# Patient Record
Sex: MALE | Race: BLACK OR AFRICAN AMERICAN | NOT HISPANIC OR LATINO | Employment: FULL TIME | URBAN - METROPOLITAN AREA
[De-identification: names, ages, dates, MRNs, and addresses within clinical notes are randomized per-mention and may not be internally consistent; named-entity substitution may affect disease eponyms.]

---

## 2017-08-18 ENCOUNTER — HOSPITAL ENCOUNTER (INPATIENT)
Facility: HOSPITAL | Age: 32
LOS: 4 days | Discharge: HOME | DRG: 300 | End: 2017-08-24
Attending: EMERGENCY MEDICINE | Admitting: INTERNAL MEDICINE
Payer: COMMERCIAL

## 2017-08-18 ENCOUNTER — NURSE TRIAGE (OUTPATIENT)
Dept: ADMINISTRATIVE | Facility: CLINIC | Age: 32
End: 2017-08-18

## 2017-08-18 DIAGNOSIS — I82.4Y2 ACUTE DEEP VEIN THROMBOSIS (DVT) OF PROXIMAL VEIN OF LEFT LOWER EXTREMITY: Primary | ICD-10-CM

## 2017-08-18 DIAGNOSIS — M79.89 LEG SWELLING: ICD-10-CM

## 2017-08-18 LAB
ALBUMIN SERPL BCP-MCNC: 3.5 G/DL
ALP SERPL-CCNC: 94 U/L
ALT SERPL W/O P-5'-P-CCNC: 15 U/L
ANION GAP SERPL CALC-SCNC: 11 MMOL/L
APTT BLDCRRT: 24.7 SEC
AST SERPL-CCNC: 16 U/L
BASOPHILS # BLD AUTO: 0.01 K/UL
BASOPHILS NFR BLD: 0.1 %
BILIRUB SERPL-MCNC: 0.6 MG/DL
BUN SERPL-MCNC: 6 MG/DL
CALCIUM SERPL-MCNC: 9.4 MG/DL
CHLORIDE SERPL-SCNC: 98 MMOL/L
CO2 SERPL-SCNC: 26 MMOL/L
CREAT SERPL-MCNC: 1.1 MG/DL
DIFFERENTIAL METHOD: ABNORMAL
EOSINOPHIL # BLD AUTO: 0.1 K/UL
EOSINOPHIL NFR BLD: 0.8 %
ERYTHROCYTE [DISTWIDTH] IN BLOOD BY AUTOMATED COUNT: 12.2 %
EST. GFR  (AFRICAN AMERICAN): >60 ML/MIN/1.73 M^2
EST. GFR  (NON AFRICAN AMERICAN): >60 ML/MIN/1.73 M^2
GLUCOSE SERPL-MCNC: 410 MG/DL
HCT VFR BLD AUTO: 42.9 %
HGB BLD-MCNC: 14.8 G/DL
INR PPP: 1
LYMPHOCYTES # BLD AUTO: 2.5 K/UL
LYMPHOCYTES NFR BLD: 21.1 %
MCH RBC QN AUTO: 29.3 PG
MCHC RBC AUTO-ENTMCNC: 34.5 G/DL
MCV RBC AUTO: 85 FL
MONOCYTES # BLD AUTO: 0.9 K/UL
MONOCYTES NFR BLD: 7.5 %
NEUTROPHILS # BLD AUTO: 8.2 K/UL
NEUTROPHILS NFR BLD: 70.2 %
PLATELET # BLD AUTO: 197 K/UL
PMV BLD AUTO: 10.8 FL
POCT GLUCOSE: 364 MG/DL (ref 70–110)
POTASSIUM SERPL-SCNC: 3.8 MMOL/L
PROT SERPL-MCNC: 7.7 G/DL
PROTHROMBIN TIME: 10.7 SEC
RBC # BLD AUTO: 5.05 M/UL
SODIUM SERPL-SCNC: 135 MMOL/L
WBC # BLD AUTO: 11.69 K/UL

## 2017-08-18 PROCEDURE — 85025 COMPLETE CBC W/AUTO DIFF WBC: CPT

## 2017-08-18 PROCEDURE — 85730 THROMBOPLASTIN TIME PARTIAL: CPT

## 2017-08-18 PROCEDURE — 80053 COMPREHEN METABOLIC PANEL: CPT

## 2017-08-18 PROCEDURE — 82962 GLUCOSE BLOOD TEST: CPT

## 2017-08-18 PROCEDURE — 99291 CRITICAL CARE FIRST HOUR: CPT | Mod: 25

## 2017-08-18 PROCEDURE — G0378 HOSPITAL OBSERVATION PER HR: HCPCS

## 2017-08-18 PROCEDURE — 96365 THER/PROPH/DIAG IV INF INIT: CPT

## 2017-08-18 PROCEDURE — 85610 PROTHROMBIN TIME: CPT

## 2017-08-18 RX ORDER — HEPARIN SODIUM,PORCINE/D5W 25000/250
17 INTRAVENOUS SOLUTION INTRAVENOUS CONTINUOUS
Status: DISCONTINUED | OUTPATIENT
Start: 2017-08-19 | End: 2017-08-21

## 2017-08-18 RX ORDER — IBUPROFEN 200 MG
16 TABLET ORAL
Status: DISCONTINUED | OUTPATIENT
Start: 2017-08-19 | End: 2017-08-24 | Stop reason: HOSPADM

## 2017-08-18 RX ORDER — IBUPROFEN 200 MG
24 TABLET ORAL
Status: DISCONTINUED | OUTPATIENT
Start: 2017-08-19 | End: 2017-08-24 | Stop reason: HOSPADM

## 2017-08-18 RX ORDER — INSULIN ASPART 100 [IU]/ML
1-10 INJECTION, SOLUTION INTRAVENOUS; SUBCUTANEOUS
Status: DISCONTINUED | OUTPATIENT
Start: 2017-08-19 | End: 2017-08-24 | Stop reason: HOSPADM

## 2017-08-18 RX ORDER — GLUCAGON 1 MG
1 KIT INJECTION
Status: DISCONTINUED | OUTPATIENT
Start: 2017-08-19 | End: 2017-08-24 | Stop reason: HOSPADM

## 2017-08-18 RX ADMIN — HEPARIN SODIUM AND DEXTROSE 17 UNITS/KG/HR: 10000; 5 INJECTION INTRAVENOUS at 11:08

## 2017-08-19 PROBLEM — E11.9 TYPE 2 DIABETES MELLITUS WITHOUT COMPLICATION: Status: ACTIVE | Noted: 2017-08-19

## 2017-08-19 PROBLEM — E66.09 NON MORBID OBESITY DUE TO EXCESS CALORIES: Status: ACTIVE | Noted: 2017-08-19

## 2017-08-19 LAB
APTT BLDCRRT: 60.6 SEC
BASOPHILS # BLD AUTO: 0.02 K/UL
BASOPHILS NFR BLD: 0.2 %
DIFFERENTIAL METHOD: NORMAL
EOSINOPHIL # BLD AUTO: 0.1 K/UL
EOSINOPHIL NFR BLD: 0.9 %
ERYTHROCYTE [DISTWIDTH] IN BLOOD BY AUTOMATED COUNT: 12.2 %
HCT VFR BLD AUTO: 41.7 %
HGB BLD-MCNC: 14.3 G/DL
LYMPHOCYTES # BLD AUTO: 3.1 K/UL
LYMPHOCYTES NFR BLD: 28.5 %
MCH RBC QN AUTO: 29.2 PG
MCHC RBC AUTO-ENTMCNC: 34.3 G/DL
MCV RBC AUTO: 85 FL
MONOCYTES # BLD AUTO: 0.8 K/UL
MONOCYTES NFR BLD: 7.1 %
NEUTROPHILS # BLD AUTO: 6.8 K/UL
NEUTROPHILS NFR BLD: 63 %
PLATELET # BLD AUTO: 209 K/UL
PMV BLD AUTO: 11.4 FL
POCT GLUCOSE: 295 MG/DL (ref 70–110)
POCT GLUCOSE: 322 MG/DL (ref 70–110)
POCT GLUCOSE: 326 MG/DL (ref 70–110)
POCT GLUCOSE: 341 MG/DL (ref 70–110)
POCT GLUCOSE: 367 MG/DL (ref 70–110)
RBC # BLD AUTO: 4.9 M/UL
WBC # BLD AUTO: 10.77 K/UL

## 2017-08-19 PROCEDURE — 36415 COLL VENOUS BLD VENIPUNCTURE: CPT

## 2017-08-19 PROCEDURE — G0378 HOSPITAL OBSERVATION PER HR: HCPCS

## 2017-08-19 PROCEDURE — 25000003 PHARM REV CODE 250: Performed by: EMERGENCY MEDICINE

## 2017-08-19 PROCEDURE — 94761 N-INVAS EAR/PLS OXIMETRY MLT: CPT

## 2017-08-19 PROCEDURE — 63600175 PHARM REV CODE 636 W HCPCS: Performed by: INTERNAL MEDICINE

## 2017-08-19 PROCEDURE — 85730 THROMBOPLASTIN TIME PARTIAL: CPT

## 2017-08-19 PROCEDURE — 25500020 PHARM REV CODE 255: Performed by: INTERNAL MEDICINE

## 2017-08-19 PROCEDURE — 99223 1ST HOSP IP/OBS HIGH 75: CPT | Mod: ,,, | Performed by: INTERNAL MEDICINE

## 2017-08-19 PROCEDURE — 63600175 PHARM REV CODE 636 W HCPCS: Performed by: EMERGENCY MEDICINE

## 2017-08-19 PROCEDURE — 85025 COMPLETE CBC W/AUTO DIFF WBC: CPT

## 2017-08-19 RX ORDER — PANTOPRAZOLE SODIUM 40 MG/1
40 TABLET, DELAYED RELEASE ORAL DAILY
Status: DISCONTINUED | OUTPATIENT
Start: 2017-08-19 | End: 2017-08-23

## 2017-08-19 RX ORDER — MIRTAZAPINE 7.5 MG/1
7.5 TABLET, FILM COATED ORAL NIGHTLY
Status: DISCONTINUED | OUTPATIENT
Start: 2017-08-19 | End: 2017-08-24 | Stop reason: HOSPADM

## 2017-08-19 RX ORDER — MORPHINE SULFATE 2 MG/ML
2 INJECTION, SOLUTION INTRAMUSCULAR; INTRAVENOUS
Status: DISCONTINUED | OUTPATIENT
Start: 2017-08-19 | End: 2017-08-24 | Stop reason: HOSPADM

## 2017-08-19 RX ADMIN — HEPARIN SODIUM AND DEXTROSE 17 UNITS/KG/HR: 10000; 5 INJECTION INTRAVENOUS at 07:08

## 2017-08-19 RX ADMIN — INSULIN ASPART 3 UNITS: 100 INJECTION, SOLUTION INTRAVENOUS; SUBCUTANEOUS at 09:08

## 2017-08-19 RX ADMIN — MORPHINE SULFATE 2 MG: 2 INJECTION, SOLUTION INTRAMUSCULAR; INTRAVENOUS at 07:08

## 2017-08-19 RX ADMIN — HEPARIN SODIUM AND DEXTROSE 17 UNITS/KG/HR: 10000; 5 INJECTION INTRAVENOUS at 09:08

## 2017-08-19 RX ADMIN — MORPHINE SULFATE 2 MG: 2 INJECTION, SOLUTION INTRAMUSCULAR; INTRAVENOUS at 02:08

## 2017-08-19 RX ADMIN — IOHEXOL 100 ML: 350 INJECTION, SOLUTION INTRAVENOUS at 08:08

## 2017-08-19 RX ADMIN — INSULIN ASPART 4 UNITS: 100 INJECTION, SOLUTION INTRAVENOUS; SUBCUTANEOUS at 02:08

## 2017-08-19 RX ADMIN — INSULIN ASPART 10 UNITS: 100 INJECTION, SOLUTION INTRAVENOUS; SUBCUTANEOUS at 11:08

## 2017-08-19 RX ADMIN — MORPHINE SULFATE 2 MG: 2 INJECTION, SOLUTION INTRAMUSCULAR; INTRAVENOUS at 09:08

## 2017-08-19 RX ADMIN — MORPHINE SULFATE 2 MG: 2 INJECTION, SOLUTION INTRAMUSCULAR; INTRAVENOUS at 01:08

## 2017-08-19 RX ADMIN — HEPARIN SODIUM AND DEXTROSE 17 UNITS/KG/HR: 10000; 5 INJECTION INTRAVENOUS at 01:08

## 2017-08-19 RX ADMIN — PANTOPRAZOLE SODIUM 40 MG: 40 TABLET, DELAYED RELEASE ORAL at 08:08

## 2017-08-19 RX ADMIN — INSULIN ASPART 8 UNITS: 100 INJECTION, SOLUTION INTRAVENOUS; SUBCUTANEOUS at 04:08

## 2017-08-19 NOTE — TELEPHONE ENCOUNTER
Reason for Disposition   Already left for the hospital/clinic.    Protocols used: ST NO CONTACT OR DUPLICATE CONTACT CALL-A-AH  Patient is noted to be in the ED

## 2017-08-19 NOTE — ED PROVIDER NOTES
Encounter Date: 8/18/2017       History     Chief Complaint   Patient presents with    Leg Swelling     increased redness and swelling to L posterior knee. denies numbness and tingling. denies fever, chills. denies CP. denies SOB. denies recent trauma or injury.      Patient is a 32-year-old male who complains of diffuse swelling and pain of his left lower leg.  He denies trauma.  He says he began to have pain to the posterior aspect of his left knee a few days ago.  He then noted swelling extending down the lower leg.  Patient had an extended car trip, although says this was a couple of weeks ago.  He denies fever or chills.  No shortness of breath or chest pain.  He has no history of DVTs.      The history is provided by the patient.     Review of patient's allergies indicates:  No Known Allergies  Past Medical History:   Diagnosis Date    Diabetes mellitus     Hypertension      Past Surgical History:   Procedure Laterality Date    APPENDECTOMY       Family History   Problem Relation Age of Onset    Cancer Mother     Cancer Paternal Uncle     Cancer Paternal Grandfather     Cancer Maternal Grandfather      Social History   Substance Use Topics    Smoking status: Current Some Day Smoker     Packs/day: 1.00    Smokeless tobacco: Current User      Comment: per 2 weeks time    Alcohol use No     Review of Systems   Constitutional: Negative for fever.   Respiratory: Negative for chest tightness and shortness of breath.    Cardiovascular: Positive for leg swelling. Negative for chest pain.   Gastrointestinal: Negative for nausea and vomiting.   Skin: Negative for color change and rash.   Neurological: Negative for numbness.   All other systems reviewed and are negative.      Physical Exam     Initial Vitals [08/18/17 2051]   BP Pulse Resp Temp SpO2   (!) 163/94 (!) 112 18 99 °F (37.2 °C) 97 %      MAP       117         Physical Exam    Nursing note and vitals reviewed.  Constitutional: No distress.   HENT:    Head: Normocephalic and atraumatic.   Eyes: EOM are normal.   Neck: Normal range of motion. Neck supple.   Cardiovascular: Normal rate, regular rhythm and normal heart sounds.   Pulmonary/Chest: Breath sounds normal.   Musculoskeletal: Normal range of motion.   There is diffuse swelling with very mild tenderness of the left calf.  There are no palpable cords.  No erythema.  DP and PT pulses are palpable.   Neurological: He is alert and oriented to person, place, and time.   Skin: Skin is warm and dry. No erythema.   Psychiatric: His behavior is normal. Thought content normal.         ED Course   Critical Care  Date/Time: 8/18/2017 11:27 PM  Performed by: ROB MOORE  Authorized by: ROB MOORE   Direct patient critical care time: 12 minutes  Ordering / reviewing critical care time: 12 minutes  Documentation critical care time: 12 minutes  Consulting other physicians critical care time: 3 minutes  Total critical care time (exclusive of procedural time) : 39 minutes  Critical care was time spent personally by me on the following activities: examination of patient, obtaining history from patient or surrogate, ordering and review of radiographic studies and ordering and review of laboratory studies.        Labs Reviewed   POCT GLUCOSE - Abnormal; Notable for the following:        Result Value    POCT Glucose 364 (*)     All other components within normal limits        Imaging Results          US Lower Extremity Veins Left (Final result)  Result time 08/18/17 22:10:00    Final result by Meg Vazquez MD (08/18/17 22:10:00)                 Impression:      Left lower extremity deep venous thrombosis throughout the femoral, popliteal, and peroneal veins.      Findings were communicated from Dr. Vazquez to DR ROB MOORE at 22:08:57 on 08/18/17..        Electronically signed by: MEG VAZQUEZ MD  Date:     08/18/17  Time:    22:10              Narrative:    Reason for study: Rule out DVT      Comparison: None.    Technique: Routine bilateral lower extremity venous ultrasound was performed using grayscale and color flow doppler spectral analysis.    Findings:   There is thrombosis visualized within the left femoral vein, popliteal vein, and peroneal vein.  No evidence of clot involving the bilateral common femoral veins or left greater saphenous, posterior tibial, and anterior tibial veins.  No evidence of soft tissue mass or Baker's cyst.                                                   ED Course     Clinical Impression:   The primary encounter diagnosis was Acute deep vein thrombosis (DVT) of proximal vein of left lower extremity. A diagnosis of Leg swelling was also pertinent to this visit.                           Jaret Julian MD  08/18/17 3601

## 2017-08-19 NOTE — PLAN OF CARE
08/19/17 1736   Discharge Assessment   Assessment Type Discharge Planning Assessment   Assessment information obtained from? Patient   Prior to hospitilization cognitive status: Alert/Oriented   Prior to hospitalization functional status: Independent   Current cognitive status: Alert/Oriented   Current Functional Status: Independent   Arrived From home or self-care   Lives With child(danny), dependent   Able to Return to Prior Arrangements yes   Is patient able to care for self after discharge? Yes   How many people do you have in your home that can help with your care after discharge? 1   Who are your caregiver(s) and their phone number(s)? Chloé Mueller(wife)[phone]   Patient's perception of discharge disposition home or selfcare   Readmission Within The Last 30 Days no previous admission in last 30 days   Patient currently being followed by outpatient case management? No   Patient currently receives home health services? No   Does the patient currently use HME? No   Patient currently receives private duty nursing? No   Patient currently receives any other outside agency services? No   Equipment Currently Used at Home none   Do you have any problems affording any of your prescribed medications? No   Is the patient taking medications as prescribed? yes   Do you have any financial concerns preventing you from receiving the healthcare you need? No   Does the patient have transportation to healthcare appointments? Yes   Transportation Available family or friend will provide;car   On Dialysis? No   Does the patient receive services at the Coumadin Clinic? No   Are there any open cases? No   Discharge Plan A Home   Discharge Plan B Home with family   Patient/Family In Agreement With Plan yes   The pt's a  and states he's very very independent.

## 2017-08-19 NOTE — NURSING TRANSFER
Nursing Transfer Note      8/19/2017     Transfer From: ED to 470     Transfer via wheelchair    Transported by: PCT    Medicines sent: Heparin drip infusing @ 18.1 mL/hr     Chart send with patient: Yes    Notified: spouse at the bedside    Patient reassessed at: 8/19 @ 00:45    98.6 temp  HR 87  168/91  RR 19  99 % on RA      Upon arrival to floor: patient oriented to room, call bell in reach and bed in lowest position, fall contract signed, all safety precautions maintained.     Rn to continue to monitor.

## 2017-08-19 NOTE — H&P
[facility]  Cardiology  History and Physical     Patient Name: Kulwant Mueller Jr.  MRN: [id number]  Admission Date: 8/18/2017  Code Status: Full Code   Attending Provider: Madan Quintero  Primary Care Physician: Primary Doctor No  Principal Problem: L leg pain    Patient information was obtained from patient and chart    Subjective:     Chief Complaint:  Left leg pain    HPI:    32 year old male presenting with an unprovoked L leg DVT. He noted severe pain with edema. No pulmonary symptoms. He was started on IV heparin with improvement of edema but the pain 6/10 with palpation. No previous h/o VTE. Drove for 5 hours 2 weeks ago. He thinks his mother and aunt had VTEs in the past. No other medical problems. Intentional 100 lbs weight loss over the past year.        Past Medical History:   Diagnosis Date    Diabetes mellitus     Hypertension        Past Surgical History:   Procedure Laterality Date    APPENDECTOMY         Review of patient's allergies indicates:  No Known Allergies    No current facility-administered medications on file prior to encounter.      Current Outpatient Prescriptions on File Prior to Encounter   Medication Sig    INSULIN DETEMIR (LEVEMIR FLEXPEN SUBQ) Inject into the skin.    insulin lispro (HUMALOG) 100 unit/mL injection Inject into the skin 3 (three) times daily before meals.    mirtazapine (REMERON) 7.5 MG Tab Take 1 tablet (7.5 mg total) by mouth every evening.    pantoprazole (PROTONIX) 40 MG tablet Take 1 tablet (40 mg total) by mouth once daily.     Family History     Problem Relation (Age of Onset)    Cancer Mother, Paternal Uncle, Paternal Grandfather, Maternal Grandfather        Social History Main Topics    Smoking status: Current Some Day Smoker     Packs/day: 1.00    Smokeless tobacco: Current User      Comment: per 2 weeks time    Alcohol use No    Drug use: No    Sexual activity: Not on file     Review of Systems   Constitution: Negative for  diaphoresis, weakness, night sweats, weight gain and weight loss.   HENT: Negative for congestion.    Eyes: Negative for blurred vision, discharge and double vision.   Cardiovascular: Positive for leg swelling (left leg with pain). Negative for chest pain, claudication, cyanosis, dyspnea on exertion, irregular heartbeat, near-syncope, orthopnea, palpitations, paroxysmal nocturnal dyspnea and syncope.   Respiratory: Negative for cough, shortness of breath and wheezing.    Endocrine: Negative for cold intolerance, heat intolerance and polyphagia.   Hematologic/Lymphatic: Negative for adenopathy and bleeding problem. Does not bruise/bleed easily.   Skin: Negative for dry skin and nail changes.   Musculoskeletal: Negative for arthritis, back pain, falls, joint pain, myalgias and neck pain.   Gastrointestinal: Negative for bloating, abdominal pain, change in bowel habit and constipation.   Genitourinary: Negative for bladder incontinence, dysuria, flank pain, genital sores and missed menses.   Neurological: Negative for aphonia, brief paralysis, difficulty with concentration and dizziness.   Psychiatric/Behavioral: Negative for altered mental status and memory loss. The patient does not have insomnia.    Allergic/Immunologic: Negative for environmental allergies.     Objective:     Vital Signs (Most Recent):  Temp: 98.6 °F (37 °C) (08/19/17 1127)  Pulse: 94 (08/19/17 1127)  Resp: 20 (08/19/17 1127)  BP: (!) 140/90 (08/19/17 1127)  SpO2: 96 % (08/19/17 1141) Vital Signs (24h Range):  Temp:  [97 °F (36.1 °C)-99 °F (37.2 °C)] 98.6 °F (37 °C)  Pulse:  [] 94  Resp:  [18-20] 20  SpO2:  [96 %-99 %] 96 %  BP: (136-168)/(77-99) 140/90     Weight: 106.6 kg (235 lb)  Body mass index is 30.17 kg/m².    SpO2: 96 %  O2 Device (Oxygen Therapy): room air      Intake/Output Summary (Last 24 hours) at 08/19/17 1453  Last data filed at 08/19/17 1200   Gross per 24 hour   Intake              700 ml   Output             1052 ml   Net              -352 ml       Lines/Drains/Airways     Peripheral Intravenous Line                 Peripheral IV - Single Lumen 08/18/17 2229 Right Antecubital less than 1 day                Physical Exam   Constitutional: He is oriented to person, place, and time. He appears well-developed and well-nourished. He is not intubated.   HENT:   Head: Normocephalic and atraumatic.   Right Ear: External ear normal.   Left Ear: External ear normal.   Mouth/Throat: Oropharynx is clear and moist.   Eyes: Conjunctivae and EOM are normal. Pupils are equal, round, and reactive to light. Right eye exhibits no discharge. Left eye exhibits no discharge. No scleral icterus.   Neck: Normal range of motion. Neck supple. Normal carotid pulses, no hepatojugular reflux and no JVD present. Carotid bruit is not present. No tracheal deviation present. No thyromegaly present.   Cardiovascular: Normal rate, regular rhythm, S1 normal and S2 normal.   No extrasystoles are present. PMI is not displaced.  Exam reveals no gallop, no S3, no distant heart sounds, no friction rub and no midsystolic click.    No murmur heard.  Pulses:       Carotid pulses are 2+ on the right side, and 2+ on the left side.       Radial pulses are 2+ on the right side, and 2+ on the left side.        Femoral pulses are 2+ on the right side, and 2+ on the left side.       Popliteal pulses are 2+ on the right side, and 2+ on the left side.        Dorsalis pedis pulses are 2+ on the right side, and 2+ on the left side.        Posterior tibial pulses are 2+ on the right side, and 2+ on the left side.       L leg edema foot to ankle associated with pain to palpation         Pulmonary/Chest: Effort normal and breath sounds normal. No accessory muscle usage or stridor. No apnea, no tachypnea and no bradypnea. He is not intubated. No respiratory distress. He has no decreased breath sounds. He has no wheezes. He has no rales. He exhibits no tenderness and no bony tenderness.    Abdominal: He exhibits no distension, no pulsatile liver, no abdominal bruit, no ascites, no pulsatile midline mass and no mass. There is no tenderness. There is no rebound and no guarding.   Musculoskeletal: Normal range of motion. He exhibits no edema or tenderness.   Lymphadenopathy:     He has no cervical adenopathy.   Neurological: He is alert and oriented to person, place, and time. He has normal reflexes. No cranial nerve deficit. Coordination normal.   Skin: Skin is warm. No rash noted. No erythema. No pallor.     L calf warmer than R   Psychiatric: He has a normal mood and affect. His behavior is normal. Judgment and thought content normal.       Significant Labs:       LABS  CBC    Recent Labs  Lab 08/18/17 2229 08/19/17  0522   WBC 11.69 10.77   RBC 5.05 4.90   HGB 14.8 14.3   HCT 42.9 41.7    209   MCV 85 85   MCH 29.3 29.2   MCHC 34.5 34.3     BMP    Recent Labs  Lab 08/18/17 2229   *   K 3.8   CO2 26   CL 98   BUN 6   CREATININE 1.1   *       POCT-Glucose  POCT Glucose   Date Value Ref Range Status   08/19/2017 367 (H) 70 - 110 mg/dL Final   08/19/2017 341 (H) 70 - 110 mg/dL Final   08/19/2017 322 (H) 70 - 110 mg/dL Final   08/18/2017 364 (H) 70 - 110 mg/dL Final         Recent Labs  Lab 08/18/17  2229   CALCIUM 9.4     LFT    Recent Labs  Lab 08/18/17  2229   PROT 7.7   ALBUMIN 3.5   BILITOT 0.6   AST 16   ALKPHOS 94   ALT 15     GFR     COAGS    Recent Labs  Lab 08/18/17 2232 08/19/17  0521   INR 1.0  --    APTT 24.7 60.6*       LAST HbA1c  Lab Results   Component Value Date    HGBA1C 11.3 (H) 10/24/2016       Lipid panel  Lab Results   Component Value Date    CHOL 176 10/24/2016     Lab Results   Component Value Date    HDL 31 (L) 10/24/2016     Lab Results   Component Value Date    LDLCALC 121.2 10/24/2016     Lab Results   Component Value Date    TRIG 119 10/24/2016     Lab Results   Component Value Date    CHOLHDL 17.6 (L) 10/24/2016          Significant Imaging:      Imaging Results          US Lower Extremity Veins Left (Final result)  Result time 08/18/17 22:10:00    Final result by Meg Vazquez MD (08/18/17 22:10:00)                 Impression:      Left lower extremity deep venous thrombosis throughout the femoral, popliteal, and peroneal veins.      Findings were communicated from Dr. Vazquez to DR ROB MOORE at 22:08:57 on 08/18/17..        Electronically signed by: MEG VAZQUEZ MD  Date:     08/18/17  Time:    22:10              Narrative:    Reason for study: Rule out DVT     Comparison: None.    Technique: Routine bilateral lower extremity venous ultrasound was performed using grayscale and color flow doppler spectral analysis.    Findings:   There is thrombosis visualized within the left femoral vein, popliteal vein, and peroneal vein.  No evidence of clot involving the bilateral common femoral veins or left greater saphenous, posterior tibial, and anterior tibial veins.  No evidence of soft tissue mass or Baker's cyst.                                Assessment and Plan:     No notes have been filed under this hospital service.  Service: Cardiology      VTE Risk Mitigation         Ordered     Low Risk of VTE  Once      08/19/17 0150              Imp:      Acute L leg DVT -POP and femoral  DVT  Obesity  DM        Plan:    IV heparin  CT abdomen and pelvis   Rule out malignancy   Check patency of IVC and iliac veins        If there is no improvement with IV heparin we will consider thrombectomy  Consider venogram later        HgA1c  Weight loss  Lipid profile        Hypercoagulable work up later          Madan Quintero MD  Cardiology   [facility]

## 2017-08-19 NOTE — HOSPITAL COURSE
8/18/2017 Presented with LLE swelling and extreme pain. Underwent LE venous ultrasound with evidence of deep venous thrombosis throughout the femoral, popliteal, and peroneal veins. Started on IV Heparin drip and admitted to [facility] 8/19/2017 Continued swelling despite IV Heparin. 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 Evidence of possible worsening DVT despite IV Heparin. Plan for venogram tomorrow.   8/21/2017 Taken to the cath lab for venogram with left AT vein access was obtained under US guidance with placement of EKOS catheter at the level of CFV and therapy initiated with TPA at 1 mg/hr as well as Angiomax infusion 8/22/2017 EKOS catheter with TPA and Angiomax continued overnight with continued improvement in LLE swelling. Complaints of nausea overnight with several episodes of vomiting with no improvement with Zofran and Phenergan. Felt to be related to side effect of pain medication. Alk phos, total bili, AST and ALT normal. STAT KUB with no evidence of obstruction and abdominal ultrasound with no acute abnormalities. Amylase and lipase pending. Plan for removal of EKOS catheter at bedside rather than cath lab given n/v and given additional dose of IV Phenergan and started on Reglan

## 2017-08-19 NOTE — SUBJECTIVE & OBJECTIVE
Past Medical History:   Diagnosis Date    Diabetes mellitus     Hypertension        Past Surgical History:   Procedure Laterality Date    APPENDECTOMY         Review of patient's allergies indicates:  No Known Allergies    No current facility-administered medications on file prior to encounter.      Current Outpatient Prescriptions on File Prior to Encounter   Medication Sig    INSULIN DETEMIR (LEVEMIR FLEXPEN SUBQ) Inject into the skin.    insulin lispro (HUMALOG) 100 unit/mL injection Inject into the skin 3 (three) times daily before meals.    mirtazapine (REMERON) 7.5 MG Tab Take 1 tablet (7.5 mg total) by mouth every evening.    pantoprazole (PROTONIX) 40 MG tablet Take 1 tablet (40 mg total) by mouth once daily.     Family History     Problem Relation (Age of Onset)    Cancer Mother, Paternal Uncle, Paternal Grandfather, Maternal Grandfather        Social History Main Topics    Smoking status: Current Some Day Smoker     Packs/day: 1.00    Smokeless tobacco: Current User      Comment: per 2 weeks time    Alcohol use No    Drug use: No    Sexual activity: Not on file     Review of Systems   Constitution: Negative for diaphoresis, weakness, night sweats, weight gain and weight loss.   HENT: Negative for congestion.    Eyes: Negative for blurred vision, discharge and double vision.   Cardiovascular: Positive for leg swelling (left leg with pain). Negative for chest pain, claudication, cyanosis, dyspnea on exertion, irregular heartbeat, near-syncope, orthopnea, palpitations, paroxysmal nocturnal dyspnea and syncope.   Respiratory: Negative for cough, shortness of breath and wheezing.    Endocrine: Negative for cold intolerance, heat intolerance and polyphagia.   Hematologic/Lymphatic: Negative for adenopathy and bleeding problem. Does not bruise/bleed easily.   Skin: Negative for dry skin and nail changes.   Musculoskeletal: Negative for arthritis, back pain, falls, joint pain, myalgias and neck pain.    Gastrointestinal: Negative for bloating, abdominal pain, change in bowel habit and constipation.   Genitourinary: Negative for bladder incontinence, dysuria, flank pain, genital sores and missed menses.   Neurological: Negative for aphonia, brief paralysis, difficulty with concentration and dizziness.   Psychiatric/Behavioral: Negative for altered mental status and memory loss. The patient does not have insomnia.    Allergic/Immunologic: Negative for environmental allergies.     Objective:     Vital Signs (Most Recent):  Temp: 98.6 °F (37 °C) (08/19/17 1127)  Pulse: 94 (08/19/17 1127)  Resp: 20 (08/19/17 1127)  BP: (!) 140/90 (08/19/17 1127)  SpO2: 96 % (08/19/17 1141) Vital Signs (24h Range):  Temp:  [97 °F (36.1 °C)-99 °F (37.2 °C)] 98.6 °F (37 °C)  Pulse:  [] 94  Resp:  [18-20] 20  SpO2:  [96 %-99 %] 96 %  BP: (136-168)/(77-99) 140/90     Weight: 106.6 kg (235 lb)  Body mass index is 30.17 kg/m².    SpO2: 96 %  O2 Device (Oxygen Therapy): room air      Intake/Output Summary (Last 24 hours) at 08/19/17 1453  Last data filed at 08/19/17 1200   Gross per 24 hour   Intake              700 ml   Output             1052 ml   Net             -352 ml       Lines/Drains/Airways     Peripheral Intravenous Line                 Peripheral IV - Single Lumen 08/18/17 2229 Right Antecubital less than 1 day                Physical Exam   Constitutional: He is oriented to person, place, and time. He appears well-developed and well-nourished. He is not intubated.   HENT:   Head: Normocephalic and atraumatic.   Right Ear: External ear normal.   Left Ear: External ear normal.   Mouth/Throat: Oropharynx is clear and moist.   Eyes: Conjunctivae and EOM are normal. Pupils are equal, round, and reactive to light. Right eye exhibits no discharge. Left eye exhibits no discharge. No scleral icterus.   Neck: Normal range of motion. Neck supple. Normal carotid pulses, no hepatojugular reflux and no JVD present. Carotid bruit is not  present. No tracheal deviation present. No thyromegaly present.   Cardiovascular: Normal rate, regular rhythm, S1 normal and S2 normal.   No extrasystoles are present. PMI is not displaced.  Exam reveals no gallop, no S3, no distant heart sounds, no friction rub and no midsystolic click.    No murmur heard.  Pulses:       Carotid pulses are 2+ on the right side, and 2+ on the left side.       Radial pulses are 2+ on the right side, and 2+ on the left side.        Femoral pulses are 2+ on the right side, and 2+ on the left side.       Popliteal pulses are 2+ on the right side, and 2+ on the left side.        Dorsalis pedis pulses are 2+ on the right side, and 2+ on the left side.        Posterior tibial pulses are 2+ on the right side, and 2+ on the left side.       L leg edema foot to ankle associated with pain to palpation         Pulmonary/Chest: Effort normal and breath sounds normal. No accessory muscle usage or stridor. No apnea, no tachypnea and no bradypnea. He is not intubated. No respiratory distress. He has no decreased breath sounds. He has no wheezes. He has no rales. He exhibits no tenderness and no bony tenderness.   Abdominal: He exhibits no distension, no pulsatile liver, no abdominal bruit, no ascites, no pulsatile midline mass and no mass. There is no tenderness. There is no rebound and no guarding.   Musculoskeletal: Normal range of motion. He exhibits no edema or tenderness.   Lymphadenopathy:     He has no cervical adenopathy.   Neurological: He is alert and oriented to person, place, and time. He has normal reflexes. No cranial nerve deficit. Coordination normal.   Skin: Skin is warm. No rash noted. No erythema. No pallor.     L calf warmer than R   Psychiatric: He has a normal mood and affect. His behavior is normal. Judgment and thought content normal.       Significant Labs:       LABS  CBC    Recent Labs  Lab 08/18/17  2229 08/19/17  0522   WBC 11.69 10.77   RBC 5.05 4.90   HGB 14.8 14.3    HCT 42.9 41.7    209   MCV 85 85   MCH 29.3 29.2   MCHC 34.5 34.3     BMP    Recent Labs  Lab 08/18/17  2229   *   K 3.8   CO2 26   CL 98   BUN 6   CREATININE 1.1   *       POCT-Glucose  POCT Glucose   Date Value Ref Range Status   08/19/2017 367 (H) 70 - 110 mg/dL Final   08/19/2017 341 (H) 70 - 110 mg/dL Final   08/19/2017 322 (H) 70 - 110 mg/dL Final   08/18/2017 364 (H) 70 - 110 mg/dL Final         Recent Labs  Lab 08/18/17  2229   CALCIUM 9.4     LFT    Recent Labs  Lab 08/18/17  2229   PROT 7.7   ALBUMIN 3.5   BILITOT 0.6   AST 16   ALKPHOS 94   ALT 15     GFR     COAGS    Recent Labs  Lab 08/18/17 2232 08/19/17  0521   INR 1.0  --    APTT 24.7 60.6*       LAST HbA1c  Lab Results   Component Value Date    HGBA1C 11.3 (H) 10/24/2016       Lipid panel  Lab Results   Component Value Date    CHOL 176 10/24/2016     Lab Results   Component Value Date    HDL 31 (L) 10/24/2016     Lab Results   Component Value Date    LDLCALC 121.2 10/24/2016     Lab Results   Component Value Date    TRIG 119 10/24/2016     Lab Results   Component Value Date    CHOLHDL 17.6 (L) 10/24/2016          Significant Imaging:     Imaging Results          US Lower Extremity Veins Left (Final result)  Result time 08/18/17 22:10:00    Final result by Meg Vazquez MD (08/18/17 22:10:00)                 Impression:      Left lower extremity deep venous thrombosis throughout the femoral, popliteal, and peroneal veins.      Findings were communicated from Dr. Vazquez to DR ROB MOORE at 22:08:57 on 08/18/17..        Electronically signed by: MEG VAZQUEZ MD  Date:     08/18/17  Time:    22:10              Narrative:    Reason for study: Rule out DVT     Comparison: None.    Technique: Routine bilateral lower extremity venous ultrasound was performed using grayscale and color flow doppler spectral analysis.    Findings:   There is thrombosis visualized within the left femoral vein, popliteal vein, and peroneal vein.   No evidence of clot involving the bilateral common femoral veins or left greater saphenous, posterior tibial, and anterior tibial veins.  No evidence of soft tissue mass or Baker's cyst.

## 2017-08-19 NOTE — ED NOTES
Patient identifiers for Kulwant Mueller Jr. checked and correct.  LOC: The patient is awake, alert and aware of environment with an appropriate affect, the patient is oriented x 3 and speaking appropriately.  APPEARANCE: Patient uncomfortable and in no acute distress, patient is clean and well groomed, patient's clothing are properly fastened.  SKIN: The skin is warm and dry.  Swelling to left leg.  Pain behind left knee.  MUSKULOSKELETAL: Patient moving all extremities. + pedal pulse, good movement and sensation in toes.  RESPIRATORY: Airway is open and patent, respirations are spontaneous, patient has a normal effort and rate.

## 2017-08-19 NOTE — PLAN OF CARE
Problem: Patient Care Overview  Goal: Plan of Care Review  Outcome: Ongoing (interventions implemented as appropriate)  Pt VSS and NAD. Pt received bolus dose of heparin to continuous infusion, rate remains @ 18.1 ml/hr or 17 units/kg/hr. Morning APTT not resulted yet. Pt LLE elevated. Pt blood glucose elevated overnight, 327, pt received 4 units s/s aspart. Pt on bedrest. Pain moderately controlled. All safety precautions maintained.   Rn to continue to monitor.

## 2017-08-19 NOTE — HPI
32 year old male presenting with an unprovoked L leg DVT. He noted severe pain with edema. No pulmonary symptoms. He was started on IV heparin with improvement of edema but the pain 6/10 with palpation. No previous h/o VTE. Drove for 5 hours 2 weeks ago. He thinks his mother and aunt had VTEs in the past. No other medical problems. Intentional 100 lbs weight loss over the past year.

## 2017-08-20 LAB
ALBUMIN SERPL BCP-MCNC: 2.9 G/DL
ALP SERPL-CCNC: 83 U/L
ALT SERPL W/O P-5'-P-CCNC: 13 U/L
ANION GAP SERPL CALC-SCNC: 8 MMOL/L
APTT BLDCRRT: 48.7 SEC
AST SERPL-CCNC: 12 U/L
BASOPHILS # BLD AUTO: 0.01 K/UL
BASOPHILS NFR BLD: 0.1 %
BILIRUB SERPL-MCNC: 0.3 MG/DL
BUN SERPL-MCNC: 10 MG/DL
CALCIUM SERPL-MCNC: 8.9 MG/DL
CHLORIDE SERPL-SCNC: 102 MMOL/L
CHOLEST/HDLC SERPL: 4.8 {RATIO}
CO2 SERPL-SCNC: 28 MMOL/L
CREAT SERPL-MCNC: 1 MG/DL
DIFFERENTIAL METHOD: ABNORMAL
EOSINOPHIL # BLD AUTO: 0.1 K/UL
EOSINOPHIL NFR BLD: 0.9 %
ERYTHROCYTE [DISTWIDTH] IN BLOOD BY AUTOMATED COUNT: 12.2 %
EST. GFR  (AFRICAN AMERICAN): >60 ML/MIN/1.73 M^2
EST. GFR  (NON AFRICAN AMERICAN): >60 ML/MIN/1.73 M^2
ESTIMATED AVG GLUCOSE: 332 MG/DL
GLUCOSE SERPL-MCNC: 356 MG/DL
HBA1C MFR BLD HPLC: 13.2 %
HCT VFR BLD AUTO: 39.9 %
HDL/CHOLESTEROL RATIO: 20.7 %
HDLC SERPL-MCNC: 140 MG/DL
HDLC SERPL-MCNC: 29 MG/DL
HGB BLD-MCNC: 13.4 G/DL
LDLC SERPL CALC-MCNC: 83.4 MG/DL
LYMPHOCYTES # BLD AUTO: 2.7 K/UL
LYMPHOCYTES NFR BLD: 25.9 %
MCH RBC QN AUTO: 29.2 PG
MCHC RBC AUTO-ENTMCNC: 33.6 G/DL
MCV RBC AUTO: 87 FL
MONOCYTES # BLD AUTO: 0.8 K/UL
MONOCYTES NFR BLD: 7.8 %
NEUTROPHILS # BLD AUTO: 6.7 K/UL
NEUTROPHILS NFR BLD: 65.2 %
NONHDLC SERPL-MCNC: 111 MG/DL
PLATELET # BLD AUTO: 204 K/UL
PMV BLD AUTO: 10.7 FL
POCT GLUCOSE: 302 MG/DL (ref 70–110)
POCT GLUCOSE: 318 MG/DL (ref 70–110)
POCT GLUCOSE: 319 MG/DL (ref 70–110)
POCT GLUCOSE: 366 MG/DL (ref 70–110)
POTASSIUM SERPL-SCNC: 4 MMOL/L
PROT SERPL-MCNC: 6.7 G/DL
RBC # BLD AUTO: 4.59 M/UL
SODIUM SERPL-SCNC: 138 MMOL/L
TRIGL SERPL-MCNC: 138 MG/DL
WBC # BLD AUTO: 10.28 K/UL

## 2017-08-20 PROCEDURE — 83036 HEMOGLOBIN GLYCOSYLATED A1C: CPT

## 2017-08-20 PROCEDURE — 63600175 PHARM REV CODE 636 W HCPCS: Performed by: EMERGENCY MEDICINE

## 2017-08-20 PROCEDURE — 85730 THROMBOPLASTIN TIME PARTIAL: CPT

## 2017-08-20 PROCEDURE — G0378 HOSPITAL OBSERVATION PER HR: HCPCS

## 2017-08-20 PROCEDURE — 85025 COMPLETE CBC W/AUTO DIFF WBC: CPT

## 2017-08-20 PROCEDURE — 94761 N-INVAS EAR/PLS OXIMETRY MLT: CPT

## 2017-08-20 PROCEDURE — 80061 LIPID PANEL: CPT

## 2017-08-20 PROCEDURE — 63600175 PHARM REV CODE 636 W HCPCS: Performed by: INTERNAL MEDICINE

## 2017-08-20 PROCEDURE — 99233 SBSQ HOSP IP/OBS HIGH 50: CPT | Mod: ,,, | Performed by: INTERNAL MEDICINE

## 2017-08-20 PROCEDURE — 25000003 PHARM REV CODE 250: Performed by: EMERGENCY MEDICINE

## 2017-08-20 PROCEDURE — 80053 COMPREHEN METABOLIC PANEL: CPT

## 2017-08-20 PROCEDURE — 36415 COLL VENOUS BLD VENIPUNCTURE: CPT

## 2017-08-20 RX ADMIN — INSULIN ASPART 8 UNITS: 100 INJECTION, SOLUTION INTRAVENOUS; SUBCUTANEOUS at 09:08

## 2017-08-20 RX ADMIN — INSULIN ASPART 4 UNITS: 100 INJECTION, SOLUTION INTRAVENOUS; SUBCUTANEOUS at 10:08

## 2017-08-20 RX ADMIN — MORPHINE SULFATE 2 MG: 2 INJECTION, SOLUTION INTRAMUSCULAR; INTRAVENOUS at 06:08

## 2017-08-20 RX ADMIN — MORPHINE SULFATE 2 MG: 2 INJECTION, SOLUTION INTRAMUSCULAR; INTRAVENOUS at 10:08

## 2017-08-20 RX ADMIN — PANTOPRAZOLE SODIUM 40 MG: 40 TABLET, DELAYED RELEASE ORAL at 09:08

## 2017-08-20 RX ADMIN — MIRTAZAPINE 7.5 MG: 7.5 TABLET ORAL at 08:08

## 2017-08-20 RX ADMIN — HEPARIN SODIUM AND DEXTROSE 17 UNITS/KG/HR: 10000; 5 INJECTION INTRAVENOUS at 01:08

## 2017-08-20 RX ADMIN — INSULIN ASPART 10 UNITS: 100 INJECTION, SOLUTION INTRAVENOUS; SUBCUTANEOUS at 12:08

## 2017-08-20 RX ADMIN — MIRTAZAPINE 7.5 MG: 7.5 TABLET ORAL at 12:08

## 2017-08-20 RX ADMIN — INSULIN ASPART 8 UNITS: 100 INJECTION, SOLUTION INTRAVENOUS; SUBCUTANEOUS at 04:08

## 2017-08-20 RX ADMIN — MORPHINE SULFATE 2 MG: 2 INJECTION, SOLUTION INTRAMUSCULAR; INTRAVENOUS at 01:08

## 2017-08-20 NOTE — PLAN OF CARE
Problem: Patient Care Overview  Goal: Plan of Care Review  Outcome: Ongoing (interventions implemented as appropriate)  Continue to monitor oxygenation status

## 2017-08-20 NOTE — PLAN OF CARE
Performed pain assessment on pt.  At rest, pt stated pain is 5-6/10.  When ambulating in the room to restroom and back, pt stated pain was 8-9/10.  Pt ambulated around the nurse's station w/nurse.  Pt stated pain while ambulating around nurse's station was 9/10.

## 2017-08-20 NOTE — PROGRESS NOTES
Seen this am      Still c/o L calf pain  Edema is better      CT revealed L CIV compression consistent with May Thurner Syndrome        Vitals:    08/20/17 0734 08/20/17 0749 08/20/17 1149 08/20/17 1209   BP: 120/78  129/75    Pulse: 85  90    Resp: 18  20    Temp: 98.3 °F (36.8 °C)  98.6 °F (37 °C)    TempSrc: Oral  Oral    SpO2:  97%  97%   Weight:       Height:               LABS  CBC    Recent Labs  Lab 08/18/17 2229 08/19/17  0522 08/20/17  0440   WBC 11.69 10.77 10.28   RBC 5.05 4.90 4.59*   HGB 14.8 14.3 13.4*   HCT 42.9 41.7 39.9*    209 204   MCV 85 85 87   MCH 29.3 29.2 29.2   MCHC 34.5 34.3 33.6     BMP    Recent Labs  Lab 08/18/17 2229 08/20/17  0440   * 138   K 3.8 4.0   CO2 26 28   CL 98 102   BUN 6 10   CREATININE 1.1 1.0   * 356*       POCT-Glucose  POCT Glucose   Date Value Ref Range Status   08/20/2017 366 (H) 70 - 110 mg/dL Final   08/20/2017 318 (H) 70 - 110 mg/dL Final   08/19/2017 295 (H) 70 - 110 mg/dL Final   08/19/2017 326 (H) 70 - 110 mg/dL Final   08/19/2017 367 (H) 70 - 110 mg/dL Final   08/19/2017 341 (H) 70 - 110 mg/dL Final   08/19/2017 322 (H) 70 - 110 mg/dL Final   08/18/2017 364 (H) 70 - 110 mg/dL Final         Recent Labs  Lab 08/18/17 2229 08/20/17  0440   CALCIUM 9.4 8.9     LFT    Recent Labs  Lab 08/18/17 2229 08/20/17  0440   PROT 7.7 6.7   ALBUMIN 3.5 2.9*   BILITOT 0.6 0.3   AST 16 12   ALKPHOS 94 83   ALT 15 13     GFR     COAGS    Recent Labs  Lab 08/18/17 2232 08/19/17  0521 08/20/17  0440   INR 1.0  --   --    APTT 24.7 60.6* 48.7*       LAST HbA1c  Lab Results   Component Value Date    HGBA1C 13.2 (H) 08/20/2017       Lipid panel  Lab Results   Component Value Date    CHOL 140 08/20/2017    CHOL 176 10/24/2016     Lab Results   Component Value Date    HDL 29 (L) 08/20/2017    HDL 31 (L) 10/24/2016     Lab Results   Component Value Date    LDLCALC 83.4 08/20/2017    LDLCALC 121.2 10/24/2016     Lab Results   Component Value Date    TRIG 138  08/20/2017    TRIG 119 10/24/2016     Lab Results   Component Value Date    CHOLHDL 20.7 08/20/2017    CHOLHDL 17.6 (L) 10/24/2016            Imaging Results          CT Abdomen Pelvis W WO Contrast (Final result)  Result time 08/20/17 06:22:19   Procedure changed from CT Abdomen Pelvis With Contrast     Final result by Catarina Rojo MD (08/20/17 06:22:19)                 Impression:       1. Possible compression of the left common iliac vein by the right common iliac artery.  Given the patient's known left lower extremity DVT, this could represent May Thurner syndrome.    2. No filling defects within the IVC to suggest thrombosis in this patient with known lower extremity DVT.    3. Marked distention of the urinary bladder.  Clinical correlation recommended.  Slight asymmetric prominence of the left renal collecting system and proximal ureter, which tapers to normal caliber.  No evidence of obstructing stone, noting multiple pelvic phleboliths.    4. No evidence of abnormal enhancing hepatic lesion on today's examination.    5. Left gynecomastia.        Electronically signed by: CATARINA ROJO  Date:     08/20/17  Time:    06:22              Narrative:    TECHNIQUE: Oral contrast was used. Noncontrast CT images of the abdomen were obtained. Using IV contrast ( 100 cc Omni 350 IV),  images of the liver were obtained during the arterial phase. Standard portal venous phase abdominal CT scan was then performed. Delayed images of the liver also performed.      COMPARISON:Lower extremity ultrasound 8/19/2017    FINDINGS:   The visualized portions of the lungs, heart, and pericardium demonstrate no significant abnormalities.  There is left-sided gynecomastia.    The liver is at the upper limit of normal size.  No focal hepatic abnormality is identified on today's examination. The poral vein and splenic vein are patent. The gallbladder demonstrates no evidence of gallbladder wall thickening, dilatation, or surrounding  inflammatory changes.  No evidence of cholelithiasis.No evidence of intra or extrahepatic biliary ductal dilation. The spleen, stomach, pancreas, and adrenal glands demonstrate no significant abnormalities.    The kidneys are normal in size and location.The kidneys are normal in size and location concentrate and excrete contrast satisfactorily on delayed phase imaging.  There is an accessory left renal artery.  There is no evidence of nephrolithiasis.  There is asymmetric slight prominence of the left renal collecting system and ureter, which tapers to normal caliber distally.  There are multiple pelvic phleboliths.  The urinary bladder markedly distended, with smooth margins.  The prostate is unremarkable.    The visualized loops of large and small bowel demonstrate no evidence of obstruction or inflammatory change. There is no ascites. There is no free intraperitoneal air or portal venous gas.    The abdominal aorta is non-aneurysmal. Aortic branch vessels are patent.  Portal venous phase and delayed phase imaging, there no filling defects identified within the IVC to indicate thrombosis.  There is possible compression of the left common iliac vein by the right common iliac artery (please see axial series 5, images 58-62).  Given this patient's history of reported left lower extremity DVT, findings could represent May Thurner syndrome.  Please note that the patient's known left lower extremity DVT is not as well appreciated on today's exam.  Please refer to ultrasound report of 8/18/2017 for additional details.    The osseous structures demonstrate no acute abnormality.                             US Lower Extremity Veins Left (Final result)  Result time 08/18/17 22:10:00    Final result by Stephenie Patel MD (08/18/17 22:10:00)                 Impression:      Left lower extremity deep venous thrombosis throughout the femoral, popliteal, and peroneal veins.      Findings were communicated from Dr. Patel to   ROB MOORE at 22:08:57 on 08/18/17..        Electronically signed by: MEG VAZQUEZ MD  Date:     08/18/17  Time:    22:10              Narrative:    Reason for study: Rule out DVT     Comparison: None.    Technique: Routine bilateral lower extremity venous ultrasound was performed using grayscale and color flow doppler spectral analysis.    Findings:   There is thrombosis visualized within the left femoral vein, popliteal vein, and peroneal vein.  No evidence of clot involving the bilateral common femoral veins or left greater saphenous, posterior tibial, and anterior tibial veins.  No evidence of soft tissue mass or Baker's cyst.                                  Imp:        L femoral, popliteal, and infrapopliteal DVT with underlying May Thurner Syndrome  L calf pain with significant improvement of edema  Obesity  Poorly controlled DM        Plan:        Continue with IV heparin  If there is persistent with ambulation he will proceed with CDT   If he will ultimately need L CIV stent       He will have a hypercoagulable work up later  He will be on Xarelto as an outpatient

## 2017-08-20 NOTE — PLAN OF CARE
Problem: Patient Care Overview  Goal: Plan of Care Review  Outcome: Ongoing (interventions implemented as appropriate)  Plan of care discussed with patient. Safety measures maintained. Call bell in reach and bed alarm on.  Patient remains on heparin drip at 18.1cc/hr with aPTT to be rechecked in AM.  Left lower leg remains warm to touch, tender to palpation. Patient receiving prn morphine with pain relief.  No complaints of shortness of breath

## 2017-08-21 LAB
ALBUMIN SERPL BCP-MCNC: 2.9 G/DL
ALP SERPL-CCNC: 86 U/L
ALT SERPL W/O P-5'-P-CCNC: 13 U/L
ANION GAP SERPL CALC-SCNC: 10 MMOL/L
ANION GAP SERPL CALC-SCNC: 9 MMOL/L
APTT BLDCRRT: 41.4 SEC
AST SERPL-CCNC: 11 U/L
BASOPHILS # BLD AUTO: 0.02 K/UL
BASOPHILS NFR BLD: 0.2 %
BILIRUB SERPL-MCNC: 0.3 MG/DL
BUN SERPL-MCNC: 10 MG/DL
BUN SERPL-MCNC: 10 MG/DL
CALCIUM SERPL-MCNC: 8.9 MG/DL
CALCIUM SERPL-MCNC: 9.2 MG/DL
CHLORIDE SERPL-SCNC: 101 MMOL/L
CHLORIDE SERPL-SCNC: 101 MMOL/L
CO2 SERPL-SCNC: 27 MMOL/L
CO2 SERPL-SCNC: 27 MMOL/L
CREAT SERPL-MCNC: 0.9 MG/DL
CREAT SERPL-MCNC: 1 MG/DL
DIFFERENTIAL METHOD: ABNORMAL
EOSINOPHIL # BLD AUTO: 0.1 K/UL
EOSINOPHIL NFR BLD: 1.2 %
ERYTHROCYTE [DISTWIDTH] IN BLOOD BY AUTOMATED COUNT: 12.1 %
ERYTHROCYTE [DISTWIDTH] IN BLOOD BY AUTOMATED COUNT: 12.4 %
EST. GFR  (AFRICAN AMERICAN): >60 ML/MIN/1.73 M^2
EST. GFR  (AFRICAN AMERICAN): >60 ML/MIN/1.73 M^2
EST. GFR  (NON AFRICAN AMERICAN): >60 ML/MIN/1.73 M^2
EST. GFR  (NON AFRICAN AMERICAN): >60 ML/MIN/1.73 M^2
GLUCOSE SERPL-MCNC: 251 MG/DL
GLUCOSE SERPL-MCNC: 308 MG/DL
HCT VFR BLD AUTO: 41.3 %
HCT VFR BLD AUTO: 43.4 %
HGB BLD-MCNC: 13.8 G/DL
HGB BLD-MCNC: 14.6 G/DL
LYMPHOCYTES # BLD AUTO: 2.2 K/UL
LYMPHOCYTES NFR BLD: 23.2 %
MCH RBC QN AUTO: 28.9 PG
MCH RBC QN AUTO: 29.1 PG
MCHC RBC AUTO-ENTMCNC: 33.4 G/DL
MCHC RBC AUTO-ENTMCNC: 33.6 G/DL
MCV RBC AUTO: 86 FL
MCV RBC AUTO: 87 FL
MONOCYTES # BLD AUTO: 0.6 K/UL
MONOCYTES NFR BLD: 6.6 %
NEUTROPHILS # BLD AUTO: 6.5 K/UL
NEUTROPHILS NFR BLD: 68.6 %
PLATELET # BLD AUTO: 223 K/UL
PLATELET # BLD AUTO: 235 K/UL
PMV BLD AUTO: 10.6 FL
PMV BLD AUTO: 11.5 FL
POCT GLUCOSE: 238 MG/DL (ref 70–110)
POCT GLUCOSE: 273 MG/DL (ref 70–110)
POCT GLUCOSE: 286 MG/DL (ref 70–110)
POTASSIUM SERPL-SCNC: 3.8 MMOL/L
POTASSIUM SERPL-SCNC: 3.9 MMOL/L
PROT SERPL-MCNC: 7 G/DL
RBC # BLD AUTO: 4.78 M/UL
RBC # BLD AUTO: 5.01 M/UL
SODIUM SERPL-SCNC: 137 MMOL/L
SODIUM SERPL-SCNC: 138 MMOL/L
WBC # BLD AUTO: 9.4 K/UL
WBC # BLD AUTO: 9.65 K/UL

## 2017-08-21 PROCEDURE — 06HN33Z INSERTION OF INFUSION DEVICE INTO LEFT FEMORAL VEIN, PERCUTANEOUS APPROACH: ICD-10-PCS | Performed by: INTERNAL MEDICINE

## 2017-08-21 PROCEDURE — 37212 THROMBOLYTIC VENOUS THERAPY: CPT | Mod: LT,,, | Performed by: INTERNAL MEDICINE

## 2017-08-21 PROCEDURE — 80053 COMPREHEN METABOLIC PANEL: CPT

## 2017-08-21 PROCEDURE — 25000003 PHARM REV CODE 250: Performed by: INTERNAL MEDICINE

## 2017-08-21 PROCEDURE — 63600175 PHARM REV CODE 636 W HCPCS

## 2017-08-21 PROCEDURE — 85027 COMPLETE CBC AUTOMATED: CPT

## 2017-08-21 PROCEDURE — 25000003 PHARM REV CODE 250: Performed by: EMERGENCY MEDICINE

## 2017-08-21 PROCEDURE — 94761 N-INVAS EAR/PLS OXIMETRY MLT: CPT

## 2017-08-21 PROCEDURE — 63600175 PHARM REV CODE 636 W HCPCS: Performed by: INTERNAL MEDICINE

## 2017-08-21 PROCEDURE — 85025 COMPLETE CBC W/AUTO DIFF WBC: CPT

## 2017-08-21 PROCEDURE — 99152 MOD SED SAME PHYS/QHP 5/>YRS: CPT | Mod: ,,, | Performed by: INTERNAL MEDICINE

## 2017-08-21 PROCEDURE — 25500020 PHARM REV CODE 255

## 2017-08-21 PROCEDURE — 85730 THROMBOPLASTIN TIME PARTIAL: CPT

## 2017-08-21 PROCEDURE — 3E04317 INTRODUCTION OF OTHER THROMBOLYTIC INTO CENTRAL VEIN, PERCUTANEOUS APPROACH: ICD-10-PCS | Performed by: INTERNAL MEDICINE

## 2017-08-21 PROCEDURE — 80048 BASIC METABOLIC PNL TOTAL CA: CPT

## 2017-08-21 PROCEDURE — 20000000 HC ICU ROOM

## 2017-08-21 PROCEDURE — 63600175 PHARM REV CODE 636 W HCPCS: Performed by: EMERGENCY MEDICINE

## 2017-08-21 PROCEDURE — C1769 GUIDE WIRE: HCPCS

## 2017-08-21 PROCEDURE — 36415 COLL VENOUS BLD VENIPUNCTURE: CPT

## 2017-08-21 PROCEDURE — 25000003 PHARM REV CODE 250

## 2017-08-21 RX ORDER — DIPHENHYDRAMINE HCL 50 MG
50 CAPSULE ORAL ONCE
Status: DISCONTINUED | OUTPATIENT
Start: 2017-08-22 | End: 2017-08-24 | Stop reason: HOSPADM

## 2017-08-21 RX ADMIN — MIRTAZAPINE 7.5 MG: 7.5 TABLET ORAL at 09:08

## 2017-08-21 RX ADMIN — MORPHINE SULFATE 2 MG: 2 INJECTION, SOLUTION INTRAMUSCULAR; INTRAVENOUS at 10:08

## 2017-08-21 RX ADMIN — MORPHINE SULFATE 2 MG: 2 INJECTION, SOLUTION INTRAMUSCULAR; INTRAVENOUS at 08:08

## 2017-08-21 RX ADMIN — BIVALIRUDIN 0.25 MG/KG/HR: 250 INJECTION, POWDER, LYOPHILIZED, FOR SOLUTION INTRAVENOUS at 06:08

## 2017-08-21 RX ADMIN — MORPHINE SULFATE 2 MG: 2 INJECTION, SOLUTION INTRAMUSCULAR; INTRAVENOUS at 03:08

## 2017-08-21 RX ADMIN — HEPARIN SODIUM AND DEXTROSE 17 UNITS/KG/HR: 10000; 5 INJECTION INTRAVENOUS at 01:08

## 2017-08-21 RX ADMIN — INSULIN ASPART 2 UNITS: 100 INJECTION, SOLUTION INTRAVENOUS; SUBCUTANEOUS at 10:08

## 2017-08-21 RX ADMIN — INSULIN ASPART 6 UNITS: 100 INJECTION, SOLUTION INTRAVENOUS; SUBCUTANEOUS at 06:08

## 2017-08-21 RX ADMIN — MORPHINE SULFATE 2 MG: 2 INJECTION, SOLUTION INTRAMUSCULAR; INTRAVENOUS at 06:08

## 2017-08-21 NOTE — PLAN OF CARE
Problem: Patient Care Overview  Goal: Plan of Care Review  Plan of care reviewed. Heparin infusion continues. Medication with morphine x1 after pt ambulated. Telemetry monitoring in progress. No true red alarms noted.NSR noted with HR in the 70s -90. Instructed on NPO for possible Venogram today. Will continue to monitor.

## 2017-08-21 NOTE — NURSING
Pt arrived to  with cath lab team. Pt awake and responding to commands. Echos to L foot. Site intact. Pt attached to bedside monitor.

## 2017-08-21 NOTE — PROGRESS NOTES
Post Procedure Note: Catheter directed thrombolysis    The pt was brought to the cath lab and under sterile technique, LATV access was obtained under US guidance.EKOS catheter placed to the level of CFV and therapy initiated with alteplase at 1 mg/hr and angiomax infusion. Please see full report for details. The pt tolerated the procedure well without complications. Sheath sutured in place and pt transported to ICU.     Vitals:    08/21/17 0453 08/21/17 0710 08/21/17 0731 08/21/17 0816   BP: 135/84  134/84    BP Location:   Right arm    Patient Position:   Lying    Pulse: 81 77 88    Resp:   18    Temp: 98.6 °F (37 °C)  98.5 °F (36.9 °C)    TempSrc: Oral  Oral    SpO2:    99%   Weight:       Height:             Gen: NAD  Ext: 2+ fem/DP/PT pulses, no evidence of hematoma    Plan:  -Post cath care per protocol  -tPA at 1 mg/hr and angiomax infusion  -Likely relook tomorrow

## 2017-08-21 NOTE — PLAN OF CARE
Problem: Patient Care Overview  Goal: Plan of Care Review  PT on RA sats 99%. Will  Cont. To misti.

## 2017-08-21 NOTE — PLAN OF CARE
Problem: Patient Care Overview  Goal: Plan of Care Review  Sats 96% RA , will continue to monitor.

## 2017-08-21 NOTE — INTERVAL H&P NOTE
The patient has been examined and the H&P has been reviewed:    I concur with the findings and no changes have occurred since H&P was written.    Anesthesia/Surgery risks, benefits and alternative options discussed and understood by patient/family.          Active Hospital Problems    Diagnosis  POA    Non morbid obesity due to excess calories [E66.09]  Unknown    Type 2 diabetes mellitus without complication [E11.9]  Unknown    Acute deep vein thrombosis (DVT) of proximal vein of left lower extremity [I82.4Y2]  Yes      Resolved Hospital Problems    Diagnosis Date Resolved POA   No resolved problems to display.

## 2017-08-21 NOTE — PLAN OF CARE
TN went to meet with patient.  Patient not in room, in cath lab procedure.   Per notes, will transfer to ICU after procedure.   TN will follow-up with patient in ICU.     --Update: TN met with patient and family in room. No current discharge needs at this time, will continue to follow.     08/21/17 1208   Discharge Reassessment   Assessment Type Discharge Planning Reassessment   Describe the patient's ability to walk at the present time. No restrictions   Discharge plan remains the same: Yes   Provided patient/caregiver education on the expected discharge date and the discharge plan Yes   Discharge Plan A Home with family   Discharge Plan B Home with family;Home Health   Patient choice form signed by patient/caregiver N/A   Involved the patient/caregiver in establishing a new discharge plan: No     Nayeli Strickland RN  Transition Navigator  [phone]

## 2017-08-21 NOTE — PLAN OF CARE
Pt will be transferring to ICU once procedure is complete.  Report given to ADRIEL Blanco in ICU.  Nurse verbalized understanding.  Pt's belongings were sent to pt's assigned room in ICU.

## 2017-08-22 LAB
ALBUMIN SERPL BCP-MCNC: 2.8 G/DL
ALP SERPL-CCNC: 80 U/L
ALT SERPL W/O P-5'-P-CCNC: 10 U/L
ANION GAP SERPL CALC-SCNC: 9 MMOL/L
AST SERPL-CCNC: 12 U/L
BASOPHILS # BLD AUTO: 0.02 K/UL
BASOPHILS NFR BLD: 0.2 %
BILIRUB SERPL-MCNC: 0.5 MG/DL
BUN SERPL-MCNC: 11 MG/DL
CALCIUM SERPL-MCNC: 8.7 MG/DL
CHLORIDE SERPL-SCNC: 101 MMOL/L
CO2 SERPL-SCNC: 25 MMOL/L
CREAT SERPL-MCNC: 0.9 MG/DL
DIFFERENTIAL METHOD: ABNORMAL
EOSINOPHIL # BLD AUTO: 0.1 K/UL
EOSINOPHIL NFR BLD: 0.7 %
ERYTHROCYTE [DISTWIDTH] IN BLOOD BY AUTOMATED COUNT: 12.1 %
EST. GFR  (AFRICAN AMERICAN): >60 ML/MIN/1.73 M^2
EST. GFR  (NON AFRICAN AMERICAN): >60 ML/MIN/1.73 M^2
GLUCOSE SERPL-MCNC: 292 MG/DL
HCT VFR BLD AUTO: 41.7 %
HGB BLD-MCNC: 14.1 G/DL
LYMPHOCYTES # BLD AUTO: 1.8 K/UL
LYMPHOCYTES NFR BLD: 16.7 %
MCH RBC QN AUTO: 29.2 PG
MCHC RBC AUTO-ENTMCNC: 33.8 G/DL
MCV RBC AUTO: 86 FL
MONOCYTES # BLD AUTO: 0.6 K/UL
MONOCYTES NFR BLD: 5.8 %
NEUTROPHILS # BLD AUTO: 8.1 K/UL
NEUTROPHILS NFR BLD: 76.3 %
PLATELET # BLD AUTO: 212 K/UL
PMV BLD AUTO: 10.2 FL
POCT GLUCOSE: 184 MG/DL (ref 70–110)
POCT GLUCOSE: 199 MG/DL (ref 70–110)
POCT GLUCOSE: 254 MG/DL (ref 70–110)
POCT GLUCOSE: 279 MG/DL (ref 70–110)
POTASSIUM SERPL-SCNC: 4.1 MMOL/L
PROT SERPL-MCNC: 6.7 G/DL
RBC # BLD AUTO: 4.83 M/UL
SODIUM SERPL-SCNC: 135 MMOL/L
WBC # BLD AUTO: 10.57 K/UL

## 2017-08-22 PROCEDURE — 25000003 PHARM REV CODE 250: Performed by: INTERNAL MEDICINE

## 2017-08-22 PROCEDURE — 63600175 PHARM REV CODE 636 W HCPCS

## 2017-08-22 PROCEDURE — 80053 COMPREHEN METABOLIC PANEL: CPT

## 2017-08-22 PROCEDURE — 63600175 PHARM REV CODE 636 W HCPCS: Performed by: INTERNAL MEDICINE

## 2017-08-22 PROCEDURE — 99233 SBSQ HOSP IP/OBS HIGH 50: CPT | Mod: ,,, | Performed by: INTERNAL MEDICINE

## 2017-08-22 PROCEDURE — 20000000 HC ICU ROOM

## 2017-08-22 PROCEDURE — 25000003 PHARM REV CODE 250: Performed by: EMERGENCY MEDICINE

## 2017-08-22 PROCEDURE — 63600175 PHARM REV CODE 636 W HCPCS: Performed by: NURSE PRACTITIONER

## 2017-08-22 PROCEDURE — 36415 COLL VENOUS BLD VENIPUNCTURE: CPT

## 2017-08-22 PROCEDURE — 85025 COMPLETE CBC W/AUTO DIFF WBC: CPT

## 2017-08-22 RX ORDER — PROMETHAZINE HYDROCHLORIDE 25 MG/ML
INJECTION, SOLUTION INTRAMUSCULAR; INTRAVENOUS
Status: COMPLETED
Start: 2017-08-22 | End: 2017-08-22

## 2017-08-22 RX ORDER — ONDANSETRON 2 MG/ML
4 INJECTION INTRAMUSCULAR; INTRAVENOUS EVERY 6 HOURS PRN
Status: DISCONTINUED | OUTPATIENT
Start: 2017-08-22 | End: 2017-08-24 | Stop reason: HOSPADM

## 2017-08-22 RX ADMIN — MORPHINE SULFATE 2 MG: 2 INJECTION, SOLUTION INTRAMUSCULAR; INTRAVENOUS at 12:08

## 2017-08-22 RX ADMIN — BIVALIRUDIN 0.25 MG/KG/HR: 250 INJECTION, POWDER, LYOPHILIZED, FOR SOLUTION INTRAVENOUS at 04:08

## 2017-08-22 RX ADMIN — INSULIN ASPART 2 UNITS: 100 INJECTION, SOLUTION INTRAVENOUS; SUBCUTANEOUS at 05:08

## 2017-08-22 RX ADMIN — MORPHINE SULFATE 2 MG: 2 INJECTION, SOLUTION INTRAMUSCULAR; INTRAVENOUS at 07:08

## 2017-08-22 RX ADMIN — MORPHINE SULFATE 2 MG: 2 INJECTION, SOLUTION INTRAMUSCULAR; INTRAVENOUS at 03:08

## 2017-08-22 RX ADMIN — LORAZEPAM 1 MG: 2 INJECTION INTRAMUSCULAR; INTRAVENOUS at 09:08

## 2017-08-22 RX ADMIN — ONDANSETRON 4 MG: 2 INJECTION INTRAMUSCULAR; INTRAVENOUS at 01:08

## 2017-08-22 RX ADMIN — INSULIN ASPART 2 UNITS: 100 INJECTION, SOLUTION INTRAVENOUS; SUBCUTANEOUS at 12:08

## 2017-08-22 RX ADMIN — INSULIN DETEMIR 35 UNITS: 100 INJECTION, SOLUTION SUBCUTANEOUS at 01:08

## 2017-08-22 RX ADMIN — BIVALIRUDIN 0.25 MG/KG/HR: 250 INJECTION, POWDER, LYOPHILIZED, FOR SOLUTION INTRAVENOUS at 05:08

## 2017-08-22 RX ADMIN — PROMETHAZINE HYDROCHLORIDE 25 MG: 25 INJECTION INTRAMUSCULAR; INTRAVENOUS at 04:08

## 2017-08-22 RX ADMIN — PROMETHAZINE HYDROCHLORIDE 12.5 MG: 25 INJECTION, SOLUTION INTRAMUSCULAR; INTRAVENOUS at 07:08

## 2017-08-22 RX ADMIN — MORPHINE SULFATE 2 MG: 2 INJECTION, SOLUTION INTRAMUSCULAR; INTRAVENOUS at 05:08

## 2017-08-22 RX ADMIN — INSULIN ASPART 4 UNITS: 100 INJECTION, SOLUTION INTRAVENOUS; SUBCUTANEOUS at 06:08

## 2017-08-22 RX ADMIN — INSULIN ASPART 3 UNITS: 100 INJECTION, SOLUTION INTRAVENOUS; SUBCUTANEOUS at 10:08

## 2017-08-22 RX ADMIN — ALTEPLASE 10 MG: 2.2 INJECTION, POWDER, LYOPHILIZED, FOR SOLUTION INTRAVENOUS at 03:08

## 2017-08-22 RX ADMIN — MIRTAZAPINE 7.5 MG: 7.5 TABLET ORAL at 09:08

## 2017-08-22 RX ADMIN — MORPHINE SULFATE 2 MG: 2 INJECTION, SOLUTION INTRAMUSCULAR; INTRAVENOUS at 11:08

## 2017-08-22 RX ADMIN — ALTEPLASE 10 MG: 2.2 INJECTION, POWDER, LYOPHILIZED, FOR SOLUTION INTRAVENOUS at 07:08

## 2017-08-22 RX ADMIN — PANTOPRAZOLE SODIUM 40 MG: 40 TABLET, DELAYED RELEASE ORAL at 09:08

## 2017-08-22 RX ADMIN — ONDANSETRON 4 MG: 2 INJECTION INTRAMUSCULAR; INTRAVENOUS at 07:08

## 2017-08-22 NOTE — PLAN OF CARE
Problem: Patient Care Overview  Goal: Plan of Care Review  Outcome: Ongoing (interventions implemented as appropriate)  EKOS therapy remained intact during shift with concurrent administration of Cathflo and Angiomax. Patient experienced nausea throughout the day with minimal relief of symptoms from initial antiemetic Zofran. Promethazine administered with mild to moderate relief. Pt to remain NPO after midnight tonight in preparation for his return to the cath lab tomorrow. Controlled bleeding to nose observed x 1 event and Dr. Quintero notified. Pt resting at this time. Has poor appetite due to nausea and vomiting. VSS. RN to the bedside to monitor.

## 2017-08-22 NOTE — ASSESSMENT & PLAN NOTE
-extensive LLE DVT present with slight extension despite IV Heparin  -underwent venogram yesterday with successful access obtained to left AT with EKOS catheter placement and initiation of TPA along with Angiomax  -slight improvement in swelling to LLE with continued pain  -no s/s of compartment syndrome present  -will continue with EKOS catheter along with TPA and Angiomax today with plans for relook venogram tomorrow  -will need chronic anticoagulation and good candidate for NOACs-plan for initiation once TPA and EKOS catheter treatment complete  -will need further workup for hypercoaguable and possible stenting for May Thurner's syndrome

## 2017-08-22 NOTE — PLAN OF CARE
Problem: Patient Care Overview  Goal: Plan of Care Review  Outcome: Ongoing (interventions implemented as appropriate)  Patient is progressing toward goals. Pain controlled with prn meds. No signs of pressure ulcer. Blood glucose monitored. On EKOS machine. Bed in low position. Remains free from fall/injury. Call light within reach. Will continue to monitor.

## 2017-08-22 NOTE — PROGRESS NOTES
Pt vomited ~100ml of emesis with food pieces. Zofran administered. Nose bleeding observed with minimal bleeding, and stopped within one minute. Dr. Quintero notified and asked nursing to notify the team if bleeding is observed further. VSS. Comfort measures taken and patient told to notify nursing with any further complaints of nausea or bleeding.

## 2017-08-22 NOTE — SUBJECTIVE & OBJECTIVE
Review of Systems   Constitution: Negative for chills, decreased appetite, diaphoresis, fever and weakness.   Cardiovascular: Negative for chest pain, claudication, cyanosis, dyspnea on exertion, irregular heartbeat, leg swelling, near-syncope, orthopnea, palpitations, paroxysmal nocturnal dyspnea and syncope.   Respiratory: Negative for cough, hemoptysis, shortness of breath and wheezing.    Gastrointestinal: Negative for bloating, abdominal pain, constipation, diarrhea, melena, nausea and vomiting.   Neurological: Negative for dizziness.     Objective:     Vital Signs (Most Recent):  Temp: 98.5 °F (36.9 °C) (08/22/17 0715)  Pulse: 93 (08/22/17 1030)  Resp: 20 (08/22/17 1030)  BP: (!) 155/92 (08/22/17 1030)  SpO2: 98 % (08/22/17 1030) Vital Signs (24h Range):  Temp:  [98.5 °F (36.9 °C)-99.6 °F (37.6 °C)] 98.5 °F (36.9 °C)  Pulse:  [] 93  Resp:  [9-23] 20  SpO2:  [92 %-98 %] 98 %  BP: (109-173)/(52-92) 155/92     Weight: 107.1 kg (236 lb 1.8 oz)  Body mass index is 30.32 kg/m².     SpO2: 98 %  O2 Device (Oxygen Therapy): room air      Intake/Output Summary (Last 24 hours) at 08/22/17 1143  Last data filed at 08/22/17 1000   Gross per 24 hour   Intake           746.13 ml   Output              875 ml   Net          -128.87 ml       Lines/Drains/Airways     Peripheral Intravenous Line                 Peripheral IV - Single Lumen 08/21/17 Left Forearm 1 day                Physical Exam   Constitutional: He is oriented to person, place, and time. He appears well-developed and well-nourished. No distress.   Cardiovascular: Normal rate and regular rhythm.  Exam reveals no gallop.    No murmur heard.  Pulmonary/Chest: Effort normal and breath sounds normal. No respiratory distress. He has no wheezes.   Abdominal: Soft. Bowel sounds are normal. He exhibits no distension. There is no tenderness.   Musculoskeletal: He exhibits edema (1-2+ LLE edema present ).   Neurological: He is alert and oriented to person, place,  and time.   Skin: Skin is warm and dry.       Significant Labs:       Recent Labs  Lab 08/22/17  0356   *   K 4.1      CO2 25   BUN 11   CREATININE 0.9       Recent Labs  Lab 08/22/17 0356   WBC 10.57   RBC 4.83   HGB 14.1   HCT 41.7      MCV 86   MCH 29.2   MCHC 33.8

## 2017-08-22 NOTE — ASSESSMENT & PLAN NOTE
-HgbA1c 13.9 this admission  -on Levemir at home per home medication-will verify dose and order  -needs aggressive control of DM  -accuchecks AC & HS with SSI prn

## 2017-08-22 NOTE — PROGRESS NOTES
[facility]  Cardiology  Progress Note    Patient Name: Kulwant Mueller Jr.  MRN: [id number]  Admission Date: 8/18/2017  Hospital Length of Stay: 2 days  Code Status: Full Code   Attending Physician: Madan Quintero MD   Primary Care Physician: Dona Pineda MD  Expected Discharge Date:   Principal Problem:Acute deep vein thrombosis (DVT) of proximal vein of left lower extremity    Subjective:     Hospital Course:   8/18/2017 Presented with LLE swelling and extreme pain. Underwent LE venous ultrasound with evidence of deep venous thrombosis throughout the femoral, popliteal, and peroneal veins. Started on IV Heparin drip and admitted to [facility] 8/19/2017 Continued swelling despite IV Heparin. 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 Evidence of possible worsening DVT despite IV Heparin. Plan for venogram tomorrow.   8/21/2017 Taken to the cath lab for venogram with left AT vein access was obtained under US guidance with placement of EKOS catheter at the level of CFV and therapy initiated with TPA at 1 mg/hr as well as Angiomax infusion        Review of Systems   Constitution: Negative for chills, decreased appetite, diaphoresis, fever and weakness.   Cardiovascular: Negative for chest pain, claudication, cyanosis, dyspnea on exertion, irregular heartbeat, leg swelling, near-syncope, orthopnea, palpitations, paroxysmal nocturnal dyspnea and syncope.   Respiratory: Negative for cough, hemoptysis, shortness of breath and wheezing.    Gastrointestinal: Negative for bloating, abdominal pain, constipation, diarrhea, melena, nausea and  vomiting.   Neurological: Negative for dizziness.     Objective:     Vital Signs (Most Recent):  Temp: 98.5 °F (36.9 °C) (08/22/17 0715)  Pulse: 93 (08/22/17 1030)  Resp: 20 (08/22/17 1030)  BP: (!) 155/92 (08/22/17 1030)  SpO2: 98 % (08/22/17 1030) Vital Signs (24h Range):  Temp:  [98.5 °F (36.9 °C)-99.6 °F (37.6 °C)] 98.5 °F (36.9 °C)  Pulse:  [] 93  Resp:  [9-23] 20  SpO2:  [92 %-98 %] 98 %  BP: (109-173)/(52-92) 155/92     Weight: 107.1 kg (236 lb 1.8 oz)  Body mass index is 30.32 kg/m².     SpO2: 98 %  O2 Device (Oxygen Therapy): room air      Intake/Output Summary (Last 24 hours) at 08/22/17 1143  Last data filed at 08/22/17 1000   Gross per 24 hour   Intake           746.13 ml   Output              875 ml   Net          -128.87 ml       Lines/Drains/Airways     Peripheral Intravenous Line                 Peripheral IV - Single Lumen 08/21/17 Left Forearm 1 day                Physical Exam   Constitutional: He is oriented to person, place, and time. He appears well-developed and well-nourished. No distress.   Cardiovascular: Normal rate and regular rhythm.  Exam reveals no gallop.    No murmur heard.  Pulmonary/Chest: Effort normal and breath sounds normal. No respiratory distress. He has no wheezes.   Abdominal: Soft. Bowel sounds are normal. He exhibits no distension. There is no tenderness.   Musculoskeletal: He exhibits edema (1-2+ LLE edema present ).   Neurological: He is alert and oriented to person, place, and time.   Skin: Skin is warm and dry.       Significant Labs:       Recent Labs  Lab 08/22/17  0356   *   K 4.1      CO2 25   BUN 11   CREATININE 0.9       Recent Labs  Lab 08/22/17  0356   WBC 10.57   RBC 4.83   HGB 14.1   HCT 41.7      MCV 86   MCH 29.2   MCHC 33.8           Assessment and Plan:     Brief HPI: Seen this morning on rounds with family at the bedside. Complains of LE pain controlled with pain medication. Discussed POC with patient and family at bedside as  detailed below-verbalized understanding and agrees with POC    Type 2 diabetes mellitus without complication    -HgbA1c 13.9 this admission  -on Levemir at home per home medication-will verify dose and order  -needs aggressive control of DM  -accuchecks AC & HS with SSI prn         * Acute deep vein thrombosis (DVT) of proximal vein of left lower extremity    -extensive LLE DVT present with slight extension despite IV Heparin  -underwent venogram yesterday with successful access obtained to left AT with EKOS catheter placement and initiation of TPA along with Angiomax  -slight improvement in swelling to LLE with continued pain  -no s/s of compartment syndrome present  -will continue with EKOS catheter along with TPA and Angiomax today with plans for relook venogram tomorrow  -will need chronic anticoagulation and good candidate for NOACs-plan for initiation once TPA and EKOS catheter treatment complete  -will need further workup for hypercoaguable and possible stenting for May Thurner's syndrome             VTE Risk Mitigation         Ordered     Low Risk of VTE  Once      08/19/17 0150          CHARLENE Moran, ANP  Cardiology  [facility]

## 2017-08-23 PROBLEM — R11.2 NAUSEA AND VOMITING: Status: ACTIVE | Noted: 2017-08-23

## 2017-08-23 LAB
ALBUMIN SERPL BCP-MCNC: 3 G/DL
ALP SERPL-CCNC: 88 U/L
ALT SERPL W/O P-5'-P-CCNC: 10 U/L
AMYLASE SERPL-CCNC: 39 U/L
ANION GAP SERPL CALC-SCNC: 11 MMOL/L
AST SERPL-CCNC: 12 U/L
BASOPHILS # BLD AUTO: 0.01 K/UL
BASOPHILS NFR BLD: 0.1 %
BILIRUB SERPL-MCNC: 0.6 MG/DL
BUN SERPL-MCNC: 9 MG/DL
CALCIUM SERPL-MCNC: 9.1 MG/DL
CHLORIDE SERPL-SCNC: 100 MMOL/L
CO2 SERPL-SCNC: 28 MMOL/L
CREAT SERPL-MCNC: 0.9 MG/DL
DIFFERENTIAL METHOD: ABNORMAL
EOSINOPHIL # BLD AUTO: 0 K/UL
EOSINOPHIL NFR BLD: 0 %
ERYTHROCYTE [DISTWIDTH] IN BLOOD BY AUTOMATED COUNT: 11.9 %
EST. GFR  (AFRICAN AMERICAN): >60 ML/MIN/1.73 M^2
EST. GFR  (NON AFRICAN AMERICAN): >60 ML/MIN/1.73 M^2
GLUCOSE SERPL-MCNC: 247 MG/DL
HCT VFR BLD AUTO: 42.1 %
HGB BLD-MCNC: 14.6 G/DL
LIPASE SERPL-CCNC: 8 U/L
LYMPHOCYTES # BLD AUTO: 1.3 K/UL
LYMPHOCYTES NFR BLD: 11.4 %
MAGNESIUM SERPL-MCNC: 1.9 MG/DL
MCH RBC QN AUTO: 29.6 PG
MCHC RBC AUTO-ENTMCNC: 34.7 G/DL
MCV RBC AUTO: 85 FL
MONOCYTES # BLD AUTO: 0.8 K/UL
MONOCYTES NFR BLD: 6.6 %
NEUTROPHILS # BLD AUTO: 9.6 K/UL
NEUTROPHILS NFR BLD: 81.7 %
PLATELET # BLD AUTO: 200 K/UL
PMV BLD AUTO: 10.3 FL
POCT GLUCOSE: 187 MG/DL (ref 70–110)
POCT GLUCOSE: 194 MG/DL (ref 70–110)
POCT GLUCOSE: 196 MG/DL (ref 70–110)
POCT GLUCOSE: 202 MG/DL (ref 70–110)
POCT GLUCOSE: 226 MG/DL (ref 70–110)
POTASSIUM SERPL-SCNC: 4.2 MMOL/L
PROT SERPL-MCNC: 7.1 G/DL
RBC # BLD AUTO: 4.94 M/UL
SODIUM SERPL-SCNC: 139 MMOL/L
WBC # BLD AUTO: 11.73 K/UL

## 2017-08-23 PROCEDURE — 25000003 PHARM REV CODE 250: Performed by: NURSE PRACTITIONER

## 2017-08-23 PROCEDURE — 80053 COMPREHEN METABOLIC PANEL: CPT

## 2017-08-23 PROCEDURE — 25000003 PHARM REV CODE 250: Performed by: INTERNAL MEDICINE

## 2017-08-23 PROCEDURE — 85025 COMPLETE CBC W/AUTO DIFF WBC: CPT

## 2017-08-23 PROCEDURE — 63600175 PHARM REV CODE 636 W HCPCS: Performed by: INTERNAL MEDICINE

## 2017-08-23 PROCEDURE — 63600175 PHARM REV CODE 636 W HCPCS: Performed by: NURSE PRACTITIONER

## 2017-08-23 PROCEDURE — 36415 COLL VENOUS BLD VENIPUNCTURE: CPT

## 2017-08-23 PROCEDURE — 63600175 PHARM REV CODE 636 W HCPCS

## 2017-08-23 PROCEDURE — S0028 INJECTION, FAMOTIDINE, 20 MG: HCPCS | Performed by: NURSE PRACTITIONER

## 2017-08-23 PROCEDURE — 83690 ASSAY OF LIPASE: CPT

## 2017-08-23 PROCEDURE — 99233 SBSQ HOSP IP/OBS HIGH 50: CPT | Mod: ,,, | Performed by: INTERNAL MEDICINE

## 2017-08-23 PROCEDURE — 25500020 PHARM REV CODE 255

## 2017-08-23 PROCEDURE — 83735 ASSAY OF MAGNESIUM: CPT

## 2017-08-23 PROCEDURE — 94761 N-INVAS EAR/PLS OXIMETRY MLT: CPT

## 2017-08-23 PROCEDURE — 82150 ASSAY OF AMYLASE: CPT

## 2017-08-23 PROCEDURE — 25000003 PHARM REV CODE 250

## 2017-08-23 PROCEDURE — 25000003 PHARM REV CODE 250: Performed by: EMERGENCY MEDICINE

## 2017-08-23 PROCEDURE — 20000000 HC ICU ROOM

## 2017-08-23 RX ORDER — METOCLOPRAMIDE HYDROCHLORIDE 5 MG/ML
5 INJECTION INTRAMUSCULAR; INTRAVENOUS EVERY 8 HOURS PRN
Status: DISCONTINUED | OUTPATIENT
Start: 2017-08-23 | End: 2017-08-24 | Stop reason: HOSPADM

## 2017-08-23 RX ORDER — PROMETHAZINE HYDROCHLORIDE 6.25 MG/5ML
25 SYRUP ORAL ONCE
Status: COMPLETED | OUTPATIENT
Start: 2017-08-23 | End: 2017-08-23

## 2017-08-23 RX ORDER — TRAMADOL HYDROCHLORIDE 50 MG/1
50 TABLET ORAL EVERY 6 HOURS PRN
Status: DISCONTINUED | OUTPATIENT
Start: 2017-08-23 | End: 2017-08-24 | Stop reason: HOSPADM

## 2017-08-23 RX ORDER — FAMOTIDINE 10 MG/ML
20 INJECTION INTRAVENOUS 2 TIMES DAILY
Status: DISCONTINUED | OUTPATIENT
Start: 2017-08-23 | End: 2017-08-24 | Stop reason: HOSPADM

## 2017-08-23 RX ADMIN — FAMOTIDINE 20 MG: 10 INJECTION, SOLUTION INTRAVENOUS at 08:08

## 2017-08-23 RX ADMIN — BIVALIRUDIN 0.25 MG/KG/HR: 250 INJECTION, POWDER, LYOPHILIZED, FOR SOLUTION INTRAVENOUS at 03:08

## 2017-08-23 RX ADMIN — TRAMADOL HYDROCHLORIDE 50 MG: 50 TABLET, COATED ORAL at 06:08

## 2017-08-23 RX ADMIN — FAMOTIDINE 20 MG: 10 INJECTION, SOLUTION INTRAVENOUS at 11:08

## 2017-08-23 RX ADMIN — RIVAROXABAN 15 MG: 15 TABLET, FILM COATED ORAL at 06:08

## 2017-08-23 RX ADMIN — PROMETHAZINE HYDROCHLORIDE 12.5 MG: 25 INJECTION, SOLUTION INTRAMUSCULAR; INTRAVENOUS at 10:08

## 2017-08-23 RX ADMIN — PROMETHAZINE HYDROCHLORIDE 25 MG: 6.25 SOLUTION ORAL at 09:08

## 2017-08-23 RX ADMIN — ONDANSETRON 4 MG: 2 INJECTION INTRAMUSCULAR; INTRAVENOUS at 07:08

## 2017-08-23 RX ADMIN — INSULIN ASPART 4 UNITS: 100 INJECTION, SOLUTION INTRAVENOUS; SUBCUTANEOUS at 07:08

## 2017-08-23 RX ADMIN — INSULIN DETEMIR 35 UNITS: 100 INJECTION, SOLUTION SUBCUTANEOUS at 11:08

## 2017-08-23 RX ADMIN — ONDANSETRON 4 MG: 2 INJECTION INTRAMUSCULAR; INTRAVENOUS at 03:08

## 2017-08-23 RX ADMIN — PROMETHAZINE HYDROCHLORIDE 25 MG: 25 INJECTION, SOLUTION INTRAMUSCULAR; INTRAVENOUS at 12:08

## 2017-08-23 RX ADMIN — INSULIN ASPART 1 UNITS: 100 INJECTION, SOLUTION INTRAVENOUS; SUBCUTANEOUS at 10:08

## 2017-08-23 RX ADMIN — INSULIN ASPART 2 UNITS: 100 INJECTION, SOLUTION INTRAVENOUS; SUBCUTANEOUS at 06:08

## 2017-08-23 RX ADMIN — MIRTAZAPINE 7.5 MG: 7.5 TABLET ORAL at 08:08

## 2017-08-23 NOTE — NURSING
Swapna, with [facility] team notified about patient increased vomiting, which causes patient heart rate to climb to the 120's and blood pressure to increase (200/100), patient received Zofran but still has no relief from vomiting, Promethazine given orally. Patient refuses 2nd IV, to infuse Promethazine IV over 20 minutes,Per new order, will continue to monitor to see if oral Promethazine helps.

## 2017-08-23 NOTE — ASSESSMENT & PLAN NOTE
-extensive LLE DVT involving femoral, popliteal, peroneal and PT veins  -angiogram with EKOS catheter placement with administration of TPA and Angiomax  -LLE swelling improving  -plan for relook angiogram today deferred secondary to nausea and vomiting  -EKOS catheter to be removed bedside with transition to oral AC   -will plan to start chronic anticoagulation this afternoon after discontinuation of TPA/Angiomax and removal of IV access  -will need further workup for hypercoaguable and  stenting for May Thurner's syndrome at a later date as an outpatient

## 2017-08-23 NOTE — PROGRESS NOTES
Cath lab procedure cancelled due to intractable nausea and vomiting. Will plan for removal of EKOS, catheter, and sheath at the bedside in ICU.

## 2017-08-23 NOTE — PLAN OF CARE
Problem: Patient Care Overview  Goal: Plan of Care Review  Outcome: Ongoing (interventions implemented as appropriate)  Pt on RA with sats of 96. Will continue to monitor.

## 2017-08-23 NOTE — NURSING
Swapna RIVERO, Notified about patient minor nose bleed, no clots were noted, and only a small amount was expelled, no other signs of distress, will continue to monitor.

## 2017-08-23 NOTE — ASSESSMENT & PLAN NOTE
-likely related to pain medication  -KUB negative; LFTs normal; amylase and lipase pending  -abdominal ultrasound with no acute abnormalities  -will de escalate pain medications once EKOS catheter removal and monitor n/v  -other possible etiology is gastroparesis given poor controlled DM-will start on Reglan

## 2017-08-23 NOTE — PROGRESS NOTES
[facility]  Cardiology  Progress Note    Patient Name: Kulwant Mueller Jr.  MRN: [id number]  Admission Date: 8/18/2017  Hospital Length of Stay: 3 days  Code Status: Full Code   Attending Physician: Madan Quintero MD   Primary Care Physician: Dona Pineda MD  Expected Discharge Date:   Principal Problem:Acute deep vein thrombosis (DVT) of proximal vein of left lower extremity    Subjective:     Hospital Course:   8/18/2017 Presented with LLE swelling and extreme pain. Underwent LE venous ultrasound with evidence of deep venous thrombosis throughout the femoral, popliteal, and peroneal veins. Started on IV Heparin drip and admitted to [facility] 8/19/2017 Continued swelling despite IV Heparin. 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 Evidence of possible worsening DVT despite IV Heparin. Plan for venogram tomorrow.   8/21/2017 Taken to the cath lab for venogram with left AT vein access was obtained under US guidance with placement of EKOS catheter at the level of CFV and therapy initiated with TPA at 1 mg/hr as well as Angiomax infusion 8/22/2017 EKOS catheter with TPA and Angiomax continued overnight with continued improvement in LLE swelling. Complaints of nausea overnight with several episodes of vomiting with no improvement with Zofran and Phenergan. Felt to be related to side effect of pain medication. Alk phos, total bili, AST and ALT normal. STAT KUB with no evidence of obstruction and abdominal ultrasound with no acute abnormalities. Amylase and lipase pending. Plan for removal of EKOS catheter at bedside rather than cath  lab given n/v and given additional dose of IV Phenergan and started on Reglan         Review of Systems   Constitution: Negative for chills, decreased appetite, diaphoresis, fever and weakness.   Cardiovascular: Negative for chest pain, claudication, cyanosis, dyspnea on exertion, irregular heartbeat, leg swelling, near-syncope, orthopnea, palpitations, paroxysmal nocturnal dyspnea and syncope.   Respiratory: Negative for cough, hemoptysis, shortness of breath and wheezing.    Gastrointestinal: Positive for nausea and vomiting. Negative for bloating, abdominal pain, constipation, diarrhea and melena.   Neurological: Negative for dizziness.     Objective:     Vital Signs (Most Recent):  Temp: 98.9 °F (37.2 °C) (08/23/17 1115)  Pulse: 101 (08/23/17 1145)  Resp: 20 (08/23/17 1145)  BP: (!) 196/105 (08/23/17 1130)  SpO2: 97 % (08/23/17 1145) Vital Signs (24h Range):  Temp:  [98.1 °F (36.7 °C)-99.1 °F (37.3 °C)] 98.9 °F (37.2 °C)  Pulse:  [] 101  Resp:  [7-33] 20  SpO2:  [94 %-97 %] 97 %  BP: (111-197)/() 196/105     Weight: 106 kg (233 lb 11 oz)  Body mass index is 30 kg/m².     SpO2: 97 %  O2 Device (Oxygen Therapy): room air      Intake/Output Summary (Last 24 hours) at 08/23/17 1306  Last data filed at 08/23/17 0000   Gross per 24 hour   Intake            593.6 ml   Output             3325 ml   Net          -2731.4 ml       Lines/Drains/Airways     Peripheral Intravenous Line                 Peripheral IV - Single Lumen 08/21/17 Left Forearm 2 days                Physical Exam   Constitutional: He is oriented to person, place, and time. He appears well-developed and well-nourished. No distress.   Cardiovascular: Normal rate and regular rhythm.  Exam reveals no gallop.    No murmur heard.  Pulmonary/Chest: Effort normal and breath sounds normal. No respiratory distress. He has no wheezes.   Abdominal: Soft. Bowel sounds are normal. He exhibits no distension. There is no tenderness.   Musculoskeletal: He  exhibits edema (1-2+ LLE edema present ).   Neurological: He is alert and oriented to person, place, and time.   Skin: Skin is warm and dry.       Significant Labs:       Recent Labs  Lab 08/23/17  0331   WBC 11.73   RBC 4.94   HGB 14.6   HCT 42.1      MCV 85   MCH 29.6   MCHC 34.7       Recent Labs  Lab 08/23/17  0331 08/23/17  1003     --    K 4.2  --      --    CO2 28  --    BUN 9  --    CREATININE 0.9  --    MG  --  1.9       Significant Imaging: Echocardiogram: 2D echo with color flow doppler: No results found for this or any previous visit.    Assessment and Plan:     Brief HPI: Seen this morning on AM NP rounds with wife at the bedside. Complains of nausea and vomiting overnight with no relief with IV or po medications and denies abdominal pain. Discussed POC with patient and wife as detailed below-verbalized understanding and agree with POC     Nausea and vomiting    -likely related to pain medication  -KUB negative; LFTs normal; amylase and lipase pending  -abdominal ultrasound with no acute abnormalities  -will de escalate pain medications once EKOS catheter removal and monitor n/v  -other possible etiology is gastroparesis given poor controlled DM-will start on Reglan        Type 2 diabetes mellitus without complication    -HgbA1c 13.9 this admission  -[id number] overnight  -on Levemir 70units daily at home with Novolog 30 units TID with meals  -given half dose of Levemir yesterday given decreased po intake; will continue current dose for now given poor po intake due to nausea and vomiting  -accuchecks AC & HS with SSI prn         * Acute deep vein thrombosis (DVT) of proximal vein of left lower extremity    -extensive LLE DVT involving femoral, popliteal, peroneal and PT veins  -angiogram with EKOS catheter placement with administration of TPA and Angiomax  -LLE swelling improving  -plan for relook angiogram today deferred secondary to nausea and vomiting  -EKOS catheter to be removed  bedside with transition to oral AC   -will plan to start chronic anticoagulation this afternoon after discontinuation of TPA/Angiomax and removal of IV access  -will need further workup for hypercoaguable and  stenting for May Thurner's syndrome at a later date as an outpatient             VTE Risk Mitigation         Ordered     Low Risk of VTE  Once      08/19/17 0150          CHARLENE Moran, ANP  Cardiology  [facility]

## 2017-08-23 NOTE — ASSESSMENT & PLAN NOTE
-HgbA1c 13.9 this admission  -[id number] overnight  -on Levemir 70units daily at home with Novolog 30 units TID with meals  -given half dose of Levemir yesterday given decreased po intake; will continue current dose for now given poor po intake due to nausea and vomiting  -accuchecks AC & HS with SSI prn

## 2017-08-23 NOTE — PLAN OF CARE
Notified pt should have at least two IVs when in the ICU. Pt has one IV currently with angiomax infusion.   Pt refuses to have another IV.

## 2017-08-23 NOTE — NURSING
Per verbal order per Dr. Omega woodard to turn off patient's alteplase and angiomax, will continue to monitor.

## 2017-08-23 NOTE — PLAN OF CARE
Problem: Patient Care Overview  Goal: Plan of Care Review  Outcome: Ongoing (interventions implemented as appropriate)  Patient understands, and agrees with plan of care. ABCDEF Bundle. Not intubated nor sedated. Breathing on room air, respirations stable currently 20, lungs clear pulse ox 99. Cam score negative, patient alert and oriented times 4 and can follow commands. Patient can move independently in bed, and move all extremities. No signs of skin breakdown. Patient remain free from fall in injury throughout the shift. Family at bedside and involved with patient care. Patient denies  pain throughout the shift. Patient remained NPO and voided approprietly each hour. No BM. Side rails up times 2, call light in reach, will continue to monitor.

## 2017-08-23 NOTE — PLAN OF CARE
Notified Dr Quintero, pt is nauseous with multiple emesis of undigested food (no blood). Pt's SBP is 170-190s.     Dr ordered phenergan 12.5 mg IV Q6H prn. Notify Dr if medicine does not help bring blood pressure down.

## 2017-08-23 NOTE — SUBJECTIVE & OBJECTIVE
Review of Systems   Constitution: Negative for chills, decreased appetite, diaphoresis, fever and weakness.   Cardiovascular: Negative for chest pain, claudication, cyanosis, dyspnea on exertion, irregular heartbeat, leg swelling, near-syncope, orthopnea, palpitations, paroxysmal nocturnal dyspnea and syncope.   Respiratory: Negative for cough, hemoptysis, shortness of breath and wheezing.    Gastrointestinal: Positive for nausea and vomiting. Negative for bloating, abdominal pain, constipation, diarrhea and melena.   Neurological: Negative for dizziness.     Objective:     Vital Signs (Most Recent):  Temp: 98.9 °F (37.2 °C) (08/23/17 1115)  Pulse: 101 (08/23/17 1145)  Resp: 20 (08/23/17 1145)  BP: (!) 196/105 (08/23/17 1130)  SpO2: 97 % (08/23/17 1145) Vital Signs (24h Range):  Temp:  [98.1 °F (36.7 °C)-99.1 °F (37.3 °C)] 98.9 °F (37.2 °C)  Pulse:  [] 101  Resp:  [7-33] 20  SpO2:  [94 %-97 %] 97 %  BP: (111-197)/() 196/105     Weight: 106 kg (233 lb 11 oz)  Body mass index is 30 kg/m².     SpO2: 97 %  O2 Device (Oxygen Therapy): room air      Intake/Output Summary (Last 24 hours) at 08/23/17 1306  Last data filed at 08/23/17 0000   Gross per 24 hour   Intake            593.6 ml   Output             3325 ml   Net          -2731.4 ml       Lines/Drains/Airways     Peripheral Intravenous Line                 Peripheral IV - Single Lumen 08/21/17 Left Forearm 2 days                Physical Exam   Constitutional: He is oriented to person, place, and time. He appears well-developed and well-nourished. No distress.   Cardiovascular: Normal rate and regular rhythm.  Exam reveals no gallop.    No murmur heard.  Pulmonary/Chest: Effort normal and breath sounds normal. No respiratory distress. He has no wheezes.   Abdominal: Soft. Bowel sounds are normal. He exhibits no distension. There is no tenderness.   Musculoskeletal: He exhibits edema (1-2+ LLE edema present ).   Neurological: He is alert and oriented  to person, place, and time.   Skin: Skin is warm and dry.       Significant Labs:       Recent Labs  Lab 08/23/17  0331   WBC 11.73   RBC 4.94   HGB 14.6   HCT 42.1      MCV 85   MCH 29.6   MCHC 34.7       Recent Labs  Lab 08/23/17  0331 08/23/17  1003     --    K 4.2  --      --    CO2 28  --    BUN 9  --    CREATININE 0.9  --    MG  --  1.9       Significant Imaging: Echocardiogram: 2D echo with color flow doppler: No results found for this or any previous visit.

## 2017-08-23 NOTE — PLAN OF CARE
Notified Dr Quintero, pt is still complaining of nausea but emesis is decreasing in occurrence. Pt has anxiety and SBP BP is decreasing. Last BP was 176/103, -110.     Pt had minimal bleeding from nose.      ordered ativan 1 mg IV q12h.

## 2017-08-24 VITALS
RESPIRATION RATE: 16 BRPM | TEMPERATURE: 98 F | SYSTOLIC BLOOD PRESSURE: 124 MMHG | HEART RATE: 91 BPM | HEIGHT: 74 IN | WEIGHT: 233.69 LBS | BODY MASS INDEX: 29.99 KG/M2 | OXYGEN SATURATION: 97 % | DIASTOLIC BLOOD PRESSURE: 69 MMHG

## 2017-08-24 LAB
ALBUMIN SERPL BCP-MCNC: 2.9 G/DL
ALP SERPL-CCNC: 73 U/L
ALT SERPL W/O P-5'-P-CCNC: 8 U/L
ANION GAP SERPL CALC-SCNC: 8 MMOL/L
AST SERPL-CCNC: 12 U/L
BASOPHILS # BLD AUTO: 0.01 K/UL
BASOPHILS NFR BLD: 0.1 %
BILIRUB SERPL-MCNC: 0.4 MG/DL
BUN SERPL-MCNC: 9 MG/DL
CALCIUM SERPL-MCNC: 8.9 MG/DL
CHLORIDE SERPL-SCNC: 100 MMOL/L
CO2 SERPL-SCNC: 30 MMOL/L
CREAT SERPL-MCNC: 0.8 MG/DL
DIFFERENTIAL METHOD: ABNORMAL
EOSINOPHIL # BLD AUTO: 0.1 K/UL
EOSINOPHIL NFR BLD: 0.6 %
ERYTHROCYTE [DISTWIDTH] IN BLOOD BY AUTOMATED COUNT: 12.1 %
EST. GFR  (AFRICAN AMERICAN): >60 ML/MIN/1.73 M^2
EST. GFR  (NON AFRICAN AMERICAN): >60 ML/MIN/1.73 M^2
GLUCOSE SERPL-MCNC: 148 MG/DL
HCT VFR BLD AUTO: 39.7 %
HGB BLD-MCNC: 13.6 G/DL
LYMPHOCYTES # BLD AUTO: 2.6 K/UL
LYMPHOCYTES NFR BLD: 25.5 %
MCH RBC QN AUTO: 29.4 PG
MCHC RBC AUTO-ENTMCNC: 34.3 G/DL
MCV RBC AUTO: 86 FL
MONOCYTES # BLD AUTO: 1 K/UL
MONOCYTES NFR BLD: 9.6 %
NEUTROPHILS # BLD AUTO: 6.6 K/UL
NEUTROPHILS NFR BLD: 63.9 %
PLATELET # BLD AUTO: 220 K/UL
PMV BLD AUTO: 10.4 FL
POCT GLUCOSE: 148 MG/DL (ref 70–110)
POCT GLUCOSE: 176 MG/DL (ref 70–110)
POCT GLUCOSE: 265 MG/DL (ref 70–110)
POTASSIUM SERPL-SCNC: 3.3 MMOL/L
PROT SERPL-MCNC: 6.9 G/DL
RBC # BLD AUTO: 4.62 M/UL
SODIUM SERPL-SCNC: 138 MMOL/L
WBC # BLD AUTO: 10.34 K/UL

## 2017-08-24 PROCEDURE — 25000003 PHARM REV CODE 250: Performed by: NURSE PRACTITIONER

## 2017-08-24 PROCEDURE — 99238 HOSP IP/OBS DSCHRG MGMT 30/<: CPT | Mod: ,,, | Performed by: INTERNAL MEDICINE

## 2017-08-24 PROCEDURE — 94761 N-INVAS EAR/PLS OXIMETRY MLT: CPT

## 2017-08-24 PROCEDURE — S0028 INJECTION, FAMOTIDINE, 20 MG: HCPCS | Performed by: NURSE PRACTITIONER

## 2017-08-24 PROCEDURE — 36415 COLL VENOUS BLD VENIPUNCTURE: CPT

## 2017-08-24 PROCEDURE — 80053 COMPREHEN METABOLIC PANEL: CPT

## 2017-08-24 PROCEDURE — 97161 PT EVAL LOW COMPLEX 20 MIN: CPT

## 2017-08-24 PROCEDURE — 85025 COMPLETE CBC W/AUTO DIFF WBC: CPT

## 2017-08-24 RX ORDER — TRAMADOL HYDROCHLORIDE 50 MG/1
50 TABLET ORAL EVERY 6 HOURS PRN
Qty: 20 TABLET | Refills: 0 | Status: SHIPPED | OUTPATIENT
Start: 2017-08-24 | End: 2017-08-31

## 2017-08-24 RX ADMIN — INSULIN DETEMIR 35 UNITS: 100 INJECTION, SOLUTION SUBCUTANEOUS at 09:08

## 2017-08-24 RX ADMIN — FAMOTIDINE 20 MG: 10 INJECTION, SOLUTION INTRAVENOUS at 09:08

## 2017-08-24 RX ADMIN — TRAMADOL HYDROCHLORIDE 50 MG: 50 TABLET, COATED ORAL at 11:08

## 2017-08-24 RX ADMIN — INSULIN ASPART 6 UNITS: 100 INJECTION, SOLUTION INTRAVENOUS; SUBCUTANEOUS at 04:08

## 2017-08-24 RX ADMIN — INSULIN ASPART 2 UNITS: 100 INJECTION, SOLUTION INTRAVENOUS; SUBCUTANEOUS at 11:08

## 2017-08-24 RX ADMIN — RIVAROXABAN 15 MG: 15 TABLET, FILM COATED ORAL at 04:08

## 2017-08-24 RX ADMIN — RIVAROXABAN 15 MG: 15 TABLET, FILM COATED ORAL at 09:08

## 2017-08-24 NOTE — PHYSICIAN QUERY
PT Name: Kulwant Mueller Jr.  MR #: [id number]     Physician Query Form - Documentation Clarification      CDS/: Khai Ayala Jr, RN              Contact information:[phone]    This form is a permanent document in the medical record.     Query Date: August 24, 2017    By submitting this query, we are merely seeking further clarification of documentation. Please utilize your independent clinical judgment when addressing the question(s) below.    The Medical record reflects the following:    Supporting Clinical Findings Location in Medical Record   Poorly controlled DM    Hemoglobin A1c =13.2    Glucose=410-->356-->308-->251   8/20 Cardiology Progress Note    8/20 Labs    8/18-8/21 Labs                                                                                      Doctor, Please specify Poorly Controlled DM    Provider Use Only    (  x  )Poorly Controlled DM with Hyperglycemia    (    )Other________________________________________________                                                                                                           [  ] Clinically undetermined

## 2017-08-24 NOTE — PLAN OF CARE
Problem: Patient Care Overview  Goal: Plan of Care Review  Outcome: Outcome(s) achieved Date Met: 08/24/17  Pt very happy to be going home excited to see children. Discharge instructions reviewed with pt along with diabetic diet education, medication education, DVT education, and fall risk prevention measures. Pt awaiting transportation home from spouse. Pt denies any needs at this time.

## 2017-08-24 NOTE — PLAN OF CARE
Problem: Patient Care Overview  Goal: Plan of Care Review  Outcome: Ongoing (interventions implemented as appropriate)  Pt on RA with sats of 98%. Will continue to monitor.

## 2017-08-24 NOTE — PT/OT/SLP EVAL
Physical Therapy  Evaluation    Kulwant Mueller Jr.   MRN: [id number]   Admitting Diagnosis: Acute deep vein thrombosis (DVT) of proximal vein of left lower extremity    PT Received On: 08/24/17  PT Start Time: 1150     PT Stop Time: 1208    PT Total Time (min): 18 min       Billable Minutes:  Evaluation 18 minutes    Diagnosis: Acute deep vein thrombosis (DVT) of proximal vein of left lower extremity  The primary encounter diagnosis was Acute deep vein thrombosis (DVT) of proximal vein of left lower extremity. A diagnosis of Leg swelling was also pertinent to this visit.      Past Medical History:   Diagnosis Date    Diabetes mellitus     Hypertension       Past Surgical History:   Procedure Laterality Date    APPENDECTOMY         General Precautions: Standard, fall  Orthopedic Precautions: N/A   Braces: N/A       Do you have any cultural, spiritual, Restorationist conflicts, given your current situation?: none    Patient History:  Living Environment Comment: Pt reports that he live with wife in [facility] with 1 threshold SHANNAN; WIS, (I)PLOF; works as  and drives  Equipment Currently Used at Home: none      Previous Level of Function:  Ambulation Skills: independent  Transfer Skills: independent  ADL Skills: independent  Work/Leisure Activity: independent    Subjective:  Communicated with nurse prior to session.  Pt reports using crutches in the past  Chief Complaint: pain initially when he first got up  Patient goals: to return to PLOF    Pain/Comfort  Pain Rating 1:  (Pt reports that the pain is tolerable throughout the session -did not rate)      Objective:   Patient found with:  (ICU monitoring)     Cognitive Exam:  Oriented to: Person, Place, Time and Situation    Follows Commands/attention: Follows multistep  commands  Communication: clear/fluent  Safety awareness/insight to disability: intact    Physical Exam:  Postural examination/scapula alignment: Rounded shoulder    Skin integrity: Visible skin intact  Edema:  None noted     Sensation:   Intact    Upper Extremity Range of Motion:  Right Upper Extremity: WFL  Left Upper Extremity: WFL    Upper Extremity Strength:  Right Upper Extremity: WFL  Left Upper Extremity: WFL    Lower Extremity Range of Motion:  Right Lower Extremity: WFL  Left Lower Extremity: WFL    Lower Extremity Strength:  Right Lower Extremity: WFL  Left Lower Extremity: WFL       Gross motor coordination: WFL    Functional Mobility:  Bed Mobility:  Pt already sitting in bedside chair    Transfers:  Sit <> Stand Assistance: Modified Independent  Sit <> Stand Assistive Device: Axillary crutches, No Assistive Device    Gait:   Gait Distance: 200ft - 170ft with crutches and last 30ft without AD  Assistance 1: Modified Independent, Independent  Gait Assistive Device: No device  Gait Pattern: 3-point gait ( with crutches; reciprocal without AD)  Gait Deviation(s): decreased nathaniel (VCs for erect stance, step length with crutches)    Stairs:  Up/down 1 step with crutches with S/SBA    Balance:   Static Sit: NORMAL: No deviations seen in posture held statically  Dynamic Sit: NORMAL: No deviations seen in posture held dynamically  Static Stand: GOOD+: Takes MAXIMAL challenges from all directions  Dynamic stand: GOOD+: Independent gait (with or without assistive device)      AM-PAC 6 CLICK MOBILITY  How much help from another person does this patient currently need?   1 = Unable, Total/Dependent Assistance  2 = A lot, Maximum/Moderate Assistance  3 = A little, Minimum/Contact Guard/Supervision  4 = None, Modified [place]/Independent    Turning over in bed (including adjusting bedclothes, sheets and blankets)?: 4  Sitting down on and standing up from a chair with arms (e.g., wheelchair, bedside commode, etc.): 4  Moving from lying on back to sitting on the side of the bed?: 4  Moving to and from a bed to a chair (including a wheelchair)?: 4  Need to walk in hospital room?: 4  Climbing 3-5 steps with a railing?:  4  Total Score: 24     AM-PAC Raw Score CMS G-Code Modifier Level of Impairment Assistance   6 % Total / Unable   7 - 9 CM 80 - 100% Maximal Assist   10 - 14 CL 60 - 80% Moderate Assist   15 - 19 CK 40 - 60% Moderate Assist   20 - 22 CJ 20 - 40% Minimal Assist   23 CI 1-20% SBA / CGA   24 CH 0% Independent/ Mod I     Patient left up in chair with all lines intact, call button in reach, nurse notified and wife present.    Assessment:   Kulwant Mueller Jr. is a 32 y.o. male with a medical diagnosis of Acute deep vein thrombosis (DVT) of proximal vein of left lower extremity   Patient demonstrated amb with crutches to initiate amb WBAT LLE and progressed to amb without AD; pt is Cristela with transfers and amb without AD; no further acute PT needs; will d/c PT services.  Rehab identified problem list/impairments: Rehab identified problem list/impairments: impaired endurance, pain    Rehab potential is excellent.    Activity tolerance: Good    Discharge recommendations: Discharge Facility/Level Of Care Needs: home     Barriers to discharge: Barriers to Discharge: None    Equipment recommendations: Equipment Needed After Discharge: none     GOALS:    Physical Therapy Goals     Not on file          Multidisciplinary Problems (Resolved)        Problem: Physical Therapy Goal    Goal Priority Disciplines Outcome Goal Variances Interventions   Physical Therapy Goal   (Resolved)     PT/OT, PT Outcome(s) achieved                     PLAN:      (d/c acute PT services)  Plan of Care reviewed with: patient, spouse          Fya BLACK Nemesio, PT  08/24/2017

## 2017-08-24 NOTE — DISCHARGE SUMMARY
[facility]  Cardiology  Discharge Summary      Patient Name: Kulwant Mueller Jr.  MRN: [id number]  Admission Date: 8/18/2017  Hospital Length of Stay: 4 days  Discharge Date and Time: 8/24/2017  Attending Physician: Madan Quintero MD  Discharging Provider: CHARLENE Moran, ANP  Primary Care Physician: Dona Pineda MD    HPI: 32 year old male presenting with an unprovoked L leg DVT. He noted severe pain with edema. No pulmonary symptoms. He was started on IV heparin with improvement of edema but the pain 6/10 with palpation. No previous h/o VTE. Drove for 5 hours 2 weeks ago. He thinks his mother and aunt had VTEs in the past. No other medical problems. Intentional 100 lbs weight loss over the past year.      Procedure(s) (LRB):  VENOGRAM (N/A)     Indwelling Lines/Drains at time of discharge:      Hospital Course 8/18/2017 Presented with LLE swelling and extreme pain. Underwent LE venous ultrasound with evidence of deep venous thrombosis throughout the femoral, popliteal, and peroneal veins. Started on IV Heparin drip and admitted to [facility] 8/19/2017 Continued swelling despite IV Heparin. 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 Evidence of possible worsening DVT despite IV Heparin. Plan for venogram tomorrow.   8/21/2017 Taken to the cath lab for venogram with left AT vein access was obtained under US guidance with placement of EKOS catheter at the level of CFV and therapy initiated with TPA at 1 mg/hr as well as Angiomax infusion 8/22/2017 EKOS catheter with TPA and Angiomax continued overnight with continued  improvement in LLE swelling.8/23/2017 Complaints of nausea overnight with several episodes of vomiting with no improvement with Zofran and Phenergan. Felt to be related to side effect of pain medication. Alk phos, total bili, AST and ALT normal. STAT KUB with no evidence of obstruction and abdominal ultrasound with no acute abnormalities. Amylase and lipase normal. Relook angiogram planned for today with deferment secondary to nausea and vomiting. TPA and Angiomax discontinued with removal of sheath due to improvement in LLE swelling. Started on Xarelto a few hours after sheath with stability of access site. Nausea and vomiting resolved with de escalation of IV pain medications to oral Tramadol  8/24/2017 Complete resolution of nausea and vomiting overnight. LLE swelling dramatically improved as well. OOB to chair with slight pain with weight bearing to LLE at access site felt to be related to access and immobility. Strong palpable pulses. PT consulted for evaluation with no recommendations for need for PT upon discharge. Deemed ready for discharge and sent home on current home medication regimen of insulin along with addition of Xarelto 15mg po BID x 21 days followed by 20mg po daily thereafter. Instructed on importance and role of Xarelto with full verbalization and understanding. Informed of needed follow up with Dr. Duff as an outpatient and need for additional procedure in the next 4-6 weeks for treatment of May Thurner syndrome. Throughtout hospitalization, he was counseled on importance of aggressive blood sugar control with moderate reception to teaching. He did not appear to be fully compliant with his diet at home and it was felt to be more related to resistance rather than lack of understanding. Will follow up with Dr. Duff in [facility]     Consults: none         Final Active Diagnoses:    Diagnosis Date Noted POA    PRINCIPAL PROBLEM:  Acute deep vein thrombosis (DVT) of proximal vein of left lower  extremity [I82.4Y2] 08/18/2017 Yes    Nausea and vomiting [R11.2] resolved  08/23/2017 No    Non morbid obesity due to excess calories [E66.09] 08/19/2017 Yes    Type 2 diabetes mellitus without complication [E11.9] 08/19/2017 Yes      Problems Resolved During this Admission:    Diagnosis Date Noted Date Resolved POA       Discharged Condition: good    Follow Up:  Follow-up Information     Tyree Duff MD On 9/21/2017.    Specialties:  INTERVENTIONAL CARDIOLOGY, Cardiology  Why:  at 9:00 am--Cardiology Hospital Follow-Up.  Contact information:  [facility] [address]  [address]  [phone]                 Patient Instructions:     Diet general   Order Specific Question Answer Comments   Total calories: 1800 Calorie      Activity as tolerated       Medications:  Reconciled Home Medications:   Current Discharge Medication List      START taking these medications    Details   !! rivaroxaban (XARELTO) 15 mg Tab Take 1 tablet (15 mg total) by mouth 2 (two) times daily with meals.  Qty: 30 tablet, Refills: 0      !! rivaroxaban (XARELTO) 20 mg Tab Take 1 tablet (20 mg total) by mouth daily with dinner or evening meal.  Qty: 30 tablet, Refills: 11      tramadol (ULTRAM) 50 mg tablet Take 1 tablet (50 mg total) by mouth every 6 (six) hours as needed for Pain.  Qty: 20 tablet, Refills: 0       !! - Potential duplicate medications found. Please discuss with provider.      CONTINUE these medications which have NOT CHANGED    Details   INSULIN DETEMIR (LEVEMIR FLEXPEN SUBQ) Inject into the skin.      insulin lispro (HUMALOG) 100 unit/mL injection Inject into the skin 3 (three) times daily before meals.      mirtazapine (REMERON) 7.5 MG Tab Take 1 tablet (7.5 mg total) by mouth every evening.  Qty: 30 tablet, Refills: 0    Associated Diagnoses: Insomnia, unspecified type      pantoprazole (PROTONIX) 40 MG tablet Take 1 tablet (40 mg total) by mouth once daily.  Qty: 30 tablet, Refills: 1    Associated  Diagnoses: Diarrhea, unspecified type             Time spent on the discharge of patient: 15 minutes    CHARLENE Moran, ANP  Cardiology  [facility]

## 2017-08-24 NOTE — PLAN OF CARE
Future Appointments  Date Time Provider Department Center   9/21/2017 9:00 AM Tyree Duff MD [facility] CARDIO [place]     Follow-up With  Details  Why  Contact Info   Tyree Duff MD  On 9/21/2017  at 9:00 am--Cardiology Hospital Follow-Up.  [address]  [address]  [place] [address]  [phone]     Nayeli Strickland RN  Transition Navigator  [phone]

## 2017-08-24 NOTE — PLAN OF CARE
Problem: Patient Care Overview  Goal: Plan of Care Review  Outcome: Ongoing (interventions implemented as appropriate)  Pt is AAOX4, VSS, no distress noted. Pt states the plan is for him to follow-up with Dr. Shields as an outpatient for a procedure. He states he wants to known if he can be discharged soon. No distress identified, care is ongoing, will continue to monitor.     Problem: VTE, DVT and PE (Adult)  Goal: Signs and Symptoms of Listed Potential Problems Will be Absent, Minimized or Managed (VTE, DVT and PE)  Signs and symptoms of listed potential problems will be absent, minimized or managed by discharge/transition of care (reference VTE, DVT and PE (Adult) CPG).   LLE warm, no SS of numbness or tingling, pulses palpable. Pt states swelling in that leg is improved, pt does have some +2/3 edema in the ankle.

## 2017-08-24 NOTE — PLAN OF CARE
Problem: Physical Therapy Goal  Goal: Physical Therapy Goal  Outcome: Outcome(s) achieved Date Met: 08/24/17  Patient demonstrated amb with crutches to initiate amb WBAT LLE and progressed to amb without AD; pt is Cristela with transfers and amb without AD; no further acute PT needs; will d/c PT services.

## 2017-08-24 NOTE — PLAN OF CARE
TN met with patient.  Patient discharged to home with wife. Wife will  patient for discharge. TN spoke with PT Raina, stated they recommend home, no HME. VIKA Aviles to send xarelto script to [facility], patient aware. No further discharge needs. TN reviewed follow-up appointments with patient, verbalized understanding. Patient would like to change PCP to an [facility] PCP. TN provided [facility] PCP name in [place] per preference. Patient will call to establish care.    Future Appointments  Date Time Provider Department Center   9/21/2017 9:00 AM Tyree Duff MD [facility] CARDIO [facility]     Follow-up With  Details  Why  Contact Info   Tyree Duff MD  On 9/21/2017  at 9:00 am--Cardiology Hospital Follow-Up.  [address]  [address]  [facility] [address]  [phone]   Aryan Steven MD    --To establish care.  [address] [facility] [address]  [phone]        08/24/17 1446   Final Note   Assessment Type Final Discharge Note   Discharge Disposition Home   What phone number can be called within the next 1-3 days to see how you are doing after discharge? [phone]   Hospital Follow Up  Appt(s) scheduled? Yes   Discharge plans and expectations educations in teach back method with documentation complete? Yes   Right Care Referral Info   Post Acute Recommendation No Care     Nayeli Strickland RN  Transition Navigator  [phone]

## 2017-08-24 NOTE — DISCHARGE INSTRUCTIONS
DEEP VEIN THROMBOSIS, TREATING (ENGLISH)  DEEP VEIN THROMBOSIS, COMPLICATIONS OF (ENGLISH)   DEEP VEIN THROMBOSIS, DISCHARGE INSTRUCTIONS FOR (ENGLISH)  RIVAROXABAN ORAL TABLETS (ENGLISH)   TRAMADOL TABLETS (ENGLISH)   DIABETES, SERVING AND PORTION SIZES, LEARNING ABOUT (ENGLISH)  DIABETES, LONG-TERM COMPLICATIONS (ENGLISH)  DIABETES, HEALTHY MEALS FOR (ENGLISH)

## 2017-08-25 ENCOUNTER — PATIENT OUTREACH (OUTPATIENT)
Dept: ADMINISTRATIVE | Facility: CLINIC | Age: 32
End: 2017-08-25

## 2017-08-25 NOTE — PATIENT INSTRUCTIONS
Discharge Instructions for Deep Vein Thrombosis (DVT)  A blood clot or thrombus that forms in a large, deep vein is called a deep vein thrombosis (DVT). If a DVT is not treated, part of the clot (embolus) can break off and travel to your lungs. This is called a pulmonary embolus (PE). This can cut off the flow of blood to part or all of the lung. PE is a medical emergency and may cause death.   Healthcare providers use the term venous thromboembolism (VTE) to describe these two conditions: DVT and PE. They use the term VTE because the two conditions are very closely related. And because their prevention and treatment are also closely related.   Make sure you follow all instructions for taking your medicine, follow-up care, and diet and lifestyle changes.  Medicine  Your healthcare provider will usually prescribe an anticoagulant medicine. This medicine is a blood thinner that helps to prevent new blood clots. Anticoagulants can be given by mouth (oral), by injection, or into your vein (intravenous or IV). Commonly used anticoagulants include warfarin and heparin. Newer anticoagulants may also be used. They include rivaroxaban, apixaban, dabigatran, and enoxaparin. Your healthcare provider will provide specific instructions on how to take your anticoagulant. You may take more than one type for a period of time.  Make sure to take your anticoagulant exactly as directed. If you miss a dose, call your healthcare provider to find out what you should do. These medicines increase the chance of bleeding. So it is very important to take them correctly. Be sure to tell all of your healthcare providers, including dentists, that you are taking an anticoagulant.  Follow-up monitoring  If you are taking warfarin, you will need regular blood tests to see how it is working. These blood tests include a prothrombin time (PT), also reported as an international normalized ratio (INR). Your dose of warfarin may be changed based on the  test results. It is very important that you keep your testing appointments after you leave the hospital.  Be sure you understand the following information before you go home:   My goal PT/INR is between _____ and _____.   My next PT/INR blood draw will be on ______________ (date) at _______________ (time) at __________________________________________ (name of healthcare provider or clinic and address).   The name of the healthcare provider who will monitor my anticoagulation is ________________________ and the phone number is _________________________.  Depending on which anticoagulant you are prescribed, you may need other blood tests. Before you are discharged, your healthcare provider will review what testing is needed in detail.  Diet and warfarin  Vitamin K can interact with warfarin and reduce its ability to thin your blood. Vitamin K helps your blood clot. So sudden changes in vitamin K intake can affect the way warfarin works. You dont need to avoid foods with vitamin K. Instead, keep the amount you eat about the same each day. Foods high in vitamin K include:  · Leafy green vegetables like spinach, cabbage, and kale  · Avocado  · Asparagus  · Egg yolks  · Oils like canola, olive, and soybean  When taking warfarin, don't change your diet without first checking with your healthcare provider.  The other anticoagulants do not have the same interaction with vitamin K that warfarin does.   Medicines and your anticoagulant  Some medicines may cause problems with anticoagulant. Check with your healthcare provider before making any changes to your medicines. And don't take over-the-counter (OTC) medicines without checking with your provider. Some medicines interact with your anticoagulant and make your blood too thin. This increases your risk of bleeding. Others may stop your anticoagulant from doing its job, making your blood too thick. So it is very important to tell your healthcare provider about all of the  medicines you take, including OTCs and herbal supplements. Don't start or stop taking any medicine, including OTCs, unless your healthcare provider tells you to.  Medicines that may cause problems with your anticoagulant include:  · Some antibiotic medicines  · Some heart medicines  · Cimetidine  · Aspirin or other nonsteroidal anti-inflammatory drugs (NSAIDs) like ibuprofen, naproxen.  · Some medicines for depression, cancer, HIV infection, diabetes, seizures, gout, high cholesterol, or thyroid disease  · Vitamins with vitamin K  · Some herbal products like Julio's wort, garlic, coenzyme Q10, tumeric, and ginkgo biloba  Home care  To help prevent blood clots, you might do the following:  · Wiggle your toes and move your ankles while sitting or lying down.  · When traveling by car, make frequent stops to get up and move around.  · On long airplane rides, get up and move around when possible. If you cant get up, wiggle your toes, move your ankles and tighten your calves to keep your blood moving.  · If you have to stay in bed, do leg exercises.  · Wear support or compression stockings, if prescribed by your healthcare provider.  · Rest and put your legs up whenever they feel swollen or heavy.  · Raise the foot of your mattress 5 to 6 inches, using a foam wedge.  Lifestyle changes  To help you stay healthy, especially your heart and blood vessels, you should:  · Begin an exercise program, if you are not exercising. Ask your healthcare provider how to get started. Try walking, inside or out.  · Stay at a healthy weight. Get help to lose any extra pounds.  · Keep blood pressure in a healthy range  · If you smoke, make a plan to quit. Ask your healthcare provider about stop-smoking programs to help you quit.  Call 911  Call 911 right away if you have the following symptoms. They may mean a blood clot in your lungs:  · Chest pain  · Trouble breathing  · Fast heartbeat  · Sweating  · Fainting  · Coughing (may cough up  blood)  · Heavy or uncontrolled bleeding  When to call your healthcare provider  Call your healthcare provider if you have pain, swelling, or redness in your leg, arm, or other area. These symptoms may mean a blood clot.  If you take blood thinners and are bleeding, you may have:  · Blood in the urine   · Bleeding with bowel movements  · Very dark or tar-like stool  · Vomiting with blood  · Coughing with blood  · Bleeding from the nose  · Bleeding from the gums  · A cut that will not stop bleeding  · Bleeding from the vagina  Date Last Reviewed: 5/1/2017© 5363-2569 [employer]. [address] [place] [address]. All rights reserved. This information is not intended as a substitute for professional medical care. Always follow your healthcare professional's instructions.

## 2017-08-29 ENCOUNTER — NURSE TRIAGE (OUTPATIENT)
Dept: ADMINISTRATIVE | Facility: CLINIC | Age: 32
End: 2017-08-29

## 2017-08-29 ENCOUNTER — TELEPHONE (OUTPATIENT)
Dept: FAMILY MEDICINE | Facility: CLINIC | Age: 32
End: 2017-08-29

## 2017-08-29 NOTE — TELEPHONE ENCOUNTER
I spoke with the pt  I advised him that we have not seen him in over 3 years  He has not followed up with Dr Pineda since he was discharged from the hospital.  He wants to see Dr Steven today or tomorrow.  I advised we do not have any openings at this time for a new patient ( today or tomorrow )  I advised the pt to go back to ER  The leg is not getting worse but the swelling has not gone done  He thinks it maybe the xarelto  I advised we have not seen him and are not up to date with what has been happening since it has been so long since he has been seen here  I advised to call the cardiologist who recently treated him or go to the ER

## 2017-08-29 NOTE — TELEPHONE ENCOUNTER
Dr Steven   See the message below  Pt has not seen you since we   Started with [facility]  Please advise

## 2017-08-29 NOTE — TELEPHONE ENCOUNTER
NO I do not think it is appropriate for me to see him. He was just admitted for DVT and dr Hager took care of him as well as dr Albrecht    He needs to go to the er if it is not better or has problems

## 2017-08-29 NOTE — TELEPHONE ENCOUNTER
Reason for Disposition   Thigh, calf, or ankle swelling in only one leg    Protocols used:  LEG SWELLING AND EDEMA-A-OH    Kulwant is calling to report that he continues to have swelling in the left ankle and foot.  Father is also present during call.  Both report swelling has not gone down any.  Both report hx of DVT.  Pain is a 9-10.  Foot is hot to touch.  Advised CARI Blum refused and states wants to come in to see Dr. Moran today or tomorrow.  Please contact Kulwant at [phone] to advise.

## 2017-08-29 NOTE — TELEPHONE ENCOUNTER
NO he was just admitted for DVT and dr Hager took care of him as well as dr Albrecht    He needs to go to the er if it is not better or has problems

## 2017-08-31 ENCOUNTER — TELEPHONE (OUTPATIENT)
Dept: CARDIOLOGY | Facility: CLINIC | Age: 32
End: 2017-08-31

## 2017-08-31 ENCOUNTER — OFFICE VISIT (OUTPATIENT)
Dept: CARDIOLOGY | Facility: CLINIC | Age: 32
End: 2017-08-31
Payer: COMMERCIAL

## 2017-08-31 VITALS
BODY MASS INDEX: 29.65 KG/M2 | WEIGHT: 231 LBS | OXYGEN SATURATION: 98 % | HEIGHT: 74 IN | SYSTOLIC BLOOD PRESSURE: 108 MMHG | HEART RATE: 95 BPM | DIASTOLIC BLOOD PRESSURE: 71 MMHG

## 2017-08-31 DIAGNOSIS — I87.1 MAY-THURNER SYNDROME: ICD-10-CM

## 2017-08-31 DIAGNOSIS — E11.9 TYPE 2 DIABETES MELLITUS WITHOUT COMPLICATION, UNSPECIFIED LONG TERM INSULIN USE STATUS: ICD-10-CM

## 2017-08-31 DIAGNOSIS — R11.2 NAUSEA AND VOMITING, INTRACTABILITY OF VOMITING NOT SPECIFIED, UNSPECIFIED VOMITING TYPE: ICD-10-CM

## 2017-08-31 DIAGNOSIS — I82.4Y2 ACUTE DEEP VEIN THROMBOSIS (DVT) OF PROXIMAL VEIN OF LEFT LOWER EXTREMITY: Primary | ICD-10-CM

## 2017-08-31 PROCEDURE — 3008F BODY MASS INDEX DOCD: CPT | Mod: S$GLB,,, | Performed by: INTERNAL MEDICINE

## 2017-08-31 PROCEDURE — 99999 PR PBB SHADOW E&M-EST. PATIENT-LVL III: CPT | Mod: PBBFAC,,, | Performed by: INTERNAL MEDICINE

## 2017-08-31 PROCEDURE — 3046F HEMOGLOBIN A1C LEVEL >9.0%: CPT | Mod: S$GLB,,, | Performed by: INTERNAL MEDICINE

## 2017-08-31 PROCEDURE — 99214 OFFICE O/P EST MOD 30 MIN: CPT | Mod: S$GLB,,, | Performed by: INTERNAL MEDICINE

## 2017-08-31 RX ORDER — DIPHENHYDRAMINE HCL 25 MG
50 CAPSULE ORAL ONCE
Status: CANCELLED | OUTPATIENT
Start: 2017-08-31 | End: 2017-08-31

## 2017-08-31 RX ORDER — SODIUM CHLORIDE 9 MG/ML
3 INJECTION, SOLUTION INTRAVENOUS CONTINUOUS
Status: CANCELLED | OUTPATIENT
Start: 2017-08-31 | End: 2017-08-31

## 2017-08-31 NOTE — PROGRESS NOTES
Subjective:    Patient ID:  Kulwant Mueller Jr. is a 32 y.o. male who presents for follow-up of Deep Vein Thrombosis      HPI     33 y/o male with hx of recent acute LLE fem-pop and below the knee DVT (extensive thrombus) s/p catheter directed thrombolysis with EKOS with significant improvement in symptoms. Also has a hx of DM on insulin. He initially presented with LLE pain and swelling and found to have acute LLE extensive DVT from fem to peroneal. 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 EKOS removed after 48 hours with significant improvement in edema and pain. Discharged home on Xarelto. Has done well since discharge. LLE around ankle still edematous bur improved. Denies CP, SOB/DASILVA, orthopnea, PND, syncope, palps. Compliant with meds. Wondering when he can go back to work.      Review of Systems   Constitution: Negative for weakness and malaise/fatigue.   HENT: Negative for congestion and headaches.    Eyes: Negative for blurred vision.   Cardiovascular: Positive for leg swelling. Negative for chest pain, claudication, cyanosis, dyspnea on exertion, irregular heartbeat, near-syncope, orthopnea, palpitations, paroxysmal nocturnal dyspnea and syncope.   Respiratory: Negative for shortness of breath.    Endocrine: Negative for polyuria.   Hematologic/Lymphatic: Negative for bleeding problem.   Skin: Negative for itching and rash.   Musculoskeletal: Negative for joint swelling, muscle cramps and muscle weakness.   Gastrointestinal: Negative for abdominal pain, hematemesis, hematochezia, melena, nausea and vomiting.   Genitourinary: Negative for dysuria and hematuria.   Neurological: Negative for dizziness, focal weakness, light-headedness and loss of balance.   Psychiatric/Behavioral: Negative for depression. The patient is not nervous/anxious.         Objective:    Physical Exam   Constitutional: He is oriented to person, place, and time. He  appears well-developed and well-nourished.   HENT:   Head: Normocephalic and atraumatic.   Neck: Neck supple. No JVD present.   Cardiovascular: Normal rate, regular rhythm and normal heart sounds.    Pulses:       Carotid pulses are 2+ on the right side, and 2+ on the left side.       Radial pulses are 2+ on the right side, and 2+ on the left side.        Femoral pulses are 2+ on the right side, and 2+ on the left side.       Dorsalis pedis pulses are 2+ on the right side, and 2+ on the left side.        Posterior tibial pulses are 2+ on the right side, and 2+ on the left side.   Pulmonary/Chest: Effort normal and breath sounds normal.   Abdominal: Soft. Bowel sounds are normal.   Musculoskeletal: He exhibits edema.   Neurological: He is alert and oriented to person, place, and time.   Skin: Skin is warm and dry.   Psychiatric: He has a normal mood and affect. His behavior is normal. Thought content normal.         Assessment:       1. Acute deep vein thrombosis (DVT) of proximal vein of left lower extremity    2. Type 2 diabetes mellitus without complication, unspecified long term insulin use status    3. Nausea and vomiting, intractability of vomiting not specified, unspecified vomiting type      33 y/o male with hx and presentation as above. LLE improved. Tolerating meds well. Will plan for staged intervention of iliac veins for May-Thurner. Will obtain venous doppler to evaluate for DVT improvement prior to and will need to hold Xarelto 48 hours prior to procedure. Compression stockings and elevation at night.        Plan:       -Venous doppler  -Plan for Venogram + intervention of iliacs for May Thurner  -Bilateral CFV access with likely 8-10 Fr sheaths  -peripheral IVUS  -Will need consents the day of procedure

## 2017-09-05 NOTE — NURSING
Spoke with patient and informed him he needs to be here at 6am. All preop instructions given.  Pt instructed to stop xarelto on today.  Pt acknowledges all instructions.  All questions answered.

## 2017-09-06 ENCOUNTER — HOSPITAL ENCOUNTER (OUTPATIENT)
Dept: RADIOLOGY | Facility: HOSPITAL | Age: 32
Discharge: HOME | End: 2017-09-06
Attending: INTERNAL MEDICINE
Payer: COMMERCIAL

## 2017-09-06 DIAGNOSIS — I82.4Y2 ACUTE DEEP VEIN THROMBOSIS (DVT) OF PROXIMAL VEIN OF LEFT LOWER EXTREMITY: ICD-10-CM

## 2017-09-06 PROCEDURE — 93971 EXTREMITY STUDY: CPT | Mod: TC,PO

## 2017-09-08 ENCOUNTER — HOSPITAL ENCOUNTER (OUTPATIENT)
Facility: HOSPITAL | Age: 32
Discharge: HOME | End: 2017-09-09
Attending: INTERNAL MEDICINE | Admitting: INTERNAL MEDICINE
Payer: COMMERCIAL

## 2017-09-08 DIAGNOSIS — I87.1 COMPRESSION OF VEIN: ICD-10-CM

## 2017-09-08 DIAGNOSIS — I82.4Y2 ACUTE DEEP VEIN THROMBOSIS (DVT) OF PROXIMAL VEIN OF LEFT LOWER EXTREMITY: ICD-10-CM

## 2017-09-08 DIAGNOSIS — R11.2 NAUSEA AND VOMITING: ICD-10-CM

## 2017-09-08 DIAGNOSIS — I87.1 MAY-THURNER SYNDROME: Primary | ICD-10-CM

## 2017-09-08 DIAGNOSIS — E11.9 TYPE 2 DIABETES MELLITUS WITHOUT COMPLICATION, UNSPECIFIED LONG TERM INSULIN USE STATUS: ICD-10-CM

## 2017-09-08 LAB
ANION GAP SERPL CALC-SCNC: 12 MMOL/L
ANION GAP SERPL CALC-SCNC: 12 MMOL/L
ANION GAP SERPL CALC-SCNC: 15 MMOL/L
BASOPHILS # BLD AUTO: 0.02 K/UL
BASOPHILS # BLD AUTO: 0.02 K/UL
BASOPHILS NFR BLD: 0.2 %
BASOPHILS NFR BLD: 0.2 %
BUN SERPL-MCNC: 10 MG/DL
BUN SERPL-MCNC: 11 MG/DL
BUN SERPL-MCNC: 11 MG/DL
CALCIUM SERPL-MCNC: 9.4 MG/DL
CALCIUM SERPL-MCNC: 9.4 MG/DL
CALCIUM SERPL-MCNC: 9.6 MG/DL
CHLORIDE SERPL-SCNC: 100 MMOL/L
CHLORIDE SERPL-SCNC: 100 MMOL/L
CHLORIDE SERPL-SCNC: 98 MMOL/L
CO2 SERPL-SCNC: 22 MMOL/L
CO2 SERPL-SCNC: 25 MMOL/L
CO2 SERPL-SCNC: 25 MMOL/L
CREAT SERPL-MCNC: 1 MG/DL
DIFFERENTIAL METHOD: NORMAL
DIFFERENTIAL METHOD: NORMAL
EOSINOPHIL # BLD AUTO: 0.1 K/UL
EOSINOPHIL # BLD AUTO: 0.1 K/UL
EOSINOPHIL NFR BLD: 0.9 %
EOSINOPHIL NFR BLD: 0.9 %
ERYTHROCYTE [DISTWIDTH] IN BLOOD BY AUTOMATED COUNT: 12.8 %
ERYTHROCYTE [DISTWIDTH] IN BLOOD BY AUTOMATED COUNT: 12.8 %
EST. GFR  (AFRICAN AMERICAN): >60 ML/MIN/1.73 M^2
EST. GFR  (NON AFRICAN AMERICAN): >60 ML/MIN/1.73 M^2
GLUCOSE SERPL-MCNC: 243 MG/DL
GLUCOSE SERPL-MCNC: 243 MG/DL
GLUCOSE SERPL-MCNC: 284 MG/DL
HCT VFR BLD AUTO: 43.8 %
HCT VFR BLD AUTO: 43.8 %
HGB BLD-MCNC: 14.5 G/DL
HGB BLD-MCNC: 14.5 G/DL
INR PPP: 1
LYMPHOCYTES # BLD AUTO: 2.8 K/UL
LYMPHOCYTES # BLD AUTO: 2.8 K/UL
LYMPHOCYTES NFR BLD: 25.7 %
LYMPHOCYTES NFR BLD: 25.7 %
MCH RBC QN AUTO: 28.9 PG
MCH RBC QN AUTO: 28.9 PG
MCHC RBC AUTO-ENTMCNC: 33.1 G/DL
MCHC RBC AUTO-ENTMCNC: 33.1 G/DL
MCV RBC AUTO: 87 FL
MCV RBC AUTO: 87 FL
MONOCYTES # BLD AUTO: 0.8 K/UL
MONOCYTES # BLD AUTO: 0.8 K/UL
MONOCYTES NFR BLD: 7.3 %
MONOCYTES NFR BLD: 7.3 %
NEUTROPHILS # BLD AUTO: 7.2 K/UL
NEUTROPHILS # BLD AUTO: 7.2 K/UL
NEUTROPHILS NFR BLD: 65.7 %
NEUTROPHILS NFR BLD: 65.7 %
PLATELET # BLD AUTO: 302 K/UL
PLATELET # BLD AUTO: 302 K/UL
PMV BLD AUTO: 9.4 FL
PMV BLD AUTO: 9.4 FL
POCT GLUCOSE: 282 MG/DL (ref 70–110)
POCT GLUCOSE: 283 MG/DL (ref 70–110)
POTASSIUM SERPL-SCNC: 3.9 MMOL/L
POTASSIUM SERPL-SCNC: 3.9 MMOL/L
POTASSIUM SERPL-SCNC: 4.1 MMOL/L
PROTHROMBIN TIME: 10.3 SEC
RBC # BLD AUTO: 5.01 M/UL
RBC # BLD AUTO: 5.01 M/UL
SODIUM SERPL-SCNC: 135 MMOL/L
SODIUM SERPL-SCNC: 137 MMOL/L
SODIUM SERPL-SCNC: 137 MMOL/L
WBC # BLD AUTO: 10.91 K/UL
WBC # BLD AUTO: 10.91 K/UL

## 2017-09-08 PROCEDURE — 85610 PROTHROMBIN TIME: CPT

## 2017-09-08 PROCEDURE — 63600175 PHARM REV CODE 636 W HCPCS

## 2017-09-08 PROCEDURE — C1759 CATH, INTRA ECHOCARDIOGRAPHY: HCPCS

## 2017-09-08 PROCEDURE — 63600175 PHARM REV CODE 636 W HCPCS: Performed by: INTERNAL MEDICINE

## 2017-09-08 PROCEDURE — 25000003 PHARM REV CODE 250

## 2017-09-08 PROCEDURE — 37239 OPEN/PERQ PLACE STENT EA ADD: CPT

## 2017-09-08 PROCEDURE — 37239 OPEN/PERQ PLACE STENT EA ADD: CPT | Mod: LT,,, | Performed by: INTERNAL MEDICINE

## 2017-09-08 PROCEDURE — 37253 INTRVASC US NONCORONARY ADDL: CPT | Mod: ,,, | Performed by: INTERNAL MEDICINE

## 2017-09-08 PROCEDURE — 25000003 PHARM REV CODE 250: Performed by: INTERNAL MEDICINE

## 2017-09-08 PROCEDURE — G0378 HOSPITAL OBSERVATION PER HR: HCPCS

## 2017-09-08 PROCEDURE — 37238 OPEN/PERQ PLACE STENT SAME: CPT | Mod: RT,,, | Performed by: INTERNAL MEDICINE

## 2017-09-08 PROCEDURE — 80048 BASIC METABOLIC PNL TOTAL CA: CPT | Mod: 91

## 2017-09-08 PROCEDURE — 94761 N-INVAS EAR/PLS OXIMETRY MLT: CPT | Mod: 59

## 2017-09-08 PROCEDURE — 99152 MOD SED SAME PHYS/QHP 5/>YRS: CPT | Mod: ,,, | Performed by: INTERNAL MEDICINE

## 2017-09-08 PROCEDURE — 99152 MOD SED SAME PHYS/QHP 5/>YRS: CPT

## 2017-09-08 PROCEDURE — 85025 COMPLETE CBC W/AUTO DIFF WBC: CPT

## 2017-09-08 PROCEDURE — 93005 ELECTROCARDIOGRAM TRACING: CPT

## 2017-09-08 PROCEDURE — 36415 COLL VENOUS BLD VENIPUNCTURE: CPT

## 2017-09-08 PROCEDURE — 37252 INTRVASC US NONCORONARY 1ST: CPT | Mod: ,,, | Performed by: INTERNAL MEDICINE

## 2017-09-08 PROCEDURE — 93010 ELECTROCARDIOGRAM REPORT: CPT | Mod: ,,, | Performed by: INTERNAL MEDICINE

## 2017-09-08 RX ORDER — IBUPROFEN 200 MG
16 TABLET ORAL
Status: DISCONTINUED | OUTPATIENT
Start: 2017-09-08 | End: 2017-09-09 | Stop reason: HOSPADM

## 2017-09-08 RX ORDER — SODIUM CHLORIDE 9 MG/ML
3 INJECTION, SOLUTION INTRAVENOUS CONTINUOUS
Status: ACTIVE | OUTPATIENT
Start: 2017-09-08 | End: 2017-09-08

## 2017-09-08 RX ORDER — HYDRALAZINE HYDROCHLORIDE 20 MG/ML
10 INJECTION INTRAMUSCULAR; INTRAVENOUS EVERY 6 HOURS PRN
Status: DISCONTINUED | OUTPATIENT
Start: 2017-09-08 | End: 2017-09-09 | Stop reason: HOSPADM

## 2017-09-08 RX ORDER — ONDANSETRON 2 MG/ML
8 INJECTION INTRAMUSCULAR; INTRAVENOUS EVERY 6 HOURS
Status: DISCONTINUED | OUTPATIENT
Start: 2017-09-08 | End: 2017-09-09 | Stop reason: HOSPADM

## 2017-09-08 RX ORDER — ONDANSETRON HCL IN 0.9 % NACL 8 MG/50 ML
8 INTRAVENOUS SOLUTION, PIGGYBACK (ML) INTRAVENOUS EVERY 6 HOURS
Status: DISCONTINUED | OUTPATIENT
Start: 2017-09-08 | End: 2017-09-08

## 2017-09-08 RX ORDER — IBUPROFEN 200 MG
24 TABLET ORAL
Status: DISCONTINUED | OUTPATIENT
Start: 2017-09-08 | End: 2017-09-09 | Stop reason: HOSPADM

## 2017-09-08 RX ORDER — GLUCAGON 1 MG
1 KIT INJECTION
Status: DISCONTINUED | OUTPATIENT
Start: 2017-09-08 | End: 2017-09-09 | Stop reason: HOSPADM

## 2017-09-08 RX ORDER — ONDANSETRON 2 MG/ML
4 INJECTION INTRAMUSCULAR; INTRAVENOUS EVERY 12 HOURS PRN
Status: DISCONTINUED | OUTPATIENT
Start: 2017-09-08 | End: 2017-09-09 | Stop reason: HOSPADM

## 2017-09-08 RX ORDER — DIPHENHYDRAMINE HCL 50 MG
50 CAPSULE ORAL ONCE
Status: DISCONTINUED | OUTPATIENT
Start: 2017-09-08 | End: 2017-09-08 | Stop reason: HOSPADM

## 2017-09-08 RX ORDER — METOCLOPRAMIDE HYDROCHLORIDE 5 MG/ML
5 INJECTION INTRAMUSCULAR; INTRAVENOUS ONCE
Status: COMPLETED | OUTPATIENT
Start: 2017-09-08 | End: 2017-09-08

## 2017-09-08 RX ORDER — METOCLOPRAMIDE HYDROCHLORIDE 5 MG/ML
10 INJECTION INTRAMUSCULAR; INTRAVENOUS EVERY 6 HOURS
Status: DISCONTINUED | OUTPATIENT
Start: 2017-09-08 | End: 2017-09-09 | Stop reason: HOSPADM

## 2017-09-08 RX ORDER — INSULIN ASPART 100 [IU]/ML
0-5 INJECTION, SOLUTION INTRAVENOUS; SUBCUTANEOUS
Status: DISCONTINUED | OUTPATIENT
Start: 2017-09-08 | End: 2017-09-09 | Stop reason: HOSPADM

## 2017-09-08 RX ADMIN — ONDANSETRON 8 MG: 2 INJECTION INTRAMUSCULAR; INTRAVENOUS at 05:09

## 2017-09-08 RX ADMIN — SODIUM CHLORIDE 3 ML/KG/HR: 0.9 INJECTION, SOLUTION INTRAVENOUS at 10:09

## 2017-09-08 RX ADMIN — SODIUM CHLORIDE 3 ML/KG/HR: 0.9 INJECTION, SOLUTION INTRAVENOUS at 07:09

## 2017-09-08 RX ADMIN — METOCLOPRAMIDE 5 MG: 5 INJECTION, SOLUTION INTRAMUSCULAR; INTRAVENOUS at 12:09

## 2017-09-08 RX ADMIN — METOCLOPRAMIDE 10 MG: 5 INJECTION, SOLUTION INTRAMUSCULAR; INTRAVENOUS at 09:09

## 2017-09-08 RX ADMIN — PROMETHAZINE HYDROCHLORIDE 12.5 MG: 25 INJECTION INTRAMUSCULAR; INTRAVENOUS at 08:09

## 2017-09-08 RX ADMIN — ONDANSETRON 4 MG: 2 INJECTION INTRAMUSCULAR; INTRAVENOUS at 11:09

## 2017-09-08 RX ADMIN — INSULIN ASPART 1 UNITS: 100 INJECTION, SOLUTION INTRAVENOUS; SUBCUTANEOUS at 09:09

## 2017-09-08 RX ADMIN — ONDANSETRON 4 MG: 2 INJECTION INTRAMUSCULAR; INTRAVENOUS at 10:09

## 2017-09-08 RX ADMIN — INSULIN ASPART 3 UNITS: 100 INJECTION, SOLUTION INTRAVENOUS; SUBCUTANEOUS at 04:09

## 2017-09-08 NOTE — PLAN OF CARE
Bernarda molina lab tech finished with [id number] pressure, bed rest for 2hrs then pt to discharge home. Care is ongoing, will continue to monitor.

## 2017-09-08 NOTE — PLAN OF CARE
Discharge instructions given to wife. Pt told to make follow-up appointment with Dr. Duff in 2 weeks. Instructions regarding site care, and SS of bleeding was given and she stated she understood and did not have any questions

## 2017-09-08 NOTE — PLAN OF CARE
Mr. De Anda arrived from cath lab, hypertensive but stable with two SHARRI 8fr sheath in groin. Pt complained of nausea, zofran was given. Sonido salas at bedside to pull sheaths. Dr. Duff aware of BP, will continue to monitor for now.

## 2017-09-08 NOTE — NURSING TRANSFER
Report given to uQin MOREL, VSS, no distress noted. Pt ambulated to bathroom and then to bed, sites in SHARRI groin remained stable, no SS of swelling, bleeding or hematoma noted. Wife received pts belongings and glasses. Care of pt handed off.

## 2017-09-08 NOTE — PLAN OF CARE
Pt is still vomiting, vomited X5 despite treatment. Dr. Shields was notified. Pt may stay overnight

## 2017-09-08 NOTE — INTERVAL H&P NOTE
The patient has been examined and the H&P has been reviewed:    I concur with the findings and no changes have occurred since H&P was written.    Anesthesia/Surgery risks, benefits and alternative options discussed and understood by patient/family.          Active Hospital Problems    Diagnosis  POA    Acute deep vein thrombosis (DVT) of proximal vein of left lower extremity [I82.4Y2]  Yes     Priority: Low      Resolved Hospital Problems    Diagnosis Date Resolved POA   No resolved problems to display.

## 2017-09-08 NOTE — PROGRESS NOTES
Post Procedure Note: Venous stenting for May-Thurner's    The pt was brought to the cath lab and under sterile technique, bilateral CFV access was obtained without difficulty under US guidance. Images were obtained in multiple views and PTA with stenting performed to bilateral iliac veins. Please see full report for details. The pt tolerated the procedure well without complications. Manual pressure held with successful hemostasis.     Vitals:    09/08/17 1045 09/08/17 1100 09/08/17 1130 09/08/17 1200   BP: (!) 198/100 (!) 184/112 (!) 193/99 (!) 179/106   BP Location: Right arm Right arm Right arm Right arm   Patient Position: Lying Lying Lying Lying   Pulse: 82 80 84 87   Resp: 12 12 14 12   Temp:       TempSrc:       SpO2: 98% 98% 98% 97%   Weight:       Height:             Gen: NAD  Ext: 2+ fem/DP/PT pulses, no evidence of hematoma    Plan:  -Post cath care per protocol  -ASA for life  -Continue NOAC

## 2017-09-08 NOTE — PLAN OF CARE
Problem: Patient Care Overview  Goal: Plan of Care Review  Pt on room air with SpO2 97 %. No respiratory distress noted. Will continue to monitor.

## 2017-09-09 VITALS
HEIGHT: 74 IN | OXYGEN SATURATION: 97 % | DIASTOLIC BLOOD PRESSURE: 77 MMHG | TEMPERATURE: 99 F | HEART RATE: 91 BPM | SYSTOLIC BLOOD PRESSURE: 139 MMHG | RESPIRATION RATE: 18 BRPM | BODY MASS INDEX: 30.29 KG/M2 | WEIGHT: 236 LBS

## 2017-09-09 LAB
POCT GLUCOSE: 196 MG/DL (ref 70–110)
POCT GLUCOSE: 265 MG/DL (ref 70–110)

## 2017-09-09 PROCEDURE — 94761 N-INVAS EAR/PLS OXIMETRY MLT: CPT

## 2017-09-09 PROCEDURE — 63600175 PHARM REV CODE 636 W HCPCS: Performed by: INTERNAL MEDICINE

## 2017-09-09 RX ADMIN — METOCLOPRAMIDE 10 MG: 5 INJECTION, SOLUTION INTRAMUSCULAR; INTRAVENOUS at 03:09

## 2017-09-09 NOTE — PLAN OF CARE
Problem: Nausea/Vomiting (Adult)  Goal: Symptom Relief  Patient will demonstrate the desired outcomes by discharge/transition of care.  Outcome: Ongoing (interventions implemented as appropriate)  Reported relief from n/v.

## 2017-09-09 NOTE — PROGRESS NOTES
Seen this am      Doing well      S/p bilateral iliac veins stenting        Nausea post anesthesia resolved        Vitals:    09/09/17 0753 09/09/17 0853 09/09/17 1127 09/09/17 1135   BP: (!) 190/114 (!) 140/72  139/77   Pulse: 101   91   Resp: 20   18   Temp: 98.1 °F (36.7 °C)   98.7 °F (37.1 °C)   TempSrc:       SpO2:   97%    Weight:       Height:             LABS  CBC    Recent Labs  Lab 09/08/17  0650   WBC 10.91  10.91   RBC 5.01  5.01   HGB 14.5  14.5   HCT 43.8  43.8     302   MCV 87  87   MCH 28.9  28.9   MCHC 33.1  33.1     BMP    Recent Labs  Lab 09/08/17  0650 09/08/17  1937     137 135*   K 3.9  3.9 4.1   CO2 25  25 22*     100 98   BUN 11  11 10   CREATININE 1.0  1.0 1.0   *  243* 284*       POCT-Glucose  POCT Glucose   Date Value Ref Range Status   09/09/2017 265 (H) 70 - 110 mg/dL Final   09/09/2017 196 (H) 70 - 110 mg/dL Final   09/08/2017 282 (H) 70 - 110 mg/dL Final   09/08/2017 283 (H) 70 - 110 mg/dL Final         Recent Labs  Lab 09/08/17  0650 09/08/17  1937   CALCIUM 9.4  9.4 9.6     LFT  No results for input(s): PROT, ALBUMIN, BILITOT, AST, ALKPHOS, ALT in the last 168 hours.  GFR     COAGS    Recent Labs  Lab 09/08/17  0650   INR 1.0  1.0  1.0     CE  No results for input(s): TROPONINI, CKTOTAL, CKMB in the last 168 hours.  ABGs  No results for input(s): PH, PCO2, PO2, HCO3, POCSATURATED, BE in the last 24 hours.  BNP  No results for input(s): BNP in the last 168 hours.    LAST HbA1c  Lab Results   Component Value Date    HGBA1C 13.2 (H) 08/20/2017       Lipid panel  Lab Results   Component Value Date    CHOL 140 08/20/2017    CHOL 176 10/24/2016     Lab Results   Component Value Date    HDL 29 (L) 08/20/2017    HDL 31 (L) 10/24/2016     Lab Results   Component Value Date    LDLCALC 83.4 08/20/2017    LDLCALC 121.2 10/24/2016     Lab Results   Component Value Date    TRIG 138 08/20/2017    TRIG 119 10/24/2016     Lab Results   Component Value  Date    CHOLHDL 20.7 08/20/2017    CHOLHDL 17.6 (L) 10/24/2016          Imp:      May Thurner s/p PTVS        DC home  Resume Xarelto  Compression stockings

## 2017-09-09 NOTE — PLAN OF CARE
Problem: Patient Care Overview  Goal: Plan of Care Review  Plan of care reviewed. Puncture site to both  Noted with dressings dry/intact. No bruising or hematoma noted. No nausea or vomiting reported since Phenergan administered.no true red alarm noted on telemetry.NSR - Sinus Tach noted.

## 2017-09-09 NOTE — PLAN OF CARE
Problem: Patient Care Overview  Goal: Plan of Care Review  Outcome: Ongoing (interventions implemented as appropriate)  RA SAT 96%; no distress noted

## 2017-09-09 NOTE — PLAN OF CARE
Problem: Patient Care Overview  Goal: Plan of Care Review  Outcome: Ongoing (interventions implemented as appropriate)  Plan of care reviewed with patient and wife. Patient verbalized complete understanding. Fall precautions maintained. Bed in lowest position, locked, and call light within reach. Side rails up x's 2 with slip resistant socks on. Nurse instructed patient to notify staff for any assistance and the patient verbalized complete understanding. Patient on telemetry throughout shift with no ectopy noted. Will continue to monitor.

## 2017-09-09 NOTE — DISCHARGE INSTRUCTIONS
Deep Vein Thrombosis (DVT) (English) View Edit Remove  DEEP VEIN THROMBOSIS, TREATING (ENGLISH) View Edit Remove  Smoking, Tips for Quitting (Cardiovascular) (English) View Edit Remove

## 2017-09-09 NOTE — PLAN OF CARE
Problem: Patient Care Overview  Goal: Plan of Care Review  Outcome: Ongoing (interventions implemented as appropriate)  Pt IV and tele box removed. No distress noted. Education on diet, medications and follow up care. Pt verbalized understanding of dc instructions. Pt given paper prescription for compression stockings.

## 2017-09-09 NOTE — PLAN OF CARE
Problem: Nausea/Vomiting (Adult)  Goal: Symptom Relief  Patient will demonstrate the desired outcomes by discharge/transition of care.  Outcome: Ongoing (interventions implemented as appropriate)  C/o N/V since came back to room. Scheduled Zofran given without relief.  Phenergan 12.5mg IVPB  Given. Will continue to monitor. Cool compress to forehead.

## 2017-09-09 NOTE — PLAN OF CARE
Discharge orders noted, no HH or HME ordered.       09/09/17 1425   Final Note   Assessment Type Final Discharge Note   Discharge Disposition Home   What phone number can be called within the next 1-3 days to see how you are doing after discharge? [phone]   Hospital Follow Up  Appt(s) scheduled? No  (offices closed, [place] to follow up)   Right Care Referral Info   Post Acute Recommendation No Care     Koki Pulido, RN Transitional Navigator  [phone]

## 2017-09-09 NOTE — PLAN OF CARE
Problem: Patient Care Overview  Goal: Plan of Care Review  Outcome: Ongoing (interventions implemented as appropriate)  Pt on RA with sats of 99%, Will continue to monitor.

## 2017-09-10 ENCOUNTER — HOSPITAL ENCOUNTER (EMERGENCY)
Facility: HOSPITAL | Age: 32
Discharge: HOME | End: 2017-09-10
Attending: EMERGENCY MEDICINE
Payer: COMMERCIAL

## 2017-09-10 VITALS
WEIGHT: 230 LBS | DIASTOLIC BLOOD PRESSURE: 82 MMHG | SYSTOLIC BLOOD PRESSURE: 177 MMHG | HEART RATE: 99 BPM | BODY MASS INDEX: 29.53 KG/M2 | TEMPERATURE: 99 F | OXYGEN SATURATION: 98 % | RESPIRATION RATE: 20 BRPM

## 2017-09-10 DIAGNOSIS — R11.2 NON-INTRACTABLE VOMITING WITH NAUSEA, UNSPECIFIED VOMITING TYPE: Primary | ICD-10-CM

## 2017-09-10 LAB
ACETONE BLD-MCNC: ABNORMAL MG/DL
ALBUMIN SERPL BCP-MCNC: 4.8 G/DL
ALP SERPL-CCNC: 89 U/L
ALT SERPL W/O P-5'-P-CCNC: 23 U/L
AMORPH CRY UR QL COMP ASSIST: NORMAL
AMPHET+METHAMPHET UR QL: NEGATIVE
ANION GAP SERPL CALC-SCNC: 18 MMOL/L
AST SERPL-CCNC: 17 U/L
BACTERIA #/AREA URNS AUTO: NORMAL /HPF
BARBITURATES UR QL SCN>200 NG/ML: NEGATIVE
BASOPHILS NFR BLD: 0 %
BENZODIAZ UR QL SCN>200 NG/ML: NEGATIVE
BILIRUB SERPL-MCNC: 0.9 MG/DL
BILIRUB UR QL STRIP: NEGATIVE
BUN SERPL-MCNC: 11 MG/DL
BZE UR QL SCN: NEGATIVE
CALCIUM SERPL-MCNC: 9.9 MG/DL
CANNABINOIDS UR QL SCN: NEGATIVE
CHLORIDE SERPL-SCNC: 96 MMOL/L
CLARITY UR REFRACT.AUTO: CLEAR
CO2 SERPL-SCNC: 25 MMOL/L
COLOR UR AUTO: ABNORMAL
CREAT SERPL-MCNC: 0.77 MG/DL
CREAT UR-MCNC: 154.7 MG/DL
DIFFERENTIAL METHOD: ABNORMAL
EOSINOPHIL NFR BLD: 0 %
ERYTHROCYTE [DISTWIDTH] IN BLOOD BY AUTOMATED COUNT: 12.7 %
EST. GFR  (AFRICAN AMERICAN): >60 ML/MIN/1.73 M^2
EST. GFR  (NON AFRICAN AMERICAN): >60 ML/MIN/1.73 M^2
GLUCOSE SERPL-MCNC: 240 MG/DL
GLUCOSE UR QL STRIP: ABNORMAL
HCT VFR BLD AUTO: 47.4 %
HGB BLD-MCNC: 16.1 G/DL
HGB UR QL STRIP: NEGATIVE
HYALINE CASTS UR QL AUTO: 0 /LPF
KETONES UR QL STRIP: ABNORMAL
LEUKOCYTE ESTERASE UR QL STRIP: NEGATIVE
LYMPHOCYTES NFR BLD: 10 %
MCH RBC QN AUTO: 28.6 PG
MCHC RBC AUTO-ENTMCNC: 34 G/DL
MCV RBC AUTO: 84 FL
METHADONE UR QL SCN>300 NG/ML: NEGATIVE
MICROSCOPIC COMMENT: NORMAL
MONOCYTES NFR BLD: 7 %
NEUTROPHILS NFR BLD: 83 %
NITRITE UR QL STRIP: NEGATIVE
OPIATES UR QL SCN: NEGATIVE
PCP UR QL SCN>25 NG/ML: NEGATIVE
PH UR STRIP: 7 [PH] (ref 5–8)
PLATELET # BLD AUTO: 253 K/UL
PMV BLD AUTO: 10.2 FL
POCT GLUCOSE: 229 MG/DL (ref 70–110)
POTASSIUM SERPL-SCNC: 3.6 MMOL/L
PROT SERPL-MCNC: 8.9 G/DL
PROT UR QL STRIP: ABNORMAL
RBC # BLD AUTO: 5.63 M/UL
RBC #/AREA URNS AUTO: 0 /HPF (ref 0–4)
SODIUM SERPL-SCNC: 139 MMOL/L
SP GR UR STRIP: 1.01 (ref 1–1.03)
TOXICOLOGY INFORMATION: NORMAL
URN SPEC COLLECT METH UR: ABNORMAL
UROBILINOGEN UR STRIP-ACNC: NEGATIVE EU/DL
WBC # BLD AUTO: 19.23 K/UL
WBC #/AREA URNS AUTO: 0 /HPF (ref 0–5)
YEAST UR QL AUTO: NORMAL

## 2017-09-10 PROCEDURE — C9113 INJ PANTOPRAZOLE SODIUM, VIA: HCPCS | Performed by: FAMILY MEDICINE

## 2017-09-10 PROCEDURE — 96375 TX/PRO/DX INJ NEW DRUG ADDON: CPT

## 2017-09-10 PROCEDURE — 81000 URINALYSIS NONAUTO W/SCOPE: CPT

## 2017-09-10 PROCEDURE — 80053 COMPREHEN METABOLIC PANEL: CPT

## 2017-09-10 PROCEDURE — 82962 GLUCOSE BLOOD TEST: CPT

## 2017-09-10 PROCEDURE — 96366 THER/PROPH/DIAG IV INF ADDON: CPT

## 2017-09-10 PROCEDURE — 25000003 PHARM REV CODE 250: Performed by: FAMILY MEDICINE

## 2017-09-10 PROCEDURE — 63600175 PHARM REV CODE 636 W HCPCS: Performed by: EMERGENCY MEDICINE

## 2017-09-10 PROCEDURE — 85025 COMPLETE CBC W/AUTO DIFF WBC: CPT

## 2017-09-10 PROCEDURE — 25000003 PHARM REV CODE 250: Performed by: EMERGENCY MEDICINE

## 2017-09-10 PROCEDURE — 82009 KETONE BODYS QUAL: CPT

## 2017-09-10 PROCEDURE — 96365 THER/PROPH/DIAG IV INF INIT: CPT

## 2017-09-10 PROCEDURE — 80307 DRUG TEST PRSMV CHEM ANLYZR: CPT

## 2017-09-10 PROCEDURE — 99284 EMERGENCY DEPT VISIT MOD MDM: CPT | Mod: 25

## 2017-09-10 PROCEDURE — 63600175 PHARM REV CODE 636 W HCPCS: Performed by: FAMILY MEDICINE

## 2017-09-10 RX ORDER — ONDANSETRON 2 MG/ML
4 INJECTION INTRAMUSCULAR; INTRAVENOUS
Status: COMPLETED | OUTPATIENT
Start: 2017-09-10 | End: 2017-09-10

## 2017-09-10 RX ORDER — PANTOPRAZOLE SODIUM 40 MG/10ML
40 INJECTION, POWDER, LYOPHILIZED, FOR SOLUTION INTRAVENOUS
Status: COMPLETED | OUTPATIENT
Start: 2017-09-10 | End: 2017-09-10

## 2017-09-10 RX ORDER — METOCLOPRAMIDE HYDROCHLORIDE 5 MG/ML
10 INJECTION INTRAMUSCULAR; INTRAVENOUS
Status: COMPLETED | OUTPATIENT
Start: 2017-09-10 | End: 2017-09-10

## 2017-09-10 RX ORDER — METOCLOPRAMIDE 10 MG/1
10 TABLET ORAL EVERY 6 HOURS
Qty: 30 TABLET | Refills: 0 | Status: SHIPPED | OUTPATIENT
Start: 2017-09-10 | End: 2017-09-12 | Stop reason: ALTCHOICE

## 2017-09-10 RX ORDER — PROMETHAZINE HYDROCHLORIDE 25 MG/1
25 SUPPOSITORY RECTAL EVERY 6 HOURS PRN
Qty: 10 SUPPOSITORY | Refills: 0 | Status: SHIPPED | OUTPATIENT
Start: 2017-09-10 | End: 2017-09-14

## 2017-09-10 RX ADMIN — METOCLOPRAMIDE 10 MG: 5 INJECTION, SOLUTION INTRAMUSCULAR; INTRAVENOUS at 07:09

## 2017-09-10 RX ADMIN — ONDANSETRON 4 MG: 2 INJECTION INTRAMUSCULAR; INTRAVENOUS at 06:09

## 2017-09-10 RX ADMIN — PROMETHAZINE HYDROCHLORIDE 12.5 MG: 25 INJECTION INTRAMUSCULAR; INTRAVENOUS at 06:09

## 2017-09-10 RX ADMIN — SODIUM CHLORIDE 1000 ML: 0.9 INJECTION, SOLUTION INTRAVENOUS at 06:09

## 2017-09-10 RX ADMIN — PANTOPRAZOLE SODIUM 40 MG: 40 INJECTION, POWDER, FOR SOLUTION INTRAVENOUS at 06:09

## 2017-09-11 LAB — PERIPHERAL STENOSIS: ABNORMAL

## 2017-09-11 NOTE — ED PROVIDER NOTES
Encounter Date: 9/10/2017       History     Chief Complaint   Patient presents with    Emesis     Pt s/p vomiting since procedure Friday had stents placed, spouse reports pt had procedure for DVT approx 2 weeks ago and had episodes of vomiting for 24-48 hours.  Pt tearful during triage, pt heaving and gagging, pt denies pain reports + nausea.      The patient underwent a surgical procedure 3 days ago involving anesthesia.  Since then the patient began having violent vomiting.  He states that last time he had surgical procedure done the same thing happened.      The history is provided by the patient.   Emesis    This is a recurrent problem. The current episode started several days ago. The problem occurs 5 - 10 times per day. The problem has been unchanged. The emesis has an appearance of stomach contents and bilious material. Associated symptoms include abdominal pain. Pertinent negatives include no arthralgias, no chills, no cough, no diarrhea, no fever, no headaches, no myalgias, no sweats and no URI.     Review of patient's allergies indicates:  No Known Allergies  Past Medical History:   Diagnosis Date    Diabetes mellitus     Hypertension      Past Surgical History:   Procedure Laterality Date    APPENDECTOMY       Family History   Problem Relation Age of Onset    Cancer Mother     Cancer Paternal Uncle     Cancer Paternal Grandfather     Cancer Maternal Grandfather      Social History   Substance Use Topics    Smoking status: Current Some Day Smoker     Packs/day: 0.00    Smokeless tobacco: Current User      Comment: occasionally     Alcohol use No     Review of Systems   Constitutional: Negative for chills and fever.   Respiratory: Negative for cough.    Gastrointestinal: Positive for abdominal pain, nausea and vomiting. Negative for diarrhea.   Musculoskeletal: Negative for arthralgias and myalgias.   Neurological: Negative for headaches.   All other systems reviewed and are  negative.      Physical Exam     Initial Vitals [09/10/17 1744]   BP Pulse Resp Temp SpO2   (!) 173/112 (!) 118 20 98.4 °F (36.9 °C) 97 %      MAP       132.33         Physical Exam    Nursing note and vitals reviewed.  Constitutional: He appears well-developed and well-nourished.   HENT:   Head: Normocephalic and atraumatic.   Eyes: EOM are normal.   Neck: Normal range of motion. Neck supple.   Cardiovascular: Normal rate, regular rhythm, normal heart sounds and intact distal pulses.   Pulmonary/Chest: Breath sounds normal.   Abdominal: Soft. He exhibits no distension. There is no tenderness. There is no rigidity, no rebound, no guarding, no CVA tenderness, no tenderness at McBurney's point and negative Riley's sign.   Musculoskeletal: Normal range of motion.   Neurological: He is alert and oriented to person, place, and time.   Skin: Skin is warm and dry. Capillary refill takes less than 2 seconds.   Psychiatric: He has a normal mood and affect. His behavior is normal. Judgment and thought content normal.         ED Course   Procedures  Labs Reviewed   CBC W/ AUTO DIFFERENTIAL - Abnormal; Notable for the following:        Result Value    WBC 19.23 (*)     Gran% 83.0 (*)     Lymph% 10.0 (*)     All other components within normal limits   COMPREHENSIVE METABOLIC PANEL - Abnormal; Notable for the following:     Glucose 240 (*)     Total Protein 8.9 (*)     Anion Gap 18 (*)     All other components within normal limits   ACETONE - Abnormal; Notable for the following:     Acetone, Bld Small (*)     All other components within normal limits   POCT GLUCOSE - Abnormal; Notable for the following:     POCT Glucose 229 (*)     All other components within normal limits   DRUG SCREEN PANEL, URINE EMERGENCY   URINALYSIS             Medical Decision Making:   Clinical Tests:   Lab Tests: Ordered and Reviewed                   ED Course      Clinical Impression:   The encounter diagnosis was Non-intractable vomiting with nausea,  unspecified vomiting type.    Disposition:   Disposition: Discharged  Condition: Stable                        Jocelyn Santiago MD  09/10/17 9555

## 2017-09-12 ENCOUNTER — HOSPITAL ENCOUNTER (EMERGENCY)
Facility: HOSPITAL | Age: 32
Discharge: HOME | End: 2017-09-12
Attending: EMERGENCY MEDICINE
Payer: COMMERCIAL

## 2017-09-12 VITALS
DIASTOLIC BLOOD PRESSURE: 80 MMHG | HEART RATE: 96 BPM | WEIGHT: 236 LBS | TEMPERATURE: 98 F | RESPIRATION RATE: 18 BRPM | SYSTOLIC BLOOD PRESSURE: 156 MMHG | OXYGEN SATURATION: 99 % | HEIGHT: 74 IN | BODY MASS INDEX: 30.29 KG/M2

## 2017-09-12 DIAGNOSIS — E86.0 MILD DEHYDRATION: ICD-10-CM

## 2017-09-12 DIAGNOSIS — R11.2 NAUSEA AND VOMITING: ICD-10-CM

## 2017-09-12 DIAGNOSIS — R73.9 HYPERGLYCEMIA: Primary | ICD-10-CM

## 2017-09-12 LAB
ALBUMIN SERPL BCP-MCNC: 3.8 G/DL
ALLENS TEST: ABNORMAL
ALP SERPL-CCNC: 79 U/L
ALT SERPL W/O P-5'-P-CCNC: 11 U/L
ANION GAP SERPL CALC-SCNC: 14 MMOL/L
AST SERPL-CCNC: 14 U/L
B-OH-BUTYR BLD STRIP-SCNC: 1.1 MMOL/L
BASOPHILS # BLD AUTO: 0.02 K/UL
BASOPHILS NFR BLD: 0.1 %
BILIRUB SERPL-MCNC: 1 MG/DL
BILIRUB UR QL STRIP: NEGATIVE
BUN SERPL-MCNC: 12 MG/DL
CALCIUM SERPL-MCNC: 9.9 MG/DL
CHLORIDE SERPL-SCNC: 97 MMOL/L
CLARITY UR: CLEAR
CO2 SERPL-SCNC: 24 MMOL/L
COLOR UR: YELLOW
CREAT SERPL-MCNC: 1 MG/DL
DELSYS: ABNORMAL
DIFFERENTIAL METHOD: ABNORMAL
EOSINOPHIL # BLD AUTO: 0 K/UL
EOSINOPHIL NFR BLD: 0.1 %
ERYTHROCYTE [DISTWIDTH] IN BLOOD BY AUTOMATED COUNT: 12.3 %
EST. GFR  (AFRICAN AMERICAN): >60 ML/MIN/1.73 M^2
EST. GFR  (NON AFRICAN AMERICAN): >60 ML/MIN/1.73 M^2
GLUCOSE SERPL-MCNC: 296 MG/DL
GLUCOSE UR QL STRIP: ABNORMAL
HCO3 UR-SCNC: 24.8 MMOL/L (ref 24–28)
HCT VFR BLD AUTO: 47.8 %
HGB BLD-MCNC: 16.5 G/DL
HGB UR QL STRIP: NEGATIVE
KETONES UR QL STRIP: ABNORMAL
LEUKOCYTE ESTERASE UR QL STRIP: NEGATIVE
LIPASE SERPL-CCNC: 16 U/L
LYMPHOCYTES # BLD AUTO: 1.4 K/UL
LYMPHOCYTES NFR BLD: 8.7 %
MCH RBC QN AUTO: 29.2 PG
MCHC RBC AUTO-ENTMCNC: 34.5 G/DL
MCV RBC AUTO: 85 FL
MONOCYTES # BLD AUTO: 0.8 K/UL
MONOCYTES NFR BLD: 5.2 %
NEUTROPHILS # BLD AUTO: 14 K/UL
NEUTROPHILS NFR BLD: 85.7 %
NITRITE UR QL STRIP: NEGATIVE
PCO2 BLDA: 34.9 MMHG (ref 35–45)
PH SMN: 7.46 [PH] (ref 7.35–7.45)
PH UR STRIP: 7 [PH] (ref 5–8)
PLATELET # BLD AUTO: 203 K/UL
PMV BLD AUTO: 10 FL
PO2 BLDA: 50 MMHG (ref 40–60)
POC ACTIVATED CLOTTING TIME K: 208 SEC (ref 74–137)
POC BE: 1 MMOL/L
POC SATURATED O2: 87 % (ref 95–100)
POC TCO2: 26 MMOL/L (ref 24–29)
POCT GLUCOSE: 270 MG/DL (ref 70–110)
POTASSIUM SERPL-SCNC: 3.5 MMOL/L
PROT SERPL-MCNC: 8.6 G/DL
PROT UR QL STRIP: NEGATIVE
RBC # BLD AUTO: 5.65 M/UL
SAMPLE: ABNORMAL
SAMPLE: ABNORMAL
SITE: ABNORMAL
SODIUM SERPL-SCNC: 135 MMOL/L
SP GR UR STRIP: 1.01 (ref 1–1.03)
URN SPEC COLLECT METH UR: ABNORMAL
UROBILINOGEN UR STRIP-ACNC: NEGATIVE EU/DL
WBC # BLD AUTO: 16.31 K/UL

## 2017-09-12 PROCEDURE — 81003 URINALYSIS AUTO W/O SCOPE: CPT

## 2017-09-12 PROCEDURE — 63600175 PHARM REV CODE 636 W HCPCS: Performed by: PHYSICIAN ASSISTANT

## 2017-09-12 PROCEDURE — 83690 ASSAY OF LIPASE: CPT

## 2017-09-12 PROCEDURE — 82803 BLOOD GASES ANY COMBINATION: CPT

## 2017-09-12 PROCEDURE — 25000003 PHARM REV CODE 250: Performed by: EMERGENCY MEDICINE

## 2017-09-12 PROCEDURE — 96374 THER/PROPH/DIAG INJ IV PUSH: CPT

## 2017-09-12 PROCEDURE — 25000003 PHARM REV CODE 250: Performed by: PHYSICIAN ASSISTANT

## 2017-09-12 PROCEDURE — 80053 COMPREHEN METABOLIC PANEL: CPT

## 2017-09-12 PROCEDURE — 82962 GLUCOSE BLOOD TEST: CPT

## 2017-09-12 PROCEDURE — 96375 TX/PRO/DX INJ NEW DRUG ADDON: CPT

## 2017-09-12 PROCEDURE — 93005 ELECTROCARDIOGRAM TRACING: CPT

## 2017-09-12 PROCEDURE — 99284 EMERGENCY DEPT VISIT MOD MDM: CPT | Mod: 25

## 2017-09-12 PROCEDURE — 96361 HYDRATE IV INFUSION ADD-ON: CPT

## 2017-09-12 PROCEDURE — 85025 COMPLETE CBC W/AUTO DIFF WBC: CPT

## 2017-09-12 PROCEDURE — C9113 INJ PANTOPRAZOLE SODIUM, VIA: HCPCS | Performed by: PHYSICIAN ASSISTANT

## 2017-09-12 PROCEDURE — 82010 KETONE BODYS QUAN: CPT

## 2017-09-12 RX ORDER — PROCHLORPERAZINE 25 MG
25 SUPPOSITORY, RECTAL RECTAL EVERY 12 HOURS PRN
Qty: 10 SUPPOSITORY | Refills: 0 | Status: SHIPPED | OUTPATIENT
Start: 2017-09-12 | End: 2018-01-11

## 2017-09-12 RX ORDER — PANTOPRAZOLE SODIUM 40 MG/10ML
40 INJECTION, POWDER, LYOPHILIZED, FOR SOLUTION INTRAVENOUS
Status: COMPLETED | OUTPATIENT
Start: 2017-09-12 | End: 2017-09-12

## 2017-09-12 RX ORDER — ONDANSETRON 2 MG/ML
4 INJECTION INTRAMUSCULAR; INTRAVENOUS
Status: DISCONTINUED | OUTPATIENT
Start: 2017-09-12 | End: 2017-09-12

## 2017-09-12 RX ORDER — HALOPERIDOL 5 MG/ML
5 INJECTION INTRAMUSCULAR
Status: COMPLETED | OUTPATIENT
Start: 2017-09-12 | End: 2017-09-12

## 2017-09-12 RX ADMIN — ONDANSETRON 4 MG: 2 INJECTION, SOLUTION INTRAMUSCULAR; INTRAVENOUS at 08:09

## 2017-09-12 RX ADMIN — HALOPERIDOL LACTATE 5 MG: 5 INJECTION, SOLUTION INTRAMUSCULAR at 08:09

## 2017-09-12 RX ADMIN — PANTOPRAZOLE SODIUM 40 MG: 40 INJECTION, POWDER, FOR SOLUTION INTRAVENOUS at 08:09

## 2017-09-12 RX ADMIN — SODIUM CHLORIDE 1000 ML: 0.9 INJECTION, SOLUTION INTRAVENOUS at 08:09

## 2017-09-12 RX ADMIN — SODIUM CHLORIDE 1000 ML: 0.9 INJECTION, SOLUTION INTRAVENOUS at 09:09

## 2017-09-12 NOTE — DISCHARGE SUMMARY
[facility]  Discharge Summary      Admit Date: 9/8/2017    Discharge Date and Time: 9/9/2017    Attending Physician: Tyree Duff MD    Reason for Admission: Venous intervention for May Thurner's Syndrome    Procedures Performed: Procedure(s) (LRB):  VENOGRAM - Iiac vein PTA/stenting (N/A)    Hospital Course (synopsis of major diagnoses, care, treatment, and services provided during the course of the hospital stay): The pt was brought to the cath lab and with bilateral CFV access and IVUS evaluation, successful bilateral iliac vein stenting for May Thurner's Syndrome was performed. Wallstents x 4 used [22 x 45 x 75 (2), 22 x 45 x 75, 20 x 55 x 75, 20 x 80 x 75]. Post procedure again developed intractable N/V, was monitored overnight and discharged home the next day. Resume NOAC.       Final Diagnoses:    Principal Problem: May-Thurner syndrome   Secondary Diagnoses:   Active Hospital Problems    Diagnosis  POA    *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]  Yes     Priority: High    Acute deep vein thrombosis (DVT) of proximal vein of left lower extremity [I82.4Y2]  Yes     Priority: Low      Resolved Hospital Problems    Diagnosis Date Resolved POA   No resolved problems to display.   Chronic anticoagulation    Discharged Condition: good    Disposition: Home or Self Care    Follow Up/Patient Instructions:     Medications:  Reconciled Home Medications: Cannot display discharge medications since this patient is expected but has not yet arrived.      Discharge Procedure Orders  COMPRESSION STOCKINGS   Order Comments: Custom made   Order Specific Question Answer Comments   Pressure amount: 20-30 mmHg      Diet general     Activity as tolerated       Follow-up Information     Tyree Duff MD In 2 weeks.    Specialties:  INTERVENTIONAL CARDIOLOGY, Cardiology  Contact information:  [address] [place] [address]  [address] [place] [address] [id number] [phone]

## 2017-09-12 NOTE — ED PROVIDER NOTES
Encounter Date: 9/12/2017       History     Chief Complaint   Patient presents with    Emesis     Pt had stents placed to bilateral groin area this past Friday- since then pt nauseated, vomiting, weakness. Seen at  ER on Saturday.      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  He is noted to have some postoperative nausea and vomiting that was controlled in the hospital process with Zofran, Phenergan and Compazine.  Per record, it seems that he did have some improvement however when he returned home the vomiting recurred.  He states he has tried prescription medications for vomiting at home with no success.  He was seen at another hospital on Sunday for the same symptoms and states no relief since this time.  He states his last by mouth intake was on Thursday and that he now feels generalized weakness, fatigue and lightheaded.  He denies any headache, chest pain, shortness of breath, abdominal pain, diarrhea, fever, chills, dysuria.  He is noted to have left lower leg swelling which she states is unchanged.  He denies any pain or changes to the operative sites.        The history is provided by the patient, the spouse and a parent.     Review of patient's allergies indicates:  No Known Allergies  Past Medical History:   Diagnosis Date    Diabetes mellitus     Hypertension      Past Surgical History:   Procedure Laterality Date    APPENDECTOMY       Family History   Problem Relation Age of Onset    Cancer Mother     Cancer Paternal Uncle     Cancer Paternal Grandfather     Cancer Maternal Grandfather      Social History   Substance Use Topics    Smoking status: Current Some Day Smoker     Packs/day: 0.00    Smokeless tobacco: Current User      Comment: occasionally      Alcohol use No     Review of Systems   Constitutional: Positive for activity change, appetite change and diaphoresis. Negative for fever.   HENT: Negative for sore throat.    Respiratory: Negative for cough and shortness of breath.    Cardiovascular: Positive for leg swelling (left lower leg swelling; unchanged pe patient). Negative for chest pain and palpitations.   Gastrointestinal: Positive for nausea and vomiting. Negative for abdominal pain, constipation and diarrhea.   Genitourinary: Positive for decreased urine volume. Negative for dysuria and hematuria.   Musculoskeletal: Negative for back pain.   Skin: Negative for color change and rash.   Neurological: Positive for weakness (generalized) and light-headedness. Negative for headaches.   Hematological: Does not bruise/bleed easily.       Physical Exam     Initial Vitals [09/12/17 0812]   BP Pulse Resp Temp SpO2   (!) 162/103 104 20 98 °F (36.7 °C) 99 %      MAP       122.67         Physical Exam    Nursing note and vitals reviewed.  Constitutional: Vital signs are normal. He appears well-developed and well-nourished. He is cooperative.  Non-toxic appearance. He does not appear ill. He appears distressed.   HENT:   Head: Normocephalic and atraumatic.   Mouth/Throat: Mucous membranes are dry.   Eyes: Conjunctivae and lids are normal.   Neck: Normal range of motion. Neck supple.   Cardiovascular: Regular rhythm. Tachycardia present.    Pulses:       Dorsalis pedis pulses are 2+ on the right side, and 2+ on the left side.        Posterior tibial pulses are 2+ on the right side, and 2+ on the left side.   Lower leg edema of the left lower extremity with no erythema or warmth    Pulmonary/Chest: Breath sounds normal. No respiratory distress. He has no wheezes. He has no rhonchi.   Abdominal: Soft. Normal appearance. There is no tenderness. There is no rebound and no guarding.   Musculoskeletal:   Fair ROM of extremities   Neurological: He is alert and oriented  to person, place, and time. He has normal strength. No sensory deficit. GCS eye subscore is 4. GCS verbal subscore is 5. GCS motor subscore is 6.   Skin: Skin is warm, dry and intact. No rash noted. No pallor.   Psychiatric: He has a normal mood and affect. His speech is normal and behavior is normal. Thought content normal.         ED Course   Procedures  Labs Reviewed   POCT GLUCOSE - Abnormal; Notable for the following:        Result Value    POCT Glucose 270 (*)     All other components within normal limits   CBC W/ AUTO DIFFERENTIAL   COMPREHENSIVE METABOLIC PANEL   LIPASE   URINALYSIS   BETA - HYDROXYBUTYRATE, SERUM   POCT GLUCOSE MONITORING CONTINUOUS             Medical Decision Making:   History:   Old Records Summarized: other records.  Initial Assessment:   32-year-old male with self-reported history of postoperative nausea vomiting with venogram and stent placement to the bilateral common femoral veins on 9/8 presents to the ED today with continued nausea and vomiting.  He has been unable to take any of his medicines including his insulin.  Physical exam reveals well-appearing male in some obvious distress.  Mucous membranes are noted to be dry.  Lungs clear to auscultation; heart regular rate and rhythm.  Abdomen is soft and nontender.  I did visualize bilateral femoral dressings that are clean and dry with no obvious erythema or edema.  Fair range of motion of all extremities with strength equal bilaterally.  Left lower extremity with noted edema with no erythema, warmth and distal pulses intact.  Differential Diagnosis:   Postoperative nausea vomiting, DKA, dehydration, acute kidney injury, UTI  Clinical Tests:   Lab Tests: Ordered and Reviewed  Medical Tests: Ordered and Reviewed  ED Management:  Patient actively vomiting upon me entering the room.  IV fluids, lab work including VBG and beta hydroxy. Haldol for vomiting as he states he feels Phenergan makes him vomit more.  EKG with no acute changes.   Lab work reveals leukocytosis that is improved from 2 days ago; glucose is noted to be elevated at 296 with no signs of acidosis no other significant findings to suggest acute infectious process or DKA at this time.  I suspected this elevation as patient has been unable to take insulin and medications due to vomiting.  Symptoms likely related to postoperative nausea and vomiting complicated by DM.  Patient will be sent home with suppository and emphasis was placed on using these as he is not tolerating the medications by mouth.  He was sent home with Phenergan from the ER 2 days ago however has not utilized any of these medications as father at the bedside revealed that patient had medication change back to by mouth as he did not want to use suppository.  It was emphasized that suppositories are the best administration of antiemetic when actively vomiting and that he should utilize this route.  He was reportedly feeling better after 2 L of IV fluids in the ED.  He was tolerating water in the ED. Patient was cautioned on when to return to ED. Patient verbalized understanding and agreement with the treatment plan                Attending Attestation:     Physician Attestation Statement for NP/PA:   I have conducted a face to face encounter with this patient in addition to the NP/PA, due to NP/PA Request    Other NP/PA Attestation Additions:    History of Present Illness: Agree; patient about 4 days status post stenting of left lower extremity vessel returns to the emergency department complaining of vomiting.  No relief with prescription oral Compazine or Phenergan or Zofran.  Patient has not tried the suppository medications prescribed.  Reports persistent nausea, vomiting which is nonbloody and nonbilious, as well as generalized weakness   Physical Exam: Agree   Medical Decision Making: Agree; patient tolerating by mouth the emergency department with stable vital signs.  We discussed disposition including discharge  with prescription for suppository medications and instructions to follow-up with his primary care physician                 ED Course      Clinical Impression:   The primary encounter diagnosis was Hyperglycemia. Diagnoses of Nausea and vomiting and Mild dehydration were also pertinent to this visit.                           STEFAN Ramsay  09/12/17 1129       STEFAN Ramsay  09/12/17 1142       Alonzo Chang MD  09/12/17 1221

## 2017-09-14 ENCOUNTER — OFFICE VISIT (OUTPATIENT)
Dept: GASTROENTEROLOGY | Facility: CLINIC | Age: 32
End: 2017-09-14
Payer: COMMERCIAL

## 2017-09-14 ENCOUNTER — TELEPHONE (OUTPATIENT)
Dept: ENDOSCOPY | Facility: HOSPITAL | Age: 32
End: 2017-09-14

## 2017-09-14 ENCOUNTER — OFFICE VISIT (OUTPATIENT)
Dept: CARDIOLOGY | Facility: CLINIC | Age: 32
End: 2017-09-14
Payer: COMMERCIAL

## 2017-09-14 VITALS
DIASTOLIC BLOOD PRESSURE: 79 MMHG | HEART RATE: 98 BPM | WEIGHT: 220.44 LBS | SYSTOLIC BLOOD PRESSURE: 106 MMHG | HEIGHT: 73 IN | BODY MASS INDEX: 29.22 KG/M2

## 2017-09-14 VITALS
OXYGEN SATURATION: 98 % | BODY MASS INDEX: 28.45 KG/M2 | WEIGHT: 221.69 LBS | HEIGHT: 74 IN | DIASTOLIC BLOOD PRESSURE: 81 MMHG | HEART RATE: 108 BPM | SYSTOLIC BLOOD PRESSURE: 121 MMHG

## 2017-09-14 DIAGNOSIS — R11.2 NAUSEA AND VOMITING, INTRACTABILITY OF VOMITING NOT SPECIFIED, UNSPECIFIED VOMITING TYPE: ICD-10-CM

## 2017-09-14 DIAGNOSIS — Z95.828 HISTORY OF INTRAVASCULAR STENT PLACEMENT: ICD-10-CM

## 2017-09-14 DIAGNOSIS — I87.1 MAY-THURNER SYNDROME: Primary | ICD-10-CM

## 2017-09-14 DIAGNOSIS — E11.9 TYPE 2 DIABETES MELLITUS WITHOUT COMPLICATION, UNSPECIFIED LONG TERM INSULIN USE STATUS: ICD-10-CM

## 2017-09-14 DIAGNOSIS — K92.0 COFFEE GROUND EMESIS: ICD-10-CM

## 2017-09-14 DIAGNOSIS — I82.4Y2 ACUTE DEEP VEIN THROMBOSIS (DVT) OF PROXIMAL VEIN OF LEFT LOWER EXTREMITY: ICD-10-CM

## 2017-09-14 DIAGNOSIS — R11.2 INTRACTABLE VOMITING WITH NAUSEA, UNSPECIFIED VOMITING TYPE: Primary | ICD-10-CM

## 2017-09-14 PROCEDURE — 99204 OFFICE O/P NEW MOD 45 MIN: CPT | Mod: S$GLB,,, | Performed by: NURSE PRACTITIONER

## 2017-09-14 PROCEDURE — 99999 PR PBB SHADOW E&M-EST. PATIENT-LVL III: CPT | Mod: PBBFAC,,, | Performed by: NURSE PRACTITIONER

## 2017-09-14 PROCEDURE — 3008F BODY MASS INDEX DOCD: CPT | Mod: S$GLB,,, | Performed by: NURSE PRACTITIONER

## 2017-09-14 PROCEDURE — 99999 PR PBB SHADOW E&M-EST. PATIENT-LVL III: CPT | Mod: PBBFAC,,, | Performed by: INTERNAL MEDICINE

## 2017-09-14 PROCEDURE — 99214 OFFICE O/P EST MOD 30 MIN: CPT | Mod: S$GLB,,, | Performed by: INTERNAL MEDICINE

## 2017-09-14 PROCEDURE — 3008F BODY MASS INDEX DOCD: CPT | Mod: S$GLB,,, | Performed by: INTERNAL MEDICINE

## 2017-09-14 PROCEDURE — 3046F HEMOGLOBIN A1C LEVEL >9.0%: CPT | Mod: S$GLB,,, | Performed by: INTERNAL MEDICINE

## 2017-09-14 RX ORDER — METOCLOPRAMIDE 10 MG/1
10 TABLET ORAL 4 TIMES DAILY PRN
Qty: 60 TABLET | Refills: 0 | Status: SHIPPED | OUTPATIENT
Start: 2017-09-14 | End: 2018-01-11

## 2017-09-14 RX ORDER — PROMETHAZINE HYDROCHLORIDE 25 MG/1
TABLET ORAL
Refills: 0 | COMMUNITY
Start: 2017-09-10 | End: 2017-09-14 | Stop reason: SDUPTHER

## 2017-09-14 RX ORDER — METOCLOPRAMIDE 10 MG/1
10 TABLET ORAL 4 TIMES DAILY
COMMUNITY
End: 2017-09-14 | Stop reason: SDUPTHER

## 2017-09-14 NOTE — PROGRESS NOTES
"Subjective:    Patient ID:  Kulwant Mueller Jr. is a 32 y.o. 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 He initially presented with LLE pain and swelling and found to have acute LLE extensive DVT from fem to peroneal. 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 EKOS removed after 48 hours with significant improvement in edema and pain. Discharged home on Xarelto. 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 Had intractable N/V for a few days after initial procedure for DVT which resolved and again had similar symptoms after this last intervention about 1 week ago. N/V initially thought due to a reaction to sedation and improved with anti-nausea meds. This last episode of intractable N/V has persisted despite multiple medications. Currently Compazine had been working until today. Originally it was non bilious, non bloody emesis and now it is more "dry heaving." Unable to hold anything down and presented earlier this week to ED with dehydration and received IVF hydration with improvement. Denies CP, SOB/DASILVA, orthopnea, PND, syncope, palps. Compliant with meds.    Review of Systems   Constitution: Negative for weakness and malaise/fatigue.   HENT: Negative for congestion.    Eyes: Negative for blurred vision.   Cardiovascular: Negative for chest pain, claudication, cyanosis, dyspnea on exertion, irregular heartbeat, leg swelling, near-syncope, orthopnea, palpitations, paroxysmal nocturnal dyspnea and syncope.   Respiratory: Negative for shortness of breath.    Endocrine: Negative for " polyuria.   Hematologic/Lymphatic: Negative for bleeding problem.   Skin: Negative for itching and rash.   Musculoskeletal: Negative for joint swelling, muscle cramps and muscle weakness.   Gastrointestinal: Negative for abdominal pain, hematemesis, hematochezia, melena, nausea and vomiting.   Genitourinary: Negative for dysuria and hematuria.   Neurological: Negative for dizziness, focal weakness, headaches, light-headedness and loss of balance.   Psychiatric/Behavioral: Negative for depression. The patient is not nervous/anxious.         Objective:    Physical Exam   Constitutional: He is oriented to person, place, and time. He appears well-developed and well-nourished.   HENT:   Head: Normocephalic and atraumatic.   Neck: Neck supple. No JVD present.   Cardiovascular: Normal rate, regular rhythm and normal heart sounds.    Pulses:       Carotid pulses are 2+ on the right side, and 2+ on the left side.       Radial pulses are 2+ on the right side, and 2+ on the left side.        Femoral pulses are 2+ on the right side, and 2+ on the left side.       Dorsalis pedis pulses are 2+ on the right side, and 2+ on the left side.        Posterior tibial pulses are 2+ on the right side, and 2+ on the left side.   Pulmonary/Chest: Effort normal and breath sounds normal.   Abdominal: Soft. Bowel sounds are normal.   Musculoskeletal: He exhibits no edema.   Neurological: He is alert and oriented to person, place, and time.   Skin: Skin is warm and dry.   Psychiatric: He has a normal mood and affect. His behavior is normal. Thought content normal.         Assessment:       1. May-Thurner syndrome    2. Acute deep vein thrombosis (DVT) of proximal vein of left lower extremity    3. Type 2 diabetes mellitus without complication, unspecified long term insulin use status    4. Nausea and vomiting, intractability of vomiting not specified, unspecified vomiting type    5. History of intravascular stent placement      31 y/o male with  hx and presentation as above. Has intractable N/V post procedure and I'm going to have him seen by GI. Symptoms of DVT and swelling have resolved.        Plan:       -GI evaluation  -Continue current medical management  -f/u in 3 months

## 2017-09-14 NOTE — TELEPHONE ENCOUNTER
Patient is wanting to schedule an EGD. Is it okay to hold Xarelto 2 days prior to procedure? Please advise.

## 2017-09-14 NOTE — PROGRESS NOTES
"[facility] Note    Reason for Visit:  The primary encounter diagnosis was Intractable vomiting with nausea, unspecified vomiting type. A diagnosis of Coffee ground emesis was also pertinent to this visit.    PCP:   Dona Pineda   [address] / [place] [id number]    Referring MD:  Tyree Duff Md  [address]  [address]  [place] [address]    HPI:  This is a 32 y.o. male here for evaluation of nausea and vomiting. Mr. Mueller is here with his father and has a h/o type 2 DM and May-Thurner syndrome with DVT. He has had procedures for DVT x 2 with post op n/v on both occasions. He states he has not been able to start blood thinner Xarelto as of yet, and has not been taking insulin d/t n/v. He has been evaluated per ED for his s/s x 2 (9/10/2017, 9/12/2017).     n/v  Onset:8/2017 s/p anesthetics d/t DVT treatment  Frequency: nausea daily. Vomiting 4-5 times most days of the week. Also with "dry heaving".   PO intake: Gatorade, Glucerna, vegetable soup (just juice) cannot tolerate solids in soup.  Emesis description:liquid. Can occur on empty stomach or a few hours after PO intake.  Hematemesis/coffee ground emesis:dark brown/coffee ground emesis at times. Denies intake of anything dark  Last episode of vomiting: last night    Taking 25 mg compazine supp BID, 10 mg Reglan once yesterday. With some improvement.    Abdominal pain - no  Reflux - no  Dysphagia/odynophagia - no   Bowel habits - 1 formed BM last Thursday.  GI bleeding - + coffee ground emesis. denies hematochezia, hematemesis, melena, BRBPR, black/tarry stools.   NSAID usage - denies    ROS:  Constitutional: No fevers, no chills, No unintentional weight loss, + fatigue,   ENT: No allergies  CV: No chest pain, no palpitations, no perif. edema, no sob on exertion  Pulm: No cough, No shortness of breath, no wheezes, no sputum  Ophtho: No vision changes  GI: see HPI;   Derm: No rash  Heme: No lymphadenopathy, No " "bruising  MSK: No arthritis, no muscle pain, no muscle weakness  : No dysuria, No hematuria  Endo: No hot or cold intolerance  Neuro: No syncope, No seizure,       Medical History:  has a past medical history of Diabetes mellitus and Hypertension.    Surgical History:  has a past surgical history that includes Appendectomy.    Family History: family history includes Cancer in his maternal grandfather, mother, paternal grandfather, and paternal uncle; Irritable bowel syndrome in his paternal grandfather..     Social History:  reports that he has been smoking.  He has been smoking about 0.00 packs per day. He uses smokeless tobacco. He reports that he does not drink alcohol or use drugs.    Review of patient's allergies indicates:  No Known Allergies    Current Outpatient Prescriptions   Medication Sig    metoclopramide HCl (REGLAN) 10 MG tablet Take 1 tablet (10 mg total) by mouth 4 (four) times daily as needed.    prochlorperazine (COMPAZINE) 25 MG suppository Place 1 suppository (25 mg total) rectally every 12 (twelve) hours as needed for Nausea.    INSULIN DETEMIR (LEVEMIR FLEXPEN SUBQ) Inject 60 Units into the skin once daily.     insulin lispro (HUMALOG) 100 unit/mL injection Inject into the skin 3 (three) times daily before meals.    rivaroxaban (XARELTO) 20 mg Tab Take 1 tablet (20 mg total) by mouth daily with dinner or evening meal.     No current facility-administered medications for this visit.        Objective Findings:    Vital Signs:  /79   Pulse 98   Ht 6' 1" (1.854 m)   Wt 100 kg (220 lb 7.4 oz)   BMI 29.09 kg/m²   Body mass index is 29.09 kg/m².    Physical Exam:  General Appearance: Well appearing in no acute distress  Head:   Normocephalic, without obvious abnormality  Eyes:    No scleral icterus, EOMI  ENT: Neck supple, Lips, mucosa, and tongue normal; teeth and gums normal  Lungs: CTA bilaterally in anterior and posterior fields, no wheezes, no crackles.  Heart:  Regular rate and " rhythm, S1, S2 normal, no murmurs heard  Abdomen: Soft, non tender, non distended with positive bowel sounds in all four quadrants. No hepatosplenomegaly, ascites, or mass  Extremities: 2+ radial pulses, no clubbing, cyanosis or edema  Skin: No rash to exposed areas  Neurologic: A&Ox4      Labs:  Lab Results   Component Value Date    WBC 16.31 (H) 2017    HGB 16.5 2017    HCT 47.8 2017     2017    CHOL 140 2017    TRIG 138 2017    HDL 29 (L) 2017    ALT 11 2017    AST 14 2017     (L) 2017    K 3.5 2017    CL 97 2017    CREATININE 1.0 2017    BUN 12 2017    CO2 24 2017    INR 1.0 2017    INR 1.0 2017    INR 1.0 2017    HGBA1C 13.2 (H) 2017       Imagin2017 chest xray-no acute process.  2017 abd us-no etiology for n/v identified.  2017 abd xray-non obstructive bowel gas pattern.  Endoscopy:    none  Assessment:  1. Intractable vomiting with nausea, unspecified vomiting type    2. Coffee ground emesis           Recommendations:  1. N/v- w/ coffee ground emesis-EGD for further eval. Possible CT abd/pelvis +/- GES pending results. ED precautions reviewed. Understanding verbalized. Pt request refill of Reglan. Pt advised that treatment with metoclopramide/Reglan can cause tardive dyskinesia, a serious movement disorder that is often irreversible. The risk of developing tardive dyskinesia increases with duration of treatment (longer than 12 weeks) and total cumulative dose (higher doses). Pt advised to stop taking the medication and seek medical attention for signs or symptoms of tardive dyskinesia (involuntary  Movements of the limbs, tongue, face; facial grimacing, twisting of the neck, tongue protrusion, change in speech). Pt also advised that there is no known treatment for tardive dyskinesia. In some patients, symptoms lessen or resolve after metoclopramide treatment is  stopped. understanding verbalized.     F/u pending results.       Order summary:  Orders Placed This Encounter    metoclopramide HCl (REGLAN) 10 MG tablet    Case request GI: ESOPHAGOGASTRODUODENOSCOPY (EGD)         Thank you so much for allowing me to participate in the care of CHARLENE Mayes Jr., FNP-C

## 2017-09-14 NOTE — LETTER
September 14, 2017      Tyree Duff MD  [address]  [address]  [facility] [address]           [facility] Gastroenterology  [address]  [place] [address]  Phone: [phone]  Fax: [phone]          Patient: Kulwant Mueller Jr.   MR Number: [id number]   YOB: 1985   Date of Visit: 9/14/2017       Dear Dr. Tyree Duff:    Thank you for referring Kulwant Mueller to me for evaluation. Attached you will find relevant portions of my assessment and plan of care.    If you have questions, please do not hesitate to call me. I look forward to following Kulwant Mueller along with you.    Sincerely,    Ana Sethi, MAT    Enclosure  CC:  No Recipients    If you would like to receive this communication electronically, please contact [email or web address] or [phone] to request more information on [employer] access.    For providers and/or their staff who would like to refer a patient to [facility], please contact us through our one-stop-shop provider referral line, [facility] , at [phone].    If you feel you have received this communication in error or would no longer like to receive these types of communications, please e-mail [email or web address]

## 2017-09-15 ENCOUNTER — TELEPHONE (OUTPATIENT)
Dept: ENDOSCOPY | Facility: HOSPITAL | Age: 32
End: 2017-09-15

## 2017-09-15 NOTE — TELEPHONE ENCOUNTER
Contacted patient to schedule EGD. Patient currently taking Xarelto that is being followed by Dr. Tyree Duff. Per Dr. Duff, patient can hold Xarelto 2 days prior to procedure. EGD scheduled 9/18/17. Patient educated on procedure prep and holding time for blood thinner. Verbalized understanding. A set of prep instructions emailed to patient.

## 2017-09-18 ENCOUNTER — ANESTHESIA EVENT (OUTPATIENT)
Dept: ENDOSCOPY | Facility: HOSPITAL | Age: 32
End: 2017-09-18
Payer: COMMERCIAL

## 2017-09-18 ENCOUNTER — ANESTHESIA (OUTPATIENT)
Dept: ENDOSCOPY | Facility: HOSPITAL | Age: 32
End: 2017-09-18
Payer: COMMERCIAL

## 2017-09-18 ENCOUNTER — SURGERY (OUTPATIENT)
Age: 32
End: 2017-09-18

## 2017-09-18 ENCOUNTER — HOSPITAL ENCOUNTER (OUTPATIENT)
Facility: HOSPITAL | Age: 32
Discharge: HOME | End: 2017-09-18
Attending: INTERNAL MEDICINE | Admitting: INTERNAL MEDICINE
Payer: COMMERCIAL

## 2017-09-18 VITALS
RESPIRATION RATE: 17 BRPM | HEIGHT: 73 IN | TEMPERATURE: 98 F | RESPIRATION RATE: 16 BRPM | WEIGHT: 221 LBS | BODY MASS INDEX: 29.29 KG/M2 | DIASTOLIC BLOOD PRESSURE: 93 MMHG | HEART RATE: 83 BPM | OXYGEN SATURATION: 99 % | SYSTOLIC BLOOD PRESSURE: 142 MMHG

## 2017-09-18 DIAGNOSIS — R11.2 NAUSEA AND VOMITING, INTRACTABILITY OF VOMITING NOT SPECIFIED, UNSPECIFIED VOMITING TYPE: Primary | ICD-10-CM

## 2017-09-18 LAB
GLUCOSE SERPL-MCNC: 276 MG/DL (ref 70–110)
POCT GLUCOSE: 276 MG/DL (ref 70–110)
POCT GLUCOSE: 305 MG/DL (ref 70–110)

## 2017-09-18 PROCEDURE — 37000008 HC ANESTHESIA 1ST 15 MINUTES: Performed by: INTERNAL MEDICINE

## 2017-09-18 PROCEDURE — D9220A PRA ANESTHESIA: Mod: ANES,,, | Performed by: ANESTHESIOLOGY

## 2017-09-18 PROCEDURE — 88342 IMHCHEM/IMCYTCHM 1ST ANTB: CPT | Mod: 26,,, | Performed by: PATHOLOGY

## 2017-09-18 PROCEDURE — 37000009 HC ANESTHESIA EA ADD 15 MINS: Performed by: INTERNAL MEDICINE

## 2017-09-18 PROCEDURE — 88305 TISSUE EXAM BY PATHOLOGIST: CPT | Performed by: PATHOLOGY

## 2017-09-18 PROCEDURE — 43239 EGD BIOPSY SINGLE/MULTIPLE: CPT | Performed by: INTERNAL MEDICINE

## 2017-09-18 PROCEDURE — 63600175 PHARM REV CODE 636 W HCPCS: Performed by: ANESTHESIOLOGY

## 2017-09-18 PROCEDURE — 82962 GLUCOSE BLOOD TEST: CPT | Performed by: INTERNAL MEDICINE

## 2017-09-18 PROCEDURE — 63600175 PHARM REV CODE 636 W HCPCS: Performed by: NURSE ANESTHETIST, CERTIFIED REGISTERED

## 2017-09-18 PROCEDURE — 27201012 HC FORCEPS, HOT/COLD, DISP: Performed by: INTERNAL MEDICINE

## 2017-09-18 PROCEDURE — 88305 TISSUE EXAM BY PATHOLOGIST: CPT | Mod: 26,,, | Performed by: PATHOLOGY

## 2017-09-18 PROCEDURE — 25000003 PHARM REV CODE 250: Performed by: INTERNAL MEDICINE

## 2017-09-18 PROCEDURE — 43239 EGD BIOPSY SINGLE/MULTIPLE: CPT | Mod: ,,, | Performed by: INTERNAL MEDICINE

## 2017-09-18 PROCEDURE — D9220A PRA ANESTHESIA: Mod: CRNA,,, | Performed by: NURSE ANESTHETIST, CERTIFIED REGISTERED

## 2017-09-18 RX ORDER — ONDANSETRON 2 MG/ML
INJECTION INTRAMUSCULAR; INTRAVENOUS
Status: DISCONTINUED | OUTPATIENT
Start: 2017-09-18 | End: 2017-09-18

## 2017-09-18 RX ORDER — LIDOCAINE HCL/PF 100 MG/5ML
SYRINGE (ML) INTRAVENOUS
Status: DISCONTINUED | OUTPATIENT
Start: 2017-09-18 | End: 2017-09-18

## 2017-09-18 RX ORDER — SODIUM CHLORIDE 9 MG/ML
INJECTION, SOLUTION INTRAVENOUS CONTINUOUS
Status: DISCONTINUED | OUTPATIENT
Start: 2017-09-18 | End: 2017-09-18 | Stop reason: HOSPADM

## 2017-09-18 RX ORDER — PROPOFOL 10 MG/ML
VIAL (ML) INTRAVENOUS
Status: DISCONTINUED | OUTPATIENT
Start: 2017-09-18 | End: 2017-09-18

## 2017-09-18 RX ADMIN — ONDANSETRON 4 MG: 2 INJECTION INTRAMUSCULAR; INTRAVENOUS at 08:09

## 2017-09-18 RX ADMIN — PROPOFOL 50 MG: 10 INJECTION, EMULSION INTRAVENOUS at 08:09

## 2017-09-18 RX ADMIN — INSULIN HUMAN 5 UNITS: 100 INJECTION, SOLUTION PARENTERAL at 08:09

## 2017-09-18 RX ADMIN — SODIUM CHLORIDE: 900 INJECTION, SOLUTION INTRAVENOUS at 08:09

## 2017-09-18 RX ADMIN — LIDOCAINE HYDROCHLORIDE 100 MG: 20 INJECTION, SOLUTION INTRAVENOUS at 08:09

## 2017-09-18 NOTE — PATIENT INSTRUCTIONS
Discharge Summary/Instructions after an Endoscopic Procedure  Patient Name: Kulwant Mueller  Patient MRN: [id number]  Patient YOB: 1985  Monday, September 18, 2017  Boaz Gonzalez MD  RESTRICTIONS:  During your procedure today, you received medications for sedation.  These   medications may affect your judgment, balance and coordination.  Therefore,   for 24 hours, you have the following restrictions:   - DO NOT drive a car, operate machinery, make legal/financial decisions,   sign important papers or drink alcohol.    ACTIVITY:  The following day: return to full activity including work, except no heavy   lifting, straining or running for 3 days if polyps were removed.  DIET:  Eat and drink normally unless instructed otherwise.  TREATMENT FOR COMMON SIDE EFFECTS:  - Mild abdominal pain, belching, bloating or excessive gas: rest, eat   lightly and use a heating pad.  - Sore Throat: treat with throat lozenges and/or gargle with warm salt   water.  SYMPTOMS TO WATCH FOR AND REPORT TO YOUR PHYSICIAN:  1. Abdominal pain or bloating, other than gas cramps.  2. Chest pain.  3. Back pain.  4. Chills or fever occurring within 24 hours after the procedure.  5. Rectal bleeding, which would show as bright red, maroon, or black stools.   (A tablespoon of blood from the rectum is not serious, especially if   hemorrhoids are present.)  6. Vomiting.  7. Weakness or dizziness.  8. Because air was used during the procedure, expelling large amounts of air   from your rectum or belching is normal.  9. If a bowel prep was taken, you may not have a bowel movement for 1-3   days.  This is normal.  GO DIRECTLY TO THE EMERGENCY ROOM IF YOU HAVE ANY OF THE FOLLOWING:   Difficulty breathing   Chills and/or fever over 101 F   Persistent vomiting and/or vomiting blood   Severe abdominal pain   Severe chest pain   Black, tarry stools   Bleeding- more than one tablespoon  Your doctor recommends these additional instructions:  If any  biopsies were taken, your doctors clinic will call you in 1 to 2   weeks with any results.  You have a contact number available for emergencies.  The signs and symptoms   of potential delayed complications were discussed with you.  You may return   to normal activities tomorrow.  Written discharge instructions were   provided to you.   You are being discharged to home.   Resume your previous diet.   Continue your present medications.   We are waiting for your pathology results.  For questions, problems or results please call your physician - Boaz Gonzalez MD at Work:  [phone].  [facility], EMERGENCY ROOM PHONE NUMBER: [phone]  IF A COMPLICATION OR EMERGENCY SITUATION ARISES AND YOU ARE UNABLE TO REACH   YOUR PHYSICIAN - GO DIRECTLY TO THE EMERGENCY ROOM.  Boaz Gonzalez MD  9/18/2017 8:32:17 AM  This report has been verified and signed electronically.

## 2017-09-18 NOTE — PLAN OF CARE
Pt aaox3, vss, no bleeding from iv site, family at bedside. Will discharge pt home with family with no distress.

## 2017-09-18 NOTE — INTERVAL H&P NOTE
The patient has been examined and the H&P has been reviewed:    There is no interval changes since last encounter.    EGD: N/V  Sedation: GA  ASA: Per anesthesia  Mallampati: Per anesthesia    Endoscopy risks, benefits and alternative options discussed and understood by patient/family.          There are no hospital problems to display for this patient.

## 2017-09-18 NOTE — ANESTHESIA PREPROCEDURE EVALUATION
09/18/2017  Kulwant Mueller Jr. is a 32 y.o., male.    Anesthesia Evaluation    I have reviewed the Patient Summary Reports.     I have reviewed the Medications.     Review of Systems  Anesthesia Hx:  No problems with previous Anesthesia  History of prior surgery of interest to airway management or planning: Previous anesthesia: General   Social:  Smoker, No Alcohol Use    Hematology/Oncology:  Hematology Normal   Oncology Normal     EENT/Dental:EENT/Dental Normal   Cardiovascular:   Exercise tolerance: good Hypertension, well controlled    Pulmonary:  Pulmonary Normal    Renal/:  Renal/ Normal     Hepatic/GI:  Hepatic/GI Normal    Musculoskeletal:  Musculoskeletal Normal    Neurological:  Neurology Normal    Endocrine:   Diabetes, well controlled, type 2    Dermatological:  Skin Normal    Psych:  Psychiatric Normal           Physical Exam  General:  Well nourished    Airway/Jaw/Neck:  Airway Findings: Mouth Opening: Normal Tongue: Normal  General Airway Assessment: Adult  Mallampati: II  Jaw/Neck Findings:  Neck ROM: Normal ROM      Dental:  Dental Findings: In tact        Mental Status:  Mental Status Findings:  Cooperative, Alert and Oriented         Anesthesia Plan  Type of Anesthesia, risks & benefits discussed:  Anesthesia Type:  general  Patient's Preference: GA  Intra-op Monitoring Plan: standard ASA monitors  Intra-op Monitoring Plan Comments:   Post Op Pain Control Plan:   Post Op Pain Control Plan Comments:   Induction:   IV  Beta Blocker:  Patient is not currently on a Beta-Blocker (No further documentation required).       Informed Consent: Patient understands risks and agrees with Anesthesia plan.  Questions answered. Anesthesia consent signed with patient.  ASA Score: 3     Day of Surgery Review of History & Physical:  There are no significant changes.  H&P update referred to the provider.          Ready For Surgery From Anesthesia Perspective.

## 2017-09-18 NOTE — PROGRESS NOTES
BS = 305. 5 units Insulin IV given per Dr. Russell's order. Patients blood sugar to be rechecked.

## 2017-09-18 NOTE — TRANSFER OF CARE
"Anesthesia Transfer of Care Note    Patient: Kulwant Mueller Jr.    Procedure(s) Performed: Procedure(s) (LRB):  ESOPHAGOGASTRODUODENOSCOPY (EGD) (N/A)    Patient location: GI    Anesthesia Type: general    Transport from OR: Transported from OR on room air with adequate spontaneous ventilation    Post pain: adequate analgesia    Post assessment: no apparent anesthetic complications    Post vital signs: stable    Level of consciousness: awake    Nausea/Vomiting: no nausea/vomiting    Complications: none    Transfer of care protocol was followed      Last vitals:   Visit Vitals  /88 (Patient Position: Lying)   Pulse 96   Temp 36.7 °C (98.1 °F) (Oral)   Resp 16   Ht 6' 1" (1.854 m)   Wt 100.2 kg (221 lb)   SpO2 99%   BMI 29.16 kg/m²     "

## 2017-09-18 NOTE — ANESTHESIA POSTPROCEDURE EVALUATION
"Anesthesia Post Evaluation    Patient: Kulwant Mueller Jr.    Procedure(s) Performed: Procedure(s) (LRB):  ESOPHAGOGASTRODUODENOSCOPY (EGD) (N/A)    Final Anesthesia Type: general  Patient location during evaluation: PACU  Patient participation: Yes- Able to Participate  Level of consciousness: awake and alert  Post-procedure vital signs: reviewed and stable  Pain management: adequate  Airway patency: patent  PONV status at discharge: No PONV  Anesthetic complications: no      Cardiovascular status: blood pressure returned to baseline  Respiratory status: unassisted  Hydration status: euvolemic  Follow-up not needed.        Visit Vitals  BP (!) 142/93   Pulse 83   Temp 36.6 °C (97.8 °F)   Resp 16   Ht 6' 1" (1.854 m)   Wt 100.2 kg (221 lb)   SpO2 99%   BMI 29.16 kg/m²       Pain/Tana Score: Pain Assessment Performed: Yes (9/18/2017  7:53 AM)  Presence of Pain: denies (9/18/2017  8:38 AM)  Tana Score: 10 (9/18/2017  8:56 AM)      "

## 2017-09-18 NOTE — H&P (VIEW-ONLY)
"[facility] Note    Reason for Visit:  The primary encounter diagnosis was Intractable vomiting with nausea, unspecified vomiting type. A diagnosis of Coffee ground emesis was also pertinent to this visit.    PCP:   Dona Pineda   [address] / [place] [id number]    Referring MD:  Tyree Duff Md  [address]  [address]  [place] [address]    HPI:  This is a 32 y.o. male here for evaluation of nausea and vomiting. Mr. Mueller is here with his father and has a h/o type 2 DM and May-Thurner syndrome with DVT. He has had procedures for DVT x 2 with post op n/v on both occasions. He states he has not been able to start blood thinner Xarelto as of yet, and has not been taking insulin d/t n/v. He has been evaluated per ED for his s/s x 2 (9/10/2017, 9/12/2017).     n/v  Onset:8/2017 s/p anesthetics d/t DVT treatment  Frequency: nausea daily. Vomiting 4-5 times most days of the week. Also with "dry heaving".   PO intake: Gatorade, Glucerna, vegetable soup (just juice) cannot tolerate solids in soup.  Emesis description:liquid. Can occur on empty stomach or a few hours after PO intake.  Hematemesis/coffee ground emesis:dark brown/coffee ground emesis at times. Denies intake of anything dark  Last episode of vomiting: last night    Taking 25 mg compazine supp BID, 10 mg Reglan once yesterday. With some improvement.    Abdominal pain - no  Reflux - no  Dysphagia/odynophagia - no   Bowel habits - 1 formed BM last Thursday.  GI bleeding - + coffee ground emesis. denies hematochezia, hematemesis, melena, BRBPR, black/tarry stools.   NSAID usage - denies    ROS:  Constitutional: No fevers, no chills, No unintentional weight loss, + fatigue,   ENT: No allergies  CV: No chest pain, no palpitations, no perif. edema, no sob on exertion  Pulm: No cough, No shortness of breath, no wheezes, no sputum  Ophtho: No vision changes  GI: see HPI;   Derm: No rash  Heme: No lymphadenopathy, No " "bruising  MSK: No arthritis, no muscle pain, no muscle weakness  : No dysuria, No hematuria  Endo: No hot or cold intolerance  Neuro: No syncope, No seizure,       Medical History:  has a past medical history of Diabetes mellitus and Hypertension.    Surgical History:  has a past surgical history that includes Appendectomy.    Family History: family history includes Cancer in his maternal grandfather, mother, paternal grandfather, and paternal uncle; Irritable bowel syndrome in his paternal grandfather..     Social History:  reports that he has been smoking.  He has been smoking about 0.00 packs per day. He uses smokeless tobacco. He reports that he does not drink alcohol or use drugs.    Review of patient's allergies indicates:  No Known Allergies    Current Outpatient Prescriptions   Medication Sig    metoclopramide HCl (REGLAN) 10 MG tablet Take 1 tablet (10 mg total) by mouth 4 (four) times daily as needed.    prochlorperazine (COMPAZINE) 25 MG suppository Place 1 suppository (25 mg total) rectally every 12 (twelve) hours as needed for Nausea.    INSULIN DETEMIR (LEVEMIR FLEXPEN SUBQ) Inject 60 Units into the skin once daily.     insulin lispro (HUMALOG) 100 unit/mL injection Inject into the skin 3 (three) times daily before meals.    rivaroxaban (XARELTO) 20 mg Tab Take 1 tablet (20 mg total) by mouth daily with dinner or evening meal.     No current facility-administered medications for this visit.        Objective Findings:    Vital Signs:  /79   Pulse 98   Ht 6' 1" (1.854 m)   Wt 100 kg (220 lb 7.4 oz)   BMI 29.09 kg/m²   Body mass index is 29.09 kg/m².    Physical Exam:  General Appearance: Well appearing in no acute distress  Head:   Normocephalic, without obvious abnormality  Eyes:    No scleral icterus, EOMI  ENT: Neck supple, Lips, mucosa, and tongue normal; teeth and gums normal  Lungs: CTA bilaterally in anterior and posterior fields, no wheezes, no crackles.  Heart:  Regular rate and " rhythm, S1, S2 normal, no murmurs heard  Abdomen: Soft, non tender, non distended with positive bowel sounds in all four quadrants. No hepatosplenomegaly, ascites, or mass  Extremities: 2+ radial pulses, no clubbing, cyanosis or edema  Skin: No rash to exposed areas  Neurologic: A&Ox4      Labs:  Lab Results   Component Value Date    WBC 16.31 (H) 2017    HGB 16.5 2017    HCT 47.8 2017     2017    CHOL 140 2017    TRIG 138 2017    HDL 29 (L) 2017    ALT 11 2017    AST 14 2017     (L) 2017    K 3.5 2017    CL 97 2017    CREATININE 1.0 2017    BUN 12 2017    CO2 24 2017    INR 1.0 2017    INR 1.0 2017    INR 1.0 2017    HGBA1C 13.2 (H) 2017       Imagin2017 chest xray-no acute process.  2017 abd us-no etiology for n/v identified.  2017 abd xray-non obstructive bowel gas pattern.  Endoscopy:    none  Assessment:  1. Intractable vomiting with nausea, unspecified vomiting type    2. Coffee ground emesis           Recommendations:  1. N/v- w/ coffee ground emesis-EGD for further eval. Possible CT abd/pelvis +/- GES pending results. ED precautions reviewed. Understanding verbalized. Pt request refill of Reglan. Pt advised that treatment with metoclopramide/Reglan can cause tardive dyskinesia, a serious movement disorder that is often irreversible. The risk of developing tardive dyskinesia increases with duration of treatment (longer than 12 weeks) and total cumulative dose (higher doses). Pt advised to stop taking the medication and seek medical attention for signs or symptoms of tardive dyskinesia (involuntary  Movements of the limbs, tongue, face; facial grimacing, twisting of the neck, tongue protrusion, change in speech). Pt also advised that there is no known treatment for tardive dyskinesia. In some patients, symptoms lessen or resolve after metoclopramide treatment is  stopped. understanding verbalized.     F/u pending results.       Order summary:  Orders Placed This Encounter    metoclopramide HCl (REGLAN) 10 MG tablet    Case request GI: ESOPHAGOGASTRODUODENOSCOPY (EGD)         Thank you so much for allowing me to participate in the care of CHARLENE Mayes Jr., FNP-C

## 2017-09-20 ENCOUNTER — TELEPHONE (OUTPATIENT)
Dept: GASTROENTEROLOGY | Facility: CLINIC | Age: 32
End: 2017-09-20

## 2017-09-20 DIAGNOSIS — R11.2 INTRACTABLE VOMITING WITH NAUSEA, UNSPECIFIED VOMITING TYPE: Primary | ICD-10-CM

## 2017-09-20 NOTE — TELEPHONE ENCOUNTER
Navdeep,  Patient would like a call back regarding his symptoms of burping  And burps smell like a boiled egg and he would like to know what causes that . Patient states he may need a follow up appt but would like to speak with staff

## 2017-09-20 NOTE — TELEPHONE ENCOUNTER
----- Message from Boaz Gonzalez MD sent at 9/20/2017 10:46 AM CDT -----  Stomach biopsies are normal.

## 2017-09-20 NOTE — TELEPHONE ENCOUNTER
Please call pt to inform: I have ordered a gastric emptying study (to see if his stomach is emptying too slowly) to evaluate his s/s since his EGD was unrevealing. He needs to be off of Reglan and compazine for the test. Orders placed.    Thanks  Tiff, NP

## 2017-09-20 NOTE — TELEPHONE ENCOUNTER
Not 100% sure about the cause of the foul smelling belch, but want to him to have GES in case delayed gastric emptying is the cause of his s/s (see phone note from earlier today).    MAT Perez

## 2017-09-25 ENCOUNTER — TELEPHONE (OUTPATIENT)
Dept: ENDOSCOPY | Facility: HOSPITAL | Age: 32
End: 2017-09-25

## 2017-10-11 LAB — PERIPHERAL STENOSIS: ABNORMAL

## 2017-11-03 ENCOUNTER — OFFICE VISIT (OUTPATIENT)
Dept: CARDIOLOGY | Facility: CLINIC | Age: 32
End: 2017-11-03
Payer: COMMERCIAL

## 2017-11-03 VITALS
HEIGHT: 73 IN | DIASTOLIC BLOOD PRESSURE: 72 MMHG | WEIGHT: 252 LBS | OXYGEN SATURATION: 96 % | SYSTOLIC BLOOD PRESSURE: 127 MMHG | HEART RATE: 98 BPM | BODY MASS INDEX: 33.4 KG/M2

## 2017-11-03 DIAGNOSIS — I87.1 MAY-THURNER SYNDROME: Primary | ICD-10-CM

## 2017-11-03 DIAGNOSIS — I82.4Y2 ACUTE DEEP VEIN THROMBOSIS (DVT) OF PROXIMAL VEIN OF LEFT LOWER EXTREMITY: ICD-10-CM

## 2017-11-03 DIAGNOSIS — E66.09 NON MORBID OBESITY DUE TO EXCESS CALORIES: ICD-10-CM

## 2017-11-03 DIAGNOSIS — E11.9 TYPE 2 DIABETES MELLITUS WITHOUT COMPLICATION, UNSPECIFIED LONG TERM INSULIN USE STATUS: ICD-10-CM

## 2017-11-03 DIAGNOSIS — Z95.828 HISTORY OF INTRAVASCULAR STENT PLACEMENT: ICD-10-CM

## 2017-11-03 PROCEDURE — 99999 PR PBB SHADOW E&M-EST. PATIENT-LVL III: CPT | Mod: PBBFAC,,, | Performed by: INTERNAL MEDICINE

## 2017-11-03 PROCEDURE — 99214 OFFICE O/P EST MOD 30 MIN: CPT | Mod: S$GLB,,, | Performed by: INTERNAL MEDICINE

## 2017-11-03 RX ORDER — PEN NEEDLE, DIABETIC 31 GX5/16"
NEEDLE, DISPOSABLE MISCELLANEOUS
Status: ON HOLD | COMMUNITY
Start: 2017-11-02 | End: 2018-09-08 | Stop reason: SDUPTHER

## 2017-11-03 RX ORDER — TADALAFIL 5 MG/1
5 TABLET ORAL DAILY PRN
Qty: 90 TABLET | Refills: 3 | Status: SHIPPED | OUTPATIENT
Start: 2017-11-03 | End: 2018-01-10 | Stop reason: SDUPTHER

## 2017-11-03 NOTE — PROGRESS NOTES
Subjective:    Patient ID:  Kulwant Mueller Jr. is a 32 y.o. male who presents for follow-up of May-Thurner's Syndrome    HPI  31 y/o male with hx of recent acute LLE fem-pop and below the knee DVT (extensive thrombus) s/p catheter directed thrombolysis with EKOS with significant improvement in symptoms on chronic anticoagulation on Xarelto, May Thurner's Syndrome s/p bilateral iliac vein stenting, DM on insulin. He initially presented with LLE pain and swelling and found to have acute LLE extensive DVT from fem to peroneal. 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 EKOS removed after 48 hours with significant improvement in edema and pain. Discharged home on Xarelto. 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 Had intractable N/V for a few days after initial procedure for DVT which resolved and again had similar symptoms after last intervention. Seen by GI and started on Reglan with improvement and eventual resolution of symptoms. Had EGD which was normal.   Doing well today and is back at work. Denies CP, SOB/DASILVA, orthopnea, PND, syncope, palps, LE edema. Compliant with meds.    Review of Systems   Constitution: Negative for weakness and malaise/fatigue.   HENT: Negative for congestion.    Eyes: Negative for blurred vision.   Cardiovascular: Negative for chest pain, claudication, cyanosis, dyspnea on exertion, irregular heartbeat, leg swelling, near-syncope, orthopnea, palpitations, paroxysmal nocturnal dyspnea and syncope.   Respiratory: Negative for shortness of breath.    Endocrine: Negative for polyuria.   Hematologic/Lymphatic: Negative for bleeding problem.   Skin: Negative for itching and rash.   Musculoskeletal: Negative for joint swelling, muscle cramps and muscle weakness.   Gastrointestinal: Negative for abdominal pain, hematemesis, hematochezia, melena, nausea and vomiting.   Genitourinary: Positive  for decreased libido. Negative for dysuria and hematuria.   Neurological: Negative for dizziness, focal weakness, headaches, light-headedness and loss of balance.   Psychiatric/Behavioral: Negative for depression. The patient is not nervous/anxious.         Objective:    Physical Exam   Constitutional: He is oriented to person, place, and time. He appears well-developed and well-nourished.   HENT:   Head: Normocephalic and atraumatic.   Neck: Neck supple. No JVD present.   Cardiovascular: Normal rate, regular rhythm and normal heart sounds.    Pulses:       Carotid pulses are 2+ on the right side, and 2+ on the left side.       Radial pulses are 2+ on the right side, and 2+ on the left side.        Femoral pulses are 2+ on the right side, and 2+ on the left side.       Dorsalis pedis pulses are 2+ on the right side, and 2+ on the left side.        Posterior tibial pulses are 2+ on the right side, and 2+ on the left side.   Pulmonary/Chest: Effort normal and breath sounds normal.   Abdominal: Soft. Bowel sounds are normal.   Musculoskeletal: He exhibits no edema.   Neurological: He is alert and oriented to person, place, and time.   Skin: Skin is warm and dry.   Psychiatric: He has a normal mood and affect. His behavior is normal. Thought content normal.         Assessment:       1. May-Thurner syndrome    2. History of intravascular stent placement    3. Acute deep vein thrombosis (DVT) of proximal vein of left lower extremity    4. Non morbid obesity due to excess calories    5. Type 2 diabetes mellitus without complication, unspecified long term insulin use status      31 y/o male with hx and presentation as above. Doing well and asymptomatic. C/o ED.        Plan:       -Continue current medical management  -Cialis 5 mg daily  -f/u in 6 months

## 2017-11-03 NOTE — TELEPHONE ENCOUNTER
----- Message from Carmen Miguel sent at 11/3/2017 12:24 PM CDT -----  No. [phone]     Please call in Cialis into [facility].  No. [phone]

## 2017-11-13 ENCOUNTER — TELEPHONE (OUTPATIENT)
Dept: CARDIOLOGY | Facility: CLINIC | Age: 32
End: 2017-11-13

## 2017-11-13 NOTE — TELEPHONE ENCOUNTER
----- Message from Manju Pelayo sent at 11/13/2017  9:49 AM CST -----  Contact: Self/ [phone]  Patient called in to get prescription refill. He said his prescription has not been sent to the pharmacy yet.    Please call and advise.

## 2018-01-08 ENCOUNTER — TELEPHONE (OUTPATIENT)
Dept: CARDIOLOGY | Facility: CLINIC | Age: 33
End: 2018-01-08

## 2018-01-08 NOTE — TELEPHONE ENCOUNTER
----- Message from Tyree Duff MD sent at 1/8/2018  3:30 PM CST -----  Contact: Patient [phone]  Aspirin is not a substitute for Xarelto. If he would like to discuss this or medication changes, he can be scheduled a clinic visit.   Thanks  FLO    ----- Message -----  From: Lyubov Butt MA  Sent: 12/14/2017   4:52 PM  To: Tyree Duff MD        ----- Message -----  From: Evens Sherman  Sent: 12/14/2017   2:21 PM  To: [facility] Staff    Patient states he saw a eye specialist in [place] on today,(didn't have the name on hand) for his eyes. He was told by the specialist that he is encountering  bleeding behind they eyes, when taking Xarelto. Was told to ask Cardiologist if he could take aspirin instead? Please call to advise.

## 2018-01-10 ENCOUNTER — OFFICE VISIT (OUTPATIENT)
Dept: CARDIOLOGY | Facility: CLINIC | Age: 33
End: 2018-01-10
Payer: COMMERCIAL

## 2018-01-10 VITALS
SYSTOLIC BLOOD PRESSURE: 130 MMHG | HEIGHT: 73 IN | BODY MASS INDEX: 33.4 KG/M2 | WEIGHT: 252 LBS | DIASTOLIC BLOOD PRESSURE: 92 MMHG | HEART RATE: 102 BPM | OXYGEN SATURATION: 96 %

## 2018-01-10 DIAGNOSIS — Z95.828 HISTORY OF INTRAVASCULAR STENT PLACEMENT: ICD-10-CM

## 2018-01-10 DIAGNOSIS — E11.9 TYPE 2 DIABETES MELLITUS WITHOUT COMPLICATION, UNSPECIFIED LONG TERM INSULIN USE STATUS: ICD-10-CM

## 2018-01-10 DIAGNOSIS — I82.4Y2 ACUTE DEEP VEIN THROMBOSIS (DVT) OF PROXIMAL VEIN OF LEFT LOWER EXTREMITY: ICD-10-CM

## 2018-01-10 DIAGNOSIS — E66.09 NON MORBID OBESITY DUE TO EXCESS CALORIES: ICD-10-CM

## 2018-01-10 DIAGNOSIS — I87.1 MAY-THURNER SYNDROME: Primary | ICD-10-CM

## 2018-01-10 DIAGNOSIS — I82.412 DEEP VEIN THROMBOSIS (DVT) OF FEMORAL VEIN OF LEFT LOWER EXTREMITY, UNSPECIFIED CHRONICITY: ICD-10-CM

## 2018-01-10 PROCEDURE — 99999 PR PBB SHADOW E&M-EST. PATIENT-LVL III: CPT | Mod: PBBFAC,,, | Performed by: INTERNAL MEDICINE

## 2018-01-10 PROCEDURE — 99214 OFFICE O/P EST MOD 30 MIN: CPT | Mod: S$GLB,,, | Performed by: INTERNAL MEDICINE

## 2018-01-10 RX ORDER — NAPROXEN SODIUM 220 MG/1
81 TABLET, FILM COATED ORAL DAILY
Refills: 0 | COMMUNITY
Start: 2018-01-10 | End: 2018-09-26

## 2018-01-10 RX ORDER — TADALAFIL 5 MG/1
5 TABLET ORAL DAILY PRN
Qty: 90 TABLET | Refills: 3 | Status: SHIPPED | OUTPATIENT
Start: 2018-01-10 | End: 2018-01-11

## 2018-01-10 NOTE — PROGRESS NOTES
"Subjective:    Patient ID:  Kulwant Mueller Jr. is a 32 y.o. male who presents for follow-up of Deep Vein Thrombosis      HPI  33 y/o male with hx of acute LLE fem-pop and below the knee DVT (extensive thrombus) 8/2016 s/p catheter directed thrombolysis with EKOS with significant improvement in symptoms on chronic anticoagulation on Xarelto, May Thurner's Syndrome s/p bilateral iliac vein stenting, DM on insulin. He initially presented with LLE pain and swelling and found to have acute LLE extensive DVT from fem to peroneal. 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 EKOS removed after 48 hours with significant improvement in edema and pain. Discharged home on Xarelto. 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 Repeat LE venous doppler without evidence of residual thrombus. Had intractable N/V for a few days after initial procedure for DVT which resolved and again had similar symptoms after last intervention. Seen by GI and started on Reglan with improvement and eventual resolution of symptoms. Had EGD which was normal.   Was recently seen by optometrist and specialist with evidence of "bleeding behind his eyes" which is being attributed to Xarelto. Denies CP, SOB/DASILVA, orthopnea, PND, syncope, palps, LE edema. Compliant with meds. Has been having HA's and checked BP recently which was 160's. Continues to complain of ED, however, has not started Cialis.     Review of Systems   Constitution: Negative for weakness and malaise/fatigue.   HENT: Negative for congestion.    Eyes: Negative for blurred vision.   Cardiovascular: Negative for chest pain, claudication, cyanosis, dyspnea on exertion, irregular heartbeat, leg swelling, near-syncope, orthopnea, palpitations, paroxysmal nocturnal dyspnea and syncope.   Respiratory: Negative for shortness of breath.    Endocrine: Negative for polyuria.   Hematologic/Lymphatic: " Negative for bleeding problem.   Skin: Negative for itching and rash.   Musculoskeletal: Negative for joint swelling, muscle cramps and muscle weakness.   Gastrointestinal: Negative for abdominal pain, hematemesis, hematochezia, melena, nausea and vomiting.   Genitourinary: Positive for decreased libido. Negative for dysuria and hematuria.   Neurological: Negative for dizziness, focal weakness, headaches, light-headedness and loss of balance.   Psychiatric/Behavioral: Negative for depression. The patient is not nervous/anxious.         Objective:    Physical Exam   Constitutional: He is oriented to person, place, and time. He appears well-developed and well-nourished.   HENT:   Head: Normocephalic and atraumatic.   Neck: Neck supple. No JVD present.   Cardiovascular: Normal rate, regular rhythm and normal heart sounds.    Pulses:       Carotid pulses are 2+ on the right side, and 2+ on the left side.       Radial pulses are 2+ on the right side, and 2+ on the left side.        Femoral pulses are 2+ on the right side, and 2+ on the left side.       Dorsalis pedis pulses are 2+ on the right side, and 2+ on the left side.        Posterior tibial pulses are 2+ on the right side, and 2+ on the left side.   Pulmonary/Chest: Effort normal and breath sounds normal.   Abdominal: Soft. Bowel sounds are normal.   Musculoskeletal: He exhibits no edema.   Neurological: He is alert and oriented to person, place, and time.   Skin: Skin is warm and dry.   Psychiatric: He has a normal mood and affect. His behavior is normal. Thought content normal.         Assessment:       1. May-Thurner syndrome    2. Deep vein thrombosis (DVT) of femoral vein of left lower extremity, unspecified chronicity    3. History of intravascular stent placement    4. Acute deep vein thrombosis (DVT) of proximal vein of left lower extremity    5. Non morbid obesity due to excess calories    6. Type 2 diabetes mellitus without complication, unspecified long  term insulin use status      31 y/o male with hx and presentation as above. Based on risk of continued hemorrhaging, will D/C Xarelto. Will start ASA 81 mg for now. No definite guidelines for anticoagulation in the setting of iliofemoral DVT provoked by May-Thurner's. Guidelines with recommendations 3-6-8 months for provoked DVT and currently 5 months on Xarelto, so I'm comfortable stopping Xarelto at this time and continuing with ASA therapy. Will obtain venous doppler every 6 months for now and will refer to Heme-Onc for hypercoagulable workup.        Plan:       -Stop Xarelto  -Start ASA 81 mg daily  -Refer to Heme-Onc  -Venous doppler in 1 month and then in 6 months  -f/u in 6 months

## 2018-01-11 ENCOUNTER — OFFICE VISIT (OUTPATIENT)
Dept: ENDOCRINOLOGY | Facility: CLINIC | Age: 33
End: 2018-01-11
Payer: COMMERCIAL

## 2018-01-11 VITALS
DIASTOLIC BLOOD PRESSURE: 100 MMHG | HEIGHT: 73 IN | HEART RATE: 72 BPM | WEIGHT: 257.69 LBS | BODY MASS INDEX: 34.15 KG/M2 | SYSTOLIC BLOOD PRESSURE: 137 MMHG

## 2018-01-11 DIAGNOSIS — Z79.4 TYPE 2 DIABETES MELLITUS WITH HYPERGLYCEMIA, WITH LONG-TERM CURRENT USE OF INSULIN: Primary | ICD-10-CM

## 2018-01-11 DIAGNOSIS — E11.65 TYPE 2 DIABETES MELLITUS WITH HYPERGLYCEMIA, WITH LONG-TERM CURRENT USE OF INSULIN: Primary | ICD-10-CM

## 2018-01-11 DIAGNOSIS — E66.9 OBESITY, UNSPECIFIED CLASSIFICATION, UNSPECIFIED OBESITY TYPE, UNSPECIFIED WHETHER SERIOUS COMORBIDITY PRESENT: ICD-10-CM

## 2018-01-11 LAB
CREAT UR-MCNC: 146 MG/DL
GLUCOSE SERPL-MCNC: 191 MG/DL (ref 70–110)
MICROALBUMIN UR DL<=1MG/L-MCNC: 11 UG/ML
MICROALBUMIN/CREATININE RATIO: 7.5 UG/MG

## 2018-01-11 PROCEDURE — 99204 OFFICE O/P NEW MOD 45 MIN: CPT | Mod: S$GLB,,, | Performed by: INTERNAL MEDICINE

## 2018-01-11 PROCEDURE — 99999 PR PBB SHADOW E&M-EST. PATIENT-LVL III: CPT | Mod: PBBFAC,,, | Performed by: INTERNAL MEDICINE

## 2018-01-11 PROCEDURE — 82948 REAGENT STRIP/BLOOD GLUCOSE: CPT | Mod: S$GLB,,, | Performed by: INTERNAL MEDICINE

## 2018-01-11 PROCEDURE — 82043 UR ALBUMIN QUANTITATIVE: CPT

## 2018-01-11 NOTE — PATIENT INSTRUCTIONS
Levemir 45 units daily    Humalog 20 with breakfast - 10 with lunch -10 with dinner    FS 3-4 times daily for next 2 weeks

## 2018-01-11 NOTE — PROGRESS NOTES
"Mr. Mueller is a 32 y.o. M with history of DM, DVT, LLE bypass, here for initial visit for DM.    Dm2 diagnosed in his late 20s when he was "found down" and in hospital sugars in 600s, initially on metformin but when it was insufficient was switched by his endocrinologist then to [employer] pump. Multiple people with DM in his family, all diagnosed when they were young.  He liked the pump but felt it was difficult for it to stay on due to the tubing and it fell into disuse.  He was then placed on levemir and humalog, but prior to his hospitalization for his DVT in Aug 2017 he was not using it - and his A1c was 13 on admission.    Since hospitalization was initially taking 60 units levemir daily but kept getting overnight hypoglycemia so now lowered himself down to 36 units levemir daily; he also takes humalog 30 units in AM regardless of if he eats breakfast or not, humalog 10 w lunch and dinner.  Breakfast is his most carb heavy meal of the day.     No log  Fasting FS 200s   Does not check FS later in the day    Optho exam in Dec 2017 per pt showed "bleeding."    Pt states he is "tired" of having diabetes with "all the sticking" for fingersticks and insulins, and wonders if he is able to simply his regimen, or go back on the pump.  He also wants to lose weight although admits he eats a very "southern" diet.         Component      Latest Ref Rng & Units 8/20/2017           4:40 AM   Cholesterol      120 - 199 mg/dL 140   Triglycerides      30 - 150 mg/dL 138   HDL      40 - 75 mg/dL 29 (L)   LDL Cholesterol      63.0 - 159.0 mg/dL 83.4   HDL/Chol Ratio      20.0 - 50.0 % 20.7   Total Cholesterol/HDL Ratio      2.0 - 5.0 4.8   Non-HDL Cholesterol      mg/dL 111   Hemoglobin A1C      4.0 - 5.6 % 13.2 (H)       Component      Latest Ref Rng & Units 9/12/2017 9/10/2017 9/9/2017 9/9/2017            11:34 AM  5:53 AM   Glucose      70 - 110 mg/dL 296 (H) 240 (H)       Component      Latest Ref Rng & Units 9/8/2017 9/8/2017 " "9/8/2017 9/8/2017           8:30 PM  7:37 PM  3:20 PM  6:50 AM   Glucose      70 - 110 mg/dL  284 (H)  243 (H)     Component      Latest Ref Rng & Units 9/8/2017           6:50 AM   Glucose      70 - 110 mg/dL 243 (H)     Past Medical History:   Diagnosis Date    Diabetes mellitus     Hypertension         reports that he has been smoking.  He has been smoking about 0.00 packs per day. He uses smokeless tobacco. He reports that he does not drink alcohol or use drugs.    Family History   Problem Relation Age of Onset    Cancer Mother     Cancer Paternal Uncle     Cancer Paternal Grandfather     Irritable bowel syndrome Paternal Grandfather     Cancer Maternal Grandfather     Colon cancer Neg Hx     Esophageal cancer Neg Hx     Rectal cancer Neg Hx     Stomach cancer Neg Hx     Ulcerative colitis Neg Hx     Crohn's disease Neg Hx     Celiac disease Neg Hx        Past Surgical History:   Procedure Laterality Date    APPENDECTOMY         Patient's Medications   New Prescriptions    No medications on file   Previous Medications    ASPIRIN 81 MG CHEW    Take 1 tablet (81 mg total) by mouth once daily.    BD ULTRA-FINE DIYA PEN NEEDLES 32 GAUGE X 5/32" NDLE        INSULIN DETEMIR (LEVEMIR FLEXPEN SUBQ)    Inject 60 Units into the skin once daily.     INSULIN LISPRO (HUMALOG) 100 UNIT/ML INJECTION    Inject into the skin 3 (three) times daily before meals.    METOCLOPRAMIDE HCL (REGLAN) 10 MG TABLET    Take 1 tablet (10 mg total) by mouth 4 (four) times daily as needed.    PROCHLORPERAZINE (COMPAZINE) 25 MG SUPPOSITORY    Place 1 suppository (25 mg total) rectally every 12 (twelve) hours as needed for Nausea.    TADALAFIL (CIALIS) 5 MG TABLET    Take 1 tablet (5 mg total) by mouth daily as needed for Erectile Dysfunction.   Modified Medications    No medications on file   Discontinued Medications    No medications on file         Review of Systems:  General: no fevers  Eyes: no vision changes  ENT: no sore " "throat  Lung: no sob  CV: no palpitations  GI: no nausea   : no dysuria   Endo: no heat intolerance  Heme: no easy bruising   MSK: no joint swelling        BP (!) 137/100 (BP Location: Right arm, Patient Position: Sitting, BP Method: Large (Manual))   Pulse 72   Ht 6' 1" (1.854 m)   Wt 116.9 kg (257 lb 11.5 oz)   BMI 34.00 kg/m²     Physical Exam:  General: obese body habitus, not in acute distress   Eyes: anicteric, no proptosis   ENT: no facial lesions, no oral lesions   Neck: no masses, no LAD   Lung; ctabl, no wheezing or stridor   GI: normal bowel sounds, nondistended   CV: RRR, no rubs or murmurs   Skin: exposed skin without bruising or bleeding   Ext: no peripheral edema or erythema, feet without lesions, +dec monofilament and vibrational sense bilaterally   Neuro: AOx3, moving all extremities, normal gait     Labs:    Chemistry        Component Value Date/Time     (L) 09/12/2017 0829    K 3.5 09/12/2017 0829    CL 97 09/12/2017 0829    CO2 24 09/12/2017 0829    BUN 12 09/12/2017 0829    CREATININE 1.0 09/12/2017 0829     (H) 09/12/2017 0829        Component Value Date/Time    CALCIUM 9.9 09/12/2017 0829    ALKPHOS 79 09/12/2017 0829    AST 14 09/12/2017 0829    ALT 11 09/12/2017 0829    BILITOT 1.0 09/12/2017 0829    ESTGFRAFRICA >60 09/12/2017 0829    EGFRNONAA >60 09/12/2017 0829          Lab Results   Component Value Date    WBC 16.31 (H) 09/12/2017    HGB 16.5 09/12/2017    HCT 47.8 09/12/2017    MCV 85 09/12/2017     09/12/2017        Lab Results   Component Value Date    HDL 29 (L) 08/20/2017    HDL 31 (L) 10/24/2016     Lab Results   Component Value Date    LDLCALC 83.4 08/20/2017    LDLCALC 121.2 10/24/2016     Lab Results   Component Value Date    TRIG 138 08/20/2017    TRIG 119 10/24/2016     Lab Results   Component Value Date    CHOLHDL 20.7 08/20/2017    CHOLHDL 17.6 (L) 10/24/2016       Hemoglobin A1C   Date Value Ref Range Status   08/20/2017 13.2 (H) 4.0 - 5.6 % Final "     Comment:     According to ADA guidelines, hemoglobin A1c <7.0% represents  optimal control in non-pregnant diabetic patients. Different  metrics may apply to specific patient populations.   Standards of Medical Care in Diabetes-2016.  For the purpose of screening for the presence of diabetes:  <5.7%     Consistent with the absence of diabetes  5.7-6.4%  Consistent with increasing risk for diabetes   (prediabetes)  >or=6.5%  Consistent with diabetes  Currently, no consensus exists for use of hemoglobin A1c  for diagnosis of diabetes for children.  This Hemoglobin A1c assay has significant interference with fetal   hemoglobin   (HbF). The results are invalid for patients with abnormal amounts of   HbF,   including those with known Hereditary Persistence   of Fetal Hemoglobin. Heterozygous hemoglobin variants (HbAS, HbAC,   HbAD, HbAE, HbA2) do not significantly interfere with this assay;   however, presence of multiple variants in a sample may impact the %   interference.     10/24/2016 11.3 (H) 4.5 - 6.2 % Final     Comment:     According to ADA guidelines, hemoglobin A1C <7.0% represents  optimal control in non-pregnant diabetic patients.  Different  metrics may apply to specific populations.   Standards of Medical Care in Diabetes - 2016.  For the purpose of screening for the presence of diabetes:  <5.7%     Consistent with the absence of diabetes  5.7-6.4%  Consistent with increasing risk for diabetes   (prediabetes)  >or=6.5%  Consistent with diabetes  Currently no consensus exists for use of hemoglobin A1C  for diagnosis of diabetes for children.         No results found for: TSH, B9LPJKB, H6HILBL, THYROIDAB      Assessment and Plan:    Mr. Mueller  with history of DM, DVT, LLE bypass, here for initial visit for Dm.    DM - given obesity and significant insulin resistance is most likely type 2, given strong family hx can also consider MAAME although his Dm/insulin resistance is quite severe for this  presentation; would also rule out MAIRELLE given young age at diagnosis  Obtain CMP, C peptide, GAD65 now  We also do not have good idea of his glycemic control - will rebalance out insulin with levemir 45 units daily, humalog 20-10-10 - and have pt check FS 3-4 times and obtain A1c  Has been to see DM education in the past   Follows optho regularly  There are several considerations we can explore if his labs are suggestive of DM type 2: SGLT2 inhibitors for weight loss (pt opposed to GLP1 agonists due to hearing about side effects), and possibly going back on insulin pump and/or obtaining CGM to decrease fingerstick/insulin injection burden  Will address glucagon kit    Also obtain TSH, lipids, cmp, microalbumin/Cr    RTC 2-3 weeks      Sunday Humphrey M.D.  Endocrinology

## 2018-01-19 ENCOUNTER — TELEPHONE (OUTPATIENT)
Dept: ENDOCRINOLOGY | Facility: HOSPITAL | Age: 33
End: 2018-01-19

## 2018-01-19 NOTE — TELEPHONE ENCOUNTER
Discussed w [employer] that pt may be available for pump upgrade, but not likely sensor/closed loop due to being DM type 2.  Could we fax over a copy of his insurance sheet to [employer] fax: [phone].      Dr. Kam Rider

## 2018-01-24 ENCOUNTER — PATIENT OUTREACH (OUTPATIENT)
Dept: DIABETES | Facility: CLINIC | Age: 33
End: 2018-01-24

## 2018-01-25 ENCOUNTER — TELEPHONE (OUTPATIENT)
Dept: ENDOCRINOLOGY | Facility: CLINIC | Age: 33
End: 2018-01-25

## 2018-01-25 NOTE — TELEPHONE ENCOUNTER
Insulin pump orders and detailed office visit notes and labs Faxed in to [employer] [facility] with certificate of medical necessity to [phone].

## 2018-02-07 ENCOUNTER — HOSPITAL ENCOUNTER (OUTPATIENT)
Dept: RADIOLOGY | Facility: HOSPITAL | Age: 33
Discharge: HOME | End: 2018-02-07
Attending: INTERNAL MEDICINE
Payer: COMMERCIAL

## 2018-02-07 DIAGNOSIS — I82.412 DEEP VEIN THROMBOSIS (DVT) OF FEMORAL VEIN OF LEFT LOWER EXTREMITY, UNSPECIFIED CHRONICITY: ICD-10-CM

## 2018-02-07 PROCEDURE — 93970 EXTREMITY STUDY: CPT | Mod: TC,PO

## 2018-02-28 ENCOUNTER — TELEPHONE (OUTPATIENT)
Dept: CARDIOLOGY | Facility: CLINIC | Age: 33
End: 2018-02-28

## 2018-02-28 NOTE — TELEPHONE ENCOUNTER
----- Message from Tyree Duff MD sent at 2/27/2018  4:56 PM CST -----  Please let Mr Mueller know that his leg ultrasound shows no evidence of clots  Thanks  FLO

## 2018-04-18 ENCOUNTER — TELEPHONE (OUTPATIENT)
Dept: DIABETES | Facility: CLINIC | Age: 33
End: 2018-04-18

## 2018-04-18 NOTE — TELEPHONE ENCOUNTER
Received order for Diabetes education - 2 hour pump eval.  Left message for pt to return call to schedule.  Contact information provided.

## 2018-08-22 ENCOUNTER — TELEPHONE (OUTPATIENT)
Dept: CARDIOLOGY | Facility: CLINIC | Age: 33
End: 2018-08-22

## 2018-08-22 DIAGNOSIS — Z95.828 HISTORY OF INTRAVASCULAR STENT PLACEMENT: ICD-10-CM

## 2018-08-22 DIAGNOSIS — I87.1 MAY-THURNER SYNDROME: Primary | ICD-10-CM

## 2018-08-22 NOTE — TELEPHONE ENCOUNTER
Contacted pt about scheduled US due to recent leg swelling. Will schedule for clinical vane shortly after.

## 2018-08-22 NOTE — TELEPHONE ENCOUNTER
----- Message from Fany Rivera sent at 8/22/2018  3:33 PM CDT -----  Contact: PT Portal Request  Appointment Request From: Kulwant Mueller Jr.    With Provider: Tyree Duff MD [[facility] - Cardiology]    Preferred Date Range: 8/22/2018 - 8/27/2018    Preferred Times: Any time    Reason for visit: Existing Patient    Comments:  Requesting a check up for occasional pains in legs, swelling. Can also have appt in [place].

## 2018-08-28 ENCOUNTER — TELEPHONE (OUTPATIENT)
Dept: CARDIOLOGY | Facility: CLINIC | Age: 33
End: 2018-08-28

## 2018-08-28 NOTE — TELEPHONE ENCOUNTER
Contacted pt to scheduled f/u w NP due to recent leg pain and swelling.     Pt was scheduled to have US prior to vane, but stated cost was too high and cancelled.

## 2018-08-31 ENCOUNTER — PATIENT MESSAGE (OUTPATIENT)
Dept: CARDIOLOGY | Facility: CLINIC | Age: 33
End: 2018-08-31

## 2018-08-31 ENCOUNTER — HOSPITAL ENCOUNTER (INPATIENT)
Facility: HOSPITAL | Age: 33
LOS: 6 days | Discharge: HOME | DRG: 299 | End: 2018-09-08
Attending: SURGERY | Admitting: INTERNAL MEDICINE
Payer: COMMERCIAL

## 2018-08-31 ENCOUNTER — TELEPHONE (OUTPATIENT)
Dept: CARDIOLOGY | Facility: CLINIC | Age: 33
End: 2018-08-31

## 2018-08-31 DIAGNOSIS — I82.412 DEEP VEIN THROMBOSIS (DVT) OF FEMORAL VEIN OF LEFT LOWER EXTREMITY, UNSPECIFIED CHRONICITY: Primary | ICD-10-CM

## 2018-08-31 DIAGNOSIS — E11.10 DKA (DIABETIC KETOACIDOSES): ICD-10-CM

## 2018-08-31 DIAGNOSIS — N48.9 DISORDER OF PENIS: ICD-10-CM

## 2018-08-31 DIAGNOSIS — Z79.4 TYPE 2 DIABETES MELLITUS WITH HYPERGLYCEMIA, WITH LONG-TERM CURRENT USE OF INSULIN: Chronic | ICD-10-CM

## 2018-08-31 DIAGNOSIS — I82.412: ICD-10-CM

## 2018-08-31 DIAGNOSIS — E11.9 TYPE 2 DIABETES MELLITUS WITHOUT COMPLICATION, WITH LONG-TERM CURRENT USE OF INSULIN: ICD-10-CM

## 2018-08-31 DIAGNOSIS — I82.409 DVT (DEEP VENOUS THROMBOSIS): ICD-10-CM

## 2018-08-31 DIAGNOSIS — I87.1 MAY-THURNER SYNDROME: Chronic | ICD-10-CM

## 2018-08-31 DIAGNOSIS — Z79.4 TYPE 2 DIABETES MELLITUS WITHOUT COMPLICATION, WITH LONG-TERM CURRENT USE OF INSULIN: ICD-10-CM

## 2018-08-31 DIAGNOSIS — E11.65 TYPE 2 DIABETES MELLITUS WITH HYPERGLYCEMIA, WITH LONG-TERM CURRENT USE OF INSULIN: Chronic | ICD-10-CM

## 2018-08-31 LAB
ALBUMIN SERPL BCP-MCNC: 4.9 G/DL
ALP SERPL-CCNC: 107 U/L
ALT SERPL W/O P-5'-P-CCNC: 24 U/L
ANION GAP SERPL CALC-SCNC: 15 MMOL/L
APTT BLDCRRT: 21.9 SEC
APTT BLDCRRT: 29.6 SEC
AST SERPL-CCNC: 25 U/L
BASOPHILS # BLD AUTO: 0.02 K/UL
BASOPHILS NFR BLD: 0.1 %
BILIRUB SERPL-MCNC: 0.5 MG/DL
BUN SERPL-MCNC: 17 MG/DL
CALCIUM SERPL-MCNC: 10 MG/DL
CHLORIDE SERPL-SCNC: 99 MMOL/L
CO2 SERPL-SCNC: 25 MMOL/L
CREAT SERPL-MCNC: 0.96 MG/DL
DIFFERENTIAL METHOD: ABNORMAL
EOSINOPHIL # BLD AUTO: 0 K/UL
EOSINOPHIL NFR BLD: 0.1 %
ERYTHROCYTE [DISTWIDTH] IN BLOOD BY AUTOMATED COUNT: 13 %
EST. GFR  (AFRICAN AMERICAN): >60 ML/MIN/1.73 M^2
EST. GFR  (NON AFRICAN AMERICAN): >60 ML/MIN/1.73 M^2
ESTIMATED AVG GLUCOSE: 318 MG/DL
GLUCOSE SERPL-MCNC: 236 MG/DL
HBA1C MFR BLD HPLC: 12.7 %
HCT VFR BLD AUTO: 51 %
HGB BLD-MCNC: 17 G/DL
INR PPP: 1
INR PPP: 1
LYMPHOCYTES # BLD AUTO: 1.6 K/UL
LYMPHOCYTES NFR BLD: 8.4 %
MCH RBC QN AUTO: 28.9 PG
MCHC RBC AUTO-ENTMCNC: 33.3 G/DL
MCV RBC AUTO: 87 FL
MONOCYTES # BLD AUTO: 0.9 K/UL
MONOCYTES NFR BLD: 4.9 %
NEUTROPHILS # BLD AUTO: 15.9 K/UL
NEUTROPHILS NFR BLD: 86.2 %
PLATELET # BLD AUTO: 246 K/UL
PMV BLD AUTO: 10.9 FL
POCT GLUCOSE: 217 MG/DL (ref 70–110)
POCT GLUCOSE: 333 MG/DL (ref 70–110)
POTASSIUM SERPL-SCNC: 4.1 MMOL/L
PROT SERPL-MCNC: 9.6 G/DL
PROTHROMBIN TIME: 11.3 SEC
PROTHROMBIN TIME: 11.3 SEC
RBC # BLD AUTO: 5.88 M/UL
SODIUM SERPL-SCNC: 139 MMOL/L
WBC # BLD AUTO: 18.41 K/UL

## 2018-08-31 PROCEDURE — 96367 TX/PROPH/DG ADDL SEQ IV INF: CPT

## 2018-08-31 PROCEDURE — 99285 EMERGENCY DEPT VISIT HI MDM: CPT | Mod: 25

## 2018-08-31 PROCEDURE — 25000003 PHARM REV CODE 250: Performed by: SURGERY

## 2018-08-31 PROCEDURE — 96375 TX/PRO/DX INJ NEW DRUG ADDON: CPT

## 2018-08-31 PROCEDURE — 80053 COMPREHEN METABOLIC PANEL: CPT

## 2018-08-31 PROCEDURE — 85610 PROTHROMBIN TIME: CPT

## 2018-08-31 PROCEDURE — 85730 THROMBOPLASTIN TIME PARTIAL: CPT | Mod: 91

## 2018-08-31 PROCEDURE — 63600175 PHARM REV CODE 636 W HCPCS: Performed by: INTERNAL MEDICINE

## 2018-08-31 PROCEDURE — 85025 COMPLETE CBC W/AUTO DIFF WBC: CPT

## 2018-08-31 PROCEDURE — 85730 THROMBOPLASTIN TIME PARTIAL: CPT

## 2018-08-31 PROCEDURE — G0378 HOSPITAL OBSERVATION PER HR: HCPCS

## 2018-08-31 PROCEDURE — 83036 HEMOGLOBIN GLYCOSYLATED A1C: CPT

## 2018-08-31 PROCEDURE — 63600175 PHARM REV CODE 636 W HCPCS: Performed by: SURGERY

## 2018-08-31 PROCEDURE — 36415 COLL VENOUS BLD VENIPUNCTURE: CPT

## 2018-08-31 PROCEDURE — 82962 GLUCOSE BLOOD TEST: CPT

## 2018-08-31 PROCEDURE — 96365 THER/PROPH/DIAG IV INF INIT: CPT

## 2018-08-31 RX ORDER — GLUCAGON 1 MG
1 KIT INJECTION
Status: DISCONTINUED | OUTPATIENT
Start: 2018-08-31 | End: 2018-09-08 | Stop reason: HOSPADM

## 2018-08-31 RX ORDER — OXYCODONE AND ACETAMINOPHEN 5; 325 MG/1; MG/1
2 TABLET ORAL
Status: COMPLETED | OUTPATIENT
Start: 2018-08-31 | End: 2018-08-31

## 2018-08-31 RX ORDER — HEPARIN SODIUM,PORCINE/D5W 25000/250
12 INTRAVENOUS SOLUTION INTRAVENOUS CONTINUOUS
Status: DISCONTINUED | OUTPATIENT
Start: 2018-08-31 | End: 2018-08-31

## 2018-08-31 RX ORDER — HEPARIN SODIUM 10000 [USP'U]/100ML
18 INJECTION, SOLUTION INTRAVENOUS
Status: DISCONTINUED | OUTPATIENT
Start: 2018-08-31 | End: 2018-09-01

## 2018-08-31 RX ORDER — MORPHINE SULFATE 4 MG/ML
2 INJECTION, SOLUTION INTRAMUSCULAR; INTRAVENOUS EVERY 4 HOURS PRN
Status: DISCONTINUED | OUTPATIENT
Start: 2018-08-31 | End: 2018-09-08 | Stop reason: HOSPADM

## 2018-08-31 RX ORDER — HEPARIN SODIUM 5000 [USP'U]/ML
5000 INJECTION, SOLUTION INTRAVENOUS; SUBCUTANEOUS
Status: COMPLETED | OUTPATIENT
Start: 2018-08-31 | End: 2018-08-31

## 2018-08-31 RX ORDER — HEPARIN SODIUM,PORCINE/D5W 25000/250
12 INTRAVENOUS SOLUTION INTRAVENOUS CONTINUOUS
Status: DISCONTINUED | OUTPATIENT
Start: 2018-08-31 | End: 2018-09-01

## 2018-08-31 RX ORDER — IBUPROFEN 200 MG
24 TABLET ORAL
Status: DISCONTINUED | OUTPATIENT
Start: 2018-08-31 | End: 2018-09-08 | Stop reason: HOSPADM

## 2018-08-31 RX ORDER — METOCLOPRAMIDE HYDROCHLORIDE 5 MG/ML
10 INJECTION INTRAMUSCULAR; INTRAVENOUS
Status: COMPLETED | OUTPATIENT
Start: 2018-08-31 | End: 2018-08-31

## 2018-08-31 RX ORDER — ONDANSETRON 2 MG/ML
4 INJECTION INTRAMUSCULAR; INTRAVENOUS EVERY 6 HOURS PRN
Status: DISCONTINUED | OUTPATIENT
Start: 2018-08-31 | End: 2018-09-07

## 2018-08-31 RX ORDER — ONDANSETRON 2 MG/ML
8 INJECTION INTRAMUSCULAR; INTRAVENOUS
Status: DISCONTINUED | OUTPATIENT
Start: 2018-08-31 | End: 2018-08-31

## 2018-08-31 RX ORDER — OXYCODONE AND ACETAMINOPHEN 10; 325 MG/1; MG/1
1 TABLET ORAL EVERY 6 HOURS PRN
Status: DISCONTINUED | OUTPATIENT
Start: 2018-08-31 | End: 2018-09-08 | Stop reason: HOSPADM

## 2018-08-31 RX ORDER — IBUPROFEN 200 MG
16 TABLET ORAL
Status: DISCONTINUED | OUTPATIENT
Start: 2018-08-31 | End: 2018-09-08 | Stop reason: HOSPADM

## 2018-08-31 RX ORDER — INSULIN ASPART 100 [IU]/ML
0-5 INJECTION, SOLUTION INTRAVENOUS; SUBCUTANEOUS
Status: DISCONTINUED | OUTPATIENT
Start: 2018-08-31 | End: 2018-09-01

## 2018-08-31 RX ORDER — ONDANSETRON 4 MG/1
8 TABLET, ORALLY DISINTEGRATING ORAL
Status: COMPLETED | OUTPATIENT
Start: 2018-08-31 | End: 2018-08-31

## 2018-08-31 RX ADMIN — HEPARIN SODIUM AND DEXTROSE 14 UNITS/KG/HR: 10000; 5 INJECTION INTRAVENOUS at 11:08

## 2018-08-31 RX ADMIN — HEPARIN SODIUM 5000 UNITS: 5000 INJECTION, SOLUTION INTRAVENOUS; SUBCUTANEOUS at 02:08

## 2018-08-31 RX ADMIN — ONDANSETRON 4 MG: 2 INJECTION INTRAMUSCULAR; INTRAVENOUS at 09:08

## 2018-08-31 RX ADMIN — METOCLOPRAMIDE 10 MG: 5 INJECTION, SOLUTION INTRAMUSCULAR; INTRAVENOUS at 02:08

## 2018-08-31 RX ADMIN — HEPARIN SODIUM AND DEXTROSE 12 UNITS/KG/HR: 10000; 5 INJECTION INTRAVENOUS at 02:08

## 2018-08-31 RX ADMIN — ONDANSETRON 8 MG: 4 TABLET, ORALLY DISINTEGRATING ORAL at 12:08

## 2018-08-31 RX ADMIN — MORPHINE SULFATE 2 MG: 4 INJECTION INTRAVENOUS at 09:08

## 2018-08-31 RX ADMIN — SODIUM CHLORIDE 1000 ML: 0.9 INJECTION, SOLUTION INTRAVENOUS at 02:08

## 2018-08-31 RX ADMIN — HEPARIN SODIUM AND DEXTROSE 12 UNITS/KG/HR: 10000; 5 INJECTION INTRAVENOUS at 11:08

## 2018-08-31 RX ADMIN — OXYCODONE HYDROCHLORIDE AND ACETAMINOPHEN 2 TABLET: 5; 325 TABLET ORAL at 01:08

## 2018-08-31 NOTE — TELEPHONE ENCOUNTER
----- Message from Rajani Elmore sent at 8/31/2018 12:47 PM CDT -----  Contact: [phone]/Dr Boyle with [facility]  Dr Boyle its requesting to speak with Dr Duff . Please advise

## 2018-08-31 NOTE — ED TRIAGE NOTES
"Pt reports leg pain and "stiffness" that started last night. Pt denies SOB or chest pain. Pt reports he has stents in both his legs  "

## 2018-08-31 NOTE — ED PROVIDER NOTES
"Encounter Date: 8/31/2018       History     Chief Complaint   Patient presents with    Leg Pain     Pt reports leg pain and "stiffness" that started last night. Pt denies SOB or chest pain. Pt reports he has stents in both his legs     Patient started with leg pain in his left leg and calf last night and this morning he woke up with his left eg swollen  he has had a previous femoral vein  thrombosis with thrombolysis and had a iliac vein stent for May -Thurner placed 8/31/17  He had normal ultrasound jan 2018   he is on aspirin now  he has no chest pain or pulmonary complaint.  He is diabetic and slightly nauseated from not eating      The history is provided by the patient.   Leg Pain    There was no injury mechanism. The incident occurred yesterday. The pain location is generalized. The quality of the pain is described as aching. The pain is at a severity of 4/10. The pain has been constant since onset. Pertinent negatives include no numbness, no inability to bear weight and no loss of motion. Nothing aggravates the symptoms.     Review of patient's allergies indicates:   Allergen Reactions    Phenergan [promethazine] Nausea And Vomiting     Past Medical History:   Diagnosis Date    Diabetes mellitus     Hypertension      Past Surgical History:   Procedure Laterality Date    APPENDECTOMY      stents in bilateral legs       Family History   Problem Relation Age of Onset    Cancer Mother     Cancer Paternal Uncle     Cancer Paternal Grandfather     Irritable bowel syndrome Paternal Grandfather     Cancer Maternal Grandfather     Colon cancer Neg Hx     Esophageal cancer Neg Hx     Rectal cancer Neg Hx     Stomach cancer Neg Hx     Ulcerative colitis Neg Hx     Crohn's disease Neg Hx     Celiac disease Neg Hx      Social History     Tobacco Use    Smoking status: Current Some Day Smoker     Packs/day: 0.00    Smokeless tobacco: Current User    Tobacco comment: occasionally    Substance Use Topics "    Alcohol use: Yes     Comment: occ    Drug use: No     Review of Systems   Constitutional: Negative.    HENT: Negative.    Eyes: Negative.    Respiratory: Negative.  Negative for stridor.    Cardiovascular: Positive for leg swelling.   Gastrointestinal: Negative.    Endocrine: Negative.    Genitourinary: Negative.    Musculoskeletal: Positive for myalgias.   Skin: Negative.    Allergic/Immunologic: Negative.    Neurological: Negative.  Negative for numbness.   Hematological: Negative.    Psychiatric/Behavioral: Negative.        Physical Exam     Initial Vitals [08/31/18 1102]   BP Pulse Resp Temp SpO2   129/82 (!) 123 18 98.2 °F (36.8 °C) 99 %      MAP       --         Physical Exam    Nursing note and vitals reviewed.  Constitutional: He appears well-developed and well-nourished.   HENT:   Head: Normocephalic.   Eyes: Conjunctivae are normal.   Neck: Normal range of motion.   Cardiovascular: Normal rate, regular rhythm, normal heart sounds and intact distal pulses.   Left leg edema from the proximal thigh to the ankle left calf compartment is tight but no compartment syndrome.  Neurovascular check of left lower extremity intact left foot is warm with pedal pulses   Pulmonary/Chest: Breath sounds normal.   Abdominal: Soft.   Musculoskeletal: Normal range of motion.   Neurological: He is alert and oriented to person, place, and time. He has normal strength.   Skin: Skin is warm and dry.   Psychiatric: He has a normal mood and affect.         ED Course   Procedures  Labs Reviewed   CBC W/ AUTO DIFFERENTIAL - Abnormal; Notable for the following components:       Result Value    WBC 18.41 (*)     Gran # (ANC) 15.9 (*)     Gran% 86.2 (*)     Lymph% 8.4 (*)     All other components within normal limits    Narrative:     (if patient is on warfarin prior to heparin therapy)   POCT GLUCOSE - Abnormal; Notable for the following components:    POCT Glucose 217 (*)     All other components within normal limits    COMPREHENSIVE METABOLIC PANEL   PROTIME-INR   APTT   PROTIME-INR   POCT GLUCOSE MONITORING CONTINUOUS          Imaging Results          US Lower Extremity Veins Bilateral (Final result)  Result time 08/31/18 12:35:57    Final result by MOMO Cevallos Sr., MD (08/31/18 12:35:57)                 Impression:      1. There is occlusive thrombosis in the left common femoral, superficial femoral, popliteal, and posterior tibial veins.  This is consistent with the patient's history.  2. There is no deep venous thrombosis in the right lower extremity.      Electronically signed by: Rian Cevallos MD  Date:    08/31/2018  Time:    12:35             Narrative:    EXAMINATION:  US LOWER EXTREMITY VEINS BILATERAL    CLINICAL HISTORY:  Acute embolism and thrombosis of unspecified deep veins of unspecified lower extremity    TECHNIQUE:  Multiple static images are submitted for interpretation with color flow and spectral Doppler imaging.    COMPARISON:  02/07/2018    FINDINGS:  The following pertains to the right lower extremity.  The veins are normal in appearance and have normal compressibility. There are normal venous waveforms seen with augmentation during the compression of the veins. The right common femoral vein has a velocity of 10 cm/sec.    The following pertains to the left lower extremity.  There is occlusive thrombosis in the common femoral, superficial femoral, popliteal, and posterior tibial veins.                                 Medical Decision Making:   Initial Assessment:   Deep venous thrombosis left leg  ED Management:  Due to extensive nature  of his thrombosis and his previous history of thrombolysis and stent placement the cardiologist on-call recommended admission and IV heparin                      Clinical Impression:   The primary encounter diagnosis was Deep vein thrombosis (DVT) of femoral vein of left lower extremity, unspecified chronicity. A diagnosis of DVT (deep venous thrombosis) was also  pertinent to this visit.      Disposition:   Disposition: Admitted  Condition: Stable                        MAURI Boyle III, MD  08/31/18 1331       MAURI Boyle III, MD  08/31/18 1333       MAURI Boyle III, MD  08/31/18 [id number]

## 2018-08-31 NOTE — TELEPHONE ENCOUNTER
Contacted Dr. Boyle's office. They were advised that Dr. Duff is out on vacation but can take a message for the NP's that are covering his messages.    Dr. Boyle would like a call back to discuss pt DVT.  [phone]

## 2018-08-31 NOTE — TELEPHONE ENCOUNTER
----- Message from aCrlos Alberto Ruby NP sent at 8/31/2018 10:56 AM CDT -----  He should go to the ED

## 2018-09-01 PROBLEM — E11.10 DIABETIC KETOACIDOSIS WITHOUT COMA ASSOCIATED WITH TYPE 2 DIABETES MELLITUS: Status: ACTIVE | Noted: 2018-09-01

## 2018-09-01 LAB
ALBUMIN SERPL BCP-MCNC: 4 G/DL
ALBUMIN SERPL BCP-MCNC: 4.2 G/DL
ALLENS TEST: ABNORMAL
ALP SERPL-CCNC: 105 U/L
ALP SERPL-CCNC: 99 U/L
ALT SERPL W/O P-5'-P-CCNC: 17 U/L
ALT SERPL W/O P-5'-P-CCNC: 17 U/L
ANION GAP SERPL CALC-SCNC: 16 MMOL/L
ANION GAP SERPL CALC-SCNC: 16 MMOL/L
ANION GAP SERPL CALC-SCNC: 17 MMOL/L
APTT BLDCRRT: 23.1 SEC
APTT BLDCRRT: 30.6 SEC
AST SERPL-CCNC: 12 U/L
AST SERPL-CCNC: 13 U/L
B-OH-BUTYR BLD STRIP-SCNC: 1 MMOL/L
BACTERIA #/AREA URNS HPF: ABNORMAL /HPF
BASOPHILS # BLD AUTO: 0.01 K/UL
BASOPHILS # BLD AUTO: 0.01 K/UL
BASOPHILS NFR BLD: 0.1 %
BASOPHILS NFR BLD: 0.1 %
BILIRUB SERPL-MCNC: 0.6 MG/DL
BILIRUB SERPL-MCNC: 0.6 MG/DL
BILIRUB UR QL STRIP: NEGATIVE
BUN SERPL-MCNC: 21 MG/DL
BUN SERPL-MCNC: 22 MG/DL
BUN SERPL-MCNC: 23 MG/DL
CALCIUM SERPL-MCNC: 10 MG/DL
CALCIUM SERPL-MCNC: 10.1 MG/DL
CALCIUM SERPL-MCNC: 10.1 MG/DL
CHLORIDE SERPL-SCNC: 100 MMOL/L
CHLORIDE SERPL-SCNC: 101 MMOL/L
CHLORIDE SERPL-SCNC: 99 MMOL/L
CLARITY UR: CLEAR
CO2 SERPL-SCNC: 18 MMOL/L
CO2 SERPL-SCNC: 18 MMOL/L
CO2 SERPL-SCNC: 21 MMOL/L
COLOR UR: YELLOW
CREAT SERPL-MCNC: 1.4 MG/DL
CREAT SERPL-MCNC: 1.5 MG/DL
CREAT SERPL-MCNC: 1.5 MG/DL
DELSYS: ABNORMAL
DIFFERENTIAL METHOD: ABNORMAL
DIFFERENTIAL METHOD: ABNORMAL
EOSINOPHIL # BLD AUTO: 0 K/UL
EOSINOPHIL # BLD AUTO: 0 K/UL
EOSINOPHIL NFR BLD: 0 %
EOSINOPHIL NFR BLD: 0 %
ERYTHROCYTE [DISTWIDTH] IN BLOOD BY AUTOMATED COUNT: 12.8 %
ERYTHROCYTE [DISTWIDTH] IN BLOOD BY AUTOMATED COUNT: 12.9 %
EST. GFR  (AFRICAN AMERICAN): >60 ML/MIN/1.73 M^2
EST. GFR  (NON AFRICAN AMERICAN): 59 ML/MIN/1.73 M^2
EST. GFR  (NON AFRICAN AMERICAN): 59 ML/MIN/1.73 M^2
EST. GFR  (NON AFRICAN AMERICAN): >60 ML/MIN/1.73 M^2
FERRITIN SERPL-MCNC: 792 NG/ML
GLUCOSE SERPL-MCNC: 425 MG/DL
GLUCOSE SERPL-MCNC: 434 MG/DL
GLUCOSE SERPL-MCNC: 435 MG/DL
GLUCOSE UR QL STRIP: ABNORMAL
HCO3 UR-SCNC: 22.4 MMOL/L (ref 24–28)
HCT VFR BLD AUTO: 49.7 %
HCT VFR BLD AUTO: 50.6 %
HGB BLD-MCNC: 16.7 G/DL
HGB BLD-MCNC: 16.8 G/DL
HGB BLD-MCNC: 18.1 G/DL
HGB UR QL STRIP: ABNORMAL
INR PPP: 1
KETONES UR QL STRIP: ABNORMAL
LACTATE SERPL-SCNC: 3.9 MMOL/L
LEUKOCYTE ESTERASE UR QL STRIP: NEGATIVE
LYMPHOCYTES # BLD AUTO: 0.9 K/UL
LYMPHOCYTES # BLD AUTO: 2.6 K/UL
LYMPHOCYTES NFR BLD: 15.8 %
LYMPHOCYTES NFR BLD: 4.5 %
MCH RBC QN AUTO: 29.3 PG
MCH RBC QN AUTO: 29.6 PG
MCHC RBC AUTO-ENTMCNC: 33.2 G/DL
MCHC RBC AUTO-ENTMCNC: 33.6 G/DL
MCV RBC AUTO: 88 FL
MCV RBC AUTO: 88 FL
MICROSCOPIC COMMENT: ABNORMAL
MONOCYTES # BLD AUTO: 0.6 K/UL
MONOCYTES # BLD AUTO: 1.1 K/UL
MONOCYTES NFR BLD: 3.1 %
MONOCYTES NFR BLD: 6.7 %
NEUTROPHILS # BLD AUTO: 12.5 K/UL
NEUTROPHILS # BLD AUTO: 17.3 K/UL
NEUTROPHILS NFR BLD: 77.2 %
NEUTROPHILS NFR BLD: 92 %
NITRITE UR QL STRIP: NEGATIVE
OSMOLALITY SERPL: 311 MOSM/KG
PCO2 BLDA: 42.4 MMHG (ref 35–45)
PH SMN: 7.33 [PH] (ref 7.35–7.45)
PH UR STRIP: 6 [PH] (ref 5–8)
PLATELET # BLD AUTO: 210 K/UL
PLATELET # BLD AUTO: 211 K/UL
PMV BLD AUTO: 10.9 FL
PMV BLD AUTO: 11.1 FL
PO2 BLDA: 42 MMHG (ref 40–60)
POC BE: -4 MMOL/L
POC SATURATED O2: 74 % (ref 95–100)
POC TCO2: 24 MMOL/L (ref 24–29)
POCT GLUCOSE: 330 MG/DL (ref 70–110)
POCT GLUCOSE: 333 MG/DL (ref 70–110)
POCT GLUCOSE: 338 MG/DL (ref 70–110)
POCT GLUCOSE: 352 MG/DL (ref 70–110)
POCT GLUCOSE: 391 MG/DL (ref 70–110)
POCT GLUCOSE: 402 MG/DL (ref 70–110)
POCT GLUCOSE: 417 MG/DL (ref 70–110)
POTASSIUM SERPL-SCNC: 4.5 MMOL/L
POTASSIUM SERPL-SCNC: 4.6 MMOL/L
POTASSIUM SERPL-SCNC: 4.7 MMOL/L
PROCALCITONIN SERPL IA-MCNC: 0.17 NG/ML
PROT SERPL-MCNC: 8.7 G/DL
PROT SERPL-MCNC: 8.9 G/DL
PROT UR QL STRIP: ABNORMAL
PROTHROMBIN TIME: 10.7 SEC
RBC # BLD AUTO: 5.64 M/UL
RBC # BLD AUTO: 5.73 M/UL
RBC #/AREA URNS HPF: 1 /HPF (ref 0–4)
SAMPLE: ABNORMAL
SITE: ABNORMAL
SODIUM SERPL-SCNC: 134 MMOL/L
SODIUM SERPL-SCNC: 136 MMOL/L
SODIUM SERPL-SCNC: 136 MMOL/L
SP GR UR STRIP: 1.02 (ref 1–1.03)
SQUAMOUS #/AREA URNS HPF: 2 /HPF
URN SPEC COLLECT METH UR: ABNORMAL
UROBILINOGEN UR STRIP-ACNC: NEGATIVE EU/DL
WBC # BLD AUTO: 16.22 K/UL
WBC # BLD AUTO: 18.84 K/UL
WBC #/AREA URNS HPF: 8 /HPF (ref 0–5)
WBC CLUMPS URNS QL MICRO: ABNORMAL
YEAST URNS QL MICRO: ABNORMAL

## 2018-09-01 PROCEDURE — 25000003 PHARM REV CODE 250: Performed by: HOSPITALIST

## 2018-09-01 PROCEDURE — 25000003 PHARM REV CODE 250: Performed by: PHYSICIAN ASSISTANT

## 2018-09-01 PROCEDURE — 82728 ASSAY OF FERRITIN: CPT

## 2018-09-01 PROCEDURE — C9113 INJ PANTOPRAZOLE SODIUM, VIA: HCPCS | Performed by: PHYSICIAN ASSISTANT

## 2018-09-01 PROCEDURE — 85610 PROTHROMBIN TIME: CPT

## 2018-09-01 PROCEDURE — 81000 URINALYSIS NONAUTO W/SCOPE: CPT

## 2018-09-01 PROCEDURE — 99223 1ST HOSP IP/OBS HIGH 75: CPT | Mod: ,,, | Performed by: INTERNAL MEDICINE

## 2018-09-01 PROCEDURE — 99900035 HC TECH TIME PER 15 MIN (STAT)

## 2018-09-01 PROCEDURE — 85018 HEMOGLOBIN: CPT

## 2018-09-01 PROCEDURE — 63600175 PHARM REV CODE 636 W HCPCS: Performed by: HOSPITALIST

## 2018-09-01 PROCEDURE — 84145 PROCALCITONIN (PCT): CPT

## 2018-09-01 PROCEDURE — 85730 THROMBOPLASTIN TIME PARTIAL: CPT | Mod: 91

## 2018-09-01 PROCEDURE — 87040 BLOOD CULTURE FOR BACTERIA: CPT

## 2018-09-01 PROCEDURE — 82803 BLOOD GASES ANY COMBINATION: CPT

## 2018-09-01 PROCEDURE — 94761 N-INVAS EAR/PLS OXIMETRY MLT: CPT

## 2018-09-01 PROCEDURE — 63600175 PHARM REV CODE 636 W HCPCS: Performed by: PHYSICIAN ASSISTANT

## 2018-09-01 PROCEDURE — 83605 ASSAY OF LACTIC ACID: CPT

## 2018-09-01 PROCEDURE — G0378 HOSPITAL OBSERVATION PER HR: HCPCS

## 2018-09-01 PROCEDURE — 63600175 PHARM REV CODE 636 W HCPCS: Performed by: INTERNAL MEDICINE

## 2018-09-01 PROCEDURE — 36415 COLL VENOUS BLD VENIPUNCTURE: CPT

## 2018-09-01 PROCEDURE — 25000003 PHARM REV CODE 250: Performed by: INTERNAL MEDICINE

## 2018-09-01 PROCEDURE — 82010 KETONE BODYS QUAN: CPT

## 2018-09-01 PROCEDURE — 83930 ASSAY OF BLOOD OSMOLALITY: CPT

## 2018-09-01 PROCEDURE — 85025 COMPLETE CBC W/AUTO DIFF WBC: CPT

## 2018-09-01 PROCEDURE — 80048 BASIC METABOLIC PNL TOTAL CA: CPT

## 2018-09-01 PROCEDURE — 80053 COMPREHEN METABOLIC PANEL: CPT | Mod: 91

## 2018-09-01 PROCEDURE — 80053 COMPREHEN METABOLIC PANEL: CPT

## 2018-09-01 RX ORDER — INSULIN ASPART 100 [IU]/ML
7 INJECTION, SOLUTION INTRAVENOUS; SUBCUTANEOUS
Status: DISCONTINUED | OUTPATIENT
Start: 2018-09-01 | End: 2018-09-02

## 2018-09-01 RX ORDER — INSULIN ASPART 100 [IU]/ML
1-10 INJECTION, SOLUTION INTRAVENOUS; SUBCUTANEOUS
Status: DISCONTINUED | OUTPATIENT
Start: 2018-09-01 | End: 2018-09-08 | Stop reason: HOSPADM

## 2018-09-01 RX ORDER — SODIUM CHLORIDE 9 MG/ML
INJECTION, SOLUTION INTRAVENOUS CONTINUOUS
Status: ACTIVE | OUTPATIENT
Start: 2018-09-01 | End: 2018-09-02

## 2018-09-01 RX ORDER — SODIUM CHLORIDE 9 MG/ML
INJECTION, SOLUTION INTRAVENOUS CONTINUOUS
Status: DISCONTINUED | OUTPATIENT
Start: 2018-09-01 | End: 2018-09-01

## 2018-09-01 RX ORDER — INSULIN ASPART 100 [IU]/ML
0-5 INJECTION, SOLUTION INTRAVENOUS; SUBCUTANEOUS
Status: DISCONTINUED | OUTPATIENT
Start: 2018-09-01 | End: 2018-09-01

## 2018-09-01 RX ORDER — MORPHINE SULFATE 4 MG/ML
1 INJECTION, SOLUTION INTRAMUSCULAR; INTRAVENOUS ONCE
Status: COMPLETED | OUTPATIENT
Start: 2018-09-01 | End: 2018-09-01

## 2018-09-01 RX ORDER — ENOXAPARIN SODIUM 150 MG/ML
1 INJECTION SUBCUTANEOUS
Status: DISCONTINUED | OUTPATIENT
Start: 2018-09-01 | End: 2018-09-04

## 2018-09-01 RX ADMIN — PIPERACILLIN AND TAZOBACTAM 4.5 G: 4; .5 INJECTION, POWDER, LYOPHILIZED, FOR SOLUTION INTRAVENOUS; PARENTERAL at 05:09

## 2018-09-01 RX ADMIN — ONDANSETRON 4 MG: 2 INJECTION INTRAMUSCULAR; INTRAVENOUS at 11:09

## 2018-09-01 RX ADMIN — ENOXAPARIN SODIUM 120 MG: 150 INJECTION SUBCUTANEOUS at 05:09

## 2018-09-01 RX ADMIN — INSULIN ASPART 5 UNITS: 100 INJECTION, SOLUTION INTRAVENOUS; SUBCUTANEOUS at 12:09

## 2018-09-01 RX ADMIN — SODIUM CHLORIDE: 0.9 INJECTION, SOLUTION INTRAVENOUS at 12:09

## 2018-09-01 RX ADMIN — INSULIN ASPART 10 UNITS: 100 INJECTION, SOLUTION INTRAVENOUS; SUBCUTANEOUS at 05:09

## 2018-09-01 RX ADMIN — INSULIN ASPART 4 UNITS: 100 INJECTION, SOLUTION INTRAVENOUS; SUBCUTANEOUS at 08:09

## 2018-09-01 RX ADMIN — SODIUM CHLORIDE 500 ML: 0.9 INJECTION, SOLUTION INTRAVENOUS at 08:09

## 2018-09-01 RX ADMIN — MORPHINE SULFATE 2 MG: 4 INJECTION INTRAVENOUS at 11:09

## 2018-09-01 RX ADMIN — INSULIN ASPART 2 UNITS: 100 INJECTION, SOLUTION INTRAVENOUS; SUBCUTANEOUS at 01:09

## 2018-09-01 RX ADMIN — SODIUM CHLORIDE: 0.9 INJECTION, SOLUTION INTRAVENOUS at 09:09

## 2018-09-01 RX ADMIN — MORPHINE SULFATE 2 MG: 4 INJECTION INTRAVENOUS at 05:09

## 2018-09-01 RX ADMIN — MORPHINE SULFATE 1 MG: 4 INJECTION INTRAVENOUS at 12:09

## 2018-09-01 RX ADMIN — INSULIN HUMAN 10 UNITS: 100 INJECTION, SOLUTION PARENTERAL at 07:09

## 2018-09-01 RX ADMIN — INSULIN DETEMIR 30 UNITS: 100 INJECTION, SOLUTION SUBCUTANEOUS at 08:09

## 2018-09-01 RX ADMIN — MORPHINE SULFATE 2 MG: 4 INJECTION INTRAVENOUS at 02:09

## 2018-09-01 RX ADMIN — ONDANSETRON 4 MG: 2 INJECTION INTRAMUSCULAR; INTRAVENOUS at 02:09

## 2018-09-01 RX ADMIN — SODIUM CHLORIDE 1000 ML: 0.9 INJECTION, SOLUTION INTRAVENOUS at 05:09

## 2018-09-01 RX ADMIN — MORPHINE SULFATE 1 MG: 4 INJECTION INTRAVENOUS at 01:09

## 2018-09-01 RX ADMIN — INSULIN HUMAN 10 UNITS: 100 INJECTION, SOLUTION PARENTERAL at 05:09

## 2018-09-01 RX ADMIN — DEXTROSE 40 MG: 50 INJECTION, SOLUTION INTRAVENOUS at 08:09

## 2018-09-01 RX ADMIN — VANCOMYCIN HYDROCHLORIDE 2000 MG: 1 INJECTION, POWDER, LYOPHILIZED, FOR SOLUTION INTRAVENOUS at 05:09

## 2018-09-01 RX ADMIN — INSULIN ASPART 7 UNITS: 100 INJECTION, SOLUTION INTRAVENOUS; SUBCUTANEOUS at 05:09

## 2018-09-01 RX ADMIN — ONDANSETRON 4 MG: 2 INJECTION INTRAMUSCULAR; INTRAVENOUS at 05:09

## 2018-09-01 RX ADMIN — ONDANSETRON 4 MG: 2 INJECTION INTRAMUSCULAR; INTRAVENOUS at 08:09

## 2018-09-01 NOTE — PLAN OF CARE
Patient continue to vomit, medication provided, small amount of dark blood present in vomit residual, MD made aware, ordered not to start heparin drip.

## 2018-09-01 NOTE — PLAN OF CARE
Problem: Patient Care Overview  Goal: Plan of Care Review   09/01/18 0734   Coping/Psychosocial   Plan Of Care Reviewed With patient;spouse   Care plan reviewed with Pt verbalized plan of care. AAOx4, spouse at bedside, no respiratory distress noted, on room air. NSR per cardiac monitor, no red alarm noted. Education provided on medication effect and side effect, including heparin drip, voices understanding. Dr. Quintero made aware of patient continue with n/v, medicine for n/v is not helping as per patient statement, new orders received to stop heparin drip for two hours and restart at same rate 14 units/kg/hr at 0930am, order followed. Safety measures maintained, call light within his reach, bed in low position, bed alarm on, educated on the importance of calling as needed, voices understanding, stable at this time.

## 2018-09-01 NOTE — CONSULTS
"[facility] Hospital Medicine  Consult Note    Patient Name: Kulwant Mueller Jr.  MRN: [id number]  Admission Date: 8/31/2018  Hospital Length of Stay: 0 days  Attending Physician: Madan Quintero MD   Primary Care Provider: Dona Pineda MD           Patient information was obtained from patient, spouse/SO, past medical records and ER records.     Consults  Subjective:     Principal Problem: Deep vein thrombosis (DVT) of femoral vein of left lower extremity    Chief Complaint:   Chief Complaint   Patient presents with    Leg Pain     Pt reports leg pain and "stiffness" that started last night. Pt denies SOB or chest pain. Pt reports he has stents in both his legs        HPI: Mr. Kulwant Mueller is a 34 yo black man with May-Thurner syndrome, morbid obesity, and DM 2 who presents today with a recurrence of DVT of the left leg. Hospital medicine has been consulted to manage DKA. Pt and wife report that he developed leg swelling and pain similar to prior DVT the am of 8/31. He has not eaten since pm of 8/30. He presented to [facility] ED where BLE venous US showed occlusive thrombosis in the left common femoral, superficial femoral, popliteal, and posterior tibial veins. CBC revealed WBC of 18.41, and indicated hemoconcentration. Pt was started on vancomycin and zosyn. Heparin drip was initiated and he was transferred to [facility] for admission by cardiology. Soon after initiating heparin drip, pt developed nausea and vomiting which has remained continuous for nearly 12 hours. Cr increased from 0.9 to 1.5, blood glucose was elevated at 435. Lactate 3.9, beta-hydroxybutyrate was 1.0, UA showed ketones. He was given 10 units of insulin, PRN antiemetics, and 1 L NS. Heparin drip was switched to lovenox for presumed sensitivity, as similar symptoms were experienced during his admission for DVT one year ago. EGD at that time was inconclusive, and gastroparesis was suspected. Vomit had some blood in it so IV PPI was " "initiated.     Past Medical History:   Diagnosis Date    Anticoagulant long-term use     Diabetes mellitus     Hypertension        Past Surgical History:   Procedure Laterality Date    APPENDECTOMY      stents in bilateral legs         Review of patient's allergies indicates:   Allergen Reactions    Heparin analogues Nausea And Vomiting    Phenergan [promethazine] Nausea And Vomiting       No current facility-administered medications on file prior to encounter.      Current Outpatient Medications on File Prior to Encounter   Medication Sig    aspirin 81 MG Chew Take 1 tablet (81 mg total) by mouth once daily.    BD ULTRA-FINE DIYA PEN NEEDLES 32 gauge x 5/32" Ndle     INSULIN DETEMIR (LEVEMIR FLEXPEN SUBQ) Inject 30 Units into the skin once daily. 30 units in morning not at night    insulin lispro (HUMALOG) 100 unit/mL injection Inject into the skin 3 (three) times daily before meals.     Family History     Problem Relation (Age of Onset)    Cancer Mother, Paternal Uncle, Paternal Grandfather, Maternal Grandfather    Irritable bowel syndrome Paternal Grandfather        Tobacco Use    Smoking status: Current Some Day Smoker     Packs/day: 0.00    Smokeless tobacco: Current User    Tobacco comment: occasionally    Substance and Sexual Activity    Alcohol use: Yes     Comment: occ    Drug use: No    Sexual activity: Not on file     Review of Systems   Constitutional: Positive for appetite change.   HENT: Negative for congestion and rhinorrhea.    Eyes: Negative for visual disturbance.   Respiratory: Negative for cough and shortness of breath.    Cardiovascular: Positive for leg swelling. Negative for chest pain.   Gastrointestinal: Positive for nausea and vomiting. Negative for abdominal pain.   Endocrine: Negative for polydipsia and polyphagia.   Genitourinary: Positive for decreased urine volume. Negative for difficulty urinating.   Musculoskeletal: Positive for myalgias.   Allergic/Immunologic: " Positive for immunocompromised state.   Neurological: Positive for weakness.   Hematological: Does not bruise/bleed easily.   Psychiatric/Behavioral: Negative for confusion.     Objective:     Vital Signs (Most Recent):  Temp: 96.9 °F (36.1 °C) (09/01/18 1627)  Pulse: 100 (09/01/18 1627)  Resp: 15 (09/01/18 1627)  BP: 136/85 (09/01/18 1627)  SpO2: 98 % (09/01/18 1606) Vital Signs (24h Range):  Temp:  [96.3 °F (35.7 °C)-99.3 °F (37.4 °C)] 96.9 °F (36.1 °C)  Pulse:  [100-122] 100  Resp:  [15-20] 15  SpO2:  [96 %-98 %] 98 %  BP: (136-177)/() 136/85     Weight: 118.9 kg (262 lb 2 oz)  Body mass index is 34.58 kg/m².    Physical Exam   Constitutional: He appears well-developed and well-nourished. He appears distressed.   Eyes: EOM are normal. Pupils are equal, round, and reactive to light.   Cardiovascular: Normal rate and regular rhythm.   Pulses:       Dorsalis pedis pulses are 2+ on the right side, and 0 on the left side.   Pulmonary/Chest: Effort normal and breath sounds normal. He has no wheezes. He has no rales.   Abdominal: Soft. Bowel sounds are decreased. There is tenderness.   Musculoskeletal: He exhibits edema (L leg firm edema, not tense) and tenderness.   Bilateral legs cool, L > R   Neurological: He is alert.   Skin: He is diaphoretic.       Significant Labs: All pertinent labs within the past 24 hours have been reviewed.    Significant Imaging: I have reviewed all pertinent imaging results/findings within the past 24 hours.    Assessment/Plan:     * Deep vein thrombosis (DVT) of femoral vein of left lower extremity    May-Thurner syndrome  History of intravascular stent placement  Per primary team cardiology. Started on heparin drip but switched to lovenox d/t n/v.         Type 2 diabetes mellitus without complication    Intractable vomiting with nausea  Diabetic ketoacidosis without coma associated with type 2 diabetes mellitus  Pt with , beta-hydroxybutyrate 1.0 and lactate 3.9. Received 10 u  regular insulin and 1 L NS with minimal improvement in labs. Given another 10 u and 500 cc NS, then continuous infusion. Persistent vomiting poorly improved with IV zofran. Appears to have blood in vomit. A1C 12.7.   Continue IV fluids. Continue insulin coverage. IV protonix 40 mg BID. PRN antiemetics.           VTE Risk Mitigation (From admission, onward)        Ordered     enoxaparin injection 120 mg  Every 24 hours (non-standard times)      09/01/18 5491              Thank you for your consult. I will follow-up with patient. Please contact us if you have any additional questions.    Nickie Billy PA-C  Department of Hospital Medicine   [facility]

## 2018-09-01 NOTE — ASSESSMENT & PLAN NOTE
Intractable vomiting with nausea  Diabetic ketoacidosis without coma associated with type 2 diabetes mellitus  Pt with , beta-hydroxybutyrate 1.0 and lactate 3.9. Received 10 u regular insulin and 1 L NS with minimal improvement in labs. Given another 10 u and 500 cc NS, then continuous infusion. Persistent vomiting poorly improved with IV zofran. Appears to have blood in vomit. A1C 12.7.   Continue IV fluids. Continue insulin coverage. IV protonix 40 mg BID. PRN antiemetics.

## 2018-09-01 NOTE — SUBJECTIVE & OBJECTIVE
"Past Medical History:   Diagnosis Date    Anticoagulant long-term use     Diabetes mellitus     Hypertension        Past Surgical History:   Procedure Laterality Date    APPENDECTOMY      stents in bilateral legs         Review of patient's allergies indicates:   Allergen Reactions    Heparin analogues Nausea And Vomiting    Phenergan [promethazine] Nausea And Vomiting       No current facility-administered medications on file prior to encounter.      Current Outpatient Medications on File Prior to Encounter   Medication Sig    aspirin 81 MG Chew Take 1 tablet (81 mg total) by mouth once daily.    BD ULTRA-FINE DIYA PEN NEEDLES 32 gauge x 5/32" Ndle     INSULIN DETEMIR (LEVEMIR FLEXPEN SUBQ) Inject 30 Units into the skin once daily. 30 units in morning not at night    insulin lispro (HUMALOG) 100 unit/mL injection Inject into the skin 3 (three) times daily before meals.     Family History     Problem Relation (Age of Onset)    Cancer Mother, Paternal Uncle, Paternal Grandfather, Maternal Grandfather    Irritable bowel syndrome Paternal Grandfather        Tobacco Use    Smoking status: Current Some Day Smoker     Packs/day: 0.00    Smokeless tobacco: Current User    Tobacco comment: occasionally    Substance and Sexual Activity    Alcohol use: Yes     Comment: occ    Drug use: No    Sexual activity: Not on file     Review of Systems   Constitutional: Positive for appetite change.   HENT: Negative for congestion and rhinorrhea.    Eyes: Negative for visual disturbance.   Respiratory: Negative for cough and shortness of breath.    Cardiovascular: Positive for leg swelling. Negative for chest pain.   Gastrointestinal: Positive for nausea and vomiting. Negative for abdominal pain.   Endocrine: Negative for polydipsia and polyphagia.   Genitourinary: Positive for decreased urine volume. Negative for difficulty urinating.   Musculoskeletal: Positive for myalgias.   Allergic/Immunologic: Positive for " immunocompromised state.   Neurological: Positive for weakness.   Hematological: Does not bruise/bleed easily.   Psychiatric/Behavioral: Negative for confusion.     Objective:     Vital Signs (Most Recent):  Temp: 96.9 °F (36.1 °C) (09/01/18 1627)  Pulse: 100 (09/01/18 1627)  Resp: 15 (09/01/18 1627)  BP: 136/85 (09/01/18 1627)  SpO2: 98 % (09/01/18 1606) Vital Signs (24h Range):  Temp:  [96.3 °F (35.7 °C)-99.3 °F (37.4 °C)] 96.9 °F (36.1 °C)  Pulse:  [100-122] 100  Resp:  [15-20] 15  SpO2:  [96 %-98 %] 98 %  BP: (136-177)/() 136/85     Weight: 118.9 kg (262 lb 2 oz)  Body mass index is 34.58 kg/m².    Physical Exam   Constitutional: He appears well-developed and well-nourished. He appears distressed.   Eyes: EOM are normal. Pupils are equal, round, and reactive to light.   Cardiovascular: Normal rate and regular rhythm.   Pulses:       Dorsalis pedis pulses are 2+ on the right side, and 0 on the left side.   Pulmonary/Chest: Effort normal and breath sounds normal. He has no wheezes. He has no rales.   Abdominal: Soft. Bowel sounds are decreased. There is tenderness.   Musculoskeletal: He exhibits edema (L leg firm edema, not tense) and tenderness.   Bilateral legs cool, L > R   Neurological: He is alert.   Skin: He is diaphoretic.       Significant Labs: All pertinent labs within the past 24 hours have been reviewed.    Significant Imaging: I have reviewed all pertinent imaging results/findings within the past 24 hours.

## 2018-09-01 NOTE — PLAN OF CARE
Problem: Patient Care Overview  Goal: Plan of Care Review  Outcome: Ongoing (interventions implemented as appropriate)   08/31/18 9369   Coping/Psychosocial   Plan Of Care Reviewed With patient   Received patient via stretcher. Orientation to room provided, voices understanding. Care plan reviewed with Pt, verbalized plan of care. AAOx3, no respiratory distress noted, on room air. NSR per cardiac monitor, no red alarm noted. Denies any pain of discomfort, education provided on medication effect and side effect, including  Heparin drip, voices understanding. Safety measures maintained, call light within his reach, no apparent distress noted, bed in low position, bed alarm on, educated on the importance of calling as needed, voices understanding, stable at this time. Report to incoming nurse provided.

## 2018-09-01 NOTE — HPI
Kulwant Mueller Jr. is a 33 y.o. black man with obesity, hypertension, insulin-dependent diabetes mellitus type 2, May-Thurner syndrome, cigarette smoking, chronic nausea and vomiting.  He likely has diabetic gastroparesis, because esophagogastroduodenoscopy on 9/18/17 showed food residue in his stomach (he could not afford the gastric emptying study).  He lives in [place], [place].  He is a  with [employer]'s Department.  His primary care physician was Dr. Dona Pineda but he wants to change to a new one.   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  Ultrasound at [facility] showed showed occlusive thrombosis in the left common femoral, superficial femoral, popliteal, and posterior tibial veins.  He was started on heparin drip, piperacillin-tazobactam, and vancomycin.  He developed intractable nausea and vomiting soon after starting the heparin drip, which was changed to enoxaparin injections because he experienced similar symptoms with heparin drip a year ago.  EGD at that time was inconclusive so gastroparesis was suspected.  Labs showed diabetic ketoacidosis.  [facility] was consulted to manage diabetic ketoacidosis.  His vomit had some blood in it so pantoprazole was started.

## 2018-09-01 NOTE — PLAN OF CARE
Problem: Diabetes, Type 2 (Adult)  Intervention: Optimize Glycemic Control  Plan of care reviewed with patient, he verbalized understanding. Blood glucose monitored       Comments: Plan of care reviewed patient verbalized understanding. Blood glucose monitored. IV Heparin Infusing 13.4 ml/hr. aPPT taken q6hrs. medication administered. Patient was vomiting zofran was administered. Bed in lowest position, call bell within reach. Bed in the lowest position. Will continue to monitor.

## 2018-09-01 NOTE — PLAN OF CARE
09/01/18 1432   Discharge Assessment   Assessment Type Discharge Planning Assessment   Confirmed/corrected address and phone number on facesheet? Yes   Assessment information obtained from? Caregiver  (Chloé Mueller(wife)[phone])   Prior to hospitilization cognitive status: Alert/Oriented   Prior to hospitalization functional status: Independent   Current cognitive status: Alert/Oriented   Current Functional Status: Independent   Facility Arrived From: [facility]   Lives With child(danny), dependent;spouse   Able to Return to Prior Arrangements yes   Is patient able to care for self after discharge? Yes   Who are your caregiver(s) and their phone number(s)? Chloé Mueller(wife)[phone]   Readmission Within The Last 30 Days no previous admission in last 30 days   Patient currently being followed by outpatient case management? No   Patient currently receives any other outside agency services? No   Equipment Currently Used at Home none   Do you have any problems affording any of your prescribed medications? No   Is the patient taking medications as prescribed? no   If no, which medications is patient not taking? his insulin   Does the patient have transportation home? Yes   Transportation Available family or friend will provide   Does the patient receive services at the [facility]? No   Discharge Plan A Home with family   Discharge Plan B Home Health   Patient/Family In Agreement With Plan yes   The Sw met with the pt and his wife Chloé who was at bedside. The pt's independent with his adl's and uses no dme. The pt's wife states he's noncompliant with his insulin and she tells him about it all the time. The pt's a [facility] . The pt was not feeling well so his wife answered the questions. The pt's a transfer from [facility]. The pt's independent with his adl's and uses no dme. The pt's on short and long acting insulin his wife states. The pt has transportation  home at d/c. The Sw stressed to the pt about the importance of taking his insulin and checking his blood sugars daily.

## 2018-09-01 NOTE — ASSESSMENT & PLAN NOTE
May-Thurner syndrome  History of intravascular stent placement  Per primary team cardiology. Started on heparin drip but switched to lovenox d/t n/v.

## 2018-09-02 PROBLEM — E11.10 DKA (DIABETIC KETOACIDOSES): Status: ACTIVE | Noted: 2018-09-02

## 2018-09-02 LAB
ANION GAP SERPL CALC-SCNC: 11 MMOL/L
ANION GAP SERPL CALC-SCNC: 14 MMOL/L
ANION GAP SERPL CALC-SCNC: 15 MMOL/L
ANION GAP SERPL CALC-SCNC: 15 MMOL/L
B-OH-BUTYR BLD STRIP-SCNC: 0.2 MMOL/L
BASOPHILS # BLD AUTO: 0.01 K/UL
BASOPHILS NFR BLD: 0.1 %
BUN SERPL-MCNC: 25 MG/DL
BUN SERPL-MCNC: 26 MG/DL
BUN SERPL-MCNC: 26 MG/DL
BUN SERPL-MCNC: 28 MG/DL
CALCIUM SERPL-MCNC: 10 MG/DL
CALCIUM SERPL-MCNC: 9.5 MG/DL
CALCIUM SERPL-MCNC: 9.6 MG/DL
CALCIUM SERPL-MCNC: 9.6 MG/DL
CHLORIDE SERPL-SCNC: 101 MMOL/L
CHLORIDE SERPL-SCNC: 102 MMOL/L
CHLORIDE SERPL-SCNC: 105 MMOL/L
CHLORIDE SERPL-SCNC: 108 MMOL/L
CO2 SERPL-SCNC: 15 MMOL/L
CO2 SERPL-SCNC: 21 MMOL/L
CO2 SERPL-SCNC: 21 MMOL/L
CO2 SERPL-SCNC: 23 MMOL/L
CREAT SERPL-MCNC: 1.6 MG/DL
CREAT SERPL-MCNC: 1.7 MG/DL
DIFFERENTIAL METHOD: ABNORMAL
EOSINOPHIL # BLD AUTO: 0 K/UL
EOSINOPHIL NFR BLD: 0 %
ERYTHROCYTE [DISTWIDTH] IN BLOOD BY AUTOMATED COUNT: 12.8 %
EST. GFR  (AFRICAN AMERICAN): 59 ML/MIN/1.73 M^2
EST. GFR  (AFRICAN AMERICAN): >60 ML/MIN/1.73 M^2
EST. GFR  (NON AFRICAN AMERICAN): 51 ML/MIN/1.73 M^2
EST. GFR  (NON AFRICAN AMERICAN): 55 ML/MIN/1.73 M^2
GLUCOSE SERPL-MCNC: 209 MG/DL
GLUCOSE SERPL-MCNC: 302 MG/DL
GLUCOSE SERPL-MCNC: 325 MG/DL
GLUCOSE SERPL-MCNC: 345 MG/DL
HCT VFR BLD AUTO: 48.6 %
HGB BLD-MCNC: 15.4 G/DL
HGB BLD-MCNC: 16 G/DL
HGB BLD-MCNC: 16.2 G/DL
HGB BLD-MCNC: 16.6 G/DL
HGB BLD-MCNC: 17.2 G/DL
LYMPHOCYTES # BLD AUTO: 0.9 K/UL
LYMPHOCYTES NFR BLD: 5.4 %
MAGNESIUM SERPL-MCNC: 2.3 MG/DL
MCH RBC QN AUTO: 29 PG
MCHC RBC AUTO-ENTMCNC: 32.9 G/DL
MCV RBC AUTO: 88 FL
MONOCYTES # BLD AUTO: 0.9 K/UL
MONOCYTES NFR BLD: 5.3 %
NEUTROPHILS # BLD AUTO: 15 K/UL
NEUTROPHILS NFR BLD: 88.8 %
PHOSPHATE SERPL-MCNC: 3.4 MG/DL
PLATELET # BLD AUTO: 201 K/UL
PMV BLD AUTO: 11 FL
POCT GLUCOSE: 171 MG/DL (ref 70–110)
POCT GLUCOSE: 183 MG/DL (ref 70–110)
POCT GLUCOSE: 223 MG/DL (ref 70–110)
POCT GLUCOSE: 243 MG/DL (ref 70–110)
POCT GLUCOSE: 257 MG/DL (ref 70–110)
POCT GLUCOSE: 279 MG/DL (ref 70–110)
POCT GLUCOSE: 281 MG/DL (ref 70–110)
POCT GLUCOSE: 298 MG/DL (ref 70–110)
POCT GLUCOSE: 299 MG/DL (ref 70–110)
POCT GLUCOSE: 315 MG/DL (ref 70–110)
POCT GLUCOSE: 334 MG/DL (ref 70–110)
POTASSIUM SERPL-SCNC: 4 MMOL/L
POTASSIUM SERPL-SCNC: 4.2 MMOL/L
POTASSIUM SERPL-SCNC: 4.3 MMOL/L
POTASSIUM SERPL-SCNC: 4.3 MMOL/L
RBC # BLD AUTO: 5.52 M/UL
SODIUM SERPL-SCNC: 136 MMOL/L
SODIUM SERPL-SCNC: 138 MMOL/L
SODIUM SERPL-SCNC: 138 MMOL/L
SODIUM SERPL-SCNC: 139 MMOL/L
WBC # BLD AUTO: 16.9 K/UL

## 2018-09-02 PROCEDURE — 84100 ASSAY OF PHOSPHORUS: CPT

## 2018-09-02 PROCEDURE — 25000003 PHARM REV CODE 250: Performed by: HOSPITALIST

## 2018-09-02 PROCEDURE — 80048 BASIC METABOLIC PNL TOTAL CA: CPT

## 2018-09-02 PROCEDURE — 63600175 PHARM REV CODE 636 W HCPCS: Performed by: INTERNAL MEDICINE

## 2018-09-02 PROCEDURE — 63600175 PHARM REV CODE 636 W HCPCS: Performed by: HOSPITALIST

## 2018-09-02 PROCEDURE — 99233 SBSQ HOSP IP/OBS HIGH 50: CPT | Mod: ,,, | Performed by: INTERNAL MEDICINE

## 2018-09-02 PROCEDURE — 85018 HEMOGLOBIN: CPT | Mod: 91

## 2018-09-02 PROCEDURE — 36415 COLL VENOUS BLD VENIPUNCTURE: CPT

## 2018-09-02 PROCEDURE — 25000003 PHARM REV CODE 250: Performed by: PHYSICIAN ASSISTANT

## 2018-09-02 PROCEDURE — 63600175 PHARM REV CODE 636 W HCPCS: Performed by: PHYSICIAN ASSISTANT

## 2018-09-02 PROCEDURE — 97802 MEDICAL NUTRITION INDIV IN: CPT

## 2018-09-02 PROCEDURE — 82010 KETONE BODYS QUAN: CPT

## 2018-09-02 PROCEDURE — 94761 N-INVAS EAR/PLS OXIMETRY MLT: CPT

## 2018-09-02 PROCEDURE — 25000003 PHARM REV CODE 250: Performed by: INTERNAL MEDICINE

## 2018-09-02 PROCEDURE — 85025 COMPLETE CBC W/AUTO DIFF WBC: CPT

## 2018-09-02 PROCEDURE — 83735 ASSAY OF MAGNESIUM: CPT

## 2018-09-02 PROCEDURE — C9113 INJ PANTOPRAZOLE SODIUM, VIA: HCPCS | Performed by: PHYSICIAN ASSISTANT

## 2018-09-02 PROCEDURE — 20000000 HC ICU ROOM

## 2018-09-02 RX ORDER — SODIUM CHLORIDE 0.9 % (FLUSH) 0.9 %
5 SYRINGE (ML) INJECTION
Status: DISCONTINUED | OUTPATIENT
Start: 2018-09-02 | End: 2018-09-08 | Stop reason: HOSPADM

## 2018-09-02 RX ORDER — ONDANSETRON 2 MG/ML
8 INJECTION INTRAMUSCULAR; INTRAVENOUS ONCE
Status: COMPLETED | OUTPATIENT
Start: 2018-09-02 | End: 2018-09-02

## 2018-09-02 RX ORDER — CLONIDINE HYDROCHLORIDE 0.1 MG/1
0.1 TABLET ORAL EVERY 6 HOURS PRN
Status: DISCONTINUED | OUTPATIENT
Start: 2018-09-02 | End: 2018-09-02

## 2018-09-02 RX ORDER — DEXTROSE MONOHYDRATE 100 MG/ML
1000 INJECTION, SOLUTION INTRAVENOUS
Status: DISCONTINUED | OUTPATIENT
Start: 2018-09-02 | End: 2018-09-03

## 2018-09-02 RX ORDER — CARVEDILOL 6.25 MG/1
6.25 TABLET ORAL 2 TIMES DAILY
Status: DISCONTINUED | OUTPATIENT
Start: 2018-09-02 | End: 2018-09-04

## 2018-09-02 RX ORDER — CLONIDINE HYDROCHLORIDE 0.1 MG/1
0.1 TABLET ORAL EVERY 4 HOURS PRN
Status: DISCONTINUED | OUTPATIENT
Start: 2018-09-02 | End: 2018-09-08 | Stop reason: HOSPADM

## 2018-09-02 RX ORDER — ONDANSETRON 2 MG/ML
8 INJECTION INTRAMUSCULAR; INTRAVENOUS ONCE
Status: DISCONTINUED | OUTPATIENT
Start: 2018-09-02 | End: 2018-09-02

## 2018-09-02 RX ORDER — SODIUM CHLORIDE 0.9 % (FLUSH) 0.9 %
5 SYRINGE (ML) INJECTION
Status: DISCONTINUED | OUTPATIENT
Start: 2018-09-02 | End: 2018-09-03

## 2018-09-02 RX ORDER — PROCHLORPERAZINE EDISYLATE 5 MG/ML
10 INJECTION INTRAMUSCULAR; INTRAVENOUS EVERY 6 HOURS PRN
Status: DISCONTINUED | OUTPATIENT
Start: 2018-09-02 | End: 2018-09-07

## 2018-09-02 RX ORDER — LABETALOL HYDROCHLORIDE 5 MG/ML
10 INJECTION, SOLUTION INTRAVENOUS EVERY 6 HOURS PRN
Status: DISCONTINUED | OUTPATIENT
Start: 2018-09-02 | End: 2018-09-08 | Stop reason: HOSPADM

## 2018-09-02 RX ORDER — NICARDIPINE HYDROCHLORIDE 0.2 MG/ML
1 INJECTION INTRAVENOUS CONTINUOUS
Status: DISCONTINUED | OUTPATIENT
Start: 2018-09-02 | End: 2018-09-04

## 2018-09-02 RX ORDER — SODIUM CHLORIDE 9 MG/ML
INJECTION, SOLUTION INTRAVENOUS CONTINUOUS
Status: DISCONTINUED | OUTPATIENT
Start: 2018-09-02 | End: 2018-09-02

## 2018-09-02 RX ORDER — HYDRALAZINE HYDROCHLORIDE 25 MG/1
25 TABLET, FILM COATED ORAL EVERY 8 HOURS
Status: DISCONTINUED | OUTPATIENT
Start: 2018-09-02 | End: 2018-09-04

## 2018-09-02 RX ADMIN — DEXTROSE 40 MG: 50 INJECTION, SOLUTION INTRAVENOUS at 09:09

## 2018-09-02 RX ADMIN — VANCOMYCIN HYDROCHLORIDE 1500 MG: 10 INJECTION, POWDER, LYOPHILIZED, FOR SOLUTION INTRAVENOUS at 06:09

## 2018-09-02 RX ADMIN — VANCOMYCIN HYDROCHLORIDE 1500 MG: 10 INJECTION, POWDER, LYOPHILIZED, FOR SOLUTION INTRAVENOUS at 05:09

## 2018-09-02 RX ADMIN — ONDANSETRON 4 MG: 2 INJECTION INTRAMUSCULAR; INTRAVENOUS at 07:09

## 2018-09-02 RX ADMIN — PIPERACILLIN AND TAZOBACTAM 4.5 G: 4; .5 INJECTION, POWDER, LYOPHILIZED, FOR SOLUTION INTRAVENOUS; PARENTERAL at 12:09

## 2018-09-02 RX ADMIN — MORPHINE SULFATE 2 MG: 4 INJECTION INTRAVENOUS at 05:09

## 2018-09-02 RX ADMIN — LABETALOL HYDROCHLORIDE 10 MG: 5 INJECTION, SOLUTION INTRAVENOUS at 12:09

## 2018-09-02 RX ADMIN — SODIUM CHLORIDE 6 UNITS/HR: 9 INJECTION, SOLUTION INTRAVENOUS at 06:09

## 2018-09-02 RX ADMIN — INSULIN ASPART 6 UNITS: 100 INJECTION, SOLUTION INTRAVENOUS; SUBCUTANEOUS at 04:09

## 2018-09-02 RX ADMIN — CARVEDILOL 6.25 MG: 6.25 TABLET, FILM COATED ORAL at 09:09

## 2018-09-02 RX ADMIN — HYDRALAZINE HYDROCHLORIDE 25 MG: 25 TABLET, FILM COATED ORAL at 01:09

## 2018-09-02 RX ADMIN — NICARDIPINE HYDROCHLORIDE 12 MG/HR: 0.2 INJECTION, SOLUTION INTRAVENOUS at 11:09

## 2018-09-02 RX ADMIN — ONDANSETRON 8 MG: 2 INJECTION INTRAMUSCULAR; INTRAVENOUS at 02:09

## 2018-09-02 RX ADMIN — CLONIDINE HYDROCHLORIDE 0.1 MG: 0.1 TABLET ORAL at 03:09

## 2018-09-02 RX ADMIN — INSULIN ASPART 3 UNITS: 100 INJECTION, SOLUTION INTRAVENOUS; SUBCUTANEOUS at 10:09

## 2018-09-02 RX ADMIN — PIPERACILLIN AND TAZOBACTAM 4.5 G: 4; .5 INJECTION, POWDER, LYOPHILIZED, FOR SOLUTION INTRAVENOUS; PARENTERAL at 09:09

## 2018-09-02 RX ADMIN — ENOXAPARIN SODIUM 120 MG: 150 INJECTION SUBCUTANEOUS at 04:09

## 2018-09-02 RX ADMIN — INSULIN DETEMIR 30 UNITS: 100 INJECTION, SOLUTION SUBCUTANEOUS at 12:09

## 2018-09-02 RX ADMIN — PIPERACILLIN AND TAZOBACTAM 4.5 G: 4; .5 INJECTION, POWDER, LYOPHILIZED, FOR SOLUTION INTRAVENOUS; PARENTERAL at 04:09

## 2018-09-02 RX ADMIN — MORPHINE SULFATE 2 MG: 4 INJECTION INTRAVENOUS at 11:09

## 2018-09-02 RX ADMIN — MORPHINE SULFATE 2 MG: 4 INJECTION INTRAVENOUS at 12:09

## 2018-09-02 RX ADMIN — NICARDIPINE HYDROCHLORIDE 1 MG/HR: 0.2 INJECTION, SOLUTION INTRAVENOUS at 04:09

## 2018-09-02 RX ADMIN — HYDRALAZINE HYDROCHLORIDE 25 MG: 25 TABLET, FILM COATED ORAL at 09:09

## 2018-09-02 RX ADMIN — MORPHINE SULFATE 2 MG: 4 INJECTION INTRAVENOUS at 08:09

## 2018-09-02 RX ADMIN — SODIUM CHLORIDE: 0.9 INJECTION, SOLUTION INTRAVENOUS at 12:09

## 2018-09-02 RX ADMIN — CARVEDILOL 6.25 MG: 6.25 TABLET, FILM COATED ORAL at 01:09

## 2018-09-02 NOTE — SUBJECTIVE & OBJECTIVE
Interval History: Pt sleepy but arousable. Feeling much better today though still with some nausea. Leg appears more swollen today. Awaiting cardiology decision.    Review of Systems   Respiratory: Negative for shortness of breath.    Cardiovascular: Negative for chest pain.   Gastrointestinal: Positive for nausea and vomiting (much less, mostly spitting).   Musculoskeletal: Positive for myalgias.   Skin: Positive for color change.     Objective:     Vital Signs (Most Recent):  Temp: 98.2 °F (36.8 °C) (09/02/18 0701)  Pulse: 102 (09/02/18 1245)  Resp: 14 (09/02/18 1245)  BP: (!) 192/114 (09/02/18 1245)  SpO2: 98 % (09/02/18 1245) Vital Signs (24h Range):  Temp:  [96.9 °F (36.1 °C)-98.5 °F (36.9 °C)] 98.2 °F (36.8 °C)  Pulse:  [] 102  Resp:  [13-20] 14  SpO2:  [96 %-99 %] 98 %  BP: (136-197)/() 192/114     Weight: 115.2 kg (253 lb 15.5 oz)  Body mass index is 33.51 kg/m².    Intake/Output Summary (Last 24 hours) at 9/2/2018 1316  Last data filed at 9/2/2018 1200  Gross per 24 hour   Intake 784.85 ml   Output 1600 ml   Net -815.15 ml      Physical Exam   Cardiovascular: Normal rate and regular rhythm.   Pulmonary/Chest: Effort normal and breath sounds normal.   Abdominal: Soft. Bowel sounds are normal. There is no tenderness.   Musculoskeletal: He exhibits edema (L leg with increased swelling) and tenderness.       Significant Labs: All pertinent labs within the past 24 hours have been reviewed.    Significant Imaging: I have reviewed all pertinent imaging results/findings within the past 24 hours.

## 2018-09-02 NOTE — PLAN OF CARE
Called Dr. Grajeda to clarify the insulin gtt order as the entered order is for inpatient protocol . No answer back.  Called [facility] and requested to clarify the insulin gtt order for DKA protocol and PRN meds for high BP. Nurse will notify Dr. Shah.

## 2018-09-02 NOTE — PLAN OF CARE
Problem: Pain, Acute (Adult)  Goal: Identify Related Risk Factors and Signs and Symptoms  Related risk factors and signs and symptoms are identified upon initiation of Human Response Clinical Practice Guideline (CPG)  Outcome: Ongoing (interventions implemented as appropriate)  Pt c/o of abd pain r/t vomiting. PRN morphine given.

## 2018-09-02 NOTE — PLAN OF CARE
Pt still vomiting and nauseated. PRN zofran cannot be given again until 0533. Dr. Quintero paged.

## 2018-09-02 NOTE — ASSESSMENT & PLAN NOTE
Intractable vomiting with nausea  Diabetic ketoacidosis without coma associated with type 2 diabetes mellitus  Pt with , beta-hydroxybutyrate 1.0 and lactate 3.9. Received 10 u regular insulin and 1 L NS with minimal improvement in labs. Given another 10 u and 500 cc NS, then continuous infusion. Persistent vomiting poorly improved with IV zofran. Appears to have blood in vomit, though Hgb elevated/stable. Insulin drip initiated in ICU. b-HB fell to 0.2 so drip d/geraldo. A1C 12.7.   Continue IV fluids. Continue insulin coverage. IV protonix 40 mg BID. PRN antiemetics.

## 2018-09-02 NOTE — PLAN OF CARE
"Spoke with Dr. Quintero and informed him that pt is still continuously vomiting. One time dose of Zofran 8mg IV ordered. Dr. Quintero asked " has the pt been transferred to ICU?" Dr. Quintero informed that no orders had been written to transfer the pt to ICU. Dr. Qiuntero said "ok."  "

## 2018-09-02 NOTE — PROGRESS NOTES
Progress Note  Cardiology    Admit Date: 8/31/2018   LOS: 0 days     Follow-up For: Recurrent LLE DVT and DKA     Scheduled Meds:   carvedilol  6.25 mg Oral BID    enoxaparin  1 mg/kg Subcutaneous Q24H    hydrALAZINE  25 mg Oral Q8H    insulin detemir U-100  30 Units Subcutaneous Daily    pantoprazole 40 mg in dextrose 5 % 100 mL infusion (ready to mix system)  40 mg Intravenous BID    piperacillin-tazobactam (ZOSYN) IVPB  4.5 g Intravenous Q8H    vancomycin (VANCOCIN) IVPB  15 mg/kg (Order-Specific) Intravenous Q12H     Continuous Infusions:   sodium chloride 0.9% 125 mL/hr at 09/02/18 1246       Review of patient's allergies indicates:   Allergen Reactions    Heparin analogues Nausea And Vomiting    Phenergan [promethazine] Nausea And Vomiting       SUBJECTIVE:   Hospital Course:    8/31/2018-9/1/2018 Pt and wife report that he developed leg swelling and pain similar to prior DVT the am of 8/31. He has not eaten since pm of 8/30. He presented to [facility] ED where BLE venous US showed occlusive thrombosis in the left common femoral, superficial femoral, popliteal, and posterior tibial veins.   Heparin drip was initiated and he was transferred to [facility] for admission by cardiology. Soon after initiating heparin drip, pt developed nausea and vomiting which has remained continuous for nearly 12 hours.  Blood glucose found to be greater than 400 and DKA was suspected.  Pt was started on vigorous IV hydration with normal saline and insulin was then given.  Labs revealed UA with ketones, anion gap of 16 with metabolic acidosis.  CBC revealed WBC of 18.41, suggestive of hemoconcentration. All findings consistent with DKA.  Etiology unclear. Blood cultures sent.  Pt started on vancomycin and zosyn. Heparin drip was discontinued and pt subsequently started on full dose lovenox.  Hospital medicine consulted for assistance with managing DKA.  Mr. Mueller has remained hemodynamically stable.  Pt transferred to the ICU  overnight for insulin drip.    Interval History: Insulin drip now discontinued.  Mr. Mueller states he's feeling much better today.  Nausea and vomiting significantly improved.  Glucose improving with insulin and IVF's.      OBJECTIVE:     Vital Signs (Most Recent)  Temp: 98.9 °F (37.2 °C) (09/02/18 1101)  Pulse: 102 (09/02/18 1245)  Resp: 14 (09/02/18 1245)  BP: (!) 192/114 (09/02/18 1245)  SpO2: 98 % (09/02/18 1245)    Vital Signs Range (Last 24H):  Temp:  [96.9 °F (36.1 °C)-98.9 °F (37.2 °C)]   Pulse:  []   Resp:  [13-20]   BP: (136-197)/()   SpO2:  [96 %-99 %]     I & O (Last 24H):    Intake/Output Summary (Last 24 hours) at 9/2/2018 1411  Last data filed at 9/2/2018 1300  Gross per 24 hour   Intake 2729.92 ml   Output 1600 ml   Net 1129.92 ml       Physical Exam:  Constitutional: No acute distress, appears comfortable  HEENT: Sclera anicteric, Pupils equal, round and reactive to light, extraocular motions intact, Oropharynx clear  Neck: No JVD, no carotid bruits  Cardiovascular: regular rate and rhythm, no murmur, rubs or gallops, normal S1/S2  Pulmonary: Clear to auscultation bilaterally  Abdominal: Abdomen soft, nontender, nondistended, positive bowel sounds  Extremities: LLE with moderate edema and TTP  Pulses:  Carotid pulses are 2+ on the right side, and 2+ on the left side.  Radial pulses are 2+ on the right side, and 2+ on the left side.   Femoral pulses are 2+ on the right side, and 2+ on the left side.  Popliteal pulses are 2+ on the right side, and 2+ on the left side.   Dorsalis pedis pulses are 2+ on the right side, and 2+ on the left side.   Posterior tibial pulses are 2+ on the right side, and 2+ on the left side.     Skin: No ecchymosis, erythema, or ulcers  Psych: Alert and oriented x 3, appropriate affect  Neuro: CNII-XII intact, no focal deficits      Laboratory:  Chemistry:  Lab Results   Component Value Date     09/02/2018    K 4.2 09/02/2018     09/02/2018    CO2 15  (L) 09/02/2018    BUN 26 (H) 09/02/2018    CREATININE 1.6 (H) 09/02/2018    CALCIUM 9.6 09/02/2018     Cardiac Markers:No results found for: CKTOTAL, CKMB, CKMBINDEX, TROPONINI  Cardiac Markers (Last 3):No results found for: CKTOTAL, CKMB, CKMBINDEX, TROPONINI  CBC:   Lab Results   Component Value Date    WBC 16.90 (H) 09/02/2018    HGB 16.6 09/02/2018    HCT 48.6 09/02/2018    HCT 54 05/19/2016    MCV 88 09/02/2018     09/02/2018     Lipids:  Lab Results   Component Value Date    CHOL 202 (H) 01/11/2018    TRIG 145 01/11/2018    HDL 34 (L) 01/11/2018     Coagulation:   Lab Results   Component Value Date    INR 1.0 09/01/2018    APTT 23.1 09/01/2018       Diagnostic Results:  Labs: Reviewed  ECG: Reviewed  X-Ray: Reviewed  US: Reviewed      ASSESSMENT/PLAN:   Kulwant Mueller Jr. is a 33 y.o. male with PMH of May-Thurner Syndrome s/p bilateral LE iliac vein stenting, who presents with extensive LLE DVT and DKA with nausea/vomiting.      PLAN:   1. LLE DVT- Pt with recurrent extensive LLE DVT.  Continue on full dose lovenox at this time and monitor clinical exam.  Reassess need for possible EKOS in am.       2. DKA- Likely due in part to insulin indiscretion given A1c of 13.  Continue IV hydration with insulin.  Nausea/vomiting improving.  We thank [facility] for their assistance with managing DKA.  Monitor closely on telemetry.

## 2018-09-02 NOTE — NURSING
/98 following Clonodine administration. Dr. Quintero notified. Orders received for Cardene drip. Will cont to monitor.

## 2018-09-02 NOTE — PLAN OF CARE
Informed Dr. Quintero of pt juan /120. No new orders for BP given at this time. Pt has 500cc bolus due and NS continuous IVF due, Dr. Quintero stated he was ok with pt BP and with pt receiving both since he believes pt in in DKA. Dr. Quintero also informed pt is still vomiting. Pt also received 10 units of Reg insulin, BS prior was 338 and after receiving the insulin . Dr. Quintero stated he will call Dr. Klein and discuss what they want to do with the pt.

## 2018-09-02 NOTE — PLAN OF CARE
Pt transferred by 2 RN's and PCT to ICU  via bed on continuous cardiac monitor. Pt belongings and chart sent down with pt. EDDA Dennis RN was at bedside awaiting pt.

## 2018-09-02 NOTE — PLAN OF CARE
"STEFAN Billy at bedside assessing pt who is continuously vomiting. She stated pt may be going down to ICU tonight. Dr. Klein was at the nurses station and he stated "Im okay with the pt going down to ICU." Dr. Klein informed that no orders have been placed to transfer pt and if they want the pt to be transferred then someone needs to put in the order. Dr. Klein stated " I will call Dr. Jang and see what he wants to do."    "

## 2018-09-02 NOTE — HOSPITAL COURSE
DVT and hypertension:  Blood pressure increased and remained elevated >200, so Cardiology put him on nicardipine drip.  He underwent thrombectomy and was prescribed rivaroxaban.    Diabetes mellitus type 2 and gastroparesis:  DKA was treated with insulin drip.  Vomiting abated by the morning of 9/2/18.  Beta-hydroxybutyrate fell to 0.2, so drip was discontinued.  Hemoglobin A1c was 12.7% (and was 9.1% on 1/11/18) showing that he is chronically hyperglycemic.  He takes insulin detemir 30 units daily in the morning and insulin lispro sliding scale with meals.  Metoclopramide was started for suspected gastroparesis.  On 9/4/18 he was seen drinking a Sprite and reported that he does not follow a diabetic diet or check his blood glucose at home.  He was kept on a diabetic diet and required much less insulin than he does at home.  He was advised to adjust his home insulin down to account for changing his diet.  He requested to be prescribed the metoclopramide because it helped.

## 2018-09-02 NOTE — ASSESSMENT & PLAN NOTE
Related to (etiology):   Excessive energy intake    Signs and Symptoms (as evidenced by):   BMI 33.6    Interventions/Recommendations (treatment strategy):  Diabetic, Low Sodium Diet  Outpt Nutrition Counseling    Nutrition Diagnosis Status:   New

## 2018-09-02 NOTE — EICU
eICU Note :    Called by the [facility] eRN:    Problem: DKA orders for Insulin GTT    Pertinent History and labs reviewed :33-year-old male with status  post bilateral lower extremity iliac vein stenting with LLE DVT on IV heparin GTT earlier changed to Lovenox   PMHx: May Thurner syndrome with Morbid Obesity    Treatment /Intervention given: Orders placed  for DKA protocol .        Candice Solitario M.D  eICU Physician

## 2018-09-02 NOTE — PROGRESS NOTES
[facility] Hospital Medicine  Progress Note    Patient Name: Kulwant Mueller Jr.  MRN: [id number]  Patient Class: IP- Inpatient   Admission Date: 8/31/2018  Length of Stay: 0 days  Attending Physician: Madan Quintero MD  Primary Care Provider: Dona Pineda MD        Subjective:     Principal Problem:Deep vein thrombosis (DVT) of femoral vein of left lower extremity    HPI:  Mr. Kulwant Mueller is a 34 yo black man with May-Thurner syndrome, morbid obesity, and DM 2 who presents today with a recurrence of DVT of the left leg. Hospital medicine has been consulted to manage DKA. Pt and wife report that he developed leg swelling and pain similar to prior DVT the am of 8/31. He has not eaten since pm of 8/30. He presented to [facility] ED where BLE venous US showed occlusive thrombosis in the left common femoral, superficial femoral, popliteal, and posterior tibial veins. CBC revealed WBC of 18.41, and indicated hemoconcentration. Pt was started on vancomycin and zosyn. Heparin drip was initiated and he was transferred to [facility] for admission by cardiology. Soon after initiating heparin drip, pt developed nausea and vomiting which has remained continuous for nearly 12 hours. Cr increased from 0.9 to 1.5, blood glucose was elevated at 435. Lactate 3.9, beta-hydroxybutyrate was 1.0, UA showed ketones. He was given 10 units of insulin, PRN antiemetics, and 1 L NS. Heparin drip was switched to lovenox for presumed sensitivity, as similar symptoms were experienced during his admission for DVT one year ago. EGD at that time was inconclusive, and gastroparesis was suspected. Vomit had some blood in it so IV PPI was initiated.     Hospital Course:  Pt transferred to ICU for insulin drip. Vomiting abated by 9/2 am. Hgb remained stable though elevated. BMP improved. Beta-hydroxybutyrate fell to 0.2, so drip was discontinued.     Interval History: Pt sleepy but arousable. Feeling much better today though still with some  nausea. Leg appears more swollen today. Awaiting cardiology decision.    Review of Systems   Respiratory: Negative for shortness of breath.    Cardiovascular: Negative for chest pain.   Gastrointestinal: Positive for nausea and vomiting (much less, mostly spitting).   Musculoskeletal: Positive for myalgias.   Skin: Positive for color change.     Objective:     Vital Signs (Most Recent):  Temp: 98.2 °F (36.8 °C) (09/02/18 0701)  Pulse: 102 (09/02/18 1245)  Resp: 14 (09/02/18 1245)  BP: (!) 192/114 (09/02/18 1245)  SpO2: 98 % (09/02/18 1245) Vital Signs (24h Range):  Temp:  [96.9 °F (36.1 °C)-98.5 °F (36.9 °C)] 98.2 °F (36.8 °C)  Pulse:  [] 102  Resp:  [13-20] 14  SpO2:  [96 %-99 %] 98 %  BP: (136-197)/() 192/114     Weight: 115.2 kg (253 lb 15.5 oz)  Body mass index is 33.51 kg/m².    Intake/Output Summary (Last 24 hours) at 9/2/2018 1316  Last data filed at 9/2/2018 1200  Gross per 24 hour   Intake 784.85 ml   Output 1600 ml   Net -815.15 ml      Physical Exam   Cardiovascular: Normal rate and regular rhythm.   Pulmonary/Chest: Effort normal and breath sounds normal.   Abdominal: Soft. Bowel sounds are normal. There is no tenderness.   Musculoskeletal: He exhibits edema (L leg with increased swelling) and tenderness.       Significant Labs: All pertinent labs within the past 24 hours have been reviewed.    Significant Imaging: I have reviewed all pertinent imaging results/findings within the past 24 hours.    Assessment/Plan:      * Deep vein thrombosis (DVT) of femoral vein of left lower extremity    May-Thurner syndrome  History of intravascular stent placement  Per primary team cardiology. Started on heparin drip but switched to lovenox d/t n/v.         Type 2 diabetes mellitus without complication    Intractable vomiting with nausea  Diabetic ketoacidosis without coma associated with type 2 diabetes mellitus  Pt with , beta-hydroxybutyrate 1.0 and lactate 3.9. Received 10 u regular insulin and  1 L NS with minimal improvement in labs. Given another 10 u and 500 cc NS, then continuous infusion. Persistent vomiting poorly improved with IV zofran. Appears to have blood in vomit, though Hgb elevated/stable. Insulin drip initiated in ICU. b-HB fell to 0.2 so drip d/geraldo. A1C 12.7.   Continue IV fluids. Continue insulin coverage. IV protonix 40 mg BID. PRN antiemetics.           VTE Risk Mitigation (From admission, onward)        Ordered     enoxaparin injection 120 mg  Every 24 hours (non-standard times)      09/01/18 0517            Nickie Billy PA-C  Department of Hospital Medicine   [facility]

## 2018-09-02 NOTE — NURSING
Pt hypertensive with SBP 200s-220s. Given IV Labetalol, po Carvedilol, po hydralazine with no improvement. Pain and nausea managed with Morphine and Zofran. Pt resting comfortably with no complaints. Nickie AVILES notified. Orders received to d/c IV fluids and give Clonodine. Will cont to monitor.

## 2018-09-02 NOTE — H&P
"HISTORY AND PHYSICAL  Interventional Cardiology     CC:   Chief Complaint   Patient presents with    Leg Pain     Pt reports leg pain and "stiffness" that started last night. Pt denies SOB or chest pain. Pt reports he has stents in both his legs       HPI:  Kulwant Mueller Jr. is a 33 y.o. male with PMH of May-Thurner Syndrome s/p bilateral LE iliac vein stenting, who presents with extensive LLE DVT and DKA with nausea/vomiting.  Pertinent history/events as follows:     8/31/2018-9/1/2018 Pt and wife report that he developed leg swelling and pain similar to prior DVT the am of 8/31. He has not eaten since pm of 8/30. He presented to [facility] ED where BLE venous US showed occlusive thrombosis in the left common femoral, superficial femoral, popliteal, and posterior tibial veins.   Heparin drip was initiated and he was transferred to [facility] for admission by cardiology. Soon after initiating heparin drip, pt developed nausea and vomiting which has remained continuous for nearly 12 hours.  Blood glucose found to be greater than 400 and DKA was suspected.  Pt was started on vigorous IV hydration with normal saline and insulin was then given.  Labs revealed UA with ketones, anion gap of 16 with metabolic acidosis.  CBC revealed WBC of 18.41, suggestive of hemoconcentration. All findings consistent with DKA.  Etiology unclear. Blood cultures sent.  Pt started on vancomycin and zosyn. Heparin drip was discontinued and pt subsequently started on full dose lovenox.  Hospital medicine consulted for assistance with managing DKA.  Mr. Mueller has remained hemodynamically stable.       Review of Systems:  Constitution: Denies chills, fever, and sweats.  HENT: Denies headaches or blurry vision.  Cardiovascular: Denies chest pain or irregular heart beat.  Respiratory: Denies cough or shortness of breath.  Gastrointestinal: Positive for abdominal pain, nausea and vomiting.  Musculoskeletal: Positive for LLE pain and edema.  Neurological: " "Denies dizziness or focal weakness.  Psychiatric/Behavioral: Normal mental status.  Hematologic/Lymphatic: Denies bleeding problem or easy bruising/bleeding.  Skin: Denies rash or suspicious lesions    Past Medical History:   Diagnosis Date    Anticoagulant long-term use     Diabetes mellitus     Hypertension      Patient Active Problem List    Diagnosis Date Noted    Diabetic ketoacidosis without coma associated with type 2 diabetes mellitus 09/01/2018    Deep vein thrombosis (DVT) of femoral vein of left lower extremity 08/31/2018    History of intravascular stent placement 09/14/2017    May-Thurner syndrome 09/08/2017    Intractable vomiting with nausea 08/23/2017    Non morbid obesity due to excess calories 08/19/2017    Type 2 diabetes mellitus without complication 08/19/2017    Acute deep vein thrombosis (DVT) of proximal vein of left lower extremity 08/18/2017    Penile lesion 10/30/2013     Social History     Tobacco Use    Smoking status: Current Some Day Smoker     Packs/day: 0.00    Smokeless tobacco: Current User    Tobacco comment: occasionally    Substance Use Topics    Alcohol use: Yes     Comment: occ    Drug use: No     Family History   Problem Relation Age of Onset    Cancer Mother     Cancer Paternal Uncle     Cancer Paternal Grandfather     Irritable bowel syndrome Paternal Grandfather     Cancer Maternal Grandfather     Colon cancer Neg Hx     Esophageal cancer Neg Hx     Rectal cancer Neg Hx     Stomach cancer Neg Hx     Ulcerative colitis Neg Hx     Crohn's disease Neg Hx     Celiac disease Neg Hx      No current facility-administered medications on file prior to encounter.      Current Outpatient Medications on File Prior to Encounter   Medication Sig Dispense Refill    aspirin 81 MG Chew Take 1 tablet (81 mg total) by mouth once daily.  0    BD ULTRA-FINE DIYA PEN NEEDLES 32 gauge x 5/32" Ndle       INSULIN DETEMIR (LEVEMIR FLEXPEN SUBQ) Inject 30 Units into " the skin once daily. 30 units in morning not at night      insulin lispro (HUMALOG) 100 unit/mL injection Inject into the skin 3 (three) times daily before meals.       Review of patient's allergies indicates:   Allergen Reactions    Heparin analogues Nausea And Vomiting    Phenergan [promethazine] Nausea And Vomiting        Vital Signs (Most Recent)  Temp: 97.2 °F (36.2 °C) (09/01/18 1936)  Pulse: (!) 118 (09/01/18 2002)  Resp: 20 (09/01/18 1936)  BP: (!) 180/120 (09/01/18 1954)  SpO2: 97 % (09/01/18 1922)    Vital Signs Range (Last 24H):  Temp:  [96.3 °F (35.7 °C)-98.4 °F (36.9 °C)]   Pulse:  [100-122]   Resp:  [15-20]   BP: (136-190)/()   SpO2:  [96 %-98 %]     Physical Exam:  Constitutional: No acute distress, conversant  HEENT: Sclera anicteric, Pupils equal, round and reactive to light, extraocular motions intact, Oropharynx clear  Neck: No JVD, no carotid bruits  Cardiovascular: regular rate and rhythm, no murmur, rubs or gallops, normal S1/S2  Pulmonary: Clear to auscultation bilaterally  Abdominal: Abdomen soft, nontender, nondistended, positive bowel sounds  Extremities: LLE with moderate edema and TTP  Pulses:  Carotid pulses are 2+ on the right side, and 2+ on the left side.  Radial pulses are 2+ on the right side, and 2+ on the left side.   Femoral pulses are 2+ on the right side, and 2+ on the left side.  Popliteal pulses are 2+ on the right side, and 2+ on the left side.   Dorsalis pedis pulses are 2+ on the right side, and 2+ on the left side.   Posterior tibial pulses are 2+ on the right side, and 2+ on the left side.     Skin: No ecchymosis, erythema, or ulcers  Psych: Alert and oriented x 3, appropriate affect  Neuro: CNII-XII intact, no focal deficits        Laboratory:  Per above      ASSESSMENT:  Kulwant Mueller Jr. is a 33 y.o. male with PMH of May-Thurner Syndrome s/p bilateral LE iliac vein stenting, who presents with extensive LLE DVT and DKA with nausea/vomiting.     PLAN:    1. LLE  DVT- Pt with recurrent extensive LLE DVT as described.  Continue on full dose lovenox at this time and monitor clinical exam.  Etiology of recurrent DVT unclear.      2. DKA- Likely due in part to insulin indiscretion given A1c of 13.  Continue IV hydration with insulin.  Nausea/vomiting improving.  We thank Hospital Medicine for their assistance with managing DKA.  Agree with moving pt to ICU for insulin drip if indicated by Hospital Medicine recommendations.  Monitor closely on telemetry.       López Klein MD, PhD  Interventional Cardiology

## 2018-09-02 NOTE — PLAN OF CARE
Problem: Nausea/Vomiting (Adult)  Goal: Identify Related Risk Factors and Signs and Symptoms  Related risk factors and signs and symptoms are identified upon initiation of Human Response Clinical Practice Guideline (CPG)  Outcome: Ongoing (interventions implemented as appropriate)  Pt has continued to vomit PRN IV Zofran given.

## 2018-09-02 NOTE — ASSESSMENT & PLAN NOTE
Contributing Nutrition Diagnosis  Inadequate energy intake    Related to (etiology):   Decreased intake of meals    Signs and Symptoms (as evidenced by):   +n/v, clear liquids     Interventions/Recommendations (treatment strategy):  Diabetic, Low Sodium Diet    Nutrition Diagnosis Status:   New

## 2018-09-02 NOTE — PLAN OF CARE
Problem: Patient Care Overview  Goal: Plan of Care Review  Outcome: Ongoing (interventions implemented as appropriate)  Nutrition Goals: advancement energy intake within 48hrs  Nutrition Goal Status: new  Communication of RD Recs: (POC)    Nutrition Discharge Planning: Diabetic Low Sodium Diet with adequate intake to meet EEN/EPN promoting glucose control

## 2018-09-02 NOTE — PLAN OF CARE
"Spoke with Dr. Quintero to clarify if he wanted pt to go down to ICU and he stated "yes transfer him to ICU."  "

## 2018-09-03 PROBLEM — E11.10 DIABETIC KETOACIDOSIS WITHOUT COMA ASSOCIATED WITH TYPE 2 DIABETES MELLITUS: Status: RESOLVED | Noted: 2018-09-01 | Resolved: 2018-09-03

## 2018-09-03 LAB
ANION GAP SERPL CALC-SCNC: 12 MMOL/L
ANION GAP SERPL CALC-SCNC: 14 MMOL/L
BASOPHILS # BLD AUTO: 0 K/UL
BASOPHILS NFR BLD: 0 %
BNP SERPL-MCNC: <10 PG/ML
BUN SERPL-MCNC: 25 MG/DL
BUN SERPL-MCNC: 29 MG/DL
CALCIUM SERPL-MCNC: 9.3 MG/DL
CALCIUM SERPL-MCNC: 9.9 MG/DL
CHLORIDE SERPL-SCNC: 105 MMOL/L
CHLORIDE SERPL-SCNC: 106 MMOL/L
CO2 SERPL-SCNC: 21 MMOL/L
CO2 SERPL-SCNC: 23 MMOL/L
CREAT SERPL-MCNC: 1.6 MG/DL
CREAT SERPL-MCNC: 1.6 MG/DL
DIFFERENTIAL METHOD: ABNORMAL
EOSINOPHIL # BLD AUTO: 0 K/UL
EOSINOPHIL NFR BLD: 0 %
ERYTHROCYTE [DISTWIDTH] IN BLOOD BY AUTOMATED COUNT: 12.8 %
EST. GFR  (AFRICAN AMERICAN): >60 ML/MIN/1.73 M^2
EST. GFR  (AFRICAN AMERICAN): >60 ML/MIN/1.73 M^2
EST. GFR  (NON AFRICAN AMERICAN): 55 ML/MIN/1.73 M^2
EST. GFR  (NON AFRICAN AMERICAN): 55 ML/MIN/1.73 M^2
GLUCOSE SERPL-MCNC: 328 MG/DL
GLUCOSE SERPL-MCNC: 360 MG/DL
HCT VFR BLD AUTO: 46.7 %
HGB BLD-MCNC: 14 G/DL
HGB BLD-MCNC: 15.4 G/DL
LYMPHOCYTES # BLD AUTO: 1 K/UL
LYMPHOCYTES NFR BLD: 6.4 %
MAGNESIUM SERPL-MCNC: 2.3 MG/DL
MCH RBC QN AUTO: 29.3 PG
MCHC RBC AUTO-ENTMCNC: 33 G/DL
MCV RBC AUTO: 89 FL
MONOCYTES # BLD AUTO: 1.1 K/UL
MONOCYTES NFR BLD: 7.1 %
NEUTROPHILS # BLD AUTO: 13.9 K/UL
NEUTROPHILS NFR BLD: 86.3 %
PHOSPHATE SERPL-MCNC: 3.8 MG/DL
PLATELET # BLD AUTO: 206 K/UL
PMV BLD AUTO: 11 FL
POCT GLUCOSE: 195 MG/DL (ref 70–110)
POCT GLUCOSE: 271 MG/DL (ref 70–110)
POCT GLUCOSE: 300 MG/DL (ref 70–110)
POCT GLUCOSE: 343 MG/DL (ref 70–110)
POCT GLUCOSE: 372 MG/DL (ref 70–110)
POTASSIUM SERPL-SCNC: 4.1 MMOL/L
POTASSIUM SERPL-SCNC: 4.3 MMOL/L
RBC # BLD AUTO: 5.26 M/UL
SODIUM SERPL-SCNC: 140 MMOL/L
SODIUM SERPL-SCNC: 141 MMOL/L
VANCOMYCIN TROUGH SERPL-MCNC: 11.9 UG/ML
WBC # BLD AUTO: 16.13 K/UL

## 2018-09-03 PROCEDURE — 25000003 PHARM REV CODE 250: Performed by: INTERNAL MEDICINE

## 2018-09-03 PROCEDURE — 83880 ASSAY OF NATRIURETIC PEPTIDE: CPT

## 2018-09-03 PROCEDURE — 63600175 PHARM REV CODE 636 W HCPCS: Performed by: PHYSICIAN ASSISTANT

## 2018-09-03 PROCEDURE — 84100 ASSAY OF PHOSPHORUS: CPT

## 2018-09-03 PROCEDURE — 36415 COLL VENOUS BLD VENIPUNCTURE: CPT

## 2018-09-03 PROCEDURE — 63600175 PHARM REV CODE 636 W HCPCS: Performed by: INTERNAL MEDICINE

## 2018-09-03 PROCEDURE — 25000003 PHARM REV CODE 250: Performed by: PHYSICIAN ASSISTANT

## 2018-09-03 PROCEDURE — 85018 HEMOGLOBIN: CPT

## 2018-09-03 PROCEDURE — 80202 ASSAY OF VANCOMYCIN: CPT

## 2018-09-03 PROCEDURE — 20000000 HC ICU ROOM

## 2018-09-03 PROCEDURE — 80048 BASIC METABOLIC PNL TOTAL CA: CPT

## 2018-09-03 PROCEDURE — 99291 CRITICAL CARE FIRST HOUR: CPT | Mod: ,,, | Performed by: INTERNAL MEDICINE

## 2018-09-03 PROCEDURE — 94761 N-INVAS EAR/PLS OXIMETRY MLT: CPT

## 2018-09-03 PROCEDURE — 63600175 PHARM REV CODE 636 W HCPCS: Performed by: HOSPITALIST

## 2018-09-03 PROCEDURE — 83735 ASSAY OF MAGNESIUM: CPT

## 2018-09-03 PROCEDURE — 85025 COMPLETE CBC W/AUTO DIFF WBC: CPT

## 2018-09-03 PROCEDURE — C9113 INJ PANTOPRAZOLE SODIUM, VIA: HCPCS | Performed by: PHYSICIAN ASSISTANT

## 2018-09-03 RX ORDER — LORAZEPAM 0.5 MG/1
0.5 TABLET ORAL ONCE
Status: DISCONTINUED | OUTPATIENT
Start: 2018-09-03 | End: 2018-09-03

## 2018-09-03 RX ORDER — METOCLOPRAMIDE HYDROCHLORIDE 5 MG/ML
10 INJECTION INTRAMUSCULAR; INTRAVENOUS EVERY 6 HOURS
Status: DISCONTINUED | OUTPATIENT
Start: 2018-09-03 | End: 2018-09-04

## 2018-09-03 RX ADMIN — VANCOMYCIN HYDROCHLORIDE 1500 MG: 10 INJECTION, POWDER, LYOPHILIZED, FOR SOLUTION INTRAVENOUS at 06:09

## 2018-09-03 RX ADMIN — METOCLOPRAMIDE 10 MG: 5 INJECTION, SOLUTION INTRAMUSCULAR; INTRAVENOUS at 12:09

## 2018-09-03 RX ADMIN — ENOXAPARIN SODIUM 120 MG: 150 INJECTION SUBCUTANEOUS at 04:09

## 2018-09-03 RX ADMIN — LABETALOL HYDROCHLORIDE 10 MG: 5 INJECTION, SOLUTION INTRAVENOUS at 06:09

## 2018-09-03 RX ADMIN — INSULIN ASPART 1 UNITS: 100 INJECTION, SOLUTION INTRAVENOUS; SUBCUTANEOUS at 09:09

## 2018-09-03 RX ADMIN — PIPERACILLIN AND TAZOBACTAM 4.5 G: 4; .5 INJECTION, POWDER, LYOPHILIZED, FOR SOLUTION INTRAVENOUS; PARENTERAL at 12:09

## 2018-09-03 RX ADMIN — INSULIN ASPART 8 UNITS: 100 INJECTION, SOLUTION INTRAVENOUS; SUBCUTANEOUS at 12:09

## 2018-09-03 RX ADMIN — NICARDIPINE HYDROCHLORIDE 12 MG/HR: 0.2 INJECTION, SOLUTION INTRAVENOUS at 02:09

## 2018-09-03 RX ADMIN — NICARDIPINE HYDROCHLORIDE 10 MG/HR: 0.2 INJECTION, SOLUTION INTRAVENOUS at 12:09

## 2018-09-03 RX ADMIN — CLONIDINE HYDROCHLORIDE 0.1 MG: 0.1 TABLET ORAL at 08:09

## 2018-09-03 RX ADMIN — NICARDIPINE HYDROCHLORIDE 15 MG/HR: 0.2 INJECTION, SOLUTION INTRAVENOUS at 05:09

## 2018-09-03 RX ADMIN — HYDRALAZINE HYDROCHLORIDE 25 MG: 25 TABLET, FILM COATED ORAL at 05:09

## 2018-09-03 RX ADMIN — CLONIDINE HYDROCHLORIDE 0.1 MG: 0.1 TABLET ORAL at 04:09

## 2018-09-03 RX ADMIN — CARVEDILOL 6.25 MG: 6.25 TABLET, FILM COATED ORAL at 09:09

## 2018-09-03 RX ADMIN — HYDRALAZINE HYDROCHLORIDE 25 MG: 25 TABLET, FILM COATED ORAL at 09:09

## 2018-09-03 RX ADMIN — PIPERACILLIN AND TAZOBACTAM 4.5 G: 4; .5 INJECTION, POWDER, LYOPHILIZED, FOR SOLUTION INTRAVENOUS; PARENTERAL at 08:09

## 2018-09-03 RX ADMIN — ONDANSETRON 4 MG: 2 INJECTION INTRAMUSCULAR; INTRAVENOUS at 04:09

## 2018-09-03 RX ADMIN — DEXTROSE 40 MG: 50 INJECTION, SOLUTION INTRAVENOUS at 09:09

## 2018-09-03 RX ADMIN — PIPERACILLIN AND TAZOBACTAM 4.5 G: 4; .5 INJECTION, POWDER, LYOPHILIZED, FOR SOLUTION INTRAVENOUS; PARENTERAL at 04:09

## 2018-09-03 RX ADMIN — METOCLOPRAMIDE 10 MG: 5 INJECTION, SOLUTION INTRAMUSCULAR; INTRAVENOUS at 05:09

## 2018-09-03 RX ADMIN — MORPHINE SULFATE 2 MG: 4 INJECTION INTRAVENOUS at 05:09

## 2018-09-03 RX ADMIN — INSULIN DETEMIR 10 UNITS: 100 INJECTION, SOLUTION SUBCUTANEOUS at 09:09

## 2018-09-03 RX ADMIN — INSULIN ASPART 10 UNITS: 100 INJECTION, SOLUTION INTRAVENOUS; SUBCUTANEOUS at 08:09

## 2018-09-03 RX ADMIN — INSULIN ASPART 6 UNITS: 100 INJECTION, SOLUTION INTRAVENOUS; SUBCUTANEOUS at 04:09

## 2018-09-03 RX ADMIN — DEXTROSE 40 MG: 50 INJECTION, SOLUTION INTRAVENOUS at 08:09

## 2018-09-03 RX ADMIN — HYDRALAZINE HYDROCHLORIDE 25 MG: 25 TABLET, FILM COATED ORAL at 02:09

## 2018-09-03 RX ADMIN — CARVEDILOL 6.25 MG: 6.25 TABLET, FILM COATED ORAL at 08:09

## 2018-09-03 RX ADMIN — NICARDIPINE HYDROCHLORIDE 15 MG/HR: 0.2 INJECTION, SOLUTION INTRAVENOUS at 07:09

## 2018-09-03 RX ADMIN — PROCHLORPERAZINE EDISYLATE 10 MG: 5 INJECTION INTRAMUSCULAR; INTRAVENOUS at 12:09

## 2018-09-03 RX ADMIN — INSULIN DETEMIR 30 UNITS: 100 INJECTION, SOLUTION SUBCUTANEOUS at 08:09

## 2018-09-03 NOTE — PROGRESS NOTES
Progress Note  Cardiology    Admit Date: 8/31/2018   LOS: 1 day     Follow-up For: Recurrent LLE DVT and DKA     Scheduled Meds:   carvedilol  6.25 mg Oral BID    enoxaparin  1 mg/kg Subcutaneous Q24H    hydrALAZINE  25 mg Oral Q8H    insulin detemir U-100  10 Units Subcutaneous QHS    insulin detemir U-100  30 Units Subcutaneous Daily    metoclopramide HCl  10 mg Intravenous Q6H    pantoprazole 40 mg in dextrose 5 % 100 mL infusion (ready to mix system)  40 mg Intravenous BID    piperacillin-tazobactam (ZOSYN) IVPB  4.5 g Intravenous Q8H    vancomycin (VANCOCIN) IVPB  15 mg/kg (Order-Specific) Intravenous Q12H     Continuous Infusions:   niCARdipine 7 mg/hr (09/03/18 1220)       Review of patient's allergies indicates:   Allergen Reactions    Heparin analogues Nausea And Vomiting    Phenergan [promethazine] Nausea And Vomiting       SUBJECTIVE:   Hospital Course:    8/31/2018-9/1/2018 Pt and wife report that he developed leg swelling and pain similar to prior DVT the am of 8/31. He has not eaten since pm of 8/30. He presented to [facility] ED where BLE venous US showed occlusive thrombosis in the left common femoral, superficial femoral, popliteal, and posterior tibial veins.   Heparin drip was initiated and he was transferred to [facility] [place] by cardiology. Soon after initiating heparin drip, pt developed nausea and vomiting which has remained continuous for nearly 12 hours.  Blood glucose found to be greater than 400 and DKA was suspected.  Pt was started on vigorous IV hydration with normal saline and insulin was then given.  Labs revealed UA with ketones, anion gap of 16 with metabolic acidosis.  CBC revealed WBC of 18.41, suggestive of hemoconcentration. All findings consistent with DKA.  Etiology unclear. Blood cultures sent.  Pt started on vancomycin and zosyn. Heparin drip was discontinued and pt subsequently started on full dose lovenox.  Hospital medicine consulted for assistance with managing  DKA.  Mr. Mueller has remained hemodynamically stable.  Pt transferred to the ICU overnight for insulin drip.    Interval History: Insulin drip now discontinued.  Mr. Mueller states he's feeling much better today.  Nausea and vomiting significantly improved.  Glucose improving with insulin and IVF's.          9/3/2018:      He is currently in ICU       No longer on insulin drip  Currently on cardene drip for BP control  LLE DVT                     OBJECTIVE:     Vital Signs (Most Recent)  Temp: 97.5 °F (36.4 °C) (09/03/18 1101)  Pulse: 107 (09/03/18 1145)  Resp: 19 (09/03/18 1145)  BP: 137/73 (09/03/18 1130)  SpO2: 97 % (09/03/18 1145)    Vital Signs Range (Last 24H):  Temp:  [97.5 °F (36.4 °C)-99.2 °F (37.3 °C)]   Pulse:  []   Resp:  [10-39]   BP: (137-228)/()   SpO2:  [94 %-98 %]     I & O (Last 24H):    Intake/Output Summary (Last 24 hours) at 9/3/2018 1300  Last data filed at 9/3/2018 1220  Gross per 24 hour   Intake 1913.75 ml   Output 2225 ml   Net -311.25 ml       Physical Exam:  Constitutional: No acute distress, appears comfortable  HEENT: Sclera anicteric, Pupils equal, round and reactive to light, extraocular motions intact, Oropharynx clear  Neck: No JVD, no carotid bruits  Cardiovascular: regular rate and rhythm, no murmur, rubs or gallops, normal S1/S2  Pulmonary: Clear to auscultation bilaterally  Abdominal: Abdomen soft, nontender, nondistended, positive bowel sounds  Extremities: LLE with moderate edema and TTP  Pulses:  Carotid pulses are 2+ on the right side, and 2+ on the left side.  Radial pulses are 2+ on the right side, and 2+ on the left side.   Femoral pulses are 2+ on the right side, and 2+ on the left side.  Popliteal pulses are 2+ on the right side, and 2+ on the left side.   Dorsalis pedis pulses are 2+ on the right side, and 2+ on the left side.   Posterior tibial pulses are 2+ on the right side, and 2+ on the left side.     Skin: No ecchymosis, erythema, or ulcers  Psych:  Alert and oriented x 3, appropriate affect  Neuro: CNII-XII intact, no focal deficits      Laboratory:  Chemistry:  Lab Results   Component Value Date     09/03/2018    K 4.1 09/03/2018     09/03/2018    CO2 23 09/03/2018    BUN 29 (H) 09/03/2018    CREATININE 1.6 (H) 09/03/2018    CALCIUM 9.3 09/03/2018    BNP <10 09/03/2018     Cardiac Markers:No results found for: CKTOTAL, CKMB, CKMBINDEX, TROPONINI  Cardiac Markers (Last 3):No results found for: CKTOTAL, CKMB, CKMBINDEX, TROPONINI  CBC:   Lab Results   Component Value Date    WBC 16.13 (H) 09/03/2018    HGB 14.0 09/03/2018    HCT 46.7 09/03/2018    HCT 54 05/19/2016    MCV 89 09/03/2018     09/03/2018     Lipids:  Lab Results   Component Value Date    CHOL 202 (H) 01/11/2018    TRIG 145 01/11/2018    HDL 34 (L) 01/11/2018     Coagulation:   Lab Results   Component Value Date    INR 1.0 09/01/2018    APTT 23.1 09/01/2018       Diagnostic Results:  Labs: Reviewed  ECG: Reviewed  X-Ray: Reviewed  US: Reviewed      ASSESSMENT/PLAN:   Kulwant Mueller Jr. is a 33 y.o. male with PMH of May-Thurner Syndrome s/p bilateral LE iliac vein stenting, who presents with extensive LLE DVT and DKA with nausea/vomiting.      PLAN:   1. LLE DVT- Pt with recurrent extensive LLE DVT.  Continue on full dose lovenox at this time and monitor clinical exam.  Reassess need for possible EKOS in am.       2. DKA- Likely due in part to insulin indiscretion given A1c of 13.  Continue IV hydration with insulin.  Nausea/vomiting improving.  We thank Hospital Medicine for their assistance with managing DKA.  Monitor closely on telemetry.        Titrate oral HTN agents  Wean cardene to off          Spoke with father at the bedside

## 2018-09-03 NOTE — PROGRESS NOTES
[facility] Hospital Medicine  Progress Note    Patient Name: Kulwant Mueller Jr.  MRN: [id number]  Patient Class: IP- Inpatient   Admission Date: 8/31/2018  Length of Stay: 1 days  Attending Physician: Madan Quintero MD  Primary Care Provider: Dona Pineda MD        Subjective:     Principal Problem:Deep vein thrombosis (DVT) of femoral vein of left lower extremity    HPI:  Mr. Kulwant Mueller is a 34 yo black man with May-Thurner syndrome, morbid obesity, and DM 2 who presents today with a recurrence of DVT of the left leg. Hospital medicine has been consulted to manage DKA. Pt and wife report that he developed leg swelling and pain similar to prior DVT the am of 8/31. He has not eaten since pm of 8/30. He presented to [facility] ED where BLE venous US showed occlusive thrombosis in the left common femoral, superficial femoral, popliteal, and posterior tibial veins. CBC revealed WBC of 18.41, and indicated hemoconcentration. Pt was started on vancomycin and zosyn. Heparin drip was initiated and he was transferred to [facility] [place] by cardiology. Soon after initiating heparin drip, pt developed nausea and vomiting which has remained continuous for nearly 12 hours. Cr increased from 0.9 to 1.5, blood glucose was elevated at 435. Lactate 3.9, beta-hydroxybutyrate was 1.0, UA showed ketones. He was given 10 units of insulin, PRN antiemetics, and 1 L NS. Heparin drip was switched to lovenox for presumed sensitivity, as similar symptoms were experienced during his admission for DVT one year ago. EGD at that time was inconclusive, and gastroparesis was suspected. Vomit had some blood in it so IV PPI was initiated.     Hospital Course:  Pt transferred to ICU for insulin drip. Vomiting abated by 9/2 am. Hgb remained stable though elevated. BMP improved. Beta-hydroxybutyrate fell to 0.2, so drip was discontinued. BP increased and remained elevated >200, so cardene drip was initiated by cardiology. IV reglan  was started for suspected gastroparesis.     Interval History: Had more vomiting this morning. Blood pressures tend to spike when pt wakes up but are controlled while he is sleeping.    Review of Systems   Unable to perform ROS: Other     Objective:     Vital Signs (Most Recent):  Temp: 97.6 °F (36.4 °C) (09/03/18 0701)  Pulse: 109 (09/03/18 1115)  Resp: 19 (09/03/18 1115)  BP: (!) 148/74 (09/03/18 1115)  SpO2: 97 % (09/03/18 1115) Vital Signs (24h Range):  Temp:  [97.6 °F (36.4 °C)-99.2 °F (37.3 °C)] 97.6 °F (36.4 °C)  Pulse:  [] 109  Resp:  [10-39] 19  SpO2:  [94 %-98 %] 97 %  BP: (139-228)/() 148/74     Weight: 115.2 kg (253 lb 15.5 oz)  Body mass index is 33.51 kg/m².    Intake/Output Summary (Last 24 hours) at 9/3/2018 1141  Last data filed at 9/3/2018 1105  Gross per 24 hour   Intake 4320.32 ml   Output 2225 ml   Net 2095.32 ml      Physical Exam   Constitutional: He appears well-developed and well-nourished. No distress.   Pulmonary/Chest: Effort normal.   Skin: Skin is warm and dry. He is not diaphoretic.       Significant Labs: All pertinent labs within the past 24 hours have been reviewed.    Significant Imaging: I have reviewed all pertinent imaging results/findings within the past 24 hours.    Assessment/Plan:      * Deep vein thrombosis (DVT) of femoral vein of left lower extremity    May-Thurner syndrome  History of intravascular stent placement  Per primary team cardiology. Started on heparin drip but switched to lovenox d/t n/v. Waiting for better BG and BP control before proceeding with intervention.        Type 2 diabetes mellitus without complication    Intractable vomiting with nausea  Diabetic ketoacidosis without coma associated with type 2 diabetes mellitus  Pt with , beta-hydroxybutyrate 1.0 and lactate 3.9. Received 10 u regular insulin and 1 L NS with minimal improvement in labs. Given another 10 u and 500 cc NS, then continuous infusion. Persistent vomiting poorly  improved with IV zofran. Appears to have blood in vomit, though Hgb elevated/stable. Insulin drip initiated in ICU. b-HB fell to 0.2 so drip d/geraldo and started on scheduled insulin. A1C 12.7. Vomiting reported this am, but no blood was present.  Continue IV fluids. Continue insulin coverage with 30 units qam and 10 units qhs, and 7u aspart with meals. IV protonix 40 mg BID. PRN antiemetics.           VTE Risk Mitigation (From admission, onward)        Ordered     enoxaparin injection 120 mg  Every 24 hours (non-standard times)      09/01/18 2147              Nickie Billy PA-C  Department of Hospital Medicine   [facility]

## 2018-09-03 NOTE — ASSESSMENT & PLAN NOTE
Intractable vomiting with nausea  Diabetic ketoacidosis without coma associated with type 2 diabetes mellitus  Pt with , beta-hydroxybutyrate 1.0 and lactate 3.9. Received 10 u regular insulin and 1 L NS with minimal improvement in labs. Given another 10 u and 500 cc NS, then continuous infusion. Persistent vomiting poorly improved with IV zofran. Appears to have blood in vomit, though Hgb elevated/stable. Insulin drip initiated in ICU. b-HB fell to 0.2 so drip d/geraldo and started on scheduled insulin. A1C 12.7. Vomiting reported this am, but no blood was present.  Continue IV fluids. Continue insulin coverage with 30 units qam and 10 units qhs, and 7u aspart with meals. IV protonix 40 mg BID. PRN antiemetics.

## 2018-09-03 NOTE — SUBJECTIVE & OBJECTIVE
Interval History: Had more vomiting this morning. Blood pressures tend to spike when pt wakes up but are controlled while he is sleeping.    Review of Systems   Unable to perform ROS: Other     Objective:     Vital Signs (Most Recent):  Temp: 97.6 °F (36.4 °C) (09/03/18 0701)  Pulse: 109 (09/03/18 1115)  Resp: 19 (09/03/18 1115)  BP: (!) 148/74 (09/03/18 1115)  SpO2: 97 % (09/03/18 1115) Vital Signs (24h Range):  Temp:  [97.6 °F (36.4 °C)-99.2 °F (37.3 °C)] 97.6 °F (36.4 °C)  Pulse:  [] 109  Resp:  [10-39] 19  SpO2:  [94 %-98 %] 97 %  BP: (139-228)/() 148/74     Weight: 115.2 kg (253 lb 15.5 oz)  Body mass index is 33.51 kg/m².    Intake/Output Summary (Last 24 hours) at 9/3/2018 1141  Last data filed at 9/3/2018 1105  Gross per 24 hour   Intake 4320.32 ml   Output 2225 ml   Net 2095.32 ml      Physical Exam   Constitutional: He appears well-developed and well-nourished. No distress.   Pulmonary/Chest: Effort normal.   Skin: Skin is warm and dry. He is not diaphoretic.       Significant Labs: All pertinent labs within the past 24 hours have been reviewed.    Significant Imaging: I have reviewed all pertinent imaging results/findings within the past 24 hours.

## 2018-09-03 NOTE — PLAN OF CARE
Problem: Patient Care Overview  Goal: Plan of Care Review  Outcome: Ongoing (interventions implemented as appropriate)  Mr Blum remains free of falls/trauma/injuries this shift.  Neuro- AAOX4, Afebrile (Tmax 99.2)  Received PRN Morphine x2 for left lower extremity pain; Refused Oxycodone-acetaminophen due to fear of vomiting.   Resp- Spo2 95-98% on RA, no s/s of respiratory distress.   Cardio-Remains in NSR/ST. Hypertensive as high as the 200s (systolic).   Managed with Scheduled BP meds, cardene gtts and PRN BP meds.   Maxed out on Cardene drip (at 15mg/hr).  PRN Clonidine and Labetalol given as well.   GI- States he feels full and feels like something is stuck in his throat.  Had a few episodes of nausea and vomitting;   PRN Zofran and prochlorperazine given, non-pharmacological measures also employed with mild to full relief.    - Voids spontaneously per urinal, UOP adequate (please see I/O flowsheet for details)  Endocrine- BG in 200s, PRN Novolog given for coverage.   Skin- General skin remains intact, PIVs CDI x2.   Left leg remains swollen, intermittently complains of pain; bedrest maintained.   On Lovenox to Treat DVT.   Other Interventions ongoing in the management of patient's DM2, Hypertensive urgency, and DVT.   General POC reviewed with patient and family; They verbalized understanding, need continuous reinforcement.

## 2018-09-03 NOTE — ASSESSMENT & PLAN NOTE
May-Thurner syndrome  History of intravascular stent placement  Per primary team cardiology. Started on heparin drip but switched to lovenox d/t n/v. Waiting for better BG and BP control before proceeding with intervention.

## 2018-09-04 PROBLEM — I87.1 MAY-THURNER SYNDROME: Chronic | Status: ACTIVE | Noted: 2017-09-08

## 2018-09-04 PROBLEM — R11.2 INTRACTABLE VOMITING WITH NAUSEA: Status: RESOLVED | Noted: 2017-08-23 | Resolved: 2018-09-04

## 2018-09-04 PROBLEM — Z79.4 TYPE 2 DIABETES MELLITUS WITH HYPERGLYCEMIA, WITH LONG-TERM CURRENT USE OF INSULIN: Chronic | Status: ACTIVE | Noted: 2017-08-19

## 2018-09-04 PROBLEM — E11.65 TYPE 2 DIABETES MELLITUS WITH HYPERGLYCEMIA, WITH LONG-TERM CURRENT USE OF INSULIN: Chronic | Status: ACTIVE | Noted: 2017-08-19

## 2018-09-04 PROBLEM — E11.10 DKA (DIABETIC KETOACIDOSES): Status: RESOLVED | Noted: 2018-09-02 | Resolved: 2018-09-04

## 2018-09-04 LAB
ANION GAP SERPL CALC-SCNC: 11 MMOL/L
BASOPHILS # BLD AUTO: 0.01 K/UL
BASOPHILS NFR BLD: 0.1 %
BUN SERPL-MCNC: 26 MG/DL
CALCIUM SERPL-MCNC: 9.3 MG/DL
CHLORIDE SERPL-SCNC: 106 MMOL/L
CO2 SERPL-SCNC: 25 MMOL/L
CREAT SERPL-MCNC: 1.4 MG/DL
DIFFERENTIAL METHOD: ABNORMAL
EOSINOPHIL # BLD AUTO: 0 K/UL
EOSINOPHIL NFR BLD: 0 %
ERYTHROCYTE [DISTWIDTH] IN BLOOD BY AUTOMATED COUNT: 12.9 %
EST. GFR  (AFRICAN AMERICAN): >60 ML/MIN/1.73 M^2
EST. GFR  (NON AFRICAN AMERICAN): >60 ML/MIN/1.73 M^2
GLUCOSE SERPL-MCNC: 262 MG/DL
HCT VFR BLD AUTO: 44.7 %
HGB BLD-MCNC: 14 G/DL
LYMPHOCYTES # BLD AUTO: 1.4 K/UL
LYMPHOCYTES NFR BLD: 11.2 %
MAGNESIUM SERPL-MCNC: 2.4 MG/DL
MCH RBC QN AUTO: 28.1 PG
MCHC RBC AUTO-ENTMCNC: 31.3 G/DL
MCV RBC AUTO: 90 FL
MONOCYTES # BLD AUTO: 0.9 K/UL
MONOCYTES NFR BLD: 6.6 %
NEUTROPHILS # BLD AUTO: 10.5 K/UL
NEUTROPHILS NFR BLD: 81.9 %
PHOSPHATE SERPL-MCNC: 3.3 MG/DL
PLATELET # BLD AUTO: 214 K/UL
PMV BLD AUTO: 10.9 FL
POCT GLUCOSE: 181 MG/DL (ref 70–110)
POCT GLUCOSE: 246 MG/DL (ref 70–110)
POCT GLUCOSE: 260 MG/DL (ref 70–110)
POCT GLUCOSE: 84 MG/DL (ref 70–110)
POTASSIUM SERPL-SCNC: 3.7 MMOL/L
RBC # BLD AUTO: 4.98 M/UL
SODIUM SERPL-SCNC: 142 MMOL/L
WBC # BLD AUTO: 12.81 K/UL

## 2018-09-04 PROCEDURE — 25000003 PHARM REV CODE 250: Performed by: PHYSICIAN ASSISTANT

## 2018-09-04 PROCEDURE — 63600175 PHARM REV CODE 636 W HCPCS: Performed by: PHYSICIAN ASSISTANT

## 2018-09-04 PROCEDURE — 63600175 PHARM REV CODE 636 W HCPCS: Performed by: INTERNAL MEDICINE

## 2018-09-04 PROCEDURE — 25000003 PHARM REV CODE 250: Performed by: INTERNAL MEDICINE

## 2018-09-04 PROCEDURE — 63600175 PHARM REV CODE 636 W HCPCS: Performed by: HOSPITALIST

## 2018-09-04 PROCEDURE — C9113 INJ PANTOPRAZOLE SODIUM, VIA: HCPCS | Performed by: PHYSICIAN ASSISTANT

## 2018-09-04 PROCEDURE — 85025 COMPLETE CBC W/AUTO DIFF WBC: CPT

## 2018-09-04 PROCEDURE — 36415 COLL VENOUS BLD VENIPUNCTURE: CPT

## 2018-09-04 PROCEDURE — 99233 SBSQ HOSP IP/OBS HIGH 50: CPT | Mod: ,,, | Performed by: INTERNAL MEDICINE

## 2018-09-04 PROCEDURE — 63600175 PHARM REV CODE 636 W HCPCS: Performed by: NURSE PRACTITIONER

## 2018-09-04 PROCEDURE — 25000003 PHARM REV CODE 250: Performed by: HOSPITALIST

## 2018-09-04 PROCEDURE — 80048 BASIC METABOLIC PNL TOTAL CA: CPT

## 2018-09-04 PROCEDURE — 83735 ASSAY OF MAGNESIUM: CPT

## 2018-09-04 PROCEDURE — 11000001 HC ACUTE MED/SURG PRIVATE ROOM

## 2018-09-04 PROCEDURE — 94761 N-INVAS EAR/PLS OXIMETRY MLT: CPT

## 2018-09-04 PROCEDURE — 84100 ASSAY OF PHOSPHORUS: CPT

## 2018-09-04 RX ORDER — CARVEDILOL 12.5 MG/1
12.5 TABLET ORAL 2 TIMES DAILY
Status: DISCONTINUED | OUTPATIENT
Start: 2018-09-04 | End: 2018-09-04

## 2018-09-04 RX ORDER — METOCLOPRAMIDE 10 MG/1
10 TABLET ORAL
Status: DISCONTINUED | OUTPATIENT
Start: 2018-09-04 | End: 2018-09-07

## 2018-09-04 RX ORDER — AMLODIPINE BESYLATE 5 MG/1
10 TABLET ORAL DAILY
Status: DISCONTINUED | OUTPATIENT
Start: 2018-09-04 | End: 2018-09-08 | Stop reason: HOSPADM

## 2018-09-04 RX ORDER — INSULIN ASPART 100 [IU]/ML
25 INJECTION, SOLUTION INTRAVENOUS; SUBCUTANEOUS
Status: DISCONTINUED | OUTPATIENT
Start: 2018-09-04 | End: 2018-09-05

## 2018-09-04 RX ORDER — ENOXAPARIN SODIUM 150 MG/ML
1 INJECTION SUBCUTANEOUS
Status: DISCONTINUED | OUTPATIENT
Start: 2018-09-04 | End: 2018-09-05

## 2018-09-04 RX ORDER — CARVEDILOL 12.5 MG/1
12.5 TABLET ORAL 2 TIMES DAILY
Status: DISCONTINUED | OUTPATIENT
Start: 2018-09-04 | End: 2018-09-05

## 2018-09-04 RX ADMIN — PIPERACILLIN AND TAZOBACTAM 4.5 G: 4; .5 INJECTION, POWDER, LYOPHILIZED, FOR SOLUTION INTRAVENOUS; PARENTERAL at 12:09

## 2018-09-04 RX ADMIN — INSULIN ASPART 1 UNITS: 100 INJECTION, SOLUTION INTRAVENOUS; SUBCUTANEOUS at 09:09

## 2018-09-04 RX ADMIN — ENOXAPARIN SODIUM 120 MG: 150 INJECTION SUBCUTANEOUS at 12:09

## 2018-09-04 RX ADMIN — INSULIN ASPART 4 UNITS: 100 INJECTION, SOLUTION INTRAVENOUS; SUBCUTANEOUS at 12:09

## 2018-09-04 RX ADMIN — METOCLOPRAMIDE 10 MG: 10 TABLET ORAL at 11:09

## 2018-09-04 RX ADMIN — PIPERACILLIN AND TAZOBACTAM 4.5 G: 4; .5 INJECTION, POWDER, LYOPHILIZED, FOR SOLUTION INTRAVENOUS; PARENTERAL at 05:09

## 2018-09-04 RX ADMIN — METOCLOPRAMIDE 10 MG: 5 INJECTION, SOLUTION INTRAMUSCULAR; INTRAVENOUS at 12:09

## 2018-09-04 RX ADMIN — PIPERACILLIN AND TAZOBACTAM 4.5 G: 4; .5 INJECTION, POWDER, LYOPHILIZED, FOR SOLUTION INTRAVENOUS; PARENTERAL at 08:09

## 2018-09-04 RX ADMIN — AMLODIPINE BESYLATE 10 MG: 5 TABLET ORAL at 06:09

## 2018-09-04 RX ADMIN — METOCLOPRAMIDE 10 MG: 5 INJECTION, SOLUTION INTRAMUSCULAR; INTRAVENOUS at 06:09

## 2018-09-04 RX ADMIN — ENOXAPARIN SODIUM 120 MG: 150 INJECTION SUBCUTANEOUS at 10:09

## 2018-09-04 RX ADMIN — ONDANSETRON 4 MG: 2 INJECTION INTRAMUSCULAR; INTRAVENOUS at 09:09

## 2018-09-04 RX ADMIN — INSULIN ASPART 25 UNITS: 100 INJECTION, SOLUTION INTRAVENOUS; SUBCUTANEOUS at 12:09

## 2018-09-04 RX ADMIN — DEXTROSE 40 MG: 50 INJECTION, SOLUTION INTRAVENOUS at 08:09

## 2018-09-04 RX ADMIN — CARVEDILOL 12.5 MG: 12.5 TABLET, FILM COATED ORAL at 09:09

## 2018-09-04 RX ADMIN — DEXTROSE 40 MG: 50 INJECTION, SOLUTION INTRAVENOUS at 09:09

## 2018-09-04 RX ADMIN — INSULIN ASPART 6 UNITS: 100 INJECTION, SOLUTION INTRAVENOUS; SUBCUTANEOUS at 06:09

## 2018-09-04 RX ADMIN — CARVEDILOL 12.5 MG: 12.5 TABLET, FILM COATED ORAL at 06:09

## 2018-09-04 RX ADMIN — METOCLOPRAMIDE 10 MG: 10 TABLET ORAL at 05:09

## 2018-09-04 NOTE — HOSPITAL COURSE
8/31/2018-9/1/2018 Pt and wife report that he developed leg swelling and pain similar to prior DVT the am of 8/31. He has not eaten since pm of 8/30. He presented to [facility] ED where BLE venous US showed occlusive thrombosis in the left common femoral, superficial femoral, popliteal, and posterior tibial veins.   Heparin drip was initiated and he was transferred to [facility] for admission by cardiology. Soon after initiating heparin drip, pt developed nausea and vomiting which has remained continuous for nearly 12 hours.  Blood glucose found to be greater than 400 and DKA was suspected.  Pt was started on vigorous IV hydration with normal saline and insulin was then given.  Labs revealed UA with ketones, anion gap of 16 with metabolic acidosis.  CBC revealed WBC of 18.41, suggestive of hemoconcentration. All findings consistent with DKA.  Etiology unclear. Blood cultures sent.  Pt started on vancomycin and zosyn. Heparin drip was discontinued and pt subsequently started on full dose lovenox.  Hospital medicine consulted for assistance with managing DKA.  Mr. Mueller has remained hemodynamically stable.  Pt transferred to the ICU overnight for insulin drip.  9/2/2018 Insulin drip now discontinued. Symptoms improved and nausea and vomiting significantly improved.  Glucose improving with insulin and IVF's. Continue full dose lovenox at this time and monitor clinical exam with plan for reassessment for need of EKOS.  DKA related to insulin indiscretion given A1c of 13.    9/3/2018 On IV Cardene drip for BP control. BS improved and remains off Insulin. On FD Lovenox for LLE DVT. Will continue with current plan of DVT and HTN management medically. Will re assess to determine if EKOS catheter needed at later time   9/4/2018 LLE swelling remains-patient uncertain if any improvement. CBGs overnight 195-300 and off insulin drip. BP improved to 150s/90s and off Cardene drip. Awaiting transfer to floor. Will continue with SQ Lovenox  treatment for now. Discuss with staff on rounds re: plans for EKOS catheter treatment   9/5/2018 NPO for EKOS catheter guided TPA otday  9/6/2018 Underwent EKOS catheter placement yesterday via left pedal access with following results:  Successful EKOS catheter placement with initiation of catheter directed thrombolysis with tPA at 1 mg/hr.  Catheter placed in LCIV (50 cm) with extension to distal LSFV with systemic bivalirudin intiated  Labs stable this AM. Complains of nausea and vomiting this AM ?pain medication related. Swelling remains with only slight improvement. Will continue EKOS catheter guided TPA for now along with Angiomax   9/7/2018 EKOS catheter with TPA and systemic Angiomax maintained overnight. LLE swelling dramatically improved overnight and left leg softer. Decision not to proceed with repeat venogram. EKOS catheter discontinued and will start on Xarelto this evening. Recurrent nausea and vomiting overnight and this AM with no abdominal pain. Amylase, lipase and LFTs WNL. Will transfer to the floor and anticipate discharge in AM

## 2018-09-04 NOTE — NURSING TRANSFER
Nursing Transfer Note      9/4/2018     Transfer To: [id number]    Transfer via wheelchair    Transfer with cardiac monitoring    Transported by RN and Pt transport    Medicines sent: Yes    Chart send with patient: Yes    Notified: father at the bedside    Patient reassessed at: 9/4/18 @ 1000 (date, time)    Upon arrival to floor: cardiac monitor applied, patient oriented to room, call bell in reach and bed in lowest position; receiving RN (Suly) at the bedside.

## 2018-09-04 NOTE — SUBJECTIVE & OBJECTIVE
Review of Systems   Constitution: Negative for chills, decreased appetite, diaphoresis, fever and weakness.   Cardiovascular: Positive for leg swelling. Negative for chest pain, claudication, cyanosis, dyspnea on exertion, irregular heartbeat, near-syncope, orthopnea, palpitations, paroxysmal nocturnal dyspnea and syncope.   Respiratory: Negative for cough, hemoptysis, shortness of breath and wheezing.    Gastrointestinal: Negative for bloating, abdominal pain, constipation, diarrhea, melena, nausea and vomiting.   Neurological: Negative for dizziness.     Objective:     Vital Signs (Most Recent):  Temp: 98.7 °F (37.1 °C) (09/04/18 0730)  Pulse: 94 (09/04/18 1000)  Resp: 17 (09/04/18 1000)  BP: (!) 159/97 (09/04/18 1000)  SpO2: 97 % (09/04/18 1000) Vital Signs (24h Range):  Temp:  [97.5 °F (36.4 °C)-99.2 °F (37.3 °C)] 98.7 °F (37.1 °C)  Pulse:  [] 94  Resp:  [10-22] 17  SpO2:  [94 %-99 %] 97 %  BP: (125-177)/() 159/97     Weight: 118.8 kg (261 lb 14.5 oz)  Body mass index is 34.55 kg/m².     SpO2: 97 %  O2 Device (Oxygen Therapy): room air      Intake/Output Summary (Last 24 hours) at 9/4/2018 1051  Last data filed at 9/4/2018 0859  Gross per 24 hour   Intake 1090.26 ml   Output 1550 ml   Net -459.74 ml       Lines/Drains/Airways     Peripheral Intravenous Line                 Peripheral IV - Single Lumen 08/31/18 0823 Anterior;Right Wrist 4 days         Peripheral IV - Single Lumen 08/31/18 1348 Left Forearm 3 days                Physical Exam   Constitutional: He is oriented to person, place, and time. He appears well-developed and well-nourished. No distress.   Cardiovascular: Normal rate and regular rhythm. Exam reveals no gallop.   No murmur heard.  Pulmonary/Chest: Effort normal and breath sounds normal. No respiratory distress. He has no wheezes.   Abdominal: Soft. Bowel sounds are normal. He exhibits no distension. There is no tenderness.   Musculoskeletal: He exhibits edema (3+ LLE swelling  present ).   Neurological: He is alert and oriented to person, place, and time.   Skin: Skin is warm and dry.       Significant Labs:       Recent Labs   Lab  09/04/18   0325  09/04/18   0326   NA   142  142   K   3.7   3.7   CL   106   106   CO2   25  25   BUN   26   26*   CREATININE   1.4   1.4   MG  2.4   --      Recent Labs   Lab  09/04/18   0325   WBC  12.81*   RBC  4.98   HGB  14.0   HCT  44.7   PLT  214   MCV  90   MCH  28.1   MCHC  31.3*     Significant Imaging: Echocardiogram: 2D echo with color flow doppler: No results found for this or any previous visit.

## 2018-09-04 NOTE — NURSING
Patient to rm via w/c with ICU RN. Tele monitor placed on patient as per orders. At present no distress noted. Patient states feeling very tired. Family at bedside. IV sites x2 dressings c/d/i. Will cont to monitor pt and intervene prn.

## 2018-09-04 NOTE — PLAN OF CARE
TN went to meet with patient, no family at bedside. Patient is asleep at this time. TN reviewed patient's clinicals. No anticipated discharge needs at this time. Will continue to follow.    Future Appointments   Date Time Provider Department Center   9/6/2018  3:20 PM Carlos Alberto Ruby NP [facility]        09/04/18 6610   Discharge Reassessment   Assessment Type Discharge Planning Reassessment   Provided patient/caregiver education on the expected discharge date and the discharge plan No   Discharge Plan A Home with family   Discharge Plan B Home with family;Home Health     Nayeli Strickland RN  Transition Navigator  [phone]

## 2018-09-04 NOTE — ASSESSMENT & PLAN NOTE
-presented with abdominal pain, nausea and vomiting  -initially concerned about infectious etiology and started on IV Vanc and Zosyn with blood cultures ordered  -initial blood cultures with NGTD and Vanc de escalated; BS noted to be 400 with anion gap treated with IVF and IV Insulin  -Hospital Medicine consulted for BS management  -[id number] 12.7 upon admission  -weaned off insulin drip and placed on SQ regimen with Levemir 60units QHS with Novology 25units TID   -CBGs overnight 195-300  -appreciate assistance of Hospital Medicine

## 2018-09-04 NOTE — PROGRESS NOTES
[facility] Hospital Medicine  Progress Note    Patient Name: Kulwant Mueller Jr.  MRN: [id number]  Patient Class: IP- Inpatient   Admission Date: 8/31/2018  Length of Stay: 2 days  Attending Physician: Madan Quintero MD  Primary Care Provider: Dona Pineda MD        Subjective:     Principal Problem:Deep vein thrombosis (DVT) of femoral vein of left lower extremity    HPI:  Kulwant Mueller Jr. is a 33 y.o. black man with obesity, hypertension, insulin-dependent diabetes mellitus type 2, May-Thurner syndrome, cigarette smoking.  He lives in [place], [place].  His primary care physician is Dr. Dona Pineda.   He was admitted to [facility] by Cardiology for recurrent left leg deep vein thrombosis.  Ultrasound at [facility] showed showed occlusive thrombosis in the left common femoral, superficial femoral, popliteal, and posterior tibial veins.  He was started on heparin drip, piperacillin-tazobactam, and vancomycin.  He developed intractable nausea and vomiting soon after starting the heparin drip, which was changed to enoxaparin injections because he experienced similar symptoms with heparin drip a year ago.  EGD at that time was inconclusive so gastroparesis was suspected.  Labs showed diabetic ketoacidosis.  [facility] was consulted to manage diabetic ketoacidosis.  His vomit had some blood in it so pantoprazole was started.      Hospital Course:  DKA was treated with insulin drip.  Vomiting abated by the morning of 9/2/18.  Beta-hydroxybutyrate fell to 0.2, so drip was discontinued.  Hemoglobin A1c was 12.7% (and was 9.1% on 1/11/18) showing that he is chronically hyperglycemic.  He takes insulin detemir 30 units daily in the morning and insulin lispro sliding scale with meals.  Blood pressure increased and remained elevated >200, so Cardiology put him on nicardipine drip.  IV metoclopramide was started  for suspected gastroparesis.  On 9/4/18 he was seen drinking a Sprite and reported that he does not follow a diabetic diet or check his blood glucose at home.      Interval History: See above.    Review of Systems   Constitutional: Negative for chills and fever.   Gastrointestinal: Negative for abdominal pain and vomiting.     Objective:     Vital Signs (Most Recent):  Temp: 98.7 °F (37.1 °C) (09/04/18 0730)  Pulse: 81 (09/04/18 0900)  Resp: 16 (09/04/18 0900)  BP: 128/81 (09/04/18 0900)  SpO2: 96 % (09/04/18 0900) Vital Signs (24h Range):  Temp:  [97.5 °F (36.4 °C)-99.2 °F (37.3 °C)] 98.7 °F (37.1 °C)  Pulse:  [] 81  Resp:  [10-22] 16  SpO2:  [94 %-99 %] 96 %  BP: (125-177)/() 128/81     Weight: 118.8 kg (261 lb 14.5 oz)  Body mass index is 34.55 kg/m².    Intake/Output Summary (Last 24 hours) at 9/4/2018 1012  Last data filed at 9/4/2018 0800  Gross per 24 hour   Intake 990.26 ml   Output 1550 ml   Net -559.74 ml      Physical Exam   Constitutional: He is oriented to person, place, and time. He appears well-developed. No distress.   Neurological: He is alert and oriented to person, place, and time.   Psychiatric: He has a normal mood and affect.   Nursing note and vitals reviewed.      Significant Labs: All pertinent labs within the past 24 hours have been reviewed.   Blood Sugars (AccuCheck):  Recent Labs      09/02/18   1648  09/02/18   2158  09/03/18   0350  09/03/18   0733  09/03/18   1215  09/03/18   1622  09/03/18   2157  09/04/18   0643   POCTGLUCOSE  257*  281*  271*  372*  343*  300*  195*  260*       Significant Imaging: I have reviewed all pertinent imaging results/findings within the past 24 hours.    Assessment/Plan:      * Deep vein thrombosis (DVT) of femoral vein of left lower extremity    May-Thurner syndrome  History of intravascular stent placement  Per primary team.        Type 2 diabetes mellitus with hyperglycemia, with long-term current use of insulin    Takes insulin detemir 70  units in the morning and Humalog 30 units with meals at home.  Hemoglobin A1c very high.  Has a high carbohydrate diet at home.  Give insulin detemir 60 units in the morning and insulin aspart 25 units with meals here.  Will adjust as necessary.  Advised a diet with complex carbohydrates.          VTE Risk Mitigation (From admission, onward)        Ordered     enoxaparin injection 120 mg  Every 24 hours (non-standard times)      09/01/18 3929          Cipriano Karimi MD  Department of Hospital Medicine   [facility]

## 2018-09-04 NOTE — ASSESSMENT & PLAN NOTE
-s/p bilateral iliac vein stenting for May Thurner  -taken off Xarelto in 1/2018 given lack of DVT  -recurrent DVT and back on SQ Lovenox currently; may need repeat EKOS catheter placement

## 2018-09-04 NOTE — ASSESSMENT & PLAN NOTE
Takes insulin detemir 70 units in the morning and Humalog 30 units with meals at home.  Hemoglobin A1c very high.  Has a high carbohydrate diet at home.  Give insulin detemir 60 units in the morning and insulin aspart 25 units with meals here.  Will adjust as necessary.  Advised a diet with complex carbohydrates.

## 2018-09-04 NOTE — ASSESSMENT & PLAN NOTE
-recurrent LLE swelling and pain similar to symptoms with previous DVTs  -venous ultrasound with evidence of LCFV, left superficial vein, left popliteal and left PT veins; no evidence of DVT to RLE with notation of velocity of 10 to right CFV  -initially treated with IV Heparin with transition to SQ Lovenox; LLE swelling remains-patient unaware if any improvement  -will continue with SQ Lovenox for now and may need repeat thrombectomy with EKOS catheter placement  -will need chronic AC upon discharge and may need lifelong given recurrence; previous mention of Hem Onc referral with no office visit noted-will likely plan to refer for evaluation upon discharge

## 2018-09-04 NOTE — PROGRESS NOTES
[facility]  Cardiology  Progress Note    Patient Name: Kulwant Mueller Jr.  MRN: [id number]  Admission Date: 8/31/2018  Hospital Length of Stay: 2 days  Code Status: Full Code   Attending Physician: Madan Quintero MD   Primary Care Physician: Dona Pineda MD  Expected Discharge Date:   Principal Problem:Deep vein thrombosis (DVT) of femoral vein of left lower extremity    Subjective:     Hospital Course:   8/31/2018-9/1/2018 Pt and wife report that he developed leg swelling and pain similar to prior DVT the am of 8/31. He has not eaten since pm of 8/30. He presented to [facility] ED where BLE venous US showed occlusive thrombosis in the left common femoral, superficial femoral, popliteal, and posterior tibial veins.   Heparin drip was initiated and he was transferred [place] [facility] [place] by cardiology. Soon after initiating heparin drip, pt developed nausea and vomiting which has remained continuous for nearly 12 hours.  Blood glucose found to be greater than 400 and DKA was suspected.  Pt was started on vigorous IV hydration with normal saline and insulin was then given.  Labs revealed UA with ketones, anion gap of 16 with metabolic acidosis.  CBC revealed WBC of 18.41, suggestive of hemoconcentration. All findings consistent with DKA.  Etiology unclear. Blood cultures sent.  Pt started on vancomycin and zosyn. Heparin drip was discontinued and pt subsequently started on full dose lovenox.  Hospital medicine consulted for assistance with managing DKA.  Mr. Mueller has remained hemodynamically stable.  Pt transferred to the ICU overnight for insulin drip.  9/2/2018 Insulin drip now discontinued. Symptoms improved and nausea and vomiting significantly improved.  Glucose improving with insulin and IVF's. Continue full dose lovenox at this time and monitor clinical exam with plan for reassessment for need of EKOS.  DKA  related to insulin indiscretion given A1c of 13.    9/3/2018 On IV Cardene drip for  BP control. BS improved and remains off Insulin. On FD Lovenox for LLE DVT. Will continue with current plan of DVT and HTN management medically. Will re assess to determine if EKOS catheter needed at later time   9/4/2018 LLE swelling remains-patient uncertain if any improvement. CBGs overnight 195-300 and off insulin drip. BP improved to 150s/90s and off Cardene drip. Awaiting transfer to floor. Will continue with SQ Lovenox treatment for now. Discuss with staff on rounds re: plans for EKOS catheter treatment             Review of Systems   Constitution: Negative for chills, decreased appetite, diaphoresis, fever and weakness.   Cardiovascular: Positive for leg swelling. Negative for chest pain, claudication, cyanosis, dyspnea on exertion, irregular heartbeat, near-syncope, orthopnea, palpitations, paroxysmal nocturnal dyspnea and syncope.   Respiratory: Negative for cough, hemoptysis, shortness of breath and wheezing.    Gastrointestinal: Negative for bloating, abdominal pain, constipation, diarrhea, melena, nausea and vomiting.   Neurological: Negative for dizziness.     Objective:     Vital Signs (Most Recent):  Temp: 98.7 °F (37.1 °C) (09/04/18 0730)  Pulse: 94 (09/04/18 1000)  Resp: 17 (09/04/18 1000)  BP: (!) 159/97 (09/04/18 1000)  SpO2: 97 % (09/04/18 1000) Vital Signs (24h Range):  Temp:  [97.5 °F (36.4 °C)-99.2 °F (37.3 °C)] 98.7 °F (37.1 °C)  Pulse:  [] 94  Resp:  [10-22] 17  SpO2:  [94 %-99 %] 97 %  BP: (125-177)/() 159/97     Weight: 118.8 kg (261 lb 14.5 oz)  Body mass index is 34.55 kg/m².     SpO2: 97 %  O2 Device (Oxygen Therapy): room air      Intake/Output Summary (Last 24 hours) at 9/4/2018 1051  Last data filed at 9/4/2018 0859  Gross per 24 hour   Intake 1090.26 ml   Output 1550 ml   Net -459.74 ml       Lines/Drains/Airways     Peripheral Intravenous Line                 Peripheral IV - Single Lumen 08/31/18 0823 Anterior;Right Wrist 4 days         Peripheral IV - Single Lumen  08/31/18 1348 Left Forearm 3 days                Physical Exam   Constitutional: He is oriented to person, place, and time. He appears well-developed and well-nourished. No distress.   Cardiovascular: Normal rate and regular rhythm. Exam reveals no gallop.   No murmur heard.  Pulmonary/Chest: Effort normal and breath sounds normal. No respiratory distress. He has no wheezes.   Abdominal: Soft. Bowel sounds are normal. He exhibits no distension. There is no tenderness.   Musculoskeletal: He exhibits edema (3+ LLE swelling present ).   Neurological: He is alert and oriented to person, place, and time.   Skin: Skin is warm and dry.       Significant Labs:       Recent Labs   Lab  09/04/18   0325  09/04/18   0326   NA   142  142   K   3.7   3.7   CL   106   106   CO2   25  25   BUN   26   26*   CREATININE   1.4   1.4   MG  2.4   --      Recent Labs   Lab  09/04/18   0325   WBC  12.81*   RBC  4.98   HGB  14.0   HCT  44.7   PLT  214   MCV  90   MCH  28.1   MCHC  31.3*     Significant Imaging: Echocardiogram: 2D echo with color flow doppler: No results found for this or any previous visit.    Assessment and Plan:     Brief HPI: Seen on AM NP rounds while resting in bed with father at bedside. Denies any complaints currently. Uncertain if swelling any different compared to admission. Not overly engaging in conversation this AM. Discussed POC with patient and father as detailed below-father verbalized understanding and agrees with POC and patient appears to understand and agree as well     * Deep vein thrombosis (DVT) of femoral vein of left lower extremity    -recurrent LLE swelling and pain similar to symptoms with previous DVTs  -venous ultrasound with evidence of LCFV, left superficial vein, left popliteal and left PT veins; no evidence of DVT to RLE with notation of velocity of 10 to right CFV  -initially treated with IV Heparin with transition to SQ Lovenox; LLE swelling remains-patient unaware if any  improvement  -will continue with SQ Lovenox for now and may need repeat thrombectomy with EKOS catheter placement  -will need chronic AC upon discharge and may need lifelong given recurrence; previous mention of Hem Onc referral with no office visit noted-will likely plan to refer for evaluation upon discharge         May-Thurner syndrome    -s/p bilateral iliac vein stenting for May Thurner  -taken off Xarelto in 1/2018 given lack of DVT  -recurrent DVT and back on SQ Lovenox currently; may need repeat EKOS catheter placement         Type 2 diabetes mellitus with hyperglycemia, with long-term current use of insulin    -presented with abdominal pain, nausea and vomiting  -initially concerned about infectious etiology and started on IV Vanc and Zosyn with blood cultures ordered  -initial blood cultures with NGTD and Vanc de escalated; BS noted to be 400 with anion gap treated with IVF and IV Insulin  -Hospital Medicine consulted for BS management  -[id number] 12.7 upon admission  -weaned off insulin drip and placed on SQ regimen with Levemir 60units QHS with Novology 25units TID   -CBGs overnight 195-300  -appreciate assistance of Hospital Medicine             VTE Risk Mitigation (From admission, onward)        Ordered     enoxaparin injection 120 mg  Every 24 hours (non-standard times)      09/01/18 1614          CHARLENE Moran, ANP  Cardiology  [facility]

## 2018-09-05 LAB
ANION GAP SERPL CALC-SCNC: 10 MMOL/L
BASOPHILS # BLD AUTO: 0.02 K/UL
BASOPHILS NFR BLD: 0.2 %
BUN SERPL-MCNC: 18 MG/DL
CALCIUM SERPL-MCNC: 8.8 MG/DL
CHLORIDE SERPL-SCNC: 103 MMOL/L
CO2 SERPL-SCNC: 25 MMOL/L
CREAT SERPL-MCNC: 1.1 MG/DL
DIFFERENTIAL METHOD: ABNORMAL
EOSINOPHIL # BLD AUTO: 0 K/UL
EOSINOPHIL NFR BLD: 0.1 %
ERYTHROCYTE [DISTWIDTH] IN BLOOD BY AUTOMATED COUNT: 12.9 %
EST. GFR  (AFRICAN AMERICAN): >60 ML/MIN/1.73 M^2
EST. GFR  (NON AFRICAN AMERICAN): >60 ML/MIN/1.73 M^2
GLUCOSE SERPL-MCNC: 202 MG/DL
HCT VFR BLD AUTO: 42.6 %
HGB BLD-MCNC: 13.9 G/DL
LYMPHOCYTES # BLD AUTO: 2.6 K/UL
LYMPHOCYTES NFR BLD: 22.2 %
MAGNESIUM SERPL-MCNC: 2.3 MG/DL
MCH RBC QN AUTO: 29.1 PG
MCHC RBC AUTO-ENTMCNC: 32.6 G/DL
MCV RBC AUTO: 89 FL
MONOCYTES # BLD AUTO: 1 K/UL
MONOCYTES NFR BLD: 8 %
NEUTROPHILS # BLD AUTO: 8.3 K/UL
NEUTROPHILS NFR BLD: 69.5 %
PERIPHERAL STENOSIS: ABNORMAL
PHOSPHATE SERPL-MCNC: 3.4 MG/DL
PLATELET # BLD AUTO: 196 K/UL
PMV BLD AUTO: 11.1 FL
POCT GLUCOSE: 132 MG/DL (ref 70–110)
POCT GLUCOSE: 191 MG/DL (ref 70–110)
POCT GLUCOSE: 214 MG/DL (ref 70–110)
POCT GLUCOSE: 261 MG/DL (ref 70–110)
POCT GLUCOSE: 54 MG/DL (ref 70–110)
POCT GLUCOSE: 69 MG/DL (ref 70–110)
POCT GLUCOSE: 79 MG/DL (ref 70–110)
POCT GLUCOSE: 86 MG/DL (ref 70–110)
POCT GLUCOSE: 99 MG/DL (ref 70–110)
POTASSIUM SERPL-SCNC: 3.1 MMOL/L
RBC # BLD AUTO: 4.77 M/UL
SODIUM SERPL-SCNC: 138 MMOL/L
WBC # BLD AUTO: 11.87 K/UL

## 2018-09-05 PROCEDURE — 25000003 PHARM REV CODE 250: Performed by: NURSE PRACTITIONER

## 2018-09-05 PROCEDURE — 25000003 PHARM REV CODE 250: Performed by: INTERNAL MEDICINE

## 2018-09-05 PROCEDURE — 84100 ASSAY OF PHOSPHORUS: CPT

## 2018-09-05 PROCEDURE — 63600175 PHARM REV CODE 636 W HCPCS: Performed by: HOSPITALIST

## 2018-09-05 PROCEDURE — 25000003 PHARM REV CODE 250: Performed by: PHYSICIAN ASSISTANT

## 2018-09-05 PROCEDURE — 63600175 PHARM REV CODE 636 W HCPCS: Performed by: INTERNAL MEDICINE

## 2018-09-05 PROCEDURE — 36140 INTRO NDL ICATH UPR/LXTR ART: CPT

## 2018-09-05 PROCEDURE — B41GYZZ FLUOROSCOPY OF LEFT LOWER EXTREMITY ARTERIES USING OTHER CONTRAST: ICD-10-PCS | Performed by: INTERNAL MEDICINE

## 2018-09-05 PROCEDURE — C9113 INJ PANTOPRAZOLE SODIUM, VIA: HCPCS | Performed by: PHYSICIAN ASSISTANT

## 2018-09-05 PROCEDURE — 04HL33Z INSERTION OF INFUSION DEVICE INTO LEFT FEMORAL ARTERY, PERCUTANEOUS APPROACH: ICD-10-PCS | Performed by: INTERNAL MEDICINE

## 2018-09-05 PROCEDURE — 36415 COLL VENOUS BLD VENIPUNCTURE: CPT

## 2018-09-05 PROCEDURE — 99152 MOD SED SAME PHYS/QHP 5/>YRS: CPT | Mod: ,,, | Performed by: INTERNAL MEDICINE

## 2018-09-05 PROCEDURE — 75710 ARTERY X-RAYS ARM/LEG: CPT | Mod: 26,,, | Performed by: INTERNAL MEDICINE

## 2018-09-05 PROCEDURE — 36140 INTRO NDL ICATH UPR/LXTR ART: CPT | Mod: ,,, | Performed by: INTERNAL MEDICINE

## 2018-09-05 PROCEDURE — 80048 BASIC METABOLIC PNL TOTAL CA: CPT

## 2018-09-05 PROCEDURE — 63600175 PHARM REV CODE 636 W HCPCS: Performed by: PHYSICIAN ASSISTANT

## 2018-09-05 PROCEDURE — 63600175 PHARM REV CODE 636 W HCPCS

## 2018-09-05 PROCEDURE — 83735 ASSAY OF MAGNESIUM: CPT

## 2018-09-05 PROCEDURE — 63600175 PHARM REV CODE 636 W HCPCS: Mod: JG | Performed by: INTERNAL MEDICINE

## 2018-09-05 PROCEDURE — 25000003 PHARM REV CODE 250: Performed by: HOSPITALIST

## 2018-09-05 PROCEDURE — 25500020 PHARM REV CODE 255

## 2018-09-05 PROCEDURE — 20000000 HC ICU ROOM

## 2018-09-05 PROCEDURE — 85025 COMPLETE CBC W/AUTO DIFF WBC: CPT

## 2018-09-05 PROCEDURE — 25000003 PHARM REV CODE 250

## 2018-09-05 PROCEDURE — 94761 N-INVAS EAR/PLS OXIMETRY MLT: CPT

## 2018-09-05 PROCEDURE — 3E05317 INTRODUCTION OF OTHER THROMBOLYTIC INTO PERIPHERAL ARTERY, PERCUTANEOUS APPROACH: ICD-10-PCS | Performed by: INTERNAL MEDICINE

## 2018-09-05 RX ORDER — CARVEDILOL 25 MG/1
25 TABLET ORAL 2 TIMES DAILY
Status: DISCONTINUED | OUTPATIENT
Start: 2018-09-05 | End: 2018-09-08 | Stop reason: HOSPADM

## 2018-09-05 RX ORDER — INSULIN ASPART 100 [IU]/ML
30 INJECTION, SOLUTION INTRAVENOUS; SUBCUTANEOUS
Status: DISCONTINUED | OUTPATIENT
Start: 2018-09-05 | End: 2018-09-06

## 2018-09-05 RX ORDER — CARVEDILOL 12.5 MG/1
12.5 TABLET ORAL ONCE
Status: COMPLETED | OUTPATIENT
Start: 2018-09-05 | End: 2018-09-05

## 2018-09-05 RX ORDER — POTASSIUM CHLORIDE 20 MEQ/15ML
60 SOLUTION ORAL ONCE
Status: DISCONTINUED | OUTPATIENT
Start: 2018-09-05 | End: 2018-09-08 | Stop reason: HOSPADM

## 2018-09-05 RX ORDER — DIPHENHYDRAMINE HCL 50 MG
50 CAPSULE ORAL ONCE
Status: CANCELLED | OUTPATIENT
Start: 2018-09-05 | End: 2018-09-05

## 2018-09-05 RX ORDER — POTASSIUM CHLORIDE 20 MEQ/1
60 TABLET, EXTENDED RELEASE ORAL ONCE
Status: COMPLETED | OUTPATIENT
Start: 2018-09-05 | End: 2018-09-05

## 2018-09-05 RX ORDER — ENOXAPARIN SODIUM 150 MG/ML
1 INJECTION SUBCUTANEOUS
Status: DISCONTINUED | OUTPATIENT
Start: 2018-09-06 | End: 2018-09-05

## 2018-09-05 RX ADMIN — CARVEDILOL 25 MG: 25 TABLET, FILM COATED ORAL at 10:09

## 2018-09-05 RX ADMIN — CARVEDILOL 25 MG: 25 TABLET, FILM COATED ORAL at 09:09

## 2018-09-05 RX ADMIN — ONDANSETRON 4 MG: 2 INJECTION INTRAMUSCULAR; INTRAVENOUS at 11:09

## 2018-09-05 RX ADMIN — BIVALIRUDIN 1.75 MG/KG/HR: 250 INJECTION, POWDER, LYOPHILIZED, FOR SOLUTION INTRAVENOUS at 05:09

## 2018-09-05 RX ADMIN — DEXTROSE 40 MG: 50 INJECTION, SOLUTION INTRAVENOUS at 09:09

## 2018-09-05 RX ADMIN — PROCHLORPERAZINE EDISYLATE 10 MG: 5 INJECTION INTRAMUSCULAR; INTRAVENOUS at 07:09

## 2018-09-05 RX ADMIN — CARVEDILOL 12.5 MG: 12.5 TABLET, FILM COATED ORAL at 09:09

## 2018-09-05 RX ADMIN — PIPERACILLIN AND TAZOBACTAM 4.5 G: 4; .5 INJECTION, POWDER, LYOPHILIZED, FOR SOLUTION INTRAVENOUS; PARENTERAL at 02:09

## 2018-09-05 RX ADMIN — LABETALOL HYDROCHLORIDE 10 MG: 5 INJECTION, SOLUTION INTRAVENOUS at 05:09

## 2018-09-05 RX ADMIN — PROCHLORPERAZINE EDISYLATE 10 MG: 5 INJECTION INTRAMUSCULAR; INTRAVENOUS at 11:09

## 2018-09-05 RX ADMIN — BIVALIRUDIN 1.75 MG/KG/HR: 250 INJECTION, POWDER, LYOPHILIZED, FOR SOLUTION INTRAVENOUS at 12:09

## 2018-09-05 RX ADMIN — INSULIN ASPART 30 UNITS: 100 INJECTION, SOLUTION INTRAVENOUS; SUBCUTANEOUS at 05:09

## 2018-09-05 RX ADMIN — METOCLOPRAMIDE 10 MG: 10 TABLET ORAL at 04:09

## 2018-09-05 RX ADMIN — PIPERACILLIN AND TAZOBACTAM 4.5 G: 4; .5 INJECTION, POWDER, LYOPHILIZED, FOR SOLUTION INTRAVENOUS; PARENTERAL at 09:09

## 2018-09-05 RX ADMIN — AMLODIPINE BESYLATE 10 MG: 5 TABLET ORAL at 09:09

## 2018-09-05 RX ADMIN — BIVALIRUDIN 1.75 MG/KG/HR: 250 INJECTION, POWDER, LYOPHILIZED, FOR SOLUTION INTRAVENOUS at 11:09

## 2018-09-05 RX ADMIN — INSULIN ASPART 2 UNITS: 100 INJECTION, SOLUTION INTRAVENOUS; SUBCUTANEOUS at 09:09

## 2018-09-05 RX ADMIN — BIVALIRUDIN 1.75 MG/KG/HR: 250 INJECTION, POWDER, LYOPHILIZED, FOR SOLUTION INTRAVENOUS at 02:09

## 2018-09-05 RX ADMIN — BIVALIRUDIN 1.75 MG/KG/HR: 250 INJECTION, POWDER, LYOPHILIZED, FOR SOLUTION INTRAVENOUS at 07:09

## 2018-09-05 RX ADMIN — PIPERACILLIN AND TAZOBACTAM 4.5 G: 4; .5 INJECTION, POWDER, LYOPHILIZED, FOR SOLUTION INTRAVENOUS; PARENTERAL at 04:09

## 2018-09-05 RX ADMIN — METOCLOPRAMIDE 10 MG: 10 TABLET ORAL at 05:09

## 2018-09-05 RX ADMIN — BIVALIRUDIN 1.75 MG/KG/HR: 250 INJECTION, POWDER, LYOPHILIZED, FOR SOLUTION INTRAVENOUS at 09:09

## 2018-09-05 RX ADMIN — METOCLOPRAMIDE 10 MG: 10 TABLET ORAL at 12:09

## 2018-09-05 RX ADMIN — POTASSIUM CHLORIDE 60 MEQ: 20 TABLET, EXTENDED RELEASE ORAL at 10:09

## 2018-09-05 RX ADMIN — INSULIN ASPART 30 UNITS: 100 INJECTION, SOLUTION INTRAVENOUS; SUBCUTANEOUS at 12:09

## 2018-09-05 RX ADMIN — BIVALIRUDIN 1.75 MG/KG/HR: 250 INJECTION, POWDER, LYOPHILIZED, FOR SOLUTION INTRAVENOUS at 10:09

## 2018-09-05 RX ADMIN — BIVALIRUDIN 1.75 MG/KG/HR: 250 INJECTION, POWDER, LYOPHILIZED, FOR SOLUTION INTRAVENOUS at 04:09

## 2018-09-05 RX ADMIN — ALTEPLASE 1 MG/HR: 2.2 INJECTION, POWDER, LYOPHILIZED, FOR SOLUTION INTRAVENOUS at 07:09

## 2018-09-05 RX ADMIN — ONDANSETRON 4 MG: 2 INJECTION INTRAMUSCULAR; INTRAVENOUS at 03:09

## 2018-09-05 RX ADMIN — LABETALOL HYDROCHLORIDE 10 MG: 5 INJECTION, SOLUTION INTRAVENOUS at 11:09

## 2018-09-05 NOTE — PROCEDURES
Post Procedure Note: Catheter directed thrombolysis with EKOS    The pt was brought to the cath lab and under sterile technique, left pedal AT access was obtained without difficulty under US guidance. Images were obtained in multiple views and successful EKOS catheter placement in LCIAV within stent to distal LSFV. TPA initiated at 1 mg/hr and systemic bivalirudin. Please see full report for details. The pt tolerated the procedure well without complications. Sheath sutured in place.     Vitals:    09/05/18 1345 09/05/18 1400 09/05/18 1500 09/05/18 1515   BP: 120/74 129/77 124/75    BP Location:   Right arm    Patient Position:   Lying    Pulse: 70 71 71 71   Resp: 13 18 14    Temp:       TempSrc:       SpO2: 95% 95% 95%    Weight:       Height:             Gen: NAD  Ext: 2+ fem/DP/PT pulses, no evidence of hematoma    Plan:  -Post cath care per protocol  -continuous tPA at 1 mg/hr, systemic bivalirudin, and reassess in AM

## 2018-09-05 NOTE — INTERVAL H&P NOTE
The patient has been examined and the H&P has been reviewed:    I concur with the findings and no changes have occurred since H&P was written.    Anesthesia/Surgery risks, benefits and alternative options discussed and understood by patient/family.          Active Hospital Problems    Diagnosis  POA    *Deep vein thrombosis (DVT) of femoral vein of left lower extremity [I82.412]  Yes    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]  Yes     Priority: High     Chronic    History of intravascular stent placement [Z95.828]  Not Applicable     Bilateral iliac vein stenting for May Thurner's Syndrome      Type 2 diabetes mellitus with hyperglycemia, with long-term current use of insulin [E11.65, Z79.4]  Not Applicable     Chronic      Resolved Hospital Problems    Diagnosis Date Resolved POA    DKA (diabetic ketoacidoses) [E13.10] 09/04/2018 Yes    Diabetic ketoacidosis without coma associated with type 2 diabetes mellitus [E11.10] 09/03/2018 No    Intractable vomiting with nausea [R11.2] 09/04/2018 No

## 2018-09-05 NOTE — PLAN OF CARE
Plan of care reviewed with patient. Patient verbalized complete understanding. Fall precautions maintained. Bed in lowest position, locked, call light within reach and bed alarm is on. Side rails up x's 2 with slip resistant socks on. Accuchecks performed, prn insulin given.PRN pain med and n/v meds given. Patient kept npo from midnight on.Nurse instructed patient to notify staff for any assistance and the patient verbalized complete understanding. Patient on telemetry throughout shift with no ectopy noted. Will continue to monitor.

## 2018-09-05 NOTE — NURSING
Report given to ADRIEL Quintanilla in the cath lab and transported downstairs by RN. Patient's tele monitor removed and returned. IVs clean, dry, and intact. Last BG is 261 following insulin administration. Patient shaved and prepped for procedure.

## 2018-09-05 NOTE — NURSING
Notified Swapna Aviles NP of patient having manual BP of 170/100. Instructed to administer morning BP medications and recheck about an hour following. Patient denies any CP or SOB. Will continue to monitor.

## 2018-09-05 NOTE — NURSING
Report given to ADRIEL Reina in the ICU. Patient will be transferred to ICU Room 262 after cath lab and all patient belongings brought to room.

## 2018-09-05 NOTE — PLAN OF CARE
[facility]    Diabetes mellitus type 2:   Mr. Mueller takes insulin detemir 70 units in the morning and Humalog 30 units with meals at home, but was not checking his blood glucose and was counteracting treatment with a diet high in sugars.  His blood glucose is fairly controlled on insulin detemir 60 units in the morning and insulin aspart 25 units with meals currently, which is slightly lower than his home doses.  Since he is NPO this morning, continue insulin detemir 60 units today, but increase to his home dose of 70 units tomorrow morning.  Increase mealtime insulin to his home dose of 30 units, which he will not get until he is put back on a diet.     Since diabetes is more manageable, continue these doses.  No further need to see daily.  Will follow from a distance.

## 2018-09-06 LAB
ANION GAP SERPL CALC-SCNC: 10 MMOL/L
BACTERIA BLD CULT: NORMAL
BASOPHILS # BLD AUTO: 0.01 K/UL
BASOPHILS NFR BLD: 0.1 %
BUN SERPL-MCNC: 12 MG/DL
CALCIUM SERPL-MCNC: 8.8 MG/DL
CHLORIDE SERPL-SCNC: 101 MMOL/L
CO2 SERPL-SCNC: 24 MMOL/L
CREAT SERPL-MCNC: 0.9 MG/DL
DIFFERENTIAL METHOD: ABNORMAL
EOSINOPHIL # BLD AUTO: 0 K/UL
EOSINOPHIL NFR BLD: 0.2 %
ERYTHROCYTE [DISTWIDTH] IN BLOOD BY AUTOMATED COUNT: 12.7 %
EST. GFR  (AFRICAN AMERICAN): >60 ML/MIN/1.73 M^2
EST. GFR  (NON AFRICAN AMERICAN): >60 ML/MIN/1.73 M^2
GLUCOSE SERPL-MCNC: 129 MG/DL
HCT VFR BLD AUTO: 41.5 %
HGB BLD-MCNC: 13.9 G/DL
LYMPHOCYTES # BLD AUTO: 1.6 K/UL
LYMPHOCYTES NFR BLD: 12 %
MAGNESIUM SERPL-MCNC: 2 MG/DL
MCH RBC QN AUTO: 29.4 PG
MCHC RBC AUTO-ENTMCNC: 33.5 G/DL
MCV RBC AUTO: 88 FL
MONOCYTES # BLD AUTO: 0.8 K/UL
MONOCYTES NFR BLD: 5.8 %
NEUTROPHILS # BLD AUTO: 10.7 K/UL
NEUTROPHILS NFR BLD: 81.9 %
PHOSPHATE SERPL-MCNC: 2.5 MG/DL
PLATELET # BLD AUTO: 192 K/UL
PMV BLD AUTO: 10.6 FL
POCT GLUCOSE: 131 MG/DL (ref 70–110)
POCT GLUCOSE: 154 MG/DL (ref 70–110)
POCT GLUCOSE: 53 MG/DL (ref 70–110)
POCT GLUCOSE: 55 MG/DL (ref 70–110)
POCT GLUCOSE: 93 MG/DL (ref 70–110)
POCT GLUCOSE: 96 MG/DL (ref 70–110)
POTASSIUM SERPL-SCNC: 3.5 MMOL/L
RBC # BLD AUTO: 4.73 M/UL
SODIUM SERPL-SCNC: 135 MMOL/L
WBC # BLD AUTO: 13.09 K/UL

## 2018-09-06 PROCEDURE — 94761 N-INVAS EAR/PLS OXIMETRY MLT: CPT

## 2018-09-06 PROCEDURE — 25000003 PHARM REV CODE 250: Performed by: NURSE PRACTITIONER

## 2018-09-06 PROCEDURE — 25000003 PHARM REV CODE 250: Performed by: INTERNAL MEDICINE

## 2018-09-06 PROCEDURE — 63600175 PHARM REV CODE 636 W HCPCS: Performed by: PHYSICIAN ASSISTANT

## 2018-09-06 PROCEDURE — 25000003 PHARM REV CODE 250: Performed by: HOSPITALIST

## 2018-09-06 PROCEDURE — 36415 COLL VENOUS BLD VENIPUNCTURE: CPT

## 2018-09-06 PROCEDURE — 83735 ASSAY OF MAGNESIUM: CPT

## 2018-09-06 PROCEDURE — 85025 COMPLETE CBC W/AUTO DIFF WBC: CPT

## 2018-09-06 PROCEDURE — C9113 INJ PANTOPRAZOLE SODIUM, VIA: HCPCS | Performed by: PHYSICIAN ASSISTANT

## 2018-09-06 PROCEDURE — 25000003 PHARM REV CODE 250: Performed by: PHYSICIAN ASSISTANT

## 2018-09-06 PROCEDURE — 63600175 PHARM REV CODE 636 W HCPCS: Performed by: INTERNAL MEDICINE

## 2018-09-06 PROCEDURE — 84100 ASSAY OF PHOSPHORUS: CPT

## 2018-09-06 PROCEDURE — 20000000 HC ICU ROOM

## 2018-09-06 PROCEDURE — 63600175 PHARM REV CODE 636 W HCPCS: Performed by: HOSPITALIST

## 2018-09-06 PROCEDURE — 63600175 PHARM REV CODE 636 W HCPCS: Performed by: NURSE PRACTITIONER

## 2018-09-06 PROCEDURE — 63600175 PHARM REV CODE 636 W HCPCS: Mod: JG | Performed by: INTERNAL MEDICINE

## 2018-09-06 PROCEDURE — 99291 CRITICAL CARE FIRST HOUR: CPT | Mod: ,,, | Performed by: INTERNAL MEDICINE

## 2018-09-06 PROCEDURE — 80048 BASIC METABOLIC PNL TOTAL CA: CPT

## 2018-09-06 RX ORDER — INSULIN ASPART 100 [IU]/ML
25 INJECTION, SOLUTION INTRAVENOUS; SUBCUTANEOUS
Status: DISCONTINUED | OUTPATIENT
Start: 2018-09-06 | End: 2018-09-07

## 2018-09-06 RX ADMIN — BIVALIRUDIN 1.75 MG/KG/HR: 250 INJECTION, POWDER, LYOPHILIZED, FOR SOLUTION INTRAVENOUS at 10:09

## 2018-09-06 RX ADMIN — BIVALIRUDIN 0.25 MG/KG/HR: 250 INJECTION, POWDER, LYOPHILIZED, FOR SOLUTION INTRAVENOUS at 05:09

## 2018-09-06 RX ADMIN — BIVALIRUDIN 1.75 MG/KG/HR: 250 INJECTION, POWDER, LYOPHILIZED, FOR SOLUTION INTRAVENOUS at 04:09

## 2018-09-06 RX ADMIN — INSULIN ASPART 2 UNITS: 100 INJECTION, SOLUTION INTRAVENOUS; SUBCUTANEOUS at 12:09

## 2018-09-06 RX ADMIN — INSULIN ASPART 25 UNITS: 100 INJECTION, SOLUTION INTRAVENOUS; SUBCUTANEOUS at 05:09

## 2018-09-06 RX ADMIN — METOCLOPRAMIDE 10 MG: 10 TABLET ORAL at 06:09

## 2018-09-06 RX ADMIN — ALTEPLASE 1 MG/HR: 2.2 INJECTION, POWDER, LYOPHILIZED, FOR SOLUTION INTRAVENOUS at 11:09

## 2018-09-06 RX ADMIN — BIVALIRUDIN 1.75 MG/KG/HR: 250 INJECTION, POWDER, LYOPHILIZED, FOR SOLUTION INTRAVENOUS at 08:09

## 2018-09-06 RX ADMIN — BIVALIRUDIN 1.75 MG/KG/HR: 250 INJECTION, POWDER, LYOPHILIZED, FOR SOLUTION INTRAVENOUS at 05:09

## 2018-09-06 RX ADMIN — BIVALIRUDIN 1.75 MG/KG/HR: 250 INJECTION, POWDER, LYOPHILIZED, FOR SOLUTION INTRAVENOUS at 07:09

## 2018-09-06 RX ADMIN — METOCLOPRAMIDE 10 MG: 10 TABLET ORAL at 05:09

## 2018-09-06 RX ADMIN — ALTEPLASE 1 MG/HR: 2.2 INJECTION, POWDER, LYOPHILIZED, FOR SOLUTION INTRAVENOUS at 02:09

## 2018-09-06 RX ADMIN — ALTEPLASE 1 MG/HR: 2.2 INJECTION, POWDER, LYOPHILIZED, FOR SOLUTION INTRAVENOUS at 07:09

## 2018-09-06 RX ADMIN — BIVALIRUDIN 0.25 MG/KG/HR: 250 INJECTION, POWDER, LYOPHILIZED, FOR SOLUTION INTRAVENOUS at 11:09

## 2018-09-06 RX ADMIN — BIVALIRUDIN 1.75 MG/KG/HR: 250 INJECTION, POWDER, LYOPHILIZED, FOR SOLUTION INTRAVENOUS at 12:09

## 2018-09-06 RX ADMIN — BIVALIRUDIN 1.75 MG/KG/HR: 250 INJECTION, POWDER, LYOPHILIZED, FOR SOLUTION INTRAVENOUS at 03:09

## 2018-09-06 RX ADMIN — BIVALIRUDIN 1.75 MG/KG/HR: 250 INJECTION, POWDER, LYOPHILIZED, FOR SOLUTION INTRAVENOUS at 02:09

## 2018-09-06 RX ADMIN — Medication 16 G: at 09:09

## 2018-09-06 RX ADMIN — DEXTROSE 40 MG: 50 INJECTION, SOLUTION INTRAVENOUS at 08:09

## 2018-09-06 RX ADMIN — INSULIN ASPART 25 UNITS: 100 INJECTION, SOLUTION INTRAVENOUS; SUBCUTANEOUS at 12:09

## 2018-09-06 RX ADMIN — CARVEDILOL 25 MG: 25 TABLET, FILM COATED ORAL at 08:09

## 2018-09-06 RX ADMIN — PIPERACILLIN AND TAZOBACTAM 4.5 G: 4; .5 INJECTION, POWDER, LYOPHILIZED, FOR SOLUTION INTRAVENOUS; PARENTERAL at 05:09

## 2018-09-06 RX ADMIN — Medication 16 G: at 08:09

## 2018-09-06 RX ADMIN — PIPERACILLIN AND TAZOBACTAM 4.5 G: 4; .5 INJECTION, POWDER, LYOPHILIZED, FOR SOLUTION INTRAVENOUS; PARENTERAL at 09:09

## 2018-09-06 RX ADMIN — PIPERACILLIN AND TAZOBACTAM 4.5 G: 4; .5 INJECTION, POWDER, LYOPHILIZED, FOR SOLUTION INTRAVENOUS; PARENTERAL at 01:09

## 2018-09-06 RX ADMIN — CARVEDILOL 25 MG: 25 TABLET, FILM COATED ORAL at 09:09

## 2018-09-06 RX ADMIN — BIVALIRUDIN 1.75 MG/KG/HR: 250 INJECTION, POWDER, LYOPHILIZED, FOR SOLUTION INTRAVENOUS at 09:09

## 2018-09-06 RX ADMIN — ONDANSETRON 4 MG: 2 INJECTION INTRAMUSCULAR; INTRAVENOUS at 07:09

## 2018-09-06 RX ADMIN — AMLODIPINE BESYLATE 10 MG: 5 TABLET ORAL at 09:09

## 2018-09-06 RX ADMIN — LABETALOL HYDROCHLORIDE 10 MG: 5 INJECTION, SOLUTION INTRAVENOUS at 07:09

## 2018-09-06 RX ADMIN — BIVALIRUDIN 1.75 MG/KG/HR: 250 INJECTION, POWDER, LYOPHILIZED, FOR SOLUTION INTRAVENOUS at 01:09

## 2018-09-06 RX ADMIN — PROCHLORPERAZINE EDISYLATE 10 MG: 5 INJECTION INTRAMUSCULAR; INTRAVENOUS at 08:09

## 2018-09-06 RX ADMIN — METOCLOPRAMIDE 10 MG: 10 TABLET ORAL at 11:09

## 2018-09-06 NOTE — PLAN OF CARE
TN went to meet with patient, family at bedside. Patient remains in ICU. No discharge plans at this time. Patient is independent at home. TN will continue to follow.    Nayeli Strickland RN  Transition Navigator  [phone]

## 2018-09-06 NOTE — PLAN OF CARE
Problem: Patient Care Overview  Goal: Plan of Care Review  Outcome: Ongoing (interventions implemented as appropriate)  Patient on RA, no respiratory distress noted. Ambu bag and mask at the bedside. Will continue to monitor.

## 2018-09-06 NOTE — SUBJECTIVE & OBJECTIVE
Review of Systems   Constitution: Negative for chills, decreased appetite, diaphoresis, fever and weakness.   Cardiovascular: Positive for leg swelling. Negative for chest pain, claudication, cyanosis, dyspnea on exertion, irregular heartbeat, near-syncope, orthopnea, palpitations, paroxysmal nocturnal dyspnea and syncope.   Respiratory: Negative for cough, hemoptysis, shortness of breath and wheezing.    Gastrointestinal: Negative for bloating, abdominal pain, constipation, diarrhea, melena, nausea and vomiting.   Neurological: Negative for dizziness.     Objective:     Vital Signs (Most Recent):  Temp: 98.6 °F (37 °C) (09/06/18 0715)  Pulse: 76 (09/06/18 1030)  Resp: 18 (09/06/18 1030)  BP: 139/77 (09/06/18 1030)  SpO2: 95 % (09/06/18 1030) Vital Signs (24h Range):  Temp:  [97.8 °F (36.6 °C)-98.9 °F (37.2 °C)] 98.6 °F (37 °C)  Pulse:  [67-94] 76  Resp:  [9-21] 18  SpO2:  [93 %-99 %] 95 %  BP: (107-186)/() 139/77     Weight: 118.8 kg (261 lb 14.5 oz)  Body mass index is 34.55 kg/m².     SpO2: 95 %  O2 Device (Oxygen Therapy): room air      Intake/Output Summary (Last 24 hours) at 9/6/2018 1045  Last data filed at 9/6/2018 0930  Gross per 24 hour   Intake 696.02 ml   Output 3531 ml   Net -2834.98 ml       Lines/Drains/Airways     Peripheral Intravenous Line                 Peripheral IV - Single Lumen 08/31/18 0823 Anterior;Right Wrist 6 days         Peripheral IV - Single Lumen 08/31/18 1348 Left Forearm 5 days                Physical Exam   Constitutional: He is oriented to person, place, and time. He appears well-developed and well-nourished. No distress.   Cardiovascular: Normal rate and regular rhythm. Exam reveals no gallop.   No murmur heard.  Pulmonary/Chest: Effort normal and breath sounds normal. No respiratory distress. He has no wheezes.   Abdominal: Soft. Bowel sounds are normal. He exhibits no distension. There is no tenderness.   Musculoskeletal: He exhibits edema (3+ LLE).   Neurological:  He is alert and oriented to person, place, and time.   Skin: Skin is warm and dry.       Significant Labs:     Recent Labs   Lab  09/06/18   0316   CALCIUM  8.8   NA  135*   K  3.5   CO2  24   CL  101   BUN  12   CREATININE  0.9     Recent Labs   Lab  09/06/18   0316   WBC  13.09*   RBC  4.73   HGB  13.9*   HCT  41.5   PLT  192   MCV  88   MCH  29.4   MCHC  33.5

## 2018-09-06 NOTE — ASSESSMENT & PLAN NOTE
-recurrent LLE swelling and pain similar to symptoms with previous DVTs  -venous ultrasound with evidence of LCFV, left superficial vein, left popliteal and left PT veins; no evidence of DVT to RLE with notation of velocity of 10 to right CFV  -successful EKOS catheter placement with initiation of catheter directed thrombolysis with tPA at 1 mg/hr with catheter placed in LCIV (50 cm) with extension to distal LSFV with systemic Angiomax infusing  -will continue with EKOS catheter guided TPA for now  -will need chronic AC upon discharge and will need lifelong AC and anticipate Xarelto

## 2018-09-06 NOTE — ASSESSMENT & PLAN NOTE
-s/p bilateral iliac vein stenting for May Thurner  -taken off Xarelto in 1/2018 given lack of DVT  -recurrent DVT with venogram yesterday with extensive DVT involving CIV stent

## 2018-09-06 NOTE — PROGRESS NOTES
[facility]  Cardiology  Progress Note    Patient Name: Kulwant Mueller Jr.  MRN: [id number]  Admission Date: 8/31/2018  Hospital Length of Stay: 4 days  Code Status: Full Code   Attending Physician: Madan Quintero MD   Primary Care Physician: Dona Pineda MD  Expected Discharge Date:   Principal Problem:Deep vein thrombosis (DVT) of femoral vein of left lower extremity    Subjective:     Hospital Course:   8/31/2018-9/1/2018 Pt and wife report that he developed leg swelling and pain similar to prior DVT the am of 8/31. He has not eaten since pm of 8/30. He presented to [facility] ED where BLE venous US showed occlusive thrombosis in the left common femoral, superficial femoral, popliteal, and posterior tibial veins.   Heparin drip was initiated and [place] [facility] [place]. Soon after initiating heparin drip, pt developed nausea and vomiting which has remained continuous for nearly 12 hours.  Blood glucose found to be greater than 400 and DKA was suspected.  Pt was started on vigorous IV hydration with normal saline and insulin was then given.  Labs revealed UA with ketones, anion gap of 16 with metabolic acidosis.  CBC revealed WBC of 18.41, suggestive of hemoconcentration. All findings consistent with DKA.  Etiology unclear. Blood cultures sent.  Pt started on vancomycin and zosyn. Heparin drip was discontinued and pt subsequently started on full dose lovenox.  Hospital medicine consulted for assistance with managing DKA.  Mr. Mueller has remained hemodynamically stable.  Pt transferred to the ICU overnight for insulin drip.  9/2/2018 Insulin drip now discontinued. Symptoms improved and nausea and vomiting significantly improved.  Glucose improving with insulin and IVF's. Continue full dose lovenox at this time and monitor clinical exam with plan for reassessment for need of EKOS.  DKA  related to insulin indiscretion given A1c of 13.    9/3/2018 On IV Cardene drip for  BP control. BS improved and remains off Insulin. On FD Lovenox for LLE DVT. Will continue with current plan of DVT and HTN management medically. Will re assess to determine if EKOS catheter needed at later time   9/4/2018 LLE swelling remains-patient uncertain if any improvement. CBGs overnight 195-300 and off insulin drip. BP improved to 150s/90s and off Cardene drip. Awaiting transfer to floor. Will continue with SQ Lovenox treatment for now. Discuss with staff on rounds re: plans for EKOS catheter treatment   9/5/2018 NPO for EKOS catheter guided TPA otday  9/6/2018 Underwent EKOS catheter placement yesterday via left pedal access with following results:  Successful EKOS catheter placement with initiation of catheter directed thrombolysis with tPA at 1 mg/hr.  Catheter placed in LCIV (50 cm) with extension to distal LSFV with systemic bivalirudin intiated  Labs stable this AM. Complains of nausea and vomiting this AM ?pain medication related. Swelling remains with only slight improvement. Will continue EKOS catheter guided TPA for now along with Angiomax           Review of Systems   Constitution: Negative for chills, decreased appetite, diaphoresis, fever and weakness.   Cardiovascular: Positive for leg swelling. Negative for chest pain, claudication, cyanosis, dyspnea on exertion, irregular heartbeat, near-syncope, orthopnea, palpitations, paroxysmal nocturnal dyspnea and syncope.   Respiratory: Negative for cough, hemoptysis, shortness of breath and wheezing.    Gastrointestinal: Negative for bloating, abdominal pain, constipation, diarrhea, melena, nausea and vomiting.   Neurological: Negative for dizziness.     Objective:     Vital Signs (Most Recent):  Temp: 98.6 °F (37 °C) (09/06/18 0715)  Pulse: 76 (09/06/18 1030)  Resp: 18 (09/06/18 1030)  BP: 139/77 (09/06/18 1030)  SpO2: 95 % (09/06/18 1030) Vital Signs (24h Range):  Temp:  [97.8 °F (36.6 °C)-98.9 °F (37.2 °C)] 98.6 °F (37 °C)  Pulse:  [67-94]  "76  Resp:  [9-21] 18  SpO2:  [93 %-99 %] 95 %  BP: (107-186)/() 139/77     Weight: 118.8 kg (261 lb 14.5 oz)  Body mass index is 34.55 kg/m².     SpO2: 95 %  O2 Device (Oxygen Therapy): room air      Intake/Output Summary (Last 24 hours) at 9/6/2018 1045  Last data filed at 9/6/2018 0930  Gross per 24 hour   Intake 696.02 ml   Output 3531 ml   Net -2834.98 ml       Lines/Drains/Airways     Peripheral Intravenous Line                 Peripheral IV - Single Lumen 08/31/18 0823 Anterior;Right Wrist 6 days         Peripheral IV - Single Lumen 08/31/18 1348 Left Forearm 5 days                Physical Exam   Constitutional: He is oriented to person, place, and time. He appears well-developed and well-nourished. No distress.   Cardiovascular: Normal rate and regular rhythm. Exam reveals no gallop.   No murmur heard.  Pulmonary/Chest: Effort normal and breath sounds normal. No respiratory distress. He has no wheezes.   Abdominal: Soft. Bowel sounds are normal. He exhibits no distension. There is no tenderness.   Musculoskeletal: He exhibits edema (3+ LLE).   Neurological: He is alert and oriented to person, place, and time.   Skin: Skin is warm and dry.       Significant Labs:     Recent Labs   Lab  09/06/18   0316   CALCIUM  8.8   NA  135*   K  3.5   CO2  24   CL  101   BUN  12   CREATININE  0.9     Recent Labs   Lab  09/06/18   0316   WBC  13.09*   RBC  4.73   HGB  13.9*   HCT  41.5   PLT  192   MCV  88   MCH  29.4   MCHC  33.5         Assessment and Plan:     Brief HPI: Seen this morning on AM NP rounds while resting in bed with father at bedside. Patient reports nausea and vomiting this morning but denies any abdominal pain. Reviewed venogram from yesterday with plan for continuation of catheter and TPA today. Patient states "my body cannot take anymore". Provided emotional support and given positive words of encouragement and discussed the overall goal given his extensive clot as well as the risk of PE without " aggressive treatment. Patient with limited eye contact or verbal response. Father very receptive and encouraging     * Deep vein thrombosis (DVT) of femoral vein of left lower extremity    -recurrent LLE swelling and pain similar to symptoms with previous DVTs  -venous ultrasound with evidence of LCFV, left superficial vein, left popliteal and left PT veins; no evidence of DVT to RLE with notation of velocity of 10 to right CFV  -successful EKOS catheter placement with initiation of catheter directed thrombolysis with tPA at 1 mg/hr with catheter placed in LCIV (50 cm) with extension to distal LSFV with systemic Angiomax infusing  -will continue with EKOS catheter guided TPA for now  -will need chronic AC upon discharge and will need lifelong AC and anticipate Xarelto         May-Thurner syndrome    -s/p bilateral iliac vein stenting for May Thurner  -taken off Xarelto in 1/2018 given lack of DVT  -recurrent DVT with venogram yesterday with extensive DVT involving CIV stent           Type 2 diabetes mellitus with hyperglycemia, with long-term current use of insulin    -presented with abdominal pain, nausea and vomiting  -initially concerned about infectious etiology and started on IV Vanc and Zosyn with blood cultures ordered  -Hospital Medicine consulted for BS management  -[id number] 12.7 upon admission  -SQ regimen with Levemir 60units QHS with Novology 25units TID   -CBGs overnight  overnight; likely due to insulin and NPO status;  this AM   -appreciate assistance of Hospital Medicine             VTE Risk Mitigation (From admission, onward)    None          CHARLENE Moran, ANP  Cardiology  [facility]

## 2018-09-06 NOTE — PLAN OF CARE
Problem: Patient Care Overview  Goal: Plan of Care Review  Outcome: Ongoing (interventions implemented as appropriate)      AAOx4, no c/o pain, blood glucose fluctuating after receiving 30 units of insulin aspart in the evening, better after oral orange juice intake, EKOS in place, doppler pulses at LLE, no bleeding or hematoma noted, one episode of vomiting, PRN meds given. BP trended up after vomiting, PRN labetalol given.     Problem: Nausea/Vomiting (Adult)  Goal: Identify Related Risk Factors and Signs and Symptoms  Related risk factors and signs and symptoms are identified upon initiation of Human Response Clinical Practice Guideline (CPG)  Outcome: Ongoing (interventions implemented as appropriate)  PRN pain meds given.    Problem: Fall Risk (Adult)  Goal: Identify Related Risk Factors and Signs and Symptoms  Related risk factors and signs and symptoms are identified upon initiation of Human Response Clinical Practice Guideline (CPG)  Outcome: Ongoing (interventions implemented as appropriate)  Fall risks reviewed with patient, pt verbalized understanding, fall precautions  Maintained, bed alarm on, bed locked and in lowest position.    Problem: Hypertensive Disease/Crisis (Arterial) (Adult)  Goal: Signs and Symptoms of Listed Potential Problems Will be Absent, Minimized or Managed (Hypertensive Disease/Crisis)  Signs and symptoms of listed potential problems will be absent, minimized or managed by discharge/transition of care (reference Hypertensive Disease/Crisis (Arterial) (Adult) CPG).  Outcome: Ongoing (interventions implemented as appropriate)  Labetelol given as per order.     Problem: VTE, DVT and PE (Adult)  Goal: Signs and Symptoms of Listed Potential Problems Will be Absent, Minimized or Managed (VTE, DVT and PE)  Signs and symptoms of listed potential problems will be absent, minimized or managed by discharge/transition of care (reference VTE, DVT and PE (Adult) CPG).  Outcome: Ongoing  (interventions implemented as appropriate)  EKOS in place

## 2018-09-06 NOTE — ASSESSMENT & PLAN NOTE
-presented with abdominal pain, nausea and vomiting  -initially concerned about infectious etiology and started on IV Vanc and Zosyn with blood cultures ordered  -Hospital Medicine consulted for BS management  -[id number] 12.7 upon admission  -SQ regimen with Levemir 60units QHS with Novology 25units TID   -CBGs overnight  overnight; likely due to insulin and NPO status;  this AM   -appreciate assistance of Hospital Medicine

## 2018-09-06 NOTE — PLAN OF CARE
Problem: Patient Care Overview  Goal: Plan of Care Review  Outcome: Ongoing (interventions implemented as appropriate)  Pt is aaox4. VSS. NADN. No c/o pain. Left leg swollen, pulses +, doppler lower. Leg Warm. Ekos running, NS @ 50, TPA @ 1cc/hr. Insulin given as scheduled. No coverage needed. Family at bedside. Call light within reach. Safety maintained.

## 2018-09-06 NOTE — PLAN OF CARE
[facility]    Diabetes mellitus type 2:   Mr. Mueller takes insulin detemir 70 units in the morning and Humalog 30 units with meals at home, but was not checking his blood glucose and was counteracting treatment with a diet high in sugars.  His blood glucose is fairly controlled on insulin detemir 60 units in the morning and insulin aspart 25 units with meals currently, which is slightly lower than his home doses.  He had mild hypoglycemia on insulin aspart 30 units due to decreased food intake yesterday, so will decrease back down to 25 units.     Since diabetes is more manageable, continue these doses.  No further need to see daily.  Will follow from a distance.

## 2018-09-07 ENCOUNTER — TELEPHONE (OUTPATIENT)
Dept: CARDIOLOGY | Facility: CLINIC | Age: 33
End: 2018-09-07

## 2018-09-07 PROBLEM — R11.10 CHRONIC VOMITING: Chronic | Status: ACTIVE | Noted: 2018-08-31

## 2018-09-07 LAB
ALBUMIN SERPL BCP-MCNC: 3.4 G/DL
ALP SERPL-CCNC: 79 U/L
ALT SERPL W/O P-5'-P-CCNC: 17 U/L
AMYLASE SERPL-CCNC: 50 U/L
ANION GAP SERPL CALC-SCNC: 8 MMOL/L
AST SERPL-CCNC: 20 U/L
BASOPHILS # BLD AUTO: 0.01 K/UL
BASOPHILS NFR BLD: 0.1 %
BILIRUB DIRECT SERPL-MCNC: 0.4 MG/DL
BILIRUB SERPL-MCNC: 0.9 MG/DL
BUN SERPL-MCNC: 9 MG/DL
CALCIUM SERPL-MCNC: 8.5 MG/DL
CHLORIDE SERPL-SCNC: 103 MMOL/L
CO2 SERPL-SCNC: 27 MMOL/L
CREAT SERPL-MCNC: 0.9 MG/DL
DIFFERENTIAL METHOD: ABNORMAL
EOSINOPHIL # BLD AUTO: 0.1 K/UL
EOSINOPHIL NFR BLD: 0.6 %
ERYTHROCYTE [DISTWIDTH] IN BLOOD BY AUTOMATED COUNT: 12.2 %
EST. GFR  (AFRICAN AMERICAN): >60 ML/MIN/1.73 M^2
EST. GFR  (NON AFRICAN AMERICAN): >60 ML/MIN/1.73 M^2
GLUCOSE SERPL-MCNC: 173 MG/DL
HCT VFR BLD AUTO: 39.2 %
HGB BLD-MCNC: 13.1 G/DL
LIPASE SERPL-CCNC: 29 U/L
LYMPHOCYTES # BLD AUTO: 2.1 K/UL
LYMPHOCYTES NFR BLD: 17.2 %
MAGNESIUM SERPL-MCNC: 2 MG/DL
MCH RBC QN AUTO: 29.6 PG
MCHC RBC AUTO-ENTMCNC: 33.4 G/DL
MCV RBC AUTO: 89 FL
MONOCYTES # BLD AUTO: 0.9 K/UL
MONOCYTES NFR BLD: 7.5 %
NEUTROPHILS # BLD AUTO: 9.2 K/UL
NEUTROPHILS NFR BLD: 74.5 %
PHOSPHATE SERPL-MCNC: 2.8 MG/DL
PLATELET # BLD AUTO: 182 K/UL
PMV BLD AUTO: 10.4 FL
POCT GLUCOSE: 193 MG/DL (ref 70–110)
POCT GLUCOSE: 219 MG/DL (ref 70–110)
POCT GLUCOSE: 229 MG/DL (ref 70–110)
POCT GLUCOSE: 234 MG/DL (ref 70–110)
POTASSIUM SERPL-SCNC: 3.5 MMOL/L
PROT SERPL-MCNC: 7.2 G/DL
RBC # BLD AUTO: 4.43 M/UL
SODIUM SERPL-SCNC: 138 MMOL/L
WBC # BLD AUTO: 12.31 K/UL

## 2018-09-07 PROCEDURE — 63600175 PHARM REV CODE 636 W HCPCS: Performed by: INTERNAL MEDICINE

## 2018-09-07 PROCEDURE — 80048 BASIC METABOLIC PNL TOTAL CA: CPT

## 2018-09-07 PROCEDURE — 84100 ASSAY OF PHOSPHORUS: CPT

## 2018-09-07 PROCEDURE — 25000003 PHARM REV CODE 250: Performed by: INTERNAL MEDICINE

## 2018-09-07 PROCEDURE — 82150 ASSAY OF AMYLASE: CPT

## 2018-09-07 PROCEDURE — 97803 MED NUTRITION INDIV SUBSEQ: CPT

## 2018-09-07 PROCEDURE — 63600175 PHARM REV CODE 636 W HCPCS: Performed by: NURSE PRACTITIONER

## 2018-09-07 PROCEDURE — 63600175 PHARM REV CODE 636 W HCPCS: Performed by: HOSPITALIST

## 2018-09-07 PROCEDURE — 83690 ASSAY OF LIPASE: CPT

## 2018-09-07 PROCEDURE — 63600175 PHARM REV CODE 636 W HCPCS: Performed by: PHYSICIAN ASSISTANT

## 2018-09-07 PROCEDURE — 83735 ASSAY OF MAGNESIUM: CPT

## 2018-09-07 PROCEDURE — 25000003 PHARM REV CODE 250: Performed by: NURSE PRACTITIONER

## 2018-09-07 PROCEDURE — 36415 COLL VENOUS BLD VENIPUNCTURE: CPT

## 2018-09-07 PROCEDURE — 25000003 PHARM REV CODE 250: Performed by: PHYSICIAN ASSISTANT

## 2018-09-07 PROCEDURE — 63600175 PHARM REV CODE 636 W HCPCS: Mod: JG | Performed by: INTERNAL MEDICINE

## 2018-09-07 PROCEDURE — 99233 SBSQ HOSP IP/OBS HIGH 50: CPT | Mod: ,,, | Performed by: INTERNAL MEDICINE

## 2018-09-07 PROCEDURE — 94761 N-INVAS EAR/PLS OXIMETRY MLT: CPT

## 2018-09-07 PROCEDURE — C9113 INJ PANTOPRAZOLE SODIUM, VIA: HCPCS | Performed by: PHYSICIAN ASSISTANT

## 2018-09-07 PROCEDURE — 20000000 HC ICU ROOM

## 2018-09-07 PROCEDURE — 85025 COMPLETE CBC W/AUTO DIFF WBC: CPT

## 2018-09-07 PROCEDURE — 80076 HEPATIC FUNCTION PANEL: CPT

## 2018-09-07 RX ORDER — PROCHLORPERAZINE EDISYLATE 5 MG/ML
10 INJECTION INTRAMUSCULAR; INTRAVENOUS EVERY 6 HOURS PRN
Status: DISCONTINUED | OUTPATIENT
Start: 2018-09-07 | End: 2018-09-08 | Stop reason: HOSPADM

## 2018-09-07 RX ORDER — METOCLOPRAMIDE HYDROCHLORIDE 5 MG/ML
10 INJECTION INTRAMUSCULAR; INTRAVENOUS
Status: DISCONTINUED | OUTPATIENT
Start: 2018-09-07 | End: 2018-09-08 | Stop reason: HOSPADM

## 2018-09-07 RX ORDER — INSULIN ASPART 100 [IU]/ML
8 INJECTION, SOLUTION INTRAVENOUS; SUBCUTANEOUS
Status: DISCONTINUED | OUTPATIENT
Start: 2018-09-07 | End: 2018-09-08 | Stop reason: HOSPADM

## 2018-09-07 RX ORDER — INSULIN ASPART 100 [IU]/ML
18 INJECTION, SOLUTION INTRAVENOUS; SUBCUTANEOUS
Status: DISCONTINUED | OUTPATIENT
Start: 2018-09-07 | End: 2018-09-07

## 2018-09-07 RX ADMIN — PROCHLORPERAZINE EDISYLATE 10 MG: 5 INJECTION INTRAMUSCULAR; INTRAVENOUS at 07:09

## 2018-09-07 RX ADMIN — INSULIN ASPART 4 UNITS: 100 INJECTION, SOLUTION INTRAVENOUS; SUBCUTANEOUS at 05:09

## 2018-09-07 RX ADMIN — METOCLOPRAMIDE 10 MG: 5 INJECTION, SOLUTION INTRAMUSCULAR; INTRAVENOUS at 05:09

## 2018-09-07 RX ADMIN — ALTEPLASE 1 MG/HR: 2.2 INJECTION, POWDER, LYOPHILIZED, FOR SOLUTION INTRAVENOUS at 02:09

## 2018-09-07 RX ADMIN — DEXTROSE 40 MG: 50 INJECTION, SOLUTION INTRAVENOUS at 09:09

## 2018-09-07 RX ADMIN — ALTEPLASE 1 MG/HR: 2.2 INJECTION, POWDER, LYOPHILIZED, FOR SOLUTION INTRAVENOUS at 11:09

## 2018-09-07 RX ADMIN — INSULIN ASPART 4 UNITS: 100 INJECTION, SOLUTION INTRAVENOUS; SUBCUTANEOUS at 12:09

## 2018-09-07 RX ADMIN — CARVEDILOL 25 MG: 25 TABLET, FILM COATED ORAL at 09:09

## 2018-09-07 RX ADMIN — AMLODIPINE BESYLATE 10 MG: 5 TABLET ORAL at 09:09

## 2018-09-07 RX ADMIN — INSULIN ASPART 8 UNITS: 100 INJECTION, SOLUTION INTRAVENOUS; SUBCUTANEOUS at 05:09

## 2018-09-07 RX ADMIN — PIPERACILLIN AND TAZOBACTAM 4.5 G: 4; .5 INJECTION, POWDER, LYOPHILIZED, FOR SOLUTION INTRAVENOUS; PARENTERAL at 02:09

## 2018-09-07 RX ADMIN — PIPERACILLIN AND TAZOBACTAM 4.5 G: 4; .5 INJECTION, POWDER, LYOPHILIZED, FOR SOLUTION INTRAVENOUS; PARENTERAL at 05:09

## 2018-09-07 RX ADMIN — METOCLOPRAMIDE 10 MG: 5 INJECTION, SOLUTION INTRAMUSCULAR; INTRAVENOUS at 10:09

## 2018-09-07 RX ADMIN — RIVAROXABAN 15 MG: 15 TABLET, FILM COATED ORAL at 07:09

## 2018-09-07 RX ADMIN — ONDANSETRON 4 MG: 2 INJECTION INTRAMUSCULAR; INTRAVENOUS at 06:09

## 2018-09-07 RX ADMIN — CLONIDINE HYDROCHLORIDE 0.1 MG: 0.1 TABLET ORAL at 09:09

## 2018-09-07 RX ADMIN — BIVALIRUDIN 0.25 MG/KG/HR: 250 INJECTION, POWDER, LYOPHILIZED, FOR SOLUTION INTRAVENOUS at 10:09

## 2018-09-07 RX ADMIN — LABETALOL HYDROCHLORIDE 10 MG: 5 INJECTION, SOLUTION INTRAVENOUS at 07:09

## 2018-09-07 RX ADMIN — INSULIN ASPART 2 UNITS: 100 INJECTION, SOLUTION INTRAVENOUS; SUBCUTANEOUS at 11:09

## 2018-09-07 RX ADMIN — METOCLOPRAMIDE 10 MG: 5 INJECTION, SOLUTION INTRAMUSCULAR; INTRAVENOUS at 09:09

## 2018-09-07 RX ADMIN — PIPERACILLIN AND TAZOBACTAM 4.5 G: 4; .5 INJECTION, POWDER, LYOPHILIZED, FOR SOLUTION INTRAVENOUS; PARENTERAL at 09:09

## 2018-09-07 NOTE — PROGRESS NOTES
[facility]  Cardiology  Progress Note    Patient Name: Kulwant Mueller Jr.  MRN: [id number]  Admission Date: 8/31/2018  Hospital Length of Stay: 5 days  Code Status: Full Code   Attending Physician: Madan Quintero MD   Primary Care Physician: Dona Pineda MD  Expected Discharge Date:   Principal Problem:Deep vein thrombosis (DVT) of femoral vein of left lower extremity    Subjective:     Hospital Course:   8/31/2018-9/1/2018 Pt and wife report that he developed leg swelling and pain similar to prior DVT the am of 8/31. He has not eaten since pm of 8/30. He presented to [facility] ED where BLE venous US showed occlusive thrombosis in the left common femoral, superficial femoral, popliteal, and posterior tibial veins.   Heparin drip was initiated and he was transferred [place] [facility] [place] by [place]ology. Soon after initiating heparin drip, pt developed nausea and vomiting which has remained continuous for nearly 12 hours.  Blood glucose found to be greater than 400 and DKA was suspected.  Pt was started on vigorous IV hydration with normal saline and insulin was then given.  Labs revealed UA with ketones, anion gap of 16 with metabolic acidosis.  CBC revealed WBC of 18.41, suggestive of hemoconcentration. All findings consistent with DKA.  Etiology unclear. Blood cultures sent.  Pt started on vancomycin and zosyn. Heparin drip was discontinued and pt subsequently started on full dose lovenox.  Hospital medicine consulted for assistance with managing DKA.  Mr. Mueller has remained hemodynamically stable.  Pt transferred to the ICU overnight for insulin drip.  9/2/2018 Insulin drip now discontinued. Symptoms improved and nausea and vomiting significantly improved.  Glucose improving with insulin and IVF's. Continue full dose lovenox at this time and monitor clinical exam with plan for reassessment for need of EKOS.  DKA  related to insulin indiscretion given A1c of 13.    9/3/2018 On IV Cardene drip for  BP control. BS improved and remains off Insulin. On FD Lovenox for LLE DVT. Will continue with current plan of DVT and HTN management medically. Will re assess to determine if EKOS catheter needed at later time   9/4/2018 LLE swelling remains-patient uncertain if any improvement. CBGs overnight 195-300 and off insulin drip. BP improved to 150s/90s and off Cardene drip. Awaiting transfer to floor. Will continue with SQ Lovenox treatment for now. Discuss with staff on rounds re: plans for EKOS catheter treatment   9/5/2018 NPO for EKOS catheter guided TPA otday  9/6/2018 Underwent EKOS catheter placement yesterday via left pedal access with following results:  Successful EKOS catheter placement with initiation of catheter directed thrombolysis with tPA at 1 mg/hr.  Catheter placed in LCIV (50 cm) with extension to distal LSFV with systemic bivalirudin intiated  Labs stable this AM. Complains of nausea and vomiting this AM ?pain medication related. Swelling remains with only slight improvement. Will continue EKOS catheter guided TPA for now along with Angiomax   9/7/2018 EKOS catheter with TPA and systemic Angiomax maintained overnight. LLE swelling dramatically improved overnight and left leg softer. Decision not to proceed with repeat venogram. EKOS catheter discontinued and will start on Xarelto this evening. Recurrent nausea and vomiting overnight and this AM with no abdominal pain. Amylase, lipase and LFTs WNL. Will transfer to the floor and anticipate discharge in AM         Review of Systems   Constitution: Negative for chills, decreased appetite, diaphoresis, fever and weakness.   Cardiovascular: Positive for leg swelling. Negative for chest pain, claudication, cyanosis, dyspnea on exertion, irregular heartbeat, near-syncope, orthopnea, palpitations, paroxysmal nocturnal dyspnea and syncope.   Respiratory: Negative for cough, hemoptysis, shortness of breath and wheezing.    Gastrointestinal: Negative for  bloating, abdominal pain, constipation, diarrhea, melena, nausea and vomiting.   Neurological: Negative for dizziness.     Objective:     Vital Signs (Most Recent):  Temp: 99.7 °F (37.6 °C) (09/07/18 1130)  Pulse: 86 (09/07/18 1515)  Resp: 20 (09/07/18 1500)  BP: (!) 157/88 (09/07/18 1515)  SpO2: 98 % (09/07/18 1515) Vital Signs (24h Range):  Temp:  [98.3 °F (36.8 °C)-99.7 °F (37.6 °C)] 99.7 °F (37.6 °C)  Pulse:  [62-91] 86  Resp:  [11-20] 20  SpO2:  [95 %-99 %] 98 %  BP: (107-180)/() 157/88     Weight: 118.8 kg (261 lb 14.5 oz)  Body mass index is 34.55 kg/m².     SpO2: 98 %  O2 Device (Oxygen Therapy): room air      Intake/Output Summary (Last 24 hours) at 9/7/2018 1536  Last data filed at 9/7/2018 1300  Gross per 24 hour   Intake 438.63 ml   Output 7600 ml   Net -7161.37 ml       Lines/Drains/Airways     Peripheral Intravenous Line                 Peripheral IV - Single Lumen 08/31/18 0823 Anterior;Right Wrist 7 days         Peripheral IV - Single Lumen 08/31/18 1348 Left Forearm 7 days                Physical Exam   Constitutional: He is oriented to person, place, and time. He appears well-developed and well-nourished. No distress.   Cardiovascular: Normal rate and regular rhythm. Exam reveals no gallop.   No murmur heard.  Pulmonary/Chest: Effort normal and breath sounds normal. No respiratory distress. He has no wheezes.   Abdominal: Soft. Bowel sounds are normal. He exhibits no distension. There is no tenderness.   Musculoskeletal: He exhibits edema (1+ LLE edema present; dramatically improved compared to yesterday ).   Neurological: He is alert and oriented to person, place, and time.   Skin: Skin is warm and dry.       Significant Labs:     Recent Labs   Lab  09/07/18   0341   WBC  12.31   RBC  4.43*   HGB  13.1*   HCT  39.2*   PLT  182   MCV  89   MCH  29.6   MCHC  33.4     Recent Labs   Lab  09/07/18   0341  09/07/18   1101   CALCIUM  8.5*   --    PROT   --   7.2   NA  138   --    K  3.5   --    CO2   27   --    CL  103   --    BUN  9   --    CREATININE  0.9   --    ALKPHOS   --   79   ALT   --   17   AST   --   20   BILITOT   --   0.9           Assessment and Plan:     Brief HPI: Seen on AM NP rounds with father at bedside and again this afternoon on rounds with Dr. Duff with family at the bedside. Reviewed POC as detailed below. EKOS catheter discontinued along with discontinuation of Angiomax and TPA. Will transfer to the floor     * Deep vein thrombosis (DVT) of femoral vein of left lower extremity    -recurrent LLE swelling and pain similar to symptoms with previous DVTs  -venous ultrasound with evidence of LCFV, left superficial vein, left popliteal and left PT veins; no evidence of DVT to RLE with notation of velocity of 10 to right CFV  -successful EKOS catheter placement with initiation of catheter directed thrombolysis with tPA at 1 mg/hr with catheter placed in LCIV (50 cm) with extension to distal LSFV with systemic Angiomax infusing  -EKOS catheter guided TPA along with systemic TPA continued for 36 hours; dramatic improvement in LLE swelling today  -will not proceed with repeat venogram given dramatic improvement; EKOS catheter discontinued along with TPA and Angiomax  -will start Xarelto this evening and plan for 15mg po BID x 21 days followed by 20mg daily   -anticipate need for lifelong AC        Chronic vomiting    -recurrent n/v this AM  -amylase, lipase and LFTs  -previous abdominal ultrasound with no acute abnormalites; EGD with retained food in stomach  -recs for gastric emptying study as an outpt; on Reglan per Hospital Medicine         May-Thurner syndrome    -s/p bilateral iliac vein stenting for May Thurner  -recurrent DVT with venogram with extensive DVT involving CIV stent   -will transition to Xarelto         Type 2 diabetes mellitus with hyperglycemia, with long-term current use of insulin    -presented with abdominal pain, nausea and vomiting  -initially concerned about  infectious etiology and started on IV Vanc and Zosyn with blood cultures ordered  -Hospital Medicine consulted for BS management  -[id number] 12.7 upon admission  -SQ regimen with Levemir 60units QHS with Novology 25units TID   -CBGs overnight  overnight; no insulin yesterday per patient out of fear of hypoglycemia  -will hold this AM due to n/v    -appreciate assistance of Hospital Medicine             VTE Risk Mitigation (From admission, onward)        Ordered     rivaroxaban tablet 20 mg  with dinner      09/07/18 1442     rivaroxaban tablet 15 mg  2 times daily with meals      09/07/18 1442          CHARLENE Moran, ANP  Cardiology  [facility]

## 2018-09-07 NOTE — PLAN OF CARE
TN went to meet with patient, grandfather at bedside. Patient remains in ICU. TN reviewed patient's clinicals. No anticipated discharge needs at this time. TN will continue to follow.    Follow-up With  Details  Why  Contact Info   Dona Pineda MD  Call  Primary Care Physician--Office Closed  [address]   [place] [id number] [phone]   Tyree Duff MD    Cardiology Follow-Up-- left message to schedule.  [address]  [address]  [facility] [address] [phone]      09/07/18 1417   Discharge Reassessment   Assessment Type Discharge Planning Reassessment   Provided patient/caregiver education on the expected discharge date and the discharge plan Yes   Discharge Plan A Home with family   Discharge Plan B Home with family;Home Health     Nayeli Strickland RN  Transition Navigator  [phone]

## 2018-09-07 NOTE — PLAN OF CARE
Problem: Patient Care Overview  Goal: Plan of Care Review  Outcome: Ongoing (interventions implemented as appropriate)  Recommendation/Intervention:   1. Resume diet when medcially acceptable.   2. Encourage intake at meals as tolerated.   3. Nausea management    Goals:   Pt will tolerate po diet with at least 50% intake at meals  Nutrition Goal Status: new  Communication of RD Recs: reviewed with RN(Jose Francisco)

## 2018-09-07 NOTE — PLAN OF CARE
[facility]    Diabetes mellitus type 2:   Mr. Mueller takes insulin detemir 70 units in the morning and Humalog 30 units with meals at home, but was not checking his blood glucose and was counteracting treatment with a diet high in sugars.  His hemoglobin A1c was 12.7%, which means his average blood glucose at home was over 300.      His blood glucose appear to be too well controlled on insulin doses lower than what he takes at home.  He is on insulin detemir 60 units in the morning and insulin aspart 25 units with meals currently.  His lowest blood glucose yesterday was 53.  Our team was notified about this, so I will decrease insulin aspart from 25 to 18 units with meals.     We were also asked about his chronic nausea and vomiting.  He had a CT and ultrasound of his abdomen last year to work this up and was supposed to get a gastric emptying study.  He likely has gastroparesis.  He is already on empiric metoclopramide, and should continue eating small meals.  When he gets outpatient gastric emptying study, he should be off metoclopramide and any opioids.    Will continue to follow from a distance and adjust insulin doses further if needed.

## 2018-09-07 NOTE — ASSESSMENT & PLAN NOTE
Supposed to get outpatient gastric emptying study for suspected diabetic gastroparesis.  Change oral metoclopramide back to IV.  Make prochlorperazine first choice PRN for nausea.  Cannot tolerate promethazine.  Ondansetron not effective.  Advised small meals.

## 2018-09-07 NOTE — PROGRESS NOTES
" [facility]  Adult Nutrition  Progress Note    SUMMARY       Recommendations    Recommendation/Intervention:   1. Resume diet when medcially acceptable.   2. Encourage intake at meals as tolerated.   3. Nausea management    Goals:   Pt will tolerate po diet with at least 50% intake at meals  Nutrition Goal Status: new  Communication of RD Recs: reviewed with RN(Jose Francisco)    Reason for Assessment  Reason for Assessment: RD follow-up  Diagnosis: diabetes diagnosis/complications  Relevant Medical History: HTN, Diabetes  General Information Comments: Pt NPO today for possible procedure. Pt also with N/V today. Pt was on 2000 ADA diet prior. Will educate on ADA diet when pt more appropriate.  Nutrition Discharge Planning: Diabetic Low Sodium Diet with adequate intake to meet EEN/EPN promoting glucose control    Nutrition Risk Screen  Nutrition Risk Screen: no indicators present    Nutrition/Diet History  Food Preferences: no Christian or cultural food prefs identified  Do you have any cultural, spiritual, Christian conflicts, given your current situation?: none reported  Food Allergies: NKFA  Factors Affecting Nutritional Intake: nausea/vomiting    Anthropometrics  Temp: 99.3 °F (37.4 °C)  Height Method: Stated  Height: 6' 1" (185.4 cm)  Height (inches): 73 in  Weight Method: Bed Scale  Weight: 118.8 kg (261 lb 14.5 oz)  Weight (lb): 261.91 lb  Ideal Body Weight (IBW), Male: 184 lb  % Ideal Body Weight, Male (lb): 142.34 lb  BMI (Calculated): 34.6  BMI Grade: 30 - 34.9- obesity - grade I     Lab/Procedures/Meds  Pertinent Labs Reviewed: reviewed  Pertinent Labs Comments: K 3.1L, Glu 202H  Pertinent Medications Reviewed: reviewed  Pertinent Medications Comments: insulin, Reglan    Physical Findings/Assessment  Overall Physical Appearance: overweight  Tubes: (-)  Oral/Mouth Cavity: WDL  Skin: (Ashish 20-intact)    Estimated/Assessed Needs  Weight Used For Calorie Calculations: 83.6 kg (184 lb 4.9 " oz)(IBW)  Energy Calorie Requirements (kcal): 2508 (30 kcal/kg)  Energy Need Method: Kcal/kg  Protein Requirements: 84g  Weight Used For Protein Calculations: 84.6 kg (186 lb 8.2 oz)(IBW)  Fluid Requirements (mL): 1ml/kcal or per MD  RDA Method (mL): 2508  CHO Requirement: 270g    Nutrition Prescription Ordered  Current Diet Order: 2000 ADA  Nutrition Order Comments: NPO today for possible procedure    Evaluation of Received Nutrient/Fluid Intake  Energy Calories Required: not meeting needs  Protein Required: not meeting needs  Fluid Required: not meeting needs  Comments: LBM 9/6  % Intake of Estimated Energy Needs: 0 - 25 %  % Meal Intake: 0 - 25 %    Nutrition Risk  Level of Risk/Frequency of Follow-up: (F/U 2x/wk)     Assessment and Plan  Nutrition Problem  Inadequate energy intake    Related to (etiology):   N/V    Signs and Symptoms (as evidenced by):   No recent po intake    Interventions/Recommendations (treatment strategy):  See recs    Nutrition Diagnosis Status:   New     Monitor and Evaluation  Food and Nutrient Intake: food and beverage intake  Food and Nutrient Adminstration: diet order  Knowledge/Beliefs/Attitudes: food and nutrition knowledge/skill, beliefs and attitudes  Physical Activity and Function: nutrition-related ADLs and IADLs  Anthropometric Measurements: weight, weight change  Biochemical Data, Medical Tests and Procedures: electrolyte and renal panel, gastrointestinal profile, glucose/endocrine profile, lipid profile, inflammatory profile  Nutrition-Focused Physical Findings: overall appearance     Nutrition Follow-Up  RD Follow-up?: Yes

## 2018-09-07 NOTE — PLAN OF CARE
Problem: Diabetes, Type 2 (Adult)  Goal: Signs and Symptoms of Listed Potential Problems Will be Absent, Minimized or Managed (Diabetes, Type 2)  Signs and symptoms of listed potential problems will be absent, minimized or managed by discharge/transition of care (reference Diabetes, Type 2 (Adult) CPG).  Outcome: Ongoing (interventions implemented as appropriate)  Results for ASTRID GOLD JR. (MRN [id number]) as of 9/7/2018 03:54   Ref. Range 9/6/2018 20:11 9/6/2018 20:58 9/6/2018 21:42   POCT Glucose Latest Ref Range: 70 - 110 mg/dL 53 (L) 55 (L) 96     BG dropped to 50's after the evening dose of 25 units of insulin aspart. As per the order, glucose tablets given x2.     Problem: Patient Care Overview  Goal: Plan of Care Review  Outcome: Ongoing (interventions implemented as appropriate)  AAOx4, no c/o pain, EKOS in place    Problem: Nausea/Vomiting (Adult)  Goal: Identify Related Risk Factors and Signs and Symptoms  Related risk factors and signs and symptoms are identified upon initiation of Human Response Clinical Practice Guideline (CPG)  Outcome: Ongoing (interventions implemented as appropriate)  No c/o nausea    Problem: Fall Risk (Adult)  Goal: Identify Related Risk Factors and Signs and Symptoms  Related risk factors and signs and symptoms are identified upon initiation of Human Response Clinical Practice Guideline (CPG)  Outcome: Ongoing (interventions implemented as appropriate)  Fall risks reviewed with patient, pt verbalized understanding, bed alarm on, bed locked and in lowest position    Problem: VTE, DVT and PE (Adult)  Goal: Signs and Symptoms of Listed Potential Problems Will be Absent, Minimized or Managed (VTE, DVT and PE)  Signs and symptoms of listed potential problems will be absent, minimized or managed by discharge/transition of care (reference VTE, DVT and PE (Adult) CPG).  Outcome: Ongoing (interventions implemented as appropriate)  EKOS in place, LLE swelling and doppler pulses, no  bleeding noted

## 2018-09-07 NOTE — SUBJECTIVE & OBJECTIVE
Review of Systems   Constitution: Negative for chills, decreased appetite, diaphoresis, fever and weakness.   Cardiovascular: Positive for leg swelling. Negative for chest pain, claudication, cyanosis, dyspnea on exertion, irregular heartbeat, near-syncope, orthopnea, palpitations, paroxysmal nocturnal dyspnea and syncope.   Respiratory: Negative for cough, hemoptysis, shortness of breath and wheezing.    Gastrointestinal: Negative for bloating, abdominal pain, constipation, diarrhea, melena, nausea and vomiting.   Neurological: Negative for dizziness.     Objective:     Vital Signs (Most Recent):  Temp: 99.7 °F (37.6 °C) (09/07/18 1130)  Pulse: 86 (09/07/18 1515)  Resp: 20 (09/07/18 1500)  BP: (!) 157/88 (09/07/18 1515)  SpO2: 98 % (09/07/18 1515) Vital Signs (24h Range):  Temp:  [98.3 °F (36.8 °C)-99.7 °F (37.6 °C)] 99.7 °F (37.6 °C)  Pulse:  [62-91] 86  Resp:  [11-20] 20  SpO2:  [95 %-99 %] 98 %  BP: (107-180)/() 157/88     Weight: 118.8 kg (261 lb 14.5 oz)  Body mass index is 34.55 kg/m².     SpO2: 98 %  O2 Device (Oxygen Therapy): room air      Intake/Output Summary (Last 24 hours) at 9/7/2018 1536  Last data filed at 9/7/2018 1300  Gross per 24 hour   Intake 438.63 ml   Output 7600 ml   Net -7161.37 ml       Lines/Drains/Airways     Peripheral Intravenous Line                 Peripheral IV - Single Lumen 08/31/18 0823 Anterior;Right Wrist 7 days         Peripheral IV - Single Lumen 08/31/18 1348 Left Forearm 7 days                Physical Exam   Constitutional: He is oriented to person, place, and time. He appears well-developed and well-nourished. No distress.   Cardiovascular: Normal rate and regular rhythm. Exam reveals no gallop.   No murmur heard.  Pulmonary/Chest: Effort normal and breath sounds normal. No respiratory distress. He has no wheezes.   Abdominal: Soft. Bowel sounds are normal. He exhibits no distension. There is no tenderness.   Musculoskeletal: He exhibits edema (1+ LLE edema  present; dramatically improved compared to yesterday ).   Neurological: He is alert and oriented to person, place, and time.   Skin: Skin is warm and dry.       Significant Labs:     Recent Labs   Lab  09/07/18   0341   WBC  12.31   RBC  4.43*   HGB  13.1*   HCT  39.2*   PLT  182   MCV  89   MCH  29.6   MCHC  33.4     Recent Labs   Lab  09/07/18   0341 09/07/18   1101   CALCIUM  8.5*   --    PROT   --   7.2   NA  138   --    K  3.5   --    CO2  27   --    CL  103   --    BUN  9   --    CREATININE  0.9   --    ALKPHOS   --   79   ALT   --   17   AST   --   20   BILITOT   --   0.9

## 2018-09-07 NOTE — SUBJECTIVE & OBJECTIVE
Interval History: Vomiting.  Ondansetron does not work.    Review of Systems   Respiratory: Negative for cough and shortness of breath.    Gastrointestinal: Positive for nausea and vomiting.     Objective:     Vital Signs (Most Recent):  Temp: 99.3 °F (37.4 °C) (09/07/18 0715)  Pulse: 81 (09/07/18 0830)  Resp: 17 (09/07/18 0830)  BP: (!) 180/103 (09/07/18 0925)  SpO2: 96 % (09/07/18 0830) Vital Signs (24h Range):  Temp:  [98.3 °F (36.8 °C)-99.3 °F (37.4 °C)] 99.3 °F (37.4 °C)  Pulse:  [62-83] 81  Resp:  [11-20] 17  SpO2:  [95 %-99 %] 96 %  BP: (107-180)/() 180/103     Weight: 118.8 kg (261 lb 14.5 oz)  Body mass index is 34.55 kg/m².    Intake/Output Summary (Last 24 hours) at 9/7/2018 0943  Last data filed at 9/7/2018 0600  Gross per 24 hour   Intake 438.63 ml   Output 3500 ml   Net -3061.37 ml      Physical Exam   Constitutional: He is oriented to person, place, and time. He appears well-developed. No distress.   Pulmonary/Chest: Effort normal. No respiratory distress.   Musculoskeletal:   Catheter in left foot   Neurological: He is alert and oriented to person, place, and time.   Psychiatric: He has a normal mood and affect.   Nursing note and vitals reviewed.      Significant Labs: All pertinent labs within the past 24 hours have been reviewed.    Significant Imaging: I have reviewed all pertinent imaging results/findings within the past 24 hours.

## 2018-09-07 NOTE — TELEPHONE ENCOUNTER
----- Message from Nayeli Strickland RN sent at 9/7/2018  2:15 PM CDT -----  Hello,        Patient may discharge over the weekend. Can you please schedule follow-up with either Dr. Duff or Debbie for follow-up. He would prefer [place] if possible. Thanks!    Thanks!  Nayeli Strickland RN  Transition Navigator  [phone]

## 2018-09-07 NOTE — ASSESSMENT & PLAN NOTE
Takes insulin detemir 70 units in the morning and Humalog 30 units with meals at home.  Hemoglobin A1c very high.  Has a high carbohydrate diet at home.  Give insulin detemir 15 units in the morning and insulin aspart 8 units with meals.  Will adjust as necessary.

## 2018-09-07 NOTE — NURSING
Sheath pulled from Left dorsal surface of foot as ordered. Suture removed, witnessed by Imani Ricks. Pressured held for 15 minutes. Pressure dressing applied. Positive pedal pulses noted.

## 2018-09-07 NOTE — ASSESSMENT & PLAN NOTE
-presented with abdominal pain, nausea and vomiting  -initially concerned about infectious etiology and started on IV Vanc and Zosyn with blood cultures ordered  -Hospital Medicine consulted for BS management  -ColG25w 12.7 upon admission  -SQ regimen with Levemir 60units QHS with Novology 25units TID   -CBGs overnight  overnight; no insulin yesterday per patient out of fear of hypoglycemia  -will hold this AM due to n/v    -appreciate assistance of Hospital Medicine

## 2018-09-07 NOTE — ASSESSMENT & PLAN NOTE
-recurrent LLE swelling and pain similar to symptoms with previous DVTs  -venous ultrasound with evidence of LCFV, left superficial vein, left popliteal and left PT veins; no evidence of DVT to RLE with notation of velocity of 10 to right CFV  -successful EKOS catheter placement with initiation of catheter directed thrombolysis with tPA at 1 mg/hr with catheter placed in LCIV (50 cm) with extension to distal LSFV with systemic Angiomax infusing  -EKOS catheter guided TPA along with systemic TPA continued for 36 hours; dramatic improvement in LLE swelling today  -will not proceed with repeat venogram given dramatic improvement; EKOS catheter discontinued along with TPA and Angiomax  -will start Xarelto this evening and plan for 15mg po BID x 21 days followed by 20mg daily   -anticipate need for lifelong AC

## 2018-09-07 NOTE — PROGRESS NOTES
[facility] Hospital Medicine  Progress Note    Patient Name: Kulwant Mueller Jr.  MRN: [id number]  Patient Class: IP- Inpatient   Admission Date: 8/31/2018  Length of Stay: 5 days  Attending Physician: Madan Quintero MD  Primary Care Provider: Dona Pineda MD        Subjective:     Principal Problem:Deep vein thrombosis (DVT) of femoral vein of left lower extremity    HPI:  Kulwant Mueller Jr. is a 33 y.o. black man with obesity, hypertension, insulin-dependent diabetes mellitus type 2, May-Thurner syndrome, cigarette smoking, chronic nausea and vomiting.  He likely has diabetic gastroparesis, because esophagogastroduodenoscopy on 9/18/17 showed food residue in his stomach.  He lives in [place], [place].  His primary care physician is Dr. Dona Pineda.   He was admitted to [facility] by Cardiology for recurrent left leg deep vein thrombosis.  Ultrasound at [facility] showed showed occlusive thrombosis in the left common femoral, superficial femoral, popliteal, and posterior tibial veins.  He was started on heparin drip, piperacillin-tazobactam, and vancomycin.  He developed intractable nausea and vomiting soon after starting the heparin drip, which was changed to enoxaparin injections because he experienced similar symptoms with heparin drip a year ago.  EGD at that time was inconclusive so gastroparesis was suspected.  Labs showed diabetic ketoacidosis.  [facility] was consulted to manage diabetic ketoacidosis.  His vomit had some blood in it so pantoprazole was started.      Hospital Course:  DKA was treated with insulin drip.  Vomiting abated by the morning of 9/2/18.  Beta-hydroxybutyrate fell to 0.2, so drip was discontinued.  Hemoglobin A1c was 12.7% (and was 9.1% on 1/11/18) showing that he is chronically hyperglycemic.  He takes insulin detemir 30 units daily in the morning and insulin lispro  sliding scale with meals.  Blood pressure increased and remained elevated >200, so Cardiology put him on nicardipine drip.  Metoclopramide was started for suspected gastroparesis.  On 9/4/18 he was seen drinking a Sprite and reported that he does not follow a diabetic diet or check his blood glucose at home.  He was kept on a diabetic diet and required much less insulin than he does at home.    Interval History: Vomiting.  Ondansetron does not work.    Review of Systems   Respiratory: Negative for cough and shortness of breath.    Gastrointestinal: Positive for nausea and vomiting.     Objective:     Vital Signs (Most Recent):  Temp: 99.3 °F (37.4 °C) (09/07/18 0715)  Pulse: 81 (09/07/18 0830)  Resp: 17 (09/07/18 0830)  BP: (!) 180/103 (09/07/18 0925)  SpO2: 96 % (09/07/18 0830) Vital Signs (24h Range):  Temp:  [98.3 °F (36.8 °C)-99.3 °F (37.4 °C)] 99.3 °F (37.4 °C)  Pulse:  [62-83] 81  Resp:  [11-20] 17  SpO2:  [95 %-99 %] 96 %  BP: (107-180)/() 180/103     Weight: 118.8 kg (261 lb 14.5 oz)  Body mass index is 34.55 kg/m².    Intake/Output Summary (Last 24 hours) at 9/7/2018 0943  Last data filed at 9/7/2018 0600  Gross per 24 hour   Intake 438.63 ml   Output 3500 ml   Net -3061.37 ml      Physical Exam   Constitutional: He is oriented to person, place, and time. He appears well-developed. No distress.   Pulmonary/Chest: Effort normal. No respiratory distress.   Musculoskeletal:   Catheter in left foot   Neurological: He is alert and oriented to person, place, and time.   Psychiatric: He has a normal mood and affect.   Nursing note and vitals reviewed.      Significant Labs: All pertinent labs within the past 24 hours have been reviewed.    Significant Imaging: I have reviewed all pertinent imaging results/findings within the past 24 hours.    Assessment/Plan:      * Deep vein thrombosis (DVT) of femoral vein of left lower extremity    May-Thurner syndrome  History of intravascular stent placement  Per  primary team.        Chronic vomiting    Supposed to get outpatient gastric emptying study for suspected diabetic gastroparesis.  Change oral metoclopramide back to IV.  Make prochlorperazine first choice PRN for nausea.  Cannot tolerate promethazine.  Ondansetron not effective.  Advised small meals.          Type 2 diabetes mellitus with hyperglycemia, with long-term current use of insulin    Takes insulin detemir 70 units in the morning and Humalog 30 units with meals at home.  Hemoglobin A1c very high.  Has a high carbohydrate diet at home.  Give insulin detemir 15 units in the morning and insulin aspart 8 units with meals.  Will adjust as necessary.            VTE Risk Mitigation (From admission, onward)    None          Critical care time spent on the evaluation and treatment of severe organ dysfunction, review of pertinent labs and imaging studies, discussions with consulting providers and discussions with patient/family: 30 minutes.    Cipriano Karimi MD  Department of Hospital Medicine   [facility]

## 2018-09-07 NOTE — ASSESSMENT & PLAN NOTE
-recurrent n/v this AM  -amylase, lipase and LFTs  -previous abdominal ultrasound with no acute abnormalites; EGD with retained food in stomach  -recs for gastric emptying study as an outpt; on Reglan per Hospital Medicine

## 2018-09-08 VITALS
BODY MASS INDEX: 34.71 KG/M2 | WEIGHT: 261.94 LBS | SYSTOLIC BLOOD PRESSURE: 136 MMHG | HEIGHT: 73 IN | TEMPERATURE: 99 F | OXYGEN SATURATION: 100 % | DIASTOLIC BLOOD PRESSURE: 73 MMHG | RESPIRATION RATE: 18 BRPM | HEART RATE: 76 BPM

## 2018-09-08 PROBLEM — E11.43 DIABETIC GASTROPARESIS ASSOCIATED WITH TYPE 2 DIABETES MELLITUS: Chronic | Status: ACTIVE | Noted: 2018-08-31

## 2018-09-08 PROBLEM — K31.84 DIABETIC GASTROPARESIS ASSOCIATED WITH TYPE 2 DIABETES MELLITUS: Chronic | Status: ACTIVE | Noted: 2018-08-31

## 2018-09-08 LAB
ANION GAP SERPL CALC-SCNC: 9 MMOL/L
BASOPHILS # BLD AUTO: 0.01 K/UL
BASOPHILS NFR BLD: 0.1 %
BUN SERPL-MCNC: 12 MG/DL
CALCIUM SERPL-MCNC: 8.8 MG/DL
CHLORIDE SERPL-SCNC: 99 MMOL/L
CO2 SERPL-SCNC: 27 MMOL/L
CREAT SERPL-MCNC: 1 MG/DL
DIFFERENTIAL METHOD: ABNORMAL
EOSINOPHIL # BLD AUTO: 0.1 K/UL
EOSINOPHIL NFR BLD: 0.8 %
ERYTHROCYTE [DISTWIDTH] IN BLOOD BY AUTOMATED COUNT: 12.3 %
EST. GFR  (AFRICAN AMERICAN): >60 ML/MIN/1.73 M^2
EST. GFR  (NON AFRICAN AMERICAN): >60 ML/MIN/1.73 M^2
GLUCOSE SERPL-MCNC: 237 MG/DL
HCT VFR BLD AUTO: 40.4 %
HGB BLD-MCNC: 13.3 G/DL
LYMPHOCYTES # BLD AUTO: 2.2 K/UL
LYMPHOCYTES NFR BLD: 16.6 %
MAGNESIUM SERPL-MCNC: 2 MG/DL
MCH RBC QN AUTO: 29 PG
MCHC RBC AUTO-ENTMCNC: 32.9 G/DL
MCV RBC AUTO: 88 FL
MONOCYTES # BLD AUTO: 1 K/UL
MONOCYTES NFR BLD: 7.3 %
NEUTROPHILS # BLD AUTO: 10 K/UL
NEUTROPHILS NFR BLD: 75.1 %
PHOSPHATE SERPL-MCNC: 3.3 MG/DL
PLATELET # BLD AUTO: 189 K/UL
PMV BLD AUTO: 10.6 FL
POCT GLUCOSE: 213 MG/DL (ref 70–110)
POCT GLUCOSE: 216 MG/DL (ref 70–110)
POTASSIUM SERPL-SCNC: 3.7 MMOL/L
RBC # BLD AUTO: 4.59 M/UL
SODIUM SERPL-SCNC: 135 MMOL/L
WBC # BLD AUTO: 13.35 K/UL

## 2018-09-08 PROCEDURE — 25000003 PHARM REV CODE 250: Performed by: PHYSICIAN ASSISTANT

## 2018-09-08 PROCEDURE — 36415 COLL VENOUS BLD VENIPUNCTURE: CPT

## 2018-09-08 PROCEDURE — 25000003 PHARM REV CODE 250: Performed by: INTERNAL MEDICINE

## 2018-09-08 PROCEDURE — 63600175 PHARM REV CODE 636 W HCPCS: Performed by: HOSPITALIST

## 2018-09-08 PROCEDURE — 83735 ASSAY OF MAGNESIUM: CPT

## 2018-09-08 PROCEDURE — 85025 COMPLETE CBC W/AUTO DIFF WBC: CPT

## 2018-09-08 PROCEDURE — 25000003 PHARM REV CODE 250: Performed by: NURSE PRACTITIONER

## 2018-09-08 PROCEDURE — 63600175 PHARM REV CODE 636 W HCPCS: Performed by: INTERNAL MEDICINE

## 2018-09-08 PROCEDURE — 80048 BASIC METABOLIC PNL TOTAL CA: CPT

## 2018-09-08 PROCEDURE — 99239 HOSP IP/OBS DSCHRG MGMT >30: CPT | Mod: ,,, | Performed by: INTERNAL MEDICINE

## 2018-09-08 PROCEDURE — 84100 ASSAY OF PHOSPHORUS: CPT

## 2018-09-08 PROCEDURE — C9113 INJ PANTOPRAZOLE SODIUM, VIA: HCPCS | Performed by: PHYSICIAN ASSISTANT

## 2018-09-08 PROCEDURE — 94761 N-INVAS EAR/PLS OXIMETRY MLT: CPT

## 2018-09-08 PROCEDURE — 63600175 PHARM REV CODE 636 W HCPCS: Performed by: PHYSICIAN ASSISTANT

## 2018-09-08 RX ORDER — PEN NEEDLE, DIABETIC 31 GX5/16"
1 NEEDLE, DISPOSABLE MISCELLANEOUS
Qty: 100 EACH | Refills: 3 | Status: SHIPPED | OUTPATIENT
Start: 2018-09-08 | End: 2021-11-27

## 2018-09-08 RX ORDER — LANCETS
1 EACH MISCELLANEOUS
Qty: 100 EACH | Refills: 11 | Status: SHIPPED | OUTPATIENT
Start: 2018-09-08 | End: 2021-11-27 | Stop reason: ALTCHOICE

## 2018-09-08 RX ORDER — INSULIN PUMP SYRINGE, 3 ML
EACH MISCELLANEOUS
Qty: 1 EACH | Refills: 0 | Status: SHIPPED | OUTPATIENT
Start: 2018-09-08 | End: 2021-11-27

## 2018-09-08 RX ORDER — METOCLOPRAMIDE 10 MG/1
10 TABLET ORAL
Qty: 90 TABLET | Refills: 11 | Status: SHIPPED | OUTPATIENT
Start: 2018-09-08 | End: 2019-02-15 | Stop reason: SDUPTHER

## 2018-09-08 RX ORDER — INSULIN LISPRO 100 [IU]/ML
15 INJECTION, SOLUTION INTRAVENOUS; SUBCUTANEOUS
Start: 2018-09-08 | End: 2021-11-27 | Stop reason: SDUPTHER

## 2018-09-08 RX ADMIN — METOCLOPRAMIDE 10 MG: 5 INJECTION, SOLUTION INTRAMUSCULAR; INTRAVENOUS at 05:09

## 2018-09-08 RX ADMIN — INSULIN ASPART 4 UNITS: 100 INJECTION, SOLUTION INTRAVENOUS; SUBCUTANEOUS at 05:09

## 2018-09-08 RX ADMIN — DEXTROSE 40 MG: 50 INJECTION, SOLUTION INTRAVENOUS at 08:09

## 2018-09-08 RX ADMIN — AMLODIPINE BESYLATE 10 MG: 5 TABLET ORAL at 08:09

## 2018-09-08 RX ADMIN — PIPERACILLIN AND TAZOBACTAM 4.5 G: 4; .5 INJECTION, POWDER, LYOPHILIZED, FOR SOLUTION INTRAVENOUS; PARENTERAL at 01:09

## 2018-09-08 RX ADMIN — INSULIN DETEMIR 20 UNITS: 100 INJECTION, SOLUTION SUBCUTANEOUS at 08:09

## 2018-09-08 RX ADMIN — INSULIN ASPART 8 UNITS: 100 INJECTION, SOLUTION INTRAVENOUS; SUBCUTANEOUS at 08:09

## 2018-09-08 RX ADMIN — RIVAROXABAN 15 MG: 15 TABLET, FILM COATED ORAL at 08:09

## 2018-09-08 RX ADMIN — METOCLOPRAMIDE 10 MG: 5 INJECTION, SOLUTION INTRAMUSCULAR; INTRAVENOUS at 11:09

## 2018-09-08 RX ADMIN — CARVEDILOL 25 MG: 25 TABLET, FILM COATED ORAL at 08:09

## 2018-09-08 RX ADMIN — PIPERACILLIN AND TAZOBACTAM 4.5 G: 4; .5 INJECTION, POWDER, LYOPHILIZED, FOR SOLUTION INTRAVENOUS; PARENTERAL at 08:09

## 2018-09-08 RX ADMIN — INSULIN ASPART 8 UNITS: 100 INJECTION, SOLUTION INTRAVENOUS; SUBCUTANEOUS at 11:09

## 2018-09-08 NOTE — PROGRESS NOTES
[facility] Hospital Medicine  Consult Progress Note    Patient Name: Kulwant Mueller Jr.  MRN: [id number]  Patient Class: IP- Inpatient   Admission Date: 8/31/2018  Length of Stay: 6 days  Attending Physician: Madan Quintero MD  Primary Care Provider: Dona Pineda MD        Subjective:     Principal Problem:Deep vein thrombosis (DVT) of femoral vein of left lower extremity    HPI:  Kulwant Mueller Jr. is a 33 y.o. black man with obesity, hypertension, insulin-dependent diabetes mellitus type 2, May-Thurner syndrome, cigarette smoking, chronic nausea and vomiting.  He likely has diabetic gastroparesis, because esophagogastroduodenoscopy on 9/18/17 showed food residue in his stomach (he could not afford the gastric emptying study).  He lives in [place], [place].  He is a  with [employer].  His primary care physician was Dr. Dona Pineda but he wants to change to a new one.   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  Ultrasound at [facility] showed showed occlusive thrombosis in the left common femoral, superficial femoral, popliteal, and posterior tibial veins.  He was started on heparin drip, piperacillin-tazobactam, and vancomycin.  He developed intractable nausea and vomiting soon after starting the heparin drip, which was changed to enoxaparin injections because he experienced similar symptoms with heparin drip a year ago.  EGD at that time was inconclusive so gastroparesis was suspected.  Labs showed diabetic ketoacidosis.  [facility] was consulted to manage diabetic ketoacidosis.  His vomit had some blood in it so pantoprazole was started.      Hospital Course:  DVT and hypertension:  Blood pressure increased and remained elevated >200, so Cardiology put him on  nicardipine drip.  He underwent thrombectomy and was prescribed rivaroxaban.    Diabetes mellitus type 2 and gastroparesis:  DKA was treated with insulin drip.  Vomiting abated by the morning of 9/2/18.  Beta-hydroxybutyrate fell to 0.2, so drip was discontinued.  Hemoglobin A1c was 12.7% (and was 9.1% on 1/11/18) showing that he is chronically hyperglycemic.  He takes insulin detemir 30 units daily in the morning and insulin lispro sliding scale with meals.  Metoclopramide was started for suspected gastroparesis.  On 9/4/18 he was seen drinking a Sprite and reported that he does not follow a diabetic diet or check his blood glucose at home.  He was kept on a diabetic diet and required much less insulin than he does at home.  He was advised to adjust his home insulin down to account for changing his diet.  He requested to be prescribed the metoclopramide because it helped.    Interval History: Apologizes for being nair the past couple days.  Wants to get his diabetes under better control, and may follow up with Dr. Manisha Cruz.  I encouraged this.    Review of Systems   Constitutional: Negative for chills and fever.   Respiratory: Negative for cough and shortness of breath.      Objective:     Vital Signs (Most Recent):  Temp: 97.8 °F (36.6 °C) (09/08/18 0422)  Pulse: 72 (09/08/18 0730)  Resp: 14 (09/08/18 0422)  BP: 136/76 (09/08/18 0422)  SpO2: 100 % (09/08/18 0915) Vital Signs (24h Range):  Temp:  [97.8 °F (36.6 °C)-99.7 °F (37.6 °C)] 97.8 °F (36.6 °C)  Pulse:  [61-91] 72  Resp:  [14-26] 14  SpO2:  [95 %-100 %] 100 %  BP: (112-176)/() 136/76     Weight: 118.8 kg (261 lb 14.5 oz)  Body mass index is 34.55 kg/m².    Intake/Output Summary (Last 24 hours) at 9/8/2018 1055  Last data filed at 9/8/2018 0615  Gross per 24 hour   Intake 665.13 ml   Output 2550 ml   Net -1884.87 ml      Physical Exam   Constitutional: He is oriented to person, place, and time. He appears well-developed. No distress.    Pulmonary/Chest: Effort normal. No respiratory distress.   Neurological: He is alert and oriented to person, place, and time.   Psychiatric: He has a normal mood and affect.   Nursing note and vitals reviewed.      Significant Labs:   POCT Glucose:   Recent Labs   Lab  09/07/18   1712  09/07/18   2330  09/08/18   0514   POCTGLUCOSE  234*  229*  213*     All pertinent labs within the past 24 hours have been reviewed.    Significant Imaging: I have reviewed all pertinent imaging results/findings within the past 24 hours.    Assessment/Plan:      * Deep vein thrombosis (DVT) of femoral vein of left lower extremity    May-Thurner syndrome  History of intravascular stent placement  Per primary team.        Diabetic gastroparesis associated with type 2 diabetes mellitus    Was supposed to get outpatient gastric emptying study for suspected diabetic gastroparesis but it was too expensive.  Advised to eat smaller more frequent meals.  Metroclopramide was prescribed.        Type 2 diabetes mellitus with hyperglycemia, with long-term current use of insulin    Takes insulin detemir 70 units in the morning and Humalog 30 units with meals at home.  Hemoglobin A1c very high.  Was eating a high carbohydrate diet at home.  Giving insulin detemir 20 units in the morning and insulin aspart 8 units with meals.  Will adjust as necessary.  He can resume his home insulin at half the doses he was getting and adjust from there.  He was prescribed testing supplies and a new glucose meter.          VTE Risk Mitigation (From admission, onward)        Ordered     rivaroxaban tablet 20 mg  with dinner      09/07/18 1442     rivaroxaban tablet 15 mg  2 times daily with meals      09/07/18 1442              Cipriano Karimi MD  Department of Hospital Medicine   [facility]

## 2018-09-08 NOTE — ASSESSMENT & PLAN NOTE
Was supposed to get outpatient gastric emptying study for suspected diabetic gastroparesis but it was too expensive.  Advised to eat smaller more frequent meals.  Metroclopramide was prescribed.

## 2018-09-08 NOTE — PLAN OF CARE
Discharge orders noted, no HH or HME ordered.    Future Appointments   Date Time Provider Department Center   9/26/2018  2:00 PM CHARLENE Rich, ANP [facility]       Pt's nurse will go over medications/signs and symptoms prior to discharge       09/08/18 1124   Final Note   Assessment Type Final Discharge Note   Discharge Disposition Home   What phone number can be called within the next 1-3 days to see how you are doing after discharge? [phone]   Hospital Follow Up  Appt(s) scheduled? No  (Offices closed for weekend. Patient to schedule own follow up appointment.)   Right Care Referral Info   Post Acute Recommendation No Care     Koki Pulido, RN Transitional Navigator  [phone]

## 2018-09-08 NOTE — ASSESSMENT & PLAN NOTE
Takes insulin detemir 70 units in the morning and Humalog 30 units with meals at home.  Hemoglobin A1c very high.  Was eating a high carbohydrate diet at home.  Giving insulin detemir 20 units in the morning and insulin aspart 8 units with meals.  Will adjust as necessary.  He can resume his home insulin at half the doses he was getting and adjust from there.  He was prescribed testing supplies and a new glucose meter.

## 2018-09-08 NOTE — NURSING TRANSFER
Nursing Transfer Note      9/8/2018     Transfer To: 428    Transfer via bed    Transfer with cardiac monitoring    Transported by ICU RN    Medicines sent: insulin pens, labetalol, enoxaparin    Chart send with patient: Yes    Notified: per patient    Handoff to Barbra RN at bedside.

## 2018-09-08 NOTE — SUBJECTIVE & OBJECTIVE
Interval History: Apologizes for being nair the past couple days.  Wants to get his diabetes under better control, and may follow up with Dr. Manisha Cruz.  I encouraged this.    Review of Systems   Constitutional: Negative for chills and fever.   Respiratory: Negative for cough and shortness of breath.      Objective:     Vital Signs (Most Recent):  Temp: 97.8 °F (36.6 °C) (09/08/18 0422)  Pulse: 72 (09/08/18 0730)  Resp: 14 (09/08/18 0422)  BP: 136/76 (09/08/18 0422)  SpO2: 100 % (09/08/18 0915) Vital Signs (24h Range):  Temp:  [97.8 °F (36.6 °C)-99.7 °F (37.6 °C)] 97.8 °F (36.6 °C)  Pulse:  [61-91] 72  Resp:  [14-26] 14  SpO2:  [95 %-100 %] 100 %  BP: (112-176)/() 136/76     Weight: 118.8 kg (261 lb 14.5 oz)  Body mass index is 34.55 kg/m².    Intake/Output Summary (Last 24 hours) at 9/8/2018 1055  Last data filed at 9/8/2018 0615  Gross per 24 hour   Intake 665.13 ml   Output 2550 ml   Net -1884.87 ml      Physical Exam   Constitutional: He is oriented to person, place, and time. He appears well-developed. No distress.   Pulmonary/Chest: Effort normal. No respiratory distress.   Neurological: He is alert and oriented to person, place, and time.   Psychiatric: He has a normal mood and affect.   Nursing note and vitals reviewed.      Significant Labs:   POCT Glucose:   Recent Labs   Lab  09/07/18   1712  09/07/18   2330  09/08/18   0514   POCTGLUCOSE  234*  229*  213*     All pertinent labs within the past 24 hours have been reviewed.    Significant Imaging: I have reviewed all pertinent imaging results/findings within the past 24 hours.

## 2018-09-08 NOTE — DISCHARGE SUMMARY
[facility]  Discharge Summary      Admit Date: 8/31/2018    Discharge Date and Time:  09/08/2018 12:53 PM    Attending Physician: Madan Quintero MD     Reason for Admission: DVT    Procedures Performed: Procedure(s) (LRB):  THROMBECTOMY (N/A)    Hospital Course (synopsis of major diagnoses, care, treatment, and services provided during the course of the hospital stay): The pt presented with recurrent LLE DVT with post thrombotic syndrome. S/p successful catheter directed EKOS tPA and started on Xarelto. Plan for outpatient venogram and possible intervention.  Outpatient H/O consult.    Final Diagnoses:    Principal Problem: Deep vein thrombosis (DVT) of femoral vein of left lower extremity   Secondary Diagnoses:   Active Hospital Problems    Diagnosis  POA    *Deep vein thrombosis (DVT) of femoral vein of left lower extremity [I82.412]  Yes    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]  Yes     Priority: High     Chronic    Diabetic gastroparesis associated with type 2 diabetes mellitus [E11.43, K31.84]  Yes     Chronic    History of intravascular stent placement [Z95.828]  Not Applicable     Bilateral iliac vein stenting for May Thurner's Syndrome      Type 2 diabetes mellitus with hyperglycemia, with long-term current use of insulin [E11.65, Z79.4]  Not Applicable     Chronic      Resolved Hospital Problems    Diagnosis Date Resolved POA    DKA (diabetic ketoacidoses) [E13.10] 09/04/2018 Yes    Diabetic ketoacidosis without coma associated with type 2 diabetes mellitus [E11.10] 09/03/2018 No    Intractable vomiting with nausea [R11.2] 09/04/2018 No       Discharged Condition: good    Disposition: Home or Self Care    Follow Up/Patient Instructions:   As previously scheduled    Medications:  Reconciled Home Medications:      Medication List      START taking these medications    blood sugar diagnostic Strp  1 strip by Misc.(Non-Drug; Combo Route) route 4 (four) times daily before meals and nightly.    "  blood-glucose meter kit  Use as instructed     lancets Misc  1 lancet by Misc.(Non-Drug; Combo Route) route 4 (four) times daily before meals and nightly.     metoclopramide HCl 10 MG tablet  Commonly known as:  REGLAN  Take 1 tablet (10 mg total) by mouth 3 (three) times daily before meals.     * XARELTO 15 mg (42)- 20 mg (9) tablet dose pack  Generic drug:  rivaroxaban  take as directed     * XARELTO 20 mg Tab  Generic drug:  rivaroxaban  Take 1 tablet (20 mg total) by mouth once daily.         * This list has 2 medication(s) that are the same as other medications prescribed for you. Read the directions carefully, and ask your doctor or other care provider to review them with you.            CHANGE how you take these medications    BD ULTRA-FINE DIYA PEN NEEDLE 32 gauge x 5/32" Ndle  Generic drug:  pen needle, diabetic  Inject 1 Units into the skin 4 (four) times daily before meals and nightly.  What changed:    · how much to take  · how to take this  · when to take this     insulin detemir U-100 100 unit/mL (3 mL) Inpn pen  Commonly known as:  LEVEMIR FLEXTOUCH  Inject 35 Units into the skin once daily. Adjust dose based on blood glucose readings.  What changed:    · medication strength  · how much to take  · additional instructions     insulin lispro 100 unit/mL injection  Commonly known as:  HUMALOG  Inject 15 Units into the skin 3 (three) times daily before meals. Adjust dose based on blood sugar readings.  What changed:    · how much to take  · additional instructions        CONTINUE taking these medications    aspirin 81 MG Chew  Take 1 tablet (81 mg total) by mouth once daily.          Discharge Procedure Orders   Diet Cardiac     Activity as tolerated     Follow-up Information     Call Dona Pineda MD.    Specialty:  Family Medicine  Why:  Primary Care Physician-- Offices closed for weekend. Patient to schedule own follow up appointment.  Contact information:  [address]   [place] [address]  [phone]             Tyree Duff MD.    Specialties:  INTERVENTIONAL CARDIOLOGY, Cardiology  Why:  Cardiology Follow-Up-- left message to schedule.  Contact information:  [address]  [address]  [place] [id number] [phone]             Manisha Cruz MD.    Specialty:  Endocrinology  Why:  Offices closed for weekend. Patient to schedule own follow up appointment.  Contact information:  [address]  [facility] [address]  [phone]

## 2018-09-08 NOTE — PLAN OF CARE
Problem: Fall Risk (Adult)  Goal: Absence of Falls  Patient will demonstrate the desired outcomes by discharge/transition of care.  Outcome: Ongoing (interventions implemented as appropriate)  Non slip socks in place. Call bell at side. Bed alarm remains on. Personal items in reach. Rails up x 2. No injuries by the end of shift.

## 2018-09-08 NOTE — PROGRESS NOTES
[facility]  Cardiology  Progress Note     Patient Name: Kulwant Mueller Jr.  MRN: [id number]  Admission Date: 8/31/2018  Hospital Length of Stay: 6 days  Code Status: Full Code   Attending Physician: Madan Quintero MD   Primary Care Physician: Dona Pineda MD  Expected Discharge Date:   Principal Problem:Deep vein thrombosis (DVT) of femoral vein of left lower extremity     Subjective:      Hospital Course:   8/31/2018-9/1/2018 Pt and wife report that he developed leg swelling and pain similar to prior DVT the am of 8/31. He has not eaten since pm of 8/30. He presented to [facility] ED where BLE venous US showed occlusive thrombosis in the left common femoral, superficial femoral, popliteal, and posterior tibial veins.   Heparin drip was initiated and he was transferred to [facility] for admission by cardiology. Soon after initiating heparin drip, pt developed nausea and vomiting which has remained continuous for nearly 12 hours.  Blood glucose found to be greater than 400 and DKA was suspected.  Pt was started on vigorous IV hydration with normal saline and insulin was then given.  Labs revealed UA with ketones, anion gap of 16 with metabolic acidosis.  CBC revealed WBC of 18.41, suggestive of hemoconcentration. All findings consistent with DKA.  Etiology unclear. Blood cultures sent.  Pt started on vancomycin and zosyn. Heparin drip was discontinued and pt subsequently started on full dose lovenox.  Hospital medicine consulted for assistance with managing DKA.  Mr. Mueller has remained hemodynamically stable.  Pt transferred to the ICU overnight for insulin drip.  9/2/2018 Insulin drip now discontinued. Symptoms improved and nausea and vomiting significantly improved.  Glucose improving with insulin and IVF's. Continue full dose lovenox at this time and monitor clinical exam with plan for reassessment for need of EKOS.  DKA  related to insulin indiscretion given A1c of 13.    9/3/2018 On IV Cardene drip  for BP control. BS improved and remains off Insulin. On FD Lovenox for LLE DVT. Will continue with current plan of DVT and HTN management medically. Will re assess to determine if EKOS catheter needed at later time   9/4/2018 LLE swelling remains-patient uncertain if any improvement. CBGs overnight 195-300 and off insulin drip. BP improved to 150s/90s and off Cardene drip. Awaiting transfer to floor. Will continue with SQ Lovenox treatment for now. Discuss with staff on rounds re: plans for EKOS catheter treatment   9/5/2018 NPO for EKOS catheter guided TPA otday  9/6/2018 Underwent EKOS catheter placement yesterday via left pedal access with following results:  Successful EKOS catheter placement with initiation of catheter directed thrombolysis with tPA at 1 mg/hr.  Catheter placed in LCIV (50 cm) with extension to distal LSFV with systemic bivalirudin intiated  Labs stable this AM. Complains of nausea and vomiting this AM ?pain medication related. Swelling remains with only slight improvement. Will continue EKOS catheter guided TPA for now along with Angiomax   9/7/2018 EKOS catheter with TPA and systemic Angiomax maintained overnight. LLE swelling dramatically improved overnight and left leg softer. Decision not to proceed with repeat venogram. EKOS catheter discontinued and will start on Xarelto this evening. Recurrent nausea and vomiting overnight and this AM with no abdominal pain. Amylase, lipase and LFTs WNL. Will transfer to the floor and anticipate discharge in AM   9/8/2018  Overnight no acute events. Hemodynamically stable and leg improved.          Review of Systems   Constitution: Negative for chills, decreased appetite, diaphoresis, fever and weakness.   Cardiovascular: Positive for leg swelling. Negative for chest pain, claudication, cyanosis, dyspnea on exertion, irregular heartbeat, near-syncope, orthopnea, palpitations, paroxysmal nocturnal dyspnea and syncope.   Respiratory: Negative for cough,  hemoptysis, shortness of breath and wheezing.    Gastrointestinal: Negative for bloating, abdominal pain, constipation, diarrhea, melena, nausea and vomiting.   Neurological: Negative for dizziness.      Objective:      Vitals:    09/08/18 0705 09/08/18 0730 09/08/18 0915 09/08/18 1120   BP: (!) 155/96   136/73   BP Location: Right arm      Patient Position: Lying      Pulse: 76 72  76   Resp: 20   18   Temp: 98.8 °F (37.1 °C)   98.6 °F (37 °C)   TempSrc: Oral      SpO2:   100%    Weight:       Height:           Intake/Output - Last 3 Shifts       09/06 0700 - 09/07 0659 09/07 0700 - 09/08 0659 09/08 0700 - 09/09 0659    P.O.  390     I.V. (mL/kg) 138.6 (1.2) 189.5 (1.6)     IV Piggyback 300 300     Total Intake(mL/kg) 438.6 (3.7) 879.5 (7.4)     Urine (mL/kg/hr) 4600 (1.6) 4850 (1.7)     Emesis/NG output       Stool 0      Total Output 4600 4850     Net -4161.4 -3970.5            Urine Occurrence  2 x     Stool Occurrence 1 x                      Lines/Drains/Airways            Peripheral Intravenous Line                          Peripheral IV - Single Lumen 08/31/18 0823 Anterior;Right Wrist 7 days           Peripheral IV - Single Lumen 08/31/18 1348 Left Forearm 7 days                     Physical Exam   Constitutional: He is oriented to person, place, and time. He appears well-developed and well-nourished. No distress.   Cardiovascular: Normal rate and regular rhythm. Exam reveals no gallop.   No murmur heard.  Pulmonary/Chest: Effort normal and breath sounds normal. No respiratory distress. He has no wheezes.   Abdominal: Soft. Bowel sounds are normal. He exhibits no distension. There is no tenderness.   Musculoskeletal: He exhibits edema (1+ LLE edema present; dramatically improved compared to yesterday ).   Neurological: He is alert and oriented to person, place, and time.   Skin: Skin is warm and dry.         Significant Labs:          Recent Labs     Recent Labs      09/08/18   0419   HGB  13.3*   HCT  40.4    NA  135*   K  3.7   BUN  12   CREATININE  1.0   ]              Assessment and Plan:             * Deep vein thrombosis (DVT) of femoral vein of left lower extremity     -recurrent LLE swelling and pain similar to symptoms with previous DVTs  -venous ultrasound with evidence of LCFV, left superficial vein, left popliteal and left PT veins; no evidence of DVT to RLE with notation of velocity of 10 to right CFV  -successful EKOS catheter placement with initiation of catheter directed thrombolysis with tPA at 1 mg/hr with catheter placed in LCIV (50 cm) with extension to distal LSFV with systemic Angiomax infusing  -EKOS catheter guided TPA along with systemic TPA continued for 36 hours; dramatic improvement in LLE swelling today  -will not proceed with repeat venogram given dramatic improvement; EKOS catheter discontinued along with TPA and Angiomax  -Xarelto yesterday evening and plan for 15mg po BID x 21 days followed by 20mg daily   -anticipate need for lifelong AC          Chronic vomiting     -amylase, lipase and LFTs  -previous abdominal ultrasound with no acute abnormalites; EGD with retained food in stomach  -recs for gastric emptying study as an outpt; on Reglan per Hospital Medicine           May-Thurner syndrome     -s/p bilateral iliac vein stenting for May Thurner  -recurrent DVT with venogram with extensive DVT involving CIV stent   -transitioned to Xarelto           Type 2 diabetes mellitus with hyperglycemia, with long-term current use of insulin     -presented with abdominal pain, nausea and vomiting  -initially concerned about infectious etiology and started on IV Vanc and Zosyn with blood cultures ordered  -Hospital Medicine consulted for BS management  -[id number] 12.7 upon admission  -SQ regimen with Levemir 60units QHS with Novology 25units TID   -CBGs overnight  overnight; no insulin yesterday per patient out of fear of hypoglycemia  -will hold this AM due to n/v    -appreciate assistance of  Hospital Medicine                       VTE Risk Mitigation (From admission, onward)         Ordered       rivaroxaban tablet 20 mg  with dinner      09/07/18 1442       rivaroxaban tablet 15 mg  2 times daily with meals      09/07/18 1442             Tyree Duff MD  Cardiology  [facility]

## 2018-09-08 NOTE — NURSING
Pt is awake and alert. Pt continued to be NSR on telemetry with HR in the 70's.No ectopy noted.Denies any pain. No distress noted. IV removed from RIGHT wrist,cath tip intact. Tele monitor removed. Discharge instructions given to patient and spouse. Discussed side effects of medications.All questions answered. Verbalized understanding.

## 2018-09-14 ENCOUNTER — TELEPHONE (OUTPATIENT)
Dept: ENDOCRINOLOGY | Facility: CLINIC | Age: 33
End: 2018-09-14

## 2018-09-14 NOTE — TELEPHONE ENCOUNTER
----- Message from [facility], System Message sent at 9/14/2018  3:37 PM CDT -----      Is it possible to be seen by someone else regarding diabetes?   ----- Message -----   From: Nurse Darlin   Sent: 9/14/2018 12:06 PM CDT   To: Kulwant Mueller Jr.   Subject: RE: Appointment Request   Dr Humphrey is leaving the [facility] sy[facility] and is moving back [place] of [place]      ----- Message -----      From: Kulwant Mueller Jr.      Sent: 9/14/2018 12:00 PM CDT        To: Patient Appointment Schedule Request Mailing List   Subject: Appointment Request      Appointment Request From: Kulwant Mueller Jr.      With Provider: Sunday Humphrey MD [[facility] - [facility]/Diab/Metab]      Preferred Date Range: 9/14/2018 - 9/26/2018      Preferred Times: Any time      Reason for visit: Existing Patient      Comments:   Just released from hospital and need follow up on diabetes care. Was on insulin drip in ICU

## 2018-09-18 ENCOUNTER — PATIENT MESSAGE (OUTPATIENT)
Dept: ENDOSCOPY | Facility: HOSPITAL | Age: 33
End: 2018-09-18

## 2018-09-26 ENCOUNTER — OFFICE VISIT (OUTPATIENT)
Dept: CARDIOLOGY | Facility: CLINIC | Age: 33
End: 2018-09-26
Payer: COMMERCIAL

## 2018-09-26 VITALS
HEIGHT: 73 IN | HEART RATE: 92 BPM | WEIGHT: 258 LBS | DIASTOLIC BLOOD PRESSURE: 100 MMHG | SYSTOLIC BLOOD PRESSURE: 152 MMHG | OXYGEN SATURATION: 99 % | BODY MASS INDEX: 34.19 KG/M2

## 2018-09-26 DIAGNOSIS — E11.65 TYPE 2 DIABETES MELLITUS WITH HYPERGLYCEMIA, WITH LONG-TERM CURRENT USE OF INSULIN: Chronic | ICD-10-CM

## 2018-09-26 DIAGNOSIS — I87.1 MAY-THURNER SYNDROME: Primary | Chronic | ICD-10-CM

## 2018-09-26 DIAGNOSIS — K31.84 DIABETIC GASTROPARESIS ASSOCIATED WITH TYPE 2 DIABETES MELLITUS: Chronic | ICD-10-CM

## 2018-09-26 DIAGNOSIS — Z79.4 TYPE 2 DIABETES MELLITUS WITH HYPERGLYCEMIA, WITH LONG-TERM CURRENT USE OF INSULIN: Chronic | ICD-10-CM

## 2018-09-26 DIAGNOSIS — E11.43 DIABETIC GASTROPARESIS ASSOCIATED WITH TYPE 2 DIABETES MELLITUS: Chronic | ICD-10-CM

## 2018-09-26 DIAGNOSIS — I10 ESSENTIAL HYPERTENSION: ICD-10-CM

## 2018-09-26 DIAGNOSIS — I82.412 ACUTE DEEP VEIN THROMBOSIS (DVT) OF FEMORAL VEIN OF LEFT LOWER EXTREMITY: ICD-10-CM

## 2018-09-26 DIAGNOSIS — Z95.828 HISTORY OF INTRAVASCULAR STENT PLACEMENT: ICD-10-CM

## 2018-09-26 DIAGNOSIS — E66.09 NON MORBID OBESITY DUE TO EXCESS CALORIES: ICD-10-CM

## 2018-09-26 PROCEDURE — 99214 OFFICE O/P EST MOD 30 MIN: CPT | Mod: S$GLB,,, | Performed by: NURSE PRACTITIONER

## 2018-09-26 PROCEDURE — 99999 PR PBB SHADOW E&M-EST. PATIENT-LVL IV: CPT | Mod: PBBFAC,,, | Performed by: NURSE PRACTITIONER

## 2018-09-26 RX ORDER — LOSARTAN POTASSIUM 25 MG/1
25 TABLET ORAL DAILY
Qty: 90 TABLET | Refills: 3 | Status: SHIPPED | OUTPATIENT
Start: 2018-09-26 | End: 2019-02-14

## 2018-09-26 NOTE — PROGRESS NOTES
Subjective:    Patient ID:  Kulwant Mueller Jr. is a 33 y.o. male who presents to clinic for follow up after hospital discharge      34yo male with history of DVT, May Thurner syndrome s/p bilateral CIV stents, HTN, DMII, obesity and chronic nausea and vomiting ?gastroparesis who presents to clinic for follow up after hospital discharge. He was admitted earlier this month with LLE swelling with recurrent DVT. He was treated for May Thurner syndrome with stenting along with oral AC with discontinuation after several months. His LLE swelling then recurred likely due to his career (). He was treated with EKOS catheter directed TPA with improvement in the swelling. He was discharged on Xarelto and instructed to follow up in the clinic    Today in the clinic, he is feeling pretty well. He complains of some pain to his left posterior thigh that typically occurs with sitting but not necessarily with walking. He reports the LLE swelling has improved. He denies any chest pain, SOB/DASILVA, orthopnea, PND, edema, nausea, vomiting. He has not returned to work and is eager to do so. 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 He has not eaten since pm of 8/30. He presented to [facility] ED where BLE venous US showed occlusive thrombosis in the left common femoral, superficial femoral, popliteal, and posterior tibial veins.   Heparin drip was initiated and he was [place] [facility] [place]ology. Soon after initiating heparin drip, pt developed nausea and vomiting which has remained continuous for nearly 12 hours.  Blood glucose found to be greater than 400 and DKA was suspected.  Pt was started on vigorous IV hydration with normal saline and  insulin was then given.  Labs revealed UA with ketones, anion gap of 16 with metabolic acidosis.  CBC revealed WBC of 18.41, suggestive of hemoconcentration. All findings consistent with DKA.  Etiology unclear. Blood cultures sent.  Pt started on vancomycin and zosyn. Heparin drip was discontinued and pt subsequently started on full dose lovenox.  Hospital medicine consulted for assistance with managing DKA.  Mr. Mueller has remained hemodynamically stable.  Pt transferred to the ICU overnight for insulin drip.  9/2/2018 Insulin drip now discontinued. Symptoms improved and nausea and vomiting significantly improved.  Glucose improving with insulin and IVF's. Continue full dose lovenox at this time and monitor clinical exam with plan for reassessment for need of EKOS.  DKA  related to insulin indiscretion given A1c of 13.    9/3/2018 On IV Cardene drip for BP control. BS improved and remains off Insulin. On FD Lovenox for LLE DVT. Will continue with current plan of DVT and HTN management medically. Will re assess to determine if EKOS catheter needed at later time   9/4/2018 LLE swelling remains-patient uncertain if any improvement. CBGs overnight 195-300 and off insulin drip. BP improved to 150s/90s and off Cardene drip. Awaiting transfer to floor. Will continue with SQ Lovenox treatment for now. Discuss with staff on rounds re: plans for EKOS catheter treatment   9/5/2018 NPO for EKOS catheter guided TPA otday  9/6/2018 Underwent EKOS catheter placement yesterday via left pedal access with following results:  Successful EKOS catheter placement with initiation of catheter directed thrombolysis with tPA at 1 mg/hr.  Catheter placed in LCIV (50 cm) with extension to distal LSFV with systemic bivalirudin intiated  Labs stable this AM. Complains of nausea and vomiting this AM ?pain medication related. Swelling remains with only slight improvement. Will continue EKOS catheter guided TPA for now along with Angiomax    9/7/2018 EKOS catheter with TPA and systemic Angiomax maintained overnight. LLE swelling dramatically improved overnight and left leg softer. Decision not to proceed with repeat venogram. EKOS catheter discontinued and will start on Xarelto this evening. Recurrent nausea and vomiting overnight and this AM with no abdominal pain. Amylase, lipase and LFTs WNL. Will transfer to the floor and anticipate discharge in AM   9/8/2018  Overnight no acute events. Hemodynamically stable and leg improved.                 Review of Systems   Constitution: Negative for chills, decreased appetite, diaphoresis, fever and weakness.   Cardiovascular: Negative for chest pain, claudication, cyanosis, dyspnea on exertion, irregular heartbeat, leg swelling, near-syncope, orthopnea, palpitations, paroxysmal nocturnal dyspnea and syncope.   Respiratory: Negative for cough, hemoptysis, shortness of breath and wheezing.    Gastrointestinal: Negative for bloating, abdominal pain, constipation, diarrhea, melena, nausea and vomiting.   Neurological: Negative for dizziness.        Objective:    Physical Exam   Constitutional: He is oriented to person, place, and time. He appears well-developed and well-nourished. No distress.   Cardiovascular: Normal rate and regular rhythm. Exam reveals no gallop.   No murmur heard.  Pulmonary/Chest: Effort normal and breath sounds normal. No respiratory distress. He has no wheezes.   Abdominal: Soft. Bowel sounds are normal. He exhibits no distension. There is no tenderness.   Neurological: He is alert and oriented to person, place, and time.   Skin: Skin is warm and dry.         BLE venous ultrasound 8/31/2018   There is occlusive thrombosis in the left common femoral, superficial femoral, popliteal, and posterior tibial veins.  This is consistent with the patient's history.  There is no deep venous thrombosis in the right lower extremity.    Labs reviewed from recent hospitalization  BMP with K+ 3.7 with  BUN 12 creatinine 1.0  HgbA1c 12.7 was previously 9.1 in January 2018  Hgb 17.0 Hct 51.0    Current Cardiac Medications:     aspirin 81 MG Chew, Take 1 tablet (81 mg total) by mouth once daily., Disp: , Rfl: 0    rivaroxaban (XARELTO) 15 mg (42)- 20 mg (9) tablet dose pack, take as directed, Disp: 51 tablet, Rfl: 0    rivaroxaban (XARELTO) 20 mg Tab, Take 1 tablet (20 mg total) by mouth once daily., Disp: 90 tablet, Rfl: 11    Vitals:    09/26/18 1408   BP: (!) 152/100   Pulse: 92       Assessment:       1. May-Thurner syndrome    2. Acute deep vein thrombosis (DVT) of femoral vein of left lower extremity    3. Essential hypertension    4. History of intravascular stent placement    5. Type 2 diabetes mellitus with hyperglycemia, with long-term current use of insulin    6. Diabetic gastroparesis associated with type 2 diabetes mellitus    7. Non morbid obesity due to excess calories         Plan:             Refer to Hem Onc_Dr. Antony/Dr. Cowan for hypercoaguable workup  Continue Xarelto-as of now I feel he will need lifelong anticoagulation  Note for work provided to avoid prolonged sitting  Repeat LLE venous ultrasound for re evaluation of DVT  BP elevated. His BP was elevated in the hospital with treatment with ARB and BB. Will start medication for BP control as well as given his history of DM  Start Losartan 25mg po daily  Recheck BMP with LLE venous ultrasound

## 2018-09-28 ENCOUNTER — HOSPITAL ENCOUNTER (OUTPATIENT)
Dept: RADIOLOGY | Facility: HOSPITAL | Age: 33
Discharge: HOME | End: 2018-09-28
Attending: NURSE PRACTITIONER
Payer: COMMERCIAL

## 2018-09-28 DIAGNOSIS — I87.1 MAY-THURNER SYNDROME: Chronic | ICD-10-CM

## 2018-09-28 DIAGNOSIS — I82.412 ACUTE DEEP VEIN THROMBOSIS (DVT) OF FEMORAL VEIN OF LEFT LOWER EXTREMITY: ICD-10-CM

## 2018-09-28 DIAGNOSIS — Z95.828 HISTORY OF INTRAVASCULAR STENT PLACEMENT: ICD-10-CM

## 2018-09-28 DIAGNOSIS — I87.1 MAY-THURNER SYNDROME: ICD-10-CM

## 2018-09-28 PROCEDURE — 93971 EXTREMITY STUDY: CPT | Mod: TC,PO

## 2018-10-02 NOTE — PROGRESS NOTES
PATIENT: Kulwant Mueller Jr.  MRN: [id number]  DATE: 10/2/2018    Diagnosis:   1. Acute deep vein thrombosis (DVT) of femoral vein of left lower extremity    2. Recurrent deep vein thrombosis (DVT) of left lower extremity    3. May-Thurner syndrome    4. History of intravascular stent placement    5. Chronic anticoagulation    6. Post-thrombotic syndrome of left lower extremity    7. Type 2 diabetes mellitus with hyperglycemia, with long-term current use of insulin      Chief Complaint: Deep Vein Thrombosis    Subjective:    History of Present Illness: Mr. Mueller is a 33 y.o. male who presents for evaluation and management of recurrent acute deep vein thromboses of the left lower extremity. He is referred by cardiology nurse practitioner Swapna Aviles. He has the following comorbid conditions: May-Thurner syndrome status post intravascular stent placement, chronic anticoagulation with rivaroxaban, likely post-thrombotic syndrome of left lower extremity, and type 2 diabetes mellitus with long-term use of insulin and hyperglycemia.    Onset of symptoms was August 2017. He was diagnosed with an extensive left lower-extremity deep vein thrombosis. He was subsequently diagnosed with May-Thurner syndrome and underwent intravascular stenting (he states that 6 stents were placed). He was treated with 6 months of anticoagulation, and he was placed on aspirin.   - in August 2018, he was diagnosed with a recurrent DVT of the left lower extremity. He was not taking anticoagulation at the time of the diagnosis, and it is unclear if he was taking aspirin or not.  - he is currently taking rivaroxaban. Follow-up ultrasound in September 2018 was negative for deep vein thrombosis.    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 He denies shortness of breath, chest pain,  "nausea, vomiting, diarrhea, constipation.    Past medical, surgical, family, and social histories have been reviewed and updated below.    Past Medical History:   Past Medical History:   Diagnosis Date    Anticoagulant long-term use     Diabetes mellitus     Hypertension     May-Thurner syndrome        Past Surgical History:   Past Surgical History:   Procedure Laterality Date    APPENDECTOMY      ESOPHAGOGASTRODUODENOSCOPY (EGD) N/A 9/18/2017    Performed by Boaz Gonzalez MD at [facility] (4TH FLR)    stents in bilateral legs         Family History:   Family History   Problem Relation Age of Onset    Cancer Mother     Cancer Paternal Uncle     Cancer Paternal Grandfather     Irritable bowel syndrome Paternal Grandfather     Cancer Maternal Grandfather     Colon cancer Neg Hx     Esophageal cancer Neg Hx     Rectal cancer Neg Hx     Stomach cancer Neg Hx     Ulcerative colitis Neg Hx     Crohn's disease Neg Hx     Celiac disease Neg Hx        Social History:  reports that he has been smoking.  He has been smoking about 0.00 packs per day. He uses smokeless tobacco. He reports that he drinks alcohol. He reports that he does not use drugs.    Allergies:  Review of patient's allergies indicates:   Allergen Reactions    Heparin analogues Nausea And Vomiting    Phenergan [promethazine] Nausea And Vomiting       Medications:  Current Outpatient Medications   Medication Sig Dispense Refill    BD ULTRA-FINE DIYA PEN NEEDLE 32 gauge x 5/32" Ndle Inject 1 Units into the skin 4 (four) times daily before meals and nightly. 100 each 3    blood sugar diagnostic Strp 1 strip by Misc.(Non-Drug; Combo Route) route 4 (four) times daily before meals and nightly. 100 strip 11    blood-glucose meter kit Use as instructed 1 each 0    insulin detemir U-100 (LEVEMIR FLEXTOUCH) 100 unit/mL (3 mL) SubQ InPn pen Inject 35 Units into the skin once daily. Adjust dose based on blood glucose readings.  0    insulin " "lispro (HUMALOG) 100 unit/mL injection Inject 15 Units into the skin 3 (three) times daily before meals. Adjust dose based on blood sugar readings.      lancets Misc 1 lancet by Misc.(Non-Drug; Combo Route) route 4 (four) times daily before meals and nightly. 100 each 11    losartan (COZAAR) 25 MG tablet Take 1 tablet (25 mg total) by mouth once daily. 90 tablet 3    metoclopramide HCl (REGLAN) 10 MG tablet Take 1 tablet (10 mg total) by mouth 3 (three) times daily before meals. 90 tablet 11    rivaroxaban (XARELTO) 15 mg (42)- 20 mg (9) tablet dose pack take as directed 51 tablet 0    rivaroxaban (XARELTO) 20 mg Tab Take 1 tablet (20 mg total) by mouth once daily. 90 tablet 11     No current facility-administered medications for this visit.        Review of Systems   Constitutional: Negative for unexpected weight change.   HENT: Negative for sore throat.    Eyes: Negative for visual disturbance.   Respiratory: Negative for shortness of breath.    Cardiovascular: Negative for chest pain.   Gastrointestinal: Negative for abdominal pain, diarrhea, nausea and vomiting.   Genitourinary: Negative for dysuria.   Musculoskeletal:        Intermittent left leg pain   Skin: Negative for rash.   Neurological: Negative for headaches.   Hematological: Negative for adenopathy.   Psychiatric/Behavioral: The patient is not nervous/anxious.        ECOG Performance Status:   ECOG SCORE 0     Objective:      Vitals:   Vitals:    10/03/18 1335   BP: (!) 157/97   BP Location: Right arm   Patient Position: Sitting   Pulse: 92   Resp: 18   Temp: 98 °F (36.7 °C)   TempSrc: Oral   SpO2: 98%   Weight: 119.2 kg (262 lb 12.6 oz)   Height: 6' 3" (1.905 m)     BMI: Body mass index is 32.85 kg/m².    Physical Exam   Constitutional: He is oriented to person, place, and time. He appears well-developed and well-nourished. No distress.   HENT:   Head: Normocephalic and atraumatic.   Eyes: EOM are normal. Pupils are equal, round, and reactive to " light. No scleral icterus.   Neck: Normal range of motion. Neck supple.   Cardiovascular: Normal rate and regular rhythm.   No murmur heard.  Pulmonary/Chest: Effort normal and breath sounds normal. He has no rales.   Abdominal: Soft. Bowel sounds are normal. He exhibits no mass.   Musculoskeletal: Normal range of motion. He exhibits no edema.   Neurological: He is alert and oriented to person, place, and time.   Skin: Skin is warm and dry.   Psychiatric: He has a normal mood and affect. His behavior is normal. Judgment and thought content normal.   Nursing note and vitals reviewed.    Laboratory Data:  Labs have been reviewed.    Imaging:     Ultrasound left lower extremity (9/28/18):  - The veins are normal in appearance and have normal compressibility. There are normal venous waveforms seen with augmentation during the compression of the veins. The left common femoral vein has a velocity of 12 cm/sec.    Ultrasound lower extremities bilateral - venous (8/31/18):  1. There is occlusive thrombosis in the left common femoral, superficial femoral, popliteal, and posterior tibial veins.  This is consistent with the patient's history.  2. There is no deep venous thrombosis in the right lower extremity.    Ultrasound lower extremities bilateral - venous (2/7/18):  - Negative for bilateral lower extremity DVT.    Ultrasound lower extremities bilateral - venous (8/20/17):  - Heavy burden of thrombus within the left femoral vein, popliteal vein, peroneal vein and now posterior tibial vein.    Assessment:       1. Acute deep vein thrombosis (DVT) of femoral vein of left lower extremity    2. Recurrent deep vein thrombosis (DVT) of left lower extremity    3. May-Thurner syndrome    4. History of intravascular stent placement    5. Chronic anticoagulation    6. Post-thrombotic syndrome of left lower extremity    7. Type 2 diabetes mellitus with hyperglycemia, with long-term current use of insulin         Plan:     1.  Recurrent deep vein thrombosis of the left lower extremity / May-Thurner syndrome  - May-Thurner syndrome (iliac vein compression syndrome) is a risk factor for deep vein thrombosis (due to venous stasis and possible endothelial injury). He underwent intravascular stenting. And now, he had an episode of recurrent left lower-extremity deep vein thrombosis.  - the endovascular stents can be considered prothrombotic due to endothelial injury.  - the guidelines regarding chronic management of anticoagulation in this setting are not 100% clear. However, given that he is a male, under 50 years old, with recurrent deep vein thrombosis, with a now-chronic risk factor (endovascular stents), I recommend indefinite anticoagulation. Without the endovascular stents, his risk of recurrent deep vein thrombosis would be estimated at at least 5-10% yearly. He is tolerating anticoagulation well and not having any bleeding.  - I told him that after 3 months of anticoagulation with rivaroxaban, I would be comfortable with him decreasing the dose to the prophylactic 10mg PO daily. Rivaroxaban has FDA approval for risk reduction following therapy for a deep vein thrombosis).  - since he has several reasons for having a deep vein thrombosis, I will not pursue a hypercoagulable workup at this time. My suspicion for a true genetic hypercoagulable state is low at this time.  - I told him that his cardiology nurse practitioner Swapna Aviles can manage his anticoagulation and decrease the dose to 10mg PO daily after 3-6 months of full-dose anticoagulation. He is agreeable with this plan.    2. Post-thrombotic syndrome of left lower extremity  - he complains of intermittent leg tightness and pain  - recommend elevation of lower extremity, compression stockings.    3. Diabetes mellitus type 2 with long-term use of insulin and hyperglycemia  - last hemoglobin A1c was uncontrolled at 12.7 %  - recommend to continue insulin therapy  - defer  management to his primary care provider    - return to clinic as needed.    Bertin Cowan M.D.  Hematology/Oncology  [facility] - [address], [address]  [place] [address]  Phone: [phone]  Fax: [phone]

## 2018-10-03 ENCOUNTER — INITIAL CONSULT (OUTPATIENT)
Dept: HEMATOLOGY/ONCOLOGY | Facility: CLINIC | Age: 33
End: 2018-10-03
Payer: COMMERCIAL

## 2018-10-03 VITALS
SYSTOLIC BLOOD PRESSURE: 157 MMHG | OXYGEN SATURATION: 98 % | WEIGHT: 262.81 LBS | RESPIRATION RATE: 18 BRPM | BODY MASS INDEX: 32.68 KG/M2 | DIASTOLIC BLOOD PRESSURE: 97 MMHG | HEIGHT: 75 IN | HEART RATE: 92 BPM | TEMPERATURE: 98 F

## 2018-10-03 DIAGNOSIS — I87.002 POST-THROMBOTIC SYNDROME OF LEFT LOWER EXTREMITY: ICD-10-CM

## 2018-10-03 DIAGNOSIS — Z79.4 TYPE 2 DIABETES MELLITUS WITH HYPERGLYCEMIA, WITH LONG-TERM CURRENT USE OF INSULIN: Chronic | ICD-10-CM

## 2018-10-03 DIAGNOSIS — Z95.828 HISTORY OF INTRAVASCULAR STENT PLACEMENT: ICD-10-CM

## 2018-10-03 DIAGNOSIS — I87.1 MAY-THURNER SYNDROME: Chronic | ICD-10-CM

## 2018-10-03 DIAGNOSIS — E11.65 TYPE 2 DIABETES MELLITUS WITH HYPERGLYCEMIA, WITH LONG-TERM CURRENT USE OF INSULIN: Chronic | ICD-10-CM

## 2018-10-03 DIAGNOSIS — I82.412 ACUTE DEEP VEIN THROMBOSIS (DVT) OF FEMORAL VEIN OF LEFT LOWER EXTREMITY: Primary | ICD-10-CM

## 2018-10-03 DIAGNOSIS — I82.402 RECURRENT DEEP VEIN THROMBOSIS (DVT) OF LEFT LOWER EXTREMITY: ICD-10-CM

## 2018-10-03 DIAGNOSIS — Z79.01 CHRONIC ANTICOAGULATION: ICD-10-CM

## 2018-10-03 PROCEDURE — 99205 OFFICE O/P NEW HI 60 MIN: CPT | Mod: S$GLB,,, | Performed by: INTERNAL MEDICINE

## 2018-10-03 PROCEDURE — 99999 PR PBB SHADOW E&M-EST. PATIENT-LVL III: CPT | Mod: PBBFAC,,, | Performed by: INTERNAL MEDICINE

## 2018-10-03 NOTE — LETTER
October 3, 2018      CHARLENE Rich, ANP  [address]  [address]  [facility] [address]           [facility]  [address] [id number]  Phone: [phone]          Patient: Kulwant Mueller Jr.   MR Number: [id number]   YOB: 1985   Date of Visit: 10/3/2018       Dear Swapna Aviles:    Thank you for referring Kulwant Mueller to me for evaluation. Attached you will find relevant portions of my assessment and plan of care.    If you have questions, please do not hesitate to call me. I look forward to following Kulwant Mueller along with you.    Sincerely,    Bertin Cowan MD    Enclosure  CC:  No Recipients    If you would like to receive this communication electronically, please contact [email or web address] or [phone] to request more information on [employer] Link access.    For providers and/or their staff who would like to refer a patient to [facility], please contact us through our one-stop-shop provider referral line, Rani Weathers, at [phone].    If you feel you have received this communication in error or would no longer like to receive these types of communications, please e-mail [email or web address]

## 2019-01-14 ENCOUNTER — PATIENT MESSAGE (OUTPATIENT)
Dept: CARDIOLOGY | Facility: CLINIC | Age: 34
End: 2019-01-14

## 2019-02-14 ENCOUNTER — OFFICE VISIT (OUTPATIENT)
Dept: CARDIOLOGY | Facility: CLINIC | Age: 34
End: 2019-02-14
Payer: COMMERCIAL

## 2019-02-14 VITALS
OXYGEN SATURATION: 96 % | HEART RATE: 92 BPM | SYSTOLIC BLOOD PRESSURE: 148 MMHG | BODY MASS INDEX: 35 KG/M2 | WEIGHT: 280 LBS | DIASTOLIC BLOOD PRESSURE: 100 MMHG

## 2019-02-14 DIAGNOSIS — I87.1 MAY-THURNER SYNDROME: Chronic | ICD-10-CM

## 2019-02-14 DIAGNOSIS — I82.412 ACUTE DEEP VEIN THROMBOSIS (DVT) OF FEMORAL VEIN OF LEFT LOWER EXTREMITY: ICD-10-CM

## 2019-02-14 DIAGNOSIS — I82.4Y2 ACUTE DEEP VEIN THROMBOSIS (DVT) OF PROXIMAL VEIN OF LEFT LOWER EXTREMITY: ICD-10-CM

## 2019-02-14 DIAGNOSIS — E66.09 NON MORBID OBESITY DUE TO EXCESS CALORIES: ICD-10-CM

## 2019-02-14 DIAGNOSIS — K31.84 DIABETIC GASTROPARESIS ASSOCIATED WITH TYPE 2 DIABETES MELLITUS: Chronic | ICD-10-CM

## 2019-02-14 DIAGNOSIS — E11.43 DIABETIC GASTROPARESIS ASSOCIATED WITH TYPE 2 DIABETES MELLITUS: Chronic | ICD-10-CM

## 2019-02-14 DIAGNOSIS — Z79.01 CHRONIC ANTICOAGULATION: ICD-10-CM

## 2019-02-14 DIAGNOSIS — I82.402 RECURRENT DEEP VEIN THROMBOSIS (DVT) OF LEFT LOWER EXTREMITY: Primary | ICD-10-CM

## 2019-02-14 DIAGNOSIS — I10 ESSENTIAL HYPERTENSION: ICD-10-CM

## 2019-02-14 DIAGNOSIS — I87.002 POST-THROMBOTIC SYNDROME OF LEFT LOWER EXTREMITY: ICD-10-CM

## 2019-02-14 DIAGNOSIS — I82.4Y9 DEEP VEIN THROMBOSIS (DVT) OF PROXIMAL LOWER EXTREMITY, UNSPECIFIED CHRONICITY, UNSPECIFIED LATERALITY: ICD-10-CM

## 2019-02-14 DIAGNOSIS — Z95.828 HISTORY OF INTRAVASCULAR STENT PLACEMENT: ICD-10-CM

## 2019-02-14 PROCEDURE — 99214 PR OFFICE/OUTPT VISIT, EST, LEVL IV, 30-39 MIN: ICD-10-PCS | Mod: S$GLB,,, | Performed by: INTERNAL MEDICINE

## 2019-02-14 PROCEDURE — 3080F DIAST BP >= 90 MM HG: CPT | Mod: CPTII,S$GLB,, | Performed by: INTERNAL MEDICINE

## 2019-02-14 PROCEDURE — 3046F HEMOGLOBIN A1C LEVEL >9.0%: CPT | Mod: CPTII,S$GLB,, | Performed by: INTERNAL MEDICINE

## 2019-02-14 PROCEDURE — 3046F PR MOST RECENT HEMOGLOBIN A1C LEVEL > 9.0%: ICD-10-PCS | Mod: CPTII,S$GLB,, | Performed by: INTERNAL MEDICINE

## 2019-02-14 PROCEDURE — 3077F PR MOST RECENT SYSTOLIC BLOOD PRESSURE >= 140 MM HG: ICD-10-PCS | Mod: CPTII,S$GLB,, | Performed by: INTERNAL MEDICINE

## 2019-02-14 PROCEDURE — 3008F BODY MASS INDEX DOCD: CPT | Mod: CPTII,S$GLB,, | Performed by: INTERNAL MEDICINE

## 2019-02-14 PROCEDURE — 3080F PR MOST RECENT DIASTOLIC BLOOD PRESSURE >= 90 MM HG: ICD-10-PCS | Mod: CPTII,S$GLB,, | Performed by: INTERNAL MEDICINE

## 2019-02-14 PROCEDURE — 99999 PR PBB SHADOW E&M-EST. PATIENT-LVL III: ICD-10-PCS | Mod: PBBFAC,,, | Performed by: INTERNAL MEDICINE

## 2019-02-14 PROCEDURE — 99999 PR PBB SHADOW E&M-EST. PATIENT-LVL III: CPT | Mod: PBBFAC,,, | Performed by: INTERNAL MEDICINE

## 2019-02-14 PROCEDURE — 3008F PR BODY MASS INDEX (BMI) DOCUMENTED: ICD-10-PCS | Mod: CPTII,S$GLB,, | Performed by: INTERNAL MEDICINE

## 2019-02-14 PROCEDURE — 3077F SYST BP >= 140 MM HG: CPT | Mod: CPTII,S$GLB,, | Performed by: INTERNAL MEDICINE

## 2019-02-14 PROCEDURE — 99214 OFFICE O/P EST MOD 30 MIN: CPT | Mod: S$GLB,,, | Performed by: INTERNAL MEDICINE

## 2019-02-14 RX ORDER — INSULIN DEGLUDEC 200 U/ML
INJECTION, SOLUTION SUBCUTANEOUS
Refills: 5 | COMMUNITY
Start: 2019-01-16 | End: 2022-01-21 | Stop reason: SDUPTHER

## 2019-02-14 RX ORDER — LOSARTAN POTASSIUM 25 MG/1
25 TABLET ORAL DAILY
Qty: 90 TABLET | Refills: 3 | Status: SHIPPED | OUTPATIENT
Start: 2019-02-14 | End: 2019-10-10 | Stop reason: SDUPTHER

## 2019-02-14 NOTE — PROGRESS NOTES
Subjective:    Patient ID:  Kulwant Mueller Jr. is a 33 y.o. male who presents for follow-up of Deep Vein Thrombosis      HPI     34 y/o male with hx of acute LLE fem-pop and below the knee DVT (extensive thrombus) 8/2016 s/p catheter directed thrombolysis with EKOS with significant improvement in symptoms on chronic anticoagulation on Xarelto, May Thurner's Syndrome s/p bilateral iliac vein stenting, DM on insulin. He initially presented with LLE pain and swelling and found to have acute LLE extensive DVT from fem to peroneal. 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 EKOS removed after 48 hours with significant improvement in edema and pain. Discharged home on Xarelto. 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 Repeat LE venous doppler without evidence of residual thrombus. Had intractable N/V for a few days after initial procedure for DVT which resolved and again had similar symptoms after last intervention. Seen by GI and started on Reglan with improvement and eventual resolution of symptoms. Had EGD which was normal. Recent hospitalization for recurrent LLE DVT with post thrombotic syndrome and again received catheter directed thrombolysis with EKOS with resolution of symptoms and DVT. Repeat LLE doppler with no evidence of DVT.   Denies CP, SOB/DASILVA, orthopnea, PND, syncope, palps, LE edema. Compliant with meds. Again began having N/V.     Review of Systems   Constitution: Negative for weakness and malaise/fatigue.   HENT: Negative for congestion.    Eyes: Negative for blurred vision.   Cardiovascular: Negative for chest pain, claudication, cyanosis, dyspnea on exertion, irregular heartbeat, leg swelling, near-syncope, orthopnea, palpitations, paroxysmal nocturnal dyspnea and syncope.   Respiratory: Negative for shortness of breath.    Endocrine: Negative for polyuria.   Hematologic/Lymphatic: Negative for  bleeding problem.   Skin: Negative for itching and rash.   Musculoskeletal: Negative for joint swelling, muscle cramps and muscle weakness.   Gastrointestinal: Negative for abdominal pain, hematemesis, hematochezia, melena, nausea and vomiting.   Genitourinary: Negative for dysuria and hematuria.   Neurological: Negative for dizziness, focal weakness, headaches, light-headedness and loss of balance.   Psychiatric/Behavioral: Negative for depression. The patient is not nervous/anxious.         Objective:    Physical Exam   Constitutional: He is oriented to person, place, and time. He appears well-developed and well-nourished.   HENT:   Head: Normocephalic and atraumatic.   Neck: Neck supple. No JVD present.   Cardiovascular: Normal rate, regular rhythm and normal heart sounds.   Pulses:       Carotid pulses are 2+ on the right side, and 2+ on the left side.       Radial pulses are 2+ on the right side, and 2+ on the left side.        Femoral pulses are 2+ on the right side, and 2+ on the left side.       Dorsalis pedis pulses are 2+ on the right side, and 2+ on the left side.        Posterior tibial pulses are 2+ on the right side, and 2+ on the left side.   Pulmonary/Chest: Effort normal and breath sounds normal.   Abdominal: Soft. Bowel sounds are normal.   Musculoskeletal: He exhibits no edema.   Neurological: He is alert and oriented to person, place, and time.   Skin: Skin is warm and dry.   Psychiatric: He has a normal mood and affect. His behavior is normal. Thought content normal.         Assessment:       1. DVT (deep vein thrombosis) in pregnancy    2. Acute deep vein thrombosis (DVT) of proximal vein of left lower extremity    3. Post-thrombotic syndrome of left lower extremity    4. Recurrent deep vein thrombosis (DVT) of left lower extremity    5. Acute deep vein thrombosis (DVT) of femoral vein of left lower extremity    6. Chronic anticoagulation    7. History of intravascular stent placement    8.  Essential hypertension    9. May-Thurner syndrome    10. Diabetic gastroparesis associated with type 2 diabetes mellitus    11. Non morbid obesity due to excess calories      34 y/o pt with hx and presentation as above. Doing well from a cardiac perspective and compensated from a HF perspective. LLE no issues and will continue with DOAC. Discussed the importance of med compliance, heart healthy diet, and regular exercise.        Plan:       -Continue current medical management  -f/u in 6 months

## 2019-02-15 ENCOUNTER — PATIENT MESSAGE (OUTPATIENT)
Dept: CARDIOLOGY | Facility: CLINIC | Age: 34
End: 2019-02-15

## 2019-02-15 DIAGNOSIS — E11.65 TYPE 2 DIABETES MELLITUS WITH HYPERGLYCEMIA, WITH LONG-TERM CURRENT USE OF INSULIN: Chronic | ICD-10-CM

## 2019-02-15 DIAGNOSIS — Z79.4 TYPE 2 DIABETES MELLITUS WITH HYPERGLYCEMIA, WITH LONG-TERM CURRENT USE OF INSULIN: Chronic | ICD-10-CM

## 2019-02-15 RX ORDER — METOCLOPRAMIDE 10 MG/1
10 TABLET ORAL
Qty: 90 TABLET | Refills: 11 | Status: SHIPPED | OUTPATIENT
Start: 2019-02-15 | End: 2020-07-18

## 2019-02-28 ENCOUNTER — PATIENT MESSAGE (OUTPATIENT)
Dept: CARDIOLOGY | Facility: CLINIC | Age: 34
End: 2019-02-28

## 2019-03-08 PROBLEM — O22.30 DVT (DEEP VEIN THROMBOSIS) IN PREGNANCY: Status: ACTIVE | Noted: 2019-03-08

## 2019-08-16 ENCOUNTER — TELEPHONE (OUTPATIENT)
Dept: CARDIOLOGY | Facility: CLINIC | Age: 34
End: 2019-08-16

## 2019-08-16 NOTE — TELEPHONE ENCOUNTER
----- Message from Deloris Post sent at 8/16/2019  1:20 PM CDT -----  Contact: [employer] request  Message     Appointment Request From: Kulwant Mueller Jr.    With Provider: Tyree Duff MD [[facility] - Cardiology]    Preferred Date Range: 8/12/2019 - 8/31/2019    Preferred Times: Any time    Reason for visit: Existing Patient    Comments:  Need follow up visit

## 2019-08-28 ENCOUNTER — TELEPHONE (OUTPATIENT)
Dept: PODIATRY | Facility: CLINIC | Age: 34
End: 2019-08-28

## 2019-08-28 NOTE — TELEPHONE ENCOUNTER
Called patient regarding scheduled appointment on 9/11/19 with Dr Booth, patient did not answer. Left a detailed voicemail and call back number, stating Dr Booth will be out of clinic and appointment needs to be rescheduled.         Bang Rice MA

## 2019-08-29 ENCOUNTER — TELEPHONE (OUTPATIENT)
Dept: PODIATRY | Facility: CLINIC | Age: 34
End: 2019-08-29

## 2019-08-29 NOTE — TELEPHONE ENCOUNTER
Spoke w/ Pt in regards to rescheduling 9/11 appt. Explained to Pt that provider would be out of office and that I would reschedule him w/ the next available. Pt agreed to all terms with new appt.

## 2019-10-10 ENCOUNTER — OFFICE VISIT (OUTPATIENT)
Dept: CARDIOLOGY | Facility: CLINIC | Age: 34
End: 2019-10-10
Payer: COMMERCIAL

## 2019-10-10 VITALS
HEIGHT: 75 IN | WEIGHT: 269.69 LBS | SYSTOLIC BLOOD PRESSURE: 142 MMHG | DIASTOLIC BLOOD PRESSURE: 100 MMHG | BODY MASS INDEX: 33.53 KG/M2 | OXYGEN SATURATION: 95 % | HEART RATE: 98 BPM

## 2019-10-10 DIAGNOSIS — Z79.4 TYPE 2 DIABETES MELLITUS WITH HYPERGLYCEMIA, WITH LONG-TERM CURRENT USE OF INSULIN: Chronic | ICD-10-CM

## 2019-10-10 DIAGNOSIS — K31.84 DIABETIC GASTROPARESIS ASSOCIATED WITH TYPE 2 DIABETES MELLITUS: Chronic | ICD-10-CM

## 2019-10-10 DIAGNOSIS — E11.65 TYPE 2 DIABETES MELLITUS WITH HYPERGLYCEMIA, WITH LONG-TERM CURRENT USE OF INSULIN: Chronic | ICD-10-CM

## 2019-10-10 DIAGNOSIS — I10 ESSENTIAL HYPERTENSION: ICD-10-CM

## 2019-10-10 DIAGNOSIS — Z95.828 HISTORY OF INTRAVASCULAR STENT PLACEMENT: ICD-10-CM

## 2019-10-10 DIAGNOSIS — E11.43 DIABETIC GASTROPARESIS ASSOCIATED WITH TYPE 2 DIABETES MELLITUS: Chronic | ICD-10-CM

## 2019-10-10 DIAGNOSIS — I82.412 ACUTE DEEP VEIN THROMBOSIS (DVT) OF FEMORAL VEIN OF LEFT LOWER EXTREMITY: ICD-10-CM

## 2019-10-10 DIAGNOSIS — I87.1 MAY-THURNER SYNDROME: Primary | Chronic | ICD-10-CM

## 2019-10-10 DIAGNOSIS — Z79.01 CHRONIC ANTICOAGULATION: ICD-10-CM

## 2019-10-10 DIAGNOSIS — I87.002 POST-THROMBOTIC SYNDROME OF LEFT LOWER EXTREMITY: ICD-10-CM

## 2019-10-10 PROCEDURE — 99214 PR OFFICE/OUTPT VISIT, EST, LEVL IV, 30-39 MIN: ICD-10-PCS | Mod: S$GLB,,, | Performed by: INTERNAL MEDICINE

## 2019-10-10 PROCEDURE — 3080F DIAST BP >= 90 MM HG: CPT | Mod: CPTII,S$GLB,, | Performed by: INTERNAL MEDICINE

## 2019-10-10 PROCEDURE — 99999 PR PBB SHADOW E&M-EST. PATIENT-LVL III: CPT | Mod: PBBFAC,,, | Performed by: INTERNAL MEDICINE

## 2019-10-10 PROCEDURE — 3008F BODY MASS INDEX DOCD: CPT | Mod: CPTII,S$GLB,, | Performed by: INTERNAL MEDICINE

## 2019-10-10 PROCEDURE — 99999 PR PBB SHADOW E&M-EST. PATIENT-LVL III: ICD-10-PCS | Mod: PBBFAC,,, | Performed by: INTERNAL MEDICINE

## 2019-10-10 PROCEDURE — 3080F PR MOST RECENT DIASTOLIC BLOOD PRESSURE >= 90 MM HG: ICD-10-PCS | Mod: CPTII,S$GLB,, | Performed by: INTERNAL MEDICINE

## 2019-10-10 PROCEDURE — 3077F PR MOST RECENT SYSTOLIC BLOOD PRESSURE >= 140 MM HG: ICD-10-PCS | Mod: CPTII,S$GLB,, | Performed by: INTERNAL MEDICINE

## 2019-10-10 PROCEDURE — 3008F PR BODY MASS INDEX (BMI) DOCUMENTED: ICD-10-PCS | Mod: CPTII,S$GLB,, | Performed by: INTERNAL MEDICINE

## 2019-10-10 PROCEDURE — 3077F SYST BP >= 140 MM HG: CPT | Mod: CPTII,S$GLB,, | Performed by: INTERNAL MEDICINE

## 2019-10-10 PROCEDURE — 99214 OFFICE O/P EST MOD 30 MIN: CPT | Mod: S$GLB,,, | Performed by: INTERNAL MEDICINE

## 2019-10-10 RX ORDER — LOSARTAN POTASSIUM 25 MG/1
25 TABLET ORAL DAILY
Qty: 90 TABLET | Refills: 3 | Status: SHIPPED | OUTPATIENT
Start: 2019-10-10 | End: 2020-12-26 | Stop reason: SDUPTHER

## 2019-10-10 NOTE — PROGRESS NOTES
Subjective:    Patient ID:  Kulwant Mueller Jr. is a 34 y.o. male who presents for follow-up of Deep Vein Thrombosis      HPI    33 y/o male with hx of acute LLE fem-pop and below the knee DVT (extensive thrombus) 8/2016 s/p catheter directed thrombolysis with EKOS with significant improvement in symptoms on chronic anticoagulation on Xarelto, May Thurner's Syndrome s/p bilateral iliac vein stenting, HTN, DM on insulin. He initially presented with LLE pain and swelling and found to have acute LLE extensive DVT from fem to peroneal. 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 EKOS removed after 48 hours with significant improvement in edema and pain. Discharged home on Xarelto. 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 Repeat LE venous doppler without evidence of residual thrombus. Had intractable N/V for a few days after initial procedure for DVT which resolved and again had similar symptoms after last intervention. Seen by GI and started on Reglan with improvement and eventual resolution of symptoms. Had EGD which was normal. Repeat hospitalization for recurrent LLE DVT with post thrombotic syndrome and again received catheter directed thrombolysis with EKOS with resolution of symptoms and DVT. Repeat LLE doppler with no evidence of DVT.   Denies CP, SOB/DASILVA, orthopnea, PND, syncope, palps, LE edema. Compliant with meds. BP elevated last couple clinic visits and he admits to not taking losartan. 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     Review of Systems   Constitution: Negative for malaise/fatigue.   HENT: Negative for congestion.    Eyes: Negative for blurred vision.   Cardiovascular: Negative for chest pain, claudication, cyanosis, dyspnea on exertion, irregular heartbeat, leg swelling, near-syncope, orthopnea, palpitations, paroxysmal nocturnal dyspnea and syncope.   Respiratory: Negative for shortness of  breath.    Endocrine: Negative for polyuria.   Hematologic/Lymphatic: Negative for bleeding problem.   Skin: Negative for itching and rash.   Musculoskeletal: Negative for joint swelling, muscle cramps and muscle weakness.   Gastrointestinal: Negative for abdominal pain, hematemesis, hematochezia, melena, nausea and vomiting.   Genitourinary: Negative for dysuria and hematuria.   Neurological: Negative for dizziness, focal weakness, headaches, light-headedness, loss of balance and weakness.   Psychiatric/Behavioral: Negative for depression. The patient is not nervous/anxious.         Objective:    Physical Exam   Constitutional: He is oriented to person, place, and time. He appears well-developed and well-nourished.   HENT:   Head: Normocephalic and atraumatic.   Neck: Neck supple. No JVD present.   Cardiovascular: Normal rate, regular rhythm and normal heart sounds.   Pulses:       Carotid pulses are 2+ on the right side, and 2+ on the left side.       Radial pulses are 2+ on the right side, and 2+ on the left side.        Femoral pulses are 2+ on the right side, and 2+ on the left side.       Dorsalis pedis pulses are 2+ on the right side, and 2+ on the left side.        Posterior tibial pulses are 2+ on the right side, and 2+ on the left side.   Pulmonary/Chest: Effort normal and breath sounds normal.   Abdominal: Soft. Bowel sounds are normal.   Musculoskeletal: He exhibits no edema.   Neurological: He is alert and oriented to person, place, and time.   Skin: Skin is warm and dry.   Psychiatric: He has a normal mood and affect. His behavior is normal. Thought content normal.         Assessment:       1. May-Thurner syndrome    2. Acute deep vein thrombosis (DVT) of femoral vein of left lower extremity    3. Post-thrombotic syndrome of left lower extremity    4. Chronic anticoagulation    5. History of intravascular stent placement    6. Essential hypertension    7. Diabetic gastroparesis associated with type 2  diabetes mellitus    8. Type 2 diabetes mellitus with hyperglycemia, with long-term current use of insulin      33 y/o pt with hx and presentation as above. Doing well from a cardiac perspective and compensated from a HF perspective. BP needs improvement. BP log intermittently. Restart losartan. Discussed the etiology, evaluation, and management of DVT, MayEagleer, AC, HTN, DM, med compliance. Discussed the importance of med compliance, heart healthy diet, and regular exercise.        Plan:       -Continue current medical management  -Resume Losartan 25 mg daily  -f/u in 6 months

## 2019-10-16 NOTE — ASSESSMENT & PLAN NOTE
-s/p bilateral iliac vein stenting for May Thurner  -recurrent DVT with venogram with extensive DVT involving CIV stent   -will transition to Xarelto    No

## 2020-01-20 ENCOUNTER — OFFICE VISIT (OUTPATIENT)
Dept: URGENT CARE | Facility: CLINIC | Age: 35
End: 2020-01-20
Payer: COMMERCIAL

## 2020-01-20 VITALS
WEIGHT: 260 LBS | OXYGEN SATURATION: 94 % | DIASTOLIC BLOOD PRESSURE: 68 MMHG | TEMPERATURE: 102 F | HEART RATE: 130 BPM | SYSTOLIC BLOOD PRESSURE: 106 MMHG | BODY MASS INDEX: 32.5 KG/M2

## 2020-01-20 DIAGNOSIS — R50.9 FEVER, UNSPECIFIED FEVER CAUSE: ICD-10-CM

## 2020-01-20 DIAGNOSIS — R53.83 FATIGUE, UNSPECIFIED TYPE: ICD-10-CM

## 2020-01-20 DIAGNOSIS — J10.1 INFLUENZA A: Primary | ICD-10-CM

## 2020-01-20 DIAGNOSIS — R11.2 NAUSEA AND VOMITING, INTRACTABILITY OF VOMITING NOT SPECIFIED, UNSPECIFIED VOMITING TYPE: ICD-10-CM

## 2020-01-20 LAB
CTP QC/QA: YES
FLUAV AG NPH QL: POSITIVE
FLUBV AG NPH QL: NEGATIVE

## 2020-01-20 PROCEDURE — 99204 OFFICE O/P NEW MOD 45 MIN: CPT | Mod: 25,S$GLB,, | Performed by: NURSE PRACTITIONER

## 2020-01-20 PROCEDURE — 87804 POCT INFLUENZA A/B: ICD-10-PCS | Mod: QW,S$GLB,, | Performed by: NURSE PRACTITIONER

## 2020-01-20 PROCEDURE — 87804 INFLUENZA ASSAY W/OPTIC: CPT | Mod: QW,S$GLB,, | Performed by: NURSE PRACTITIONER

## 2020-01-20 PROCEDURE — 99204 PR OFFICE/OUTPT VISIT, NEW, LEVL IV, 45-59 MIN: ICD-10-PCS | Mod: 25,S$GLB,, | Performed by: NURSE PRACTITIONER

## 2020-01-20 RX ORDER — ACETAMINOPHEN 325 MG/1
650 TABLET ORAL
Status: COMPLETED | OUTPATIENT
Start: 2020-01-20 | End: 2020-01-20

## 2020-01-20 RX ORDER — ONDANSETRON 8 MG/1
8 TABLET, ORALLY DISINTEGRATING ORAL EVERY 8 HOURS PRN
Qty: 12 TABLET | Refills: 0 | Status: SHIPPED | OUTPATIENT
Start: 2020-01-20 | End: 2020-01-24

## 2020-01-20 RX ORDER — OSELTAMIVIR PHOSPHATE 75 MG/1
75 CAPSULE ORAL 2 TIMES DAILY
Qty: 10 CAPSULE | Refills: 0 | Status: SHIPPED | OUTPATIENT
Start: 2020-01-20 | End: 2020-01-25

## 2020-01-20 RX ORDER — ONDANSETRON 8 MG/1
8 TABLET, ORALLY DISINTEGRATING ORAL
Status: COMPLETED | OUTPATIENT
Start: 2020-01-20 | End: 2020-01-20

## 2020-01-20 RX ADMIN — ONDANSETRON 8 MG: 8 TABLET, ORALLY DISINTEGRATING ORAL at 05:01

## 2020-01-20 RX ADMIN — ACETAMINOPHEN 650 MG: 325 TABLET ORAL at 05:01

## 2020-01-20 NOTE — PATIENT INSTRUCTIONS
· Your symptoms are caused by the influenza virus. Viral infections will not improve with antibiotics. If your symptoms persist >10 days without improvement or if you have any new or worsening symptoms, follow up with your primary care provider.  · You are considered contagious for up to 24 hours until after your fever is resolved. Fever is considered any reading > 100.4. Fever many times lasts between 3-7 days with flu viruses. It is very important to remain home from work/school until you are no longer contagious in order to help prevent spreading of the flu virus. Also make sure to take part in frequent hand washing and always cover your cough.  · Getting plenty of rest is very important to fighting infections.  · Increase fluids.   · May apply warm compresses as needed for facial pain and congestion.   · Saline nasal spray to loosen nasal congestion.  · Flonase or Nasacort to reduce inflammation in the sinus cavities.  · You may take an over the counter antihistamine for allergy symptoms such as sneezing, itchy/watery eyes, scratchy throat, or congestion.  · Take Tylenol or Ibuprofen as needed for sore throat, body aches, or fever (fever is considered any temperature > 100.4).  · Don't drive, drink alcohol, or take any other medication or substance that causes sedation while taking cough syrup.   · Go to the ER for any fever that does not improve with Tylenol/Ibuprofen, neck stiffness, rash, severe headache, vision changes, shortness of breath, chest pain, facial swelling, severe facial pain, or any other new and concerning symptoms.     Influenza (Adult)    Influenza is also called the flu. It is a viral illness that affects the air passages of your lungs. It is different from the common cold. The flu can easily be passed from one to person to another. It may be spread through the air by coughing and sneezing. Or it can be spread by touching the sick person and then touching your own eyes, nose, or mouth.  The  flu starts 1 to 3 days after you are exposed to the flu virus. It may last for 1 to 2 weeks but many people feel tired or fatigued for many weeks afterward. You usually dont need to take antibiotics unless you have a complication. This might be an ear or sinus infection or pneumonia.  Symptoms of the flu may be mild or severe. They can include extreme tiredness (wanting to stay in bed all day), chills, fevers, muscle aches, soreness with eye movement, headache, and a dry, hacking cough.  Home care  Follow these guidelines when caring for yourself at home:  · Avoid being around cigarette smoke, whether yours or other peoples.  · Acetaminophen or ibuprofen will help ease your fever, muscle aches, and headache. Dont give aspirin to anyone younger than 18 who has the flu. Aspirin can harm the liver.  · Nausea and loss of appetite are common with the flu. Eat light meals. Drink 6 to 8 glasses of liquids every day. Good choices are water, sport drinks, soft drinks without caffeine, juices, tea, and soup. Extra fluids will also help loosen secretions in your nose and lungs.  · Over-the-counter cold medicines will not make the flu go away faster. But the medicines may help with coughing, sore throat, and congestion in your nose and sinuses. Dont use a decongestant if you have high blood pressure.  · Stay home until your fever has been gone for at least 24 hours without using medicine to reduce fever.  Follow-up care  Follow up with your healthcare provider, or as advised, if you are not getting better over the next week.  If you are age 65 or older, talk with your provider about getting a pneumococcal vaccine every 5 years. You should also get this vaccine if you have chronic asthma or COPD. All adults should get a flu vaccine every fall. Ask your provider about this.  When to seek medical advice  Call your healthcare provider right away if any of these occur:  · Cough with lots of colored mucus (sputum) or blood in  your mucus  · Chest pain, shortness of breath, wheezing, or trouble breathing  · Severe headache, or face, neck, or ear pain  · New rash with fever  · Fever of 100.4°F (38°C) or higher, or as directed by your healthcare provider  · Confusion, behavior change, or seizure  · Severe weakness or dizziness  · You get a new fever or cough after getting better for a few days  Date Last Reviewed: 1/1/2017  © 8597-5457 [employer]. [address] [place] [id number]. All rights reserved. This information is not intended as a substitute for professional medical care. Always follow your healthcare professional's instructions.

## 2020-01-20 NOTE — PROGRESS NOTES
Subjective:       Patient ID: Kulwant Mueller Jr. is a 34 y.o. male.    Vitals:  weight is 117.9 kg (260 lb). His tympanic temperature is 101.5 °F (38.6 °C) (abnormal). His blood pressure is 106/68 and his pulse is 130 (abnormal). His oxygen saturation is 94% (abnormal).     Chief Complaint: Fever    Fever    This is a new problem. The current episode started yesterday. The problem occurs constantly. The problem has been unchanged. The maximum temperature noted was 101 to 101.9 F. The temperature was taken using a tympanic thermometer. Associated symptoms include nausea and vomiting. Pertinent negatives include no congestion, coughing, ear pain, rash, sore throat or wheezing. He has tried acetaminophen for the symptoms. The treatment provided no relief.       Constitution: Positive for chills, sweating, fatigue and fever.   HENT: Negative for ear pain, congestion, sinus pain, sinus pressure, sore throat and voice change.    Neck: Negative for painful lymph nodes.   Eyes: Negative for eye redness.   Respiratory: Negative for chest tightness, cough, sputum production, bloody sputum, COPD, shortness of breath, stridor, wheezing and asthma.    Gastrointestinal: Positive for nausea and vomiting.   Musculoskeletal: Positive for muscle ache.   Skin: Negative for rash.   Allergic/Immunologic: Negative for seasonal allergies and asthma.   Hematologic/Lymphatic: Negative for swollen lymph nodes.       Objective:      Physical Exam   Constitutional: He is oriented to person, place, and time. He appears well-developed and well-nourished. He is cooperative.  Non-toxic appearance. He does not have a sickly appearance. He appears ill. No distress.   HENT:   Head: Normocephalic and atraumatic.   Right Ear: Hearing, external ear and ear canal normal. Tympanic membrane is erythematous. No middle ear effusion.   Left Ear: Hearing, external ear and ear canal normal. Tympanic membrane is erythematous.  No middle ear effusion.   Nose: Nose  normal. No mucosal edema, rhinorrhea or nasal deformity. No epistaxis. Right sinus exhibits no maxillary sinus tenderness and no frontal sinus tenderness. Left sinus exhibits no maxillary sinus tenderness and no frontal sinus tenderness.   Mouth/Throat: Uvula is midline and mucous membranes are normal. No trismus in the jaw. Normal dentition. No uvula swelling. Posterior oropharyngeal erythema present. No oropharyngeal exudate or posterior oropharyngeal edema.   Pt actively vomiting in exam room   Eyes: Conjunctivae and lids are normal. No scleral icterus.   Neck: Trachea normal, full passive range of motion without pain and phonation normal. Neck supple. No neck rigidity. No edema and no erythema present.   Cardiovascular: Regular rhythm, normal heart sounds, intact distal pulses and normal pulses. Tachycardia present.   Pulmonary/Chest: Effort normal and breath sounds normal. No respiratory distress. He has no decreased breath sounds. He has no rhonchi.   Abdominal: Soft. Normal appearance and bowel sounds are normal.   Musculoskeletal: Normal range of motion. He exhibits no edema or deformity.   Neurological: He is alert and oriented to person, place, and time. He exhibits normal muscle tone. Coordination normal.   Skin: Skin is warm, dry, intact, not diaphoretic and not pale.   Psychiatric: He has a normal mood and affect. His speech is normal and behavior is normal. Judgment and thought content normal. Cognition and memory are normal.   Nursing note and vitals reviewed.        Assessment:       1. Influenza A    2. Fever, unspecified fever cause    3. Nausea and vomiting, intractability of vomiting not specified, unspecified vomiting type    4. Fatigue, unspecified type        Plan:         Influenza A  -     oseltamivir (TAMIFLU) 75 MG capsule; Take 1 capsule (75 mg total) by mouth 2 (two) times daily. for 5 days  Dispense: 10 capsule; Refill: 0    Fever, unspecified fever cause  -     POCT Influenza A/B  -      acetaminophen tablet 650 mg    Nausea and vomiting, intractability of vomiting not specified, unspecified vomiting type  -     ondansetron disintegrating tablet 8 mg  -     ondansetron (ZOFRAN-ODT) 8 MG TbDL; Take 1 tablet (8 mg total) by mouth every 8 (eight) hours as needed (nausea and vomiting).  Dispense: 12 tablet; Refill: 0    Fatigue, unspecified type         Results for orders placed or performed in visit on 01/20/20   POCT Influenza A/B   Result Value Ref Range    Rapid Influenza A Ag Positive (A) Negative    Rapid Influenza B Ag Negative Negative     Acceptable Yes      Lab results reviewed and discussed with patient.    · Your symptoms are caused by the influenza virus. Viral infections will not improve with antibiotics. If your symptoms persist >10 days without improvement or if you have any new or worsening symptoms, follow up with your primary care provider.  · You are considered contagious for up to 24 hours until after your fever is resolved. Fever is considered any reading > 100.4. Fever many times lasts between 3-7 days with flu viruses. It is very important to remain home from work/school until you are no longer contagious in order to help prevent spreading of the flu virus. Also make sure to take part in frequent hand washing and always cover your cough.  · Take tamiflu as prescribed.  · Take Zofran as prescribed for nausea and vomiting.  · Getting plenty of rest is very important to fighting infections.  · Increase fluids.   · May apply warm compresses as needed for facial pain and congestion.   · Saline nasal spray to loosen nasal congestion.  · Flonase or Nasacort to reduce inflammation in the sinus cavities.  · You may take an over the counter antihistamine for allergy symptoms such as sneezing, itchy/watery eyes, scratchy throat, or congestion.  · Take Tylenol or Ibuprofen as needed for sore throat, body aches, or fever (fever is considered any temperature > 100.4).  · Take  an over the counter cough suppressant as needed for cough.  · Go to the ER for any fever that does not improve with Tylenol/Ibuprofen, neck stiffness, rash, severe headache, vision changes, shortness of breath, chest pain, facial swelling, severe facial pain, or any other new and concerning symptoms.

## 2020-03-16 ENCOUNTER — PATIENT MESSAGE (OUTPATIENT)
Dept: CARDIOLOGY | Facility: CLINIC | Age: 35
End: 2020-03-16

## 2020-03-16 ENCOUNTER — TELEPHONE (OUTPATIENT)
Dept: CARDIOLOGY | Facility: CLINIC | Age: 35
End: 2020-03-16

## 2020-03-16 RX ORDER — RIVAROXABAN 10 MG/1
TABLET, FILM COATED ORAL
Qty: 90 TABLET | Refills: 3 | OUTPATIENT
Start: 2020-03-16

## 2020-03-16 NOTE — TELEPHONE ENCOUNTER
Reached out to pt     Pt was informed about the medication dosage on his xarelto rx being sent in for 20 mg, which is what he should be on, and not the 10 mg     Pt was very thankful

## 2020-03-16 NOTE — TELEPHONE ENCOUNTER
----- Message from CHARLENE Mehta, ANP sent at 3/16/2020  3:26 PM CDT -----  Contact: Swapna  I sent his refill for Xarelto to the pharmacy. I noticed it stated 10mg po daily but he should be on 20mg po daily. I changed the prescription but please make sure he knows it is 20mg po daily     Thanks  BH

## 2020-03-18 ENCOUNTER — PATIENT MESSAGE (OUTPATIENT)
Dept: CARDIOLOGY | Facility: CLINIC | Age: 35
End: 2020-03-18

## 2020-03-19 ENCOUNTER — PATIENT MESSAGE (OUTPATIENT)
Dept: CARDIOLOGY | Facility: CLINIC | Age: 35
End: 2020-03-19

## 2020-03-19 DIAGNOSIS — O22.30 DVT (DEEP VEIN THROMBOSIS) IN PREGNANCY: Primary | ICD-10-CM

## 2020-03-19 DIAGNOSIS — I82.4Y1 DEEP VEIN THROMBOSIS (DVT) OF PROXIMAL VEIN OF RIGHT LOWER EXTREMITY, UNSPECIFIED CHRONICITY: ICD-10-CM

## 2020-03-24 ENCOUNTER — TELEPHONE (OUTPATIENT)
Dept: CARDIOLOGY | Facility: CLINIC | Age: 35
End: 2020-03-24

## 2020-03-24 ENCOUNTER — OFFICE VISIT (OUTPATIENT)
Dept: CARDIOLOGY | Facility: CLINIC | Age: 35
End: 2020-03-24
Payer: COMMERCIAL

## 2020-03-24 VITALS — DIASTOLIC BLOOD PRESSURE: 109 MMHG | SYSTOLIC BLOOD PRESSURE: 163 MMHG

## 2020-03-24 DIAGNOSIS — Z79.01 CHRONIC ANTICOAGULATION: ICD-10-CM

## 2020-03-24 DIAGNOSIS — Z79.4 TYPE 2 DIABETES MELLITUS WITH HYPERGLYCEMIA, WITH LONG-TERM CURRENT USE OF INSULIN: Chronic | ICD-10-CM

## 2020-03-24 DIAGNOSIS — Z95.828 HISTORY OF INTRAVASCULAR STENT PLACEMENT: ICD-10-CM

## 2020-03-24 DIAGNOSIS — I82.402 RECURRENT DEEP VEIN THROMBOSIS (DVT) OF LEFT LOWER EXTREMITY: ICD-10-CM

## 2020-03-24 DIAGNOSIS — I87.1 MAY-THURNER SYNDROME: Primary | Chronic | ICD-10-CM

## 2020-03-24 DIAGNOSIS — R60.9 EDEMA, UNSPECIFIED TYPE: Primary | ICD-10-CM

## 2020-03-24 DIAGNOSIS — E11.65 TYPE 2 DIABETES MELLITUS WITH HYPERGLYCEMIA, WITH LONG-TERM CURRENT USE OF INSULIN: Chronic | ICD-10-CM

## 2020-03-24 DIAGNOSIS — I10 ESSENTIAL HYPERTENSION: ICD-10-CM

## 2020-03-24 DIAGNOSIS — I87.002 POST-THROMBOTIC SYNDROME OF LEFT LOWER EXTREMITY: ICD-10-CM

## 2020-03-24 DIAGNOSIS — I82.512 CHRONIC DEEP VEIN THROMBOSIS (DVT) OF FEMORAL VEIN OF LEFT LOWER EXTREMITY: ICD-10-CM

## 2020-03-24 DIAGNOSIS — E66.09 NON MORBID OBESITY DUE TO EXCESS CALORIES: ICD-10-CM

## 2020-03-24 PROCEDURE — 3077F PR MOST RECENT SYSTOLIC BLOOD PRESSURE >= 140 MM HG: ICD-10-PCS | Mod: CPTII,,, | Performed by: INTERNAL MEDICINE

## 2020-03-24 PROCEDURE — 99214 OFFICE O/P EST MOD 30 MIN: CPT | Mod: 95,,, | Performed by: INTERNAL MEDICINE

## 2020-03-24 PROCEDURE — 3077F SYST BP >= 140 MM HG: CPT | Mod: CPTII,,, | Performed by: INTERNAL MEDICINE

## 2020-03-24 PROCEDURE — 99214 PR OFFICE/OUTPT VISIT, EST, LEVL IV, 30-39 MIN: ICD-10-PCS | Mod: 95,,, | Performed by: INTERNAL MEDICINE

## 2020-03-24 PROCEDURE — 3080F PR MOST RECENT DIASTOLIC BLOOD PRESSURE >= 90 MM HG: ICD-10-PCS | Mod: CPTII,,, | Performed by: INTERNAL MEDICINE

## 2020-03-24 PROCEDURE — 3080F DIAST BP >= 90 MM HG: CPT | Mod: CPTII,,, | Performed by: INTERNAL MEDICINE

## 2020-03-24 NOTE — PROGRESS NOTES
Subjective:    Patient ID:  Kulwant Mueller Jr. is a 35 y.o. male who presents for follow-up of Deep Vein Thrombosis      HPI    The patient location is: [place], home  The chief complaint leading to consultation is: leg edema and pain with hx of DVT  Visit type: Virtual visit with synchronous audio and video  Total time spent with patient: 45 mins  Each patient to whom he or she provides medical services by telemedicine is:  (1) informed of the relationship between the physician and patient and the respective role of any other health care provider with respect to management of the patient; and (2) notified that he or she may decline to receive medical services by telemedicine and may withdraw from such care at any time.    Notes:       36 y/o male  with hx of acute LLE fem-pop and below the knee DVT (extensive thrombus) 8/2016 s/p catheter directed thrombolysis with EKOS with significant improvement in symptoms on chronic anticoagulation on Xarelto, May Thurner's Syndrome s/p bilateral iliac vein stenting, HTN, DM on insulin. He initially presented with LLE pain and swelling and found to have acute LLE extensive DVT from fem to peroneal. 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 EKOS removed after 48 hours with significant improvement in edema and pain. Discharged home on Xarelto. 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 Repeat LE venous doppler without evidence of residual thrombus. Had intractable N/V for a few days after initial procedure for DVT which resolved and again had similar symptoms after last intervention. Seen by GI and started on Reglan with improvement and eventual resolution of symptoms. Had EGD which was normal. Repeat hospitalization for recurrent LLE DVT with post thrombotic syndrome and again received catheter directed thrombolysis with EKOS with resolution of symptoms and DVT.  Repeat LLE doppler with no evidence of DVT.   Had developed bilateral leg edema mostly to the ankles and mostly on the right. States symptoms began after increasing dose of Xarelto from 10 mg to 20 mg. No evidence of post phlebitic syndrome. Has not been taking losartan again and his wife checked BP while I was observing and /109.   Denies CP, SOB/DASILVA, orthopnea, PND, syncope, palps, LE edema. 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     Review of Systems   Constitution: Negative for malaise/fatigue.   HENT: Negative for congestion.    Eyes: Negative for blurred vision.   Cardiovascular: Positive for leg swelling. Negative for chest pain, claudication, cyanosis, dyspnea on exertion, irregular heartbeat, near-syncope, orthopnea, palpitations, paroxysmal nocturnal dyspnea and syncope.   Respiratory: Negative for shortness of breath.    Endocrine: Negative for polyuria.   Hematologic/Lymphatic: Negative for bleeding problem.   Skin: Negative for itching and rash.   Musculoskeletal: Positive for joint pain. Negative for joint swelling, muscle cramps and muscle weakness.   Gastrointestinal: Negative for abdominal pain, hematemesis, hematochezia, melena, nausea and vomiting.   Genitourinary: Negative for dysuria and hematuria.   Neurological: Negative for dizziness, focal weakness, headaches, light-headedness, loss of balance and weakness.   Psychiatric/Behavioral: Negative for depression. The patient is not nervous/anxious.         Objective:    Physical Exam   Constitutional: He is oriented to person, place, and time. He appears well-developed and well-nourished.   HENT:   Head: Normocephalic and atraumatic.   Eyes: EOM are normal.   Neck: No JVD present.   Pulmonary/Chest: Effort normal.   Musculoskeletal: He exhibits edema.   Neurological: He is alert and oriented to person, place, and time.   Skin: Skin is dry.   Psychiatric: He has a normal mood and affect. His behavior is normal. Thought content normal.          Assessment:       1. May-Thurner syndrome    2. Recurrent deep vein thrombosis (DVT) of left lower extremity    3. Chronic deep vein thrombosis (DVT) of femoral vein of left lower extremity    4. Post-thrombotic syndrome of left lower extremity    5. Chronic anticoagulation    6. History of intravascular stent placement    7. Essential hypertension    8. Type 2 diabetes mellitus with hyperglycemia, with long-term current use of insulin    9. Non morbid obesity due to excess calories      36 y/o pt with hx and presentation as above. Doing well from a cardiac perspective and compensated from a HF perspective. BP needs improvement. BP log daily for 2 weeks. Restart losartan daily. Regarding leg issues, likely not acute DVT or post phlebitic syndrome. Will obtain bilateral LE venous doppler for surveillance. Decrease dose of Xarelto to 10 mg daily per Heme/Onc rec's. Discussed the etiology, evaluation, and management of DVT, May-Thurner, AC, HTN, DM, med compliance. Discussed the importance of med compliance, heart healthy diet, and regular exercise.      Plan:       -Decrease Xarelto to 10 mg daily (see H/O note)  -Bilateral venous doppler  -Restart losartan 25 mg daily  -Daily BP log  -f/u in 2 weeks with virtual visit

## 2020-03-24 NOTE — TELEPHONE ENCOUNTER
Reached out to pt    Pt was advised, per Dr. Duff, to reduce the dose of Xarelto from 20 mg to 10 mg

## 2020-03-24 NOTE — TELEPHONE ENCOUNTER
----- Message from Tyree Duff MD sent at 3/24/2020  3:24 PM CDT -----  Please call Mr Mueller and tell him to reduce dose of Xarelto from 20 mg to 10 mg. He was placed on the 10 mg dose by Heme/Onc per their recommendations and was changed back to 20 mg by our NP unintentionally.   FLO

## 2020-03-27 ENCOUNTER — HOSPITAL ENCOUNTER (OUTPATIENT)
Dept: RADIOLOGY | Facility: HOSPITAL | Age: 35
Discharge: HOME | End: 2020-03-27
Attending: INTERNAL MEDICINE
Payer: COMMERCIAL

## 2020-03-27 DIAGNOSIS — R60.9 EDEMA, UNSPECIFIED TYPE: ICD-10-CM

## 2020-03-27 PROCEDURE — 93970 EXTREMITY STUDY: CPT | Mod: TC,PO

## 2020-03-31 ENCOUNTER — TELEPHONE (OUTPATIENT)
Dept: CARDIOLOGY | Facility: CLINIC | Age: 35
End: 2020-03-31

## 2020-03-31 NOTE — TELEPHONE ENCOUNTER
----- Message from Tyree Duff MD sent at 3/31/2020  1:09 PM CDT -----  Your leg ultrasound shows normal circulation with no evidence of blood clots or significant congestion

## 2020-04-01 ENCOUNTER — PATIENT MESSAGE (OUTPATIENT)
Dept: CARDIOLOGY | Facility: CLINIC | Age: 35
End: 2020-04-01

## 2020-04-03 ENCOUNTER — TELEPHONE (OUTPATIENT)
Dept: CARDIOLOGY | Facility: CLINIC | Age: 35
End: 2020-04-03

## 2020-04-03 NOTE — TELEPHONE ENCOUNTER
Mr. Mueller,    I just wanted to let you know that     Received your message regarding a  Letter of Medical Leave .    Dr. Duff,     Is working on writing you that Medical Leave Letter     For your Job.        Sincerely --      Nayely Patino (rita).

## 2020-04-07 ENCOUNTER — OFFICE VISIT (OUTPATIENT)
Dept: CARDIOLOGY | Facility: CLINIC | Age: 35
End: 2020-04-07
Payer: COMMERCIAL

## 2020-04-07 VITALS — DIASTOLIC BLOOD PRESSURE: 107 MMHG | SYSTOLIC BLOOD PRESSURE: 158 MMHG

## 2020-04-07 DIAGNOSIS — E11.43 DIABETIC GASTROPARESIS ASSOCIATED WITH TYPE 2 DIABETES MELLITUS: Chronic | ICD-10-CM

## 2020-04-07 DIAGNOSIS — I87.002 POST-THROMBOTIC SYNDROME OF LEFT LOWER EXTREMITY: ICD-10-CM

## 2020-04-07 DIAGNOSIS — I82.402 RECURRENT DEEP VEIN THROMBOSIS (DVT) OF LEFT LOWER EXTREMITY: ICD-10-CM

## 2020-04-07 DIAGNOSIS — I82.4Y2 ACUTE DEEP VEIN THROMBOSIS (DVT) OF PROXIMAL VEIN OF LEFT LOWER EXTREMITY: Primary | ICD-10-CM

## 2020-04-07 DIAGNOSIS — Z79.01 CHRONIC ANTICOAGULATION: ICD-10-CM

## 2020-04-07 DIAGNOSIS — K31.84 DIABETIC GASTROPARESIS ASSOCIATED WITH TYPE 2 DIABETES MELLITUS: Chronic | ICD-10-CM

## 2020-04-07 DIAGNOSIS — I10 ESSENTIAL HYPERTENSION: ICD-10-CM

## 2020-04-07 DIAGNOSIS — I82.512 CHRONIC DEEP VEIN THROMBOSIS (DVT) OF FEMORAL VEIN OF LEFT LOWER EXTREMITY: ICD-10-CM

## 2020-04-07 DIAGNOSIS — I87.1 MAY-THURNER SYNDROME: Chronic | ICD-10-CM

## 2020-04-07 DIAGNOSIS — Z95.828 HISTORY OF INTRAVASCULAR STENT PLACEMENT: ICD-10-CM

## 2020-04-07 PROCEDURE — 3080F DIAST BP >= 90 MM HG: CPT | Mod: CPTII,,, | Performed by: INTERNAL MEDICINE

## 2020-04-07 PROCEDURE — 99214 PR OFFICE/OUTPT VISIT, EST, LEVL IV, 30-39 MIN: ICD-10-PCS | Mod: 95,,, | Performed by: INTERNAL MEDICINE

## 2020-04-07 PROCEDURE — 3077F SYST BP >= 140 MM HG: CPT | Mod: CPTII,,, | Performed by: INTERNAL MEDICINE

## 2020-04-07 PROCEDURE — 3077F PR MOST RECENT SYSTOLIC BLOOD PRESSURE >= 140 MM HG: ICD-10-PCS | Mod: CPTII,,, | Performed by: INTERNAL MEDICINE

## 2020-04-07 PROCEDURE — 3080F PR MOST RECENT DIASTOLIC BLOOD PRESSURE >= 90 MM HG: ICD-10-PCS | Mod: CPTII,,, | Performed by: INTERNAL MEDICINE

## 2020-04-07 PROCEDURE — 99214 OFFICE O/P EST MOD 30 MIN: CPT | Mod: 95,,, | Performed by: INTERNAL MEDICINE

## 2020-04-07 NOTE — PROGRESS NOTES
Subjective:    Patient ID:  Kulwant Mueller Jr. is a 35 y.o. male who presents for follow-up of Deep Vein Thrombosis      HPI    The patient location is: [place], home  The chief complaint leading to consultation is: leg edema and pain with hx of DVT  Visit type: Virtual visit with synchronous audio and video  Total time spent with patient: 30 mins  Each patient to whom he or she provides medical services by telemedicine is:  (1) informed of the relationship between the physician and patient and the respective role of any other health care provider with respect to management of the patient; and (2) notified that he or she may decline to receive medical services by telemedicine and may withdraw from such care at any time.     Notes:       34 y/o male  with hx of acute LLE fem-pop and below the knee DVT (extensive thrombus) 8/2016 s/p catheter directed thrombolysis with EKOS with significant improvement in symptoms on chronic anticoagulation on Xarelto, May Thurner's Syndrome s/p bilateral iliac vein stenting, HTN, DM on insulin. He initially presented with LLE pain and swelling and found to have acute LLE extensive DVT from fem to peroneal. 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 EKOS removed after 48 hours with significant improvement in edema and pain. Discharged home on Xarelto. 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 Repeat LE venous doppler without evidence of residual thrombus. Had intractable N/V for a few days after initial procedure for DVT which resolved and again had similar symptoms after last intervention. Seen by GI and started on Reglan with improvement and eventual resolution of symptoms. Had EGD which was normal. Repeat hospitalization for recurrent LLE DVT with post thrombotic syndrome and again received catheter directed thrombolysis with EKOS with resolution of symptoms and DVT.  Repeat LLE doppler with no evidence of DVT.   Had developed bilateral leg edema mostly to the ankles and mostly on the right. States symptoms began after increasing dose of Xarelto from 10 mg to 20 mg. No evidence of post phlebitic syndrome. Had not been taking losartan again. Last clinic visit had venous doppler ordered which was normal. Xarelto dose decreased to 10 mg daily per Heme/Onc rec's. BP elevated today, however, states he just took losartan. States BP has improved since restarting losartan ~130/90's. Had an episode of left sided mild chest pain (pressure) which lasted mins, relieved with a nebulizer treatment from his wife (?albuterol). States SaO2 was 92% at the time. No recurrent episodes.   Denies CP, SOB/DASILVA, orthopnea, PND, syncope, palps, LE edema. 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     Review of Systems   Constitution: Negative for malaise/fatigue.   HENT: Negative for congestion.    Eyes: Negative for blurred vision.   Cardiovascular: Positive for chest pain. Negative for claudication, cyanosis, dyspnea on exertion, irregular heartbeat, leg swelling, near-syncope, orthopnea, palpitations, paroxysmal nocturnal dyspnea and syncope.   Respiratory: Positive for shortness of breath.    Endocrine: Negative for polyuria.   Hematologic/Lymphatic: Negative for bleeding problem.   Skin: Negative for itching and rash.   Musculoskeletal: Negative for joint swelling, muscle cramps and muscle weakness.   Gastrointestinal: Negative for abdominal pain, hematemesis, hematochezia, melena, nausea and vomiting.   Genitourinary: Negative for dysuria and hematuria.   Neurological: Negative for dizziness, focal weakness, headaches, light-headedness, loss of balance and weakness.   Psychiatric/Behavioral: Negative for depression. The patient is not nervous/anxious.         Objective:    Physical Exam   Constitutional: He is oriented to person, place, and time. He appears well-developed and well-nourished.   HENT:    Head: Normocephalic and atraumatic.   Eyes: EOM are normal.   Neck: No JVD present.   Pulmonary/Chest: Effort normal.   Musculoskeletal: He exhibits no edema.   Neurological: He is alert and oriented to person, place, and time.   Psychiatric: He has a normal mood and affect. His behavior is normal. Thought content normal.         Assessment:       1. Acute deep vein thrombosis (DVT) of proximal vein of left lower extremity    2. Chronic deep vein thrombosis (DVT) of femoral vein of left lower extremity    3. Recurrent deep vein thrombosis (DVT) of left lower extremity    4. Post-thrombotic syndrome of left lower extremity    5. Chronic anticoagulation    6. May-Thurner syndrome    7. History of intravascular stent placement    8. Essential hypertension    9. Diabetic gastroparesis associated with type 2 diabetes mellitus      34 y/o pt with hx and presentation as above. Doing well from a cardiac perspective and compensated from a HF perspective. BP improved, however, elevated this AM (just took losartan). Continue BP log (doesn't seem like he does this regularly). Discussed the etiology, evaluation, and management of DVT, May-Thurner, AC, HTN, DM, med compliance. Discussed the importance of med compliance, heart healthy diet, and regular exercise.      Plan:       -Continue current medical management  -BP log  -f/u in 6 months

## 2020-05-22 ENCOUNTER — TELEPHONE (OUTPATIENT)
Dept: PODIATRY | Facility: CLINIC | Age: 35
End: 2020-05-22

## 2020-09-15 ENCOUNTER — OFFICE VISIT (OUTPATIENT)
Dept: URGENT CARE | Facility: CLINIC | Age: 35
End: 2020-09-15
Payer: COMMERCIAL

## 2020-09-15 ENCOUNTER — CLINICAL SUPPORT (OUTPATIENT)
Dept: URGENT CARE | Facility: CLINIC | Age: 35
End: 2020-09-15
Payer: COMMERCIAL

## 2020-09-15 ENCOUNTER — OFFICE VISIT (OUTPATIENT)
Dept: FAMILY MEDICINE | Facility: CLINIC | Age: 35
End: 2020-09-15
Payer: COMMERCIAL

## 2020-09-15 VITALS — OXYGEN SATURATION: 97 % | HEART RATE: 108 BPM | TEMPERATURE: 97 F

## 2020-09-15 VITALS — HEART RATE: 108 BPM | TEMPERATURE: 97 F | OXYGEN SATURATION: 97 %

## 2020-09-15 DIAGNOSIS — Z53.20 PROCEDURE NOT CARRIED OUT BECAUSE OF PATIENT'S DECISION: Primary | ICD-10-CM

## 2020-09-15 DIAGNOSIS — Z20.822 EXPOSURE TO COVID-19 VIRUS: ICD-10-CM

## 2020-09-15 DIAGNOSIS — R53.83 FATIGUE, UNSPECIFIED TYPE: ICD-10-CM

## 2020-09-15 DIAGNOSIS — R11.2 NON-INTRACTABLE VOMITING WITH NAUSEA, UNSPECIFIED VOMITING TYPE: Primary | ICD-10-CM

## 2020-09-15 DIAGNOSIS — R05.9 COUGH: Primary | ICD-10-CM

## 2020-09-15 DIAGNOSIS — R63.0 POOR APPETITE: ICD-10-CM

## 2020-09-15 LAB
CTP QC/QA: YES
SARS-COV-2 RDRP RESP QL NAA+PROBE: NEGATIVE

## 2020-09-15 PROCEDURE — 99203 OFFICE O/P NEW LOW 30 MIN: CPT | Mod: 95,,, | Performed by: INTERNAL MEDICINE

## 2020-09-15 PROCEDURE — U0002: ICD-10-PCS | Mod: QW,S$GLB,, | Performed by: NURSE PRACTITIONER

## 2020-09-15 PROCEDURE — 99203 PR OFFICE/OUTPT VISIT, NEW, LEVL III, 30-44 MIN: ICD-10-PCS | Mod: 95,,, | Performed by: INTERNAL MEDICINE

## 2020-09-15 PROCEDURE — 99499 UNLISTED E&M SERVICE: CPT | Mod: S$GLB,,, | Performed by: NURSE PRACTITIONER

## 2020-09-15 PROCEDURE — U0002 COVID-19 LAB TEST NON-CDC: HCPCS | Mod: QW,S$GLB,, | Performed by: NURSE PRACTITIONER

## 2020-09-15 PROCEDURE — 99499 NO LOS: ICD-10-PCS | Mod: S$GLB,,, | Performed by: NURSE PRACTITIONER

## 2020-09-15 NOTE — PROGRESS NOTES
Primary Care Telemedicine Note  The patient location is:  Patient Home   The chief complaint leading to consultation is: n/d symptoms  Total time spent with patient: 10    Visit type: Virtual visit with synchronous audio only and video  Each patient to whom he or she provides medical services by telemedicine is:  (1) informed of the relationship between the physician and patient and the respective role of any other health care provider with respect to management of the patient; and (2) notified that he or she may decline to receive medical services by telemedicine and may withdraw from such care at any time.    CC: n/v/dweak  HPI:    He woke up this morning with vomiting, nausea, and diarrhea. No fevers or chills. No headache. No cough or SOB. Poor appetite. Felt ok last night. His son has uri symptoms. Works at [employer] in [employer]. No body aches.  Discussed with patient that he has multiple comorbidities.  He has not reach out to his primary care physician.  He appears ill during our examination.  He was somewhat apprehensive in regards to COVID testing, however as noted per assessment plan my limitations via this platform limit me being able to provide further assessment beyond ruling out COVID.  He denies any pulmonary symptoms, but advised that COVID may present with other symptoms.  He denies any diarrhea.    Problem List Items Addressed This Visit     None      Visit Diagnoses     Non-intractable vomiting with nausea, unspecified vomiting type    -  Primary    Relevant Orders    COVID-19 Routine Screening    Fatigue, unspecified type        Relevant Orders    COVID-19 Routine Screening    Poor appetite        Relevant Orders    COVID-19 Routine Screening    Exposure to Covid-19 Virus        Relevant Orders    COVID-19 Routine Screening          The patient's Health Maintenance was reviewed and the following appears to be due at this time:  Health Maintenance Due   Topic Date Due    Hepatitis C Screening   "1985    Eye Exam  03/15/1993    HIV Screening  03/15/1998    TETANUS VACCINE  03/15/2001    Pneumococcal Vaccine (Medium Risk) (1 of 1 - PPSV23) 03/15/2002    Foot Exam  01/11/2019    Lipid Panel  01/11/2019    Hemoglobin A1c  02/28/2019    Influenza Vaccine (1) 08/01/2020       [unfilled]   Outpatient Medications Prior to Visit   Medication Sig Dispense Refill    BD ULTRA-FINE DIYA PEN NEEDLE 32 gauge x 5/32" Ndle Inject 1 Units into the skin 4 (four) times daily before meals and nightly. 100 each 3    blood sugar diagnostic Strp 1 strip by Misc.(Non-Drug; Combo Route) route 4 (four) times daily before meals and nightly. 100 strip 11    blood-glucose meter kit Use as instructed 1 each 0    insulin lispro (HUMALOG) 100 unit/mL injection Inject 15 Units into the skin 3 (three) times daily before meals. Adjust dose based on blood sugar readings.      lancets Misc 1 lancet by Misc.(Non-Drug; Combo Route) route 4 (four) times daily before meals and nightly. 100 each 11    losartan (COZAAR) 25 MG tablet Take 1 tablet (25 mg total) by mouth once daily. 90 tablet 3    metoclopramide HCl (REGLAN) 10 MG tablet TAKE 1 TABLET(10 MG) BY MOUTH THREE TIMES DAILY BEFORE MEALS 90 tablet 11    rivaroxaban (XARELTO) 20 mg Tab Take 1 tablet (20 mg total) by mouth daily with dinner or evening meal. 90 tablet 3    TRESIBA FLEXTOUCH U-200 200 unit/mL (3 mL) InPn INJECT 50 UNITS UNDER THE SKIN D  5     No facility-administered medications prior to visit.      Review of Systems   Constitutional: Positive for malaise/fatigue. Negative for chills and fever.   HENT: Negative for hearing loss.    Eyes: Negative for discharge.   Respiratory: Negative for cough and wheezing.    Cardiovascular: Negative for chest pain and palpitations.   Gastrointestinal: Positive for diarrhea and vomiting. Negative for blood in stool and constipation.   Genitourinary: Negative for hematuria and urgency.   Musculoskeletal: Negative for " neck pain.   Skin: Negative.    Neurological: Negative for weakness and headaches.   Endo/Heme/Allergies: Negative for polydipsia.         Physical Exam   @[email or web address]@  Physical Exam  Vitals signs and nursing note reviewed.   Constitutional:       General: He is not in acute distress.     Appearance: Normal appearance. He is ill-appearing and toxic-appearing. He is not diaphoretic.      Comments: Laying in bed.  Appears very ill   HENT:      Head: Normocephalic and atraumatic.      Mouth/Throat:      Mouth: Mucous membranes are moist.   Eyes:      Conjunctiva/sclera: Conjunctivae normal.   Cardiovascular:      Rate and Rhythm: Normal rate.   Pulmonary:      Comments: Speaking in full sentences   Skin:     General: Skin is warm and dry.   Neurological:      Mental Status: He is alert and oriented to person, place, and time.   Psychiatric:         Mood and Affect: Mood normal.         Behavior: Behavior normal.         Thought Content: Thought content normal.         Judgment: Judgment normal.           Encounter Diagnoses   Name Primary?    Non-intractable vomiting with nausea, unspecified vomiting type Yes    Fatigue, unspecified type     Poor appetite     Exposure to Covid-19 Virus        PLAN:    I am having Kulwant Menardcaro Benítez maintain his insulin lispro, BD ULTRA-FINE DIYA PEN NEEDLE, lancets, blood sugar diagnostic, blood-glucose meter, TRESIBA FLEXTOUCH U-200, losartan, rivaroxaban, and metoclopramide HCl.    Kulwant was seen today for nausea, emesis and fatigue.    Diagnoses and all orders for this visit:    Non-intractable vomiting with nausea, unspecified vomiting type  -     COVID-19 Routine Screening; Future    Fatigue, unspecified type  -     COVID-19 Routine Screening; Future    Poor appetite  -     COVID-19 Routine Screening; Future    Exposure to Covid-19 Virus  -     COVID-19 Routine Screening; Future      As per HPI patient was advised that my limitation via the telemedicine platform  prohibits me from being able to provide a thorough examination.  He does appear ill with sudden onset of symptoms.  He is a healthcare worker.  Discussed that COVID testing would be for step and if negative recommended that he follow-up with primary care for thorough examination and person.  Discussed close monitoring of blood sugars.  Inability to tolerate oral intake.  Higher level of care needed if unable to keep food down.  Child with similar symptoms.  Patient verbalized understanding and states that he will go to [place] to hopefully have a drive-through testing.  I advised him that I would released the results and if positive or negative he would be advised.  Further follow-up to his PCP.          Stephanie Avila M.D.  9/15/2020  7:21 AM           Orders Placed This Encounter   Procedures    COVID-19 Routine Screening     Standing Status:   Future     Standing Expiration Date:   11/14/2021     Order Specific Question:   Is the patient symptomatic?     Answer:   Yes     Order Specific Question:   Is this needed for pre-procedure or pre-op testing?     Answer:   No     Order Specific Question:   Does the patient have the ability to go to a drive thru location?     Answer:   Yes     Order Specific Question:   Diagnosis:     Answer:   Nausea [916028]     Order Specific Question:   Diagnosis:     Answer:   Diarrhea [787.91.ICD-9-CM]     Order Specific Question:   Diagnosis:     Answer:   Encounter for observation for suspected exposure to other biological agents ruled out [6855184]       Answers for HPI/ROS submitted by the patient on 9/15/2020   activity change: Yes  unexpected weight change: No  rhinorrhea: No  trouble swallowing: No  visual disturbance: No  chest tightness: No  polyuria: No  difficulty urinating: No  joint swelling: No  arthralgias: No  confusion: No  dysphoric mood: No

## 2020-09-15 NOTE — PROGRESS NOTES
Pt is an [employer] [facility] sent by [employer] for a rapid covid test due to symptoms. Pt has order for rapid covid test.

## 2020-10-06 ENCOUNTER — TELEPHONE (OUTPATIENT)
Dept: PODIATRY | Facility: CLINIC | Age: 35
End: 2020-10-06

## 2020-10-06 ENCOUNTER — OFFICE VISIT (OUTPATIENT)
Dept: FAMILY MEDICINE | Facility: CLINIC | Age: 35
End: 2020-10-06
Payer: COMMERCIAL

## 2020-10-06 DIAGNOSIS — L08.9 TOE INFECTION: Primary | ICD-10-CM

## 2020-10-06 PROCEDURE — 99213 OFFICE O/P EST LOW 20 MIN: CPT | Mod: 95,,, | Performed by: NURSE PRACTITIONER

## 2020-10-06 PROCEDURE — 99213 PR OFFICE/OUTPT VISIT, EST, LEVL III, 20-29 MIN: ICD-10-PCS | Mod: 95,,, | Performed by: NURSE PRACTITIONER

## 2020-10-06 RX ORDER — CLINDAMYCIN HYDROCHLORIDE 300 MG/1
300 CAPSULE ORAL EVERY 8 HOURS
Qty: 30 CAPSULE | Refills: 0 | Status: SHIPPED | OUTPATIENT
Start: 2020-10-06 | End: 2020-10-16

## 2020-10-06 NOTE — PROGRESS NOTES
Answers for HPI/ROS submitted by the patient on 10/6/2020   activity change: No  hearing loss: No  trouble swallowing: No  eye discharge: No  chest tightness: No  wheezing: No  chest pain: No  palpitations: No  constipation: No  vomiting: No  diarrhea: No  difficulty urinating: No  hematuria: No  headaches: No  dysphoric mood: No

## 2020-10-06 NOTE — Clinical Note
I have a patient, [employer] employee, who has a significant infected wound on his right toe, he is an insulin dependant diabetic, the earliest I could get him in was 10/19/2020, is there any way he could be worked in earlier? Thank you

## 2020-10-06 NOTE — PROGRESS NOTES
Subjective:       Patient ID: Kulwant Mueller Jr. is a 35 y.o. male.    Chief Complaint: No chief complaint on file.    The patient location is: [place], [place]  The chief complaint leading to consultation is: toe infection    Visit type: audiovisual    Face to Face time with patient: 15  15 minutes of total time spent on the encounter, which includes face to face time and non-face to face time preparing to see the patient (eg, review of tests), Obtaining and/or reviewing separately obtained history, Documenting clinical information in the electronic or other health record, Independently interpreting results (not separately reported) and communicating results to the patient/family/caregiver, or Care coordination (not separately reported).         Each patient to whom he or she provides medical services by telemedicine is:  (1) informed of the relationship between the physician and patient and the respective role of any other health care provider with respect to management of the patient; and (2) notified that he or she may decline to receive medical services by telemedicine and may withdraw from such care at any time.    Notes:   Patient had a blister on his foot, on the side of his right great toe, opened with a nail clipper and drained pus from it about 2 days ago. Has been using bactroban on it. Now there is a large red swollen ulceration in the area. He says there has been bloody drainage on the bandage. He is a diabetic on insulin, Last Hga1c reported by patient was 7 at the beginning of last year. No kidney problems according to patient.         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    Past Surgical History:  No date: APPENDECTOMY  No date: stents in bilateral legs    Review of patient's family history indicates:  Problem: Cancer      Relation: Mother          Age of Onset: (Not Specified)  Problem: Cancer       Relation: Paternal Uncle          Age of Onset: (Not Specified)  Problem: Cancer      Relation: Paternal Grandfather          Age of Onset: (Not Specified)  Problem: Irritable bowel syndrome      Relation: Paternal Grandfather          Age of Onset: (Not Specified)  Problem: Cancer      Relation: Maternal Grandfather          Age of Onset: (Not Specified)  Problem: Colon cancer      Relation: Neg Hx          Age of Onset: (Not Specified)  Problem: Esophageal cancer      Relation: Neg Hx          Age of Onset: (Not Specified)  Problem: Rectal cancer      Relation: Neg Hx          Age of Onset: (Not Specified)  Problem: Stomach cancer      Relation: Neg Hx          Age of Onset: (Not Specified)  Problem: Ulcerative colitis      Relation: Neg Hx          Age of Onset: (Not Specified)  Problem: Crohn's disease      Relation: Neg Hx          Age of Onset: (Not Specified)  Problem: Celiac disease      Relation: Neg Hx          Age of Onset: (Not Specified)      Social History    Socioeconomic History      Marital status:       Spouse name: Not on file      Number of children: Not on file      Years of education: Not on file      Highest education level: Not on file    Occupational History      Not on file    Social Needs      Financial resource strain: Not on file      Food insecurity        Worry: Not on file        Inability: Not on file      Transportation needs        Medical: Not on file        Non-medical: Not on file    Tobacco Use      Smoking status: Current Some Day Smoker        Packs/day: 0.00      Smokeless tobacco: Current User      Tobacco comment: occasionally     Substance and Sexual Activity      Alcohol use: Yes        Comment: occ      Drug use: No      Sexual activity: Not on file    Lifestyle      Physical activity        Days per week: Not on file        Minutes per session: Not on file      Stress: Not on file    Relationships      Social connections        Talks on phone: Not on file         "Gets together: Not on file        Attends [employer] service: Not on file        Active member of club or organization: Not on file        Attends meetings of clubs or organizations: Not on file        Relationship status: Not on file    Other Topics      Concerns:        Not on file    Social History Narrative      Not on file      Current Outpatient Medications:  BD ULTRA-FINE DIYA PEN NEEDLE 32 gauge x 5/32" Ndle, Inject 1 Units into the skin 4 (four) times daily before meals and nightly., Disp: 100 each, Rfl: 3  blood sugar diagnostic Strp, 1 strip by Misc.(Non-Drug; Combo Route) route 4 (four) times daily before meals and nightly., Disp: 100 strip, Rfl: 11  blood-glucose meter kit, Use as instructed, Disp: 1 each, Rfl: 0  icosapent ethyL (VASCEPA) 1 gram Cap, Take 2 capsules by mouth twice daily, Disp: 120 capsule, Rfl: 5  insulin degludec (TRESIBA FLEXTOUCH U-200) 200 unit/mL (3 mL) InPn, Inject 50 units under the skin daily, Disp: 9 mL, Rfl: 2  insulin lispro (HUMALOG) 100 unit/mL injection, Inject 15 Units into the skin 3 (three) times daily before meals. Adjust dose based on blood sugar readings., Disp: , Rfl:   lancets Misc, 1 lancet by Misc.(Non-Drug; Combo Route) route 4 (four) times daily before meals and nightly., Disp: 100 each, Rfl: 11  losartan (COZAAR) 25 MG tablet, Take 1 tablet (25 mg total) by mouth once daily., Disp: 90 tablet, Rfl: 3  metoclopramide HCl (REGLAN) 10 MG tablet, TAKE 1 TABLET(10 MG) BY MOUTH THREE TIMES DAILY BEFORE MEALS, Disp: 90 tablet, Rfl: 11  pen needle, diabetic (BD ULTRA-FINE DIYA PEN NEEDLE) 32 gauge x 5/32" Ndle, Use as directed four times daily  before meals and every night at bedtime, Disp: 100 each, Rfl: 3  rivaroxaban (XARELTO) 20 mg Tab, Take 1 tablet (20 mg total) by mouth daily with dinner or evening meal., Disp: 90 tablet, Rfl: 3  TRESIBA FLEXTOUCH U-200 200 unit/mL (3 mL) InPn, INJECT 50 UNITS UNDER THE SKIN D, Disp: , Rfl: 5    No current facility-administered " medications for this visit.       Review of patient's allergies indicates:   -- Heparin analogues -- Nausea And Vomiting    Review of Systems   Constitutional: Negative for activity change, chills and fever.   HENT: Negative for hearing loss and trouble swallowing.    Eyes: Negative for discharge.   Respiratory: Negative for chest tightness and wheezing.    Cardiovascular: Negative for chest pain and palpitations.   Gastrointestinal: Negative for constipation, diarrhea and vomiting.   Genitourinary: Negative for difficulty urinating and hematuria.   Integumentary:  Positive for wound.   Neurological: Negative for headaches.   Psychiatric/Behavioral: Negative for dysphoric mood.         Objective:      Physical Exam  Musculoskeletal:        Feet:          Assessment:       1. Toe infection        Plan:       1. Toe infection  Warm epsom salt soaks, treat with oral clindamycin, probiotics as discussed, asap follow up with podiatry to monitor.   - Ambulatory referral/consult to Podiatry; Future

## 2020-10-06 NOTE — TELEPHONE ENCOUNTER
Spoke with pt about a messasge we received that he need to be seen by a podiatrist. I asked the pt what was a good time and date for him he stated that he wanted to come on Friday after 2:00 pm. I offered them Friday October 09,2020 at 3:15 pm. Pt stated that was a good time for him.          Nicole Sorto MA  Podiatry Surgical Department  [facility]            ----- Message from Nicole Dawkins NP sent at 10/6/2020  4:43 PM CDT -----  I have a patient, [employer] employee, who has a significant infected wound on his right toe, he is an insulin dependant diabetic, the earliest I could get him in was 10/19/2020, is there any way he could be worked in earlier? Thank you

## 2020-10-12 ENCOUNTER — HOSPITAL ENCOUNTER (OUTPATIENT)
Dept: RADIOLOGY | Facility: HOSPITAL | Age: 35
Discharge: HOME | End: 2020-10-12
Attending: PODIATRIST
Payer: COMMERCIAL

## 2020-10-12 ENCOUNTER — OFFICE VISIT (OUTPATIENT)
Dept: PODIATRY | Facility: CLINIC | Age: 35
End: 2020-10-12
Payer: COMMERCIAL

## 2020-10-12 DIAGNOSIS — Z79.4 TYPE 2 DIABETES MELLITUS WITH HYPERGLYCEMIA, WITH LONG-TERM CURRENT USE OF INSULIN: ICD-10-CM

## 2020-10-12 DIAGNOSIS — L08.9 TOE INFECTION: ICD-10-CM

## 2020-10-12 DIAGNOSIS — Z79.01 CHRONIC ANTICOAGULATION: ICD-10-CM

## 2020-10-12 DIAGNOSIS — L98.8 MACERATION OF SKIN: ICD-10-CM

## 2020-10-12 DIAGNOSIS — E11.65 TYPE 2 DIABETES MELLITUS WITH HYPERGLYCEMIA, WITH LONG-TERM CURRENT USE OF INSULIN: ICD-10-CM

## 2020-10-12 DIAGNOSIS — L97.512 ULCER OF GREAT TOE, RIGHT, WITH FAT LAYER EXPOSED: Primary | ICD-10-CM

## 2020-10-12 PROCEDURE — 73630 X-RAY EXAM OF FOOT: CPT | Mod: 26,50,, | Performed by: RADIOLOGY

## 2020-10-12 PROCEDURE — 99243 OFF/OP CNSLTJ NEW/EST LOW 30: CPT | Mod: 25,S$GLB,, | Performed by: PODIATRIST

## 2020-10-12 PROCEDURE — 99999 PR PBB SHADOW E&M-EST. PATIENT-LVL III: ICD-10-PCS | Mod: PBBFAC,,, | Performed by: PODIATRIST

## 2020-10-12 PROCEDURE — 73630 XR FOOT COMPLETE 3 VIEW BILATERAL: ICD-10-PCS | Mod: 26,50,, | Performed by: RADIOLOGY

## 2020-10-12 PROCEDURE — 99999 PR PBB SHADOW E&M-EST. PATIENT-LVL III: CPT | Mod: PBBFAC,,, | Performed by: PODIATRIST

## 2020-10-12 PROCEDURE — 99243 PR OFFICE CONSULTATION,LEVEL III: ICD-10-PCS | Mod: 25,S$GLB,, | Performed by: PODIATRIST

## 2020-10-12 PROCEDURE — 11042 DBRDMT SUBQ TIS 1ST 20SQCM/<: CPT | Mod: S$GLB,,, | Performed by: PODIATRIST

## 2020-10-12 PROCEDURE — 11042 PR DEBRIDEMENT, SKIN, SUB-Q TISSUE,=<20 SQ CM: ICD-10-PCS | Mod: S$GLB,,, | Performed by: PODIATRIST

## 2020-10-12 PROCEDURE — 73630 X-RAY EXAM OF FOOT: CPT | Mod: TC,50

## 2020-10-12 NOTE — LETTER
October 12, 2020      Nicole Dawkins NP  [address] [facility] [address] [place] [address]           [facility] [address]  Phone: [phone]  Fax: [phone]          Patient: Kulwant Mueller Jr.   MR Number: [id number]   YOB: 1985   Date of Visit: 10/12/2020       Dear Nicole Dawkins:    Thank you for referring Kulwant Mueller to me for evaluation. Attached you will find relevant portions of my assessment and plan of care.    If you have questions, please do not hesitate to call me. I look forward to following Kulwant Mueller along with you.    Sincerely,    Greta Joaquin, HILARIA    Enclosure  CC:  No Recipients    If you would like to receive this communication electronically, please contact [email or web address] or [phone] to request more information on [employer] access.    For providers and/or their staff who would like to refer a patient to [facility], please contact us through our one-stop-shop provider referral line, [facility], at [phone].    If you feel you have received this communication in error or would no longer like to receive these types of communications, please e-mail [email or web address]

## 2020-10-12 NOTE — PROGRESS NOTES
0.7cm x 0.2cm x 0.1cm . Right medial hallux    Subjective:       Patient ID: Kulwant Mueller Jr. is a 35 y.o. male.    Chief Complaint: Diabetic Foot Exam (patient is a diabetic and last seen Stephanie Avila on 9/17/2020. Denies pain at present) and Diabetic Foot Ulcer (Right hallux medial wound present 10/8/2020. no drainage present, no odor. )      HPI: Kulwant Mueller Jr. is referreby Nicole Dawkins NP for a right toe infection.  Patient was recently started on antibiotics.  He does have an area of discoloration to the plantar aspect of the right great toe.  States that he is concerned about possible ulceration.  Does not have ulceration history in the past.  States the ulcer became present on 10/08/2020.  Has been utilizing a antibiotics.  Has noticed the ulcer to dry.  States that he does follow with Dr. Stephanie Avila with last appointment on 09/17/2020.    Hemoglobin A1C   Date Value Ref Range Status   08/31/2018 12.7 (H) 4.0 - 5.6 % Final     Comment:     ADA Screening Guidelines:  5.7-6.4%  Consistent with prediabetes  >or=6.5%  Consistent with diabetes  High levels of fetal hemoglobin interfere with the HbA1C  assay. Heterozygous hemoglobin variants (HbS, HgC, etc)do  not significantly interfere with this assay.   However, presence of multiple variants may affect accuracy.     01/11/2018 9.1 (H) 4.0 - 5.6 % Final     Comment:     According to ADA guidelines, hemoglobin A1c <7.0% represents  optimal control in non-pregnant diabetic patients. Different  metrics may apply to specific patient populations.   Standards of Medical Care in Diabetes-2016.  For the purpose of screening for the presence of diabetes:  <5.7%     Consistent with the absence of diabetes  5.7-6.4%  Consistent with increasing risk for diabetes   (prediabetes)  >or=6.5%  Consistent with diabetes  Currently, no consensus exists for use of hemoglobin A1c  for diagnosis of diabetes for children.  This Hemoglobin A1c assay has significant  interference with fetal   hemoglobin   (HbF). The results are invalid for patients with abnormal amounts of   HbF,   including those with known Hereditary Persistence   of Fetal Hemoglobin. Heterozygous hemoglobin variants (HbAS, HbAC,   HbAD, HbAE, HbA2) do not significantly interfere with this assay;   however, presence of multiple variants in a sample may impact the %   interference.     08/20/2017 13.2 (H) 4.0 - 5.6 % Final     Comment:     According to ADA guidelines, hemoglobin A1c <7.0% represents  optimal control in non-pregnant diabetic patients. Different  metrics may apply to specific patient populations.   Standards of Medical Care in Diabetes-2016.  For the purpose of screening for the presence of diabetes:  <5.7%     Consistent with the absence of diabetes  5.7-6.4%  Consistent with increasing risk for diabetes   (prediabetes)  >or=6.5%  Consistent with diabetes  Currently, no consensus exists for use of hemoglobin A1c  for diagnosis of diabetes for children.  This Hemoglobin A1c assay has significant interference with fetal   hemoglobin   (HbF). The results are invalid for patients with abnormal amounts of   HbF,   including those with known Hereditary Persistence   of Fetal Hemoglobin. Heterozygous hemoglobin variants (HbAS, HbAC,   HbAD, HbAE, HbA2) do not significantly interfere with this assay;   however, presence of multiple variants in a sample may impact the %   interference.     .    Review of patient's allergies indicates:   Allergen Reactions    Heparin analogues Nausea And Vomiting       Past Medical History:   Diagnosis Date    Anticoagulant long-term use     Diabetes mellitus     Hypertension     May-Thurner syndrome        Family History   Problem Relation Age of Onset    Cancer Mother     Cancer Paternal Uncle     Cancer Paternal Grandfather     Irritable bowel syndrome Paternal Grandfather     Cancer Maternal Grandfather     Colon cancer Neg Hx     Esophageal cancer Neg Hx      Rectal cancer Neg Hx     Stomach cancer Neg Hx     Ulcerative colitis Neg Hx     Crohn's disease Neg Hx     Celiac disease Neg Hx        Social History     Socioeconomic History    Marital status:      Spouse name: Not on file    Number of children: Not on file    Years of education: Not on file    Highest education level: Not on file   Occupational History    Not on file   Social Needs    Financial resource strain: Not on file    Food insecurity     Worry: Not on file     Inability: Not on file    Transportation needs     Medical: Not on file     Non-medical: Not on file   Tobacco Use    Smoking status: Current Some Day Smoker     Packs/day: 0.00    Smokeless tobacco: Current User    Tobacco comment: occasionally    Substance and Sexual Activity    Alcohol use: Yes     Comment: occ    Drug use: No    Sexual activity: Not on file   Lifestyle    Physical activity     Days per week: Not on file     Minutes per session: Not on file    Stress: Not on file   Relationships    Social connections     Talks on phone: Not on file     Gets together: Not on file     Attends Orthodox service: Not on file     Active member of club or organization: Not on file     Attends meetings of clubs or organizations: Not on file     Relationship status: Not on file   Other Topics Concern    Not on file   Social History Narrative    Not on file       Past Surgical History:   Procedure Laterality Date    APPENDECTOMY      stents in bilateral legs         Review of Systems      Objective:   There were no vitals taken for this visit.    Physical Exam  LOWER EXTREMITY PHYSICAL EXAMINATION    ORTHOPEDIC:  No pain on palpation of the foot or ankle.   Range of motion within normal limits of the ankle joint, rearfoot, and forefoot.  Manual muscle strength testing is 5/5 with dorsiflexion, plantar flexion, abduction and abduction of the lower extremity.  No pain with or without resistance.  The patient is a full  to ambulate without pain or discomfort.  The patient's gait is non antalgic.  The patient does not utilize any assistive device for ambulation.    VASCULAR: 2Capillary refill time is less than 3s. Edema is trace. The left dorsalis pedis pulse is 2/4 and the right dorsalis pedis pulse is 2/4. The left posterior tibial pulse is 2/4 and the right posterior tibial pulse is 2/4. Varicosities are absent. Spider veins and telangiectasias are absent. Hair growth is decreased. Skin appearance is WNL. Proximal to distal warm to warm to touch.     NEUROLOGY: Proprioception is intact. Sensation to light touch is intact. Sensation to pin prick is intact. Vibratory sensation is decreased to the left and right lower extremity. Examination with 5.07 [place] monofilament reveals that protective sensation is intact to the left and right plantar surfaces of the foot and digits    DERMATOLOGY: Skin is supple and dry.  There is a thickened callus present to the medial aspect of the right great toe.  Upon removal this thickened discolored hyperkeratotic tissue there is a small ulceration present.  This ulceration does measure approximately 7 mm by 2 mm.  The wound base is granular with a 0.1 cm depth.  No signs of infection.  No evidence of drainage.  No malodor.  No evidence of erythema.  Interdigital maceration is noted to the 4th and 3rd interspace bilaterally.  No rash present.  No flaking skin or pruritus.    Assessment:     1. Ulcer of great toe, right, with fat layer exposed    2. Toe infection    3. Type 2 diabetes mellitus with hyperglycemia, with long-term current use of insulin    4. Chronic anticoagulation        Plan:     Ulcer of great toe, right, with fat layer exposed  The wound was surgically debrided after adequate prep with alcohol and/or betadine paint. Excisional wound debridement was performed using sharp #10/#15 blade/rounded scalpel and tissue nipper, with removal of all non-viable skin and soft tissues;  necrotic skin/tissue formation. The woundbase/wound bed was also debrided to encourage bleeding as to promote/stimulate healing. Debridement was excisional and included epidermal, dermal and subcutaneous tissues. Post debridement measurements are as above. Hemostasis was achieved. Patient tolerated procedure well and without complications. Local woundcare with iodine and dry dressings and bandage thereafter.     Toe infection  -     Ambulatory referral/consult to Podiatry  -     X-Ray Foot Complete 3 view Bilateral; Future; Expected date: 10/12/2020    Maceration of skin  Interdigital maceration is noted to the right left foot.  Recommend patient to utilize iodine/Betadine paint to the interspaces.  Would recommend utilizing dry gauze or cotton balls between the digits.  Recommend 3 times per day dressing changes.  Also discussed the importance of changing socks 2-3 times per day.  Patient may benefit from transition of shoe gear every other day.  Encourage him to utilize [place] powders to help deal with hyperhidrosis and moisture.    Type 2 diabetes mellitus with hyperglycemia, with long-term current use of insulin  I counseled the patient on his/her Diabetic Mellitus regarding today's clinical examination and objection findings. We did also discuss recent medication changes, pertinent labs and imaging evaluations and other medical consultation notes and progress notes. Greater than 50% of this visit was spent on counseling and coordination of care. Greater than 20 minutes of this appt. was spent on education about the diabetic foot, in relation to PVD and/or neuropathy, and the prevention of limb loss.     Shoe gear is inspected and wear and proper fit/type. Patient is reminded of the importance of good nutrition and blood sugar control to help prevent podiatric complications of diabetes. Patient instructed on proper foot hygeine. We discussed wearing proper shoe gear, daily foot inspections, never walking  without protective shoe gear, never putting sharp instruments to feet.  Patient  will continue to monitor the areas daily, inspect feet, wear protective shoe gear when ambulatory, moisturizer to maintain skin integrity.     Patient's DMI/DMII is managed by Primary Care Provider and/or Endocrinology Advanced Practice Provider.    Chronic anticoagulation  Continue long-term anticoagulation therapy as he does have a risk of DVT

## 2020-10-13 ENCOUNTER — PATIENT MESSAGE (OUTPATIENT)
Dept: PODIATRY | Facility: CLINIC | Age: 35
End: 2020-10-13

## 2020-10-30 ENCOUNTER — OFFICE VISIT (OUTPATIENT)
Dept: PODIATRY | Facility: CLINIC | Age: 35
End: 2020-10-30
Payer: COMMERCIAL

## 2020-10-30 VITALS — HEIGHT: 75 IN | BODY MASS INDEX: 33.91 KG/M2 | WEIGHT: 272.69 LBS

## 2020-10-30 DIAGNOSIS — Z87.2 HISTORY OF FOOT ULCER: Primary | ICD-10-CM

## 2020-10-30 DIAGNOSIS — Z79.4 TYPE 2 DIABETES MELLITUS WITH HYPERGLYCEMIA, WITH LONG-TERM CURRENT USE OF INSULIN: ICD-10-CM

## 2020-10-30 DIAGNOSIS — E11.65 TYPE 2 DIABETES MELLITUS WITH HYPERGLYCEMIA, WITH LONG-TERM CURRENT USE OF INSULIN: ICD-10-CM

## 2020-10-30 DIAGNOSIS — Z79.01 CHRONIC ANTICOAGULATION: ICD-10-CM

## 2020-10-30 PROCEDURE — 3008F PR BODY MASS INDEX (BMI) DOCUMENTED: ICD-10-PCS | Mod: CPTII,S$GLB,, | Performed by: PODIATRIST

## 2020-10-30 PROCEDURE — 99999 PR PBB SHADOW E&M-EST. PATIENT-LVL III: CPT | Mod: PBBFAC,,, | Performed by: PODIATRIST

## 2020-10-30 PROCEDURE — 3008F BODY MASS INDEX DOCD: CPT | Mod: CPTII,S$GLB,, | Performed by: PODIATRIST

## 2020-10-30 PROCEDURE — 99212 PR OFFICE/OUTPT VISIT, EST, LEVL II, 10-19 MIN: ICD-10-PCS | Mod: S$GLB,,, | Performed by: PODIATRIST

## 2020-10-30 PROCEDURE — 99999 PR PBB SHADOW E&M-EST. PATIENT-LVL III: ICD-10-PCS | Mod: PBBFAC,,, | Performed by: PODIATRIST

## 2020-10-30 PROCEDURE — 99212 OFFICE O/P EST SF 10 MIN: CPT | Mod: S$GLB,,, | Performed by: PODIATRIST

## 2020-10-30 NOTE — PROGRESS NOTES
Subjective:       Patient ID: Kulwant Mueller Jr. is a 35 y.o. male.    Chief Complaint: Foot Ulcer (Wound care to the right great toe. No pain at present. Wears tennis shoes w/ socks. Diabetic pt. Last seen by Nicole Dawkins NP on 10/06/2020.)      HPI: Kulwant Mueller Jr. presents today to follow-up on ulceration to the right great toe.  Has been utilizing iodine and a Band-Aid to this area.  States that he has noticed improvement in the size and texture of the wound.  States he has 0/10 pain at present.  Ambulating in regular shoe gear.      Hemoglobin A1C   Date Value Ref Range Status   08/31/2018 12.7 (H) 4.0 - 5.6 % Final     Comment:     ADA Screening Guidelines:  5.7-6.4%  Consistent with prediabetes  >or=6.5%  Consistent with diabetes  High levels of fetal hemoglobin interfere with the HbA1C  assay. Heterozygous hemoglobin variants (HbS, HgC, etc)do  not significantly interfere with this assay.   However, presence of multiple variants may affect accuracy.     01/11/2018 9.1 (H) 4.0 - 5.6 % Final     Comment:     According to ADA guidelines, hemoglobin A1c <7.0% represents  optimal control in non-pregnant diabetic patients. Different  metrics may apply to specific patient populations.   Standards of Medical Care in Diabetes-2016.  For the purpose of screening for the presence of diabetes:  <5.7%     Consistent with the absence of diabetes  5.7-6.4%  Consistent with increasing risk for diabetes   (prediabetes)  >or=6.5%  Consistent with diabetes  Currently, no consensus exists for use of hemoglobin A1c  for diagnosis of diabetes for children.  This Hemoglobin A1c assay has significant interference with fetal   hemoglobin   (HbF). The results are invalid for patients with abnormal amounts of   HbF,   including those with known Hereditary Persistence   of Fetal Hemoglobin. Heterozygous hemoglobin variants (HbAS, HbAC,   HbAD, HbAE, HbA2) do not significantly interfere with this assay;   however, presence of  multiple variants in a sample may impact the %   interference.     08/20/2017 13.2 (H) 4.0 - 5.6 % Final     Comment:     According to ADA guidelines, hemoglobin A1c <7.0% represents  optimal control in non-pregnant diabetic patients. Different  metrics may apply to specific patient populations.   Standards of Medical Care in Diabetes-2016.  For the purpose of screening for the presence of diabetes:  <5.7%     Consistent with the absence of diabetes  5.7-6.4%  Consistent with increasing risk for diabetes   (prediabetes)  >or=6.5%  Consistent with diabetes  Currently, no consensus exists for use of hemoglobin A1c  for diagnosis of diabetes for children.  This Hemoglobin A1c assay has significant interference with fetal   hemoglobin   (HbF). The results are invalid for patients with abnormal amounts of   HbF,   including those with known Hereditary Persistence   of Fetal Hemoglobin. Heterozygous hemoglobin variants (HbAS, HbAC,   HbAD, HbAE, HbA2) do not significantly interfere with this assay;   however, presence of multiple variants in a sample may impact the %   interference.     .    Review of patient's allergies indicates:   Allergen Reactions    Heparin analogues Nausea And Vomiting       Past Medical History:   Diagnosis Date    Anticoagulant long-term use     Diabetes mellitus     Hypertension     May-Thurner syndrome        Family History   Problem Relation Age of Onset    Cancer Mother     Cancer Paternal Uncle     Cancer Paternal Grandfather     Irritable bowel syndrome Paternal Grandfather     Cancer Maternal Grandfather     Colon cancer Neg Hx     Esophageal cancer Neg Hx     Rectal cancer Neg Hx     Stomach cancer Neg Hx     Ulcerative colitis Neg Hx     Crohn's disease Neg Hx     Celiac disease Neg Hx        Social History     Socioeconomic History    Marital status:      Spouse name: Not on file    Number of children: Not on file    Years of education: Not on file    Highest  "education level: Not on file   Occupational History    Not on file   Social Needs    Financial resource strain: Not on file    Food insecurity     Worry: Not on file     Inability: Not on file    Transportation needs     Medical: Not on file     Non-medical: Not on file   Tobacco Use    Smoking status: Current Some Day Smoker     Packs/day: 0.00    Smokeless tobacco: Current User    Tobacco comment: occasionally    Substance and Sexual Activity    Alcohol use: Yes     Comment: occ    Drug use: No    Sexual activity: Not on file   Lifestyle    Physical activity     Days per week: Not on file     Minutes per session: Not on file    Stress: Not on file   Relationships    Social connections     Talks on phone: Not on file     Gets together: Not on file     Attends Muslim service: Not on file     Active member of club or organization: Not on file     Attends meetings of clubs or organizations: Not on file     Relationship status: Not on file   Other Topics Concern    Not on file   Social History Narrative    Not on file       Past Surgical History:   Procedure Laterality Date    APPENDECTOMY      stents in bilateral legs         Review of Systems   Constitutional: Negative for activity change, appetite change, chills and fever.   HENT: Negative for sinus pain, sore throat and voice change.    Eyes: Negative for pain, redness and visual disturbance.   Respiratory: Negative for cough and shortness of breath.    Cardiovascular: Negative for chest pain and palpitations.   Gastrointestinal: Negative for diarrhea, nausea and vomiting.   Musculoskeletal: Negative for back pain and joint swelling.   Skin: Negative for color change and wound.   Neurological: Negative for dizziness, weakness and numbness.   Psychiatric/Behavioral: The patient is not nervous/anxious.          Objective:   Ht 6' 3" (1.905 m)   Wt 123.7 kg (272 lb 11.3 oz)   BMI 34.09 kg/m²     Physical Exam  LOWER EXTREMITY PHYSICAL " EXAMINATION      DERMATOLOGY: Skin is supple and dry.  Ulceration to the plantar aspect of the right great toe has ultimately healed.  There is no additional ulceration present.  There is no evidence of open wounds, erythema, edema.     Assessment:     1. History of foot ulcer    2. Type 2 diabetes mellitus with hyperglycemia, with long-term current use of insulin    3. Chronic anticoagulation        Plan:     History of foot ulcer  Ulceration is resolved to the plantar aspect the right foot.  Continue to monitor this area for repeat or subsequent ulcerations.  Continue with diabetic foot exam daily to assess the plantar surface of the right left foot.     Type 2 diabetes mellitus with hyperglycemia, with long-term current use of insulin  Foot counseling and education is provided at this visit. Patient is advised to wear socks and shoes at all times.  Do not walk barefoot, or with just socks, even when indoors.  Be sure to check and inspect the inside of the shoe before putting them on her feet.  Protect your feet at all times.  Walking shoes and/or athletic shoes are the best types of shoe gear. Do not wear vinyl or plastic type shoe gear, as they do not stretch and/or breathe.  Protect your feet from hot and/or cold. Elevate the extremities when sitting.  Do not wear excessively tight socks and/or shoe gear. Wiggle your toes for a few minutes throughout the day. Move your ankles up and down, in and out, to help blood flow in your lower extremity.     Chronic anticoagulation  Continue long-term anticoagulation therapy as he does have a risk of DVT

## 2020-12-04 DIAGNOSIS — Z01.84 ANTIBODY RESPONSE EXAMINATION: ICD-10-CM

## 2020-12-15 ENCOUNTER — OFFICE VISIT (OUTPATIENT)
Dept: PODIATRY | Facility: CLINIC | Age: 35
End: 2020-12-15
Payer: COMMERCIAL

## 2020-12-15 DIAGNOSIS — Z79.4 TYPE 2 DIABETES MELLITUS WITH HYPERGLYCEMIA, WITH LONG-TERM CURRENT USE OF INSULIN: Primary | ICD-10-CM

## 2020-12-15 DIAGNOSIS — Z87.2 HISTORY OF FOOT ULCER: ICD-10-CM

## 2020-12-15 DIAGNOSIS — Z79.01 CHRONIC ANTICOAGULATION: ICD-10-CM

## 2020-12-15 DIAGNOSIS — L84 CORN OR CALLUS: ICD-10-CM

## 2020-12-15 DIAGNOSIS — R23.8 NONTRAUMATIC BLISTER OF SKIN: ICD-10-CM

## 2020-12-15 DIAGNOSIS — E11.65 TYPE 2 DIABETES MELLITUS WITH HYPERGLYCEMIA, WITH LONG-TERM CURRENT USE OF INSULIN: Primary | ICD-10-CM

## 2020-12-15 PROCEDURE — 1125F AMNT PAIN NOTED PAIN PRSNT: CPT | Mod: S$GLB,,, | Performed by: PODIATRIST

## 2020-12-15 PROCEDURE — 99999 PR PBB SHADOW E&M-EST. PATIENT-LVL III: ICD-10-PCS | Mod: PBBFAC,,, | Performed by: PODIATRIST

## 2020-12-15 PROCEDURE — 99213 OFFICE O/P EST LOW 20 MIN: CPT | Mod: 25,S$GLB,, | Performed by: PODIATRIST

## 2020-12-15 PROCEDURE — 97597 DBRDMT OPN WND 1ST 20 CM/<: CPT | Mod: S$GLB,,, | Performed by: PODIATRIST

## 2020-12-15 PROCEDURE — 99213 PR OFFICE/OUTPT VISIT, EST, LEVL III, 20-29 MIN: ICD-10-PCS | Mod: 25,S$GLB,, | Performed by: PODIATRIST

## 2020-12-15 PROCEDURE — 97597 PR DEBRIDEMENT OPEN WOUND 20 SQ CM<: ICD-10-PCS | Mod: S$GLB,,, | Performed by: PODIATRIST

## 2020-12-15 PROCEDURE — 1125F PR PAIN SEVERITY QUANTIFIED, PAIN PRESENT: ICD-10-PCS | Mod: S$GLB,,, | Performed by: PODIATRIST

## 2020-12-15 PROCEDURE — 99999 PR PBB SHADOW E&M-EST. PATIENT-LVL III: CPT | Mod: PBBFAC,,, | Performed by: PODIATRIST

## 2020-12-15 NOTE — PROGRESS NOTES
Subjective:       Patient ID: Kulwant Mueller Jr. is a 35 y.o. male.    Chief Complaint: Toe Pain (Right halux pain, 2/10. Thick hardened area noted with discoloration. no drainage at this time. patient is a diabetic. )    HPI: Kulwant Mueller Jr. presents to the office today for concerns of recurrent ulceration to the plantar surface of the right 1st metatarsophalangeal joint.  States he has noticed thickening of the area with discoloration of the skin.  He is concerned about new ulceration.  Denies any recent injury or trauma.  Denies any drainage.  States he has noticed a change in the discoloration over the last 1 week.    Review of patient's allergies indicates:   Allergen Reactions    Heparin analogues Nausea And Vomiting       Past Medical History:   Diagnosis Date    Anticoagulant long-term use     Diabetes mellitus     Hypertension     May-Thurner syndrome        Family History   Problem Relation Age of Onset    Cancer Mother     Cancer Paternal Uncle     Cancer Paternal Grandfather     Irritable bowel syndrome Paternal Grandfather     Cancer Maternal Grandfather     Colon cancer Neg Hx     Esophageal cancer Neg Hx     Rectal cancer Neg Hx     Stomach cancer Neg Hx     Ulcerative colitis Neg Hx     Crohn's disease Neg Hx     Celiac disease Neg Hx        Social History     Socioeconomic History    Marital status:      Spouse name: Not on file    Number of children: Not on file    Years of education: Not on file    Highest education level: Not on file   Occupational History    Not on file   Social Needs    Financial resource strain: Not on file    Food insecurity     Worry: Not on file     Inability: Not on file    Transportation needs     Medical: Not on file     Non-medical: Not on file   Tobacco Use    Smoking status: Current Some Day Smoker     Packs/day: 0.00    Smokeless tobacco: Current User    Tobacco comment: occasionally    Substance and Sexual Activity    Alcohol  use: Yes     Comment: occ    Drug use: No    Sexual activity: Not on file   Lifestyle    Physical activity     Days per week: Not on file     Minutes per session: Not on file    Stress: Not on file   Relationships    Social connections     Talks on phone: Not on file     Gets together: Not on file     Attends Confucianism service: Not on file     Active member of club or organization: Not on file     Attends meetings of clubs or organizations: Not on file     Relationship status: Not on file   Other Topics Concern    Not on file   Social History Narrative    Not on file       Past Surgical History:   Procedure Laterality Date    APPENDECTOMY      stents in bilateral legs         Review of Systems   Constitutional: Negative for activity change, appetite change, chills and fever.   HENT: Negative for sinus pain, sore throat and voice change.    Eyes: Negative for pain, redness and visual disturbance.   Respiratory: Negative for cough and shortness of breath.    Cardiovascular: Negative for chest pain and palpitations.   Gastrointestinal: Negative for diarrhea, nausea and vomiting.   Musculoskeletal: Negative for back pain and joint swelling.   Skin: Negative for color change and wound.   Neurological: Negative for dizziness, weakness and numbness.   Psychiatric/Behavioral: The patient is not nervous/anxious.           Objective:   There were no vitals taken for this visit.    X-Ray Foot Complete 3 view Bilateral  Narrative: EXAMINATION:  XR FOOT COMPLETE 3 VIEW BILATERAL    CLINICAL HISTORY:  Local infection of the skin and subcutaneous tissue, unspecified    TECHNIQUE:  AP, lateral, and oblique views of both feet were performed.    COMPARISON:  None    FINDINGS:  Left foot:    There is no left foot fracture.  Alignment of the left foot is normal.  Joint spaces are within normal limits.  No osseous erosion to suggest presence of osteomyelitis in this patient with reported infection.  No acute soft tissue  abnormality identified.    Right foot:    There is no right foot fracture.  Alignment of the right foot is normal.  Joint spaces of the right foot are normal.  No osseous erosion to suggest presence of osteomyelitis in this patient with reported infection.  No acute soft tissue abnormality identified.  Impression: As above.    Electronically signed by: Bull Raymundo  Date:    10/12/2020  Time:    16:27       Physical Exam   LOWER EXTREMITY PHYSICAL EXAMINATION    Vascular: Capillary refill time is intact.  The left dorsalis pedis pulse is 2/4 and on the right is 2/4. The left posterior tibial pulse is 2/4  and is the right posterior tibial pulse is 2/4. Hair growth is diminished to absent on the bilateral dorsal foot and at the digits.      Neurological: Sensation to light touch is intact. Proprioception is intact. Sensation to pin prick is intact. Vibratory sensation is intact to the left and right lower extremity. Examination with 5.07 [place] monofilament reveals that protective sensation is intact     Musculoskeletal: Manual Muscle Testing is 5/5 with dorsiflexion, plantar flexion, abduction, and adduction.   There is normal range of motion in the forefoot, hindfoot, and Ankle joint.  No pain on palpation of the feet bilaterally.  No pain with muscle testing with or without resistance. Gait pattern is non-antalgic.  Patient ambulates with an assistive device    Dermatological:  Skin is supple, moist, intact.  There is area discoloration to the plantar surface of the right 1st metatarsophalangeal joint.  Overlying tissue is hyperkeratotic.  Upon removal of the thickened hyperkeratotic tissue, there is no open wounds.  Area discoloration was likely associated with previous blister formation.  There is no signs of infection.  There is no drainage.  There is no malodor.  Area measures 1.7 cm x 1.5 cm in circumference.  The lesion is limited to the skin and does not extend into the deep dermal layer.  No  subcutaneous tissue is appreciated.      Imaging:     Results for orders placed during the hospital encounter of 10/12/20   X-Ray Foot Complete 3 view Bilateral    Narrative EXAMINATION:  XR FOOT COMPLETE 3 VIEW BILATERAL    CLINICAL HISTORY:  Local infection of the skin and subcutaneous tissue, unspecified    TECHNIQUE:  AP, lateral, and oblique views of both feet were performed.    COMPARISON:  None    FINDINGS:  Left foot:    There is no left foot fracture.  Alignment of the left foot is normal.  Joint spaces are within normal limits.  No osseous erosion to suggest presence of osteomyelitis in this patient with reported infection.  No acute soft tissue abnormality identified.    Right foot:    There is no right foot fracture.  Alignment of the right foot is normal.  Joint spaces of the right foot are normal.  No osseous erosion to suggest presence of osteomyelitis in this patient with reported infection.  No acute soft tissue abnormality identified.      Impression As above.      Electronically signed by: Bull Raymundo  Date:    10/12/2020  Time:    16:27       No results found for this or any previous visit.     No results found for this or any previous visit.        Assessment:     1. Type 2 diabetes mellitus with hyperglycemia, with long-term current use of insulin    2. Chronic anticoagulation    3. Nontraumatic blister of skin    4. Pre-ulcerative Lesion    5. History of foot ulcer        Plan:     Type 2 diabetes mellitus with hyperglycemia, with long-term current use of insulin  Foot counseling and education is provided at this visit. Patient is advised to wear socks and shoes at all times.  Do not walk barefoot, or with just socks, even when indoors.  Be sure to check and inspect the inside of the shoe before putting them on her feet.  Protect your feet at all times.  Walking shoes and/or athletic shoes are the best types of shoe gear. Do not wear vinyl or plastic type shoe gear, as they do not stretch  and/or breathe.  Protect your feet from hot and/or cold. Elevate the extremities when sitting.  Do not wear excessively tight socks and/or shoe gear. Wiggle your toes for a few minutes throughout the day. Move your ankles up and down, in and out, to help blood flow in your lower extremity.     Chronic anticoagulation  Continue on long-term anticoagulation therapy.    Nontraumatic blister of skin  Pre-ulcerative Lesion  The wound was surgically debrided after adequate prep with alcohol and/or betadine paint. Excisional wound debridement was performed using sharp #10/#15 blade/rounded scalpel and tissue nipper, with removal of all non-viable skin and soft tissues; necrotic skin/tissue formation.  Debridement was excisional and included epidermal, dermal. Post debridement measurements are as above. Hemostasis was achieved. Patient tolerated procedure well and without complications. Local woundcare with Betadine dressings and bandage thereafter.    Patient to initiate Medihoney dressing changes in 2 days until areas completely healed.  History of foot ulcer              No future appointments.

## 2020-12-28 ENCOUNTER — IMMUNIZATION (OUTPATIENT)
Dept: INTERNAL MEDICINE | Facility: CLINIC | Age: 35
End: 2020-12-28
Payer: COMMERCIAL

## 2020-12-28 DIAGNOSIS — Z23 NEED FOR VACCINATION: ICD-10-CM

## 2020-12-28 PROCEDURE — 0001A COVID-19, MRNA, LNP-S, PF, 30 MCG/0.3 ML DOSE VACCINE: ICD-10-PCS | Mod: CV19,,, | Performed by: FAMILY MEDICINE

## 2020-12-28 PROCEDURE — 91300 COVID-19, MRNA, LNP-S, PF, 30 MCG/0.3 ML DOSE VACCINE: CPT | Mod: ,,, | Performed by: FAMILY MEDICINE

## 2020-12-28 PROCEDURE — 0001A COVID-19, MRNA, LNP-S, PF, 30 MCG/0.3 ML DOSE VACCINE: CPT | Mod: CV19,,, | Performed by: FAMILY MEDICINE

## 2020-12-28 PROCEDURE — 91300 COVID-19, MRNA, LNP-S, PF, 30 MCG/0.3 ML DOSE VACCINE: ICD-10-PCS | Mod: ,,, | Performed by: FAMILY MEDICINE

## 2020-12-28 RX ORDER — LOSARTAN POTASSIUM 25 MG/1
25 TABLET ORAL DAILY
Qty: 90 TABLET | Refills: 3 | Status: SHIPPED | OUTPATIENT
Start: 2020-12-28 | End: 2021-12-29 | Stop reason: SDUPTHER

## 2021-01-19 ENCOUNTER — IMMUNIZATION (OUTPATIENT)
Dept: INTERNAL MEDICINE | Facility: CLINIC | Age: 36
End: 2021-01-19
Payer: COMMERCIAL

## 2021-01-19 DIAGNOSIS — Z23 NEED FOR VACCINATION: Primary | ICD-10-CM

## 2021-01-19 PROCEDURE — 0002A COVID-19, MRNA, LNP-S, PF, 30 MCG/0.3 ML DOSE VACCINE: CPT | Mod: PBBFAC | Performed by: FAMILY MEDICINE

## 2021-01-19 PROCEDURE — 91300 COVID-19, MRNA, LNP-S, PF, 30 MCG/0.3 ML DOSE VACCINE: CPT | Mod: PBBFAC | Performed by: FAMILY MEDICINE

## 2021-01-28 ENCOUNTER — HOSPITAL ENCOUNTER (EMERGENCY)
Facility: HOSPITAL | Age: 36
Discharge: HOME | End: 2021-01-28
Attending: EMERGENCY MEDICINE
Payer: COMMERCIAL

## 2021-01-28 VITALS
WEIGHT: 275 LBS | RESPIRATION RATE: 18 BRPM | DIASTOLIC BLOOD PRESSURE: 97 MMHG | HEIGHT: 74 IN | TEMPERATURE: 98 F | OXYGEN SATURATION: 100 % | BODY MASS INDEX: 35.29 KG/M2 | SYSTOLIC BLOOD PRESSURE: 173 MMHG | HEART RATE: 86 BPM

## 2021-01-28 DIAGNOSIS — H10.9 CONJUNCTIVITIS OF LEFT EYE, UNSPECIFIED CONJUNCTIVITIS TYPE: Primary | ICD-10-CM

## 2021-01-28 DIAGNOSIS — R03.0 ELEVATED BLOOD PRESSURE READING: ICD-10-CM

## 2021-01-28 PROCEDURE — 99283 EMERGENCY DEPT VISIT LOW MDM: CPT | Mod: ER

## 2021-01-28 PROCEDURE — 25000003 PHARM REV CODE 250: Mod: ER | Performed by: EMERGENCY MEDICINE

## 2021-01-28 RX ORDER — CIPROFLOXACIN HYDROCHLORIDE 3 MG/ML
SOLUTION/ DROPS OPHTHALMIC
Qty: 5 ML | Refills: 0 | Status: SHIPPED | OUTPATIENT
Start: 2021-01-28 | End: 2021-11-27 | Stop reason: ALTCHOICE

## 2021-01-28 RX ORDER — PROPARACAINE HYDROCHLORIDE 5 MG/ML
2 SOLUTION/ DROPS OPHTHALMIC
Status: COMPLETED | OUTPATIENT
Start: 2021-01-28 | End: 2021-01-28

## 2021-01-28 RX ADMIN — FLUORESCEIN SODIUM 1 EACH: 1 STRIP OPHTHALMIC at 06:01

## 2021-01-28 RX ADMIN — PROPARACAINE HYDROCHLORIDE 2 DROP: 5 SOLUTION/ DROPS OPHTHALMIC at 06:01

## 2021-03-06 ENCOUNTER — PATIENT MESSAGE (OUTPATIENT)
Dept: CARDIOLOGY | Facility: CLINIC | Age: 36
End: 2021-03-06

## 2021-03-16 ENCOUNTER — PATIENT MESSAGE (OUTPATIENT)
Dept: PODIATRY | Facility: CLINIC | Age: 36
End: 2021-03-16

## 2021-03-18 ENCOUNTER — TELEPHONE (OUTPATIENT)
Dept: RADIOLOGY | Facility: HOSPITAL | Age: 36
End: 2021-03-18

## 2021-03-19 ENCOUNTER — HOSPITAL ENCOUNTER (OUTPATIENT)
Dept: RADIOLOGY | Facility: HOSPITAL | Age: 36
Discharge: HOME | End: 2021-03-19
Attending: INTERNAL MEDICINE
Payer: COMMERCIAL

## 2021-03-19 ENCOUNTER — OFFICE VISIT (OUTPATIENT)
Dept: PODIATRY | Facility: CLINIC | Age: 36
End: 2021-03-19
Payer: COMMERCIAL

## 2021-03-19 VITALS — HEIGHT: 74 IN | WEIGHT: 250.44 LBS | BODY MASS INDEX: 32.14 KG/M2

## 2021-03-19 DIAGNOSIS — L97.512 ULCER OF GREAT TOE, RIGHT, WITH FAT LAYER EXPOSED: Primary | ICD-10-CM

## 2021-03-19 DIAGNOSIS — Z79.4 TYPE 2 DIABETES MELLITUS WITH HYPERGLYCEMIA, WITH LONG-TERM CURRENT USE OF INSULIN: ICD-10-CM

## 2021-03-19 DIAGNOSIS — Z79.01 CHRONIC ANTICOAGULATION: ICD-10-CM

## 2021-03-19 DIAGNOSIS — E11.65 TYPE 2 DIABETES MELLITUS WITH HYPERGLYCEMIA, WITH LONG-TERM CURRENT USE OF INSULIN: ICD-10-CM

## 2021-03-19 DIAGNOSIS — I82.4Y1 DEEP VEIN THROMBOSIS (DVT) OF PROXIMAL VEIN OF RIGHT LOWER EXTREMITY, UNSPECIFIED CHRONICITY: ICD-10-CM

## 2021-03-19 PROCEDURE — 11042 DBRDMT SUBQ TIS 1ST 20SQCM/<: CPT | Mod: S$GLB,,, | Performed by: PODIATRIST

## 2021-03-19 PROCEDURE — 1126F PR PAIN SEVERITY QUANTIFIED, NO PAIN PRESENT: ICD-10-PCS | Mod: S$GLB,,, | Performed by: PODIATRIST

## 2021-03-19 PROCEDURE — 99213 PR OFFICE/OUTPT VISIT, EST, LEVL III, 20-29 MIN: ICD-10-PCS | Mod: 25,S$GLB,, | Performed by: PODIATRIST

## 2021-03-19 PROCEDURE — 11042 PR DEBRIDEMENT, SKIN, SUB-Q TISSUE,=<20 SQ CM: ICD-10-PCS | Mod: S$GLB,,, | Performed by: PODIATRIST

## 2021-03-19 PROCEDURE — 93971 EXTREMITY STUDY: CPT | Mod: 26,RT,, | Performed by: RADIOLOGY

## 2021-03-19 PROCEDURE — 3008F PR BODY MASS INDEX (BMI) DOCUMENTED: ICD-10-PCS | Mod: CPTII,S$GLB,, | Performed by: PODIATRIST

## 2021-03-19 PROCEDURE — 1126F AMNT PAIN NOTED NONE PRSNT: CPT | Mod: S$GLB,,, | Performed by: PODIATRIST

## 2021-03-19 PROCEDURE — 99999 PR PBB SHADOW E&M-EST. PATIENT-LVL III: ICD-10-PCS | Mod: PBBFAC,,, | Performed by: PODIATRIST

## 2021-03-19 PROCEDURE — 3008F BODY MASS INDEX DOCD: CPT | Mod: CPTII,S$GLB,, | Performed by: PODIATRIST

## 2021-03-19 PROCEDURE — 93971 US LOWER EXTREMITY VEINS RIGHT: ICD-10-PCS | Mod: 26,RT,, | Performed by: RADIOLOGY

## 2021-03-19 PROCEDURE — 99999 PR PBB SHADOW E&M-EST. PATIENT-LVL III: CPT | Mod: PBBFAC,,, | Performed by: PODIATRIST

## 2021-03-19 PROCEDURE — 93971 EXTREMITY STUDY: CPT | Mod: TC,RT

## 2021-03-19 PROCEDURE — 99213 OFFICE O/P EST LOW 20 MIN: CPT | Mod: 25,S$GLB,, | Performed by: PODIATRIST

## 2021-04-08 ENCOUNTER — OFFICE VISIT (OUTPATIENT)
Dept: CARDIOLOGY | Facility: CLINIC | Age: 36
End: 2021-04-08
Payer: COMMERCIAL

## 2021-04-08 VITALS
SYSTOLIC BLOOD PRESSURE: 105 MMHG | OXYGEN SATURATION: 95 % | DIASTOLIC BLOOD PRESSURE: 76 MMHG | WEIGHT: 252.31 LBS | HEIGHT: 74 IN | HEART RATE: 98 BPM | BODY MASS INDEX: 32.38 KG/M2

## 2021-04-08 DIAGNOSIS — Z95.828 HISTORY OF INTRAVASCULAR STENT PLACEMENT: ICD-10-CM

## 2021-04-08 DIAGNOSIS — I87.1 MAY-THURNER SYNDROME: Primary | Chronic | ICD-10-CM

## 2021-04-08 DIAGNOSIS — E66.09 NON MORBID OBESITY DUE TO EXCESS CALORIES: ICD-10-CM

## 2021-04-08 DIAGNOSIS — Z79.01 CHRONIC ANTICOAGULATION: ICD-10-CM

## 2021-04-08 DIAGNOSIS — I10 ESSENTIAL HYPERTENSION: ICD-10-CM

## 2021-04-08 DIAGNOSIS — E11.65 TYPE 2 DIABETES MELLITUS WITH HYPERGLYCEMIA, WITH LONG-TERM CURRENT USE OF INSULIN: Chronic | ICD-10-CM

## 2021-04-08 DIAGNOSIS — I82.402 RECURRENT DEEP VEIN THROMBOSIS (DVT) OF LEFT LOWER EXTREMITY: ICD-10-CM

## 2021-04-08 DIAGNOSIS — Z79.4 TYPE 2 DIABETES MELLITUS WITH HYPERGLYCEMIA, WITH LONG-TERM CURRENT USE OF INSULIN: Chronic | ICD-10-CM

## 2021-04-08 PROCEDURE — 99214 PR OFFICE/OUTPT VISIT, EST, LEVL IV, 30-39 MIN: ICD-10-PCS | Mod: S$GLB,,, | Performed by: INTERNAL MEDICINE

## 2021-04-08 PROCEDURE — 99999 PR PBB SHADOW E&M-EST. PATIENT-LVL III: ICD-10-PCS | Mod: PBBFAC,,, | Performed by: INTERNAL MEDICINE

## 2021-04-08 PROCEDURE — 3008F BODY MASS INDEX DOCD: CPT | Mod: CPTII,S$GLB,, | Performed by: INTERNAL MEDICINE

## 2021-04-08 PROCEDURE — 99999 PR PBB SHADOW E&M-EST. PATIENT-LVL III: CPT | Mod: PBBFAC,,, | Performed by: INTERNAL MEDICINE

## 2021-04-08 PROCEDURE — 1126F PR PAIN SEVERITY QUANTIFIED, NO PAIN PRESENT: ICD-10-PCS | Mod: S$GLB,,, | Performed by: INTERNAL MEDICINE

## 2021-04-08 PROCEDURE — 3008F PR BODY MASS INDEX (BMI) DOCUMENTED: ICD-10-PCS | Mod: CPTII,S$GLB,, | Performed by: INTERNAL MEDICINE

## 2021-04-08 PROCEDURE — 99214 OFFICE O/P EST MOD 30 MIN: CPT | Mod: S$GLB,,, | Performed by: INTERNAL MEDICINE

## 2021-04-08 PROCEDURE — 1126F AMNT PAIN NOTED NONE PRSNT: CPT | Mod: S$GLB,,, | Performed by: INTERNAL MEDICINE

## 2021-04-18 ENCOUNTER — PATIENT MESSAGE (OUTPATIENT)
Dept: PODIATRY | Facility: CLINIC | Age: 36
End: 2021-04-18

## 2021-04-19 RX ORDER — DOXYCYCLINE 100 MG/1
100 CAPSULE ORAL EVERY 12 HOURS
Qty: 10 CAPSULE | Refills: 0 | Status: SHIPPED | OUTPATIENT
Start: 2021-04-19 | End: 2021-04-25

## 2021-04-21 RX ORDER — RIVAROXABAN 20 MG/1
20 TABLET, FILM COATED ORAL
Qty: 90 TABLET | Refills: 3 | Status: SHIPPED | OUTPATIENT
Start: 2021-04-21 | End: 2022-07-07 | Stop reason: SDUPTHER

## 2021-04-22 ENCOUNTER — OFFICE VISIT (OUTPATIENT)
Dept: PODIATRY | Facility: CLINIC | Age: 36
End: 2021-04-22
Payer: COMMERCIAL

## 2021-04-22 DIAGNOSIS — L97.512 ULCER OF GREAT TOE, RIGHT, WITH FAT LAYER EXPOSED: Primary | ICD-10-CM

## 2021-04-22 DIAGNOSIS — Z79.4 TYPE 2 DIABETES MELLITUS WITH HYPERGLYCEMIA, WITH LONG-TERM CURRENT USE OF INSULIN: ICD-10-CM

## 2021-04-22 DIAGNOSIS — E11.65 TYPE 2 DIABETES MELLITUS WITH HYPERGLYCEMIA, WITH LONG-TERM CURRENT USE OF INSULIN: ICD-10-CM

## 2021-04-22 DIAGNOSIS — Z79.01 CHRONIC ANTICOAGULATION: ICD-10-CM

## 2021-04-22 PROCEDURE — 99499 NO LOS: ICD-10-PCS | Mod: S$GLB,,, | Performed by: PODIATRIST

## 2021-04-22 PROCEDURE — 99999 PR PBB SHADOW E&M-EST. PATIENT-LVL III: ICD-10-PCS | Mod: PBBFAC,,, | Performed by: PODIATRIST

## 2021-04-22 PROCEDURE — 11042 PR DEBRIDEMENT, SKIN, SUB-Q TISSUE,=<20 SQ CM: ICD-10-PCS | Mod: S$GLB,,, | Performed by: PODIATRIST

## 2021-04-22 PROCEDURE — 11042 DBRDMT SUBQ TIS 1ST 20SQCM/<: CPT | Mod: S$GLB,,, | Performed by: PODIATRIST

## 2021-04-22 PROCEDURE — 99499 UNLISTED E&M SERVICE: CPT | Mod: S$GLB,,, | Performed by: PODIATRIST

## 2021-04-22 PROCEDURE — 99999 PR PBB SHADOW E&M-EST. PATIENT-LVL III: CPT | Mod: PBBFAC,,, | Performed by: PODIATRIST

## 2021-08-09 DIAGNOSIS — E11.65 TYPE 2 DIABETES MELLITUS WITH HYPERGLYCEMIA, WITH LONG-TERM CURRENT USE OF INSULIN: Chronic | ICD-10-CM

## 2021-08-09 DIAGNOSIS — Z79.4 TYPE 2 DIABETES MELLITUS WITH HYPERGLYCEMIA, WITH LONG-TERM CURRENT USE OF INSULIN: Chronic | ICD-10-CM

## 2021-08-09 RX ORDER — METOCLOPRAMIDE 10 MG/1
TABLET ORAL
Qty: 90 TABLET | Refills: 11 | Status: ON HOLD | OUTPATIENT
Start: 2021-08-09 | End: 2022-03-21 | Stop reason: SDUPTHER

## 2021-08-18 ENCOUNTER — OFFICE VISIT (OUTPATIENT)
Dept: PODIATRY | Facility: CLINIC | Age: 36
End: 2021-08-18
Payer: COMMERCIAL

## 2021-08-18 DIAGNOSIS — L97.512 ULCER OF GREAT TOE, RIGHT, WITH FAT LAYER EXPOSED: Primary | ICD-10-CM

## 2021-08-18 DIAGNOSIS — E11.65 TYPE 2 DIABETES MELLITUS WITH HYPERGLYCEMIA, WITH LONG-TERM CURRENT USE OF INSULIN: ICD-10-CM

## 2021-08-18 DIAGNOSIS — Z79.01 CHRONIC ANTICOAGULATION: ICD-10-CM

## 2021-08-18 DIAGNOSIS — Z79.4 TYPE 2 DIABETES MELLITUS WITH HYPERGLYCEMIA, WITH LONG-TERM CURRENT USE OF INSULIN: ICD-10-CM

## 2021-08-18 PROCEDURE — 99499 NO LOS: ICD-10-PCS | Mod: S$GLB,,, | Performed by: PODIATRIST

## 2021-08-18 PROCEDURE — 11042 DBRDMT SUBQ TIS 1ST 20SQCM/<: CPT | Mod: S$GLB,,, | Performed by: PODIATRIST

## 2021-08-18 PROCEDURE — 99999 PR PBB SHADOW E&M-EST. PATIENT-LVL III: CPT | Mod: PBBFAC,,, | Performed by: PODIATRIST

## 2021-08-18 PROCEDURE — 1159F PR MEDICATION LIST DOCUMENTED IN MEDICAL RECORD: ICD-10-PCS | Mod: CPTII,S$GLB,, | Performed by: PODIATRIST

## 2021-08-18 PROCEDURE — 11042 PR DEBRIDEMENT, SKIN, SUB-Q TISSUE,=<20 SQ CM: ICD-10-PCS | Mod: S$GLB,,, | Performed by: PODIATRIST

## 2021-08-18 PROCEDURE — 1160F RVW MEDS BY RX/DR IN RCRD: CPT | Mod: CPTII,S$GLB,, | Performed by: PODIATRIST

## 2021-08-18 PROCEDURE — 1159F MED LIST DOCD IN RCRD: CPT | Mod: CPTII,S$GLB,, | Performed by: PODIATRIST

## 2021-08-18 PROCEDURE — 99999 PR PBB SHADOW E&M-EST. PATIENT-LVL III: ICD-10-PCS | Mod: PBBFAC,,, | Performed by: PODIATRIST

## 2021-08-18 PROCEDURE — 1160F PR REVIEW ALL MEDS BY PRESCRIBER/CLIN PHARMACIST DOCUMENTED: ICD-10-PCS | Mod: CPTII,S$GLB,, | Performed by: PODIATRIST

## 2021-08-18 PROCEDURE — 99499 UNLISTED E&M SERVICE: CPT | Mod: S$GLB,,, | Performed by: PODIATRIST

## 2021-08-24 ENCOUNTER — PATIENT MESSAGE (OUTPATIENT)
Dept: PODIATRY | Facility: CLINIC | Age: 36
End: 2021-08-24

## 2021-08-24 DIAGNOSIS — Z79.4 TYPE 2 DIABETES MELLITUS WITH HYPERGLYCEMIA, WITH LONG-TERM CURRENT USE OF INSULIN: Primary | ICD-10-CM

## 2021-08-24 DIAGNOSIS — E11.65 TYPE 2 DIABETES MELLITUS WITH HYPERGLYCEMIA, WITH LONG-TERM CURRENT USE OF INSULIN: Primary | ICD-10-CM

## 2021-09-08 ENCOUNTER — PATIENT MESSAGE (OUTPATIENT)
Dept: CARDIOLOGY | Facility: CLINIC | Age: 36
End: 2021-09-08

## 2021-09-15 ENCOUNTER — OFFICE VISIT (OUTPATIENT)
Dept: CARDIOLOGY | Facility: CLINIC | Age: 36
End: 2021-09-15
Payer: COMMERCIAL

## 2021-09-15 DIAGNOSIS — E11.65 TYPE 2 DIABETES MELLITUS WITH HYPERGLYCEMIA, WITH LONG-TERM CURRENT USE OF INSULIN: Chronic | ICD-10-CM

## 2021-09-15 DIAGNOSIS — Z95.828 HISTORY OF INTRAVASCULAR STENT PLACEMENT: ICD-10-CM

## 2021-09-15 DIAGNOSIS — Z79.4 TYPE 2 DIABETES MELLITUS WITH HYPERGLYCEMIA, WITH LONG-TERM CURRENT USE OF INSULIN: Chronic | ICD-10-CM

## 2021-09-15 DIAGNOSIS — Z79.01 CHRONIC ANTICOAGULATION: ICD-10-CM

## 2021-09-15 DIAGNOSIS — R60.0 LEG EDEMA, RIGHT: ICD-10-CM

## 2021-09-15 DIAGNOSIS — I10 ESSENTIAL HYPERTENSION: ICD-10-CM

## 2021-09-15 DIAGNOSIS — I82.512 CHRONIC DEEP VEIN THROMBOSIS (DVT) OF FEMORAL VEIN OF LEFT LOWER EXTREMITY: ICD-10-CM

## 2021-09-15 DIAGNOSIS — I87.1 MAY-THURNER SYNDROME: Primary | Chronic | ICD-10-CM

## 2021-09-15 DIAGNOSIS — E66.09 NON MORBID OBESITY DUE TO EXCESS CALORIES: ICD-10-CM

## 2021-09-15 DIAGNOSIS — I87.002 POST-THROMBOTIC SYNDROME OF LEFT LOWER EXTREMITY: ICD-10-CM

## 2021-09-15 PROCEDURE — 4010F ACE/ARB THERAPY RXD/TAKEN: CPT | Mod: CPTII,95,, | Performed by: INTERNAL MEDICINE

## 2021-09-15 PROCEDURE — 1160F RVW MEDS BY RX/DR IN RCRD: CPT | Mod: CPTII,95,, | Performed by: INTERNAL MEDICINE

## 2021-09-15 PROCEDURE — 3046F HEMOGLOBIN A1C LEVEL >9.0%: CPT | Mod: CPTII,95,, | Performed by: INTERNAL MEDICINE

## 2021-09-15 PROCEDURE — 99214 OFFICE O/P EST MOD 30 MIN: CPT | Mod: 95,,, | Performed by: INTERNAL MEDICINE

## 2021-09-15 PROCEDURE — 1159F PR MEDICATION LIST DOCUMENTED IN MEDICAL RECORD: ICD-10-PCS | Mod: CPTII,95,, | Performed by: INTERNAL MEDICINE

## 2021-09-15 PROCEDURE — 1160F PR REVIEW ALL MEDS BY PRESCRIBER/CLIN PHARMACIST DOCUMENTED: ICD-10-PCS | Mod: CPTII,95,, | Performed by: INTERNAL MEDICINE

## 2021-09-15 PROCEDURE — 4010F PR ACE/ARB THEARPY RXD/TAKEN: ICD-10-PCS | Mod: CPTII,95,, | Performed by: INTERNAL MEDICINE

## 2021-09-15 PROCEDURE — 99214 PR OFFICE/OUTPT VISIT, EST, LEVL IV, 30-39 MIN: ICD-10-PCS | Mod: 95,,, | Performed by: INTERNAL MEDICINE

## 2021-09-15 PROCEDURE — 1159F MED LIST DOCD IN RCRD: CPT | Mod: CPTII,95,, | Performed by: INTERNAL MEDICINE

## 2021-09-15 PROCEDURE — 3046F PR MOST RECENT HEMOGLOBIN A1C LEVEL > 9.0%: ICD-10-PCS | Mod: CPTII,95,, | Performed by: INTERNAL MEDICINE

## 2021-09-16 ENCOUNTER — PATIENT MESSAGE (OUTPATIENT)
Dept: CARDIOLOGY | Facility: CLINIC | Age: 36
End: 2021-09-16

## 2021-09-22 ENCOUNTER — HOSPITAL ENCOUNTER (OUTPATIENT)
Dept: RADIOLOGY | Facility: HOSPITAL | Age: 36
Discharge: HOME | End: 2021-09-22
Attending: INTERNAL MEDICINE
Payer: COMMERCIAL

## 2021-09-22 DIAGNOSIS — R60.0 LEG EDEMA, RIGHT: ICD-10-CM

## 2021-09-22 PROCEDURE — 93970 EXTREMITY STUDY: CPT | Mod: TC,PO,RT

## 2021-09-28 ENCOUNTER — OFFICE VISIT (OUTPATIENT)
Dept: INTERNAL MEDICINE | Facility: CLINIC | Age: 36
End: 2021-09-28
Payer: COMMERCIAL

## 2021-09-28 VITALS
HEART RATE: 72 BPM | SYSTOLIC BLOOD PRESSURE: 110 MMHG | BODY MASS INDEX: 31.15 KG/M2 | DIASTOLIC BLOOD PRESSURE: 70 MMHG | TEMPERATURE: 98 F | RESPIRATION RATE: 18 BRPM | HEIGHT: 74 IN | WEIGHT: 242.75 LBS

## 2021-09-28 DIAGNOSIS — E11.43 DIABETIC GASTROPARESIS ASSOCIATED WITH TYPE 2 DIABETES MELLITUS: Chronic | ICD-10-CM

## 2021-09-28 DIAGNOSIS — I82.402 RECURRENT DEEP VEIN THROMBOSIS (DVT) OF LEFT LOWER EXTREMITY: ICD-10-CM

## 2021-09-28 DIAGNOSIS — K31.84 DIABETIC GASTROPARESIS ASSOCIATED WITH TYPE 2 DIABETES MELLITUS: Chronic | ICD-10-CM

## 2021-09-28 DIAGNOSIS — E66.09 NON MORBID OBESITY DUE TO EXCESS CALORIES: ICD-10-CM

## 2021-09-28 DIAGNOSIS — Z79.01 CHRONIC ANTICOAGULATION: ICD-10-CM

## 2021-09-28 DIAGNOSIS — E11.65 UNCONTROLLED TYPE 2 DIABETES MELLITUS WITH HYPERGLYCEMIA: Primary | ICD-10-CM

## 2021-09-28 PROCEDURE — 99999 PR PBB SHADOW E&M-EST. PATIENT-LVL IV: CPT | Mod: PBBFAC,,, | Performed by: NURSE PRACTITIONER

## 2021-09-28 PROCEDURE — 1159F MED LIST DOCD IN RCRD: CPT | Mod: CPTII,S$GLB,, | Performed by: NURSE PRACTITIONER

## 2021-09-28 PROCEDURE — 3078F DIAST BP <80 MM HG: CPT | Mod: CPTII,S$GLB,, | Performed by: NURSE PRACTITIONER

## 2021-09-28 PROCEDURE — 99999 PR PBB SHADOW E&M-EST. PATIENT-LVL IV: ICD-10-PCS | Mod: PBBFAC,,, | Performed by: NURSE PRACTITIONER

## 2021-09-28 PROCEDURE — 3074F SYST BP LT 130 MM HG: CPT | Mod: CPTII,S$GLB,, | Performed by: NURSE PRACTITIONER

## 2021-09-28 PROCEDURE — 99214 PR OFFICE/OUTPT VISIT, EST, LEVL IV, 30-39 MIN: ICD-10-PCS | Mod: S$GLB,,, | Performed by: NURSE PRACTITIONER

## 2021-09-28 PROCEDURE — 3008F PR BODY MASS INDEX (BMI) DOCUMENTED: ICD-10-PCS | Mod: CPTII,S$GLB,, | Performed by: NURSE PRACTITIONER

## 2021-09-28 PROCEDURE — 1159F PR MEDICATION LIST DOCUMENTED IN MEDICAL RECORD: ICD-10-PCS | Mod: CPTII,S$GLB,, | Performed by: NURSE PRACTITIONER

## 2021-09-28 PROCEDURE — 3046F HEMOGLOBIN A1C LEVEL >9.0%: CPT | Mod: CPTII,S$GLB,, | Performed by: NURSE PRACTITIONER

## 2021-09-28 PROCEDURE — 4010F ACE/ARB THERAPY RXD/TAKEN: CPT | Mod: CPTII,S$GLB,, | Performed by: NURSE PRACTITIONER

## 2021-09-28 PROCEDURE — 3046F PR MOST RECENT HEMOGLOBIN A1C LEVEL > 9.0%: ICD-10-PCS | Mod: CPTII,S$GLB,, | Performed by: NURSE PRACTITIONER

## 2021-09-28 PROCEDURE — 3008F BODY MASS INDEX DOCD: CPT | Mod: CPTII,S$GLB,, | Performed by: NURSE PRACTITIONER

## 2021-09-28 PROCEDURE — 4010F PR ACE/ARB THEARPY RXD/TAKEN: ICD-10-PCS | Mod: CPTII,S$GLB,, | Performed by: NURSE PRACTITIONER

## 2021-09-28 PROCEDURE — 99214 OFFICE O/P EST MOD 30 MIN: CPT | Mod: S$GLB,,, | Performed by: NURSE PRACTITIONER

## 2021-09-28 PROCEDURE — 3074F PR MOST RECENT SYSTOLIC BLOOD PRESSURE < 130 MM HG: ICD-10-PCS | Mod: CPTII,S$GLB,, | Performed by: NURSE PRACTITIONER

## 2021-09-28 PROCEDURE — 3078F PR MOST RECENT DIASTOLIC BLOOD PRESSURE < 80 MM HG: ICD-10-PCS | Mod: CPTII,S$GLB,, | Performed by: NURSE PRACTITIONER

## 2021-09-28 RX ORDER — BLOOD-GLUCOSE TRANSMITTER
EACH MISCELLANEOUS
Qty: 1 EACH | Refills: 0 | Status: SHIPPED | OUTPATIENT
Start: 2021-09-28 | End: 2021-11-27

## 2021-09-28 RX ORDER — BLOOD-GLUCOSE SENSOR
EACH MISCELLANEOUS
Qty: 10 EACH | Refills: 6 | Status: SHIPPED | OUTPATIENT
Start: 2021-09-28 | End: 2021-11-27

## 2021-09-28 RX ORDER — BLOOD-GLUCOSE,RECEIVER,CONT
EACH MISCELLANEOUS
Qty: 1 EACH | Refills: 0 | Status: SHIPPED | OUTPATIENT
Start: 2021-09-28 | End: 2021-11-27

## 2021-10-04 ENCOUNTER — PATIENT MESSAGE (OUTPATIENT)
Dept: CARDIOLOGY | Facility: CLINIC | Age: 36
End: 2021-10-04

## 2021-10-20 ENCOUNTER — TELEPHONE (OUTPATIENT)
Dept: CARDIOLOGY | Facility: CLINIC | Age: 36
End: 2021-10-20

## 2021-10-21 ENCOUNTER — OFFICE VISIT (OUTPATIENT)
Dept: CARDIOLOGY | Facility: CLINIC | Age: 36
End: 2021-10-21
Payer: COMMERCIAL

## 2021-10-21 DIAGNOSIS — K31.84 DIABETIC GASTROPARESIS ASSOCIATED WITH TYPE 2 DIABETES MELLITUS: Chronic | ICD-10-CM

## 2021-10-21 DIAGNOSIS — Z95.828 HISTORY OF INTRAVASCULAR STENT PLACEMENT: ICD-10-CM

## 2021-10-21 DIAGNOSIS — I87.1 MAY-THURNER SYNDROME: Primary | Chronic | ICD-10-CM

## 2021-10-21 DIAGNOSIS — E11.43 DIABETIC GASTROPARESIS ASSOCIATED WITH TYPE 2 DIABETES MELLITUS: Chronic | ICD-10-CM

## 2021-10-21 DIAGNOSIS — Z79.01 CHRONIC ANTICOAGULATION: ICD-10-CM

## 2021-10-21 DIAGNOSIS — I82.512 CHRONIC DEEP VEIN THROMBOSIS (DVT) OF FEMORAL VEIN OF LEFT LOWER EXTREMITY: ICD-10-CM

## 2021-10-21 DIAGNOSIS — Z79.4 TYPE 2 DIABETES MELLITUS WITH HYPERGLYCEMIA, WITH LONG-TERM CURRENT USE OF INSULIN: Chronic | ICD-10-CM

## 2021-10-21 DIAGNOSIS — E66.09 NON MORBID OBESITY DUE TO EXCESS CALORIES: ICD-10-CM

## 2021-10-21 DIAGNOSIS — E11.65 TYPE 2 DIABETES MELLITUS WITH HYPERGLYCEMIA, WITH LONG-TERM CURRENT USE OF INSULIN: Chronic | ICD-10-CM

## 2021-10-21 DIAGNOSIS — I10 ESSENTIAL HYPERTENSION: ICD-10-CM

## 2021-10-21 PROCEDURE — 99214 PR OFFICE/OUTPT VISIT, EST, LEVL IV, 30-39 MIN: ICD-10-PCS | Mod: 95,,, | Performed by: INTERNAL MEDICINE

## 2021-10-21 PROCEDURE — 99214 OFFICE O/P EST MOD 30 MIN: CPT | Mod: 95,,, | Performed by: INTERNAL MEDICINE

## 2021-10-21 PROCEDURE — 4010F PR ACE/ARB THEARPY RXD/TAKEN: ICD-10-PCS | Mod: CPTII,95,, | Performed by: INTERNAL MEDICINE

## 2021-10-21 PROCEDURE — 4010F ACE/ARB THERAPY RXD/TAKEN: CPT | Mod: CPTII,95,, | Performed by: INTERNAL MEDICINE

## 2021-10-29 ENCOUNTER — TELEPHONE (OUTPATIENT)
Dept: DIABETES | Facility: CLINIC | Age: 36
End: 2021-10-29
Payer: COMMERCIAL

## 2021-11-02 ENCOUNTER — TELEPHONE (OUTPATIENT)
Dept: ADMINISTRATIVE | Facility: HOSPITAL | Age: 36
End: 2021-11-02
Payer: COMMERCIAL

## 2021-11-22 ENCOUNTER — OFFICE VISIT (OUTPATIENT)
Dept: PODIATRY | Facility: CLINIC | Age: 36
End: 2021-11-22
Payer: COMMERCIAL

## 2021-11-22 VITALS — BODY MASS INDEX: 31.06 KG/M2 | WEIGHT: 242 LBS | HEIGHT: 74 IN

## 2021-11-22 DIAGNOSIS — E11.65 UNCONTROLLED TYPE 2 DIABETES MELLITUS WITH HYPERGLYCEMIA: ICD-10-CM

## 2021-11-22 DIAGNOSIS — L60.3 ONYCHODYSTROPHY: ICD-10-CM

## 2021-11-22 DIAGNOSIS — L97.512 ULCER OF GREAT TOE, RIGHT, WITH FAT LAYER EXPOSED: Primary | ICD-10-CM

## 2021-11-22 PROCEDURE — 11721 PR DEBRIDEMENT OF NAILS, 6 OR MORE: ICD-10-PCS | Mod: 59,S$GLB,, | Performed by: PODIATRIST

## 2021-11-22 PROCEDURE — 99999 PR PBB SHADOW E&M-EST. PATIENT-LVL IV: CPT | Mod: PBBFAC,,, | Performed by: PODIATRIST

## 2021-11-22 PROCEDURE — 11721 DEBRIDE NAIL 6 OR MORE: CPT | Mod: 59,S$GLB,, | Performed by: PODIATRIST

## 2021-11-22 PROCEDURE — 4010F ACE/ARB THERAPY RXD/TAKEN: CPT | Mod: CPTII,S$GLB,, | Performed by: PODIATRIST

## 2021-11-22 PROCEDURE — 99214 PR OFFICE/OUTPT VISIT, EST, LEVL IV, 30-39 MIN: ICD-10-PCS | Mod: 25,S$GLB,, | Performed by: PODIATRIST

## 2021-11-22 PROCEDURE — 99999 PR PBB SHADOW E&M-EST. PATIENT-LVL IV: ICD-10-PCS | Mod: PBBFAC,,, | Performed by: PODIATRIST

## 2021-11-22 PROCEDURE — 4010F PR ACE/ARB THEARPY RXD/TAKEN: ICD-10-PCS | Mod: CPTII,S$GLB,, | Performed by: PODIATRIST

## 2021-11-22 PROCEDURE — 99214 OFFICE O/P EST MOD 30 MIN: CPT | Mod: 25,S$GLB,, | Performed by: PODIATRIST

## 2021-11-22 PROCEDURE — 11042 DBRDMT SUBQ TIS 1ST 20SQCM/<: CPT | Mod: S$GLB,,, | Performed by: PODIATRIST

## 2021-11-22 PROCEDURE — 11042 PR DEBRIDEMENT, SKIN, SUB-Q TISSUE,=<20 SQ CM: ICD-10-PCS | Mod: S$GLB,,, | Performed by: PODIATRIST

## 2021-11-24 ENCOUNTER — PATIENT MESSAGE (OUTPATIENT)
Dept: PODIATRY | Facility: CLINIC | Age: 36
End: 2021-11-24
Payer: COMMERCIAL

## 2021-11-26 ENCOUNTER — PATIENT MESSAGE (OUTPATIENT)
Dept: PODIATRY | Facility: CLINIC | Age: 36
End: 2021-11-26
Payer: COMMERCIAL

## 2021-11-27 ENCOUNTER — HOSPITAL ENCOUNTER (OUTPATIENT)
Facility: HOSPITAL | Age: 36
Discharge: HOME HEALTH | End: 2021-11-29
Attending: EMERGENCY MEDICINE | Admitting: FAMILY MEDICINE
Payer: COMMERCIAL

## 2021-11-27 DIAGNOSIS — U07.1 COVID: ICD-10-CM

## 2021-11-27 DIAGNOSIS — L97.421 DIABETIC ULCER OF LEFT MIDFOOT ASSOCIATED WITH TYPE 1 DIABETES MELLITUS, LIMITED TO BREAKDOWN OF SKIN: ICD-10-CM

## 2021-11-27 DIAGNOSIS — E10.621 DIABETIC ULCER OF LEFT MIDFOOT ASSOCIATED WITH TYPE 1 DIABETES MELLITUS, LIMITED TO BREAKDOWN OF SKIN: ICD-10-CM

## 2021-11-27 DIAGNOSIS — L97.422 DIABETIC ULCER OF LEFT HEEL ASSOCIATED WITH TYPE 2 DIABETES MELLITUS, WITH FAT LAYER EXPOSED: ICD-10-CM

## 2021-11-27 DIAGNOSIS — L03.116 CELLULITIS OF LEFT LOWER EXTREMITY: Primary | ICD-10-CM

## 2021-11-27 DIAGNOSIS — R50.9 FEVER: ICD-10-CM

## 2021-11-27 DIAGNOSIS — I95.9 HYPOTENSION, UNSPECIFIED HYPOTENSION TYPE: ICD-10-CM

## 2021-11-27 DIAGNOSIS — U07.1 COVID-19: ICD-10-CM

## 2021-11-27 DIAGNOSIS — E11.621 DIABETIC ULCER OF LEFT HEEL ASSOCIATED WITH TYPE 2 DIABETES MELLITUS, WITH FAT LAYER EXPOSED: ICD-10-CM

## 2021-11-27 PROBLEM — E88.09 HYPOALBUMINEMIA: Status: ACTIVE | Noted: 2021-11-27

## 2021-11-27 PROBLEM — E11.59 HYPERTENSION ASSOCIATED WITH DIABETES: Status: ACTIVE | Noted: 2018-09-26

## 2021-11-27 PROBLEM — I15.2 HYPERTENSION ASSOCIATED WITH DIABETES: Status: ACTIVE | Noted: 2018-09-26

## 2021-11-27 PROBLEM — E11.8 TYPE 2 DIABETES MELLITUS WITH COMPLICATION, WITH LONG-TERM CURRENT USE OF INSULIN: Status: ACTIVE | Noted: 2017-08-19

## 2021-11-27 PROBLEM — Z79.4 TYPE 2 DIABETES MELLITUS WITH COMPLICATION, WITH LONG-TERM CURRENT USE OF INSULIN: Status: ACTIVE | Noted: 2017-08-19

## 2021-11-27 PROBLEM — L97.529 DIABETIC ULCER OF LEFT FOOT ASSOCIATED WITH TYPE 2 DIABETES MELLITUS: Status: ACTIVE | Noted: 2021-11-27

## 2021-11-27 PROBLEM — R74.8 ELEVATED LIVER ENZYMES: Status: ACTIVE | Noted: 2021-11-27

## 2021-11-27 LAB
25(OH)D3+25(OH)D2 SERPL-MCNC: 18 NG/ML (ref 30–96)
ALBUMIN SERPL BCP-MCNC: 3.4 G/DL (ref 3.5–5.2)
ALP SERPL-CCNC: 115 U/L (ref 55–135)
ALT SERPL W/O P-5'-P-CCNC: 56 U/L (ref 10–44)
ANION GAP SERPL CALC-SCNC: 15 MMOL/L (ref 8–16)
APTT BLDCRRT: 29.2 SEC (ref 21–32)
AST SERPL-CCNC: 45 U/L (ref 10–40)
BASOPHILS # BLD AUTO: 0.02 K/UL (ref 0–0.2)
BASOPHILS NFR BLD: 0.2 % (ref 0–1.9)
BILIRUB SERPL-MCNC: 0.4 MG/DL (ref 0.1–1)
BILIRUB UR QL STRIP: NEGATIVE
BNP SERPL-MCNC: 17 PG/ML (ref 0–99)
BUN SERPL-MCNC: 11 MG/DL (ref 6–20)
CALCIUM SERPL-MCNC: 8.7 MG/DL (ref 8.7–10.5)
CHLORIDE SERPL-SCNC: 99 MMOL/L (ref 95–110)
CK SERPL-CCNC: 81 U/L (ref 20–200)
CLARITY UR: CLEAR
CO2 SERPL-SCNC: 23 MMOL/L (ref 23–29)
COLOR UR: YELLOW
CREAT SERPL-MCNC: 1.3 MG/DL (ref 0.5–1.4)
CTP QC/QA: YES
D DIMER PPP IA.FEU-MCNC: <0.19 MG/L FEU
DIFFERENTIAL METHOD: ABNORMAL
EOSINOPHIL # BLD AUTO: 0.1 K/UL (ref 0–0.5)
EOSINOPHIL NFR BLD: 0.6 % (ref 0–8)
ERYTHROCYTE [DISTWIDTH] IN BLOOD BY AUTOMATED COUNT: 11.8 % (ref 11.5–14.5)
ERYTHROCYTE [SEDIMENTATION RATE] IN BLOOD BY WESTERGREN METHOD: 44 MM/HR (ref 0–10)
EST. GFR  (AFRICAN AMERICAN): >60 ML/MIN/1.73 M^2
EST. GFR  (NON AFRICAN AMERICAN): >60 ML/MIN/1.73 M^2
FERRITIN SERPL-MCNC: 675 NG/ML (ref 20–300)
GLUCOSE SERPL-MCNC: 236 MG/DL (ref 70–110)
GLUCOSE UR QL STRIP: ABNORMAL
HCT VFR BLD AUTO: 43 % (ref 40–54)
HGB BLD-MCNC: 14.3 G/DL (ref 14–18)
HGB UR QL STRIP: NEGATIVE
IMM GRANULOCYTES # BLD AUTO: 0.03 K/UL (ref 0–0.04)
IMM GRANULOCYTES NFR BLD AUTO: 0.4 % (ref 0–0.5)
INR PPP: 1.1 (ref 0.8–1.2)
KETONES UR QL STRIP: NEGATIVE
LACTATE SERPL-SCNC: 2 MMOL/L (ref 0.5–2.2)
LDH SERPL L TO P-CCNC: 228 U/L (ref 110–260)
LEUKOCYTE ESTERASE UR QL STRIP: NEGATIVE
LIPASE SERPL-CCNC: 10 U/L (ref 4–60)
LYMPHOCYTES # BLD AUTO: 0.5 K/UL (ref 1–4.8)
LYMPHOCYTES NFR BLD: 5.5 % (ref 18–48)
MCH RBC QN AUTO: 29 PG (ref 27–31)
MCHC RBC AUTO-ENTMCNC: 33.3 G/DL (ref 32–36)
MCV RBC AUTO: 87 FL (ref 82–98)
MONOCYTES # BLD AUTO: 0.6 K/UL (ref 0.3–1)
MONOCYTES NFR BLD: 7.3 % (ref 4–15)
NEUTROPHILS # BLD AUTO: 7.3 K/UL (ref 1.8–7.7)
NEUTROPHILS NFR BLD: 86 % (ref 38–73)
NITRITE UR QL STRIP: NEGATIVE
NRBC BLD-RTO: 0 /100 WBC
PH UR STRIP: 7 [PH] (ref 5–8)
PLATELET # BLD AUTO: 227 K/UL (ref 150–450)
PMV BLD AUTO: 10.2 FL (ref 9.2–12.9)
POCT GLUCOSE: 119 MG/DL (ref 70–110)
POCT GLUCOSE: 146 MG/DL (ref 70–110)
POTASSIUM SERPL-SCNC: 3.9 MMOL/L (ref 3.5–5.1)
PROT SERPL-MCNC: 7 G/DL (ref 6–8.4)
PROT UR QL STRIP: NEGATIVE
PROTHROMBIN TIME: 11.8 SEC (ref 9–12.5)
RBC # BLD AUTO: 4.93 M/UL (ref 4.6–6.2)
SARS-COV-2 RDRP RESP QL NAA+PROBE: POSITIVE
SODIUM SERPL-SCNC: 137 MMOL/L (ref 136–145)
SP GR UR STRIP: 1.01 (ref 1–1.03)
TROPONIN I SERPL DL<=0.01 NG/ML-MCNC: 0.02 NG/ML (ref 0–0.03)
TROPONIN I SERPL DL<=0.01 NG/ML-MCNC: <0.006 NG/ML (ref 0–0.03)
URN SPEC COLLECT METH UR: ABNORMAL
UROBILINOGEN UR STRIP-ACNC: NEGATIVE EU/DL
WBC # BLD AUTO: 8.5 K/UL (ref 3.9–12.7)

## 2021-11-27 PROCEDURE — 25000003 PHARM REV CODE 250: Performed by: EMERGENCY MEDICINE

## 2021-11-27 PROCEDURE — G0378 HOSPITAL OBSERVATION PER HR: HCPCS

## 2021-11-27 PROCEDURE — 82306 VITAMIN D 25 HYDROXY: CPT | Performed by: NURSE PRACTITIONER

## 2021-11-27 PROCEDURE — U0002 COVID-19 LAB TEST NON-CDC: HCPCS | Performed by: NURSE PRACTITIONER

## 2021-11-27 PROCEDURE — 96366 THER/PROPH/DIAG IV INF ADDON: CPT

## 2021-11-27 PROCEDURE — 63600175 PHARM REV CODE 636 W HCPCS: Performed by: NURSE PRACTITIONER

## 2021-11-27 PROCEDURE — 84484 ASSAY OF TROPONIN QUANT: CPT | Mod: 91 | Performed by: NURSE PRACTITIONER

## 2021-11-27 PROCEDURE — 81003 URINALYSIS AUTO W/O SCOPE: CPT | Performed by: NURSE PRACTITIONER

## 2021-11-27 PROCEDURE — 83880 ASSAY OF NATRIURETIC PEPTIDE: CPT | Performed by: NURSE PRACTITIONER

## 2021-11-27 PROCEDURE — 82550 ASSAY OF CK (CPK): CPT | Performed by: NURSE PRACTITIONER

## 2021-11-27 PROCEDURE — 96365 THER/PROPH/DIAG IV INF INIT: CPT

## 2021-11-27 PROCEDURE — 85379 FIBRIN DEGRADATION QUANT: CPT | Performed by: NURSE PRACTITIONER

## 2021-11-27 PROCEDURE — 99285 EMERGENCY DEPT VISIT HI MDM: CPT | Mod: 25

## 2021-11-27 PROCEDURE — 36415 COLL VENOUS BLD VENIPUNCTURE: CPT | Performed by: NURSE PRACTITIONER

## 2021-11-27 PROCEDURE — 83615 LACTATE (LD) (LDH) ENZYME: CPT | Performed by: NURSE PRACTITIONER

## 2021-11-27 PROCEDURE — 63600175 PHARM REV CODE 636 W HCPCS: Performed by: EMERGENCY MEDICINE

## 2021-11-27 PROCEDURE — 85651 RBC SED RATE NONAUTOMATED: CPT | Performed by: NURSE PRACTITIONER

## 2021-11-27 PROCEDURE — 85610 PROTHROMBIN TIME: CPT | Performed by: NURSE PRACTITIONER

## 2021-11-27 PROCEDURE — 96372 THER/PROPH/DIAG INJ SC/IM: CPT | Mod: 59

## 2021-11-27 PROCEDURE — 82728 ASSAY OF FERRITIN: CPT | Performed by: NURSE PRACTITIONER

## 2021-11-27 PROCEDURE — 85730 THROMBOPLASTIN TIME PARTIAL: CPT | Performed by: NURSE PRACTITIONER

## 2021-11-27 PROCEDURE — 83690 ASSAY OF LIPASE: CPT | Performed by: NURSE PRACTITIONER

## 2021-11-27 PROCEDURE — 25000003 PHARM REV CODE 250: Performed by: NURSE PRACTITIONER

## 2021-11-27 PROCEDURE — 87040 BLOOD CULTURE FOR BACTERIA: CPT | Performed by: EMERGENCY MEDICINE

## 2021-11-27 PROCEDURE — 85025 COMPLETE CBC W/AUTO DIFF WBC: CPT | Performed by: NURSE PRACTITIONER

## 2021-11-27 PROCEDURE — 83605 ASSAY OF LACTIC ACID: CPT | Performed by: EMERGENCY MEDICINE

## 2021-11-27 PROCEDURE — 80053 COMPREHEN METABOLIC PANEL: CPT | Performed by: NURSE PRACTITIONER

## 2021-11-27 RX ORDER — IBUPROFEN 200 MG
24 TABLET ORAL
Status: DISCONTINUED | OUTPATIENT
Start: 2021-11-27 | End: 2021-11-29 | Stop reason: HOSPADM

## 2021-11-27 RX ORDER — ASCORBIC ACID 500 MG
500 TABLET ORAL NIGHTLY
Status: DISCONTINUED | OUTPATIENT
Start: 2021-11-27 | End: 2021-11-29 | Stop reason: HOSPADM

## 2021-11-27 RX ORDER — ENOXAPARIN SODIUM 100 MG/ML
1 INJECTION SUBCUTANEOUS 2 TIMES DAILY
Status: DISCONTINUED | OUTPATIENT
Start: 2021-11-27 | End: 2021-11-29 | Stop reason: HOSPADM

## 2021-11-27 RX ORDER — ACETAMINOPHEN 325 MG/1
650 TABLET ORAL EVERY 4 HOURS PRN
Status: DISCONTINUED | OUTPATIENT
Start: 2021-11-27 | End: 2021-11-29 | Stop reason: HOSPADM

## 2021-11-27 RX ORDER — TALC
6 POWDER (GRAM) TOPICAL NIGHTLY PRN
Status: DISCONTINUED | OUTPATIENT
Start: 2021-11-27 | End: 2021-11-29 | Stop reason: HOSPADM

## 2021-11-27 RX ORDER — SODIUM CHLORIDE 0.9 % (FLUSH) 0.9 %
10 SYRINGE (ML) INJECTION
Status: DISCONTINUED | OUTPATIENT
Start: 2021-11-27 | End: 2021-11-27

## 2021-11-27 RX ORDER — IBUPROFEN 200 MG
16 TABLET ORAL
Status: DISCONTINUED | OUTPATIENT
Start: 2021-11-27 | End: 2021-11-29 | Stop reason: HOSPADM

## 2021-11-27 RX ORDER — POLYETHYLENE GLYCOL 3350 17 G/17G
17 POWDER, FOR SOLUTION ORAL 2 TIMES DAILY PRN
Status: DISCONTINUED | OUTPATIENT
Start: 2021-11-27 | End: 2021-11-29 | Stop reason: HOSPADM

## 2021-11-27 RX ORDER — CHOLECALCIFEROL (VITAMIN D3) 25 MCG
1000 TABLET ORAL DAILY
Status: DISCONTINUED | OUTPATIENT
Start: 2021-11-28 | End: 2021-11-29 | Stop reason: HOSPADM

## 2021-11-27 RX ORDER — METOCLOPRAMIDE 5 MG/1
10 TABLET ORAL
Status: DISCONTINUED | OUTPATIENT
Start: 2021-11-27 | End: 2021-11-29 | Stop reason: HOSPADM

## 2021-11-27 RX ORDER — ZINC SULFATE 50(220)MG
220 CAPSULE ORAL NIGHTLY
Status: DISCONTINUED | OUTPATIENT
Start: 2021-11-27 | End: 2021-11-29 | Stop reason: HOSPADM

## 2021-11-27 RX ORDER — SODIUM CHLORIDE 0.9 % (FLUSH) 0.9 %
10 SYRINGE (ML) INJECTION
Status: DISCONTINUED | OUTPATIENT
Start: 2021-11-27 | End: 2021-11-29 | Stop reason: HOSPADM

## 2021-11-27 RX ORDER — HYDROCODONE BITARTRATE AND ACETAMINOPHEN 5; 325 MG/1; MG/1
1 TABLET ORAL EVERY 6 HOURS PRN
Status: DISCONTINUED | OUTPATIENT
Start: 2021-11-27 | End: 2021-11-29 | Stop reason: HOSPADM

## 2021-11-27 RX ORDER — ONDANSETRON 2 MG/ML
4 INJECTION INTRAMUSCULAR; INTRAVENOUS EVERY 4 HOURS PRN
Status: DISCONTINUED | OUTPATIENT
Start: 2021-11-27 | End: 2021-11-29 | Stop reason: HOSPADM

## 2021-11-27 RX ORDER — INSULIN ASPART 100 [IU]/ML
1-10 INJECTION, SOLUTION INTRAVENOUS; SUBCUTANEOUS
Status: DISCONTINUED | OUTPATIENT
Start: 2021-11-27 | End: 2021-11-29 | Stop reason: HOSPADM

## 2021-11-27 RX ORDER — GLUCAGON 1 MG
1 KIT INJECTION
Status: DISCONTINUED | OUTPATIENT
Start: 2021-11-27 | End: 2021-11-29 | Stop reason: HOSPADM

## 2021-11-27 RX ORDER — INSULIN ASPART 100 [IU]/ML
6 INJECTION, SOLUTION INTRAVENOUS; SUBCUTANEOUS
Status: DISCONTINUED | OUTPATIENT
Start: 2021-11-27 | End: 2021-11-29 | Stop reason: HOSPADM

## 2021-11-27 RX ADMIN — INSULIN ASPART 6 UNITS: 100 INJECTION, SOLUTION INTRAVENOUS; SUBCUTANEOUS at 04:11

## 2021-11-27 RX ADMIN — OXYCODONE HYDROCHLORIDE AND ACETAMINOPHEN 500 MG: 500 TABLET ORAL at 09:11

## 2021-11-27 RX ADMIN — PIPERACILLIN AND TAZOBACTAM 4.5 G: 4; .5 INJECTION, POWDER, LYOPHILIZED, FOR SOLUTION INTRAVENOUS; PARENTERAL at 09:11

## 2021-11-27 RX ADMIN — ENOXAPARIN SODIUM 110 MG: 100 INJECTION, SOLUTION INTRAVENOUS; SUBCUTANEOUS at 09:11

## 2021-11-27 RX ADMIN — PIPERACILLIN AND TAZOBACTAM 4.5 G: 4; .5 INJECTION, POWDER, LYOPHILIZED, FOR SOLUTION INTRAVENOUS; PARENTERAL at 01:11

## 2021-11-27 RX ADMIN — ZINC SULFATE 220 MG (50 MG) CAPSULE 220 MG: CAPSULE at 09:11

## 2021-11-28 ENCOUNTER — ANESTHESIA (OUTPATIENT)
Dept: SURGERY | Facility: HOSPITAL | Age: 36
End: 2021-11-28
Payer: COMMERCIAL

## 2021-11-28 ENCOUNTER — ANESTHESIA EVENT (OUTPATIENT)
Dept: SURGERY | Facility: HOSPITAL | Age: 36
End: 2021-11-28
Payer: COMMERCIAL

## 2021-11-28 DIAGNOSIS — L02.612 ABSCESS OF LEFT FOOT: Primary | ICD-10-CM

## 2021-11-28 DIAGNOSIS — M25.572 PAIN OF JOINT OF LEFT ANKLE AND FOOT: ICD-10-CM

## 2021-11-28 LAB
ALBUMIN SERPL BCP-MCNC: 3.5 G/DL (ref 3.5–5.2)
ALP SERPL-CCNC: 322 U/L (ref 55–135)
ALT SERPL W/O P-5'-P-CCNC: 169 U/L (ref 10–44)
ANION GAP SERPL CALC-SCNC: 16 MMOL/L (ref 8–16)
AST SERPL-CCNC: 211 U/L (ref 10–40)
BASOPHILS # BLD AUTO: 0.01 K/UL (ref 0–0.2)
BASOPHILS NFR BLD: 0.1 % (ref 0–1.9)
BILIRUB SERPL-MCNC: 0.5 MG/DL (ref 0.1–1)
BUN SERPL-MCNC: 13 MG/DL (ref 6–20)
CALCIUM SERPL-MCNC: 9.4 MG/DL (ref 8.7–10.5)
CHLORIDE SERPL-SCNC: 96 MMOL/L (ref 95–110)
CO2 SERPL-SCNC: 25 MMOL/L (ref 23–29)
CREAT SERPL-MCNC: 1.2 MG/DL (ref 0.5–1.4)
DIFFERENTIAL METHOD: NORMAL
EOSINOPHIL # BLD AUTO: 0 K/UL (ref 0–0.5)
EOSINOPHIL NFR BLD: 0 % (ref 0–8)
ERYTHROCYTE [DISTWIDTH] IN BLOOD BY AUTOMATED COUNT: 11.9 % (ref 11.5–14.5)
EST. GFR  (AFRICAN AMERICAN): >60 ML/MIN/1.73 M^2
EST. GFR  (NON AFRICAN AMERICAN): >60 ML/MIN/1.73 M^2
GLUCOSE SERPL-MCNC: 134 MG/DL (ref 70–110)
HCT VFR BLD AUTO: 47.7 % (ref 40–54)
HGB BLD-MCNC: 15.6 G/DL (ref 14–18)
IMM GRANULOCYTES # BLD AUTO: 0.02 K/UL (ref 0–0.04)
IMM GRANULOCYTES NFR BLD AUTO: 0.3 % (ref 0–0.5)
LYMPHOCYTES # BLD AUTO: 1.3 K/UL (ref 1–4.8)
LYMPHOCYTES NFR BLD: 18.5 % (ref 18–48)
MAGNESIUM SERPL-MCNC: 1.9 MG/DL (ref 1.6–2.6)
MCH RBC QN AUTO: 28.7 PG (ref 27–31)
MCHC RBC AUTO-ENTMCNC: 32.7 G/DL (ref 32–36)
MCV RBC AUTO: 88 FL (ref 82–98)
MONOCYTES # BLD AUTO: 0.7 K/UL (ref 0.3–1)
MONOCYTES NFR BLD: 10.3 % (ref 4–15)
NEUTROPHILS # BLD AUTO: 5 K/UL (ref 1.8–7.7)
NEUTROPHILS NFR BLD: 70.8 % (ref 38–73)
NRBC BLD-RTO: 0 /100 WBC
PLATELET # BLD AUTO: 229 K/UL (ref 150–450)
PMV BLD AUTO: 10.4 FL (ref 9.2–12.9)
POCT GLUCOSE: 132 MG/DL (ref 70–110)
POCT GLUCOSE: 173 MG/DL (ref 70–110)
POCT GLUCOSE: 195 MG/DL (ref 70–110)
POCT GLUCOSE: 204 MG/DL (ref 70–110)
POTASSIUM SERPL-SCNC: 4 MMOL/L (ref 3.5–5.1)
PROT SERPL-MCNC: 8.3 G/DL (ref 6–8.4)
RBC # BLD AUTO: 5.43 M/UL (ref 4.6–6.2)
SODIUM SERPL-SCNC: 137 MMOL/L (ref 136–145)
WBC # BLD AUTO: 7.1 K/UL (ref 3.9–12.7)

## 2021-11-28 PROCEDURE — 99900035 HC TECH TIME PER 15 MIN (STAT)

## 2021-11-28 PROCEDURE — 11042 DBRDMT SUBQ TIS 1ST 20SQCM/<: CPT | Mod: 59,,, | Performed by: PODIATRIST

## 2021-11-28 PROCEDURE — 63600175 PHARM REV CODE 636 W HCPCS

## 2021-11-28 PROCEDURE — 83735 ASSAY OF MAGNESIUM: CPT | Performed by: NURSE PRACTITIONER

## 2021-11-28 PROCEDURE — 94761 N-INVAS EAR/PLS OXIMETRY MLT: CPT

## 2021-11-28 PROCEDURE — 36000704 HC OR TIME LEV I 1ST 15 MIN: Performed by: PODIATRIST

## 2021-11-28 PROCEDURE — 96376 TX/PRO/DX INJ SAME DRUG ADON: CPT

## 2021-11-28 PROCEDURE — 96372 THER/PROPH/DIAG INJ SC/IM: CPT

## 2021-11-28 PROCEDURE — 85025 COMPLETE CBC W/AUTO DIFF WBC: CPT | Performed by: NURSE PRACTITIONER

## 2021-11-28 PROCEDURE — 37000009 HC ANESTHESIA EA ADD 15 MINS: Performed by: PODIATRIST

## 2021-11-28 PROCEDURE — 99214 OFFICE O/P EST MOD 30 MIN: CPT | Mod: 25,,, | Performed by: PODIATRIST

## 2021-11-28 PROCEDURE — 63600175 PHARM REV CODE 636 W HCPCS: Performed by: NURSE PRACTITIONER

## 2021-11-28 PROCEDURE — 25000003 PHARM REV CODE 250: Performed by: NURSE ANESTHETIST, CERTIFIED REGISTERED

## 2021-11-28 PROCEDURE — 25000003 PHARM REV CODE 250: Performed by: NURSE PRACTITIONER

## 2021-11-28 PROCEDURE — 94799 UNLISTED PULMONARY SVC/PX: CPT

## 2021-11-28 PROCEDURE — 25000003 PHARM REV CODE 250: Performed by: PODIATRIST

## 2021-11-28 PROCEDURE — 10060 PR DRAIN SKIN ABSCESS SIMPLE: ICD-10-PCS | Mod: ,,, | Performed by: PODIATRIST

## 2021-11-28 PROCEDURE — 99214 PR OFFICE/OUTPT VISIT, EST, LEVL IV, 30-39 MIN: ICD-10-PCS | Mod: 25,,, | Performed by: PODIATRIST

## 2021-11-28 PROCEDURE — 87077 CULTURE AEROBIC IDENTIFY: CPT | Performed by: PODIATRIST

## 2021-11-28 PROCEDURE — 87186 SC STD MICRODIL/AGAR DIL: CPT | Performed by: PODIATRIST

## 2021-11-28 PROCEDURE — 37000008 HC ANESTHESIA 1ST 15 MINUTES: Performed by: PODIATRIST

## 2021-11-28 PROCEDURE — 96366 THER/PROPH/DIAG IV INF ADDON: CPT

## 2021-11-28 PROCEDURE — 87070 CULTURE OTHR SPECIMN AEROBIC: CPT | Performed by: PODIATRIST

## 2021-11-28 PROCEDURE — C9399 UNCLASSIFIED DRUGS OR BIOLOG: HCPCS | Performed by: NURSE PRACTITIONER

## 2021-11-28 PROCEDURE — 10060 I&D ABSCESS SIMPLE/SINGLE: CPT | Mod: ,,, | Performed by: PODIATRIST

## 2021-11-28 PROCEDURE — 63600175 PHARM REV CODE 636 W HCPCS: Performed by: NURSE ANESTHETIST, CERTIFIED REGISTERED

## 2021-11-28 PROCEDURE — G0378 HOSPITAL OBSERVATION PER HR: HCPCS

## 2021-11-28 PROCEDURE — 11045 PR DEB SUBQ TISSUE ADD-ON: ICD-10-PCS | Mod: ,,, | Performed by: PODIATRIST

## 2021-11-28 PROCEDURE — 71000033 HC RECOVERY, INTIAL HOUR: Performed by: PODIATRIST

## 2021-11-28 PROCEDURE — 11042 PR DEBRIDEMENT, SKIN, SUB-Q TISSUE,=<20 SQ CM: ICD-10-PCS | Mod: 59,,, | Performed by: PODIATRIST

## 2021-11-28 PROCEDURE — 36415 COLL VENOUS BLD VENIPUNCTURE: CPT | Performed by: NURSE PRACTITIONER

## 2021-11-28 PROCEDURE — 11045 DBRDMT SUBQ TISS EACH ADDL: CPT | Mod: ,,, | Performed by: PODIATRIST

## 2021-11-28 PROCEDURE — 36000705 HC OR TIME LEV I EA ADD 15 MIN: Performed by: PODIATRIST

## 2021-11-28 PROCEDURE — 94760 N-INVAS EAR/PLS OXIMETRY 1: CPT

## 2021-11-28 PROCEDURE — 80053 COMPREHEN METABOLIC PANEL: CPT | Performed by: NURSE PRACTITIONER

## 2021-11-28 RX ORDER — MIDAZOLAM HYDROCHLORIDE 1 MG/ML
INJECTION, SOLUTION INTRAMUSCULAR; INTRAVENOUS
Status: DISCONTINUED | OUTPATIENT
Start: 2021-11-28 | End: 2021-11-28

## 2021-11-28 RX ORDER — FENTANYL CITRATE 50 UG/ML
INJECTION, SOLUTION INTRAMUSCULAR; INTRAVENOUS
Status: DISCONTINUED | OUTPATIENT
Start: 2021-11-28 | End: 2021-11-28

## 2021-11-28 RX ORDER — FENTANYL CITRATE 50 UG/ML
25 INJECTION, SOLUTION INTRAMUSCULAR; INTRAVENOUS EVERY 5 MIN PRN
Status: DISCONTINUED | OUTPATIENT
Start: 2021-11-28 | End: 2021-11-29 | Stop reason: HOSPADM

## 2021-11-28 RX ORDER — METOCLOPRAMIDE HYDROCHLORIDE 5 MG/ML
10 INJECTION INTRAMUSCULAR; INTRAVENOUS EVERY 10 MIN PRN
Status: DISCONTINUED | OUTPATIENT
Start: 2021-11-28 | End: 2021-11-29 | Stop reason: HOSPADM

## 2021-11-28 RX ORDER — DOXYCYCLINE 100 MG/1
100 CAPSULE ORAL EVERY 12 HOURS
Qty: 14 CAPSULE | Refills: 0 | Status: SHIPPED | OUTPATIENT
Start: 2021-11-28 | End: 2021-12-05

## 2021-11-28 RX ORDER — DIPHENHYDRAMINE HYDROCHLORIDE 50 MG/ML
25 INJECTION INTRAMUSCULAR; INTRAVENOUS EVERY 6 HOURS PRN
Status: DISCONTINUED | OUTPATIENT
Start: 2021-11-28 | End: 2021-11-29 | Stop reason: HOSPADM

## 2021-11-28 RX ORDER — ACETAMINOPHEN 325 MG/1
650 TABLET ORAL EVERY 6 HOURS PRN
Status: DISCONTINUED | OUTPATIENT
Start: 2021-11-28 | End: 2021-11-29 | Stop reason: HOSPADM

## 2021-11-28 RX ORDER — LIDOCAINE HYDROCHLORIDE AND EPINEPHRINE 10; 10 MG/ML; UG/ML
INJECTION, SOLUTION INFILTRATION; PERINEURAL
Status: DISCONTINUED | OUTPATIENT
Start: 2021-11-28 | End: 2021-11-28 | Stop reason: HOSPADM

## 2021-11-28 RX ORDER — LIDOCAINE HYDROCHLORIDE 10 MG/ML
INJECTION, SOLUTION EPIDURAL; INFILTRATION; INTRACAUDAL; PERINEURAL
Status: DISCONTINUED | OUTPATIENT
Start: 2021-11-28 | End: 2021-11-28

## 2021-11-28 RX ORDER — ONDANSETRON 2 MG/ML
4 INJECTION INTRAMUSCULAR; INTRAVENOUS DAILY PRN
Status: DISCONTINUED | OUTPATIENT
Start: 2021-11-28 | End: 2021-11-29 | Stop reason: HOSPADM

## 2021-11-28 RX ORDER — SODIUM CHLORIDE 0.9 % (FLUSH) 0.9 %
10 SYRINGE (ML) INJECTION
Status: DISCONTINUED | OUTPATIENT
Start: 2021-11-28 | End: 2021-11-29 | Stop reason: HOSPADM

## 2021-11-28 RX ORDER — PROPOFOL 10 MG/ML
VIAL (ML) INTRAVENOUS
Status: DISCONTINUED | OUTPATIENT
Start: 2021-11-28 | End: 2021-11-28

## 2021-11-28 RX ORDER — HYDROCODONE BITARTRATE AND ACETAMINOPHEN 10; 325 MG/1; MG/1
1 TABLET ORAL EVERY 6 HOURS PRN
Qty: 28 TABLET | Refills: 0 | Status: SHIPPED | OUTPATIENT
Start: 2021-11-28 | End: 2021-12-05

## 2021-11-28 RX ORDER — KETOROLAC TROMETHAMINE 30 MG/ML
15 INJECTION, SOLUTION INTRAMUSCULAR; INTRAVENOUS EVERY 8 HOURS PRN
Status: DISCONTINUED | OUTPATIENT
Start: 2021-11-28 | End: 2021-11-29 | Stop reason: HOSPADM

## 2021-11-28 RX ADMIN — MIDAZOLAM HYDROCHLORIDE 2 MG: 1 INJECTION, SOLUTION INTRAMUSCULAR; INTRAVENOUS at 02:11

## 2021-11-28 RX ADMIN — ZINC SULFATE 220 MG (50 MG) CAPSULE 220 MG: CAPSULE at 09:11

## 2021-11-28 RX ADMIN — Medication 1000 UNITS: at 08:11

## 2021-11-28 RX ADMIN — INSULIN ASPART 6 UNITS: 100 INJECTION, SOLUTION INTRAVENOUS; SUBCUTANEOUS at 11:11

## 2021-11-28 RX ADMIN — ACETAMINOPHEN 650 MG: 325 TABLET ORAL at 01:11

## 2021-11-28 RX ADMIN — PIPERACILLIN AND TAZOBACTAM 4.5 G: 4; .5 INJECTION, POWDER, LYOPHILIZED, FOR SOLUTION INTRAVENOUS; PARENTERAL at 06:11

## 2021-11-28 RX ADMIN — OXYCODONE HYDROCHLORIDE AND ACETAMINOPHEN 500 MG: 500 TABLET ORAL at 09:11

## 2021-11-28 RX ADMIN — SODIUM CHLORIDE, POTASSIUM CHLORIDE, SODIUM LACTATE AND CALCIUM CHLORIDE: 600; 310; 30; 20 INJECTION, SOLUTION INTRAVENOUS at 02:11

## 2021-11-28 RX ADMIN — PIPERACILLIN AND TAZOBACTAM 4.5 G: 4; .5 INJECTION, POWDER, LYOPHILIZED, FOR SOLUTION INTRAVENOUS; PARENTERAL at 09:11

## 2021-11-28 RX ADMIN — METOCLOPRAMIDE 10 MG: 5 TABLET ORAL at 06:11

## 2021-11-28 RX ADMIN — INSULIN DETEMIR 16 UNITS: 100 INJECTION, SOLUTION SUBCUTANEOUS at 08:11

## 2021-11-28 RX ADMIN — ENOXAPARIN SODIUM 110 MG: 100 INJECTION, SOLUTION INTRAVENOUS; SUBCUTANEOUS at 08:11

## 2021-11-28 RX ADMIN — INSULIN ASPART 6 UNITS: 100 INJECTION, SOLUTION INTRAVENOUS; SUBCUTANEOUS at 06:11

## 2021-11-28 RX ADMIN — INSULIN ASPART 6 UNITS: 100 INJECTION, SOLUTION INTRAVENOUS; SUBCUTANEOUS at 04:11

## 2021-11-28 RX ADMIN — INSULIN DETEMIR 16 UNITS: 100 INJECTION, SOLUTION SUBCUTANEOUS at 09:11

## 2021-11-28 RX ADMIN — PIPERACILLIN AND TAZOBACTAM 4.5 G: 4; .5 INJECTION, POWDER, LYOPHILIZED, FOR SOLUTION INTRAVENOUS; PARENTERAL at 01:11

## 2021-11-28 RX ADMIN — FENTANYL CITRATE 50 MCG: 50 INJECTION, SOLUTION INTRAMUSCULAR; INTRAVENOUS at 02:11

## 2021-11-28 RX ADMIN — LIDOCAINE HYDROCHLORIDE 50 MG: 10 INJECTION, SOLUTION EPIDURAL; INFILTRATION; INTRACAUDAL; PERINEURAL at 02:11

## 2021-11-28 RX ADMIN — PROPOFOL 50 MG: 10 INJECTION, EMULSION INTRAVENOUS at 02:11

## 2021-11-28 RX ADMIN — MULTIPLE VITAMINS W/ MINERALS TAB 1 TABLET: TAB at 08:11

## 2021-11-28 RX ADMIN — INSULIN ASPART 2 UNITS: 100 INJECTION, SOLUTION INTRAVENOUS; SUBCUTANEOUS at 11:11

## 2021-11-28 RX ADMIN — INSULIN ASPART 4 UNITS: 100 INJECTION, SOLUTION INTRAVENOUS; SUBCUTANEOUS at 04:11

## 2021-11-29 VITALS
BODY MASS INDEX: 30.67 KG/M2 | OXYGEN SATURATION: 97 % | WEIGHT: 239 LBS | DIASTOLIC BLOOD PRESSURE: 97 MMHG | RESPIRATION RATE: 18 BRPM | HEIGHT: 74 IN | TEMPERATURE: 99 F | HEART RATE: 100 BPM | SYSTOLIC BLOOD PRESSURE: 151 MMHG

## 2021-11-29 DIAGNOSIS — U07.1 COVID-19 VIRUS DETECTED: ICD-10-CM

## 2021-11-29 PROBLEM — E88.09 HYPOALBUMINEMIA: Status: RESOLVED | Noted: 2021-11-27 | Resolved: 2021-11-29

## 2021-11-29 PROBLEM — R74.8 ELEVATED LIVER ENZYMES: Status: RESOLVED | Noted: 2021-11-27 | Resolved: 2021-11-29

## 2021-11-29 LAB
BASOPHILS # BLD AUTO: 0.05 K/UL (ref 0–0.2)
BASOPHILS NFR BLD: 0.4 % (ref 0–1.9)
DIFFERENTIAL METHOD: ABNORMAL
EOSINOPHIL # BLD AUTO: 0.1 K/UL (ref 0–0.5)
EOSINOPHIL NFR BLD: 0.8 % (ref 0–8)
ERYTHROCYTE [DISTWIDTH] IN BLOOD BY AUTOMATED COUNT: 16.1 % (ref 11.5–14.5)
HCT VFR BLD AUTO: 31.9 % (ref 40–54)
HGB BLD-MCNC: 9.7 G/DL (ref 14–18)
IMM GRANULOCYTES # BLD AUTO: 0.15 K/UL (ref 0–0.04)
IMM GRANULOCYTES NFR BLD AUTO: 1.2 % (ref 0–0.5)
LYMPHOCYTES # BLD AUTO: 2.8 K/UL (ref 1–4.8)
LYMPHOCYTES NFR BLD: 21.8 % (ref 18–48)
MCH RBC QN AUTO: 27.1 PG (ref 27–31)
MCHC RBC AUTO-ENTMCNC: 30.4 G/DL (ref 32–36)
MCV RBC AUTO: 89 FL (ref 82–98)
MONOCYTES # BLD AUTO: 1 K/UL (ref 0.3–1)
MONOCYTES NFR BLD: 7.5 % (ref 4–15)
NEUTROPHILS # BLD AUTO: 8.7 K/UL (ref 1.8–7.7)
NEUTROPHILS NFR BLD: 68.3 % (ref 38–73)
NRBC BLD-RTO: 0 /100 WBC
PLATELET # BLD AUTO: 392 K/UL (ref 150–450)
PLATELET BLD QL SMEAR: ABNORMAL
PMV BLD AUTO: 10.2 FL (ref 9.2–12.9)
POCT GLUCOSE: 132 MG/DL (ref 70–110)
POCT GLUCOSE: 153 MG/DL (ref 70–110)
RBC # BLD AUTO: 3.58 M/UL (ref 4.6–6.2)
WBC # BLD AUTO: 12.74 K/UL (ref 3.9–12.7)

## 2021-11-29 PROCEDURE — 36415 COLL VENOUS BLD VENIPUNCTURE: CPT | Performed by: NURSE PRACTITIONER

## 2021-11-29 PROCEDURE — G0378 HOSPITAL OBSERVATION PER HR: HCPCS

## 2021-11-29 PROCEDURE — 96376 TX/PRO/DX INJ SAME DRUG ADON: CPT

## 2021-11-29 PROCEDURE — 94761 N-INVAS EAR/PLS OXIMETRY MLT: CPT

## 2021-11-29 PROCEDURE — 63600175 PHARM REV CODE 636 W HCPCS: Performed by: NURSE PRACTITIONER

## 2021-11-29 PROCEDURE — 85025 COMPLETE CBC W/AUTO DIFF WBC: CPT | Performed by: NURSE PRACTITIONER

## 2021-11-29 PROCEDURE — C9399 UNCLASSIFIED DRUGS OR BIOLOG: HCPCS | Performed by: NURSE PRACTITIONER

## 2021-11-29 PROCEDURE — 25000003 PHARM REV CODE 250: Performed by: NURSE PRACTITIONER

## 2021-11-29 PROCEDURE — 96372 THER/PROPH/DIAG INJ SC/IM: CPT

## 2021-11-29 PROCEDURE — 94760 N-INVAS EAR/PLS OXIMETRY 1: CPT

## 2021-11-29 RX ADMIN — MULTIPLE VITAMINS W/ MINERALS TAB 1 TABLET: TAB at 08:11

## 2021-11-29 RX ADMIN — PIPERACILLIN AND TAZOBACTAM 4.5 G: 4; .5 INJECTION, POWDER, LYOPHILIZED, FOR SOLUTION INTRAVENOUS; PARENTERAL at 05:11

## 2021-11-29 RX ADMIN — Medication 1000 UNITS: at 08:11

## 2021-11-29 RX ADMIN — INSULIN DETEMIR 16 UNITS: 100 INJECTION, SOLUTION SUBCUTANEOUS at 08:11

## 2021-11-29 RX ADMIN — METOCLOPRAMIDE 10 MG: 5 TABLET ORAL at 05:11

## 2021-11-29 RX ADMIN — INSULIN ASPART 6 UNITS: 100 INJECTION, SOLUTION INTRAVENOUS; SUBCUTANEOUS at 08:11

## 2021-11-30 PROCEDURE — G0180 PR HOME HEALTH MD CERTIFICATION: ICD-10-PCS | Mod: ,,, | Performed by: FAMILY MEDICINE

## 2021-11-30 PROCEDURE — G0180 MD CERTIFICATION HHA PATIENT: HCPCS | Mod: ,,, | Performed by: FAMILY MEDICINE

## 2021-12-01 LAB — BACTERIA SPEC AEROBE CULT: ABNORMAL

## 2021-12-02 ENCOUNTER — PATIENT MESSAGE (OUTPATIENT)
Dept: PODIATRY | Facility: CLINIC | Age: 36
End: 2021-12-02
Payer: COMMERCIAL

## 2021-12-02 LAB
BACTERIA BLD CULT: NORMAL
BACTERIA BLD CULT: NORMAL

## 2021-12-03 ENCOUNTER — PATIENT MESSAGE (OUTPATIENT)
Dept: PODIATRY | Facility: CLINIC | Age: 36
End: 2021-12-03
Payer: COMMERCIAL

## 2021-12-13 ENCOUNTER — OFFICE VISIT (OUTPATIENT)
Dept: PODIATRY | Facility: CLINIC | Age: 36
End: 2021-12-13
Payer: COMMERCIAL

## 2021-12-13 VITALS — WEIGHT: 238 LBS | BODY MASS INDEX: 30.54 KG/M2 | HEIGHT: 74 IN

## 2021-12-13 DIAGNOSIS — E11.621 DIABETIC ULCER OF LEFT MIDFOOT ASSOCIATED WITH TYPE 2 DIABETES MELLITUS, WITH FAT LAYER EXPOSED: Primary | ICD-10-CM

## 2021-12-13 DIAGNOSIS — L97.422 DIABETIC ULCER OF LEFT MIDFOOT ASSOCIATED WITH TYPE 2 DIABETES MELLITUS, WITH FAT LAYER EXPOSED: Primary | ICD-10-CM

## 2021-12-13 PROCEDURE — 4010F ACE/ARB THERAPY RXD/TAKEN: CPT | Mod: CPTII,S$GLB,, | Performed by: PODIATRIST

## 2021-12-13 PROCEDURE — 99214 PR OFFICE/OUTPT VISIT, EST, LEVL IV, 30-39 MIN: ICD-10-PCS | Mod: 25,S$GLB,, | Performed by: PODIATRIST

## 2021-12-13 PROCEDURE — 4010F PR ACE/ARB THEARPY RXD/TAKEN: ICD-10-PCS | Mod: CPTII,S$GLB,, | Performed by: PODIATRIST

## 2021-12-13 PROCEDURE — 11042 PR DEBRIDEMENT, SKIN, SUB-Q TISSUE,=<20 SQ CM: ICD-10-PCS | Mod: S$GLB,,, | Performed by: PODIATRIST

## 2021-12-13 PROCEDURE — 11042 DBRDMT SUBQ TIS 1ST 20SQCM/<: CPT | Mod: S$GLB,,, | Performed by: PODIATRIST

## 2021-12-13 PROCEDURE — 99999 PR PBB SHADOW E&M-EST. PATIENT-LVL III: CPT | Mod: PBBFAC,,, | Performed by: PODIATRIST

## 2021-12-13 PROCEDURE — 99999 PR PBB SHADOW E&M-EST. PATIENT-LVL III: ICD-10-PCS | Mod: PBBFAC,,, | Performed by: PODIATRIST

## 2021-12-13 PROCEDURE — 99214 OFFICE O/P EST MOD 30 MIN: CPT | Mod: 25,S$GLB,, | Performed by: PODIATRIST

## 2021-12-14 ENCOUNTER — DOCUMENT SCAN (OUTPATIENT)
Dept: HOME HEALTH SERVICES | Facility: HOSPITAL | Age: 36
End: 2021-12-14
Payer: COMMERCIAL

## 2021-12-14 ENCOUNTER — PATIENT MESSAGE (OUTPATIENT)
Dept: PODIATRY | Facility: CLINIC | Age: 36
End: 2021-12-14
Payer: COMMERCIAL

## 2021-12-16 ENCOUNTER — EXTERNAL HOME HEALTH (OUTPATIENT)
Dept: HOME HEALTH SERVICES | Facility: HOSPITAL | Age: 36
End: 2021-12-16
Payer: COMMERCIAL

## 2021-12-17 ENCOUNTER — PATIENT MESSAGE (OUTPATIENT)
Dept: PODIATRY | Facility: CLINIC | Age: 36
End: 2021-12-17
Payer: COMMERCIAL

## 2021-12-20 ENCOUNTER — PATIENT MESSAGE (OUTPATIENT)
Dept: PODIATRY | Facility: CLINIC | Age: 36
End: 2021-12-20
Payer: COMMERCIAL

## 2021-12-29 RX ORDER — LOSARTAN POTASSIUM 25 MG/1
25 TABLET ORAL DAILY
Qty: 90 TABLET | Refills: 3 | Status: SHIPPED | OUTPATIENT
Start: 2021-12-29 | End: 2022-07-25 | Stop reason: SDUPTHER

## 2022-01-01 ENCOUNTER — PATIENT MESSAGE (OUTPATIENT)
Dept: PODIATRY | Facility: CLINIC | Age: 37
End: 2022-01-01
Payer: COMMERCIAL

## 2022-01-04 ENCOUNTER — OFFICE VISIT (OUTPATIENT)
Dept: PODIATRY | Facility: CLINIC | Age: 37
End: 2022-01-04
Payer: COMMERCIAL

## 2022-01-04 DIAGNOSIS — E11.621 DIABETIC ULCER OF LEFT MIDFOOT ASSOCIATED WITH TYPE 2 DIABETES MELLITUS, WITH FAT LAYER EXPOSED: Primary | ICD-10-CM

## 2022-01-04 DIAGNOSIS — L97.422 DIABETIC ULCER OF LEFT MIDFOOT ASSOCIATED WITH TYPE 2 DIABETES MELLITUS, WITH FAT LAYER EXPOSED: Primary | ICD-10-CM

## 2022-01-04 PROCEDURE — 1159F PR MEDICATION LIST DOCUMENTED IN MEDICAL RECORD: ICD-10-PCS | Mod: CPTII,S$GLB,, | Performed by: PODIATRIST

## 2022-01-04 PROCEDURE — 1159F MED LIST DOCD IN RCRD: CPT | Mod: CPTII,S$GLB,, | Performed by: PODIATRIST

## 2022-01-04 PROCEDURE — 99214 OFFICE O/P EST MOD 30 MIN: CPT | Mod: 25,S$GLB,, | Performed by: PODIATRIST

## 2022-01-04 PROCEDURE — 11042 PR DEBRIDEMENT, SKIN, SUB-Q TISSUE,=<20 SQ CM: ICD-10-PCS | Mod: S$GLB,,, | Performed by: PODIATRIST

## 2022-01-04 PROCEDURE — 1160F PR REVIEW ALL MEDS BY PRESCRIBER/CLIN PHARMACIST DOCUMENTED: ICD-10-PCS | Mod: CPTII,S$GLB,, | Performed by: PODIATRIST

## 2022-01-04 PROCEDURE — 99999 PR PBB SHADOW E&M-EST. PATIENT-LVL III: ICD-10-PCS | Mod: PBBFAC,,, | Performed by: PODIATRIST

## 2022-01-04 PROCEDURE — 11042 DBRDMT SUBQ TIS 1ST 20SQCM/<: CPT | Mod: S$GLB,,, | Performed by: PODIATRIST

## 2022-01-04 PROCEDURE — 99214 PR OFFICE/OUTPT VISIT, EST, LEVL IV, 30-39 MIN: ICD-10-PCS | Mod: 25,S$GLB,, | Performed by: PODIATRIST

## 2022-01-04 PROCEDURE — 1160F RVW MEDS BY RX/DR IN RCRD: CPT | Mod: CPTII,S$GLB,, | Performed by: PODIATRIST

## 2022-01-04 PROCEDURE — 99999 PR PBB SHADOW E&M-EST. PATIENT-LVL III: CPT | Mod: PBBFAC,,, | Performed by: PODIATRIST

## 2022-01-04 NOTE — PROGRESS NOTES
Subjective:       Patient ID: Kulwant Mueller Jr. is a 36 y.o. male.    Chief Complaint: Post-op Evaluation (Post op 11.28.21 I & d of left foot. Denies pain at present. )      HPI: Kulwant Mueller Jr. presents to the clinic today, for evaluation and treatment concerning an ulceration/wound/bulla(e) to the left dorsal midfoot. Patient is s/p 11/28/2021 I&D of left foot with excisional wound debridement of LLE dorsal lateral midfoot and right plantar great toe. Patient's Primary Care Provider is Dona Pineda MD. The PMHx. does include uncontrolled DMII. The wound(s) have/has been present for several weeks. Most recent local wound care w/ Santyl on the LLE. Patient does have home health services. Home Health Agency: [facility]. Did recently complete PO ABx. States better controlled finger sticks at present. Prior XR was WNL.     Hemoglobin A1C   Date Value Ref Range Status   09/22/2021 >14.0 (H) 4.0 - 5.6 % Final     Comment:     ADA Screening Guidelines:  5.7-6.4%  Consistent with prediabetes  >or=6.5%  Consistent with diabetes    High levels of fetal hemoglobin interfere with the HbA1C  assay. Heterozygous hemoglobin variants (HbS, HgC, etc)do  not significantly interfere with this assay.   However, presence of multiple variants may affect accuracy.     08/31/2018 12.7 (H) 4.0 - 5.6 % Final     Comment:     ADA Screening Guidelines:  5.7-6.4%  Consistent with prediabetes  >or=6.5%  Consistent with diabetes  High levels of fetal hemoglobin interfere with the HbA1C  assay. Heterozygous hemoglobin variants (HbS, HgC, etc)do  not significantly interfere with this assay.   However, presence of multiple variants may affect accuracy.     01/11/2018 9.1 (H) 4.0 - 5.6 % Final     Comment:     According to ADA guidelines, hemoglobin A1c <7.0% represents  optimal control in non-pregnant diabetic patients. Different  metrics may apply to specific patient populations.   Standards of Medical Care in Diabetes-2016.  For the  purpose of screening for the presence of diabetes:  <5.7%     Consistent with the absence of diabetes  5.7-6.4%  Consistent with increasing risk for diabetes   (prediabetes)  >or=6.5%  Consistent with diabetes  Currently, no consensus exists for use of hemoglobin A1c  for diagnosis of diabetes for children.  This Hemoglobin A1c assay has significant interference with fetal   hemoglobin   (HbF). The results are invalid for patients with abnormal amounts of   HbF,   including those with known Hereditary Persistence   of Fetal Hemoglobin. Heterozygous hemoglobin variants (HbAS, HbAC,   HbAD, HbAE, HbA2) do not significantly interfere with this assay;   however, presence of multiple variants in a sample may impact the %   interference.         Review of patient's allergies indicates:   Allergen Reactions    Heparin analogues Nausea And Vomiting       Past Medical History:   Diagnosis Date    Anticoagulant long-term use     Diabetes mellitus     Hypertension     May-Thurner syndrome        Family History   Problem Relation Age of Onset    Cancer Mother     Cancer Paternal Uncle     Cancer Paternal Grandfather     Irritable bowel syndrome Paternal Grandfather     Cancer Maternal Grandfather     Colon cancer Neg Hx     Esophageal cancer Neg Hx     Rectal cancer Neg Hx     Stomach cancer Neg Hx     Ulcerative colitis Neg Hx     Crohn's disease Neg Hx     Celiac disease Neg Hx        Social History     Socioeconomic History    Marital status:    Tobacco Use    Smoking status: Former Smoker     Packs/day: 0.00    Smokeless tobacco: Former User    Tobacco comment: occasionally    Substance and Sexual Activity    Alcohol use: Yes     Comment: occ    Drug use: No       Past Surgical History:   Procedure Laterality Date    APPENDECTOMY      INCISION AND DRAINAGE FOOT Left 11/28/2021    Procedure: INCISION AND DRAINAGE, FOOT;  Surgeon: Bj Alicea DPM;  Location: [facility];  Service: Podiatry;   Laterality: Left;    stents in bilateral legs         Review of Systems   Constitutional: Negative for chills, fatigue and fever.   HENT: Negative for hearing loss.    Eyes: Negative for photophobia and visual disturbance.   Respiratory: Negative for cough, chest tightness, shortness of breath and wheezing.    Cardiovascular: Negative for chest pain and palpitations.   Gastrointestinal: Negative for constipation, diarrhea, nausea and vomiting.   Endocrine: Negative for cold intolerance and heat intolerance.   Genitourinary: Negative for flank pain.   Musculoskeletal: Negative for neck pain and neck stiffness.   Skin: Positive for wound. Negative for color change.   Neurological: Negative for light-headedness and headaches.   Psychiatric/Behavioral: Negative for sleep disturbance.          Objective:   There were no vitals taken for this visit.                LOWER EXTREMITY PHYSICAL EXAMINATION  DERMATOLOGY: Skin is supple, dry and intact. Ulceration, dorsal lateral left midfoot. The area measures approximately 4.75cm x 3.75 cm without appreciable depth. Granulation tissues are noted.  No drainage.  No malodor.    VASCULAR: The left dorsalis pedis pulse is 2/4 and the posterior tibial pulse is 1/4. Hair growth is noted on the dorsal foot and digits. Proximal to distal, warm to warm. Capillary refill time is WNL at less than 3s.    NEUROLOGY: Protective sensation is not intact to the left plantar surfaces of the foot and digits, as the patient has no sensation/detection at greater than 4 distinct points of contact with 5.07 [place] monofilament. Sensation to light touch is intact on the right foot. Proprioception is intact. Sensation to pin prick is reduced to absent. Vibratory sensation is diminished.    Assessment:     1. Diabetic ulcer of left midfoot associated with type 2 diabetes mellitus, with fat layer exposed    2. Uncontrolled type 2 diabetes mellitus with complication        Plan:     Diabetic  ulcer of left midfoot associated with type 2 diabetes mellitus, with fat layer exposed  -     SUBSEQUENT HOME HEALTH ORDERS  -     Skin Substitute Authorization    Uncontrolled type 2 diabetes mellitus with complication      Thorough discussion is had with the patient today, concerning the diagnosis, its etiology, and the treatment algorithm at present.     The wound was surgically debrided after adequate prep with alcohol and/or betadine paint. Excisional wound debridement was performed using sharp #10/#15 blade/rounded scalpel and tissue nipper, with removal of all non-viable skin and soft tissues; necrotic skin/tissue formation. The woundbase/wound bed was also debrided to encourage bleeding as to promote/stimulate healing. Debridement was excisional and included epidermal, dermal and subcutaneous tissues. Post debridement measurements are as above. Hemostasis was achieved. Patient tolerated procedure well and without complications. Local woundcare with wet-2-dry dressings and bandage thereafter.     Continue [facility] for wound care with Santyl wet-2-dry.     Sx Shoe.    Strict DMII control.    Will attempt allograft authorization.             Future Appointments   Date Time Provider Department Center   1/7/2022  7:40 AM Rajwinder Sigala PA-C [facility] [place]   1/21/2022  4:00 PM [facility]

## 2022-01-07 ENCOUNTER — PATIENT MESSAGE (OUTPATIENT)
Dept: GASTROENTEROLOGY | Facility: CLINIC | Age: 37
End: 2022-01-07

## 2022-01-07 ENCOUNTER — OFFICE VISIT (OUTPATIENT)
Dept: GASTROENTEROLOGY | Facility: CLINIC | Age: 37
End: 2022-01-07
Payer: COMMERCIAL

## 2022-01-07 ENCOUNTER — HOSPITAL ENCOUNTER (OUTPATIENT)
Dept: RADIOLOGY | Facility: HOSPITAL | Age: 37
Discharge: HOME | End: 2022-01-07
Attending: PHYSICIAN ASSISTANT
Payer: COMMERCIAL

## 2022-01-07 VITALS
SYSTOLIC BLOOD PRESSURE: 138 MMHG | DIASTOLIC BLOOD PRESSURE: 100 MMHG | HEIGHT: 74 IN | BODY MASS INDEX: 30.44 KG/M2 | WEIGHT: 237.19 LBS

## 2022-01-07 DIAGNOSIS — K59.00 CONSTIPATION, UNSPECIFIED CONSTIPATION TYPE: ICD-10-CM

## 2022-01-07 DIAGNOSIS — R11.2 NAUSEA AND VOMITING, INTRACTABILITY OF VOMITING NOT SPECIFIED, UNSPECIFIED VOMITING TYPE: Primary | ICD-10-CM

## 2022-01-07 DIAGNOSIS — R11.2 NAUSEA AND VOMITING, INTRACTABILITY OF VOMITING NOT SPECIFIED, UNSPECIFIED VOMITING TYPE: ICD-10-CM

## 2022-01-07 DIAGNOSIS — K31.84 DIABETIC GASTROPARESIS ASSOCIATED WITH TYPE 2 DIABETES MELLITUS: ICD-10-CM

## 2022-01-07 DIAGNOSIS — E11.43 DIABETIC GASTROPARESIS ASSOCIATED WITH TYPE 2 DIABETES MELLITUS: ICD-10-CM

## 2022-01-07 PROCEDURE — 1159F MED LIST DOCD IN RCRD: CPT | Mod: CPTII,S$GLB,, | Performed by: PHYSICIAN ASSISTANT

## 2022-01-07 PROCEDURE — 3080F DIAST BP >= 90 MM HG: CPT | Mod: CPTII,S$GLB,, | Performed by: PHYSICIAN ASSISTANT

## 2022-01-07 PROCEDURE — 3075F PR MOST RECENT SYSTOLIC BLOOD PRESS GE 130-139MM HG: ICD-10-PCS | Mod: CPTII,S$GLB,, | Performed by: PHYSICIAN ASSISTANT

## 2022-01-07 PROCEDURE — 99999 PR PBB SHADOW E&M-EST. PATIENT-LVL III: ICD-10-PCS | Mod: PBBFAC,,, | Performed by: PHYSICIAN ASSISTANT

## 2022-01-07 PROCEDURE — 3075F SYST BP GE 130 - 139MM HG: CPT | Mod: CPTII,S$GLB,, | Performed by: PHYSICIAN ASSISTANT

## 2022-01-07 PROCEDURE — 3080F PR MOST RECENT DIASTOLIC BLOOD PRESSURE >= 90 MM HG: ICD-10-PCS | Mod: CPTII,S$GLB,, | Performed by: PHYSICIAN ASSISTANT

## 2022-01-07 PROCEDURE — 99203 PR OFFICE/OUTPT VISIT, NEW, LEVL III, 30-44 MIN: ICD-10-PCS | Mod: S$GLB,,, | Performed by: PHYSICIAN ASSISTANT

## 2022-01-07 PROCEDURE — 74019 RADEX ABDOMEN 2 VIEWS: CPT | Mod: 26,,, | Performed by: RADIOLOGY

## 2022-01-07 PROCEDURE — 99999 PR PBB SHADOW E&M-EST. PATIENT-LVL III: CPT | Mod: PBBFAC,,, | Performed by: PHYSICIAN ASSISTANT

## 2022-01-07 PROCEDURE — 99203 OFFICE O/P NEW LOW 30 MIN: CPT | Mod: S$GLB,,, | Performed by: PHYSICIAN ASSISTANT

## 2022-01-07 PROCEDURE — 74019 XR ABDOMEN FLAT AND ERECT: ICD-10-PCS | Mod: 26,,, | Performed by: RADIOLOGY

## 2022-01-07 PROCEDURE — 1159F PR MEDICATION LIST DOCUMENTED IN MEDICAL RECORD: ICD-10-PCS | Mod: CPTII,S$GLB,, | Performed by: PHYSICIAN ASSISTANT

## 2022-01-07 PROCEDURE — 3008F BODY MASS INDEX DOCD: CPT | Mod: CPTII,S$GLB,, | Performed by: PHYSICIAN ASSISTANT

## 2022-01-07 PROCEDURE — 74019 RADEX ABDOMEN 2 VIEWS: CPT | Mod: TC

## 2022-01-07 PROCEDURE — 3008F PR BODY MASS INDEX (BMI) DOCUMENTED: ICD-10-PCS | Mod: CPTII,S$GLB,, | Performed by: PHYSICIAN ASSISTANT

## 2022-01-07 RX ORDER — PROMETHAZINE HYDROCHLORIDE 25 MG/1
25 SUPPOSITORY RECTAL EVERY 6 HOURS PRN
Qty: 15 SUPPOSITORY | Refills: 0 | Status: SHIPPED | OUTPATIENT
Start: 2022-01-07 | End: 2022-03-11

## 2022-01-07 NOTE — PROGRESS NOTES
Clinic Consult:  [facility] Consultation Note    Reason for Consult:  The primary encounter diagnosis was Nausea and vomiting, intractability of vomiting not specified, unspecified vomiting type. Diagnoses of Diabetic gastroparesis associated with type 2 diabetes mellitus and Constipation, unspecified constipation type were also pertinent to this visit.    PCP: Dona Pineda   No address on file    CC: Constipation and Emesis      HPI:  This is a 36 y.o. male with history of DM II, foot ulcer, recurrent nausea/vomiting here for evaluation of the above. Reports nausea/vomiting over the past week. Unable to tolerate anything by mouth. Reports history of gastroparesis, although no GES completed upon chart review. Takes Reglan regularly which is typically helpful. Has been to the ED a few times over the past week for same complaints. Has Zofran at home, but has been prescribed phenergan as well. Had EGD in 2017 for similar complaint with food noted in the stomach.     Unable to count the amount of times he is vomiting. Ate [employer] right before this started last Wednesday. No bowel movement since last Wednesday. Nausea and vomiting resolves with taking Phenergan but he is not taking regularly per his wife.     Constipation noted on CT in the ER. Does not feel constipated. Last bowel movement was last Wednesday. No abdominal pain. Reports a lot of gas and belching. No bleeding.     Elevated BP today - wife reports it has been high. Was elevated in his last ED visit as well. Denies dizziness, changes in vision, headache. Wife has BP cuff at home to monitor.      Review of Systems   Constitutional: Positive for appetite change and fatigue. Negative for chills, diaphoresis and fever.   HENT: Negative for trouble swallowing.         Reports a lot of mucus in his throat   Respiratory: Negative for cough and shortness of breath.    Cardiovascular: Negative for chest pain.   Gastrointestinal: Positive for  constipation, nausea and vomiting. Negative for abdominal distention, abdominal pain, blood in stool and diarrhea.   Skin: Negative for color change.   Neurological: Negative for dizziness, syncope, light-headedness and headaches.   Psychiatric/Behavioral: Positive for dysphoric mood.        Medical History:   Past Medical History:   Diagnosis Date    Anticoagulant long-term use     Diabetes mellitus     Hypertension     May-Thurner syndrome        Surgical History:   Past Surgical History:   Procedure Laterality Date    APPENDECTOMY      INCISION AND DRAINAGE FOOT Left 11/28/2021    Procedure: INCISION AND DRAINAGE, FOOT;  Surgeon: Bj Alicea DPM;  Location: [facility];  Service: Podiatry;  Laterality: Left;    stents in bilateral legs         Family History:    Family History   Problem Relation Age of Onset    Cancer Mother     Cancer Paternal Uncle     Cancer Paternal Grandfather     Irritable bowel syndrome Paternal Grandfather     Cancer Maternal Grandfather     Colon cancer Neg Hx     Esophageal cancer Neg Hx     Rectal cancer Neg Hx     Stomach cancer Neg Hx     Ulcerative colitis Neg Hx     Crohn's disease Neg Hx     Celiac disease Neg Hx        Social History:   Social History     Socioeconomic History    Marital status:    Tobacco Use    Smoking status: Former Smoker     Packs/day: 0.00    Smokeless tobacco: Former User    Tobacco comment: occasionally    Substance and Sexual Activity    Alcohol use: Yes     Comment: occ    Drug use: No       Allergies:   Review of patient's allergies indicates:   Allergen Reactions    Heparin analogues Nausea And Vomiting       Home Medications:   Current Outpatient Medications on File Prior to Visit   Medication Sig Dispense Refill    collagenase (SANTYL) ointment Apply topically once daily. 90 g 1    insulin lispro 100 unit/mL pen Inject 30 units under the skin with meals (Patient taking differently: Inject 30 Units into the skin 3  "(three) times daily.) 30 mL 3    losartan (COZAAR) 25 MG tablet Take 1 tablet (25 mg total) by mouth once daily. 90 tablet 3    metoclopramide HCl (REGLAN) 10 MG tablet TAKE 1 TABLET(10 MG) BY MOUTH THREE TIMES DAILY BEFORE MEALS 90 tablet 11    pen needle, diabetic (BD ULTRA-FINE DIYA PEN NEEDLE) 32 gauge x 5/32" Ndle Use as directed four times daily  before meals and every night at bedtime 100 each 3    rivaroxaban (XARELTO) 20 mg Tab Take 1 tablet (20 mg total) by mouth daily with dinner or evening meal. 90 tablet 3    TRESIBA FLEXTOUCH U-200 200 unit/mL (3 mL) InPn INJECT 50 UNITS UNDER THE SKIN D  5     No current facility-administered medications on file prior to visit.       Physical Exam:  BP (!) 138/100   Ht 6' 2" (1.88 m)   Wt 107.6 kg (237 lb 3.4 oz)   BMI 30.46 kg/m²   Body mass index is 30.46 kg/m².  Physical Exam  Constitutional:       Appearance: Normal appearance. He is ill-appearing. He is not toxic-appearing or diaphoretic.      Comments: Patient continuously spitting on exam. Denies dysphagia, reports he is spitting due to excess mucus.    HENT:      Head: Normocephalic and atraumatic.   Eyes:      General: No scleral icterus.     Extraocular Movements: Extraocular movements intact.   Cardiovascular:      Rate and Rhythm: Normal rate and regular rhythm.   Pulmonary:      Effort: Pulmonary effort is normal. No respiratory distress.   Abdominal:      General: Bowel sounds are normal. There is no distension.      Palpations: Abdomen is soft.      Tenderness: There is no abdominal tenderness. There is no guarding.      Comments: One episode of dry heaving during exam.   Musculoskeletal:         General: Normal range of motion.      Cervical back: Normal range of motion.   Skin:     General: Skin is warm and dry.      Coloration: Skin is not jaundiced or pale.   Neurological:      General: No focal deficit present.      Mental Status: He is alert and oriented to person, place, and time. "           Labs: Pertinent labs reviewed.  Endoscopy: 2017  CRC Screening: --  Anticoagulation: Xarelto    Assessment:  1. Nausea and vomiting, intractability of vomiting not specified, unspecified vomiting type    2. Diabetic gastroparesis associated with type 2 diabetes mellitus    3. Constipation, unspecified constipation type         Recommendations:  -Recommend using phenergan suppositories as prescribed, as this medication resolves symptoms.  -[place] diet. Advance as tolerated.  -Will need GES  -Constipation likely contributing.  -Xray today to ensure no obstruction prior to clearing bowels.  -CBC, CMP  -ED if symptoms worsen.    Nausea and vomiting, intractability of vomiting not specified, unspecified vomiting type  -     CBC Auto Differential; Future; Expected date: 01/07/2022  -     Comprehensive Metabolic Panel; Future; Expected date: 01/07/2022  -     X-Ray Abdomen Flat And Erect; Future; Expected date: 01/07/2022  -     promethazine (PHENERGAN) 25 MG suppository; Place 1 suppository (25 mg total) rectally every 6 (six) hours as needed for Nausea.  Dispense: 15 suppository; Refill: 0  -     NM Gastric Emptying; Future; Expected date: 01/07/2022    Diabetic gastroparesis associated with type 2 diabetes mellitus  -     CBC Auto Differential; Future; Expected date: 01/07/2022  -     Comprehensive Metabolic Panel; Future; Expected date: 01/07/2022  -     X-Ray Abdomen Flat And Erect; Future; Expected date: 01/07/2022  -     promethazine (PHENERGAN) 25 MG suppository; Place 1 suppository (25 mg total) rectally every 6 (six) hours as needed for Nausea.  Dispense: 15 suppository; Refill: 0  -     NM Gastric Emptying; Future; Expected date: 01/07/2022    Constipation, unspecified constipation type  -     CBC Auto Differential; Future; Expected date: 01/07/2022  -     Comprehensive Metabolic Panel; Future; Expected date: 01/07/2022  -     X-Ray Abdomen Flat And Erect; Future; Expected date: 01/07/2022  -      promethazine (PHENERGAN) 25 MG suppository; Place 1 suppository (25 mg total) rectally every 6 (six) hours as needed for Nausea.  Dispense: 15 suppository; Refill: 0  -     NM Gastric Emptying; Future; Expected date: 01/07/2022        No follow-ups on file.    Thank you so much for allowing me to participate in the care of Kulwant Sigala PA-C

## 2022-01-09 ENCOUNTER — PATIENT MESSAGE (OUTPATIENT)
Dept: PODIATRY | Facility: CLINIC | Age: 37
End: 2022-01-09
Payer: COMMERCIAL

## 2022-01-10 ENCOUNTER — DOCUMENT SCAN (OUTPATIENT)
Dept: HOME HEALTH SERVICES | Facility: HOSPITAL | Age: 37
End: 2022-01-10
Payer: COMMERCIAL

## 2022-01-10 ENCOUNTER — PATIENT MESSAGE (OUTPATIENT)
Dept: GASTROENTEROLOGY | Facility: CLINIC | Age: 37
End: 2022-01-10
Payer: COMMERCIAL

## 2022-01-11 ENCOUNTER — DOCUMENT SCAN (OUTPATIENT)
Dept: HOME HEALTH SERVICES | Facility: HOSPITAL | Age: 37
End: 2022-01-11
Payer: COMMERCIAL

## 2022-01-17 ENCOUNTER — PATIENT MESSAGE (OUTPATIENT)
Dept: GASTROENTEROLOGY | Facility: CLINIC | Age: 37
End: 2022-01-17
Payer: COMMERCIAL

## 2022-01-19 ENCOUNTER — HOSPITAL ENCOUNTER (OUTPATIENT)
Dept: RADIOLOGY | Facility: HOSPITAL | Age: 37
Discharge: HOME | End: 2022-01-19
Attending: PHYSICIAN ASSISTANT
Payer: COMMERCIAL

## 2022-01-19 ENCOUNTER — PATIENT MESSAGE (OUTPATIENT)
Dept: GASTROENTEROLOGY | Facility: CLINIC | Age: 37
End: 2022-01-19
Payer: COMMERCIAL

## 2022-01-19 DIAGNOSIS — K59.00 CONSTIPATION, UNSPECIFIED CONSTIPATION TYPE: ICD-10-CM

## 2022-01-19 DIAGNOSIS — R11.2 NAUSEA AND VOMITING, INTRACTABILITY OF VOMITING NOT SPECIFIED, UNSPECIFIED VOMITING TYPE: ICD-10-CM

## 2022-01-19 DIAGNOSIS — E11.43 DIABETIC GASTROPARESIS ASSOCIATED WITH TYPE 2 DIABETES MELLITUS: ICD-10-CM

## 2022-01-19 DIAGNOSIS — K31.84 DIABETIC GASTROPARESIS ASSOCIATED WITH TYPE 2 DIABETES MELLITUS: ICD-10-CM

## 2022-01-19 PROCEDURE — 78264 GASTRIC EMPTYING IMG STUDY: CPT | Mod: 26,,, | Performed by: RADIOLOGY

## 2022-01-19 PROCEDURE — A9541 TC99M SULFUR COLLOID: HCPCS

## 2022-01-19 PROCEDURE — 78264 NM GASTRIC EMPTYING: ICD-10-PCS | Mod: 26,,, | Performed by: RADIOLOGY

## 2022-01-20 ENCOUNTER — OFFICE VISIT (OUTPATIENT)
Dept: PODIATRY | Facility: CLINIC | Age: 37
End: 2022-01-20
Payer: COMMERCIAL

## 2022-01-20 ENCOUNTER — OFFICE VISIT (OUTPATIENT)
Dept: GASTROENTEROLOGY | Facility: CLINIC | Age: 37
End: 2022-01-20
Payer: COMMERCIAL

## 2022-01-20 VITALS — WEIGHT: 237.19 LBS | HEIGHT: 74 IN | BODY MASS INDEX: 30.44 KG/M2

## 2022-01-20 DIAGNOSIS — E11.43 DIABETIC GASTROPARESIS ASSOCIATED WITH TYPE 2 DIABETES MELLITUS: ICD-10-CM

## 2022-01-20 DIAGNOSIS — L97.422 DIABETIC ULCER OF LEFT MIDFOOT ASSOCIATED WITH TYPE 2 DIABETES MELLITUS, WITH FAT LAYER EXPOSED: Primary | ICD-10-CM

## 2022-01-20 DIAGNOSIS — K31.84 DIABETIC GASTROPARESIS ASSOCIATED WITH TYPE 2 DIABETES MELLITUS: ICD-10-CM

## 2022-01-20 DIAGNOSIS — E11.621 DIABETIC ULCER OF LEFT MIDFOOT ASSOCIATED WITH TYPE 2 DIABETES MELLITUS, WITH FAT LAYER EXPOSED: Primary | ICD-10-CM

## 2022-01-20 DIAGNOSIS — R11.2 NAUSEA AND VOMITING, INTRACTABILITY OF VOMITING NOT SPECIFIED, UNSPECIFIED VOMITING TYPE: Primary | ICD-10-CM

## 2022-01-20 PROCEDURE — 99999 PR PBB SHADOW E&M-EST. PATIENT-LVL III: ICD-10-PCS | Mod: PBBFAC,,, | Performed by: PODIATRIST

## 2022-01-20 PROCEDURE — 1159F PR MEDICATION LIST DOCUMENTED IN MEDICAL RECORD: ICD-10-PCS | Mod: CPTII,S$GLB,, | Performed by: PODIATRIST

## 2022-01-20 PROCEDURE — 15275 PR SKIN SUB GRAFT FACE/NK/HF/G UP TO 100 SQCM: ICD-10-PCS | Mod: S$GLB,,, | Performed by: PODIATRIST

## 2022-01-20 PROCEDURE — 1159F MED LIST DOCD IN RCRD: CPT | Mod: CPTII,S$GLB,, | Performed by: PODIATRIST

## 2022-01-20 PROCEDURE — 99213 OFFICE O/P EST LOW 20 MIN: CPT | Mod: 95,,, | Performed by: PHYSICIAN ASSISTANT

## 2022-01-20 PROCEDURE — 1160F RVW MEDS BY RX/DR IN RCRD: CPT | Mod: CPTII,S$GLB,, | Performed by: PODIATRIST

## 2022-01-20 PROCEDURE — 99999 PR PBB SHADOW E&M-EST. PATIENT-LVL III: CPT | Mod: PBBFAC,,, | Performed by: PODIATRIST

## 2022-01-20 PROCEDURE — 3008F BODY MASS INDEX DOCD: CPT | Mod: CPTII,S$GLB,, | Performed by: PODIATRIST

## 2022-01-20 PROCEDURE — 15275 SKIN SUB GRAFT FACE/NK/HF/G: CPT | Mod: S$GLB,,, | Performed by: PODIATRIST

## 2022-01-20 PROCEDURE — 99499 UNLISTED E&M SERVICE: CPT | Mod: S$GLB,,, | Performed by: PODIATRIST

## 2022-01-20 PROCEDURE — 99213 PR OFFICE/OUTPT VISIT, EST, LEVL III, 20-29 MIN: ICD-10-PCS | Mod: 95,,, | Performed by: PHYSICIAN ASSISTANT

## 2022-01-20 PROCEDURE — 3008F PR BODY MASS INDEX (BMI) DOCUMENTED: ICD-10-PCS | Mod: CPTII,S$GLB,, | Performed by: PODIATRIST

## 2022-01-20 PROCEDURE — 1160F PR REVIEW ALL MEDS BY PRESCRIBER/CLIN PHARMACIST DOCUMENTED: ICD-10-PCS | Mod: CPTII,S$GLB,, | Performed by: PODIATRIST

## 2022-01-20 PROCEDURE — 99499 NO LOS: ICD-10-PCS | Mod: S$GLB,,, | Performed by: PODIATRIST

## 2022-01-20 NOTE — PROGRESS NOTES
Subjective:      Patient ID: Kulwant Mueller Jr. is a 36 y.o. male.    Chief Complaint: No chief complaint on file.  The patient location is: home  The chief complaint leading to consultation is: follow up nausea/vomiting    Visit type: audiovisual    Face to Face time with patient: 15 mins  20 minutes of total time spent on the encounter, which includes face to face time and non-face to face time preparing to see the patient (eg, review of tests), Obtaining and/or reviewing separately obtained history, Documenting clinical information in the electronic or other health record, Independently interpreting results (not separately reported) and communicating results to the patient/family/caregiver, or Care coordination (not separately reported).     Each patient to whom he or she provides medical services by telemedicine is:  (1) informed of the relationship between the physician and patient and the respective role of any other health care provider with respect to management of the patient; and (2) notified that he or she may decline to receive medical services by telemedicine and may withdraw from such care at any time.    Notes:   HPI:  Patient reports today via telemedicine for follow up of the above. Last visit he reported ongoing nausea and vomiting. He is diabetic and believed to have gastroparesis. However, he has not completed a GES. This was ordered, but patient unable to tolerate test, as he was vomiting. He is prescribed Reglan TID and was also given phenergan suppositories. He was not taking medication regularly.  Today he reports with continued nausea and vomiting. Unable to hold anything down. Wife specifies that symptoms are not daily - some days he is ok while others he is symptomatic. Patient reports he has only been taking Reglan maybe once per day because he didn't know he needed to take it 3x per day. Also reports he just started back taking the phenergan yesterday - only took 1. Reports this did not  help symptoms. On days he feels well, he eats a couple of meals and holds it down. On days where he is vomiting,m he may be donaldo to only hold down a smoothie.   Last EGD in 2017 with food remnants noted. No outlet obstruction.    Review of Systems   Constitutional: Positive for appetite change and fatigue. Negative for chills, diaphoresis and fever.   HENT: Negative for trouble swallowing.    Respiratory: Negative for cough and shortness of breath.    Cardiovascular: Negative for chest pain.   Gastrointestinal: Positive for nausea and vomiting. Negative for abdominal distention, abdominal pain and blood in stool.   Neurological: Positive for weakness. Negative for syncope.   Psychiatric/Behavioral: Positive for dysphoric mood. Negative for confusion. The patient is nervous/anxious.        Medical History: Reviewed    Social History: Reviewed    Allergies: Reviewed    Objective:     Physical Exam  Constitutional:       Appearance: Normal appearance. He is ill-appearing.   Pulmonary:      Effort: Pulmonary effort is normal. No respiratory distress.   Neurological:      Mental Status: He is alert and oriented to person, place, and time.         Assessment:     1. Nausea and vomiting, intractability of vomiting not specified, unspecified vomiting type    2. Diabetic gastroparesis associated with type 2 diabetes mellitus        Plan:     -Spoke with patient and wife. Explained that if this is gastroparesis, which I suspect it is, he needs to be taking the Reglan as prescribed TID. In addition, he needs to be taking antiemetics regularly in order to try to keep the vomiting controlled. Can always try trial of erythromycin if Reglan not helpful.  -Schedule for EGD for further evaluation, as last scope was in 2017.  -GES unable to be performed due to vomiting.  -Small, soft meals. [place] diet, chew well. Prefer liquids.  -Instructed ED if symptoms worsen, as he may need fluids if symptoms continue. Wife verbalizes  understanding and reports she is watching him closely and will being him to the ED if needed.    Diagnoses and all orders for this visit:    Nausea and vomiting, intractability of vomiting not specified, unspecified vomiting type  -     Case Request Endoscopy: EGD (ESOPHAGOGASTRODUODENOSCOPY)  -     COVID-19 Routine Screening; Future    Diabetic gastroparesis associated with type 2 diabetes mellitus  -     Case Request Endoscopy: EGD (ESOPHAGOGASTRODUODENOSCOPY)  -     COVID-19 Routine Screening; Future        No follow-ups on file.    Thank you for the opportunity to participate in the care of this patient.   Rajwinder Sigala PA-C.

## 2022-01-20 NOTE — PROGRESS NOTES
Subjective:       Patient ID: Kulwant Mueller Jr. is a 36 y.o. male.    Chief Complaint: skin graft (Left midfoot skin graft application, 0/10 pain at present, pcp  last seen 09/28/2021, currently wearing slip ons w/socks)    HPI: Patient presents to the clinic today, for application of Affinity allograft to an ulceration located at the dorsal lateral left midfoot. Patient's Primary Care Provider is Dona Pineda MD. The PMHx. does include uncontrolled. To this juncture, patient has had 1 allograft applications.    Hemoglobin A1C   Date Value Ref Range Status   09/22/2021 >14.0 (H) 4.0 - 5.6 % Final     Comment:     ADA Screening Guidelines:  5.7-6.4%  Consistent with prediabetes  >or=6.5%  Consistent with diabetes    High levels of fetal hemoglobin interfere with the HbA1C  assay. Heterozygous hemoglobin variants (HbS, HgC, etc)do  not significantly interfere with this assay.   However, presence of multiple variants may affect accuracy.     08/31/2018 12.7 (H) 4.0 - 5.6 % Final     Comment:     ADA Screening Guidelines:  5.7-6.4%  Consistent with prediabetes  >or=6.5%  Consistent with diabetes  High levels of fetal hemoglobin interfere with the HbA1C  assay. Heterozygous hemoglobin variants (HbS, HgC, etc)do  not significantly interfere with this assay.   However, presence of multiple variants may affect accuracy.     01/11/2018 9.1 (H) 4.0 - 5.6 % Final     Comment:     According to ADA guidelines, hemoglobin A1c <7.0% represents  optimal control in non-pregnant diabetic patients. Different  metrics may apply to specific patient populations.   Standards of Medical Care in Diabetes-2016.  For the purpose of screening for the presence of diabetes:  <5.7%     Consistent with the absence of diabetes  5.7-6.4%  Consistent with increasing risk for diabetes   (prediabetes)  >or=6.5%  Consistent with diabetes  Currently, no consensus exists for use of hemoglobin A1c  for diagnosis of diabetes for  children.  This Hemoglobin A1c assay has significant interference with fetal   hemoglobin   (HbF). The results are invalid for patients with abnormal amounts of   HbF,   including those with known Hereditary Persistence   of Fetal Hemoglobin. Heterozygous hemoglobin variants (HbAS, HbAC,   HbAD, HbAE, HbA2) do not significantly interfere with this assay;   however, presence of multiple variants in a sample may impact the %   interference.         Review of patient's allergies indicates:   Allergen Reactions    Heparin analogues Nausea And Vomiting       Past Medical History:   Diagnosis Date    Anticoagulant long-term use     Diabetes mellitus     Hypertension     May-Thurner syndrome        Family History   Problem Relation Age of Onset    Cancer Mother     Cancer Paternal Uncle     Cancer Paternal Grandfather     Irritable bowel syndrome Paternal Grandfather     Cancer Maternal Grandfather     Colon cancer Neg Hx     Esophageal cancer Neg Hx     Rectal cancer Neg Hx     Stomach cancer Neg Hx     Ulcerative colitis Neg Hx     Crohn's disease Neg Hx     Celiac disease Neg Hx        Social History     Socioeconomic History    Marital status:    Tobacco Use    Smoking status: Former Smoker     Packs/day: 0.00    Smokeless tobacco: Former User    Tobacco comment: occasionally    Substance and Sexual Activity    Alcohol use: Yes     Comment: occ    Drug use: No       Past Surgical History:   Procedure Laterality Date    APPENDECTOMY      INCISION AND DRAINAGE FOOT Left 11/28/2021    Procedure: INCISION AND DRAINAGE, FOOT;  Surgeon: Bj Alicea DPM;  Location: [facility];  Service: Podiatry;  Laterality: Left;    stents in bilateral legs         Review of Systems   Constitutional: Negative for chills, fatigue and fever.   HENT: Negative for hearing loss.    Eyes: Negative for photophobia and visual disturbance.   Respiratory: Negative for cough, chest tightness, shortness of breath and  "wheezing.    Cardiovascular: Negative for chest pain and palpitations.   Gastrointestinal: Negative for constipation, diarrhea, nausea and vomiting.   Endocrine: Negative for cold intolerance and heat intolerance.   Genitourinary: Negative for flank pain.   Musculoskeletal: Negative for neck pain and neck stiffness.   Skin: Positive for wound. Negative for color change.   Neurological: Positive for numbness. Negative for light-headedness and headaches.   Psychiatric/Behavioral: Negative for sleep disturbance.          Objective:   Ht 6' 2" (1.88 m)   Wt 107.6 kg (237 lb 3.4 oz)   BMI 30.46 kg/m²             LOWER EXTREMITY PHYSICAL EXAMINATION    DERMATOLOGY: The wound measures 5.0cm x 2.5cm x 0.10cm. No drainage is noted. Granular tissues are noted.     Assessment:     1. Diabetic ulcer of left midfoot associated with type 2 diabetes mellitus, with fat layer exposed    2. Uncontrolled type 2 diabetes mellitus with complication        Plan:     Diabetic ulcer of left midfoot associated with type 2 diabetes mellitus, with fat layer exposed  -     SUBSEQUENT HOME HEALTH ORDERS    Uncontrolled type 2 diabetes mellitus with complication      The wound was surgically debrided after adequate prep with alcohol and/or betadine paint. Excisional wound debridement was performed using sharp #10/#15 blade/rounded scalpel and tissue nipper, with removal of all non-viable skin and soft tissues; necrotic skin/tissue formation. The woundbase/wound bed was also debrided to encourage bleeding as to promote/stimulate healing. Debridement was excisional and included epidermal, dermal and subcutaneous tissues. Post debridement measurements are as above. Hemostasis was achieved. Patient tolerated procedure well and without complications. Local woundcare with [employer] Affinity allograft 15.625cm2 graft with nonadherent dressings and bandage thereafter. All units (21) of the graft were applied. The graft was packed deed into and " around the wound as well as to the wound periphery as to stimulate cell migration and proliferation, deep to superficial, and to fill the defect. The graft is then secured in standard fashion. Today's graft application is # 1. Donor #: [id number]  . Expiration Date: 1/26/22. ID#: [id number], [id number] and [id number].    Back to work on 2/24/22 (will extend LA).     D/C Home Health.         Future Appointments   Date Time Provider Department Center   1/21/2022  4:00 PM Lisa Bro NP [facility] DI[facility] [place]   1/24/2022  2:20 PM Rajwinder Sigala PA-C [facility]   1/28/2022  9:15 AM Bj Alicea DPM [facility]

## 2022-01-21 ENCOUNTER — DOCUMENTATION ONLY (OUTPATIENT)
Dept: GASTROENTEROLOGY | Facility: CLINIC | Age: 37
End: 2022-01-21
Payer: COMMERCIAL

## 2022-01-21 ENCOUNTER — OFFICE VISIT (OUTPATIENT)
Dept: DIABETES | Facility: CLINIC | Age: 37
End: 2022-01-21
Payer: COMMERCIAL

## 2022-01-21 ENCOUNTER — PATIENT MESSAGE (OUTPATIENT)
Dept: GASTROENTEROLOGY | Facility: CLINIC | Age: 37
End: 2022-01-21
Payer: COMMERCIAL

## 2022-01-21 VITALS
HEIGHT: 74 IN | HEART RATE: 113 BPM | BODY MASS INDEX: 28.32 KG/M2 | WEIGHT: 220.69 LBS | SYSTOLIC BLOOD PRESSURE: 128 MMHG | DIASTOLIC BLOOD PRESSURE: 90 MMHG

## 2022-01-21 DIAGNOSIS — I82.402 RECURRENT DEEP VEIN THROMBOSIS (DVT) OF LEFT LOWER EXTREMITY: ICD-10-CM

## 2022-01-21 DIAGNOSIS — E11.621 DIABETIC ULCER OF LEFT MIDFOOT ASSOCIATED WITH TYPE 2 DIABETES MELLITUS, WITH FAT LAYER EXPOSED: ICD-10-CM

## 2022-01-21 DIAGNOSIS — I15.2 HYPERTENSION ASSOCIATED WITH DIABETES: ICD-10-CM

## 2022-01-21 DIAGNOSIS — R11.2 INTRACTABLE VOMITING WITH NAUSEA, UNSPECIFIED VOMITING TYPE: ICD-10-CM

## 2022-01-21 DIAGNOSIS — L97.422 DIABETIC ULCER OF LEFT MIDFOOT ASSOCIATED WITH TYPE 2 DIABETES MELLITUS, WITH FAT LAYER EXPOSED: ICD-10-CM

## 2022-01-21 DIAGNOSIS — Z79.4 UNCONTROLLED TYPE 2 DIABETES MELLITUS WITH HYPERGLYCEMIA, WITH LONG-TERM CURRENT USE OF INSULIN: Primary | ICD-10-CM

## 2022-01-21 DIAGNOSIS — E11.65 UNCONTROLLED TYPE 2 DIABETES MELLITUS WITH HYPERGLYCEMIA, WITH LONG-TERM CURRENT USE OF INSULIN: Primary | ICD-10-CM

## 2022-01-21 DIAGNOSIS — E11.59 HYPERTENSION ASSOCIATED WITH DIABETES: ICD-10-CM

## 2022-01-21 LAB — GLUCOSE SERPL-MCNC: 261 MG/DL (ref 70–110)

## 2022-01-21 PROCEDURE — 3008F PR BODY MASS INDEX (BMI) DOCUMENTED: ICD-10-PCS | Mod: CPTII,S$GLB,, | Performed by: NURSE PRACTITIONER

## 2022-01-21 PROCEDURE — 99204 PR OFFICE/OUTPT VISIT, NEW, LEVL IV, 45-59 MIN: ICD-10-PCS | Mod: S$GLB,,, | Performed by: NURSE PRACTITIONER

## 2022-01-21 PROCEDURE — 1160F PR REVIEW ALL MEDS BY PRESCRIBER/CLIN PHARMACIST DOCUMENTED: ICD-10-PCS | Mod: CPTII,S$GLB,, | Performed by: NURSE PRACTITIONER

## 2022-01-21 PROCEDURE — 3080F PR MOST RECENT DIASTOLIC BLOOD PRESSURE >= 90 MM HG: ICD-10-PCS | Mod: CPTII,S$GLB,, | Performed by: NURSE PRACTITIONER

## 2022-01-21 PROCEDURE — 1159F PR MEDICATION LIST DOCUMENTED IN MEDICAL RECORD: ICD-10-PCS | Mod: CPTII,S$GLB,, | Performed by: NURSE PRACTITIONER

## 2022-01-21 PROCEDURE — 3080F DIAST BP >= 90 MM HG: CPT | Mod: CPTII,S$GLB,, | Performed by: NURSE PRACTITIONER

## 2022-01-21 PROCEDURE — 82962 GLUCOSE BLOOD TEST: CPT | Mod: S$GLB,,, | Performed by: NURSE PRACTITIONER

## 2022-01-21 PROCEDURE — 3074F SYST BP LT 130 MM HG: CPT | Mod: CPTII,S$GLB,, | Performed by: NURSE PRACTITIONER

## 2022-01-21 PROCEDURE — 1160F RVW MEDS BY RX/DR IN RCRD: CPT | Mod: CPTII,S$GLB,, | Performed by: NURSE PRACTITIONER

## 2022-01-21 PROCEDURE — 99204 OFFICE O/P NEW MOD 45 MIN: CPT | Mod: S$GLB,,, | Performed by: NURSE PRACTITIONER

## 2022-01-21 PROCEDURE — 1159F MED LIST DOCD IN RCRD: CPT | Mod: CPTII,S$GLB,, | Performed by: NURSE PRACTITIONER

## 2022-01-21 PROCEDURE — 82962 POCT GLUCOSE, HAND-HELD DEVICE: ICD-10-PCS | Mod: S$GLB,,, | Performed by: NURSE PRACTITIONER

## 2022-01-21 PROCEDURE — 3008F BODY MASS INDEX DOCD: CPT | Mod: CPTII,S$GLB,, | Performed by: NURSE PRACTITIONER

## 2022-01-21 PROCEDURE — 99999 PR PBB SHADOW E&M-EST. PATIENT-LVL V: CPT | Mod: PBBFAC,,, | Performed by: NURSE PRACTITIONER

## 2022-01-21 PROCEDURE — 99999 PR PBB SHADOW E&M-EST. PATIENT-LVL V: ICD-10-PCS | Mod: PBBFAC,,, | Performed by: NURSE PRACTITIONER

## 2022-01-21 PROCEDURE — 3074F PR MOST RECENT SYSTOLIC BLOOD PRESSURE < 130 MM HG: ICD-10-PCS | Mod: CPTII,S$GLB,, | Performed by: NURSE PRACTITIONER

## 2022-01-21 RX ORDER — PEN NEEDLE, DIABETIC 30 GX3/16"
NEEDLE, DISPOSABLE MISCELLANEOUS
Qty: 200 EACH | Refills: 5 | Status: SHIPPED | OUTPATIENT
Start: 2022-01-21 | End: 2022-02-03 | Stop reason: SDUPTHER

## 2022-01-21 RX ORDER — BLOOD-GLUCOSE TRANSMITTER
EACH MISCELLANEOUS
Qty: 1 EACH | Refills: 3 | Status: SHIPPED | OUTPATIENT
Start: 2022-01-21 | End: 2022-01-21

## 2022-01-21 RX ORDER — INSULIN DEGLUDEC 200 U/ML
50 INJECTION, SOLUTION SUBCUTANEOUS DAILY
Qty: 3 PEN | Refills: 5 | Status: SHIPPED | OUTPATIENT
Start: 2022-01-21 | End: 2022-07-25 | Stop reason: SDUPTHER

## 2022-01-21 RX ORDER — BLOOD-GLUCOSE SENSOR
EACH MISCELLANEOUS
Qty: 3 EACH | Refills: 11 | Status: SHIPPED | OUTPATIENT
Start: 2022-01-21 | End: 2022-01-21

## 2022-01-21 NOTE — PATIENT INSTRUCTIONS
Referral for Dexcom G6 (Sent to the [facility])     Referral for diabetes education to meet with Yuriy Salazar for pump evaluation/carb counting. Recommend Tandem TSlim X2 insulin pump with access to Control IQ.    -- Medications adjusted for today's visit include:    Continue Tresiba at 50 units once daily.    Take Humalog directly with each main meal at 15 units. If you are not eating or if the meal has zero carbs, do not take your Humalog.     You can use correction scale insulin every 4 hours as needed to correct a high blood sugar per sliding scale if you are unable to eat:    160-190: 1 unit  191-220: 2 units  221-250: 3 units  251-280: 4 units  281-310: 5 units  311-340: 6 units  341-370: 7 units  >370: 8 units    -- Limit carbs to no more than 45 grams with each meal. Never eat carbs by themselves, always add protein. Make snacks low carb or non-carb only.    -- Start checking blood sugars consistently with Dexcom: Fasting blood sugar, before lunch, before supper, 2 hours after any meal, and bedtime.   -Blood sugar goals should be a fasting blood sugar between , and no blood sugars throughout the day over 180 is good, less than 160 better, less than 140 perfect.    --Carry glucose tablets/soft peppermints/regular juice or Coke product with you at all times to treat a low blood sugar episode (less than 70). If your blood sugar is between 50-70, Chew 4 tablets or drink 1/2 cup of juice or regular Coke product. If your blood sugar is below 50, double the treatment. Re-check blood sugar in 15 minutes. If still low, repeat this. Always call the clinic to give an update for any low blood sugar episodes.    --Exercise as tolerated: Goal 30 minutes/day, 5 days/week. Start slowly and increase as tolerated.    --Follow-up for your next visit in 4 weeks. Plan to send readings weekly, sooner if having highs or lows.    --Please either drop off, fax, or MyChart your readings to me as needed.

## 2022-01-21 NOTE — PROGRESS NOTES
Recommended ED for further evaluation due to intractable nausea/vomiting. Patient has suspected gastroparesis (unable to complete GES to confirm due to vomiting). Has prescription for Reglan TID and phenergan but is not compliant per his wife. Zofran not helpful. Has not tried Erythromycin, per patient. GI physician on call has been notified.

## 2022-01-21 NOTE — PROGRESS NOTES
Subjective:         Patient ID: Kulwant Mueller Jr. is a 36 y.o. male.  Patient's current PCP is Dona Pineda MD.     Chief Complaint: Diabetes Mellitus    HPI  Kulwant Mueller Jr. is a 36 y.o. Black or  male presenting for a new consult for diabetes. Patient has been diagnosed with diabetes since almost 12 years ago .  The patient was initially diagnosed with Type 2 diabetes mellitus based on the following criteria: Experienced syncopal episode- blood sugar was 700 at the time. Classic symptoms.   Received diabetes education: yes (needs new referral for pump eval and carb counting refresher)  Prior visit with dietician: yes     CURRENT DM MEDICATIONS:   Diabetes Medications             insulin lispro 100 unit/mL pen Inject 30 units under the skin with meals    TRESIBA FLEXTOUCH U-200 200 unit/mL (3 mL) InPn INJECT 50 UNITS UNDER THE SKIN D        Past failed treatment include:  Metformin- nausea (?) he's unsure why this was discontinued.    Blood glucose testing continuous per Dexcom G6- initiated today. Previously 4x per day with high glycemic variability. Glucose range of .   Preferred lab: [facility]    His blood sugar in the clinic today was:   Lab Results   Component Value Date    POCGLU 261 (A) 01/21/2022       Kulwant Mueller Jr. presents today to discuss DM management.   Home blood sugar records: did not bring meter/logs today but reports range .    Presents today to establish care with his wife (respiratory therapist) for diabetes management. He unfortunately is having significant nausea and vomiting, unable to hold any fluids down. He does state he urinated shortly before coming to clinic- dark urine. He has been to the ER for this twice and so far workup has been inconclusive. He is working closely with GI- He was unable to complete GES as he vomited retirement through the test. He has plans for an EGD. He does not want to go back to the Er currently,  however I advised him ER precautions and urged him to go if he is unable to hold any food or fluids down. We discussed my suspicion for gastroparesis.     As for his diabetes, he was off of insulin for several months- he admits he was not taking good care of himself. He has used insulin pump therapy in the past (thinks Flowboard). We discussed pump therapy and I recommended Tandem TSlim X2 pump that communicates with Dexcom. He is interested in Dexcom and he is a good candidate. blood sugar of 261 today- fasting. He has not taken any insulin today. We reviewed MOA of Tresiba vs mealtime insulin and importance of site rotation. Today, plan to reduce Humalog with meals and start a correction sliding scale for use when he is not eating.     Comprehensive labs are overdue- will defer this due to acute nausea/vomiting complaints. Anti-BIRD 65 antibody in the past negative however C-peptide lower end of normal at 1.1 (not sure what glucose was at the time however- would benefit from repeat). No family history of T1 diabetes but does have T2 family history.    Diabetic foot ulcer of L midfoot--seeing podiatry routinely.    Gastroparesis- followed by GI. See above- plan for EGD. I advised patient I do feel ER evaluation is warranted if he is unable to keep any fluids/food down.    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 On Xarelto.    Current diet:  We discussed the diabetic diet today. He has carb counted in the past. He feels knowledgeable regarding carbs.  Activity Level:     Lab Results   Component Value Date    HGBA1C >14.0 (H) 09/22/2021    HGBA1C 12.7 (H) 08/31/2018    HGBA1C 9.1 (H) 01/11/2018     Any episodes of hypoglycemia? Yes- feels symptomatic of lows often, not always checking.   Hypoglycemia Unawareness? no   Severe hypoglycemia requiring 3rd party no  Complications related to diabetes: suspected gastroparesis. Diabetic foot ulcer    STANDARDS OF CARE  Diabetes Management Status    Statin:  Not taking  ACE/ARB: Taking    Screening or Prevention Patient's value Goal Complete/Controlled?   HgA1C Testing and Control   Lab Results   Component Value Date    HGBA1C >14.0 (H) 09/22/2021      Annually/Less than 8% Yes   Lipid profile Most Recent Lipid Panel Health Maintenance Topic Completion: Not Found Annually No   LDL control Lab Results   Component Value Date    LDLCALC 139.0 01/11/2018    Annually/Less than 100 mg/dl  No   Nephropathy screening Lab Results   Component Value Date    LABMICR 11.0 01/11/2018     Lab Results   Component Value Date    PROTEINUA Negative 11/27/2021     No results found for: UTPCR   Annually Yes   Blood pressure BP Readings from Last 1 Encounters:   01/21/22 (!) 128/90    Less than 140/90 Yes   Dilated retinal exam Most Recent Eye Exam Date: Not Found Annually No - Deferred due to time constraints- ask at next visit.   Foot exam   : 11/27/2021 Annually Yes       Labs reviewed and are noted below.    Lab Results   Component Value Date    WBC 9.66 01/07/2022    HGB 15.6 01/07/2022    HCT 49.1 01/07/2022     01/07/2022    CHOL 202 (H) 01/11/2018    TRIG 145 01/11/2018    HDL 34 (L) 01/11/2018    LDLCALC 139.0 01/11/2018    ALT 25 01/07/2022    AST 22 01/07/2022     01/07/2022    K 3.9 01/07/2022    CL 98 01/07/2022    ANIONGAP 8 01/07/2022    CREATININE 1.0 01/07/2022    ESTGFRAFRICA >60.0 01/07/2022    EGFRNONAA >60.0 01/07/2022    BUN 9 01/07/2022    CO2 30 (H) 01/07/2022    TSH 0.550 01/11/2018    INR 1.1 11/27/2021     (H) 01/07/2022     Lab Results   Component Value Date    GLUTAMICACID 0.01 01/11/2018    CPEPTIDE 1.17 01/11/2018    TSH 0.550 01/11/2018    FERRITIN 675 (H) 11/27/2021    CALCIUM 9.9 01/07/2022    PHOS 3.3 09/08/2018     Lab Results   Component Value Date    CPEPTIDE 1.17 01/11/2018     Lab Results   Component Value Date    GLUTAMICACID 0.01 01/11/2018     Glucose   Date Value Ref Range Status   01/07/2022 185 (H) 70 - 110 mg/dL Final      Anion Gap   Date Value Ref Range Status   01/07/2022 8 8 - 16 mmol/L Final     eGFR if    Date Value Ref Range Status   01/07/2022 >60.0 >60 mL/min/1.73 m^2 Final     eGFR if non    Date Value Ref Range Status   01/07/2022 >60.0 >60 mL/min/1.73 m^2 Final     Comment:     Calculation used to obtain the estimated glomerular filtration  rate (eGFR) is the CKD-EPI equation.          The following portions of the patient's history were reviewed and updated as appropriate: allergies, current medications, past family history, past medical history, past social history, past surgical history and problem list.    Review of patient's allergies indicates:   Allergen Reactions    Heparin analogues Nausea And Vomiting     Social History     Socioeconomic History    Marital status:    Tobacco Use    Smoking status: Former Smoker     Packs/day: 0.00    Smokeless tobacco: Former User    Tobacco comment: occasionally    Substance and Sexual Activity    Alcohol use: Yes     Comment: occ    Drug use: No     Past Medical History:   Diagnosis Date    Anticoagulant long-term use     Diabetes mellitus     Hypertension     May-Thurner syndrome        REVIEW OF SYSTEMS:  Cardiovascular: History of hyperlipidemia.   GI: C/o nausea and vomiting-see HPI  : No history of CKD.   Neuro: Suspected autonomic neuropathy.  PSYCH: No tobacco use. Former smoker.  ENDO: See HPI.        Objective:      Vitals:    01/21/22 1427   BP: (!) 128/90   Pulse: (!) 113     RESPIRATORY: No respiratory distress  NEUROLOGIC: Cranial nerves II-XII grossly intact.   PSYCHIATRIC: Alert & oriented x3. Normal mood and affect.  FOOT EXAMINATION:  UTD and seeing podiatry routinely.  Assessment:       1. Uncontrolled type 2 diabetes mellitus with hyperglycemia, with long-term current use of insulin    2. Hypertension associated with diabetes    3. Intractable vomiting with nausea, unspecified vomiting type    4. History  "of Recurrent deep vein thrombosis (DVT) of left lower extremity with chronic anticoagulation with Xarelto    5. Diabetic ulcer of left midfoot associated with type 2 diabetes mellitus, with fat layer exposed        Plan:   Uncontrolled type 2 diabetes mellitus with hyperglycemia, with long-term current use of insulin-Chronic  -     Ambulatory referral/consult to Diabetic Advanced Practice Providers (Medical Management)  -     POCT Glucose, Hand-Held Device  -     blood-glucose sensor (DEXCOM G6 SENSOR) Justyna; Change sensor every 10 days  Dispense: 3 each; Refill: 11  -     blood-glucose transmitter (DEXCOM G6 TRANSMITTER) Justyna; Change transmitter every 90 days.  Dispense: 1 each; Refill: 3  -     Ambulatory referral/consult to Diabetes Education; Future; Expected date: 01/21/2022  -     TRESIBA FLEXTOUCH U-200 200 unit/mL (3 mL) insulin pen; Inject 50 Units into the skin once daily.  Dispense: 3 pen; Refill: 5  -     pen needle, diabetic (BD ULTRA-FINE DIYA PEN NEEDLE) 32 gauge x 5/32" Ndle; Use with Tresiba daily and Humalog 3-4 times daily as directed.  Dispense: 200 each; Refill: 5  -     insulin lispro 200 unit/mL (3 mL) InPn; Inject 14 Units into the skin 3 (three) times daily with meals. Plus sliding scale if needed.  Dispense: 12 mL; Refill: 5    Referral for Dexcom G6 (Sent to the [facility])     Referral for diabetes education to meet with Yuriy Salazar for pump evaluation/carb counting. Recommend Tandem TSlim X2 insulin pump with access to Control IQ.    -- Medications adjusted for today's visit include:    Continue Tresiba at 50 units once daily.    Take Humalog directly with each main meal at 15 units. If you are not eating or if the meal has zero carbs, do not take your Humalog.     You can use correction scale insulin every 4 hours as needed to correct a high blood sugar per sliding scale if you are unable to eat:    160-190: 1 unit  191-220: 2 units  221-250: 3 units  251-280: 4 units  281-310: 5 " "units  311-340: 6 units  341-370: 7 units  >370: 8 units    -- Plan to send readings weekly, sooner if having highs or lows.    Hypertension associated with diabetes-Chronic,near goal    -Continue to monitor BP. Managed per PCP.    Intractable vomiting with nausea, unspecified vomiting type-Since late Dec 2021    -ER precautions advised. Continue workup with GI. Suspect gastroparesis.    History of Recurrent deep vein thrombosis (DVT) of left lower extremity with chronic anticoagulation with Xarelto-Chronic,stable    -iliac stent placed in the past- secondary to May-Thurner syndrome. On Xarelto. Continue to follow up with cardiology as advised.    Diabetic ulcer of left midfoot associated with type 2 diabetes mellitus, with fat layer exposed-Chronic    -Receiving treatment through podiatry routinely. Needs optimal glycemic control to promote healing.    - Follow up: 1 month    I spent a total of 55 minutes on the day of the visit.This includes face to face time and non-face to face time preparing to see the patient (eg, review of tests), documenting clinical information in the electronic record, independently interpreting results and communicating results to the patient.    Portions of this note may have been created with voice recognition software. Occasional "wrong-word" or "sound-a-like" substitutions may have occurred due to the inherent limitations of voice recognition software. Please, read the note carefully and recognize, using context, where substitutions have occurred.       "

## 2022-01-23 ENCOUNTER — HOSPITAL ENCOUNTER (EMERGENCY)
Facility: HOSPITAL | Age: 37
Discharge: HOME | End: 2022-01-23
Attending: EMERGENCY MEDICINE
Payer: COMMERCIAL

## 2022-01-23 VITALS
RESPIRATION RATE: 16 BRPM | DIASTOLIC BLOOD PRESSURE: 74 MMHG | HEART RATE: 97 BPM | TEMPERATURE: 98 F | SYSTOLIC BLOOD PRESSURE: 123 MMHG | WEIGHT: 220 LBS | HEIGHT: 74 IN | BODY MASS INDEX: 28.23 KG/M2 | OXYGEN SATURATION: 99 %

## 2022-01-23 DIAGNOSIS — E11.59 HYPERTENSION ASSOCIATED WITH DIABETES: ICD-10-CM

## 2022-01-23 DIAGNOSIS — R11.2 NON-INTRACTABLE VOMITING WITH NAUSEA, UNSPECIFIED VOMITING TYPE: ICD-10-CM

## 2022-01-23 DIAGNOSIS — R63.4 ABNORMAL WEIGHT LOSS: ICD-10-CM

## 2022-01-23 DIAGNOSIS — E11.8 TYPE 2 DIABETES MELLITUS WITH COMPLICATION, WITH LONG-TERM CURRENT USE OF INSULIN: Primary | ICD-10-CM

## 2022-01-23 DIAGNOSIS — E11.43 DIABETIC GASTROPARESIS ASSOCIATED WITH TYPE 2 DIABETES MELLITUS: Chronic | ICD-10-CM

## 2022-01-23 DIAGNOSIS — K31.84 DIABETIC GASTROPARESIS ASSOCIATED WITH TYPE 2 DIABETES MELLITUS: Chronic | ICD-10-CM

## 2022-01-23 DIAGNOSIS — Z79.4 TYPE 2 DIABETES MELLITUS WITH COMPLICATION, WITH LONG-TERM CURRENT USE OF INSULIN: Primary | ICD-10-CM

## 2022-01-23 DIAGNOSIS — I15.2 HYPERTENSION ASSOCIATED WITH DIABETES: ICD-10-CM

## 2022-01-23 DIAGNOSIS — E86.0 DEHYDRATION: ICD-10-CM

## 2022-01-23 LAB
ALBUMIN SERPL BCP-MCNC: 3.9 G/DL (ref 3.5–5.2)
ALP SERPL-CCNC: 61 U/L (ref 55–135)
ALT SERPL W/O P-5'-P-CCNC: 22 U/L (ref 10–44)
ANION GAP SERPL CALC-SCNC: 15 MMOL/L (ref 8–16)
AST SERPL-CCNC: 20 U/L (ref 10–40)
BASOPHILS # BLD AUTO: 0.03 K/UL (ref 0–0.2)
BASOPHILS NFR BLD: 0.2 % (ref 0–1.9)
BILIRUB SERPL-MCNC: 0.7 MG/DL (ref 0.1–1)
BUN SERPL-MCNC: 12 MG/DL (ref 6–20)
CALCIUM SERPL-MCNC: 10.2 MG/DL (ref 8.7–10.5)
CHLORIDE SERPL-SCNC: 100 MMOL/L (ref 95–110)
CO2 SERPL-SCNC: 27 MMOL/L (ref 23–29)
CREAT SERPL-MCNC: 1.1 MG/DL (ref 0.5–1.4)
DIFFERENTIAL METHOD: ABNORMAL
EOSINOPHIL # BLD AUTO: 0 K/UL (ref 0–0.5)
EOSINOPHIL NFR BLD: 0.2 % (ref 0–8)
ERYTHROCYTE [DISTWIDTH] IN BLOOD BY AUTOMATED COUNT: 11.7 % (ref 11.5–14.5)
EST. GFR  (AFRICAN AMERICAN): >60 ML/MIN/1.73 M^2
EST. GFR  (NON AFRICAN AMERICAN): >60 ML/MIN/1.73 M^2
GLUCOSE SERPL-MCNC: 211 MG/DL (ref 70–110)
HCT VFR BLD AUTO: 46.8 % (ref 40–54)
HGB BLD-MCNC: 15.2 G/DL (ref 14–18)
IMM GRANULOCYTES # BLD AUTO: 0.04 K/UL (ref 0–0.04)
IMM GRANULOCYTES NFR BLD AUTO: 0.3 % (ref 0–0.5)
LYMPHOCYTES # BLD AUTO: 2.5 K/UL (ref 1–4.8)
LYMPHOCYTES NFR BLD: 20.2 % (ref 18–48)
MAGNESIUM SERPL-MCNC: 2.1 MG/DL (ref 1.6–2.6)
MCH RBC QN AUTO: 28.5 PG (ref 27–31)
MCHC RBC AUTO-ENTMCNC: 32.5 G/DL (ref 32–36)
MCV RBC AUTO: 88 FL (ref 82–98)
MONOCYTES # BLD AUTO: 0.5 K/UL (ref 0.3–1)
MONOCYTES NFR BLD: 3.8 % (ref 4–15)
NEUTROPHILS # BLD AUTO: 9.2 K/UL (ref 1.8–7.7)
NEUTROPHILS NFR BLD: 75.3 % (ref 38–73)
NRBC BLD-RTO: 0 /100 WBC
PLATELET # BLD AUTO: 245 K/UL (ref 150–450)
PMV BLD AUTO: 10.8 FL (ref 9.2–12.9)
POCT GLUCOSE: 228 MG/DL (ref 70–110)
POTASSIUM SERPL-SCNC: 4.1 MMOL/L (ref 3.5–5.1)
PROT SERPL-MCNC: 8 G/DL (ref 6–8.4)
RBC # BLD AUTO: 5.33 M/UL (ref 4.6–6.2)
SODIUM SERPL-SCNC: 142 MMOL/L (ref 136–145)
WBC # BLD AUTO: 12.22 K/UL (ref 3.9–12.7)

## 2022-01-23 PROCEDURE — 63600175 PHARM REV CODE 636 W HCPCS: Performed by: EMERGENCY MEDICINE

## 2022-01-23 PROCEDURE — 99284 PR EMERGENCY DEPT VISIT,LEVEL IV: ICD-10-PCS | Mod: CR,,, | Performed by: EMERGENCY MEDICINE

## 2022-01-23 PROCEDURE — 99284 EMERGENCY DEPT VISIT MOD MDM: CPT | Mod: CR,,, | Performed by: EMERGENCY MEDICINE

## 2022-01-23 PROCEDURE — 80053 COMPREHEN METABOLIC PANEL: CPT | Performed by: EMERGENCY MEDICINE

## 2022-01-23 PROCEDURE — 85025 COMPLETE CBC W/AUTO DIFF WBC: CPT | Performed by: EMERGENCY MEDICINE

## 2022-01-23 PROCEDURE — 96361 HYDRATE IV INFUSION ADD-ON: CPT

## 2022-01-23 PROCEDURE — 82962 GLUCOSE BLOOD TEST: CPT

## 2022-01-23 PROCEDURE — 83735 ASSAY OF MAGNESIUM: CPT | Performed by: EMERGENCY MEDICINE

## 2022-01-23 PROCEDURE — 96374 THER/PROPH/DIAG INJ IV PUSH: CPT

## 2022-01-23 PROCEDURE — 25000003 PHARM REV CODE 250: Performed by: EMERGENCY MEDICINE

## 2022-01-23 PROCEDURE — 99284 EMERGENCY DEPT VISIT MOD MDM: CPT | Mod: 25

## 2022-01-23 RX ORDER — DROPERIDOL 2.5 MG/ML
0.62 INJECTION, SOLUTION INTRAMUSCULAR; INTRAVENOUS
Status: COMPLETED | OUTPATIENT
Start: 2022-01-23 | End: 2022-01-23

## 2022-01-23 RX ADMIN — SODIUM CHLORIDE 1000 ML: 0.9 INJECTION, SOLUTION INTRAVENOUS at 03:01

## 2022-01-23 RX ADMIN — DROPERIDOL 0.62 MG: 2.5 INJECTION, SOLUTION INTRAMUSCULAR; INTRAVENOUS at 02:01

## 2022-01-23 NOTE — ED PROVIDER NOTES
Encounter Date: 1/23/2022    SCRIBE #1 NOTE: I, Bairon Ordoñez am scribing for, and in the presence of,  Geovani Alaniz III, MD. I have scribed the following portions of the note - Other sections scribed: [place].       History     Chief Complaint   Patient presents with    Emesis     Pt emesis x1 month with intermittent diarrhea.      Mr. Mueller is a 36-year-old who presents by personal transportation.  He complains of nausea and vomiting for one month.  He has been unable to tolerate  intake of neither liquids nor solids.  He feels extremely weak and had a 20 lb weight loss over this period of time.  He reports being evaluated him multiple ED Zofran this period of time with labs that have been normal.  He is currently taking Reglan 3 times daily but is unable to tolerate the medication due to his symptoms.  He contracted the COVID-19 virus last month and recovered.  He is being evaluated by a gastroenterologist for these symptoms.  He is an insulin-dependent diabetic.  He has not been taking his insulin due to poor oral intake attributed to his symptoms.  A nuclear medicine gastric emptying study was aborted early on 01/19 due to development of vomiting.    He has a past medical history of HTN, DM, May-Thurner syndrome, and anticoagulant long-term use.       The history is provided by the patient and medical records. No  was used.     Review of patient's allergies indicates:   Allergen Reactions    Heparin analogues Nausea And Vomiting     Past Medical History:   Diagnosis Date    Anticoagulant long-term use     Diabetes mellitus     Hypertension     May-Thurner syndrome      Past Surgical History:   Procedure Laterality Date    APPENDECTOMY      ESOPHAGOGASTRODUODENOSCOPY N/A 1/31/2022    Procedure: EGD (ESOPHAGOGASTRODUODENOSCOPY);  Surgeon: Cindy Quiros MD;  Location: [facility];  Service: Endoscopy;  Laterality: N/A;    INCISION AND DRAINAGE FOOT Left 11/28/2021    Procedure:  INCISION AND DRAINAGE, FOOT;  Surgeon: Bj Alicea DPM;  Location: [facility] OR;  Service: Podiatry;  Laterality: Left;    stents in bilateral legs       Family History   Problem Relation Age of Onset    Cancer Mother     Cancer Paternal Uncle     Cancer Paternal Grandfather     Irritable bowel syndrome Paternal Grandfather     Cancer Maternal Grandfather     Colon cancer Neg Hx     Esophageal cancer Neg Hx     Rectal cancer Neg Hx     Stomach cancer Neg Hx     Ulcerative colitis Neg Hx     Crohn's disease Neg Hx     Celiac disease Neg Hx      Social History     Tobacco Use    Smoking status: Former Smoker     Packs/day: 0.00    Smokeless tobacco: Former User    Tobacco comment: occasionally    Substance Use Topics    Alcohol use: Yes     Comment: occ    Drug use: No     Review of Systems   Constitutional: Positive for fatigue and unexpected weight change. Negative for chills and fever.   HENT: Negative for sore throat and trouble swallowing.    Eyes: Negative for visual disturbance.   Respiratory: Negative for shortness of breath.    Cardiovascular: Negative for chest pain.   Gastrointestinal: Positive for vomiting. Negative for abdominal pain and diarrhea.   Endocrine: Negative for polydipsia and polyuria.   Genitourinary: Negative for dysuria and frequency.   Musculoskeletal: Negative for arthralgias and myalgias.   Allergic/Immunologic: Negative for immunocompromised state.   Neurological: Negative for dizziness, syncope, light-headedness and headaches.       Physical Exam     Initial Vitals [01/23/22 1314]   BP Pulse Resp Temp SpO2   119/80 106 18 98.7 °F (37.1 °C) 97 %      MAP       --         Physical Exam    Nursing note and vitals reviewed.  Constitutional: He is not diaphoretic. No distress.   HENT:   Mouth/Throat: Oropharynx is clear and moist.   Eyes: Conjunctivae are normal. No scleral icterus.   Neck: No JVD present.   Cardiovascular: Regular rhythm. Tachycardia present.  Exam  reveals no gallop and no friction rub.    No murmur heard.  Pulmonary/Chest: No stridor. No respiratory distress. He has no decreased breath sounds. He has no wheezes. He has no rhonchi. He has no rales.   Abdominal: Abdomen is soft. He exhibits no distension. There is no abdominal tenderness.   Musculoskeletal:         General: No edema.     Neurological: He is alert and oriented to person, place, and time. GCS score is 15. GCS eye subscore is 4. GCS verbal subscore is 5. GCS motor subscore is 6.   Skin: Skin is warm and dry. No pallor.         ED Course   Procedures  Labs Reviewed   CBC W/ AUTO DIFFERENTIAL - Abnormal; Notable for the following components:       Result Value    Gran # (ANC) 9.2 (*)     Gran % 75.3 (*)     Mono % 3.8 (*)     All other components within normal limits   COMPREHENSIVE METABOLIC PANEL - Abnormal; Notable for the following components:    Glucose 211 (*)     All other components within normal limits   POCT GLUCOSE - Abnormal; Notable for the following components:    POCT Glucose 228 (*)     All other components within normal limits   MAGNESIUM          Imaging Results    None          Medications   sodium chloride 0.9% bolus 1,000 mL (0 mLs Intravenous Stopped 1/23/22 1516)   droperidoL injection 0.625 mg (0.625 mg Intravenous Given 1/23/22 1428)   sodium chloride 0.9% bolus 1,000 mL (0 mLs Intravenous Stopped 1/23/22 1637)     Medical Decision Making:   History:   Old Medical Records: I decided to obtain old medical records.  Clinical Tests:   Lab Tests: Ordered and Reviewed          Scribe Attestation:   Scribe #1: I performed the above scribed service and the documentation accurately describes the services I performed. I attest to the accuracy of the note.    Attending Attestation:             Attending ED Notes:   Portions of this chart were completed by the scribe by interpretive transcription of statements made by the patient as a result of my questions at the bedside. Other  portions were completed by the scribe from statements made by me for direct transcription into the medical record. Following completion of the charting by the scribe, I made modifications for both correctness and proper phrasing.      ED Course as of 02/02/22 0015   Sun Jan 23, 2022   1518 He has positive orthostatic vital signs. [LP]   1631 He denies nausea at this time. He was able to drink water without nausea/vomiting. Will repeat orthostatic vitals after second liter of IV fluids. Discharge with gastroenterology follow-up if better. Otherwise, admit for additional hydration. [LP]      ED Course User Index  [LP] Geovani Alaniz III, MD             Clinical Impression:   Final diagnoses:  [R11.2] Non-intractable vomiting with nausea, unspecified vomiting type  [R63.4] Abnormal weight loss  [E86.0] Dehydration          ED Disposition Condition    Discharge Stable        ED Prescriptions     None        Follow-up Information     Follow up With Specialties Details Why Contact Info    Rajwinder Sigala PA-C Gastroenterology Schedule an appointment as soon as possible for a visit   [address] [place] [address] [address] Judah [address]AN[address] [address]  [phone]      [facility] - Emergency Dept Emergency Medicine  Return to the ER as needed for any concerns. [address]  [facility] [address] [phone]           Geovani Alaniz III, MD  02/02/22 0015

## 2022-01-23 NOTE — ED NOTES
Past month, unable to keep any food or drink down, weight loss 20 lbs in a month. Nausea vomiting, intermittent diarrhea. Weakness. Was given IV fluids 2 days ago at another hospital. Has been seen multiple times at numerous hospitals for same issue. Expresses frustration on lack of diagnoses and being sent to ERs with no improvement. Diabetic ulcer to L foot, followed by wound care. Overall ill appearing.     Patient identifiers for Kulwant Mueller Jr. 36 y.o. male checked and correct.  Chief Complaint   Patient presents with    Emesis     Pt emesis x1 month with intermittent diarrhea.      Past Medical History:   Diagnosis Date    Anticoagulant long-term use     Diabetes mellitus     Hypertension     May-Thurner syndrome      Allergies reported:   Review of patient's allergies indicates:   Allergen Reactions    Heparin analogues Nausea And Vomiting         LOC: Patient is awake, alert, and aware of environment with an appropriate affect. Patient is oriented x 4 and speaking appropriately.  APPEARANCE: Patient resting comfortably and in no acute distress. Patient is clean and well groomed, patient's clothing is properly fastened.  HEENT: WDL  SKIN: The skin is warm and dry. Patient has normal skin turgor and moist mucus membranes.   MUSKULOSKELETAL: Patient is moving all extremities well, no obvious deformities noted. Pulses intact.   RESPIRATORY: Airway is open and patent. Respirations are spontaneous and non-labored with normal effort and rate.  CARDIAC: Patient has a normal rate and rhythm.  No peripheral edema noted.   ABDOMEN: No distention noted. Soft and non-tender upon palpation.  NEUROLOGICAL: pupils 3mm, PERRL. Facial expression is symmetrical. Hand grasps are equal bilaterally. Normal sensation in all extremities when touched with finger.

## 2022-01-24 ENCOUNTER — OFFICE VISIT (OUTPATIENT)
Dept: GASTROENTEROLOGY | Facility: CLINIC | Age: 37
End: 2022-01-24
Payer: COMMERCIAL

## 2022-01-24 ENCOUNTER — PATIENT MESSAGE (OUTPATIENT)
Dept: CARDIOLOGY | Facility: CLINIC | Age: 37
End: 2022-01-24
Payer: COMMERCIAL

## 2022-01-24 ENCOUNTER — TELEPHONE (OUTPATIENT)
Dept: GASTROENTEROLOGY | Facility: CLINIC | Age: 37
End: 2022-01-24
Payer: COMMERCIAL

## 2022-01-24 ENCOUNTER — PATIENT MESSAGE (OUTPATIENT)
Dept: GASTROENTEROLOGY | Facility: CLINIC | Age: 37
End: 2022-01-24

## 2022-01-24 ENCOUNTER — HOSPITAL ENCOUNTER (OUTPATIENT)
Dept: RADIOLOGY | Facility: HOSPITAL | Age: 37
Discharge: HOME | End: 2022-01-24
Attending: PHYSICIAN ASSISTANT
Payer: COMMERCIAL

## 2022-01-24 DIAGNOSIS — R11.2 NAUSEA AND VOMITING, INTRACTABILITY OF VOMITING NOT SPECIFIED, UNSPECIFIED VOMITING TYPE: Primary | ICD-10-CM

## 2022-01-24 DIAGNOSIS — R11.2 NAUSEA AND VOMITING, INTRACTABILITY OF VOMITING NOT SPECIFIED, UNSPECIFIED VOMITING TYPE: ICD-10-CM

## 2022-01-24 PROCEDURE — 76700 US EXAM ABDOM COMPLETE: CPT | Mod: TC

## 2022-01-24 PROCEDURE — 99213 PR OFFICE/OUTPT VISIT, EST, LEVL III, 20-29 MIN: ICD-10-PCS | Mod: 95,,, | Performed by: PHYSICIAN ASSISTANT

## 2022-01-24 PROCEDURE — 99213 OFFICE O/P EST LOW 20 MIN: CPT | Mod: 95,,, | Performed by: PHYSICIAN ASSISTANT

## 2022-01-24 NOTE — TELEPHONE ENCOUNTER
Spoke with Nurse Pedraza with Home Health. Nurse Pedraza aware that I have spoke with the patient today. Patient aware that we are waiting on clearance from his cardiologist Dr. Duff in order to proceed with upper endoscopy. Patient has virtual appointment today with provider to discuss continued emesis.

## 2022-01-24 NOTE — PROGRESS NOTES
Subjective:      Patient ID: Kulwant Mueller Jr. is a 36 y.o. male.    Chief Complaint: No chief complaint on file.  The patient location is: home   The chief complaint leading to consultation is: nausea/vomiting    Visit type: audiovisual    Face to Face time with patient: 20 mins  20 minutes of total time spent on the encounter, which includes face to face time and non-face to face time preparing to see the patient (eg, review of tests), Obtaining and/or reviewing separately obtained history, Documenting clinical information in the electronic or other health record, Independently interpreting results (not separately reported) and communicating results to the patient/family/caregiver, or Care coordination (not separately reported).     Each patient to whom he or she provides medical services by telemedicine is:  (1) informed of the relationship between the physician and patient and the respective role of any other health care provider with respect to management of the patient; and (2) notified that he or she may decline to receive medical services by telemedicine and may withdraw from such care at any time.    Notes:   HPI:  Patient reports today via telemedicine for follow up of the above. Last seen a few days ago via telemedicine due to recurrent nausea/vomiting. Last visit was instructed to go to the ED due to the fact that he could not hold down anything orally. Went to the ED twice over the weekend. Got fluids and IV antiemetics and symptoms improved. Labs have been stable with no electrolyte abnormalities, white count or anemia. Reports oral medications (reglan and Zofran) and his phenergan suppositories are not helpful. Continues to lose weight. EGD is ordered but waiting on cardiac clearance, as he is on blood thinner.   Father is on telephone and is concerned regarding patient's gallbladder. Explained that CT did not show any gallbladder abnormalities. He is insistent. Will order STAT US.      Review of  Systems   Constitutional: Positive for activity change, appetite change, fatigue and unexpected weight change. Negative for chills, diaphoresis and fever.   HENT: Negative for trouble swallowing.    Respiratory: Negative for cough and shortness of breath.    Cardiovascular: Negative for chest pain.   Gastrointestinal: Positive for constipation, nausea and vomiting. Negative for abdominal distention, abdominal pain, blood in stool and diarrhea.   Neurological: Positive for weakness. Negative for dizziness.   Psychiatric/Behavioral: Positive for dysphoric mood. The patient is nervous/anxious.        Medical History: Reviewed    Social History: Reviewed    Allergies: Reviewed    Objective:     Physical Exam    Assessment:     1. Nausea and vomiting, intractability of vomiting not specified, unspecified vomiting type        Plan:     -STAT US ordered, as patient father is concerned regarding gallbladder. Explained CT gallbladder normal.  -Continue Reglan and phenergan   -ED with worsening symptoms for fluids and IV antiemetics  -gastroparesis diet  -Will call Dr. Duff office today to try and expedite clearance for EGD.    Diagnoses and all orders for this visit:    Nausea and vomiting, intractability of vomiting not specified, unspecified vomiting type  -     US Abdomen Complete; Future        No follow-ups on file.    Thank you for the opportunity to participate in the care of this patient.   Rajwinder Sigala PA-C.

## 2022-01-24 NOTE — TELEPHONE ENCOUNTER
Patient seen in clinic 1/20/22 for nausea/vomiting. Needs EGD for further evaluation. He is currently taking Xarelto 20mg daily. Requesting clearance to stop anticoagulant therapy for scopes.

## 2022-01-24 NOTE — TELEPHONE ENCOUNTER
----- Message from Maryanne Angel sent at 1/24/2022  8:17 AM CST -----  Regarding: vomiting  Contact: Keila- [facility]  Ms Pedraza needs to speak to someone regarding patient vomiting and not eating, Ms Pedraza is aware of patients virtual appt today, but wanted message as well, please call her back at [phone]

## 2022-01-24 NOTE — TELEPHONE ENCOUNTER
----- Message from Maryanne Angel sent at 1/24/2022  8:17 AM CST -----  Regarding: vomiting  Contact: Keila- [facility]  Ms Pedraza needs to speak to someone regarding patient vomiting and not eating, Ms Pedraza is aware of patients virtual appt today, but wanted message as well, please call her back at [phone]     I have personally performed a face to face diagnostic evaluation on this patient. I have reviewed the PA note and agree with the history, exam, and plan of care, except as noted.

## 2022-01-25 ENCOUNTER — TELEPHONE (OUTPATIENT)
Dept: CARDIOLOGY | Facility: CLINIC | Age: 37
End: 2022-01-25
Payer: COMMERCIAL

## 2022-01-25 ENCOUNTER — TELEPHONE (OUTPATIENT)
Dept: NEUROLOGY | Facility: HOSPITAL | Age: 37
End: 2022-01-25
Payer: COMMERCIAL

## 2022-01-25 ENCOUNTER — HOSPITAL ENCOUNTER (EMERGENCY)
Facility: HOSPITAL | Age: 37
Discharge: HOME | End: 2022-01-25
Attending: EMERGENCY MEDICINE
Payer: COMMERCIAL

## 2022-01-25 VITALS
SYSTOLIC BLOOD PRESSURE: 105 MMHG | WEIGHT: 220 LBS | DIASTOLIC BLOOD PRESSURE: 69 MMHG | BODY MASS INDEX: 28.25 KG/M2 | HEART RATE: 91 BPM | TEMPERATURE: 99 F | RESPIRATION RATE: 20 BRPM | OXYGEN SATURATION: 99 %

## 2022-01-25 DIAGNOSIS — R11.2 NAUSEA & VOMITING: Primary | ICD-10-CM

## 2022-01-25 LAB
ALBUMIN SERPL BCP-MCNC: 3.8 G/DL (ref 3.5–5.2)
ALP SERPL-CCNC: 55 U/L (ref 55–135)
ALT SERPL W/O P-5'-P-CCNC: 19 U/L (ref 10–44)
ANION GAP SERPL CALC-SCNC: 14 MMOL/L (ref 8–16)
AST SERPL-CCNC: 15 U/L (ref 10–40)
BASOPHILS # BLD AUTO: 0.03 K/UL (ref 0–0.2)
BASOPHILS NFR BLD: 0.3 % (ref 0–1.9)
BILIRUB SERPL-MCNC: 0.6 MG/DL (ref 0.1–1)
BUN SERPL-MCNC: 12 MG/DL (ref 6–20)
CALCIUM SERPL-MCNC: 9.5 MG/DL (ref 8.7–10.5)
CHLORIDE SERPL-SCNC: 102 MMOL/L (ref 95–110)
CO2 SERPL-SCNC: 27 MMOL/L (ref 23–29)
CREAT SERPL-MCNC: 1.2 MG/DL (ref 0.5–1.4)
DIFFERENTIAL METHOD: NORMAL
EOSINOPHIL # BLD AUTO: 0 K/UL (ref 0–0.5)
EOSINOPHIL NFR BLD: 0.4 % (ref 0–8)
ERYTHROCYTE [DISTWIDTH] IN BLOOD BY AUTOMATED COUNT: 11.9 % (ref 11.5–14.5)
EST. GFR  (AFRICAN AMERICAN): >60 ML/MIN/1.73 M^2
EST. GFR  (NON AFRICAN AMERICAN): >60 ML/MIN/1.73 M^2
GLUCOSE SERPL-MCNC: 194 MG/DL (ref 70–110)
HCT VFR BLD AUTO: 45.6 % (ref 40–54)
HGB BLD-MCNC: 15 G/DL (ref 14–18)
IMM GRANULOCYTES # BLD AUTO: 0.03 K/UL (ref 0–0.04)
IMM GRANULOCYTES NFR BLD AUTO: 0.3 % (ref 0–0.5)
LIPASE SERPL-CCNC: 22 U/L (ref 4–60)
LYMPHOCYTES # BLD AUTO: 2.4 K/UL (ref 1–4.8)
LYMPHOCYTES NFR BLD: 23.6 % (ref 18–48)
MCH RBC QN AUTO: 28.8 PG (ref 27–31)
MCHC RBC AUTO-ENTMCNC: 32.9 G/DL (ref 32–36)
MCV RBC AUTO: 88 FL (ref 82–98)
MONOCYTES # BLD AUTO: 0.5 K/UL (ref 0.3–1)
MONOCYTES NFR BLD: 5 % (ref 4–15)
NEUTROPHILS # BLD AUTO: 7.1 K/UL (ref 1.8–7.7)
NEUTROPHILS NFR BLD: 70.4 % (ref 38–73)
NRBC BLD-RTO: 0 /100 WBC
PLATELET # BLD AUTO: 224 K/UL (ref 150–450)
PMV BLD AUTO: 11.3 FL (ref 9.2–12.9)
POCT GLUCOSE: 172 MG/DL (ref 70–110)
POTASSIUM SERPL-SCNC: 3.8 MMOL/L (ref 3.5–5.1)
PROT SERPL-MCNC: 7.2 G/DL (ref 6–8.4)
RBC # BLD AUTO: 5.21 M/UL (ref 4.6–6.2)
SODIUM SERPL-SCNC: 143 MMOL/L (ref 136–145)
WBC # BLD AUTO: 10.03 K/UL (ref 3.9–12.7)

## 2022-01-25 PROCEDURE — 99284 EMERGENCY DEPT VISIT MOD MDM: CPT | Mod: 25

## 2022-01-25 PROCEDURE — 82962 GLUCOSE BLOOD TEST: CPT

## 2022-01-25 PROCEDURE — 63600175 PHARM REV CODE 636 W HCPCS: Performed by: EMERGENCY MEDICINE

## 2022-01-25 PROCEDURE — 96376 TX/PRO/DX INJ SAME DRUG ADON: CPT

## 2022-01-25 PROCEDURE — 80053 COMPREHEN METABOLIC PANEL: CPT | Performed by: PHYSICIAN ASSISTANT

## 2022-01-25 PROCEDURE — 25000003 PHARM REV CODE 250: Performed by: EMERGENCY MEDICINE

## 2022-01-25 PROCEDURE — 96361 HYDRATE IV INFUSION ADD-ON: CPT

## 2022-01-25 PROCEDURE — 85025 COMPLETE CBC W/AUTO DIFF WBC: CPT | Performed by: PHYSICIAN ASSISTANT

## 2022-01-25 PROCEDURE — 83690 ASSAY OF LIPASE: CPT | Performed by: PHYSICIAN ASSISTANT

## 2022-01-25 PROCEDURE — 96374 THER/PROPH/DIAG INJ IV PUSH: CPT

## 2022-01-25 RX ORDER — PROCHLORPERAZINE 25 MG
25 SUPPOSITORY, RECTAL RECTAL EVERY 12 HOURS PRN
Qty: 10 SUPPOSITORY | Refills: 0 | Status: SHIPPED | OUTPATIENT
Start: 2022-01-25 | End: 2022-03-11

## 2022-01-25 RX ORDER — PROCHLORPERAZINE MALEATE 10 MG
10 TABLET ORAL 3 TIMES DAILY PRN
Qty: 15 TABLET | Refills: 0 | Status: SHIPPED | OUTPATIENT
Start: 2022-01-25 | End: 2022-03-11 | Stop reason: SDUPTHER

## 2022-01-25 RX ORDER — HALOPERIDOL 5 MG/ML
5 INJECTION INTRAMUSCULAR
Status: COMPLETED | OUTPATIENT
Start: 2022-01-25 | End: 2022-01-25

## 2022-01-25 RX ADMIN — SODIUM CHLORIDE 1000 ML: 0.9 INJECTION, SOLUTION INTRAVENOUS at 06:01

## 2022-01-25 RX ADMIN — HALOPERIDOL LACTATE 5 MG: 5 INJECTION, SOLUTION INTRAMUSCULAR at 06:01

## 2022-01-25 NOTE — FIRST PROVIDER EVALUATION
Emergency Department TeleTriage Encounter Note      CHIEF COMPLAINT    Chief Complaint   Patient presents with    gastroparesis     Pt states he has been to 4 different ER's over the past mth for n/v/, hx of dm, father reports weight loss of 20 lbs in the past mth, MM dry, pt retching in triage, pt alos reports generalized weakness         VITAL SIGNS   Initial Vitals [01/25/22 1530]   BP Pulse Resp Temp SpO2   108/70 97 18 99 °F (37.2 °C) 98 %      MAP       --            ALLERGIES    Review of patient's allergies indicates:   Allergen Reactions    Heparin analogues Nausea And Vomiting       PROVIDER TRIAGE NOTE  This is a teletriage evaluation of a 36 y.o. male presenting to the ED complaining of persistent nausea and vomiting for the last month with associated weight loss.  No recent fever.  Has been evaluated in multiple EDs with no diagnosis.     Initial orders will be placed and care will be transferred to an alternate provider when patient is roomed for a full evaluation. Any additional orders and the final disposition will be determined by that provider.           ORDERS  Labs Reviewed   CBC W/ AUTO DIFFERENTIAL   COMPREHENSIVE METABOLIC PANEL   LIPASE       ED Orders (720h ago, onward)    Start Ordered     Status Ordering Provider    01/25/22 1539 01/25/22 1538  CBC auto differential  STAT         Ordered CHEYANNE LEGER S.    01/25/22 1539 01/25/22 1538  Comprehensive metabolic panel  STAT         Ordered CHEYANNE LEGER SLeti    01/25/22 1539 01/25/22 1538  Lipase  STAT         Ordered CHEYANNE LEGER S.    01/25/22 1538 01/25/22 1538  Saline lock IV  Once         Ordered CHEYANNE LEGER            Virtual Visit Note: The provider triage portion of this emergency department evaluation and documentation was performed via [employer], a HIPAA-compliant telemedicine application, in concert with a tele-presenter in the room. A face to face patient evaluation with one of my colleagues will occur  once the patient is placed in an emergency department room.      DISCLAIMER: This note was prepared with [employer] voice recognition transcription software. Garbled syntax, mangled pronouns, and other bizarre constructions may be attributed to that software system.

## 2022-01-25 NOTE — ED NOTES
Dr. Almodovar and MAT Arias both in room speaking with patient and his dad, because he is refusing all treatment ordered for him.

## 2022-01-25 NOTE — ED PROVIDER NOTES
Encounter Date: 1/25/2022       History     Chief Complaint   Patient presents with    gastroparesis     Pt states he has been to 4 different ER's over the past mth for n/v/, hx of dm, father reports weight loss of 20 lbs in the past mth, MM dry, pt retching in triage, pt alos reports generalized weakness       37yo male presents to the ED for vomiting. The patient's father reports that he has been unable to tolerate oral food for about a month.  He has seen GI and has had multiple visits to the ED, but the vomiting continues. Pt is awaiting clearance by cardiology for EGD.  Pt is prescribed insulin but has not been taking it due to inability to tolerate oral foods.  No abd pain, fever, CP, SOB.  Pt is prescribed Reglan TID but states that he is unable to tolerate the medication due to vomiting. Pt has had an unintentional weight loss of about 20lbs in the past month. No hematemesis.  No other complaints at this time.     The history is provided by the patient, medical records and a parent.     Review of patient's allergies indicates:   Allergen Reactions    Heparin analogues Nausea And Vomiting     Past Medical History:   Diagnosis Date    Anticoagulant long-term use     Diabetes mellitus     Hypertension     May-Thurner syndrome      Past Surgical History:   Procedure Laterality Date    APPENDECTOMY      INCISION AND DRAINAGE FOOT Left 11/28/2021    Procedure: INCISION AND DRAINAGE, FOOT;  Surgeon: Bj Alicea DPM;  Location: [facility];  Service: Podiatry;  Laterality: Left;    stents in bilateral legs       Family History   Problem Relation Age of Onset    Cancer Mother     Cancer Paternal Uncle     Cancer Paternal Grandfather     Irritable bowel syndrome Paternal Grandfather     Cancer Maternal Grandfather     Colon cancer Neg Hx     Esophageal cancer Neg Hx     Rectal cancer Neg Hx     Stomach cancer Neg Hx     Ulcerative colitis Neg Hx     Crohn's disease Neg Hx     Celiac disease Neg  Hx      Social History     Tobacco Use    Smoking status: Former Smoker     Packs/day: 0.00    Smokeless tobacco: Former User    Tobacco comment: occasionally    Substance Use Topics    Alcohol use: Yes     Comment: occ    Drug use: No     Review of Systems   Constitutional: Positive for activity change and appetite change. Negative for fever.   HENT: Negative for congestion.    Respiratory: Negative for cough and shortness of breath.    Cardiovascular: Negative for chest pain.   Gastrointestinal: Positive for nausea and vomiting. Negative for abdominal pain and diarrhea.   Genitourinary: Negative for dysuria.   Hematological: Bruises/bleeds easily.   Psychiatric/Behavioral: Negative for confusion.       Physical Exam     Initial Vitals [01/25/22 1530]   BP Pulse Resp Temp SpO2   108/70 97 18 99 °F (37.2 °C) 98 %      MAP       --         Physical Exam    Nursing note and vitals reviewed.  Constitutional: Vital signs are normal. He appears well-developed and well-nourished. He is active and cooperative. He is easily aroused.  Non-toxic appearance. He does not have a sickly appearance. He does not appear ill. No distress.   HENT:   Head: Normocephalic and atraumatic.   Mouth/Throat: Mucous membranes are normal.   Eyes: Conjunctivae are normal.   Neck: Phonation normal.   Normal range of motion.  Cardiovascular: Normal rate, regular rhythm and normal heart sounds.   Pulmonary/Chest: Effort normal and breath sounds normal.   Abdominal: Abdomen is soft. Normal appearance and bowel sounds are normal. He exhibits no distension. There is no abdominal tenderness.   Glucose monitor LLQ.  There is no rigidity, no rebound, no guarding and no CVA tenderness.   Musculoskeletal:      Cervical back: Normal range of motion.     Neurological: He is alert, oriented to person, place, and time and easily aroused. He has normal strength. Coordination normal. GCS eye subscore is 4. GCS verbal subscore is 5. GCS motor subscore is 6.    Skin: Skin is warm, dry and intact. No bruising and no rash noted.   Psychiatric: He has a normal mood and affect. His speech is normal and behavior is normal. Judgment and thought content normal. Cognition and memory are normal.         ED Course   Procedures  Labs Reviewed   COMPREHENSIVE METABOLIC PANEL - Abnormal; Notable for the following components:       Result Value    Glucose 194 (*)     All other components within normal limits   POCT GLUCOSE - Abnormal; Notable for the following components:    POCT Glucose 172 (*)     All other components within normal limits   CBC W/ AUTO DIFFERENTIAL   LIPASE   MAGNESIUM   URINALYSIS   DRUG SCREEN PANEL, URINE EMERGENCY   POCT GLUCOSE MONITORING CONTINUOUS          Imaging Results          X-Ray Abdomen Flat And Erect (Final result)  Result time 01/25/22 18:04:41    Final result by Zeferino Lynch MD (01/25/22 18:04:41)                 Impression:      Nonobstructive bowel gas pattern.      Electronically signed by: Zeferino Lynch MD  Date:    01/25/2022  Time:    18:04             Narrative:    EXAMINATION:  XR ABDOMEN FLAT AND ERECT    CLINICAL HISTORY:  Nausea with vomiting, unspecified    TECHNIQUE:  Flat and erect AP views of the abdomen were performed.    COMPARISON:  Abdominal ultrasound 01/24/2022, abdominal series 01/07/2022 and CT abdomen and pelvis 08/19/2017    FINDINGS:  Nonobstructive bowel gas pattern noting decreased amount of scattered fecal material throughout the colon, overall mild.  No organomegaly or significant mass effect.  No large amount of free air or abnormal intra-abdominal calcification seen.  Small electronic device now projects over the left lower quadrant.  Bilateral iliac stents again noted.  Pelvic phleboliths noted.  Imaged lung bases are clear.  Osseous structures are intact.                                 Medications   sodium chloride 0.9% bolus 1,000 mL (1,000 mLs Intravenous New Bag 1/25/22 1800)   haloperidol lactate injection 5  mg (5 mg Intravenous Given 1/25/22 1801)     Medical Decision Making:   History:   Old Medical Records: I decided to obtain old medical records.  Old Records Summarized: other records.       <> Summary of Records: 1/24/22- US Abdomen-Gallbladder sludge without wall thickening or sonographic Riley sign.     Otherwise unremarkable exam.    1/7/22 Gastric emptying-Essentially nondiagnostic study secondary to patient vomiting.  Essentially no gastric emptying at 65 minutes.  Initial Assessment:   35yo male here for persistent vomiting for one month. Denies abd pain. Awaiting clearance from cardiology for EGD.   Differential Diagnosis:   Gastroporesis, DM, dehydration, electrolyte derangement, infection, GERD, intestinal spasm, gastroenteritis, gastritis, ulcer, cholecystitis, cholelithiasis, gallstones, pancreatitis, ileus, small bowel obstruction, appendicitis, diverticulitis, colitis, constipation, intestinal gas pain.    Clinical Tests:   Lab Tests: Ordered and Reviewed  Radiological Study: Ordered and Reviewed  ED Management:  5:30 PM  Pt declined IV fluids and haldol.  States that he has no nausea currently.     No abd tenderness or report of pain.    Xray negative for acute change. WBC normal.  LFTs WNL. Pt is not in DKA.  The patient is tolerating oral fluids while in the Ed.  He was offered to await the UA result, but he denies urinary symptoms.  The patient was given a prescription for compazine suppositories and oral tablets.  He is to use the medication that brings him relief.  I advised attempting the compazine suppository if he is unable to tolerate reglan and compazine oral tablets due to vomiting.    The patient is awaiting cardiology clearance for EGD.      Pt to follow up with GI within one week.  I reviewed strict return precautions. In addition, pt is to return to the ED if condition changes, progresses, or if there are any concerns.  Pt verbalized understanding, compliance, and agreement with the  treatment plan.      Other:   I have discussed this case with another health care provider.       <> Summary of the Discussion: Reviewed with  who also evaluated this patient. He agrees with ED course and disposition.            Attending Attestation:     Physician Attestation Statement for NP/PA:   I have conducted a face to face encounter with this patient in addition to the NP/PA, due to NP/PA Request    Other NP/PA Attestation Additions:    History of Present Illness: Agree; 36-year-old male returns emergency department complaining of nausea, vomiting.  Has had a few evaluations in the past week or 2 for similar symptoms.  Has been diagnosed with gastroparesis in the past.  Is awaiting GI follow-up for endoscopy and further management and evaluation.   Physical Exam: Agree   Medical Decision Making: Agree; vital signs and labs benign here.  Imaging benign.  Patient offered symptomatic medication here, reassured regarding his results, encouraged to pursue follow-up with primary care and GI physicians                      Clinical Impression:   Final diagnoses:  [R11.2] Nausea & vomiting (Primary)          ED Disposition Condition    Discharge Stable        ED Prescriptions     Medication Sig Dispense Start Date End Date Auth. Provider    prochlorperazine (COMPAZINE) 10 MG tablet Take 1 tablet (10 mg total) by mouth 3 (three) times daily as needed (n/v). 15 tablet 1/25/2022  BRIEN Mcintosh    prochlorperazine (COMPAZINE) 25 MG suppository Place 1 suppository (25 mg total) rectally every 12 (twelve) hours as needed for Nausea. 10 suppository 1/25/2022  BRIEN Mcintosh        Follow-up Information     Follow up With Specialties Details Why Contact Info    Rajwinder Sigala PA-C Gastroenterology Schedule an appointment as soon as possible for a visit in 1 week  [address] [place] [address] DR Judah BOLANOS [id number]  [phone]             BRIEN Mcintosh  01/25/22  1852       Alonzo Chang MD  01/28/22 5344

## 2022-01-25 NOTE — TELEPHONE ENCOUNTER
Returned a call, spoke with patients fathers, advised that they are currently in the ER at [facility]. All questions were answered at this time.

## 2022-01-25 NOTE — TELEPHONE ENCOUNTER
----- Message from Anna Magdaleno sent at 1/25/2022  8:36 AM CST -----  Contact: Kulwant(Father)-[phone]  Type:  Needs Medical Advice    Who Called: Pt's Father  Reason for call: regarding scheduling the pt for a New pt appt for the pt is losing Weight, not able to keep anything down and has been to the ER a few Times. Pt is a Diabetic  Would the patient rather a call back or a response via [facility]?  Call back  Best Call Back Number:  [phone]

## 2022-01-25 NOTE — TELEPHONE ENCOUNTER
----- Message from Britany Cisneros sent at 1/25/2022 12:13 PM CST -----  Contact: [phone] or [phone]  Who Called: kevin  Regarding: hasn't eaten in 1 month  unable to take any of his meds he has lost 20 pounds and is dehydrated Rajwinder Sigala PA-C doesn't wants to admit him into the hospital with our Dr. Omega woodarday      Would the patient rather a call back or a response via [facility]? Call back  Best Call Back Number:[phone] or [phone]  Additional Information:

## 2022-01-26 ENCOUNTER — PATIENT MESSAGE (OUTPATIENT)
Dept: GASTROENTEROLOGY | Facility: CLINIC | Age: 37
End: 2022-01-26
Payer: COMMERCIAL

## 2022-01-26 NOTE — ED NOTES
Review of patient's allergies indicates:   Allergen Reactions    Heparin analogues Nausea And Vomiting        Patient has verified the spelling of the name and  on armband.   APPEARANCE: Patient is alert, calm, oriented x 4, and does not appear distressed. Flat affect and states that he is tired of going to ERs over the last couple weeks fo rthe same complaint.  SKIN: Skin is normal for race, warm, and dry. Normal skin turgor and mucous membranes moist.  CARDIAC: Normal rate and rhythm, no murmur heard.   RESPIRATORY:Normal rate and effort. Breath sounds clear bilaterally throughout chest. Respirations are equal and unlabored.    GASTRO: Bowel sounds normal, abdomen abdomen tender to palpitation, and no abdominal distention. Patient and father reports nausea and vomiting that has been going on for a few weeks. Patient reports no PO intake in a couple days, except some gatorade.   MUSCLE: Full ROM. No bony tenderness or soft tissue tenderness. No obvious deformity. Generalized weakness  PERIPHERAL VASCULAR: peripheral pulses present. Normal cap refill. No edema. Warm to touch.  NEURO: 5/5 strength major flexors/extensors bilaterally. Sensory intact to light touch bilaterally. Hinton coma scale: eyes open spontaneously-4, oriented & converses-5, obeys commands-6. No neurological abnormalities.   MENTAL STATUS: awake, alert and aware of environment.  EYE: No overt deficits noted. No drainage. Sclera WNL  ENT: EARS: no obvious drainage. NOSE: no active bleeding. THROAT: no redness or swelling.  : Voids without complication. Reports that he has not urinated since 11am today and that his urine is dark in color.

## 2022-01-26 NOTE — DISCHARGE INSTRUCTIONS
Please eat a bland diet and increase your fluid intake.      You may attempt the compazine tablets if you have no relief from the reglan.  You may attempt the compazine suppositories if you are unable to keep the reglan or compazine tablets down.     Return to the ED if your condition changes, progresses, or if you have any concerns.      Thank you for choosing [facility]! We appreciate you coming to us for your medical care. We hope you feel better soon! Please come back to [facility] for all of your future medical needs.      Our goal in the emergency department is to always give you outstanding care and exceptional service. You may receive a survey by mail or e-mail in the next week regarding your experience in our ED. We would greatly appreciate your completing and returning the survey. Your feedback provides us with a way to recognize our staff who give very good care and it helps us learn how to improve when your experience was below our aspiration of excellence.      Sincerely,  KIMBERLEY Zamudio  [facility]

## 2022-01-27 ENCOUNTER — OFFICE VISIT (OUTPATIENT)
Dept: CARDIOLOGY | Facility: CLINIC | Age: 37
End: 2022-01-27
Payer: COMMERCIAL

## 2022-01-27 ENCOUNTER — TELEPHONE (OUTPATIENT)
Dept: GASTROENTEROLOGY | Facility: CLINIC | Age: 37
End: 2022-01-27
Payer: COMMERCIAL

## 2022-01-27 ENCOUNTER — PATIENT MESSAGE (OUTPATIENT)
Dept: GASTROENTEROLOGY | Facility: CLINIC | Age: 37
End: 2022-01-27
Payer: COMMERCIAL

## 2022-01-27 VITALS
DIASTOLIC BLOOD PRESSURE: 85 MMHG | HEIGHT: 74 IN | HEART RATE: 95 BPM | BODY MASS INDEX: 28.22 KG/M2 | WEIGHT: 219.88 LBS | OXYGEN SATURATION: 98 % | SYSTOLIC BLOOD PRESSURE: 129 MMHG

## 2022-01-27 DIAGNOSIS — I15.2 HYPERTENSION ASSOCIATED WITH DIABETES: ICD-10-CM

## 2022-01-27 DIAGNOSIS — I82.512 CHRONIC DEEP VEIN THROMBOSIS (DVT) OF FEMORAL VEIN OF LEFT LOWER EXTREMITY: ICD-10-CM

## 2022-01-27 DIAGNOSIS — E11.59 HYPERTENSION ASSOCIATED WITH DIABETES: ICD-10-CM

## 2022-01-27 DIAGNOSIS — Z79.01 CHRONIC ANTICOAGULATION: ICD-10-CM

## 2022-01-27 DIAGNOSIS — E11.43 DIABETIC GASTROPARESIS ASSOCIATED WITH TYPE 2 DIABETES MELLITUS: Chronic | ICD-10-CM

## 2022-01-27 DIAGNOSIS — Z79.4 TYPE 2 DIABETES MELLITUS WITH COMPLICATION, WITH LONG-TERM CURRENT USE OF INSULIN: ICD-10-CM

## 2022-01-27 DIAGNOSIS — I82.4Y2 ACUTE DEEP VEIN THROMBOSIS (DVT) OF PROXIMAL VEIN OF LEFT LOWER EXTREMITY: Primary | ICD-10-CM

## 2022-01-27 DIAGNOSIS — I82.402 RECURRENT DEEP VEIN THROMBOSIS (DVT) OF LEFT LOWER EXTREMITY: ICD-10-CM

## 2022-01-27 DIAGNOSIS — K31.84 DIABETIC GASTROPARESIS ASSOCIATED WITH TYPE 2 DIABETES MELLITUS: Chronic | ICD-10-CM

## 2022-01-27 DIAGNOSIS — E11.8 TYPE 2 DIABETES MELLITUS WITH COMPLICATION, WITH LONG-TERM CURRENT USE OF INSULIN: ICD-10-CM

## 2022-01-27 DIAGNOSIS — Z95.828 HISTORY OF INTRAVASCULAR STENT PLACEMENT: ICD-10-CM

## 2022-01-27 DIAGNOSIS — I87.1 MAY-THURNER SYNDROME: Chronic | ICD-10-CM

## 2022-01-27 PROCEDURE — 1159F MED LIST DOCD IN RCRD: CPT | Mod: CPTII,S$GLB,, | Performed by: NURSE PRACTITIONER

## 2022-01-27 PROCEDURE — 3074F PR MOST RECENT SYSTOLIC BLOOD PRESSURE < 130 MM HG: ICD-10-PCS | Mod: CPTII,S$GLB,, | Performed by: NURSE PRACTITIONER

## 2022-01-27 PROCEDURE — 3008F PR BODY MASS INDEX (BMI) DOCUMENTED: ICD-10-PCS | Mod: CPTII,S$GLB,, | Performed by: NURSE PRACTITIONER

## 2022-01-27 PROCEDURE — 99999 PR PBB SHADOW E&M-EST. PATIENT-LVL V: CPT | Mod: PBBFAC,,, | Performed by: NURSE PRACTITIONER

## 2022-01-27 PROCEDURE — 99999 PR PBB SHADOW E&M-EST. PATIENT-LVL V: ICD-10-PCS | Mod: PBBFAC,,, | Performed by: NURSE PRACTITIONER

## 2022-01-27 PROCEDURE — 3079F PR MOST RECENT DIASTOLIC BLOOD PRESSURE 80-89 MM HG: ICD-10-PCS | Mod: CPTII,S$GLB,, | Performed by: NURSE PRACTITIONER

## 2022-01-27 PROCEDURE — 1160F PR REVIEW ALL MEDS BY PRESCRIBER/CLIN PHARMACIST DOCUMENTED: ICD-10-PCS | Mod: CPTII,S$GLB,, | Performed by: NURSE PRACTITIONER

## 2022-01-27 PROCEDURE — 99214 PR OFFICE/OUTPT VISIT, EST, LEVL IV, 30-39 MIN: ICD-10-PCS | Mod: S$GLB,,, | Performed by: NURSE PRACTITIONER

## 2022-01-27 PROCEDURE — 1159F PR MEDICATION LIST DOCUMENTED IN MEDICAL RECORD: ICD-10-PCS | Mod: CPTII,S$GLB,, | Performed by: NURSE PRACTITIONER

## 2022-01-27 PROCEDURE — 1160F RVW MEDS BY RX/DR IN RCRD: CPT | Mod: CPTII,S$GLB,, | Performed by: NURSE PRACTITIONER

## 2022-01-27 PROCEDURE — 3074F SYST BP LT 130 MM HG: CPT | Mod: CPTII,S$GLB,, | Performed by: NURSE PRACTITIONER

## 2022-01-27 PROCEDURE — 3008F BODY MASS INDEX DOCD: CPT | Mod: CPTII,S$GLB,, | Performed by: NURSE PRACTITIONER

## 2022-01-27 PROCEDURE — 3079F DIAST BP 80-89 MM HG: CPT | Mod: CPTII,S$GLB,, | Performed by: NURSE PRACTITIONER

## 2022-01-27 PROCEDURE — 99214 OFFICE O/P EST MOD 30 MIN: CPT | Mod: S$GLB,,, | Performed by: NURSE PRACTITIONER

## 2022-01-27 NOTE — TELEPHONE ENCOUNTER
REQUESTING CARDIAC CLEARANCE:  Patient seen in clinic 1/20/22 for nausea/vomiting. Needs EGD for further evaluation. He is currently taking Xarelto 20mg daily. Requesting clearance to stop anticoagulant therapy for scopes.

## 2022-01-27 NOTE — TELEPHONE ENCOUNTER
CARDIAC CLEARANCE  Per Fior Conner NP    Patient will be at low risk for upcoming EGD procedure. He is able to perform a minimum of 4 METS at moderate function capacity. He will hold Xarelto 24-48 hrs prior to procedure and can resume when ok from bleeding standpoint from GI. His EDG is not scheduled, as he was awaiting clearance.

## 2022-01-27 NOTE — PROGRESS NOTES
Cardiology Clinic note    Subjective:   Patient ID:  Kulwant Mueller Jr. is a 36 y.o. male who presents for clearance for EGD to evaluate nausea and vomiting.     He was last seen by Dr. Duff 10/21/2021 for a follow up visit hx of acute LLE fem-pop and below the knee DVT 8/2016 s/p catheter directed thrombolysis with EKOS on chronic Xarelto, May Thurner's Syndrome s/p bilateral iliac vein stenting, HTN, DM on insulin, which he reports today he does not take. Most recent venous u/s without evidence of residual trombosis.    He had intractable N/V for the last few months with multiple hospital visits. Reports losing 20lbs in a months time. His most recent A1C >14; he reports he does not use recommended insulin. GI is requesting cardiac risk assessment for EGD procedure to evaluate.     He denies CP, SOB/DASILVA, orthopnea, PND, palpitations, syncope.    HPI:   Kulwant Mueller Jr.  has a past medical history of Anticoagulant long-term use, Diabetes mellitus, Hypertension, and May-Thurner syndrome.          Patient Active Problem List    Diagnosis Date Noted    Infected Diabetic ulcer of left foot with Cellulitis  associated with type 2 diabetes mellitus 11/27/2021    COVID-19 virus infection 11/27/2021    History of Recurrent deep vein thrombosis (DVT) of left lower extremity with chronic anticoagulation with Xarelto 10/03/2018    Chronic anticoagulation 10/03/2018    Post-thrombotic syndrome of left lower extremity 10/03/2018    May-Thurner syndrome 10/03/2018    Hypertension associated with diabetes 09/26/2018    Deep vein thrombosis (DVT) of femoral vein of left lower extremity 08/31/2018    Diabetic gastroparesis associated with type 2 diabetes mellitus 08/31/2018    History of intravascular stent placement 09/14/2017     Bilateral iliac vein stenting for May Thurner's Syndrome      Non morbid obesity due to excess calories 08/19/2017    Type 2 diabetes mellitus with complication, with long-term  "current use of insulin 08/19/2017    Acute deep vein thrombosis (DVT) of proximal vein of left lower extremity 08/18/2017    Penile lesion 10/30/2013       Patient's Medications   New Prescriptions    No medications on file   Previous Medications    DEXCOM G6 SENSOR CAROL    CHANGE SENSOR EVERY 10 DAYS    DEXCOM G6 TRANSMITTER CAROL    CHANGE TRANSMITTER EVERY 90 DAYS.    INSULIN LISPRO 200 UNIT/ML (3 ML) INPN    Inject 14 Units into the skin 3 (three) times daily with meals. Plus sliding scale if needed.    LOSARTAN (COZAAR) 25 MG TABLET    Take 1 tablet (25 mg total) by mouth once daily.    METOCLOPRAMIDE HCL (REGLAN) 10 MG TABLET    TAKE 1 TABLET(10 MG) BY MOUTH THREE TIMES DAILY BEFORE MEALS    PEN NEEDLE, DIABETIC (BD ULTRA-FINE DIYA PEN NEEDLE) 32 GAUGE X 5/32" NDLE    Use with Tresiba daily and Humalog 3-4 times daily as directed.    PROCHLORPERAZINE (COMPAZINE) 10 MG TABLET    Take 1 tablet (10 mg total) by mouth 3 (three) times daily as needed (n/v).    PROCHLORPERAZINE (COMPAZINE) 25 MG SUPPOSITORY    Place 1 suppository (25 mg total) rectally every 12 (twelve) hours as needed for Nausea.    PROMETHAZINE (PHENERGAN) 25 MG SUPPOSITORY    Place 1 suppository (25 mg total) rectally every 6 (six) hours as needed for Nausea.    RIVAROXABAN (XARELTO) 20 MG TAB    Take 1 tablet (20 mg total) by mouth daily with dinner or evening meal.    TRESIBA FLEXTOUCH U-200 200 UNIT/ML (3 ML) INSULIN PEN    Inject 50 Units into the skin once daily.   Modified Medications    No medications on file   Discontinued Medications    No medications on file        ROS      Objective:   Vitals  Vitals:    01/27/22 1012 01/27/22 1014   BP: (!) 132/92 129/85   Pulse: 94 95   SpO2: 98% 98%   Weight: 99.7 kg (219 lb 14.4 oz) 99.7 kg (219 lb 14.4 oz)   Height: 6' 2" (1.88 m)           Physical Exam      Lab Results    Lab Results   Component Value Date    WBC 10.03 01/25/2022    HGB 15.0 01/25/2022    HCT 45.6 01/25/2022    HCT 54 " 05/19/2016    MCV 88 01/25/2022       Lab Results   Component Value Date     01/25/2022    INR 1.1 11/27/2021       Lab Results   Component Value Date    K 3.8 01/25/2022    MG 2.1 01/23/2022    BUN 12 01/25/2022    CREATININE 1.2 01/25/2022       Lab Results   Component Value Date     (H) 01/25/2022    HGBA1C >14.0 (H) 09/22/2021       Lab Results   Component Value Date    AST 15 01/25/2022    ALT 19 01/25/2022    ALBUMIN 3.8 01/25/2022    PROT 7.2 01/25/2022       Lab Results   Component Value Date    CHOL 202 (H) 01/11/2018    HDL 34 (L) 01/11/2018    LDLCALC 139.0 01/11/2018    TRIG 145 01/11/2018       Lab Results   Component Value Date    BNP 17 11/27/2021       Cardiac Studies    ECG 9/12/17:  NSR  Echo: N/A  Stress Test: N/A  Cath study: N/A    Assessment:     1. Acute deep vein thrombosis (DVT) of proximal vein of left lower extremity    2. Chronic deep vein thrombosis (DVT) of femoral vein of left lower extremity    3. History of Recurrent deep vein thrombosis (DVT) of left lower extremity with chronic anticoagulation with Xarelto        Plan:     Patient will be at low risk for upcoming EGD procedure. He is able to perform a minimum of 4 METS at moderate function capacity. He will hold Xarelto 24-48 hrs prior to procedure and can resume when ok from bleeding standpoint from GI. His EDG is not scheduled, as he was awaiting clearance.     Continue with current medical plan and lifestyle changes.    Follow up in 1 year with surveillance bilateral venous ultrasound    Return sooner for concerns or questions. If symptoms persist go to the ED    He expressed verbal understanding and agreed with the plan    Thank you for the opportunity to care for this patient. Will be available for questions if needed.     Total duration of face to face visit time 30 minutes.  Total time spent counseling greater than fifty percent of total visit time.  Counseling included discussion regarding imaging findings,  diagnosis, possibilities, treatment options, risks and benefits.    Fior Conner, FNP-C  Cardiology Clinic  [facility]

## 2022-01-28 ENCOUNTER — TELEPHONE (OUTPATIENT)
Dept: PREADMISSION TESTING | Facility: HOSPITAL | Age: 37
End: 2022-01-28
Payer: COMMERCIAL

## 2022-01-28 ENCOUNTER — OFFICE VISIT (OUTPATIENT)
Dept: PODIATRY | Facility: CLINIC | Age: 37
End: 2022-01-28
Payer: COMMERCIAL

## 2022-01-28 ENCOUNTER — EXTERNAL HOME HEALTH (OUTPATIENT)
Dept: HOME HEALTH SERVICES | Facility: HOSPITAL | Age: 37
End: 2022-01-28
Payer: COMMERCIAL

## 2022-01-28 ENCOUNTER — ANESTHESIA EVENT (OUTPATIENT)
Dept: ENDOSCOPY | Facility: HOSPITAL | Age: 37
End: 2022-01-28
Payer: COMMERCIAL

## 2022-01-28 VITALS — HEIGHT: 74 IN | BODY MASS INDEX: 28.11 KG/M2 | WEIGHT: 219 LBS

## 2022-01-28 DIAGNOSIS — E11.621 DIABETIC ULCER OF LEFT MIDFOOT ASSOCIATED WITH TYPE 2 DIABETES MELLITUS, WITH FAT LAYER EXPOSED: Primary | ICD-10-CM

## 2022-01-28 DIAGNOSIS — L97.422 DIABETIC ULCER OF LEFT MIDFOOT ASSOCIATED WITH TYPE 2 DIABETES MELLITUS, WITH FAT LAYER EXPOSED: Primary | ICD-10-CM

## 2022-01-28 PROCEDURE — 99999 PR PBB SHADOW E&M-EST. PATIENT-LVL IV: CPT | Mod: PBBFAC,,, | Performed by: PODIATRIST

## 2022-01-28 PROCEDURE — 99999 PR PBB SHADOW E&M-EST. PATIENT-LVL IV: ICD-10-PCS | Mod: PBBFAC,,, | Performed by: PODIATRIST

## 2022-01-28 PROCEDURE — 1160F PR REVIEW ALL MEDS BY PRESCRIBER/CLIN PHARMACIST DOCUMENTED: ICD-10-PCS | Mod: CPTII,S$GLB,, | Performed by: PODIATRIST

## 2022-01-28 PROCEDURE — 1159F PR MEDICATION LIST DOCUMENTED IN MEDICAL RECORD: ICD-10-PCS | Mod: CPTII,S$GLB,, | Performed by: PODIATRIST

## 2022-01-28 PROCEDURE — 99214 OFFICE O/P EST MOD 30 MIN: CPT | Mod: 25,S$GLB,, | Performed by: PODIATRIST

## 2022-01-28 PROCEDURE — 3008F BODY MASS INDEX DOCD: CPT | Mod: CPTII,S$GLB,, | Performed by: PODIATRIST

## 2022-01-28 PROCEDURE — 3008F PR BODY MASS INDEX (BMI) DOCUMENTED: ICD-10-PCS | Mod: CPTII,S$GLB,, | Performed by: PODIATRIST

## 2022-01-28 PROCEDURE — 15275 SKIN SUB GRAFT FACE/NK/HF/G: CPT | Mod: S$GLB,,, | Performed by: PODIATRIST

## 2022-01-28 PROCEDURE — 1160F RVW MEDS BY RX/DR IN RCRD: CPT | Mod: CPTII,S$GLB,, | Performed by: PODIATRIST

## 2022-01-28 PROCEDURE — 1159F MED LIST DOCD IN RCRD: CPT | Mod: CPTII,S$GLB,, | Performed by: PODIATRIST

## 2022-01-28 PROCEDURE — 15275 PR SKIN SUB GRAFT FACE/NK/HF/G UP TO 100 SQCM: ICD-10-PCS | Mod: S$GLB,,, | Performed by: PODIATRIST

## 2022-01-28 PROCEDURE — 99214 PR OFFICE/OUTPT VISIT, EST, LEVL IV, 30-39 MIN: ICD-10-PCS | Mod: 25,S$GLB,, | Performed by: PODIATRIST

## 2022-01-28 RX ORDER — DOXYCYCLINE 100 MG/1
100 CAPSULE ORAL EVERY 12 HOURS
Qty: 14 CAPSULE | Refills: 0 | Status: SHIPPED | OUTPATIENT
Start: 2022-01-28 | End: 2022-02-04

## 2022-01-28 NOTE — PRE-PROCEDURE INSTRUCTIONS
PAT call completed.  Patient educated on procedure instructions.  Medical history discussed and patient informed of arrival time of 11:00 AM on Monday, January 31, 2022 at the [place], and was made aware of the limited-visitor policy, and that  is to remain during the entire visit.  All questions and concerns addressed.  Endoscopy instructions reviewed. Instructed nothing to eat or drink after midnight the night before procedure.  Instructed also to take only his Losartan the morning of procedure with just small sips of water, to hold his diabetes medication, and to monitor his blood sugar closely.  Rapid pre-procedure covid testing to be performed the morning of procedure.  Patient verbalized understanding of all instructions.    Clearance and Xarelto 2 day hold approval received from Dr. Duff.

## 2022-01-28 NOTE — TELEPHONE ENCOUNTER
PAT call completed.  Patient educated on procedure instructions.  Medical history discussed and patient informed of arrival time of 11:00 AM on Monday, January 31, 2022 at the [place], and was made aware of the limited-visitor policy, and that  is to remain during the entire visit.  All questions and concerns addressed.  Endoscopy instructions reviewed. Instructed nothing to eat or drink after midnight the night before procedure.  Instructed also to take only his Losartan the morning of procedure with just small sips of water, to hold his diabetes medication, and to monitor his blood sugar closely.  Rapid pre-procedure covid testing to be performed the morning of procedure.  Patient verbalized understanding of all instructions.    Cardiac clearance and Xarelto 2 day hold approval received from Dr. Duff.

## 2022-01-28 NOTE — ANESTHESIA PREPROCEDURE EVALUATION
01/28/2022  Kulwant Mueller Jr. is a 36 y.o., male.  Past Medical History:   Diagnosis Date    Anticoagulant long-term use     Diabetes mellitus     Hypertension     May-Thurner syndrome      Past Surgical History:   Procedure Laterality Date    APPENDECTOMY      INCISION AND DRAINAGE FOOT Left 11/28/2021    Procedure: INCISION AND DRAINAGE, FOOT;  Surgeon: Bj Alicea DPM;  Location: [facility];  Service: Podiatry;  Laterality: Left;    stents in bilateral legs           Anesthesia Evaluation    I have reviewed the Patient Summary Reports.    I have reviewed the Nursing Notes. I have reviewed the NPO Status.   I have reviewed the Medications.     Review of Systems  Anesthesia Hx:  No problems with previous Anesthesia  Denies Family Hx of Anesthesia complications.   Denies Personal Hx of Anesthesia complications.   Social:  Former Smoker, Alcohol Use    Hematology/Oncology:  Hematology Normal   Oncology Normal     EENT/Dental:EENT/Dental Normal   Cardiovascular:   Hypertension Cleared by cards for EGD 1/27/2022. Long-term anticoagulation d/t May-Thurner Syndrome and h/o recurrent  DVT LLE.  Pt has debi iliac stents. Recent h/o Lt foot diabetic ulcer w cellulitis. Peripheral Arterial Disease    Pulmonary:  Pulmonary Normal    Renal/:  Renal/ Normal     Hepatic/GI:   Intractable N/V, diabetic gastroparesis    Musculoskeletal:  Musculoskeletal Normal    Neurological:  Neurology Normal    Endocrine:   Diabetes, poorly controlled, type 2, using insulin Last A1C >14    Dermatological:  Skin Normal    Psych:  Psychiatric Normal           Physical Exam  General:  Well nourished    Airway/Jaw/Neck:  Airway Findings: Mouth Opening: Normal Tongue: Normal  General Airway Assessment: Adult  Mallampati: II  TM Distance: Normal, at least 6 cm         Dental:  Dental Findings: In tact    Chest/Lungs:  Chest/Lungs Clear    Heart/Vascular:  Heart Findings: Normal Heart murmur: negative Vascular Findings: Normal    Abdomen:  Abdomen Findings: Normal    Musculoskeletal:  Musculoskeletal Findings: Normal   Skin:  Skin Findings: Normal    Mental Status:  Mental Status Findings:  Cooperative, Alert and Oriented         Anesthesia Plan  Type of Anesthesia, risks & benefits discussed:  Anesthesia Type:  general    Patient's Preference:   Plan Factors:          Intra-op Monitoring Plan: standard ASA monitors  Intra-op Monitoring Plan Comments:   Post Op Pain Control Plan: per primary service following discharge from PACU  Post Op Pain Control Plan Comments:     Induction:   IV  Beta Blocker:  Patient is not currently on a Beta-Blocker (No further documentation required).       Informed Consent: Patient understands risks and agrees with Anesthesia plan.  Questions answered. Anesthesia consent signed with patient.  ASA Score: 3     Day of Surgery Review of History & Physical: I have interviewed and examined the patient. I have reviewed the patient's H&P dated:    H&P update referred to the provider.         Ready For Surgery From Anesthesia Perspective.

## 2022-01-28 NOTE — PROGRESS NOTES
Subjective:       Patient ID: Kulwant Mueller Jr. is a 36 y.o. male.    Chief Complaint: Wound Care (No c/o pain, Wears post op shoe with dressing, diabetic Pt, PCP Dr. Pineda)      HPI: Patient presents to the clinic today, for follow up concerning application of Affinity allograft to an ulceration located at the left dorsal lateral foot. Patient's Primary Care Provider is Dona Pineda MD. The PMHx. does include uncontrolled DMII with neuropathy. To this juncture, patient has had 1 allograft applications. Is pending EGD on next week.     Hemoglobin A1C   Date Value Ref Range Status   09/22/2021 >14.0 (H) 4.0 - 5.6 % Final     Comment:     ADA Screening Guidelines:  5.7-6.4%  Consistent with prediabetes  >or=6.5%  Consistent with diabetes    High levels of fetal hemoglobin interfere with the HbA1C  assay. Heterozygous hemoglobin variants (HbS, HgC, etc)do  not significantly interfere with this assay.   However, presence of multiple variants may affect accuracy.     08/31/2018 12.7 (H) 4.0 - 5.6 % Final     Comment:     ADA Screening Guidelines:  5.7-6.4%  Consistent with prediabetes  >or=6.5%  Consistent with diabetes  High levels of fetal hemoglobin interfere with the HbA1C  assay. Heterozygous hemoglobin variants (HbS, HgC, etc)do  not significantly interfere with this assay.   However, presence of multiple variants may affect accuracy.     01/11/2018 9.1 (H) 4.0 - 5.6 % Final     Comment:     According to ADA guidelines, hemoglobin A1c <7.0% represents  optimal control in non-pregnant diabetic patients. Different  metrics may apply to specific patient populations.   Standards of Medical Care in Diabetes-2016.  For the purpose of screening for the presence of diabetes:  <5.7%     Consistent with the absence of diabetes  5.7-6.4%  Consistent with increasing risk for diabetes   (prediabetes)  >or=6.5%  Consistent with diabetes  Currently, no consensus exists for use of hemoglobin A1c  for diagnosis of  diabetes for children.  This Hemoglobin A1c assay has significant interference with fetal   hemoglobin   (HbF). The results are invalid for patients with abnormal amounts of   HbF,   including those with known Hereditary Persistence   of Fetal Hemoglobin. Heterozygous hemoglobin variants (HbAS, HbAC,   HbAD, HbAE, HbA2) do not significantly interfere with this assay;   however, presence of multiple variants in a sample may impact the %   interference.         Review of patient's allergies indicates:   Allergen Reactions    Heparin analogues Nausea And Vomiting       Past Medical History:   Diagnosis Date    Anticoagulant long-term use     Diabetes mellitus     Hypertension     May-Thurner syndrome        Family History   Problem Relation Age of Onset    Cancer Mother     Cancer Paternal Uncle     Cancer Paternal Grandfather     Irritable bowel syndrome Paternal Grandfather     Cancer Maternal Grandfather     Colon cancer Neg Hx     Esophageal cancer Neg Hx     Rectal cancer Neg Hx     Stomach cancer Neg Hx     Ulcerative colitis Neg Hx     Crohn's disease Neg Hx     Celiac disease Neg Hx        Social History     Socioeconomic History    Marital status:    Tobacco Use    Smoking status: Former Smoker     Packs/day: 0.00    Smokeless tobacco: Former User    Tobacco comment: occasionally    Substance and Sexual Activity    Alcohol use: Yes     Comment: occ    Drug use: No       Past Surgical History:   Procedure Laterality Date    APPENDECTOMY      INCISION AND DRAINAGE FOOT Left 11/28/2021    Procedure: INCISION AND DRAINAGE, FOOT;  Surgeon: Bj Alicea DPM;  Location: [facility];  Service: Podiatry;  Laterality: Left;    stents in bilateral legs         Review of Systems   Constitutional: Negative for chills, fatigue and fever.   HENT: Negative for hearing loss.    Eyes: Negative for photophobia and visual disturbance.   Respiratory: Negative for cough, chest tightness, shortness  "of breath and wheezing.    Cardiovascular: Negative for chest pain and palpitations.   Gastrointestinal: Negative for constipation, diarrhea, nausea and vomiting.   Endocrine: Negative for cold intolerance and heat intolerance.   Genitourinary: Negative for flank pain.   Musculoskeletal: Negative for neck pain and neck stiffness.   Skin: Positive for color change and wound.   Neurological: Positive for numbness. Negative for light-headedness and headaches.   Psychiatric/Behavioral: Negative for sleep disturbance.          Objective:   Ht 6' 2" (1.88 m)   Wt 99.3 kg (219 lb)   BMI 28.12 kg/m²                   LOWER EXTREMITY PHYSICAL EXAMINATION    DERMATOLOGY: The ulceration at the dorsal lateral left foot measures 2cm x 3cm x 0.10c. Slight malodor is noted.     Assessment:     1. Diabetic ulcer of left midfoot associated with type 2 diabetes mellitus, with fat layer exposed    2. Uncontrolled type 2 diabetes mellitus with complication        Plan:     Diabetic ulcer of left midfoot associated with type 2 diabetes mellitus, with fat layer exposed  -     doxycycline (VIBRAMYCIN) 100 MG Cap; Take 1 capsule (100 mg total) by mouth every 12 (twelve) hours. for 7 days  Dispense: 14 capsule; Refill: 0    Uncontrolled type 2 diabetes mellitus with complication        The wound was surgically debrided after adequate prep with alcohol and/or betadine paint. Excisional wound debridement was performed using sharp #10/#15 blade/rounded scalpel and tissue nipper, with removal of all non-viable skin and soft tissues; necrotic skin/tissue formation. The woundbase/wound bed was also debrided to encourage bleeding as to promote/stimulate healing. Debridement was excisional and included epidermal, dermal and subcutaneous tissues. Post debridement measurements are as above. Hemostasis was achieved. Patient tolerated procedure well and without complications. Local woundcare with [employer] Affinity allograft (two 2.5cm x 2.5cm " grafts) 12.5cm2 graft with nonadherent dressings and bandage thereafter. All units of the graft were applied. The graft was packed deed into and around the wound as well as to the wound periphery as to stimulate cell migration and proliferation, deep to superficial, and to fill the defect. The graft is then secured in standard fashion. Today's graft application is # 2. Donor #: [id number]. Expiration Date: 02/01/2022. ID#: [id number] and [id number].              Future Appointments   Date Time Provider Department Center   2/4/2022  8:00 AM [facility]   2/7/2022  9:15 AM Bj Alicea DPM [facility]   2/11/2022 12:00 PM [facility] [place] [id number]   2/22/2022  1:00 PM Lisa Bro NP [facility]

## 2022-01-29 ENCOUNTER — PATIENT MESSAGE (OUTPATIENT)
Dept: DIABETES | Facility: CLINIC | Age: 37
End: 2022-01-29
Payer: COMMERCIAL

## 2022-01-29 PROCEDURE — G0179 MD RECERTIFICATION HHA PT: HCPCS | Mod: ,,, | Performed by: PODIATRIST

## 2022-01-29 PROCEDURE — G0179 PR HOME HEALTH MD RECERTIFICATION: ICD-10-PCS | Mod: ,,, | Performed by: PODIATRIST

## 2022-01-31 ENCOUNTER — ANESTHESIA (OUTPATIENT)
Dept: ENDOSCOPY | Facility: HOSPITAL | Age: 37
End: 2022-01-31
Payer: COMMERCIAL

## 2022-01-31 ENCOUNTER — HOSPITAL ENCOUNTER (OUTPATIENT)
Facility: HOSPITAL | Age: 37
Discharge: HOME | End: 2022-01-31
Attending: INTERNAL MEDICINE | Admitting: INTERNAL MEDICINE
Payer: COMMERCIAL

## 2022-01-31 VITALS
WEIGHT: 225 LBS | TEMPERATURE: 98 F | DIASTOLIC BLOOD PRESSURE: 82 MMHG | BODY MASS INDEX: 28.88 KG/M2 | HEART RATE: 84 BPM | SYSTOLIC BLOOD PRESSURE: 140 MMHG | OXYGEN SATURATION: 100 % | RESPIRATION RATE: 15 BRPM | HEIGHT: 74 IN

## 2022-01-31 DIAGNOSIS — R11.2 N&V (NAUSEA AND VOMITING): ICD-10-CM

## 2022-01-31 DIAGNOSIS — R11.2 NAUSEA AND VOMITING, INTRACTABILITY OF VOMITING NOT SPECIFIED, UNSPECIFIED VOMITING TYPE: ICD-10-CM

## 2022-01-31 DIAGNOSIS — K20.80 LOS ANGELES GRADE C ESOPHAGITIS: Primary | ICD-10-CM

## 2022-01-31 DIAGNOSIS — Z01.818 PREOP TESTING: ICD-10-CM

## 2022-01-31 LAB
GLUCOSE SERPL-MCNC: 249 MG/DL (ref 70–110)
SARS-COV-2 RNA NPH QL NAA+NON-PROBE: NOT DETECTED

## 2022-01-31 PROCEDURE — 88305 TISSUE EXAM BY PATHOLOGIST: CPT | Mod: 26,,, | Performed by: PATHOLOGY

## 2022-01-31 PROCEDURE — 63600175 PHARM REV CODE 636 W HCPCS: Performed by: NURSE ANESTHETIST, CERTIFIED REGISTERED

## 2022-01-31 PROCEDURE — 88112 CYTOPATH CELL ENHANCE TECH: CPT | Performed by: PATHOLOGY

## 2022-01-31 PROCEDURE — 82962 GLUCOSE BLOOD TEST: CPT | Performed by: INTERNAL MEDICINE

## 2022-01-31 PROCEDURE — 88305 TISSUE EXAM BY PATHOLOGIST: ICD-10-PCS | Mod: 26,,, | Performed by: PATHOLOGY

## 2022-01-31 PROCEDURE — 88342 IMHCHEM/IMCYTCHM 1ST ANTB: CPT | Mod: 26,,, | Performed by: PATHOLOGY

## 2022-01-31 PROCEDURE — 88112 CYTOPATH CELL ENHANCE TECH: CPT | Mod: 26,,, | Performed by: PATHOLOGY

## 2022-01-31 PROCEDURE — 88342 IMHCHEM/IMCYTCHM 1ST ANTB: CPT | Performed by: PATHOLOGY

## 2022-01-31 PROCEDURE — 63600175 PHARM REV CODE 636 W HCPCS: Performed by: INTERNAL MEDICINE

## 2022-01-31 PROCEDURE — 88112 PR  CYTOPATH, CELL ENHANCE TECH: ICD-10-PCS | Mod: 26,,, | Performed by: PATHOLOGY

## 2022-01-31 PROCEDURE — 88305 TISSUE EXAM BY PATHOLOGIST: CPT | Performed by: PATHOLOGY

## 2022-01-31 PROCEDURE — D9220A PRA ANESTHESIA: Mod: ,,, | Performed by: NURSE ANESTHETIST, CERTIFIED REGISTERED

## 2022-01-31 PROCEDURE — 43239 EGD BIOPSY SINGLE/MULTIPLE: CPT | Performed by: INTERNAL MEDICINE

## 2022-01-31 PROCEDURE — 43239 EGD BIOPSY SINGLE/MULTIPLE: CPT | Mod: ,,, | Performed by: INTERNAL MEDICINE

## 2022-01-31 PROCEDURE — 27200946 HC BRUSH, CYTOLOGY: Performed by: INTERNAL MEDICINE

## 2022-01-31 PROCEDURE — 43239 PR EGD, FLEX, W/BIOPSY, SGL/MULTI: ICD-10-PCS | Mod: ,,, | Performed by: INTERNAL MEDICINE

## 2022-01-31 PROCEDURE — D9220A PRA ANESTHESIA: ICD-10-PCS | Mod: ,,, | Performed by: NURSE ANESTHETIST, CERTIFIED REGISTERED

## 2022-01-31 PROCEDURE — 27201012 HC FORCEPS, HOT/COLD, DISP: Performed by: INTERNAL MEDICINE

## 2022-01-31 PROCEDURE — 88342 CHG IMMUNOCYTOCHEMISTRY: ICD-10-PCS | Mod: 26,,, | Performed by: PATHOLOGY

## 2022-01-31 PROCEDURE — 37000008 HC ANESTHESIA 1ST 15 MINUTES: Performed by: INTERNAL MEDICINE

## 2022-01-31 RX ORDER — SODIUM CHLORIDE 0.9 % (FLUSH) 0.9 %
10 SYRINGE (ML) INJECTION
Status: DISCONTINUED | OUTPATIENT
Start: 2022-01-31 | End: 2022-01-31 | Stop reason: HOSPADM

## 2022-01-31 RX ORDER — LIDOCAINE HYDROCHLORIDE 10 MG/ML
1 INJECTION, SOLUTION EPIDURAL; INFILTRATION; INTRACAUDAL; PERINEURAL ONCE
Status: DISCONTINUED | OUTPATIENT
Start: 2022-01-31 | End: 2022-01-31 | Stop reason: HOSPADM

## 2022-01-31 RX ORDER — LIDOCAINE HCL/PF 100 MG/5ML
SYRINGE (ML) INTRAVENOUS
Status: DISCONTINUED | OUTPATIENT
Start: 2022-01-31 | End: 2022-01-31

## 2022-01-31 RX ORDER — PROPOFOL 10 MG/ML
VIAL (ML) INTRAVENOUS
Status: DISCONTINUED | OUTPATIENT
Start: 2022-01-31 | End: 2022-01-31

## 2022-01-31 RX ORDER — SODIUM CHLORIDE, SODIUM LACTATE, POTASSIUM CHLORIDE, CALCIUM CHLORIDE 600; 310; 30; 20 MG/100ML; MG/100ML; MG/100ML; MG/100ML
INJECTION, SOLUTION INTRAVENOUS CONTINUOUS
Status: DISCONTINUED | OUTPATIENT
Start: 2022-01-31 | End: 2022-01-31 | Stop reason: HOSPADM

## 2022-01-31 RX ORDER — PANTOPRAZOLE SODIUM 40 MG/1
40 TABLET, DELAYED RELEASE ORAL 2 TIMES DAILY
Qty: 120 TABLET | Refills: 0 | Status: SHIPPED | OUTPATIENT
Start: 2022-01-31 | End: 2022-02-22

## 2022-01-31 RX ADMIN — PROPOFOL 50 MG: 10 INJECTION, EMULSION INTRAVENOUS at 11:01

## 2022-01-31 RX ADMIN — SODIUM CHLORIDE, SODIUM LACTATE, POTASSIUM CHLORIDE, AND CALCIUM CHLORIDE: 600; 310; 30; 20 INJECTION, SOLUTION INTRAVENOUS at 11:01

## 2022-01-31 RX ADMIN — SODIUM CHLORIDE, SODIUM LACTATE, POTASSIUM CHLORIDE, AND CALCIUM CHLORIDE: .6; .31; .03; .02 INJECTION, SOLUTION INTRAVENOUS at 11:01

## 2022-01-31 RX ADMIN — Medication 20 MG: at 11:01

## 2022-01-31 NOTE — [employer] PATIENT INSTRUCTIONS
Discharge Summary/Instructions after an Endoscopic Procedure  Patient Name: Kulwant Mueller  Patient MRN: [id number]  Patient YOB: 1985  Monday, January 31, 2022  Cindy Quiros MD  Dear patient,  As a result of recent federal legislation (The Federal Cures Act), you may   receive lab or pathology results from your procedure in your [facility]   account before your physician is able to contact you. Your physician or   their representative will relay the results to you with their   recommendations at their soonest availability.  Thank you,  RESTRICTIONS:  During your procedure today, you received medications for sedation.  These   medications may affect your judgment, balance and coordination.  Therefore,   for 24 hours, you have the following restrictions:   - DO NOT drive a car, operate machinery, make legal/financial decisions,   sign important papers or drink alcohol.    ACTIVITY:  Today: no heavy lifting, straining or running due to procedural   sedation/anesthesia.  The following day: return to full activity including work.  DIET:  Eat and drink normally unless instructed otherwise.     TREATMENT FOR COMMON SIDE EFFECTS:  - Mild abdominal pain, nausea, belching, bloating or excessive gas:  rest,   eat lightly and use a heating pad.  - Sore Throat: treat with throat lozenges and/or gargle with warm salt   water.  - Because air was used during the procedure, expelling large amounts of air   from your rectum or belching is normal.  - If a bowel prep was taken, you may not have a bowel movement for 1-3 days.    This is normal.  SYMPTOMS TO WATCH FOR AND REPORT TO YOUR PHYSICIAN:  1. Abdominal pain or bloating, other than gas cramps.  2. Chest pain.  3. Back pain.  4. Signs of infection such as: chills or fever occurring within 24 hours   after the procedure.  5. Rectal bleeding, which would show as bright red, maroon, or black stools.   (A tablespoon of blood from the rectum is not serious, especially  if   hemorrhoids are present.)  6. Vomiting.  7. Weakness or dizziness.  GO DIRECTLY TO THE NEAREST EMERGENCY ROOM IF YOU HAVE ANY OF THE FOLLOWING:      Difficulty breathing              Chills and/or fever over 101 F   Persistent vomiting and/or vomiting blood   Severe abdominal pain   Severe chest pain   Black, tarry stools   Bleeding- more than one tablespoon   Any other symptom or condition that you feel may need urgent attention  Your doctor recommends these additional instructions:  If any biopsies were taken, your doctors clinic will contact you in 1 to 2   weeks with any results.  - Discharge patient to home (via wheelchair).   - Resume previous diet.   - Continue present medications.   - Use Protonix (pantoprazole) 40 mg PO BID for 2 months.   - Repeat upper endoscopy in 2 months to check healing.   - Telephone GI clinic for pathology results in 2 weeks.   - Patient has a contact number available for emergencies.  The signs and   symptoms of potential delayed complications were discussed with the   patient.  Return to normal activities tomorrow.  Written discharge   instructions were provided to the patient.   - Await pathology results.   - Resume anticoagulant at prior dose.  For questions, problems or results please call your physician Cindy Quiros MD at Work:  [phone]  If you have any questions about the above instructions, call the GI   department at [phone] or call the endoscopy unit at [phone]   from 7am until 3 pm.  [facility], EMERGENCY ROOM PHONE NUMBER:   [phone]  IF A COMPLICATION OR EMERGENCY SITUATION ARISES AND YOU ARE UNABLE TO REACH   YOUR PHYSICIAN - GO DIRECTLY TO THE EMERGENCY ROOM.  I have read or have had read to me these discharge instructions for my   procedure and have received a written copy.  I understand these   instructions and will follow-up with my physician if I have any questions.      __________________________________       _____________________________________  Nurse Signature                                          Patient/Designated   Responsible Party Signature  MD Cindy Adam MD  1/31/2022 11:39:17 AM  [employer]

## 2022-01-31 NOTE — ANESTHESIA POSTPROCEDURE EVALUATION
Anesthesia Post Evaluation    Patient: Kulwant Mueller Jr.    Procedure(s) Performed: Procedure(s) (LRB):  EGD (ESOPHAGOGASTRODUODENOSCOPY) (N/A)    Final Anesthesia Type: general      Patient location during evaluation: PACU  Patient participation: Yes- Able to Participate  Level of consciousness: awake and alert and oriented  Post-procedure vital signs: reviewed and stable  Pain management: adequate  Airway patency: patent    PONV status at discharge: No PONV  Anesthetic complications: no      Cardiovascular status: blood pressure returned to baseline and stable  Respiratory status: unassisted, spontaneous ventilation and room air  Hydration status: euvolemic  Follow-up not needed.          Vitals Value Taken Time   /82 01/31/22 1206   Temp 36.4 °C (97.5 °F) 01/31/22 1141   Pulse 84 01/31/22 1206   Resp 15 01/31/22 1205   SpO2 100 % 01/31/22 1206   Vitals shown include unvalidated device data.      Event Time   Out of Recovery 12:10:00         Pain/Tana Score: Tana Score: 10 (1/31/2022 12:10 PM)

## 2022-01-31 NOTE — TRANSFER OF CARE
"Anesthesia Transfer of Care Note    Patient: Kulwant Mueller Jr.    Procedure(s) Performed: Procedure(s) (LRB):  EGD (ESOPHAGOGASTRODUODENOSCOPY) (N/A)    Patient location: PACU    Anesthesia Type: general    Transport from OR: Transported from OR on room air with adequate spontaneous ventilation    Post pain: adequate analgesia    Post assessment: no apparent anesthetic complications and tolerated procedure well    Post vital signs: stable    Level of consciousness: sedated    Nausea/Vomiting: no nausea/vomiting    Complications: none    Transfer of care protocol was followed      Last vitals:   Visit Vitals  BP (!) 96/52 (BP Location: Left arm, Patient Position: Lying)   Pulse 83   Temp 36.4 °C (97.5 °F) (Temporal)   Resp 16   Ht 6' 2" (1.88 m)   Wt 102 kg (224 lb 15.7 oz)   SpO2 99%   BMI 28.89 kg/m²     "

## 2022-01-31 NOTE — PLAN OF CARE
Discharge instructions reviewed with patient and visitor. Handouts given & verbalized understanding with no further questions at this time. Dr. Quiros spoke to pt at bedside, reviewed procedure and findings, answered questions. Made aware they are awaiting biopsy results with MD telephone number provided per AVS sheet. VSS on RA, no pain or nausea noted, tolerating po fluids, no complaints noted. Fall precautions reviewed, consents in chart.

## 2022-01-31 NOTE — DISCHARGE SUMMARY
[facility]  Discharge Note  Short Stay    Procedure(s) (LRB):  EGD (ESOPHAGOGASTRODUODENOSCOPY) (N/A)    OUTCOME: Patient tolerated treatment/procedure well without complication and is now ready for discharge.    DISPOSITION: Home or Self Care    FINAL DIAGNOSIS:  N&V (nausea and vomiting)    FOLLOWUP: With primary care provider    DISCHARGE INSTRUCTIONS:    Discharge Procedure Orders   Case Request Endoscopy: EGD (ESOPHAGOGASTRODUODENOSCOPY)     Order Specific Question Answer Comments   Medical Necessity: Medically Non-Urgent [100]    CPT Code: OK EGD, FLEX, DIAGNOSTIC [20450]    Is an on-site pathologist required for this procedure? N/A

## 2022-01-31 NOTE — H&P
Short Stay Endoscopy History and Physical    PCP - Dona Pineda MD    Procedure - EGD  ASA - 2  Mallampati - per anesthesia  History of Anesthesia problems - no  Family history Anesthesia problems -  no     HPI:  This is a 36 y.o. male here for evaluation of :   Active Hospital Problems    Diagnosis  POA    *N&V (nausea and vomiting) [R11.2]  No      Resolved Hospital Problems   No resolved problems to display.       ROS:  CONSTITUTIONAL: Denies weight change,  fatigue, fevers, chills, night sweats.  CARDIOVASCULAR: Denies chest pain, shortness of breath, orthopnea and edema.  RESPIRATORY: Denies cough, hemoptysis, dyspnea, and wheezing.  GI: See HPI.    Medical History:   Past Medical History:   Diagnosis Date    Anticoagulant long-term use     Diabetes mellitus     Hypertension     May-Thurner syndrome        Surgical History:   Past Surgical History:   Procedure Laterality Date    APPENDECTOMY      INCISION AND DRAINAGE FOOT Left 11/28/2021    Procedure: INCISION AND DRAINAGE, FOOT;  Surgeon: Bj Alicea DPM;  [facility];  Service: Podiatry;  Laterality: Left;    stents in bilateral legs         Family History:  Family History   Problem Relation Age of Onset    Cancer Mother     Cancer Paternal Uncle     Cancer Paternal Grandfather     Irritable bowel syndrome Paternal Grandfather     Cancer Maternal Grandfather     Colon cancer Neg Hx     Esophageal cancer Neg Hx     Rectal cancer Neg Hx     Stomach cancer Neg Hx     Ulcerative colitis Neg Hx     Crohn's disease Neg Hx     Celiac disease Neg Hx        Social History:   Social History     Tobacco Use    Smoking status: Former Smoker     Packs/day: 0.00    Smokeless tobacco: Former User    Tobacco comment: occasionally    Substance Use Topics    Alcohol use: Yes     Comment: occ    Drug use: No       Allergy  Review of patient's allergies indicates:   Allergen Reactions    Heparin analogues Nausea And Vomiting        Medications:   No current facility-administered medications on file prior to encounter.     Current Outpatient Medications on File Prior to Encounter   Medication Sig Dispense Refill    losartan (COZAAR) 25 MG tablet Take 1 tablet (25 mg total) by mouth once daily. 90 tablet 3    rivaroxaban (XARELTO) 20 mg Tab Take 1 tablet (20 mg total) by mouth daily with dinner or evening meal. 90 tablet 3    metoclopramide HCl (REGLAN) 10 MG tablet TAKE 1 TABLET(10 MG) BY MOUTH THREE TIMES DAILY BEFORE MEALS 90 tablet 11    promethazine (PHENERGAN) 25 MG suppository Place 1 suppository (25 mg total) rectally every 6 (six) hours as needed for Nausea. 15 suppository 0       Physical Exam:  Vital Signs: There were no vitals filed for this visit.  General Appearance: Well appearing in no acute distress  ENT: OP clear  Chest: CTA B  CV: RRR, no m/r/g  Abd: s/nt/nd/nabs  Ext: no edema    Labs:  Reviewed    IMP:  Active Hospital Problems    Diagnosis  POA    *N&V (nausea and vomiting) [R11.2]  No      Resolved Hospital Problems   No resolved problems to display.         Plan:  I have explained the risks and benefits of endoscopy procedures to the patient including but not limited to bleeding, perforation, infection, and death. The patient wishes to proceed.

## 2022-02-01 ENCOUNTER — PATIENT MESSAGE (OUTPATIENT)
Dept: GASTROENTEROLOGY | Facility: CLINIC | Age: 37
End: 2022-02-01
Payer: COMMERCIAL

## 2022-02-01 DIAGNOSIS — Z79.4 UNCONTROLLED TYPE 2 DIABETES MELLITUS WITH HYPERGLYCEMIA, WITH LONG-TERM CURRENT USE OF INSULIN: ICD-10-CM

## 2022-02-01 DIAGNOSIS — E11.65 UNCONTROLLED TYPE 2 DIABETES MELLITUS WITH HYPERGLYCEMIA: ICD-10-CM

## 2022-02-01 DIAGNOSIS — E11.65 UNCONTROLLED TYPE 2 DIABETES MELLITUS WITH HYPERGLYCEMIA, WITH LONG-TERM CURRENT USE OF INSULIN: ICD-10-CM

## 2022-02-01 LAB — POCT GLUCOSE: 249 MG/DL (ref 70–110)

## 2022-02-02 ENCOUNTER — OUTPATIENT CASE MANAGEMENT (OUTPATIENT)
Dept: ADMINISTRATIVE | Facility: OTHER | Age: 37
End: 2022-02-02
Payer: COMMERCIAL

## 2022-02-02 LAB
FINAL PATHOLOGIC DIAGNOSIS: NORMAL
Lab: NORMAL

## 2022-02-02 NOTE — LETTER
February 2, 2022    Kulwant Mueller Jr.  [address] [place] [id number]             [facility] [address] [place] [address] Dear  Kulwant Ami,    Welcome to [facility]s Complex Care Management Program.  It was a pleasure talking with you today.  My name is Key Wren RN. I look forward to working with you as your Care Manager.  My goal is to help you function at the healthiest and highest level possible.  You can contact me directly at [phone].    As an [facility] patient, some of the services we may be able to provide include:      Development of an individualized care plan with a Registered Nurse    Connection with a    Connection with available resources and services     Coordinate communication among your care team members    Provide coaching and education    Help you understand your doctors treatment plan   Help you obtain information about your insurance coverage.     All services provided by [facility]s Complex Care Managers and other care team members are coordinated with and communicated to your primary care team.    As part of your enrollment, you will be receiving education materials and more information about these services in your [facility] account, by phone or through the mail.  If you do not wish to participate or receive information, please contact our office at [phone].      Sincerely,        Key Wren, RN  [facility]   Out-patient RN Complex Care Manager

## 2022-02-02 NOTE — PROGRESS NOTES
Outpatient Care Management  Initial Patient Assessment    Patient: Kulwant Mueller Jr.  MRN: [id number]  Date of Service: 02/02/2022  Completed by: Key Wren RN  Referral Date: 01/23/2022  Program:     Reason for Visit   Patient presents with    Initial Assessment    OPCM RN First Assessment Attempt    OPCM Enrollment Call    OPCM Chart Review    Plan Of Care       Brief Summary:  Kulwant Mueller Jr. was referred by his provider for DM. Patient qualifies for program based on Dx DM and risk score of 79.1%.   Active problem list, medical, surgical and social history reviewed. Active comorbidities include DM, HTN, DVT, and N/V . Areas of need identified by patient include DM.   Complex care plan created with patient/caregiver input. By next encounter, patient agrees to keep a log of his blood sugars for one week.  Pt reports that he is currently using the fingerstick twice a day. Pt voiced that he has to pick-up the supplies for the Dexcom. Pt states that he checks his BS BID. Pt checked his fasting BS with reading of 154 reported.Pt voiced that his BS are around 150. Pt reports that he is checking the blood sugar per fingerstick because he does not have the supplies for the Dexcom. Pt states that he will  the supplies on Friday 2/4/2022.  Discussed with pt the importance of checking the blood sugars 3-4 5 times daily. Pt is on insulin lispro 14 units three times a day with meals. Discussed with pt the benefit of keeping a log of the blood sugars to review on the next call and to bring to MD appts.Discussed with pt the goal blood glucose for pre-meal is . Reviewed with pt his most recent Hemoglobin a1c result of >14 on 9/22/2021. Pt agrees to OPCM services.      Assessment Documentation     OPCM Initial Assessment    Involvement of Care  Do I have permission to speak with other family members about your care?: Yes (Comment: Pt gives permission to speak with his wife,  Chloé.)  Assessment completed by: Patient  Identified Areas of Need  Advanced Care Planning: No  Housing: no  Medication Adherence: No  *Active medication list was reviewed and reconciled with patient and/or caregiver:   Nutrition: no (Comment: Pt reports no further N/V at this time.)  Lab Adherence: no  Depression: No  Cognitive/Behavioral Health: no  Communication: no  Health Literacy: no  Fall risk?: No  Equipment/Supplies/Services: no          Problem List and History     Problems Addressed This Visit    Hypertension: Not identified by patient as current problem  Diabetes: Identified as current problem  Chronic Kidney Disease: Not identified by patient as current problem         Reviewed medical and social history with patient and/or caregiver. A complex care plan was discussed and completed today, with input from patient and/or caregiver.    Patient Instructions     Instructions were provided via the [facility] patient resources and are available for the patient to view on the patient portal, if active.    Next steps: Mail diabetic guide to the pt. F/U that the pt received his supplies for the Dexcom  Follow up in about 1 week (around 2/9/2022) for RN Follow.    Todays OPCM Self-Management Care Plan was developed with the patients/caregivers input and was based on identified barriers from todays assessment.  Goals were written today with the patient/caregiver and the patient has agreed to work towards these goals to improve his/her overall well-being. Patient verbalized understanding of the care plan, goals, and all of today's instructions. Encouraged patient/caregiver to communicate with his/her physician and health care team about health conditions and the treatment plan.  Provided my contact information today and encouraged patient/caregiver to call me with any questions as needed.

## 2022-02-03 ENCOUNTER — PATIENT MESSAGE (OUTPATIENT)
Dept: GASTROENTEROLOGY | Facility: CLINIC | Age: 37
End: 2022-02-03
Payer: COMMERCIAL

## 2022-02-03 DIAGNOSIS — B37.81 CANDIDA ESOPHAGITIS: Primary | ICD-10-CM

## 2022-02-03 RX ORDER — FLUCONAZOLE 200 MG/1
200 TABLET ORAL DAILY
Qty: 22 TABLET | Refills: 0 | Status: SHIPPED | OUTPATIENT
Start: 2022-02-03 | End: 2022-02-25

## 2022-02-04 ENCOUNTER — TELEPHONE (OUTPATIENT)
Dept: DIABETES | Facility: CLINIC | Age: 37
End: 2022-02-04
Payer: COMMERCIAL

## 2022-02-07 ENCOUNTER — OFFICE VISIT (OUTPATIENT)
Dept: PODIATRY | Facility: CLINIC | Age: 37
End: 2022-02-07
Payer: COMMERCIAL

## 2022-02-07 VITALS — HEIGHT: 74 IN | BODY MASS INDEX: 28.86 KG/M2 | WEIGHT: 224.88 LBS

## 2022-02-07 DIAGNOSIS — E11.621 DIABETIC ULCER OF LEFT MIDFOOT ASSOCIATED WITH TYPE 2 DIABETES MELLITUS, WITH FAT LAYER EXPOSED: Primary | ICD-10-CM

## 2022-02-07 DIAGNOSIS — L97.422 DIABETIC ULCER OF LEFT MIDFOOT ASSOCIATED WITH TYPE 2 DIABETES MELLITUS, WITH FAT LAYER EXPOSED: Primary | ICD-10-CM

## 2022-02-07 LAB
FINAL PATHOLOGIC DIAGNOSIS: NORMAL
Lab: NORMAL

## 2022-02-07 PROCEDURE — 3008F BODY MASS INDEX DOCD: CPT | Mod: CPTII,S$GLB,, | Performed by: PODIATRIST

## 2022-02-07 PROCEDURE — 1159F PR MEDICATION LIST DOCUMENTED IN MEDICAL RECORD: ICD-10-PCS | Mod: CPTII,S$GLB,, | Performed by: PODIATRIST

## 2022-02-07 PROCEDURE — 3008F PR BODY MASS INDEX (BMI) DOCUMENTED: ICD-10-PCS | Mod: CPTII,S$GLB,, | Performed by: PODIATRIST

## 2022-02-07 PROCEDURE — 15275 SKIN SUB GRAFT FACE/NK/HF/G: CPT | Mod: S$GLB,,, | Performed by: PODIATRIST

## 2022-02-07 PROCEDURE — 99999 PR PBB SHADOW E&M-EST. PATIENT-LVL III: ICD-10-PCS | Mod: PBBFAC,,, | Performed by: PODIATRIST

## 2022-02-07 PROCEDURE — 15275 PR SKIN SUB GRAFT FACE/NK/HF/G UP TO 100 SQCM: ICD-10-PCS | Mod: S$GLB,,, | Performed by: PODIATRIST

## 2022-02-07 PROCEDURE — 1159F MED LIST DOCD IN RCRD: CPT | Mod: CPTII,S$GLB,, | Performed by: PODIATRIST

## 2022-02-07 PROCEDURE — 1160F PR REVIEW ALL MEDS BY PRESCRIBER/CLIN PHARMACIST DOCUMENTED: ICD-10-PCS | Mod: CPTII,S$GLB,, | Performed by: PODIATRIST

## 2022-02-07 PROCEDURE — 1160F RVW MEDS BY RX/DR IN RCRD: CPT | Mod: CPTII,S$GLB,, | Performed by: PODIATRIST

## 2022-02-07 PROCEDURE — 99499 NO LOS: ICD-10-PCS | Mod: S$GLB,,, | Performed by: PODIATRIST

## 2022-02-07 PROCEDURE — 99499 UNLISTED E&M SERVICE: CPT | Mod: S$GLB,,, | Performed by: PODIATRIST

## 2022-02-07 PROCEDURE — 99999 PR PBB SHADOW E&M-EST. PATIENT-LVL III: CPT | Mod: PBBFAC,,, | Performed by: PODIATRIST

## 2022-02-07 NOTE — PROGRESS NOTES
Subjective:       Patient ID: Kulwant Mueller Jr. is a 36 y.o. male.    Chief Complaint: Procedure (Graft application (AFFINITY). Diabetci pt, PCP: Dr. Pineda last seen 06/21/21. )      HPI: Patient presents to the clinic today, for follow up concerning application of Affinity allograft to an ulceration located at the left dorsal lateral foot. Patient's Primary Care Provider is Dona Pineda MD. The PMHx. does include uncontrolled DMII with neuropathy. To this juncture, patient has had 2 allograft applications.     Hemoglobin A1C   Date Value Ref Range Status   09/22/2021 >14.0 (H) 4.0 - 5.6 % Final     Comment:     ADA Screening Guidelines:  5.7-6.4%  Consistent with prediabetes  >or=6.5%  Consistent with diabetes    High levels of fetal hemoglobin interfere with the HbA1C  assay. Heterozygous hemoglobin variants (HbS, HgC, etc)do  not significantly interfere with this assay.   However, presence of multiple variants may affect accuracy.     08/31/2018 12.7 (H) 4.0 - 5.6 % Final     Comment:     ADA Screening Guidelines:  5.7-6.4%  Consistent with prediabetes  >or=6.5%  Consistent with diabetes  High levels of fetal hemoglobin interfere with the HbA1C  assay. Heterozygous hemoglobin variants (HbS, HgC, etc)do  not significantly interfere with this assay.   However, presence of multiple variants may affect accuracy.     01/11/2018 9.1 (H) 4.0 - 5.6 % Final     Comment:     According to ADA guidelines, hemoglobin A1c <7.0% represents  optimal control in non-pregnant diabetic patients. Different  metrics may apply to specific patient populations.   Standards of Medical Care in Diabetes-2016.  For the purpose of screening for the presence of diabetes:  <5.7%     Consistent with the absence of diabetes  5.7-6.4%  Consistent with increasing risk for diabetes   (prediabetes)  >or=6.5%  Consistent with diabetes  Currently, no consensus exists for use of hemoglobin A1c  for diagnosis of diabetes for  children.  This Hemoglobin A1c assay has significant interference with fetal   hemoglobin   (HbF). The results are invalid for patients with abnormal amounts of   HbF,   including those with known Hereditary Persistence   of Fetal Hemoglobin. Heterozygous hemoglobin variants (HbAS, HbAC,   HbAD, HbAE, HbA2) do not significantly interfere with this assay;   however, presence of multiple variants in a sample may impact the %   interference.         Review of patient's allergies indicates:   Allergen Reactions    Heparin analogues Nausea And Vomiting       Past Medical History:   Diagnosis Date    Anticoagulant long-term use     Diabetes mellitus     Hypertension     May-Thurner syndrome        Family History   Problem Relation Age of Onset    Cancer Mother     Cancer Paternal Uncle     Cancer Paternal Grandfather     Irritable bowel syndrome Paternal Grandfather     Cancer Maternal Grandfather     Colon cancer Neg Hx     Esophageal cancer Neg Hx     Rectal cancer Neg Hx     Stomach cancer Neg Hx     Ulcerative colitis Neg Hx     Crohn's disease Neg Hx     Celiac disease Neg Hx        Social History     Socioeconomic History    Marital status:    Tobacco Use    Smoking status: Former Smoker     Packs/day: 0.00    Smokeless tobacco: Former User    Tobacco comment: occasionally    Substance and Sexual Activity    Alcohol use: Yes     Comment: occ    Drug use: No       Past Surgical History:   Procedure Laterality Date    APPENDECTOMY      ESOPHAGOGASTRODUODENOSCOPY N/A 1/31/2022    Procedure: EGD (ESOPHAGOGASTRODUODENOSCOPY);  Surgeon: Cindy Quiros MD;  [facility];  Service: Endoscopy;  Laterality: N/A;    INCISION AND DRAINAGE FOOT Left 11/28/2021    Procedure: INCISION AND DRAINAGE, FOOT;  Surgeon: Bj Alicea DPM;  Location: [facility];  Service: Podiatry;  Laterality: Left;    stents in bilateral legs         Review of Systems   Constitutional: Negative for chills,  "fatigue and fever.   HENT: Negative for hearing loss.    Eyes: Negative for photophobia and visual disturbance.   Respiratory: Negative for cough, chest tightness, shortness of breath and wheezing.    Cardiovascular: Negative for chest pain and palpitations.   Gastrointestinal: Negative for constipation, diarrhea, nausea and vomiting.   Endocrine: Negative for cold intolerance and heat intolerance.   Genitourinary: Negative for flank pain.   Musculoskeletal: Negative for neck pain and neck stiffness.   Skin: Positive for color change and wound.   Neurological: Positive for numbness. Negative for light-headedness and headaches.   Psychiatric/Behavioral: Negative for sleep disturbance.          Objective:   Ht 6' 2" (1.88 m)   Wt 102 kg (224 lb 13.9 oz)   BMI 28.87 kg/m²                     LOWER EXTREMITY PHYSICAL EXAMINATION    DERMATOLOGY: The ulceration at the dorsal lateral left foot measures 3cm x 3.25cm x 0.10c.     Assessment:     1. Diabetic ulcer of left midfoot associated with type 2 diabetes mellitus, with fat layer exposed    2. Uncontrolled type 2 diabetes mellitus with complication        Plan:     Diabetic ulcer of left midfoot associated with type 2 diabetes mellitus, with fat layer exposed    Uncontrolled type 2 diabetes mellitus with complication        The wound was surgically debrided after adequate prep with alcohol and/or betadine paint. Excisional wound debridement was performed using sharp #10/#15 blade/rounded scalpel and tissue nipper, with removal of all non-viable skin and soft tissues; necrotic skin/tissue formation. The woundbase/wound bed was also debrided to encourage bleeding as to promote/stimulate healing. Debridement was excisional and included epidermal, dermal and subcutaneous tissues. Post debridement measurements are as above. Hemostasis was achieved. Patient tolerated procedure well and without complications. Local woundcare with [employer] Affinity allograft (two 2.5cm x " 2.5cm grafts) 12.5cm2 graft with nonadherent dressings and bandage thereafter. All units of the graft were applied. The graft was packed deed into and around the wound as well as to the wound periphery as to stimulate cell migration and proliferation, deep to superficial, and to fill the defect. The graft is then secured in standard fashion. Today's graft application is # 3. Donor #: [id number]. Expiration Date: 02/09/2022. ID#: [id number] and [id number].              Future Appointments   Date Time Provider Department Center   2/11/2022 12:00 PM [facility] [place] [address]   2/14/2022 10:00 AM Yuriy Salazar RD [facility] DIBEDU [place]   2/15/2022 10:30 AM Bj Alicea DPM [facility] POD [facility]   2/22/2022  1:00 PM Lisa Bro NP [facility] DIABETE [place]   3/2/2022  1:40 PM Rajwinder Sigala PA-C [facility] GASTRO [facility]

## 2022-02-11 ENCOUNTER — IMMUNIZATION (OUTPATIENT)
Dept: PRIMARY CARE CLINIC | Facility: CLINIC | Age: 37
End: 2022-02-11
Payer: COMMERCIAL

## 2022-02-11 ENCOUNTER — OUTPATIENT CASE MANAGEMENT (OUTPATIENT)
Dept: ADMINISTRATIVE | Facility: OTHER | Age: 37
End: 2022-02-11
Payer: COMMERCIAL

## 2022-02-11 DIAGNOSIS — Z23 NEED FOR VACCINATION: Primary | ICD-10-CM

## 2022-02-11 PROCEDURE — 0004A COVID-19, MRNA, LNP-S, PF, 30 MCG/0.3 ML DOSE VACCINE: CPT | Mod: CV19,PBBFAC | Performed by: FAMILY MEDICINE

## 2022-02-11 NOTE — PROGRESS NOTES
Outpatient Care Management  Plan of Care Follow Up Visit    Patient: Kulwant Mueller Jr.  MRN: [id number]  Date of Service: 02/11/2022  Completed by: Key Wren RN  Referral Date: 01/23/2022  Program:     Reason for Visit   Patient presents with    Plan Of Care       Brief Summary: Pt reports that his blood sugar was 150 this am before meal. Discussed with pt the BS goal for before meals. Pt voiced that he has changed his eating habit to include more fruits. Discussed with pt the the portion size is important when eating fruits due to the natural sugars. Pt voiced that he is having skin grafts to his foot wound. Pt voiced that he does not have any pain or swelling at this time.      Patient Summary     Involvement of Care:  Do I have permission to speak with other family members about your care?   yes    Patient Reported Labs & Vitals:  1.  Any Patient Reported Labs & Vitals?     2.  Patient Reported Blood Pressure:     3.  Patient Reported Pulse:     4.  Patient Reported Weight (Kg):     5.  Patient Reported Blood Glucose (mg/dl):       Medical and social history was reviewed with patient and/or caregiver.     Clinical Assessment     Reviewed and provided basic information on available community resources for mental health, transportation, wellness resources, and palliative care programs with patient and/or caregiver.     Complex Care Plan     Care plan was discussed and completed today with input from patient and/or caregiver.    Patient Instructions     Instructions were provided via the [facility] patient resources and are available for the patient to view on the patient portal.    Next Steps:Provide pt with education on 15 grams carbs serving size as per Diabetic Guide.  Follow up in about 1 week (around 2/18/2022) for RN Follow.    Todays OPCM Self-Management Care Plan was developed with the patients/caregivers input and was based on identified barriers from todays assessment.  Goals were written  today with the patient/caregiver and the patient has agreed to work towards these goals to improve his/her overall well-being. Patient verbalized understanding of the care plan, goals, and all of today's instructions. Encouraged patient/caregiver to communicate with his/her physician and health care team about health conditions and the treatment plan.  Provided my contact information today and encouraged patient/caregiver to call me with any questions as needed.

## 2022-02-14 ENCOUNTER — CLINICAL SUPPORT (OUTPATIENT)
Dept: DIABETES | Facility: CLINIC | Age: 37
End: 2022-02-14
Payer: COMMERCIAL

## 2022-02-14 VITALS — BODY MASS INDEX: 29.99 KG/M2 | HEIGHT: 74 IN | WEIGHT: 233.69 LBS

## 2022-02-14 DIAGNOSIS — E11.65 UNCONTROLLED TYPE 2 DIABETES MELLITUS WITH HYPERGLYCEMIA: Primary | ICD-10-CM

## 2022-02-14 DIAGNOSIS — E11.65 UNCONTROLLED TYPE 2 DIABETES MELLITUS WITH HYPERGLYCEMIA, WITH LONG-TERM CURRENT USE OF INSULIN: ICD-10-CM

## 2022-02-14 DIAGNOSIS — Z79.4 UNCONTROLLED TYPE 2 DIABETES MELLITUS WITH HYPERGLYCEMIA, WITH LONG-TERM CURRENT USE OF INSULIN: ICD-10-CM

## 2022-02-14 PROCEDURE — 99999 PR PBB SHADOW E&M-EST. PATIENT-LVL II: ICD-10-PCS | Mod: PBBFAC,,, | Performed by: DIETITIAN, REGISTERED

## 2022-02-14 PROCEDURE — G0108 PR DIAB MANAGE TRN  PER INDIV: ICD-10-PCS | Mod: S$GLB,,, | Performed by: DIETITIAN, REGISTERED

## 2022-02-14 PROCEDURE — G0108 DIAB MANAGE TRN  PER INDIV: HCPCS | Mod: S$GLB,,, | Performed by: DIETITIAN, REGISTERED

## 2022-02-14 PROCEDURE — 99999 PR PBB SHADOW E&M-EST. PATIENT-LVL II: CPT | Mod: PBBFAC,,, | Performed by: DIETITIAN, REGISTERED

## 2022-02-14 NOTE — LETTER
February 14, 2022    Lisa Bro, MAT  [address] [address]             [facility]  [address] [address] [address]  Phone: [phone]  Fax: [phone]   Patient: Kulwant Mueller Jr.   MR Number: [id number]   YOB: 1985   Date of Visit: 2/14/2022   Dear Dr. Bro:    Thank you for referring Kulwant Mueller to me for evaluation. Below are the relevant portions of my assessment and plan of care.    If you have questions, please do not hesitate to call me. I look forward to following Kulwant along with you.    Sincerely,      Yuriy Salazar RD       CC  No Recipients

## 2022-02-14 NOTE — PROGRESS NOTES
"Diabetes Care Specialist Progress Note  Author: Yuriy Salazar, HUNTER  Date: 2/14/2022   Assessment and education provided by Jennifer Mcclendon RN and me.     Program Intake  Reason for Diabetes Program Visit:: Initial Diabetes Assessment  Current diabetes risk level:: high  In the last 12 months, have you:: used emergency room services  Was the ER or hospital admission related to diabetes?: No  Permission to speak with others about care:: no    Lab Results   Component Value Date    HGBA1C >14.0 (H) 09/22/2021     CURRENT DM MEDICATIONS:    Tresiba, 53 units daily in the AM    Humalog, 14 units before meals - not using SS    Sliding Scale:  160-190: 1 unit  191-220: 2 units  221-250: 3 units  251-280: 4 units  281-310: 5 units  311-340: 6 units  341-370: 7 units  >370: 8 units    Clinical    Weight: 106 kg (233 lb 11 oz)   Height: 6' 2" (188 cm)   Body mass index is 30 kg/m².    Patient Health Rating  Compared to other people your age, how would you rate your health?: Fair    Problem Review  Reviewed Problem List with Patient: yes  Active comorbidities affecting diabetes self-care.: yes  Comorbidities: Diabetic Ulcer/wound,Cardiovascular Disease,Hypertension,Gastrointestinal Disorder  Reviewed health maintenance: yes    Clinical Assessment  Current Diabetes Treatment: Insulin  Have you ever experienced hypoglycemia (low blood sugar)?: yes  In the last month, how often have you experienced low blood sugar?: more than once a week  Are you able to tell when your blood sugar is low?: Yes  What symptoms do you experience?: Hungry,Tiredness,Shaky  Have you ever been hospitalized because your blood sugar was too low?: no  How do you treat hypoglycemia (low blood sugar)?: 1/2 can soda/fruit juice,5-6 pieces of hard candy  Have you ever experienced hyperglycemia (high blood sugar)?: yes  In the last month, how often have you experienced high blood sugar?: once a day  Are you able to tell when your blood sugar is high?: " No (comment)  Have you ever been hospitalized because your blood sugar was high?: no    Medication Information  How do you obtain your medications?: Patient drives  How many days a week do you miss your medications?: Never  Do you use a pill box or medication chart to help you manage your medications?: No  Do you sometimes have difficulty refilling your medications?: No  Medication adherence impacting ability to self-manage diabetes?: No    Labs  Do you have regular lab work to monitor your medications?: Yes  Type of Regular Lab Work: A1c,CBC,BMP,Cholesterol  Where do you get your labs drawn?: [facility]  Lab Compliance Barriers: No    Nutritional Status  Diet: Regular  Meal Plan 24 Hour Recall: Breakfast,Dinner,Snack,Lunch  Meal Plan 24 Hour Recall - Breakfast: 1 egg and 2 slices of kaur; water  Meal Plan 24 Hour Recall - Lunch: 2 slices of pizza; water  Meal Plan 24 Hour Recall - Dinner: 2 slices of pizza; 1/2 cup sweet tea  Meal Plan 24 Hour Recall - Snack: Apple  Change in appetite?: No  Dentation:: Intact  Recent Changes in Weight: Weight Gain  Current nutritional status an area of need that is impacting patient's ability to self-manage diabetes?: No    Additional Social History    Support  Does anyone support you with your diabetes care?: yes  Who supports you?: spouse,self  Who takes you to your medical appointments?: self  Does the current support meet the patient's needs?: Yes  Is Support an area impacting ability to self-manage diabetes?: No    Access to Mass Media & Technology  Does the patient have access to any of the following devices or technologies?: Smart phone  Media or technology needs impacting ability to self-manage diabetes?: No    Cognitive/Behavioral Health  Alert and Oriented: Yes  Difficulty Thinking: No  Requires Prompting: No  Requires assistance for routine expression?: No  Cognitive or behavioral barriers impacting ability to self-manage diabetes?: No    Culture/Roman Catholic  Culture or  Orthodoxy beliefs that may impact ability to access healthcare: No    Communication  Language preference: English  Hearing Problems: No  Vision Problems: Yes  Vision problem type:: Decreased Vision  Vision Assistance: Glasses  Communication needs impacting ability to self-manage diabetes?: No    Health Literacy  Preferred Learning Method: Face to Face,Reading Materials  How often do you need to have someone help you read instructions, pamphlets, or written material from your doctor or pharmacy?: Never  Health literacy needs impacting ability to self-manage diabetes?: No      Diabetes Self-Management Skills Assessment    Diabetes Disease Process/Treatment Options  Patient/caregiver able to state what happens when someone has diabetes.: no  Patient/caregiver knows what type of diabetes they have.: yes  Diabetes Type : Type II  Patient/caregiver able to identify at least three signs and symptoms of diabetes.: no  Patient able to identify at least three risk factors for diabetes.: no  Diabetes Disease Process/Treatment Options: Skills Assessment Completed: Yes  Assessment indicates:: Knowledge deficit,Instruction Needed  Area of need?: Yes    Nutrition/Healthy Eating  Challenges to healthy eating:: eating out, going to parties,portion control  Method of carbohydrate measurement:: no method  Patient can identify foods that impact blood sugar.: yes  Patient-identified foods:: fruit/fruit juice,starches (bread, pasta, rice, cereal),sweets  Nutrition/Healthy Eating Skills Assessment Completed:: Yes  Assessment indicates:: Knowledge deficit,Instruction Needed  Area of need?: Yes    Physical Activity/Exercise  Patient's daily activity level:: moderately active  Patient formally exercises outside of work.: yes  Exercise Type: walking  Intensity: Moderate  Frequency: four or more times a week  Duration: 45 min  Patient can identify forms of physical activity.: yes  Stated forms of physical activity:: any movement performed by  muscles that uses energy  Patient can identify reasons why exercise/physical activity is important in diabetes management.: yes  Identified reasons:: keeps body and joints flexible  Physical Activity/Exercise Skills Assessment Completed: : Yes  Assessment indicates:: Adequate understanding  Area of need?: No    Medications  Patient is able to describe current diabetes management routine.: yes  Diabetes management routine:: insulin  Patient is able to identify current diabetes medications, dosages, and appropriate timing of medications.: yes  Patient understands the purpose of the medications taken for diabetes.: yes  Patient reports problems or concerns with current medication regimen.: no  Medication Skills Assessment Completed:: Yes  Assessment indicates:: Adequate understanding  Area of need?: No    Home Blood Glucose Monitoring  Patient states that blood sugar is checked at home daily.: yes  Monitoring Method:: home glucometer  How often do you check your blood sugar?: Twice a day  When do you check your blood sugar?: Before breakfast,Before lunch  When you check what is your typical blood sugar range? : Fastin-250. Before Lunch: 125-250.  Blood glucose logs:: no,encouraged to keep logs  Blood glucose logs reviewed today?: no  Home Blood Glucose Monitoring Skills Assessment Completed: : Yes  Assessment indicates:: Knowledge deficit,Instruction Needed  Area of need?: Yes    Acute Complications  Patient is able to identify types of acute complications: Yes  Patient Identified:: Hypoglycemia  Patient is able to state the basic meaning of hypoglycemia?: Yes  Able to state the blood sugar range for hypoglycemia?: yes  Patient stated range:: <75  Patient can identify general symptoms of hypoglycemia: yes  Patient identified:: hunger,shakiness  Able to state proper treatment of hypoglycemia?: yes  Patient identified:: 1/2 can soda/fruit juice,5-6 pieces of hard candy  Acute Complications Skills Assessment  Completed: : Yes  Assessment indicates:: Knowledge deficit,Instruction Needed  Area of need?: Yes    Chronic Complications  Patient can identify major chronic complications of diabetes.: yes  Stated chronic complications::  (Organ failure)  Patient can identify ways to prevent or delay diabetes complications.: yes  Stated ways to prevent complications:: controlling blood sugar  Patient is aware that having diabetes increases risk of heart disease?: No  Patient is aware that heart disease is the leading cause of death and disability in people with diabetes?: No  Patient able to state risk factors for heart disease?: No  Patient is taking statin?: No  Chronic Complications Skills Assessment Completed: : Yes  Assessment indicates:: Knowledge deficit,Instruction Needed  Area of need?: Yes    Psychosocial/Coping  Psychosocial/Coping Skills Assessment Completed: : No  Deffered due to:: Time  Area of need?:  (Deferred.)    Assessment Summary and Plan    Based on today's diabetes care assessment, the following areas of need were identified:      Social 2/14/2022   Support No   Access to Mass Media/Tech No   Cognitive/Behavioral Health No   Culture/Cheondoism No   Communication No   Health Literacy No        Clinical 2/14/2022   Medication Adherence No   Lab Compliance No   Nutritional Status No        Diabetes Self-Management Skills 2/14/2022   Diabetes Disease Process/Treatment Options Yes-Discussed pathology of Type 2 diabetes, risk factors, and treatment options.      Nutrition/Healthy Eating Yes-See care plan.     Physical Activity/Exercise No     Medication No     Home Blood Glucose Monitoring Yes-See care plan.      Acute Complications Yes-Reviewed blood glucose goals, prevention, detection, signs and symptoms, and treatment of hypoglycemia and hyperglycemia, and when to contact the clinic.     Chronic Complications Yes-Discussed importance of A1c less than 7 to reduce risk of micro and macro complications, controlled  Blood Pressure and Cholesterol Lab Values for prevention heart disease, heart attack, stroke.  Health maintenance reviewed     Psychosocial/Coping Deferred.          Today's interventions were provided through individual discussion, instruction, and written materials were provided.      Patient verbalized understanding of instruction and written materials.  Pt was able to return back demonstration of instructions today. Patient understood key points, needs reinforcement and further instruction.     Diabetes Self-Management Care Plan:    Today's Diabetes Self-Management Care Plan was developed with Kulwant's input. Kulwant has agreed to work toward the following goal(s) to improve his/her overall diabetes control.      Care Plan: Diabetes Management   Updates made since 1/15/2022 12:00 AM      Problem: Healthy Eating       Goal: Eat 3 meals daily with 45-60g/3-4 servings of carbohydrate per meal & 15g/1 serving of carbohydrate per snack as needed.    Start Date: 2/14/2022   Expected End Date: 5/14/2022   Priority: High   Barriers: No Barriers Identified   Note:    Discussed carb counting and how it pertains to insulin pump therapy.   Provided patient with smart charts to document his meals and carbs in preparation for insulin pump therapy.      Task: Provided visual examples using dry measuring cups, food models, and other familiar objects such as computer mouse, deck or cards, tennis ball etc. to help with visualization of portions. Completed 2/14/2022      Task: Explained how to count carbohydrates using the food label and the use of dry measuring cups for accurate carb counting. Completed 2/14/2022      Task: Discussed strategies for choosing healthier menu options when dining out. Completed 2/14/2022      Task: Recommended replacing beverages containing high sugar content with noncaloric/sugar free options and/or water. Completed 2/14/2022      Task: Review the importance of balancing carbohydrates with each meal  using portion control techniques to count servings of carbohydrate and label reading to identify serving size and amount of total carbs per serving. Completed 2/14/2022      Task: Provided Sample plate method and reviewed the use of the plate to estimate amounts of carbohydrate per meal. Completed 2/14/2022      Problem: Blood Glucose Self-Monitoring       Goal: Patient agrees to check and record blood sugars 2-4 times per day.    Start Date: 2/14/2022   Expected End Date: 5/14/2022   Priority: Medium   Barriers: No Barriers Identified   Note:    Patient is checking blood sugars 2x/day (fasting and before lunch). Encouraged patient to check fasting blood sugar, before each meal and possibly check 2 hours pp occasionally.      Task: Reviewed the importance of self-monitoring blood glucose and keeping logs. Completed 2/14/2022      Task: Provided patient with blood glucose logs, reviewed appropriate timing and frequency to SMBG, education on parameters on when to notify provider and advised patient to bring logs to all appts with PCP/Endocrinologist/Diabetes Care Specialist. Completed 2/14/2022          Follow Up Plan     Follow up in about 2 weeks (around 2/28/2022) for Pump Education Follow-Up.    Today's care plan and follow up schedule was discussed with patient.  Kulwant verbalized understanding of the care plan, goals, and agrees to follow up plan.        The patient was encouraged to communicate with his/her health care provider/physician and care team regarding his/her condition(s) and treatment.  I provided the patient with my contact information today and encouraged to contact me via phone or [facility] Patient Portal as needed.     Length of Visit   Total Time: 60 Minutes

## 2022-02-15 ENCOUNTER — OFFICE VISIT (OUTPATIENT)
Dept: PODIATRY | Facility: CLINIC | Age: 37
End: 2022-02-15
Payer: COMMERCIAL

## 2022-02-15 VITALS — BODY MASS INDEX: 29.99 KG/M2 | HEIGHT: 74 IN | WEIGHT: 233.69 LBS

## 2022-02-15 DIAGNOSIS — L97.422 DIABETIC ULCER OF LEFT MIDFOOT ASSOCIATED WITH TYPE 2 DIABETES MELLITUS, WITH FAT LAYER EXPOSED: Primary | ICD-10-CM

## 2022-02-15 DIAGNOSIS — E11.621 DIABETIC ULCER OF LEFT MIDFOOT ASSOCIATED WITH TYPE 2 DIABETES MELLITUS, WITH FAT LAYER EXPOSED: Primary | ICD-10-CM

## 2022-02-15 PROCEDURE — 99499 NO LOS: ICD-10-PCS | Mod: S$GLB,,, | Performed by: PODIATRIST

## 2022-02-15 PROCEDURE — 99999 PR PBB SHADOW E&M-EST. PATIENT-LVL IV: CPT | Mod: PBBFAC,,, | Performed by: PODIATRIST

## 2022-02-15 PROCEDURE — 1160F RVW MEDS BY RX/DR IN RCRD: CPT | Mod: CPTII,S$GLB,, | Performed by: PODIATRIST

## 2022-02-15 PROCEDURE — 99999 PR PBB SHADOW E&M-EST. PATIENT-LVL IV: ICD-10-PCS | Mod: PBBFAC,,, | Performed by: PODIATRIST

## 2022-02-15 PROCEDURE — 3008F PR BODY MASS INDEX (BMI) DOCUMENTED: ICD-10-PCS | Mod: CPTII,S$GLB,, | Performed by: PODIATRIST

## 2022-02-15 PROCEDURE — 1160F PR REVIEW ALL MEDS BY PRESCRIBER/CLIN PHARMACIST DOCUMENTED: ICD-10-PCS | Mod: CPTII,S$GLB,, | Performed by: PODIATRIST

## 2022-02-15 PROCEDURE — 15275 SKIN SUB GRAFT FACE/NK/HF/G: CPT | Mod: S$GLB,,, | Performed by: PODIATRIST

## 2022-02-15 PROCEDURE — 99499 UNLISTED E&M SERVICE: CPT | Mod: S$GLB,,, | Performed by: PODIATRIST

## 2022-02-15 PROCEDURE — 3008F BODY MASS INDEX DOCD: CPT | Mod: CPTII,S$GLB,, | Performed by: PODIATRIST

## 2022-02-15 PROCEDURE — 15275 PR SKIN SUB GRAFT FACE/NK/HF/G UP TO 100 SQCM: ICD-10-PCS | Mod: S$GLB,,, | Performed by: PODIATRIST

## 2022-02-15 PROCEDURE — 1159F MED LIST DOCD IN RCRD: CPT | Mod: CPTII,S$GLB,, | Performed by: PODIATRIST

## 2022-02-15 PROCEDURE — 1159F PR MEDICATION LIST DOCUMENTED IN MEDICAL RECORD: ICD-10-PCS | Mod: CPTII,S$GLB,, | Performed by: PODIATRIST

## 2022-02-15 NOTE — PROGRESS NOTES
Subjective:       Patient ID: Kulwant Mueller Jr. is a 36 y.o. male.    Chief Complaint: Procedure (Graft application (affinity). Diabetic pt, PCP: Dr. Pineda last seen 06/21/21. )      HPI: Patient presents to the clinic today, for follow up concerning application of Affinity allograft to an ulceration located at the left dorsal lateral foot. Patient's Primary Care Provider is Dona Pineda MD. The PMHx. does include uncontrolled DMII with neuropathy. To this juncture, patient has had 3 allograft applications.     Hemoglobin A1C   Date Value Ref Range Status   09/22/2021 >14.0 (H) 4.0 - 5.6 % Final     Comment:     ADA Screening Guidelines:  5.7-6.4%  Consistent with prediabetes  >or=6.5%  Consistent with diabetes    High levels of fetal hemoglobin interfere with the HbA1C  assay. Heterozygous hemoglobin variants (HbS, HgC, etc)do  not significantly interfere with this assay.   However, presence of multiple variants may affect accuracy.     08/31/2018 12.7 (H) 4.0 - 5.6 % Final     Comment:     ADA Screening Guidelines:  5.7-6.4%  Consistent with prediabetes  >or=6.5%  Consistent with diabetes  High levels of fetal hemoglobin interfere with the HbA1C  assay. Heterozygous hemoglobin variants (HbS, HgC, etc)do  not significantly interfere with this assay.   However, presence of multiple variants may affect accuracy.     01/11/2018 9.1 (H) 4.0 - 5.6 % Final     Comment:     According to ADA guidelines, hemoglobin A1c <7.0% represents  optimal control in non-pregnant diabetic patients. Different  metrics may apply to specific patient populations.   Standards of Medical Care in Diabetes-2016.  For the purpose of screening for the presence of diabetes:  <5.7%     Consistent with the absence of diabetes  5.7-6.4%  Consistent with increasing risk for diabetes   (prediabetes)  >or=6.5%  Consistent with diabetes  Currently, no consensus exists for use of hemoglobin A1c  for diagnosis of diabetes for  children.  This Hemoglobin A1c assay has significant interference with fetal   hemoglobin   (HbF). The results are invalid for patients with abnormal amounts of   HbF,   including those with known Hereditary Persistence   of Fetal Hemoglobin. Heterozygous hemoglobin variants (HbAS, HbAC,   HbAD, HbAE, HbA2) do not significantly interfere with this assay;   however, presence of multiple variants in a sample may impact the %   interference.         Review of patient's allergies indicates:   Allergen Reactions    Heparin analogues Nausea And Vomiting       Past Medical History:   Diagnosis Date    Anticoagulant long-term use     Diabetes mellitus     Hypertension     May-Thurner syndrome        Family History   Problem Relation Age of Onset    Cancer Mother     Cancer Paternal Uncle     Cancer Paternal Grandfather     Irritable bowel syndrome Paternal Grandfather     Cancer Maternal Grandfather     Colon cancer Neg Hx     Esophageal cancer Neg Hx     Rectal cancer Neg Hx     Stomach cancer Neg Hx     Ulcerative colitis Neg Hx     Crohn's disease Neg Hx     Celiac disease Neg Hx        Social History     Socioeconomic History    Marital status:    Tobacco Use    Smoking status: Former Smoker     Packs/day: 0.00    Smokeless tobacco: Former User    Tobacco comment: occasionally    Substance and Sexual Activity    Alcohol use: Yes     Comment: occ    Drug use: No       Past Surgical History:   Procedure Laterality Date    APPENDECTOMY      ESOPHAGOGASTRODUODENOSCOPY N/A 1/31/2022    Procedure: EGD (ESOPHAGOGASTRODUODENOSCOPY);  Surgeon: Cindy Quiros MD;  Location: [facility];  Service: Endoscopy;  Laterality: N/A;    INCISION AND DRAINAGE FOOT Left 11/28/2021    Procedure: INCISION AND DRAINAGE, FOOT;  Surgeon: Bj Alicea DPM;  Location[facility];  Service: Podiatry;  Laterality: Left;    stents in bilateral legs         Review of Systems   Constitutional: Negative for chills,  "fatigue and fever.   HENT: Negative for hearing loss.    Eyes: Negative for photophobia and visual disturbance.   Respiratory: Negative for cough, chest tightness, shortness of breath and wheezing.    Cardiovascular: Negative for chest pain and palpitations.   Gastrointestinal: Negative for constipation, diarrhea, nausea and vomiting.   Endocrine: Negative for cold intolerance and heat intolerance.   Genitourinary: Negative for flank pain.   Musculoskeletal: Negative for neck pain and neck stiffness.   Skin: Positive for color change and wound.   Neurological: Positive for numbness. Negative for light-headedness and headaches.   Psychiatric/Behavioral: Negative for sleep disturbance.          Objective:   Ht 6' 2" (1.88 m)   Wt 106 kg (233 lb 11 oz)   BMI 30.00 kg/m²                             LOWER EXTREMITY PHYSICAL EXAMINATION    DERMATOLOGY: The ulceration at the dorsal lateral left foot measures 2.5cm x 1.0cm x 0.10c.     Assessment:     1. Diabetic ulcer of left midfoot associated with type 2 diabetes mellitus, with fat layer exposed    2. Uncontrolled type 2 diabetes mellitus with complication        Plan:     Diabetic ulcer of left midfoot associated with type 2 diabetes mellitus, with fat layer exposed    Uncontrolled type 2 diabetes mellitus with complication        The wound was surgically debrided after adequate prep with alcohol and/or betadine paint. Excisional wound debridement was performed using sharp #10/#15 blade/rounded scalpel and tissue nipper, with removal of all non-viable skin and soft tissues; necrotic skin/tissue formation. The woundbase/wound bed was also debrided to encourage bleeding as to promote/stimulate healing. Debridement was excisional and included epidermal, dermal and subcutaneous tissues. Post debridement measurements are as above. Hemostasis was achieved. Patient tolerated procedure well and without complications. Local woundcare with [employer] Affinity allograft (two " 2.5cm x 2.5cm grafts) 12.5cm2 graft with nonadherent dressings and bandage thereafter. All units of the graft were applied. The graft was packed deed into and around the wound as well as to the wound periphery as to stimulate cell migration and proliferation, deep to superficial, and to fill the defect. The graft is then secured in standard fashion. Today's graft application is # 4. Donor #: [id number]. Expiration Date: 2/16/2022. ID#: [id number] and [id number].              Future Appointments   Date Time Provider Department Center   2/15/2022 10:30 AM Bj Alicea DPM [facility]   2/22/2022 10:00 AM Bj Alicea DPM [facility]   2/22/2022  1:00 PM Lisa Bro NP [facility] [place] [facility]   3/2/2022  1:40 PM Rajwinder Sigala PA-C [facility]   3/7/2022  2:30 PM Yuriy Salazar RD [facility]

## 2022-02-22 ENCOUNTER — OFFICE VISIT (OUTPATIENT)
Dept: PODIATRY | Facility: CLINIC | Age: 37
End: 2022-02-22
Payer: COMMERCIAL

## 2022-02-22 ENCOUNTER — PATIENT MESSAGE (OUTPATIENT)
Dept: PODIATRY | Facility: CLINIC | Age: 37
End: 2022-02-22

## 2022-02-22 VITALS — HEIGHT: 74 IN | BODY MASS INDEX: 29.99 KG/M2 | WEIGHT: 233.69 LBS

## 2022-02-22 DIAGNOSIS — L97.422 DIABETIC ULCER OF LEFT MIDFOOT ASSOCIATED WITH TYPE 2 DIABETES MELLITUS, WITH FAT LAYER EXPOSED: Primary | ICD-10-CM

## 2022-02-22 DIAGNOSIS — E11.621 DIABETIC ULCER OF LEFT MIDFOOT ASSOCIATED WITH TYPE 2 DIABETES MELLITUS, WITH FAT LAYER EXPOSED: Primary | ICD-10-CM

## 2022-02-22 PROCEDURE — 1159F PR MEDICATION LIST DOCUMENTED IN MEDICAL RECORD: ICD-10-PCS | Mod: CPTII,S$GLB,, | Performed by: PODIATRIST

## 2022-02-22 PROCEDURE — 1160F PR REVIEW ALL MEDS BY PRESCRIBER/CLIN PHARMACIST DOCUMENTED: ICD-10-PCS | Mod: CPTII,S$GLB,, | Performed by: PODIATRIST

## 2022-02-22 PROCEDURE — 1160F RVW MEDS BY RX/DR IN RCRD: CPT | Mod: CPTII,S$GLB,, | Performed by: PODIATRIST

## 2022-02-22 PROCEDURE — 99999 PR PBB SHADOW E&M-EST. PATIENT-LVL IV: CPT | Mod: PBBFAC,,, | Performed by: PODIATRIST

## 2022-02-22 PROCEDURE — 15275 PR SKIN SUB GRAFT FACE/NK/HF/G UP TO 100 SQCM: ICD-10-PCS | Mod: S$GLB,,, | Performed by: PODIATRIST

## 2022-02-22 PROCEDURE — 1159F MED LIST DOCD IN RCRD: CPT | Mod: CPTII,S$GLB,, | Performed by: PODIATRIST

## 2022-02-22 PROCEDURE — 15275 SKIN SUB GRAFT FACE/NK/HF/G: CPT | Mod: S$GLB,,, | Performed by: PODIATRIST

## 2022-02-22 PROCEDURE — 99499 NO LOS: ICD-10-PCS | Mod: S$GLB,,, | Performed by: PODIATRIST

## 2022-02-22 PROCEDURE — 99499 UNLISTED E&M SERVICE: CPT | Mod: S$GLB,,, | Performed by: PODIATRIST

## 2022-02-22 PROCEDURE — 3008F BODY MASS INDEX DOCD: CPT | Mod: CPTII,S$GLB,, | Performed by: PODIATRIST

## 2022-02-22 PROCEDURE — 3008F PR BODY MASS INDEX (BMI) DOCUMENTED: ICD-10-PCS | Mod: CPTII,S$GLB,, | Performed by: PODIATRIST

## 2022-02-22 PROCEDURE — 99999 PR PBB SHADOW E&M-EST. PATIENT-LVL IV: ICD-10-PCS | Mod: PBBFAC,,, | Performed by: PODIATRIST

## 2022-02-22 RX ORDER — AMOXICILLIN AND CLAVULANATE POTASSIUM 875; 125 MG/1; MG/1
1 TABLET, FILM COATED ORAL EVERY 12 HOURS
Qty: 14 TABLET | Refills: 0 | Status: SHIPPED | OUTPATIENT
Start: 2022-02-22 | End: 2022-03-01

## 2022-02-22 RX ORDER — MUPIROCIN 20 MG/G
OINTMENT TOPICAL 2 TIMES DAILY
Qty: 30 G | Refills: 1 | Status: SHIPPED | OUTPATIENT
Start: 2022-02-22 | End: 2022-03-24

## 2022-02-22 NOTE — PROGRESS NOTES
Subjective:       Patient ID: Kulwant Mueller Jr. is a 36 y.o. male.    Chief Complaint: Procedure (Graft application (affinity). Pt denies pain at present. Diabetic PCP: Dr. Pineda last seen 06/21/21. )      HPI: Patient presents to the clinic today, for follow up concerning application of Affinity allograft to an ulceration located at the left dorsal lateral foot. Patient's Primary Care Provider is Dona Pineda MD. The PMHx. does include uncontrolled DMII with neuropathy. To this juncture, patient has had 4 allograft applications.     Hemoglobin A1C   Date Value Ref Range Status   09/22/2021 >14.0 (H) 4.0 - 5.6 % Final     Comment:     ADA Screening Guidelines:  5.7-6.4%  Consistent with prediabetes  >or=6.5%  Consistent with diabetes    High levels of fetal hemoglobin interfere with the HbA1C  assay. Heterozygous hemoglobin variants (HbS, HgC, etc)do  not significantly interfere with this assay.   However, presence of multiple variants may affect accuracy.     08/31/2018 12.7 (H) 4.0 - 5.6 % Final     Comment:     ADA Screening Guidelines:  5.7-6.4%  Consistent with prediabetes  >or=6.5%  Consistent with diabetes  High levels of fetal hemoglobin interfere with the HbA1C  assay. Heterozygous hemoglobin variants (HbS, HgC, etc)do  not significantly interfere with this assay.   However, presence of multiple variants may affect accuracy.     01/11/2018 9.1 (H) 4.0 - 5.6 % Final     Comment:     According to ADA guidelines, hemoglobin A1c <7.0% represents  optimal control in non-pregnant diabetic patients. Different  metrics may apply to specific patient populations.   Standards of Medical Care in Diabetes-2016.  For the purpose of screening for the presence of diabetes:  <5.7%     Consistent with the absence of diabetes  5.7-6.4%  Consistent with increasing risk for diabetes   (prediabetes)  >or=6.5%  Consistent with diabetes  Currently, no consensus exists for use of hemoglobin A1c  for diagnosis of  diabetes for children.  This Hemoglobin A1c assay has significant interference with fetal   hemoglobin   (HbF). The results are invalid for patients with abnormal amounts of   HbF,   including those with known Hereditary Persistence   of Fetal Hemoglobin. Heterozygous hemoglobin variants (HbAS, HbAC,   HbAD, HbAE, HbA2) do not significantly interfere with this assay;   however, presence of multiple variants in a sample may impact the %   interference.         Review of patient's allergies indicates:   Allergen Reactions    Heparin analogues Nausea And Vomiting       Past Medical History:   Diagnosis Date    Anticoagulant long-term use     Diabetes mellitus     Hypertension     May-Thurner syndrome        Family History   Problem Relation Age of Onset    Cancer Mother     Cancer Paternal Uncle     Cancer Paternal Grandfather     Irritable bowel syndrome Paternal Grandfather     Cancer Maternal Grandfather     Colon cancer Neg Hx     Esophageal cancer Neg Hx     Rectal cancer Neg Hx     Stomach cancer Neg Hx     Ulcerative colitis Neg Hx     Crohn's disease Neg Hx     Celiac disease Neg Hx        Social History     Socioeconomic History    Marital status:    Tobacco Use    Smoking status: Former Smoker     Packs/day: 0.00    Smokeless tobacco: Former User    Tobacco comment: occasionally    Substance and Sexual Activity    Alcohol use: Yes     Comment: occ    Drug use: No       Past Surgical History:   Procedure Laterality Date    APPENDECTOMY      ESOPHAGOGASTRODUODENOSCOPY N/A 1/31/2022    Procedure: EGD (ESOPHAGOGASTRODUODENOSCOPY);  Surgeon: Cindy Quiros MD;  Location: [facility];  Service: Endoscopy;  Laterality: N/A;    INCISION AND DRAINAGE FOOT Left 11/28/2021    Procedure: INCISION AND DRAINAGE, FOOT;  Surgeon: Bj Alicea DPM;  Location: [facility] OR;  Service: Podiatry;  Laterality: Left;    stents in bilateral legs         Review of Systems   Constitutional: Negative  "for chills, fatigue and fever.   HENT: Negative for hearing loss.    Eyes: Negative for photophobia and visual disturbance.   Respiratory: Negative for cough, chest tightness, shortness of breath and wheezing.    Cardiovascular: Negative for chest pain and palpitations.   Gastrointestinal: Negative for constipation, diarrhea, nausea and vomiting.   Endocrine: Negative for cold intolerance and heat intolerance.   Genitourinary: Negative for flank pain.   Musculoskeletal: Negative for neck pain and neck stiffness.   Skin: Positive for color change and wound.   Neurological: Positive for numbness. Negative for light-headedness and headaches.   Psychiatric/Behavioral: Negative for sleep disturbance.          Objective:   Ht 6' 2" (1.88 m)   Wt 106 kg (233 lb 11 oz)   BMI 30.00 kg/m²                 LOWER EXTREMITY PHYSICAL EXAMINATION    DERMATOLOGY: The ulceration at the dorsal lateral left foot measures 2.0cm x 6.0cm x 0.10cm.  Granular tissue noted.  No probe to bone or osseous exposure.  Periwound dermatitis is noted due to persistent bandaging.      Assessment:     1. Diabetic ulcer of left midfoot associated with type 2 diabetes mellitus, with fat layer exposed    2. Uncontrolled type 2 diabetes mellitus with complication        Plan:     Diabetic ulcer of left midfoot associated with type 2 diabetes mellitus, with fat layer exposed  -     amoxicillin-clavulanate 875-125mg (AUGMENTIN) 875-125 mg per tablet; Take 1 tablet by mouth every 12 (twelve) hours. for 7 days  Dispense: 14 tablet; Refill: 0  -     mupirocin (BACTROBAN) 2 % ointment; Apply topically 2 (two) times daily.  Dispense: 30 g; Refill: 1    Uncontrolled type 2 diabetes mellitus with complication        The wound was surgically debrided after adequate prep with alcohol and/or betadine paint. Excisional wound debridement was performed using sharp #10/#15 blade/rounded scalpel and tissue nipper, with removal of all non-viable skin and soft tissues; " necrotic skin/tissue formation. The woundbase/wound bed was also debrided to encourage bleeding as to promote/stimulate healing. Debridement was excisional and included epidermal, dermal and subcutaneous tissues. Post debridement measurements are as above. Hemostasis was achieved. Patient tolerated procedure well and without complications. Local woundcare with [employer] Affinity allograft (2.5cm x 2.5cm grafts) 6.25cm2 graft with nonadherent dressings and bandage thereafter. All units of the graft were applied. The graft was packed deed into and around the wound as well as to the wound periphery as to stimulate cell migration and proliferation, deep to superficial, and to fill the defect. The graft is then secured in standard fashion. Today's graft application is # 5. Donor #: [id number]. Expiration Date: 2/24/2022. ID#: [id number].    Strict DMII control.    Off-loading ATC.     May remove the dressings in 5-7 days and clean the area with NSS and apply topical Bactroban.    Patient is going away on vacation on tomorrow.  He may remove the outer dressing and apply a waterproof bandage, once or twice daily.    Start PO ABx.           Future Appointments   Date Time Provider Department Center   2/22/2022  1:00 PM Lisa Bro NP [facility]   3/2/2022  1:40 PM Rajwinder Sigala PA-C [facility]   3/7/2022  2:30 PM Yuriy Salazar RD [facility]   3/11/2022  3:30 PM Bj Alicea DPM [facility] POD [facility]

## 2022-02-25 ENCOUNTER — OUTPATIENT CASE MANAGEMENT (OUTPATIENT)
Dept: ADMINISTRATIVE | Facility: OTHER | Age: 37
End: 2022-02-25
Payer: COMMERCIAL

## 2022-03-02 ENCOUNTER — PATIENT MESSAGE (OUTPATIENT)
Dept: PODIATRY | Facility: CLINIC | Age: 37
End: 2022-03-02
Payer: COMMERCIAL

## 2022-03-04 ENCOUNTER — PATIENT MESSAGE (OUTPATIENT)
Dept: GASTROENTEROLOGY | Facility: CLINIC | Age: 37
End: 2022-03-04
Payer: COMMERCIAL

## 2022-03-07 ENCOUNTER — PATIENT MESSAGE (OUTPATIENT)
Dept: GASTROENTEROLOGY | Facility: CLINIC | Age: 37
End: 2022-03-07

## 2022-03-07 ENCOUNTER — OFFICE VISIT (OUTPATIENT)
Dept: GASTROENTEROLOGY | Facility: CLINIC | Age: 37
End: 2022-03-07
Payer: COMMERCIAL

## 2022-03-07 ENCOUNTER — TELEPHONE (OUTPATIENT)
Dept: GASTROENTEROLOGY | Facility: CLINIC | Age: 37
End: 2022-03-07

## 2022-03-07 DIAGNOSIS — Z01.818 PRE-OP TESTING: ICD-10-CM

## 2022-03-07 DIAGNOSIS — B37.81 CANDIDA ESOPHAGITIS: Primary | ICD-10-CM

## 2022-03-07 DIAGNOSIS — K20.80 LOS ANGELES GRADE C ESOPHAGITIS: ICD-10-CM

## 2022-03-07 DIAGNOSIS — R11.2 NAUSEA AND VOMITING, INTRACTABILITY OF VOMITING NOT SPECIFIED, UNSPECIFIED VOMITING TYPE: ICD-10-CM

## 2022-03-07 PROCEDURE — 99213 PR OFFICE/OUTPT VISIT, EST, LEVL III, 20-29 MIN: ICD-10-PCS | Mod: 95,,, | Performed by: PHYSICIAN ASSISTANT

## 2022-03-07 PROCEDURE — 99213 OFFICE O/P EST LOW 20 MIN: CPT | Mod: 95,,, | Performed by: PHYSICIAN ASSISTANT

## 2022-03-07 NOTE — TELEPHONE ENCOUNTER
Requesting clearance for patient to stop anticoagulant XARELTO for upcoming EGD scheduled on 3/28/22.    When can patient safely stop XARELTO prior to procedure?

## 2022-03-07 NOTE — PROGRESS NOTES
Subjective:      Patient ID: Kulwant Mueller Jr. is a 36 y.o. male.    Chief Complaint: No chief complaint on file.  The patient location is: work   The chief complaint leading to consultation is: follow up nausea/vomiting    Visit type: audiovisual    Face to Face time with patient: 10 mins  15 minutes of total time spent on the encounter, which includes face to face time and non-face to face time preparing to see the patient (eg, review of tests), Obtaining and/or reviewing separately obtained history, Documenting clinical information in the electronic or other health record, Independently interpreting results (not separately reported) and communicating results to the patient/family/caregiver, or Care coordination (not separately reported).     Each patient to whom he or she provides medical services by telemedicine is:  (1) informed of the relationship between the physician and patient and the respective role of any other health care provider with respect to management of the patient; and (2) notified that he or she may decline to receive medical services by telemedicine and may withdraw from such care at any time.    Notes:     HPI:  Patient reports to clinic today for follow up of the above. Previously seen for intractable nausea/vomiting. EGD completed which found Grade C esophagitis and esophageal candidiasis. Patient reports completing the Diflucan as prescribed and also has been taking Protonix twice daily. His symptoms of nausea and vomiting returned last Wednesday, however, he reports today with much improved symptoms. Reports he is taking a medication for nausea/vomiting which is working very well for him. He is taking 3x per day but it unsure of what medication it is. He does not think it is zofran or promethazine. Requesting refill. He is due this month for repeat upper endoscopy.    Review of Systems   Constitutional: Negative for activity change, appetite change, chills, diaphoresis, fatigue,  fever and unexpected weight change.   HENT: Negative for trouble swallowing.    Respiratory: Negative for shortness of breath.    Cardiovascular: Negative for chest pain.   Gastrointestinal: Positive for nausea (currently controlled) and vomiting (currently controlled). Negative for abdominal pain.   Neurological: Negative for dizziness and weakness.   Psychiatric/Behavioral: Negative for dysphoric mood. The patient is not nervous/anxious.        Medical History: Reviewed    Social History: Reviewed    Allergies: Reviewed    Objective:     Physical Exam  Constitutional:       General: He is not in acute distress.     Appearance: Normal appearance. He is not ill-appearing, toxic-appearing or diaphoretic.   Neurological:      Mental Status: He is alert.         Assessment:     1. Candida esophagitis    2. Nausea and vomiting, intractability of vomiting not specified, unspecified vomiting type    3. [place] grade C esophagitis        Plan:     -Symptoms of nausea/vomiting well controlled currently with medication. Patient requesting refill, but unsure of name of medication. Reports he will send the name of medication via portal once he gets home.   -Will schedule for repeat EGD near end of this month.  -Continue to monitor symptoms.  -Continue Protonix BID as prescribed.  -GERD diet      Diagnoses and all orders for this visit:    Candida esophagitis    Nausea and vomiting, intractability of vomiting not specified, unspecified vomiting type    [place] grade C esophagitis        No follow-ups on file.    Thank you for the opportunity to participate in the care of this patient.   Rajwinder Sigala PA-C.

## 2022-03-07 NOTE — H&P (VIEW-ONLY)
Subjective:      Patient ID: Kulwant Mueller Jr. is a 36 y.o. male.    Chief Complaint: No chief complaint on file.  The patient location is: work   The chief complaint leading to consultation is: follow up nausea/vomiting    Visit type: audiovisual    Face to Face time with patient: 10 mins  15 minutes of total time spent on the encounter, which includes face to face time and non-face to face time preparing to see the patient (eg, review of tests), Obtaining and/or reviewing separately obtained history, Documenting clinical information in the electronic or other health record, Independently interpreting results (not separately reported) and communicating results to the patient/family/caregiver, or Care coordination (not separately reported).     Each patient to whom he or she provides medical services by telemedicine is:  (1) informed of the relationship between the physician and patient and the respective role of any other health care provider with respect to management of the patient; and (2) notified that he or she may decline to receive medical services by telemedicine and may withdraw from such care at any time.    Notes:     HPI:  Patient reports to clinic today for follow up of the above. Previously seen for intractable nausea/vomiting. EGD completed which found Grade C esophagitis and esophageal candidiasis. Patient reports completing the Diflucan as prescribed and also has been taking Protonix twice daily. His symptoms of nausea and vomiting returned last Wednesday, however, he reports today with much improved symptoms. Reports he is taking a medication for nausea/vomiting which is working very well for him. He is taking 3x per day but it unsure of what medication it is. He does not think it is zofran or promethazine. Requesting refill. He is due this month for repeat upper endoscopy.    Review of Systems   Constitutional: Negative for activity change, appetite change, chills, diaphoresis, fatigue,  fever and unexpected weight change.   HENT: Negative for trouble swallowing.    Respiratory: Negative for shortness of breath.    Cardiovascular: Negative for chest pain.   Gastrointestinal: Positive for nausea (currently controlled) and vomiting (currently controlled). Negative for abdominal pain.   Neurological: Negative for dizziness and weakness.   Psychiatric/Behavioral: Negative for dysphoric mood. The patient is not nervous/anxious.        Medical History: Reviewed    Social History: Reviewed    Allergies: Reviewed    Objective:     Physical Exam  Constitutional:       General: He is not in acute distress.     Appearance: Normal appearance. He is not ill-appearing, toxic-appearing or diaphoretic.   Neurological:      Mental Status: He is alert.         Assessment:     1. Candida esophagitis    2. Nausea and vomiting, intractability of vomiting not specified, unspecified vomiting type    3. [place] grade C esophagitis        Plan:     -Symptoms of nausea/vomiting well controlled currently with medication. Patient requesting refill, but unsure of name of medication. Reports he will send the name of medication via portal once he gets home.   -Will schedule for repeat EGD near end of this month.  -Continue to monitor symptoms.  -Continue Protonix BID as prescribed.  -GERD diet      Diagnoses and all orders for this visit:    Candida esophagitis    Nausea and vomiting, intractability of vomiting not specified, unspecified vomiting type    [place] grade C esophagitis        No follow-ups on file.    Thank you for the opportunity to participate in the care of this patient.   Rajwinder Sigala PA-C.

## 2022-03-07 NOTE — TELEPHONE ENCOUNTER
Per Fior Conner NP:  Patient will be at low risk for upcoming EGD procedure. He is able to perform a minimum of 4 METS at moderate function capacity. He will hold Xarelto 24-48 hrs prior to procedure and can resume when ok from bleeding standpoint from GI. His EDG is not scheduled, as he was awaiting clearance.

## 2022-03-08 NOTE — TELEPHONE ENCOUNTER
Called patient and instructed to stop Xarelto 2 days days prior to procedure and to resume the day after procedure.

## 2022-03-09 ENCOUNTER — PATIENT MESSAGE (OUTPATIENT)
Dept: GASTROENTEROLOGY | Facility: CLINIC | Age: 37
End: 2022-03-09
Payer: COMMERCIAL

## 2022-03-11 ENCOUNTER — PATIENT MESSAGE (OUTPATIENT)
Dept: PODIATRY | Facility: CLINIC | Age: 37
End: 2022-03-11
Payer: COMMERCIAL

## 2022-03-11 ENCOUNTER — PATIENT MESSAGE (OUTPATIENT)
Dept: GASTROENTEROLOGY | Facility: CLINIC | Age: 37
End: 2022-03-11
Payer: COMMERCIAL

## 2022-03-11 ENCOUNTER — OFFICE VISIT (OUTPATIENT)
Dept: FAMILY MEDICINE | Facility: CLINIC | Age: 37
End: 2022-03-11
Payer: COMMERCIAL

## 2022-03-11 DIAGNOSIS — L97.529 ULCER OF LEFT FOOT, UNSPECIFIED ULCER STAGE: ICD-10-CM

## 2022-03-11 DIAGNOSIS — R11.2 NAUSEA AND VOMITING, INTRACTABILITY OF VOMITING NOT SPECIFIED, UNSPECIFIED VOMITING TYPE: Primary | ICD-10-CM

## 2022-03-11 DIAGNOSIS — E11.8 TYPE 2 DIABETES MELLITUS WITH COMPLICATION, WITH LONG-TERM CURRENT USE OF INSULIN: ICD-10-CM

## 2022-03-11 DIAGNOSIS — R11.2 INTRACTABLE VOMITING WITH NAUSEA, UNSPECIFIED VOMITING TYPE: Primary | ICD-10-CM

## 2022-03-11 DIAGNOSIS — Z79.4 TYPE 2 DIABETES MELLITUS WITH COMPLICATION, WITH LONG-TERM CURRENT USE OF INSULIN: ICD-10-CM

## 2022-03-11 PROCEDURE — 1159F PR MEDICATION LIST DOCUMENTED IN MEDICAL RECORD: ICD-10-PCS | Mod: CPTII,95,, | Performed by: FAMILY MEDICINE

## 2022-03-11 PROCEDURE — 99214 PR OFFICE/OUTPT VISIT, EST, LEVL IV, 30-39 MIN: ICD-10-PCS | Mod: 95,,, | Performed by: FAMILY MEDICINE

## 2022-03-11 PROCEDURE — 1159F MED LIST DOCD IN RCRD: CPT | Mod: CPTII,95,, | Performed by: FAMILY MEDICINE

## 2022-03-11 PROCEDURE — 1160F PR REVIEW ALL MEDS BY PRESCRIBER/CLIN PHARMACIST DOCUMENTED: ICD-10-PCS | Mod: CPTII,95,, | Performed by: FAMILY MEDICINE

## 2022-03-11 PROCEDURE — 99214 OFFICE O/P EST MOD 30 MIN: CPT | Mod: 95,,, | Performed by: FAMILY MEDICINE

## 2022-03-11 PROCEDURE — 1160F RVW MEDS BY RX/DR IN RCRD: CPT | Mod: CPTII,95,, | Performed by: FAMILY MEDICINE

## 2022-03-11 RX ORDER — PROCHLORPERAZINE MALEATE 10 MG
10 TABLET ORAL 3 TIMES DAILY PRN
Qty: 15 TABLET | Refills: 0 | Status: SHIPPED | OUTPATIENT
Start: 2022-03-11 | End: 2024-02-26

## 2022-03-11 NOTE — PROGRESS NOTES
The patient location is: At home  The chief complaint leading to consultation is: Vomiting     Visit type: Audiovisual     Face to Face time with patient: 25 min  30 minutes of total time spent on the encounter, which includes face to face time and non-face to face time preparing to see the patient (eg, review of tests), Obtaining and/or reviewing separately obtained history, Documenting clinical information in the electronic or other health record, Independently interpreting results (not separately reported) and communicating results to the patient/family/caregiver, or Care coordination (not separately reported).     Each patient to whom he or she provides medical services by telemedicine is:  (1) informed of the relationship between the physician and patient and the respective role of any other health care provider with respect to management of the patient; and (2) notified that he or she may decline to receive medical services by telemedicine and may withdraw from such care at any time.      CHIEF COMPLAINT:  This is a 36-year-old male complaining of vomiting.    SUBJECTIVE:  The patient is accompanied today by his wife who provides the majority of the history.  The patient who has uncontrolled diabetes has been vomiting since New Year's Day.  He has been to the ED 3 times since then and has been given IV fluids and sent home on anti emetics.  He has been evaluated by GI and is scheduled for gastric emptying study next week.  He also has repeat EGD scheduled for the end of the month.  Previous EGD showed Grade C esophagitis and esophageal candidiasis.  He completed treatment course of fluconazole and also has been taking Protonix twice daily.  Vomiting initially resolved but then returned.  Current working diagnosis is diabetic gastroparesis.  He was given a prescription for   metoclopramide which has not improved vomiting.    The patient reports intractable vomiting over the last 2-3 days with associated  weakness and lethargy.  He reports a urinating at least twice today.  He states that promethazine and ondansetron are not effective in relieving his nausea.  He was given Compazine by ER physician which worked well and requests refill.  Patient has uncontrolled diabetes with last A1c greater than 14% 6 months ago.  Patient reports is blood sugars are now in the range of 120-230.  He is currently being treated for chronic left foot ulcer by Podiatry.  His wife reports that the foot ulcer is finally healing.  Patient takes Tresiba and bolus insulin for diabetes and uses Dexcom CGM.  Patient is on chronic anticoagulation with of Xarelto for recurrent DVT.  Patient is obese with a BMI of 30.00.    ROS:  GENERAL: Patient denies fever, chills, night sweats.  Patient denies weight gain or loss. Patient denies swollen glands.  Positive for anorexia, fatigue and weakness.    SKIN: Patient denies rash.  HEENT: Patient denies sore throat, ear pain, hearing loss, nasal congestion, or runny nose. Patient denies visual disturbance, eye irritation or discharge.  LUNGS: Patient denies cough, wheeze or hemoptysis.  CARDIOVASCULAR: Patient denies chest pain, shortness of breath, palpitations, syncope or lower extremity edema.  GI: Patient denies abdominal pain, diarrhea, blood in stool or melena.  Positive for nausea, vomiting and constipation.  GENITOURINARY:  Patient denies dysuria, frequency, hematuria, nocturia, urgency or incontinence.  MUSCULOSKELETAL: Patient denies joint pain, swelling, redness or warmth.  NEUROLOGIC: Patient denies headache, vertigo, paresthesias, weakness in limb, dysarthria, dysphagia or abnormality of gait.  PSYCHIATRIC: Patient denies depression, anxiety or memory loss.    OBJECTIVE:   GENERAL: Well-developed well-nourished lethargic appearing overweight black male  alert and oriented x3 in no acute distress.  Memory, judgment and cognition without deficit.  Normal affect.  Accompanied by spouse.   SKIN:  Clear without rash. Normal color and tone.  HEENT: Eyes: Clear conjunctivae. No scleral icterus.  Nose:  Not congested-sounding.    NECK: Supple, normal range of motion.  LUNGS:  Normal respiratory effort.  No audible wheezing.  EXTREMITIES:  Normal range of motion in all extremities.  NEUROLOGIC:  Gait without abnormality.  No tremor.    ASSESSMENT:  1. Intractable vomiting with nausea, unspecified vomiting type    2. Type 2 diabetes mellitus with complication, with long-term current use of insulin    3. Ulcer of left foot, unspecified ulcer stage      PLAN:   1. Compazine 10 mg 3 times daily as needed for nausea or vomiting.  2. Sip clear liquids every 5 minutes.  Increased amount if no vomiting.  Avoid caffeine or milk products.  3. Dehydration precautions given.  4. Seek medical attention for intractable vomiting, decreased urination or worsening lethargy.  5. Check A1c.    6. Keep appointments for imaging study and endoscopy.    This note is generated with speech recognition software and is subject to transcription error and sound alike phrases that may be missed by proofreading.

## 2022-03-13 PROBLEM — U07.1 COVID-19 VIRUS INFECTION: Status: RESOLVED | Noted: 2021-11-27 | Resolved: 2022-03-13

## 2022-03-13 PROBLEM — I82.4Y2 ACUTE DEEP VEIN THROMBOSIS (DVT) OF PROXIMAL VEIN OF LEFT LOWER EXTREMITY: Status: RESOLVED | Noted: 2017-08-18 | Resolved: 2022-03-13

## 2022-03-13 PROBLEM — I82.412 DEEP VEIN THROMBOSIS (DVT) OF FEMORAL VEIN OF LEFT LOWER EXTREMITY: Status: RESOLVED | Noted: 2018-08-31 | Resolved: 2022-03-13

## 2022-03-13 PROBLEM — E11.43 DIABETIC GASTROPARESIS ASSOCIATED WITH TYPE 2 DIABETES MELLITUS: Status: ACTIVE | Noted: 2018-08-31

## 2022-03-13 PROBLEM — E11.8 TYPE 2 DIABETES MELLITUS WITH COMPLICATION, WITH LONG-TERM CURRENT USE OF INSULIN: Chronic | Status: ACTIVE | Noted: 2017-08-19

## 2022-03-13 PROBLEM — K31.84 DIABETIC GASTROPARESIS ASSOCIATED WITH TYPE 2 DIABETES MELLITUS: Status: ACTIVE | Noted: 2018-08-31

## 2022-03-13 PROBLEM — E66.09 NON MORBID OBESITY DUE TO EXCESS CALORIES: Status: RESOLVED | Noted: 2017-08-19 | Resolved: 2022-03-13

## 2022-03-13 PROBLEM — I82.402 RECURRENT DEEP VEIN THROMBOSIS (DVT) OF LEFT LOWER EXTREMITY: Chronic | Status: ACTIVE | Noted: 2018-10-03

## 2022-03-14 ENCOUNTER — PATIENT MESSAGE (OUTPATIENT)
Dept: PODIATRY | Facility: CLINIC | Age: 37
End: 2022-03-14
Payer: COMMERCIAL

## 2022-03-14 ENCOUNTER — TELEPHONE (OUTPATIENT)
Dept: HEPATOLOGY | Facility: CLINIC | Age: 37
End: 2022-03-14
Payer: COMMERCIAL

## 2022-03-15 ENCOUNTER — OFFICE VISIT (OUTPATIENT)
Dept: URGENT CARE | Facility: CLINIC | Age: 37
End: 2022-03-15
Payer: COMMERCIAL

## 2022-03-15 VITALS
WEIGHT: 215 LBS | SYSTOLIC BLOOD PRESSURE: 122 MMHG | BODY MASS INDEX: 27.59 KG/M2 | TEMPERATURE: 98 F | RESPIRATION RATE: 16 BRPM | HEART RATE: 96 BPM | DIASTOLIC BLOOD PRESSURE: 73 MMHG | OXYGEN SATURATION: 99 % | HEIGHT: 74 IN

## 2022-03-15 DIAGNOSIS — R11.2 NAUSEA AND VOMITING, INTRACTABILITY OF VOMITING NOT SPECIFIED, UNSPECIFIED VOMITING TYPE: Primary | ICD-10-CM

## 2022-03-15 PROCEDURE — 3074F PR MOST RECENT SYSTOLIC BLOOD PRESSURE < 130 MM HG: ICD-10-PCS | Mod: CPTII,S$GLB,, | Performed by: PHYSICIAN ASSISTANT

## 2022-03-15 PROCEDURE — 1160F RVW MEDS BY RX/DR IN RCRD: CPT | Mod: CPTII,S$GLB,, | Performed by: PHYSICIAN ASSISTANT

## 2022-03-15 PROCEDURE — 3008F PR BODY MASS INDEX (BMI) DOCUMENTED: ICD-10-PCS | Mod: CPTII,S$GLB,, | Performed by: PHYSICIAN ASSISTANT

## 2022-03-15 PROCEDURE — 3078F DIAST BP <80 MM HG: CPT | Mod: CPTII,S$GLB,, | Performed by: PHYSICIAN ASSISTANT

## 2022-03-15 PROCEDURE — 99213 OFFICE O/P EST LOW 20 MIN: CPT | Mod: S$GLB,,, | Performed by: PHYSICIAN ASSISTANT

## 2022-03-15 PROCEDURE — 1159F PR MEDICATION LIST DOCUMENTED IN MEDICAL RECORD: ICD-10-PCS | Mod: CPTII,S$GLB,, | Performed by: PHYSICIAN ASSISTANT

## 2022-03-15 PROCEDURE — 3074F SYST BP LT 130 MM HG: CPT | Mod: CPTII,S$GLB,, | Performed by: PHYSICIAN ASSISTANT

## 2022-03-15 PROCEDURE — 99213 PR OFFICE/OUTPT VISIT, EST, LEVL III, 20-29 MIN: ICD-10-PCS | Mod: S$GLB,,, | Performed by: PHYSICIAN ASSISTANT

## 2022-03-15 PROCEDURE — 1160F PR REVIEW ALL MEDS BY PRESCRIBER/CLIN PHARMACIST DOCUMENTED: ICD-10-PCS | Mod: CPTII,S$GLB,, | Performed by: PHYSICIAN ASSISTANT

## 2022-03-15 PROCEDURE — 3008F BODY MASS INDEX DOCD: CPT | Mod: CPTII,S$GLB,, | Performed by: PHYSICIAN ASSISTANT

## 2022-03-15 PROCEDURE — 3078F PR MOST RECENT DIASTOLIC BLOOD PRESSURE < 80 MM HG: ICD-10-PCS | Mod: CPTII,S$GLB,, | Performed by: PHYSICIAN ASSISTANT

## 2022-03-15 PROCEDURE — 1159F MED LIST DOCD IN RCRD: CPT | Mod: CPTII,S$GLB,, | Performed by: PHYSICIAN ASSISTANT

## 2022-03-15 RX ORDER — PROMETHAZINE HYDROCHLORIDE 25 MG/1
25 TABLET ORAL EVERY 6 HOURS PRN
Qty: 20 TABLET | Refills: 0 | Status: ON HOLD | OUTPATIENT
Start: 2022-03-15 | End: 2022-03-21 | Stop reason: HOSPADM

## 2022-03-15 RX ORDER — PROCHLORPERAZINE MALEATE 10 MG
10 TABLET ORAL 3 TIMES DAILY PRN
Qty: 15 TABLET | Refills: 0 | OUTPATIENT
Start: 2022-03-15

## 2022-03-15 NOTE — TELEPHONE ENCOUNTER
Patient called today complaing of vomiting and nausea every day and doesn't feel PA is doing anything for him. Patient stated he is having to leave work for the vomiting and he is a [employer] employee. Patient is asking for some kind of nausea medication he can take to work and a Doctors excuse. Patient requested s sooner appt with you but he has a scheduled procedure and appt already set up. He is requesting a refill on compazine and some other nausea medication.

## 2022-03-15 NOTE — LETTER
March 15, 2022      [facility]  [address], [address]  [place] [address]  Phone: [phone]       Patient: Kulwant Mueller   YOB: 1985  Date of Visit: 03/15/2022    To Whom It May Concern:    Vineet Mueller  was at [facility] on 03/15/2022. The patient may return to work/school on 3/21/2022 with no restrictions.  Patient has significant nausea and vomiting that could affect his work.  Has an appt with specialist on 3/29/2022.  If you have any questions or concerns, or if I can be of further assistance, please do not hesitate to contact me.    Sincerely,        Douglas Clayton Jr., PA

## 2022-03-15 NOTE — TELEPHONE ENCOUNTER
----- Message from Cathy Amrit sent at 3/15/2022 11:59 AM CDT -----  Name Of Caller: Chloé     Provider Name: Cindy Quiros,     Does patient feel the need to be seen today? No     Relationship to the Pt?: wife     Contact Preference?: [phone]    What is the nature of the call?:Chloé called and would like to speak to nurse because he been vomiting everyday. Would like to speak to you or nurse please call back

## 2022-03-15 NOTE — PROGRESS NOTES
"Subjective:       Patient ID: Kulwant Mueller Jr. is a 37 y.o. male.    Vitals:  height is 6' 2" (1.88 m) and weight is 97.5 kg (215 lb). His tympanic temperature is 97.7 °F (36.5 °C). His blood pressure is 122/73 and his pulse is 96. His respiration is 16 and oxygen saturation is 99%.     Chief Complaint: Emesis    37-year-old male who presents with vomiting and nausea x2 months.  States he has been to his primary care, and GI specialist.  He is being followed by [facility] practitioner [facility] but had EGD performed 1 month ago by Dr. Quiros gastroenterologist.  States he has another EGD scheduled for 03/28/2022 but until then he has continuously had nausea and vomiting that keeps him from working.  Nausea vomiting is continuous and he has not been able to eat.  Currently using electrolyte popsicles to help him get fluids.  Due to his nausea and vomiting his boss has sent him home, he works for security at [employer].  His boss told him he needs a note stating he needs to stay home until further appointment.  Corresponded with the nurse practitioner at the [facility] who booked him an appointment for 03/29/2022.  His primary care physician gave him 5 day supply of Compazine as he stated the nurse practitioner at the [facility]ology [facility] would not.  At this time he is here for a no to stay home from work or further evaluation.  States he has not had a bowel movement in several days because he has not been able to eat anything or hold anything down.    Emesis   This is a new problem. The current episode started more than 1 month ago. The problem occurs 5 to 10 times per day. The problem has been gradually worsening. Associated symptoms include abdominal pain and weight loss. Pertinent negatives include no arthralgias, chest pain, chills, coughing, decreased urine volume, diarrhea, dizziness, fever, headaches, myalgias, sweats or URI. Associated symptoms comments: Weakness . " He has tried nothing for the symptoms. The treatment provided no relief.       Constitution: Negative for chills, sweating, fatigue and fever.   Cardiovascular: Negative for chest pain.   Respiratory: Negative for cough.    Gastrointestinal: Positive for abdominal pain, nausea and vomiting. Negative for constipation, diarrhea, dark colored stools and rectal bleeding.   Genitourinary: Negative for urine decreased.   Musculoskeletal: Negative for joint pain and muscle ache.   Neurological: Negative for dizziness and headaches.       Objective:      Physical Exam   Constitutional: He is oriented to person, place, and time. He appears well-developed.   HENT:   Head: Normocephalic and atraumatic.   Ears:   Right Ear: External ear normal.   Left Ear: External ear normal.   Nose: Nose normal.   Mouth/Throat: Mucous membranes are normal. Abnormal dentition. No posterior oropharyngeal erythema or cobblestoning.   Eyes: Conjunctivae and lids are normal.   Neck: Trachea normal. Neck supple.   Cardiovascular: Normal rate, regular rhythm and normal heart sounds.   Pulmonary/Chest: Effort normal and breath sounds normal. No stridor. No respiratory distress. He has no decreased breath sounds. He has no wheezes. He has no rhonchi. He has no rales.   Abdominal: Normal appearance and bowel sounds are normal. He exhibits no distension, no abdominal bruit, no pulsatile midline mass and no mass. Soft. flat abdomenThere is no abdominal tenderness. There is no rebound, no guarding, no left CVA tenderness and no right CVA tenderness.   Musculoskeletal: Normal range of motion.         General: Normal range of motion.   Neurological: He is alert and oriented to person, place, and time. He has normal strength.   Skin: Skin is warm, dry, intact, not diaphoretic and not pale.   Psychiatric: His speech is normal and behavior is normal. Judgment and thought content normal.   Nursing note and vitals reviewed.        Assessment:       1. Nausea and  vomiting, intractability of vomiting not specified, unspecified vomiting type          Plan:         Nausea and vomiting, intractability of vomiting not specified, unspecified vomiting type    Other orders  -     promethazine (PHENERGAN) 25 MG tablet; Take 1 tablet (25 mg total) by mouth every 6 (six) hours as needed for Nausea.  Dispense: 20 tablet; Refill: 0    Will give patient a note to return to work on Monday.  Stated he needed talk to Gastroenterology who did his initial EGD to discuss further work note.  Recommend he call the Gastroenterology Department and talked to the nurse for the physician who performed his EGD or the physician who will be performing his next EGD in 2 weeks.  Unable to provide any further diagnosis or treatment plan as he is currently being followed by specialist.  Follow-up with gastroenterology ASAP.       You must understand that you've received an Urgent Care treatment only and that you may be released before all your medical problems are known or treated. You, the patient, will arrange for follow up care as instructed.  Follow up with your PCP or specialty clinic as directed in the next 1-2 weeks if not improved or as needed.  You can call [phone] to schedule an appointment with the appropriate provider.  If your condition worsens we recommend that you receive another evaluation at the emergency room immediately or contact your primary medical clinics after hours call service to discuss your concerns.  Please return here or go to the Emergency Department for any concerns or worsening of condition.    If you were prescribed a narcotic or controlled medication, do not drive or operate heavy equipment or machinery while taking these medications.\  Patient Instructions       Patient Education        Nausea and Vomiting, Adult ED   General Information   You came to the Emergency Department (ED) for nausea and vomiting. When you feel sick to your stomach, this is nausea. When you  throw up, this is vomiting. Often, nausea and vomiting are caused by a virus. But they can also be caused by more serious things like an infection around the brain. The staff felt the risk of a serious cause for your nausea and vomiting is low.  If a virus is causing your nausea and vomiting, it is easy to spread from person to person. You can lower this risk by washing your hands often. Most of the time, your symptoms will go away without treatment in a few days.  You may be waiting on some test results. The staff will contact you if there are concerning results.  What care is needed at home?   · Call your regular doctor to let them know you were in the ED. Make a follow-up appointment if you were told to.  · Drink small amounts of fluid every 15 to 30 minutes. Good fluids to drink are water, broth, and oral electrolyte solutions. Sugar-free or very low sugar sports drinks are also OK.  · Once you feel you can eat, start with crackers, toast, or cereal. Then eat small amounts of food more often.  · Wash your hands often. This will help keep others healthy.  · Avoid alcohol and caffeine.  · If you have diabetes, check your blood sugar more often than usual. Being sick can affect your blood sugar levels.  When do I need to get emergency help?   1. Call for an ambulance right away if:   1. You have vomiting along with a fever and severe headache or stiff neck.  2. You have vomiting along with severe chest or belly pain or trouble breathing.  3. You are vomiting large amounts of blood (more than 1 teaspoon or 5 mL).  2. Return to the ED if:   1. You have signs of severe fluid loss, such as:  § No urine for more than 8 hours.  § Feel very light-headed or like you are going to pass out.  § Feel weak like you are going to fall.  § Feel like your heart is beating very fast.  2. You feel extremely weak, like you are going to pass out.  3. You are vomiting multiple times every hour.  When do I need to call the doctor?    1. You develop early signs of fluid loss, such as:  ? Dark-colored urine.  ? Dry mouth.  ? Muscle cramps.  ? Lack of energy.  ? Feeling light-headed when you get up.  2. You have a small amount of blood (less than 1 teaspoon or 5 mL) in your vomit or bowel movements.  3. You throw up something that looks like coffee grounds.  4. You have a bowel movement that is black and looks like tar.  5. You are not able to keep fluids down.  6. You have a fever of 100.4º F (38°C) or higher or chills that do not go away after a day.  7. You have new or worsening symptoms.  Last Reviewed Date   2021-05-21  Consumer Information Use and Disclaimer   This information is not specific medical advice and does not replace information you receive from your health care provider. This is only a brief summary of general information. It does NOT include all information about conditions, illnesses, injuries, tests, procedures, treatments, therapies, discharge instructions or life-style choices that may apply to you. You must talk with your health care provider for complete information about your health and treatment options. This information should not be used to decide whether or not to accept your health care providers advice, instructions or recommendations. Only your health care provider has the knowledge and training to provide advice that is right for you.  Copyright   Copyright © 2021 [employer] and its affiliates and/or licensors. All rights reserved.            STEFAN Mars

## 2022-03-15 NOTE — PATIENT INSTRUCTIONS
Patient Education        Nausea and Vomiting, Adult ED   General Information   You came to the Emergency Department (ED) for nausea and vomiting. When you feel sick to your stomach, this is nausea. When you throw up, this is vomiting. Often, nausea and vomiting are caused by a virus. But they can also be caused by more serious things like an infection around the brain. The staff felt the risk of a serious cause for your nausea and vomiting is low.  If a virus is causing your nausea and vomiting, it is easy to spread from person to person. You can lower this risk by washing your hands often. Most of the time, your symptoms will go away without treatment in a few days.  You may be waiting on some test results. The staff will contact you if there are concerning results.  What care is needed at home?   Call your regular doctor to let them know you were in the ED. Make a follow-up appointment if you were told to.  Drink small amounts of fluid every 15 to 30 minutes. Good fluids to drink are water, broth, and oral electrolyte solutions. Sugar-free or very low sugar sports drinks are also OK.  Once you feel you can eat, start with crackers, toast, or cereal. Then eat small amounts of food more often.  Wash your hands often. This will help keep others healthy.  Avoid alcohol and caffeine.  If you have diabetes, check your blood sugar more often than usual. Being sick can affect your blood sugar levels.  When do I need to get emergency help?   Call for an ambulance right away if:   You have vomiting along with a fever and severe headache or stiff neck.  You have vomiting along with severe chest or belly pain or trouble breathing.  You are vomiting large amounts of blood (more than 1 teaspoon or 5 mL).  Return to the ED if:   You have signs of severe fluid loss, such as:  No urine for more than 8 hours.  Feel very light-headed or like you are going to pass out.  Feel weak like you are going to fall.  Feel like your heart is  beating very fast.  You feel extremely weak, like you are going to pass out.  You are vomiting multiple times every hour.  When do I need to call the doctor?   You develop early signs of fluid loss, such as:  Dark-colored urine.  Dry mouth.  Muscle cramps.  Lack of energy.  Feeling light-headed when you get up.  You have a small amount of blood (less than 1 teaspoon or 5 mL) in your vomit or bowel movements.  You throw up something that looks like coffee grounds.  You have a bowel movement that is black and looks like tar.  You are not able to keep fluids down.  You have a fever of 100.4º F (38°C) or higher or chills that do not go away after a day.  You have new or worsening symptoms.  Last Reviewed Date   2021-05-21  Consumer Information Use and Disclaimer   This information is not specific medical advice and does not replace information you receive from your health care provider. This is only a brief summary of general information. It does NOT include all information about conditions, illnesses, injuries, tests, procedures, treatments, therapies, discharge instructions or life-style choices that may apply to you. You must talk with your health care provider for complete information about your health and treatment options. This information should not be used to decide whether or not to accept your health care providers advice, instructions or recommendations. Only your health care provider has the knowledge and training to provide advice that is right for you.  Copyright   Copyright © 2021 [employer] and its affiliates and/or licensors. All rights reserved.

## 2022-03-17 ENCOUNTER — OUTPATIENT CASE MANAGEMENT (OUTPATIENT)
Dept: ADMINISTRATIVE | Facility: OTHER | Age: 37
End: 2022-03-17
Payer: COMMERCIAL

## 2022-03-17 NOTE — PROGRESS NOTES
Outpatient Care Management  Plan of Care Follow Up Visit    Patient: Kulwant Mueller Jr.  MRN: [id number]  Date of Service: 03/17/2022  Completed by: Key Wren RN  Referral Date: 01/23/2022  Program:     Reason for Visit   Patient presents with    OPCM RN First Assessment Attempt       Brief Summary: Pt reports that he is unable to talk at this time.     Patient Summary     Involvement of Care:  Do I have permission to speak with other family members about your care?   yes    Patient Reported Labs & Vitals:  1.  Any Patient Reported Labs & Vitals?     2.  Patient Reported Blood Pressure:     3.  Patient Reported Pulse:     4.  Patient Reported Weight (Kg):     5.  Patient Reported Blood Glucose (mg/dl):       Medical and social history was reviewed with patient and/or caregiver.     Clinical Assessment     Reviewed and provided basic information on available community resources for mental health, transportation, wellness resources, and palliative care programs with patient and/or caregiver.     Complex Care Plan     Care plan was discussed and completed today with input from patient and/or caregiver.    Patient Instructions     Instructions were provided via the [employer] patient resources and are available for the patient to view on the patient portal.    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    Follow up in about 8 days (around 3/25/2022).    Todays OPCM Self-Management Care Plan was developed with the patients/caregivers input and was based on identified barriers from todays assessment.  Goals were written today with the patient/caregiver and the patient has agreed to work towards these goals to improve his/her overall well-being. Patient verbalized understanding of the care plan, goals, and all of today's instructions. Encouraged patient/caregiver to communicate with his/her physician and health care team about health conditions and the treatment plan.  Provided my  contact information today and encouraged patient/caregiver to call me with any questions as needed.

## 2022-03-18 ENCOUNTER — PATIENT MESSAGE (OUTPATIENT)
Dept: ENDOSCOPY | Facility: HOSPITAL | Age: 37
End: 2022-03-18
Payer: COMMERCIAL

## 2022-03-18 ENCOUNTER — OFFICE VISIT (OUTPATIENT)
Dept: GASTROENTEROLOGY | Facility: CLINIC | Age: 37
End: 2022-03-18
Payer: COMMERCIAL

## 2022-03-18 ENCOUNTER — HOSPITAL ENCOUNTER (OUTPATIENT)
Dept: RADIOLOGY | Facility: HOSPITAL | Age: 37
Discharge: HOME | End: 2022-03-18
Attending: INTERNAL MEDICINE
Payer: COMMERCIAL

## 2022-03-18 ENCOUNTER — TELEPHONE (OUTPATIENT)
Dept: ENDOSCOPY | Facility: HOSPITAL | Age: 37
End: 2022-03-18
Payer: COMMERCIAL

## 2022-03-18 VITALS
SYSTOLIC BLOOD PRESSURE: 100 MMHG | BODY MASS INDEX: 28.6 KG/M2 | WEIGHT: 222.88 LBS | HEIGHT: 74 IN | HEART RATE: 106 BPM | DIASTOLIC BLOOD PRESSURE: 62 MMHG

## 2022-03-18 DIAGNOSIS — K20.80 LOS ANGELES GRADE C ESOPHAGITIS: Primary | ICD-10-CM

## 2022-03-18 DIAGNOSIS — K59.04 CHRONIC IDIOPATHIC CONSTIPATION: ICD-10-CM

## 2022-03-18 DIAGNOSIS — B37.81 CANDIDA ESOPHAGITIS: ICD-10-CM

## 2022-03-18 PROCEDURE — 1159F PR MEDICATION LIST DOCUMENTED IN MEDICAL RECORD: ICD-10-PCS | Mod: CPTII,S$GLB,, | Performed by: INTERNAL MEDICINE

## 2022-03-18 PROCEDURE — 3008F PR BODY MASS INDEX (BMI) DOCUMENTED: ICD-10-PCS | Mod: CPTII,S$GLB,, | Performed by: INTERNAL MEDICINE

## 2022-03-18 PROCEDURE — 3078F PR MOST RECENT DIASTOLIC BLOOD PRESSURE < 80 MM HG: ICD-10-PCS | Mod: CPTII,S$GLB,, | Performed by: INTERNAL MEDICINE

## 2022-03-18 PROCEDURE — 99999 PR PBB SHADOW E&M-EST. PATIENT-LVL IV: CPT | Mod: PBBFAC,,, | Performed by: INTERNAL MEDICINE

## 2022-03-18 PROCEDURE — 99214 OFFICE O/P EST MOD 30 MIN: CPT | Mod: S$GLB,,, | Performed by: INTERNAL MEDICINE

## 2022-03-18 PROCEDURE — 1159F MED LIST DOCD IN RCRD: CPT | Mod: CPTII,S$GLB,, | Performed by: INTERNAL MEDICINE

## 2022-03-18 PROCEDURE — 74019 RADEX ABDOMEN 2 VIEWS: CPT | Mod: TC

## 2022-03-18 PROCEDURE — 3074F PR MOST RECENT SYSTOLIC BLOOD PRESSURE < 130 MM HG: ICD-10-PCS | Mod: CPTII,S$GLB,, | Performed by: INTERNAL MEDICINE

## 2022-03-18 PROCEDURE — 99214 PR OFFICE/OUTPT VISIT, EST, LEVL IV, 30-39 MIN: ICD-10-PCS | Mod: S$GLB,,, | Performed by: INTERNAL MEDICINE

## 2022-03-18 PROCEDURE — 99999 PR PBB SHADOW E&M-EST. PATIENT-LVL IV: ICD-10-PCS | Mod: PBBFAC,,, | Performed by: INTERNAL MEDICINE

## 2022-03-18 PROCEDURE — 3078F DIAST BP <80 MM HG: CPT | Mod: CPTII,S$GLB,, | Performed by: INTERNAL MEDICINE

## 2022-03-18 PROCEDURE — 1160F RVW MEDS BY RX/DR IN RCRD: CPT | Mod: CPTII,S$GLB,, | Performed by: INTERNAL MEDICINE

## 2022-03-18 PROCEDURE — 1160F PR REVIEW ALL MEDS BY PRESCRIBER/CLIN PHARMACIST DOCUMENTED: ICD-10-PCS | Mod: CPTII,S$GLB,, | Performed by: INTERNAL MEDICINE

## 2022-03-18 PROCEDURE — 74019 RADEX ABDOMEN 2 VIEWS: CPT | Mod: 26,,, | Performed by: RADIOLOGY

## 2022-03-18 PROCEDURE — 3008F BODY MASS INDEX DOCD: CPT | Mod: CPTII,S$GLB,, | Performed by: INTERNAL MEDICINE

## 2022-03-18 PROCEDURE — 3074F SYST BP LT 130 MM HG: CPT | Mod: CPTII,S$GLB,, | Performed by: INTERNAL MEDICINE

## 2022-03-18 PROCEDURE — 74019 XR ABDOMEN FLAT AND ERECT: ICD-10-PCS | Mod: 26,,, | Performed by: RADIOLOGY

## 2022-03-18 NOTE — TELEPHONE ENCOUNTER
Please advise if patient may hold Eliquis 2 days prior to endoscopy procedure diagnosis . Upon your approval, our team will inform patient of medication clearance prior to procedure.  Procedure is scheduled for 3-.  Thanks for your time.

## 2022-03-18 NOTE — PROGRESS NOTES
Subjective:       Patient ID: Kulwant Mueller Jr. is a 37 y.o. male.    Chief Complaint: Emesis and Constipation    This patient is known to our service from previous encounters.  He underwent EGD earlier this year because of refractory nausea and vomiting and was diagnosed with grade C esophagitis with associated Candida.  The patient was treated with Diflucan and did well for about a month and till of foot infection required him to be on wide-spectrum antibiotic therapy, and not long afterwards his symptoms recurred.    As part of the patient's initial workup and his history of diabetes mellitus, a gastric emptying study was recommended and attempted to be performed on January 19th.  The procedure was not able to be completed because of the patient's vomiting.  He has been on therapy with Reglan, but despite this has continue with issues with vomiting.  Patient is due for repeat EGD at the end of this month and a repeat gastric emptying study has been reordered; however, because of refractory symptoms the patient has been unable to maintain his work schedule.  After speaking with our on-call endoscopy team, they have agreed to accommodate a repeat upper GI endoscopy next week.    Review of Systems   Constitutional: Positive for fatigue and unexpected weight change. Negative for activity change, appetite change, chills, diaphoresis and fever.   HENT: Negative for congestion, ear discharge, facial swelling, hearing loss, nosebleeds, postnasal drip, sinus pressure, sneezing, tinnitus, trouble swallowing and voice change.    Eyes: Negative for photophobia, redness and visual disturbance.   Respiratory: Negative for cough, chest tightness, shortness of breath and wheezing.    Cardiovascular: Negative for chest pain and palpitations.   Gastrointestinal: Positive for constipation, nausea and vomiting. Negative for abdominal distention, abdominal pain, blood in stool, diarrhea and rectal pain.   Genitourinary: Negative  for difficulty urinating, dysuria, flank pain, frequency, hematuria, penile discharge, scrotal swelling, testicular pain and urgency.   Musculoskeletal: Negative for arthralgias, back pain, gait problem, joint swelling, myalgias and neck stiffness.   Skin: Negative for color change, pallor, rash and wound.   Neurological: Negative for dizziness, tremors, seizures, syncope, facial asymmetry, speech difficulty, weakness, light-headedness, numbness and headaches.   Hematological: Negative for adenopathy. Does not bruise/bleed easily.   Psychiatric/Behavioral: Negative for agitation, confusion, hallucinations, sleep disturbance and suicidal ideas.       Objective:      Physical Exam  Vitals reviewed.   Constitutional:       General: He is not in acute distress.     Appearance: He is well-developed. He is not diaphoretic.   HENT:      Head: Normocephalic and atraumatic.      Nose: Nose normal.      Mouth/Throat:      Pharynx: No oropharyngeal exudate.   Eyes:      General: No scleral icterus.     Conjunctiva/sclera: Conjunctivae normal.      Pupils: Pupils are equal, round, and reactive to light.   Neck:      Thyroid: No thyromegaly.   Cardiovascular:      Rate and Rhythm: Normal rate and regular rhythm.      Heart sounds: No murmur heard.    No friction rub. No gallop.   Pulmonary:      Effort: Pulmonary effort is normal. No respiratory distress.      Breath sounds: Normal breath sounds. No wheezing or rales.   Abdominal:      General: Bowel sounds are normal. There is no distension.      Palpations: Abdomen is soft. There is no mass.      Tenderness: There is no abdominal tenderness. There is no guarding or rebound.   Musculoskeletal:         General: No tenderness.      Cervical back: Normal range of motion and neck supple.   Lymphadenopathy:      Cervical: No cervical adenopathy.   Skin:     General: Skin is warm.      Findings: Rash present.      Comments: Scaling and hyperpigmentation lower extremities due to  diabetes related  circulation issues and recent foot abscess.   Neurological:      Mental Status: He is alert and oriented to person, place, and time.      Motor: No abnormal muscle tone.      Coordination: Coordination normal.   Psychiatric:         Behavior: Behavior normal.         Thought Content: Thought content normal.         Judgment: Judgment normal.         Assessment:   [place] grade C esophagitis  -     Case Request Endoscopy: EGD (ESOPHAGOGASTRODUODENOSCOPY)    Candida esophagitis  -     Case Request Endoscopy: EGD (ESOPHAGOGASTRODUODENOSCOPY)    Chronic idiopathic constipation  -     X-Ray Abdomen Flat And Erect; Future; Expected date: 03/18/2022            Plan:   As above.   Off work until after EGD.

## 2022-03-19 ENCOUNTER — PATIENT MESSAGE (OUTPATIENT)
Dept: GASTROENTEROLOGY | Facility: CLINIC | Age: 37
End: 2022-03-19
Payer: COMMERCIAL

## 2022-03-19 PROBLEM — B37.81 CANDIDA ESOPHAGITIS: Status: ACTIVE | Noted: 2022-03-19

## 2022-03-19 PROBLEM — K20.90 ESOPHAGITIS DETERMINED BY ENDOSCOPY: Status: ACTIVE | Noted: 2022-03-19

## 2022-03-21 ENCOUNTER — HOSPITAL ENCOUNTER (OUTPATIENT)
Facility: HOSPITAL | Age: 37
Discharge: HOME | End: 2022-03-21
Attending: INTERNAL MEDICINE | Admitting: INTERNAL MEDICINE
Payer: COMMERCIAL

## 2022-03-21 ENCOUNTER — ANESTHESIA EVENT (OUTPATIENT)
Dept: ENDOSCOPY | Facility: HOSPITAL | Age: 37
End: 2022-03-21
Payer: COMMERCIAL

## 2022-03-21 ENCOUNTER — PATIENT MESSAGE (OUTPATIENT)
Dept: ENDOSCOPY | Facility: HOSPITAL | Age: 37
End: 2022-03-21
Payer: COMMERCIAL

## 2022-03-21 ENCOUNTER — ANESTHESIA (OUTPATIENT)
Dept: ENDOSCOPY | Facility: HOSPITAL | Age: 37
End: 2022-03-21
Payer: COMMERCIAL

## 2022-03-21 VITALS
RESPIRATION RATE: 20 BRPM | HEART RATE: 91 BPM | WEIGHT: 200 LBS | BODY MASS INDEX: 25.67 KG/M2 | HEIGHT: 74 IN | SYSTOLIC BLOOD PRESSURE: 134 MMHG | TEMPERATURE: 98 F | DIASTOLIC BLOOD PRESSURE: 80 MMHG | OXYGEN SATURATION: 100 %

## 2022-03-21 DIAGNOSIS — E11.65 TYPE 2 DIABETES MELLITUS WITH HYPERGLYCEMIA, WITH LONG-TERM CURRENT USE OF INSULIN: Chronic | ICD-10-CM

## 2022-03-21 DIAGNOSIS — Z79.4 TYPE 2 DIABETES MELLITUS WITH HYPERGLYCEMIA, WITH LONG-TERM CURRENT USE OF INSULIN: Chronic | ICD-10-CM

## 2022-03-21 DIAGNOSIS — K20.90 ESOPHAGITIS DETERMINED BY ENDOSCOPY: Primary | ICD-10-CM

## 2022-03-21 DIAGNOSIS — K21.9 GERD (GASTROESOPHAGEAL REFLUX DISEASE): ICD-10-CM

## 2022-03-21 DIAGNOSIS — B37.81 CANDIDA ESOPHAGITIS: ICD-10-CM

## 2022-03-21 DIAGNOSIS — K20.80 LOS ANGELES GRADE C ESOPHAGITIS: ICD-10-CM

## 2022-03-21 PROCEDURE — 43239 PR EGD, FLEX, W/BIOPSY, SGL/MULTI: ICD-10-PCS | Mod: ,,, | Performed by: INTERNAL MEDICINE

## 2022-03-21 PROCEDURE — 37000009 HC ANESTHESIA EA ADD 15 MINS: Performed by: INTERNAL MEDICINE

## 2022-03-21 PROCEDURE — 43239 EGD BIOPSY SINGLE/MULTIPLE: CPT | Mod: ,,, | Performed by: INTERNAL MEDICINE

## 2022-03-21 PROCEDURE — 37000008 HC ANESTHESIA 1ST 15 MINUTES: Performed by: INTERNAL MEDICINE

## 2022-03-21 PROCEDURE — 88305 TISSUE EXAM BY PATHOLOGIST: CPT | Performed by: PATHOLOGY

## 2022-03-21 PROCEDURE — 43239 EGD BIOPSY SINGLE/MULTIPLE: CPT | Performed by: INTERNAL MEDICINE

## 2022-03-21 PROCEDURE — 27201012 HC FORCEPS, HOT/COLD, DISP: Performed by: INTERNAL MEDICINE

## 2022-03-21 PROCEDURE — 63600175 PHARM REV CODE 636 W HCPCS: Performed by: NURSE ANESTHETIST, CERTIFIED REGISTERED

## 2022-03-21 PROCEDURE — 25000003 PHARM REV CODE 250: Performed by: NURSE ANESTHETIST, CERTIFIED REGISTERED

## 2022-03-21 PROCEDURE — 88305 TISSUE EXAM BY PATHOLOGIST: CPT | Mod: 26,,, | Performed by: PATHOLOGY

## 2022-03-21 PROCEDURE — 88305 TISSUE EXAM BY PATHOLOGIST: ICD-10-PCS | Mod: 26,,, | Performed by: PATHOLOGY

## 2022-03-21 RX ORDER — SODIUM CHLORIDE, SODIUM LACTATE, POTASSIUM CHLORIDE, CALCIUM CHLORIDE 600; 310; 30; 20 MG/100ML; MG/100ML; MG/100ML; MG/100ML
INJECTION, SOLUTION INTRAVENOUS CONTINUOUS PRN
Status: DISCONTINUED | OUTPATIENT
Start: 2022-03-21 | End: 2022-03-21

## 2022-03-21 RX ORDER — SODIUM CHLORIDE, SODIUM LACTATE, POTASSIUM CHLORIDE, CALCIUM CHLORIDE 600; 310; 30; 20 MG/100ML; MG/100ML; MG/100ML; MG/100ML
INJECTION, SOLUTION INTRAVENOUS CONTINUOUS
Status: DISCONTINUED | OUTPATIENT
Start: 2022-03-21 | End: 2022-03-21 | Stop reason: HOSPADM

## 2022-03-21 RX ORDER — LIDOCAINE HYDROCHLORIDE 10 MG/ML
INJECTION, SOLUTION EPIDURAL; INFILTRATION; INTRACAUDAL; PERINEURAL
Status: DISCONTINUED | OUTPATIENT
Start: 2022-03-21 | End: 2022-03-21

## 2022-03-21 RX ORDER — METOCLOPRAMIDE 10 MG/1
10 TABLET ORAL
Qty: 360 TABLET | Refills: 4 | Status: SHIPPED | OUTPATIENT
Start: 2022-03-21 | End: 2023-05-04 | Stop reason: SDUPTHER

## 2022-03-21 RX ORDER — SODIUM CHLORIDE 0.9 % (FLUSH) 0.9 %
2 SYRINGE (ML) INJECTION
Status: DISCONTINUED | OUTPATIENT
Start: 2022-03-21 | End: 2022-03-21 | Stop reason: HOSPADM

## 2022-03-21 RX ORDER — PROPOFOL 10 MG/ML
VIAL (ML) INTRAVENOUS
Status: DISCONTINUED | OUTPATIENT
Start: 2022-03-21 | End: 2022-03-21

## 2022-03-21 RX ADMIN — PROPOFOL 250 MG: 10 INJECTION, EMULSION INTRAVENOUS at 02:03

## 2022-03-21 RX ADMIN — LIDOCAINE HYDROCHLORIDE 50 MG: 10 INJECTION, SOLUTION EPIDURAL; INFILTRATION; INTRACAUDAL; PERINEURAL at 02:03

## 2022-03-21 RX ADMIN — SODIUM CHLORIDE, SODIUM LACTATE, POTASSIUM CHLORIDE, AND CALCIUM CHLORIDE: .6; .31; .03; .02 INJECTION, SOLUTION INTRAVENOUS at 02:03

## 2022-03-21 NOTE — INTERVAL H&P NOTE
The patient has been examined and the H&P has been reviewed:    I concur with the findings and no changes have occurred since H&P was written.    Procedure risks, benefits and alternative options discussed and understood by patient/family.          Active Hospital Problems    Diagnosis  POA    *Esophagitis determined by endoscopy [K20.90]  Yes    Candida esophagitis [B37.81]  Yes      Resolved Hospital Problems   No resolved problems to display.

## 2022-03-21 NOTE — ANESTHESIA PREPROCEDURE EVALUATION
03/21/2022  Kulwant Mueller Jr. is a 37 y.o., male.      Pre-op Assessment    I have reviewed the Patient Summary Reports.     I have reviewed the Nursing Notes. I have reviewed the NPO Status.   I have reviewed the Medications.     Review of Systems  Anesthesia Hx:  No problems with previous Anesthesia  Denies Family Hx of Anesthesia complications.   Denies Personal Hx of Anesthesia complications.   Social:  Former Smoker, Alcohol Use    Hematology/Oncology:  Hematology Normal   Oncology Normal     EENT/Dental:EENT/Dental Normal   Cardiovascular:   Hypertension  Peripheral Arterial Disease    Pulmonary:   History of covid   Renal/:  Renal/ Normal     Hepatic/GI:   esophagitis   Musculoskeletal:  Musculoskeletal Normal    Neurological:  Neurology Normal    Endocrine:   Diabetes, type 2, using insulin Last A1C >14    Dermatological:  Skin Normal    Psych:  Psychiatric Normal           Physical Exam  General: Well nourished, Cooperative, Alert and Oriented    Airway:  Mallampati: II / II  Mouth Opening: Normal  TM Distance: Normal  Tongue: Normal  Neck ROM: Normal ROM    Chest/Lungs:  Clear to auscultation, Normal Respiratory Rate    Heart:  Rate: Normal  Rhythm: Regular Rhythm  Sounds: Normal        Anesthesia Plan  Type of Anesthesia, risks & benefits discussed:    Anesthesia Type: MAC  Intra-op Monitoring Plan: Standard ASA Monitors  Post Op Pain Control Plan: multimodal analgesia  Induction:  IV  Informed Consent: Informed consent signed with the Patient and all parties understand the risks and agree with anesthesia plan.  All questions answered.   ASA Score: 2  Day of Surgery Review of History & Physical: H&P Update referred to the surgeon/provider.    Ready For Surgery From Anesthesia Perspective.     .

## 2022-03-21 NOTE — ANESTHESIA POSTPROCEDURE EVALUATION
Anesthesia Post Evaluation    Patient: Kulwant Mueller Jr.    Procedure(s) Performed: Procedure(s) (LRB):  EGD (ESOPHAGOGASTRODUODENOSCOPY) (N/A)    Final Anesthesia Type: MAC      Patient location during evaluation: GI PACU  Patient participation: No - Unable to Participate, Sedation  Level of consciousness: responds to stimulation  Post-procedure vital signs: reviewed and stable  Pain management: adequate  Airway patency: patent    PONV status at discharge: No PONV  Anesthetic complications: no      Cardiovascular status: blood pressure returned to baseline  Respiratory status: unassisted, spontaneous ventilation and room air  Hydration status: euvolemic  Follow-up not needed.          Vitals Value Taken Time   BP 86/60 03/21/22 1423   Temp  03/21/22 1425   Pulse 90 03/21/22 1423   Resp 20 03/21/22 1423   SpO2 99 % 03/21/22 1423         No case tracking events are documented in the log.      Pain/Tana Score: Tana Score: 10 (3/21/2022  2:23 PM)

## 2022-03-21 NOTE — PATIENT INSTRUCTIONS
Dear Kulwant Mueller Jr.      If you develop fevers, severe abdominal pain, significant bleeding, nausea or vomiting, please contact us or come to our Emergency Department at [facility].  Our  contact number is [phone] available for emergencies or any question that you may have.      You may return to normal activities tomorrow.  Written discharge instructions were provided to you today and have them handy since you may not remember our conversation today.       If you take Aspirin, please resume taking it at your prior dose today. If we obtained biopsies or removed polyps during your procedure, we will contact you within 5 to 6 days with the results.      Thanks for trusting us with your healthcare needs.      Sincerely,     Tejas Mann M.D.        To rate your experience with Dr. Mann, please click on the link below     [email or web address]

## 2022-03-21 NOTE — [employer]ATION PATIENT INSTRUCTIONS
Discharge Summary/Instructions after an Endoscopic Procedure  Patient Name: Kulwant Mueller  Patient MRN: [id number]  Patient YOB: 1985  Monday, March 21, 2022 Tejas Mann MD  Dear patient,  As a result of recent federal legislation (The Federal Cures Act), you may   receive lab or pathology results from your procedure in your [facility]   account before your physician is able to contact you. Your physician or   their representative will relay the results to you with their   recommendations at their soonest availability.  Thank you,  RESTRICTIONS:  During your procedure today, you received medications for sedation.  These   medications may affect your judgment, balance and coordination.  Therefore,   for 24 hours, you have the following restrictions:   - DO NOT drive a car, operate machinery, make legal/financial decisions,   sign important papers or drink alcohol.    ACTIVITY:  Today: no heavy lifting, straining or running due to procedural   sedation/anesthesia.  The following day: return to full activity including work.  DIET:  Eat and drink normally unless instructed otherwise.     TREATMENT FOR COMMON SIDE EFFECTS:  - Mild abdominal pain, nausea, belching, bloating or excessive gas:  rest,   eat lightly and use a heating pad.  - Sore Throat: treat with throat lozenges and/or gargle with warm salt   water.  - Because air was used during the procedure, expelling large amounts of air   from your rectum or belching is normal.  - If a bowel prep was taken, you may not have a bowel movement for 1-3 days.    This is normal.  SYMPTOMS TO WATCH FOR AND REPORT TO YOUR PHYSICIAN:  1. Abdominal pain or bloating, other than gas cramps.  2. Chest pain.  3. Back pain.  4. Signs of infection such as: chills or fever occurring within 24 hours   after the procedure.  5. Rectal bleeding, which would show as bright red, maroon, or black stools.   (A tablespoon of blood from the rectum is not serious, especially if    hemorrhoids are present.)  6. Vomiting.  7. Weakness or dizziness.  GO DIRECTLY TO THE NEAREST EMERGENCY ROOM IF YOU HAVE ANY OF THE FOLLOWING:      Difficulty breathing              Chills and/or fever over 101 F   Persistent vomiting and/or vomiting blood   Severe abdominal pain   Severe chest pain   Black, tarry stools   Bleeding- more than one tablespoon   Any other symptom or condition that you feel may need urgent attention  Your doctor recommends these additional instructions:  If any biopsies were taken, your doctors clinic will contact you in 1 to 2   weeks with any results.  - The patient will be observed post-procedure, until all discharge criteria   are met.   - Discharge patient to home (ambulatory).   - Patient has a contact number available for emergencies.  The signs and   symptoms of potential delayed complications were discussed with the   patient.  Return to normal activities tomorrow.  Written discharge   instructions were provided to the patient.   - Resume previous diet.   - Continue present medications.   - Await pathology results.   - Repeat upper endoscopy in 3 months to check healing.   - Return to referring physician as previously scheduled.   - Optimize glycemic management.  For questions, problems or results please call your physician Tejas Mann MD at Work:  [phone]  If you have any questions about the above instructions, call the GI   department at [phone] or call the endoscopy unit at [phone]   from 7am until 3 pm.  [facility], EMERGENCY ROOM PHONE NUMBER:   [phone]  IF A COMPLICATION OR EMERGENCY SITUATION ARISES AND YOU ARE UNABLE TO REACH   YOUR PHYSICIAN - GO DIRECTLY TO THE EMERGENCY ROOM.  I have read or have had read to me these discharge instructions for my   procedure and have received a written copy.  I understand these   instructions and will follow-up with my physician if I have any questions.      __________________________________       _____________________________________  Nurse Signature                                          Patient/Designated   Responsible Party Signature  Tejas Mann MD  3/21/2022 2:23:32 PM  This report has been verified and signed electronically.  Dear patient,  As a result of recent federal legislation (The Federal Cures Act), you may   receive lab or pathology results from your procedure in your [facility]   account before your physician is able to contact you. Your physician or   their representative will relay the results to you with their   recommendations at their soonest availability.  Thank you,  [employer]

## 2022-03-21 NOTE — BRIEF OP NOTE
Endoscopy Discharge Summary      Admit Date: 3/21/2022    Discharge Date and Time:  3/21/2022 2:29 PM    Attending Physician: Tejas Mann MD     Discharge Physician: Tejas Mann MD     Principal Admitting Diagnoses: Esophagitis determined by endoscopy         Discharge Diagnosis: The primary encounter diagnosis was Esophagitis determined by endoscopy. Diagnoses of Candida esophagitis, GERD (gastroesophageal reflux disease), Type 2 diabetes mellitus with hyperglycemia, with long-term current use of insulin, and [place] grade C esophagitis were also pertinent to this visit.     Discharged Condition: Good    Indication for Admission: Esophagitis determined by endoscopy     Hospital Course: Patient was admitted for an inpatient procedure and tolerated the procedure well with no complications.    Significant Diagnostic Studies: EGD    Pathology (if any):  Specimen (24h ago, onward)             Start     Ordered    03/21/22 1415  Specimen to Pathology, Surgery Gastrointestinal tract  Once        Comments: Pre-op Diagnosis: [place] grade C esophagitis [K20.80]  Candida esophagitis [B37.81]    Procedure(s):  EGD (ESOPHAGOGASTRODUODENOSCOPY)     1. Antrum bx r/o h pylori  2. Distal esophagus bx   References:    Click here for ordering Quick Tip   Question Answer Comment   Procedure Type: Gastrointestinal tract    Release to patient Immediate        03/21/22 1410                Disposition: Home.    Bleeding: None    No Implants         Follow Up/Patient Instructions:   Current Discharge Medication List      CONTINUE these medications which have CHANGED    Details   metoclopramide HCl (REGLAN) 10 MG tablet Take 1 tablet (10 mg total) by mouth 4 (four) times daily before meals and nightly.  Qty: 360 tablet, Refills: 4    Associated Diagnoses: Type 2 diabetes mellitus with hyperglycemia, with long-term current use of insulin         CONTINUE these medications which have NOT CHANGED    Details   insulin lispro 200  "unit/mL (3 mL) InPn Inject 14 Units into the skin 3 (three) times daily with meals. Plus sliding scale if needed.  Qty: 12 mL, Refills: 5    Comments: Total daily dose of 60 units  Associated Diagnoses: Uncontrolled type 2 diabetes mellitus with hyperglycemia, with long-term current use of insulin      losartan (COZAAR) 25 MG tablet Take 1 tablet (25 mg total) by mouth once daily.  Qty: 90 tablet, Refills: 3    Comments: .      mupirocin (BACTROBAN) 2 % ointment Apply topically 2 (two) times daily.  Qty: 30 g, Refills: 1    Associated Diagnoses: Diabetic ulcer of left midfoot associated with type 2 diabetes mellitus, with fat layer exposed      pantoprazole (PROTONIX) 40 MG tablet TAKE 1 TABLET BY MOUTH TWICE A DAY  Qty: 60 tablet, Refills: 1      prochlorperazine (COMPAZINE) 10 MG tablet Take 1 tablet (10 mg total) by mouth 3 (three) times daily as needed (nausea or vomiting).  Qty: 15 tablet, Refills: 0      TRESIBA FLEXTOUCH U-200 200 unit/mL (3 mL) insulin pen Inject 50 Units into the skin once daily.  Qty: 3 pen, Refills: 5    Associated Diagnoses: Uncontrolled type 2 diabetes mellitus with hyperglycemia, with long-term current use of insulin      pen needle, diabetic (BD ULTRA-FINE DIYA PEN NEEDLE) 32 gauge x 5/32" Ndle Use with Tresiba daily and Humalog 3-4 times daily as directed.  Qty: 200 each, Refills: 5    Associated Diagnoses: Uncontrolled type 2 diabetes mellitus with hyperglycemia, with long-term current use of insulin      rivaroxaban (XARELTO) 20 mg Tab Take 1 tablet (20 mg total) by mouth daily with dinner or evening meal.  Qty: 90 tablet, Refills: 3         STOP taking these medications       promethazine (PHENERGAN) 25 MG tablet Comments:   Reason for Stopping:         DEXCOM G6 SENSOR Justyna Comments:   Reason for Stopping:         DEXCOM G6 TRANSMITTER Justyna Comments:   Reason for Stopping:               Discharge Procedure Orders   Diet general     No dressing needed     Call MD for:  temperature " >100.4     Call MD for:  persistent nausea and vomiting     Call MD for:  severe uncontrolled pain     Call MD for:  difficulty breathing, headache or visual disturbances     Call MD for:  redness, tenderness, or signs of infection (pain, swelling, redness, odor or green/yellow discharge around incision site)     Call MD for:  hives     Call MD for:  persistent dizziness or light-headedness        Follow-up Information     Primary Doctor No .           First Available Judah Perkins Follow up in 3 month(s).    Why: For repeat EGD

## 2022-03-21 NOTE — TRANSFER OF CARE
"Anesthesia Transfer of Care Note    Patient: Kulwant Mueller Jr.    Procedure(s) Performed: Procedure(s) (LRB):  EGD (ESOPHAGOGASTRODUODENOSCOPY) (N/A)    Patient location: GI    Anesthesia Type: MAC    Transport from OR: Transported from OR on room air with adequate spontaneous ventilation    Post pain: adequate analgesia    Post assessment: no apparent anesthetic complications    Post vital signs: stable    Level of consciousness: responds to stimulation    Nausea/Vomiting: no nausea/vomiting    Complications: none    Transfer of care protocol was followed      Last vitals:   Visit Vitals  BP (!) 86/60 (BP Location: Left arm, Patient Position: Lying)   Pulse 90   Temp 36.5 °C (97.7 °F)   Resp 20   Ht 6' 2" (1.88 m)   Wt 90.7 kg (200 lb)   SpO2 99%   BMI 25.68 kg/m²     "

## 2022-03-23 ENCOUNTER — PATIENT MESSAGE (OUTPATIENT)
Dept: GASTROENTEROLOGY | Facility: CLINIC | Age: 37
End: 2022-03-23
Payer: COMMERCIAL

## 2022-03-23 ENCOUNTER — PATIENT MESSAGE (OUTPATIENT)
Dept: PODIATRY | Facility: CLINIC | Age: 37
End: 2022-03-23
Payer: COMMERCIAL

## 2022-03-24 ENCOUNTER — OFFICE VISIT (OUTPATIENT)
Dept: DIABETES | Facility: CLINIC | Age: 37
End: 2022-03-24
Payer: COMMERCIAL

## 2022-03-24 VITALS
DIASTOLIC BLOOD PRESSURE: 82 MMHG | SYSTOLIC BLOOD PRESSURE: 115 MMHG | BODY MASS INDEX: 28.08 KG/M2 | HEART RATE: 100 BPM | WEIGHT: 218.69 LBS

## 2022-03-24 DIAGNOSIS — E11.59 HYPERTENSION ASSOCIATED WITH DIABETES: ICD-10-CM

## 2022-03-24 DIAGNOSIS — Z79.01 CHRONIC ANTICOAGULATION: ICD-10-CM

## 2022-03-24 DIAGNOSIS — I15.2 HYPERTENSION ASSOCIATED WITH DIABETES: ICD-10-CM

## 2022-03-24 DIAGNOSIS — R11.2 INTRACTABLE VOMITING WITH NAUSEA, UNSPECIFIED VOMITING TYPE: ICD-10-CM

## 2022-03-24 DIAGNOSIS — K21.9 GASTROESOPHAGEAL REFLUX DISEASE, UNSPECIFIED WHETHER ESOPHAGITIS PRESENT: ICD-10-CM

## 2022-03-24 DIAGNOSIS — I82.402 RECURRENT DEEP VEIN THROMBOSIS (DVT) OF LEFT LOWER EXTREMITY: ICD-10-CM

## 2022-03-24 DIAGNOSIS — Z79.4 UNCONTROLLED TYPE 2 DIABETES MELLITUS WITH HYPERGLYCEMIA, WITH LONG-TERM CURRENT USE OF INSULIN: Primary | ICD-10-CM

## 2022-03-24 DIAGNOSIS — K20.80 LOS ANGELES GRADE C ESOPHAGITIS: ICD-10-CM

## 2022-03-24 DIAGNOSIS — E11.65 UNCONTROLLED TYPE 2 DIABETES MELLITUS WITH HYPERGLYCEMIA, WITH LONG-TERM CURRENT USE OF INSULIN: Primary | ICD-10-CM

## 2022-03-24 PROCEDURE — 3074F SYST BP LT 130 MM HG: CPT | Mod: CPTII,S$GLB,, | Performed by: NURSE PRACTITIONER

## 2022-03-24 PROCEDURE — 1160F PR REVIEW ALL MEDS BY PRESCRIBER/CLIN PHARMACIST DOCUMENTED: ICD-10-PCS | Mod: CPTII,S$GLB,, | Performed by: NURSE PRACTITIONER

## 2022-03-24 PROCEDURE — 1160F RVW MEDS BY RX/DR IN RCRD: CPT | Mod: CPTII,S$GLB,, | Performed by: NURSE PRACTITIONER

## 2022-03-24 PROCEDURE — 99214 OFFICE O/P EST MOD 30 MIN: CPT | Mod: S$GLB,,, | Performed by: NURSE PRACTITIONER

## 2022-03-24 PROCEDURE — 3079F DIAST BP 80-89 MM HG: CPT | Mod: CPTII,S$GLB,, | Performed by: NURSE PRACTITIONER

## 2022-03-24 PROCEDURE — 99999 PR PBB SHADOW E&M-EST. PATIENT-LVL IV: CPT | Mod: PBBFAC,,, | Performed by: NURSE PRACTITIONER

## 2022-03-24 PROCEDURE — 3079F PR MOST RECENT DIASTOLIC BLOOD PRESSURE 80-89 MM HG: ICD-10-PCS | Mod: CPTII,S$GLB,, | Performed by: NURSE PRACTITIONER

## 2022-03-24 PROCEDURE — 99999 PR PBB SHADOW E&M-EST. PATIENT-LVL IV: ICD-10-PCS | Mod: PBBFAC,,, | Performed by: NURSE PRACTITIONER

## 2022-03-24 PROCEDURE — 3074F PR MOST RECENT SYSTOLIC BLOOD PRESSURE < 130 MM HG: ICD-10-PCS | Mod: CPTII,S$GLB,, | Performed by: NURSE PRACTITIONER

## 2022-03-24 PROCEDURE — 1159F PR MEDICATION LIST DOCUMENTED IN MEDICAL RECORD: ICD-10-PCS | Mod: CPTII,S$GLB,, | Performed by: NURSE PRACTITIONER

## 2022-03-24 PROCEDURE — 99214 PR OFFICE/OUTPT VISIT, EST, LEVL IV, 30-39 MIN: ICD-10-PCS | Mod: S$GLB,,, | Performed by: NURSE PRACTITIONER

## 2022-03-24 PROCEDURE — 3008F BODY MASS INDEX DOCD: CPT | Mod: CPTII,S$GLB,, | Performed by: NURSE PRACTITIONER

## 2022-03-24 PROCEDURE — 3008F PR BODY MASS INDEX (BMI) DOCUMENTED: ICD-10-PCS | Mod: CPTII,S$GLB,, | Performed by: NURSE PRACTITIONER

## 2022-03-24 PROCEDURE — 1159F MED LIST DOCD IN RCRD: CPT | Mod: CPTII,S$GLB,, | Performed by: NURSE PRACTITIONER

## 2022-03-24 NOTE — PATIENT INSTRUCTIONS
Will send Dexcom G6 referral to [facility].    -- Medications adjusted for today's visit include:    Send some fasting blood sugars to me and I can advise how much long-acting insulin to re-start. Likely will not require the 50 units you were on before.    Since you are not eating, use this sliding scale every 4 hours to correct a high blood sugar. This is necessary outside of eating to control high blood sugars. Do not take the 15 units since you are not able to hold down any food.    160-190: 1 unit  191-220: 2 units  221-250: 3 units  251-280: 4 units  281-310: 5 units  311-340: 6 units  341-370: 7 units  >370: 8 units    -- Limit carbs to no more than 45 grams with each meal. Never eat carbs by themselves, always add protein. Make snacks low carb or non-carb only.    -- Start checking blood sugars at least 3-4 times per day: Fasting blood sugar, before lunch, before supper, 2 hours after any meal, and bedtime.   -Blood sugar goals should be a fasting blood sugar between , and no blood sugars throughout the day over 180 is good, less than 160 better, less than 140 perfect.    --Carry glucose tablets/soft peppermints/regular juice or Coke product with you at all times to treat a low blood sugar episode (less than 70). If your blood sugar is between 50-70, Chew 4 tablets or drink 1/2 cup of juice or regular Coke product. If your blood sugar is below 50, double the treatment. Re-check blood sugar in 15 minutes. If still low, repeat this. Always call the clinic to give an update for any low blood sugar episodes.    --Follow-up for your next visit in 6 weeks. Plan to send readings weekly, sooner if having highs or lows.

## 2022-03-24 NOTE — PROGRESS NOTES
Subjective:         Patient ID: Kulwant Mueller Jr. is a 37 y.o. male.  Patient's current PCP is Yessenia Santos MD.     Chief Complaint: Diabetes Mellitus (F/u), Nausea, and Vomiting    HPI  Kulwant Mueller Jr. is a 37 y.o. Black or  male presenting for a follow up for diabetes. Patient has been diagnosed with type 2 diabetes for several years.    CURRENT DM MEDICATIONS:   Diabetes Medications             insulin lispro 200 unit/mL (3 mL) InPn Inject 14 Units into the skin 3 (three) times daily with meals. Plus sliding scale if needed. Reports he is not taking.    TRESIBA FLEXTOUCH U-200 200 unit/mL (3 mL) insulin pen Inject 50 Units into the skin once daily. Reports he is not taking.        Past failed treatment include:  Metformin- unsure why discontinued in the past.    Blood glucose testing -- Referred but Dexcom but has not heard anything. Did not bring meter today. Not checking consistently  Preferred lab: [facility]     Any episodes of hypoglycemia? no     Complications related to diabetes: Diabetic foot ulcer. Possible gastroparesis     His blood sugar in the clinic today was:   Lab Results   Component Value Date    POCGLU 249 (A) 01/31/2022     Kulwant Mueller Jr. presents today for follow up visit to discuss diabetes management. Checked blood sugar last on Monday PM - Result 249 mg/dL.  At last visit, he was referred for Dexcom. He was processed at [facility] but he has a high deductible plan.  He declines sample today. He admits he is not currently monitoring BGs consistently. He is not taking his insulins due to chronic nausea and vomiting and fear of hypoglycemia as he is not eating. Advised patient that he can try to see if an urgent care can give him some fluids due to excessive vomiting, but that I also agree with ER if he is unable to hold down fluids/food. He was able to keep down a bowl of chicken noodle soup today. blood sugar during  mg/dL. Advised patient that  "I cannot help adjust his insulin doses without checking BGs routinely. Advised patient he can use his insulin SS every 4 hours to correct a high blood sugar. He has not been taking his Tresiba. Advised to send fasting BGs so I can suggest a dose to re-start.     Would suggest a repeat C-peptide with glucose with next labs. Would also repeat insulin antibody studies. Would not add SGLT2 inhibitor nor consider Metformin with current suspected dehydration status. Would not add GLP1 agonist with chronic nausea and vomiting.      Diabetic foot ulcer of L midfoot--seeing podiatry routinely. Healing.     Chronic nausea and vomiting- followed by GI. He reports this is his 3rd "second opinion" and so far he has not been able to get any answers.  Phenergan and Zofran have not helped. Suspect gastroparesis, but attempts to complete previous gastric emptying study 01/2022 were not successful as patient vomited during the study. Per PCP notes 03/11/22, Reglan has not helped. He was prescribed Compazine by PCP 3/11/2022 but as a short supply.  He did complete a repeat EGD between visits and is awaiting biopsy results.  I advised patient I do feel ER evaluation is warranted if he is unable to keep any fluids/food down. Secure chat message sent to his GI team to make them aware of the continued nausea and vomiting. MD confirms waiting on results but to send to ER as above. He has known grade C esophagitis as well as candida esophagitis.      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 On Xarelto.    Current diet: not able to eat much- having workup with GI.   Activity Level: Confirm at next visit    Lab Results   Component Value Date    HGBA1C >14.0 (H) 09/22/2021    HGBA1C 12.7 (H) 08/31/2018    HGBA1C 9.1 (H) 01/11/2018     STANDARDS OF CARE  Diabetes Management Status    Statin: Not taking  ACE/ARB: Taking    Screening or Prevention Patient's value Goal Complete/Controlled?   HgA1C Testing and Control   Lab Results "   Component Value Date    HGBA1C >14.0 (H) 09/22/2021      Annually/Less than 8% Yes   Lipid profile Most Recent Lipid Panel Health Maintenance Topic Completion: Not Found Annually No   LDL control Lab Results   Component Value Date    LDLCALC 139.0 01/11/2018    Annually/Less than 100 mg/dl  No   Nephropathy screening Lab Results   Component Value Date    LABMICR 11.0 01/11/2018     Lab Results   Component Value Date    PROTEINUA Negative 11/27/2021     No results found for: UTPCR   Annually Yes   Blood pressure BP Readings from Last 1 Encounters:   03/24/22 115/82    Less than 140/90 Yes   Dilated retinal exam Most Recent Eye Exam Date: Not Found Annually Yes- Reports 2021, pito SLOAN   Foot exam   : 11/27/2021 Annually Yes       Labs reviewed and are noted below.    Lab Results   Component Value Date    WBC 10.03 01/25/2022    HGB 15.0 01/25/2022    HCT 45.6 01/25/2022     01/25/2022    CHOL 202 (H) 01/11/2018    TRIG 145 01/11/2018    HDL 34 (L) 01/11/2018    LDLCALC 139.0 01/11/2018    ALT 19 01/25/2022    AST 15 01/25/2022     01/25/2022    K 3.8 01/25/2022     01/25/2022    ANIONGAP 14 01/25/2022    CREATININE 1.2 01/25/2022    ESTGFRAFRICA >60 01/25/2022    EGFRNONAA >60 01/25/2022    BUN 12 01/25/2022    CO2 27 01/25/2022    TSH 0.550 01/11/2018    INR 1.1 11/27/2021     (H) 01/25/2022     Lab Results   Component Value Date    GLUTAMICACID 0.01 01/11/2018    CPEPTIDE 1.17 01/11/2018    TSH 0.550 01/11/2018    FERRITIN 675 (H) 11/27/2021    CALCIUM 9.5 01/25/2022    PHOS 3.3 09/08/2018     Lab Results   Component Value Date    CPEPTIDE 1.17 01/11/2018     Lab Results   Component Value Date    GLUTAMICACID 0.01 01/11/2018     Glucose   Date Value Ref Range Status   01/25/2022 194 (H) 70 - 110 mg/dL Final     Anion Gap   Date Value Ref Range Status   01/25/2022 14 8 - 16 mmol/L Final     eGFR if    Date Value Ref Range Status   01/25/2022 >60 >60 mL/min/1.73 m^2 Final      eGFR if non    Date Value Ref Range Status   01/25/2022 >60 >60 mL/min/1.73 m^2 Final     Comment:     Calculation used to obtain the estimated glomerular filtration  rate (eGFR) is the CKD-EPI equation.          The following portions of the patient's history were reviewed and updated as appropriate: allergies, current medications, past family history, past medical history, past social history, past surgical history and problem list.    Review of patient's allergies indicates:   Allergen Reactions    Heparin analogues Nausea And Vomiting     Social History     Socioeconomic History    Marital status:    Tobacco Use    Smoking status: Former Smoker     Packs/day: 0.00    Smokeless tobacco: Former User    Tobacco comment: occasionally    Substance and Sexual Activity    Alcohol use: Yes     Comment: occ    Drug use: No     Past Medical History:   Diagnosis Date    Anticoagulant long-term use     COVID-19 virus infection     Diabetes mellitus     Hypertension     May-Thurner syndrome      REVIEW OF SYSTEMS:  Eyes Denies history of DR and reports eye exam 2021 stable.  Cardiovascular: History of hyperlipidemia.  GI: Chronic nausea and vomiting- having workup with GI   : No history of CKD.  SKIN: Dibetic foot ulceration healing. Sees podiatry routinely.  Neuro: suspect gastroparesis.  PSYCH: No tobacco use currently. Former user.  ENDO: See HPI.        Objective:      Vitals:    03/24/22 1505   BP: 115/82   Pulse: 100     RESPIRATORY: No respiratory distress  NEUROLOGIC: Cranial nerves II-XII grossly intact.   PSYCHIATRIC: Alert & oriented x3. Normal mood and affect.  FOOT EXAMINATION:  UTD  Assessment:       1. Uncontrolled type 2 diabetes mellitus with hyperglycemia, with long-term current use of insulin    2. Hypertension associated with diabetes    3. Intractable vomiting with nausea, unspecified vomiting type    4. [place] grade C esophagitis    5. Gastroesophageal  reflux disease, unspecified whether esophagitis present    6. Chronic anticoagulation    7. Recurrent deep vein thrombosis (DVT) of left lower extremity        Plan:   Uncontrolled type 2 diabetes mellitus with hyperglycemia, with long-term current use of insulin-Chronic  -     POCT Glucose, Hand-Held Device    -Highly recommend moving forward with Dexcom however patient declines today. He does have a deductible to meet per [place] notes.     Would suggest a repeat C-peptide with glucose with next labs. Would also repeat insulin antibody studies. Would not add SGLT2 inhibitor nor consider Metformin with current suspected dehydration status. Would not add GLP1 agonist with chronic nausea and vomiting.      Advised to send readings weekly. Cannot advise change of insulins without consistent glucose checks.     Send some fasting blood sugars to me and I can advise how much long-acting insulin to re-start. Likely will not require the 50 units you were on before.    Since you are not eating, use this sliding scale every 4 hours to correct a high blood sugar. This is necessary outside of eating to control high blood sugars. Do not take the 15 units since you are not able to hold down any food.    160-190: 1 unit  191-220: 2 units  221-250: 3 units  251-280: 4 units  281-310: 5 units  311-340: 6 units  341-370: 7 units  >370: 8 units    Hypertension associated with diabetes-Chronic    -Blood pressure is at goal. Continue current medications.    Intractable vomiting with nausea, unspecified vomiting type  [place] grade C esophagitis  Gastroesophageal reflux disease, unspecified whether esophagitis present    -See HPI for full details. Continue to follow up with GI. Awaiting EGD results. Needs repeat gastric emptying study. Advised ER precautions.     Chronic anticoagulation-On Xarelto  Recurrent deep vein thrombosis (DVT) of left lower extremity    -iliac stent placed in the past- secondary to May-Thurner syndrome. On  Xarelto. Continue to follow up with cardiology as advised.    - Follow up: 6 weeks however send readings weekly stressed.    I spent a total of 30 minutes on the day of the visit.This includes face to face time and non-face to face time preparing to see the patient (eg, review of tests), documenting clinical information in the electronic record, independently interpreting results and communicating results to the patient.      Lisa Bro,FNP-C [facility]

## 2022-03-25 ENCOUNTER — PATIENT MESSAGE (OUTPATIENT)
Dept: ADMINISTRATIVE | Facility: HOSPITAL | Age: 37
End: 2022-03-25
Payer: COMMERCIAL

## 2022-03-26 DIAGNOSIS — E11.9 TYPE 2 DIABETES MELLITUS WITHOUT COMPLICATION, UNSPECIFIED WHETHER LONG TERM INSULIN USE: ICD-10-CM

## 2022-03-29 ENCOUNTER — OFFICE VISIT (OUTPATIENT)
Dept: GASTROENTEROLOGY | Facility: CLINIC | Age: 37
End: 2022-03-29
Payer: COMMERCIAL

## 2022-03-29 ENCOUNTER — PATIENT MESSAGE (OUTPATIENT)
Dept: RESEARCH | Facility: HOSPITAL | Age: 37
End: 2022-03-29
Payer: COMMERCIAL

## 2022-03-29 VITALS
HEIGHT: 74 IN | SYSTOLIC BLOOD PRESSURE: 130 MMHG | DIASTOLIC BLOOD PRESSURE: 90 MMHG | BODY MASS INDEX: 29.23 KG/M2 | WEIGHT: 227.75 LBS

## 2022-03-29 DIAGNOSIS — K21.00 GASTROESOPHAGEAL REFLUX DISEASE WITH ESOPHAGITIS, UNSPECIFIED WHETHER HEMORRHAGE: ICD-10-CM

## 2022-03-29 DIAGNOSIS — R11.0 NAUSEA: Primary | ICD-10-CM

## 2022-03-29 DIAGNOSIS — K20.80 LOS ANGELES GRADE C ESOPHAGITIS: ICD-10-CM

## 2022-03-29 LAB — POCT GLUCOSE: 249 MG/DL (ref 70–110)

## 2022-03-29 PROCEDURE — 99214 PR OFFICE/OUTPT VISIT, EST, LEVL IV, 30-39 MIN: ICD-10-PCS | Mod: S$GLB,,, | Performed by: INTERNAL MEDICINE

## 2022-03-29 PROCEDURE — 99214 OFFICE O/P EST MOD 30 MIN: CPT | Mod: S$GLB,,, | Performed by: INTERNAL MEDICINE

## 2022-03-29 PROCEDURE — 1159F PR MEDICATION LIST DOCUMENTED IN MEDICAL RECORD: ICD-10-PCS | Mod: CPTII,S$GLB,, | Performed by: INTERNAL MEDICINE

## 2022-03-29 PROCEDURE — 3075F SYST BP GE 130 - 139MM HG: CPT | Mod: CPTII,S$GLB,, | Performed by: INTERNAL MEDICINE

## 2022-03-29 PROCEDURE — 1159F MED LIST DOCD IN RCRD: CPT | Mod: CPTII,S$GLB,, | Performed by: INTERNAL MEDICINE

## 2022-03-29 PROCEDURE — 3008F BODY MASS INDEX DOCD: CPT | Mod: CPTII,S$GLB,, | Performed by: INTERNAL MEDICINE

## 2022-03-29 PROCEDURE — 3080F PR MOST RECENT DIASTOLIC BLOOD PRESSURE >= 90 MM HG: ICD-10-PCS | Mod: CPTII,S$GLB,, | Performed by: INTERNAL MEDICINE

## 2022-03-29 PROCEDURE — 3080F DIAST BP >= 90 MM HG: CPT | Mod: CPTII,S$GLB,, | Performed by: INTERNAL MEDICINE

## 2022-03-29 PROCEDURE — 3075F PR MOST RECENT SYSTOLIC BLOOD PRESS GE 130-139MM HG: ICD-10-PCS | Mod: CPTII,S$GLB,, | Performed by: INTERNAL MEDICINE

## 2022-03-29 PROCEDURE — 3008F PR BODY MASS INDEX (BMI) DOCUMENTED: ICD-10-PCS | Mod: CPTII,S$GLB,, | Performed by: INTERNAL MEDICINE

## 2022-03-29 PROCEDURE — 99999 PR PBB SHADOW E&M-EST. PATIENT-LVL III: ICD-10-PCS | Mod: PBBFAC,,, | Performed by: INTERNAL MEDICINE

## 2022-03-29 PROCEDURE — 99999 PR PBB SHADOW E&M-EST. PATIENT-LVL III: CPT | Mod: PBBFAC,,, | Performed by: INTERNAL MEDICINE

## 2022-03-29 RX ORDER — SUCRALFATE 1 G/10ML
1 SUSPENSION ORAL 4 TIMES DAILY
Qty: 1200 ML | Refills: 1 | Status: SHIPPED | OUTPATIENT
Start: 2022-03-29 | End: 2022-04-28

## 2022-03-29 NOTE — PROGRESS NOTES
[facility]  Gastroenterology    PCP: Yessenia Santos MD    3/29/22    Reason for Visit: Nausea    Subjective:   Kulwant Mueller Jr. is a 37 y.o. male here for follow-up nausea/vomiting, GERD with [place] Grade C esophagitis. Patient was initially seen by GI PA with complaints of N/V. He had initial EGD in 01/2022 which showed candida esophagitis and Grade C reflux esophagitis. His Candida was treated with fluconazole x 21 days. He was also on PPI BID for the esophagitis. Symptoms of N/V continued. Repeat EGD in 03/2022 still showed Grade C esophagitis with free flow of stomach contents into esophagus. There was also retained gastric fluid. NM GE study unable to be completed due to patient vomiting. US GB showed sludge, otherwise normal.      Today, patient reports he is doing better. He has not vomited in the past 3 days and is starting to eat normally again. He thinks that maybe antibiotics after the last EGD flared his symptoms again but they are subsiding now. He is on PPI BID. He does not think the increase in Reglan was helping him. He is off of the antiemetics now. He occasionally still feels some nausea and abdominal cramping which he is concerned about, although no vomiting.         Past Medical History:   Diagnosis Date    Anticoagulant long-term use     COVID-19 virus infection     Diabetes mellitus     Hypertension     May-Thurner syndrome        Past Surgical History:   Procedure Laterality Date    APPENDECTOMY      ESOPHAGOGASTRODUODENOSCOPY N/A 1/31/2022    Procedure: EGD (ESOPHAGOGASTRODUODENOSCOPY);  Surgeon: Cindy Quiros MD;  Location: [facility];  Service: Endoscopy;  Laterality: N/A;    ESOPHAGOGASTRODUODENOSCOPY N/A 3/21/2022    Procedure: EGD (ESOPHAGOGASTRODUODENOSCOPY);  Surgeon: Tejas Mann MD;  Location: [facility];  Service: Endoscopy;  Laterality: N/A;    INCISION AND DRAINAGE FOOT Left 11/28/2021    Procedure: INCISION AND DRAINAGE, FOOT;  Surgeon: Bj  "PAPO Alicea DPM;  Location: [facility];  Service: Podiatry;  Laterality: Left;    stents in bilateral legs         Current Outpatient Medications on File Prior to Visit   Medication Sig Dispense Refill    insulin lispro 200 unit/mL (3 mL) InPn Inject 14 Units into the skin 3 (three) times daily with meals. Plus sliding scale if needed. 12 mL 5    losartan (COZAAR) 25 MG tablet Take 1 tablet (25 mg total) by mouth once daily. 90 tablet 3    metoclopramide HCl (REGLAN) 10 MG tablet Take 1 tablet (10 mg total) by mouth 4 (four) times daily before meals and nightly. 360 tablet 4    pantoprazole (PROTONIX) 40 MG tablet TAKE 1 TABLET BY MOUTH TWICE A DAY 60 tablet 1    pen needle, diabetic (BD ULTRA-FINE DIYA PEN NEEDLE) 32 gauge x 5/32" Ndle Use with Tresiba daily and Humalog 3-4 times daily as directed. 200 each 5    prochlorperazine (COMPAZINE) 10 MG tablet Take 1 tablet (10 mg total) by mouth 3 (three) times daily as needed (nausea or vomiting). 15 tablet 0    rivaroxaban (XARELTO) 20 mg Tab Take 1 tablet (20 mg total) by mouth daily with dinner or evening meal. 90 tablet 3    TRESIBA FLEXTOUCH U-200 200 unit/mL (3 mL) insulin pen Inject 50 Units into the skin once daily. 3 pen 5    [DISCONTINUED] DEXCOM G6 SENSOR Justyna CHANGE SENSOR EVERY 10 DAYS 3 each 11    [DISCONTINUED] DEXCOM G6 TRANSMITTER Justyna CHANGE TRANSMITTER EVERY 90 DAYS. 1 each 3     No current facility-administered medications on file prior to visit.       Review of patient's allergies indicates:   Allergen Reactions    Heparin analogues Nausea And Vomiting       Social History     Socioeconomic History    Marital status:    Tobacco Use    Smoking status: Former Smoker     Packs/day: 0.00    Smokeless tobacco: Former User    Tobacco comment: occasionally    Substance and Sexual Activity    Alcohol use: Yes     Comment: occ    Drug use: No       Family History   Problem Relation Age of Onset    Cancer Mother     Cancer Paternal " Uncle     Cancer Paternal Grandfather     Irritable bowel syndrome Paternal Grandfather     Cancer Maternal Grandfather     Colon cancer Neg Hx     Esophageal cancer Neg Hx     Rectal cancer Neg Hx     Stomach cancer Neg Hx     Ulcerative colitis Neg Hx     Crohn's disease Neg Hx     Celiac disease Neg Hx        Review of Systems   Constitutional: Negative for appetite change, fever and unexpected weight change.   HENT: Negative for sore throat and trouble swallowing.    Eyes: Negative for visual disturbance.   Respiratory: Negative for cough, shortness of breath and wheezing.    Cardiovascular: Negative for chest pain, palpitations and leg swelling.   Gastrointestinal: Positive for nausea. Negative for abdominal pain, blood in stool, constipation, diarrhea and vomiting.   Genitourinary: Negative for dysuria.   Musculoskeletal: Negative for arthralgias and myalgias.   Skin: Negative for color change, pallor and rash.   Neurological: Negative for dizziness and weakness.   Hematological: Negative for adenopathy.   Psychiatric/Behavioral: Negative for agitation.           Objective:   Vitals:   Vitals:    03/29/22 0959   BP: (!) 130/90       Physical Exam  Vitals reviewed.   Constitutional:       General: He is not in acute distress.     Appearance: He is not diaphoretic.   HENT:      Head: Normocephalic and atraumatic.      Mouth/Throat:      Pharynx: No oropharyngeal exudate.   Eyes:      General: No scleral icterus.        Right eye: No discharge.         Left eye: No discharge.      Conjunctiva/sclera: Conjunctivae normal.      Pupils: Pupils are equal, round, and reactive to light.   Cardiovascular:      Rate and Rhythm: Normal rate and regular rhythm.      Heart sounds: Normal heart sounds. No murmur heard.    No friction rub. No gallop.   Pulmonary:      Effort: Pulmonary effort is normal. No respiratory distress.      Breath sounds: Normal breath sounds. No stridor. No wheezing or rales.   Abdominal:       General: Bowel sounds are normal. There is no distension.      Palpations: Abdomen is soft. There is no mass.      Tenderness: There is no abdominal tenderness. There is no guarding.   Musculoskeletal:         General: Normal range of motion.      Cervical back: Normal range of motion.   Skin:     General: Skin is warm and dry.      Coloration: Skin is not pale.      Findings: No erythema or rash.   Neurological:      Mental Status: He is alert and oriented to person, place, and time.           IMPRESSION     Problem List Items Addressed This Visit        GI    [place] grade C esophagitis    GERD (gastroesophageal reflux disease)      Other Visit Diagnoses     Nausea    -  Primary          PLANS:    - Symptoms improved on PPI BID and after Candida esophagitis treatment  - Symptoms that recurred a bit after initial EGD may have been related to a flare from antibiotics taken for foot infection but now GI symptoms improving again  - Feel this is likely all related to severe acid reflux  - Will add in carafate with the PPI for the residual nausea to see if this helps to continue with symptom resolution  - NSAID avoidance  - Strict reflux precautions discussed- avoidance of caffeine/chocolate/mints, HOB elevation at night and avoidance of eating at least 3 hours before bedtime  - Hold off on HIDA scan for now. US reviewed. Can continue to hold Reglan and other antiemetics if not needed  - Very gradual diet acceleration to avoid symptom exacerbation  - Will f/u in 1 month to assess symptoms and decide eventual timing of repeat EGD, as we would like to see eventual healing of esophagitis. Biopsies from most recent EGD pending. Will f/u results  - RTC in 1 month for f/u    Nausea    [place] grade C esophagitis    Gastroesophageal reflux disease with esophagitis, unspecified whether hemorrhage    Other orders  -     sucralfate (CARAFATE) 100 mg/mL suspension; Take 10 mLs (1 g total) by mouth 4 (four) times  daily.  Dispense: 1200 mL; Refill: 1        Cindy Quiros MD  Gastroenterology and Hepatology

## 2022-03-30 ENCOUNTER — OFFICE VISIT (OUTPATIENT)
Dept: PODIATRY | Facility: CLINIC | Age: 37
End: 2022-03-30
Payer: COMMERCIAL

## 2022-03-30 ENCOUNTER — PATIENT OUTREACH (OUTPATIENT)
Dept: ADMINISTRATIVE | Facility: OTHER | Age: 37
End: 2022-03-30
Payer: COMMERCIAL

## 2022-03-30 VITALS — BODY MASS INDEX: 29.23 KG/M2 | HEIGHT: 74 IN | WEIGHT: 227.75 LBS

## 2022-03-30 DIAGNOSIS — L97.422 DIABETIC ULCER OF LEFT MIDFOOT ASSOCIATED WITH TYPE 2 DIABETES MELLITUS, WITH FAT LAYER EXPOSED: Primary | ICD-10-CM

## 2022-03-30 DIAGNOSIS — E11.42 TYPE 2 DIABETES MELLITUS WITH PERIPHERAL NEUROPATHY: ICD-10-CM

## 2022-03-30 DIAGNOSIS — M24.571 CONTRACTURE, RIGHT ANKLE: ICD-10-CM

## 2022-03-30 DIAGNOSIS — E11.621 DIABETIC ULCER OF LEFT MIDFOOT ASSOCIATED WITH TYPE 2 DIABETES MELLITUS, WITH FAT LAYER EXPOSED: Primary | ICD-10-CM

## 2022-03-30 DIAGNOSIS — L84 CALLUS: ICD-10-CM

## 2022-03-30 DIAGNOSIS — Z87.2 HEALED ULCER OF LEFT FOOT ON EXAMINATION: ICD-10-CM

## 2022-03-30 LAB
FINAL PATHOLOGIC DIAGNOSIS: NORMAL
GROSS: NORMAL
Lab: NORMAL

## 2022-03-30 PROCEDURE — 11055 PARING/CUTG B9 HYPRKER LES 1: CPT | Mod: S$GLB,,, | Performed by: PODIATRIST

## 2022-03-30 PROCEDURE — 1160F PR REVIEW ALL MEDS BY PRESCRIBER/CLIN PHARMACIST DOCUMENTED: ICD-10-PCS | Mod: CPTII,S$GLB,, | Performed by: PODIATRIST

## 2022-03-30 PROCEDURE — 99214 PR OFFICE/OUTPT VISIT, EST, LEVL IV, 30-39 MIN: ICD-10-PCS | Mod: 25,S$GLB,, | Performed by: PODIATRIST

## 2022-03-30 PROCEDURE — 11055 PR TRIM HYPERKERATOTIC SKIN LESION, ONE: ICD-10-PCS | Mod: S$GLB,,, | Performed by: PODIATRIST

## 2022-03-30 PROCEDURE — 99999 PR PBB SHADOW E&M-EST. PATIENT-LVL III: CPT | Mod: PBBFAC,,, | Performed by: PODIATRIST

## 2022-03-30 PROCEDURE — 1160F RVW MEDS BY RX/DR IN RCRD: CPT | Mod: CPTII,S$GLB,, | Performed by: PODIATRIST

## 2022-03-30 PROCEDURE — 99999 PR PBB SHADOW E&M-EST. PATIENT-LVL III: ICD-10-PCS | Mod: PBBFAC,,, | Performed by: PODIATRIST

## 2022-03-30 PROCEDURE — 1159F MED LIST DOCD IN RCRD: CPT | Mod: CPTII,S$GLB,, | Performed by: PODIATRIST

## 2022-03-30 PROCEDURE — 3008F PR BODY MASS INDEX (BMI) DOCUMENTED: ICD-10-PCS | Mod: CPTII,S$GLB,, | Performed by: PODIATRIST

## 2022-03-30 PROCEDURE — 1159F PR MEDICATION LIST DOCUMENTED IN MEDICAL RECORD: ICD-10-PCS | Mod: CPTII,S$GLB,, | Performed by: PODIATRIST

## 2022-03-30 PROCEDURE — 99214 OFFICE O/P EST MOD 30 MIN: CPT | Mod: 25,S$GLB,, | Performed by: PODIATRIST

## 2022-03-30 PROCEDURE — 3008F BODY MASS INDEX DOCD: CPT | Mod: CPTII,S$GLB,, | Performed by: PODIATRIST

## 2022-03-30 NOTE — PROGRESS NOTES
Health Maintenance Due   Topic Date Due    Hepatitis C Screening  Never done    Pneumococcal Vaccines (Age 0-64) (1 of 2 - PPSV23) Never done    Eye Exam  Never done    HIV Screening  Never done    TETANUS VACCINE  Never done    Diabetes Urine Screening  01/11/2019    Lipid Panel  01/11/2019    Hemoglobin A1c  12/22/2021     Updates were requested from care everywhere.  Chart was reviewed for overdue Proactive [facility] Encounters (CADEN) topics (CRS, Breast Cancer Screening, Eye exam)  Health Maintenance has been updated.  LINKS immunization registry triggered.  Immunizations were reconciled.

## 2022-03-30 NOTE — PROGRESS NOTES
Subjective:       Patient ID: Kulwant Mueller Jr. is a 37 y.o. male.    Chief Complaint: Follow-up (Diabetic ulcer of left midfoot, previously had graft application, 0/10 pain at present, pcp  last seen 03/11/2022)      HPI: Patient presents to the clinic today, for follow up concerning application of Affinity allograft to an ulceration located at the dorsal lateral left foot. Patient's Primary Care Provider is Yessenia Santos MD. The PMHx. does include uncontrolled DMII with neuropathy. To this juncture, patient has had 5 allograft applications. Also complains of a new wound to the right great toe.     Hemoglobin A1C   Date Value Ref Range Status   09/22/2021 >14.0 (H) 4.0 - 5.6 % Final     Comment:     ADA Screening Guidelines:  5.7-6.4%  Consistent with prediabetes  >or=6.5%  Consistent with diabetes    High levels of fetal hemoglobin interfere with the HbA1C  assay. Heterozygous hemoglobin variants (HbS, HgC, etc)do  not significantly interfere with this assay.   However, presence of multiple variants may affect accuracy.     08/31/2018 12.7 (H) 4.0 - 5.6 % Final     Comment:     ADA Screening Guidelines:  5.7-6.4%  Consistent with prediabetes  >or=6.5%  Consistent with diabetes  High levels of fetal hemoglobin interfere with the HbA1C  assay. Heterozygous hemoglobin variants (HbS, HgC, etc)do  not significantly interfere with this assay.   However, presence of multiple variants may affect accuracy.     01/11/2018 9.1 (H) 4.0 - 5.6 % Final     Comment:     According to ADA guidelines, hemoglobin A1c <7.0% represents  optimal control in non-pregnant diabetic patients. Different  metrics may apply to specific patient populations.   Standards of Medical Care in Diabetes-2016.  For the purpose of screening for the presence of diabetes:  <5.7%     Consistent with the absence of diabetes  5.7-6.4%  Consistent with increasing risk for diabetes   (prediabetes)  >or=6.5%  Consistent with  diabetes  Currently, no consensus exists for use of hemoglobin A1c  for diagnosis of diabetes for children.  This Hemoglobin A1c assay has significant interference with fetal   hemoglobin   (HbF). The results are invalid for patients with abnormal amounts of   HbF,   including those with known Hereditary Persistence   of Fetal Hemoglobin. Heterozygous hemoglobin variants (HbAS, HbAC,   HbAD, HbAE, HbA2) do not significantly interfere with this assay;   however, presence of multiple variants in a sample may impact the %   interference.         Review of patient's allergies indicates:   Allergen Reactions    Heparin analogues Nausea And Vomiting       Past Medical History:   Diagnosis Date    Anticoagulant long-term use     COVID-19 virus infection     Diabetes mellitus     Hypertension     May-Thurner syndrome        Family History   Problem Relation Age of Onset    Cancer Mother     Cancer Paternal Uncle     Cancer Paternal Grandfather     Irritable bowel syndrome Paternal Grandfather     Cancer Maternal Grandfather     Colon cancer Neg Hx     Esophageal cancer Neg Hx     Rectal cancer Neg Hx     Stomach cancer Neg Hx     Ulcerative colitis Neg Hx     Crohn's disease Neg Hx     Celiac disease Neg Hx        Social History     Socioeconomic History    Marital status:    Tobacco Use    Smoking status: Former Smoker     Packs/day: 0.00    Smokeless tobacco: Former User    Tobacco comment: occasionally    Substance and Sexual Activity    Alcohol use: Yes     Comment: occ    Drug use: No       Past Surgical History:   Procedure Laterality Date    APPENDECTOMY      ESOPHAGOGASTRODUODENOSCOPY N/A 1/31/2022    Procedure: EGD (ESOPHAGOGASTRODUODENOSCOPY);  Surgeon: Cindy Quiros MD;  Location[facility];  Service: Endoscopy;  Laterality: N/A;    ESOPHAGOGASTRODUODENOSCOPY N/A 3/21/2022    Procedure: EGD (ESOPHAGOGASTRODUODENOSCOPY);  Surgeon: Tejas Mann MD;  Location[facility];   "Service: Endoscopy;  Laterality: N/A;    INCISION AND DRAINAGE FOOT Left 11/28/2021    Procedure: INCISION AND DRAINAGE, FOOT;  Surgeon: Bj Alicea DPM;  Location: [facility];  Service: Podiatry;  Laterality: Left;    stents in bilateral legs         Review of Systems   Constitutional: Negative for chills, fatigue and fever.   HENT: Negative for hearing loss.    Eyes: Negative for photophobia and visual disturbance.   Respiratory: Negative for cough, chest tightness, shortness of breath and wheezing.    Cardiovascular: Negative for chest pain and palpitations.   Gastrointestinal: Negative for constipation, diarrhea, nausea and vomiting.   Endocrine: Negative for cold intolerance and heat intolerance.   Genitourinary: Negative for flank pain.   Musculoskeletal: Negative for neck pain and neck stiffness.   Skin: Positive for wound.   Neurological: Positive for numbness. Negative for light-headedness and headaches.   Psychiatric/Behavioral: Negative for sleep disturbance.          Objective:   Ht 6' 2" (1.88 m)   Wt 103.3 kg (227 lb 11.8 oz)   BMI 29.24 kg/m²       LOWER EXTREMITY PHYSICAL EXAMINATION  DERMATOLOGY: Skin is supple, dry and intact. Callus to the medial border of the right great toe. Upon debridement, no ulceration is noted. No infection is noted. The ulcer to the dorsal lateral left foot is healed and resolved.    VASCULAR: The B/L LE dorsalis pedis pulse is 2/4 and the posterior tibial pulse is 2/4. Hair growth is noted on the dorsal foot and digits. Proximal to distal, warm to warm. Capillary refill time is WNL at less than 3s.    ORTHOPEDIC: Manual Muscle Testing is 5/5 in all planes on the 2, without pains, with and without resistance. Equinus is noted, RLE.    NEUROLOGY: Sensation to light touch is intact. Proprioception is intact, bilateral. Sensation to pin prick is reduced to absent. Vibratory sensation is diminished to the left and right lower extremity. Examination with 5.07 [place] " [place] monofilament reveals that protective sensation is not intact to the left and right plantar surfaces of the foot and digits, as the patient has no sensation/detection at greater than 4 distinct points of contact.     Assessment:     1. Diabetic ulcer of left midfoot associated with type 2 diabetes mellitus, with fat layer exposed    2. Healed ulcer of left foot on examination    3. Callus    4. Type 2 diabetes mellitus with peripheral neuropathy    5. Contracture, right ankle        Plan:     Diabetic ulcer of left midfoot associated with type 2 diabetes mellitus, with fat layer exposed  Healed ulcer of the left foot on examination  The wound is healed and resolved at present. May D/C all local wound care.    Callus  Hypertrophic skin formation, as outlined within the examination portion of this note, is surgically debrided with sharp #10/#15 blade, to alleviate discomfort with weight bearing and ambulation, and to lessen the possibility of skin complications, e.g., ulceration due to pressure. No ulceration(s) is are noted with/post debridement. The lesion is completed healed and resolved. No evidence of infection.     Type 2 diabetes mellitus with peripheral neuropathy  Patient advised to follow up with Primary Care Physician for management of comorbid states.    Contracture, right ankle  The procedure of (percutaneous ISAAC) was thoroughly explained to the patient. Its necessity and/or purpose and the implications therein were outlined, including any pertinent advantages and/or disadvantages, and possible complications, if any. Possible complications include recurrence of pathology and/or deformity, infection (cellulitis, drainage, purulence, malodor, etc...), pain, numbness, neuritis, edema, burning, loss of function, need for further surgery, possible need for removal of any implanted hardware, soft tissue contracture and/or scarring, etc... No guarantees were given and/or implied. Post-operative  expectations and weightbearing protocol is thoroughly explained to the patient, who acknowledges understanding.             Future Appointments   Date Time Provider Department Center   3/31/2022  8:30 AM [facility] [place]   4/4/2022 11:15 AM Bj Alicea DPM [facility]   4/26/2022  9:30 AM Cindy Quiros MD [facility]STR[facility] [place]   5/5/2022  1:30 PM Lisa Bro NP [facility]

## 2022-04-08 ENCOUNTER — OUTPATIENT CASE MANAGEMENT (OUTPATIENT)
Dept: ADMINISTRATIVE | Facility: OTHER | Age: 37
End: 2022-04-08
Payer: COMMERCIAL

## 2022-04-08 ENCOUNTER — PATIENT MESSAGE (OUTPATIENT)
Dept: PODIATRY | Facility: CLINIC | Age: 37
End: 2022-04-08
Payer: COMMERCIAL

## 2022-04-08 NOTE — PROGRESS NOTES
Outpatient Care Management  Plan of Care Follow Up Visit    Patient: Kulwant Mueller Jr.  MRN: [id number]  Date of Service: 04/08/2022  Completed by: Key Wren RN  Referral Date: 01/23/2022  Program:     Reason for Visit   Patient presents with    Update Plan Of Care       Brief Summary: Pt reports that he is currently at work. Pt voiced that his BS last night was 75. Pt states that his foot is looking better.    Patient Summary     Involvement of Care:  Do I have permission to speak with other family members about your care?   yes    Patient Reported Labs & Vitals:  1.  Any Patient Reported Labs & Vitals?     2.  Patient Reported Blood Pressure:     3.  Patient Reported Pulse:     4.  Patient Reported Weight (Kg):     5.  Patient Reported Blood Glucose (mg/dl):       Medical and social history was reviewed with patient and/or caregiver.     Clinical Assessment     Reviewed and provided basic information on available community resources for mental health, transportation, wellness resources, and palliative care programs with patient and/or caregiver.     Complex Care Plan     Care plan was discussed and completed today with input from patient and/or caregiver.    Patient Instructions     Instructions were provided via the [employer] patient resources and are available for the patient to view on the patient portal.    Next Steps: Review BS trends.    Follow up in about 2 weeks (around 4/22/2022).    Todays OPCM Self-Management Care Plan was developed with the patients/caregivers input and was based on identified barriers from todays assessment.  Goals were written today with the patient/caregiver and the patient has agreed to work towards these goals to improve his/her overall well-being. Patient verbalized understanding of the care plan, goals, and all of today's instructions. Encouraged patient/caregiver to communicate with his/her physician and health care team about health conditions and the treatment  plan.  Provided my contact information today and encouraged patient/caregiver to call me with any questions as needed.

## 2022-04-11 DIAGNOSIS — M25.572 PAIN OF JOINT OF LEFT ANKLE AND FOOT: Primary | ICD-10-CM

## 2022-04-11 DIAGNOSIS — R22.43 LOCALIZED SWELLING OF BOTH LOWER LEGS: ICD-10-CM

## 2022-04-26 ENCOUNTER — PATIENT MESSAGE (OUTPATIENT)
Dept: ADMINISTRATIVE | Facility: HOSPITAL | Age: 37
End: 2022-04-26
Payer: COMMERCIAL

## 2022-04-29 ENCOUNTER — OUTPATIENT CASE MANAGEMENT (OUTPATIENT)
Dept: ADMINISTRATIVE | Facility: OTHER | Age: 37
End: 2022-04-29
Payer: COMMERCIAL

## 2022-04-29 NOTE — PROGRESS NOTES
Outpatient Care Management  Plan of Care Follow Up Visit    Patient: Kulwant Mueller Jr.  MRN: [id number]  Date of Service: 04/29/2022  Completed by: Key Wren RN  Referral Date: 01/23/2022  Program:     Reason for Visit   Patient presents with    Update Plan Of Care    Case Closure       Brief Summary: Pt voiced that he is doing well with his blood sugars at this time. Pt states that he is currently at work. Informed pt of case closure. Informed pt that he can contact this CM with his concerns. Pt voiced agreement with case closure at this time.    Patient Summary     Involvement of Care:  Do I have permission to speak with other family members about your care?   yes    Patient Reported Labs & Vitals:  1.  Any Patient Reported Labs & Vitals?     2.  Patient Reported Blood Pressure:     3.  Patient Reported Pulse:     4.  Patient Reported Weight (Kg):     5.  Patient Reported Blood Glucose (mg/dl):       Medical and social history was reviewed with patient and/or caregiver.     Clinical Assessment     Reviewed and provided basic information on available community resources for mental health, transportation, wellness resources, and palliative care programs with patient and/or caregiver.     Complex Care Plan     Care plan was discussed and completed today with input from patient and/or caregiver.    Patient Instructions     Instructions were provided via the [employer] patient resources and are available for the patient to view on the patient portal.    Next Steps: na  No follow-ups on file.na    Todays OPCM Self-Management Care Plan was developed with the patients/caregivers input and was based on identified barriers from todays assessment.  Goals were written today with the patient/caregiver and the patient has agreed to work towards these goals to improve his/her overall well-being. Patient verbalized understanding of the care plan, goals, and all of today's instructions. Encouraged patient/caregiver  to communicate with his/her physician and health care team about health conditions and the treatment plan.  Provided my contact information today and encouraged patient/caregiver to call me with any questions as needed.

## 2022-05-02 ENCOUNTER — PATIENT MESSAGE (OUTPATIENT)
Dept: GASTROENTEROLOGY | Facility: CLINIC | Age: 37
End: 2022-05-02
Payer: COMMERCIAL

## 2022-05-04 ENCOUNTER — PATIENT OUTREACH (OUTPATIENT)
Dept: ADMINISTRATIVE | Facility: OTHER | Age: 37
End: 2022-05-04
Payer: COMMERCIAL

## 2022-05-11 DIAGNOSIS — E11.9 TYPE 2 DIABETES MELLITUS WITHOUT COMPLICATION: ICD-10-CM

## 2022-05-27 ENCOUNTER — PATIENT MESSAGE (OUTPATIENT)
Dept: PODIATRY | Facility: CLINIC | Age: 37
End: 2022-05-27
Payer: COMMERCIAL

## 2022-05-31 ENCOUNTER — PATIENT MESSAGE (OUTPATIENT)
Dept: PODIATRY | Facility: CLINIC | Age: 37
End: 2022-05-31
Payer: COMMERCIAL

## 2022-06-06 RX ORDER — PANTOPRAZOLE SODIUM 40 MG/1
40 TABLET, DELAYED RELEASE ORAL 2 TIMES DAILY
Qty: 60 TABLET | Refills: 1 | OUTPATIENT
Start: 2022-06-06

## 2022-06-08 ENCOUNTER — PATIENT MESSAGE (OUTPATIENT)
Dept: PODIATRY | Facility: CLINIC | Age: 37
End: 2022-06-08

## 2022-06-08 ENCOUNTER — OFFICE VISIT (OUTPATIENT)
Dept: PODIATRY | Facility: CLINIC | Age: 37
End: 2022-06-08
Payer: COMMERCIAL

## 2022-06-08 ENCOUNTER — HOSPITAL ENCOUNTER (OUTPATIENT)
Dept: RADIOLOGY | Facility: HOSPITAL | Age: 37
Discharge: HOME | End: 2022-06-08
Attending: PODIATRIST
Payer: COMMERCIAL

## 2022-06-08 VITALS — BODY MASS INDEX: 29.23 KG/M2 | WEIGHT: 227.75 LBS | HEIGHT: 74 IN

## 2022-06-08 DIAGNOSIS — E11.42 TYPE 2 DIABETES MELLITUS WITH PERIPHERAL NEUROPATHY: ICD-10-CM

## 2022-06-08 DIAGNOSIS — E11.621 DIABETIC ULCER OF TOE OF RIGHT FOOT ASSOCIATED WITH TYPE 2 DIABETES MELLITUS, WITH FAT LAYER EXPOSED: ICD-10-CM

## 2022-06-08 DIAGNOSIS — L97.512 DIABETIC ULCER OF TOE OF RIGHT FOOT ASSOCIATED WITH TYPE 2 DIABETES MELLITUS, WITH FAT LAYER EXPOSED: ICD-10-CM

## 2022-06-08 DIAGNOSIS — E11.621 DIABETIC ULCER OF TOE OF RIGHT FOOT ASSOCIATED WITH TYPE 2 DIABETES MELLITUS, WITH FAT LAYER EXPOSED: Primary | ICD-10-CM

## 2022-06-08 DIAGNOSIS — L97.512 DIABETIC ULCER OF TOE OF RIGHT FOOT ASSOCIATED WITH TYPE 2 DIABETES MELLITUS, WITH FAT LAYER EXPOSED: Primary | ICD-10-CM

## 2022-06-08 PROCEDURE — 99999 PR PBB SHADOW E&M-EST. PATIENT-LVL III: ICD-10-PCS | Mod: PBBFAC,,, | Performed by: PODIATRIST

## 2022-06-08 PROCEDURE — 99214 PR OFFICE/OUTPT VISIT, EST, LEVL IV, 30-39 MIN: ICD-10-PCS | Mod: 25,S$GLB,, | Performed by: PODIATRIST

## 2022-06-08 PROCEDURE — 4010F PR ACE/ARB THEARPY RXD/TAKEN: ICD-10-PCS | Mod: CPTII,S$GLB,, | Performed by: PODIATRIST

## 2022-06-08 PROCEDURE — 87070 CULTURE OTHR SPECIMN AEROBIC: CPT | Performed by: PODIATRIST

## 2022-06-08 PROCEDURE — 73630 X-RAY EXAM OF FOOT: CPT | Mod: TC,RT

## 2022-06-08 PROCEDURE — 73630 X-RAY EXAM OF FOOT: CPT | Mod: 26,RT,, | Performed by: RADIOLOGY

## 2022-06-08 PROCEDURE — 1160F RVW MEDS BY RX/DR IN RCRD: CPT | Mod: CPTII,S$GLB,, | Performed by: PODIATRIST

## 2022-06-08 PROCEDURE — 11042 DBRDMT SUBQ TIS 1ST 20SQCM/<: CPT | Mod: S$GLB,,, | Performed by: PODIATRIST

## 2022-06-08 PROCEDURE — 1159F MED LIST DOCD IN RCRD: CPT | Mod: CPTII,S$GLB,, | Performed by: PODIATRIST

## 2022-06-08 PROCEDURE — 99214 OFFICE O/P EST MOD 30 MIN: CPT | Mod: 25,S$GLB,, | Performed by: PODIATRIST

## 2022-06-08 PROCEDURE — 4010F ACE/ARB THERAPY RXD/TAKEN: CPT | Mod: CPTII,S$GLB,, | Performed by: PODIATRIST

## 2022-06-08 PROCEDURE — 3008F PR BODY MASS INDEX (BMI) DOCUMENTED: ICD-10-PCS | Mod: CPTII,S$GLB,, | Performed by: PODIATRIST

## 2022-06-08 PROCEDURE — 1159F PR MEDICATION LIST DOCUMENTED IN MEDICAL RECORD: ICD-10-PCS | Mod: CPTII,S$GLB,, | Performed by: PODIATRIST

## 2022-06-08 PROCEDURE — 1160F PR REVIEW ALL MEDS BY PRESCRIBER/CLIN PHARMACIST DOCUMENTED: ICD-10-PCS | Mod: CPTII,S$GLB,, | Performed by: PODIATRIST

## 2022-06-08 PROCEDURE — 99999 PR PBB SHADOW E&M-EST. PATIENT-LVL III: CPT | Mod: PBBFAC,,, | Performed by: PODIATRIST

## 2022-06-08 PROCEDURE — 73630 XR FOOT COMPLETE 3 VIEW RIGHT: ICD-10-PCS | Mod: 26,RT,, | Performed by: RADIOLOGY

## 2022-06-08 PROCEDURE — 11042 PR DEBRIDEMENT, SKIN, SUB-Q TISSUE,=<20 SQ CM: ICD-10-PCS | Mod: S$GLB,,, | Performed by: PODIATRIST

## 2022-06-08 PROCEDURE — 3008F BODY MASS INDEX DOCD: CPT | Mod: CPTII,S$GLB,, | Performed by: PODIATRIST

## 2022-06-08 RX ORDER — MUPIROCIN 20 MG/G
OINTMENT TOPICAL 2 TIMES DAILY
Qty: 30 G | Refills: 1 | Status: SHIPPED | OUTPATIENT
Start: 2022-06-08 | End: 2022-07-06 | Stop reason: SDUPTHER

## 2022-06-08 NOTE — PROGRESS NOTES
Subjective:       Patient ID: Kulwant Mueller Jr. is a 37 y.o. male.    Chief Complaint: Wound Check (Pt c/o poss wound on the right hallux, 0/10 pain at present, diabetic, pcp  last seen 03/11/2022)      HPI: Kulwant Mueller Jr. presents to the clinic today, for evaluation and treatment concerning an ulceration/wound/bulla(e) to the right great toe. Patient's Primary Care Provider is Yessenia Santos MD. The PMHx. does include Hx. of amputation(s). The wound(s) have/has been present for several days. Most recent local wound care w/ dry dressings. Patient does not have home health services. DMII is not controlled.     Hemoglobin A1C   Date Value Ref Range Status   09/22/2021 >14.0 (H) 4.0 - 5.6 % Final     Comment:     ADA Screening Guidelines:  5.7-6.4%  Consistent with prediabetes  >or=6.5%  Consistent with diabetes    High levels of fetal hemoglobin interfere with the HbA1C  assay. Heterozygous hemoglobin variants (HbS, HgC, etc)do  not significantly interfere with this assay.   However, presence of multiple variants may affect accuracy.     08/31/2018 12.7 (H) 4.0 - 5.6 % Final     Comment:     ADA Screening Guidelines:  5.7-6.4%  Consistent with prediabetes  >or=6.5%  Consistent with diabetes  High levels of fetal hemoglobin interfere with the HbA1C  assay. Heterozygous hemoglobin variants (HbS, HgC, etc)do  not significantly interfere with this assay.   However, presence of multiple variants may affect accuracy.     01/11/2018 9.1 (H) 4.0 - 5.6 % Final     Comment:     According to ADA guidelines, hemoglobin A1c <7.0% represents  optimal control in non-pregnant diabetic patients. Different  metrics may apply to specific patient populations.   Standards of Medical Care in Diabetes-2016.  For the purpose of screening for the presence of diabetes:  <5.7%     Consistent with the absence of diabetes  5.7-6.4%  Consistent with increasing risk for diabetes   (prediabetes)  >or=6.5%  Consistent with  diabetes  Currently, no consensus exists for use of hemoglobin A1c  for diagnosis of diabetes for children.  This Hemoglobin A1c assay has significant interference with fetal   hemoglobin   (HbF). The results are invalid for patients with abnormal amounts of   HbF,   including those with known Hereditary Persistence   of Fetal Hemoglobin. Heterozygous hemoglobin variants (HbAS, HbAC,   HbAD, HbAE, HbA2) do not significantly interfere with this assay;   however, presence of multiple variants in a sample may impact the %   interference.         Review of patient's allergies indicates:   Allergen Reactions    Heparin analogues Nausea And Vomiting       Past Medical History:   Diagnosis Date    Anticoagulant long-term use     COVID-19 virus infection     Diabetes mellitus     Hypertension     May-Thurner syndrome        Family History   Problem Relation Age of Onset    Cancer Mother     Cancer Paternal Uncle     Cancer Paternal Grandfather     Irritable bowel syndrome Paternal Grandfather     Cancer Maternal Grandfather     Colon cancer Neg Hx     Esophageal cancer Neg Hx     Rectal cancer Neg Hx     Stomach cancer Neg Hx     Ulcerative colitis Neg Hx     Crohn's disease Neg Hx     Celiac disease Neg Hx        Social History     Socioeconomic History    Marital status:    Tobacco Use    Smoking status: Former Smoker     Packs/day: 0.00    Smokeless tobacco: Former User    Tobacco comment: occasionally    Substance and Sexual Activity    Alcohol use: Yes     Comment: occ    Drug use: No       Past Surgical History:   Procedure Laterality Date    APPENDECTOMY      ESOPHAGOGASTRODUODENOSCOPY N/A 1/31/2022    Procedure: EGD (ESOPHAGOGASTRODUODENOSCOPY);  Surgeon: Cindy Quiros MD;  [facility];  Service: Endoscopy;  Laterality: N/A;    ESOPHAGOGASTRODUODENOSCOPY N/A 3/21/2022    Procedure: EGD (ESOPHAGOGASTRODUODENOSCOPY);  Surgeon: Tejas Mann MD;  [facility];   "Service: Endoscopy;  Laterality: N/A;    INCISION AND DRAINAGE FOOT Left 11/28/2021    Procedure: INCISION AND DRAINAGE, FOOT;  Surgeon: Bj Alicea DPM;  Location: [facility];  Service: Podiatry;  Laterality: Left;    stents in bilateral legs         Review of Systems   Constitutional: Negative for chills, fatigue and fever.   HENT: Negative for hearing loss.    Eyes: Negative for photophobia and visual disturbance.   Respiratory: Negative for cough, chest tightness, shortness of breath and wheezing.    Cardiovascular: Negative for chest pain and palpitations.   Gastrointestinal: Negative for constipation, diarrhea, nausea and vomiting.   Endocrine: Negative for cold intolerance and heat intolerance.   Genitourinary: Negative for flank pain.   Musculoskeletal: Positive for gait problem. Negative for neck pain and neck stiffness.   Skin: Positive for color change and wound.   Neurological: Positive for numbness. Negative for light-headedness and headaches.   Psychiatric/Behavioral: Negative for sleep disturbance.          Objective:   Ht 6' 2" (1.88 m)   Wt 103.3 kg (227 lb 11.8 oz)   BMI 29.24 kg/m²           LOWER EXTREMITY PHYSICAL EXAMINATION  DERMATOLOGY: Ulceration, plantar medial aspect of the RLE 1st toe. The area measures 3.25cm x 2.5cm x 0.20cm. No osseous exposure is noted. Granular tissues are noted. No fluctuance or malodor is noted. No purulence is noted.    VASCULAR: The RLE dorsalis pedis pulse is 2/4 and the posterior tibial pulse is 2/4. Hair growth is noted on the dorsal foot and digits. Proximal to distal, warm to warm. Capillary refill time is WNL at less than 3s.    NEUROLOGY: Sensation to light touch is intact. Proprioception is intact. Sensation to pin prick is reduced to absent. Vibratory sensation is diminished to the right lower extremity. Examination with 5.07 [place] Dillon monofilament reveals that protective sensation is not intact to the right plantar surfaces of the foot and " digits, as the patient has no sensation/detection at greater than 4 distinct points of contact.       Assessment:     1. Diabetic ulcer of toe of right foot associated with type 2 diabetes mellitus, with fat layer exposed    2. Type 2 diabetes mellitus with peripheral neuropathy        Plan:     Diabetic ulcer of toe of right foot associated with type 2 diabetes mellitus, with fat layer exposed  -     Aerobic culture (Specify Source)  -     Sedimentation rate; Future; Expected date: 06/08/2022  -     C-reactive protein; Future; Expected date: 06/08/2022  -     X-Ray Foot Complete Right; Future; Expected date: 06/08/2022  -     mupirocin (BACTROBAN) 2 % ointment; Apply topically 2 (two) times daily.  Dispense: 30 g; Refill: 1    Type 2 diabetes mellitus with peripheral neuropathy  -     Hemoglobin A1C; Future; Expected date: 06/08/2022    Thorough discussion is had with the patient today, concerning the diagnosis, its etiology, and the treatment algorithm at present.     Please obtain XR.    The wound was surgically debrided after adequate prep with alcohol and/or betadine paint. Excisional wound debridement was performed using sharp #10/#15 blade/rounded scalpel and tissue nipper, with removal of all non-viable skin and soft tissues; necrotic skin/tissue formation. The woundbase/wound bed was also debrided to encourage bleeding as to promote/stimulate healing. Debridement was excisional and included epidermal, dermal and subcutaneous tissues. Post debridement measurements are as above. Hemostasis was achieved. Patient tolerated procedure well and without complications. Local woundcare with topical ABx ointment dressings and bandage thereafter.     Continue dressings with topical Bactroban QD to BID.    CAM Walker for gait ATC.    DMII control.    Check/Update HbA1C.              Future Appointments   Date Time Provider Department Center   6/21/2022  3:00 PM Cindy Quiros MD [facility] [place]

## 2022-06-09 ENCOUNTER — PATIENT MESSAGE (OUTPATIENT)
Dept: PODIATRY | Facility: CLINIC | Age: 37
End: 2022-06-09
Payer: COMMERCIAL

## 2022-06-11 LAB — BACTERIA SPEC AEROBE CULT: NORMAL

## 2022-07-06 DIAGNOSIS — E11.621 DIABETIC ULCER OF TOE OF RIGHT FOOT ASSOCIATED WITH TYPE 2 DIABETES MELLITUS, WITH FAT LAYER EXPOSED: ICD-10-CM

## 2022-07-06 DIAGNOSIS — L97.512 DIABETIC ULCER OF TOE OF RIGHT FOOT ASSOCIATED WITH TYPE 2 DIABETES MELLITUS, WITH FAT LAYER EXPOSED: ICD-10-CM

## 2022-07-06 RX ORDER — MUPIROCIN 20 MG/G
OINTMENT TOPICAL 2 TIMES DAILY
Qty: 30 G | Refills: 1 | Status: SHIPPED | OUTPATIENT
Start: 2022-07-06 | End: 2022-08-05

## 2022-07-08 ENCOUNTER — PATIENT MESSAGE (OUTPATIENT)
Dept: CARDIOLOGY | Facility: CLINIC | Age: 37
End: 2022-07-08
Payer: COMMERCIAL

## 2022-07-11 NOTE — TELEPHONE ENCOUNTER
Reached out to pt's preferred pharmacy, [facility] who verified they have this prescription on file.  They are unable to fill a 90 day supply at this time however will have filled tomorrow by 3 pm for pt     Reached out to pt and relayed this information. He responded that he will go tomorrow to  his Rx,    Voiced appreciation for calls

## 2022-07-22 ENCOUNTER — OFFICE VISIT (OUTPATIENT)
Dept: OPHTHALMOLOGY | Facility: CLINIC | Age: 37
End: 2022-07-22
Payer: COMMERCIAL

## 2022-07-22 DIAGNOSIS — H52.13 MYOPIA OF BOTH EYES WITH ASTIGMATISM: ICD-10-CM

## 2022-07-22 DIAGNOSIS — E11.9 DIABETES MELLITUS WITHOUT COMPLICATION: Primary | ICD-10-CM

## 2022-07-22 DIAGNOSIS — H52.203 MYOPIA OF BOTH EYES WITH ASTIGMATISM: ICD-10-CM

## 2022-07-22 PROCEDURE — 99999 PR PBB SHADOW E&M-EST. PATIENT-LVL III: ICD-10-PCS | Mod: PBBFAC,,, | Performed by: OPTOMETRIST

## 2022-07-22 PROCEDURE — 92015 DETERMINE REFRACTIVE STATE: CPT | Mod: S$GLB,,, | Performed by: OPTOMETRIST

## 2022-07-22 PROCEDURE — 92004 PR EYE EXAM, NEW PATIENT,COMPREHESV: ICD-10-PCS | Mod: S$GLB,,, | Performed by: OPTOMETRIST

## 2022-07-22 PROCEDURE — 99999 PR PBB SHADOW E&M-EST. PATIENT-LVL III: CPT | Mod: PBBFAC,,, | Performed by: OPTOMETRIST

## 2022-07-22 PROCEDURE — 92015 PR REFRACTION: ICD-10-PCS | Mod: S$GLB,,, | Performed by: OPTOMETRIST

## 2022-07-22 PROCEDURE — 92004 COMPRE OPH EXAM NEW PT 1/>: CPT | Mod: S$GLB,,, | Performed by: OPTOMETRIST

## 2022-07-22 NOTE — PROGRESS NOTES
HPI     Diabetic Eye Exam     Comments: NP reports for yearly eye exam   Would like new Mrx today  Monitors blood glucose-- 190  Diagnosed with diabetes in 2016  Longterm insulin use   Lab Results       Component                Value               Date                       HGBA1C                   10.0 (H)            06/08/2022                    Last edited by Fawad Andrew on 7/22/2022 10:31 AM. (History)            Assessment /Plan     For exam results, see Encounter Report.    Diabetes mellitus without complication  6 years, last A1c 10.0 Stressed importance of DM control to preserve vision. No diabetic retinopathy was seen in either eye today. Continue strict blood glucose control.  Reviewed importance of yearly dilated eye exams. Continue close care with PCP regarding diabetes.  Myopia of both eyes with astigmatism  Eyeglass Final Rx     Eyeglass Final Rx       Sphere Cylinder Axis    Right -3.50 +2.50 110    Left -5.00 +2.75 085    Type: SVL    Expiration Date: 7/22/2023   PD-67.5                 RTC 1 yr for dilated eye exam or sooner if any changes to vision.   Discussed above and answered questions.

## 2022-08-05 ENCOUNTER — OCCUPATIONAL HEALTH (OUTPATIENT)
Dept: URGENT CARE | Facility: CLINIC | Age: 37
End: 2022-08-05

## 2022-08-05 DIAGNOSIS — J02.9 SORE THROAT: Primary | ICD-10-CM

## 2022-08-05 LAB
CTP QC/QA: YES
SARS-COV-2 RDRP RESP QL NAA+PROBE: POSITIVE

## 2022-08-05 PROCEDURE — U0002: ICD-10-PCS | Mod: QW,S$GLB,, | Performed by: EMERGENCY MEDICINE

## 2022-08-05 PROCEDURE — U0002 COVID-19 LAB TEST NON-CDC: HCPCS | Mod: QW,S$GLB,, | Performed by: EMERGENCY MEDICINE

## 2022-08-05 NOTE — PATIENT INSTRUCTIONS
Your test was POSITIVE for COVID-19 (coronavirus).       Please isolate yourself at home.  You may leave home and/or return to work once the following conditions are met:    If you were not hospitalized and are not moderately to severely immunocompromised:   More than 5 days since symptoms first appeared AND  More than 24 hours fever free without medications AND  Symptoms are improving  Continue to wear a mask around others for 5 additional days.    If you were hospitalized OR are moderately to severely immunocompromised:  More than 20 days since symptoms first appeared  More than 24 hours fever free without medications  Symptoms have improved    If you had no symptoms but tested positive:  More than 5 days since the date of the first positive test (20 days if moderately to severely immunocompromised). If you develop symptoms, then use the guidelines above.  Continue to wear a mask around others for 5 additional days.      Contact Tracing    As one of the next steps, you will receive a call or text from the [place] Department of Health ([facility]) COVID-19 Tracing Team. See the contact information below so you know not to ignore the health departments call. It is important that you contact them back immediately so they can help.      Contact Tracer Number:  [phone]  Caller ID for most carriers: [facility]     What is contact tracing?  Contact tracing is a process that helps identify everyone who has been in close contact with an infected person. Contact tracers let those people know they may have been exposed and guide them on next steps. Confidentiality is important for everyone; no one will be told who may have exposed them to the virus.  Your involvement is important. The more we know about where and how this virus is spreading, the better chance we have at stopping it from spreading further.  What does exposure mean?  Exposure means you have been within 6 feet for more than 15 minutes with a person who  has or had COVID-19.  What kind of questions do the contact tracers ask?  A contact tracer will confirm your basic contact information including name, address, phone number, and next of kin, as well as asking about any symptoms you may have had. Theyll also ask you how you think you may have gotten sick, such as places where you may have been exposed to the virus, and people you were with. Those names will never be shared with anyone outside of that call, and will only be used to help trace and stop the spread of the virus.   I have privacy concerns. How will the state use my information?  Your privacy about your health is important. All calls are completed using call centers that use the appropriate health privacy protection measures (HIPAA compliance), meaning that your patient information is safe. No one will ever ask you any questions related to immigration status. Your health comes first.   Do I have to participate?  You do not have to participate, but we strongly encourage you to. Contact tracing can help us catch and control new outbreaks as theyre developing to keep your friends and family safe.   What if I dont hear from anyone?  If you dont receive a call within 24 hours, you can call the number above right away to inquire about your status. That line is open from 8:00 am - 8:00 p.m., 7 days a week.  Contact tracing saves lives! Together, we have the power to beat this virus and keep our loved ones and neighbors safe.    For more information see CDC link below.      https://www.cdc.gov/coronavirus/2019-ncov/hcp/guidance-prevent-spread.html#precautions        Sources:  [facility], [place] Department of [facility] and [facility]           Sincerely,     Ana Stapleton RT

## 2022-09-13 ENCOUNTER — OFFICE VISIT (OUTPATIENT)
Dept: PODIATRY | Facility: CLINIC | Age: 37
End: 2022-09-13
Payer: COMMERCIAL

## 2022-09-13 VITALS — HEIGHT: 74 IN | BODY MASS INDEX: 29.23 KG/M2 | WEIGHT: 227.75 LBS

## 2022-09-13 DIAGNOSIS — E11.42 TYPE 2 DIABETES MELLITUS WITH PERIPHERAL NEUROPATHY: ICD-10-CM

## 2022-09-13 DIAGNOSIS — L97.512 DIABETIC ULCER OF TOE OF RIGHT FOOT ASSOCIATED WITH TYPE 2 DIABETES MELLITUS, WITH FAT LAYER EXPOSED: Primary | ICD-10-CM

## 2022-09-13 DIAGNOSIS — E11.621 DIABETIC ULCER OF TOE OF RIGHT FOOT ASSOCIATED WITH TYPE 2 DIABETES MELLITUS, WITH FAT LAYER EXPOSED: Primary | ICD-10-CM

## 2022-09-13 PROCEDURE — 1159F PR MEDICATION LIST DOCUMENTED IN MEDICAL RECORD: ICD-10-PCS | Mod: CPTII,S$GLB,, | Performed by: PODIATRIST

## 2022-09-13 PROCEDURE — 99213 PR OFFICE/OUTPT VISIT, EST, LEVL III, 20-29 MIN: ICD-10-PCS | Mod: 25,S$GLB,, | Performed by: PODIATRIST

## 2022-09-13 PROCEDURE — 99999 PR PBB SHADOW E&M-EST. PATIENT-LVL III: ICD-10-PCS | Mod: PBBFAC,,, | Performed by: PODIATRIST

## 2022-09-13 PROCEDURE — 3046F PR MOST RECENT HEMOGLOBIN A1C LEVEL > 9.0%: ICD-10-PCS | Mod: CPTII,S$GLB,, | Performed by: PODIATRIST

## 2022-09-13 PROCEDURE — 11042 PR DEBRIDEMENT, SKIN, SUB-Q TISSUE,=<20 SQ CM: ICD-10-PCS | Mod: S$GLB,,, | Performed by: PODIATRIST

## 2022-09-13 PROCEDURE — 99999 PR PBB SHADOW E&M-EST. PATIENT-LVL III: CPT | Mod: PBBFAC,,, | Performed by: PODIATRIST

## 2022-09-13 PROCEDURE — 3046F HEMOGLOBIN A1C LEVEL >9.0%: CPT | Mod: CPTII,S$GLB,, | Performed by: PODIATRIST

## 2022-09-13 PROCEDURE — 3008F PR BODY MASS INDEX (BMI) DOCUMENTED: ICD-10-PCS | Mod: CPTII,S$GLB,, | Performed by: PODIATRIST

## 2022-09-13 PROCEDURE — 11042 DBRDMT SUBQ TIS 1ST 20SQCM/<: CPT | Mod: S$GLB,,, | Performed by: PODIATRIST

## 2022-09-13 PROCEDURE — 3008F BODY MASS INDEX DOCD: CPT | Mod: CPTII,S$GLB,, | Performed by: PODIATRIST

## 2022-09-13 PROCEDURE — 1160F RVW MEDS BY RX/DR IN RCRD: CPT | Mod: CPTII,S$GLB,, | Performed by: PODIATRIST

## 2022-09-13 PROCEDURE — 99213 OFFICE O/P EST LOW 20 MIN: CPT | Mod: 25,S$GLB,, | Performed by: PODIATRIST

## 2022-09-13 PROCEDURE — 4010F PR ACE/ARB THEARPY RXD/TAKEN: ICD-10-PCS | Mod: CPTII,S$GLB,, | Performed by: PODIATRIST

## 2022-09-13 PROCEDURE — 4010F ACE/ARB THERAPY RXD/TAKEN: CPT | Mod: CPTII,S$GLB,, | Performed by: PODIATRIST

## 2022-09-13 PROCEDURE — 1160F PR REVIEW ALL MEDS BY PRESCRIBER/CLIN PHARMACIST DOCUMENTED: ICD-10-PCS | Mod: CPTII,S$GLB,, | Performed by: PODIATRIST

## 2022-09-13 PROCEDURE — 1159F MED LIST DOCD IN RCRD: CPT | Mod: CPTII,S$GLB,, | Performed by: PODIATRIST

## 2022-09-13 NOTE — PROGRESS NOTES
Subjective:       Patient ID: Kulwant Mueller Jr. is a 37 y.o. male.    Chief Complaint: Wound Care (Diabetic ulcer of right toe, 0/10 pain at present, pcp  last seen 03/11/2022)      HPI: Kulwant Mueller Jr. presents to the clinic today, for evaluation and treatment concerning an ulceration/wound/bulla(e) to the right 1st toe. Patient's Primary Care Provider is Yessenia Santos MD. The PMHx. does include DM w/ peripheral neuropathy. The wound(s) have/has been present for several weeks. Most recent local wound care w/ medical honey. Patient does not have home health services. WB with slides. DMII is not controlled. Last evaluation here was on 6/8/22.    Hemoglobin A1C   Date Value Ref Range Status   06/08/2022 10.0 (H) 4.0 - 5.6 % Final     Comment:     ADA Screening Guidelines:  5.7-6.4%  Consistent with prediabetes  >or=6.5%  Consistent with diabetes    High levels of fetal hemoglobin interfere with the HbA1C  assay. Heterozygous hemoglobin variants (HbS, HgC, etc)do  not significantly interfere with this assay.   However, presence of multiple variants may affect accuracy.     09/22/2021 >14.0 (H) 4.0 - 5.6 % Final     Comment:     ADA Screening Guidelines:  5.7-6.4%  Consistent with prediabetes  >or=6.5%  Consistent with diabetes    High levels of fetal hemoglobin interfere with the HbA1C  assay. Heterozygous hemoglobin variants (HbS, HgC, etc)do  not significantly interfere with this assay.   However, presence of multiple variants may affect accuracy.     08/31/2018 12.7 (H) 4.0 - 5.6 % Final     Comment:     ADA Screening Guidelines:  5.7-6.4%  Consistent with prediabetes  >or=6.5%  Consistent with diabetes  High levels of fetal hemoglobin interfere with the HbA1C  assay. Heterozygous hemoglobin variants (HbS, HgC, etc)do  not significantly interfere with this assay.   However, presence of multiple variants may affect accuracy.         Review of patient's allergies indicates:   Allergen  Reactions    Heparin analogues Nausea And Vomiting       Past Medical History:   Diagnosis Date    Anticoagulant long-term use     COVID-19 virus infection     Diabetes mellitus     Hypertension     May-Thurner syndrome        Family History   Problem Relation Age of Onset    Cancer Mother     Cancer Paternal Uncle     Cancer Paternal Grandfather     Irritable bowel syndrome Paternal Grandfather     Cancer Maternal Grandfather     Colon cancer Neg Hx     Esophageal cancer Neg Hx     Rectal cancer Neg Hx     Stomach cancer Neg Hx     Ulcerative colitis Neg Hx     Crohn's disease Neg Hx     Celiac disease Neg Hx        Social History     Socioeconomic History    Marital status:    Tobacco Use    Smoking status: Former     Packs/day: 0.00     Types: Cigarettes    Smokeless tobacco: Former    Tobacco comments:     occasionally    Substance and Sexual Activity    Alcohol use: Yes     Comment: occ    Drug use: No       Past Surgical History:   Procedure Laterality Date    APPENDECTOMY      ESOPHAGOGASTRODUODENOSCOPY N/A 1/31/2022    Procedure: EGD (ESOPHAGOGASTRODUODENOSCOPY);  Surgeon: Cindy Quiros MD;  Location: [facility];  Service: Endoscopy;  Laterality: N/A;    ESOPHAGOGASTRODUODENOSCOPY N/A 3/21/2022    Procedure: EGD (ESOPHAGOGASTRODUODENOSCOPY);  Surgeon: Tejas Mann MD;  Location[facility];  Service: Endoscopy;  Laterality: N/A;    INCISION AND DRAINAGE FOOT Left 11/28/2021    Procedure: INCISION AND DRAINAGE, FOOT;  Surgeon: Bj Alicea DPM;  Location: [facility];  Service: Podiatry;  Laterality: Left;    stents in bilateral legs         Review of Systems   Constitutional:  Negative for chills, fatigue and fever.   HENT:  Negative for hearing loss.    Eyes:  Negative for photophobia and visual disturbance.   Respiratory:  Negative for cough, chest tightness, shortness of breath and wheezing.    Cardiovascular:  Negative for chest pain and palpitations.   Gastrointestinal:  Negative for  "constipation, diarrhea, nausea and vomiting.   Endocrine: Negative for cold intolerance and heat intolerance.   Genitourinary:  Negative for flank pain.   Musculoskeletal:  Negative for neck pain and neck stiffness.   Neurological:  Positive for numbness. Negative for light-headedness and headaches.   Psychiatric/Behavioral:  Negative for sleep disturbance.         Objective:   Ht 6' 2" (1.88 m)   Wt 103.3 kg (227 lb 11.8 oz)   BMI 29.24 kg/m²     Physical Exam  LOWER EXTREMITY PHYSICAL EXAMINATION  DERMATOLOGY: Substantial callus formation, plantar medial RLE 1st digit.  Upon debridement, a callus is noted.  The callus measures 0.60 cm x 0.40 cm x 0.10 cm.  Granulation tissues are noted.  No probe to bone or osseous exposure.  Copious bleeding with debridement.    NEUROLOGY: Sensation to light touch is intact. Proprioception is intact. Sensation to pin prick is reduced to absent. Examination with 5.07 [place] Dillon monofilament reveals that protective sensation is not intact to the plantar surfaces of the foot and digits, as the patient has no sensation/detection at greater than 4 distinct points of contact.     Assessment:     1. Diabetic ulcer of toe of right foot associated with type 2 diabetes mellitus, with fat layer exposed    2. Type 2 diabetes mellitus with peripheral neuropathy    3. Uncontrolled type 2 diabetes mellitus with complication        Plan:     Diabetic ulcer of toe of right foot associated with type 2 diabetes mellitus, with fat layer exposed    Type 2 diabetes mellitus with peripheral neuropathy    Uncontrolled type 2 diabetes mellitus with complication      Thorough discussion is had with the patient today, concerning the diagnosis, its etiology, and the treatment algorithm at present.     The wound was surgically debrided after adequate prep with alcohol and/or betadine paint. Excisional wound debridement was performed using sharp #10/#15 blade/rounded scalpel and tissue nipper, with " removal of all non-viable skin and soft tissues; necrotic skin/tissue formation. The woundbase/wound bed was also debrided to encourage bleeding as to promote/stimulate healing. Debridement was excisional and included epidermal, dermal and subcutaneous tissues. Post debridement measurements are as above. Hemostasis was achieved. Patient tolerated procedure well and without complications. Local woundcare with medical honey dressings and bandage thereafter.     Strict diabetes control.    Neuropathic foot counseling and education is provided at this visit. Patient is advised to wear socks and shoes at all times.  Do not walk barefoot, or with just socks, even when indoors.  Be sure to check and inspect the inside of the shoe before putting them on her feet.  Protect your feet at all times.  Walking shoes and/or athletic shoes are the best types of shoe gear. Do not wear vinyl or plastic type shoe gear, as they do not stretch and/or breathe.  Protect your feet from hot and/or cold. Elevate the extremities when sitting.  Do not wear excessively tight socks and/or shoe gear. Wiggle your toes for a few minutes throughout the day. Move your ankles up and down, in and out, to help blood flow in your lower extremity.     Patient is at risk for limb loss due to improper shoe gear with uncontrolled blood sugars due to recurrent ulceration.          No future appointments.

## 2023-01-25 ENCOUNTER — PATIENT MESSAGE (OUTPATIENT)
Dept: ADMINISTRATIVE | Facility: HOSPITAL | Age: 38
End: 2023-01-25
Payer: COMMERCIAL

## 2023-01-25 DIAGNOSIS — E11.9 TYPE 2 DIABETES MELLITUS WITHOUT COMPLICATION, UNSPECIFIED WHETHER LONG TERM INSULIN USE: Primary | ICD-10-CM

## 2023-01-26 ENCOUNTER — OFFICE VISIT (OUTPATIENT)
Dept: CARDIOLOGY | Facility: CLINIC | Age: 38
End: 2023-01-26
Payer: COMMERCIAL

## 2023-01-26 ENCOUNTER — TELEPHONE (OUTPATIENT)
Dept: CARDIOLOGY | Facility: CLINIC | Age: 38
End: 2023-01-26
Payer: COMMERCIAL

## 2023-01-26 ENCOUNTER — PATIENT MESSAGE (OUTPATIENT)
Dept: ADMINISTRATIVE | Facility: HOSPITAL | Age: 38
End: 2023-01-26
Payer: COMMERCIAL

## 2023-01-26 ENCOUNTER — HOSPITAL ENCOUNTER (OUTPATIENT)
Dept: RADIOLOGY | Facility: HOSPITAL | Age: 38
Discharge: HOME | End: 2023-01-26
Attending: NURSE PRACTITIONER
Payer: COMMERCIAL

## 2023-01-26 VITALS
SYSTOLIC BLOOD PRESSURE: 105 MMHG | WEIGHT: 245 LBS | OXYGEN SATURATION: 98 % | HEIGHT: 74 IN | HEART RATE: 93 BPM | BODY MASS INDEX: 31.44 KG/M2 | DIASTOLIC BLOOD PRESSURE: 74 MMHG

## 2023-01-26 DIAGNOSIS — E11.8 TYPE 2 DIABETES MELLITUS WITH COMPLICATION, WITH LONG-TERM CURRENT USE OF INSULIN: Chronic | ICD-10-CM

## 2023-01-26 DIAGNOSIS — Z79.4 TYPE 2 DIABETES MELLITUS WITH COMPLICATION, WITH LONG-TERM CURRENT USE OF INSULIN: Chronic | ICD-10-CM

## 2023-01-26 DIAGNOSIS — Z79.01 CHRONIC ANTICOAGULATION: ICD-10-CM

## 2023-01-26 DIAGNOSIS — I87.1 MAY-THURNER SYNDROME: Primary | Chronic | ICD-10-CM

## 2023-01-26 DIAGNOSIS — Z95.828 HISTORY OF INTRAVASCULAR STENT PLACEMENT: ICD-10-CM

## 2023-01-26 DIAGNOSIS — I82.402 RECURRENT DEEP VEIN THROMBOSIS (DVT) OF LEFT LOWER EXTREMITY: Chronic | ICD-10-CM

## 2023-01-26 DIAGNOSIS — I15.2 HYPERTENSION ASSOCIATED WITH DIABETES: ICD-10-CM

## 2023-01-26 DIAGNOSIS — K21.00 GASTROESOPHAGEAL REFLUX DISEASE WITH ESOPHAGITIS WITHOUT HEMORRHAGE: ICD-10-CM

## 2023-01-26 DIAGNOSIS — I87.002 POST-THROMBOTIC SYNDROME OF LEFT LOWER EXTREMITY: ICD-10-CM

## 2023-01-26 DIAGNOSIS — I82.402 RECURRENT DEEP VEIN THROMBOSIS (DVT) OF LEFT LOWER EXTREMITY: ICD-10-CM

## 2023-01-26 DIAGNOSIS — E11.59 HYPERTENSION ASSOCIATED WITH DIABETES: ICD-10-CM

## 2023-01-26 PROCEDURE — 3072F LOW RISK FOR RETINOPATHY: CPT | Mod: ,,, | Performed by: INTERNAL MEDICINE

## 2023-01-26 PROCEDURE — 3066F NEPHROPATHY DOC TX: CPT | Mod: ,,, | Performed by: INTERNAL MEDICINE

## 2023-01-26 PROCEDURE — 99214 PR OFFICE/OUTPT VISIT, EST, LEVL IV, 30-39 MIN: ICD-10-PCS | Mod: S$PBB,,, | Performed by: INTERNAL MEDICINE

## 2023-01-26 PROCEDURE — 99999 PR PBB SHADOW E&M-EST. PATIENT-LVL III: ICD-10-PCS | Mod: PBBFAC,,, | Performed by: INTERNAL MEDICINE

## 2023-01-26 PROCEDURE — 3066F PR DOCUMENTATION OF TREATMENT FOR NEPHROPATHY: ICD-10-PCS | Mod: ,,, | Performed by: INTERNAL MEDICINE

## 2023-01-26 PROCEDURE — 93970 EXTREMITY STUDY: CPT | Mod: TC,PO

## 2023-01-26 PROCEDURE — 93970 US LOWER EXTREMITY VEINS BILATERAL: ICD-10-PCS | Mod: 26,,, | Performed by: STUDENT IN AN ORGANIZED HEALTH CARE EDUCATION/TRAINING PROGRAM

## 2023-01-26 PROCEDURE — 99214 OFFICE O/P EST MOD 30 MIN: CPT | Mod: S$PBB,,, | Performed by: INTERNAL MEDICINE

## 2023-01-26 PROCEDURE — 93970 EXTREMITY STUDY: CPT | Mod: 26,,, | Performed by: STUDENT IN AN ORGANIZED HEALTH CARE EDUCATION/TRAINING PROGRAM

## 2023-01-26 PROCEDURE — 3061F NEG MICROALBUMINURIA REV: CPT | Mod: ,,, | Performed by: INTERNAL MEDICINE

## 2023-01-26 PROCEDURE — 3061F PR NEG MICROALBUMINURIA RESULT DOCUMENTED/REVIEW: ICD-10-PCS | Mod: ,,, | Performed by: INTERNAL MEDICINE

## 2023-01-26 PROCEDURE — 3072F PR LOW RISK FOR RETINOPATHY: ICD-10-PCS | Mod: ,,, | Performed by: INTERNAL MEDICINE

## 2023-01-26 PROCEDURE — 99999 PR PBB SHADOW E&M-EST. PATIENT-LVL III: CPT | Mod: PBBFAC,,, | Performed by: INTERNAL MEDICINE

## 2023-01-26 RX ORDER — FUROSEMIDE 20 MG/1
20 TABLET ORAL DAILY
Qty: 90 TABLET | Refills: 3 | Status: SHIPPED | OUTPATIENT
Start: 2023-01-26 | End: 2024-02-26

## 2023-01-26 NOTE — TELEPHONE ENCOUNTER
----- Message from Fior Conner NP sent at 1/26/2023 10:21 AM CST -----  The ultrasound of your legs does not show any indications of blood clots.

## 2023-02-02 ENCOUNTER — LAB VISIT (OUTPATIENT)
Dept: LAB | Facility: HOSPITAL | Age: 38
End: 2023-02-02
Attending: FAMILY MEDICINE
Payer: COMMERCIAL

## 2023-02-02 DIAGNOSIS — E11.9 TYPE 2 DIABETES MELLITUS WITHOUT COMPLICATION, UNSPECIFIED WHETHER LONG TERM INSULIN USE: ICD-10-CM

## 2023-02-02 DIAGNOSIS — E11.9 TYPE 2 DIABETES MELLITUS WITHOUT COMPLICATION: ICD-10-CM

## 2023-02-02 LAB
CHOLEST SERPL-MCNC: 178 MG/DL (ref 120–199)
CHOLEST/HDLC SERPL: 4.6 {RATIO} (ref 2–5)
ESTIMATED AVG GLUCOSE: ABNORMAL MG/DL (ref 68–131)
HBA1C MFR BLD: >14 % (ref 4–5.6)
HDLC SERPL-MCNC: 39 MG/DL (ref 40–75)
HDLC SERPL: 21.9 % (ref 20–50)
LDLC SERPL CALC-MCNC: 122.2 MG/DL (ref 63–159)
NONHDLC SERPL-MCNC: 139 MG/DL
TRIGL SERPL-MCNC: 84 MG/DL (ref 30–150)

## 2023-02-02 PROCEDURE — 80061 LIPID PANEL: CPT | Performed by: FAMILY MEDICINE

## 2023-02-02 PROCEDURE — 83036 HEMOGLOBIN GLYCOSYLATED A1C: CPT | Performed by: FAMILY MEDICINE

## 2023-02-02 PROCEDURE — 36415 COLL VENOUS BLD VENIPUNCTURE: CPT | Mod: PO | Performed by: FAMILY MEDICINE

## 2023-02-07 ENCOUNTER — OFFICE VISIT (OUTPATIENT)
Dept: PODIATRY | Facility: CLINIC | Age: 38
End: 2023-02-07
Payer: COMMERCIAL

## 2023-02-07 ENCOUNTER — HOSPITAL ENCOUNTER (OUTPATIENT)
Dept: RADIOLOGY | Facility: HOSPITAL | Age: 38
Discharge: HOME | End: 2023-02-07
Attending: PODIATRIST
Payer: COMMERCIAL

## 2023-02-07 VITALS — WEIGHT: 245 LBS | HEIGHT: 74 IN | BODY MASS INDEX: 31.44 KG/M2

## 2023-02-07 DIAGNOSIS — E11.42 TYPE 2 DIABETES MELLITUS WITH PERIPHERAL NEUROPATHY: ICD-10-CM

## 2023-02-07 DIAGNOSIS — M24.571 CONTRACTURE, RIGHT ANKLE: ICD-10-CM

## 2023-02-07 DIAGNOSIS — E11.621 DIABETIC ULCER OF TOE OF RIGHT FOOT ASSOCIATED WITH TYPE 2 DIABETES MELLITUS, WITH FAT LAYER EXPOSED: Primary | ICD-10-CM

## 2023-02-07 DIAGNOSIS — L97.512 DIABETIC ULCER OF TOE OF RIGHT FOOT ASSOCIATED WITH TYPE 2 DIABETES MELLITUS, WITH FAT LAYER EXPOSED: ICD-10-CM

## 2023-02-07 DIAGNOSIS — M20.61 ACQUIRED DEFORMITY OF JOINT OF BIG TOE, RIGHT: ICD-10-CM

## 2023-02-07 DIAGNOSIS — M25.572 PAIN OF JOINT OF LEFT ANKLE AND FOOT: ICD-10-CM

## 2023-02-07 DIAGNOSIS — L97.512 DIABETIC ULCER OF TOE OF RIGHT FOOT ASSOCIATED WITH TYPE 2 DIABETES MELLITUS, WITH FAT LAYER EXPOSED: Primary | ICD-10-CM

## 2023-02-07 DIAGNOSIS — E11.621 DIABETIC ULCER OF TOE OF RIGHT FOOT ASSOCIATED WITH TYPE 2 DIABETES MELLITUS, WITH FAT LAYER EXPOSED: ICD-10-CM

## 2023-02-07 PROCEDURE — 1159F PR MEDICATION LIST DOCUMENTED IN MEDICAL RECORD: ICD-10-PCS | Mod: CPTII,S$GLB,, | Performed by: PODIATRIST

## 2023-02-07 PROCEDURE — 99214 PR OFFICE/OUTPT VISIT, EST, LEVL IV, 30-39 MIN: ICD-10-PCS | Mod: 25,S$GLB,, | Performed by: PODIATRIST

## 2023-02-07 PROCEDURE — 3008F PR BODY MASS INDEX (BMI) DOCUMENTED: ICD-10-PCS | Mod: CPTII,S$GLB,, | Performed by: PODIATRIST

## 2023-02-07 PROCEDURE — 73630 XR FOOT COMPLETE 3 VIEW RIGHT: ICD-10-PCS | Mod: 26,RT,, | Performed by: RADIOLOGY

## 2023-02-07 PROCEDURE — 3046F PR MOST RECENT HEMOGLOBIN A1C LEVEL > 9.0%: ICD-10-PCS | Mod: CPTII,S$GLB,, | Performed by: PODIATRIST

## 2023-02-07 PROCEDURE — 11042 DBRDMT SUBQ TIS 1ST 20SQCM/<: CPT | Mod: S$GLB,,, | Performed by: PODIATRIST

## 2023-02-07 PROCEDURE — 3066F NEPHROPATHY DOC TX: CPT | Mod: CPTII,S$GLB,, | Performed by: PODIATRIST

## 2023-02-07 PROCEDURE — 3066F PR DOCUMENTATION OF TREATMENT FOR NEPHROPATHY: ICD-10-PCS | Mod: CPTII,S$GLB,, | Performed by: PODIATRIST

## 2023-02-07 PROCEDURE — 1160F PR REVIEW ALL MEDS BY PRESCRIBER/CLIN PHARMACIST DOCUMENTED: ICD-10-PCS | Mod: CPTII,S$GLB,, | Performed by: PODIATRIST

## 2023-02-07 PROCEDURE — 3008F BODY MASS INDEX DOCD: CPT | Mod: CPTII,S$GLB,, | Performed by: PODIATRIST

## 2023-02-07 PROCEDURE — 11042 PR DEBRIDEMENT, SKIN, SUB-Q TISSUE,=<20 SQ CM: ICD-10-PCS | Mod: S$GLB,,, | Performed by: PODIATRIST

## 2023-02-07 PROCEDURE — 99214 OFFICE O/P EST MOD 30 MIN: CPT | Mod: 25,S$GLB,, | Performed by: PODIATRIST

## 2023-02-07 PROCEDURE — 1160F RVW MEDS BY RX/DR IN RCRD: CPT | Mod: CPTII,S$GLB,, | Performed by: PODIATRIST

## 2023-02-07 PROCEDURE — 73630 X-RAY EXAM OF FOOT: CPT | Mod: 26,RT,, | Performed by: RADIOLOGY

## 2023-02-07 PROCEDURE — 3072F LOW RISK FOR RETINOPATHY: CPT | Mod: CPTII,S$GLB,, | Performed by: PODIATRIST

## 2023-02-07 PROCEDURE — 87070 CULTURE OTHR SPECIMN AEROBIC: CPT | Performed by: PODIATRIST

## 2023-02-07 PROCEDURE — 99999 PR PBB SHADOW E&M-EST. PATIENT-LVL III: ICD-10-PCS | Mod: PBBFAC,,, | Performed by: PODIATRIST

## 2023-02-07 PROCEDURE — 3061F NEG MICROALBUMINURIA REV: CPT | Mod: CPTII,S$GLB,, | Performed by: PODIATRIST

## 2023-02-07 PROCEDURE — 3061F PR NEG MICROALBUMINURIA RESULT DOCUMENTED/REVIEW: ICD-10-PCS | Mod: CPTII,S$GLB,, | Performed by: PODIATRIST

## 2023-02-07 PROCEDURE — 3072F PR LOW RISK FOR RETINOPATHY: ICD-10-PCS | Mod: CPTII,S$GLB,, | Performed by: PODIATRIST

## 2023-02-07 PROCEDURE — 99999 PR PBB SHADOW E&M-EST. PATIENT-LVL III: CPT | Mod: PBBFAC,,, | Performed by: PODIATRIST

## 2023-02-07 PROCEDURE — 1159F MED LIST DOCD IN RCRD: CPT | Mod: CPTII,S$GLB,, | Performed by: PODIATRIST

## 2023-02-07 PROCEDURE — 3046F HEMOGLOBIN A1C LEVEL >9.0%: CPT | Mod: CPTII,S$GLB,, | Performed by: PODIATRIST

## 2023-02-07 PROCEDURE — 73630 X-RAY EXAM OF FOOT: CPT | Mod: TC,RT

## 2023-02-07 RX ORDER — DOXYCYCLINE 100 MG/1
100 CAPSULE ORAL EVERY 12 HOURS
Qty: 14 CAPSULE | Refills: 0 | Status: SHIPPED | OUTPATIENT
Start: 2023-02-07 | End: 2023-02-14

## 2023-02-07 NOTE — PROGRESS NOTES
Subjective:       Patient ID: Kulwant Mueller Jr. is a 37 y.o. male.    Chief Complaint: Wound Check (C/o right big toe wound, 0 pain, diabetic wears casual shoes and socks)    HPI: Kulwant Mueller Jr. presents to the clinic today, for evaluation and treatment concerning an ulceration/wound/bulla(e) to the right 1st toe. The PMHx. does include DM w/ PVD and DM w/ peripheral neuropathy. The wound(s) have/has been present for several months (on and off). Most recent local wound care w/ [facility]. Patient does not have home health services.     Hemoglobin A1C   Date Value Ref Range Status   02/02/2023 >14.0 (H) 4.0 - 5.6 % Final     Comment:     ADA Screening Guidelines:  5.7-6.4%  Consistent with prediabetes  >or=6.5%  Consistent with diabetes    High levels of fetal hemoglobin interfere with the HbA1C  assay. Heterozygous hemoglobin variants (HbS, HgC, etc)do  not significantly interfere with this assay.   However, presence of multiple variants may affect accuracy.     06/08/2022 10.0 (H) 4.0 - 5.6 % Final     Comment:     ADA Screening Guidelines:  5.7-6.4%  Consistent with prediabetes  >or=6.5%  Consistent with diabetes    High levels of fetal hemoglobin interfere with the HbA1C  assay. Heterozygous hemoglobin variants (HbS, HgC, etc)do  not significantly interfere with this assay.   However, presence of multiple variants may affect accuracy.     09/22/2021 >14.0 (H) 4.0 - 5.6 % Final     Comment:     ADA Screening Guidelines:  5.7-6.4%  Consistent with prediabetes  >or=6.5%  Consistent with diabetes    High levels of fetal hemoglobin interfere with the HbA1C  assay. Heterozygous hemoglobin variants (HbS, HgC, etc)do  not significantly interfere with this assay.   However, presence of multiple variants may affect accuracy.         Review of patient's allergies indicates:   Allergen Reactions    Heparin analogues Nausea And Vomiting       Past Medical History:   Diagnosis Date    Anticoagulant long-term use      COVID-19 virus infection     Diabetes mellitus     Diabetes mellitus, type 2     Hypertension     May-Thurner syndrome        Family History   Problem Relation Age of Onset    Cancer Mother     Cancer Paternal Uncle     Cancer Paternal Grandfather     Irritable bowel syndrome Paternal Grandfather     Cancer Maternal Grandfather     Colon cancer Neg Hx     Esophageal cancer Neg Hx     Rectal cancer Neg Hx     Stomach cancer Neg Hx     Ulcerative colitis Neg Hx     Crohn's disease Neg Hx     Celiac disease Neg Hx        Social History     Socioeconomic History    Marital status:    Tobacco Use    Smoking status: Former     Packs/day: 0.00     Types: Cigarettes    Smokeless tobacco: Former    Tobacco comments:     occasionally    Substance and Sexual Activity    Alcohol use: Yes     Comment: occ    Drug use: No       Past Surgical History:   Procedure Laterality Date    APPENDECTOMY      ESOPHAGOGASTRODUODENOSCOPY N/A 1/31/2022    Procedure: EGD (ESOPHAGOGASTRODUODENOSCOPY);  Surgeon: Cindy Quiros MD;  Location: [facility];  Service: Endoscopy;  Laterality: N/A;    ESOPHAGOGASTRODUODENOSCOPY N/A 3/21/2022    Procedure: EGD (ESOPHAGOGASTRODUODENOSCOPY);  Surgeon: Tejas Mann MD;  Location: [facility];  Service: Endoscopy;  Laterality: N/A;    INCISION AND DRAINAGE FOOT Left 11/28/2021    Procedure: INCISION AND DRAINAGE, FOOT;  Surgeon: Bj Alicea DPM;  Location: [facility];  Service: Podiatry;  Laterality: Left;    stents in bilateral legs         Review of Systems   Constitutional:  Negative for chills, fatigue and fever.   HENT:  Negative for hearing loss.    Eyes:  Negative for photophobia and visual disturbance.   Respiratory:  Negative for cough, chest tightness, shortness of breath and wheezing.    Cardiovascular:  Negative for chest pain and palpitations.   Gastrointestinal:  Negative for constipation, diarrhea, nausea and vomiting.   Endocrine: Negative for cold intolerance and heat  "intolerance.   Genitourinary:  Negative for flank pain.   Musculoskeletal:  Positive for gait problem. Negative for neck pain and neck stiffness.   Skin:  Positive for color change and wound.   Neurological:  Positive for numbness. Negative for light-headedness and headaches.   Psychiatric/Behavioral:  Negative for sleep disturbance.         Objective:   Ht 6' 2" (1.88 m)   Wt 111.1 kg (245 lb)   BMI 31.46 kg/m²     Physical Exam  LOWER EXTREMITY PHYSICAL EXAMINATION    DERMATOLOGY: Ulceration, medial border, RLE 1st toe at the IPJ. The area measures 3.5cm x 2.10cm x 0.20cm. Mild malodor is noted. No drainage is noted. No bone is noted. Digital edema is noted.    VASCULAR: The RLE dorsalis pedis pulse is 2/4 and the posterior tibial pulse is 2/4. Hair growth is noted on the dorsal foot and digits. Proximal to distal, warm to warm. Capillary refill time is WNL at less than 3s.    ORTHOPEDIC: Manual Muscle Testing is 5/5 in all planes on the RLE, without pains, with and without resistance. Equinus is noted. Flexion deformity, RLE 1st toe.    NEUROLOGY: Sensation to light touch is intact. Proprioception is intact. Sensation to pin prick is reduced to absent. Vibratory sensation is diminished to the right lower extremity. Examination with 5.07 [place] monofilament reveals that protective sensation is not intact to the right plantar surfaces of the foot and digits, as the patient has no sensation/detection at greater than 4 distinct points of contact.     Assessment:     1. Diabetic ulcer of toe of right foot associated with type 2 diabetes mellitus, with fat layer exposed    2. Type 2 diabetes mellitus with peripheral neuropathy    3. Pain of joint of left ankle and foot    4. Contracture, right ankle    5. Acquired deformity of joint of big toe, right      Plan:     Diabetic ulcer of toe of right foot associated with type 2 diabetes mellitus, with fat layer exposed  -     X-Ray Foot Complete Right; Future; " Expected date: 02/07/2023  -     Aerobic culture (Specify Source)  -     doxycycline (VIBRAMYCIN) 100 MG Cap; Take 1 capsule (100 mg total) by mouth every 12 (twelve) hours. for 7 days  Dispense: 14 capsule; Refill: 0    Thorough discussion is had with the patient today, concerning the diagnosis, its etiology, and the treatment algorithm at present.     The wound was surgically debrided after adequate prep with alcohol and/or betadine paint. Excisional wound debridement was performed using sharp #10/#15 blade/rounded scalpel and tissue nipper, with removal of all non-viable skin and soft tissues; necrotic skin/tissue formation. The woundbase/wound bed was also debrided to encourage bleeding as to promote/stimulate healing. Debridement was excisional and included epidermal, dermal and subcutaneous tissues. Post debridement measurements are as above. Hemostasis was achieved. Patient tolerated procedure well and without complications. Local woundcare with alginate dressings and bandage thereafter.     Sx shoe for gait.    PO Abx.    Wound C&S.    Type 2 diabetes mellitus with peripheral neuropathy  Thorough discussion is had with the patient today, concerning the diagnosis, its etiology, and the treatment algorithm at present.     Pain of joint of left ankle and foot  Contracture, right ankle  Acquired deformity of joint of big toe, right  Thorough discussion is had with the patient today, concerning the diagnosis, its etiology, and the treatment algorithm at present.     The procedure of (RLE ISAAC with ostectomy of the joint vs IPJ fusion) was thoroughly explained to the patient. Its necessity and/or purpose and the implications therein were outlined, including any pertinent advantages and/or disadvantages, and possible complications, if any. Possible complications include recurrence of pathology and/or deformity, infection (cellulitis, drainage, purulence, malodor, etc...), pain, numbness, neuritis, edema, burning,  loss of function, need for further surgery, possible need for removal of any implanted hardware, soft tissue contracture and/or scarring, etc... No guarantees were given and/or implied. Post-operative expectations and weightbearing protocol is thoroughly explained to the patient, who acknowledges understanding.         No future appointments.

## 2023-02-09 ENCOUNTER — PATIENT MESSAGE (OUTPATIENT)
Dept: PODIATRY | Facility: CLINIC | Age: 38
End: 2023-02-09
Payer: COMMERCIAL

## 2023-02-10 LAB — BACTERIA SPEC AEROBE CULT: NORMAL

## 2023-02-17 ENCOUNTER — OFFICE VISIT (OUTPATIENT)
Dept: OPHTHALMOLOGY | Facility: CLINIC | Age: 38
End: 2023-02-17
Payer: COMMERCIAL

## 2023-02-17 ENCOUNTER — PATIENT MESSAGE (OUTPATIENT)
Dept: OPHTHALMOLOGY | Facility: CLINIC | Age: 38
End: 2023-02-17

## 2023-02-17 DIAGNOSIS — E11.3393 TYPE 2 DIABETES MELLITUS WITH MODERATE NONPROLIFERATIVE DIABETIC RETINOPATHY OF BOTH EYES WITHOUT MACULAR EDEMA, UNSPECIFIED WHETHER LONG TERM INSULIN USE: ICD-10-CM

## 2023-02-17 DIAGNOSIS — H20.9 IRITIS OF LEFT EYE: Primary | ICD-10-CM

## 2023-02-17 PROCEDURE — 99214 PR OFFICE/OUTPT VISIT, EST, LEVL IV, 30-39 MIN: ICD-10-PCS | Mod: S$GLB,,, | Performed by: OPTOMETRIST

## 2023-02-17 PROCEDURE — 99214 OFFICE O/P EST MOD 30 MIN: CPT | Mod: S$GLB,,, | Performed by: OPTOMETRIST

## 2023-02-17 PROCEDURE — 3066F NEPHROPATHY DOC TX: CPT | Mod: CPTII,S$GLB,, | Performed by: OPTOMETRIST

## 2023-02-17 PROCEDURE — 3046F HEMOGLOBIN A1C LEVEL >9.0%: CPT | Mod: CPTII,S$GLB,, | Performed by: OPTOMETRIST

## 2023-02-17 PROCEDURE — 99999 PR PBB SHADOW E&M-EST. PATIENT-LVL III: ICD-10-PCS | Mod: PBBFAC,,, | Performed by: OPTOMETRIST

## 2023-02-17 PROCEDURE — 1160F PR REVIEW ALL MEDS BY PRESCRIBER/CLIN PHARMACIST DOCUMENTED: ICD-10-PCS | Mod: CPTII,S$GLB,, | Performed by: OPTOMETRIST

## 2023-02-17 PROCEDURE — 1159F MED LIST DOCD IN RCRD: CPT | Mod: CPTII,S$GLB,, | Performed by: OPTOMETRIST

## 2023-02-17 PROCEDURE — 3061F PR NEG MICROALBUMINURIA RESULT DOCUMENTED/REVIEW: ICD-10-PCS | Mod: CPTII,S$GLB,, | Performed by: OPTOMETRIST

## 2023-02-17 PROCEDURE — 1160F RVW MEDS BY RX/DR IN RCRD: CPT | Mod: CPTII,S$GLB,, | Performed by: OPTOMETRIST

## 2023-02-17 PROCEDURE — 3046F PR MOST RECENT HEMOGLOBIN A1C LEVEL > 9.0%: ICD-10-PCS | Mod: CPTII,S$GLB,, | Performed by: OPTOMETRIST

## 2023-02-17 PROCEDURE — 1159F PR MEDICATION LIST DOCUMENTED IN MEDICAL RECORD: ICD-10-PCS | Mod: CPTII,S$GLB,, | Performed by: OPTOMETRIST

## 2023-02-17 PROCEDURE — 99999 PR PBB SHADOW E&M-EST. PATIENT-LVL III: CPT | Mod: PBBFAC,,, | Performed by: OPTOMETRIST

## 2023-02-17 PROCEDURE — 3061F NEG MICROALBUMINURIA REV: CPT | Mod: CPTII,S$GLB,, | Performed by: OPTOMETRIST

## 2023-02-17 PROCEDURE — 3066F PR DOCUMENTATION OF TREATMENT FOR NEPHROPATHY: ICD-10-PCS | Mod: CPTII,S$GLB,, | Performed by: OPTOMETRIST

## 2023-02-17 RX ORDER — ATROPINE SULFATE 10 MG/ML
1 SOLUTION/ DROPS OPHTHALMIC DAILY
Qty: 5 ML | Refills: 0 | Status: SHIPPED | OUTPATIENT
Start: 2023-02-17 | End: 2023-02-24

## 2023-02-17 RX ORDER — PREDNISOLONE ACETATE 10 MG/ML
SUSPENSION/ DROPS OPHTHALMIC
Qty: 5 ML | Refills: 0 | Status: ON HOLD | OUTPATIENT
Start: 2023-02-17 | End: 2024-01-15 | Stop reason: ALTCHOICE

## 2023-02-17 NOTE — PATIENT INSTRUCTIONS
Instil PRED FORTE into left eye every 2 hours while awake THEN instil PRED FORTE 4 times daily for 5 days    Instil Atrophie into left eye 1 time daily for 7 days

## 2023-02-22 NOTE — PROGRESS NOTES
Subjective:    Patient ID:  Kulwant Mueller Jr. is a 37 y.o. 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 He initially presented with LLE pain and swelling and found to have acute LLE extensive DVT from fem to peroneal. 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 EKOS removed after 48 hours with significant improvement in edema and pain. Discharged home on Xarelto. 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 Repeat LE venous doppler without evidence of residual thrombus. Had intractable N/V for a few days after initial procedure for DVT which resolved and again had similar symptoms after last intervention. Seen by GI and started on Reglan with improvement and eventual resolution of symptoms. Had EGD which was normal. Repeat hospitalization for recurrent LLE DVT with post thrombotic syndrome and again received catheter directed thrombolysis with EKOS with resolution of symptoms and DVT. Repeat LLE doppler with no evidence of DVT. Had developed bilateral leg edema mostly to the ankles and mostly on the right. Symptoms began after increasing dose of Xarelto from 10 mg to 20 mg. No evidence of post phlebitic syndrome. Had not been taking losartan again. Previous clinic visit had venous doppler ordered which was normal. Xarelto dose decreased to 10 mg daily per Heme/Onc rec's.   Had done well with no issues.  Previously developed LE edema (right ankle), no significant pain or claudication, now resolved. Last venous doppler with no evidence of DVT. Denies CP, SOB/DASILVA, orthopnea, PND,  syncope, palps. 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     1/26/2023:  Has wound on right great toe. Denies CP, SOB/DASILVA, orthopnea, PND, syncope, palps.    Review of Systems   Constitutional: Negative for malaise/fatigue.   HENT:  Negative for congestion.    Eyes:  Negative for blurred vision.   Cardiovascular:  Negative for chest pain, claudication, cyanosis, dyspnea on exertion, irregular heartbeat, leg swelling, near-syncope, orthopnea, palpitations, paroxysmal nocturnal dyspnea and syncope.   Respiratory:  Negative for shortness of breath.    Endocrine: Negative for polyuria.   Hematologic/Lymphatic: Negative for bleeding problem.   Skin:  Positive for poor wound healing. Negative for itching and rash.   Musculoskeletal:  Negative for joint swelling, muscle cramps and muscle weakness.   Gastrointestinal:  Negative for abdominal pain, hematemesis, hematochezia, melena, nausea and vomiting.   Genitourinary:  Negative for dysuria and hematuria.   Neurological:  Negative for dizziness, focal weakness, headaches, light-headedness, loss of balance and weakness.   Psychiatric/Behavioral:  Negative for depression. The patient is not nervous/anxious.       Objective:    Physical Exam  Constitutional:       Appearance: Normal appearance.   HENT:      Head: Normocephalic and atraumatic.   Eyes:      Extraocular Movements: Extraocular movements intact.   Musculoskeletal:      Right lower leg: No edema.      Left lower leg: No edema.   Neurological:      Mental Status: He is alert and oriented to person, place, and time. Mental status is at baseline.   Psychiatric:         Mood and Affect: Mood normal.         Behavior: Behavior normal.         Thought Content: Thought content normal.         Assessment:       1. May-Thurner syndrome    2. Hypertension associated with diabetes    3. History of intravascular stent placement    4. Recurrent deep vein thrombosis (DVT) of left lower extremity    5. Post-thrombotic syndrome of  left lower extremity    6. Chronic anticoagulation    7. Type 2 diabetes mellitus with complication, with long-term current use of insulin    8. Gastroesophageal reflux disease with esophagitis without hemorrhage      36 y/o pt with hx and presentation as above. Doing well from a cardiac perspective and compensated from a HF perspective. Continue wound care. Need to stay active. Discussed the etiology, evaluation, and management of DVT, May-Thurner, AC, HTN, DM, med compliance. Discussed the importance of med compliance, heart healthy diet, and regular exercise.      Plan:       -Continue current medical management  -Continue aggressive wound care  -f/u in 1 month

## 2023-05-03 DIAGNOSIS — Z79.4 TYPE 2 DIABETES MELLITUS WITH HYPERGLYCEMIA, WITH LONG-TERM CURRENT USE OF INSULIN: Chronic | ICD-10-CM

## 2023-05-03 DIAGNOSIS — E11.65 TYPE 2 DIABETES MELLITUS WITH HYPERGLYCEMIA, WITH LONG-TERM CURRENT USE OF INSULIN: Chronic | ICD-10-CM

## 2023-05-04 RX ORDER — METOCLOPRAMIDE 10 MG/1
10 TABLET ORAL
Qty: 360 TABLET | Refills: 4 | Status: SHIPPED | OUTPATIENT
Start: 2023-05-04 | End: 2023-07-26 | Stop reason: SDUPTHER

## 2023-06-27 ENCOUNTER — OFFICE VISIT (OUTPATIENT)
Dept: DIABETES | Facility: CLINIC | Age: 38
End: 2023-06-27
Payer: COMMERCIAL

## 2023-06-27 ENCOUNTER — PATIENT MESSAGE (OUTPATIENT)
Dept: DIABETES | Facility: CLINIC | Age: 38
End: 2023-06-27
Payer: COMMERCIAL

## 2023-06-27 DIAGNOSIS — Z79.4 TYPE 2 DIABETES MELLITUS WITH COMPLICATION, WITH LONG-TERM CURRENT USE OF INSULIN: Primary | Chronic | ICD-10-CM

## 2023-06-27 DIAGNOSIS — I87.1 MAY-THURNER SYNDROME: Chronic | ICD-10-CM

## 2023-06-27 DIAGNOSIS — E11.621 DIABETIC ULCER OF LEFT MIDFOOT ASSOCIATED WITH TYPE 2 DIABETES MELLITUS, WITH FAT LAYER EXPOSED: ICD-10-CM

## 2023-06-27 DIAGNOSIS — I82.402 RECURRENT DEEP VEIN THROMBOSIS (DVT) OF LEFT LOWER EXTREMITY: Chronic | ICD-10-CM

## 2023-06-27 DIAGNOSIS — L97.422 DIABETIC ULCER OF LEFT MIDFOOT ASSOCIATED WITH TYPE 2 DIABETES MELLITUS, WITH FAT LAYER EXPOSED: ICD-10-CM

## 2023-06-27 DIAGNOSIS — K20.80 LOS ANGELES GRADE C ESOPHAGITIS: ICD-10-CM

## 2023-06-27 DIAGNOSIS — K31.84 DIABETIC GASTROPARESIS ASSOCIATED WITH TYPE 2 DIABETES MELLITUS: ICD-10-CM

## 2023-06-27 DIAGNOSIS — E11.43 DIABETIC GASTROPARESIS ASSOCIATED WITH TYPE 2 DIABETES MELLITUS: ICD-10-CM

## 2023-06-27 DIAGNOSIS — E11.59 HYPERTENSION ASSOCIATED WITH DIABETES: ICD-10-CM

## 2023-06-27 DIAGNOSIS — I15.2 HYPERTENSION ASSOCIATED WITH DIABETES: ICD-10-CM

## 2023-06-27 DIAGNOSIS — E11.8 TYPE 2 DIABETES MELLITUS WITH COMPLICATION, WITH LONG-TERM CURRENT USE OF INSULIN: Primary | Chronic | ICD-10-CM

## 2023-06-27 PROCEDURE — 99214 PR OFFICE/OUTPT VISIT, EST, LEVL IV, 30-39 MIN: ICD-10-PCS | Mod: 95,,, | Performed by: NURSE PRACTITIONER

## 2023-06-27 PROCEDURE — 3066F PR DOCUMENTATION OF TREATMENT FOR NEPHROPATHY: ICD-10-PCS | Mod: CPTII,95,, | Performed by: NURSE PRACTITIONER

## 2023-06-27 PROCEDURE — 3072F PR LOW RISK FOR RETINOPATHY: ICD-10-PCS | Mod: CPTII,95,, | Performed by: NURSE PRACTITIONER

## 2023-06-27 PROCEDURE — 3072F LOW RISK FOR RETINOPATHY: CPT | Mod: CPTII,95,, | Performed by: NURSE PRACTITIONER

## 2023-06-27 PROCEDURE — 99214 OFFICE O/P EST MOD 30 MIN: CPT | Mod: 95,,, | Performed by: NURSE PRACTITIONER

## 2023-06-27 PROCEDURE — 3061F PR NEG MICROALBUMINURIA RESULT DOCUMENTED/REVIEW: ICD-10-PCS | Mod: CPTII,95,, | Performed by: NURSE PRACTITIONER

## 2023-06-27 PROCEDURE — 3066F NEPHROPATHY DOC TX: CPT | Mod: CPTII,95,, | Performed by: NURSE PRACTITIONER

## 2023-06-27 PROCEDURE — 3061F NEG MICROALBUMINURIA REV: CPT | Mod: CPTII,95,, | Performed by: NURSE PRACTITIONER

## 2023-06-27 PROCEDURE — 3046F HEMOGLOBIN A1C LEVEL >9.0%: CPT | Mod: CPTII,95,, | Performed by: NURSE PRACTITIONER

## 2023-06-27 PROCEDURE — 3046F PR MOST RECENT HEMOGLOBIN A1C LEVEL > 9.0%: ICD-10-PCS | Mod: CPTII,95,, | Performed by: NURSE PRACTITIONER

## 2023-06-27 RX ORDER — BLOOD-GLUCOSE SENSOR
1 EACH MISCELLANEOUS
Qty: 3 EACH | Refills: 11 | Status: SHIPPED | OUTPATIENT
Start: 2023-06-27 | End: 2023-07-10 | Stop reason: SDUPTHER

## 2023-06-27 RX ORDER — BLOOD-GLUCOSE,RECEIVER,CONT
1 EACH MISCELLANEOUS DAILY
Qty: 1 EACH | Refills: 0 | Status: SHIPPED | OUTPATIENT
Start: 2023-06-27 | End: 2024-06-26

## 2023-06-27 NOTE — PATIENT INSTRUCTIONS
PATIENT INSTRUCTIONS    Lifestyle modification with diabetic diet and at least 30 minutes of physical activity daily recommended.   Diabetic Foot Exam deferred today due to virtual visit. Will plan to be done at next in-person visit.   Refer to diabetes education.    Fasting labs to be done 1 week prior to follow up appointment with me.   Refer to IM to establish care with PCP.     Increase Tresiba to 55 units.   Continue Humalog three times daily with meals using correction scale below:    160-190: 1 unit  191-220: 2 units  221-250: 3 units  251-280: 4 units  281-310: 5 units  311-340: 6 units  341-370: 7 units  >370: 8 units    Dexcom G7 CGM ordered. Patient is on an intensive insulin regimen with [facility]. Patient has demonstrated an understanding of technology and is motivated to use the device correctly. Patient is expected to adhere to a diabetes treatment plan and is capable of recognizing alerts and alarms.   Please let me know as soon as you receive your continuous glucose monitoring supplies so we can get your training scheduled with our diabetic educators.

## 2023-06-27 NOTE — PROGRESS NOTES
Telemedicine Visit    The patient location is home  The chief complaint leading to consultation is: Diabetes    Visit type: audiovisual    Face to Face time with patient: 30  60 minutes of total time spent on the encounter, which includes face to face time and non-face to face time preparing to see the patient (eg, review of tests), Obtaining and/or reviewing separately obtained history, Documenting clinical information in the electronic or other health record, Independently interpreting results (not separately reported) and communicating results to the patient/family/caregiver, or Care coordination (not separately reported).  Each patient to whom he or she provides medical services by telemedicine is:  (1) informed of the relationship between the physician and patient and the respective role of any other health care provider with respect to management of the patient; and (2) notified that he or she may decline to receive medical services by telemedicine and may withdraw from such care at any time.  Notes:         Kulwant Mueller Jr. is a 38 y.o. male who  has a past medical history of Anticoagulant long-term use, COVID-19 virus infection, Diabetes mellitus, Diabetes mellitus, type 2, Hypertension, and May-Thurner syndrome., who present for an initial evaluation of Type 2 diabetes mellitus.     CHIEF COMPLAINT: Diabetes Consultation    PCP: Primary Doctor No - No current PCP.     The patient was initially diagnosed with diabetes at age 25.   Previously followed at [facility] by Lisa Bro NP, last visit 3/2022.    Stopped taking Humalog and Tresiba 3 weeks ago due to making him feel weak, but restarted last week due to blood sugars going up.     Previous failed treatments include:  Metformin    Social Documentation:  Patient lives in [place]. 3 children, 15, 13, 7.   Occupation:  at [employer] in [place].   Exercise: walking at work.     Current monitoring regimen: capillary  blood glucose monitoring with finger sticks.   Fastins.   Before lunch: 160-250  HS: 150-250.     Current Diabetes related symptoms/problems include hyperglycemia and visual disturbances, foot ulcer (followed by wound care at [facility]) and have been unchanged.     Diabetes related complications:   peripheral neuropathy.   denies Pancreatitis  reports Gastroparesis  denies DKA  denies Hx/family Hx of MEN2/MTC  denies Frequent UTIs/yeast infections    Cardiovascular Risk Factors: dyslipidemia, family history of premature cardiovascular disease, hypertension, male gender, and obesity (BMI >30 kg/m2).    Any episodes of hypoglycemia?  Yes. Hypoglycemia treatment reviewed with patient and education to be provided in AVS.   Hypoglycemia Unawareness? No  Severe hypoglycemia requiring 3rd party? No    Seen by Diabetes Education in last year? no    Diabetes Medications               insulin lispro 200 unit/mL (3 mL) InPn - HUMALOG Inject 14 Units into the skin 3 (three) times daily with meals. Plus sliding scale if needed. - USING SLIDING SCALE.    TRESIBA FLEXTOUCH U-200 200 unit/mL (3 mL) insulin pen Inject 50 Units into the skin once daily.     DIABETES DISEASE MANAGEMENT STATUS  Statin: Not taking  ACE/ARB: Not taking  Screening or Prevention Patient's value Goal Complete/Controlled?   HgA1C Testing and Control   Lab Results   Component Value Date    HGBA1C >14.0 (H) 2023      Annually/Less than 8% Yes   Lipid profile : 2023 Annually Yes   LDL control Lab Results   Component Value Date    LDLCALC 122.2 2023    Annually/Less than 100 mg/dl  No   Nephropathy screening Lab Results   Component Value Date    LABMICR <5.0 2023     Lab Results   Component Value Date    PROTEINUA Negative 2021     No results found for: UTPCR   Annually Yes   Blood pressure BP Readings from Last 1 Encounters:   23 105/74    Less than 140/90 Yes   Dilated retinal exam : 2023 Annually Yes    Foot exam   : 11/27/2021 Annually No   Patient's medications, allergies, past medical, surgical, social and family histories were reviewed and updated as appropriate.     Review of Systems   Constitutional:  Negative for weight loss.   Eyes:  Negative for blurred vision and double vision.   Cardiovascular:  Negative for chest pain.   Gastrointestinal:  Negative for nausea and vomiting.   Genitourinary:  Negative for frequency.   Musculoskeletal:  Negative for falls.   Neurological:  Negative for dizziness and weakness.   Endo/Heme/Allergies:  Negative for polydipsia.   Psychiatric/Behavioral:  Negative for depression.    All other systems reviewed and are negative.          Physical Exam  Constitutional:       General: He is not in acute distress.     Appearance: Normal appearance.   Pulmonary:      Effort: No respiratory distress.   Neurological:      Mental Status: He is alert and oriented to person, place, and time.   Psychiatric:         Mood and Affect: Mood normal.         Behavior: Behavior normal.      There were no vitals taken for this visit.  Wt Readings from Last 3 Encounters:   02/07/23 111.1 kg (245 lb)   01/26/23 111.1 kg (245 lb)   09/13/22 103.3 kg (227 lb 11.8 oz)       LAB REVIEW  Lab Results   Component Value Date     01/25/2022    K 3.8 01/25/2022     01/25/2022    CO2 27 01/25/2022    BUN 12 01/25/2022    CREATININE 1.2 01/25/2022    CALCIUM 9.5 01/25/2022    ANIONGAP 14 01/25/2022     Lab Results   Component Value Date    CPEPTIDE 1.17 01/11/2018    GLUTAMICACID 0.01 01/11/2018     Hemoglobin A1C   Date Value Ref Range Status   02/02/2023 >14.0 (H) 4.0 - 5.6 % Final     Comment:     ADA Screening Guidelines:  5.7-6.4%  Consistent with prediabetes  >or=6.5%  Consistent with diabetes    High levels of fetal hemoglobin interfere with the HbA1C  assay. Heterozygous hemoglobin variants (HbS, HgC, etc)do  not significantly interfere with this assay.   However, presence of multiple  variants may affect accuracy.     06/08/2022 10.0 (H) 4.0 - 5.6 % Final     Comment:     ADA Screening Guidelines:  5.7-6.4%  Consistent with prediabetes  >or=6.5%  Consistent with diabetes    High levels of fetal hemoglobin interfere with the HbA1C  assay. Heterozygous hemoglobin variants (HbS, HgC, etc)do  not significantly interfere with this assay.   However, presence of multiple variants may affect accuracy.     09/22/2021 >14.0 (H) 4.0 - 5.6 % Final     Comment:     ADA Screening Guidelines:  5.7-6.4%  Consistent with prediabetes  >or=6.5%  Consistent with diabetes    High levels of fetal hemoglobin interfere with the HbA1C  assay. Heterozygous hemoglobin variants (HbS, HgC, etc)do  not significantly interfere with this assay.   However, presence of multiple variants may affect accuracy.         ASSESSMENT    ICD-10-CM ICD-9-CM   1. Type 2 diabetes mellitus with complication, with long-term current use of insulin  E11.8 250.90    Z79.4 V58.67   2. Diabetic gastroparesis associated with type 2 diabetes mellitus  E11.43 250.60    K31.84 536.3   3. May-Thurner syndrome  I87.1 459.2   4. Hypertension associated with diabetes  E11.59 250.80    I15.2 401.9   5. Recurrent deep vein thrombosis (DVT) of left lower extremity  I82.402 453.40   6. [place] grade C esophagitis  K20.80 530.19   7. Diabetic ulcer of left midfoot associated with type 2 diabetes mellitus, with fat layer exposed  E11.621 250.80    L97.422 707.14        PLAN  Diagnoses and all orders for this visit:    Type 2 diabetes mellitus with complication, with long-term current use of insulin  -     blood-glucose meter,continuous (DEXCOM G7 ) Misc; 1 Device by Misc.(Non-Drug; Combo Route) route once daily.  -     blood-glucose sensor (DEXCOM G7 SENSOR) Justyna; 1 Device by Misc.(Non-Drug; Combo Route) route every 10 days.  -     Basic Metabolic Panel; Future  -     C-Peptide; Future  -     Glutamic Acid Decarboxylase; Future  -     Insulin  Antibody; Future  -     Hemoglobin A1C; Future  -     Lipid Panel; Future  -     Microalbumin/Creatinine Ratio, Urine; Future  -     Ambulatory referral/consult to Diabetes Education; Future  -     Ambulatory referral/consult to Internal Medicine; Future    Diabetic gastroparesis associated with type 2 diabetes mellitus  -     Ambulatory referral/consult to Internal Medicine; Future    May-Thurner syndrome    Hypertension associated with diabetes    Recurrent deep vein thrombosis (DVT) of left lower extremity    [place] grade C esophagitis    Diabetic ulcer of left midfoot associated with type 2 diabetes mellitus, with fat layer exposed        Reviewed pathophysiology of diabetes, complications related to the disease, importance of annual dilated eye exam and daily foot examination. Explained MOA, SE, dosage of medications. Written instructions given and reviewed with patient and patient verbalizes understanding.     PATIENT INSTRUCTIONS    Lifestyle modification with diabetic diet and at least 30 minutes of physical activity daily recommended.   Diabetic Foot Exam deferred today due to virtual visit. Will plan to be done at next in-person visit.   Refer to diabetes education.    Fasting labs to be done 1 week prior to follow up appointment with me.   Refer to IM to establish care with PCP.     Increase Tresiba to 55 units.   Continue Humalog three times daily with meals using correction scale below:    160-190: 1 unit  191-220: 2 units  221-250: 3 units  251-280: 4 units  281-310: 5 units  311-340: 6 units  341-370: 7 units  >370: 8 units    Dexcom G7 CGM ordered. Patient is on an intensive insulin regimen with MDI. Patient has demonstrated an understanding of technology and is motivated to use the device correctly. Patient is expected to adhere to a diabetes treatment plan and is capable of recognizing alerts and alarms.   Please let me know as soon as you receive your continuous glucose monitoring supplies so  we can get your training scheduled with our diabetic educators.      Follow up in about 4 weeks (around 7/25/2023) for VIRTUAL, FASTING LABS 1 WEEK PRIOR TO APPOINTMENT, SCHEDULE DM EDUCATION, DEXCOM - SHARING.    Portions of this note were prepared with [employer] Naturally Speaking voice recognition transcription software. Grammatical errors, including garbled syntax, mangle pronouns, and other bizarre constructions may be attributed to that software system.

## 2023-06-27 NOTE — Clinical Note
VIRTUAL, 4 WEEKS FASTING LABS 1 WEEK PRIOR TO APPOINTMENT,  SCHEDULE DM EDUCATION - AT THE [facility].  SCHEDULE APPT WITH IM TO ESTABLISH CARE WITH PCP,  DEXCOM ORDERED TODAY. - SENT TO [facility]

## 2023-07-05 DIAGNOSIS — E11.65 UNCONTROLLED TYPE 2 DIABETES MELLITUS WITH HYPERGLYCEMIA, WITH LONG-TERM CURRENT USE OF INSULIN: ICD-10-CM

## 2023-07-05 DIAGNOSIS — Z79.4 UNCONTROLLED TYPE 2 DIABETES MELLITUS WITH HYPERGLYCEMIA, WITH LONG-TERM CURRENT USE OF INSULIN: ICD-10-CM

## 2023-07-05 RX ORDER — INSULIN LISPRO 200 [IU]/ML
INJECTION, SOLUTION SUBCUTANEOUS
Qty: 12 EACH | Refills: 0 | OUTPATIENT
Start: 2023-07-05

## 2023-07-07 ENCOUNTER — PATIENT MESSAGE (OUTPATIENT)
Dept: DIABETES | Facility: CLINIC | Age: 38
End: 2023-07-07
Payer: COMMERCIAL

## 2023-07-07 RX ORDER — INSULIN ASPART 100 [IU]/ML
14 INJECTION, SOLUTION INTRAVENOUS; SUBCUTANEOUS
Qty: 12 ML | Refills: 0 | Status: SHIPPED | OUTPATIENT
Start: 2023-07-07 | End: 2023-08-28 | Stop reason: SDUPTHER

## 2023-07-07 NOTE — TELEPHONE ENCOUNTER
Courtesy refill of Novolog given for coverage of MAT [place]. Formulary no longer covers Humalog. Please let pt know dosing will be same and he can finish current supply of Humalog first.     Chandler Mckeon PA-C  Diabetes Management

## 2023-07-07 NOTE — TELEPHONE ENCOUNTER
Almaz,     Do you want to send in one of the preferred medications or do you want to try and do a PA?

## 2023-07-10 ENCOUNTER — PATIENT MESSAGE (OUTPATIENT)
Dept: DIABETES | Facility: CLINIC | Age: 38
End: 2023-07-10
Payer: COMMERCIAL

## 2023-07-10 DIAGNOSIS — Z79.4 TYPE 2 DIABETES MELLITUS WITH COMPLICATION, WITH LONG-TERM CURRENT USE OF INSULIN: Chronic | ICD-10-CM

## 2023-07-10 DIAGNOSIS — E11.8 TYPE 2 DIABETES MELLITUS WITH COMPLICATION, WITH LONG-TERM CURRENT USE OF INSULIN: Chronic | ICD-10-CM

## 2023-07-10 RX ORDER — INSULIN ASPART 100 [IU]/ML
14 INJECTION, SOLUTION INTRAVENOUS; SUBCUTANEOUS
Qty: 12 ML | Refills: 0 | Status: CANCELLED | OUTPATIENT
Start: 2023-07-10 | End: 2024-07-09

## 2023-07-11 RX ORDER — INSULIN ASPART 100 [IU]/ML
14 INJECTION, SOLUTION INTRAVENOUS; SUBCUTANEOUS
Qty: 12.6 ML | Refills: 3 | Status: ON HOLD | OUTPATIENT
Start: 2023-07-11 | End: 2024-02-01

## 2023-07-11 RX ORDER — BLOOD-GLUCOSE SENSOR
1 EACH MISCELLANEOUS
Qty: 3 EACH | Refills: 11 | Status: SHIPPED | OUTPATIENT
Start: 2023-07-11 | End: 2024-07-10

## 2023-07-19 DIAGNOSIS — E11.8 TYPE 2 DIABETES MELLITUS WITH COMPLICATION, WITH LONG-TERM CURRENT USE OF INSULIN: Primary | ICD-10-CM

## 2023-07-19 DIAGNOSIS — Z79.4 TYPE 2 DIABETES MELLITUS WITH COMPLICATION, WITH LONG-TERM CURRENT USE OF INSULIN: Primary | ICD-10-CM

## 2023-07-20 ENCOUNTER — LAB VISIT (OUTPATIENT)
Dept: LAB | Facility: HOSPITAL | Age: 38
End: 2023-07-20
Attending: NURSE PRACTITIONER
Payer: COMMERCIAL

## 2023-07-20 DIAGNOSIS — E11.8 TYPE 2 DIABETES MELLITUS WITH COMPLICATION, WITH LONG-TERM CURRENT USE OF INSULIN: Primary | ICD-10-CM

## 2023-07-20 DIAGNOSIS — Z79.4 TYPE 2 DIABETES MELLITUS WITH COMPLICATION, WITH LONG-TERM CURRENT USE OF INSULIN: Primary | ICD-10-CM

## 2023-07-20 LAB
ALBUMIN/CREAT UR: 115.2 UG/MG (ref 0–30)
ANION GAP SERPL CALC-SCNC: 8 MMOL/L (ref 8–16)
C PEPTIDE SERPL-MCNC: 0.87 NG/ML (ref 0.78–5.19)
CALCIUM SERPL-MCNC: 9.2 MG/DL (ref 8.7–10.5)
CHLORIDE SERPL-SCNC: 102 MMOL/L (ref 95–110)
CHOLEST SERPL-MCNC: 151 MG/DL (ref 120–199)
CHOLEST/HDLC SERPL: 4.1 {RATIO} (ref 2–5)
CO2 SERPL-SCNC: 32 MMOL/L (ref 23–29)
CREAT SERPL-MCNC: 0.93 MG/DL (ref 0.5–1.4)
CREAT UR-MCNC: 211 MG/DL (ref 23–375)
EST. GFR  (NO RACE VARIABLE): >60 ML/MIN/1.73 M^2
ESTIMATED AVG GLUCOSE: 255 MG/DL (ref 68–131)
GLUCOSE SERPL-MCNC: 85 MG/DL (ref 70–110)
HBA1C MFR BLD: 10.5 % (ref 4–5.6)
HDLC SERPL-MCNC: 37 MG/DL (ref 40–75)
HDLC SERPL: 24.5 % (ref 20–50)
LDLC SERPL CALC-MCNC: 102.2 MG/DL (ref 63–159)
MICROALBUMIN UR DL<=1MG/L-MCNC: 243 UG/ML
NONHDLC SERPL-MCNC: 114 MG/DL
POTASSIUM SERPL-SCNC: 4.2 MMOL/L (ref 3.5–5.1)
SODIUM SERPL-SCNC: 142 MMOL/L (ref 136–145)
TRIGL SERPL-MCNC: 59 MG/DL (ref 30–150)
UUN UR-MCNC: 16 MG/DL (ref 2–20)

## 2023-07-20 PROCEDURE — 86341 ISLET CELL ANTIBODY: CPT | Mod: PO | Performed by: NURSE PRACTITIONER

## 2023-07-20 PROCEDURE — 84681 ASSAY OF C-PEPTIDE: CPT | Mod: PO | Performed by: NURSE PRACTITIONER

## 2023-07-20 PROCEDURE — 82570 ASSAY OF URINE CREATININE: CPT | Mod: PO | Performed by: NURSE PRACTITIONER

## 2023-07-20 PROCEDURE — 86337 INSULIN ANTIBODIES: CPT | Mod: PO | Performed by: NURSE PRACTITIONER

## 2023-07-20 PROCEDURE — 83036 HEMOGLOBIN GLYCOSYLATED A1C: CPT | Performed by: NURSE PRACTITIONER

## 2023-07-20 PROCEDURE — 80048 BASIC METABOLIC PNL TOTAL CA: CPT | Mod: PO | Performed by: NURSE PRACTITIONER

## 2023-07-20 PROCEDURE — 80061 LIPID PANEL: CPT | Performed by: NURSE PRACTITIONER

## 2023-07-21 NOTE — PROGRESS NOTES
A1c better, but does remain well above goal at 10.5%. Lipids stable. Normal kidney function. C-peptide is normal, confirming the pancreas can produce some insulin (noted decrease from previous 5 years ago). Antibodies remain pending. Evidence of protein in the urine - emphasis on gaining optimal control of diabetes as well as high blood pressure to prevent progression to chronic kidney disease. Follow up as scheduled.

## 2023-07-24 LAB
GAD65 AB SER-SCNC: 0.02 NMOL/L
INSULIN AB SER-SCNC: 0 NMOL/L (ref 0–0.02)

## 2023-07-26 DIAGNOSIS — E11.65 TYPE 2 DIABETES MELLITUS WITH HYPERGLYCEMIA, WITH LONG-TERM CURRENT USE OF INSULIN: Chronic | ICD-10-CM

## 2023-07-26 DIAGNOSIS — Z79.4 TYPE 2 DIABETES MELLITUS WITH HYPERGLYCEMIA, WITH LONG-TERM CURRENT USE OF INSULIN: Chronic | ICD-10-CM

## 2023-07-26 RX ORDER — PANTOPRAZOLE SODIUM 40 MG/1
40 TABLET, DELAYED RELEASE ORAL DAILY
Qty: 90 TABLET | Refills: 0 | Status: SHIPPED | OUTPATIENT
Start: 2023-07-26 | End: 2024-02-26 | Stop reason: SDUPTHER

## 2023-07-26 NOTE — TELEPHONE ENCOUNTER
Refills requested on protonix 40 mg BID; last refills given on 02/11/23 180 tablets with 1 refill; last office visit on 03/29/22

## 2023-07-27 ENCOUNTER — PATIENT MESSAGE (OUTPATIENT)
Dept: GASTROENTEROLOGY | Facility: CLINIC | Age: 38
End: 2023-07-27
Payer: COMMERCIAL

## 2023-07-27 DIAGNOSIS — K31.84 DIABETIC GASTROPARESIS ASSOCIATED WITH TYPE 2 DIABETES MELLITUS: Primary | ICD-10-CM

## 2023-07-27 DIAGNOSIS — E11.43 DIABETIC GASTROPARESIS ASSOCIATED WITH TYPE 2 DIABETES MELLITUS: Primary | ICD-10-CM

## 2023-07-27 RX ORDER — METOCLOPRAMIDE 10 MG/1
5 TABLET ORAL
Qty: 270 TABLET | Refills: 3 | Status: SHIPPED | OUTPATIENT
Start: 2023-07-27

## 2023-07-27 NOTE — TELEPHONE ENCOUNTER
Mr. Kulwant Blair Ami Miguel.,    I hope this message finds you well. I am writing to provide you with essential information concerning the use of Reglan (generic name: metoclopramide), a medication commonly prescribed to treat gastrointestinal issues such as nausea, vomiting, and heartburn.    While Reglan can be effective in managing these conditions, it is crucial to be aware of potential side effects associated with its use. Please read the following information carefully to ensure your safety and wellbein. **Tardive Dyskinesia:** Reglan may lead to a serious neurological disorder called Tardive Dyskinesia (TD). TD causes involuntary movements, especially in the face, lips, tongue, and limbs. These movements can be repetitive and uncontrollable and may become permanent, even after stopping the medication. The risk of TD is higher in elderly patients and those who use Reglan for an extended period.    2. **Extrapyramidal Symptoms (EPS):** EPS refers to a group of movement disorders that can occur with Reglan use. Symptoms may include muscle spasms, restlessness, tremors, and stiffness. Inform your healthcare provider immediately if you experience any of these symptoms.    3. **Neuroleptic Malignant Syndrome (NMS):** Although rare, Reglan can trigger NMS, a severe and potentially fatal reaction. NMS symptoms include high fever, rigid muscles, confusion, and changes in heart rate and blood pressure. Seek immediate medical attention if you experience any of these signs.    4. **Depression and Suicidal Thoughts:** Some patients have reported feelings of depression or suicidal thoughts while taking Reglan. If you or someone you know experiences mood changes or thoughts of self-harm, please contact your healthcare provider immediately.    5. **Parkinsonism:** In some cases, Reglan use has been associated with symptoms similar to Parkinson's disease, such as resting tremors, slowed movements, and muscle  stiffness.    To ensure your safety while taking Reglan, please take note of the following recommendations:    - Only take Reglan as prescribed by your healthcare provider. I have reduced the dose to 5 mg three times a day.   - Inform us or your primary care doctor about any pre-existing medical conditions, especially those related to the nervous system.  - Report any unusual or persistent side effects to your healthcare provider promptly.  - Avoid alcohol and other central nervous system depressants while on Reglan.  - Do not stop or adjust your Reglan dosage without consulting your healthcare provider.    Your health and well-being are of utmost importance to us. If you have any questions or concerns about Reglan or its potential side effects, do not hesitate to reach out to your healthcare provider or our office.    Thanks for trusting us with your healthcare needs and using [facility]. If you want to ask us a question, you can do so by replying to this message or by calling [phone].    Sincerely,    Tejas Mann M.D.

## 2023-07-28 ENCOUNTER — PATIENT MESSAGE (OUTPATIENT)
Dept: CARDIOLOGY | Facility: CLINIC | Age: 38
End: 2023-07-28
Payer: COMMERCIAL

## 2023-07-31 ENCOUNTER — PATIENT MESSAGE (OUTPATIENT)
Dept: DIABETES | Facility: CLINIC | Age: 38
End: 2023-07-31
Payer: COMMERCIAL

## 2023-07-31 ENCOUNTER — OFFICE VISIT (OUTPATIENT)
Dept: DIABETES | Facility: CLINIC | Age: 38
End: 2023-07-31
Payer: COMMERCIAL

## 2023-07-31 DIAGNOSIS — E11.8 TYPE 2 DIABETES MELLITUS WITH COMPLICATION, WITH LONG-TERM CURRENT USE OF INSULIN: Primary | Chronic | ICD-10-CM

## 2023-07-31 DIAGNOSIS — I15.2 HYPERTENSION ASSOCIATED WITH DIABETES: ICD-10-CM

## 2023-07-31 DIAGNOSIS — E11.59 HYPERTENSION ASSOCIATED WITH DIABETES: ICD-10-CM

## 2023-07-31 DIAGNOSIS — Z79.4 TYPE 2 DIABETES MELLITUS WITH COMPLICATION, WITH LONG-TERM CURRENT USE OF INSULIN: Primary | Chronic | ICD-10-CM

## 2023-07-31 PROCEDURE — 3060F PR POS MICROALBUMINURIA RESULT DOCUMENTED/REVIEW: ICD-10-PCS | Mod: CPTII,95,, | Performed by: NURSE PRACTITIONER

## 2023-07-31 PROCEDURE — 3072F LOW RISK FOR RETINOPATHY: CPT | Mod: CPTII,95,, | Performed by: NURSE PRACTITIONER

## 2023-07-31 PROCEDURE — 3046F PR MOST RECENT HEMOGLOBIN A1C LEVEL > 9.0%: ICD-10-PCS | Mod: CPTII,95,, | Performed by: NURSE PRACTITIONER

## 2023-07-31 PROCEDURE — 99214 PR OFFICE/OUTPT VISIT, EST, LEVL IV, 30-39 MIN: ICD-10-PCS | Mod: 95,,, | Performed by: NURSE PRACTITIONER

## 2023-07-31 PROCEDURE — 3066F PR DOCUMENTATION OF TREATMENT FOR NEPHROPATHY: ICD-10-PCS | Mod: CPTII,95,, | Performed by: NURSE PRACTITIONER

## 2023-07-31 PROCEDURE — 3046F HEMOGLOBIN A1C LEVEL >9.0%: CPT | Mod: CPTII,95,, | Performed by: NURSE PRACTITIONER

## 2023-07-31 PROCEDURE — 3060F POS MICROALBUMINURIA REV: CPT | Mod: CPTII,95,, | Performed by: NURSE PRACTITIONER

## 2023-07-31 PROCEDURE — 3066F NEPHROPATHY DOC TX: CPT | Mod: CPTII,95,, | Performed by: NURSE PRACTITIONER

## 2023-07-31 PROCEDURE — 3072F PR LOW RISK FOR RETINOPATHY: ICD-10-PCS | Mod: CPTII,95,, | Performed by: NURSE PRACTITIONER

## 2023-07-31 PROCEDURE — 99214 OFFICE O/P EST MOD 30 MIN: CPT | Mod: 95,,, | Performed by: NURSE PRACTITIONER

## 2023-07-31 NOTE — PROGRESS NOTES
The patient location is: Home  The chief complaint leading to consultation is: Diabetes    Visit type: audiovisual    Face to Face time with patient: 15  30 minutes of total time spent on the encounter, which includes face to face time and non-face to face time preparing to see the patient (eg, review of tests), Obtaining and/or reviewing separately obtained history, Documenting clinical information in the electronic or other health record, Independently interpreting results (not separately reported) and communicating results to the patient/family/caregiver, or Care coordination (not separately reported).  Each patient to whom he or she provides medical services by telemedicine is:  (1) informed of the relationship between the physician and patient and the respective role of any other health care provider with respect to management of the patient; and (2) notified that he or she may decline to receive medical services by telemedicine and may withdraw from such care at any time.    Notes:    Kulwant Mueller Jr. is a 38 y.o. male who presents for a follow up evaluation of Type 2 diabetes mellitus.     CHIEF COMPLAINT: Diabetes Consultation    PCP: Primary Doctor No      Initial visit with me - 6/27/2023    The patient was initially diagnosed with diabetes at age 25.   Previously followed at [facility] by Lisa Bro NP, last visit 3/2022.    Stopped taking Humalog and Tresiba 3 weeks ago due to making him feel weak, but restarted last week due to blood sugars going up.      Previous failed treatments include:  Metformin     Social Documentation:  Patient lives in [place]. 3 children, 15, 13, 7.   Occupation:  at [employer] in [place].   Exercise: walking at work.       Diabetes related complications:   peripheral neuropathy.   denies Pancreatitis  reports Gastroparesis  denies DKA  denies Hx/family Hx of MEN2/MTC  denies Frequent UTIs/yeast infections     Cardiovascular Risk  "Factors: dyslipidemia, family history of premature cardiovascular disease, hypertension, male gender, and obesity (BMI >30 kg/m2).    Diabetes Medications               insulin aspart U-100 (NOVOLOG FLEXPEN U-100 INSULIN) 100 unit/mL (3 mL) InPn pen Inject 14 Units into the skin 3 (three) times daily with meals.    insulin aspart U-100 (NOVOLOG U-100 INSULIN ASPART) 100 unit/mL injection Inject 14 Units into the skin 3 (three) times daily before meals.    insulin lispro 200 unit/mL (3 mL) InPn Inject 14 Units into the skin 3 (three) times daily with meals. Plus sliding scale if needed.    TRESIBA FLEXTOUCH U-200 200 unit/mL (3 mL) insulin pen Inject 50 Units into the skin once daily.     Current monitoring regimen:  Dexcom G7 - need to set up data sharing.   Recent hypoglycemic episodes: No.   Patient compliant with glucose checks and medication administration? Yes    DIABETES MANAGEMENT STATUS  Statin: Not taking  ACE/ARB: Not taking  Screening or Prevention Patient's value Goal Complete/Controlled?   HgA1C Testing and Control   Lab Results   Component Value Date    HGBA1C 10.5 (H) 07/20/2023      Annually/Less than 8% No   Lipid profile : 07/20/2023 Annually Yes   LDL control Lab Results   Component Value Date    LDLCALC 102.2 07/20/2023    Annually/Less than 100 mg/dl  No   Nephropathy screening Lab Results   Component Value Date    LABMICR 243.0 07/20/2023     Lab Results   Component Value Date    PROTEINUA Negative 11/27/2021     No results found for: "UTPCR"   Annually Yes   Blood pressure BP Readings from Last 1 Encounters:   01/26/23 105/74    Less than 140/90 Yes   Dilated retinal exam : 02/17/2023 Annually Yes   Foot exam   : 11/27/2021 Annually No   Patient's medications, allergies, surgical, social and family histories were reviewed and updated as appropriate.     Review of Systems   Constitutional:  Negative for weight loss.   Eyes:  Negative for blurred vision and double vision.   Cardiovascular:  " Negative for chest pain.   Gastrointestinal:  Negative for nausea and vomiting.   Genitourinary:  Negative for frequency.   Musculoskeletal:  Negative for falls.   Neurological:  Negative for dizziness and weakness.   Endo/Heme/Allergies:  Negative for polydipsia.   Psychiatric/Behavioral:  Negative for depression.    All other systems reviewed and are negative.       Physical Exam  Constitutional:       General: He is not in acute distress.     Appearance: Normal appearance.   Pulmonary:      Effort: No respiratory distress.   Neurological:      Mental Status: He is alert and oriented to person, place, and time.   Psychiatric:         Mood and Affect: Mood normal.         Behavior: Behavior normal.        There were no vitals taken for this visit.  Wt Readings from Last 3 Encounters:   02/07/23 111.1 kg (245 lb)   01/26/23 111.1 kg (245 lb)   09/13/22 103.3 kg (227 lb 11.8 oz)       LAB REVIEW  Lab Results   Component Value Date     07/20/2023    K 4.2 07/20/2023     07/20/2023    CO2 32 (H) 07/20/2023    BUN 16 07/20/2023    CREATININE 0.93 07/20/2023    CALCIUM 9.2 07/20/2023    ANIONGAP 8 07/20/2023    EGFRNORACEVR >60.0 07/20/2023     Lab Results   Component Value Date    CPEPTIDE 0.87 07/20/2023    GLUTAMICACID 0.02 07/20/2023     Hemoglobin A1C   Date Value Ref Range Status   07/20/2023 10.5 (H) 4.0 - 5.6 % Final     Comment:     ADA Screening Guidelines:  5.7-6.4%  Consistent with prediabetes  >or=6.5%  Consistent with diabetes    High levels of fetal hemoglobin interfere with the HbA1C  assay. Heterozygous hemoglobin variants (HbS, HgC, etc)do  not significantly interfere with this assay.   However, presence of multiple variants may affect accuracy.     02/02/2023 >14.0 (H) 4.0 - 5.6 % Final     Comment:     ADA Screening Guidelines:  5.7-6.4%  Consistent with prediabetes  >or=6.5%  Consistent with diabetes    High levels of fetal hemoglobin interfere with the HbA1C  assay. Heterozygous  hemoglobin variants (HbS, HgC, etc)do  not significantly interfere with this assay.   However, presence of multiple variants may affect accuracy.     2022 10.0 (H) 4.0 - 5.6 % Final     Comment:     ADA Screening Guidelines:  5.7-6.4%  Consistent with prediabetes  >or=6.5%  Consistent with diabetes    High levels of fetal hemoglobin interfere with the HbA1C  assay. Heterozygous hemoglobin variants (HbS, HgC, etc)do  not significantly interfere with this assay.   However, presence of multiple variants may affect accuracy.          ASSESSMENT    ICD-10-CM ICD-9-CM   1. Type 2 diabetes mellitus with complication, with long-term current use of insulin  E11.8 250.90    Z79.4 V58.67   2. Hypertension associated with diabetes  E11.59 250.80    I15.2 401.9       PLAN  Diagnoses and all orders for this visit:    Type 2 diabetes mellitus with complication, with long-term current use of insulin  -     empagliflozin (JARDIANCE) 25 mg tablet; Take 1 tablet (25 mg total) by mouth once daily.    Hypertension associated with diabetes        Reviewed pathophysiology of diabetes, complications related to the disease, importance of annual dilated eye exam and daily foot examination. Explained MOA, SE, dosage of medications. Written instructions given and reviewed with patient and patient verbalizes understanding.     PATIENT INSTRUCTIONS     Lifestyle modification with diabetic diet and at least 30 minutes of physical activity daily recommended.   Diabetic Foot Exam deferred today due to virtual visit. Will plan to be done at next in-person visit.      Start Jardiance 25 mg by mouth daily.   Continue Tresiba 55 units.   Continue Humalog three times daily with meals using correction scale below:     160-190: 1 unit  191-220: 2 units  221-250: 3 units  251-280: 4 units  281-310: 5 units  311-340: 6 units  341-370: 7 units  >370: 8 units    Continue Dexcom G7 CGM  Blood Sugar Goals:       Fastin-130.       1-2 hours after a  meal: Less than 180.     Follow up in about 4 weeks (around 8/28/2023) for VIRTUAL, DEXCOM - SHARING.    Portions of this note were prepared with [employer] voice recognition transcription software. Grammatical errors, including garbled syntax, mangle pronouns, and other bizarre constructions may be attributed to that software system.

## 2023-07-31 NOTE — PATIENT INSTRUCTIONS
PATIENT INSTRUCTIONS     Lifestyle modification with diabetic diet and at least 30 minutes of physical activity daily recommended.   Diabetic Foot Exam deferred today due to virtual visit. Will plan to be done at next in-person visit.      Start Jardiance 25 mg by mouth daily.   Continue Tresiba 55 units.   Continue Humalog three times daily with meals using correction scale below:     160-190: 1 unit  191-220: 2 units  221-250: 3 units  251-280: 4 units  281-310: 5 units  311-340: 6 units  341-370: 7 units  >370: 8 units    Continue Dexcom G7 CGM  Blood Sugar Goals:       Fastin-130.       1-2 hours after a meal: Less than 180.

## 2023-08-18 DIAGNOSIS — E11.8 TYPE 2 DIABETES MELLITUS WITH COMPLICATION, WITH LONG-TERM CURRENT USE OF INSULIN: Chronic | ICD-10-CM

## 2023-08-18 DIAGNOSIS — Z79.4 TYPE 2 DIABETES MELLITUS WITH COMPLICATION, WITH LONG-TERM CURRENT USE OF INSULIN: Chronic | ICD-10-CM

## 2023-08-18 NOTE — TELEPHONE ENCOUNTER
[facility]     Chloe Ramirez, FNP-C  Diabetes Management

## 2023-08-28 ENCOUNTER — OFFICE VISIT (OUTPATIENT)
Dept: DIABETES | Facility: CLINIC | Age: 38
End: 2023-08-28
Payer: COMMERCIAL

## 2023-08-28 DIAGNOSIS — E11.8 TYPE 2 DIABETES MELLITUS WITH COMPLICATION, WITH LONG-TERM CURRENT USE OF INSULIN: Chronic | ICD-10-CM

## 2023-08-28 DIAGNOSIS — Z79.4 TYPE 2 DIABETES MELLITUS WITH COMPLICATION, WITH LONG-TERM CURRENT USE OF INSULIN: Chronic | ICD-10-CM

## 2023-08-28 PROCEDURE — 3060F POS MICROALBUMINURIA REV: CPT | Mod: CPTII,95,, | Performed by: NURSE PRACTITIONER

## 2023-08-28 PROCEDURE — 3066F PR DOCUMENTATION OF TREATMENT FOR NEPHROPATHY: ICD-10-PCS | Mod: CPTII,95,, | Performed by: NURSE PRACTITIONER

## 2023-08-28 PROCEDURE — 3046F PR MOST RECENT HEMOGLOBIN A1C LEVEL > 9.0%: ICD-10-PCS | Mod: CPTII,95,, | Performed by: NURSE PRACTITIONER

## 2023-08-28 PROCEDURE — 3060F PR POS MICROALBUMINURIA RESULT DOCUMENTED/REVIEW: ICD-10-PCS | Mod: CPTII,95,, | Performed by: NURSE PRACTITIONER

## 2023-08-28 PROCEDURE — 3066F NEPHROPATHY DOC TX: CPT | Mod: CPTII,95,, | Performed by: NURSE PRACTITIONER

## 2023-08-28 PROCEDURE — 99214 PR OFFICE/OUTPT VISIT, EST, LEVL IV, 30-39 MIN: ICD-10-PCS | Mod: 95,,, | Performed by: NURSE PRACTITIONER

## 2023-08-28 PROCEDURE — 99214 OFFICE O/P EST MOD 30 MIN: CPT | Mod: 95,,, | Performed by: NURSE PRACTITIONER

## 2023-08-28 PROCEDURE — 3046F HEMOGLOBIN A1C LEVEL >9.0%: CPT | Mod: CPTII,95,, | Performed by: NURSE PRACTITIONER

## 2023-08-28 RX ORDER — GLUCAGON INJECTION, SOLUTION 1 MG/.2ML
1 INJECTION, SOLUTION SUBCUTANEOUS
Qty: 2 EACH | Refills: 5 | Status: SHIPPED | OUTPATIENT
Start: 2023-08-28

## 2023-08-28 NOTE — PATIENT INSTRUCTIONS
PATIENT INSTRUCTIONS     Lifestyle modification with diabetic diet and at least 30 minutes of physical activity daily recommended.   Diabetic Foot Exam deferred today due to virtual visit. Will plan to be done at next in-person visit.      Start Jardiance 25 mg by mouth daily.   Continue Tresiba 55 units.   Continue Humalog three times daily with meals using correction scale below:     160-190: 1 unit  191-220: 2 units  221-250: 3 units  251-280: 4 units  281-310: 5 units  311-340: 6 units  341-370: 7 units  >370: 8 units    Continue Dexcom G7 CGM - SEE BELOW FOR SHARING CODE AND INSTRUCTIONS.   Blood Sugar Goals:       Fastin-130.       1-2 hours after a meal: Less than 180.     GVOKE Hypopen ordered for severe hypoglycemia emergencies - patient instructions provided with AVS for use and administration.  Use this link to watch instructional video on [employer] - https://www.[employer].com/watch?v=[email or web address]    For low blood sugar between 55-69 mg/dL, raise it by following the 15-15 rule: have 15 grams of carbs and check your blood sugar after 15 minutes. If its still below your target range, have another serving. Repeat these steps until its in your target range. Once its in range, eat a nutritious meal or snack to ensure it doesnt get too low again.    These items have about 15 grams of carbs:  4 ounces (½ cup) of juice or regular soda.  1 tablespoon of sugar, honey, or syrup.  3-4 glucose tablets.  1 dose of glucose gel (usually 1 tube; follow instructions).    NOTIFY YOUR DIABETES MANAGEMENT PROVIDER FOR ANY GLUCOSE LESS THAN 70.      I recommend using a skin barrier wipe prevent your CGM sensor from coming off too soon. Skin Glu is a barrier wipe used pre-CGM application that is available over the counter at [employer]. Skin barrier wipes prepare your skin for your CGM sensor and overlay patch and give the skin extra protection and help your CGM stick better. Skin-Tac is also available on Amazon.    DEXCOM G7  "DATA SHARING    Download Dexcom Clarity Kiarra in Kiarra-Store or Play-Store.   Log in using same log-in information as you use for your Dexcom G7 Kiarra.  Tap Profile.  Tap Authorize Sharing.  Tap Accept Invitation.  Enter this Sharing Code provided by your clinic. - [id number]  This code will  on 23.   Select your Date of Birth.  Tap Continue.  Tap the "I consent to share my data with my clinic" box.  Tap Yes, Share My Data.    "

## 2023-08-28 NOTE — PROGRESS NOTES
The patient location is: Home  The chief complaint leading to consultation is: Diabetes    Visit type: audiovisual    Face to Face time with patient: 15  30 minutes of total time spent on the encounter, which includes face to face time and non-face to face time preparing to see the patient (eg, review of tests), Obtaining and/or reviewing separately obtained history, Documenting clinical information in the electronic or other health record, Independently interpreting results (not separately reported) and communicating results to the patient/family/caregiver, or Care coordination (not separately reported).  Each patient to whom he or she provides medical services by telemedicine is:  (1) informed of the relationship between the physician and patient and the respective role of any other health care provider with respect to management of the patient; and (2) notified that he or she may decline to receive medical services by telemedicine and may withdraw from such care at any time.    Notes:    Kulwant Mueller Jr. is a 38 y.o. male who presents for a follow up evaluation of Type 2 diabetes mellitus.     CHIEF COMPLAINT: Diabetes Consultation    PCP: No, Primary Doctor      Initial visit with me - 6/27/2023    The patient was initially diagnosed with diabetes at age 25.   Previously followed at [facility] by Lisa Bro NP, last visit 3/2022.    Stopped taking Humalog and Tresiba 3 weeks prior to initial visit with me due to making him feel weak, but restarted week before due to blood sugars going up.      Previous failed treatments include:  Metformin     Social Documentation:  Patient lives in [place]. 3 children, 15, 13, 7.   Occupation:  at [employer] in [place].   Exercise: walking at work.       Diabetes related complications:   peripheral neuropathy.   denies Pancreatitis  reports Gastroparesis  denies DKA  denies Hx/family Hx of MEN2/MTC  denies Frequent UTIs/yeast infections    "  Cardiovascular Risk Factors: dyslipidemia, family history of premature cardiovascular disease, hypertension, male gender, and obesity (BMI >30 kg/m2).    Diabetes Medications               empagliflozin (JARDIANCE) 25 mg tablet Take 1 tablet (25 mg total) by mouth once daily. - DUE TO ERROR AT PHARMACY, HAS NOT STARTED MEDICATION YET.    insulin aspart U-100 (NOVOLOG FLEXPEN U-100 INSULIN) 100 unit/mL (3 mL) InPn pen Inject 14 Units into the skin 3 (three) times daily with meals. - USING CORRECTION SCALE.    TRESIBA FLEXTOUCH U-200 200 unit/mL (3 mL) insulin pen Inject 50 Units into the skin once daily.     Current monitoring regimen:  Dexcom G7 - need to set up data sharing.   Has had issues with adhesive lasting. Sensors falling off too early due to sweating/heat.    Recent hypoglycemic episodes: No.   Patient compliant with glucose checks and medication administration? Yes    DIABETES MANAGEMENT STATUS  Statin: Not taking  ACE/ARB: Not taking  Screening or Prevention Patient's value Goal Complete/Controlled?   HgA1C Testing and Control   Lab Results   Component Value Date    HGBA1C 10.5 (H) 07/20/2023      Annually/Less than 8% No   Lipid profile : 07/20/2023 Annually Yes   LDL control Lab Results   Component Value Date    LDLCALC 102.2 07/20/2023    Annually/Less than 100 mg/dl  No   Nephropathy screening Lab Results   Component Value Date    LABMICR 243.0 07/20/2023     Lab Results   Component Value Date    PROTEINUA Negative 11/27/2021     No results found for: "UTPCR"   Annually Yes   Blood pressure BP Readings from Last 1 Encounters:   01/26/23 105/74    Less than 140/90 Yes   Dilated retinal exam : 02/17/2023 Annually Yes   Foot exam   : 11/27/2021 Annually No   Patient's medications, allergies, surgical, social and family histories were reviewed and updated as appropriate.     Review of Systems   Constitutional:  Negative for weight loss.   Eyes:  Negative for blurred vision and double vision. "   Cardiovascular:  Negative for chest pain.   Gastrointestinal:  Negative for nausea and vomiting.   Genitourinary:  Negative for frequency.   Musculoskeletal:  Negative for falls.   Neurological:  Negative for dizziness and weakness.   Endo/Heme/Allergies:  Negative for polydipsia.   Psychiatric/Behavioral:  Negative for depression.    All other systems reviewed and are negative.       Physical Exam  Constitutional:       General: He is not in acute distress.     Appearance: Normal appearance.   Pulmonary:      Effort: No respiratory distress.   Neurological:      Mental Status: He is alert and oriented to person, place, and time.   Psychiatric:         Mood and Affect: Mood normal.         Behavior: Behavior normal.        There were no vitals taken for this visit.  Wt Readings from Last 3 Encounters:   02/07/23 111.1 kg (245 lb)   01/26/23 111.1 kg (245 lb)   09/13/22 103.3 kg (227 lb 11.8 oz)       LAB REVIEW  Lab Results   Component Value Date     07/20/2023    K 4.2 07/20/2023     07/20/2023    CO2 32 (H) 07/20/2023    BUN 16 07/20/2023    CREATININE 0.93 07/20/2023    CALCIUM 9.2 07/20/2023    ANIONGAP 8 07/20/2023    EGFRNORACEVR >60.0 07/20/2023     Lab Results   Component Value Date    CPEPTIDE 0.87 07/20/2023    GLUTAMICACID 0.02 07/20/2023     Hemoglobin A1C   Date Value Ref Range Status   07/20/2023 10.5 (H) 4.0 - 5.6 % Final     Comment:     ADA Screening Guidelines:  5.7-6.4%  Consistent with prediabetes  >or=6.5%  Consistent with diabetes    High levels of fetal hemoglobin interfere with the HbA1C  assay. Heterozygous hemoglobin variants (HbS, HgC, etc)do  not significantly interfere with this assay.   However, presence of multiple variants may affect accuracy.     02/02/2023 >14.0 (H) 4.0 - 5.6 % Final     Comment:     ADA Screening Guidelines:  5.7-6.4%  Consistent with prediabetes  >or=6.5%  Consistent with diabetes    High levels of fetal hemoglobin interfere with the HbA1C  assay.  Heterozygous hemoglobin variants (HbS, HgC, etc)do  not significantly interfere with this assay.   However, presence of multiple variants may affect accuracy.     06/08/2022 10.0 (H) 4.0 - 5.6 % Final     Comment:     ADA Screening Guidelines:  5.7-6.4%  Consistent with prediabetes  >or=6.5%  Consistent with diabetes    High levels of fetal hemoglobin interfere with the HbA1C  assay. Heterozygous hemoglobin variants (HbS, HgC, etc)do  not significantly interfere with this assay.   However, presence of multiple variants may affect accuracy.          ASSESSMENT    ICD-10-CM ICD-9-CM   1. Type 2 diabetes mellitus with complication, with long-term current use of insulin  E11.8 250.90    Z79.4 V58.67         PLAN  Diagnoses and all orders for this visit:    Type 2 diabetes mellitus with complication, with long-term current use of insulin  -     empagliflozin (JARDIANCE) 25 mg tablet; Take 1 tablet (25 mg total) by mouth once daily.  -     glucagon (GVOKE HYPOPEN 2-PACK) 1 mg/0.2 mL AtIn; Inject 1 mg into the skin as needed (SEVERE HYPOGLYCEMIA).      Reviewed pathophysiology of diabetes, complications related to the disease, importance of annual dilated eye exam and daily foot examination. Explained MOA, SE, dosage of medications. Written instructions given and reviewed with patient and patient verbalizes understanding.     8/28/2023 - states attempted to  Jardiance twice, but states there was issue at pharmacy and was never able to fill. Will send to [facility] today. G7 sensors falling off after a few days due to sweat/heat. Patient given additional adhesive options. Did not have clarity vane when he was trying to set up sharing. Will give more detailed instruction and new sharing code. Follow up 4 weeks.   PATIENT INSTRUCTIONS     Lifestyle modification with diabetic diet and at least 30 minutes of physical activity daily recommended.   Diabetic Foot Exam deferred today due to virtual visit. Will plan to be  done at next in-person visit.      Start Jardiance 25 mg by mouth daily.   Continue Tresiba 55 units.   Continue Humalog three times daily with meals using correction scale below:     160-190: 1 unit  191-220: 2 units  221-250: 3 units  251-280: 4 units  281-310: 5 units  311-340: 6 units  341-370: 7 units  >370: 8 units    Continue Dexcom G7 CGM  Blood Sugar Goals:       Fastin-130.       1-2 hours after a meal: Less than 180.     GVOKE Hypopen ordered for severe hypoglycemia emergencies - patient instructions provided with AVS for use and administration.  Use this link to watch instructional video on GVOKE - https://www.[employer].com/watch?v=[email or web address]    For low blood sugar between 55-69 mg/dL, raise it by following the 15-15 rule: have 15 grams of carbs and check your blood sugar after 15 minutes. If its still below your target range, have another serving. Repeat these steps until its in your target range. Once its in range, eat a nutritious meal or snack to ensure it doesnt get too low again.    These items have about 15 grams of carbs:  4 ounces (½ cup) of juice or regular soda.  1 tablespoon of sugar, honey, or syrup.  3-4 glucose tablets.  1 dose of glucose gel (usually 1 tube; follow instructions).    NOTIFY YOUR DIABETES MANAGEMENT PROVIDER FOR ANY GLUCOSE LESS THAN 70.      I recommend using a skin barrier wipe prevent your CGM sensor from coming off too soon. Skin Glu is a barrier wipe used pre-CGM application that is available over the counter at [employer]. Skin barrier wipes prepare your skin for your CGM sensor and overlay patch and give the skin extra protection and help your CGM stick better. Skin-Tac is also available on Amazon.    DEXCOM G7 DATA SHARING    Download Dexcom Clarity Kiarra in Kiarra-Store or Play-Store.   Log in using same log-in information as you use for your Dexcom G7 Kiarra.  Tap Profile.  Tap Authorize Sharing.  Tap Accept Invitation.  Enter this Sharing Code provided by your  "clinic. - [email or web address]  This code will  on 23.   Select your Date of Birth.  Tap Continue.  Tap the "I consent to share my data with my clinic" box.  Tap Yes, Share My Data.    Follow up in about 4 weeks (around 2023) for VIRTUAL, DEXCOM - SHARING.    Portions of this note were prepared with [employer] Naturally Speaking voice recognition transcription software. Grammatical errors, including garbled syntax, mangle pronouns, and other bizarre constructions may be attributed to that software system.       "

## 2023-09-14 ENCOUNTER — PATIENT MESSAGE (OUTPATIENT)
Dept: DIABETES | Facility: CLINIC | Age: 38
End: 2023-09-14
Payer: COMMERCIAL

## 2023-09-14 DIAGNOSIS — Z79.4 TYPE 2 DIABETES MELLITUS WITH COMPLICATION, WITH LONG-TERM CURRENT USE OF INSULIN: Chronic | ICD-10-CM

## 2023-09-14 DIAGNOSIS — E11.65 UNCONTROLLED TYPE 2 DIABETES MELLITUS WITH HYPERGLYCEMIA, WITH LONG-TERM CURRENT USE OF INSULIN: ICD-10-CM

## 2023-09-14 DIAGNOSIS — E11.8 TYPE 2 DIABETES MELLITUS WITH COMPLICATION, WITH LONG-TERM CURRENT USE OF INSULIN: Chronic | ICD-10-CM

## 2023-09-14 DIAGNOSIS — Z79.4 UNCONTROLLED TYPE 2 DIABETES MELLITUS WITH HYPERGLYCEMIA, WITH LONG-TERM CURRENT USE OF INSULIN: ICD-10-CM

## 2023-09-14 RX ORDER — INSULIN DEGLUDEC 200 U/ML
50 INJECTION, SOLUTION SUBCUTANEOUS DAILY
Qty: 3 PEN | Refills: 0 | Status: SHIPPED | OUTPATIENT
Start: 2023-09-14 | End: 2023-10-23 | Stop reason: SDUPTHER

## 2023-09-25 ENCOUNTER — OFFICE VISIT (OUTPATIENT)
Dept: DIABETES | Facility: CLINIC | Age: 38
End: 2023-09-25
Payer: COMMERCIAL

## 2023-09-25 DIAGNOSIS — E11.8 TYPE 2 DIABETES MELLITUS WITH COMPLICATION, WITH LONG-TERM CURRENT USE OF INSULIN: Primary | Chronic | ICD-10-CM

## 2023-09-25 DIAGNOSIS — Z79.4 TYPE 2 DIABETES MELLITUS WITH COMPLICATION, WITH LONG-TERM CURRENT USE OF INSULIN: Primary | Chronic | ICD-10-CM

## 2023-09-25 PROCEDURE — 3046F HEMOGLOBIN A1C LEVEL >9.0%: CPT | Mod: CPTII,95,, | Performed by: NURSE PRACTITIONER

## 2023-09-25 PROCEDURE — 99214 PR OFFICE/OUTPT VISIT, EST, LEVL IV, 30-39 MIN: ICD-10-PCS | Mod: 95,,, | Performed by: NURSE PRACTITIONER

## 2023-09-25 PROCEDURE — 3060F POS MICROALBUMINURIA REV: CPT | Mod: CPTII,95,, | Performed by: NURSE PRACTITIONER

## 2023-09-25 PROCEDURE — 3066F PR DOCUMENTATION OF TREATMENT FOR NEPHROPATHY: ICD-10-PCS | Mod: CPTII,95,, | Performed by: NURSE PRACTITIONER

## 2023-09-25 PROCEDURE — 3066F NEPHROPATHY DOC TX: CPT | Mod: CPTII,95,, | Performed by: NURSE PRACTITIONER

## 2023-09-25 PROCEDURE — 3046F PR MOST RECENT HEMOGLOBIN A1C LEVEL > 9.0%: ICD-10-PCS | Mod: CPTII,95,, | Performed by: NURSE PRACTITIONER

## 2023-09-25 PROCEDURE — 3060F PR POS MICROALBUMINURIA RESULT DOCUMENTED/REVIEW: ICD-10-PCS | Mod: CPTII,95,, | Performed by: NURSE PRACTITIONER

## 2023-09-25 PROCEDURE — 99214 OFFICE O/P EST MOD 30 MIN: CPT | Mod: 95,,, | Performed by: NURSE PRACTITIONER

## 2023-09-25 NOTE — Clinical Note
Virtual, 4 wks Schedule fasting labs in [place] prior to f/u. Dexcom - schedule NV at the [facility] to set up Data Sharing

## 2023-09-25 NOTE — PATIENT INSTRUCTIONS
PATIENT INSTRUCTIONS     Lifestyle modification with diabetic diet and at least 30 minutes of physical activity daily recommended.   Diabetic Foot Exam deferred today due to virtual visit. Will plan to be done at next in-person visit.   Will schedule nurse visit to set up data sharing with dexcom.      Continue Jardiance 25 mg by mouth daily.   Continue Tresiba 55 units.   Continue Humalog three times daily with meals using correction scale below:     160-190: 1 unit  191-220: 2 units  221-250: 3 units  251-280: 4 units  281-310: 5 units  311-340: 6 units  341-370: 7 units  >370: 8 units    Continue Dexcom G7 CGM  Blood Sugar Goals:       Fastin-130.       1-2 hours after a meal: Less than 180.

## 2023-09-25 NOTE — PROGRESS NOTES
The patient location is: Home  The chief complaint leading to consultation is: Diabetes    Visit type: audiovisual    Face to Face time with patient: 15  30 minutes of total time spent on the encounter, which includes face to face time and non-face to face time preparing to see the patient (eg, review of tests), Obtaining and/or reviewing separately obtained history, Documenting clinical information in the electronic or other health record, Independently interpreting results (not separately reported) and communicating results to the patient/family/caregiver, or Care coordination (not separately reported).  Each patient to whom he or she provides medical services by telemedicine is:  (1) informed of the relationship between the physician and patient and the respective role of any other health care provider with respect to management of the patient; and (2) notified that he or she may decline to receive medical services by telemedicine and may withdraw from such care at any time.    Notes:    Kulwant Mueller Jr. is a 38 y.o. male who presents for a follow up evaluation of Type 2 diabetes mellitus.     CHIEF COMPLAINT: Diabetes Consultation    PCP: No, Primary Doctor      Initial visit with me - 6/27/2023    The patient was initially diagnosed with diabetes at age 25.   Previously followed at [facility] by Lisa Bro NP, last visit 3/2022.    Stopped taking Humalog and Tresiba 3 weeks prior to initial visit with me due to making him feel weak, but restarted week before due to blood sugars going up.      Previous failed treatments include:  Metformin     Social Documentation:  Patient lives in [place]. 3 children, 15, 13, 7.   Occupation:  at [employer] in [place].   Exercise: walking at work.       Diabetes related complications:   peripheral neuropathy.   denies Pancreatitis  reports Gastroparesis  denies DKA  denies Hx/family Hx of MEN2/MTC  denies Frequent UTIs/yeast infections    "  Cardiovascular Risk Factors: dyslipidemia, family history of premature cardiovascular disease, hypertension, male gender, and obesity (BMI >30 kg/m2).    Diabetes Medications               empagliflozin (JARDIANCE) 25 mg tablet Take 1 tablet (25 mg total) by mouth once daily.    glucagon (GVOKE HYPOPEN 2-PACK) 1 mg/0.2 mL AtIn Inject 1 mg into the skin as needed (SEVERE HYPOGLYCEMIA).    insulin aspart U-100 (NOVOLOG U-100 INSULIN ASPART) 100 unit/mL injection Inject 14 Units into the skin 3 (three) times daily before meals.    TRESIBA FLEXTOUCH U-200 200 unit/mL (3 mL) insulin pen Inject 50 Units into the skin once daily.     Current monitoring regimen:  Dexcom G7 - need to set up data sharing.     Recent hypoglycemic episodes: No.   Patient compliant with glucose checks and medication administration? Yes    DIABETES MANAGEMENT STATUS  Statin: Not taking  ACE/ARB: Not taking  Screening or Prevention Patient's value Goal Complete/Controlled?   HgA1C Testing and Control   Lab Results   Component Value Date    HGBA1C 10.5 (H) 07/20/2023      Annually/Less than 8% No   Lipid profile : 07/20/2023 Annually Yes   LDL control Lab Results   Component Value Date    LDLCALC 102.2 07/20/2023    Annually/Less than 100 mg/dl  No   Nephropathy screening Lab Results   Component Value Date    LABMICR 243.0 07/20/2023     Lab Results   Component Value Date    PROTEINUA Negative 11/27/2021     No results found for: "UTPCR"   Annually Yes   Blood pressure BP Readings from Last 1 Encounters:   01/26/23 105/74    Less than 140/90 Yes   Dilated retinal exam : 02/17/2023 Annually Yes   Foot exam   : 11/27/2021 Annually No   Patient's medications, allergies, surgical, social and family histories were reviewed and updated as appropriate.     Review of Systems   Constitutional:  Negative for weight loss.   Eyes:  Negative for blurred vision and double vision.   Cardiovascular:  Negative for chest pain.   Gastrointestinal:  Negative for " nausea and vomiting.   Genitourinary:  Negative for frequency.   Musculoskeletal:  Negative for falls.   Neurological:  Negative for dizziness and weakness.   Endo/Heme/Allergies:  Negative for polydipsia.   Psychiatric/Behavioral:  Negative for depression.    All other systems reviewed and are negative.       Physical Exam  Constitutional:       General: He is not in acute distress.     Appearance: Normal appearance.   Pulmonary:      Effort: No respiratory distress.   Neurological:      Mental Status: He is alert and oriented to person, place, and time.   Psychiatric:         Mood and Affect: Mood normal.         Behavior: Behavior normal.        There were no vitals taken for this visit.  Wt Readings from Last 3 Encounters:   02/07/23 111.1 kg (245 lb)   01/26/23 111.1 kg (245 lb)   09/13/22 103.3 kg (227 lb 11.8 oz)       LAB REVIEW  Lab Results   Component Value Date     07/20/2023    K 4.2 07/20/2023     07/20/2023    CO2 32 (H) 07/20/2023    BUN 16 07/20/2023    CREATININE 0.93 07/20/2023    CALCIUM 9.2 07/20/2023    ANIONGAP 8 07/20/2023    EGFRNORACEVR >60.0 07/20/2023     Lab Results   Component Value Date    CPEPTIDE 0.87 07/20/2023    GLUTAMICACID 0.02 07/20/2023     Hemoglobin A1C   Date Value Ref Range Status   07/20/2023 10.5 (H) 4.0 - 5.6 % Final     Comment:     ADA Screening Guidelines:  5.7-6.4%  Consistent with prediabetes  >or=6.5%  Consistent with diabetes    High levels of fetal hemoglobin interfere with the HbA1C  assay. Heterozygous hemoglobin variants (HbS, HgC, etc)do  not significantly interfere with this assay.   However, presence of multiple variants may affect accuracy.     02/02/2023 >14.0 (H) 4.0 - 5.6 % Final     Comment:     ADA Screening Guidelines:  5.7-6.4%  Consistent with prediabetes  >or=6.5%  Consistent with diabetes    High levels of fetal hemoglobin interfere with the HbA1C  assay. Heterozygous hemoglobin variants (HbS, HgC, etc)do  not significantly interfere  "with this assay.   However, presence of multiple variants may affect accuracy.     06/08/2022 10.0 (H) 4.0 - 5.6 % Final     Comment:     ADA Screening Guidelines:  5.7-6.4%  Consistent with prediabetes  >or=6.5%  Consistent with diabetes    High levels of fetal hemoglobin interfere with the HbA1C  assay. Heterozygous hemoglobin variants (HbS, HgC, etc)do  not significantly interfere with this assay.   However, presence of multiple variants may affect accuracy.          ASSESSMENT    ICD-10-CM ICD-9-CM   1. Type 2 diabetes mellitus with complication, with long-term current use of insulin  E11.8 250.90    Z79.4 V58.67         PLAN  Diagnoses and all orders for this visit:    Type 2 diabetes mellitus with complication, with long-term current use of insulin  -     Comprehensive Metabolic Panel; Future  -     Hemoglobin A1C; Future      Reviewed pathophysiology of diabetes, complications related to the disease, importance of annual dilated eye exam and daily foot examination. Explained MOA, SE, dosage of medications. Written instructions given and reviewed with patient and patient verbalizes understanding.     8/28/2023 - states attempted to  Jardiance twice, but states there was issue at pharmacy and was never able to fill. Will send to [facility] today. G7 sensors falling off after a few days due to sweat/heat. Patient given additional adhesive options. Did not have clarity vane when he was trying to set up sharing. Will give more detailed instruction and new sharing code. Follow up 4 weeks.     9/25/2023 - started jardiance yesterday, states he "felt a little weird" after taking it, but better today. Still having issues with adhesive with g7, discussed additional overlay options. Will schedule nurse visit at the [facility] to set up data sharing with dexcom and have labs done prior to f/u     PATIENT INSTRUCTIONS     Lifestyle modification with diabetic diet and at least 30 minutes of physical activity daily " recommended.   Diabetic Foot Exam deferred today due to virtual visit. Will plan to be done at next in-person visit.   Will schedule nurse visit to set up data sharing with dexcom.      Continue Jardiance 25 mg by mouth daily.   Continue Tresiba 55 units.   Continue Humalog three times daily with meals using correction scale below:     160-190: 1 unit  191-220: 2 units  221-250: 3 units  251-280: 4 units  281-310: 5 units  311-340: 6 units  341-370: 7 units  >370: 8 units    Continue Dexcom G7 CGM  Blood Sugar Goals:       Fastin-130.       1-2 hours after a meal: Less than 180.       Follow up in about 4 weeks (around 10/23/2023) for Virtual, Schedule fasting labs, Dexcom - Sharing.    Portions of this note were prepared with [employer] Naturally Speaking voice recognition transcription software. Grammatical errors, including garbled syntax, mangle pronouns, and other bizarre constructions may be attributed to that software system.

## 2023-09-27 ENCOUNTER — LAB VISIT (OUTPATIENT)
Dept: LAB | Facility: HOSPITAL | Age: 38
End: 2023-09-27
Attending: NURSE PRACTITIONER
Payer: COMMERCIAL

## 2023-09-27 DIAGNOSIS — Z79.4 TYPE 2 DIABETES MELLITUS WITH COMPLICATION, WITH LONG-TERM CURRENT USE OF INSULIN: ICD-10-CM

## 2023-09-27 DIAGNOSIS — E11.8 TYPE 2 DIABETES MELLITUS WITH COMPLICATION, WITH LONG-TERM CURRENT USE OF INSULIN: ICD-10-CM

## 2023-09-27 LAB
ALBUMIN SERPL BCP-MCNC: 4.7 G/DL (ref 3.5–5.2)
ALBUMIN SERPL BCP-MCNC: 4.7 G/DL (ref 3.5–5.2)
ALP SERPL-CCNC: 76 U/L (ref 38–126)
ALP SERPL-CCNC: 76 U/L (ref 38–126)
ALT SERPL W/O P-5'-P-CCNC: 23 U/L (ref 10–44)
ALT SERPL W/O P-5'-P-CCNC: 23 U/L (ref 10–44)
ANION GAP SERPL CALC-SCNC: 14 MMOL/L (ref 8–16)
ANION GAP SERPL CALC-SCNC: 14 MMOL/L (ref 8–16)
AST SERPL-CCNC: 30 U/L (ref 15–46)
AST SERPL-CCNC: 30 U/L (ref 15–46)
BILIRUB SERPL-MCNC: 0.4 MG/DL (ref 0.1–1)
BILIRUB SERPL-MCNC: 0.4 MG/DL (ref 0.1–1)
C PEPTIDE SERPL-MCNC: 3.1 NG/ML (ref 0.78–5.19)
CALCIUM SERPL-MCNC: 9.6 MG/DL (ref 8.7–10.5)
CALCIUM SERPL-MCNC: 9.6 MG/DL (ref 8.7–10.5)
CHLORIDE SERPL-SCNC: 100 MMOL/L (ref 95–110)
CHLORIDE SERPL-SCNC: 100 MMOL/L (ref 95–110)
CHOLEST SERPL-MCNC: 178 MG/DL (ref 120–199)
CHOLEST/HDLC SERPL: 5.6 {RATIO} (ref 2–5)
CO2 SERPL-SCNC: 30 MMOL/L (ref 23–29)
CO2 SERPL-SCNC: 30 MMOL/L (ref 23–29)
CREAT SERPL-MCNC: 1.33 MG/DL (ref 0.5–1.4)
CREAT SERPL-MCNC: 1.33 MG/DL (ref 0.5–1.4)
ERYTHROCYTE [DISTWIDTH] IN BLOOD BY AUTOMATED COUNT: 13.2 % (ref 11.5–14.5)
EST. GFR  (NO RACE VARIABLE): >60 ML/MIN/1.73 M^2
EST. GFR  (NO RACE VARIABLE): >60 ML/MIN/1.73 M^2
ESTIMATED AVG GLUCOSE: 189 MG/DL (ref 68–131)
ESTIMATED AVG GLUCOSE: 189 MG/DL (ref 68–131)
GLUCOSE SERPL-MCNC: 188 MG/DL (ref 70–110)
GLUCOSE SERPL-MCNC: 188 MG/DL (ref 70–110)
HBA1C MFR BLD: 8.2 % (ref 4–5.6)
HBA1C MFR BLD: 8.2 % (ref 4–5.6)
HCT VFR BLD AUTO: 48.8 % (ref 40–54)
HDLC SERPL-MCNC: 32 MG/DL (ref 40–75)
HDLC SERPL: 18 % (ref 20–50)
HGB BLD-MCNC: 15.2 G/DL (ref 14–18)
LDLC SERPL CALC-MCNC: 120.6 MG/DL (ref 63–159)
MCH RBC QN AUTO: 28.1 PG (ref 27–31)
MCHC RBC AUTO-ENTMCNC: 31.1 G/DL (ref 32–36)
MCV RBC AUTO: 90 FL (ref 82–98)
NONHDLC SERPL-MCNC: 146 MG/DL
PLATELET # BLD AUTO: 292 K/UL (ref 150–450)
PMV BLD AUTO: 10.7 FL (ref 9.2–12.9)
POTASSIUM SERPL-SCNC: 4.5 MMOL/L (ref 3.5–5.1)
POTASSIUM SERPL-SCNC: 4.5 MMOL/L (ref 3.5–5.1)
PROT SERPL-MCNC: 9.3 G/DL (ref 6–8.4)
PROT SERPL-MCNC: 9.3 G/DL (ref 6–8.4)
RBC # BLD AUTO: 5.4 M/UL (ref 4.6–6.2)
SODIUM SERPL-SCNC: 144 MMOL/L (ref 136–145)
SODIUM SERPL-SCNC: 144 MMOL/L (ref 136–145)
TRIGL SERPL-MCNC: 127 MG/DL (ref 30–150)
UUN UR-MCNC: 20 MG/DL (ref 2–20)
UUN UR-MCNC: 20 MG/DL (ref 2–20)
WBC # BLD AUTO: 9.56 K/UL (ref 3.9–12.7)

## 2023-09-27 PROCEDURE — 36415 COLL VENOUS BLD VENIPUNCTURE: CPT | Mod: PN | Performed by: NURSE PRACTITIONER

## 2023-09-27 PROCEDURE — 80053 COMPREHEN METABOLIC PANEL: CPT | Mod: PN | Performed by: NURSE PRACTITIONER

## 2023-09-27 PROCEDURE — 84681 ASSAY OF C-PEPTIDE: CPT | Mod: PN | Performed by: NURSE PRACTITIONER

## 2023-09-27 PROCEDURE — 86341 ISLET CELL ANTIBODY: CPT | Mod: PN | Performed by: NURSE PRACTITIONER

## 2023-09-27 PROCEDURE — 80061 LIPID PANEL: CPT | Performed by: NURSE PRACTITIONER

## 2023-09-27 PROCEDURE — 86337 INSULIN ANTIBODIES: CPT | Mod: PN | Performed by: NURSE PRACTITIONER

## 2023-09-27 PROCEDURE — 85027 COMPLETE CBC AUTOMATED: CPT | Mod: PN | Performed by: NURSE PRACTITIONER

## 2023-09-27 PROCEDURE — 83036 HEMOGLOBIN GLYCOSYLATED A1C: CPT | Performed by: NURSE PRACTITIONER

## 2023-10-01 LAB — GAD65 AB SER-SCNC: 0.01 NMOL/L

## 2023-10-03 ENCOUNTER — PATIENT MESSAGE (OUTPATIENT)
Dept: DIABETES | Facility: CLINIC | Age: 38
End: 2023-10-03
Payer: COMMERCIAL

## 2023-10-03 DIAGNOSIS — R77.9 ELEVATED BLOOD PROTEIN: Primary | ICD-10-CM

## 2023-10-03 LAB — INSULIN AB SER-SCNC: 0 NMOL/L (ref 0–0.02)

## 2023-10-12 ENCOUNTER — PATIENT MESSAGE (OUTPATIENT)
Dept: RESPIRATORY THERAPY | Facility: HOSPITAL | Age: 38
End: 2023-10-12
Payer: COMMERCIAL

## 2023-10-16 DIAGNOSIS — E11.65 UNCONTROLLED TYPE 2 DIABETES MELLITUS WITH HYPERGLYCEMIA, WITH LONG-TERM CURRENT USE OF INSULIN: ICD-10-CM

## 2023-10-16 DIAGNOSIS — Z79.4 UNCONTROLLED TYPE 2 DIABETES MELLITUS WITH HYPERGLYCEMIA, WITH LONG-TERM CURRENT USE OF INSULIN: ICD-10-CM

## 2023-10-16 RX ORDER — INSULIN DEGLUDEC 200 U/ML
50 INJECTION, SOLUTION SUBCUTANEOUS DAILY
Qty: 3 PEN | Refills: 0 | OUTPATIENT
Start: 2023-10-16

## 2023-10-23 ENCOUNTER — OFFICE VISIT (OUTPATIENT)
Dept: DIABETES | Facility: CLINIC | Age: 38
End: 2023-10-23
Payer: COMMERCIAL

## 2023-10-23 ENCOUNTER — PATIENT MESSAGE (OUTPATIENT)
Dept: DIABETES | Facility: CLINIC | Age: 38
End: 2023-10-23

## 2023-10-23 DIAGNOSIS — E11.8 TYPE 2 DIABETES MELLITUS WITH COMPLICATION, WITH LONG-TERM CURRENT USE OF INSULIN: Primary | Chronic | ICD-10-CM

## 2023-10-23 DIAGNOSIS — E11.65 UNCONTROLLED TYPE 2 DIABETES MELLITUS WITH HYPERGLYCEMIA, WITH LONG-TERM CURRENT USE OF INSULIN: ICD-10-CM

## 2023-10-23 DIAGNOSIS — Z79.4 TYPE 2 DIABETES MELLITUS WITH COMPLICATION, WITH LONG-TERM CURRENT USE OF INSULIN: Primary | Chronic | ICD-10-CM

## 2023-10-23 DIAGNOSIS — Z79.4 UNCONTROLLED TYPE 2 DIABETES MELLITUS WITH HYPERGLYCEMIA, WITH LONG-TERM CURRENT USE OF INSULIN: ICD-10-CM

## 2023-10-23 PROCEDURE — 99214 OFFICE O/P EST MOD 30 MIN: CPT | Mod: 95,,, | Performed by: NURSE PRACTITIONER

## 2023-10-23 PROCEDURE — 3066F NEPHROPATHY DOC TX: CPT | Mod: CPTII,95,, | Performed by: NURSE PRACTITIONER

## 2023-10-23 PROCEDURE — 99214 PR OFFICE/OUTPT VISIT, EST, LEVL IV, 30-39 MIN: ICD-10-PCS | Mod: 95,,, | Performed by: NURSE PRACTITIONER

## 2023-10-23 PROCEDURE — 3052F HG A1C>EQUAL 8.0%<EQUAL 9.0%: CPT | Mod: CPTII,95,, | Performed by: NURSE PRACTITIONER

## 2023-10-23 PROCEDURE — 3060F POS MICROALBUMINURIA REV: CPT | Mod: CPTII,95,, | Performed by: NURSE PRACTITIONER

## 2023-10-23 PROCEDURE — 3060F PR POS MICROALBUMINURIA RESULT DOCUMENTED/REVIEW: ICD-10-PCS | Mod: CPTII,95,, | Performed by: NURSE PRACTITIONER

## 2023-10-23 PROCEDURE — 3052F PR MOST RECENT HEMOGLOBIN A1C LEVEL 8.0 - < 9.0%: ICD-10-PCS | Mod: CPTII,95,, | Performed by: NURSE PRACTITIONER

## 2023-10-23 PROCEDURE — 3066F PR DOCUMENTATION OF TREATMENT FOR NEPHROPATHY: ICD-10-PCS | Mod: CPTII,95,, | Performed by: NURSE PRACTITIONER

## 2023-10-23 RX ORDER — INSULIN DEGLUDEC 200 U/ML
55 INJECTION, SOLUTION SUBCUTANEOUS DAILY
Qty: 3 PEN | Refills: 11 | Status: SHIPPED | OUTPATIENT
Start: 2023-10-23 | End: 2023-11-17 | Stop reason: SDUPTHER

## 2023-10-23 NOTE — PROGRESS NOTES
The patient location is: Home  The chief complaint leading to consultation is: Diabetes    Visit type: audiovisual    Face to Face time with patient: 15  30 minutes of total time spent on the encounter, which includes face to face time and non-face to face time preparing to see the patient (eg, review of tests), Obtaining and/or reviewing separately obtained history, Documenting clinical information in the electronic or other health record, Independently interpreting results (not separately reported) and communicating results to the patient/family/caregiver, or Care coordination (not separately reported).  Each patient to whom he or she provides medical services by telemedicine is:  (1) informed of the relationship between the physician and patient and the respective role of any other health care provider with respect to management of the patient; and (2) notified that he or she may decline to receive medical services by telemedicine and may withdraw from such care at any time.    Notes:    Kulwant Mueller Jr. is a 38 y.o. male who presents for a follow up evaluation of Type 2 diabetes mellitus.     CHIEF COMPLAINT: Diabetes Consultation    PCP: No, Primary Doctor      Initial visit with me - 6/27/2023    The patient was initially diagnosed with diabetes at age 25.   Previously followed at [facility] by Lisa Bro NP, last visit 3/2022.    Stopped taking Humalog and Tresiba 3 weeks prior to initial visit with me due to making him feel weak, but restarted week before due to blood sugars going up.      Previous failed treatments include:  Metformin     Social Documentation:  Patient lives in [place]. 3 children, 15, 13, 7.   Occupation:  at [employer] in [place].   Exercise: walking at work.       Diabetes related complications:   peripheral neuropathy.   denies Pancreatitis  reports Gastroparesis  denies DKA  denies Hx/family Hx of MEN2/MTC  denies Frequent UTIs/yeast infections    "  Cardiovascular Risk Factors: dyslipidemia, family history of premature cardiovascular disease, hypertension, male gender, and obesity (BMI >30 kg/m2).    Diabetes Medications               empagliflozin (JARDIANCE) 25 mg tablet Take 1 tablet (25 mg total) by mouth once daily.    glucagon (GVOKE HYPOPEN 2-PACK) 1 mg/0.2 mL AtIn Inject 1 mg into the skin as needed (SEVERE HYPOGLYCEMIA).    insulin aspart U-100 (NOVOLOG U-100 INSULIN ASPART) 100 unit/mL injection Inject 14 Units into the skin 3 (three) times daily before meals.    TRESIBA FLEXTOUCH U-200 200 unit/mL (3 mL) insulin pen Inject 50 Units into the skin once daily.     Current monitoring regimen:  Dexcom G7 - need to set up data sharing.     Recent hypoglycemic episodes: No.   Patient compliant with glucose checks and medication administration? Yes    DIABETES MANAGEMENT STATUS  Statin: Not taking  ACE/ARB: Not taking  Screening or Prevention Patient's value Goal Complete/Controlled?   HgA1C Testing and Control   Lab Results   Component Value Date    HGBA1C 8.2 (H) 09/27/2023    HGBA1C 8.2 (H) 09/27/2023      Annually/Less than 8% No   Lipid profile : 09/27/2023 Annually Yes   LDL control Lab Results   Component Value Date    LDLCALC 120.6 09/27/2023    Annually/Less than 100 mg/dl  No   Nephropathy screening Lab Results   Component Value Date    LABMICR 243.0 07/20/2023     Lab Results   Component Value Date    PROTEINUA Negative 11/27/2021     No results found for: "UTPCR"   Annually Yes   Blood pressure BP Readings from Last 1 Encounters:   01/26/23 105/74    Less than 140/90 Yes   Dilated retinal exam : 02/17/2023 Annually Yes   Foot exam   : 11/27/2021 Annually No   Patient's medications, allergies, surgical, social and family histories were reviewed and updated as appropriate.     Review of Systems   Constitutional:  Negative for weight loss.   Eyes:  Negative for blurred vision and double vision.   Cardiovascular:  Negative for chest pain. "   Gastrointestinal:  Negative for nausea and vomiting.   Genitourinary:  Negative for frequency.   Musculoskeletal:  Negative for falls.   Neurological:  Negative for dizziness and weakness.   Endo/Heme/Allergies:  Negative for polydipsia.   Psychiatric/Behavioral:  Negative for depression.    All other systems reviewed and are negative.       Physical Exam  Constitutional:       General: He is not in acute distress.     Appearance: Normal appearance.   Pulmonary:      Effort: No respiratory distress.   Neurological:      Mental Status: He is alert and oriented to person, place, and time.   Psychiatric:         Mood and Affect: Mood normal.         Behavior: Behavior normal.        There were no vitals taken for this visit.  Wt Readings from Last 3 Encounters:   02/07/23 111.1 kg (245 lb)   01/26/23 111.1 kg (245 lb)   09/13/22 103.3 kg (227 lb 11.8 oz)       LAB REVIEW  Lab Results   Component Value Date     09/27/2023     09/27/2023    K 4.5 09/27/2023    K 4.5 09/27/2023     09/27/2023     09/27/2023    CO2 30 (H) 09/27/2023    CO2 30 (H) 09/27/2023    BUN 20 09/27/2023    BUN 20 09/27/2023    CREATININE 1.33 09/27/2023    CREATININE 1.33 09/27/2023    CALCIUM 9.6 09/27/2023    CALCIUM 9.6 09/27/2023    ANIONGAP 14 09/27/2023    ANIONGAP 14 09/27/2023    EGFRNORACEVR >60.0 09/27/2023    EGFRNORACEVR >60.0 09/27/2023     Lab Results   Component Value Date    CPEPTIDE 3.10 09/27/2023    GLUTAMICACID 0.01 09/27/2023     Hemoglobin A1C   Date Value Ref Range Status   09/27/2023 8.2 (H) 4.0 - 5.6 % Final     Comment:     ADA Screening Guidelines:  5.7-6.4%  Consistent with prediabetes  >or=6.5%  Consistent with diabetes    High levels of fetal hemoglobin interfere with the HbA1C  assay. Heterozygous hemoglobin variants (HbS, HgC, etc)do  not significantly interfere with this assay.   However, presence of multiple variants may affect accuracy.     09/27/2023 8.2 (H) 4.0 - 5.6 % Final      "Comment:     ADA Screening Guidelines:  5.7-6.4%  Consistent with prediabetes  >or=6.5%  Consistent with diabetes    High levels of fetal hemoglobin interfere with the HbA1C  assay. Heterozygous hemoglobin variants (HbS, HgC, etc)do  not significantly interfere with this assay.   However, presence of multiple variants may affect accuracy.     07/20/2023 10.5 (H) 4.0 - 5.6 % Final     Comment:     ADA Screening Guidelines:  5.7-6.4%  Consistent with prediabetes  >or=6.5%  Consistent with diabetes    High levels of fetal hemoglobin interfere with the HbA1C  assay. Heterozygous hemoglobin variants (HbS, HgC, etc)do  not significantly interfere with this assay.   However, presence of multiple variants may affect accuracy.          ASSESSMENT    ICD-10-CM ICD-9-CM   1. Type 2 diabetes mellitus with complication, with long-term current use of insulin  E11.8 250.90    Z79.4 V58.67         PLAN  Diagnoses and all orders for this visit:    Type 2 diabetes mellitus with complication, with long-term current use of insulin      Reviewed pathophysiology of diabetes, complications related to the disease, importance of annual dilated eye exam and daily foot examination. Explained MOA, SE, dosage of medications. Written instructions given and reviewed with patient and patient verbalizes understanding.     8/28/2023 - states attempted to  Jardiance twice, but states there was issue at pharmacy and was never able to fill. Will send to [facility] today. G7 sensors falling off after a few days due to sweat/heat. Patient given additional adhesive options. Did not have clarity vane when he was trying to set up sharing. Will give more detailed instruction and new sharing code. Follow up 4 weeks.     9/25/2023 - started jardiance yesterday, states he "felt a little weird" after taking it, but better today. Still having issues with adhesive with g7, discussed additional overlay options. Will schedule nurse visit at the [facility] to " set up data sharing with dexcom and have labs done prior to f/u     10/23/2023 - patient had to cancel nurse visit due to work schedule. Will be able to go on Friday, will f/u 1 week to review dexcom data. A1c improved to 8.2.     PATIENT INSTRUCTIONS     Lifestyle modification with diabetic diet and at least 30 minutes of physical activity daily recommended.   Diabetic Foot Exam deferred today due to virtual visit. Will plan to be done at next in-person visit.   Will schedule nurse visit to set up data sharing with dexcom on Friday.     Continue Jardiance 25 mg by mouth daily.   Continue Tresiba 55 units.   Continue Humalog three times daily with meals using correction scale below:     160-190: 1 unit  191-220: 2 units  221-250: 3 units  251-280: 4 units  281-310: 5 units  311-340: 6 units  341-370: 7 units  >370: 8 units    Continue Dexcom G7 CGM  Blood Sugar Goals:       Fastin-130.       1-2 hours after a meal: Less than 180.       Follow up in about 1 week (around 10/30/2023) for Virtual, Dexcom - Sharing.    Portions of this note were prepared with [employer] Naturally Speaking voice recognition transcription software. Grammatical errors, including garbled syntax, mangle pronouns, and other bizarre constructions may be attributed to that software system.

## 2023-10-23 NOTE — PATIENT INSTRUCTIONS
PATIENT INSTRUCTIONS     Lifestyle modification with diabetic diet and at least 30 minutes of physical activity daily recommended.   Diabetic Foot Exam deferred today due to virtual visit. Will plan to be done at next in-person visit.   Will schedule nurse visit to set up data sharing with dexcom on Friday.     Continue Jardiance 25 mg by mouth daily.   Continue Tresiba 55 units.   Continue Humalog three times daily with meals using correction scale below:     160-190: 1 unit  191-220: 2 units  221-250: 3 units  251-280: 4 units  281-310: 5 units  311-340: 6 units  341-370: 7 units  >370: 8 units    Continue Dexcom G7 CGM  Blood Sugar Goals:       Fastin-130.       1-2 hours after a meal: Less than 180.

## 2023-10-30 ENCOUNTER — PATIENT MESSAGE (OUTPATIENT)
Dept: CARDIOLOGY | Facility: CLINIC | Age: 38
End: 2023-10-30
Payer: COMMERCIAL

## 2023-10-31 ENCOUNTER — OFFICE VISIT (OUTPATIENT)
Dept: CARDIOLOGY | Facility: CLINIC | Age: 38
End: 2023-10-31
Payer: COMMERCIAL

## 2023-10-31 VITALS
WEIGHT: 273.81 LBS | OXYGEN SATURATION: 95 % | HEART RATE: 90 BPM | SYSTOLIC BLOOD PRESSURE: 120 MMHG | DIASTOLIC BLOOD PRESSURE: 83 MMHG | BODY MASS INDEX: 35.14 KG/M2 | HEIGHT: 74 IN

## 2023-10-31 DIAGNOSIS — I87.1 MAY-THURNER SYNDROME: Chronic | ICD-10-CM

## 2023-10-31 DIAGNOSIS — E11.59 HYPERTENSION ASSOCIATED WITH DIABETES: ICD-10-CM

## 2023-10-31 DIAGNOSIS — Z79.01 CHRONIC ANTICOAGULATION: ICD-10-CM

## 2023-10-31 DIAGNOSIS — I82.402 RECURRENT DEEP VEIN THROMBOSIS (DVT) OF LEFT LOWER EXTREMITY: Chronic | ICD-10-CM

## 2023-10-31 DIAGNOSIS — Z79.4 TYPE 2 DIABETES MELLITUS WITH COMPLICATION, WITH LONG-TERM CURRENT USE OF INSULIN: Chronic | ICD-10-CM

## 2023-10-31 DIAGNOSIS — E11.8 TYPE 2 DIABETES MELLITUS WITH COMPLICATION, WITH LONG-TERM CURRENT USE OF INSULIN: Chronic | ICD-10-CM

## 2023-10-31 DIAGNOSIS — I87.002 POST-THROMBOTIC SYNDROME OF LEFT LOWER EXTREMITY: ICD-10-CM

## 2023-10-31 DIAGNOSIS — R60.0 LEG EDEMA, RIGHT: Primary | ICD-10-CM

## 2023-10-31 DIAGNOSIS — I15.2 HYPERTENSION ASSOCIATED WITH DIABETES: ICD-10-CM

## 2023-10-31 DIAGNOSIS — Z95.828 HISTORY OF INTRAVASCULAR STENT PLACEMENT: ICD-10-CM

## 2023-10-31 PROCEDURE — 99999 PR PBB SHADOW E&M-EST. PATIENT-LVL IV: CPT | Mod: PBBFAC,,, | Performed by: INTERNAL MEDICINE

## 2023-10-31 PROCEDURE — 3079F PR MOST RECENT DIASTOLIC BLOOD PRESSURE 80-89 MM HG: ICD-10-PCS | Mod: CPTII,S$GLB,, | Performed by: INTERNAL MEDICINE

## 2023-10-31 PROCEDURE — 3008F PR BODY MASS INDEX (BMI) DOCUMENTED: ICD-10-PCS | Mod: CPTII,S$GLB,, | Performed by: INTERNAL MEDICINE

## 2023-10-31 PROCEDURE — 3052F HG A1C>EQUAL 8.0%<EQUAL 9.0%: CPT | Mod: CPTII,S$GLB,, | Performed by: INTERNAL MEDICINE

## 2023-10-31 PROCEDURE — 3060F POS MICROALBUMINURIA REV: CPT | Mod: CPTII,S$GLB,, | Performed by: INTERNAL MEDICINE

## 2023-10-31 PROCEDURE — 3008F BODY MASS INDEX DOCD: CPT | Mod: CPTII,S$GLB,, | Performed by: INTERNAL MEDICINE

## 2023-10-31 PROCEDURE — 99214 PR OFFICE/OUTPT VISIT, EST, LEVL IV, 30-39 MIN: ICD-10-PCS | Mod: S$GLB,,, | Performed by: INTERNAL MEDICINE

## 2023-10-31 PROCEDURE — 99214 OFFICE O/P EST MOD 30 MIN: CPT | Mod: S$GLB,,, | Performed by: INTERNAL MEDICINE

## 2023-10-31 PROCEDURE — 1160F PR REVIEW ALL MEDS BY PRESCRIBER/CLIN PHARMACIST DOCUMENTED: ICD-10-PCS | Mod: CPTII,S$GLB,, | Performed by: INTERNAL MEDICINE

## 2023-10-31 PROCEDURE — 1159F MED LIST DOCD IN RCRD: CPT | Mod: CPTII,S$GLB,, | Performed by: INTERNAL MEDICINE

## 2023-10-31 PROCEDURE — 3052F PR MOST RECENT HEMOGLOBIN A1C LEVEL 8.0 - < 9.0%: ICD-10-PCS | Mod: CPTII,S$GLB,, | Performed by: INTERNAL MEDICINE

## 2023-10-31 PROCEDURE — 3079F DIAST BP 80-89 MM HG: CPT | Mod: CPTII,S$GLB,, | Performed by: INTERNAL MEDICINE

## 2023-10-31 PROCEDURE — 3074F SYST BP LT 130 MM HG: CPT | Mod: CPTII,S$GLB,, | Performed by: INTERNAL MEDICINE

## 2023-10-31 PROCEDURE — 1159F PR MEDICATION LIST DOCUMENTED IN MEDICAL RECORD: ICD-10-PCS | Mod: CPTII,S$GLB,, | Performed by: INTERNAL MEDICINE

## 2023-10-31 PROCEDURE — 3074F PR MOST RECENT SYSTOLIC BLOOD PRESSURE < 130 MM HG: ICD-10-PCS | Mod: CPTII,S$GLB,, | Performed by: INTERNAL MEDICINE

## 2023-10-31 PROCEDURE — 1160F RVW MEDS BY RX/DR IN RCRD: CPT | Mod: CPTII,S$GLB,, | Performed by: INTERNAL MEDICINE

## 2023-10-31 PROCEDURE — 3066F PR DOCUMENTATION OF TREATMENT FOR NEPHROPATHY: ICD-10-PCS | Mod: CPTII,S$GLB,, | Performed by: INTERNAL MEDICINE

## 2023-10-31 PROCEDURE — 3060F PR POS MICROALBUMINURIA RESULT DOCUMENTED/REVIEW: ICD-10-PCS | Mod: CPTII,S$GLB,, | Performed by: INTERNAL MEDICINE

## 2023-10-31 PROCEDURE — 3066F NEPHROPATHY DOC TX: CPT | Mod: CPTII,S$GLB,, | Performed by: INTERNAL MEDICINE

## 2023-10-31 PROCEDURE — 99999 PR PBB SHADOW E&M-EST. PATIENT-LVL IV: ICD-10-PCS | Mod: PBBFAC,,, | Performed by: INTERNAL MEDICINE

## 2023-10-31 NOTE — PROGRESS NOTES
Subjective:    Patient ID:  Kulwant Mueller Jr. is a 38 y.o. male who presents for follow-up of Deep Vein Thrombosis      HPI     37 y/o male  with hx of acute LLE fem-pop and below the knee DVT (extensive thrombus) 8/2016 s/p catheter directed thrombolysis with EKOS with significant improvement in symptoms on chronic anticoagulation on Xarelto, May Thurner's Syndrome s/p bilateral iliac vein stenting, HTN, DM on insulin. He initially presented with LLE pain and swelling and found to have acute LLE extensive DVT from fem to peroneal. 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 EKOS removed after 48 hours with significant improvement in edema and pain. Discharged home on Xarelto. 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 Repeat LE venous doppler without evidence of residual thrombus. Had intractable N/V for a few days after initial procedure for DVT which resolved and again had similar symptoms after last intervention. Seen by GI and started on Reglan with improvement and eventual resolution of symptoms. Had EGD which was normal. Repeat hospitalization for recurrent LLE DVT with post thrombotic syndrome and again received catheter directed thrombolysis with EKOS with resolution of symptoms and DVT. Repeat LLE doppler with no evidence of DVT. Had developed bilateral leg edema mostly to the ankles and mostly on the right. Symptoms began after increasing dose of Xarelto from 10 mg to 20 mg. No evidence of post phlebitic syndrome. Had not been taking losartan again. Previous clinic visit had venous doppler ordered which was normal. Xarelto dose decreased to 10 mg daily per Heme/Onc rec's.   Had done well with no issues.  Previously developed LE edema (right ankle), no significant pain or claudication, now resolved. Last venous doppler with no evidence of DVT. Denies CP, SOB/DASILVA, orthopnea, PND,  syncope, palps. 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     1/26/2023:  Has wound on right great toe. Denies CP, SOB/DASILVA, orthopnea, PND, syncope, palps.    10/31/2023:  Since last visit has run out of Xarelto and not taking for about 1 month. Developed LLE ankle edema for a couple weeks that is improved with compression stockings. Has right heel lesion that is healing and is following with wound care. No trauma to the area, no TTP, no erythema, no calf or thigh edema.     Review of Systems   Constitutional: Negative for malaise/fatigue.   HENT:  Negative for congestion.    Eyes:  Negative for blurred vision.   Cardiovascular:  Positive for leg swelling. Negative for chest pain, claudication, cyanosis, dyspnea on exertion, irregular heartbeat, near-syncope, orthopnea, palpitations, paroxysmal nocturnal dyspnea and syncope.   Respiratory:  Negative for shortness of breath.    Endocrine: Negative for polyuria.   Hematologic/Lymphatic: Negative for bleeding problem.   Skin:  Positive for poor wound healing. Negative for itching and rash.   Musculoskeletal:  Negative for joint swelling, muscle cramps and muscle weakness.   Gastrointestinal:  Negative for abdominal pain, hematemesis, hematochezia, melena, nausea and vomiting.   Genitourinary:  Negative for dysuria and hematuria.   Neurological:  Negative for dizziness, focal weakness, headaches, light-headedness, loss of balance and weakness.   Psychiatric/Behavioral:  Negative for depression. The patient is not nervous/anxious.         Objective:    Physical Exam  Constitutional:       Appearance: Normal appearance.   HENT:      Head: Normocephalic and atraumatic.   Eyes:      Extraocular Movements: Extraocular movements intact.   Musculoskeletal:      Right lower leg: Edema present.      Left lower leg: No edema.   Neurological:      Mental Status: He is alert and oriented to person, place, and time. Mental status is at baseline.   Psychiatric:         Mood and  Affect: Mood normal.         Behavior: Behavior normal.         Thought Content: Thought content normal.           Assessment:       1. Leg edema, right    2. Recurrent deep vein thrombosis (DVT) of left lower extremity    3. Post-thrombotic syndrome of left lower extremity    4. Chronic anticoagulation    5. History of intravascular stent placement    6. May-Thurner syndrome    7. Hypertension associated with diabetes    8. Type 2 diabetes mellitus with complication, with long-term current use of insulin      39 y/o pt with hx and presentation as above. Doing well from a cardiac perspective and compensated from a HF perspective. Has new RLE edema and will obtain doppler. Had extensive discussion on med compliance and will restart DOAC. Continue wound care. Need to stay active. Discussed the etiology, evaluation, and management of DVT, May-Thurner, AC, HTN, DM, med compliance. Discussed the importance of med compliance, heart healthy diet, and regular exercise.      Plan:       -Continue current medical management  -Continue aggressive wound care  -Restart DOAC  -RLE venous doppler  -Continue compression stockings  -f/u in 3 months

## 2023-11-02 ENCOUNTER — HOSPITAL ENCOUNTER (OUTPATIENT)
Dept: RADIOLOGY | Facility: HOSPITAL | Age: 38
Discharge: HOME | End: 2023-11-02
Attending: INTERNAL MEDICINE
Payer: COMMERCIAL

## 2023-11-02 DIAGNOSIS — R60.0 LEG EDEMA, RIGHT: ICD-10-CM

## 2023-11-02 PROCEDURE — 93971 EXTREMITY STUDY: CPT | Mod: 26,RT,, | Performed by: RADIOLOGY

## 2023-11-02 PROCEDURE — 93971 EXTREMITY STUDY: CPT | Mod: TC,PN,RT

## 2023-11-02 PROCEDURE — 93971 US LOWER EXTREMITY VEINS RIGHT: ICD-10-PCS | Mod: 26,RT,, | Performed by: RADIOLOGY

## 2023-11-17 DIAGNOSIS — E11.65 UNCONTROLLED TYPE 2 DIABETES MELLITUS WITH HYPERGLYCEMIA, WITH LONG-TERM CURRENT USE OF INSULIN: ICD-10-CM

## 2023-11-17 DIAGNOSIS — Z79.4 UNCONTROLLED TYPE 2 DIABETES MELLITUS WITH HYPERGLYCEMIA, WITH LONG-TERM CURRENT USE OF INSULIN: ICD-10-CM

## 2023-11-20 RX ORDER — INSULIN DEGLUDEC 200 U/ML
55 INJECTION, SOLUTION SUBCUTANEOUS DAILY
Qty: 3 PEN | Refills: 11 | Status: ON HOLD | OUTPATIENT
Start: 2023-11-20 | End: 2024-02-07

## 2023-12-27 ENCOUNTER — HOSPITAL ENCOUNTER (EMERGENCY)
Facility: HOSPITAL | Age: 38
Discharge: LEFT BEFORE DISCHARGE | End: 2023-12-27
Payer: COMMERCIAL

## 2023-12-27 ENCOUNTER — OFFICE VISIT (OUTPATIENT)
Dept: GASTROENTEROLOGY | Facility: CLINIC | Age: 38
End: 2023-12-27
Payer: COMMERCIAL

## 2023-12-27 ENCOUNTER — HOSPITAL ENCOUNTER (INPATIENT)
Facility: HOSPITAL | Age: 38
LOS: 13 days | Discharge: NURSING, REHAB OR LONG-TERM CARE | DRG: 853 | End: 2024-01-10
Attending: EMERGENCY MEDICINE | Admitting: INTERNAL MEDICINE
Payer: COMMERCIAL

## 2023-12-27 VITALS
HEART RATE: 111 BPM | HEIGHT: 74 IN | SYSTOLIC BLOOD PRESSURE: 124 MMHG | DIASTOLIC BLOOD PRESSURE: 82 MMHG | WEIGHT: 273.81 LBS | BODY MASS INDEX: 35.14 KG/M2

## 2023-12-27 VITALS
RESPIRATION RATE: 20 BRPM | HEART RATE: 111 BPM | DIASTOLIC BLOOD PRESSURE: 64 MMHG | WEIGHT: 264 LBS | HEIGHT: 73 IN | TEMPERATURE: 98 F | OXYGEN SATURATION: 99 % | BODY MASS INDEX: 34.99 KG/M2 | SYSTOLIC BLOOD PRESSURE: 149 MMHG

## 2023-12-27 DIAGNOSIS — D72.829 LEUKOCYTOSIS: ICD-10-CM

## 2023-12-27 DIAGNOSIS — R00.0 TACHYCARDIA: ICD-10-CM

## 2023-12-27 DIAGNOSIS — F32.0 CURRENT MILD EPISODE OF MAJOR DEPRESSIVE DISORDER WITHOUT PRIOR EPISODE: ICD-10-CM

## 2023-12-27 DIAGNOSIS — L97.411 DIABETIC ULCER OF RIGHT HEEL ASSOCIATED WITH DIABETES MELLITUS DUE TO UNDERLYING CONDITION, LIMITED TO BREAKDOWN OF SKIN: ICD-10-CM

## 2023-12-27 DIAGNOSIS — E11.69 TYPE 2 DIABETES MELLITUS WITH OTHER SPECIFIED COMPLICATION, UNSPECIFIED WHETHER LONG TERM INSULIN USE: ICD-10-CM

## 2023-12-27 DIAGNOSIS — E11.69 TYPE 2 DIABETES MELLITUS WITH OTHER SPECIFIED COMPLICATION, WITH LONG-TERM CURRENT USE OF INSULIN: ICD-10-CM

## 2023-12-27 DIAGNOSIS — R78.81 STAPHYLOCOCCUS AUREUS BACTEREMIA: Primary | ICD-10-CM

## 2023-12-27 DIAGNOSIS — B95.62 MRSA BACTEREMIA: ICD-10-CM

## 2023-12-27 DIAGNOSIS — E11.8 TYPE 2 DIABETES MELLITUS WITH COMPLICATION, WITH LONG-TERM CURRENT USE OF INSULIN: Chronic | ICD-10-CM

## 2023-12-27 DIAGNOSIS — E08.621 DIABETIC ULCER OF HEEL ASSOCIATED WITH DIABETES MELLITUS DUE TO UNDERLYING CONDITION, LIMITED TO BREAKDOWN OF SKIN, UNSPECIFIED LATERALITY: ICD-10-CM

## 2023-12-27 DIAGNOSIS — M00.9 SEPTIC ARTHRITIS, DUE TO UNSPECIFIED ORGANISM, SEPTIC ARTHRITIS OF UNSPECIFIED LOCATION: ICD-10-CM

## 2023-12-27 DIAGNOSIS — R19.7 DIARRHEA, UNSPECIFIED TYPE: ICD-10-CM

## 2023-12-27 DIAGNOSIS — I15.2 HYPERTENSION ASSOCIATED WITH DIABETES: ICD-10-CM

## 2023-12-27 DIAGNOSIS — M00.062 STAPHYLOCOCCAL ARTHRITIS OF LEFT KNEE: ICD-10-CM

## 2023-12-27 DIAGNOSIS — E11.59 HYPERTENSION ASSOCIATED WITH DIABETES: ICD-10-CM

## 2023-12-27 DIAGNOSIS — E08.621: ICD-10-CM

## 2023-12-27 DIAGNOSIS — B95.61 STAPHYLOCOCCUS AUREUS BACTEREMIA: Primary | ICD-10-CM

## 2023-12-27 DIAGNOSIS — R74.01 TRANSAMINITIS: ICD-10-CM

## 2023-12-27 DIAGNOSIS — L97.401 DIABETIC ULCER OF HEEL ASSOCIATED WITH DIABETES MELLITUS DUE TO UNDERLYING CONDITION, LIMITED TO BREAKDOWN OF SKIN, UNSPECIFIED LATERALITY: ICD-10-CM

## 2023-12-27 DIAGNOSIS — L97.401: ICD-10-CM

## 2023-12-27 DIAGNOSIS — D64.9 NORMOCYTIC ANEMIA: ICD-10-CM

## 2023-12-27 DIAGNOSIS — Z79.4 TYPE 2 DIABETES MELLITUS WITH COMPLICATION, WITH LONG-TERM CURRENT USE OF INSULIN: Chronic | ICD-10-CM

## 2023-12-27 DIAGNOSIS — E08.621 DIABETIC ULCER OF RIGHT HEEL ASSOCIATED WITH DIABETES MELLITUS DUE TO UNDERLYING CONDITION, LIMITED TO BREAKDOWN OF SKIN: ICD-10-CM

## 2023-12-27 DIAGNOSIS — I49.9 CARDIAC ARRHYTHMIA, UNSPECIFIED CARDIAC ARRHYTHMIA TYPE: ICD-10-CM

## 2023-12-27 DIAGNOSIS — M86.179 OTHER ACUTE OSTEOMYELITIS, UNSPECIFIED ANKLE AND FOOT: ICD-10-CM

## 2023-12-27 DIAGNOSIS — A41.9 SEVERE SEPSIS: ICD-10-CM

## 2023-12-27 DIAGNOSIS — R11.2 NAUSEA AND VOMITING, UNSPECIFIED VOMITING TYPE: ICD-10-CM

## 2023-12-27 DIAGNOSIS — Z79.4 TYPE 2 DIABETES MELLITUS WITH OTHER SPECIFIED COMPLICATION, WITH LONG-TERM CURRENT USE OF INSULIN: ICD-10-CM

## 2023-12-27 DIAGNOSIS — D72.829 LEUKOCYTOSIS, UNSPECIFIED TYPE: ICD-10-CM

## 2023-12-27 DIAGNOSIS — E86.0 DEHYDRATION: ICD-10-CM

## 2023-12-27 DIAGNOSIS — R78.81 MRSA BACTEREMIA: ICD-10-CM

## 2023-12-27 DIAGNOSIS — N17.9 ACUTE KIDNEY INJURY: ICD-10-CM

## 2023-12-27 DIAGNOSIS — D72.829 LEUKOCYTOSIS, UNSPECIFIED TYPE: Primary | ICD-10-CM

## 2023-12-27 DIAGNOSIS — M71.162 SEPTIC PREPATELLAR BURSITIS OF LEFT KNEE: ICD-10-CM

## 2023-12-27 DIAGNOSIS — S91.301A NON-HEALING WOUND OF RIGHT HEEL: ICD-10-CM

## 2023-12-27 DIAGNOSIS — R11.2 INTRACTABLE NAUSEA AND VOMITING: ICD-10-CM

## 2023-12-27 DIAGNOSIS — R11.10 INTRACTABLE VOMITING: ICD-10-CM

## 2023-12-27 DIAGNOSIS — E11.10 DIABETIC KETOACIDOSIS WITHOUT COMA ASSOCIATED WITH TYPE 2 DIABETES MELLITUS: ICD-10-CM

## 2023-12-27 DIAGNOSIS — R65.20 SEVERE SEPSIS: ICD-10-CM

## 2023-12-27 DIAGNOSIS — S90.851A: ICD-10-CM

## 2023-12-27 LAB
ALBUMIN SERPL BCP-MCNC: 2.7 G/DL (ref 3.5–5.2)
ALLENS TEST: ABNORMAL
ALP SERPL-CCNC: 131 U/L (ref 55–135)
ALT SERPL W/O P-5'-P-CCNC: 31 U/L (ref 10–44)
ANION GAP SERPL CALC-SCNC: 31 MMOL/L (ref 8–16)
AST SERPL-CCNC: 22 U/L (ref 10–40)
B-OH-BUTYR BLD STRIP-SCNC: 6.1 MMOL/L (ref 0–0.5)
BACTERIA #/AREA URNS HPF: ABNORMAL /HPF
BASOPHILS # BLD AUTO: 0.03 K/UL (ref 0–0.2)
BASOPHILS NFR BLD: 0.1 % (ref 0–1.9)
BILIRUB SERPL-MCNC: 0.6 MG/DL (ref 0.1–1)
BILIRUB UR QL STRIP: NEGATIVE
BUN SERPL-MCNC: 33 MG/DL (ref 6–20)
CALCIUM SERPL-MCNC: 9.7 MG/DL (ref 8.7–10.5)
CHLORIDE SERPL-SCNC: 95 MMOL/L (ref 95–110)
CLARITY UR: CLEAR
CO2 SERPL-SCNC: 9 MMOL/L (ref 23–29)
COLOR UR: YELLOW
CREAT SERPL-MCNC: 1.9 MG/DL (ref 0.5–1.4)
DELSYS: ABNORMAL
DIFFERENTIAL METHOD BLD: ABNORMAL
EOSINOPHIL # BLD AUTO: 0 K/UL (ref 0–0.5)
EOSINOPHIL NFR BLD: 0 % (ref 0–8)
ERYTHROCYTE [DISTWIDTH] IN BLOOD BY AUTOMATED COUNT: 14.4 % (ref 11.5–14.5)
ERYTHROCYTE [SEDIMENTATION RATE] IN BLOOD BY WESTERGREN METHOD: 23 MM/H
EST. GFR  (NO RACE VARIABLE): 45 ML/MIN/1.73 M^2
FIO2: 21
GLUCOSE SERPL-MCNC: 343 MG/DL (ref 70–110)
GLUCOSE UR QL STRIP: ABNORMAL
GRAN CASTS #/AREA URNS LPF: 11 /LPF
HCO3 UR-SCNC: 9.9 MMOL/L (ref 24–28)
HCT VFR BLD AUTO: 42.1 % (ref 40–54)
HGB BLD-MCNC: 14.1 G/DL (ref 14–18)
HGB UR QL STRIP: ABNORMAL
HYALINE CASTS #/AREA URNS LPF: 5 /LPF
IMM GRANULOCYTES # BLD AUTO: 0.56 K/UL (ref 0–0.04)
IMM GRANULOCYTES NFR BLD AUTO: 1.3 % (ref 0–0.5)
INFLUENZA A, MOLECULAR: NEGATIVE
INFLUENZA B, MOLECULAR: NEGATIVE
KETONES UR QL STRIP: ABNORMAL
LACTATE SERPL-SCNC: 1.3 MMOL/L (ref 0.5–2.2)
LEUKOCYTE ESTERASE UR QL STRIP: ABNORMAL
LYMPHOCYTES # BLD AUTO: 1.1 K/UL (ref 1–4.8)
LYMPHOCYTES NFR BLD: 2.4 % (ref 18–48)
MCH RBC QN AUTO: 28.3 PG (ref 27–31)
MCHC RBC AUTO-ENTMCNC: 33.5 G/DL (ref 32–36)
MCV RBC AUTO: 84 FL (ref 82–98)
MICROSCOPIC COMMENT: ABNORMAL
MODE: ABNORMAL
MONOCYTES # BLD AUTO: 2.4 K/UL (ref 0.3–1)
MONOCYTES NFR BLD: 5.4 % (ref 4–15)
NEUTROPHILS # BLD AUTO: 40.2 K/UL (ref 1.8–7.7)
NEUTROPHILS NFR BLD: 90.8 % (ref 38–73)
NITRITE UR QL STRIP: NEGATIVE
NRBC BLD-RTO: 0 /100 WBC
PCO2 BLDA: 19 MMHG (ref 35–45)
PH SMN: 7.33 [PH] (ref 7.35–7.45)
PH UR STRIP: 6 [PH] (ref 5–8)
PLATELET # BLD AUTO: 376 K/UL (ref 150–450)
PMV BLD AUTO: 10.8 FL (ref 9.2–12.9)
PO2 BLDA: 53 MMHG (ref 40–60)
POC BE: -16 MMOL/L
POC SATURATED O2: 86 % (ref 95–100)
POC TCO2: 10 MMOL/L (ref 24–29)
POCT GLUCOSE: 320 MG/DL (ref 70–110)
POCT GLUCOSE: 343 MG/DL (ref 70–110)
POTASSIUM SERPL-SCNC: 4.5 MMOL/L (ref 3.5–5.1)
PROCALCITONIN SERPL IA-MCNC: 7.65 NG/ML
PROT SERPL-MCNC: 9.2 G/DL (ref 6–8.4)
PROT UR QL STRIP: ABNORMAL
RBC # BLD AUTO: 4.99 M/UL (ref 4.6–6.2)
RBC #/AREA URNS HPF: 17 /HPF (ref 0–4)
SAMPLE: ABNORMAL
SARS-COV-2 RDRP RESP QL NAA+PROBE: NEGATIVE
SITE: ABNORMAL
SODIUM SERPL-SCNC: 135 MMOL/L (ref 136–145)
SP GR UR STRIP: 1.03 (ref 1–1.03)
SP02: 97
SPECIMEN SOURCE: NORMAL
SQUAMOUS #/AREA URNS HPF: 0 /HPF
TROPONIN I SERPL DL<=0.01 NG/ML-MCNC: 0.01 NG/ML (ref 0–0.03)
URN SPEC COLLECT METH UR: ABNORMAL
UROBILINOGEN UR STRIP-ACNC: NEGATIVE EU/DL
WBC # BLD AUTO: 44.27 K/UL (ref 3.9–12.7)
WBC #/AREA URNS HPF: 24 /HPF (ref 0–5)
YEAST URNS QL MICRO: ABNORMAL

## 2023-12-27 PROCEDURE — 87088 URINE BACTERIA CULTURE: CPT | Performed by: EMERGENCY MEDICINE

## 2023-12-27 PROCEDURE — 82010 KETONE BODYS QUAN: CPT | Performed by: EMERGENCY MEDICINE

## 2023-12-27 PROCEDURE — 25000003 PHARM REV CODE 250: Performed by: EMERGENCY MEDICINE

## 2023-12-27 PROCEDURE — U0002 COVID-19 LAB TEST NON-CDC: HCPCS | Performed by: EMERGENCY MEDICINE

## 2023-12-27 PROCEDURE — 3074F PR MOST RECENT SYSTOLIC BLOOD PRESSURE < 130 MM HG: ICD-10-PCS | Mod: CPTII,S$GLB,, | Performed by: NURSE PRACTITIONER

## 2023-12-27 PROCEDURE — 82803 BLOOD GASES ANY COMBINATION: CPT

## 2023-12-27 PROCEDURE — 3060F PR POS MICROALBUMINURIA RESULT DOCUMENTED/REVIEW: ICD-10-PCS | Mod: CPTII,S$GLB,, | Performed by: NURSE PRACTITIONER

## 2023-12-27 PROCEDURE — 80307 DRUG TEST PRSMV CHEM ANLYZR: CPT | Performed by: EMERGENCY MEDICINE

## 2023-12-27 PROCEDURE — 3060F POS MICROALBUMINURIA REV: CPT | Mod: CPTII,S$GLB,, | Performed by: NURSE PRACTITIONER

## 2023-12-27 PROCEDURE — 99900041 HC LEFT WITHOUT BEING SEEN- EMERGENCY

## 2023-12-27 PROCEDURE — 96374 THER/PROPH/DIAG INJ IV PUSH: CPT

## 2023-12-27 PROCEDURE — 1160F PR REVIEW ALL MEDS BY PRESCRIBER/CLIN PHARMACIST DOCUMENTED: ICD-10-PCS | Mod: CPTII,S$GLB,, | Performed by: NURSE PRACTITIONER

## 2023-12-27 PROCEDURE — 81000 URINALYSIS NONAUTO W/SCOPE: CPT | Mod: 59 | Performed by: EMERGENCY MEDICINE

## 2023-12-27 PROCEDURE — 3052F HG A1C>EQUAL 8.0%<EQUAL 9.0%: CPT | Mod: CPTII,S$GLB,, | Performed by: NURSE PRACTITIONER

## 2023-12-27 PROCEDURE — 99900035 HC TECH TIME PER 15 MIN (STAT)

## 2023-12-27 PROCEDURE — 83605 ASSAY OF LACTIC ACID: CPT | Performed by: EMERGENCY MEDICINE

## 2023-12-27 PROCEDURE — 87077 CULTURE AEROBIC IDENTIFY: CPT | Mod: 59 | Performed by: EMERGENCY MEDICINE

## 2023-12-27 PROCEDURE — 80053 COMPREHEN METABOLIC PANEL: CPT | Performed by: EMERGENCY MEDICINE

## 2023-12-27 PROCEDURE — 87186 SC STD MICRODIL/AGAR DIL: CPT | Performed by: EMERGENCY MEDICINE

## 2023-12-27 PROCEDURE — 99999 PR PBB SHADOW E&M-EST. PATIENT-LVL IV: CPT | Mod: PBBFAC,,, | Performed by: NURSE PRACTITIONER

## 2023-12-27 PROCEDURE — 96365 THER/PROPH/DIAG IV INF INIT: CPT

## 2023-12-27 PROCEDURE — 82962 GLUCOSE BLOOD TEST: CPT

## 2023-12-27 PROCEDURE — 87502 INFLUENZA DNA AMP PROBE: CPT | Performed by: EMERGENCY MEDICINE

## 2023-12-27 PROCEDURE — 96361 HYDRATE IV INFUSION ADD-ON: CPT

## 2023-12-27 PROCEDURE — 3008F BODY MASS INDEX DOCD: CPT | Mod: CPTII,S$GLB,, | Performed by: NURSE PRACTITIONER

## 2023-12-27 PROCEDURE — 99291 CRITICAL CARE FIRST HOUR: CPT

## 2023-12-27 PROCEDURE — 3079F DIAST BP 80-89 MM HG: CPT | Mod: CPTII,S$GLB,, | Performed by: NURSE PRACTITIONER

## 2023-12-27 PROCEDURE — 99215 PR OFFICE/OUTPT VISIT, EST, LEVL V, 40-54 MIN: ICD-10-PCS | Mod: S$GLB,,, | Performed by: NURSE PRACTITIONER

## 2023-12-27 PROCEDURE — 87086 URINE CULTURE/COLONY COUNT: CPT | Performed by: EMERGENCY MEDICINE

## 2023-12-27 PROCEDURE — 3052F PR MOST RECENT HEMOGLOBIN A1C LEVEL 8.0 - < 9.0%: ICD-10-PCS | Mod: CPTII,S$GLB,, | Performed by: NURSE PRACTITIONER

## 2023-12-27 PROCEDURE — 3074F SYST BP LT 130 MM HG: CPT | Mod: CPTII,S$GLB,, | Performed by: NURSE PRACTITIONER

## 2023-12-27 PROCEDURE — 1159F MED LIST DOCD IN RCRD: CPT | Mod: CPTII,S$GLB,, | Performed by: NURSE PRACTITIONER

## 2023-12-27 PROCEDURE — 87040 BLOOD CULTURE FOR BACTERIA: CPT | Performed by: EMERGENCY MEDICINE

## 2023-12-27 PROCEDURE — 1159F PR MEDICATION LIST DOCUMENTED IN MEDICAL RECORD: ICD-10-PCS | Mod: CPTII,S$GLB,, | Performed by: NURSE PRACTITIONER

## 2023-12-27 PROCEDURE — 3008F PR BODY MASS INDEX (BMI) DOCUMENTED: ICD-10-PCS | Mod: CPTII,S$GLB,, | Performed by: NURSE PRACTITIONER

## 2023-12-27 PROCEDURE — 84145 PROCALCITONIN (PCT): CPT | Performed by: EMERGENCY MEDICINE

## 2023-12-27 PROCEDURE — 63600175 PHARM REV CODE 636 W HCPCS: Performed by: EMERGENCY MEDICINE

## 2023-12-27 PROCEDURE — 85025 COMPLETE CBC W/AUTO DIFF WBC: CPT | Performed by: EMERGENCY MEDICINE

## 2023-12-27 PROCEDURE — 3066F PR DOCUMENTATION OF TREATMENT FOR NEPHROPATHY: ICD-10-PCS | Mod: CPTII,S$GLB,, | Performed by: NURSE PRACTITIONER

## 2023-12-27 PROCEDURE — 99215 OFFICE O/P EST HI 40 MIN: CPT | Mod: S$GLB,,, | Performed by: NURSE PRACTITIONER

## 2023-12-27 PROCEDURE — 93005 ELECTROCARDIOGRAM TRACING: CPT

## 2023-12-27 PROCEDURE — 1160F RVW MEDS BY RX/DR IN RCRD: CPT | Mod: CPTII,S$GLB,, | Performed by: NURSE PRACTITIONER

## 2023-12-27 PROCEDURE — 84484 ASSAY OF TROPONIN QUANT: CPT | Performed by: EMERGENCY MEDICINE

## 2023-12-27 PROCEDURE — 99999 PR PBB SHADOW E&M-EST. PATIENT-LVL IV: ICD-10-PCS | Mod: PBBFAC,,, | Performed by: NURSE PRACTITIONER

## 2023-12-27 PROCEDURE — 94761 N-INVAS EAR/PLS OXIMETRY MLT: CPT | Mod: XB

## 2023-12-27 PROCEDURE — 93010 ELECTROCARDIOGRAM REPORT: CPT | Mod: ,,, | Performed by: INTERNAL MEDICINE

## 2023-12-27 PROCEDURE — 3079F PR MOST RECENT DIASTOLIC BLOOD PRESSURE 80-89 MM HG: ICD-10-PCS | Mod: CPTII,S$GLB,, | Performed by: NURSE PRACTITIONER

## 2023-12-27 PROCEDURE — 3066F NEPHROPATHY DOC TX: CPT | Mod: CPTII,S$GLB,, | Performed by: NURSE PRACTITIONER

## 2023-12-27 PROCEDURE — 96375 TX/PRO/DX INJ NEW DRUG ADDON: CPT

## 2023-12-27 RX ORDER — SODIUM CHLORIDE 9 MG/ML
1000 INJECTION, SOLUTION INTRAVENOUS
Status: COMPLETED | OUTPATIENT
Start: 2023-12-27 | End: 2023-12-27

## 2023-12-27 RX ORDER — DEXTROSE MONOHYDRATE AND SODIUM CHLORIDE 5; .45 G/100ML; G/100ML
INJECTION, SOLUTION INTRAVENOUS CONTINUOUS PRN
Status: DISCONTINUED | OUTPATIENT
Start: 2023-12-28 | End: 2023-12-28

## 2023-12-27 RX ORDER — ONDANSETRON 2 MG/ML
4 INJECTION INTRAMUSCULAR; INTRAVENOUS ONCE
Status: COMPLETED | OUTPATIENT
Start: 2023-12-28 | End: 2023-12-27

## 2023-12-27 RX ORDER — PROMETHAZINE HYDROCHLORIDE 25 MG/1
25 SUPPOSITORY RECTAL EVERY 6 HOURS PRN
Qty: 10 SUPPOSITORY | Refills: 0 | Status: ON HOLD | OUTPATIENT
Start: 2023-12-27 | End: 2024-02-01

## 2023-12-27 RX ORDER — DEXTROSE MONOHYDRATE 100 MG/ML
INJECTION, SOLUTION INTRAVENOUS
Status: DISCONTINUED | OUTPATIENT
Start: 2023-12-28 | End: 2023-12-28

## 2023-12-27 RX ORDER — SODIUM CHLORIDE 9 MG/ML
1000 INJECTION, SOLUTION INTRAVENOUS CONTINUOUS
Status: ACTIVE | OUTPATIENT
Start: 2023-12-28 | End: 2023-12-28

## 2023-12-27 RX ORDER — SODIUM CHLORIDE 0.9 % (FLUSH) 0.9 %
10 SYRINGE (ML) INJECTION
Status: DISCONTINUED | OUTPATIENT
Start: 2023-12-28 | End: 2024-01-08

## 2023-12-27 RX ORDER — ONDANSETRON 2 MG/ML
4 INJECTION INTRAMUSCULAR; INTRAVENOUS
Status: DISCONTINUED | OUTPATIENT
Start: 2023-12-27 | End: 2023-12-27 | Stop reason: HOSPADM

## 2023-12-27 RX ADMIN — SODIUM CHLORIDE 0.1 UNITS/KG/HR: 9 INJECTION, SOLUTION INTRAVENOUS at 11:12

## 2023-12-27 RX ADMIN — SODIUM CHLORIDE 1000 ML: 9 INJECTION, SOLUTION INTRAVENOUS at 10:12

## 2023-12-27 RX ADMIN — SODIUM CHLORIDE 1000 ML: 9 INJECTION, SOLUTION INTRAVENOUS at 11:12

## 2023-12-27 RX ADMIN — PIPERACILLIN SODIUM AND TAZOBACTAM SODIUM 4.5 G: 4; .5 INJECTION, POWDER, FOR SOLUTION INTRAVENOUS at 11:12

## 2023-12-27 RX ADMIN — ONDANSETRON 4 MG: 2 INJECTION INTRAMUSCULAR; INTRAVENOUS at 11:12

## 2023-12-27 NOTE — PROGRESS NOTES
Clinic Consult:  [facility]    Reason for Consult:  The primary encounter diagnosis was Leukocytosis, unspecified type. Diagnoses of Intractable vomiting, Diarrhea, unspecified type, and Type 2 diabetes mellitus with other specified complication, unspecified whether long term insulin use were also pertinent to this visit.    PCP: Shellie, Primary Doctor       HPI:  This is a 38 y.o. male here for evaluation of the above.   He started with nausea and vomiting for the last couple of weeks. He has diabetes but has not been taking any medication. He had sick contact with the flu and he started running fever (per patient, low grade). Vomiting has become intractable. He has not been able to hold anything down. He had one episode of watery stool today.     He doesn't have a primary care. Has a diabetic wound over the right heel. He is seeing wound clinic.     He went to  and then ER yesterday for symptoms. WBC at 33. Normal lactic acid. Glucose was 200. Lipase normal. He had a CT scan abdomen pelvis that was normal. He was discharged home.     Review of Systems   Constitutional:  Positive for fever and malaise/fatigue.   HENT:  Negative for sore throat.    Respiratory:  Negative for cough, shortness of breath and wheezing.    Cardiovascular:  Negative for chest pain.   Gastrointestinal:  Positive for diarrhea, nausea and vomiting. Negative for abdominal pain, blood in stool, constipation and melena.   Skin:  Negative for itching and rash.   Neurological:  Positive for weakness.       Medical History:  has a past medical history of Anticoagulant long-term use, COVID-19 virus infection, Diabetes mellitus, Diabetes mellitus, type 2, Hypertension, and May-Thurner syndrome.    Surgical History:  has a past surgical history that includes Appendectomy; stents in bilateral legs; Incision and drainage foot (Left, 11/28/2021); Esophagogastroduodenoscopy (N/A, 1/31/2022); and Esophagogastroduodenoscopy  "(N/A, 3/21/2022).    Family History: family history includes Cancer in his maternal grandfather, mother, paternal grandfather, and paternal uncle; Irritable bowel syndrome in his paternal grandfather..     Social History:  reports that he has quit smoking. His smoking use included cigarettes. He has quit using smokeless tobacco. He reports current alcohol use. He reports that he does not use drugs.    Allergies: Reviewed    Home Medications:   Current Outpatient Medications on File Prior to Visit   Medication Sig Dispense Refill    blood-glucose meter,continuous (DEXCOM G7 ) Misc 1 Device by Misc.(Non-Drug; Combo Route) route once daily. 1 each 0    blood-glucose sensor (DEXCOM G7 SENSOR) Justyna 1 Device by Misc.(Non-Drug; Combo Route) route every 10 days. 3 each 11    empagliflozin (JARDIANCE) 25 mg tablet Take 1 tablet (25 mg total) by mouth once daily. 90 tablet 3    furosemide (LASIX) 20 MG tablet Take 1 tablet (20 mg total) by mouth once daily. 90 tablet 3    glucagon (GVOKE HYPOPEN 2-PACK) 1 mg/0.2 mL AtIn Inject 1 mg into the skin as needed (SEVERE HYPOGLYCEMIA). 2 each 5    insulin aspart U-100 (NOVOLOG U-100 INSULIN ASPART) 100 unit/mL injection Inject 14 Units into the skin 3 (three) times daily before meals. 12.6 mL 3    metoclopramide HCl (REGLAN) 10 MG tablet Take 0.5 tablets (5 mg total) by mouth 3 (three) times daily before meals. 270 tablet 3    pantoprazole (PROTONIX) 40 MG tablet Take 1 tablet (40 mg total) by mouth once daily. 90 tablet 0    pen needle, diabetic (BD ULTRA-FINE DIYA PEN NEEDLE) 32 gauge x 5/32" Ndle Use with Tresiba daily and Humalog 3-4 times daily as directed. 200 each 5    prednisoLONE acetate (PRED FORTE) 1 % DrpS Instill one drop to the left eye every 2 hours while awake for 2 days then instill one drop 4 time daily for 5 days 5 mL 0    prochlorperazine (COMPAZINE) 10 MG tablet Take 1 tablet (10 mg total) by mouth 3 (three) times daily as needed (nausea or vomiting). 15 " tablet 0    rivaroxaban (XARELTO) 10 mg Tab Take 1 tablet (10 mg total) by mouth daily with dinner or evening meal. 90 tablet 3    TRESIBA FLEXTOUCH U-200 200 unit/mL (3 mL) insulin pen Inject 56 Units into the skin once daily. 3 pen 11    [DISCONTINUED] DEXCOM G6 TRANSMITTER Justyna CHANGE TRANSMITTER EVERY 90 DAYS. 1 each 3     No current facility-administered medications on file prior to visit.       Physical Exam:  There were no vitals taken for this visit.  There is no height or weight on file to calculate BMI.  Physical Exam  Constitutional:       General: He is not in acute distress.     Appearance: He is ill-appearing.   Cardiovascular:      Rate and Rhythm: Regular rhythm. Tachycardia present.      Heart sounds: Normal heart sounds. No murmur heard.  Pulmonary:      Effort: Pulmonary effort is normal. No respiratory distress.      Breath sounds: Normal breath sounds.   Neurological:      Mental Status: He is alert.   Psychiatric:         Mood and Affect: Mood normal.         Behavior: Behavior normal.         Labs: Pertinent labs reviewed.    Assessment:  1. Leukocytosis, unspecified type    2. Intractable vomiting    3. Diarrhea, unspecified type    4. Type 2 diabetes mellitus with other specified complication, unspecified whether long term insulin use    I do feel he does nausea and vomiting but I am very concerned about his WBC of 33. He has also continued with vomiting and has not had any improvements since leaving the hospital. Hyperglycemia can also be causing some of his nausea and vomiting. He doesn't have a primary care. I talked with his family and urgent him and family to take him back to the ED. Patient is refusing to go. Family is agreeable I feel GI is not the main issue at this time. He does have a wound over right foot but it is chronic and there is no induration around.    Recommendations:   - ER but patient refuses. Family will continue to encourage him to go  - since he does not want to go,  I will check STAT labs to be reviewed in the morning.   - will get him a PCP appt for tomorrow.   - phenergan suppositories.     Leukocytosis, unspecified type  -     CBC Auto Differential; Future; Expected date: 12/27/2023  -     Comprehensive Metabolic Panel; Future; Expected date: 12/27/2023  -     LACTIC ACID, PLASMA; Future; Expected date: 12/27/2023  -     promethazine (PHENERGAN) 25 MG suppository; Place 1 suppository (25 mg total) rectally every 6 (six) hours as needed for Nausea.  Dispense: 10 suppository; Refill: 0    Intractable vomiting  -     CBC Auto Differential; Future; Expected date: 12/27/2023  -     Comprehensive Metabolic Panel; Future; Expected date: 12/27/2023  -     LACTIC ACID, PLASMA; Future; Expected date: 12/27/2023  -     promethazine (PHENERGAN) 25 MG suppository; Place 1 suppository (25 mg total) rectally every 6 (six) hours as needed for Nausea.  Dispense: 10 suppository; Refill: 0    Diarrhea, unspecified type  -     CBC Auto Differential; Future; Expected date: 12/27/2023  -     Comprehensive Metabolic Panel; Future; Expected date: 12/27/2023  -     LACTIC ACID, PLASMA; Future; Expected date: 12/27/2023  -     promethazine (PHENERGAN) 25 MG suppository; Place 1 suppository (25 mg total) rectally every 6 (six) hours as needed for Nausea.  Dispense: 10 suppository; Refill: 0    Type 2 diabetes mellitus with other specified complication, unspecified whether long term insulin use      Follow up to be determined after results/ procedure(s).    Thank you so much for allowing me to participate in the care of TERESSA Camacho Jr.

## 2023-12-27 NOTE — FIRST PROVIDER EVALUATION
"Medical screening examination initiated.  I have conducted a focused provider triage encounter, findings are as follows:    Brief history of present illness:  Patient presents with nausea, vomiting and abdominal pain x1 week.    Vitals:    12/27/23 1755   BP: (!) 149/64   BP Location: Right arm   Patient Position: Sitting   Pulse: (!) 111   Resp: 20   Temp: 98 °F (36.7 °C)   TempSrc: Oral   SpO2: 99%   Weight: 119.7 kg (264 lb)   Height: 6' 1" (1.854 m)       Pertinent physical exam:  Tachycardic, nauseous    Brief workup plan:  Labs    Preliminary workup initiated; this workup will be continued and followed by the physician or advanced practice provider that is assigned to the patient when roomed.  "

## 2023-12-28 PROBLEM — D72.828 OTHER ELEVATED WHITE BLOOD CELL COUNT: Status: ACTIVE | Noted: 2023-12-28

## 2023-12-28 LAB
ADENOVIRUS: NOT DETECTED
AMPHET+METHAMPHET UR QL: NEGATIVE
ANION GAP SERPL CALC-SCNC: 14 MMOL/L (ref 8–16)
ANION GAP SERPL CALC-SCNC: 15 MMOL/L (ref 8–16)
ANION GAP SERPL CALC-SCNC: 15 MMOL/L (ref 8–16)
ANION GAP SERPL CALC-SCNC: 18 MMOL/L (ref 8–16)
ANION GAP SERPL CALC-SCNC: 22 MMOL/L (ref 8–16)
BARBITURATES UR QL SCN>200 NG/ML: NEGATIVE
BASOPHILS # BLD AUTO: 0.18 K/UL (ref 0–0.2)
BASOPHILS # BLD AUTO: ABNORMAL K/UL (ref 0–0.2)
BASOPHILS NFR BLD: 0 % (ref 0–1.9)
BASOPHILS NFR BLD: 0.4 % (ref 0–1.9)
BENZODIAZ UR QL SCN>200 NG/ML: NEGATIVE
BORDETELLA PARAPERTUSSIS (IS1001): NOT DETECTED
BORDETELLA PERTUSSIS (PTXP): NOT DETECTED
BUN SERPL-MCNC: 21 MG/DL (ref 6–20)
BUN SERPL-MCNC: 22 MG/DL (ref 6–20)
BUN SERPL-MCNC: 25 MG/DL (ref 6–20)
BUN SERPL-MCNC: 27 MG/DL (ref 6–20)
BUN SERPL-MCNC: 30 MG/DL (ref 6–20)
BZE UR QL SCN: NEGATIVE
CALCIUM SERPL-MCNC: 9.3 MG/DL (ref 8.7–10.5)
CALCIUM SERPL-MCNC: 9.3 MG/DL (ref 8.7–10.5)
CALCIUM SERPL-MCNC: 9.4 MG/DL (ref 8.7–10.5)
CALCIUM SERPL-MCNC: 9.5 MG/DL (ref 8.7–10.5)
CALCIUM SERPL-MCNC: 9.5 MG/DL (ref 8.7–10.5)
CANNABINOIDS UR QL SCN: NEGATIVE
CHLAMYDIA PNEUMONIAE: NOT DETECTED
CHLORIDE SERPL-SCNC: 101 MMOL/L (ref 95–110)
CHLORIDE SERPL-SCNC: 102 MMOL/L (ref 95–110)
CHLORIDE SERPL-SCNC: 102 MMOL/L (ref 95–110)
CHLORIDE SERPL-SCNC: 103 MMOL/L (ref 95–110)
CHLORIDE SERPL-SCNC: 103 MMOL/L (ref 95–110)
CO2 SERPL-SCNC: 15 MMOL/L (ref 23–29)
CO2 SERPL-SCNC: 17 MMOL/L (ref 23–29)
CO2 SERPL-SCNC: 21 MMOL/L (ref 23–29)
CO2 SERPL-SCNC: 21 MMOL/L (ref 23–29)
CO2 SERPL-SCNC: 22 MMOL/L (ref 23–29)
CORONAVIRUS 229E, COMMON COLD VIRUS: NOT DETECTED
CORONAVIRUS HKU1, COMMON COLD VIRUS: NOT DETECTED
CORONAVIRUS NL63, COMMON COLD VIRUS: NOT DETECTED
CORONAVIRUS OC43, COMMON COLD VIRUS: NOT DETECTED
CREAT SERPL-MCNC: 1.6 MG/DL (ref 0.5–1.4)
CREAT SERPL-MCNC: 1.6 MG/DL (ref 0.5–1.4)
CREAT SERPL-MCNC: 1.7 MG/DL (ref 0.5–1.4)
CREAT UR-MCNC: 61.1 MG/DL (ref 23–375)
DIFFERENTIAL METHOD BLD: ABNORMAL
DIFFERENTIAL METHOD BLD: ABNORMAL
EOSINOPHIL # BLD AUTO: 0 K/UL (ref 0–0.5)
EOSINOPHIL # BLD AUTO: ABNORMAL K/UL (ref 0–0.5)
EOSINOPHIL NFR BLD: 0 % (ref 0–8)
EOSINOPHIL NFR BLD: 0 % (ref 0–8)
ERYTHROCYTE [DISTWIDTH] IN BLOOD BY AUTOMATED COUNT: 14.5 % (ref 11.5–14.5)
ERYTHROCYTE [DISTWIDTH] IN BLOOD BY AUTOMATED COUNT: 14.5 % (ref 11.5–14.5)
EST. GFR  (NO RACE VARIABLE): 52 ML/MIN/1.73 M^2
EST. GFR  (NO RACE VARIABLE): 56 ML/MIN/1.73 M^2
EST. GFR  (NO RACE VARIABLE): 56 ML/MIN/1.73 M^2
ESTIMATED AVG GLUCOSE: 166 MG/DL (ref 68–131)
FLUBV RNA NPH QL NAA+NON-PROBE: NOT DETECTED
GLUCOSE SERPL-MCNC: 177 MG/DL (ref 70–110)
GLUCOSE SERPL-MCNC: 178 MG/DL (ref 70–110)
GLUCOSE SERPL-MCNC: 180 MG/DL (ref 70–110)
GLUCOSE SERPL-MCNC: 201 MG/DL (ref 70–110)
GLUCOSE SERPL-MCNC: 204 MG/DL (ref 70–110)
HBA1C MFR BLD: 7.4 % (ref 4–5.6)
HCT VFR BLD AUTO: 37.4 % (ref 40–54)
HCT VFR BLD AUTO: 41.6 % (ref 40–54)
HGB BLD-MCNC: 13 G/DL (ref 14–18)
HGB BLD-MCNC: 13.7 G/DL (ref 14–18)
HPIV1 RNA NPH QL NAA+NON-PROBE: NOT DETECTED
HPIV2 RNA NPH QL NAA+NON-PROBE: NOT DETECTED
HPIV3 RNA NPH QL NAA+NON-PROBE: NOT DETECTED
HPIV4 RNA NPH QL NAA+NON-PROBE: NOT DETECTED
HUMAN METAPNEUMOVIRUS: NOT DETECTED
IMM GRANULOCYTES # BLD AUTO: 0.52 K/UL (ref 0–0.04)
IMM GRANULOCYTES # BLD AUTO: ABNORMAL K/UL (ref 0–0.04)
IMM GRANULOCYTES NFR BLD AUTO: 1.2 % (ref 0–0.5)
IMM GRANULOCYTES NFR BLD AUTO: ABNORMAL % (ref 0–0.5)
INFLUENZA A (SUBTYPES H1,H1-2009,H3): NOT DETECTED
LYMPHOCYTES # BLD AUTO: 0.7 K/UL (ref 1–4.8)
LYMPHOCYTES # BLD AUTO: ABNORMAL K/UL (ref 1–4.8)
LYMPHOCYTES NFR BLD: 1.7 % (ref 18–48)
LYMPHOCYTES NFR BLD: 5 % (ref 18–48)
MAGNESIUM SERPL-MCNC: 2.9 MG/DL (ref 1.6–2.6)
MCH RBC QN AUTO: 28.3 PG (ref 27–31)
MCH RBC QN AUTO: 28.4 PG (ref 27–31)
MCHC RBC AUTO-ENTMCNC: 32.9 G/DL (ref 32–36)
MCHC RBC AUTO-ENTMCNC: 34.8 G/DL (ref 32–36)
MCV RBC AUTO: 82 FL (ref 82–98)
MCV RBC AUTO: 86 FL (ref 82–98)
METHADONE UR QL SCN>300 NG/ML: NEGATIVE
MONOCYTES # BLD AUTO: 2.4 K/UL (ref 0.3–1)
MONOCYTES # BLD AUTO: ABNORMAL K/UL (ref 0.3–1)
MONOCYTES NFR BLD: 4 % (ref 4–15)
MONOCYTES NFR BLD: 5.5 % (ref 4–15)
MRSA ID BY PCR: POSITIVE
MYCOPLASMA PNEUMONIAE: NOT DETECTED
NEUTROPHILS # BLD AUTO: 39.8 K/UL (ref 1.8–7.7)
NEUTROPHILS NFR BLD: 81 % (ref 38–73)
NEUTROPHILS NFR BLD: 91.2 % (ref 38–73)
NEUTS BAND NFR BLD MANUAL: 10 %
NRBC BLD-RTO: 0 /100 WBC
NRBC BLD-RTO: 0 /100 WBC
OPIATES UR QL SCN: NEGATIVE
PATH REV BLD -IMP: NORMAL
PATH REV BLD -IMP: NORMAL
PCP UR QL SCN>25 NG/ML: NEGATIVE
PHOSPHATE SERPL-MCNC: 2.7 MG/DL (ref 2.7–4.5)
PLATELET # BLD AUTO: 395 K/UL (ref 150–450)
PLATELET # BLD AUTO: 405 K/UL (ref 150–450)
PLATELET BLD QL SMEAR: ABNORMAL
PMV BLD AUTO: 10.4 FL (ref 9.2–12.9)
PMV BLD AUTO: 11.2 FL (ref 9.2–12.9)
POCT GLUCOSE: 149 MG/DL (ref 70–110)
POCT GLUCOSE: 150 MG/DL (ref 70–110)
POCT GLUCOSE: 153 MG/DL (ref 70–110)
POCT GLUCOSE: 168 MG/DL (ref 70–110)
POCT GLUCOSE: 168 MG/DL (ref 70–110)
POCT GLUCOSE: 170 MG/DL (ref 70–110)
POCT GLUCOSE: 176 MG/DL (ref 70–110)
POCT GLUCOSE: 178 MG/DL (ref 70–110)
POCT GLUCOSE: 182 MG/DL (ref 70–110)
POCT GLUCOSE: 184 MG/DL (ref 70–110)
POCT GLUCOSE: 184 MG/DL (ref 70–110)
POCT GLUCOSE: 185 MG/DL (ref 70–110)
POCT GLUCOSE: 186 MG/DL (ref 70–110)
POCT GLUCOSE: 189 MG/DL (ref 70–110)
POCT GLUCOSE: 205 MG/DL (ref 70–110)
POCT GLUCOSE: 212 MG/DL (ref 70–110)
POCT GLUCOSE: 214 MG/DL (ref 70–110)
POCT GLUCOSE: 264 MG/DL (ref 70–110)
POCT GLUCOSE: 280 MG/DL (ref 70–110)
POTASSIUM SERPL-SCNC: 3.5 MMOL/L (ref 3.5–5.1)
POTASSIUM SERPL-SCNC: 3.6 MMOL/L (ref 3.5–5.1)
POTASSIUM SERPL-SCNC: 3.6 MMOL/L (ref 3.5–5.1)
POTASSIUM SERPL-SCNC: 3.7 MMOL/L (ref 3.5–5.1)
POTASSIUM SERPL-SCNC: 3.7 MMOL/L (ref 3.5–5.1)
RBC # BLD AUTO: 4.58 M/UL (ref 4.6–6.2)
RBC # BLD AUTO: 4.84 M/UL (ref 4.6–6.2)
RESPIRATORY INFECTION PANEL SOURCE: NORMAL
RSV RNA NPH QL NAA+NON-PROBE: NOT DETECTED
RV+EV RNA NPH QL NAA+NON-PROBE: NOT DETECTED
SARS-COV-2 RNA RESP QL NAA+PROBE: NOT DETECTED
SODIUM SERPL-SCNC: 137 MMOL/L (ref 136–145)
SODIUM SERPL-SCNC: 138 MMOL/L (ref 136–145)
SODIUM SERPL-SCNC: 138 MMOL/L (ref 136–145)
SODIUM SERPL-SCNC: 139 MMOL/L (ref 136–145)
SODIUM SERPL-SCNC: 139 MMOL/L (ref 136–145)
STAPH AUREUS ID BY PCR: POSITIVE
T4 FREE SERPL-MCNC: 1.42 NG/DL (ref 0.71–1.51)
TOXICOLOGY INFORMATION: NORMAL
TSH SERPL DL<=0.005 MIU/L-ACNC: 0.2 UIU/ML (ref 0.4–4)
WBC # BLD AUTO: 43.64 K/UL (ref 3.9–12.7)
WBC # BLD AUTO: 47.31 K/UL (ref 3.9–12.7)
WBC TOXIC VACUOLES BLD QL SMEAR: PRESENT

## 2023-12-28 PROCEDURE — 82962 GLUCOSE BLOOD TEST: CPT

## 2023-12-28 PROCEDURE — 96372 THER/PROPH/DIAG INJ SC/IM: CPT

## 2023-12-28 PROCEDURE — 99223 1ST HOSP IP/OBS HIGH 75: CPT | Mod: ,,, | Performed by: INTERNAL MEDICINE

## 2023-12-28 PROCEDURE — 83036 HEMOGLOBIN GLYCOSYLATED A1C: CPT

## 2023-12-28 PROCEDURE — 25000003 PHARM REV CODE 250

## 2023-12-28 PROCEDURE — 87150 DNA/RNA AMPLIFIED PROBE: CPT | Performed by: EMERGENCY MEDICINE

## 2023-12-28 PROCEDURE — 85025 COMPLETE CBC W/AUTO DIFF WBC: CPT

## 2023-12-28 PROCEDURE — 80202 ASSAY OF VANCOMYCIN: CPT | Performed by: INTERNAL MEDICINE

## 2023-12-28 PROCEDURE — 93005 ELECTROCARDIOGRAM TRACING: CPT

## 2023-12-28 PROCEDURE — 85060 BLOOD SMEAR INTERPRETATION: CPT | Mod: ,,, | Performed by: PATHOLOGY

## 2023-12-28 PROCEDURE — 83735 ASSAY OF MAGNESIUM: CPT

## 2023-12-28 PROCEDURE — C9113 INJ PANTOPRAZOLE SODIUM, VIA: HCPCS

## 2023-12-28 PROCEDURE — 36415 COLL VENOUS BLD VENIPUNCTURE: CPT

## 2023-12-28 PROCEDURE — 63600175 PHARM REV CODE 636 W HCPCS

## 2023-12-28 PROCEDURE — 25000003 PHARM REV CODE 250: Performed by: EMERGENCY MEDICINE

## 2023-12-28 PROCEDURE — 63600175 PHARM REV CODE 636 W HCPCS: Performed by: EMERGENCY MEDICINE

## 2023-12-28 PROCEDURE — 63600175 PHARM REV CODE 636 W HCPCS: Performed by: INTERNAL MEDICINE

## 2023-12-28 PROCEDURE — 93010 ELECTROCARDIOGRAM REPORT: CPT | Mod: 76,,, | Performed by: INTERNAL MEDICINE

## 2023-12-28 PROCEDURE — 84439 ASSAY OF FREE THYROXINE: CPT | Performed by: EMERGENCY MEDICINE

## 2023-12-28 PROCEDURE — 84443 ASSAY THYROID STIM HORMONE: CPT | Performed by: EMERGENCY MEDICINE

## 2023-12-28 PROCEDURE — S5010 5% DEXTROSE AND 0.45% SALINE: HCPCS

## 2023-12-28 PROCEDURE — 20000000 HC ICU ROOM

## 2023-12-28 PROCEDURE — 25000003 PHARM REV CODE 250: Performed by: INTERNAL MEDICINE

## 2023-12-28 PROCEDURE — 99900035 HC TECH TIME PER 15 MIN (STAT)

## 2023-12-28 PROCEDURE — 85007 BL SMEAR W/DIFF WBC COUNT: CPT

## 2023-12-28 PROCEDURE — 84100 ASSAY OF PHOSPHORUS: CPT

## 2023-12-28 PROCEDURE — 80048 BASIC METABOLIC PNL TOTAL CA: CPT | Mod: 91

## 2023-12-28 PROCEDURE — 36415 COLL VENOUS BLD VENIPUNCTURE: CPT | Mod: XB | Performed by: INTERNAL MEDICINE

## 2023-12-28 PROCEDURE — 87633 RESP VIRUS 12-25 TARGETS: CPT

## 2023-12-28 PROCEDURE — 93010 ELECTROCARDIOGRAM REPORT: CPT | Mod: ,,, | Performed by: INTERNAL MEDICINE

## 2023-12-28 PROCEDURE — 85027 COMPLETE CBC AUTOMATED: CPT

## 2023-12-28 RX ORDER — DROPERIDOL 2.5 MG/ML
0.62 INJECTION, SOLUTION INTRAMUSCULAR; INTRAVENOUS EVERY 6 HOURS PRN
Status: DISCONTINUED | OUTPATIENT
Start: 2023-12-28 | End: 2024-01-10 | Stop reason: HOSPADM

## 2023-12-28 RX ORDER — POTASSIUM CHLORIDE 7.45 MG/ML
40 INJECTION INTRAVENOUS
Status: DISCONTINUED | OUTPATIENT
Start: 2023-12-28 | End: 2023-12-28

## 2023-12-28 RX ORDER — SODIUM CHLORIDE 9 MG/ML
1000 INJECTION, SOLUTION INTRAVENOUS CONTINUOUS
Status: DISCONTINUED | OUTPATIENT
Start: 2023-12-28 | End: 2023-12-28

## 2023-12-28 RX ORDER — DEXTROSE MONOHYDRATE 100 MG/ML
INJECTION, SOLUTION INTRAVENOUS
Status: DISCONTINUED | OUTPATIENT
Start: 2023-12-28 | End: 2024-01-08

## 2023-12-28 RX ORDER — ENOXAPARIN SODIUM 150 MG/ML
1 INJECTION SUBCUTANEOUS EVERY 12 HOURS
Status: COMPLETED | OUTPATIENT
Start: 2023-12-28 | End: 2024-01-03

## 2023-12-28 RX ORDER — IBUPROFEN 200 MG
16 TABLET ORAL
Status: DISCONTINUED | OUTPATIENT
Start: 2023-12-28 | End: 2024-01-08

## 2023-12-28 RX ORDER — DEXTROSE MONOHYDRATE 100 MG/ML
INJECTION, SOLUTION INTRAVENOUS
Status: DISCONTINUED | OUTPATIENT
Start: 2023-12-28 | End: 2024-01-10 | Stop reason: HOSPADM

## 2023-12-28 RX ORDER — DEXTROSE, SODIUM CHLORIDE, SODIUM LACTATE, POTASSIUM CHLORIDE, AND CALCIUM CHLORIDE 5; .6; .31; .03; .02 G/100ML; G/100ML; G/100ML; G/100ML; G/100ML
INJECTION, SOLUTION INTRAVENOUS CONTINUOUS PRN
Status: DISCONTINUED | OUTPATIENT
Start: 2023-12-28 | End: 2023-12-29

## 2023-12-28 RX ORDER — SODIUM CHLORIDE 0.9 % (FLUSH) 0.9 %
10 SYRINGE (ML) INJECTION
Status: DISCONTINUED | OUTPATIENT
Start: 2023-12-28 | End: 2024-01-10 | Stop reason: HOSPADM

## 2023-12-28 RX ORDER — IBUPROFEN 200 MG
24 TABLET ORAL
Status: DISCONTINUED | OUTPATIENT
Start: 2023-12-28 | End: 2024-01-08

## 2023-12-28 RX ORDER — ONDANSETRON 2 MG/ML
4 INJECTION INTRAMUSCULAR; INTRAVENOUS ONCE
Status: COMPLETED | OUTPATIENT
Start: 2023-12-28 | End: 2023-12-28

## 2023-12-28 RX ORDER — INSULIN ASPART 100 [IU]/ML
0-10 INJECTION, SOLUTION INTRAVENOUS; SUBCUTANEOUS
Status: DISCONTINUED | OUTPATIENT
Start: 2023-12-28 | End: 2024-01-08

## 2023-12-28 RX ORDER — GLUCAGON 1 MG
1 KIT INJECTION
Status: DISCONTINUED | OUTPATIENT
Start: 2023-12-28 | End: 2024-01-08

## 2023-12-28 RX ORDER — PANTOPRAZOLE SODIUM 40 MG/10ML
40 INJECTION, POWDER, LYOPHILIZED, FOR SOLUTION INTRAVENOUS DAILY
Status: DISCONTINUED | OUTPATIENT
Start: 2023-12-28 | End: 2024-01-03

## 2023-12-28 RX ORDER — ONDANSETRON 2 MG/ML
4 INJECTION INTRAMUSCULAR; INTRAVENOUS EVERY 8 HOURS PRN
Status: DISCONTINUED | OUTPATIENT
Start: 2023-12-28 | End: 2023-12-28

## 2023-12-28 RX ORDER — PROCHLORPERAZINE EDISYLATE 5 MG/ML
2.5 INJECTION INTRAMUSCULAR; INTRAVENOUS EVERY 6 HOURS PRN
Status: DISCONTINUED | OUTPATIENT
Start: 2023-12-28 | End: 2023-12-28

## 2023-12-28 RX ORDER — METOCLOPRAMIDE HYDROCHLORIDE 5 MG/ML
5 INJECTION INTRAMUSCULAR; INTRAVENOUS
Status: DISCONTINUED | OUTPATIENT
Start: 2023-12-29 | End: 2024-01-04

## 2023-12-28 RX ORDER — SCOLOPAMINE TRANSDERMAL SYSTEM 1 MG/1
1 PATCH, EXTENDED RELEASE TRANSDERMAL
Status: DISCONTINUED | OUTPATIENT
Start: 2023-12-28 | End: 2023-12-28

## 2023-12-28 RX ORDER — DROPERIDOL 2.5 MG/ML
0.62 INJECTION, SOLUTION INTRAMUSCULAR; INTRAVENOUS ONCE
Status: COMPLETED | OUTPATIENT
Start: 2023-12-28 | End: 2023-12-28

## 2023-12-28 RX ORDER — DEXTROSE MONOHYDRATE AND SODIUM CHLORIDE 5; .45 G/100ML; G/100ML
INJECTION, SOLUTION INTRAVENOUS CONTINUOUS PRN
Status: DISCONTINUED | OUTPATIENT
Start: 2023-12-28 | End: 2023-12-28

## 2023-12-28 RX ORDER — MUPIROCIN 20 MG/G
OINTMENT TOPICAL 2 TIMES DAILY
Status: COMPLETED | OUTPATIENT
Start: 2023-12-28 | End: 2024-01-02

## 2023-12-28 RX ADMIN — INSULIN DETEMIR 15 UNITS: 100 INJECTION, SOLUTION SUBCUTANEOUS at 08:12

## 2023-12-28 RX ADMIN — INSULIN ASPART 1 UNITS: 100 INJECTION, SOLUTION INTRAVENOUS; SUBCUTANEOUS at 08:12

## 2023-12-28 RX ADMIN — PIPERACILLIN SODIUM AND TAZOBACTAM SODIUM 4.5 G: 4; .5 INJECTION, POWDER, FOR SOLUTION INTRAVENOUS at 06:12

## 2023-12-28 RX ADMIN — SODIUM CHLORIDE 0.1 UNITS/KG/HR: 9 INJECTION, SOLUTION INTRAVENOUS at 07:12

## 2023-12-28 RX ADMIN — INSULIN DETEMIR 10 UNITS: 100 INJECTION, SOLUTION SUBCUTANEOUS at 02:12

## 2023-12-28 RX ADMIN — DROPERIDOL 0.62 MG: 2.5 INJECTION, SOLUTION INTRAMUSCULAR; INTRAVENOUS at 10:12

## 2023-12-28 RX ADMIN — PANTOPRAZOLE SODIUM 40 MG: 40 INJECTION, POWDER, FOR SOLUTION INTRAVENOUS at 08:12

## 2023-12-28 RX ADMIN — MUPIROCIN: 20 OINTMENT TOPICAL at 10:12

## 2023-12-28 RX ADMIN — PROCHLORPERAZINE EDISYLATE 2.5 MG: 5 INJECTION INTRAMUSCULAR; INTRAVENOUS at 02:12

## 2023-12-28 RX ADMIN — DEXTROSE AND SODIUM CHLORIDE: 5; 450 INJECTION, SOLUTION INTRAVENOUS at 03:12

## 2023-12-28 RX ADMIN — ENOXAPARIN SODIUM 120 MG: 120 INJECTION SUBCUTANEOUS at 12:12

## 2023-12-28 RX ADMIN — PIPERACILLIN AND TAZOBACTAM 4.5 G: 4; .5 INJECTION, POWDER, LYOPHILIZED, FOR SOLUTION INTRAVENOUS; PARENTERAL at 02:12

## 2023-12-28 RX ADMIN — VANCOMYCIN HYDROCHLORIDE 2000 MG: 500 INJECTION, POWDER, LYOPHILIZED, FOR SOLUTION INTRAVENOUS at 12:12

## 2023-12-28 RX ADMIN — ONDANSETRON 4 MG: 2 INJECTION INTRAMUSCULAR; INTRAVENOUS at 10:12

## 2023-12-28 RX ADMIN — ONDANSETRON 4 MG: 2 INJECTION INTRAMUSCULAR; INTRAVENOUS at 11:12

## 2023-12-28 RX ADMIN — ONDANSETRON 4 MG: 2 INJECTION INTRAMUSCULAR; INTRAVENOUS at 03:12

## 2023-12-28 RX ADMIN — INSULIN DETEMIR 25 UNITS: 100 INJECTION, SOLUTION SUBCUTANEOUS at 08:12

## 2023-12-28 RX ADMIN — SODIUM CHLORIDE, SODIUM LACTATE, POTASSIUM CHLORIDE, CALCIUM CHLORIDE AND DEXTROSE MONOHYDRATE: 5; 600; 310; 30; 20 INJECTION, SOLUTION INTRAVENOUS at 11:12

## 2023-12-28 RX ADMIN — DROPERIDOL 0.62 MG: 2.5 INJECTION, SOLUTION INTRAMUSCULAR; INTRAVENOUS at 04:12

## 2023-12-28 RX ADMIN — PIPERACILLIN AND TAZOBACTAM 4.5 G: 4; .5 INJECTION, POWDER, LYOPHILIZED, FOR SOLUTION INTRAVENOUS; PARENTERAL at 10:12

## 2023-12-28 NOTE — PLAN OF CARE
Problem: Adult Inpatient Plan of Care  Goal: Plan of Care Review  Outcome: Ongoing, Not Progressing  Goal: Patient-Specific Goal (Individualized)  Outcome: Ongoing, Not Progressing  Goal: Absence of Hospital-Acquired Illness or Injury  Outcome: Ongoing, Not Progressing  Goal: Optimal Comfort and Wellbeing  Outcome: Ongoing, Not Progressing  Goal: Readiness for Transition of Care  Outcome: Ongoing, Not Progressing     Problem: Diabetes Comorbidity  Goal: Blood Glucose Level Within Targeted Range  Outcome: Ongoing, Not Progressing     Problem: Skin Injury Risk Increased  Goal: Skin Health and Integrity  Outcome: Ongoing, Not Progressing     Problem: Fall Injury Risk  Goal: Absence of Fall and Fall-Related Injury  Outcome: Ongoing, Not Progressing     Problem: Impaired Wound Healing  Goal: Optimal Wound Healing  Outcome: Ongoing, Not Progressing

## 2023-12-28 NOTE — ED NOTES
Pt reports to ED with c/o weakness, decreased oral intake, nausea, vomiting, and diarrhea.     Adult Physical Assessment  LOC: Kulwant Mueller Jr., 38 y.o. male verified via two identifiers.  The patient is awake, alert, oriented and speaking appropriately at this time. Pt drowsy.   APPEARANCE: Patient is clean and well groomed, patient's clothing is properly fastened.  SKIN:The skin is warm and dry, color consistent with ethnicity, patient has normal skin turgor and moist mucus membranes. wound noted to right heel.   MUSCULOSKELETAL: Patient moving all extremities well, no obvious swelling or deformities noted. Pt reports generalized weakness.   RESPIRATORY: Airway is open and patent, respirations are spontaneous, patient has a normal effort and rate, no accessory muscle use noted.  CARDIAC: Patient tachycardic, no periphreal edema noted in any extremity, capillary refill < 3 seconds in all extremities  ABDOMEN: Soft and non tender to palpation, no abdominal distention noted. Bowel sounds present in all four quadrants. Pt reports nausea, vomiting, diarrhea, and decreased oral intake.   NEUROLOGIC: Eyes open spontaneously, behavior appropriate to situation, follows commands, facial expression symmetrical, bilateral hand grasp equal and even, purposeful motor response noted, normal sensation in all extremities when touched with a finger.

## 2023-12-28 NOTE — SUBJECTIVE & OBJECTIVE
- he endorses fatigue, weakness, nausea/vomiting, diarrhea.      Oncology Treatment Plan:   [No matching plan found]    Medications:  Continuous Infusions:   dextrose 5% lactated ringers 100 mL/hr at 12/28/23 1131    insulin regular 1 units/mL infusion orderable (DKA) 0.05 Units/kg/hr (12/28/23 1248)     Scheduled Meds:   enoxparin  1 mg/kg Subcutaneous Q12H (treatment, non-standard time)    insulin detemir U-100  15 Units Subcutaneous BID    pantoprazole  40 mg Intravenous Daily    piperacillin-tazobactam (Zosyn) IV (PEDS and ADULTS) (extended infusion is not appropriate)  4.5 g Intravenous Q8H     PRN Meds:dextrose 10 % in water (D10W), dextrose 10 % in water (D10W), dextrose 10%, dextrose 10%, dextrose 5% lactated ringers, glucagon (human recombinant), glucose, glucose, ondansetron, sodium chloride 0.9%, sodium chloride 0.9%, Pharmacy to dose Vancomycin consult **AND** vancomycin - pharmacy to dose     Review of patient's allergies indicates:   Allergen Reactions    Heparin analogues Nausea And Vomiting        Past Medical History:   Diagnosis Date    Anticoagulant long-term use     COVID-19 virus infection     Diabetes mellitus     Diabetes mellitus, type 2     Hypertension     May-Thurner syndrome      Past Surgical History:   Procedure Laterality Date    APPENDECTOMY      ESOPHAGOGASTRODUODENOSCOPY N/A 1/31/2022    Procedure: EGD (ESOPHAGOGASTRODUODENOSCOPY);  Surgeon: Cindy Quiros MD;  Location[facility];  Service: Endoscopy;  Laterality: N/A;    ESOPHAGOGASTRODUODENOSCOPY N/A 3/21/2022    Procedure: EGD (ESOPHAGOGASTRODUODENOSCOPY);  Surgeon: Tejas Mann MD;  Location: [facility] ENDO;  Service: Endoscopy;  Laterality: N/A;    INCISION AND DRAINAGE FOOT Left 11/28/2021    Procedure: INCISION AND DRAINAGE, FOOT;  Surgeon: Bj Alicea DPM;  Location: [facility] OR;  Service: Podiatry;  Laterality: Left;    stents in bilateral legs       Family History       Problem Relation (Age of Onset)    Cancer Mother,  Paternal Uncle, Paternal Grandfather, Maternal Grandfather    Irritable bowel syndrome Paternal Grandfather          Tobacco Use    Smoking status: Former     Types: Cigarettes    Smokeless tobacco: Former    Tobacco comments:     occasionally    Substance and Sexual Activity    Alcohol use: Yes     Comment: occ    Drug use: No    Sexual activity: Not on file       Review of Systems   Constitutional:  Positive for fatigue and fever.   HENT:  Negative for sore throat.    Eyes:  Negative for visual disturbance.   Respiratory:  Negative for shortness of breath.    Cardiovascular:  Negative for chest pain.   Gastrointestinal:  Positive for diarrhea, nausea and vomiting. Negative for abdominal pain.   Genitourinary:  Negative for dysuria.   Musculoskeletal:  Negative for back pain.   Skin:  Positive for wound. Negative for rash.   Neurological:  Positive for weakness. Negative for headaches.   Hematological:  Negative for adenopathy.   Psychiatric/Behavioral:  The patient is not nervous/anxious.      Objective:     Vital Signs (Most Recent):  Temp: 98.9 °F (37.2 °C) (12/28/23 1200)  Pulse: (!) 115 (12/28/23 1230)  Resp: (!) 22 (12/28/23 1230)  BP: (!) 174/82 (12/28/23 1230)  SpO2: 96 % (12/28/23 1230) Vital Signs (24h Range):  Temp:  [97.7 °F (36.5 °C)-99.5 °F (37.5 °C)] 98.9 °F (37.2 °C)  Pulse:  [] 115  Resp:  [15-27] 22  SpO2:  [95 %-99 %] 96 %  BP: (115-192)/(64-93) 174/82     Weight: 114.5 kg (252 lb 6.8 oz)  Body mass index is 33.3 kg/m².  Body surface area is 2.43 meters squared.      Intake/Output Summary (Last 24 hours) at 12/28/2023 1301  Last data filed at 12/28/2023 1100  Gross per 24 hour   Intake 2887.65 ml   Output 750 ml   Net 2137.65 ml        Physical Exam  Vitals and nursing note reviewed.   Constitutional:       Appearance: He is well-developed.      Comments: Fatigued, uncomfortable.   HENT:      Head: Normocephalic and atraumatic.   Eyes:      Pupils: Pupils are equal, round, and reactive to  light.   Cardiovascular:      Rate and Rhythm: Regular rhythm. Tachycardia present.   Pulmonary:      Effort: Pulmonary effort is normal.      Breath sounds: Normal breath sounds.   Abdominal:      General: Bowel sounds are normal.      Palpations: Abdomen is soft.   Musculoskeletal:         General: Normal range of motion.      Cervical back: Normal range of motion and neck supple.   Neurological:      Mental Status: He is alert and oriented to person, place, and time.   Psychiatric:         Behavior: Behavior normal.         Thought Content: Thought content normal.         Judgment: Judgment normal.          Significant Labs:   CBC:   Recent Labs   Lab 12/27/23 2158 12/28/23 0355   WBC 44.27* 47.31*   HGB 14.1 13.7*   HCT 42.1 41.6    405    and CMP:   Recent Labs   Lab 12/27/23 2158 12/28/23 0355 12/28/23  0812   * 138 137   K 4.5 3.7 3.6   CL 95 101 102   CO2 9* 15* 17*   * 177* 204*   BUN 33* 30* 27*   CREATININE 1.9* 1.7* 1.7*   CALCIUM 9.7 9.4 9.3   PROT 9.2*  --   --    ALBUMIN 2.7*  --   --    BILITOT 0.6  --   --    ALKPHOS 131  --   --    AST 22  --   --    ALT 31  --   --    ANIONGAP 31* 22* 18*

## 2023-12-28 NOTE — ASSESSMENT & PLAN NOTE
- granulocyte-predominant leukocytosis is noted.  - I reviewed the peripheral smear. Multiple neutrophils noted per high-power field. I did not see blasts/immature cells on the peripheral smear.  - I suspect his leukocytosis is secondary to his diabetic ketoacidosis and chronic foot wound. Suspicion for malignancy is low.  - I will order labs for tomorrow morning's lab draw.

## 2023-12-28 NOTE — CONSULTS
[facility] - Intensive Care  Hematology/Oncology  Consult Note    Patient Name: Kulwant Mueller Jr.  MRN: [id number]  Admission Date: 12/27/2023  Hospital Length of Stay: 0 days  Code Status: Prior   Attending Provider: Jazmin Goode MD  Consulting Provider: Bertin Cowan MD  Primary Care Physician: No, Primary Doctor  Principal Problem:<principal problem not specified>    Inpatient consult to Hematology/Oncology  Consult performed by: Bertin Cowan MD  Consult ordered by: Jakob Dunham MD  Reason for consult: leukocytosis        Subjective:     HPI:  38 year-old male admitted on 12/28/23 with diabetic ketoacidosis. Consult is for leukocytosis.    - he endorses fatigue, weakness, nausea/vomiting, diarrhea.      Oncology Treatment Plan:   [No matching plan found]    Medications:  Continuous Infusions:   dextrose 5% lactated ringers 100 mL/hr at 12/28/23 1131    insulin regular 1 units/mL infusion orderable (DKA) 0.05 Units/kg/hr (12/28/23 1248)     Scheduled Meds:   enoxparin  1 mg/kg Subcutaneous Q12H (treatment, non-standard time)    insulin detemir U-100  15 Units Subcutaneous BID    pantoprazole  40 mg Intravenous Daily    piperacillin-tazobactam (Zosyn) IV (PEDS and ADULTS) (extended infusion is not appropriate)  4.5 g Intravenous Q8H     PRN Meds:dextrose 10 % in water (D10W), dextrose 10 % in water (D10W), dextrose 10%, dextrose 10%, dextrose 5% lactated ringers, glucagon (human recombinant), glucose, glucose, ondansetron, sodium chloride 0.9%, sodium chloride 0.9%, Pharmacy to dose Vancomycin consult **AND** vancomycin - pharmacy to dose     Review of patient's allergies indicates:   Allergen Reactions    Heparin analogues Nausea And Vomiting        Past Medical History:   Diagnosis Date    Anticoagulant long-term use     COVID-19 virus infection     Diabetes mellitus     Diabetes mellitus, type 2     Hypertension     May-Thurner syndrome      Past Surgical History:   Procedure Laterality Date     APPENDECTOMY      ESOPHAGOGASTRODUODENOSCOPY N/A 1/31/2022    Procedure: EGD (ESOPHAGOGASTRODUODENOSCOPY);  Surgeon: Cindy Quiros MD;  Location: [facility] ENDO;  Service: Endoscopy;  Laterality: N/A;    ESOPHAGOGASTRODUODENOSCOPY N/A 3/21/2022    Procedure: EGD (ESOPHAGOGASTRODUODENOSCOPY);  Surgeon: Tejas Mann MD;  Location: [facility] ENDO;  Service: Endoscopy;  Laterality: N/A;    INCISION AND DRAINAGE FOOT Left 11/28/2021    Procedure: INCISION AND DRAINAGE, FOOT;  Surgeon: Bj Alicea DPM;  Location: [facility] OR;  Service: Podiatry;  Laterality: Left;    stents in bilateral legs       Family History       Problem Relation (Age of Onset)    Cancer Mother, Paternal Uncle, Paternal Grandfather, Maternal Grandfather    Irritable bowel syndrome Paternal Grandfather          Tobacco Use    Smoking status: Former     Types: Cigarettes    Smokeless tobacco: Former    Tobacco comments:     occasionally    Substance and Sexual Activity    Alcohol use: Yes     Comment: occ    Drug use: No    Sexual activity: Not on file       Review of Systems   Constitutional:  Positive for fatigue and fever.   HENT:  Negative for sore throat.    Eyes:  Negative for visual disturbance.   Respiratory:  Negative for shortness of breath.    Cardiovascular:  Negative for chest pain.   Gastrointestinal:  Positive for diarrhea, nausea and vomiting. Negative for abdominal pain.   Genitourinary:  Negative for dysuria.   Musculoskeletal:  Negative for back pain.   Skin:  Positive for wound. Negative for rash.   Neurological:  Positive for weakness. Negative for headaches.   Hematological:  Negative for adenopathy.   Psychiatric/Behavioral:  The patient is not nervous/anxious.      Objective:     Vital Signs (Most Recent):  Temp: 98.9 °F (37.2 °C) (12/28/23 1200)  Pulse: (!) 115 (12/28/23 1230)  Resp: (!) 22 (12/28/23 1230)  BP: (!) 174/82 (12/28/23 1230)  SpO2: 96 % (12/28/23 1230) Vital Signs (24h Range):  Temp:  [97.7 °F (36.5 °C)-99.5 °F  (37.5 °C)] 98.9 °F (37.2 °C)  Pulse:  [] 115  Resp:  [15-27] 22  SpO2:  [95 %-99 %] 96 %  BP: (115-192)/(64-93) 174/82     Weight: 114.5 kg (252 lb 6.8 oz)  Body mass index is 33.3 kg/m².  Body surface area is 2.43 meters squared.      Intake/Output Summary (Last 24 hours) at 12/28/2023 1301  Last data filed at 12/28/2023 1100  Gross per 24 hour   Intake 2887.65 ml   Output 750 ml   Net 2137.65 ml        Physical Exam  Vitals and nursing note reviewed.   Constitutional:       Appearance: He is well-developed.      Comments: Fatigued, uncomfortable.   HENT:      Head: Normocephalic and atraumatic.   Eyes:      Pupils: Pupils are equal, round, and reactive to light.   Cardiovascular:      Rate and Rhythm: Regular rhythm. Tachycardia present.   Pulmonary:      Effort: Pulmonary effort is normal.      Breath sounds: Normal breath sounds.   Abdominal:      General: Bowel sounds are normal.      Palpations: Abdomen is soft.   Musculoskeletal:         General: Normal range of motion.      Cervical back: Normal range of motion and neck supple.   Neurological:      Mental Status: He is alert and oriented to person, place, and time.   Psychiatric:         Behavior: Behavior normal.         Thought Content: Thought content normal.         Judgment: Judgment normal.          Significant Labs:   CBC:   Recent Labs   Lab 12/27/23 2158 12/28/23  0355   WBC 44.27* 47.31*   HGB 14.1 13.7*   HCT 42.1 41.6    405    and CMP:   Recent Labs   Lab 12/27/23 2158 12/28/23  0355 12/28/23  0812   * 138 137   K 4.5 3.7 3.6   CL 95 101 102   CO2 9* 15* 17*   * 177* 204*   BUN 33* 30* 27*   CREATININE 1.9* 1.7* 1.7*   CALCIUM 9.7 9.4 9.3   PROT 9.2*  --   --    ALBUMIN 2.7*  --   --    BILITOT 0.6  --   --    ALKPHOS 131  --   --    AST 22  --   --    ALT 31  --   --    ANIONGAP 31* 22* 18*       Assessment/Plan:     Other elevated white blood cell count  - granulocyte-predominant leukocytosis is noted.  - I reviewed  the peripheral smear. Multiple neutrophils noted per high-power field. I did not see blasts/immature cells on the peripheral smear.  - I suspect his leukocytosis is secondary to his diabetic ketoacidosis and chronic foot wound. Suspicion for malignancy is low.  - I will order labs for tomorrow morning's lab draw.        Thank you for your consult.     Bertin Cowan MD  Hematology/Oncology  [facility] - Intensive Care

## 2023-12-28 NOTE — PROGRESS NOTES
Pharmacokinetic Initial Assessment: IV Vancomycin    Assessment/Plan:    Initiate intravenous vancomycin with loading dose of 2000 mg once with subsequent doses when random concentrations are less than 20 mcg/mL  Desired empiric serum trough concentration is 10 to 15 mcg/mL  Draw vancomycin random level on 12/29 at 0000.  Pharmacy will continue to follow and monitor vancomycin.      Please contact pharmacy at extension [phone] with any questions regarding this assessment.     Thank you for the consult,   Frank Houser       Patient brief summary:  Kulwant Mueller Jr. is a 38 y.o. male initiated on antimicrobial therapy with IV Vancomycin for treatment of suspected skin & soft tissue infection    Drug Allergies:   Review of patient's allergies indicates:   Allergen Reactions    Heparin analogues Nausea And Vomiting       Actual Body Weight:   114kg    Renal Function:   Estimated Creatinine Clearance: 76.6 mL/min (A) (based on SCr of 1.7 mg/dL (H)).,     Dialysis Method (if applicable):  FREDERICK    CBC (last 72 hours):  Recent Labs   Lab Result Units 12/27/23 2158 12/28/23  0355   WBC K/uL 44.27* 47.31*   Hemoglobin g/dL 14.1 13.7*   Hemoglobin A1C %  --  7.4*   Hematocrit % 42.1 41.6   Platelets K/uL 376 405   Gran % % 90.8* 81.0*   Lymph % % 2.4* 5.0*   Mono % % 5.4 4.0   Eosinophil % % 0.0 0.0   Basophil % % 0.1 0.0   Differential Method  Automated Manual       Metabolic Panel (last 72 hours):  Recent Labs   Lab Result Units 12/27/23 2158 12/27/23 2318 12/28/23  0355   Sodium mmol/L 135*  --  138   Potassium mmol/L 4.5  --  3.7   Chloride mmol/L 95  --  101   CO2 mmol/L 9*  --  15*   Glucose mg/dL 343*  --  177*   Glucose, UA   --  4+*  --    BUN mg/dL 33*  --  30*   Creatinine mg/dL 1.9*  --  1.7*   Creatinine, Urine mg/dL  --  61.1  --    Albumin g/dL 2.7*  --   --    Total Bilirubin mg/dL 0.6  --   --    Alkaline Phosphatase U/L 131  --   --    AST U/L 22  --   --    ALT U/L 31  --   --    Magnesium mg/dL  --    "--  2.9*   Phosphorus mg/dL  --   --  2.7       Drug levels (last 3 results):  No results for input(s): "VANCOMYCINRA", "VANCORANDOM", "VANCOMYCINPE", "VANCOPEAK", "VANCOMYCINTR", "VANCOTROUGH" in the last 72 hours.    Microbiologic Results:  Microbiology Results (last 7 days)       Procedure Component Value Units Date/Time    Blood culture [[id number]] Collected: 12/27/23 2242    Order Status: Sent Specimen: Blood Updated: 12/28/23 0158    Blood culture [[id number]] Collected: 12/27/23 2329    Order Status: Sent Specimen: Blood from Peripheral, Hand, Right Updated: 12/28/23 0158    Respiratory Infection Panel (PCR), Nasopharyngeal [[id number]] Collected: 12/28/23 0047    Order Status: Completed Specimen: Nasopharyngeal Swab Updated: 12/28/23 0050     Respiratory Infection Panel Source NP Swab    Narrative:      For all other respiratory sources, order [id number] -  Respiratory Viral Panel by PCR    Urine culture [[id number]] Collected: 12/27/23 2318    Order Status: No result Specimen: Urine Updated: 12/27/23 2359    Influenza A & B by Molecular [[id number]] Collected: 12/27/23 2229    Order Status: Completed Specimen: Nasopharyngeal Swab Updated: 12/27/23 2313     Influenza A, Molecular Negative     Influenza B, Molecular Negative     Flu A & B Source Nasal swab    Blood culture [[id number]] Collected: 12/27/23 2243    Order Status: Canceled Specimen: Blood from Peripheral, Antecubital, Right             "

## 2023-12-28 NOTE — CONSULTS
[facility] Consult Note      Patient:Kulwant Mueller Jr.  Age:38 y.o.  MRN:[id number]  Admit date:12/27/2023  LOS:0 day(s)    Primary Attending:  Jazmin Goode MD   Consultant Attending: Ernie Clark MD   Consultant Fellow: Payam Simental HO-IV     Reason for Consult: DKA     HPI:       Kulwant Mueller Jr. is a 38 y.o. male with T2DM, May-Thurner syndrome who presented to [facility] with chief complaint of nausea and vomit for the past 4-5 days. Patient was drowsy in the am which limited obtaining HPI. States that he has type 2 diabetes and that he did not take his insulin since Monday. Did not report running out of the medication but also did not elaborate on why he wasn't taking it. He endorsed some abdominal pain and nausea, as well as inability to keep food down. Denied any fever or chills. He denied coughm, upper respiratory symptoms or other infectious prodromes. Does have a R   heel wound that does not bother him much.     On chart review, patient endorsed some diarrhea on admission as well as being worked up for gastroparesis. He is on xarelto for his May-Thurner syndrome.     In the ER, found to be hyperglycemic, AG 31, CO2 of 9, FREDERICK 1.9, BHB 6.1 and ketonuria present. He was given long acting insulin, started on a drip and sent to the ICU for further management. Procalcitonin elevated, WBC in the 40s with granulocyte predominance.     [facility] consulted for assistance with DKA    ROS: Mostly significant for Nausea    A 10 point ROS was negative except as listed above  MEDICAL HISTORY:      Past Medical History:   Anticoagulant long-term use      COVID-19 virus infection      Diabetes mellitus      Diabetes mellitus, type 2      Hypertension      May-Thurner syndrome        Past Surgical History:   APPENDECTOMY        ESOPHAGOGASTRODUODENOSCOPY N/A 1/31/2022     Procedure: EGD (ESOPHAGOGASTRODUODENOSCOPY);  Surgeon: Cindy Quiros MD;  Location:  "[facility];  Service: Endoscopy;  Laterality: N/A;    ESOPHAGOGASTRODUODENOSCOPY N/A 3/21/2022     Procedure: EGD (ESOPHAGOGASTRODUODENOSCOPY);  Surgeon: Tejas Mann MD;  Location: [facility];  Service: Endoscopy;  Laterality: N/A;    INCISION AND DRAINAGE FOOT Left 11/28/2021     Procedure: INCISION AND DRAINAGE, FOOT;  Surgeon: Bj Alicea DPM;  Location: [facility] OR;  Service: Podiatry;  Laterality: Left;    stents in bilateral legs          Family History:    Cancer Mother      Cancer Paternal Uncle      Cancer Paternal Grandfather      Irritable bowel syndrome Paternal Grandfather      Cancer Maternal Grandfather      Colon cancer Neg Hx      Esophageal cancer Neg Hx      Rectal cancer Neg Hx      Stomach cancer Neg Hx      Ulcerative colitis Neg Hx      Crohn's disease Neg Hx      Celiac disease Neg Hx           Social History:  - Tobacco: former (cigarettes)  - Alcohol: yes  - Drugs: no    Allergies:  Review of patient's allergies indicates:   Allergen Reactions    Heparin analogues Nausea And Vomiting       Home Medications:  Current Outpatient Medications   Medication Instructions    blood-glucose meter,continuous (DEXCOM G7 ) Misc 1 Device, Misc.(Non-Drug; Combo Route), Daily    blood-glucose sensor (DEXCOM G7 SENSOR) Justyna 1 Device, Misc.(Non-Drug; Combo Route), Every 10 days    empagliflozin (JARDIANCE) 25 mg, Oral, Daily    furosemide (LASIX) 20 mg, Oral, Daily    GVOKE HYPOPEN 2-PACK 1 mg, Subcutaneous, As needed (PRN)    insulin aspart U-100 (NOVOLOG U-100 INSULIN ASPART) 14 Units, Subcutaneous, 3 times daily before meals    metoclopramide HCl (REGLAN) 5 mg, Oral, 3 times daily before meals    pantoprazole (PROTONIX) 40 mg, Oral, Daily    pen needle, diabetic (BD ULTRA-FINE DIYA PEN NEEDLE) 32 gauge x 5/32" Ndle Use with Tresiba daily and Humalog 3-4 times daily as directed.    prednisoLONE acetate (PRED FORTE) 1 % DrpS Instill one drop to the left eye every 2 hours while awake for 2 days " "then instill one drop 4 time daily for 5 days    prochlorperazine (COMPAZINE) 10 mg, Oral, 3 times daily PRN    promethazine (PHENERGAN) 25 mg, Rectal, Every 6 hours PRN    rivaroxaban (XARELTO) 10 mg, Oral, With dinner    TRESIBA FLEXTOUCH U-200 56 Units, Subcutaneous, Daily        I have reviewed the above medical history / surgeries / home meds / allergies / family history / social history    OBJECTIVE DATA:      Vital Signs:  Temp:  [97.7 °F (36.5 °C)-99.5 °F (37.5 °C)]   Pulse:  []   Resp:  [15-27]   BP: (115-192)/(64-93)   SpO2:  [95 %-99 %]    No results found for: "HTIN", "WEIGHT"    Intake / Output:    Intake/Output Summary (Last 24 hours) at 12/28/2023 1214  Last data filed at 12/28/2023 1100  Gross per 24 hour   Intake 2887.65 ml   Output 750 ml   Net 2137.65 ml       I have reviewed the vital trends as well as the documented I/Os     Physical Exam:  GEN: non-toxic appearing and appears stated age  HEENT: MMM, no scleral icterus, EOMI  NECK: Supple, midline trachea, no raised JVP or a-waves notable  CV: RRR, no MRG, pulses equal and symmetric 2+ at radial  PULM: CTAB  ABDOMEN: Soft, non-tender, non-distended, no rebound or guarding  SKIN: Warm, dry, intact, no rashes  MSK: No deformity, no clubbing, cyanosis, or lower extremity edema.  Right heel ulcer noted ~ inch diameter.   NEURO: RASS -1, awake and following commands, at neurologic baseline  LINES: Intact, no extravasation or induration, no erythema to PIV or support devices     Laboratory/Imaging:  I have reviewed and interpreted the labs below  Recent Labs   Lab 12/27/23 2158 12/28/23  0355   WBC 44.27* 47.31*   HGB 14.1 13.7*   HCT 42.1 41.6    405       Recent Labs   Lab 12/27/23 2158 12/28/23  0355 12/28/23  0812   * 138 137   K 4.5 3.7 3.6   CL 95 101 102   CO2 9* 15* 17*   BUN 33* 30* 27*   CREATININE 1.9* 1.7* 1.7*   * 177* 204*   CALCIUM 9.7 9.4 9.3   MG  --  2.9*  --    PHOS  --  2.7  --        Recent Labs   Lab " "12/27/23 2158   PROT 9.2*   ALBUMIN 2.7*   BILITOT 0.6   AST 22   ALT 31   ALKPHOS 131       No results for input(s): "PROTIME", "PTT", "INR" in the last 168 hours.    Cardiac:   Recent Labs   Lab 12/27/23 2158   TROPONINI 0.009       FLP:   Lab Results   Component Value Date    CHOL 178 09/27/2023    HDL 32 (L) 09/27/2023    LDLCALC 120.6 09/27/2023    TRIG 127 09/27/2023    CHOLHDL 18.0 (L) 09/27/2023     DM:   Lab Results   Component Value Date    HGBA1C 7.4 (H) 12/28/2023    HGBA1C 8.2 (H) 09/27/2023    HGBA1C 8.2 (H) 09/27/2023    LDLCALC 120.6 09/27/2023    CREATININE 1.7 (H) 12/28/2023     Thyroid:   Lab Results   Component Value Date    TSH 0.202 (L) 12/28/2023    FREET4 1.42 12/28/2023     Anemia:   Lab Results   Component Value Date    FERRITIN 675 (H) 11/27/2021     Urinalysis:   Lab Results   Component Value Date    COLORU Yellow 12/27/2023    SPECGRAV 1.030 12/27/2023    NITRITE Negative 12/27/2023    KETONESU 3+ (A) 12/27/2023    UROBILINOGEN Negative 12/27/2023       Microbiology:  Microbiology Results (last 7 days)       Procedure Component Value Units Date/Time    Blood culture [[id number]] Collected: 12/27/23 2242    Order Status: Completed Specimen: Blood Updated: 12/28/23 0915     Blood Culture, Routine No Growth to date    Blood culture [[id number]] Collected: 12/27/23 2329    Order Status: Completed Specimen: Blood from Peripheral, Hand, Right Updated: 12/28/23 0915     Blood Culture, Routine No Growth to date    Respiratory Infection Panel (PCR), Nasopharyngeal [[id number]] Collected: 12/28/23 0047    Order Status: Completed Specimen: Nasopharyngeal Swab Updated: 12/28/23 0050     Respiratory Infection Panel Source NP Swab    Narrative:      For all other respiratory sources, order [id number] -  Respiratory Viral Panel by PCR    Urine culture [[id number]] Collected: 12/27/23 8798    Order Status: No result Specimen: Urine Updated: 12/27/23 4682    Influenza A & B by Molecular [[id number]] " Collected: 12/27/23 2229    Order Status: Completed Specimen: Nasopharyngeal Swab Updated: 12/27/23 2313     Influenza A, Molecular Negative     Influenza B, Molecular Negative     Flu A & B Source Nasal swab    Blood culture [[id number]] Collected: 12/27/23 2243    Order Status: Canceled Specimen: Blood from Peripheral, Antecubital, Right                Imaging:     Imaging Results              US Upper Extremity Veins Bilateral (Final result)  Result time 12/28/23 08:41:34      Final result by Gui Brush III, MD (12/28/23 08:41:34)                   Impression:      Thrombus within 1 of the right paired brachial veins.      Electronically signed by: Gui Brush MD  Date:    12/28/2023  Time:    08:41               Narrative:    EXAMINATION:  US UPPER EXTREMITY VEINS BILATERAL    CLINICAL HISTORY:  May Thurner, Hx DVT;  Type 2 diabetes mellitus with ketoacidosis without coma    FINDINGS:  There are 2 right brachial veins.  One is thrombosed the other is patent.  Remaining neck, brachiocephalic, and upper extremity veins are patent bilaterally.                                       US Lower Extremity Veins Bilateral (Final result)  Result time 12/28/23 08:39:13      Final result by Adrienne Elizondo MD (12/28/23 08:39:13)                   Impression:      No evidence of deep venous thrombosis in either lower extremity.      Electronically signed by: Adrienne Elizondo MD  Date:    12/28/2023  Time:    08:39               Narrative:    EXAMINATION:  US LOWER EXTREMITY VEINS BILATERAL    CLINICAL HISTORY:  May Thurner, Hx DVT; Type 2 diabetes mellitus with ketoacidosis without coma    TECHNIQUE:  Duplex and color flow Doppler and dynamic compression was performed of the bilateral lower extremity veins was performed.    COMPARISON:  11/02/2023 right lower extremity venous Doppler, 01/26/2023 bilateral lower extremity venous Doppler    FINDINGS:  Right thigh veins: The common femoral, femoral,  popliteal, upper greater saphenous, and deep femoral veins are patent and free of thrombus. The veins are normally compressible and have normal phasic flow and augmentation response.    Right calf veins: The visualized calf veins are patent.    Left thigh veins: The common femoral, femoral, popliteal, upper greater saphenous, and deep femoral veins are patent and free of thrombus. The veins are normally compressible and have normal phasic flow and augmentation response.    Left calf veins: The visualized calf veins are patent.    Miscellaneous: None                                       X-Ray Foot Complete Right (Final result)  Result time 12/28/23 00:05:16      Final result by Ben Roman DO (12/28/23 00:05:16)                   Impression:      No acute osseous abnormality of the foot.      Electronically signed by: Ben Roman  Date:    12/28/2023  Time:    00:05               Narrative:    EXAMINATION:  XR FOOT COMPLETE 3 VIEW RIGHT    CLINICAL HISTORY:  . Elevated white blood cell count, unspecified    TECHNIQUE:  AP, lateral, and oblique views of the right foot were performed.    COMPARISON:  02/07/2023.    FINDINGS:  No acute fracture or dislocation. Alignment is normal. The Lisfranc articulation is congruent. Joint spaces are preserved.                                       X-Ray Chest AP Portable (Final result)  Result time 12/27/23 22:14:01      Final result by Ben Roman DO (12/27/23 22:14:01)                   Impression:      No acute abnormality.      Electronically signed by: Ben Roman  Date:    12/27/2023  Time:    22:14               Narrative:    EXAMINATION:  XR CHEST AP PORTABLE    CLINICAL HISTORY:  Elevated white blood cell count, unspecified    TECHNIQUE:  Single frontal view of the chest was performed.    COMPARISON:  11/27/2021.    FINDINGS:  The lungs are well expanded and clear. No focal opacities are seen. The pleural spaces are clear. The cardiac silhouette is  unremarkable. The visualized osseous structures are unremarkable.                                        I have reviewed the above imaging    Medications:      enoxparin  1 mg/kg Subcutaneous Q12H (treatment, non-standard time)    insulin detemir U-100  15 Units Subcutaneous BID    pantoprazole  40 mg Intravenous Daily    piperacillin-tazobactam (Zosyn) IV (PEDS and ADULTS) (extended infusion is not appropriate)  4.5 g Intravenous Q8H         dextrose 5% lactated ringers 100 mL/hr at 12/28/23 1131    insulin regular 1 units/mL infusion orderable (DKA) 0.05 Units/kg/hr (12/28/23 1127)        dextrose 10 % in water (D10W), dextrose 10 % in water (D10W), dextrose 10%, dextrose 10%, dextrose 5% lactated ringers, glucagon (human recombinant), glucose, glucose, ondansetron, sodium chloride 0.9%, sodium chloride 0.9%, Pharmacy to dose Vancomycin consult **AND** vancomycin - pharmacy to dose     Assessment:     Kulwant Menardcaro Benítez is a 38 y.o. male with PMHx as per hpi who presented with DKA and leukocytosis    Plan:        NEURO:  No acute concerns    Pain / Agitation / Delirium / Immobility / Sleep Disruption:  -- Primary analgesia with      CV:  #May-Thurner syndrome on Xarelto at home  #Peripheral vascular disease   Follows with Dr. Duff.   - DVT US bilateral UE showed thrombus in the R brachial vein, Left arm and bilateral LE without evidence of clot. On full-dose anticoagulation with heparin.      PULM:  - No acute concerns       GI/FEN  #Nausea/vomiting  - In the setting of DKA. Prn antiemetics onboard     Fluids: per DKA protocol  Electrolytes: follow q4h and replace as needed   Nutrition: enteral feeds as tolerated      RENAL:   #FREDERICK, likely prerenal in the setting of DKA.  - fluids as below. Follow urine output and I/Os     HEME/ONC:  #Leukocytosis  - Unclear etiology. Peripheral smear ordered and Heme-onc consulted by primary team.   - Trend CBC     ENDOCRINE:  #Diabetic Ketoacidosis, likely in the setting  of medication non adherence vs. infection  - Fluids per DKA protocol  - Long acting insulin given.  - Insulin drip started, transition to long acting once Anion gap has closed.   - Treat nausea as above, resume diet as able  - Trend BMPs q4hs. Replace electrolytes as needed.   - Hypoglycemic precautions     INFECTIOUS DISEASE:  # Leukocytosis  # Right heel wound, Chronic  # Tachycardia  - XR foot negative for acute issues. Follows with wound care at home.   - blood cultures, urine cultures and resp panel ordered.  - Started on Zosyn + vanc per primary. De-escalate as able.     MSK/RHEUM:  -- PT/OT for early mobility      PSYCHOSOCIAL:  -- no acute concerns    Feeding: enteral feeds as tolerated.   Analgesia: Multimodal pain control with tylenol  Sedation: none  DVT prophylaxis: lovenox full dose as below d/t RUE DVT   Anticoagulants       Ordered     Route Frequency Start Stop    12/28/23 0043  enoxaparin         SubQ Every 12 hours 12/28/23 0145 --           Head of Bed: 30 degrees to prevent VAP  Ulcer PPX: n/a  Glucose: Hypoglycemic precautions, DKA protocol with IV insulin   SBT/SAT: Daily  Bowels: Bowel regimen for constipation  Indwelling Lines: PIVs  Deescalation Abx: as above    Code: Code Status Discussion Note     Case was discussed with Dr. Clark. Attestation to follow. Thank you for allowing us to participate in the care of this patient. We will continue to follow. Please contact me with any questions should they arise.      Payam Simental -IV  [facility] Fellow    Pt seen and examined with Pulmonary/Critical Care team and this note was reviewed and validated with the following additional comments: Pt is lethargic, feels ill, has leg pain.  Sepsis is very likely given that he is not normally ketosis prone. CXR clear. Heal ulcer does not look infected but it is hard to be certain.  Abx appropriate. IVFs, insulin, electrolytes.    Critical Care time was spent validating  the history and physical exam, reviewing the lab and imaging results, and discussing the care of the patient with the bedside nurse and the patient and/or surrogates. This critical care time did not overlap with that of any other provider or involve time for any procedures.  This patient has a high probability of sudden clinically significant deterioration which requires the highest level of physician preparedness to intervene urgently. I managed/supervised life or organ supporting interventions that required frequent physician assessments. I devoted my full attention in the ICU to the direct care of this patient for this period of time. Organ systems which are failing and require intensive, critical care support are: metabolic, neurologic, hemodynamic    Critical Care time: 40 minutes    Cristiano Clark MD  Phone [phone]

## 2023-12-28 NOTE — CONSULTS
[facility] - Intensive Care  Wound Care    Patient Name:  Kulwant Mueller Jr.   MRN:  [id number]  Date: 12/28/2023  Diagnosis: <principal problem not specified>    History:     Past Medical History:   Diagnosis Date    Anticoagulant long-term use     COVID-19 virus infection     Diabetes mellitus     Diabetes mellitus, type 2     Hypertension     May-Thurner syndrome        Social History     Socioeconomic History    Marital status:    Tobacco Use    Smoking status: Former     Types: Cigarettes    Smokeless tobacco: Former    Tobacco comments:     occasionally    Substance and Sexual Activity    Alcohol use: Yes     Comment: occ    Drug use: No     Social Determinants of Health     Financial Resource Strain: Low Risk  (12/28/2023)    Overall Financial Resource Strain (CARDIA)     Difficulty of Paying Living Expenses: Not hard at all   Food Insecurity: No Food Insecurity (12/28/2023)    Hunger Vital Sign     Worried About Running Out of Food in the Last Year: Never true     Ran Out of Food in the Last Year: Never true   Transportation Needs: No Transportation Needs (12/28/2023)    PRAPARE - Transportation     Lack of Transportation (Medical): No     Lack of Transportation (Non-Medical): No   Physical Activity: Sufficiently Active (12/28/2023)    Exercise Vital Sign     Days of Exercise per Week: 3 days     Minutes of Exercise per Session: 60 min   Stress: Stress Concern Present (12/28/2023)    [employer] - Occupational Stress Questionnaire     Feeling of Stress : Very much   Social Connections: Moderately Integrated (12/28/2023)    Social Connection and Isolation Panel [NHANES]     Frequency of Communication with Friends and Family: More than three times a week     Frequency of Social Gatherings with Friends and Family: More than three times a week     Attends Denominational Services: More than 4 times per year     Active Member of Clubs or Organizations: No     Attends Club or  Organization Meetings: Never     Marital Status:    Housing Stability: Low Risk  (12/28/2023)    Housing Stability Vital Sign     Unable to Pay for Housing in the Last Year: No     Number of Places Lived in the Last Year: 1     Unstable Housing in the Last Year: No       Precautions:     Allergies as of 12/27/2023 - Reviewed 12/27/2023   Allergen Reaction Noted    Heparin analogues Nausea And Vomiting 09/01/2018       WOC Assessment Details/Treatment       R heel- DM foot ulcer- 3x3cm- soft eschar intact- appears pt has been applying gentian violet to area  BLE dry skin  LLE- intact scar tissue from history of wound  L heel intact with no redness      Nurse reports sacrum/buttocks intact with no redness.    Recommendations discussed with pt, nurse and Dr. Dunham:  - Pressure injury prevention interventions to include heel boots- pt refusing at this time  - Betadine BID to R heel- leave open to air     12/28/2023

## 2023-12-28 NOTE — EICU
EICU BRIEF ADMIT NOTE:    HISTORY:  Nausea, vomiting, DKA Please refer to H/P and ER notes for detail    CAMERA ASSESSMENT: Two way audiovisual assessment was done: Yes    Telemetry was reviewed. Medical records including notes, labs and imaging were reviewed.Yes    DISCUSSED with bedside nurse.Yes    ASSESSMENT AND PLAN:    # DKA with nausea and vomiting: IV fluids, Insulin, Monitor lactate, BMP and accuchecks per DKA protocol  # Fever, Sepsis and leukocytosis: IV Zosyn started empirically. CT abdomen nl. Blood and urine cultures to be followed. Procal elevated      BEST PRACTICES REVIEW:    INTUBATED: NO  GLYCEMIN CONTROL:  Diabetes: Yes    Insulin Infusion.  STRESS ULCER PROPHYLAXIS: PPI   DVT PROPHYLAXIS:  Pharmacological    Thank You for allowing EICU to participate in the care of the patient. Please call as needed      Titi Viera MD  EICU  Critical Care Medicine

## 2023-12-28 NOTE — H&P
[facility] Medicine H&P Note     Admitting Team: [facility] Hospitalist Team A  Attending Physician: Jazmin Goode MD  Resident: Roly  Intern: Rhonda     Date of Admit: 12/27/2023    Chief Complaint     Intractable N/V and diarrhea for over 6 days, generalized weakness for 6 days     Subjective:      History of Present Illness:  Kulwant Mueller Jr. is a 38 y.o. male with PMHx May-Thurner syndrome on xarelto, DM2 on insulin, chronic foot wounds following with wound care, HTN. The patient presented to [facility] on 12/27/2023 with a primary complaint of Intractable N/V and diarrhea for over 6 days, generalized weakness for 6 days.     The patient was in their usual state of health until around 1 week ago when he developed NBNB N/V and decreased oral intake. His family members had recently been ill as well, but have tested negative for COVID/flu. He reports he continued to have N/V whether or not he was able to have any oral intake. He presented to an ER for evaluation yesterday, where it was noted he had a WBC 33K, with an unremarkable CT A/P with negative COVID/flu. He was discharged with GI follow-up, however at the appointment today, there was concern for elevated WBC, and patient was instructed to present to the ER for further evaluation. Does endorse some abdominal discomfort and 1 episode of non-mucoid non-bloody diarrhea this afternoon. Denies  fevers, chills, shortness of breath, chest pain, palpitations, dysuria.    He has experienced similar symptoms in the past, and was being worked up for gastroparesis, however was unable to complete testing as he vomiting during testing. The symptoms lasted for several weeks and spontaneously resolved, and he did not pursue further workup outpatient. He has been taking reglan outpatient, but is unsure if it does anything to help him.     He has not been able to take his jardiance, aspart, tresiba for the past week as has not been eating, and had not  kept good track of his blood sugar. He also has been unable to take his xarelto 2' inability to tolerate oral intake, which cardiology restarted given his multiple DVTs 2' May-Thurner syndrome.     Past Medical History:  Past Medical History:   Diagnosis Date    Anticoagulant long-term use     COVID-19 virus infection     Diabetes mellitus     Diabetes mellitus, type 2     Hypertension     May-Thurner syndrome        Past Surgical History:  Past Surgical History:   Procedure Laterality Date    APPENDECTOMY      ESOPHAGOGASTRODUODENOSCOPY N/A 1/31/2022    Procedure: EGD (ESOPHAGOGASTRODUODENOSCOPY);  Surgeon: Cindy Quiros MD;  Location: [facility] ENDO;  Service: Endoscopy;  Laterality: N/A;    ESOPHAGOGASTRODUODENOSCOPY N/A 3/21/2022    Procedure: EGD (ESOPHAGOGASTRODUODENOSCOPY);  Surgeon: Tejas Mann MD;  Location: [facility] ENDO;  Service: Endoscopy;  Laterality: N/A;    INCISION AND DRAINAGE FOOT Left 11/28/2021    Procedure: INCISION AND DRAINAGE, FOOT;  Surgeon: Bj Alicea DPM;  Location: [facility] OR;  Service: Podiatry;  Laterality: Left;    stents in bilateral legs         Allergies:  Review of patient's allergies indicates:   Allergen Reactions    Heparin analogues Nausea And Vomiting       Home Medications:  Prior to Admission medications    Medication Sig Start Date End Date Taking? Authorizing Provider   blood-glucose meter,continuous (DEXCOM G7 ) Misc 1 Device by Misc.(Non-Drug; Combo Route) route once daily. 6/27/23 6/26/24  Almaz Santiago FNP   blood-glucose sensor (DEXCOM G7 SENSOR) Justyna 1 Device by Misc.(Non-Drug; Combo Route) route every 10 days. 7/11/23 7/10/24  Almaz Santiago FNP   empagliflozin (JARDIANCE) 25 mg tablet Take 1 tablet (25 mg total) by mouth once daily. 9/14/23 9/13/24  Almaz Santiago FNP   furosemide (LASIX) 20 MG tablet Take 1 tablet (20 mg total) by mouth once daily. 1/26/23 1/26/24  Tyree Duff MD   glucagon (GVOKE HYPOPEN 2-PACK) 1  "mg/0.2 mL AtIn Inject 1 mg into the skin as needed (SEVERE HYPOGLYCEMIA). 8/28/23   Almaz Santiago FNP   insulin aspart U-100 (NOVOLOG U-100 INSULIN ASPART) 100 unit/mL injection Inject 14 Units into the skin 3 (three) times daily before meals. 7/11/23 7/10/24  Almaz Santiago FNP   metoclopramide HCl (REGLAN) 10 MG tablet Take 0.5 tablets (5 mg total) by mouth 3 (three) times daily before meals. 7/27/23   Tejas Mann MD   pantoprazole (PROTONIX) 40 MG tablet Take 1 tablet (40 mg total) by mouth once daily. 7/26/23 10/31/23  Cindy Quiros MD   pen needle, diabetic (BD ULTRA-FINE DIYA PEN NEEDLE) 32 gauge x 5/32" Ndle Use with Tresiba daily and Humalog 3-4 times daily as directed. 3/10/23   Lisa Bro NP   prednisoLONE acetate (PRED FORTE) 1 % DrpS Instill one drop to the left eye every 2 hours while awake for 2 days then instill one drop 4 time daily for 5 days 2/17/23   Yariel Pantoja, MADDY   prochlorperazine (COMPAZINE) 10 MG tablet Take 1 tablet (10 mg total) by mouth 3 (three) times daily as needed (nausea or vomiting). 3/11/22   Yessenia Santos MD   promethazine (PHENERGAN) 25 MG suppository Place 1 suppository (25 mg total) rectally every 6 (six) hours as needed for Nausea. 12/27/23   Mary Steele NP   rivaroxaban (XARELTO) 10 mg Tab Take 1 tablet (10 mg total) by mouth daily with dinner or evening meal. 12/13/23 12/12/24  Salvador Tapia MD   TRESIBA FLEXTOUCH U-200 200 unit/mL (3 mL) insulin pen Inject 56 Units into the skin once daily. 11/20/23 11/19/24  Almaz Santiago FNP   DEXCOM G6 TRANSMITTER Justyna CHANGE TRANSMITTER EVERY 90 DAYS. 1/21/22 3/21/22  Lisa Bro NP       Family History:  Family History   Problem Relation Age of Onset    Cancer Mother     Cancer Paternal Uncle     Cancer Paternal Grandfather     Irritable bowel syndrome Paternal Grandfather     Cancer Maternal Grandfather     Colon cancer Neg Hx     Esophageal cancer Neg Hx     " "Rectal cancer Neg Hx     Stomach cancer Neg Hx     Ulcerative colitis Neg Hx     Crohn's disease Neg Hx     Celiac disease Neg Hx        Social History:  Social History     Tobacco Use    Smoking status: Former     Types: Cigarettes    Smokeless tobacco: Former    Tobacco comments:     occasionally    Substance Use Topics    Alcohol use: Yes     Comment: occ    Drug use: No       Review of Systems:  Pertinent items are noted in HPI. All other systems are reviewed and are negative.    Health Maintaince :   Primary Care Physician:     Immunizations:   TDap due for booster  Flu UTD   Pna due    Cancer Screening:  Colonoscopy: not indicated     Objective:   Last 24 Hour Vital Signs:  BP  Min: 115/65  Max: 183/93  Temp  Av.7 °F (37.1 °C)  Min: 97.7 °F (36.5 °C)  Max: 99.5 °F (37.5 °C)  Pulse  Av  Min: 98  Max: 118  Resp  Av.9  Min: 15  Max: 23  SpO2  Av.1 %  Min: 95 %  Max: 99 %  Height  Av' 1.3" (186.2 cm)  Min: 6' 1" (185.4 cm)  Max: 6' 2" (188 cm)  Weight  Av.6 kg (263 lb 9 oz)  Min: 114.5 kg (252 lb 6.8 oz)  Max: 124.2 kg (273 lb 13 oz)  Body mass index is 33.3 kg/m².  No intake/output data recorded.    Physical Examination:  General Awake, resting comfortably on the stretcher, appears uncomfortable  HENT  Normocephalic, atraumatic. PERRL, EOMI. No nasal congestion or rhinorrhea. Dry mucus membranes, throat without erythema or exudates. Trachea midline, neck with full ROM, no midline or paraspinal tenderness  Cardiac Tachycardic rate and regular rhythm; S1S2 normal with no murmurs, rubs, or gallops  Respiratory Air entry equal to all lung fields with no adventitious sounds auscultated. Normal work of breathing, no accessory muscle use.  Abdomen Soft, non-distended, diffuse TTP, no rebound or guarding, negative Riley's/McBurney's sign; +BS; no masses or hepatomegaly  Extremities No clubbing, cyanosis, or edema; 2+ radial pulses, right foot with palpable dorsal vasculature  Neuro  Alert " and oriented x3; GCS 15; speech normal with no facial asymmetry; no focal motor deficits elicited on exam  Skin  Warm and dry; no lesions rashes      Laboratory:  Most Recent Data:  CBC:   Lab Results   Component Value Date    WBC 47.31 (H) 12/28/2023    HGB 13.7 (L) 12/28/2023    HCT 41.6 12/28/2023     12/28/2023    MCV 86 12/28/2023    RDW 14.5 12/28/2023     BMP:   Lab Results   Component Value Date     12/28/2023    K 3.7 12/28/2023     12/28/2023    CO2 15 (L) 12/28/2023    BUN 30 (H) 12/28/2023    CREATININE 1.7 (H) 12/28/2023     (H) 12/28/2023    CALCIUM 9.4 12/28/2023    MG 2.9 (H) 12/28/2023    PHOS 2.7 12/28/2023     LFTs:   Lab Results   Component Value Date    PROT 9.2 (H) 12/27/2023    ALBUMIN 2.7 (L) 12/27/2023    BILITOT 0.6 12/27/2023    AST 22 12/27/2023    ALKPHOS 131 12/27/2023    ALT 31 12/27/2023     Coags:   Lab Results   Component Value Date    INR 1.1 11/27/2021     FLP:   Lab Results   Component Value Date    CHOL 178 09/27/2023    HDL 32 (L) 09/27/2023    LDLCALC 120.6 09/27/2023    TRIG 127 09/27/2023    CHOLHDL 18.0 (L) 09/27/2023     DM:   Lab Results   Component Value Date    HGBA1C 7.4 (H) 12/28/2023    HGBA1C 8.2 (H) 09/27/2023    HGBA1C 8.2 (H) 09/27/2023    LDLCALC 120.6 09/27/2023    CREATININE 1.7 (H) 12/28/2023     Thyroid:   Lab Results   Component Value Date    TSH 0.202 (L) 12/28/2023    FREET4 1.42 12/28/2023     Cardiac:   Lab Results   Component Value Date    TROPONINI 0.009 12/27/2023    BNP 17 11/27/2021     Urinalysis:   Lab Results   Component Value Date    COLORU Yellow 12/27/2023    SPECGRAV 1.030 12/27/2023    NITRITE Negative 12/27/2023    KETONESU 3+ (A) 12/27/2023    UROBILINOGEN Negative 12/27/2023    WBCUA 24 (H) 12/27/2023         Microbiology Data:  Microbiology Results (last 7 days)       Procedure Component Value Units Date/Time    Blood culture [[id number]] Collected: 12/27/23 2242    Order Status: Sent Specimen: Blood Updated:  12/28/23 0158    Blood culture [[id number]] Collected: 12/27/23 2329    Order Status: Sent Specimen: Blood from Peripheral, Hand, Right Updated: 12/28/23 0158    Respiratory Infection Panel (PCR), Nasopharyngeal [[id number]] Collected: 12/28/23 0047    Order Status: Completed Specimen: Nasopharyngeal Swab Updated: 12/28/23 0050     Respiratory Infection Panel Source NP Swab    Narrative:      For all other respiratory sources, order [id number] -  Respiratory Viral Panel by PCR    Urine culture [[id number]] Collected: 12/27/23 2318    Order Status: No result Specimen: Urine Updated: 12/27/23 2359    Influenza A & B by Molecular [[id number]] Collected: 12/27/23 2229    Order Status: Completed Specimen: Nasopharyngeal Swab Updated: 12/27/23 2313     Influenza A, Molecular Negative     Influenza B, Molecular Negative     Flu A & B Source Nasal swab    Blood culture [[id number]] Collected: 12/27/23 2243    Order Status: Canceled Specimen: Blood from Peripheral, Antecubital, Right             Other Results:  EKG (my interpretation): sinus tachycardia 109 bpm, MD interval and QRS duration within normal limits, Qtc 468, no SHANNAN    Radiology:  X-Ray Foot Complete Right   Final Result      No acute osseous abnormality of the foot.         Electronically signed by: Ben Roman   Date:    12/28/2023   Time:    00:05      X-Ray Chest AP Portable   Final Result      No acute abnormality.         Electronically signed by: Ben Roman   Date:    12/27/2023   Time:    22:14      US Lower Extremity Veins Bilateral    (Results Pending)   US Upper Extremity Veins Bilateral    (Results Pending)        Assessment:     Kulwant Mueller JrLeti is a 38 y.o. male with PMHx May-Thurner syndrome on xarelto, DM2 on insulin, chronic foot wounds following with wound care, HTN. The patient presented to [facility] on 12/27/2023 with a primary complaint of Intractable N/V and diarrhea for over 6 days, generalized weakness for 6  days. While in the ER, the patient was found to be in DKA with anion gap 31 and elevated beta-hydroxybutyrate and was unable to tolerate oral intake. Initiated on insulin drip and admitted to ICU for further management.     Plan:     DKA  AGMA  Intractable N/V  Gastroparesis  DM2 with long-term use insulin  - anion gap 31, beta-hydroxybutyric acid elevated, with bicarb 9 and elevated blood glucose; UA with ketones and glucose  - suspect intractable N/V, not taking home insulin, and infection  - ICU DKA protocol with IV insulin drip  - initiate long-acting insulin to help bridge over from insulin drip  - close monitoring of BMP q4h  - caution with Qtc prolonging agents, given Qtc 468 on admission; repeat EKG ordered for AM  - sliding scale insulin when gap closed and tolerating PO    May-Thurner syndrome  Peripheral vascular disease  - has Hx May-Thurner syndrome and was following with Dr. Duff, who had kept patient on xarelto 10 mg daily  - patient reports has not been able to take xarelto for past 1-2 weeks 2' inability to tolerate PO  - concern for DVT vs septic thrombi given fever and leukocytosis  - has noted Hx allergy to heparin analogues (reaction N/V) from when he received heparin drip prior to vascular intervention years ago; per chart review and discussion with pharmacy patient had received enoxaparin in 2018 and 2021 and appeared to tolerate; needs anticoagulation but unable to utilize PO regimen  - DVT US BUE/BLE ordered    Leukocytosis  Chronic right heel wound  Possible sepsis  Febrile  - WBC at OSH ER 33K, now elevated to 47K with neutrophil predominance and patient febrile  - COVID/flu negative, CXR with no acute cardiopulmonary process  - XR right foot with no osseous abnormality; has been following with wound care and has been off PO antibiotics for at least 1 month with no drainage or swelling from wound site  - no neck pain/stiffness/headache to suggest meningoencephalitis picture  - blood  cultures, urine culture, respiratory infection panel in process  - broad spectrum antibiotics with zosyn, vanc; de-escalate as able with further culture data    HTN  - patient reports not currently on any anti-hypertensive regimen  - SBP 130s throughout ER course  - monitor; initiate regimen as indicated      Fluids: per DKA protocol  Electrolytes: per DKA protocol  Nutrition: clear liquid, advance as tolerated  VTE PPx: enoxaparin 1mg/kg BID  GI PPx: pantoprazole     Code Status: full  Disposition: admit to ICU for DKA management      Yudy Benson MD  Internal / Emergency Medicine, PGY-3  [facility]  Night Flo Resident      [facility] Medicine Hospitalist Pager numbers:   U Hospitalist Medicine Team A (Russel/Emmanuel): [phone]  [facility] Hospitalist Medicine Team B (Odin/Kameron):  [phone]

## 2023-12-28 NOTE — ED PROVIDER NOTES
Encounter Date: 12/27/2023       History     Chief Complaint   Patient presents with    Vomiting    Diarrhea     Patient reports vomiting for 6 days and diarrhea today. Denies fever, abd pain. He reports severe weakness and has been unable to hold food or fluid down. He was seen at [facility] and was told his wbc count was high and was referred to a GI doctor.      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  Patient states that last Wednesday began to have multiple episodes of non bilious nonbloody emesis with associated nausea.  Pain.  He states that he continued to have these episodes with decreased p.o. intake.  He states he was evaluated at an ER yesterday where he was found to have a white blood cell count of 33K.  He reportedly underwent CT scan of the abdomen and pelvis that was found to be unremarkable.  He states he was discharged and was instructed to follow-up with GI.  The patient followed up with GI today who referred him back to the ER for further evaluation.  The patient states that today he had 1 episode of non mucoid nonbloody diarrhea.  Again, he continues to deny any abdominal pain.  Furthermore he denies any fevers chills chest pain shortness of breath, recent travel.  Of note is the fact the patient is currently taking oral antibiotics for a wound to the area of the right calcaneus.  The patient states that he went to be evaluated at an ER in [facility] but left secondary to long wait time.    Note, despite patient with several days of nausea vomiting diarrhea he reports compliance with his NovoLog and Jardiance.  Any cough fever chills  complaints whatsoever.      Review of patient's allergies indicates:   Allergen Reactions    Heparin analogues Nausea And Vomiting     Past Medical History:   Diagnosis Date    Anticoagulant long-term use      COVID-19 virus infection     Diabetes mellitus     Diabetes mellitus, type 2     Hypertension     May-Thurner syndrome      Past Surgical History:   Procedure Laterality Date    APPENDECTOMY      ESOPHAGOGASTRODUODENOSCOPY N/A 1/31/2022    Procedure: EGD (ESOPHAGOGASTRODUODENOSCOPY);  Surgeon: Cindy Quiros MD;  Location: [facility] ENDO;  Service: Endoscopy;  Laterality: N/A;    ESOPHAGOGASTRODUODENOSCOPY N/A 3/21/2022    Procedure: EGD (ESOPHAGOGASTRODUODENOSCOPY);  Surgeon: Tejas Mann MD;  Location: [facility];  Service: Endoscopy;  Laterality: N/A;    INCISION AND DRAINAGE FOOT Left 11/28/2021    Procedure: INCISION AND DRAINAGE, FOOT;  Surgeon: Bj Alicea DPM;  Location: [facility] OR;  Service: Podiatry;  Laterality: Left;    stents in bilateral legs       Family History   Problem Relation Age of Onset    Cancer Mother     Cancer Paternal Uncle     Cancer Paternal Grandfather     Irritable bowel syndrome Paternal Grandfather     Cancer Maternal Grandfather     Colon cancer Neg Hx     Esophageal cancer Neg Hx     Rectal cancer Neg Hx     Stomach cancer Neg Hx     Ulcerative colitis Neg Hx     Crohn's disease Neg Hx     Celiac disease Neg Hx      Social History     Tobacco Use    Smoking status: Former     Types: Cigarettes    Smokeless tobacco: Former    Tobacco comments:     occasionally    Substance Use Topics    Alcohol use: Yes     Comment: occ    Drug use: No     Review of Systems   Constitutional:  Positive for appetite change and fatigue. Negative for fever.   Respiratory:  Negative for chest tightness and shortness of breath.    Cardiovascular:  Negative for chest pain and leg swelling.   Gastrointestinal:  Positive for diarrhea, nausea and vomiting. Negative for abdominal pain.   Musculoskeletal:  Negative for back pain, joint swelling and myalgias.   Neurological:  Negative for dizziness and headaches.   Psychiatric/Behavioral:  Negative for agitation and confusion.        Physical Exam      Initial Vitals [12/27/23 2122]   BP Pulse Resp Temp SpO2   (!) 142/92 110 18 97.7 °F (36.5 °C) 98 %      MAP       --         Physical Exam    Nursing note and vitals reviewed.  Constitutional: He appears well-developed and well-nourished. No distress.   No distress noted   HENT:   Head: Normocephalic and atraumatic.   Right Ear: External ear normal.   Left Ear: External ear normal.   Dry oral mucosa   Eyes: EOM are normal. Pupils are equal, round, and reactive to light.   Neck: Neck supple.   Normal range of motion.  Cardiovascular:  Regular rhythm, normal heart sounds and intact distal pulses.           Tachycardic   Pulmonary/Chest: Breath sounds normal.   Abdominal: Abdomen is soft. Bowel sounds are normal.   The abdomen is soft. There is no rebound or guarding. Normal bowel sounds in all four quadrants. Negative Riley's sign. Negative McBurney's point tenderness Negative heel tap. No masses, fluid wave, ecchymosis or other skin changes appreciated on physical exam.     Musculoskeletal:      Cervical back: Normal range of motion and neck supple.     Neurological: He is oriented to person, place, and time. He has normal strength. GCS score is 15. GCS eye subscore is 4. GCS verbal subscore is 5. GCS motor subscore is 6.   Skin: Skin is warm.   Psychiatric: He has a normal mood and affect.         ED Course   Critical Care    Date/Time: 12/28/2023 12:29 AM    Performed by: Eduin Oviedo MD  Authorized by: Eduin Oviedo MD  Direct patient critical care time: 15 minutes  Additional history critical care time: 5 minutes  Ordering / reviewing critical care time: 5 minutes  Documentation critical care time: 5 minutes  Consulting other physicians critical care time: 5 minutes  Consult with family critical care time: 5 minutes  Other critical care time: 5 minutes  Total critical care time (exclusive of procedural time) : 45 minutes  Critical care was necessary to treat or prevent imminent or  life-threatening deterioration of the following conditions: renal failure, metabolic crisis and dehydration.        Labs Reviewed   CBC W/ AUTO DIFFERENTIAL - Abnormal; Notable for the following components:       Result Value    WBC 44.27 (*)     Immature Granulocytes 1.3 (*)     Gran # (ANC) 40.2 (*)     Immature Grans (Abs) 0.56 (*)     Mono # 2.4 (*)     Gran % 90.8 (*)     Lymph % 2.4 (*)     All other components within normal limits   COMPREHENSIVE METABOLIC PANEL - Abnormal; Notable for the following components:    Sodium 135 (*)     CO2 9 (*)     Glucose 343 (*)     BUN 33 (*)     Creatinine 1.9 (*)     Total Protein 9.2 (*)     Albumin 2.7 (*)     eGFR 45 (*)     Anion Gap 31 (*)     All other components within normal limits    Narrative:      co2  critical result(s) called and verbal readback obtained from   Love Servin RN  by LIBRADO 12/27/2023 22:38   URINALYSIS, REFLEX TO URINE CULTURE - Abnormal; Notable for the following components:    Protein, UA 1+ (*)     Glucose, UA 4+ (*)     Ketones, UA 3+ (*)     Occult Blood UA 2+ (*)     Leukocytes, UA Trace (*)     All other components within normal limits    Narrative:     Specimen Source->Urine   PROCALCITONIN - Abnormal; Notable for the following components:    Procalcitonin 7.65 (*)     All other components within normal limits   BETA - HYDROXYBUTYRATE, SERUM - Abnormal; Notable for the following components:    Beta-Hydroxybutyrate 6.1 (*)     All other components within normal limits   URINALYSIS MICROSCOPIC - Abnormal; Notable for the following components:    RBC, UA 17 (*)     WBC, UA 24 (*)     Hyaline Casts, UA 5 (*)     Granular Casts, UA 11 (*)     All other components within normal limits    Narrative:     Specimen Source->Urine   POCT GLUCOSE - Abnormal; Notable for the following components:    POCT Glucose 343 (*)     All other components within normal limits   ISTAT PROCEDURE - Abnormal; Notable for the following components:    POC PH 7.326 (*)      POC PCO2 19.0 (*)     POC HCO3 9.9 (*)     POC BE -16 (*)     POC TCO2 10 (*)     All other components within normal limits   POCT GLUCOSE - Abnormal; Notable for the following components:    POCT Glucose 320 (*)     All other components within normal limits   INFLUENZA A & B BY MOLECULAR   CULTURE, BLOOD   CULTURE, BLOOD   CULTURE, URINE   RESPIRATORY INFECTION PANEL (PCR), NASOPHARYNGEAL   LACTIC ACID, PLASMA   TROPONIN I   DRUG SCREEN PANEL, URINE EMERGENCY    Narrative:     Specimen Source->Urine   SARS-COV-2 RNA AMPLIFICATION, QUAL   TSH   TROPONIN I   TSH   POCT GLUCOSE MONITORING CONTINUOUS   POCT GLUCOSE MONITORING CONTINUOUS     EKG Readings: (Independently Interpreted)   Initial Reading: No STEMI. Rhythm: Sinus Tachycardia. Heart Rate: 108. Ectopy: No Ectopy. Conduction: Normal. ST Segments: Normal ST Segments. T Waves: Normal.   Sinus tachycardia; no overt ischemic change; no STEMI        Imaging Results              X-Ray Foot Complete Right (Final result)  Result time 12/28/23 00:05:16      Final result by Ben Roman DO (12/28/23 00:05:16)                   Impression:      No acute osseous abnormality of the foot.      Electronically signed by: Ben Roman  Date:    12/28/2023  Time:    00:05               Narrative:    EXAMINATION:  XR FOOT COMPLETE 3 VIEW RIGHT    CLINICAL HISTORY:  . Elevated white blood cell count, unspecified    TECHNIQUE:  AP, lateral, and oblique views of the right foot were performed.    COMPARISON:  02/07/2023.    FINDINGS:  No acute fracture or dislocation. Alignment is normal. The Lisfranc articulation is congruent. Joint spaces are preserved.                                       X-Ray Chest AP Portable (Final result)  Result time 12/27/23 22:14:01      Final result by Ben Roman DO (12/27/23 22:14:01)                   Impression:      No acute abnormality.      Electronically signed by: Ben Roman  Date:    12/27/2023  Time:    22:14                Narrative:    EXAMINATION:  XR CHEST AP PORTABLE    CLINICAL HISTORY:  Elevated white blood cell count, unspecified    TECHNIQUE:  Single frontal view of the chest was performed.    COMPARISON:  11/27/2021.    FINDINGS:  The lungs are well expanded and clear. No focal opacities are seen. The pleural spaces are clear. The cardiac silhouette is unremarkable. The visualized osseous structures are unremarkable.                                       Medications   sodium chloride 0.9% flush 10 mL (has no administration in time range)   0.9%  NaCl infusion (1,000 mLs Intravenous New Bag 12/27/23 2342)   dextrose 5 % and 0.45 % NaCl infusion (has no administration in time range)   dextrose 10 % infusion (has no administration in time range)   dextrose 10 % infusion (has no administration in time range)   insulin regular 1 Units/mL in sodium chloride 0.9% 100 mL infusion (0.1 Units/kg/hr × 119.8 kg Intravenous New Bag 12/27/23 2345)   dextrose 10% bolus 125 mL 125 mL (has no administration in time range)   dextrose 10% bolus 250 mL 250 mL (has no administration in time range)   piperacillin-tazobactam (ZOSYN) 4.5 g in dextrose 5 % in water (D5W) 100 mL IVPB (MB+) (0 g Intravenous Stopped 12/28/23 0001)   vancomycin 2 g in dextrose 5 % 500 mL IVPB (2,000 mg Intravenous New Bag 12/28/23 0004)   pantoprazole injection 40 mg (has no administration in time range)   sodium chloride 0.9% flush 10 mL (has no administration in time range)   dextrose 5 % and 0.45 % NaCl infusion (has no administration in time range)   dextrose 10 % infusion (has no administration in time range)   dextrose 10 % infusion (has no administration in time range)   potassium chloride 10 mEq in 100 mL IVPB (has no administration in time range)   ondansetron injection 4 mg (has no administration in time range)   dextrose 10% bolus 125 mL 125 mL (has no administration in time range)   dextrose 10% bolus 250 mL 250 mL (has no administration in time range)   0.9%   NaCl infusion (0 mLs Intravenous Stopped 12/27/23 2319)   insulin regular bolus from bag/infusion 11.98 Units 11.98 mL (11.98 Units Intravenous Bolus from Bag 12/27/23 2345)   ondansetron injection 4 mg (4 mg Intravenous Given 12/27/23 2331)     Medical Decision Making  Amount and/or Complexity of Data Reviewed  Labs: ordered. Decision-making details documented in ED Course.  Radiology: ordered. Decision-making details documented in ED Course.    Risk  Prescription drug management.               ED Course as of 12/28/23 0032   Wed Dec 27, 2023   2228 Beta-Hydroxybutyrate(!): 6.1 [LC]   2228 WBC(!): 44.27 [LC]   2229 X-Ray Chest AP Portable  No acute cardiopulmonary process per my independent interpretation.  [LC]   2235 Platelet Count: 376 [LC]   2239 Creatinine(!): 1.9 [LC]   2239 Glucose(!): 343 [LC]   2239 Anion Gap(!): 31 [LC]   2239 Beta-Hydroxybutyrate(!): 6.1 [LC]   2246 Per chart check, patient underwent a CT scan of the abdomen pelvis with IV contrast yesterday that was read by Radiology as unremarkable for any acute intra-abdominal pathology. [LC]   2251 POC PH(!): 7.326 [LC]   2251 POC PCO2(!): 19.0 [LC]   2251 POC HCO3(!): 9.9 [LC]   2305 Discussed case with the [facility] Medicine resident who will admit the patient to her service for further evaluation of patient's leukocytosis, presumed diabetic ketoacidosis with blood glucose control, insulin infusion, IV fluids, further evaluation of leukocytosis of unknown source, nausea vomiting and diarrhea, FREDERICK [LC]   2323 Patient and family member at bedside updated regarding ED workup results and need for admission for presumptive DKA, leukocytosis of unknown origin, acute kidney injury, dehydration; will start an insulin infusion, broad-spectrum antibiotics in light of his leukocytosis, elevated procalcitonin; obtain additional TSH; [LC]   Thu Dec 28, 2023   0003 Ketones, UA(!): 3+ [LC]   0003 Leukocytes, UA(!): Trace [LC]   0003 Glucose, UA(!): 4+ [LC]    0003 Protein, UA(!): 1+ [LC]   0015 X-Ray Foot Complete Right  No acute osseous abnormality per my independent interpretation.  [LC]      ED Course User Index  [LC] Eduin Oviedo MD                 Medical Decision Making:   Initial Assessment:   See HPI  Basic labs, IVF  Differential Diagnosis:   Dehydration, acute kidney injury, viral gastroenteritis, intra-abdominal infection   Clinical Tests:   Lab Tests: Reviewed and Ordered  Radiological Study: Ordered and Reviewed  ED Management:  - patient's ED laboratory analysis notable for leukocytosis of 44K, creatinine 1.9, procalcitonin of 7; chest x-ray demonstrates no acute cardiopulmonary process; CMP notable will for elevated anion gap in the setting of hyperglycemia; the patient's CO2 of 9, mild acidosis and ketones in the blood urine are concerning for diabetic ketoacidosis; his urinalysis is unremarkable; will treat with insulin infusion and admission to the ICU for presumed DKA; will cover with broad-spectrum antibiotics in light of patient's tachycardia, elevated procalcitonin, leukocytosis of unknown origin; patient hydrated ED; patient is comfortable plan for admission at this time.  I discussed the case at length [facility] Medicine resident who is agreed to admit the patient to her service for further evaluation management presumed DKA, acute kidney injury, dehydration, leukocytosis of unknown source.              Clinical Impression:  Final diagnoses:  [R00.0] Tachycardia  [D72.829] Leukocytosis  [E11.10] Diabetic ketoacidosis without coma associated with type 2 diabetes mellitus (Primary)  [N17.9] Acute kidney injury  [E86.0] Dehydration  [R11.2] Nausea and vomiting, unspecified vomiting type  [S90.851A] Splinter of right foot without major open wound or infection, initial encounter          ED Disposition Condition    Observation Stable                Eduin Oviedo MD  12/28/23 0032

## 2023-12-28 NOTE — PLAN OF CARE
The sw met with the pt to complete the assessment but he was very nauseated and couldn't talk. The pt gave the sw permission to speak with Chloé Mueller(wife)[phone]. The sw spoke to her via phone to complete the assessment. The pt and his wife live in [place] with their 2 children. The pt's independent with his ADL'S and has the dme listed below. 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 The pt still drives but his wife will transport him home at d/c. The pt's wife states he doesn't have a PCP but they're interested in one near [facility] or [place]. The sw requested it with the . The sw completed the white board in the pt's room with her name and contact info. The sw encouraged the couple to call if they have any further questions or concerns. The sw will continue to follow the pt throughout his transitions of care and will assist with any d/c needs.     [facility] - Intensive Care  Initial Discharge Assessment       Primary Care Provider: No, Primary Doctor    Admission Diagnosis: Dehydration [E86.0]  Leukocytosis [D72.829]  Tachycardia [R00.0]  Acute kidney injury [N17.9]  Diabetic ketoacidosis without coma associated with type 2 diabetes mellitus [E11.10]  Splinter of right foot without major open wound or infection, initial encounter [S90.851A]  Nausea and vomiting, unspecified vomiting type [R11.2]    Admission Date: 12/27/2023  Expected Discharge Date:     Transition of Care Barriers: (P) None    Payor: BLUE CROSS BLUE SHIELD / Plan: BCBS ALL OUT OF STATE / Product Type: PPO /     Extended Emergency Contact Information  Primary Emergency Contact: Chloé Mueller  Address: [address] [place] [address] [facility]  Mobile Phone: [phone]  Relation: Spouse  Secondary Emergency Contact: Hari Mueller  Mobile Phone: [phone]  Relation: Brother    Discharge Plan A: (P) Home with family  Discharge Plan B: (P) Home Health      [id number] [place] - [address]  [address]  [place] [address]  Phone: [phone] Fax: [phone]    [facility] - [place], [place] - [address]  [address]  [place] [address]  Phone: [phone] Fax: [phone]    [id number] - [place] - [address] [facility] [address]  [address]  [place] [address]  Phone: [phone] Fax: [phone]    [id number] [place] - [address] [facility]  [address] [facility] [place] [address]  Phone: [phone] Fax: [phone]    [facility] - [address] [address]  Phone: [phone] Fax: [phone]      Initial Assessment (most recent)       Adult Discharge Assessment - 12/28/23 1114          Discharge Assessment    Assessment Type Discharge Planning Assessment (P)      Confirmed/corrected address, phone number and insurance Yes (P)      Confirmed Demographics Correct on Facesheet (P)      Source of Information family (P)      If unable to respond/provide information was family/caregiver contacted? Yes (P)      Contact Name/Number Chloé Mueller(wife)[phone] (P)      When was your last doctors appointment? 12/27/23 (P)      Communicated RAEANN with patient/caregiver Date not available/Unable to determine (P)      Reason For Admission DKA (P)      People in Home child(danny), dependent (P)      Do you expect to return to your current living situation? Yes (P)      Do you have help at home or someone to help you manage your care at home? Yes (P)      Who are your caregiver(s) and their phone number(s)? Chloé Mueller(wife)[phone] (P)      Prior to hospitilization cognitive status: Alert/Oriented (P)      Current cognitive status: Alert/Oriented (P)      Walking or Climbing Stairs Difficulty no (P)       Dressing/Bathing Difficulty no (P)      Home Accessibility wheelchair accessible (P)      Home Layout Able to live on 1st floor (P)      Equipment Currently Used at Home glucometer (P)      Readmission within 30 days? No (P)      Patient currently being followed by outpatient case management? No (P)      Do you currently have service(s) that help you manage your care at home? No (P)      Do you take prescription medications? Yes (P)      Do you have prescription coverage? Yes (P)      Coverage BCBS (P)      Do you have any problems affording any of your prescribed medications? No (P)      Is the patient taking medications as prescribed? yes (P)      Who is going to help you get home at discharge? Chloé Mueller(wife)[phone] (P)      How do you get to doctors appointments? car, drives self (P)      Are you on dialysis? No (P)      Do you take coumadin? No (P)      Discharge Plan A Home with family (P)      Discharge Plan B Home Health (P)      DME Needed Upon Discharge  none (P)      Discharge Plan discussed with: Spouse/sig other;Patient (P)      Name(s) and Number(s) Chloé Mueller(wife)[phone] (P)      Transition of Care Barriers None (P)         Physical Activity    On average, how many days per week do you engage in moderate to strenuous exercise (like a brisk walk)? 3 days (P)      On average, how many minutes do you engage in exercise at this level? 60 min (P)         Financial Resource Strain    How hard is it for you to pay for the very basics like food, housing, medical care, and heating? Not hard at all (P)         Housing Stability    In the last 12 months, was there a time when you were not able to pay the mortgage or rent on time? No (P)      In the last 12 months, how many places have you lived? 1 (P)      In the last 12 months, was there a time when you did not have a steady place to sleep or slept in a shelter (including now)? No (P)         Transportation Needs    In the past 12 months,  has lack of transportation kept you from medical appointments or from getting medications? No (P)      In the past 12 months, has lack of transportation kept you from meetings, work, or from getting things needed for daily living? No (P)         Food Insecurity    Within the past 12 months, you worried that your food would run out before you got the money to buy more. Never true (P)      Within the past 12 months, the food you bought just didn't last and you didn't have money to get more. Never true (P)         Stress    Do you feel stress - tense, restless, nervous, or anxious, or unable to sleep at night because your mind is troubled all the time - these days? Very much (P)         Social Connections    In a typical week, how many times do you talk on the phone with family, friends, or neighbors? More than three times a week (P)      How often do you get together with friends or relatives? More than three times a week (P)      How often do you attend Mormonism or Islam services? More than 4 times per year (P)      Do you belong to any clubs or organizations such as Mormonism groups, unions, fraternal or athletic groups, or school groups? No (P)      How often do you attend meetings of the clubs or organizations you belong to? Never (P)      Are you , , , , never , or living with a partner?  (P)         Alcohol Use    Q1: How often do you have a drink containing alcohol? Monthly or less (P)      Q2: How many drinks containing alcohol do you have on a typical day when you are drinking? 1 or 2 (P)      Q3: How often do you have six or more drinks on one occasion? Never (P)         OTHER    Name(s) of People in Home Chloé Mueller(wife)[phone] (P)

## 2023-12-28 NOTE — ED NOTES
Pt remains in stretcher in low and locked position, side rails raised x2, call light in reach. Pt remains on continuous cardiac monitoring, pulse oximetry, and BP cycling every 60 minutes. Pt in NAD. Pt updated on POC. Pt verbalizes no needs at this time.

## 2023-12-29 ENCOUNTER — ANESTHESIA EVENT (OUTPATIENT)
Dept: SURGERY | Facility: HOSPITAL | Age: 38
DRG: 853 | End: 2023-12-29
Payer: COMMERCIAL

## 2023-12-29 ENCOUNTER — ANESTHESIA (OUTPATIENT)
Dept: SURGERY | Facility: HOSPITAL | Age: 38
DRG: 853 | End: 2023-12-29
Payer: COMMERCIAL

## 2023-12-29 PROBLEM — R78.81 MRSA BACTEREMIA: Status: ACTIVE | Noted: 2023-12-29

## 2023-12-29 PROBLEM — M00.062 STAPHYLOCOCCAL ARTHRITIS OF LEFT KNEE: Status: ACTIVE | Noted: 2023-12-29

## 2023-12-29 PROBLEM — B95.62 MRSA BACTEREMIA: Status: ACTIVE | Noted: 2023-12-29

## 2023-12-29 LAB
ANION GAP SERPL CALC-SCNC: 14 MMOL/L (ref 8–16)
ANION GAP SERPL CALC-SCNC: 14 MMOL/L (ref 8–16)
ANION GAP SERPL CALC-SCNC: 15 MMOL/L (ref 8–16)
APPEARANCE FLD: NORMAL
AV INDEX (PROSTH): 0.77
AV MEAN GRADIENT: 3 MMHG
AV PEAK GRADIENT: 5 MMHG
AV VALVE AREA BY VELOCITY RATIO: 3.88 CM²
AV VALVE AREA: 4.07 CM²
AV VELOCITY RATIO: 0.74
BASOPHILS # BLD AUTO: 0.15 K/UL (ref 0–0.2)
BASOPHILS NFR BLD: 0.4 % (ref 0–1.9)
BODY FLD TYPE: NORMAL
BODY FLD TYPE: NORMAL
BSA FOR ECHO PROCEDURE: 2.43 M2
BUN SERPL-MCNC: 18 MG/DL (ref 6–20)
BUN SERPL-MCNC: 19 MG/DL (ref 6–20)
BUN SERPL-MCNC: 22 MG/DL (ref 6–20)
CALCIUM SERPL-MCNC: 9.2 MG/DL (ref 8.7–10.5)
CALCIUM SERPL-MCNC: 9.2 MG/DL (ref 8.7–10.5)
CALCIUM SERPL-MCNC: 9.5 MG/DL (ref 8.7–10.5)
CHLORIDE SERPL-SCNC: 103 MMOL/L (ref 95–110)
CHLORIDE SERPL-SCNC: 103 MMOL/L (ref 95–110)
CHLORIDE SERPL-SCNC: 104 MMOL/L (ref 95–110)
CO2 SERPL-SCNC: 21 MMOL/L (ref 23–29)
CO2 SERPL-SCNC: 21 MMOL/L (ref 23–29)
CO2 SERPL-SCNC: 22 MMOL/L (ref 23–29)
COLOR FLD: NORMAL
CREAT SERPL-MCNC: 1.1 MG/DL (ref 0.5–1.4)
CREAT SERPL-MCNC: 1.5 MG/DL (ref 0.5–1.4)
CREAT SERPL-MCNC: 1.6 MG/DL (ref 0.5–1.4)
CRP SERPL-MCNC: 393.7 MG/L (ref 0–8.2)
CRYSTALS FLD MICRO: NEGATIVE
CV ECHO LV RWT: 0.51 CM
DIFFERENTIAL METHOD BLD: ABNORMAL
DOP CALC AO PEAK VEL: 1.1 M/S
DOP CALC AO VTI: 15 CM
DOP CALC LVOT AREA: 5.3 CM2
DOP CALC LVOT DIAMETER: 2.59 CM
DOP CALC LVOT PEAK VEL: 0.81 M/S
DOP CALC LVOT STROKE VOLUME: 61.08 CM3
DOP CALC MV VTI: 11.6 CM
DOP CALCLVOT PEAK VEL VTI: 11.6 CM
E WAVE DECELERATION TIME: 104.56 MSEC
E/A RATIO: 0.68
E/E' RATIO: 4.9 M/S
ECHO LV POSTERIOR WALL: 1.29 CM (ref 0.6–1.1)
EOSINOPHIL # BLD AUTO: 0 K/UL (ref 0–0.5)
EOSINOPHIL NFR BLD: 0 % (ref 0–8)
ERYTHROCYTE [DISTWIDTH] IN BLOOD BY AUTOMATED COUNT: 14.6 % (ref 11.5–14.5)
ERYTHROCYTE [SEDIMENTATION RATE] IN BLOOD BY PHOTOMETRIC METHOD: 22 MM/HR (ref 0–23)
EST. GFR  (NO RACE VARIABLE): 56 ML/MIN/1.73 M^2
EST. GFR  (NO RACE VARIABLE): 60 ML/MIN/1.73 M^2
EST. GFR  (NO RACE VARIABLE): >60 ML/MIN/1.73 M^2
FRACTIONAL SHORTENING: 31 % (ref 28–44)
GLUCOSE SERPL-MCNC: 192 MG/DL (ref 70–110)
GLUCOSE SERPL-MCNC: 192 MG/DL (ref 70–110)
GLUCOSE SERPL-MCNC: 235 MG/DL (ref 70–110)
GRAM STN SPEC: NORMAL
HCT VFR BLD AUTO: 37.9 % (ref 40–54)
HGB BLD-MCNC: 12.6 G/DL (ref 14–18)
IMM GRANULOCYTES # BLD AUTO: 0.7 K/UL (ref 0–0.04)
IMM GRANULOCYTES NFR BLD AUTO: 1.8 % (ref 0–0.5)
INTERVENTRICULAR SEPTUM: 1.29 CM (ref 0.6–1.1)
IVC DIAMETER: 2.15 CM
LA MAJOR: 5.05 CM
LA MINOR: 5.12 CM
LA WIDTH: 3.5 CM
LDH SERPL L TO P-CCNC: 385 U/L (ref 110–260)
LEFT ATRIUM SIZE: 3.33 CM
LEFT ATRIUM VOLUME INDEX MOD: 22.5 ML/M2
LEFT ATRIUM VOLUME INDEX: 21.2 ML/M2
LEFT ATRIUM VOLUME MOD: 53.47 CM3
LEFT ATRIUM VOLUME: 50.37 CM3
LEFT INTERNAL DIMENSION IN SYSTOLE: 3.49 CM (ref 2.1–4)
LEFT VENTRICLE DIASTOLIC VOLUME INDEX: 51.75 ML/M2
LEFT VENTRICLE DIASTOLIC VOLUME: 123.17 ML
LEFT VENTRICLE MASS INDEX: 112 G/M2
LEFT VENTRICLE SYSTOLIC VOLUME INDEX: 21.2 ML/M2
LEFT VENTRICLE SYSTOLIC VOLUME: 50.52 ML
LEFT VENTRICULAR INTERNAL DIMENSION IN DIASTOLE: 5.09 CM (ref 3.5–6)
LEFT VENTRICULAR MASS: 266.3 G
LV LATERAL E/E' RATIO: 4.9 M/S
LV SEPTAL E/E' RATIO: 4.9 M/S
LVOT MG: 1.62 MMHG
LVOT MV: 0.61 CM/S
LYMPHOCYTES # BLD AUTO: 1.4 K/UL (ref 1–4.8)
LYMPHOCYTES NFR BLD: 3.6 % (ref 18–48)
MAGNESIUM SERPL-MCNC: 2.8 MG/DL (ref 1.6–2.6)
MCH RBC QN AUTO: 27.9 PG (ref 27–31)
MCHC RBC AUTO-ENTMCNC: 33.2 G/DL (ref 32–36)
MCV RBC AUTO: 84 FL (ref 82–98)
MONOCYTES # BLD AUTO: 2.3 K/UL (ref 0.3–1)
MONOCYTES NFR BLD: 6.1 % (ref 4–15)
MV MEAN GRADIENT: 1 MMHG
MV PEAK A VEL: 0.72 M/S
MV PEAK E VEL: 0.49 M/S
MV PEAK GRADIENT: 3 MMHG
MV STENOSIS PRESSURE HALF TIME: 30.02 MS
MV VALVE AREA BY CONTINUITY EQUATION: 5.27 CM2
MV VALVE AREA P 1/2 METHOD: 7.33 CM2
NEUTROPHILS # BLD AUTO: 34 K/UL (ref 1.8–7.7)
NEUTROPHILS NFR BLD: 88.1 % (ref 38–73)
NRBC BLD-RTO: 0 /100 WBC
OHS LV EJECTION FRACTION SIMPSONS BIPLANE MOD: 48 %
PLATELET # BLD AUTO: 417 K/UL (ref 150–450)
PLATELET BLD QL SMEAR: ABNORMAL
PMV BLD AUTO: 10.5 FL (ref 9.2–12.9)
POCT GLUCOSE: 154 MG/DL (ref 70–110)
POCT GLUCOSE: 154 MG/DL (ref 70–110)
POCT GLUCOSE: 157 MG/DL (ref 70–110)
POCT GLUCOSE: 169 MG/DL (ref 70–110)
POCT GLUCOSE: 171 MG/DL (ref 70–110)
POCT GLUCOSE: 209 MG/DL (ref 70–110)
POCT GLUCOSE: 244 MG/DL (ref 70–110)
POTASSIUM SERPL-SCNC: 3.6 MMOL/L (ref 3.5–5.1)
POTASSIUM SERPL-SCNC: 3.6 MMOL/L (ref 3.5–5.1)
POTASSIUM SERPL-SCNC: 3.7 MMOL/L (ref 3.5–5.1)
RA MAJOR: 4.87 CM
RA PRESSURE ESTIMATED: 15 MMHG
RA WIDTH: 3.87 CM
RBC # BLD AUTO: 4.51 M/UL (ref 4.6–6.2)
RIGHT VENTRICULAR END-DIASTOLIC DIMENSION: 3.33 CM
RV TISSUE DOPPLER FREE WALL SYSTOLIC VELOCITY 1 (APICAL 4 CHAMBER VIEW): 26.76 CM/S
SINUS: 3.87 CM
SODIUM SERPL-SCNC: 138 MMOL/L (ref 136–145)
SODIUM SERPL-SCNC: 139 MMOL/L (ref 136–145)
SODIUM SERPL-SCNC: 140 MMOL/L (ref 136–145)
TDI LATERAL: 0.1 M/S
TDI SEPTAL: 0.1 M/S
TDI: 0.1 M/S
TRICUSPID ANNULAR PLANE SYSTOLIC EXCURSION: 2.54 CM
VANCOMYCIN SERPL-MCNC: 4.1 UG/ML
VANCOMYCIN SERPL-MCNC: 9.8 UG/ML
WBC # BLD AUTO: 38.53 K/UL (ref 3.9–12.7)
WBC # FLD: NORMAL /CU MM
Z-SCORE OF LEFT VENTRICULAR DIMENSION IN END DIASTOLE: -7.21
Z-SCORE OF LEFT VENTRICULAR DIMENSION IN END SYSTOLE: -4.64

## 2023-12-29 PROCEDURE — 27000207 HC ISOLATION

## 2023-12-29 PROCEDURE — 87077 CULTURE AEROBIC IDENTIFY: CPT | Mod: 59 | Performed by: STUDENT IN AN ORGANIZED HEALTH CARE EDUCATION/TRAINING PROGRAM

## 2023-12-29 PROCEDURE — 36415 COLL VENOUS BLD VENIPUNCTURE: CPT | Mod: XB

## 2023-12-29 PROCEDURE — C9113 INJ PANTOPRAZOLE SODIUM, VIA: HCPCS

## 2023-12-29 PROCEDURE — 36000709 HC OR TIME LEV III EA ADD 15 MIN: Performed by: ORTHOPAEDIC SURGERY

## 2023-12-29 PROCEDURE — 63600175 PHARM REV CODE 636 W HCPCS: Performed by: NURSE ANESTHETIST, CERTIFIED REGISTERED

## 2023-12-29 PROCEDURE — D9220A PRA ANESTHESIA: Mod: CRNA,,, | Performed by: NURSE ANESTHETIST, CERTIFIED REGISTERED

## 2023-12-29 PROCEDURE — 87186 SC STD MICRODIL/AGAR DIL: CPT | Mod: 59

## 2023-12-29 PROCEDURE — 36415 COLL VENOUS BLD VENIPUNCTURE: CPT | Performed by: INTERNAL MEDICINE

## 2023-12-29 PROCEDURE — 85025 COMPLETE CBC W/AUTO DIFF WBC: CPT

## 2023-12-29 PROCEDURE — 87205 SMEAR GRAM STAIN: CPT | Mod: 59 | Performed by: STUDENT IN AN ORGANIZED HEALTH CARE EDUCATION/TRAINING PROGRAM

## 2023-12-29 PROCEDURE — 63600175 PHARM REV CODE 636 W HCPCS

## 2023-12-29 PROCEDURE — 63600175 PHARM REV CODE 636 W HCPCS: Performed by: INTERNAL MEDICINE

## 2023-12-29 PROCEDURE — 25000003 PHARM REV CODE 250: Performed by: INTERNAL MEDICINE

## 2023-12-29 PROCEDURE — 89060 EXAM SYNOVIAL FLUID CRYSTALS: CPT

## 2023-12-29 PROCEDURE — D9220A PRA ANESTHESIA: Mod: ANES,,, | Performed by: ANESTHESIOLOGY

## 2023-12-29 PROCEDURE — 83615 LACTATE (LD) (LDH) ENZYME: CPT | Performed by: INTERNAL MEDICINE

## 2023-12-29 PROCEDURE — 87077 CULTURE AEROBIC IDENTIFY: CPT | Mod: 59

## 2023-12-29 PROCEDURE — 85652 RBC SED RATE AUTOMATED: CPT | Performed by: INTERNAL MEDICINE

## 2023-12-29 PROCEDURE — 20000000 HC ICU ROOM

## 2023-12-29 PROCEDURE — 89051 BODY FLUID CELL COUNT: CPT | Performed by: STUDENT IN AN ORGANIZED HEALTH CARE EDUCATION/TRAINING PROGRAM

## 2023-12-29 PROCEDURE — 37000008 HC ANESTHESIA 1ST 15 MINUTES: Performed by: ORTHOPAEDIC SURGERY

## 2023-12-29 PROCEDURE — 87070 CULTURE OTHR SPECIMN AEROBIC: CPT | Mod: 59 | Performed by: ORTHOPAEDIC SURGERY

## 2023-12-29 PROCEDURE — 86140 C-REACTIVE PROTEIN: CPT | Performed by: INTERNAL MEDICINE

## 2023-12-29 PROCEDURE — 0S9D3ZZ DRAINAGE OF LEFT KNEE JOINT, PERCUTANEOUS APPROACH: ICD-10-PCS | Performed by: INTERNAL MEDICINE

## 2023-12-29 PROCEDURE — 83735 ASSAY OF MAGNESIUM: CPT

## 2023-12-29 PROCEDURE — 87186 SC STD MICRODIL/AGAR DIL: CPT | Performed by: ORTHOPAEDIC SURGERY

## 2023-12-29 PROCEDURE — 80048 BASIC METABOLIC PNL TOTAL CA: CPT

## 2023-12-29 PROCEDURE — 27310 EXPLORATION OF KNEE JOINT: CPT | Mod: LT,,, | Performed by: ORTHOPAEDIC SURGERY

## 2023-12-29 PROCEDURE — 87070 CULTURE OTHR SPECIMN AEROBIC: CPT | Performed by: STUDENT IN AN ORGANIZED HEALTH CARE EDUCATION/TRAINING PROGRAM

## 2023-12-29 PROCEDURE — 80202 ASSAY OF VANCOMYCIN: CPT | Performed by: INTERNAL MEDICINE

## 2023-12-29 PROCEDURE — 87075 CULTR BACTERIA EXCEPT BLOOD: CPT | Mod: 59 | Performed by: STUDENT IN AN ORGANIZED HEALTH CARE EDUCATION/TRAINING PROGRAM

## 2023-12-29 PROCEDURE — 80048 BASIC METABOLIC PNL TOTAL CA: CPT | Mod: 91

## 2023-12-29 PROCEDURE — 87077 CULTURE AEROBIC IDENTIFY: CPT | Performed by: ORTHOPAEDIC SURGERY

## 2023-12-29 PROCEDURE — 63600175 PHARM REV CODE 636 W HCPCS: Performed by: ORTHOPAEDIC SURGERY

## 2023-12-29 PROCEDURE — 0SBD0ZZ EXCISION OF LEFT KNEE JOINT, OPEN APPROACH: ICD-10-PCS | Performed by: ORTHOPAEDIC SURGERY

## 2023-12-29 PROCEDURE — 87040 BLOOD CULTURE FOR BACTERIA: CPT | Mod: 59

## 2023-12-29 PROCEDURE — 99222 1ST HOSP IP/OBS MODERATE 55: CPT | Mod: ,,, | Performed by: INTERNAL MEDICINE

## 2023-12-29 PROCEDURE — 25000003 PHARM REV CODE 250: Performed by: NURSE ANESTHETIST, CERTIFIED REGISTERED

## 2023-12-29 PROCEDURE — 25000003 PHARM REV CODE 250

## 2023-12-29 PROCEDURE — 37000009 HC ANESTHESIA EA ADD 15 MINS: Performed by: ORTHOPAEDIC SURGERY

## 2023-12-29 PROCEDURE — 87075 CULTR BACTERIA EXCEPT BLOOD: CPT | Performed by: ORTHOPAEDIC SURGERY

## 2023-12-29 PROCEDURE — 87205 SMEAR GRAM STAIN: CPT | Mod: 59 | Performed by: ORTHOPAEDIC SURGERY

## 2023-12-29 PROCEDURE — 0S9D3ZZ DRAINAGE OF LEFT KNEE JOINT, PERCUTANEOUS APPROACH: ICD-10-PCS | Performed by: ORTHOPAEDIC SURGERY

## 2023-12-29 PROCEDURE — 36000708 HC OR TIME LEV III 1ST 15 MIN: Performed by: ORTHOPAEDIC SURGERY

## 2023-12-29 RX ORDER — FENTANYL CITRATE 50 UG/ML
INJECTION, SOLUTION INTRAMUSCULAR; INTRAVENOUS
Status: DISCONTINUED | OUTPATIENT
Start: 2023-12-29 | End: 2023-12-29

## 2023-12-29 RX ORDER — ONDANSETRON 2 MG/ML
4 INJECTION INTRAMUSCULAR; INTRAVENOUS ONCE AS NEEDED
Status: CANCELLED | OUTPATIENT
Start: 2023-12-29 | End: 2035-05-27

## 2023-12-29 RX ORDER — SODIUM CHLORIDE 0.9 % (FLUSH) 0.9 %
10 SYRINGE (ML) INJECTION
Status: CANCELLED | OUTPATIENT
Start: 2023-12-29

## 2023-12-29 RX ORDER — DROPERIDOL 2.5 MG/ML
0.62 INJECTION, SOLUTION INTRAMUSCULAR; INTRAVENOUS ONCE
Status: COMPLETED | OUTPATIENT
Start: 2023-12-29 | End: 2023-12-29

## 2023-12-29 RX ORDER — LIDOCAINE HYDROCHLORIDE 10 MG/ML
INJECTION, SOLUTION EPIDURAL; INFILTRATION; INTRACAUDAL; PERINEURAL
Status: COMPLETED
Start: 2023-12-29 | End: 2023-12-29

## 2023-12-29 RX ORDER — LIDOCAINE HYDROCHLORIDE 10 MG/ML
INJECTION, SOLUTION INTRAVENOUS
Status: DISCONTINUED | OUTPATIENT
Start: 2023-12-29 | End: 2023-12-29

## 2023-12-29 RX ORDER — SCOLOPAMINE TRANSDERMAL SYSTEM 1 MG/1
1 PATCH, EXTENDED RELEASE TRANSDERMAL
Status: DISCONTINUED | OUTPATIENT
Start: 2023-12-29 | End: 2024-01-10 | Stop reason: HOSPADM

## 2023-12-29 RX ORDER — PROPOFOL 10 MG/ML
VIAL (ML) INTRAVENOUS
Status: DISCONTINUED | OUTPATIENT
Start: 2023-12-29 | End: 2023-12-29

## 2023-12-29 RX ORDER — LIDOCAINE HYDROCHLORIDE 10 MG/ML
5 INJECTION, SOLUTION EPIDURAL; INFILTRATION; INTRACAUDAL; PERINEURAL ONCE
Status: COMPLETED | OUTPATIENT
Start: 2023-12-29 | End: 2023-12-29

## 2023-12-29 RX ORDER — HYDROMORPHONE HYDROCHLORIDE 2 MG/ML
0.5 INJECTION, SOLUTION INTRAMUSCULAR; INTRAVENOUS; SUBCUTANEOUS EVERY 5 MIN PRN
Status: CANCELLED | OUTPATIENT
Start: 2023-12-29

## 2023-12-29 RX ORDER — ONDANSETRON 2 MG/ML
INJECTION INTRAMUSCULAR; INTRAVENOUS
Status: DISCONTINUED | OUTPATIENT
Start: 2023-12-29 | End: 2023-12-29

## 2023-12-29 RX ORDER — ROCURONIUM BROMIDE 10 MG/ML
INJECTION, SOLUTION INTRAVENOUS
Status: DISCONTINUED | OUTPATIENT
Start: 2023-12-29 | End: 2023-12-29

## 2023-12-29 RX ORDER — BUPIVACAINE HYDROCHLORIDE 5 MG/ML
INJECTION, SOLUTION EPIDURAL; INTRACAUDAL
Status: DISCONTINUED | OUTPATIENT
Start: 2023-12-29 | End: 2023-12-29 | Stop reason: HOSPADM

## 2023-12-29 RX ORDER — MORPHINE SULFATE 4 MG/ML
4 INJECTION, SOLUTION INTRAMUSCULAR; INTRAVENOUS ONCE
Status: COMPLETED | OUTPATIENT
Start: 2023-12-29 | End: 2023-12-29

## 2023-12-29 RX ORDER — SUCCINYLCHOLINE CHLORIDE 20 MG/ML
INJECTION INTRAMUSCULAR; INTRAVENOUS
Status: DISCONTINUED | OUTPATIENT
Start: 2023-12-29 | End: 2023-12-29

## 2023-12-29 RX ADMIN — ROCURONIUM BROMIDE 5 MG: 10 INJECTION, SOLUTION INTRAVENOUS at 05:12

## 2023-12-29 RX ADMIN — SODIUM CHLORIDE: 0.9 INJECTION, SOLUTION INTRAVENOUS at 05:12

## 2023-12-29 RX ADMIN — VANCOMYCIN HYDROCHLORIDE 1750 MG: 10 INJECTION, POWDER, LYOPHILIZED, FOR SOLUTION INTRAVENOUS at 08:12

## 2023-12-29 RX ADMIN — SODIUM CHLORIDE, POTASSIUM CHLORIDE, SODIUM LACTATE AND CALCIUM CHLORIDE 1000 ML: 600; 310; 30; 20 INJECTION, SOLUTION INTRAVENOUS at 08:12

## 2023-12-29 RX ADMIN — PROPOFOL 150 MG: 10 INJECTION, EMULSION INTRAVENOUS at 05:12

## 2023-12-29 RX ADMIN — SCOPALAMINE 1 PATCH: 1 PATCH, EXTENDED RELEASE TRANSDERMAL at 08:12

## 2023-12-29 RX ADMIN — LIDOCAINE HYDROCHLORIDE 50 MG: 10 INJECTION, SOLUTION EPIDURAL; INFILTRATION; INTRACAUDAL at 12:12

## 2023-12-29 RX ADMIN — PANTOPRAZOLE SODIUM 40 MG: 40 INJECTION, POWDER, FOR SOLUTION INTRAVENOUS at 08:12

## 2023-12-29 RX ADMIN — MUPIROCIN: 20 OINTMENT TOPICAL at 08:12

## 2023-12-29 RX ADMIN — INSULIN DETEMIR 25 UNITS: 100 INJECTION, SOLUTION SUBCUTANEOUS at 08:12

## 2023-12-29 RX ADMIN — FENTANYL CITRATE 50 MCG: 50 INJECTION, SOLUTION INTRAMUSCULAR; INTRAVENOUS at 05:12

## 2023-12-29 RX ADMIN — DROPERIDOL 0.62 MG: 2.5 INJECTION, SOLUTION INTRAMUSCULAR; INTRAVENOUS at 12:12

## 2023-12-29 RX ADMIN — VANCOMYCIN HYDROCHLORIDE 1750 MG: 1.5 INJECTION, POWDER, LYOPHILIZED, FOR SOLUTION INTRAVENOUS at 01:12

## 2023-12-29 RX ADMIN — ENOXAPARIN SODIUM 120 MG: 120 INJECTION SUBCUTANEOUS at 01:12

## 2023-12-29 RX ADMIN — SUCCINYLCHOLINE CHLORIDE 120 MG: 20 INJECTION, SOLUTION INTRAMUSCULAR; INTRAVENOUS at 05:12

## 2023-12-29 RX ADMIN — PIPERACILLIN AND TAZOBACTAM 4.5 G: 4; .5 INJECTION, POWDER, LYOPHILIZED, FOR SOLUTION INTRAVENOUS; PARENTERAL at 06:12

## 2023-12-29 RX ADMIN — DROPERIDOL 0.62 MG: 2.5 INJECTION, SOLUTION INTRAMUSCULAR; INTRAVENOUS at 05:12

## 2023-12-29 RX ADMIN — LIDOCAINE HYDROCHLORIDE 50 MG: 10 INJECTION, SOLUTION INTRAVENOUS at 05:12

## 2023-12-29 RX ADMIN — ONDANSETRON 4 MG: 2 INJECTION, SOLUTION INTRAMUSCULAR; INTRAVENOUS at 05:12

## 2023-12-29 RX ADMIN — INSULIN ASPART 2 UNITS: 100 INJECTION, SOLUTION INTRAVENOUS; SUBCUTANEOUS at 11:12

## 2023-12-29 RX ADMIN — LIDOCAINE HYDROCHLORIDE 50 MG: 10 INJECTION, SOLUTION EPIDURAL; INFILTRATION; INTRACAUDAL; PERINEURAL at 12:12

## 2023-12-29 RX ADMIN — METOCLOPRAMIDE 5 MG: 5 INJECTION, SOLUTION INTRAMUSCULAR; INTRAVENOUS at 06:12

## 2023-12-29 RX ADMIN — FENTANYL CITRATE 100 MCG: 50 INJECTION, SOLUTION INTRAMUSCULAR; INTRAVENOUS at 05:12

## 2023-12-29 RX ADMIN — METOCLOPRAMIDE 5 MG: 5 INJECTION, SOLUTION INTRAMUSCULAR; INTRAVENOUS at 11:12

## 2023-12-29 RX ADMIN — DROPERIDOL 0.62 MG: 2.5 INJECTION, SOLUTION INTRAMUSCULAR; INTRAVENOUS at 08:12

## 2023-12-29 RX ADMIN — MORPHINE SULFATE 4 MG: 4 INJECTION INTRAVENOUS at 08:12

## 2023-12-29 NOTE — ANESTHESIA PROCEDURE NOTES
Intubation    Date/Time: 12/29/2023 5:12 PM    Performed by: Chevy Lee CRNA  Authorized by: Chevy Lee CRNA    Intubation:     Induction:  Intravenous    Intubated:  Postinduction    Mask Ventilation:  Easy with oral airway    Attempts:  1    Attempted By:  CRNA    Method of Intubation:  Video laryngoscopy    Blade:  Alana 3    Laryngeal View Grade: Grade IIA - cords partially seen      Difficult Airway Encountered?: No      Complications:  None    Airway Device:  Oral endotracheal tube    Airway Device Size:  7.5    Style/Cuff Inflation:  Cuffed    Inflation Amount (mL):  6    Tube secured:  22    Secured at:  The lips    Placement Verified By:  Capnometry    Complicating Factors:  None    Findings Post-Intubation:  BS equal bilateral

## 2023-12-29 NOTE — CONSULTS
[facility] - Intensive Care  Infectious Disease  Consult Note    Patient Name: Kulwant Mueller Jr.  MRN: [id number]  Admission Date: 12/27/2023  Hospital Length of Stay: 1 days  Attending Physician: Russell Newby MD  Primary Care Provider: Shellie Primary Doctor     Isolation Status: Contact    Patient information was obtained from patient, past medical records, and ER records.      Inpatient consult to Infectious Diseases  Consult performed by: Marciano Millan MD  Consult ordered by: Blanca Ellis MD  Reason for consult: staph bacteremia        Assessment/Plan:     ID  MRSA bacteremia  38 y.o. 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      -blood cxs from 12/27 with staph aureus   -repeat blood cxs to document clearance  -obtain TTE  -continue vanco with goal VT of 15-20  -would stop zosyn since foot ulcer is less likely source of infection  -await tap of left knee with cell count and cx          Thank you for your consult. I will follow-up with patient. Please contact us if you have any additional questions.    Marciano Millan MD  Infectious Disease  [facility] - Intensive Care    Subjective:     Principal Problem: <principal problem not specified>    HPI: 38 y.o. 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 The patient presented to [facility] on 12/27/2023 with a primary complaint of Intractable N/V and diarrhea for over 6 days, generalized weakness for 6 days. He was in his usual state of health until around 1 week ago when he developed NBNB N/V and decreased oral intake. His family members had recently been ill as well, but have tested negative for COVID/flu. He reports he continued to have N/V whether or not he was able to have any oral  intake. He presented to an ER for evaluation yesterday, where it was noted he had a WBC 33K, with an unremarkable CT A/P with negative COVID/flu. He was discharged with GI follow-up, however at the appointment today, there was concern for elevated WBC, and patient was instructed to present to the ER for further evaluation. Does endorse some abdominal discomfort and 1 episode of non-mucoid non-bloody diarrhea this afternoon. Denies  fevers, chills, shortness of breath, chest pain, palpitations, dysuria. He was admitted for DKA and found to have MRSa bacteremia. He is currently on vanco and zosyn. Initially source was thought to be right foot heel ulcer but bedside US was concerning for left knee bursitis.     Past Medical History:   Diagnosis Date    Anticoagulant long-term use     COVID-19 virus infection     Diabetes mellitus     Diabetes mellitus, type 2     Hypertension     May-Thurner syndrome        Past Surgical History:   Procedure Laterality Date    APPENDECTOMY      ESOPHAGOGASTRODUODENOSCOPY N/A 1/31/2022    Procedure: EGD (ESOPHAGOGASTRODUODENOSCOPY);  Surgeon: Cindy Quiros MD;  Location: [facility] [place] [place];  Service: Endoscopy;  Laterality: N/A;    ESOPHAGOGASTRODUODENOSCOPY N/A 3/21/2022    Procedure: EGD (ESOPHAGOGASTRODUODENOSCOPY);  Surgeon: Tejas Mann MD;  Location: [facility];  Service: Endoscopy;  Laterality: N/A;    INCISION AND DRAINAGE FOOT Left 11/28/2021    Procedure: INCISION AND DRAINAGE, FOOT;  Surgeon: Bj Alicea DPM;  Location: [facility];  Service: Podiatry;  Laterality: Left;    stents in bilateral legs         Review of patient's allergies indicates:   Allergen Reactions    Heparin analogues Nausea And Vomiting       Medications:  Medications Prior to Admission   Medication Sig    blood-glucose meter,continuous (DEXCOM G7 ) Misc 1 Device by Misc.(Non-Drug; Combo Route) route once daily.    blood-glucose sensor (DEXCOM G7 SENSOR) Justyna 1 Device by Misc.(Non-Drug; Combo  "Route) route every 10 days.    empagliflozin (JARDIANCE) 25 mg tablet Take 1 tablet (25 mg total) by mouth once daily.    furosemide (LASIX) 20 MG tablet Take 1 tablet (20 mg total) by mouth once daily.    glucagon (GVOKE HYPOPEN 2-PACK) 1 mg/0.2 mL AtIn Inject 1 mg into the skin as needed (SEVERE HYPOGLYCEMIA).    insulin aspart U-100 (NOVOLOG U-100 INSULIN ASPART) 100 unit/mL injection Inject 14 Units into the skin 3 (three) times daily before meals.    metoclopramide HCl (REGLAN) 10 MG tablet Take 0.5 tablets (5 mg total) by mouth 3 (three) times daily before meals.    pantoprazole (PROTONIX) 40 MG tablet Take 1 tablet (40 mg total) by mouth once daily.    pen needle, diabetic ([facility] ULTRA-FINE DIYA PEN NEEDLE) 32 gauge x 5/32" Ndle Use with Tresiba daily and Humalog 3-4 times daily as directed.    prednisoLONE acetate (PRED FORTE) 1 % DrpS Instill one drop to the left eye every 2 hours while awake for 2 days then instill one drop 4 time daily for 5 days    prochlorperazine (COMPAZINE) 10 MG tablet Take 1 tablet (10 mg total) by mouth 3 (three) times daily as needed (nausea or vomiting).    promethazine (PHENERGAN) 25 MG suppository Place 1 suppository (25 mg total) rectally every 6 (six) hours as needed for Nausea.    rivaroxaban (XARELTO) 10 mg Tab Take 1 tablet (10 mg total) by mouth daily with dinner or evening meal.    TRESIBA FLEXTOUCH U-200 200 unit/mL (3 mL) insulin pen Inject 56 Units into the skin once daily.     Antibiotics (From admission, onward)      Start     Stop Route Frequency Ordered    12/28/23 2300  mupirocin 2 % ointment         01/02/24 2059 Nasl 2 times daily 12/28/23 2154    12/28/23 1500  piperacillin-tazobactam (ZOSYN) 4.5 g in dextrose 5 % in water (D5W) 100 mL IVPB (MB+)         -- IV Every 8 hours (non-standard times) 12/28/23 0940    12/28/23 0745  vancomycin - pharmacy to dose  (vancomycin IVPB (PEDS and ADULTS))        See Hyperspace for full Linked Orders Report.    -- IV pharmacy " to manage frequency 12/28/23 0646          Antifungals (From admission, onward)      None          Antivirals (From admission, onward)      None             Immunization History   Administered Date(s) Administered    COVID-19, MRNA, LN-S, PF (Pfizer) (Purple Cap) 12/28/2020, 01/19/2021, 02/11/2022    Influenza - Quadrivalent - PF *Preferred* (6 months and older) 09/29/2020, 09/30/2021       Family History       Problem Relation (Age of Onset)    Cancer Mother, Paternal Uncle, Paternal Grandfather, Maternal Grandfather    Irritable bowel syndrome Paternal Grandfather          Social History     Socioeconomic History    Marital status:    Tobacco Use    Smoking status: Former     Types: Cigarettes    Smokeless tobacco: Former    Tobacco comments:     occasionally    Substance and Sexual Activity    Alcohol use: Yes     Comment: occ    Drug use: No     Social Determinants of Health     Financial Resource Strain: Low Risk  (12/28/2023)    Overall Financial Resource Strain (CARDIA)     Difficulty of Paying Living Expenses: Not hard at all   Food Insecurity: No Food Insecurity (12/28/2023)    Hunger Vital Sign     Worried About Running Out of Food in the Last Year: Never true     Ran Out of Food in the Last Year: Never true   Transportation Needs: No Transportation Needs (12/28/2023)    PRAPARE - Transportation     Lack of Transportation (Medical): No     Lack of Transportation (Non-Medical): No   Physical Activity: Sufficiently Active (12/28/2023)    Exercise Vital Sign     Days of Exercise per Week: 3 days     Minutes of Exercise per Session: 60 min   Stress: Stress Concern Present (12/28/2023)    [employer] - Occupational Stress Questionnaire     Feeling of Stress : Very much   Social Connections: Moderately Integrated (12/28/2023)    Social Connection and Isolation Panel [NHANES]     Frequency of Communication with Friends and Family: More than three times a week     Frequency of  Social Gatherings with Friends and Family: More than three times a week     Attends Sabianist Services: More than 4 times per year     Active Member of Clubs or Organizations: No     Attends Club or Organization Meetings: Never     Marital Status:    Housing Stability: Low Risk  (12/28/2023)    Housing Stability Vital Sign     Unable to Pay for Housing in the Last Year: No     Number of Places Lived in the Last Year: 1     Unstable Housing in the Last Year: No     Review of Systems   Constitutional:  Positive for fatigue and fever.   HENT:  Negative for sore throat.    Eyes:  Negative for visual disturbance.   Respiratory:  Negative for shortness of breath.    Cardiovascular:  Negative for chest pain.   Gastrointestinal:  Positive for diarrhea, nausea and vomiting. Negative for abdominal pain.   Genitourinary:  Negative for dysuria.   Musculoskeletal:  Negative for back pain.   Skin:  Positive for wound. Negative for rash.   Neurological:  Positive for weakness. Negative for headaches.   Hematological:  Negative for adenopathy.   Psychiatric/Behavioral:  The patient is not nervous/anxious.      Objective:     Vital Signs (Most Recent):  Temp: 98 °F (36.7 °C) (12/29/23 1130)  Pulse: (!) 114 (12/29/23 1130)  Resp: (!) 26 (12/29/23 1130)  BP: (!) 167/91 (12/29/23 1130)  SpO2: 96 % (12/29/23 1130) Vital Signs (24h Range):  Temp:  [98 °F (36.7 °C)-100.5 °F (38.1 °C)] 98 °F (36.7 °C)  Pulse:  [] 114  Resp:  [13-31] 26  SpO2:  [91 %-97 %] 96 %  BP: (111-180)/() 167/91     Weight: 114.5 kg (252 lb 6.8 oz)  Body mass index is 33.3 kg/m².    Estimated Creatinine Clearance: 86.8 mL/min (A) (based on SCr of 1.5 mg/dL (H)).     Physical Exam  Vitals and nursing note reviewed.   Constitutional:       Appearance: He is well-developed.      Comments: Fatigued, uncomfortable.   HENT:      Head: Normocephalic and atraumatic.   Eyes:      Pupils: Pupils are equal, round, and reactive to light.   Cardiovascular:       Rate and Rhythm: Regular rhythm. Tachycardia present.   Pulmonary:      Effort: Pulmonary effort is normal.      Breath sounds: Normal breath sounds.   Abdominal:      General: Bowel sounds are normal.      Palpations: Abdomen is soft.   Musculoskeletal:         General: Normal range of motion.      Cervical back: Normal range of motion and neck supple.   Neurological:      Mental Status: He is alert and oriented to person, place, and time.   Psychiatric:         Behavior: Behavior normal.         Thought Content: Thought content normal.         Judgment: Judgment normal.          Significant Labs: All pertinent labs within the past 24 hours have been reviewed.    Significant Imaging: I have reviewed all pertinent imaging results/findings within the past 24 hours.

## 2023-12-29 NOTE — ASSESSMENT & PLAN NOTE
38 y.o. 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      -blood cxs from 12/27 with staph aureus   -repeat blood cxs to document clearance  -obtain TTE  -continue vanco with goal VT of 15-20  -would stop zosyn since foot ulcer is less likely source of infection  -await tap of left knee with cell count and cx

## 2023-12-29 NOTE — SUBJECTIVE & OBJECTIVE
Past Medical History:   Diagnosis Date    Anticoagulant long-term use     COVID-19 virus infection     Diabetes mellitus     Diabetes mellitus, type 2     Hypertension     May-Thurner syndrome        Past Surgical History:   Procedure Laterality Date    APPENDECTOMY      ESOPHAGOGASTRODUODENOSCOPY N/A 1/31/2022    Procedure: EGD (ESOPHAGOGASTRODUODENOSCOPY);  Surgeon: Cindy Quiros MD;  Location: [facility] ENDO;  Service: Endoscopy;  Laterality: N/A;    ESOPHAGOGASTRODUODENOSCOPY N/A 3/21/2022    Procedure: EGD (ESOPHAGOGASTRODUODENOSCOPY);  Surgeon: Tejas Mann MD;  Location: [facility] ENDO;  Service: Endoscopy;  Laterality: N/A;    INCISION AND DRAINAGE FOOT Left 11/28/2021    Procedure: INCISION AND DRAINAGE, FOOT;  Surgeon: Bj Alicea DPM;  Location: [facility] OR;  Service: Podiatry;  Laterality: Left;    stents in bilateral legs         Review of patient's allergies indicates:   Allergen Reactions    Heparin analogues Nausea And Vomiting       Medications:  Medications Prior to Admission   Medication Sig    blood-glucose meter,continuous (DEXCOM G7 ) Misc 1 Device by Misc.(Non-Drug; Combo Route) route once daily.    blood-glucose sensor (DEXCOM G7 SENSOR) Justyna 1 Device by Misc.(Non-Drug; Combo Route) route every 10 days.    empagliflozin (JARDIANCE) 25 mg tablet Take 1 tablet (25 mg total) by mouth once daily.    furosemide (LASIX) 20 MG tablet Take 1 tablet (20 mg total) by mouth once daily.    glucagon (GVOKE HYPOPEN 2-PACK) 1 mg/0.2 mL AtIn Inject 1 mg into the skin as needed (SEVERE HYPOGLYCEMIA).    insulin aspart U-100 (NOVOLOG U-100 INSULIN ASPART) 100 unit/mL injection Inject 14 Units into the skin 3 (three) times daily before meals.    metoclopramide HCl (REGLAN) 10 MG tablet Take 0.5 tablets (5 mg total) by mouth 3 (three) times daily before meals.    pantoprazole (PROTONIX) 40 MG tablet Take 1 tablet (40 mg total) by mouth once daily.    pen needle, diabetic (BD ULTRA-FINE DIYA PEN NEEDLE) 32  "gauge x 5/32" Ndle Use with Tresiba daily and Humalog 3-4 times daily as directed.    prednisoLONE acetate (PRED FORTE) 1 % DrpS Instill one drop to the left eye every 2 hours while awake for 2 days then instill one drop 4 time daily for 5 days    prochlorperazine (COMPAZINE) 10 MG tablet Take 1 tablet (10 mg total) by mouth 3 (three) times daily as needed (nausea or vomiting).    promethazine (PHENERGAN) 25 MG suppository Place 1 suppository (25 mg total) rectally every 6 (six) hours as needed for Nausea.    rivaroxaban (XARELTO) 10 mg Tab Take 1 tablet (10 mg total) by mouth daily with dinner or evening meal.    TRESIBA FLEXTOUCH U-200 200 unit/mL (3 mL) insulin pen Inject 56 Units into the skin once daily.     Antibiotics (From admission, onward)      Start     Stop Route Frequency Ordered    12/28/23 2300  mupirocin 2 % ointment         01/02/24 2059 Nasl 2 times daily 12/28/23 2154    12/28/23 1500  piperacillin-tazobactam (ZOSYN) 4.5 g in dextrose 5 % in water (D5W) 100 mL IVPB (MB+)         -- IV Every 8 hours (non-standard times) 12/28/23 0940    12/28/23 0745  vancomycin - pharmacy to dose  (vancomycin IVPB (PEDS and ADULTS))        See Hyperspace for full Linked Orders Report.    -- IV pharmacy to manage frequency 12/28/23 0646          Antifungals (From admission, onward)      None          Antivirals (From admission, onward)      None             Immunization History   Administered Date(s) Administered    COVID-19, MRNA, LN-S, PF (Pfizer) (Purple Cap) 12/28/2020, 01/19/2021, 02/11/2022    Influenza - Quadrivalent - PF *Preferred* (6 months and older) 09/29/2020, 09/30/2021       Family History       Problem Relation (Age of Onset)    Cancer Mother, Paternal Uncle, Paternal Grandfather, Maternal Grandfather    Irritable bowel syndrome Paternal Grandfather          Social History     Socioeconomic History    Marital status:    Tobacco Use    Smoking status: Former     Types: Cigarettes    Smokeless " tobacco: Former    Tobacco comments:     occasionally    Substance and Sexual Activity    Alcohol use: Yes     Comment: occ    Drug use: No     Social Determinants of Health     Financial Resource Strain: Low Risk  (12/28/2023)    Overall Financial Resource Strain (CARDIA)     Difficulty of Paying Living Expenses: Not hard at all   Food Insecurity: No Food Insecurity (12/28/2023)    Hunger Vital Sign     Worried About Running Out of Food in the Last Year: Never true     Ran Out of Food in the Last Year: Never true   Transportation Needs: No Transportation Needs (12/28/2023)    PRAPARE - Transportation     Lack of Transportation (Medical): No     Lack of Transportation (Non-Medical): No   Physical Activity: Sufficiently Active (12/28/2023)    Exercise Vital Sign     Days of Exercise per Week: 3 days     Minutes of Exercise per Session: 60 min   Stress: Stress Concern Present (12/28/2023)    [employer] [place] [place] [employer] - Occupational Stress Questionnaire     Feeling of Stress : Very much   Social Connections: Moderately Integrated (12/28/2023)    Social Connection and Isolation Panel [NHANES]     Frequency of Communication with Friends and Family: More than three times a week     Frequency of Social Gatherings with Friends and Family: More than three times a week     Attends Sikh Services: More than 4 times per year     Active Member of Clubs or Organizations: No     Attends Club or Organization Meetings: Never     Marital Status:    Housing Stability: Low Risk  (12/28/2023)    Housing Stability Vital Sign     Unable to Pay for Housing in the Last Year: No     Number of Places Lived in the Last Year: 1     Unstable Housing in the Last Year: No     Review of Systems   Constitutional:  Positive for fatigue and fever.   HENT:  Negative for sore throat.    Eyes:  Negative for visual disturbance.   Respiratory:  Negative for shortness of breath.    Cardiovascular:  Negative for chest pain.    Gastrointestinal:  Positive for diarrhea, nausea and vomiting. Negative for abdominal pain.   Genitourinary:  Negative for dysuria.   Musculoskeletal:  Negative for back pain.   Skin:  Positive for wound. Negative for rash.   Neurological:  Positive for weakness. Negative for headaches.   Hematological:  Negative for adenopathy.   Psychiatric/Behavioral:  The patient is not nervous/anxious.      Objective:     Vital Signs (Most Recent):  Temp: 98 °F (36.7 °C) (12/29/23 1130)  Pulse: (!) 114 (12/29/23 1130)  Resp: (!) 26 (12/29/23 1130)  BP: (!) 167/91 (12/29/23 1130)  SpO2: 96 % (12/29/23 1130) Vital Signs (24h Range):  Temp:  [98 °F (36.7 °C)-100.5 °F (38.1 °C)] 98 °F (36.7 °C)  Pulse:  [] 114  Resp:  [13-31] 26  SpO2:  [91 %-97 %] 96 %  BP: (111-180)/() 167/91     Weight: 114.5 kg (252 lb 6.8 oz)  Body mass index is 33.3 kg/m².    Estimated Creatinine Clearance: 86.8 mL/min (A) (based on SCr of 1.5 mg/dL (H)).     Physical Exam  Vitals and nursing note reviewed.   Constitutional:       Appearance: He is well-developed.      Comments: Fatigued, uncomfortable.   HENT:      Head: Normocephalic and atraumatic.   Eyes:      Pupils: Pupils are equal, round, and reactive to light.   Cardiovascular:      Rate and Rhythm: Regular rhythm. Tachycardia present.   Pulmonary:      Effort: Pulmonary effort is normal.      Breath sounds: Normal breath sounds.   Abdominal:      General: Bowel sounds are normal.      Palpations: Abdomen is soft.   Musculoskeletal:         General: Normal range of motion.      Cervical back: Normal range of motion and neck supple.   Neurological:      Mental Status: He is alert and oriented to person, place, and time.   Psychiatric:         Behavior: Behavior normal.         Thought Content: Thought content normal.         Judgment: Judgment normal.          Significant Labs: All pertinent labs within the past 24 hours have been reviewed.    Significant Imaging: I have reviewed all  pertinent imaging results/findings within the past 24 hours.

## 2023-12-29 NOTE — PLAN OF CARE
Problem: Adult Inpatient Plan of Care  Goal: Plan of Care Review  Outcome: Ongoing, Progressing  Goal: Patient-Specific Goal (Individualized)  Outcome: Ongoing, Progressing  Goal: Absence of Hospital-Acquired Illness or Injury  Outcome: Ongoing, Progressing  Goal: Optimal Comfort and Wellbeing  Outcome: Ongoing, Progressing  Goal: Readiness for Transition of Care  Outcome: Ongoing, Progressing     Problem: Diabetes Comorbidity  Goal: Blood Glucose Level Within Targeted Range  Outcome: Ongoing, Progressing     Problem: Skin Injury Risk Increased  Goal: Skin Health and Integrity  Outcome: Ongoing, Progressing     Problem: Fall Injury Risk  Goal: Absence of Fall and Fall-Related Injury  Outcome: Ongoing, Progressing     Problem: Impaired Wound Healing  Goal: Optimal Wound Healing  Outcome: Ongoing, Progressing     Problem: Infection  Goal: Absence of Infection Signs and Symptoms  Outcome: Ongoing, Progressing

## 2023-12-29 NOTE — ANESTHESIA PREPROCEDURE EVALUATION
12/29/2023  Kulwant Mueller Jr. is a 38 y.o., male for debridement of L Patella bursa  Past Medical History:   Diagnosis Date    Anticoagulant long-term use     COVID-19 virus infection     Diabetes mellitus     Diabetes mellitus, type 2     Hypertension     May-Thurner syndrome      Past Surgical History:   Procedure Laterality Date    APPENDECTOMY      ESOPHAGOGASTRODUODENOSCOPY N/A 1/31/2022    Procedure: EGD (ESOPHAGOGASTRODUODENOSCOPY);  Surgeon: Cindy Quiros MD;  Location: [facility];  Service: Endoscopy;  Laterality: N/A;    ESOPHAGOGASTRODUODENOSCOPY N/A 3/21/2022    Procedure: EGD (ESOPHAGOGASTRODUODENOSCOPY);  Surgeon: Tejas Mann MD;  Location: [facility];  Service: Endoscopy;  Laterality: N/A;    INCISION AND DRAINAGE FOOT Left 11/28/2021    Procedure: INCISION AND DRAINAGE, FOOT;  Surgeon: Bj Alicea DPM;  Location: [facility] OR;  Service: Podiatry;  Laterality: Left;    stents in bilateral legs       Pre-op Assessment       I have reviewed the Medications.     Review of Systems  Anesthesia Hx:             Denies Family Hx of Anesthesia complications.     Cardiovascular:     Hypertension                                        Hepatic/GI:     GERD             Endocrine:  Diabetes, type 2               Physical Exam  General: Well nourished    Airway:  Mallampati: II   TM Distance: Normal  Neck ROM: Normal ROM    Dental:  Intact    Chest/Lungs:  Clear to auscultation    Heart:  Rate: Normal  Rhythm: Regular Rhythm        Anesthesia Plan  Type of Anesthesia, risks & benefits discussed:    Anesthesia Type: Gen ETT  Intra-op Monitoring Plan: Standard ASA Monitors  Post Op Pain Control Plan: multimodal analgesia  Informed Consent: Informed consent signed with the Patient and all parties understand the risks and agree with anesthesia plan.  All questions answered.   ASA Score: 3    Ready For  Surgery From Anesthesia Perspective.     .

## 2023-12-29 NOTE — PROCEDURES
"Kulwant Mueller Jr. is a 38 y.o. male patient.    Temp: 98 °F (36.7 °C) (23 1200)  Pulse: (!) 114 (23 1200)  Resp: (!) 23 (23 1200)  BP: (!) 164/88 (23 1200)  SpO2: 97 % (23 1200)  Weight: 114.5 kg (252 lb 6.8 oz) (23 032)  Height: 6' 1" (185.4 cm) (23)       Arthrocentesis    Date/Time: 2023 1:09 PM  Location procedure was performed: [facility] ICU 5TH FLOOR    Performed by: Payam Alvarado MD  Authorized by: Payam Alvarado MD  Assisting provider: Ernie Styles MD  Pre-op diagnosis: mrsa bacteremia; left anterior thigh pain; concern for septic bursitis  Post-op diagnosis: same  Consent Done: Yes  Consent: Verbal consent obtained. Written consent obtained.  Risks and benefits: risks, benefits and alternatives were discussed  Consent given by: spouse  Patient understanding: patient states understanding of the procedure being performed  Patient consent: the patient's understanding of the procedure matches consent given  Procedure consent: procedure consent matches procedure scheduled  Relevant documents: relevant documents present and verified  Test results: test results available and properly labeled  Site marked: the operative site was marked  Patient identity confirmed: , MRN, name and verbally with patient  Time out: Immediately prior to procedure a "time out" was called to verify the correct patient, procedure, equipment, support staff and site/side marked as required.  Indications: pain, possible septic joint and diagnostic evaluation   Body area: knee  Joint: left knee  Local anesthesia used: yes  Anesthesia: local infiltration    Anesthesia:  Local anesthesia used: yes  Local Anesthetic: lidocaine 1% with epinephrine  Anesthetic total: 5 mL    Patient sedated: no  Description of findings: 8 ml of cloudy yellow/green appearing fluid aspirated   Needle size: 18 G  Approach: anterior  Aspirate amount: 8 mL  Aspirate: cloudy, " purulent and yellow  Technical procedures used: needle aspiration  Significant surgical tasks conducted by the assistant(s): supervised, held ultrasound probe sterile fashion  Complications: No  Estimated blood loss (mL): 0  Specimens: Yes  Implants: No        Left suprapatellar bursa fluid pocket      Left suprapatellar bursa fluid pocket    Appearance of fluid aspirated    Payam Simental MD  [employer] [facility] Medicine Fellow    12/29/2023    Supervised by me in its entirety.  Cristiano Clark MD  [phone]

## 2023-12-29 NOTE — PROGRESS NOTES
[facility] Medicine Progress Note    Primary Team: [facility] Hospitalist Team A  Attending Physician: Russell Newby MD  Resident: Roly  Intern: Rhonda    Subjective/Interval History      Febrile 100.5 yesterday evening, now afebrile. Tachycardic 110s. Patient still having nausea and vomiting. Actively vomiting/wretching on interview after having small sip of water.      Objective     Physical Examination:  Temp:  [98.2 °F (36.8 °C)-100.5 °F (38.1 °C)] 98.2 °F (36.8 °C)  Pulse:  [] 115  Resp:  [13-31] 27  SpO2:  [91 %-97 %] 96 %  BP: (111-192)/() 152/87  Physical Exam  Constitutional:       Appearance: He is ill-appearing and diaphoretic.   HENT:      Head: Normocephalic and atraumatic.   Cardiovascular:      Rate and Rhythm: Tachycardia present. Rhythm irregular.      Heart sounds: No murmur heard.  Pulmonary:      Effort: Pulmonary effort is normal. No respiratory distress.      Breath sounds: Normal breath sounds.   Abdominal:      General: There is no distension.      Palpations: Abdomen is soft.   Musculoskeletal:      Right lower leg: No edema.      Left lower leg: No edema.   Skin:     Comments: R heel ulcer (photos in chart); no drainage   Neurological:      Mental Status: He is alert and oriented to person, place, and time.       Intake/Output:    Intake/Output Summary (Last 24 hours) at 12/29/2023 0727  Last data filed at 12/29/2023 0700  Gross per 24 hour   Intake 1909.95 ml   Output 2350 ml   Net -440.05 ml     Net IO Since Admission: 1,575.29 mL [12/29/23 0727]    Laboratory:  Recent Labs   Lab 12/27/23  2158 12/28/23  0355 12/28/23  0812 12/28/23  1305 12/28/23  1611 12/28/23  2026 12/28/23  2357 12/29/23  0400   WBC 44.27* 47.31*  --  43.64*  --   --   --  38.53*   HGB 14.1 13.7*  --  13.0*  --   --   --  12.6*    405  --  395  --   --   --  417   MCV 84 86  --  82  --   --   --  84   * 138   < > 138 139 139 140 138   K 4.5 3.7   < > 3.6 3.5 3.7 3.6 3.6   CL 95 101   < > 102  103 103 104 103   CO2 9* 15*   < > 21* 22* 21* 22* 21*   BUN 33* 30*   < > 25* 22* 21* 22* 19   CREATININE 1.9* 1.7*   < > 1.7* 1.6* 1.6* 1.6* 1.5*   * 177*   < > 178* 201* 180* 192* 235*   CALCIUM 9.7 9.4   < > 9.5 9.3 9.5 9.2 9.5   PROT 9.2*  --   --   --   --   --   --   --    ALBUMIN 2.7*  --   --   --   --   --   --   --    PHOS  --  2.7  --   --   --   --   --   --    MG  --  2.9*  --   --   --   --   --  2.8*   AST 22  --   --   --   --   --   --   --    ALT 31  --   --   --   --   --   --   --    ALKPHOS 131  --   --   --   --   --   --   --    TROPONINI 0.009  --   --   --   --   --   --   --     < > = values in this interval not displayed.       All laboratory data reviewed    Microbiology: reviewed   Respiratory PCR negative  12/27 bcx staph aureus (BCID MRSA)  12/27 urine cx pending    Radiology:   US Upper Extremity Veins Bilateral   Final Result      Thrombus within 1 of the right paired brachial veins.         Electronically signed by: Gui Brush MD   Date:    12/28/2023   Time:    08:41      US Lower Extremity Veins Bilateral   Final Result      No evidence of deep venous thrombosis in either lower extremity.         Electronically signed by: Adrienne Elizondo MD   Date:    12/28/2023   Time:    08:39      X-Ray Foot Complete Right   Final Result      No acute osseous abnormality of the foot.         Electronically signed by: Ben Roman   Date:    12/28/2023   Time:    00:05      X-Ray Chest AP Portable   Final Result      No acute abnormality.         Electronically signed by: Ben Roman   Date:    12/27/2023   Time:    22:14           Current Medications:     Infusions:   dextrose 5% lactated ringers Stopped (12/28/23 7387)        Scheduled:   enoxparin  1 mg/kg Subcutaneous Q12H (treatment, non-standard time)    insulin detemir U-100  25 Units Subcutaneous BID    metoclopramide  5 mg Intravenous TID AC    mupirocin   Nasal BID    pantoprazole  40 mg Intravenous Daily     piperacillin-tazobactam (Zosyn) IV (PEDS and ADULTS) (extended infusion is not appropriate)  4.5 g Intravenous Q8H        PRN:  dextrose 10 % in water (D10W), dextrose 10 % in water (D10W), dextrose 10%, dextrose 10%, dextrose 5% lactated ringers, droPERidol, glucagon (human recombinant), glucose, glucose, insulin aspart U-100, sodium chloride 0.9%, sodium chloride 0.9%, Pharmacy to dose Vancomycin consult **AND** vancomycin - pharmacy to dose    Antibiotics and Day Number of Therapy:  Vanc 12/27-present  Zosyn 12/27-12/29    Assessment/Plan:     Kulwant Mueller Jr. is a 38 y.o. 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 The patient presented to [facility] on 12/27/2023 with a primary complaint of Intractable N/V and diarrhea for over 6 days, generalized weakness for 6 days. Admitted for DKA, which has now resolved s/p insulin gtt with bridge to long-acting insulin. Found to have MRSA bacteremia, suspect source R heel wound.      DKA - resolved  DM2 with long-term use insulin  - anion gap 31, beta-hydroxybutyric acid elevated, with bicarb 9 and elevated blood glucose; UA with ketones and glucose  - suspect due to intractable N/V, not taking home insulin, and infection  - DKA resolved s/p insulin gtt with bridge to long-acting insulin  - A1c 7.4 on 12/28, prior A1c as high as 14  - per most recent visit with diabetes NP, home regimen includes tresiba 55u daily, sliding scale humalog, jardiance 25mg daily  Plan:  - continue levemir 25u BID + SSI, modify as patient able to tolerate diet  - monitor K, replete as needed  - space out BMPs to q8h. Continue to monitor for recurrence of DKA given patient has not been able to tolerate PO intake    Sepsis  MRSA bacteremia  Septic bursitis of L knee  - WBC at OSH ER 33K, elevated to 47K with neutrophil predominance and patient febrile on admission  - respiratory PCR negative, CXR  with no acute cardiopulmonary process  - hematology consulted - multiple neutrophils noted per high power field on peripheral smear; no blasts/immature cells noted. Suspect leukocytosis 2/2 DKA and chronic foot wound  Plan:  - 12/27 bcx staph aureus (BCID MRSA)  - 12/27 urine cx pending  - 12/29 aspiration of L knee, cx pending  - ID consulted - continue vanc; discontinued zosyn  - TTE pending  - repeat bcx pending    Intractable N/V  Gastroparesis  - was being worked up for gastroparesis outpatient, however was unable to complete testing as he vomiting during testing. Sx lasted for several weeks and spontaneously resolved, and he did not pursue further workup outpatient. He has been taking reglan outpatient, but is unsure if it does anything to help him.   Plan:  - continue home metoclopramide  - zofran not helping with N/V. PRN droperidol and scopolamine ordered. QTc 448, caution in QTc prolonging agents  - advance diet as tolerated  - continue IV PPI    FREDERICK  - on admit, Cr 1.9; baseline 1  - suspect prerenal due to decreased PO intake  - Cr improving with IVF    Chronic R heel wound  - 12/27 XR right foot with no osseous abnormality; has been following with wound care and has been off PO antibiotics for at least 1 month with no drainage or swelling from wound site  - MRI R hindfoot ordered  - wound care consulted    May-Thurner syndrome  Peripheral vascular disease  - has Hx May-Thurner syndrome and was following with Dr. Duff, who had kept patient on xarelto 10 mg daily  - patient reports has not been able to take xarelto for past 1-2 weeks 2' inability to tolerate PO  - DVT US BLE negative. DVT BUE with thrombus within 1 of the right paired brachial veins  - has noted Hx allergy to heparin analogues (reaction N/V) from when he received heparin drip prior to vascular intervention years ago; per chart review and discussion with pharmacy patient had received enoxaparin in 2018 and 2021 and appeared to tolerate;  needs anticoagulation but unable to utilize PO regimen  - continue full dose lovenox     HTN  - patient reports not currently on any anti-hypertensive regimen  - SBP 130s throughout ER course  - monitor; initiate regimen as indicated       Diet: clear liquid, advance as tolerated  VTE PPx: full dose lovenox  Code Status: full    Dispo: pending infectious workup and when patient able to tolerate PO intake      Blanca Ellis MD  [facility] Internal Medicine PGY-1      [facility] Medicine Hospitalist Pager numbers:   [facility] Hospitalist Medicine Team A (Russel/Emmanuel): [phone]  [facility] Hospitalist Medicine Team B (Odin/Kameron):  [phone]

## 2023-12-29 NOTE — PROGRESS NOTES
Pharmacokinetic Assessment Follow Up: IV Vancomycin    Vancomycin serum concentration assessment(s):    The random level was drawn correctly and can be used to guide therapy at this time. The measurement is below the desired definitive target range of 15 to 20 mcg/mL.    Vancomycin Regimen Plan:    Change regimen to Vancomycin 1750 mg IV every 12 hours with next serum trough concentration measured at 1600 prior to 3rd dose on 12/30    Drug levels (last 3 results):  Recent Labs   Lab Result Units 12/28/23  2357 12/29/23  1357   Vancomycin, Random ug/mL 4.1 9.8       Pharmacy will continue to follow and monitor vancomycin.    Please contact pharmacy at extension [phone] for questions regarding this assessment.    Thank you for the consult,   Fany Chen       Patient brief summary:  Kulwant Mueller Jr. is a 38 y.o. male initiated on antimicrobial therapy with IV Vancomycin for treatment of bacteremia    The patient's current regimen is pulse dose    Drug Allergies:   Review of patient's allergies indicates:   Allergen Reactions    Heparin analogues Nausea And Vomiting       Actual Body Weight:   114.5 kg    Renal Function:   Estimated Creatinine Clearance: 86.8 mL/min (A) (based on SCr of 1.5 mg/dL (H)).,     Dialysis Method (if applicable):  N/A    CBC (last 72 hours):  Recent Labs   Lab Result Units 12/27/23 2158 12/28/23  0355 12/28/23  1305 12/29/23  0400   WBC K/uL 44.27* 47.31* 43.64* 38.53*   Hemoglobin g/dL 14.1 13.7* 13.0* 12.6*   Hemoglobin A1C %  --  7.4*  --   --    Hematocrit % 42.1 41.6 37.4* 37.9*   Platelets K/uL 376 405 395 417   Gran % % 90.8* 81.0* 91.2* 88.1*   Lymph % % 2.4* 5.0* 1.7* 3.6*   Mono % % 5.4 4.0 5.5 6.1   Eosinophil % % 0.0 0.0 0.0 0.0   Basophil % % 0.1 0.0 0.4 0.4   Differential Method  Automated Manual Automated Automated       Metabolic Panel (last 72 hours):  Recent Labs   Lab Result Units 12/27/23 2158 12/27/23  2318 12/28/23  0355 12/28/23  0812 12/28/23  1305  12/28/23  1611 12/28/23 2026 12/28/23  2357 12/29/23  0400   Sodium mmol/L 135*  --  138 137 138 139 139 140 138   Potassium mmol/L 4.5  --  3.7 3.6 3.6 3.5 3.7 3.6 3.6   Chloride mmol/L 95  --  101 102 102 103 103 104 103   CO2 mmol/L 9*  --  15* 17* 21* 22* 21* 22* 21*   Glucose mg/dL 343*  --  177* 204* 178* 201* 180* 192* 235*   Glucose, UA   --  4+*  --   --   --   --   --   --   --    BUN mg/dL 33*  --  30* 27* 25* 22* 21* 22* 19   Creatinine mg/dL 1.9*  --  1.7* 1.7* 1.7* 1.6* 1.6* 1.6* 1.5*   Creatinine, Urine mg/dL  --  61.1  --   --   --   --   --   --   --    Albumin g/dL 2.7*  --   --   --   --   --   --   --   --    Total Bilirubin mg/dL 0.6  --   --   --   --   --   --   --   --    Alkaline Phosphatase U/L 131  --   --   --   --   --   --   --   --    AST U/L 22  --   --   --   --   --   --   --   --    ALT U/L 31  --   --   --   --   --   --   --   --    Magnesium mg/dL  --   --  2.9*  --   --   --   --   --  2.8*   Phosphorus mg/dL  --   --  2.7  --   --   --   --   --   --        Vancomycin Administrations:  vancomycin given in the last 96 hours                     vancomycin (VANCOCIN) 1,750 mg in dextrose 5 % (D5W) 500 mL IVPB (mg) 1,750 mg New Bag 12/29/23 0139    vancomycin 2 g in dextrose 5 % 500 mL IVPB (mg) 2,000 mg New Bag 12/28/23 0004                    Microbiologic Results:  Microbiology Results (last 7 days)       Procedure Component Value Units Date/Time    Blood culture [[id number]] Collected: 12/29/23 1357    Order Status: Sent Specimen: Blood Updated: 12/29/23 1358    Urine culture [[id number]]  (Abnormal) Collected: 12/27/23 2318    Order Status: Completed Specimen: Urine Updated: 12/29/23 1301     Urine Culture, Routine STAPHYLOCOCCUS AUREUS  10,000 - 49,999 cfu/ml  Susceptibility pending      Narrative:      Specimen Source->Urine    Culture, Anaerobe [[id number]] Collected: 12/29/23 1238    Order Status: Sent Specimen: Joint Fluid from Knee, Left Updated: 12/29/23 1301    Gram  stain [[id number]] Collected: 12/29/23 1238    Order Status: Sent Specimen: Joint Fluid from Knee, Left Updated: 12/29/23 1300    Culture, Fluid  (Aerobic) [[id number]] Collected: 12/29/23 1238    Order Status: Sent Specimen: Joint Fluid from Knee, Left Updated: 12/29/23 1300    Blood culture [[id number]]     Order Status: Sent Specimen: Blood     Blood culture [[id number]]  (Abnormal) Collected: 12/27/23 2242    Order Status: Completed Specimen: Blood Updated: 12/29/23 0739     Blood Culture, Routine Gram stain aer bottle: Gram positive cocci in clusters resembling Staph      Results called to and read back by:Haroon Bravo RN 12/28/2023  15:59      Gram stain patsy bottle: Gram positive cocci in clusters resembling Staph      STAPHYLOCOCCUS AUREUS  Susceptibility pending  ID consult required at [facility],[place] and [facility] locations.      Blood culture [[id number]] Collected: 12/27/23 2329    Order Status: Completed Specimen: Blood from Peripheral, Hand, Right Updated: 12/29/23 0247     Blood Culture, Routine Gram stain aer bottle: Gram positive cocci in clusters resembling Staph      Positive results previously called 12/29/2023  00:06      Gram stain patsy bottle: Gram positive cocci in clusters resembling Staph      12/29/2023  02:47    MRSA/SA Rapid ID by PCR from Blood culture [[id number]]  (Abnormal) Collected: 12/28/23 1600    Order Status: Completed Updated: 12/28/23 1731     Staph aureus ID by PCR Positive     Methicillin Resistant ID by PCR Positive    Respiratory Infection Panel (PCR), Nasopharyngeal [[id number]] Collected: 12/28/23 0047    Order Status: Completed Specimen: Nasopharyngeal Swab Updated: 12/28/23 1224     Respiratory Infection Panel Source NP Swab     Adenovirus Not Detected     Coronavirus 229E, Common Cold Virus Not Detected     Coronavirus HKU1, Common Cold Virus Not Detected     Coronavirus NL63, Common Cold Virus Not Detected     Coronavirus OC43, Common Cold Virus Not Detected      Comment: The Coronavirus strains detected in this test cause the common cold.  These strains are not the COVID-19 (novel Coronavirus)strain   associated with the respiratory disease outbreak.          SARS-CoV2 (COVID-19) Qualitative PCR Not Detected     Human Metapneumovirus Not Detected     Human Rhinovirus/Enterovirus Not Detected     Influenza A (subtypes H1, H1-2009,H3) Not Detected     Influenza B Not Detected     Parainfluenza Virus 1 Not Detected     Parainfluenza Virus 2 Not Detected     Parainfluenza Virus 3 Not Detected     Parainfluenza Virus 4 Not Detected     Respiratory Syncytial Virus Not Detected     Bordetella Parapertussis (LD3441) Not Detected     Bordetella pertussis (ptxP) Not Detected     Chlamydia pneumoniae Not Detected     Mycoplasma pneumoniae Not Detected    Narrative:      For all other respiratory sources, order [id number] -  Respiratory Viral Panel by PCR    Influenza A & B by Molecular [[id number]] Collected: 12/27/23 2229    Order Status: Completed Specimen: Nasopharyngeal Swab Updated: 12/27/23 2313     Influenza A, Molecular Negative     Influenza B, Molecular Negative     Flu A & B Source Nasal swab    Blood culture [[id number]] Collected: 12/27/23 2243    Order Status: Canceled Specimen: Blood from Peripheral, Antecubital, Right

## 2023-12-29 NOTE — PROGRESS NOTES
[facility] / Critical Care Consult Note      Patient:Kulwant Mueller Jr.  Age:38 y.o.  MRN:[id number]  Admit date:12/27/2023  LOS:1 day(s)    Primary Attending:  Russell Newby MD   Consultant Attending: Ernie Clark MD   Consultant Fellow: Payam Simental, HO-IV     Reason for Consult: DKA     HPI:       Kulwant Mueller Jr. is a 38 y.o. male with T2DM, May-Thurner syndrome who presented to [facility] with chief complaint of nausea and vomit for the past 4-5 days. Patient was drowsy in the am which limited obtaining HPI. States that he has type 2 diabetes and that he did not take his insulin since Monday. Did not report running out of the medication but also did not elaborate on why he wasn't taking it. He endorsed some abdominal pain and nausea, as well as inability to keep food down. Denied any fever or chills. He denied coughm, upper respiratory symptoms or other infectious prodromes. Does have a R   heel wound that does not bother him much.     On chart review, patient endorsed some diarrhea on admission as well as being worked up for gastroparesis. He is on xarelto for his May-Thurner syndrome.     In the ER, found to be hyperglycemic, AG 31, CO2 of 9, FREDERICK 1.9, BHB 6.1 and ketonuria present. He was given long acting insulin, started on a drip and sent to the ICU for further management. Procalcitonin elevated, WBC in the 40s with granulocyte predominance.     Interval history:  12/29: DKA has improved, but patient still significantly nauseated, unable to tolerate po intake. Blood cultures returned positive for MRSA x2. Found to have significant tenderness to palpation of the anterior distal thigh in the supra patellar area. POCUS revealed fluid collection which was then aspirated and had fluid that resembles pus.       ROS: Mostly significant for Nausea and left knee pain    A 10 point ROS was negative except as listed above    OBJECTIVE DATA:      Vital Signs:  Temp:  [98 °F (36.7  "°C)-100.5 °F (38.1 °C)]   Pulse:  []   Resp:  [13-31]   BP: (111-180)/()   SpO2:  [91 %-97 %]    No results found for: "HTIN", "WEIGHT"    Intake / Output:    Intake/Output Summary (Last 24 hours) at 12/29/2023 1636  Last data filed at 12/29/2023 1500  Gross per 24 hour   Intake 1664.94 ml   Output 3300 ml   Net -1635.06 ml         I have reviewed the vital trends as well as the documented I/Os     Physical Exam:  GEN: non-toxic appearing and appears stated age  HEENT: MMM, no scleral icterus, EOMI  NECK: Supple, midline trachea, no raised JVP or a-waves notable  CV: RRR, no MRG, pulses equal and symmetric 2+ at radial  PULM: CTAB  ABDOMEN: Soft, non-tender, non-distended, no rebound or guarding  SKIN: Warm, dry, intact, no rashes  MSK: No deformity, no clubbing, cyanosis, or lower extremity edema.  Right heel ulcer noted ~ inch diameter. Left knee tenderness to palpation anteriorly extending from mid to distal thigh  NEURO: RASS -1, awake and following commands, at neurologic baseline  LINES: Intact, no extravasation or induration, no erythema to PIV or support devices     Laboratory/Imaging:  I have reviewed and interpreted the labs below  Recent Labs   Lab 12/28/23  0355 12/28/23  1305 12/29/23  0400   WBC 47.31* 43.64* 38.53*   HGB 13.7* 13.0* 12.6*   HCT 41.6 37.4* 37.9*    395 417         Recent Labs   Lab 12/28/23  0355 12/28/23  0812 12/28/23  2026 12/28/23  2357 12/29/23  0400      < > 139 140 138   K 3.7   < > 3.7 3.6 3.6      < > 103 104 103   CO2 15*   < > 21* 22* 21*   BUN 30*   < > 21* 22* 19   CREATININE 1.7*   < > 1.6* 1.6* 1.5*   *   < > 180* 192* 235*   CALCIUM 9.4   < > 9.5 9.2 9.5   MG 2.9*  --   --   --  2.8*   PHOS 2.7  --   --   --   --     < > = values in this interval not displayed.         Recent Labs   Lab 12/27/23 2158   PROT 9.2*   ALBUMIN 2.7*   BILITOT 0.6   AST 22   ALT 31   ALKPHOS 131         No results for input(s): "PROTIME", "PTT", "INR" in " the last 168 hours.    Cardiac:   Recent Labs   Lab 12/27/23 2158   TROPONINI 0.009         FLP:   Lab Results   Component Value Date    CHOL 178 09/27/2023    HDL 32 (L) 09/27/2023    LDLCALC 120.6 09/27/2023    TRIG 127 09/27/2023    CHOLHDL 18.0 (L) 09/27/2023     DM:   Lab Results   Component Value Date    HGBA1C 7.4 (H) 12/28/2023    HGBA1C 8.2 (H) 09/27/2023    HGBA1C 8.2 (H) 09/27/2023    LDLCALC 120.6 09/27/2023    CREATININE 1.5 (H) 12/29/2023     Thyroid:   Lab Results   Component Value Date    TSH 0.202 (L) 12/28/2023    FREET4 1.42 12/28/2023     Anemia:   Lab Results   Component Value Date    FERRITIN 675 (H) 11/27/2021     Urinalysis:   Lab Results   Component Value Date    LABURIN (A) 12/27/2023     STAPHYLOCOCCUS AUREUS  10,000 - 49,999 cfu/ml  Susceptibility pending      COLORU Yellow 12/27/2023    SPECGRAV 1.030 12/27/2023    NITRITE Negative 12/27/2023    KETONESU 3+ (A) 12/27/2023    UROBILINOGEN Negative 12/27/2023       Microbiology:  Microbiology Results (last 7 days)       Procedure Component Value Units Date/Time    Blood culture [[id number]] Collected: 12/29/23 1357    Order Status: Sent Specimen: Blood Updated: 12/29/23 1358    Urine culture [[id number]]  (Abnormal) Collected: 12/27/23 2318    Order Status: Completed Specimen: Urine Updated: 12/29/23 1301     Urine Culture, Routine STAPHYLOCOCCUS AUREUS  10,000 - 49,999 cfu/ml  Susceptibility pending      Narrative:      Specimen Source->Urine    Culture, Anaerobe [[id number]] Collected: 12/29/23 1238    Order Status: Sent Specimen: Joint Fluid from Knee, Left Updated: 12/29/23 1301    Gram stain [[id number]] Collected: 12/29/23 1238    Order Status: Sent Specimen: Joint Fluid from Knee, Left Updated: 12/29/23 1300    Culture, Fluid  (Aerobic) [[id number]] Collected: 12/29/23 1238    Order Status: Sent Specimen: Joint Fluid from Knee, Left Updated: 12/29/23 1300    Blood culture [[id number]]     Order Status: Sent Specimen: Blood      Blood culture [[id number]]  (Abnormal) Collected: 12/27/23 2242    Order Status: Completed Specimen: Blood Updated: 12/29/23 0739     Blood Culture, Routine Gram stain aer bottle: Gram positive cocci in clusters resembling Staph      Results called to and read back by:Haroon Bravo RN 12/28/2023  15:59      Gram stain patsy bottle: Gram positive cocci in clusters resembling Staph      STAPHYLOCOCCUS AUREUS  Susceptibility pending  ID consult required at [facility], [facility] and [facility] locations.      Blood culture [[id number]] Collected: 12/27/23 2329    Order Status: Completed Specimen: Blood from Peripheral, Hand, Right Updated: 12/29/23 0247     Blood Culture, Routine Gram stain aer bottle: Gram positive cocci in clusters resembling Staph      Positive results previously called 12/29/2023  00:06      Gram stain patsy bottle: Gram positive cocci in clusters resembling Staph      12/29/2023  02:47    MRSA/SA Rapid ID by PCR from Blood culture [[id number]]  (Abnormal) Collected: 12/28/23 1600    Order Status: Completed Updated: 12/28/23 1731     Staph aureus ID by PCR Positive     Methicillin Resistant ID by PCR Positive    Respiratory Infection Panel (PCR), Nasopharyngeal [[id number]] Collected: 12/28/23 0047    Order Status: Completed Specimen: Nasopharyngeal Swab Updated: 12/28/23 1224     Respiratory Infection Panel Source NP Swab     Adenovirus Not Detected     Coronavirus 229E, Common Cold Virus Not Detected     Coronavirus HKU1, Common Cold Virus Not Detected     Coronavirus NL63, Common Cold Virus Not Detected     Coronavirus OC43, Common Cold Virus Not Detected     Comment: The Coronavirus strains detected in this test cause the common cold.  These strains are not the COVID-19 (novel Coronavirus)strain   associated with the respiratory disease outbreak.          SARS-CoV2 (COVID-19) Qualitative PCR Not Detected     Human Metapneumovirus Not Detected     Human Rhinovirus/Enterovirus Not Detected     Influenza A  (subtypes H1, H1-2009,H3) Not Detected     Influenza B Not Detected     Parainfluenza Virus 1 Not Detected     Parainfluenza Virus 2 Not Detected     Parainfluenza Virus 3 Not Detected     Parainfluenza Virus 4 Not Detected     Respiratory Syncytial Virus Not Detected     Bordetella Parapertussis (CV1153) Not Detected     Bordetella pertussis (ptxP) Not Detected     Chlamydia pneumoniae Not Detected     Mycoplasma pneumoniae Not Detected    Narrative:      For all other respiratory sources, order [id number] -  Respiratory Viral Panel by PCR    Influenza A & B by Molecular [[id number]] Collected: 12/27/23 2229    Order Status: Completed Specimen: Nasopharyngeal Swab Updated: 12/27/23 2313     Influenza A, Molecular Negative     Influenza B, Molecular Negative     Flu A & B Source Nasal swab    Blood culture [[id number]] Collected: 12/27/23 2243    Order Status: Canceled Specimen: Blood from Peripheral, Antecubital, Right                Imaging:     Imaging Results              US Upper Extremity Veins Bilateral (Final result)  Result time 12/28/23 08:41:34      Final result by Gui Brush III, MD (12/28/23 08:41:34)                   Impression:      Thrombus within 1 of the right paired brachial veins.      Electronically signed by: Gui Brush MD  Date:    12/28/2023  Time:    08:41               Narrative:    EXAMINATION:  US UPPER EXTREMITY VEINS BILATERAL    CLINICAL HISTORY:  May Rachelleurner, Hx DVT;  Type 2 diabetes mellitus with ketoacidosis without coma    FINDINGS:  There are 2 right brachial veins.  One is thrombosed the other is patent.  Remaining neck, brachiocephalic, and upper extremity veins are patent bilaterally.                                       US Lower Extremity Veins Bilateral (Final result)  Result time 12/28/23 08:39:13      Final result by Adrienne Elizondo MD (12/28/23 08:39:13)                   Impression:      No evidence of deep venous thrombosis in either lower  extremity.      Electronically signed by: Adrienne Elizondo MD  Date:    12/28/2023  Time:    08:39               Narrative:    EXAMINATION:  US LOWER EXTREMITY VEINS BILATERAL    CLINICAL HISTORY:  May Thurner, Hx DVT; Type 2 diabetes mellitus with ketoacidosis without coma    TECHNIQUE:  Duplex and color flow Doppler and dynamic compression was performed of the bilateral lower extremity veins was performed.    COMPARISON:  11/02/2023 right lower extremity venous Doppler, 01/26/2023 bilateral lower extremity venous Doppler    FINDINGS:  Right thigh veins: The common femoral, femoral, popliteal, upper greater saphenous, and deep femoral veins are patent and free of thrombus. The veins are normally compressible and have normal phasic flow and augmentation response.    Right calf veins: The visualized calf veins are patent.    Left thigh veins: The common femoral, femoral, popliteal, upper greater saphenous, and deep femoral veins are patent and free of thrombus. The veins are normally compressible and have normal phasic flow and augmentation response.    Left calf veins: The visualized calf veins are patent.    Miscellaneous: None                                       X-Ray Foot Complete Right (Final result)  Result time 12/28/23 00:05:16      Final result by Ben Roman DO (12/28/23 00:05:16)                   Impression:      No acute osseous abnormality of the foot.      Electronically signed by: Ben Roman  Date:    12/28/2023  Time:    00:05               Narrative:    EXAMINATION:  XR FOOT COMPLETE 3 VIEW RIGHT    CLINICAL HISTORY:  . Elevated white blood cell count, unspecified    TECHNIQUE:  AP, lateral, and oblique views of the right foot were performed.    COMPARISON:  02/07/2023.    FINDINGS:  No acute fracture or dislocation. Alignment is normal. The Lisfranc articulation is congruent. Joint spaces are preserved.                                       X-Ray Chest AP Portable (Final result)   Result time 12/27/23 22:14:01      Final result by Ben Roman DO (12/27/23 22:14:01)                   Impression:      No acute abnormality.      Electronically signed by: Ben Rmoan  Date:    12/27/2023  Time:    22:14               Narrative:    EXAMINATION:  XR CHEST AP PORTABLE    CLINICAL HISTORY:  Elevated white blood cell count, unspecified    TECHNIQUE:  Single frontal view of the chest was performed.    COMPARISON:  11/27/2021.    FINDINGS:  The lungs are well expanded and clear. No focal opacities are seen. The pleural spaces are clear. The cardiac silhouette is unremarkable. The visualized osseous structures are unremarkable.                                        I have reviewed the above imaging    Medications:      enoxparin  1 mg/kg Subcutaneous Q12H (treatment, non-standard time)    insulin detemir U-100  25 Units Subcutaneous BID    metoclopramide  5 mg Intravenous TID AC    mupirocin   Nasal BID    pantoprazole  40 mg Intravenous Daily    vancomycin (VANCOCIN) IV (PEDS and ADULTS)  1,750 mg Intravenous Q12H               dextrose 10 % in water (D10W), dextrose 10 % in water (D10W), dextrose 10%, dextrose 10%, droPERidol, glucagon (human recombinant), glucose, glucose, insulin aspart U-100, scopolamine, sodium chloride 0.9%, sodium chloride 0.9%, Pharmacy to dose Vancomycin consult **AND** vancomycin - pharmacy to dose     Assessment:     Kulwant Mueller Jr. is a 38 y.o. male with PMHx as per hpi who presented with DKA and leukocytosis    Plan:        NEURO:  No acute concerns    Pain / Agitation / Delirium / Immobility / Sleep Disruption:  -- Primary analgesia with      CV:  #May-Thurner syndrome on Xarelto at home  #Peripheral vascular disease   Follows with Dr. Duff.   - DVT US bilateral UE showed thrombus in the R brachial vein, Left arm and bilateral LE without evidence of clot. On full-dose anticoagulation with heparin.      PULM:  - No acute concerns        GI/FEN  #Nausea/vomiting  - This seems to be more of a recurrent issue maybe exacerbated by current disease process. Also endorses hx of gastroparesis. Prn antiemetics/motility agents onboard  - resume diet after surgery as tolerated    Fluids: oral intake  Electrolytes: replace per protocol   Nutrition: enteral feeds as tolerated      RENAL:   #FREDERICK, likely prerenal in the setting of DKA/decreased PO intake.  - S/p fluid resuscitation, this is Improving. Follow urine output and I/Os     HEME/ONC:  #Leukocytosis  - This is in the setting of MRSA bacteremia and septic bursitis.  - Antibiotics as below  - Trend CBC  - Evaluated by heme/onc and no concerns for hematological malignancy     ENDOCRINE:  #Diabetic Ketoacidosis, resolved  # Insulin dependent T2DM  - Likely precipitating event was MRSA bacteremia, of which portal of entry is still unclear  - Continue long acting insulin  - Advance diet as tolerated post-surgically  - Daily bmps  - poc blood gluc checks per ICU protocol  - Hypoglycemic precautions     INFECTIOUS DISEASE:  # MRSA bacteremia  # Septic bursitis; +/- septic arthritis of the L knee  # Leukocytosis  # Right heel wound, Chronic  # Tachycardia  - XR foot negative for acute issues. Follows with wound care at home.   - ID following, de-escalating from vanc+zosyn to Vancomycin monotherapy  - S/p supra patellar bursa aspiration which yielded purulent fluid and WBC of 118K consistent with septic bursitis. General surgery/Ortho consulted and plan is for surgical exploration of the bursa/joint with subsequent I&D  - Follow repeat Blood cultures to assure clearance of bacteremia  - Will likely require prolonged course of antibiotics and Echo to r/o endocarditis.     MSK/RHEUM:  -PT/OT for early mobility after surgery    PSYCHOSOCIAL:  -- no acute concerns    Feeding: enteral feeds as tolerated.   Analgesia: Multimodal pain control with tylenol  Sedation: none  DVT prophylaxis: lovenox full dose as below d/t  RUE DVT   Anticoagulants       Ordered     Route Frequency Start Stop    12/28/23 0043  enoxaparin         SubQ Every 12 hours 12/28/23 0145 --           Head of Bed: 30 degrees to prevent VAP  Ulcer PPX: n/a  Glucose: Hypoglycemic precautions, low dose sliding scale insulin  SBT/SAT: Daily  Bowels: Bowel regimen for constipation  Indwelling Lines: PIVs  Deescalation Abx: as above    Code: Code Status Discussion Note     Case was discussed with Dr. Clark. Attestation to follow. Thank you for allowing us to participate in the care of this patient. We will continue to follow. Please contact me with any questions should they arise.      CRISTY Portillo  [facility] & Critical [facility] Fellow    Pt seen and examined with Pulmonary/Critical Care team and this note was reviewed and validated with the following additional comments: Remains ill.  WE do not have good source control.  We were able to identify a left suprapatellar collection of fluid using POCUS that looks like a septic bursitis.  We will proceed with aspiration.  Will likely need more definitive drainage.    Critical Care time was spent validating the history and physical exam, reviewing the lab and imaging results, and discussing the care of the patient with the bedside nurse and the patient and/or surrogates. This critical care time did not overlap with that of any other provider or involve time for any procedures.  This patient has a high probability of sudden clinically significant deterioration which requires the highest level of physician preparedness to intervene urgently. I managed/supervised life or organ supporting interventions that required frequent physician assessments. I devoted my full attention in the ICU to the direct care of this patient for this period of time. Organ systems which are failing and require intensive, critical care support are: metabolic, cardiovascular, infectious, musculoskeletal.  Critical Care time: 40  minutes    Cristiano Clark MD  Phone [phone]

## 2023-12-29 NOTE — PROGRESS NOTES
Pharmacokinetic Assessment Follow Up: IV Vancomycin    Vancomycin serum concentration assessment(s):    The random level was drawn correctly and can be used to guide therapy at this time. The measurement is below the desired definitive target range of 10 to 15 mcg/mL.    Vancomycin Regimen Plan:    Give Vancomycin 1750 mg (15 mg/kg) once due to FREDERICK. Next random level to be drawn on 12/30/23 at 0030.   Drug levels (last 3 results):  Recent Labs   Lab Result Units 12/28/23  2357   Vancomycin, Random ug/mL 4.1       Pharmacy will continue to follow and monitor vancomycin.    Please contact pharmacy at extension [phone] for questions regarding this assessment.    Thank you for the consult,   Eugenio Celestin       Patient brief summary:  Kulwant Mueller Jr. is a 38 y.o. male initiated on antimicrobial therapy with IV Vancomycin for treatment of skin & soft tissue infection    Drug Allergies:   Review of patient's allergies indicates:   Allergen Reactions    Heparin analogues Nausea And Vomiting       Actual Body Weight:   114.5 kg    Renal Function:   Estimated Creatinine Clearance: 81.3 mL/min (A) (based on SCr of 1.6 mg/dL (H)).,     Dialysis Method (if applicable):  N/A    CBC (last 72 hours):  Recent Labs   Lab Result Units 12/27/23 2158 12/28/23  0355 12/28/23  1305   WBC K/uL 44.27* 47.31* 43.64*   Hemoglobin g/dL 14.1 13.7* 13.0*   Hemoglobin A1C %  --  7.4*  --    Hematocrit % 42.1 41.6 37.4*   Platelets K/uL 376 405 395   Gran % % 90.8* 81.0* 91.2*   Lymph % % 2.4* 5.0* 1.7*   Mono % % 5.4 4.0 5.5   Eosinophil % % 0.0 0.0 0.0   Basophil % % 0.1 0.0 0.4   Differential Method  Automated Manual Automated       Metabolic Panel (last 72 hours):  Recent Labs   Lab Result Units 12/27/23 2158 12/27/23  2318 12/28/23  0355 12/28/23  0812 12/28/23  1305 12/28/23  1611 12/28/23  2026 12/28/23  2357   Sodium mmol/L 135*  --  138 137 138 139 139 140   Potassium mmol/L 4.5  --  3.7 3.6 3.6 3.5 3.7 3.6   Chloride mmol/L 95  --   101 102 102 103 103 104   CO2 mmol/L 9*  --  15* 17* 21* 22* 21* 22*   Glucose mg/dL 343*  --  177* 204* 178* 201* 180* 192*   Glucose, UA   --  4+*  --   --   --   --   --   --    BUN mg/dL 33*  --  30* 27* 25* 22* 21* 22*   Creatinine mg/dL 1.9*  --  1.7* 1.7* 1.7* 1.6* 1.6* 1.6*   Creatinine, Urine mg/dL  --  61.1  --   --   --   --   --   --    Albumin g/dL 2.7*  --   --   --   --   --   --   --    Total Bilirubin mg/dL 0.6  --   --   --   --   --   --   --    Alkaline Phosphatase U/L 131  --   --   --   --   --   --   --    AST U/L 22  --   --   --   --   --   --   --    ALT U/L 31  --   --   --   --   --   --   --    Magnesium mg/dL  --   --  2.9*  --   --   --   --   --    Phosphorus mg/dL  --   --  2.7  --   --   --   --   --        Vancomycin Administrations:  vancomycin given in the last 96 hours                     vancomycin 2 g in dextrose 5 % 500 mL IVPB (mg) 2,000 mg New Bag 12/28/23 0004                    Microbiologic Results:  Microbiology Results (last 7 days)       Procedure Component Value Units Date/Time    Blood culture [[id number]] Collected: 12/27/23 2329    Order Status: Completed Specimen: Blood from Peripheral, Hand, Right Updated: 12/29/23 0006     Blood Culture, Routine Gram stain aer bottle: Gram positive cocci in clusters resembling Staph      Positive results previously called 12/29/2023  00:06    MRSA/SA Rapid ID by PCR from Blood culture [[id number]]  (Abnormal) Collected: 12/28/23 1600    Order Status: Completed Updated: 12/28/23 1731     Staph aureus ID by PCR Positive     Methicillin Resistant ID by PCR Positive    Blood culture [[id number]] Collected: 12/27/23 2242    Order Status: Completed Specimen: Blood Updated: 12/28/23 1654     Blood Culture, Routine Gram stain aer bottle: Gram positive cocci in clusters resembling Staph      Results called to and read back by:Haroon Bravo RN 12/28/2023  15:59      Gram stain patsy bottle: Gram positive cocci in clusters resembling Staph     Respiratory Infection Panel (PCR), Nasopharyngeal [[id number]] Collected: 12/28/23 0047    Order Status: Completed Specimen: Nasopharyngeal Swab Updated: 12/28/23 1224     Respiratory Infection Panel Source NP Swab     Adenovirus Not Detected     Coronavirus 229E, Common Cold Virus Not Detected     Coronavirus HKU1, Common Cold Virus Not Detected     Coronavirus NL63, Common Cold Virus Not Detected     Coronavirus OC43, Common Cold Virus Not Detected     Comment: The Coronavirus strains detected in this test cause the common cold.  These strains are not the COVID-19 (novel Coronavirus)strain   associated with the respiratory disease outbreak.          SARS-CoV2 (COVID-19) Qualitative PCR Not Detected     Human Metapneumovirus Not Detected     Human Rhinovirus/Enterovirus Not Detected     Influenza A (subtypes H1, H1-2009,H3) Not Detected     Influenza B Not Detected     Parainfluenza Virus 1 Not Detected     Parainfluenza Virus 2 Not Detected     Parainfluenza Virus 3 Not Detected     Parainfluenza Virus 4 Not Detected     Respiratory Syncytial Virus Not Detected     Bordetella Parapertussis (QG3586) Not Detected     Bordetella pertussis (ptxP) Not Detected     Chlamydia pneumoniae Not Detected     Mycoplasma pneumoniae Not Detected    Narrative:      For all other respiratory sources, order [id number] -  Respiratory Viral Panel by PCR    Urine culture [[id number]] Collected: 12/27/23 2318    Order Status: No result Specimen: Urine Updated: 12/27/23 2359    Influenza A & B by Molecular [[id number]] Collected: 12/27/23 2229    Order Status: Completed Specimen: Nasopharyngeal Swab Updated: 12/27/23 2313     Influenza A, Molecular Negative     Influenza B, Molecular Negative     Flu A & B Source Nasal swab    Blood culture [[id number]] Collected: 12/27/23 2243    Order Status: Canceled Specimen: Blood from Peripheral, Antecubital, Right

## 2023-12-29 NOTE — HPI
38 y.o. 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 The patient presented to [facility] on 12/27/2023 with a primary complaint of Intractable N/V and diarrhea for over 6 days, generalized weakness for 6 days. He was in his usual state of health until around 1 week ago when he developed NBNB N/V and decreased oral intake. His family members had recently been ill as well, but have tested negative for COVID/flu. He reports he continued to have N/V whether or not he was able to have any oral intake. He presented to an ER for evaluation yesterday, where it was noted he had a WBC 33K, with an unremarkable CT A/P with negative COVID/flu. He was discharged with GI follow-up, however at the appointment today, there was concern for elevated WBC, and patient was instructed to present to the ER for further evaluation. Does endorse some abdominal discomfort and 1 episode of non-mucoid non-bloody diarrhea this afternoon. Denies  fevers, chills, shortness of breath, chest pain, palpitations, dysuria. He was admitted for DKA and found to have MRSa bacteremia. Initially source was thought to be right foot heel ulcer but bedside US was concerning for left knee bursitis.

## 2023-12-29 NOTE — CONSULTS
Subjective:      Patient ID: Kulwant Mueller Jr. is a 38 y.o. male.    Chief Complaint: Vomiting and Diarrhea (Patient reports vomiting for 6 days and diarrhea today. Denies fever, abd pain. He reports severe weakness and has been unable to hold food or fluid down. He was seen at [facility] and was told his wbc count was high and was referred to a GI doctor. )      HPI  Kulwant Mueller Jr. is a  38 y.o. male presenting today for painful swelling of the left knee.  There was not a history of trauma.  Onset of symptoms began 3 or 4 days ago   He has been in the hospital for few days in diabetic ketoacidosis   Left knee became more painful yesterday and aspiration of the left knee showed purulent material which showed about 100,000 white count  Patient complaining of pain left knee   .      Review of patient's allergies indicates:   Allergen Reactions    Heparin analogues Nausea And Vomiting         Current Facility-Administered Medications   Medication Dose Route Frequency Provider Last Rate Last Admin    dextrose 10 % infusion   Intravenous PRN Yudy Benson MD        dextrose 10 % infusion   Intravenous PRN Yudy Benson MD        dextrose 10% bolus 125 mL 125 mL  12.5 g Intravenous PRN Yudy Benson MD        dextrose 10% bolus 250 mL 250 mL  25 g Intravenous PRN Yudy Benson MD        droPERidol injection 0.625 mg  0.625 mg Intravenous Q6H PRN Joan Singh MD   0.625 mg at 12/29/23 1221    enoxaparin injection 120 mg  1 mg/kg Subcutaneous Q12H (treatment, non-standard time) Yudy Benson MD   120 mg at 12/29/23 0136    glucagon (human recombinant) injection 1 mg  1 mg Intramuscular PRN Yudy Benson MD        glucose chewable tablet 16 g  16 g Oral PRN Yudy Benson MD        glucose chewable tablet 24 g  24 g Oral PRN Yudy Benson MD        insulin aspart U-100 pen 0-10 Units  0-10 Units Subcutaneous QID (AC + HS) PRN Joan Singh MD   1 Units at 12/28/23 2040    insulin  "detemir U-100 (Levemir) pen 25 Units  25 Units Subcutaneous BID Jakob Dunham MD   25 Units at 12/29/23 0823    metoclopramide injection 5 mg  5 mg Intravenous TID AC Joan Singh MD   5 mg at 12/29/23 1107    mupirocin 2 % ointment   Nasal BID Russell Newby MD   Given at 12/29/23 0823    pantoprazole injection 40 mg  40 mg Intravenous Daily Yudy Benson MD   40 mg at 12/29/23 0822    scopolamine 1.3-1.5 mg (1 mg over 3 days) 1 patch  1 patch Transdermal Q72H PRN Jakob Dunham MD   1 patch at 12/29/23 0822    sodium chloride 0.9% flush 10 mL  10 mL Intravenous PRN Yudy Benson MD        sodium chloride 0.9% flush 10 mL  10 mL Intravenous PRN Yudy Benson MD        vancomycin (VANCOCIN) 1,750 mg in dextrose 5 % (D5W) 500 mL IVPB  1,750 mg Intravenous Q12H Russell Newby MD        vancomycin - pharmacy to dose   Intravenous pharmacy to manage frequency Yudy Benson MD           Past Medical History:   Diagnosis Date    Anticoagulant long-term use     COVID-19 virus infection     Diabetes mellitus     Diabetes mellitus, type 2     Hypertension     May-Thurner syndrome        Past Surgical History:   Procedure Laterality Date    APPENDECTOMY      ESOPHAGOGASTRODUODENOSCOPY N/A 1/31/2022    Procedure: EGD (ESOPHAGOGASTRODUODENOSCOPY);  Surgeon: Cindy Quiros MD;  Location: [facility];  Service: Endoscopy;  Laterality: N/A;    ESOPHAGOGASTRODUODENOSCOPY N/A 3/21/2022    Procedure: EGD (ESOPHAGOGASTRODUODENOSCOPY);  Surgeon: Tejas Mann MD;  Location: [facility] ENDO;  Service: Endoscopy;  Laterality: N/A;    INCISION AND DRAINAGE FOOT Left 11/28/2021    Procedure: INCISION AND DRAINAGE, FOOT;  Surgeon: Bj Alicea DPM;  Location: [facility] OR;  Service: Podiatry;  Laterality: Left;    stents in bilateral legs         Review of Systems:  ROS    OBJECTIVE:     PHYSICAL EXAM:  Height: 6' 1" (185.4 cm) Weight: 114.5 kg (252 lb 6.8 oz)  Vitals:    12/29/23 1200 12/29/23 1300 12/29/23 1400 " 12/29/23 1500   BP: (!) 164/88 (!) 159/87 (!) 161/86 (!) 174/95   Pulse: (!) 114 (!) 116 (!) 113 (!) 113   Resp: (!) 23 (!) 23 (!) 28 (!) 22   Temp: 98 °F (36.7 °C)      TempSrc: Oral      SpO2: 97% 96% 96% 97%   Weight:       Height:         Well developed, well nourished male in no acute distress  Alert and oriented x 3  HEENT- Normal exam  Lungs- Clear to auscultation  Heart- Regular rate and rhythm  Abdomen- Soft nontender  Extremity exam- examination left knee there is mild diffuse swelling left knee mainly in the prepatellar area   A mild effusion is noted of the knee joint   Range of motion knee limited due to pain   No obvious abscess   He is able to move the toes well sensation intact    RADIOGRAPHS:  AP lateral x-rays show mild degenerative change left knee   CT scan currently pending  Comments: I have personally reviewed the imaging and I agree with the above radiologist's report.    ASSESSMENT/PLAN:     IMPRESSION:  Septic prepatellar bursitis left knee with possible joint involvement    PLAN:  I am not sure if this involves just the bursa or the knee joint itself  But I have recommended surgical exploration for debridement drainage and possible arthrotomy of the left knee  We will plan surgery today pending anesthesia evaluation       - We talked at length about the anatomy and pathophysiology of @DX@        Disclaimer: This note has been generated using voice-recognition software. There may be typographical errors that have been missed during proof-reading.

## 2023-12-29 NOTE — PLAN OF CARE
Hematology/Oncology Update:    Labs have been reviewed.    Lab Results   Component Value Date    WBC 38.53 (H) 12/29/2023    HGB 12.6 (L) 12/29/2023    HCT 37.9 (L) 12/29/2023    MCV 84 12/29/2023     12/29/2023         Leukocytosis  - c-reactive protein is extremely elevated, suggesting that his leukocytosis is a reactive process.  - WBC has decreased from yesterday.  - I will hold off on further labs since suspicion for hematologic malignancy is low.    Bertin Cowan M.D.  Hematology/Oncology  [facility] - [address], [address]  [place] [address]  Phone: [phone]  Fax: [phone]

## 2023-12-30 PROBLEM — R65.20 SEVERE SEPSIS: Status: ACTIVE | Noted: 2023-12-30

## 2023-12-30 PROBLEM — M71.162 SEPTIC PREPATELLAR BURSITIS OF LEFT KNEE: Status: ACTIVE | Noted: 2023-12-30

## 2023-12-30 PROBLEM — A41.9 SEVERE SEPSIS: Status: ACTIVE | Noted: 2023-12-30

## 2023-12-30 PROBLEM — E08.621 DIABETIC ULCER OF HEEL ASSOCIATED WITH DIABETES MELLITUS DUE TO UNDERLYING CONDITION, LIMITED TO BREAKDOWN OF SKIN: Status: ACTIVE | Noted: 2023-12-30

## 2023-12-30 PROBLEM — N17.9 ACUTE KIDNEY INJURY: Status: ACTIVE | Noted: 2023-12-30

## 2023-12-30 PROBLEM — E11.9 DIABETES MELLITUS: Status: ACTIVE | Noted: 2023-12-30

## 2023-12-30 PROBLEM — L97.401 DIABETIC ULCER OF HEEL ASSOCIATED WITH DIABETES MELLITUS DUE TO UNDERLYING CONDITION, LIMITED TO BREAKDOWN OF SKIN: Status: ACTIVE | Noted: 2023-12-30

## 2023-12-30 LAB
ANION GAP SERPL CALC-SCNC: 14 MMOL/L (ref 8–16)
ANION GAP SERPL CALC-SCNC: 15 MMOL/L (ref 8–16)
BACTERIA BLD CULT: ABNORMAL
BACTERIA UR CULT: ABNORMAL
BASOPHILS # BLD AUTO: 0.09 K/UL (ref 0–0.2)
BASOPHILS NFR BLD: 0.4 % (ref 0–1.9)
BUN SERPL-MCNC: 18 MG/DL (ref 6–20)
BUN SERPL-MCNC: 19 MG/DL (ref 6–20)
CALCIUM SERPL-MCNC: 9.1 MG/DL (ref 8.7–10.5)
CALCIUM SERPL-MCNC: 9.2 MG/DL (ref 8.7–10.5)
CHLORIDE SERPL-SCNC: 103 MMOL/L (ref 95–110)
CHLORIDE SERPL-SCNC: 103 MMOL/L (ref 95–110)
CO2 SERPL-SCNC: 22 MMOL/L (ref 23–29)
CO2 SERPL-SCNC: 23 MMOL/L (ref 23–29)
CREAT SERPL-MCNC: 1 MG/DL (ref 0.5–1.4)
CREAT SERPL-MCNC: 1.1 MG/DL (ref 0.5–1.4)
DIFFERENTIAL METHOD BLD: ABNORMAL
EOSINOPHIL # BLD AUTO: 0 K/UL (ref 0–0.5)
EOSINOPHIL NFR BLD: 0 % (ref 0–8)
ERYTHROCYTE [DISTWIDTH] IN BLOOD BY AUTOMATED COUNT: 14.8 % (ref 11.5–14.5)
EST. GFR  (NO RACE VARIABLE): >60 ML/MIN/1.73 M^2
EST. GFR  (NO RACE VARIABLE): >60 ML/MIN/1.73 M^2
GLUCOSE SERPL-MCNC: 195 MG/DL (ref 70–110)
GLUCOSE SERPL-MCNC: 210 MG/DL (ref 70–110)
HCT VFR BLD AUTO: 36.1 % (ref 40–54)
HGB BLD-MCNC: 12 G/DL (ref 14–18)
IMM GRANULOCYTES # BLD AUTO: 0.39 K/UL (ref 0–0.04)
IMM GRANULOCYTES NFR BLD AUTO: 1.5 % (ref 0–0.5)
LYMPHOCYTES # BLD AUTO: 1 K/UL (ref 1–4.8)
LYMPHOCYTES NFR BLD: 4 % (ref 18–48)
MAGNESIUM SERPL-MCNC: 2.6 MG/DL (ref 1.6–2.6)
MCH RBC QN AUTO: 28.2 PG (ref 27–31)
MCHC RBC AUTO-ENTMCNC: 33.2 G/DL (ref 32–36)
MCV RBC AUTO: 85 FL (ref 82–98)
MONOCYTES # BLD AUTO: 1.4 K/UL (ref 0.3–1)
MONOCYTES NFR BLD: 5.4 % (ref 4–15)
NEUTROPHILS # BLD AUTO: 22.7 K/UL (ref 1.8–7.7)
NEUTROPHILS NFR BLD: 88.7 % (ref 38–73)
NRBC BLD-RTO: 0 /100 WBC
PHOSPHATE SERPL-MCNC: 2.9 MG/DL (ref 2.7–4.5)
PLATELET # BLD AUTO: 353 K/UL (ref 150–450)
PMV BLD AUTO: 10.7 FL (ref 9.2–12.9)
POCT GLUCOSE: 147 MG/DL (ref 70–110)
POCT GLUCOSE: 167 MG/DL (ref 70–110)
POCT GLUCOSE: 177 MG/DL (ref 70–110)
POCT GLUCOSE: 188 MG/DL (ref 70–110)
POCT GLUCOSE: 205 MG/DL (ref 70–110)
POTASSIUM SERPL-SCNC: 3.8 MMOL/L (ref 3.5–5.1)
POTASSIUM SERPL-SCNC: 3.8 MMOL/L (ref 3.5–5.1)
RBC # BLD AUTO: 4.26 M/UL (ref 4.6–6.2)
SODIUM SERPL-SCNC: 140 MMOL/L (ref 136–145)
SODIUM SERPL-SCNC: 140 MMOL/L (ref 136–145)
VANCOMYCIN TROUGH SERPL-MCNC: 11.3 UG/ML (ref 10–22)
WBC # BLD AUTO: 25.58 K/UL (ref 3.9–12.7)

## 2023-12-30 PROCEDURE — 63600175 PHARM REV CODE 636 W HCPCS

## 2023-12-30 PROCEDURE — 25000003 PHARM REV CODE 250: Performed by: INTERNAL MEDICINE

## 2023-12-30 PROCEDURE — 27000207 HC ISOLATION

## 2023-12-30 PROCEDURE — 80048 BASIC METABOLIC PNL TOTAL CA: CPT

## 2023-12-30 PROCEDURE — 11000001 HC ACUTE MED/SURG PRIVATE ROOM

## 2023-12-30 PROCEDURE — 80202 ASSAY OF VANCOMYCIN: CPT | Performed by: INTERNAL MEDICINE

## 2023-12-30 PROCEDURE — 84100 ASSAY OF PHOSPHORUS: CPT

## 2023-12-30 PROCEDURE — 85025 COMPLETE CBC W/AUTO DIFF WBC: CPT

## 2023-12-30 PROCEDURE — 36415 COLL VENOUS BLD VENIPUNCTURE: CPT | Mod: XB | Performed by: INTERNAL MEDICINE

## 2023-12-30 PROCEDURE — 83735 ASSAY OF MAGNESIUM: CPT

## 2023-12-30 PROCEDURE — 99232 SBSQ HOSP IP/OBS MODERATE 35: CPT | Mod: ,,, | Performed by: INTERNAL MEDICINE

## 2023-12-30 PROCEDURE — C9113 INJ PANTOPRAZOLE SODIUM, VIA: HCPCS

## 2023-12-30 PROCEDURE — 25000003 PHARM REV CODE 250

## 2023-12-30 PROCEDURE — 36415 COLL VENOUS BLD VENIPUNCTURE: CPT | Mod: XB

## 2023-12-30 PROCEDURE — 63600175 PHARM REV CODE 636 W HCPCS: Performed by: INTERNAL MEDICINE

## 2023-12-30 PROCEDURE — 36415 COLL VENOUS BLD VENIPUNCTURE: CPT

## 2023-12-30 RX ORDER — MORPHINE SULFATE 4 MG/ML
4 INJECTION, SOLUTION INTRAMUSCULAR; INTRAVENOUS EVERY 6 HOURS PRN
Status: DISCONTINUED | OUTPATIENT
Start: 2023-12-30 | End: 2023-12-31

## 2023-12-30 RX ORDER — ACETAMINOPHEN 325 MG/1
650 TABLET ORAL EVERY 6 HOURS PRN
Status: DISCONTINUED | OUTPATIENT
Start: 2023-12-30 | End: 2023-12-31

## 2023-12-30 RX ORDER — OXYCODONE AND ACETAMINOPHEN 5; 325 MG/1; MG/1
1 TABLET ORAL EVERY 8 HOURS
Status: DISCONTINUED | OUTPATIENT
Start: 2023-12-30 | End: 2024-01-04

## 2023-12-30 RX ADMIN — MORPHINE SULFATE 4 MG: 4 INJECTION INTRAVENOUS at 08:12

## 2023-12-30 RX ADMIN — MUPIROCIN: 20 OINTMENT TOPICAL at 08:12

## 2023-12-30 RX ADMIN — ACETAMINOPHEN 650 MG: 325 TABLET ORAL at 07:12

## 2023-12-30 RX ADMIN — METOCLOPRAMIDE 5 MG: 5 INJECTION, SOLUTION INTRAMUSCULAR; INTRAVENOUS at 04:12

## 2023-12-30 RX ADMIN — MUPIROCIN: 20 OINTMENT TOPICAL at 09:12

## 2023-12-30 RX ADMIN — METOCLOPRAMIDE 5 MG: 5 INJECTION, SOLUTION INTRAMUSCULAR; INTRAVENOUS at 07:12

## 2023-12-30 RX ADMIN — ENOXAPARIN SODIUM 120 MG: 120 INJECTION SUBCUTANEOUS at 04:12

## 2023-12-30 RX ADMIN — ENOXAPARIN SODIUM 120 MG: 120 INJECTION SUBCUTANEOUS at 07:12

## 2023-12-30 RX ADMIN — INSULIN ASPART 2 UNITS: 100 INJECTION, SOLUTION INTRAVENOUS; SUBCUTANEOUS at 11:12

## 2023-12-30 RX ADMIN — INSULIN DETEMIR 25 UNITS: 100 INJECTION, SOLUTION SUBCUTANEOUS at 09:12

## 2023-12-30 RX ADMIN — DROPERIDOL 0.62 MG: 2.5 INJECTION, SOLUTION INTRAMUSCULAR; INTRAVENOUS at 12:12

## 2023-12-30 RX ADMIN — OXYCODONE HYDROCHLORIDE AND ACETAMINOPHEN 1 TABLET: 5; 325 TABLET ORAL at 02:12

## 2023-12-30 RX ADMIN — VANCOMYCIN HYDROCHLORIDE 2250 MG: 10 INJECTION, POWDER, LYOPHILIZED, FOR SOLUTION INTRAVENOUS at 09:12

## 2023-12-30 RX ADMIN — ACETAMINOPHEN 650 MG: 325 TABLET ORAL at 09:12

## 2023-12-30 RX ADMIN — MORPHINE SULFATE 4 MG: 4 INJECTION INTRAVENOUS at 11:12

## 2023-12-30 RX ADMIN — INSULIN ASPART 2 UNITS: 100 INJECTION, SOLUTION INTRAVENOUS; SUBCUTANEOUS at 07:12

## 2023-12-30 RX ADMIN — METOCLOPRAMIDE 5 MG: 5 INJECTION, SOLUTION INTRAMUSCULAR; INTRAVENOUS at 11:12

## 2023-12-30 RX ADMIN — VANCOMYCIN HYDROCHLORIDE 1750 MG: 10 INJECTION, POWDER, LYOPHILIZED, FOR SOLUTION INTRAVENOUS at 07:12

## 2023-12-30 RX ADMIN — INSULIN DETEMIR 25 UNITS: 100 INJECTION, SOLUTION SUBCUTANEOUS at 08:12

## 2023-12-30 RX ADMIN — PANTOPRAZOLE SODIUM 40 MG: 40 INJECTION, POWDER, FOR SOLUTION INTRAVENOUS at 09:12

## 2023-12-30 RX ADMIN — MORPHINE SULFATE 4 MG: 4 INJECTION INTRAVENOUS at 02:12

## 2023-12-30 NOTE — ASSESSMENT & PLAN NOTE
38 y.o. 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      -blood cxs from 12/27 with staph aureus and from 12/29 with GPCs  -repeat blood cxs daily to document clearance  -TTE without vegetation  -continue vanco with goal VT of 15-20  -fluid from knee with GPCs await culture

## 2023-12-30 NOTE — OP NOTE
[place] - Intensive Care  Surgery Department  Operative Note    SUMMARY     Date of Procedure: 12/29/2023     Procedure: Procedure(s) (LRB):  ARTHROTOMY, KNEE (Left)     Surgeon(s) and Role:     * Edy Bocanegra Jr., MD - Primary    Assisting Surgeon: None    Pre-Operative Diagnosis: Septic arthritis, due to unspecified organism, septic arthritis of unspecified location [M00.9]    Post-Operative Diagnosis: Post-Op Diagnosis Codes:     * Septic arthritis, due to unspecified organism, septic arthritis of unspecified location [M00.9]    Anesthesia: General    Operative Findings (including complications, if any):  Septic arthritis left knee    Description of Technical Procedures:     Preop diagnosis:  Septic arthritis left knee.      Postop diagnosis: Same.      Operative procedure:  Arthrotomy left knee with synovectomy.      Surgeon: Daljit.      Anesthesia:  General.      EBL: Minimal.      Specimen:  Cultures.      Operative procedure in detail as follows.      After operative consent was obtained patient brought the operating room placed supine on the operating table.  Anesthesia by general endotracheal method performed by the anesthesia staff.  After the patient was asleep a tourniquet applied left leg left lower extremity prepped and draped out in the normal sterile fashion.    At this point, aspiration of the left knee joint was performed in an area away from the prepatellar bursa and 10 cc of purulent material was encountered this was sent for culture.      At this point a decision was made for arthrotomy- therefore the leg elevated the tourniquet inflated 300 mmHg.      A medial parapatellar incision made with a 10 blade hemostasis achieved with Bovie and the joint entered with a 2 cm arthrotomy.  Purulent material was encountered and sent for culture.  Synovectomy was performed removing all synovitis that was present that appeared to be infected.  Pulse lavage was then performed with 3 L of antibiotic  saline solution.  Following this the wound was irrigated again hemostasis achieved with the Bovie and the capsule closed loosely with interrupted 0 Vicryl suture.  A large Hemovac drain was placed followed by closure of the skin with retention sutures of 2-0 Vicryl.  Sterile bulky bandage applied followed by a knee immobilizer tourniquet deflated patient brought to the recovery room in stable condition all sponge needle counts reported as correct no complications    Significant Surgical Tasks Conducted by the Assistant(s), if Applicable: na    Estimated Blood Loss (EBL): * No values recorded between 12/29/2023  5:35 PM and 12/29/2023  6:16 PM *           Implants: * No implants in log *    Specimens:   Specimen (24h ago, onward)      None                    Condition: Good    Disposition: PACU - hemodynamically stable.    Attestation: I was present and scrubbed for the entire procedure.

## 2023-12-30 NOTE — PLAN OF CARE
Problem: Impaired Wound Healing  Goal: Optimal Wound Healing  Outcome: Ongoing, Not Progressing  Intervention: Promote Wound Healing  Flowsheets (Taken 12/29/2023 2143)  Oral Nutrition Promotion: physical activity promoted  Sleep/Rest Enhancement: regular sleep/rest pattern promoted  Activity Management:   Ankle pumps - L1   Arm raise - L1  Pain Management Interventions:   pillow support provided   quiet environment facilitated

## 2023-12-30 NOTE — PLAN OF CARE
Critical Care Plan of Care note    Reviewed patient's chart and examined the patient at bedside. His left knee is  and drain is in place with minimal output. He endorses some improvement of his nausea and has been able to tolerate oral fluids, but not solid food yet. His blood culture from yesterday was still positive, although that was before synovectomy. He remains on antibiotics. As far as his diabetes, his anion gap remains closed and suspect that with treatment of his infection, likelihood of going back into DKA is not high, assuming adequate basal/bolus coverage.   Disposition wise, he is stable to step down to the floor for continued management of his septic arthritis, MRSA bacteremia and other medical co-morbidities.    Payam Simental MD  [facility] Critical Care Medicine Fellow     Pt seen and examined with Pulmonary/Critical Care team and this note reviewed and validated with the following additional comments: Better today.  Looks good to step down.    Medical Decision Making (MDM) was complex.  The number and complexity of problems addressed was high.  The amount and complexity of data reviewed was high.  The risk of complications and/or morbidity/mortality was high.  The tests ordered were Blood Cultures.  I communicated with the following providers HM  Time spent in the care of this patient was 30 minutes    Cristiano Clark MD  Phone [phone]

## 2023-12-30 NOTE — PROGRESS NOTES
[facility] Medicine Progress Note    Primary Team: [facility] Hospitalist Team A  Attending Physician: Russell Newby MD  Resident: Roly  Intern: Rhonda    Subjective/Interval History      No acute overnight events. Tolerated L knee I&D with ortho well yesterday. Had episodes of HTN last night while in significant pain, BP improved with pain control. Patient febrile this morning and tolerating PO. Tylenol ordered. Patient continues to endorse L knee pain from the procedure but otherwise is doing well. Endorses fatigue. No fever, chills, SOB, CP, abdominal pain. Reports that he has not had a BM in a few days.     Objective     Physical Examination:  Temp:  [98.9 °F (37.2 °C)-101.8 °F (38.8 °C)] 98.9 °F (37.2 °C)  Pulse:  [] 103  Resp:  [14-31] 20  SpO2:  [94 %-99 %] 97 %  BP: (112-199)/() 156/83  Gen: NAD, appears mildly uncomfortable secondary to pain  HEENT: NC/AT, EOMI grossly, normal external ears bilaterally, normal external nose, moist MM  Neck: Supple  CV: Tachycardic rate, regular rhythm, no murmurs, rubs, or gallops. 2+ radial pulses bilaterally  Resp: CTAB. No wheezes, rales, rhonchi. Normal work of breathing. Normal effort. Equal chest rise. No respiratory distress  Abd: Soft, nontender, nondistended, no rebound/guarding  MSK/Ext: L knee in dressing from recent procedure. No clubbing, cyanosis, or edema. Moves all four extremities  Neuro: GCS 15, alert, speech is normal, face symmetric, no focal motor deficits elicited on exam  Skin: Warm, dry, no rash. R heel ulcer without drainage  Psych: Normal mood and affect   Intake/Output:    Intake/Output Summary (Last 24 hours) at 12/30/2023 1251  Last data filed at 12/30/2023 1115  Gross per 24 hour   Intake 1901.84 ml   Output 2265 ml   Net -363.16 ml     Net IO Since Admission: 1,246.73 mL [12/30/23 1251]    Laboratory:  Recent Labs   Lab 12/27/23  2158 12/28/23  0355 12/28/23  0812 12/28/23  1305 12/28/23  1611 12/29/23  0400 12/29/23  2045  12/29/23  2349 12/30/23  0346 12/30/23  0804   WBC 44.27* 47.31*  --  43.64*  --  38.53*  --   --  25.58*  --    HGB 14.1 13.7*  --  13.0*  --  12.6*  --   --  12.0*  --     405  --  395  --  417  --   --  353  --    MCV 84 86  --  82  --  84  --   --  85  --    * 138   < > 138   < > 138 139 140  --  140   K 4.5 3.7   < > 3.6   < > 3.6 3.7 3.8  --  3.8   CL 95 101   < > 102   < > 103 103 103  --  103   CO2 9* 15*   < > 21*   < > 21* 21* 22*  --  23   BUN 33* 30*   < > 25*   < > 19 18 19  --  18   CREATININE 1.9* 1.7*   < > 1.7*   < > 1.5* 1.1 1.1  --  1.0   * 177*   < > 178*   < > 235* 192* 210*  --  195*   CALCIUM 9.7 9.4   < > 9.5   < > 9.5 9.2 9.2  --  9.1   PROT 9.2*  --   --   --   --   --   --   --   --   --    ALBUMIN 2.7*  --   --   --   --   --   --   --   --   --    PHOS  --  2.7  --   --   --   --   --   --   --  2.9   MG  --  2.9*  --   --   --  2.8*  --   --  2.6  --    AST 22  --   --   --   --   --   --   --   --   --    ALT 31  --   --   --   --   --   --   --   --   --    ALKPHOS 131  --   --   --   --   --   --   --   --   --    TROPONINI 0.009  --   --   --   --   --   --   --   --   --     < > = values in this interval not displayed.       All laboratory data reviewed    Microbiology: reviewed   Respiratory PCR negative  12/27 bcx staph aureus (BCID MRSA)  12/27 urine cx pending  12/29 bcx with GPC    Radiology:   CT Knee Without Contrast Left   Final Result      1. No acute abnormality.   2. Small joint effusion and mild degenerative changes.         Electronically signed by: Isai Villela   Date:    12/29/2023   Time:    16:45      X-Ray Knee 1 or 2 View Left   Final Result      1. No acute osseous abnormality.   2. Small joint effusion.         Electronically signed by: Isai Villela   Date:    12/29/2023   Time:    16:46      US Upper Extremity Veins Bilateral   Final Result      Thrombus within 1 of the right paired brachial veins.         Electronically signed  by: Gui Brush MD   Date:    12/28/2023   Time:    08:41      US Lower Extremity Veins Bilateral   Final Result      No evidence of deep venous thrombosis in either lower extremity.         Electronically signed by: Adrienne Elizondo MD   Date:    12/28/2023   Time:    08:39      X-Ray Foot Complete Right   Final Result      No acute osseous abnormality of the foot.         Electronically signed by: Ben Roman   Date:    12/28/2023   Time:    00:05      X-Ray Chest AP Portable   Final Result      No acute abnormality.         Electronically signed by: Ben Roman   Date:    12/27/2023   Time:    22:14      MRI Foot ([place]indfoot) Right W W/O Contrast    (Results Pending)        Current Medications:     Infusions:         Scheduled:   enoxparin  1 mg/kg Subcutaneous Q12H (treatment, non-standard time)    insulin detemir U-100  25 Units Subcutaneous BID    metoclopramide  5 mg Intravenous TID AC    mupirocin   Nasal BID    oxyCODONE-acetaminophen  1 tablet Oral Q8H    pantoprazole  40 mg Intravenous Daily    vancomycin (VANCOCIN) IV (PEDS and ADULTS)  1,750 mg Intravenous Q12H        PRN:  acetaminophen, dextrose 10 % in water (D10W), dextrose 10 % in water (D10W), dextrose 10%, dextrose 10%, droPERidol, glucagon (human recombinant), glucose, glucose, insulin aspart U-100, morphine, scopolamine, sodium chloride 0.9%, sodium chloride 0.9%, Pharmacy to dose Vancomycin consult **AND** vancomycin - pharmacy to dose    Antibiotics and Day Number of Therapy:  Vanc 12/27-present  Zosyn 12/27-12/29    Assessment/Plan:     Kulwant Mueller  is a 38 y.o. 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 The patient presented to [facility] on 12/27/2023 with a primary complaint of Intractable N/V and diarrhea for over 6 days, generalized weakness for 6 days. Admitted for DKA, which has now resolved s/p insulin gtt with bridge to  long-acting insulin. Found to have MRSA bacteremia, suspect source R heel wound.      Sepsis  MRSA bacteremia  Septic arthritis/bursitis of L knee  - WBC at OSH ER 33K, elevated to 47K with neutrophil predominance and patient febrile on admission  - respiratory PCR negative, CXR with no acute cardiopulmonary process, TTE neg for vegetation  - hematology consulted - multiple neutrophils noted per high power field on peripheral smear; no blasts/immature cells noted. Suspect leukocytosis 2/2 DKA and bacteremia   - 12/27 Bcx positive for MRSA, Ucx 12/27 with MRSA L knee acutely inflamed  - L knee aspiration with 118k WBC on 12/29  - s/p L knee I&D on 12/29  - 12/29 Bcx positive for GPC  Plan:  - ID on board, continue vanc with trough goal of 15-20  - Repeat Bcx in AM (q48hr until neg cultures)  - R foot MRI w/wo to evaluate for osteo as another potential source  - Percocet 5 q8h scheduled plus PRN morphine for pain    Intractable N/V  Gastroparesis  - was being worked up for gastroparesis outpatient, however was unable to complete testing as he vomiting during testing. Sx lasted for several weeks and spontaneously resolved, and he did not pursue further workup outpatient. He has been taking reglan outpatient, but is unsure if it does anything to help him.   Plan:  - continue home metoclopramide  - zofran not helping with N/V. PRN droperidol and scopolamine ordered. QTc 448, caution in QTc prolonging agents  - advance diet as tolerated, tolerating some PO today  - continue IV PPI    DKA - resolved  DM2 with long-term use insulin  - anion gap 31, beta-hydroxybutyric acid elevated, with bicarb 9 and elevated blood glucose; UA with ketones and glucose  - suspect due to intractable N/V, not taking home insulin, and infection  - DKA resolved s/p insulin gtt with bridge to long-acting insulin  - A1c 7.4 on 12/28, prior A1c as high as 14  - per most recent visit with diabetes NP, home regimen includes tresiba 55u daily, sliding  scale humalog, jardiance 25mg daily  Plan:  - continue levemir 25u BID + SSI, modify as patient able to tolerate diet  - monitor K, replete as needed  - space out BMPs to daily CMP    Acute kidney injury, resolved  - on admit, Cr 1.9; baseline 1  - suspect prerenal due to decreased PO intake  - Cr improving with IVF, currently at 1.0  - CTM    Chronic R heel wound  - 12/27 XR right foot with no osseous abnormality; has been following with wound care and has been off PO antibiotics for at least 1 month with no drainage or swelling from wound site  - MRI R hindfoot ordered  - wound care consulted    May-Thurner syndrome  Peripheral vascular disease  - has Hx May-Thurner syndrome and was following with Dr. Duff, who had kept patient on xarelto 10 mg daily  - patient reports has not been able to take xarelto for past 1-2 weeks 2' inability to tolerate PO  - DVT US BLE negative. DVT BUE with thrombus within 1 of the right paired brachial veins  - has noted Hx allergy to heparin analogues (reaction N/V) from when he received heparin drip prior to vascular intervention years ago; per chart review and discussion with pharmacy patient had received enoxaparin in 2018 and 2021 and appeared to tolerate; needs anticoagulation but unable to utilize PO regimen  - continue full dose lovenox     HTN  - patient reports not currently on any anti-hypertensive regimen  - SBP 130s throughout ER course  - Had an episode of SBP over 180 night on 12/29 s/p L knee I&D, improved with pain regimen  - monitor; initiate regimen as indicated    Diet: clear liquid, advance as tolerated  VTE PPx: full dose lovenox  Code Status: full    Dispo: Step down to the floor today with telemetry    Eduin Villegas DO  [facility] Internal Medicine, HO-II     [facility] Medicine Hospitalist Pager numbers:   [facility] Hospitalist Medicine Team A (Russel/Emmanuel): 2005  [facility] Hospitalist Medicine Team B (Odin/Kameron):  2006

## 2023-12-30 NOTE — SUBJECTIVE & OBJECTIVE
Interval History: Had drainage of fluid collection of anterior thigh, pus-like in appearance    Review of Systems   Constitutional:  Positive for fatigue and fever.   HENT:  Negative for sore throat.    Eyes:  Negative for visual disturbance.   Respiratory:  Negative for shortness of breath.    Cardiovascular:  Negative for chest pain.   Gastrointestinal:  Positive for diarrhea, nausea and vomiting. Negative for abdominal pain.   Genitourinary:  Negative for dysuria.   Musculoskeletal:  Negative for back pain.   Skin:  Positive for wound. Negative for rash.   Neurological:  Positive for weakness. Negative for headaches.   Hematological:  Negative for adenopathy.   Psychiatric/Behavioral:  The patient is not nervous/anxious.      Objective:     Vital Signs (Most Recent):  Temp: 98.9 °F (37.2 °C) (12/30/23 1115)  Pulse: 103 (12/30/23 1115)  Resp: 20 (12/30/23 1115)  BP: 139/75 (12/30/23 1100)  SpO2: 97 % (12/30/23 1115) Vital Signs (24h Range):  Temp:  [98 °F (36.7 °C)-101.8 °F (38.8 °C)] 98.9 °F (37.2 °C)  Pulse:  [] 103  Resp:  [14-31] 20  SpO2:  [94 %-99 %] 97 %  BP: (112-199)/() 139/75     Weight: 117 kg (257 lb 15 oz)  Body mass index is 34.03 kg/m².    Estimated Creatinine Clearance: 131.5 mL/min (based on SCr of 1 mg/dL).     Physical Exam  Vitals and nursing note reviewed.   Constitutional:       Appearance: He is well-developed.      Comments: Fatigued, uncomfortable.   HENT:      Head: Normocephalic and atraumatic.   Eyes:      Pupils: Pupils are equal, round, and reactive to light.   Cardiovascular:      Rate and Rhythm: Regular rhythm. Tachycardia present.   Pulmonary:      Effort: Pulmonary effort is normal.      Breath sounds: Normal breath sounds.   Abdominal:      General: Bowel sounds are normal.      Palpations: Abdomen is soft.   Musculoskeletal:         General: Normal range of motion.      Cervical back: Normal range of motion and neck supple.      Comments: Left knee with bandage    Neurological:      Mental Status: He is alert and oriented to person, place, and time.   Psychiatric:         Behavior: Behavior normal.         Thought Content: Thought content normal.         Judgment: Judgment normal.          Significant Labs: All pertinent labs within the past 24 hours have been reviewed.    Significant Imaging: I have reviewed all pertinent imaging results/findings within the past 24 hours.

## 2023-12-30 NOTE — PROGRESS NOTES
[facility] - Intensive Care  Infectious Disease  Progress Note    Patient Name: Kulwant Mueller Jr.  MRN: [id number]  Admission Date: 12/27/2023  Length of Stay: 2 days  Attending Physician: Russell Newby MD  Primary Care Provider: Shellie, Primary Doctor    Isolation Status: Contact  Assessment/Plan:      ID  MRSA bacteremia  38 y.o. 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      -blood cxs from 12/27 with staph aureus and from 12/29 with GPCs  -repeat blood cxs daily to document clearance  -TTE without vegetation  -continue vanco with goal VT of 15-20  -fluid from knee with GPCs await culture          Thank you for your consult. I will follow-up with patient. Please contact us if you have any additional questions.    Marciano Millan MD  Infectious Disease  [facility] - Intensive Care    Subjective:     Principal Problem:Staphylococcal arthritis of left knee    HPI: 38 y.o. 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 The patient presented to [facility] on 12/27/2023 with a primary complaint of Intractable N/V and diarrhea for over 6 days, generalized weakness for 6 days. He was in his usual state of health until around 1 week ago when he developed NBNB N/V and decreased oral intake. His family members had recently been ill as well, but have tested negative for COVID/flu. He reports he continued to have N/V whether or not he was able to have any oral intake. He presented to an ER for evaluation yesterday, where it was noted he had a WBC 33K, with an unremarkable CT A/P with negative COVID/flu. He was discharged with GI follow-up, however at the appointment today, there was concern for elevated WBC, and patient was instructed to present to the ER for  further evaluation. Does endorse some abdominal discomfort and 1 episode of non-mucoid non-bloody diarrhea this afternoon. Denies  fevers, chills, shortness of breath, chest pain, palpitations, dysuria. He was admitted for DKA and found to have MRSa bacteremia. He is currently on vanco and zosyn. Initially source was thought to be right foot heel ulcer but bedside US was concerning for left knee bursitis.   Interval History: Had drainage of fluid collection of anterior thigh, pus-like in appearance    Review of Systems   Constitutional:  Positive for fatigue and fever.   HENT:  Negative for sore throat.    Eyes:  Negative for visual disturbance.   Respiratory:  Negative for shortness of breath.    Cardiovascular:  Negative for chest pain.   Gastrointestinal:  Positive for diarrhea, nausea and vomiting. Negative for abdominal pain.   Genitourinary:  Negative for dysuria.   Musculoskeletal:  Negative for back pain.   Skin:  Positive for wound. Negative for rash.   Neurological:  Positive for weakness. Negative for headaches.   Hematological:  Negative for adenopathy.   Psychiatric/Behavioral:  The patient is not nervous/anxious.      Objective:     Vital Signs (Most Recent):  Temp: 98.9 °F (37.2 °C) (12/30/23 1115)  Pulse: 103 (12/30/23 1115)  Resp: 20 (12/30/23 1115)  BP: 139/75 (12/30/23 1100)  SpO2: 97 % (12/30/23 1115) Vital Signs (24h Range):  Temp:  [98 °F (36.7 °C)-101.8 °F (38.8 °C)] 98.9 °F (37.2 °C)  Pulse:  [] 103  Resp:  [14-31] 20  SpO2:  [94 %-99 %] 97 %  BP: (112-199)/() 139/75     Weight: 117 kg (257 lb 15 oz)  Body mass index is 34.03 kg/m².    Estimated Creatinine Clearance: 131.5 mL/min (based on SCr of 1 mg/dL).     Physical Exam  Vitals and nursing note reviewed.   Constitutional:       Appearance: He is well-developed.      Comments: Fatigued, uncomfortable.   HENT:      Head: Normocephalic and atraumatic.   Eyes:      Pupils: Pupils are equal, round, and reactive to light.    Cardiovascular:      Rate and Rhythm: Regular rhythm. Tachycardia present.   Pulmonary:      Effort: Pulmonary effort is normal.      Breath sounds: Normal breath sounds.   Abdominal:      General: Bowel sounds are normal.      Palpations: Abdomen is soft.   Musculoskeletal:         General: Normal range of motion.      Cervical back: Normal range of motion and neck supple.      Comments: Left knee with bandage   Neurological:      Mental Status: He is alert and oriented to person, place, and time.   Psychiatric:         Behavior: Behavior normal.         Thought Content: Thought content normal.         Judgment: Judgment normal.          Significant Labs: All pertinent labs within the past 24 hours have been reviewed.    Significant Imaging: I have reviewed all pertinent imaging results/findings within the past 24 hours.

## 2023-12-30 NOTE — PT/OT/SLP PROGRESS
[facility] - Intensive Care  Orthopedics  Progress Note    Patient Name: Kulwant Mueller Jr.  MRN: [id number]  Admission Date: 12/27/2023  Hospital Length of Stay: 2 days  Attending Provider: Russell Newby MD  Primary Care Provider: Shellie Primary Doctor  Follow-up For: Procedure(s) (LRB):  ARTHROTOMY, KNEE (Left)    Post-Operative Day: 1 Day Post-Op  Subjective:     Principal Problem:Staphylococcal arthritis of left knee    Principal Orthopedic Problem:  Septic arthritis left knee    Interval History:  Postop day 1 status post washout left knee doing well patient reports a little bit less pain in the knee   Cultures pending  G stain positive for Gram-positive cocci    Review of patient's allergies indicates:   Allergen Reactions    Heparin analogues Nausea And Vomiting       Current Facility-Administered Medications   Medication    acetaminophen tablet 650 mg    dextrose 10 % infusion    dextrose 10 % infusion    dextrose 10% bolus 125 mL 125 mL    dextrose 10% bolus 250 mL 250 mL    droPERidol injection 0.625 mg    enoxaparin injection 120 mg    glucagon (human recombinant) injection 1 mg    glucose chewable tablet 16 g    glucose chewable tablet 24 g    insulin aspart U-100 pen 0-10 Units    insulin detemir U-100 (Levemir) pen 25 Units    metoclopramide injection 5 mg    morphine injection 4 mg    mupirocin 2 % ointment    oxyCODONE-acetaminophen 5-325 mg per tablet 1 tablet    pantoprazole injection 40 mg    scopolamine 1.3-1.5 mg (1 mg over 3 days) 1 patch    sodium chloride 0.9% flush 10 mL    sodium chloride 0.9% flush 10 mL    vancomycin (VANCOCIN) 1,750 mg in dextrose 5 % (D5W) 500 mL IVPB    vancomycin - pharmacy to dose     Objective:     Vital Signs (Most Recent):  Temp: 98.9 °F (37.2 °C) (12/30/23 1115)  Pulse: 103 (12/30/23 1115)  Resp: 20 (12/30/23 1115)  BP: (!) 156/83 (12/30/23 1115)  SpO2: 97 % (12/30/23 1115) Vital Signs (24h Range):  Temp:  [98 °F (36.7 °C)-101.8 °F (38.8 °C)] 98.9 °F (37.2  "°C)  Pulse:  [] 103  Resp:  [14-31] 20  SpO2:  [94 %-99 %] 97 %  BP: (112-199)/() 156/83     Weight: 117 kg (257 lb 15 oz)  Height: 6' 1" (185.4 cm)  Body mass index is 34.03 kg/m².      Intake/Output Summary (Last 24 hours) at 12/30/2023 1148  Last data filed at 12/30/2023 1115  Gross per 24 hour   Intake 1901.84 ml   Output 2265 ml   Net -363.16 ml       Ortho/SPM Exam left knee dressing intact dry drainage in the drain is minimal    Significant Labs: All pertinent labs within the past 24 hours have been reviewed.    Significant Imaging: I have reviewed and interpreted all pertinent imaging results/findings.    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 due to underlying condition, limited to breakdown of skin [E08.621, L97.401] 12/30/2023 Yes    MRSA bacteremia [R78.81, B95.62] 12/29/2023 Yes    Other elevated white blood cell count [D72.828] 12/28/2023 Yes    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] 10/03/2018 Yes     Chronic    DKA (diabetic ketoacidoses) [E11.10] 09/02/2018 Yes    Diabetic gastroparesis associated with type 2 diabetes mellitus [E11.43, K31.84] 08/31/2018 Yes    History of intravascular stent placement [Z95.828] 09/14/2017 Not Applicable    Intractable nausea and vomiting [R11.2] 08/23/2017 Yes    Type 2 diabetes mellitus with complication, with long-term current use of insulin [E11.8, Z79.4] 08/19/2017 Not Applicable     Chronic      Problems Resolved During this Admission:     Plan:  Continue IV antibiotics   Follow cultures   Plan drain removal tomorrow   Ambulate with PT when able    Edy Bocanegra Jr, MD  Orthopedics  [facility] - Intensive Care  "

## 2023-12-30 NOTE — TRANSFER OF CARE
"Anesthesia Transfer of Care Note    Patient: Kulwant Mueller Jr.    Procedure(s) Performed: Procedure(s) (LRB):  ARTHROTOMY, KNEE (Left)    Patient location: ICU    Transport from OR: Transported from OR on 6-10 L/min O2 by face mask with adequate spontaneous ventilation    Post pain: adequate analgesia    Post assessment: no apparent anesthetic complications    Post vital signs: stable    Level of consciousness: sedated    Nausea/Vomiting: no nausea/vomiting    Complications: none    Transfer of care protocol was followed    Last vitals: Visit Vitals  BP (!) 169/98 (BP Location: Left arm, Patient Position: Lying)   Pulse (!) 116   Temp 37.7 °C (99.8 °F) (Oral)   Resp (!) 24   Ht 6' 1" (1.854 m)   Wt 114.5 kg (252 lb 6.8 oz)   SpO2 (!) 94%   BMI 33.30 kg/m²     "

## 2023-12-31 LAB
ALBUMIN SERPL BCP-MCNC: 1.7 G/DL (ref 3.5–5.2)
ALP SERPL-CCNC: 237 U/L (ref 55–135)
ALT SERPL W/O P-5'-P-CCNC: 125 U/L (ref 10–44)
ANION GAP SERPL CALC-SCNC: 11 MMOL/L (ref 8–16)
AST SERPL-CCNC: 200 U/L (ref 10–40)
BACTERIA BLD CULT: ABNORMAL
BASOPHILS # BLD AUTO: 0.11 K/UL (ref 0–0.2)
BASOPHILS NFR BLD: 0.4 % (ref 0–1.9)
BILIRUB SERPL-MCNC: 0.8 MG/DL (ref 0.1–1)
BUN SERPL-MCNC: 16 MG/DL (ref 6–20)
CALCIUM SERPL-MCNC: 9.3 MG/DL (ref 8.7–10.5)
CHLORIDE SERPL-SCNC: 100 MMOL/L (ref 95–110)
CO2 SERPL-SCNC: 25 MMOL/L (ref 23–29)
CREAT SERPL-MCNC: 0.8 MG/DL (ref 0.5–1.4)
DIFFERENTIAL METHOD BLD: ABNORMAL
EOSINOPHIL # BLD AUTO: 0 K/UL (ref 0–0.5)
EOSINOPHIL NFR BLD: 0 % (ref 0–8)
ERYTHROCYTE [DISTWIDTH] IN BLOOD BY AUTOMATED COUNT: 14.6 % (ref 11.5–14.5)
EST. GFR  (NO RACE VARIABLE): >60 ML/MIN/1.73 M^2
GLUCOSE SERPL-MCNC: 153 MG/DL (ref 70–110)
HCT VFR BLD AUTO: 36.1 % (ref 40–54)
HGB BLD-MCNC: 11.7 G/DL (ref 14–18)
IMM GRANULOCYTES # BLD AUTO: 0.27 K/UL (ref 0–0.04)
IMM GRANULOCYTES NFR BLD AUTO: 1 % (ref 0–0.5)
LYMPHOCYTES # BLD AUTO: 1.5 K/UL (ref 1–4.8)
LYMPHOCYTES NFR BLD: 5.2 % (ref 18–48)
MAGNESIUM SERPL-MCNC: 2.4 MG/DL (ref 1.6–2.6)
MCH RBC QN AUTO: 27.9 PG (ref 27–31)
MCHC RBC AUTO-ENTMCNC: 32.4 G/DL (ref 32–36)
MCV RBC AUTO: 86 FL (ref 82–98)
MONOCYTES # BLD AUTO: 1.6 K/UL (ref 0.3–1)
MONOCYTES NFR BLD: 5.6 % (ref 4–15)
NEUTROPHILS # BLD AUTO: 24.5 K/UL (ref 1.8–7.7)
NEUTROPHILS NFR BLD: 87.8 % (ref 38–73)
NRBC BLD-RTO: 0 /100 WBC
PHOSPHATE SERPL-MCNC: 3.2 MG/DL (ref 2.7–4.5)
PLATELET # BLD AUTO: 351 K/UL (ref 150–450)
PMV BLD AUTO: 10.7 FL (ref 9.2–12.9)
POCT GLUCOSE: 110 MG/DL (ref 70–110)
POCT GLUCOSE: 165 MG/DL (ref 70–110)
POCT GLUCOSE: 178 MG/DL (ref 70–110)
POCT GLUCOSE: 220 MG/DL (ref 70–110)
POTASSIUM SERPL-SCNC: 3.7 MMOL/L (ref 3.5–5.1)
PROT SERPL-MCNC: 8.1 G/DL (ref 6–8.4)
RBC # BLD AUTO: 4.19 M/UL (ref 4.6–6.2)
SODIUM SERPL-SCNC: 136 MMOL/L (ref 136–145)
WBC # BLD AUTO: 27.91 K/UL (ref 3.9–12.7)

## 2023-12-31 PROCEDURE — 63600175 PHARM REV CODE 636 W HCPCS

## 2023-12-31 PROCEDURE — 27000207 HC ISOLATION

## 2023-12-31 PROCEDURE — 63600175 PHARM REV CODE 636 W HCPCS: Performed by: INTERNAL MEDICINE

## 2023-12-31 PROCEDURE — 25000003 PHARM REV CODE 250

## 2023-12-31 PROCEDURE — 11000001 HC ACUTE MED/SURG PRIVATE ROOM

## 2023-12-31 PROCEDURE — 25000003 PHARM REV CODE 250: Performed by: INTERNAL MEDICINE

## 2023-12-31 PROCEDURE — 80053 COMPREHEN METABOLIC PANEL: CPT

## 2023-12-31 PROCEDURE — 36415 COLL VENOUS BLD VENIPUNCTURE: CPT

## 2023-12-31 PROCEDURE — 84100 ASSAY OF PHOSPHORUS: CPT

## 2023-12-31 PROCEDURE — 83735 ASSAY OF MAGNESIUM: CPT

## 2023-12-31 PROCEDURE — 85025 COMPLETE CBC W/AUTO DIFF WBC: CPT

## 2023-12-31 PROCEDURE — 99232 SBSQ HOSP IP/OBS MODERATE 35: CPT | Mod: ,,, | Performed by: INTERNAL MEDICINE

## 2023-12-31 PROCEDURE — 87040 BLOOD CULTURE FOR BACTERIA: CPT

## 2023-12-31 PROCEDURE — 99233 SBSQ HOSP IP/OBS HIGH 50: CPT | Mod: ,,, | Performed by: ORTHOPAEDIC SURGERY

## 2023-12-31 PROCEDURE — C9113 INJ PANTOPRAZOLE SODIUM, VIA: HCPCS

## 2023-12-31 RX ORDER — OXYCODONE HYDROCHLORIDE 5 MG/1
5 TABLET ORAL EVERY 6 HOURS PRN
Status: DISCONTINUED | OUTPATIENT
Start: 2023-12-31 | End: 2024-01-04

## 2023-12-31 RX ORDER — SENNOSIDES 8.6 MG/1
8.6 TABLET ORAL NIGHTLY
Status: DISCONTINUED | OUTPATIENT
Start: 2023-12-31 | End: 2024-01-04

## 2023-12-31 RX ORDER — POLYETHYLENE GLYCOL 3350 17 G/17G
17 POWDER, FOR SOLUTION ORAL 2 TIMES DAILY
Status: DISCONTINUED | OUTPATIENT
Start: 2023-12-31 | End: 2024-01-04

## 2023-12-31 RX ORDER — HYDROMORPHONE HYDROCHLORIDE 1 MG/ML
0.5 INJECTION, SOLUTION INTRAMUSCULAR; INTRAVENOUS; SUBCUTANEOUS EVERY 6 HOURS PRN
Status: DISCONTINUED | OUTPATIENT
Start: 2023-12-31 | End: 2024-01-04

## 2023-12-31 RX ADMIN — MUPIROCIN: 20 OINTMENT TOPICAL at 08:12

## 2023-12-31 RX ADMIN — OXYCODONE HYDROCHLORIDE 5 MG: 5 TABLET ORAL at 09:12

## 2023-12-31 RX ADMIN — OXYCODONE HYDROCHLORIDE AND ACETAMINOPHEN 1 TABLET: 5; 325 TABLET ORAL at 05:12

## 2023-12-31 RX ADMIN — OXYCODONE HYDROCHLORIDE AND ACETAMINOPHEN 1 TABLET: 5; 325 TABLET ORAL at 10:12

## 2023-12-31 RX ADMIN — VANCOMYCIN HYDROCHLORIDE 2000 MG: 10 INJECTION, POWDER, LYOPHILIZED, FOR SOLUTION INTRAVENOUS at 09:12

## 2023-12-31 RX ADMIN — MUPIROCIN: 20 OINTMENT TOPICAL at 10:12

## 2023-12-31 RX ADMIN — METOCLOPRAMIDE 5 MG: 5 INJECTION, SOLUTION INTRAMUSCULAR; INTRAVENOUS at 05:12

## 2023-12-31 RX ADMIN — ENOXAPARIN SODIUM 120 MG: 120 INJECTION SUBCUTANEOUS at 05:12

## 2023-12-31 RX ADMIN — INSULIN DETEMIR 25 UNITS: 100 INJECTION, SOLUTION SUBCUTANEOUS at 10:12

## 2023-12-31 RX ADMIN — VANCOMYCIN HYDROCHLORIDE 2000 MG: 10 INJECTION, POWDER, LYOPHILIZED, FOR SOLUTION INTRAVENOUS at 11:12

## 2023-12-31 RX ADMIN — POLYETHYLENE GLYCOL 3350 17 G: 17 POWDER, FOR SOLUTION ORAL at 10:12

## 2023-12-31 RX ADMIN — INSULIN ASPART 4 UNITS: 100 INJECTION, SOLUTION INTRAVENOUS; SUBCUTANEOUS at 11:12

## 2023-12-31 RX ADMIN — INSULIN DETEMIR 25 UNITS: 100 INJECTION, SOLUTION SUBCUTANEOUS at 08:12

## 2023-12-31 RX ADMIN — MORPHINE SULFATE 4 MG: 4 INJECTION INTRAVENOUS at 03:12

## 2023-12-31 RX ADMIN — INSULIN ASPART 2 UNITS: 100 INJECTION, SOLUTION INTRAVENOUS; SUBCUTANEOUS at 06:12

## 2023-12-31 RX ADMIN — INSULIN ASPART 1 UNITS: 100 INJECTION, SOLUTION INTRAVENOUS; SUBCUTANEOUS at 10:12

## 2023-12-31 RX ADMIN — OXYCODONE HYDROCHLORIDE AND ACETAMINOPHEN 1 TABLET: 5; 325 TABLET ORAL at 02:12

## 2023-12-31 RX ADMIN — PANTOPRAZOLE SODIUM 40 MG: 40 INJECTION, POWDER, FOR SOLUTION INTRAVENOUS at 09:12

## 2023-12-31 RX ADMIN — METOCLOPRAMIDE 5 MG: 5 INJECTION, SOLUTION INTRAMUSCULAR; INTRAVENOUS at 10:12

## 2023-12-31 NOTE — PROGRESS NOTES
[facility]  Orthopedics  Progress Note    Patient Name: Kulwant Mueller Jr.  MRN: [id number]  Admission Date: 12/27/2023  Hospital Length of Stay: 3 days  Attending Provider: Russell Newby MD  Primary Care Provider: Shellie Primary Doctor  Follow-up For: Procedure(s) (LRB):  ARTHROTOMY, KNEE (Left)    Post-Operative Day: 2 Days Post-Op  Subjective:     Principal Problem:Staphylococcal arthritis of left knee    Principal Orthopedic Problem:  Septic arthritis left knee    Interval History:  Postop day 2 status post I&D and washout left knee for septic arthritis   Complaining of pain but improving left knee    Review of patient's allergies indicates:   Allergen Reactions    Heparin analogues Nausea And Vomiting       Current Facility-Administered Medications   Medication    dextrose 10 % infusion    dextrose 10 % infusion    dextrose 10% bolus 125 mL 125 mL    dextrose 10% bolus 250 mL 250 mL    droPERidol injection 0.625 mg    enoxaparin injection 120 mg    glucagon (human recombinant) injection 1 mg    glucose chewable tablet 16 g    glucose chewable tablet 24 g    HYDROmorphone injection 0.5 mg    insulin aspart U-100 pen 0-10 Units    insulin detemir U-100 (Levemir) pen 25 Units    metoclopramide injection 5 mg    [START ON 1/1/2024] metoprolol succinate (TOPROL-XL) 24 hr split tablet 12.5 mg    mupirocin 2 % ointment    oxyCODONE immediate release tablet 5 mg    oxyCODONE-acetaminophen 5-325 mg per tablet 1 tablet    pantoprazole injection 40 mg    polyethylene glycol packet 17 g    scopolamine 1.3-1.5 mg (1 mg over 3 days) 1 patch    senna tablet 8.6 mg    sodium chloride 0.9% flush 10 mL    sodium chloride 0.9% flush 10 mL    vancomycin - pharmacy to dose    vancomycin 2 g in dextrose 5 % 500 mL IVPB     Objective:     Vital Signs (Most Recent):  Temp: 98.5 °F (36.9 °C) (12/31/23 1401)  Pulse: 110 (12/31/23 1401)  Resp: 18 (12/31/23 1408)  BP: (!) 172/98 (12/31/23 1401)  SpO2: 95 % (12/31/23 1401) Vital  "Signs (24h Range):  Temp:  [98 °F (36.7 °C)-100.7 °F (38.2 °C)] 98.5 °F (36.9 °C)  Pulse:  [] 110  Resp:  [12-23] 18  SpO2:  [90 %-97 %] 95 %  BP: (115-186)/() 172/98     Weight: 117 kg (257 lb 15 oz)  Height: 6' 1" (185.4 cm)  Body mass index is 34.03 kg/m².      Intake/Output Summary (Last 24 hours) at 12/31/2023 1458  Last data filed at 12/31/2023 1200  Gross per 24 hour   Intake 2880.14 ml   Output 1250 ml   Net 1630.14 ml       Ortho/SPM Exam intact left knee minimal drainage   Drain removed with minimal drainage   Range of motion limited left knee       Significant Labs: All pertinent labs within the past 24 hours have been reviewed.    Significant Imaging: I have reviewed and interpreted all pertinent imaging results/findings.    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 due to underlying condition, limited to breakdown of skin [E08.621, L97.401] 12/30/2023 Yes    Diabetes mellitus [E11.9] 12/30/2023 Yes    MRSA bacteremia [R78.81, B95.62] 12/29/2023 Yes    Other elevated white blood cell count [D72.828] 12/28/2023 Yes    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] 10/03/2018 Yes     Chronic    DKA (diabetic ketoacidoses) [E11.10] 09/02/2018 Yes    Diabetic gastroparesis associated with type 2 diabetes mellitus [E11.43, K31.84] 08/31/2018 Yes    History of intravascular stent placement [Z95.828] 09/14/2017 Not Applicable    Intractable nausea and vomiting [R11.2] 08/23/2017 Yes    Type 2 diabetes mellitus with complication, with long-term current use of insulin [E11.8, Z79.4] 08/19/2017 Not Applicable     Chronic      Problems Resolved During this Admission:     Plan: Continue IV " antibiotics and follow cultures   PT ordered for mobilization tomorrow    Edy Bocanegra Jr, MD  Orthopedics  [facility] S[facility]

## 2023-12-31 NOTE — PLAN OF CARE
VN note: pt transferred from ICU. progress notes, labs and vital signs reviewed. Will be available to intervene if needed.

## 2023-12-31 NOTE — NURSING
Pt transferred to room 522 vss, no acute ditress noted. Last recorded temp was 100.5, per Dr. Quinonez, do not treat unless symptomatic. All belongings sent with patient, including cell phone, clothes bag, and shoes. Receiving unit notifed of patients arrival. Wife updated via phone. Call light given to patient, bed in lowest position with side rails up.

## 2023-12-31 NOTE — NURSING
Notified Marcelino CANDELARIO of pt SBP sustaining 160s-170s after PRN morphine admin and pt asymptomatic. No new orders at this time.

## 2023-12-31 NOTE — PROGRESS NOTES
Pharmacokinetic Assessment Follow Up: IV Vancomycin    Vancomycin serum concentration assessment(s):    The trough level was drawn correctly and can be used to guide therapy at this time. The measurement is below the desired definitive target range of 15 to 20 mcg/mL.    Vancomycin Regimen Plan:    Give 1 time dose IV Vanc 2250 mg x 2 dose followed by Vancomycin 2000 mg IV every 12 hours with next serum trough concentration measured at 0800 prior to 4th dose on 1/1    Drug levels (last 3 results):  Recent Labs   Lab Result Units 12/28/23  2357 12/29/23  1357 12/30/23  1842   Vancomycin, Random ug/mL 4.1 9.8  --    Vancomycin-Trough ug/mL  --   --  11.3       Pharmacy will continue to follow and monitor vancomycin.    Please contact pharmacy at extension [phone] for questions regarding this assessment.    Thank you for the consult,   Lupe Gonzalez       Patient brief summary:  Kulwant Mueller Jr. is a 38 y.o. male initiated on antimicrobial therapy with IV Vancomycin for treatment of bacteremia    The patient's current regimen is 1750 mh q12h    Drug Allergies:   Review of patient's allergies indicates:   Allergen Reactions    Heparin analogues Nausea And Vomiting       Actual Body Weight:   117 kg    Renal Function:   Estimated Creatinine Clearance: 164.4 mL/min (based on SCr of 0.8 mg/dL).,     Dialysis Method (if applicable):  N/A    CBC (last 72 hours):  Recent Labs   Lab Result Units 12/28/23  1305 12/29/23  0400 12/30/23  0346 12/31/23  0416   WBC K/uL 43.64* 38.53* 25.58* 27.91*   Hemoglobin g/dL 13.0* 12.6* 12.0* 11.7*   Hematocrit % 37.4* 37.9* 36.1* 36.1*   Platelets K/uL 395 417 353 351   Gran % % 91.2* 88.1* 88.7* 87.8*   Lymph % % 1.7* 3.6* 4.0* 5.2*   Mono % % 5.5 6.1 5.4 5.6   Eosinophil % % 0.0 0.0 0.0 0.0   Basophil % % 0.4 0.4 0.4 0.4   Differential Method  Automated Automated Automated Automated       Metabolic Panel (last 72 hours):  Recent Labs   Lab Result Units 12/28/23  0812 12/28/23  130  12/28/23  1611 12/28/23  2026 12/28/23  2357 12/29/23  0400 12/29/23  2045 12/29/23  2349 12/30/23  0346 12/30/23  0804 12/31/23  0416   Sodium mmol/L 137 138 139 139 140 138 139 140  --  140 136   Potassium mmol/L 3.6 3.6 3.5 3.7 3.6 3.6 3.7 3.8  --  3.8 3.7   Chloride mmol/L 102 102 103 103 104 103 103 103  --  103 100   CO2 mmol/L 17* 21* 22* 21* 22* 21* 21* 22*  --  23 25   Glucose mg/dL 204* 178* 201* 180* 192* 235* 192* 210*  --  195* 153*   BUN mg/dL 27* 25* 22* 21* 22* 19 18 19  --  18 16   Creatinine mg/dL 1.7* 1.7* 1.6* 1.6* 1.6* 1.5* 1.1 1.1  --  1.0 0.8   Albumin g/dL  --   --   --   --   --   --   --   --   --   --  1.7*   Total Bilirubin mg/dL  --   --   --   --   --   --   --   --   --   --  0.8   Alkaline Phosphatase U/L  --   --   --   --   --   --   --   --   --   --  237*   AST U/L  --   --   --   --   --   --   --   --   --   --  200*   ALT U/L  --   --   --   --   --   --   --   --   --   --  125*   Magnesium mg/dL  --   --   --   --   --  2.8*  --   --  2.6  --  2.4   Phosphorus mg/dL  --   --   --   --   --   --   --   --   --  2.9 3.2       Vancomycin Administrations:  vancomycin given in the last 96 hours                     vancomycin (VANCOCIN) 2,250 mg in dextrose 5 % (D5W) 500 mL IVPB (mg) 2,250 mg New Bag 12/30/23 2130    vancomycin (VANCOCIN) 1,750 mg in dextrose 5 % (D5W) 500 mL IVPB (mg) 1,750 mg New Bag 12/30/23 0734     1,750 mg New Bag 12/29/23 2007    vancomycin (VANCOCIN) 1,750 mg in dextrose 5 % (D5W) 500 mL IVPB (mg) 1,750 mg New Bag 12/29/23 0139    vancomycin 2 g in dextrose 5 % 500 mL IVPB (mg) 2,000 mg New Bag 12/28/23 0004                    Microbiologic Results:  Microbiology Results (last 7 days)       Procedure Component Value Units Date/Time    Aerobic culture [[id number]] Collected: 12/29/23 1830    Order Status: Completed Specimen: Wound from Knee, Left Updated: 12/31/23 0516     Aerobic Bacterial Culture No growth    Narrative:      After Incision #2    Culture,  Fluid  (Aerobic) [[id number]] Collected: 12/29/23 1238    Order Status: Completed Specimen: Joint Fluid from Knee, Left Updated: 12/31/23 0516     AEROBIC CULTURE - FLUID No growth     Gram Stain Result Few WBC's      No organisms seen    Blood culture [[id number]] Collected: 12/31/23 0418    Order Status: Sent Specimen: Blood Updated: 12/31/23 0419    Blood culture [[id number]] Collected: 12/31/23 0416    Order Status: Sent Specimen: Blood from Antecubital, Left Arm Updated: 12/31/23 0416    Blood culture [[id number]] Collected: 12/29/23 2045    Order Status: Completed Specimen: Blood Updated: 12/30/23 1739     Blood Culture, Routine Gram stain aer bottle: Gram positive cocci in clusters resembling Staph      Positive results previously called 12/30/2023    Narrative:      Collection has been rescheduled by [email or web address] at 12/29/2023 14:04 Reason:   Patient Refused/ 2nd set of blood cultures   Collection has been rescheduled by [email or web address] at 12/29/2023 17:54 Reason:   Patient unavailable/pt not in room   Collection has been rescheduled by [email or web address] at 12/29/2023 14:04 Reason:   Patient Refused/ 2nd set of blood cultures   Collection has been rescheduled by [email or web address] at 12/29/2023 17:54 Reason:   Patient unavailable/pt not in room     Blood culture [[id number]] Collected: 12/29/23 1357    Order Status: Completed Specimen: Blood Updated: 12/30/23 1323     Blood Culture, Routine Gram stain aer bottle: Gram positive cocci in clusters resembling Staph      Results called to and read back by; Haley Horne RN 12/30/2023  10:56      Gram stain patsy bottle: Gram positive cocci in clusters resembling Staph      12/30/2023  13:23  Positive results previously called    Blood culture [[id number]]  (Abnormal)  (Susceptibility) Collected: 12/27/23 2242    Order Status: Completed Specimen: Blood Updated: 12/30/23 1034     Blood Culture, Routine Gram stain aer bottle: Gram positive cocci in clusters resembling Staph      Results called to and read back  by:Haroon Bravo RN 12/28/2023  15:59      Gram stain patsy bottle: Gram positive cocci in clusters resembling Staph      METHICILLIN RESISTANT STAPHYLOCOCCUS AUREUS  ID consult required at [facility] and [facility].      Urine culture [[id number]]  (Abnormal)  (Susceptibility) Collected: 12/27/23 2318    Order Status: Completed Specimen: Urine Updated: 12/30/23 0919     Urine Culture, Routine METHICILLIN RESISTANT STAPHYLOCOCCUS AUREUS  10,000 - 49,999 cfu/ml      Narrative:      Specimen Source->Urine    Culture, Anaerobe [[id number]] Collected: 12/29/23 1238    Order Status: Completed Specimen: Joint Fluid from Knee, Left Updated: 12/30/23 0808     Anaerobic Culture Culture in progress    Narrative:      From left knee bursa    Blood culture [[id number]]  (Abnormal) Collected: 12/27/23 2329    Order Status: Completed Specimen: Blood from Peripheral, Hand, Right Updated: 12/30/23 0751     Blood Culture, Routine Gram stain aer bottle: Gram positive cocci in clusters resembling Staph      Positive results previously called 12/29/2023  00:06      Gram stain patsy bottle: Gram positive cocci in clusters resembling Staph      12/29/2023  02:47      METHICILLIN RESISTANT STAPHYLOCOCCUS AUREUS  ID consult required at [facility] and [facility].  For susceptibility see order # [id number]      Gram stain [[id number]] Collected: 12/29/23 1830    Order Status: Completed Specimen: Wound from Knee, Left Updated: 12/29/23 2325     Gram Stain Result Many WBC's      No organisms seen    Narrative:      Before Incision #1    Gram stain [[id number]] Collected: 12/29/23 1830    Order Status: Completed Specimen: Wound from Knee, Left Updated: 12/29/23 2309     Gram Stain Result Rare WBC's      Rare Gram positive cocci    Narrative:      After Incision #2    Culture, Anaerobe [[id number]] Collected: 12/29/23 1830    Order Status: Sent Specimen: Wound from Knee, Left Updated: 12/29/23 2157    Aerobic culture  [[id number]] Collected: 12/29/23 1830    Order Status: Sent Specimen: Wound from Knee, Left Updated: 12/29/23 2157    Culture, Anaerobe [[id number]] Collected: 12/29/23 1830    Order Status: Sent Specimen: Wound from Knee, Left Updated: 12/29/23 2157    Gram stain [[id number]] Collected: 12/29/23 1238    Order Status: Completed Specimen: Joint Fluid from Knee, Left Updated: 12/29/23 2115     Gram Stain Result Moderate WBC's      No organisms seen    Narrative:      From left knee bursa    Gram stain [[id number]]     Order Status: Canceled Specimen: Joint Fluid from Knee, Left     Culture, Anaerobe [[id number]]     Order Status: Canceled Specimen: Joint Fluid from Knee, Left     Aerobic culture [[id number]]     Order Status: Canceled Specimen: Incision site from Knee, Left     Culture, Anaerobe [[id number]]     Order Status: Canceled Specimen: Joint Fluid from Knee, Left     Aerobic culture [[id number]]     Order Status: Canceled Specimen: Wound from Knee, Left     Gram stain [[id number]]     Order Status: Canceled Specimen: Joint Fluid from Knee, Left     Culture, Anaerobe [[id number]]     Order Status: Canceled Specimen: Joint Fluid from Knee, Left     Aerobic culture [[id number]]     Order Status: Canceled Specimen: Incision site from Knee, Left     Gram stain [[id number]]     Order Status: Canceled Specimen: Joint Fluid from Knee, Left     MRSA/SA Rapid ID by PCR from Blood culture [[id number]]  (Abnormal) Collected: 12/28/23 1600    Order Status: Completed Updated: 12/28/23 1731     Staph aureus ID by PCR Positive     Methicillin Resistant ID by PCR Positive    Respiratory Infection Panel (PCR), Nasopharyngeal [[id number]] Collected: 12/28/23 0047    Order Status: Completed Specimen: Nasopharyngeal Swab Updated: 12/28/23 1224     Respiratory Infection Panel Source NP Swab     Adenovirus Not Detected     Coronavirus 229E, Common Cold Virus Not Detected     Coronavirus HKU1, Common Cold Virus Not Detected      Coronavirus NL63, Common Cold Virus Not Detected     Coronavirus OC43, Common Cold Virus Not Detected     Comment: The Coronavirus strains detected in this test cause the common cold.  These strains are not the COVID-19 (novel Coronavirus)strain   associated with the respiratory disease outbreak.          SARS-CoV2 (COVID-19) Qualitative PCR Not Detected     Human Metapneumovirus Not Detected     Human Rhinovirus/Enterovirus Not Detected     Influenza A (subtypes H1, H1-2009,H3) Not Detected     Influenza B Not Detected     Parainfluenza Virus 1 Not Detected     Parainfluenza Virus 2 Not Detected     Parainfluenza Virus 3 Not Detected     Parainfluenza Virus 4 Not Detected     Respiratory Syncytial Virus Not Detected     Bordetella Parapertussis (TU5066) Not Detected     Bordetella pertussis (ptxP) Not Detected     Chlamydia pneumoniae Not Detected     Mycoplasma pneumoniae Not Detected    Narrative:      For all other respiratory sources, order [id number] -  Respiratory Viral Panel by PCR    Influenza A & B by Molecular [[id number]] Collected: 12/27/23 2229    Order Status: Completed Specimen: Nasopharyngeal Swab Updated: 12/27/23 2313     Influenza A, Molecular Negative     Influenza B, Molecular Negative     Flu A & B Source Nasal swab    Blood culture [[id number]] Collected: 12/27/23 2243    Order Status: Canceled Specimen: Blood from Peripheral, Antecubital, Right

## 2023-12-31 NOTE — PLAN OF CARE
"  Care Plan    Pt remains in ICU at this time. Pt AAOx4. SpO2 90s, RA. NSR 90s. SBP 130s-180s overnight. UOP 1250 ml. PRN tylenol given x1 overnight for temp 100.7.   POC reviewed with pt and family. Questions and concerns addressed. Safety and infection precautions in place. See below and flowsheets for full assessment and VS info.     Neuro:  [place] Coma Scale  Best Eye Response: 3-->(E3) to speech  Best Motor Response: 6-->(M6) obeys commands  Best Verbal Response: 5-->(V5) oriented  [place] Coma Scale Score: 14  Assessment Qualifiers: patient not sedated/intubated  Pupil PERRLA: yes  24 hr Temp:  [98 °F (36.7 °C)-101.8 °F (38.8 °C)]      CV:  Rhythm: normal sinus rhythm  DVT prophylaxis: VTE Required Core Measure: Pharmacological prophylaxis initiated/maintained    Resp:          GI/:  GI prophylaxis: yes  Diet/Nutrition Received: clear liquid  Last Bowel Movement: 12/29/23  Voiding Characteristics: external catheter   Intake/Output Summary (Last 24 hours) at 12/31/2023 0615  Last data filed at 12/31/2023 0440  Gross per 24 hour   Intake 2856.56 ml   Output 2565 ml   Net 291.56 ml       Labs/Accuchecks:  Recent Labs   Lab 12/31/23  0416   WBC 27.91*   RBC 4.19*   HGB 11.7*   HCT 36.1*         Recent Labs   Lab 12/31/23  0416      K 3.7   CO2 25      BUN 16   CREATININE 0.8   ALKPHOS 237*   *   *   BILITOT 0.8    No results for input(s): "PROTIME", "INR", "APTT", "HEPANTIXA" in the last 168 hours.   Recent Labs   Lab 12/27/23  2158   TROPONINI 0.009       Electrolytes: N/A - electrolytes WDL  Accuchecks: ACHS    Gtts/LDAs:      Lines/Drains/Airways       Drain  Duration                  Closed/Suction Drain 12/29/23 1750 Tube - 1 Left;Anterior Knee Accordion 15 Fr. 1 day              Peripheral Intravenous Line  Duration                  Peripheral IV - Single Lumen 12/27/23 2201 20 G Right Antecubital 3 days         Peripheral IV - Single Lumen 12/27/23 2329 20 G " Posterior;Right Hand 3 days                    Skin/Wounds  Bathing/Skin Care: bath, complete;dressed/undressed;electrode patches/site rotation;incontinence care;linen changed (12/29/23 1600)  Wounds: Yes  Wound care consulted: Yes    Consults  Consults (From admission, onward)          Status Ordering Provider     Inpatient consult to Infectious Diseases  Once        Provider:  (Not yet assigned)    Completed JESSICA SANTIAGO     Inpatient consult to Hematology/Oncology  Once        Provider:  (Not yet assigned)    Completed EDIN TEMPLE     Pharmacy to dose Vancomycin consult  Once        Provider:  (Not yet assigned)   See [facility] for full Linked Orders Report.    Acknowledged TONIE MCKEON           LYNNE/CORNELIUS/Ravi

## 2023-12-31 NOTE — SUBJECTIVE & OBJECTIVE
Interval History: Reports pain of left knee. Very sleepy this morning. Per nursing he was awake during the night    Review of Systems   Constitutional:  Positive for fatigue and fever.   HENT:  Negative for sore throat.    Eyes:  Negative for visual disturbance.   Respiratory:  Negative for shortness of breath.    Cardiovascular:  Negative for chest pain.   Gastrointestinal:  Positive for diarrhea, nausea and vomiting. Negative for abdominal pain.   Genitourinary:  Negative for dysuria.   Musculoskeletal:  Negative for back pain.   Skin:  Positive for wound. Negative for rash.   Neurological:  Positive for weakness. Negative for headaches.   Hematological:  Negative for adenopathy.   Psychiatric/Behavioral:  The patient is not nervous/anxious.      Objective:     Vital Signs (Most Recent):  Temp: 99.8 °F (37.7 °C) (12/31/23 0330)  Pulse: 100 (12/31/23 1045)  Resp: 16 (12/31/23 1045)  BP: (!) 174/99 (12/31/23 1045)  SpO2: (!) 94 % (12/31/23 1045) Vital Signs (24h Range):  Temp:  [98 °F (36.7 °C)-100.8 °F (38.2 °C)] 99.8 °F (37.7 °C)  Pulse:  [] 100  Resp:  [12-23] 16  SpO2:  [90 %-97 %] 94 %  BP: (115-186)/() 174/99     Weight: 117 kg (257 lb 15 oz)  Body mass index is 34.03 kg/m².    Estimated Creatinine Clearance: 164.4 mL/min (based on SCr of 0.8 mg/dL).     Physical Exam  Vitals and nursing note reviewed.   Constitutional:       Appearance: He is well-developed.      Comments: Fatigued, uncomfortable.   HENT:      Head: Normocephalic and atraumatic.   Eyes:      Pupils: Pupils are equal, round, and reactive to light.   Cardiovascular:      Rate and Rhythm: Regular rhythm. Tachycardia present.   Pulmonary:      Effort: Pulmonary effort is normal.      Breath sounds: Normal breath sounds.   Abdominal:      General: Bowel sounds are normal.      Palpations: Abdomen is soft.   Musculoskeletal:         General: Normal range of motion.      Cervical back: Normal range of motion and neck supple.       Comments: Left knee with bandage   Neurological:      Mental Status: He is alert and oriented to person, place, and time.   Psychiatric:         Behavior: Behavior normal.         Thought Content: Thought content normal.         Judgment: Judgment normal.          Significant Labs: All pertinent labs within the past 24 hours have been reviewed.    Significant Imaging: I have reviewed all pertinent imaging results/findings within the past 24 hours.

## 2023-12-31 NOTE — PROGRESS NOTES
Pharmacokinetic Assessment Follow Up: IV Vancomycin    Vancomycin serum concentration assessment(s):    The trough level was drawn correctly and can be used to guide therapy at this time. The measurement is below the desired definitive target range of 15 to 20 mcg/mL.    Vancomycin Regimen Plan:    Change regimen to Vancomycin 2250 mg IV every 12 hours with next serum trough concentration measured at 2000 prior to next dose on 12/31.    Drug levels (last 3 results):  Recent Labs   Lab Result Units 12/28/23  2357 12/29/23  1357 12/30/23  1842   Vancomycin, Random ug/mL 4.1 9.8  --    Vancomycin-Trough ug/mL  --   --  11.3       Pharmacy will continue to follow and monitor vancomycin.    Please contact pharmacy at extension [phone] for questions regarding this assessment.    Thank you for the consult,   Nilam Green       Patient brief summary:  Kulwant Mueller Jr. is a 38 y.o. male initiated on antimicrobial therapy with IV Vancomycin for treatment of bacteremia    The patient's current regimen is vanc 2.25 g q12h.    Drug Allergies:   Review of patient's allergies indicates:   Allergen Reactions    Heparin analogues Nausea And Vomiting       Actual Body Weight:   117 kg    Renal Function:   Estimated Creatinine Clearance: 131.5 mL/min (based on SCr of 1 mg/dL).,     Dialysis Method (if applicable):  N/A    CBC (last 72 hours):  Recent Labs   Lab Result Units 12/27/23  2158 12/28/23  0355 12/28/23  1305 12/29/23  0400 12/30/23  0346   WBC K/uL 44.27* 47.31* 43.64* 38.53* 25.58*   Hemoglobin g/dL 14.1 13.7* 13.0* 12.6* 12.0*   Hemoglobin A1C %  --  7.4*  --   --   --    Hematocrit % 42.1 41.6 37.4* 37.9* 36.1*   Platelets K/uL 376 405 395 417 353   Gran % % 90.8* 81.0* 91.2* 88.1* 88.7*   Lymph % % 2.4* 5.0* 1.7* 3.6* 4.0*   Mono % % 5.4 4.0 5.5 6.1 5.4   Eosinophil % % 0.0 0.0 0.0 0.0 0.0   Basophil % % 0.1 0.0 0.4 0.4 0.4   Differential Method  Automated Manual Automated Automated Automated       Metabolic Panel (last  72 hours):  Recent Labs   Lab Result Units 12/27/23  2158 12/27/23  2318 12/28/23  0355 12/28/23  0812 12/28/23  1305 12/28/23  1611 12/28/23  2026 12/28/23  2357 12/29/23  0400 12/29/23  2045 12/29/23  2349 12/30/23  0346 12/30/23  0804   Sodium mmol/L 135*  --  138 137 138 139 139 140 138 139 140  --  140   Potassium mmol/L 4.5  --  3.7 3.6 3.6 3.5 3.7 3.6 3.6 3.7 3.8  --  3.8   Chloride mmol/L 95  --  101 102 102 103 103 104 103 103 103  --  103   CO2 mmol/L 9*  --  15* 17* 21* 22* 21* 22* 21* 21* 22*  --  23   Glucose mg/dL 343*  --  177* 204* 178* 201* 180* 192* 235* 192* 210*  --  195*   Glucose, UA   --  4+*  --   --   --   --   --   --   --   --   --   --   --    BUN mg/dL 33*  --  30* 27* 25* 22* 21* 22* 19 18 19  --  18   Creatinine mg/dL 1.9*  --  1.7* 1.7* 1.7* 1.6* 1.6* 1.6* 1.5* 1.1 1.1  --  1.0   Creatinine, Urine mg/dL  --  61.1  --   --   --   --   --   --   --   --   --   --   --    Albumin g/dL 2.7*  --   --   --   --   --   --   --   --   --   --   --   --    Total Bilirubin mg/dL 0.6  --   --   --   --   --   --   --   --   --   --   --   --    Alkaline Phosphatase U/L 131  --   --   --   --   --   --   --   --   --   --   --   --    AST U/L 22  --   --   --   --   --   --   --   --   --   --   --   --    ALT U/L 31  --   --   --   --   --   --   --   --   --   --   --   --    Magnesium mg/dL  --   --  2.9*  --   --   --   --   --  2.8*  --   --  2.6  --    Phosphorus mg/dL  --   --  2.7  --   --   --   --   --   --   --   --   --  2.9       Vancomycin Administrations:  vancomycin given in the last 96 hours                     vancomycin (VANCOCIN) 1,750 mg in dextrose 5 % (D5W) 500 mL IVPB (mg) 1,750 mg New Bag 12/30/23 0734     1,750 mg New Bag 12/29/23 2007    vancomycin (VANCOCIN) 1,750 mg in dextrose 5 % (D5W) 500 mL IVPB (mg) 1,750 mg New Bag 12/29/23 0139    vancomycin 2 g in dextrose 5 % 500 mL IVPB (mg) 2,000 mg New Bag 12/28/23 0004                    Microbiologic  Results:  Microbiology Results (last 7 days)       Procedure Component Value Units Date/Time    Blood culture [[id number]] Collected: 12/29/23 2045    Order Status: Completed Specimen: Blood Updated: 12/30/23 1739     Blood Culture, Routine Gram stain aer bottle: Gram positive cocci in clusters resembling Staph      Positive results previously called 12/30/2023    Narrative:      Collection has been rescheduled by [email or web address] at 12/29/2023 14:04 Reason:   Patient Refused/ 2nd set of blood cultures   Collection has been rescheduled by [email or web address] at 12/29/2023 17:54 Reason:   Patient unavailable/pt not in room   Collection has been rescheduled by [email or web address] at 12/29/2023 14:04 Reason:   Patient Refused/ 2nd set of blood cultures   Collection has been rescheduled by [email or web address] at 12/29/2023 17:54 Reason:   Patient unavailable/pt not in room     Blood culture [[id number]] Collected: 12/29/23 1357    Order Status: Completed Specimen: Blood Updated: 12/30/23 1323     Blood Culture, Routine Gram stain aer bottle: Gram positive cocci in clusters resembling Staph      Results called to and read back by; Haley Horne RN 12/30/2023  10:56      Gram stain patsy bottle: Gram positive cocci in clusters resembling Staph      12/30/2023  13:23  Positive results previously called    Blood culture [[id number]]  (Abnormal)  (Susceptibility) Collected: 12/27/23 2242    Order Status: Completed Specimen: Blood Updated: 12/30/23 1034     Blood Culture, Routine Gram stain aer bottle: Gram positive cocci in clusters resembling Staph      Results called to and read back by:Haroon Bravo RN 12/28/2023  15:59      Gram stain patsy bottle: Gram positive cocci in clusters resembling Staph      METHICILLIN RESISTANT STAPHYLOCOCCUS AUREUS  ID consult required at [facility].[place].      Urine culture [[id number]]  (Abnormal)  (Susceptibility) Collected: 12/27/23 2318    Order Status: Completed Specimen: Urine Updated: 12/30/23 0919     Urine Culture,  Routine METHICILLIN RESISTANT STAPHYLOCOCCUS AUREUS  10,000 - 49,999 cfu/ml      Narrative:      Specimen Source->Urine    Culture, Anaerobe [[id number]] Collected: 12/29/23 1238    Order Status: Completed Specimen: Joint Fluid from Knee, Left Updated: 12/30/23 0808     Anaerobic Culture Culture in progress    Narrative:      From left knee bursa    Blood culture [[id number]]  (Abnormal) Collected: 12/27/23 2329    Order Status: Completed Specimen: Blood from Peripheral, Hand, Right Updated: 12/30/23 0751     Blood Culture, Routine Gram stain aer bottle: Gram positive cocci in clusters resembling Staph      Positive results previously called 12/29/2023  00:06      Gram stain patsy bottle: Gram positive cocci in clusters resembling Staph      12/29/2023  02:47      METHICILLIN RESISTANT STAPHYLOCOCCUS AUREUS  ID consult required at [facility] [place] locations.  For susceptibility see order # [id number]      Gram stain [[id number]] Collected: 12/29/23 1830    Order Status: Completed Specimen: Wound from Knee, Left Updated: 12/29/23 2325     Gram Stain Result Many WBC's      No organisms seen    Narrative:      Before Incision #1    Gram stain [[id number]] Collected: 12/29/23 1830    Order Status: Completed Specimen: Wound from Knee, Left Updated: 12/29/23 2309     Gram Stain Result Rare WBC's      Rare Gram positive cocci    Narrative:      After Incision #2    Aerobic culture [[id number]] Collected: 12/29/23 1830    Order Status: Sent Specimen: Wound from Knee, Left Updated: 12/29/23 2157    Culture, Anaerobe [[id number]] Collected: 12/29/23 1830    Order Status: Sent Specimen: Wound from Knee, Left Updated: 12/29/23 2157    Aerobic culture [[id number]] Collected: 12/29/23 1830    Order Status: Sent Specimen: Wound from Knee, Left Updated: 12/29/23 2157    Culture, Anaerobe [[id number]] Collected: 12/29/23 1830    Order Status: Sent Specimen: Wound from Knee, Left Updated: 12/29/23 2157    Gram stain  [[id number]] Collected: 12/29/23 1238    Order Status: Completed Specimen: Joint Fluid from Knee, Left Updated: 12/29/23 2115     Gram Stain Result Moderate WBC's      No organisms seen    Narrative:      From left knee bursa    Culture, Fluid  (Aerobic) [[id number]] Collected: 12/29/23 1238    Order Status: Completed Specimen: Joint Fluid from Knee, Left Updated: 12/29/23 2105     Gram Stain Result Few WBC's      No organisms seen    Gram stain [[id number]]     Order Status: Canceled Specimen: Joint Fluid from Knee, Left     Culture, Anaerobe [[id number]]     Order Status: Canceled Specimen: Joint Fluid from Knee, Left     Aerobic culture [[id number]]     Order Status: Canceled Specimen: Incision site from Knee, Left     Culture, Anaerobe [[id number]]     Order Status: Canceled Specimen: Joint Fluid from Knee, Left     Aerobic culture [[id number]]     Order Status: Canceled Specimen: Wound from Knee, Left     Gram stain [[id number]]     Order Status: Canceled Specimen: Joint Fluid from Knee, Left     Culture, Anaerobe [[id number]]     Order Status: Canceled Specimen: Joint Fluid from Knee, Left     Aerobic culture [[id number]]     Order Status: Canceled Specimen: Incision site from Knee, Left     Gram stain [[id number]]     Order Status: Canceled Specimen: Joint Fluid from Knee, Left     MRSA/SA Rapid ID by PCR from Blood culture [[id number]]  (Abnormal) Collected: 12/28/23 1600    Order Status: Completed Updated: 12/28/23 1731     Staph aureus ID by PCR Positive     Methicillin Resistant ID by PCR Positive    Respiratory Infection Panel (PCR), Nasopharyngeal [[id number]] Collected: 12/28/23 0047    Order Status: Completed Specimen: Nasopharyngeal Swab Updated: 12/28/23 1224     Respiratory Infection Panel Source NP Swab     Adenovirus Not Detected     Coronavirus 229E, Common Cold Virus Not Detected     Coronavirus HKU1, Common Cold Virus Not Detected     Coronavirus NL63, Common Cold Virus Not Detected      Coronavirus OC43, Common Cold Virus Not Detected     Comment: The Coronavirus strains detected in this test cause the common cold.  These strains are not the COVID-19 (novel Coronavirus)strain   associated with the respiratory disease outbreak.          SARS-CoV2 (COVID-19) Qualitative PCR Not Detected     Human Metapneumovirus Not Detected     Human Rhinovirus/Enterovirus Not Detected     Influenza A (subtypes H1, H1-2009,H3) Not Detected     Influenza B Not Detected     Parainfluenza Virus 1 Not Detected     Parainfluenza Virus 2 Not Detected     Parainfluenza Virus 3 Not Detected     Parainfluenza Virus 4 Not Detected     Respiratory Syncytial Virus Not Detected     Bordetella Parapertussis (UV7953) Not Detected     Bordetella pertussis (ptxP) Not Detected     Chlamydia pneumoniae Not Detected     Mycoplasma pneumoniae Not Detected    Narrative:      For all other respiratory sources, order [id number] -  Respiratory Viral Panel by PCR    Influenza A & B by Molecular [[id number]] Collected: 12/27/23 2229    Order Status: Completed Specimen: Nasopharyngeal Swab Updated: 12/27/23 2313     Influenza A, Molecular Negative     Influenza B, Molecular Negative     Flu A & B Source Nasal swab    Blood culture [[id number]] Collected: 12/27/23 2243    Order Status: Canceled Specimen: Blood from Peripheral, Antecubital, Right

## 2023-12-31 NOTE — PROGRESS NOTES
[facility] - Intensive Care  Infectious Disease  Progress Note    Patient Name: Kulwant Mueller Jr.  MRN: [id number]  Admission Date: 12/27/2023  Length of Stay: 3 days  Attending Physician: Russell Newby MD  Primary Care Provider: Shellie, Primary Doctor    Isolation Status: Contact  Assessment/Plan:      ID  MRSA bacteremia  38 y.o. 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      -blood cxs from 12/27 and 12/29 with MRSA  -repeat blood cxs daily to document clearance  -TTE without vegetation  -continue vanco with goal VT of 15-20  -fluid from knee with MRSA  -await MRI          Thank you for your consult. I will follow-up with patient. Please contact us if you have any additional questions.    Marciano Millan MD  Infectious Disease  [facility] - Intensive Care    Subjective:     Principal Problem:Staphylococcal arthritis of left knee    HPI: 38 y.o. 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 The patient presented to [facility] on 12/27/2023 with a primary complaint of Intractable N/V and diarrhea for over 6 days, generalized weakness for 6 days. He was in his usual state of health until around 1 week ago when he developed NBNB N/V and decreased oral intake. His family members had recently been ill as well, but have tested negative for COVID/flu. He reports he continued to have N/V whether or not he was able to have any oral intake. He presented to an ER for evaluation yesterday, where it was noted he had a WBC 33K, with an unremarkable CT A/P with negative COVID/flu. He was discharged with GI follow-up, however at the appointment today, there was concern for elevated WBC, and patient was instructed to present to the ER for further evaluation. Does  endorse some abdominal discomfort and 1 episode of non-mucoid non-bloody diarrhea this afternoon. Denies  fevers, chills, shortness of breath, chest pain, palpitations, dysuria. He was admitted for DKA and found to have MRSa bacteremia. He is currently on vanco and zosyn. Initially source was thought to be right foot heel ulcer but bedside US was concerning for left knee bursitis.   Interval History: Reports pain of left knee. Very sleepy this morning. Per nursing he was awake during the night    Review of Systems   Constitutional:  Positive for fatigue and fever.   HENT:  Negative for sore throat.    Eyes:  Negative for visual disturbance.   Respiratory:  Negative for shortness of breath.    Cardiovascular:  Negative for chest pain.   Gastrointestinal:  Positive for diarrhea, nausea and vomiting. Negative for abdominal pain.   Genitourinary:  Negative for dysuria.   Musculoskeletal:  Negative for back pain.   Skin:  Positive for wound. Negative for rash.   Neurological:  Positive for weakness. Negative for headaches.   Hematological:  Negative for adenopathy.   Psychiatric/Behavioral:  The patient is not nervous/anxious.      Objective:     Vital Signs (Most Recent):  Temp: 99.8 °F (37.7 °C) (12/31/23 0330)  Pulse: 100 (12/31/23 1045)  Resp: 16 (12/31/23 1045)  BP: (!) 174/99 (12/31/23 1045)  SpO2: (!) 94 % (12/31/23 1045) Vital Signs (24h Range):  Temp:  [98 °F (36.7 °C)-100.8 °F (38.2 °C)] 99.8 °F (37.7 °C)  Pulse:  [] 100  Resp:  [12-23] 16  SpO2:  [90 %-97 %] 94 %  BP: (115-186)/() 174/99     Weight: 117 kg (257 lb 15 oz)  Body mass index is 34.03 kg/m².    Estimated Creatinine Clearance: 164.4 mL/min (based on SCr of 0.8 mg/dL).     Physical Exam  Vitals and nursing note reviewed.   Constitutional:       Appearance: He is well-developed.      Comments: Fatigued, uncomfortable.   HENT:      Head: Normocephalic and atraumatic.   Eyes:      Pupils: Pupils are equal, round, and reactive to light.    Cardiovascular:      Rate and Rhythm: Regular rhythm. Tachycardia present.   Pulmonary:      Effort: Pulmonary effort is normal.      Breath sounds: Normal breath sounds.   Abdominal:      General: Bowel sounds are normal.      Palpations: Abdomen is soft.   Musculoskeletal:         General: Normal range of motion.      Cervical back: Normal range of motion and neck supple.      Comments: Left knee with bandage   Neurological:      Mental Status: He is alert and oriented to person, place, and time.   Psychiatric:         Behavior: Behavior normal.         Thought Content: Thought content normal.         Judgment: Judgment normal.          Significant Labs: All pertinent labs within the past 24 hours have been reviewed.    Significant Imaging: I have reviewed all pertinent imaging results/findings within the past 24 hours.

## 2023-12-31 NOTE — ASSESSMENT & PLAN NOTE
38 y.o. 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      -blood cxs from 12/27 and 12/29 with MRSA  -repeat blood cxs daily to document clearance  -TTE without vegetation  -continue vanco with goal VT of 15-20  -fluid from knee with MRSA  -await MRI

## 2024-01-01 PROBLEM — F32.A DEPRESSION: Status: ACTIVE | Noted: 2024-01-01

## 2024-01-01 LAB
ALBUMIN SERPL BCP-MCNC: 1.7 G/DL (ref 3.5–5.2)
ALP SERPL-CCNC: 201 U/L (ref 55–135)
ALT SERPL W/O P-5'-P-CCNC: 85 U/L (ref 10–44)
ANION GAP SERPL CALC-SCNC: 14 MMOL/L (ref 8–16)
AST SERPL-CCNC: 57 U/L (ref 10–40)
BACTERIA BLD CULT: ABNORMAL
BACTERIA SPEC AEROBE CULT: ABNORMAL
BASOPHILS # BLD AUTO: 0.11 K/UL (ref 0–0.2)
BASOPHILS NFR BLD: 0.5 % (ref 0–1.9)
BILIRUB SERPL-MCNC: 0.5 MG/DL (ref 0.1–1)
BUN SERPL-MCNC: 14 MG/DL (ref 6–20)
CALCIUM SERPL-MCNC: 9.2 MG/DL (ref 8.7–10.5)
CHLORIDE SERPL-SCNC: 96 MMOL/L (ref 95–110)
CK SERPL-CCNC: 138 U/L (ref 20–200)
CO2 SERPL-SCNC: 23 MMOL/L (ref 23–29)
CREAT SERPL-MCNC: 0.8 MG/DL (ref 0.5–1.4)
DIFFERENTIAL METHOD BLD: ABNORMAL
EOSINOPHIL # BLD AUTO: 0 K/UL (ref 0–0.5)
EOSINOPHIL NFR BLD: 0 % (ref 0–8)
ERYTHROCYTE [DISTWIDTH] IN BLOOD BY AUTOMATED COUNT: 14.4 % (ref 11.5–14.5)
EST. GFR  (NO RACE VARIABLE): >60 ML/MIN/1.73 M^2
GLUCOSE SERPL-MCNC: 192 MG/DL (ref 70–110)
HCT VFR BLD AUTO: 37.5 % (ref 40–54)
HGB BLD-MCNC: 12.2 G/DL (ref 14–18)
IMM GRANULOCYTES # BLD AUTO: 0.46 K/UL (ref 0–0.04)
IMM GRANULOCYTES NFR BLD AUTO: 1.9 % (ref 0–0.5)
LYMPHOCYTES # BLD AUTO: 1.6 K/UL (ref 1–4.8)
LYMPHOCYTES NFR BLD: 6.4 % (ref 18–48)
MCH RBC QN AUTO: 27.5 PG (ref 27–31)
MCHC RBC AUTO-ENTMCNC: 32.5 G/DL (ref 32–36)
MCV RBC AUTO: 85 FL (ref 82–98)
MONOCYTES # BLD AUTO: 1.4 K/UL (ref 0.3–1)
MONOCYTES NFR BLD: 5.8 % (ref 4–15)
NEUTROPHILS # BLD AUTO: 20.8 K/UL (ref 1.8–7.7)
NEUTROPHILS NFR BLD: 85.4 % (ref 38–73)
NRBC BLD-RTO: 0 /100 WBC
PHOSPHATE SERPL-MCNC: 3.4 MG/DL (ref 2.7–4.5)
PLATELET # BLD AUTO: 337 K/UL (ref 150–450)
PMV BLD AUTO: 10.9 FL (ref 9.2–12.9)
POCT GLUCOSE: 169 MG/DL (ref 70–110)
POCT GLUCOSE: 210 MG/DL (ref 70–110)
POCT GLUCOSE: 261 MG/DL (ref 70–110)
POTASSIUM SERPL-SCNC: 4 MMOL/L (ref 3.5–5.1)
PROT SERPL-MCNC: 8.1 G/DL (ref 6–8.4)
RBC # BLD AUTO: 4.43 M/UL (ref 4.6–6.2)
SODIUM SERPL-SCNC: 133 MMOL/L (ref 136–145)
VANCOMYCIN TROUGH SERPL-MCNC: 17.8 UG/ML (ref 10–22)
WBC # BLD AUTO: 24.35 K/UL (ref 3.9–12.7)

## 2024-01-01 PROCEDURE — 36415 COLL VENOUS BLD VENIPUNCTURE: CPT

## 2024-01-01 PROCEDURE — C9113 INJ PANTOPRAZOLE SODIUM, VIA: HCPCS

## 2024-01-01 PROCEDURE — 25000003 PHARM REV CODE 250

## 2024-01-01 PROCEDURE — 27000207 HC ISOLATION

## 2024-01-01 PROCEDURE — 82550 ASSAY OF CK (CPK): CPT

## 2024-01-01 PROCEDURE — 87186 SC STD MICRODIL/AGAR DIL: CPT | Performed by: STUDENT IN AN ORGANIZED HEALTH CARE EDUCATION/TRAINING PROGRAM

## 2024-01-01 PROCEDURE — 85025 COMPLETE CBC W/AUTO DIFF WBC: CPT

## 2024-01-01 PROCEDURE — 63600175 PHARM REV CODE 636 W HCPCS

## 2024-01-01 PROCEDURE — 87040 BLOOD CULTURE FOR BACTERIA: CPT | Performed by: STUDENT IN AN ORGANIZED HEALTH CARE EDUCATION/TRAINING PROGRAM

## 2024-01-01 PROCEDURE — 11000001 HC ACUTE MED/SURG PRIVATE ROOM

## 2024-01-01 PROCEDURE — 97530 THERAPEUTIC ACTIVITIES: CPT

## 2024-01-01 PROCEDURE — 84100 ASSAY OF PHOSPHORUS: CPT

## 2024-01-01 PROCEDURE — 63600175 PHARM REV CODE 636 W HCPCS: Performed by: INTERNAL MEDICINE

## 2024-01-01 PROCEDURE — 25000003 PHARM REV CODE 250: Performed by: STUDENT IN AN ORGANIZED HEALTH CARE EDUCATION/TRAINING PROGRAM

## 2024-01-01 PROCEDURE — 80202 ASSAY OF VANCOMYCIN: CPT | Performed by: INTERNAL MEDICINE

## 2024-01-01 PROCEDURE — 87077 CULTURE AEROBIC IDENTIFY: CPT | Performed by: STUDENT IN AN ORGANIZED HEALTH CARE EDUCATION/TRAINING PROGRAM

## 2024-01-01 PROCEDURE — 36415 COLL VENOUS BLD VENIPUNCTURE: CPT | Mod: XB | Performed by: INTERNAL MEDICINE

## 2024-01-01 PROCEDURE — 80053 COMPREHEN METABOLIC PANEL: CPT

## 2024-01-01 PROCEDURE — 25000003 PHARM REV CODE 250: Performed by: INTERNAL MEDICINE

## 2024-01-01 PROCEDURE — 97162 PT EVAL MOD COMPLEX 30 MIN: CPT

## 2024-01-01 RX ORDER — ACETAMINOPHEN 325 MG/1
650 TABLET ORAL ONCE
Status: COMPLETED | OUTPATIENT
Start: 2024-01-01 | End: 2024-01-01

## 2024-01-01 RX ADMIN — VANCOMYCIN HYDROCHLORIDE 2000 MG: 10 INJECTION, POWDER, LYOPHILIZED, FOR SOLUTION INTRAVENOUS at 09:01

## 2024-01-01 RX ADMIN — DAPTOMYCIN 945 MG: 500 INJECTION, POWDER, LYOPHILIZED, FOR SOLUTION INTRAVENOUS at 02:01

## 2024-01-01 RX ADMIN — INSULIN DETEMIR 25 UNITS: 100 INJECTION, SOLUTION SUBCUTANEOUS at 09:01

## 2024-01-01 RX ADMIN — INSULIN ASPART 2 UNITS: 100 INJECTION, SOLUTION INTRAVENOUS; SUBCUTANEOUS at 06:01

## 2024-01-01 RX ADMIN — ENOXAPARIN SODIUM 120 MG: 120 INJECTION SUBCUTANEOUS at 05:01

## 2024-01-01 RX ADMIN — POLYETHYLENE GLYCOL 3350 17 G: 17 POWDER, FOR SOLUTION ORAL at 09:01

## 2024-01-01 RX ADMIN — SENNOSIDES 8.6 MG: 8.6 TABLET, FILM COATED ORAL at 09:01

## 2024-01-01 RX ADMIN — METOCLOPRAMIDE 5 MG: 5 INJECTION, SOLUTION INTRAMUSCULAR; INTRAVENOUS at 11:01

## 2024-01-01 RX ADMIN — PANTOPRAZOLE SODIUM 40 MG: 40 INJECTION, POWDER, FOR SOLUTION INTRAVENOUS at 09:01

## 2024-01-01 RX ADMIN — INSULIN ASPART 6 UNITS: 100 INJECTION, SOLUTION INTRAVENOUS; SUBCUTANEOUS at 12:01

## 2024-01-01 RX ADMIN — METOCLOPRAMIDE 5 MG: 5 INJECTION, SOLUTION INTRAMUSCULAR; INTRAVENOUS at 06:01

## 2024-01-01 RX ADMIN — ENOXAPARIN SODIUM 120 MG: 120 INJECTION SUBCUTANEOUS at 04:01

## 2024-01-01 RX ADMIN — METOPROLOL SUCCINATE 12.5 MG: 25 TABLET, EXTENDED RELEASE ORAL at 09:01

## 2024-01-01 RX ADMIN — MUPIROCIN: 20 OINTMENT TOPICAL at 09:01

## 2024-01-01 RX ADMIN — OXYCODONE HYDROCHLORIDE 5 MG: 5 TABLET ORAL at 05:01

## 2024-01-01 RX ADMIN — INSULIN ASPART 4 UNITS: 100 INJECTION, SOLUTION INTRAVENOUS; SUBCUTANEOUS at 05:01

## 2024-01-01 RX ADMIN — ACETAMINOPHEN 650 MG: 325 TABLET ORAL at 04:01

## 2024-01-01 RX ADMIN — OXYCODONE HYDROCHLORIDE AND ACETAMINOPHEN 1 TABLET: 5; 325 TABLET ORAL at 09:01

## 2024-01-01 NOTE — PROGRESS NOTES
Pharmacokinetic Assessment Follow Up: IV Vancomycin    Vancomycin serum concentration assessment(s):    The trough level was drawn correctly and can be used to guide therapy at this time. The measurement is within the desired definitive target range of 15 to 20 mcg/mL.    Vancomycin Regimen Plan:    Continue regimen to Vancomycin 2000 mg IV every 12 hours with next serum trough concentration measured at 2000 prior to 4th dose on 01/02/24    Drug levels (last 3 results):  Recent Labs   Lab Result Units 12/29/23  1357 12/30/23  1842 01/01/24  0807   Vancomycin, Random ug/mL 9.8  --   --    Vancomycin-Trough ug/mL  --  11.3 17.8       Pharmacy will continue to follow and monitor vancomycin.    Please contact pharmacy at extension [phone] for questions regarding this assessment.    Thank you for the consult,   Mary Choudhary       Patient brief summary:  Kulwant Mueller Jr. is a 38 y.o. male initiated on antimicrobial therapy with IV Vancomycin for treatment of bacteremia    The patient's current regimen is 2000 mg q 12 h    Drug Allergies:   Review of patient's allergies indicates:   Allergen Reactions    Heparin analogues Nausea And Vomiting       Actual Body Weight:   117 kg    Renal Function:   Estimated Creatinine Clearance: 164.4 mL/min (based on SCr of 0.8 mg/dL).,     Dialysis Method (if applicable):  N/A    CBC (last 72 hours):  Recent Labs   Lab Result Units 12/30/23  0346 12/31/23  0416 01/01/24  0400   WBC K/uL 25.58* 27.91* 24.35*   Hemoglobin g/dL 12.0* 11.7* 12.2*   Hematocrit % 36.1* 36.1* 37.5*   Platelets K/uL 353 351 337   Gran % % 88.7* 87.8* 85.4*   Lymph % % 4.0* 5.2* 6.4*   Mono % % 5.4 5.6 5.8   Eosinophil % % 0.0 0.0 0.0   Basophil % % 0.4 0.4 0.5   Differential Method  Automated Automated Automated       Metabolic Panel (last 72 hours):  Recent Labs   Lab Result Units 12/29/23  2045 12/29/23  2349 12/30/23  0346 12/30/23  0804 12/31/23  0416 01/01/24  0400   Sodium mmol/L 139 140  --  140 136  133*   Potassium mmol/L 3.7 3.8  --  3.8 3.7 4.0   Chloride mmol/L 103 103  --  103 100 96   CO2 mmol/L 21* 22*  --  23 25 23   Glucose mg/dL 192* 210*  --  195* 153* 192*   BUN mg/dL 18 19  --  18 16 14   Creatinine mg/dL 1.1 1.1  --  1.0 0.8 0.8   Albumin g/dL  --   --   --   --  1.7* 1.7*   Total Bilirubin mg/dL  --   --   --   --  0.8 0.5   Alkaline Phosphatase U/L  --   --   --   --  237* 201*   AST U/L  --   --   --   --  200* 57*   ALT U/L  --   --   --   --  125* 85*   Magnesium mg/dL  --   --  2.6  --  2.4  --    Phosphorus mg/dL  --   --   --  2.9 3.2 3.4       Vancomycin Administrations:  vancomycin given in the last 96 hours                     vancomycin 2 g in dextrose 5 % 500 mL IVPB (mg) 2,000 mg New Bag 12/31/23 2303     2,000 mg New Bag  0942    vancomycin (VANCOCIN) 2,250 mg in dextrose 5 % (D5W) 500 mL IVPB (mg) 2,250 mg New Bag 12/30/23 2130    vancomycin (VANCOCIN) 1,750 mg in dextrose 5 % (D5W) 500 mL IVPB (mg) 1,750 mg New Bag 12/30/23 0734     1,750 mg New Bag 12/29/23 2007    vancomycin (VANCOCIN) 1,750 mg in dextrose 5 % (D5W) 500 mL IVPB (mg) 1,750 mg New Bag 12/29/23 0139                    Microbiologic Results:  Microbiology Results (last 7 days)       Procedure Component Value Units Date/Time    Blood culture [[id number]] Collected: 12/31/23 0418    Order Status: Completed Specimen: Blood Updated: 01/01/24 0927     Blood Culture, Routine Gram stain aer bottle: Gram positive cocci in clusters resembling Staph      Results called to and read back by: Carmita Davenport RN 01/01/2024  09:26    Narrative:      Set 2    Blood culture [[id number]]  (Abnormal) Collected: 12/29/23 2045    Order Status: Completed Specimen: Blood Updated: 01/01/24 0825     Blood Culture, Routine Gram stain aer bottle: Gram positive cocci in clusters resembling Staph      Positive results previously called 12/30/2023      METHICILLIN RESISTANT STAPHYLOCOCCUS AUREUS  ID consult required at [facility] [place] [facility] and  [facility].  For susceptibility see order #[id number]      Narrative:      Collection has been rescheduled by [email or web address] at 12/29/2023 14:04 Reason:   Patient Refused/ 2nd set of blood cultures   Collection has been rescheduled by [email or web address] at 12/29/2023 17:54 Reason:   Patient unavailable/pt not in room   Collection has been rescheduled by [email or web address] at 12/29/2023 14:04 Reason:   Patient Refused/ 2nd set of blood cultures   Collection has been rescheduled by [email or web address] at 12/29/2023 17:54 Reason:   Patient unavailable/pt not in room     Blood culture [[id number]]  (Abnormal)  (Susceptibility) Collected: 12/29/23 1357    Order Status: Completed Specimen: Blood Updated: 01/01/24 0824     Blood Culture, Routine Gram stain aer bottle: Gram positive cocci in clusters resembling Staph      Results called to and read back by; Haley Horne RN 12/30/2023  10:56      Gram stain patsy bottle: Gram positive cocci in clusters resembling Staph      12/30/2023  13:23  Positive results previously called      METHICILLIN RESISTANT STAPHYLOCOCCUS AUREUS  ID consult required at [facility] and [facility].      Aerobic culture [[id number]]  (Abnormal)  (Susceptibility) Collected: 12/29/23 1830    Order Status: Completed Specimen: Wound from Knee, Left Updated: 01/01/24 0653     Aerobic Bacterial Culture METHICILLIN RESISTANT STAPHYLOCOCCUS AUREUS  Many      Narrative:      Before Incision #1    Blood culture [[id number]] Collected: 01/01/24 0359    Order Status: Sent Specimen: Blood Updated: 01/01/24 0400    Blood culture [[id number]] Collected: 01/01/24 0400    Order Status: Sent Specimen: Blood Updated: 01/01/24 0400    Blood culture [[id number]]     Order Status: Canceled Specimen: Blood     Blood culture [[id number]]     Order Status: Canceled Specimen: Blood     Blood culture [[id number]] Collected: 12/31/23 0416    Order Status: Completed Specimen: Blood from Antecubital, Left Arm Updated: 12/31/23 2145     Blood Culture, Routine  No Growth to date    Narrative:      Set 1    Blood culture [[id number]]  (Abnormal) Collected: 12/27/23 2329    Order Status: Completed Specimen: Blood from Peripheral, Hand, Right Updated: 12/31/23 1121     Blood Culture, Routine Gram stain aer bottle: Gram positive cocci in clusters resembling Staph      Positive results previously called 12/29/2023  00:06      Gram stain patsy bottle: Gram positive cocci in clusters resembling Staph      12/29/2023  02:47      METHICILLIN RESISTANT STAPHYLOCOCCUS AUREUS  ID consult required at [facility]Critical access hospital,[place] and [facility] locations.  For susceptibility see order # [id number]      Culture, Anaerobe [[id number]] Collected: 12/29/23 1830    Order Status: Completed Specimen: Wound from Knee, Left Updated: 12/31/23 0721     Anaerobic Culture Culture in progress    Narrative:      After Incision #2    Culture, Anaerobe [[id number]] Collected: 12/29/23 1830    Order Status: Completed Specimen: Wound from Knee, Left Updated: 12/31/23 0721     Anaerobic Culture Culture in progress    Narrative:      Before Incision #1    Aerobic culture [[id number]] Collected: 12/29/23 1830    Order Status: Completed Specimen: Wound from Knee, Left Updated: 12/31/23 0516     Aerobic Bacterial Culture No growth    Narrative:      After Incision #2    Culture, Fluid  (Aerobic) [[id number]] Collected: 12/29/23 1238    Order Status: Completed Specimen: Joint Fluid from Knee, Left Updated: 12/31/23 0516     AEROBIC CULTURE - FLUID No growth     Gram Stain Result Few WBC's      No organisms seen    Blood culture [[id number]]  (Abnormal)  (Susceptibility) Collected: 12/27/23 2242    Order Status: Completed Specimen: Blood Updated: 12/30/23 1034     Blood Culture, Routine Gram stain aer bottle: Gram positive cocci in clusters resembling Staph      Results called to and read back by:Haroon Bravo RN 12/28/2023  15:59      Gram stain patsy bottle: Gram positive cocci in clusters resembling Staph      METHICILLIN  RESISTANT STAPHYLOCOCCUS AUREUS  ID consult required at [facility] [place] [facility] [facility].      Urine culture [[id number]]  (Abnormal)  (Susceptibility) Collected: 12/27/23 2318    Order Status: Completed Specimen: Urine Updated: 12/30/23 0919     Urine Culture, Routine METHICILLIN RESISTANT STAPHYLOCOCCUS AUREUS  10,000 - 49,999 cfu/ml      Narrative:      Specimen Source->Urine    Culture, Anaerobe [[id number]] Collected: 12/29/23 1238    Order Status: Completed Specimen: Joint Fluid from Knee, Left Updated: 12/30/23 0808     Anaerobic Culture Culture in progress    Narrative:      From left knee bursa    Gram stain [[id number]] Collected: 12/29/23 1830    Order Status: Completed Specimen: Wound from Knee, Left Updated: 12/29/23 2325     Gram Stain Result Many WBC's      No organisms seen    Narrative:      Before Incision #1    Gram stain [[id number]] Collected: 12/29/23 1830    Order Status: Completed Specimen: Wound from Knee, Left Updated: 12/29/23 2309     Gram Stain Result Rare WBC's      Rare Gram positive cocci    Narrative:      After Incision #2    Gram stain [[id number]] Collected: 12/29/23 1238    Order Status: Completed Specimen: Joint Fluid from Knee, Left Updated: 12/29/23 2115     Gram Stain Result Moderate WBC's      No organisms seen    Narrative:      From left knee bursa    Gram stain [[id number]]     Order Status: Canceled Specimen: Joint Fluid from Knee, Left     Culture, Anaerobe [[id number]]     Order Status: Canceled Specimen: Joint Fluid from Knee, Left     Aerobic culture [[id number]]     Order Status: Canceled Specimen: Incision site from Knee, Left     Culture, Anaerobe [[id number]]     Order Status: Canceled Specimen: Joint Fluid from Knee, Left     Aerobic culture [[id number]]     Order Status: Canceled Specimen: Wound from Knee, Left     Gram stain [[id number]]     Order Status: Canceled Specimen: Joint Fluid from Knee, Left     Culture, Anaerobe [[id number]]     Order  Status: Canceled Specimen: Joint Fluid from Knee, Left     Aerobic culture [[id number]]     Order Status: Canceled Specimen: Incision site from Knee, Left     Gram stain [[id number]]     Order Status: Canceled Specimen: Joint Fluid from Knee, Left     MRSA/SA Rapid ID by PCR from Blood culture [[id number]]  (Abnormal) Collected: 12/28/23 1600    Order Status: Completed Updated: 12/28/23 1731     Staph aureus ID by PCR Positive     Methicillin Resistant ID by PCR Positive    Respiratory Infection Panel (PCR), Nasopharyngeal [[id number]] Collected: 12/28/23 0047    Order Status: Completed Specimen: Nasopharyngeal Swab Updated: 12/28/23 1224     Respiratory Infection Panel Source NP Swab     Adenovirus Not Detected     Coronavirus 229E, Common Cold Virus Not Detected     Coronavirus HKU1, Common Cold Virus Not Detected     Coronavirus NL63, Common Cold Virus Not Detected     Coronavirus OC43, Common Cold Virus Not Detected     Comment: The Coronavirus strains detected in this test cause the common cold.  These strains are not the COVID-19 (novel Coronavirus)strain   associated with the respiratory disease outbreak.          SARS-CoV2 (COVID-19) Qualitative PCR Not Detected     Human Metapneumovirus Not Detected     Human Rhinovirus/Enterovirus Not Detected     Influenza A (subtypes H1, H1-2009,H3) Not Detected     Influenza B Not Detected     Parainfluenza Virus 1 Not Detected     Parainfluenza Virus 2 Not Detected     Parainfluenza Virus 3 Not Detected     Parainfluenza Virus 4 Not Detected     Respiratory Syncytial Virus Not Detected     Bordetella Parapertussis (PN5945) Not Detected     Bordetella pertussis (ptxP) Not Detected     Chlamydia pneumoniae Not Detected     Mycoplasma pneumoniae Not Detected    Narrative:      For all other respiratory sources, order [id number] -  Respiratory Viral Panel by PCR    Influenza A & B by Molecular [[id number]] Collected: 12/27/23 2229    Order Status: Completed Specimen:  Nasopharyngeal Swab Updated: 12/27/23 2313     Influenza A, Molecular Negative     Influenza B, Molecular Negative     Flu A & B Source Nasal swab    Blood culture [[id number]] Collected: 12/27/23 2243    Order Status: Canceled Specimen: Blood from Peripheral, Antecubital, Right

## 2024-01-01 NOTE — PT/OT/SLP EVAL
"Physical Therapy Evaluation    Patient Name:  Kulwant Mueller Jr.   MRN:  [id number]    Recommendations:     Discharge Recommendations:  (TBD, pending progress), likely home with low intensity therapy  Discharge Equipment Recommendations: walker, rolling   Barriers to discharge:  currently requiring max assist for all functional mobility, reports dizziness with standing    Assessment:     Kulwant Mueller Jr. is a 38 y.o. male admitted with a medical diagnosis of Staphylococcal arthritis of left knee.  He presents with the following impairments/functional limitations: weakness, impaired functional mobility, impaired endurance, gait instability, pain, impaired balance, impaired skin, impaired self care skills, decreased lower extremity function, decreased ROM, edema, orthopedic precautions     Patient seen for physical therapy evaluation on this date.  Patient very slow to move and requires max encouragement for functional mobility.  Patient requires max assist for supine to sit and max assist for sit to stand with a RW.  Patient able to take 2 steps towards HOB with max assist, reports dizziness.  BP at 112/70.  Nsg notified.  Patient returns back to supine with max assist.  Full report to follow.  Recommendations are TBD, pending progress.  Patient will require a RW for home use..    Rehab Prognosis: Fair; patient would benefit from acute skilled PT services to address these deficits and reach maximum level of function.    Recent Surgery: Procedure(s) (LRB):  ARTHROTOMY, KNEE (Left) 3 Days Post-Op    Plan:     During this hospitalization, patient to be seen 5 x/week to address the identified rehab impairments via gait training, therapeutic activities, therapeutic exercises, neuromuscular re-education and progress toward the following goals:    Plan of Care Expires:  01/31/24    Subjective     Chief Complaint: "I haven't been up"  Patient/Family Comments/goals: to return to PLOF  Pain/Comfort:  Pain Rating 1: " 5/10  Location - Side 1: Left  Location - Orientation 1: generalized  Location 1: knee  Pain Addressed 1: Pre-medicate for activity, Reposition, Distraction, Cessation of Activity, Nurse notified  Pain Rating Post-Intervention 1: 5/10    Patients cultural, spiritual, Latter day conflicts given the current situation: no    Living Environment:  Patient lives with spouse and 3 kids in a 1 SH, 1 SHANNAN, WIS with shower chair in bathroom.    Prior to admission, patients level of function was Independent, patient works full time as a  at [employer].  Wife works from home.  Patient drives.  Equipment used at home: none.  DME owned (not currently used): none.  Upon discharge, patient will have assistance from spouse.    Objective:     Communicated with nurse Vizcarra prior to session.  Patient found HOB elevated with bed alarm, telemetry, peripheral IV  upon PT entry to room.    General Precautions: Standard, fall  Orthopedic Precautions:LLE weight bearing as tolerated   Braces: Knee immobilizer  Respiratory Status: Room air    Exams:  Cognitive Exam:  Patient is oriented to Person, Place, Time, Situation, and flat affect  Gross Motor Coordination:  limited in L LE due to surgery, otherwise WFL  Postural Exam:  Patient presented with the following abnormalities:    -       Rounded shoulders  -       Forward head  Sensation:    -       Intact  light/touch R LE, Left Knee not tested - dressing and knee immobilizer  Skin Integrity/Edema:      -       Skin integrity: Knee immobilizer to L knee with ace wrap dressing, observed sanguinous drainage on ace wrap  -       Edema: Moderate Left Knee  RLE ROM: WFL  RLE Strength: WFL  LLE ROM: Deficits: Knee NT.  Hip and Ankle WFL  LLE Strength: Deficits: knee NT.  Ankle 3-/5    Functional Mobility:  Bed Mobility:     Rolling Right: maximal assistance  Scooting: maximal assistance  Supine to Sit: maximal assistance  Sit to Supine: maximal assistance and total  assistance  Transfers:     Sit to Stand:  maximal assistance with rolling walker, max VC's for technique, requires 5 tries before attaining standing  Gait: Patient able to take 2 lateral steps with RW and Max assist;  Max VC's to increase R knee extension.    Balance: Seated:  EOB, SBA   Standing:  requires RW, very unsteady, reports dizziness, poor ability to increase R knee extension      AM-PAC 6 CLICK MOBILITY  Total Score:10       Treatment & Education:  Patient educated on role of physical therapy and goals.  Patient educated on importance of OOB and early mobility.  Patient requires max encouragement to perform sit to stand, requires multiple trials prior to achieving standing.  Once standing, R knee remains flexed, max Verbal and tactile cues to increase R knee extension and increase upright posture.  Patient reports dizziness upon standing.  BP at 112/70.  Returns back to supine with max assist.  Patient educated to perform exercises in bed and importance of OOB.      Patient left HOB elevated with all lines intact, call button in reach, bed alarm on, and nurse notified.    GOALS:   Multidisciplinary Problems       Physical Therapy Goals          Problem: Physical Therapy    Goal Priority Disciplines Outcome Goal Variances Interventions   Physical Therapy Goal     PT, PT/OT Ongoing, Progressing     Description: Goals to be met by: 2024     Patient will increase functional independence with mobility by performin. Supine to sit with Modified [place]  2. Sit to supine with Modified [place]  3. Rolling to Left and Right with Modified [place].  4. Sit to stand transfer with Modified [place]  5. Bed to chair transfer with Modified [place] using Rolling Walker  6. Gait  x 150 feet with Modified [place] using Rolling Walker.   7. Ascend/descend 1 stair with Modified [place] using Rolling Walker.                          History:     Past Medical History:   Diagnosis  Date    Anticoagulant long-term use     COVID-19 virus infection     Diabetes mellitus     Diabetes mellitus, type 2     Hypertension     May-Thurner syndrome        Past Surgical History:   Procedure Laterality Date    APPENDECTOMY      ESOPHAGOGASTRODUODENOSCOPY N/A 1/31/2022    Procedure: EGD (ESOPHAGOGASTRODUODENOSCOPY);  Surgeon: Cindy Quiros MD;  [facility];  Service: Endoscopy;  Laterality: N/A;    ESOPHAGOGASTRODUODENOSCOPY N/A 3/21/2022    Procedure: EGD (ESOPHAGOGASTRODUODENOSCOPY);  Surgeon: Tejas Mann MD;  Location: [facility];  Service: Endoscopy;  Laterality: N/A;    INCISION AND DRAINAGE FOOT Left 11/28/2021    Procedure: INCISION AND DRAINAGE, FOOT;  Surgeon: Bj Alicea DPM;  Location: [facility];  Service: Podiatry;  Laterality: Left;    stents in bilateral legs         Time Tracking:     PT Received On: 01/01/24  PT Start Time: 1121     PT Stop Time: 1153  PT Total Time (min): 32 min     Billable Minutes: Evaluation 8 and Therapeutic Activity 24 01/01/2024

## 2024-01-01 NOTE — SUBJECTIVE & OBJECTIVE
Interval History: Remains febrile    Review of Systems   Constitutional:  Positive for fatigue and fever.   HENT:  Negative for sore throat.    Eyes:  Negative for visual disturbance.   Respiratory:  Negative for shortness of breath.    Cardiovascular:  Negative for chest pain.   Gastrointestinal:  Positive for diarrhea, nausea and vomiting. Negative for abdominal pain.   Genitourinary:  Negative for dysuria.   Musculoskeletal:  Negative for back pain.   Skin:  Positive for wound. Negative for rash.   Neurological:  Positive for weakness. Negative for headaches.   Hematological:  Negative for adenopathy.   Psychiatric/Behavioral:  The patient is not nervous/anxious.      Objective:     Vital Signs (Most Recent):  Temp: 98.1 °F (36.7 °C) (01/01/24 1100)  Pulse: (!) 113 (01/01/24 1100)  Resp: 20 (01/01/24 1100)  BP: 112/70 (01/01/24 1100)  SpO2: 95 % (01/01/24 1100) Vital Signs (24h Range):  Temp:  [98 °F (36.7 °C)-101.1 °F (38.4 °C)] 98.1 °F (36.7 °C)  Pulse:  [] 113  Resp:  [14-20] 20  SpO2:  [95 %-98 %] 95 %  BP: (112-175)/(70-98) 112/70     Weight: 117 kg (257 lb 15 oz)  Body mass index is 34.03 kg/m².    Estimated Creatinine Clearance: 164.4 mL/min (based on SCr of 0.8 mg/dL).     Physical Exam  Vitals and nursing note reviewed.   Constitutional:       Appearance: He is well-developed.      Comments: Fatigued, uncomfortable.   HENT:      Head: Normocephalic and atraumatic.   Eyes:      Pupils: Pupils are equal, round, and reactive to light.   Cardiovascular:      Rate and Rhythm: Regular rhythm. Tachycardia present.   Pulmonary:      Effort: Pulmonary effort is normal.      Breath sounds: Normal breath sounds.   Abdominal:      General: Bowel sounds are normal.      Palpations: Abdomen is soft.   Musculoskeletal:         General: Normal range of motion.      Cervical back: Normal range of motion and neck supple.      Comments: Left knee with bandage   Neurological:      Mental Status: He is alert and  oriented to person, place, and time.   Psychiatric:         Behavior: Behavior normal.         Thought Content: Thought content normal.         Judgment: Judgment normal.          Significant Labs: All pertinent labs within the past 24 hours have been reviewed.    Significant Imaging: I have reviewed all pertinent imaging results/findings within the past 24 hours.

## 2024-01-01 NOTE — PROGRESS NOTES
[facility]  Infectious Disease  Progress Note    Patient Name: Kulwant Mueller Jr.  MRN: [id number]  Admission Date: 12/27/2023  Length of Stay: 4 days  Attending Physician: Russell Newby MD  Primary Care Provider: Shellie, Primary Doctor    Isolation Status: Contact  Assessment/Plan:      ID  MRSA bacteremia  38 y.o. 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      -blood cxs from 12/27,12/29, 12/31 with MRSA  -repeat blood cxs daily to document clearance  -TTE without vegetation  -given slow clearance of blood cx and Vanco edelmira of 2 on some of the cultures would stop vanco and start daptomycin  -check weekly CK while on dapto  -await MRI          Thank you for your consult. I will follow-up with patient. Please contact us if you have any additional questions.    Marciano Millan MD  Infectious Disease  [place] [facility] Surg    Subjective:     Principal Problem:Staphylococcal arthritis of left knee    HPI: 38 y.o. 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 The patient presented to [facility] on 12/27/2023 with a primary complaint of Intractable N/V and diarrhea for over 6 days, generalized weakness for 6 days. He was in his usual state of health until around 1 week ago when he developed NBNB N/V and decreased oral intake. His family members had recently been ill as well, but have tested negative for COVID/flu. He reports he continued to have N/V whether or not he was able to have any oral intake. He presented to an ER for evaluation yesterday, where it was noted he had a WBC 33K, with an unremarkable CT A/P with negative COVID/flu. He was discharged with GI follow-up, however at the appointment today, there was concern for elevated WBC, and  patient was instructed to present to the ER for further evaluation. Does endorse some abdominal discomfort and 1 episode of non-mucoid non-bloody diarrhea this afternoon. Denies  fevers, chills, shortness of breath, chest pain, palpitations, dysuria. He was admitted for DKA and found to have MRSa bacteremia. Initially source was thought to be right foot heel ulcer but bedside US was concerning for left knee bursitis.   Interval History: Remains febrile    Review of Systems   Constitutional:  Positive for fatigue and fever.   HENT:  Negative for sore throat.    Eyes:  Negative for visual disturbance.   Respiratory:  Negative for shortness of breath.    Cardiovascular:  Negative for chest pain.   Gastrointestinal:  Positive for diarrhea, nausea and vomiting. Negative for abdominal pain.   Genitourinary:  Negative for dysuria.   Musculoskeletal:  Negative for back pain.   Skin:  Positive for wound. Negative for rash.   Neurological:  Positive for weakness. Negative for headaches.   Hematological:  Negative for adenopathy.   Psychiatric/Behavioral:  The patient is not nervous/anxious.      Objective:     Vital Signs (Most Recent):  Temp: 98.1 °F (36.7 °C) (01/01/24 1100)  Pulse: (!) 113 (01/01/24 1100)  Resp: 20 (01/01/24 1100)  BP: 112/70 (01/01/24 1100)  SpO2: 95 % (01/01/24 1100) Vital Signs (24h Range):  Temp:  [98 °F (36.7 °C)-101.1 °F (38.4 °C)] 98.1 °F (36.7 °C)  Pulse:  [] 113  Resp:  [14-20] 20  SpO2:  [95 %-98 %] 95 %  BP: (112-175)/(70-98) 112/70     Weight: 117 kg (257 lb 15 oz)  Body mass index is 34.03 kg/m².    Estimated Creatinine Clearance: 164.4 mL/min (based on SCr of 0.8 mg/dL).     Physical Exam  Vitals and nursing note reviewed.   Constitutional:       Appearance: He is well-developed.      Comments: Fatigued, uncomfortable.   HENT:      Head: Normocephalic and atraumatic.   Eyes:      Pupils: Pupils are equal, round, and reactive to light.   Cardiovascular:      Rate and Rhythm: Regular  rhythm. Tachycardia present.   Pulmonary:      Effort: Pulmonary effort is normal.      Breath sounds: Normal breath sounds.   Abdominal:      General: Bowel sounds are normal.      Palpations: Abdomen is soft.   Musculoskeletal:         General: Normal range of motion.      Cervical back: Normal range of motion and neck supple.      Comments: Left knee with bandage   Neurological:      Mental Status: He is alert and oriented to person, place, and time.   Psychiatric:         Behavior: Behavior normal.         Thought Content: Thought content normal.         Judgment: Judgment normal.          Significant Labs: All pertinent labs within the past 24 hours have been reviewed.    Significant Imaging: I have reviewed all pertinent imaging results/findings within the past 24 hours.

## 2024-01-01 NOTE — ASSESSMENT & PLAN NOTE
38 y.o. 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      -blood cxs from 12/27,12/29, 12/31 with MRSA  -repeat blood cxs daily to document clearance  -TTE without vegetation  -given slow clearance of blood cx and Vanco edelmira of 2 on some of the cultures would stop vanco and start daptomycin  -check weekly CK while on dapto  -await MRI

## 2024-01-01 NOTE — PLAN OF CARE
Patient seen for physical therapy evaluation on this date.  Patient very slow to move and requires max encouragement for functional mobility.  Patient requires max assist for supine to sit and max assist for sit to stand with a RW.  Patient able to take 2 steps towards HOB with max assist, reports dizziness.  BP at 112/70.  Nsg notified.  Patient returns back to supine with max assist.  Full report to follow.  Recommendations are TBD, pending progress.  Patient will require a RW for home use.      Problem: Physical Therapy  Goal: Physical Therapy Goal  Description: Goals to be met by: 2024     Patient will increase functional independence with mobility by performin. Supine to sit with Modified Ecru  2. Sit to supine with Modified Ecru  3. Rolling to Left and Right with Modified Ecru.  4. Sit to stand transfer with Modified Ecru  5. Bed to chair transfer with Modified Ecru using Rolling Walker  6. Gait  x 150 feet with Modified Ecru using Rolling Walker.   7. Ascend/descend 1 stair with Modified Ecru using Rolling Walker.     Outcome: Ongoing, Progressing

## 2024-01-01 NOTE — PROGRESS NOTES
[facility] Medicine Progress Note    Primary Team: [facility] Hospitalist Team A  Attending Physician: Russell Newby MD  Resident: Roly  Intern: Rhonda    Subjective/Interval History     Temp 101.1F overnight, now afebrile. Patient states he feels about the same. Still not having an appetite but open to trying regular diet and nutritional shakes.     Objective     Physical Examination:  Temp:  [98 °F (36.7 °C)-101.1 °F (38.4 °C)] 98 °F (36.7 °C)  Pulse:  [] 99  Resp:  [12-20] 20  SpO2:  [93 %-98 %] 95 %  BP: (115-181)/() 140/85  Gen: NAD, appears mildly uncomfortable, diaphoretic   HEENT: NC/AT, EOMI grossly, normal external ears bilaterally, normal external nose, moist MM  Neck: Supple  CV: Tachycardic rate, regular rhythm, no murmurs, rubs, or gallops.  Resp: CTAB. No wheezes, rales, rhonchi. Normal work of breathing. Normal effort. Equal chest rise. No respiratory distress  Abd: Soft, nontender, nondistended, no rebound/guarding  MSK/Ext: L knee in dressing from recent procedure. No clubbing, cyanosis. Moves all four extremities  Neuro: GCS 15, alert, speech is normal, face symmetric, no focal motor deficits elicited on exam  Skin: Warm, dry, no rash. R heel ulcer without drainage  Psych: Normal mood and affect     Intake/Output:    Intake/Output Summary (Last 24 hours) at 1/1/2024 0744  Last data filed at 1/1/2024 0459  Gross per 24 hour   Intake 1220.07 ml   Output 1500 ml   Net -279.93 ml       Net IO Since Admission: 1,878.34 mL [01/01/24 0744]    Laboratory:  Recent Labs   Lab 12/27/23  2158 12/28/23  0355 12/28/23  0812 12/29/23  0400 12/29/23  2045 12/29/23  2349 12/30/23  0346 12/30/23  0804 12/31/23  0416 01/01/24  0400   WBC 44.27* 47.31*   < > 38.53*  --   --  25.58*  --  27.91* 24.35*   HGB 14.1 13.7*   < > 12.6*  --   --  12.0*  --  11.7* 12.2*    405   < > 417  --   --  353  --  351 337   MCV 84 86   < > 84  --   --  85  --  86 85   * 138   < > 138   < > 140  --  140 136  133*   K 4.5 3.7   < > 3.6   < > 3.8  --  3.8 3.7 4.0   CL 95 101   < > 103   < > 103  --  103 100 96   CO2 9* 15*   < > 21*   < > 22*  --  23 25 23   BUN 33* 30*   < > 19   < > 19  --  18 16 14   CREATININE 1.9* 1.7*   < > 1.5*   < > 1.1  --  1.0 0.8 0.8   * 177*   < > 235*   < > 210*  --  195* 153* 192*   CALCIUM 9.7 9.4   < > 9.5   < > 9.2  --  9.1 9.3 9.2   PROT 9.2*  --   --   --   --   --   --   --  8.1 8.1   ALBUMIN 2.7*  --   --   --   --   --   --   --  1.7* 1.7*   PHOS  --  2.7  --   --   --   --   --  2.9 3.2 3.4   MG  --  2.9*  --  2.8*  --   --  2.6  --  2.4  --    AST 22  --   --   --   --   --   --   --  200* 57*   ALT 31  --   --   --   --   --   --   --  125* 85*   ALKPHOS 131  --   --   --   --   --   --   --  237* 201*   TROPONINI 0.009  --   --   --   --   --   --   --   --   --     < > = values in this interval not displayed.       All laboratory data reviewed    Microbiology: reviewed   Respiratory PCR negative  12/27 bcx MRSA  12/27 urine cx MRSA  12/29 bcx with GPC  12/29 L knee aspiration cx MRSA  12/31 bcx GPC   1/1 bcx pending    Radiology:   CT Knee Without Contrast Left   Final Result      1. No acute abnormality.   2. Small joint effusion and mild degenerative changes.         Electronically signed by: Isai Villela   Date:    12/29/2023   Time:    16:45      X-Ray Knee 1 or 2 View Left   Final Result      1. No acute osseous abnormality.   2. Small joint effusion.         Electronically signed by: Isai Villela   Date:    12/29/2023   Time:    16:46      US Upper Extremity Veins Bilateral   Final Result      Thrombus within 1 of the right paired brachial veins.         Electronically signed by: Gui Brush MD   Date:    12/28/2023   Time:    08:41      US Lower Extremity Veins Bilateral   Final Result      No evidence of deep venous thrombosis in either lower extremity.         Electronically signed by: Adrienne Elizondo MD   Date:    12/28/2023   Time:    08:39       X-Ray Foot Complete Right   Final Result      No acute osseous abnormality of the foot.         Electronically signed by: Ben Roman   Date:    12/28/2023   Time:    00:05      X-Ray Chest AP Portable   Final Result      No acute abnormality.         Electronically signed by: Ben Roman   Date:    12/27/2023   Time:    22:14      MRI Foot (Hindfoot) Right W W/O Contrast    (Results Pending)        Current Medications:     Infusions:         Scheduled:   enoxparin  1 mg/kg Subcutaneous Q12H (treatment, non-standard time)    insulin detemir U-100  25 Units Subcutaneous BID    metoclopramide  5 mg Intravenous TID AC    metoprolol succinate  12.5 mg Oral Daily    mupirocin   Nasal BID    oxyCODONE-acetaminophen  1 tablet Oral Q8H    pantoprazole  40 mg Intravenous Daily    polyethylene glycol  17 g Oral BID    senna  8.6 mg Oral QHS    vancomycin (VANCOCIN) IV (PEDS and ADULTS)  2,000 mg Intravenous Q12H        PRN:  dextrose 10 % in water (D10W), dextrose 10 % in water (D10W), dextrose 10%, dextrose 10%, droPERidol, glucagon (human recombinant), glucose, glucose, HYDROmorphone, insulin aspart U-100, oxyCODONE, scopolamine, sodium chloride 0.9%, sodium chloride 0.9%, Pharmacy to dose Vancomycin consult **AND** vancomycin - pharmacy to dose    Antibiotics and Day Number of Therapy:  Vanc 12/27-present  Zosyn 12/27-12/29    Assessment/Plan:     Kulwant Mueller Jr. is a 38 y.o. 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 The patient presented to [facility] on 12/27/2023 with a primary complaint of Intractable N/V and diarrhea for over 6 days, generalized weakness for 6 days. Admitted for DKA, which has now resolved s/p insulin gtt with bridge to long-acting insulin. Found to have MRSA bacteremia, suspect source L knee septic arthritis/bursitis      Sepsis  MRSA bacteremia 2/2 Septic arthritis/bursitis of L knee  MRSA  UTI  - WBC at OSH ER 33K, elevated to 47K with neutrophil predominance and patient febrile on admission  - hematology consulted - multiple neutrophils noted per high power field on peripheral smear; no blasts/immature cells noted. Suspect leukocytosis 2/2 DKA and bacteremia   - TTE neg for vegetation  - 12/27 Bcx positive for MRSA, Ucx 12/27 with MRSA  - L knee aspiration with 118k WBC on 12/29  - s/p L knee arthrotomy with synovectomy on 12/29. Cx with staph aureus  - 12/31 bcx GPC, 1/1 bcx pending  Plan:  - ID on board. Switch from vanc to daptomycin given slow clearance of cultures. Check weekly CK  - R foot MRI w/wo to evaluate for osteo as another potential source  - Percocet 5 q8h scheduled plus PRN hydromorphone, oxycodone for pain    Intractable N/V  Gastroparesis  - was being worked up for gastroparesis outpatient, however was unable to complete testing as he vomiting during testing. Sx lasted for several weeks and spontaneously resolved, and he did not pursue further workup outpatient. He has been taking reglan outpatient, but is unsure if it does anything to help him.   Plan:  - continue home metoclopramide  - zofran not helping with N/V. PRN droperidol and scopolamine ordered. QTc 448, caution in QTc prolonging agents  - advance diet as tolerated  - continue IV PPI    DKA - resolved  DM2 with long-term use insulin  - anion gap 31, beta-hydroxybutyric acid elevated, with bicarb 9 and elevated blood glucose; UA with ketones and glucose  - suspect due to intractable N/V, not taking home insulin, and infection  - DKA resolved s/p insulin gtt with bridge to long-acting insulin  - A1c 7.4 on 12/28, prior A1c as high as 14  - per most recent visit with diabetes NP, home regimen includes tresiba 55u daily, sliding scale humalog, jardiance 25mg daily  Plan:  - continue levemir 25u BID + SSI, modify as patient able to tolerate diet  - space out BMPs to daily CMP    Newly dx HFmrEF  - 12/29 TTE: EF 48%  - will  slowly initiate GDMT - start metoprolol succinate 12.5mg daily  - already on jardiance at home    Acute kidney injury, resolved  - on admit, Cr 1.9; baseline 1  - suspect prerenal due to decreased PO intake  - Cr improving with IVF, currently at baseline    Chronic R heel wound  - 12/27 XR right foot with no osseous abnormality; has been following with wound care and has been off PO antibiotics for at least 1 month with no drainage or swelling from wound site  - MRI R hindfoot ordered  - wound care consulted    May-Thurner syndrome  Peripheral vascular disease  - has Hx May-Thurner syndrome and was following with Dr. Duff, who had kept patient on xarelto 10 mg daily  - patient reports has not been able to take xarelto for past 1-2 weeks 2' inability to tolerate PO  - DVT US BLE negative. DVT BUE with thrombus within 1 of the right paired brachial veins  - has noted Hx allergy to heparin analogues (reaction N/V) from when he received heparin drip prior to vascular intervention years ago; per chart review and discussion with pharmacy patient had received enoxaparin in 2018 and 2021 and appeared to tolerate; needs anticoagulation but unable to utilize PO regimen  - continue full dose lovenox     HTN  - patient reports not currently on any anti-hypertensive regimen  - SBP 130s throughout ER course  - Had an episode of SBP over 180 night on 12/29 s/p L knee I&D, improved with pain regimen  - BP should improve while slowly initiating GDMT    Diet: diabetic  VTE PPx: full dose lovenox  Code Status: full    Dispo: pending clearance of cultures, MRI foot, tolerating diet    Blanca Ellis MD  [facility] Internal Medicine PGY-1    [facility] Medicine Hospitalist Pager numbers:   [facility] Hospitalist Medicine Team A (Russel/Emmanuel): 2005  [facility] Hospitalist Medicine Team B (Odin/Kameron):  2006

## 2024-01-01 NOTE — PROGRESS NOTES
[facility]                   Pharmacy  Pharmacy Vancomycin/AG Sign-off    Therapy with vancomycin completed and/or consult discontinued by provider.  Pharmacy will sign off, please re-consult as needed. Thank you for allowing us to participate in this patient's care.     Mary Choudhary, PharmD    [phone]

## 2024-01-02 LAB
ALBUMIN SERPL BCP-MCNC: 1.6 G/DL (ref 3.5–5.2)
ALP SERPL-CCNC: 163 U/L (ref 55–135)
ALT SERPL W/O P-5'-P-CCNC: 83 U/L (ref 10–44)
ANION GAP SERPL CALC-SCNC: 12 MMOL/L (ref 8–16)
AST SERPL-CCNC: 88 U/L (ref 10–40)
BACTERIA SPEC AEROBE CULT: NO GROWTH
BASOPHILS # BLD AUTO: 0.15 K/UL (ref 0–0.2)
BASOPHILS NFR BLD: 0.5 % (ref 0–1.9)
BILIRUB SERPL-MCNC: 0.6 MG/DL (ref 0.1–1)
BUN SERPL-MCNC: 12 MG/DL (ref 6–20)
CALCIUM SERPL-MCNC: 8.9 MG/DL (ref 8.7–10.5)
CHLORIDE SERPL-SCNC: 96 MMOL/L (ref 95–110)
CO2 SERPL-SCNC: 26 MMOL/L (ref 23–29)
CREAT SERPL-MCNC: 0.9 MG/DL (ref 0.5–1.4)
DIFFERENTIAL METHOD BLD: ABNORMAL
EOSINOPHIL # BLD AUTO: 0 K/UL (ref 0–0.5)
EOSINOPHIL NFR BLD: 0 % (ref 0–8)
ERYTHROCYTE [DISTWIDTH] IN BLOOD BY AUTOMATED COUNT: 14.2 % (ref 11.5–14.5)
EST. GFR  (NO RACE VARIABLE): >60 ML/MIN/1.73 M^2
GLUCOSE SERPL-MCNC: 155 MG/DL (ref 70–110)
HCT VFR BLD AUTO: 33.3 % (ref 40–54)
HGB BLD-MCNC: 11.1 G/DL (ref 14–18)
IMM GRANULOCYTES # BLD AUTO: 0.97 K/UL (ref 0–0.04)
IMM GRANULOCYTES NFR BLD AUTO: 3.4 % (ref 0–0.5)
LYMPHOCYTES # BLD AUTO: 2.2 K/UL (ref 1–4.8)
LYMPHOCYTES NFR BLD: 7.9 % (ref 18–48)
MCH RBC QN AUTO: 28.2 PG (ref 27–31)
MCHC RBC AUTO-ENTMCNC: 33.3 G/DL (ref 32–36)
MCV RBC AUTO: 85 FL (ref 82–98)
MONOCYTES # BLD AUTO: 1.6 K/UL (ref 0.3–1)
MONOCYTES NFR BLD: 5.5 % (ref 4–15)
NEUTROPHILS # BLD AUTO: 23.4 K/UL (ref 1.8–7.7)
NEUTROPHILS NFR BLD: 82.7 % (ref 38–73)
NRBC BLD-RTO: 0 /100 WBC
PHOSPHATE SERPL-MCNC: 3.2 MG/DL (ref 2.7–4.5)
PLATELET # BLD AUTO: 369 K/UL (ref 150–450)
PLATELET BLD QL SMEAR: ABNORMAL
PMV BLD AUTO: 10.7 FL (ref 9.2–12.9)
POCT GLUCOSE: 143 MG/DL (ref 70–110)
POCT GLUCOSE: 145 MG/DL (ref 70–110)
POCT GLUCOSE: 155 MG/DL (ref 70–110)
POCT GLUCOSE: 183 MG/DL (ref 70–110)
POCT GLUCOSE: 186 MG/DL (ref 70–110)
POTASSIUM SERPL-SCNC: 3.6 MMOL/L (ref 3.5–5.1)
PROT SERPL-MCNC: 7.7 G/DL (ref 6–8.4)
RBC # BLD AUTO: 3.94 M/UL (ref 4.6–6.2)
SODIUM SERPL-SCNC: 134 MMOL/L (ref 136–145)
WBC # BLD AUTO: 28.28 K/UL (ref 3.9–12.7)

## 2024-01-02 PROCEDURE — 25000003 PHARM REV CODE 250

## 2024-01-02 PROCEDURE — 87040 BLOOD CULTURE FOR BACTERIA: CPT | Mod: 59

## 2024-01-02 PROCEDURE — 27000207 HC ISOLATION

## 2024-01-02 PROCEDURE — 63600175 PHARM REV CODE 636 W HCPCS

## 2024-01-02 PROCEDURE — C9113 INJ PANTOPRAZOLE SODIUM, VIA: HCPCS

## 2024-01-02 PROCEDURE — 0R9K3ZX DRAINAGE OF LEFT SHOULDER JOINT, PERCUTANEOUS APPROACH, DIAGNOSTIC: ICD-10-PCS | Performed by: ORTHOPAEDIC SURGERY

## 2024-01-02 PROCEDURE — 11000001 HC ACUTE MED/SURG PRIVATE ROOM

## 2024-01-02 PROCEDURE — 99223 1ST HOSP IP/OBS HIGH 75: CPT | Mod: ,,, | Performed by: STUDENT IN AN ORGANIZED HEALTH CARE EDUCATION/TRAINING PROGRAM

## 2024-01-02 PROCEDURE — 85025 COMPLETE CBC W/AUTO DIFF WBC: CPT

## 2024-01-02 PROCEDURE — 80053 COMPREHEN METABOLIC PANEL: CPT

## 2024-01-02 PROCEDURE — 84100 ASSAY OF PHOSPHORUS: CPT

## 2024-01-02 PROCEDURE — 99232 SBSQ HOSP IP/OBS MODERATE 35: CPT | Mod: ,,, | Performed by: INTERNAL MEDICINE

## 2024-01-02 PROCEDURE — 87150 DNA/RNA AMPLIFIED PROBE: CPT

## 2024-01-02 RX ORDER — LIDOCAINE HYDROCHLORIDE 10 MG/ML
3 INJECTION, SOLUTION EPIDURAL; INFILTRATION; INTRACAUDAL; PERINEURAL
Status: DISCONTINUED | OUTPATIENT
Start: 2024-01-02 | End: 2024-01-06

## 2024-01-02 RX ADMIN — INSULIN DETEMIR 25 UNITS: 100 INJECTION, SOLUTION SUBCUTANEOUS at 08:01

## 2024-01-02 RX ADMIN — METOCLOPRAMIDE 5 MG: 5 INJECTION, SOLUTION INTRAMUSCULAR; INTRAVENOUS at 06:01

## 2024-01-02 RX ADMIN — PANTOPRAZOLE SODIUM 40 MG: 40 INJECTION, POWDER, FOR SOLUTION INTRAVENOUS at 08:01

## 2024-01-02 RX ADMIN — METOPROLOL SUCCINATE 12.5 MG: 25 TABLET, EXTENDED RELEASE ORAL at 08:01

## 2024-01-02 RX ADMIN — DROPERIDOL 0.62 MG: 2.5 INJECTION, SOLUTION INTRAMUSCULAR; INTRAVENOUS at 12:01

## 2024-01-02 RX ADMIN — OXYCODONE HYDROCHLORIDE AND ACETAMINOPHEN 1 TABLET: 5; 325 TABLET ORAL at 06:01

## 2024-01-02 RX ADMIN — MUPIROCIN: 20 OINTMENT TOPICAL at 08:01

## 2024-01-02 RX ADMIN — INSULIN DETEMIR 25 UNITS: 100 INJECTION, SOLUTION SUBCUTANEOUS at 09:01

## 2024-01-02 RX ADMIN — POLYETHYLENE GLYCOL 3350 17 G: 17 POWDER, FOR SOLUTION ORAL at 09:01

## 2024-01-02 RX ADMIN — ENOXAPARIN SODIUM 120 MG: 120 INJECTION SUBCUTANEOUS at 06:01

## 2024-01-02 RX ADMIN — ENOXAPARIN SODIUM 120 MG: 120 INJECTION SUBCUTANEOUS at 05:01

## 2024-01-02 RX ADMIN — DAPTOMYCIN 945 MG: 500 INJECTION, POWDER, LYOPHILIZED, FOR SOLUTION INTRAVENOUS at 05:01

## 2024-01-02 RX ADMIN — INSULIN ASPART 1 UNITS: 100 INJECTION, SOLUTION INTRAVENOUS; SUBCUTANEOUS at 09:01

## 2024-01-02 NOTE — PLAN OF CARE
Sw met with pt at bedside to discuss short term disability application. SW was given information and emailed [facility] [employer] department. Cm will continue to follow pt through transitions of care and assist with any discharge needs.     Sreedhar Dennis, MSW  [phone]    Future Appointments   Date Time Provider Department Center   1/3/2024 10:40 AM Lawrence Magana MD [facility]  [place]   1/4/2024 10:00 AM Inna Bonilla FNP [facility] [place]   1/31/2024  2:00 PM Salvador Tapia MD [facility]        01/02/24 1342   Post-Acute Status   Post-Acute Authorization Other   Other Status See Comments  (SSID)   Discharge Plan   Discharge Plan A Home with family

## 2024-01-02 NOTE — ASSESSMENT & PLAN NOTE
38 y.o. 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      -blood cxs from 12/27,12/29, 12/31, 1/1 with MRSA  -repeat blood cxs daily to document clearance  -TTE without vegetation  -given prolonged bacteremia would consider ROBY  -continue dapto  -check weekly CK while on dapto  -await MRI foot and CT left shoulder

## 2024-01-02 NOTE — SUBJECTIVE & OBJECTIVE
Interval History: Remains febrile. Complaining of left shoulder pain. CT pending    Review of Systems   Constitutional:  Positive for fatigue and fever.   HENT:  Negative for sore throat.    Eyes:  Negative for visual disturbance.   Respiratory:  Negative for shortness of breath.    Cardiovascular:  Negative for chest pain.   Gastrointestinal:  Positive for diarrhea, nausea and vomiting. Negative for abdominal pain.   Genitourinary:  Negative for dysuria.   Musculoskeletal:  Negative for back pain.   Skin:  Positive for wound. Negative for rash.   Neurological:  Positive for weakness. Negative for headaches.   Hematological:  Negative for adenopathy.   Psychiatric/Behavioral:  The patient is not nervous/anxious.      Objective:     Vital Signs (Most Recent):  Temp: 98.3 °F (36.8 °C) (01/02/24 1137)  Pulse: 105 (01/02/24 1234)  Resp: 18 (01/02/24 1137)  BP: 139/80 (01/02/24 1137)  SpO2: 96 % (01/02/24 1137) Vital Signs (24h Range):  Temp:  [98.3 °F (36.8 °C)-101 °F (38.3 °C)] 98.3 °F (36.8 °C)  Pulse:  [100-112] 105  Resp:  [16-22] 18  SpO2:  [95 %-99 %] 96 %  BP: (136-140)/(71-80) 139/80     Weight: 118 kg (260 lb 2.3 oz)  Body mass index is 34.32 kg/m².    Estimated Creatinine Clearance: 146.8 mL/min (based on SCr of 0.9 mg/dL).     Physical Exam  Vitals and nursing note reviewed.   Constitutional:       Appearance: He is well-developed.      Comments: Fatigued, uncomfortable.   HENT:      Head: Normocephalic and atraumatic.   Eyes:      Pupils: Pupils are equal, round, and reactive to light.   Cardiovascular:      Rate and Rhythm: Regular rhythm. Tachycardia present.   Pulmonary:      Effort: Pulmonary effort is normal.      Breath sounds: Normal breath sounds.   Abdominal:      General: Bowel sounds are normal.      Palpations: Abdomen is soft.   Musculoskeletal:         General: Normal range of motion.      Cervical back: Normal range of motion and neck supple.      Comments: Left knee with bandage    Neurological:      Mental Status: He is alert and oriented to person, place, and time.   Psychiatric:         Behavior: Behavior normal.         Thought Content: Thought content normal.         Judgment: Judgment normal.          Significant Labs: All pertinent labs within the past 24 hours have been reviewed.    Significant Imaging: I have reviewed all pertinent imaging results/findings within the past 24 hours.

## 2024-01-02 NOTE — HPI
38 y.o. 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      -blood cxs from 12/27,12/29, 12/31, 1/1 with MRSA  -repeat blood cxs daily to document clearance  -TTE without vegetation  -given prolonged bacteremia would consider ROBY  -continue dapto  -check weekly CK while on dapto  -await MRI foot and CT left shoulder    Patient unavailable- in MRI at time of cardiology evaluation. NPO p MN for ROBY- persistent bacteremia. Full consult to follow.

## 2024-01-02 NOTE — PROGRESS NOTES
[facility] S[facility]  Orthopedics  Progress Note    Patient Name: Kulwant Mueller Jr.  MRN: [id number]  Admission Date: 12/27/2023  Hospital Length of Stay: 5 days  Attending Provider: Russell Newby MD  Primary Care Provider: Shellie Primary Doctor  Follow-up For: Procedure(s) (LRB):  ARTHROTOMY, KNEE (Left)    Post-Operative Day: 4 Days Post-Op  Subjective:     Principal Problem:Staphylococcal arthritis of left knee    Principal Orthopedic Problem:  Sepsis with left knee septic arthritis    Interval History:  Patient having less pain in the left knee but now having increased pain in the left shoulder   He does have a history of shoulder problems in the past no history of trauma    Review of patient's allergies indicates:   Allergen Reactions    Heparin analogues Nausea And Vomiting       Current Facility-Administered Medications   Medication    DAPTOmycin (CUBICIN) 945 mg in sodium chloride 0.9% SolP 50 mL IVPB    dextrose 10 % infusion    dextrose 10 % infusion    dextrose 10% bolus 125 mL 125 mL    dextrose 10% bolus 250 mL 250 mL    droPERidol injection 0.625 mg    enoxaparin injection 120 mg    glucagon (human recombinant) injection 1 mg    glucose chewable tablet 16 g    glucose chewable tablet 24 g    HYDROmorphone injection 0.5 mg    insulin aspart U-100 pen 0-10 Units    insulin detemir U-100 (Levemir) pen 25 Units    metoclopramide injection 5 mg    metoprolol succinate (TOPROL-XL) 24 hr split tablet 12.5 mg    oxyCODONE immediate release tablet 5 mg    oxyCODONE-acetaminophen 5-325 mg per tablet 1 tablet    pantoprazole injection 40 mg    polyethylene glycol packet 17 g    scopolamine 1.3-1.5 mg (1 mg over 3 days) 1 patch    senna tablet 8.6 mg    sodium chloride 0.9% flush 10 mL    sodium chloride 0.9% flush 10 mL     Objective:     Vital Signs (Most Recent):  Temp: 99.2 °F (37.3 °C) (01/02/24 0818)  Pulse: 101 (01/02/24 0818)  Resp: 18 (01/02/24 0818)  BP: 136/75 (01/02/24 0818)  SpO2: 99 % (01/02/24 0818)  "Vital Signs (24h Range):  Temp:  [98.1 °F (36.7 °C)-101 °F (38.3 °C)] 99.2 °F (37.3 °C)  Pulse:  [101-113] 101  Resp:  [16-22] 18  SpO2:  [95 %-99 %] 99 %  BP: (112-140)/(70-79) 136/75     Weight: 118 kg (260 lb 2.3 oz)  Height: 6' 1" (185.4 cm)  Body mass index is 34.32 kg/m².      Intake/Output Summary (Last 24 hours) at 1/2/2024 1056  Last data filed at 1/2/2024 1024  Gross per 24 hour   Intake --   Output 1000 ml   Net -1000 ml       Ortho/SPM Exam examination left knee shows a moderate amount of bloody drainage swelling is down no purulence noted range of motion decreased due to pain   Examination left shoulder mild tenderness anteriorly no redness no swelling no effusion   Range of motion limited due to pain   There is weakness   Neurologic exam intact    Significant Labs: All pertinent labs within the past 24 hours have been reviewed.    Significant Imaging: I have reviewed and interpreted all pertinent imaging results/findings.    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 due to underlying condition, limited to breakdown of skin [E08.621, L97.401] 12/30/2023 Yes    Diabetes mellitus [E11.9] 12/30/2023 Yes    MRSA bacteremia [R78.81, B95.62] 12/29/2023 Yes    Other elevated white blood cell count [D72.828] 12/28/2023 Yes    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] 10/03/2018 Yes     Chronic    DKA (diabetic ketoacidoses) [E11.10] 09/02/2018 Yes    Diabetic gastroparesis associated with type 2 diabetes mellitus [E11.43, K31.84] 08/31/2018 Yes    History of intravascular stent placement [Z95.828] 09/14/2017 Not Applicable    Intractable nausea and vomiting " [R11.2] 08/23/2017 Yes    Type 2 diabetes mellitus with complication, with long-term current use of insulin [E11.8, Z79.4] 08/19/2017 Not Applicable     Chronic      Problems Resolved During this Admission:     Plan: Continue IV antibiotics to cover MRSA   CT scan/x-ray left shoulder for further evaluation  May consider aspiration left shoulder later today   NPO for possible surgery if indicated    Edy Bocanegra Jr, MD  Orthopedics  [facility]

## 2024-01-02 NOTE — PROGRESS NOTES
[facility] Medicine Progress Note    Primary Team: [facility] Hospitalist Team A  Attending Physician: Russell Newby MD  Resident: Roly  Intern: Rhonda    Subjective/Interval History     Still having fevers Tmax 101F overnight. Tachycardic. Patient reports difficulty moving LUE at shoulder. States this has been an issue since he went to urgent care on 12/26 but worsening in the last 2-3 days.    Objective     Physical Examination:  Temp:  [98 °F (36.7 °C)-101 °F (38.3 °C)] 99.5 °F (37.5 °C)  Pulse:  [] 106  Resp:  [16-22] 16  SpO2:  [95 %-98 %] 98 %  BP: (112-140)/(70-85) 140/79  Gen: NAD, appears mildly uncomfortable, diaphoretic   HEENT: NC/AT, EOMI grossly, normal external ears bilaterally, normal external nose, moist MM  Neck: Supple  CV: Tachycardic rate, regular rhythm, no murmurs, rubs, or gallops.  Resp: CTAB. No wheezes, rales, rhonchi. Normal work of breathing. Normal effort. Equal chest rise. No respiratory distress  Abd: Soft, nontender, nondistended, no rebound/guarding  MSK/Ext: L knee in dressing from recent procedure. Decreased ROM of L shoulder (2/2 pain) and TTP; no erythema; some increased warmth. No clubbing, cyanosis. Moves all four extremities  Neuro: GCS 15, alert, speech is normal, face symmetric, no focal motor deficits elicited on exam  Skin: Warm, dry, no rash. R heel ulcer without drainage  Psych: Normal mood and affect     Intake/Output:  No intake or output data in the 24 hours ending 01/02/24 0720    Net IO Since Admission: 1,878.34 mL [01/02/24 0720]    Laboratory:  Recent Labs   Lab 12/27/23  2158 12/27/23  2158 12/28/23  0355 12/28/23  0812 12/29/23  0400 12/29/23  2045 12/30/23  0346 12/30/23  0804 12/31/23  0416 01/01/24  0400 01/02/24  0623   WBC 44.27*  --  47.31*   < > 38.53*  --  25.58*  --  27.91* 24.35* 28.28*   HGB 14.1  --  13.7*   < > 12.6*  --  12.0*  --  11.7* 12.2* 11.1*     --  405   < > 417  --  353  --  351 337 369   MCV 84  --  86   < > 84  --  85  --   86 85 85   *  --  138   < > 138   < >  --  140 136 133* 134*   K 4.5  --  3.7   < > 3.6   < >  --  3.8 3.7 4.0 3.6   CL 95  --  101   < > 103   < >  --  103 100 96 96   CO2 9*  --  15*   < > 21*   < >  --  23 25 23 26   BUN 33*  --  30*   < > 19   < >  --  18 16 14 12   CREATININE 1.9*  --  1.7*   < > 1.5*   < >  --  1.0 0.8 0.8 0.9   *  --  177*   < > 235*   < >  --  195* 153* 192* 155*   CALCIUM 9.7  --  9.4   < > 9.5   < >  --  9.1 9.3 9.2 8.9   PROT 9.2*  --   --   --   --   --   --   --  8.1 8.1 7.7   ALBUMIN 2.7*  --   --   --   --   --   --   --  1.7* 1.7* 1.6*   PHOS  --    < > 2.7  --   --   --   --  2.9 3.2 3.4 3.2   MG  --   --  2.9*  --  2.8*  --  2.6  --  2.4  --   --    AST 22  --   --   --   --   --   --   --  200* 57* 88*   ALT 31  --   --   --   --   --   --   --  125* 85* 83*   ALKPHOS 131  --   --   --   --   --   --   --  237* 201* 163*   TROPONINI 0.009  --   --   --   --   --   --   --   --   --   --     < > = values in this interval not displayed.       All laboratory data reviewed    Microbiology: reviewed   Respiratory PCR negative  12/27, 12/29, 12/31, 1/1 bcx MRSA  12/27 urine cx MRSA  12/29 L knee aspiration cx MRSA  1/2 bcx pending    Radiology:   CT Knee Without Contrast Left   Final Result      1. No acute abnormality.   2. Small joint effusion and mild degenerative changes.         Electronically signed by: Isai Villela   Date:    12/29/2023   Time:    16:45      X-Ray Knee 1 or 2 View Left   Final Result      1. No acute osseous abnormality.   2. Small joint effusion.         Electronically signed by: Isai Villela   Date:    12/29/2023   Time:    16:46      US Upper Extremity Veins Bilateral   Final Result      Thrombus within 1 of the right paired brachial veins.         Electronically signed by: Gui Brush MD   Date:    12/28/2023   Time:    08:41      US Lower Extremity Veins Bilateral   Final Result      No evidence of deep venous thrombosis in either lower  extremity.         Electronically signed by: Adrienne Elizondo MD   Date:    12/28/2023   Time:    08:39      X-Ray Foot Complete Right   Final Result      No acute osseous abnormality of the foot.         Electronically signed by: Ben Roman   Date:    12/28/2023   Time:    00:05      X-Ray Chest AP Portable   Final Result      No acute abnormality.         Electronically signed by: Ben Roman   Date:    12/27/2023   Time:    22:14      MRI Foot (Hindfoot) Right W W/O Contrast    (Results Pending)        Current Medications:     Infusions:         Scheduled:   DAPTOmycin (CUBICIN) IV (PEDS and ADULTS)  10 mg/kg (Adjusted) Intravenous Q24H    enoxparin  1 mg/kg Subcutaneous Q12H (treatment, non-standard time)    insulin detemir U-100  25 Units Subcutaneous BID    metoclopramide  5 mg Intravenous TID AC    metoprolol succinate  12.5 mg Oral Daily    mupirocin   Nasal BID    oxyCODONE-acetaminophen  1 tablet Oral Q8H    pantoprazole  40 mg Intravenous Daily    polyethylene glycol  17 g Oral BID    senna  8.6 mg Oral QHS        PRN:  dextrose 10 % in water (D10W), dextrose 10 % in water (D10W), dextrose 10%, dextrose 10%, droPERidol, glucagon (human recombinant), glucose, glucose, HYDROmorphone, insulin aspart U-100, oxyCODONE, scopolamine, sodium chloride 0.9%, sodium chloride 0.9%    Antibiotics and Day Number of Therapy:  Vanc 12/27-present  Zosyn 12/27-12/29    Assessment/Plan:     Kulwant Mueller Jr. is a 38 y.o. 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 The patient presented to [facility] on 12/27/2023 with a primary complaint of Intractable N/V and diarrhea for over 6 days, generalized weakness for 6 days. Admitted for DKA, which has now resolved s/p insulin gtt with bridge to long-acting insulin. Found to have MRSA bacteremia, suspect source L knee septic arthritis/bursitis      Sepsis  MRSA bacteremia 2/2  Septic arthritis/bursitis of L knee  MRSA UTI  - WBC at OSH ER 33K, elevated to 47K with neutrophil predominance and patient febrile on admission  - hematology consulted - multiple neutrophils noted per high power field on peripheral smear; no blasts/immature cells noted. Suspect leukocytosis 2/2 DKA and bacteremia   - TTE neg for vegetation  - 12/27, 12/29, 12/31, 1/1 bcx MRSA  - Ucx 12/27 with MRSA  - L knee aspiration with 118k WBC on 12/29  - s/p L knee arthrotomy with synovectomy on 12/29. Cx with staph aureus  - 1/2 bcx pending  Plan:  - ID on board. Switch from vanc to daptomycin given slow clearance of cultures. Check weekly CK  - daily blood cultures to document clearance  - cards consulted for ROBY  - R foot MRI w/wo to evaluate for osteo as another potential source  - Percocet 5 q8h scheduled plus PRN hydromorphone, oxycodone for pain    L shoulder pain  - decreased ROM, TTP, some increased warmth. Patient reports this has been an issue since 12/26 when he went to urgent care but acutely worsening in the last few days. Denies recent trauma  - concern joint is infected given daily fevers and slow clearance of blood cultures  - ortho notified  - XR and CT shoulder ordered    Intractable N/V  Gastroparesis  - was being worked up for gastroparesis outpatient, however was unable to complete testing as he vomiting during testing. Sx lasted for several weeks and spontaneously resolved, and he did not pursue further workup outpatient. He has been taking reglan outpatient, but is unsure if it does anything to help him.   Plan:  - continue home metoclopramide  - zofran not helping with N/V. PRN droperidol and scopolamine ordered. QTc 448, caution in QTc prolonging agents  - advance diet as tolerated  - continue IV PPI    DKA - resolved  DM2 with long-term use insulin  - anion gap 31, beta-hydroxybutyric acid elevated, with bicarb 9 and elevated blood glucose; UA with ketones and glucose  - suspect due to intractable  N/V, not taking home insulin, and infection  - DKA resolved s/p insulin gtt with bridge to long-acting insulin  - A1c 7.4 on 12/28, prior A1c as high as 14  - per most recent visit with diabetes NP, home regimen includes tresiba 55u daily, sliding scale humalog, jardiance 25mg daily  Plan:  - continue levemir 25u BID + SSI, modify as patient able to tolerate diet    Newly dx HFmrEF  - 12/29 TTE: EF 48%  - will slowly initiate GDMT - start metoprolol succinate 12.5mg daily  - already on jardiance at home    Acute kidney injury, resolved  - on admit, Cr 1.9; baseline 1  - suspect prerenal due to decreased PO intake  - Cr improving with IVF, currently at baseline    Chronic R heel wound  - 12/27 XR right foot with no osseous abnormality; has been following with wound care and has been off PO antibiotics for at least 1 month with no drainage or swelling from wound site  - MRI R hindfoot ordered  - wound care consulted    May-Thurner syndrome  Peripheral vascular disease  - has Hx May-Thurner syndrome and was following with Dr. Duff, who had kept patient on xarelto 10 mg daily  - patient reports has not been able to take xarelto for past 1-2 weeks 2' inability to tolerate PO  - DVT US BLE negative. DVT BUE with thrombus within 1 of the right paired brachial veins  - has noted Hx allergy to heparin analogues (reaction N/V) from when he received heparin drip prior to vascular intervention years ago; per chart review and discussion with pharmacy patient had received enoxaparin in 2018 and 2021 and appeared to tolerate; needs anticoagulation but unable to utilize PO regimen  - continue full dose lovenox     HTN  - patient reports not currently on any anti-hypertensive regimen  - SBP 130s throughout ER course  - Had an episode of SBP over 180 night on 12/29 s/p L knee I&D, improved with pain regimen  - BP should improve while slowly initiating GDMT    Concern for depression  - patient noted to have flat affect, decreased  motivation, sleeping for several hours during the day since admission, decreased appetite. Per wife, she has been concerned there may be underlying depression. Patient was laid off from the 's office in 2020 and has not been taking care of himself in terms of taking his medicines, etc since then  - psych consulted    Diet: diabetic  VTE PPx: full dose lovenox  Code Status: full    Dispo: pending clearance of cultures, MRI foot, tolerating diet    Blanca Ellis MD  [facility] Internal Medicine PGY-1    [facility] Medicine Hospitalist Pager numbers:   [facility] Hospitalist Medicine Team A (Russel/Emmanuel): 2005  [facility] Hospitalist Medicine Team B (Odin/Kameron):  2006

## 2024-01-02 NOTE — PT/OT/SLP PROGRESS
Physical Therapy      Patient Name:  Kulwant Mueller .   MRN:  [id number]    Patient not seen today secondary to  (pt unavailable X 3 attempts secondary to various testing(see OT note for times). Will follow-up tomorrow.

## 2024-01-02 NOTE — CONSULTS
I attempted to see the patient today for a psychiatric consultation, but the patient was [facility] for an MRI.  I will attempt again tomorrow afternoon to perform the psychiatric consultation.  Thank you.     Abdoulaye Londono MD  [facility]   01/02/2024

## 2024-01-02 NOTE — NURSING
Patient has vomiting episode, MD on duty was made aware to put prn medication.    PRN medication administered per MAR.

## 2024-01-02 NOTE — ASSESSMENT & PLAN NOTE
Persistent bacteremia   TTE without evidence of vegetation   ROBY requested per ID  NPO p MN for ROBY  Patient unavailable for evaluation by cardiology today 2/2 being in MRI  Formal consult to follow  Cath lab notified about pending procedure

## 2024-01-02 NOTE — PT/OT/SLP PROGRESS
Occupational Therapy      Patient Name:  Kulwant Mueller Jr.   MRN:  [id number]    Patient not seen today x3 attempts secondary to Testing/imaging (xray/CT/MRI). 0942 X-ray in room, 1043 MD in room, 1218 PE[facility] CT scan. Will follow-up as able.    1/2/2024

## 2024-01-02 NOTE — PROGRESS NOTES
[facility]  Infectious Disease  Progress Note    Patient Name: Kulwant Mueller Jr.  MRN: [id number]  Admission Date: 12/27/2023  Length of Stay: 5 days  Attending Physician: Russell Newby MD  Primary Care Provider: Shellie, Primary Doctor    Isolation Status: Contact  Assessment/Plan:      ID  MRSA bacteremia  38 y.o. 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      -blood cxs from 12/27,12/29, 12/31, 1/1 with MRSA  -repeat blood cxs daily to document clearance  -TTE without vegetation  -given prolonged bacteremia would consider ROBY  -continue dapto  -check weekly CK while on dapto  -await MRI foot and CT left shoulder              Thank you for your consult. I will follow-up with patient. Please contact us if you have any additional questions.    Marciano Millan MD  Infectious Disease  [facility] Surg    Subjective:     Principal Problem:Staphylococcal arthritis of left knee    HPI: 38 y.o. 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 The patient presented to [facility] on 12/27/2023 with a primary complaint of Intractable N/V and diarrhea for over 6 days, generalized weakness for 6 days. He was in his usual state of health until around 1 week ago when he developed NBNB N/V and decreased oral intake. His family members had recently been ill as well, but have tested negative for COVID/flu. He reports he continued to have N/V whether or not he was able to have any oral intake. He presented to an ER for evaluation yesterday, where it was noted he had a WBC 33K, with an unremarkable CT A/P with negative COVID/flu. He was discharged with GI follow-up, however at the appointment today, there was concern for elevated WBC, and patient was  instructed to present to the ER for further evaluation. Does endorse some abdominal discomfort and 1 episode of non-mucoid non-bloody diarrhea this afternoon. Denies  fevers, chills, shortness of breath, chest pain, palpitations, dysuria. He was admitted for DKA and found to have MRSa bacteremia. Initially source was thought to be right foot heel ulcer but bedside US was concerning for left knee bursitis.   Interval History: Remains febrile. Complaining of left shoulder pain. CT pending    Review of Systems   Constitutional:  Positive for fatigue and fever.   HENT:  Negative for sore throat.    Eyes:  Negative for visual disturbance.   Respiratory:  Negative for shortness of breath.    Cardiovascular:  Negative for chest pain.   Gastrointestinal:  Positive for diarrhea, nausea and vomiting. Negative for abdominal pain.   Genitourinary:  Negative for dysuria.   Musculoskeletal:  Negative for back pain.   Skin:  Positive for wound. Negative for rash.   Neurological:  Positive for weakness. Negative for headaches.   Hematological:  Negative for adenopathy.   Psychiatric/Behavioral:  The patient is not nervous/anxious.      Objective:     Vital Signs (Most Recent):  Temp: 98.3 °F (36.8 °C) (01/02/24 1137)  Pulse: 105 (01/02/24 1234)  Resp: 18 (01/02/24 1137)  BP: 139/80 (01/02/24 1137)  SpO2: 96 % (01/02/24 1137) Vital Signs (24h Range):  Temp:  [98.3 °F (36.8 °C)-101 °F (38.3 °C)] 98.3 °F (36.8 °C)  Pulse:  [100-112] 105  Resp:  [16-22] 18  SpO2:  [95 %-99 %] 96 %  BP: (136-140)/(71-80) 139/80     Weight: 118 kg (260 lb 2.3 oz)  Body mass index is 34.32 kg/m².    Estimated Creatinine Clearance: 146.8 mL/min (based on SCr of 0.9 mg/dL).     Physical Exam  Vitals and nursing note reviewed.   Constitutional:       Appearance: He is well-developed.      Comments: Fatigued, uncomfortable.   HENT:      Head: Normocephalic and atraumatic.   Eyes:      Pupils: Pupils are equal, round, and reactive to light.   Cardiovascular:       Rate and Rhythm: Regular rhythm. Tachycardia present.   Pulmonary:      Effort: Pulmonary effort is normal.      Breath sounds: Normal breath sounds.   Abdominal:      General: Bowel sounds are normal.      Palpations: Abdomen is soft.   Musculoskeletal:         General: Normal range of motion.      Cervical back: Normal range of motion and neck supple.      Comments: Left knee with bandage   Neurological:      Mental Status: He is alert and oriented to person, place, and time.   Psychiatric:         Behavior: Behavior normal.         Thought Content: Thought content normal.         Judgment: Judgment normal.          Significant Labs: All pertinent labs within the past 24 hours have been reviewed.    Significant Imaging: I have reviewed all pertinent imaging results/findings within the past 24 hours.

## 2024-01-02 NOTE — PROGRESS NOTES
CT scan reviewed left shoulder shows no obvious effusion or abscess accumulation     Aspiration of left shoulder performed at the bedside with no fluid obtained suggestive of no significant fluid accumulation in the shoulder or purulent material present   Overall the examination left shoulder is relatively benign although he does have pain with movement     Plan: Continue IV antibiotics and close observation of the left shoulder and left knee

## 2024-01-03 ENCOUNTER — ANESTHESIA EVENT (OUTPATIENT)
Dept: CARDIOLOGY | Facility: HOSPITAL | Age: 39
DRG: 853 | End: 2024-01-03
Payer: COMMERCIAL

## 2024-01-03 ENCOUNTER — ANESTHESIA (OUTPATIENT)
Dept: CARDIOLOGY | Facility: HOSPITAL | Age: 39
DRG: 853 | End: 2024-01-03
Payer: COMMERCIAL

## 2024-01-03 LAB
ALBUMIN SERPL BCP-MCNC: 1.4 G/DL (ref 3.5–5.2)
ALP SERPL-CCNC: 145 U/L (ref 55–135)
ALT SERPL W/O P-5'-P-CCNC: 116 U/L (ref 10–44)
ANION GAP SERPL CALC-SCNC: 13 MMOL/L (ref 8–16)
AST SERPL-CCNC: 136 U/L (ref 10–40)
BACTERIA BLD CULT: ABNORMAL
BACTERIA SPEC ANAEROBE CULT: NORMAL
BACTERIA SPEC ANAEROBE CULT: NORMAL
BASOPHILS # BLD AUTO: 0.12 K/UL (ref 0–0.2)
BASOPHILS NFR BLD: 0.4 % (ref 0–1.9)
BILIRUB SERPL-MCNC: 0.6 MG/DL (ref 0.1–1)
BSA FOR ECHO PROCEDURE: 2.43 M2
BUN SERPL-MCNC: 15 MG/DL (ref 6–20)
CALCIUM SERPL-MCNC: 8.7 MG/DL (ref 8.7–10.5)
CHLORIDE SERPL-SCNC: 94 MMOL/L (ref 95–110)
CO2 SERPL-SCNC: 26 MMOL/L (ref 23–29)
CREAT SERPL-MCNC: 0.9 MG/DL (ref 0.5–1.4)
DIFFERENTIAL METHOD BLD: ABNORMAL
EOSINOPHIL # BLD AUTO: 0 K/UL (ref 0–0.5)
EOSINOPHIL NFR BLD: 0 % (ref 0–8)
ERYTHROCYTE [DISTWIDTH] IN BLOOD BY AUTOMATED COUNT: 14.3 % (ref 11.5–14.5)
EST. GFR  (NO RACE VARIABLE): >60 ML/MIN/1.73 M^2
GLUCOSE SERPL-MCNC: 129 MG/DL (ref 70–110)
HCT VFR BLD AUTO: 31.2 % (ref 40–54)
HGB BLD-MCNC: 10.1 G/DL (ref 14–18)
IMM GRANULOCYTES # BLD AUTO: 1.33 K/UL (ref 0–0.04)
IMM GRANULOCYTES NFR BLD AUTO: 4.4 % (ref 0–0.5)
LYMPHOCYTES # BLD AUTO: 2 K/UL (ref 1–4.8)
LYMPHOCYTES NFR BLD: 6.7 % (ref 18–48)
MCH RBC QN AUTO: 27.6 PG (ref 27–31)
MCHC RBC AUTO-ENTMCNC: 32.4 G/DL (ref 32–36)
MCV RBC AUTO: 85 FL (ref 82–98)
MONOCYTES # BLD AUTO: 1.5 K/UL (ref 0.3–1)
MONOCYTES NFR BLD: 4.8 % (ref 4–15)
MRSA ID BY PCR: POSITIVE
NEUTROPHILS # BLD AUTO: 25.5 K/UL (ref 1.8–7.7)
NEUTROPHILS NFR BLD: 83.7 % (ref 38–73)
NRBC BLD-RTO: 0 /100 WBC
PHOSPHATE SERPL-MCNC: 3.1 MG/DL (ref 2.7–4.5)
PLATELET # BLD AUTO: 364 K/UL (ref 150–450)
PMV BLD AUTO: 10.7 FL (ref 9.2–12.9)
POCT GLUCOSE: 178 MG/DL (ref 70–110)
POCT GLUCOSE: 189 MG/DL (ref 70–110)
POCT GLUCOSE: 203 MG/DL (ref 70–110)
POCT GLUCOSE: 303 MG/DL (ref 70–110)
POTASSIUM SERPL-SCNC: 3.8 MMOL/L (ref 3.5–5.1)
PROT SERPL-MCNC: 7.5 G/DL (ref 6–8.4)
RBC # BLD AUTO: 3.66 M/UL (ref 4.6–6.2)
SODIUM SERPL-SCNC: 133 MMOL/L (ref 136–145)
STAPH AUREUS ID BY PCR: POSITIVE
WBC # BLD AUTO: 30.46 K/UL (ref 3.9–12.7)

## 2024-01-03 PROCEDURE — 97530 THERAPEUTIC ACTIVITIES: CPT

## 2024-01-03 PROCEDURE — 90833 PSYTX W PT W E/M 30 MIN: CPT | Mod: ,,, | Performed by: PSYCHIATRY & NEUROLOGY

## 2024-01-03 PROCEDURE — 37000008 HC ANESTHESIA 1ST 15 MINUTES: Performed by: STUDENT IN AN ORGANIZED HEALTH CARE EDUCATION/TRAINING PROGRAM

## 2024-01-03 PROCEDURE — 25000003 PHARM REV CODE 250: Performed by: NURSE ANESTHETIST, CERTIFIED REGISTERED

## 2024-01-03 PROCEDURE — 25000003 PHARM REV CODE 250

## 2024-01-03 PROCEDURE — 37000009 HC ANESTHESIA EA ADD 15 MINS: Performed by: STUDENT IN AN ORGANIZED HEALTH CARE EDUCATION/TRAINING PROGRAM

## 2024-01-03 PROCEDURE — 85025 COMPLETE CBC W/AUTO DIFF WBC: CPT

## 2024-01-03 PROCEDURE — 63600175 PHARM REV CODE 636 W HCPCS

## 2024-01-03 PROCEDURE — 97165 OT EVAL LOW COMPLEX 30 MIN: CPT

## 2024-01-03 PROCEDURE — D9220A PRA ANESTHESIA: Mod: CRNA,,, | Performed by: NURSE ANESTHETIST, CERTIFIED REGISTERED

## 2024-01-03 PROCEDURE — 93325 DOPPLER ECHO COLOR FLOW MAPG: CPT | Mod: 26,,, | Performed by: STUDENT IN AN ORGANIZED HEALTH CARE EDUCATION/TRAINING PROGRAM

## 2024-01-03 PROCEDURE — 11000001 HC ACUTE MED/SURG PRIVATE ROOM

## 2024-01-03 PROCEDURE — 84100 ASSAY OF PHOSPHORUS: CPT

## 2024-01-03 PROCEDURE — D9220A PRA ANESTHESIA: Mod: ANES,,, | Performed by: ANESTHESIOLOGY

## 2024-01-03 PROCEDURE — 80053 COMPREHEN METABOLIC PANEL: CPT

## 2024-01-03 PROCEDURE — 93320 DOPPLER ECHO COMPLETE: CPT | Mod: 26,,, | Performed by: STUDENT IN AN ORGANIZED HEALTH CARE EDUCATION/TRAINING PROGRAM

## 2024-01-03 PROCEDURE — 27000207 HC ISOLATION

## 2024-01-03 PROCEDURE — 87040 BLOOD CULTURE FOR BACTERIA: CPT

## 2024-01-03 PROCEDURE — 99232 SBSQ HOSP IP/OBS MODERATE 35: CPT | Mod: ,,, | Performed by: INTERNAL MEDICINE

## 2024-01-03 PROCEDURE — 93312 ECHO TRANSESOPHAGEAL: CPT | Mod: 26,,, | Performed by: STUDENT IN AN ORGANIZED HEALTH CARE EDUCATION/TRAINING PROGRAM

## 2024-01-03 PROCEDURE — C9113 INJ PANTOPRAZOLE SODIUM, VIA: HCPCS

## 2024-01-03 PROCEDURE — 97110 THERAPEUTIC EXERCISES: CPT

## 2024-01-03 PROCEDURE — 36415 COLL VENOUS BLD VENIPUNCTURE: CPT

## 2024-01-03 PROCEDURE — 99223 1ST HOSP IP/OBS HIGH 75: CPT | Mod: ,,, | Performed by: PSYCHIATRY & NEUROLOGY

## 2024-01-03 RX ORDER — DEXMEDETOMIDINE HYDROCHLORIDE 100 UG/ML
INJECTION, SOLUTION INTRAVENOUS
Status: DISCONTINUED | OUTPATIENT
Start: 2024-01-03 | End: 2024-01-03

## 2024-01-03 RX ORDER — ETOMIDATE 2 MG/ML
INJECTION INTRAVENOUS
Status: DISCONTINUED | OUTPATIENT
Start: 2024-01-03 | End: 2024-01-03

## 2024-01-03 RX ORDER — LIDOCAINE HYDROCHLORIDE 20 MG/ML
INJECTION, SOLUTION EPIDURAL; INFILTRATION; INTRACAUDAL; PERINEURAL
Status: DISCONTINUED | OUTPATIENT
Start: 2024-01-03 | End: 2024-01-03

## 2024-01-03 RX ORDER — PANTOPRAZOLE SODIUM 40 MG/1
40 TABLET, DELAYED RELEASE ORAL DAILY
Status: DISCONTINUED | OUTPATIENT
Start: 2024-01-04 | End: 2024-01-10 | Stop reason: HOSPADM

## 2024-01-03 RX ORDER — PROPOFOL 10 MG/ML
VIAL (ML) INTRAVENOUS
Status: DISCONTINUED | OUTPATIENT
Start: 2024-01-03 | End: 2024-01-03

## 2024-01-03 RX ADMIN — INSULIN ASPART 4 UNITS: 100 INJECTION, SOLUTION INTRAVENOUS; SUBCUTANEOUS at 11:01

## 2024-01-03 RX ADMIN — Medication 20 MG: at 09:01

## 2024-01-03 RX ADMIN — METOCLOPRAMIDE 5 MG: 5 INJECTION, SOLUTION INTRAMUSCULAR; INTRAVENOUS at 05:01

## 2024-01-03 RX ADMIN — ETOMIDATE 6 MG: 2 INJECTION, SOLUTION INTRAVENOUS at 09:01

## 2024-01-03 RX ADMIN — INSULIN ASPART 4 UNITS: 100 INJECTION, SOLUTION INTRAVENOUS; SUBCUTANEOUS at 10:01

## 2024-01-03 RX ADMIN — INSULIN DETEMIR 25 UNITS: 100 INJECTION, SOLUTION SUBCUTANEOUS at 10:01

## 2024-01-03 RX ADMIN — OXYCODONE HYDROCHLORIDE 5 MG: 5 TABLET ORAL at 12:01

## 2024-01-03 RX ADMIN — SODIUM CHLORIDE: 0.9 INJECTION, SOLUTION INTRAVENOUS at 09:01

## 2024-01-03 RX ADMIN — OXYCODONE HYDROCHLORIDE 5 MG: 5 TABLET ORAL at 07:01

## 2024-01-03 RX ADMIN — METOPROLOL SUCCINATE 12.5 MG: 25 TABLET, EXTENDED RELEASE ORAL at 11:01

## 2024-01-03 RX ADMIN — METOCLOPRAMIDE 5 MG: 5 INJECTION, SOLUTION INTRAMUSCULAR; INTRAVENOUS at 11:01

## 2024-01-03 RX ADMIN — LIDOCAINE HYDROCHLORIDE 50 MG: 20 INJECTION, SOLUTION EPIDURAL; INFILTRATION; INTRACAUDAL; PERINEURAL at 09:01

## 2024-01-03 RX ADMIN — ENOXAPARIN SODIUM 120 MG: 120 INJECTION SUBCUTANEOUS at 05:01

## 2024-01-03 RX ADMIN — DEXMEDETOMIDINE HCL 8 MCG: 100 INJECTION INTRAVENOUS at 09:01

## 2024-01-03 RX ADMIN — INSULIN DETEMIR 25 UNITS: 100 INJECTION, SOLUTION SUBCUTANEOUS at 11:01

## 2024-01-03 RX ADMIN — DAPTOMYCIN 945 MG: 500 INJECTION, POWDER, LYOPHILIZED, FOR SOLUTION INTRAVENOUS at 03:01

## 2024-01-03 RX ADMIN — PANTOPRAZOLE SODIUM 40 MG: 40 INJECTION, POWDER, FOR SOLUTION INTRAVENOUS at 11:01

## 2024-01-03 RX ADMIN — Medication 30 MG: at 09:01

## 2024-01-03 RX ADMIN — INSULIN ASPART 4 UNITS: 100 INJECTION, SOLUTION INTRAVENOUS; SUBCUTANEOUS at 06:01

## 2024-01-03 RX ADMIN — INSULIN ASPART 2 UNITS: 100 INJECTION, SOLUTION INTRAVENOUS; SUBCUTANEOUS at 05:01

## 2024-01-03 NOTE — PLAN OF CARE
Pt arrived per bed from room 522. Patient lying in bed with no complaints of pain or distress noted. Monitors applied. VSS, AAOx4.  Yellow non-skid socks noted on patient and bilateral green boots. Fall risk reviewed with patient. Procedure and recovery process explained to patient and all questions answered.  Patient verbalized understanding, denies questions. Will continue to monitor for safety and needs. Right AC IV leaking around site and not flushing well. D/C with cath tip intact.  Right hand IV flushing well.

## 2024-01-03 NOTE — NURSING
Pt on bsc, ready to get back in bed. Pt assisted x4 staff, gait belt and walker. Pt unable to straighten left leg. Pt safely placed in bed. Safety m/t.

## 2024-01-03 NOTE — ANESTHESIA PREPROCEDURE EVALUATION
01/03/2024  Kulwant Mueller Jr. is a 38 y.o., male for ROBY  Past Medical History:   Diagnosis Date    Anticoagulant long-term use     COVID-19 virus infection     Diabetes mellitus     Diabetes mellitus, type 2     Hypertension     May-Thurner syndrome      Past Surgical History:   Procedure Laterality Date    APPENDECTOMY      ARTHROTOMY OF KNEE Left 12/29/2023    Procedure: ARTHROTOMY, KNEE;  Surgeon: Edy Bocanegra Jr., MD;  Location: [facility];  Service: Orthopedics;  Laterality: Left;    ESOPHAGOGASTRODUODENOSCOPY N/A 1/31/2022    Procedure: EGD (ESOPHAGOGASTRODUODENOSCOPY);  Surgeon: Cindy Quiros MD;  Location: [facility];  Service: Endoscopy;  Laterality: N/A;    ESOPHAGOGASTRODUODENOSCOPY N/A 3/21/2022    Procedure: EGD (ESOPHAGOGASTRODUODENOSCOPY);  Surgeon: Tejas Mann MD;  Location: [facility];  Service: Endoscopy;  Laterality: N/A;    INCISION AND DRAINAGE FOOT Left 11/28/2021    Procedure: INCISION AND DRAINAGE, FOOT;  Surgeon: Bj Alicea DPM;  Location: [facility];  Service: Podiatry;  Laterality: Left;    stents in bilateral legs       Pre-op Assessment       I have reviewed the Medications.     Review of Systems  Anesthesia Hx:             Denies Family Hx of Anesthesia complications.     Cardiovascular:     Hypertension                                        Hepatic/GI:     GERD             Endocrine:  Diabetes, type 2               Physical Exam  General: Well nourished    Airway:  Mallampati: II   TM Distance: Normal  Neck ROM: Normal ROM    Dental:  Intact    Chest/Lungs:  Clear to auscultation    Heart:  Rate: Normal  Rhythm: Regular Rhythm      Anesthesia Plan  Type of Anesthesia, risks & benefits discussed:    Anesthesia Type: Gen ETT  Intra-op Monitoring Plan: Standard ASA Monitors  Post Op Pain Control Plan: multimodal analgesia  Informed Consent: Informed  consent signed with the Patient and all parties understand the risks and agree with anesthesia plan.  All questions answered.   ASA Score: 3    Ready For Surgery From Anesthesia Perspective.     .

## 2024-01-03 NOTE — PLAN OF CARE
Problem: Physical Therapy  Goal: Physical Therapy Goal  Description: Goals to be met by: 2024     Patient will increase functional independence with mobility by performin. Supine to sit with Modified [place]  2. Sit to supine with Modified [place]  3. Rolling to Left and Right with Modified [place].  4. Sit to stand transfer with Modified [place]  5. Bed to chair transfer with Modified [place] using Rolling Walker  6. Gait  x 150 feet with Modified [place] using Rolling Walker.   7. Ascend/descend 1 stair with Modified [place] using Rolling Walker.     Outcome: Ongoing, Not Progressing     Pt recently performing BSC transfer with nursing/staff (~3-4 person assist) and fatigued but agreeable to participating in therapy session. Pt was unable to perform full stand this date due to increased pain. Pt scooted laterally along the side of the bed with ModA x 2 and bed height elevated. Recommending high intensity therapy upon d/c.

## 2024-01-03 NOTE — PT/OT/SLP PROGRESS
Physical Therapy Treatment    Patient Name:  Kulwant Mueller Jr.   MRN:  [id number]    Recommendations:     Discharge Recommendations: High Intensity Therapy  Discharge Equipment Recommendations: drop arm commode, wheelchair  Barriers to discharge:  significant assist required    Assessment:     Kulwant Mueller Jr. is a 38 y.o. male admitted with a medical diagnosis of Staphylococcal arthritis of left knee.  He presents with the following impairments/functional limitations: weakness, gait instability, impaired balance, impaired endurance, impaired self care skills, impaired functional mobility, decreased coordination, decreased lower extremity function, decreased upper extremity function, decreased ROM, pain, impaired skin, edema, orthopedic precautions, impaired joint extensibility .Pt recently performing BSC transfer with nursing/staff (~3-4 person assist) and fatigued but agreeable to participating in therapy session. Pt was unable to perform full stand this date due to increased pain. Pt scooted laterally along the side of the bed with ModA x 2 and bed height elevated. Recommending high intensity therapy upon d/c.    Rehab Prognosis: Good and Fair; patient would benefit from acute skilled PT services to address these deficits and reach maximum level of function.    Recent Surgery: Procedure(s) (LRB):  ECHOCARDIOGRAM, TRANSESOPHAGEAL (N/A) Day of Surgery    Plan:     During this hospitalization, patient to be seen 5 x/week to address the identified rehab impairments via gait training, therapeutic activities, wheelchair management/training, neuromuscular re-education and progress toward the following goals:    Plan of Care Expires:  01/31/24    Subjective     Chief Complaint: pain  Patient/Family Comments/goals: return to PLOF  Pain/Comfort:  Pain Rating 1: 4/10  Location - Side 1:  (L shoulder, R thigh, and low back)  Pain Addressed 1: Reposition, Distraction, Cessation of Activity, Nurse notified  Pain Rating  Post-Intervention 1:  (not rated but increased with activity)      Objective:     Communicated with nsg prior to session.  Patient found HOB elevated with bed alarm, telemetry, peripheral IV upon PT entry to room.     General Precautions: Standard, fall  Orthopedic Precautions:  (no restrictions per Dr. Bocanegra; wound care nurse recommends limiting pressure through R heel (encourage WB to forefoot))  Braces:  (fitted pt for regular darco shoe)  Respiratory Status: Room air     Functional Mobility:  Bed Mobility:     Scooting: moderate assistance and of 2 persons X 3 scoots laterally; ModA x 2 scooting forward/backward EOB  Supine to Sit: moderate assistance and of 2 persons  Sit to Supine: maximal assistance and of 2 persons  Transfers:     Sit to Stand:  maximal assistance and of 2 persons with rolling walker and minimal clearance of buttocks; VC's for hand placement and sequencing, use of bariatric RW and assist to stabilize RW, bed height elevated      AM-PAC 6 CLICK MOBILITY  Turning over in bed (including adjusting bedclothes, sheets and blankets)?: 2  Sitting down on and standing up from a chair with arms (e.g., wheelchair, bedside commode, etc.): 2  Moving from lying on back to sitting on the side of the bed?: 2  Moving to and from a bed to a chair (including a wheelchair)?: 1  Need to walk in hospital room?: 1  Climbing 3-5 steps with a railing?: 1  Basic Mobility Total Score: 9       Treatment & Education:  Pt fatigued from recent BSC t/f with staff  Pt agreeable to sit EOB  Pt fitted for regular darco shoe for R foot for protection, donned with totalA  Performed scooting EOB and 1 stand attempt - pt declined further attempts due to pain and fatigue  Performed ankle pumps, AAROM L knee flexion/ext in sitting and supine (pt able to obtain ~80 deg of L knee flexion in sitting but providing increased resistance in supine)  Pressure relief boots donned   Pt educated on importance of HEP and B UE/LE therex to  tolerance    Patient left HOB elevated with all lines intact, call button in reach, bed alarm on, nsg notified, and family/friends present..    GOALS:   Multidisciplinary Problems       Physical Therapy Goals          Problem: Physical Therapy    Goal Priority Disciplines Outcome Goal Variances Interventions   Physical Therapy Goal     PT, PT/OT Ongoing, Not Progressing     Description: Goals to be met by: 2024     Patient will increase functional independence with mobility by performin. Supine to sit with Modified [place]  2. Sit to supine with Modified [place]  3. Rolling to Left and Right with Modified [place].  4. Sit to stand transfer with Modified [place]  5. Bed to chair transfer with Modified [place] using Rolling Walker  6. Gait  x 150 feet with Modified [place] using Rolling Walker.   7. Ascend/descend 1 stair with Modified [place] using Rolling Walker.                          Time Tracking:     PT Received On: 24  PT Start Time: 1329     PT Stop Time: 1400  PT Total Time (min): 31 min cotx with OT    Billable Minutes: Therapeutic Activity 20 and Therapeutic Exercise 11    Treatment Type: Treatment  PT/PTA: PT     Number of PTA visits since last PT visit: 0     2024

## 2024-01-03 NOTE — PLAN OF CARE
Problem: Adult Inpatient Plan of Care  Goal: Plan of Care Review  Outcome: Ongoing, Progressing  Flowsheets (Taken 1/3/2024 0610)  Plan of Care Reviewed With: patient     Problem: Diabetes Comorbidity  Goal: Blood Glucose Level Within Targeted Range  Outcome: Ongoing, Progressing  Intervention: Monitor and Manage Glycemia  Flowsheets (Taken 1/3/2024 0610)  Glycemic Management: blood glucose monitored     Problem: Fall Injury Risk  Goal: Absence of Fall and Fall-Related Injury  Outcome: Ongoing, Progressing  Intervention: Promote Injury-Free Environment  Flowsheets (Taken 1/3/2024 0610)  Safety Promotion/Fall Prevention: bed alarm set

## 2024-01-03 NOTE — PLAN OF CARE
SW sent IV abx referral via careport to [facility] Phone: [phone]   and multiple HH referrals.Cm will continue to follow pt through transitions of care and assist with any discharge needs.    3:30pm pt is approved to receive HH with [facility] Phone: [phone]      MAYUR Wang  [phone]    Future Appointments   Date Time Provider Department Center   1/31/2024  2:00 PM Salvador Tapia MD [facility]   2/20/2024 11:00 AM Lawrence Magana MD [facility]        01/03/24 1321   Post-Acute Status   Post-Acute Authorization Home Health;IV Infusion   Home Health Status Referrals Sent   Coverage [employer]   IV Infusion Status Referral(s) sent   Discharge Plan   Discharge Plan A Home Health

## 2024-01-03 NOTE — CONSULTS
PSYCHIATRY INPATIENT CONSULT NOTE      1/3/2024 2:03 PM   Kulwant Mueller Jr.   1985   [id number]           DATE OF ADMISSION: 12/27/2023  9:28 PM    SITE: [facility]    CURRENT LEGAL STATUS: Patient does not currently meet PEC criteria due to not currently being an imminent threat to self/others and not being gravely disabled 2/2 mental illness at this time.     HISTORY    Per Initial History from Primary Team:  Kulwant Mueller Jr. is a 38 y.o. male with PMHx May-Thurner syndrome on xarelto, DM2 on insulin, chronic foot wounds following with wound care, HTN. The patient presented to [facility] on 12/27/2023 with a primary complaint of Intractable N/V and diarrhea for over 6 days, generalized weakness for 6 days.   The patient was in their usual state of health until around 1 week ago when he developed NBNB N/V and decreased oral intake. His family members had recently been ill as well, but have tested negative for COVID/flu. He reports he continued to have N/V whether or not he was able to have any oral intake. He presented to an ER for evaluation yesterday, where it was noted he had a WBC 33K, with an unremarkable CT A/P with negative COVID/flu. He was discharged with GI follow-up, however at the appointment today, there was concern for elevated WBC, and patient was instructed to present to the ER for further evaluation. Does endorse some abdominal discomfort and 1 episode of non-mucoid non-bloody diarrhea this afternoon. Denies  fevers, chills, shortness of breath, chest pain, palpitations, dysuria.  He has experienced similar symptoms in the past, and was being worked up for gastroparesis, however was unable to complete testing as he vomiting during testing. The symptoms lasted for several weeks and spontaneously resolved, and he did not pursue further workup outpatient. He has been taking reglan outpatient, but is unsure if it does anything to help him.   He has not been able to  take his jardiance, aspart, tresiba for the past week as has not been eating, and had not kept good track of his blood sugar. He also has been unable to take his xarelto 2' inability to tolerate oral intake, which cardiology restarted given his multiple DVTs 2' May-Thurner syndrome.   Interval History from Primary Team on 01/03/2024:  Patient asleep on morning exam and easily arousal. He expressed frustration with the continued lab draws and not being able to eat this morning for his ROBY. He states that his shoulder pain from yesterday has resolved and denies chest pain, shortness of breath, abdominal pain, or any other complaints at this time. Has not had a bowel movement since admission.       Chief Complaint / Reason for Psychiatry Consult: Concern for Depression       HPI:   Kulwant Mueller Jr. is a 38 y.o. 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  Psychiatry was originally consulted as noted above.  The patient was seen and examined.  The chart was reviewed.  On examination today, the patient was alert and oriented to person, place, city, state, month, year, and situation.  He was CAM-ICU negative for delirium.  He denies any past psychiatric diagnoses or medication / treatment.  He denies any current or prior s/s consistent with MDD, BIRD, psychosis, sue, panic, OCD, PTSD, eating disorders, or substance abuse.  He does endorses some intermittent adjustment-related low mood and irritability that he attributes to his current medical complications.  He denies any current or prior active or passive SI / HI.  He denies any current or prior AH, VH, TH, delusions, paranoia, or DIGFAST (sue) s/s.  He endorses sleeping about 7 hours per night and endorses a good appetite but states that the hospital food isn't too good.  He  denies any adverse effects to his current medication regimen.  Regarding current medical/physical complaints, he endorses L knee pain and foot wounds.  He denies any other medical complaints at this time.  NAD was observed during the examination.  Psychotherapy was implemented as noted below with a focus on improving adjustment-related depression.  See detailed psych ROS below.  See A/P below.        Psychiatric Review Of Systems - Currently, the patient is endorsing and/or denying the following:  (patient's endorsements are BOLDED below; if not BOLDED, then patient denied):    Endorses intermittent Symptoms of Depression (adjustment-related): diminished mood, low motivation, loss of interest/anhedonia, irritability, diminished energy, change in sleep, change in appetite, diminished concentration or cognition or indecisiveness, PMA/R, excessive guilt or hopelessness or worthlessness, suicidal ideations    Denies issues with Sleep: initiation, maintenance, early morning awakening with inability to return to sleep    Denies Suicidal/Homicidal ideations: active/passive ideations, organized plans, future intentions    Denies Symptoms of psychosis: hallucinations, delusions, disorganized thinking, disorganized behavior or abnormal motor behavior, or negative symptoms (diminshed emotional expression, avolition, anhedonia, alogia, asociality     Denies Symptoms of sue or hypomania: elevated, expansive, or irritable mood with increased energy or activity; with inflated self-esteem or grandiosity, decreased need for sleep, increased rate of speech, FOI or racing thoughts, distractibility, increased goal directed activity or PMA, risky/disinhibited behavior    Denies Symptoms of Anxiety: excessive anxiety/worry/fear, more days than not, about numerous issues, difficult to control, with restlessness, fatigue, poor concentration, irritability, muscle tension, sleep disturbance; causes functionally impairing distress      Denies Symptoms of Panic Disorder: recurrent panic attacks, precipitated or un-precipitated, source of worry and/or behavioral changes secondary; with or without agoraphobia    Denies Symptoms of PTSD: h/o trauma; re-experiencing/intrusive symptoms, avoidant behavior, negative alterations in cognition or mood, or hyperarousal symptoms; with or without dissociative symptoms     Denies Symptoms of OCD: obsessions or compulsions     Denies Symptoms of Eating Disorders: anorexia, bulimia or binging    Denies Substance Use: intoxication, withdrawal, tolerance, used in larger amounts or duration than intended, unsuccessful attempts to limit or quit, increased time engaging in or seeking out, cravings or strong desire to use, failure to fulfill obligations, negative consequences in social/interpersonal/occupational,/recreational areas, use in dangerous situations, medical or psychological consequences       [facility] Toolkit ASQ Suicide Screening Tool:  In the past few weeks, have you wished you were dead? Denies   In the past few weeks, have you felt that you or your family would be better off if you were dead? Denies  In the past week, have you been having thoughts about killing yourself? Denies  Have you ever tried to kill yourself? Denies  Are you having thoughts of killing yourself right now? Denies       PSYCHOTHERAPY ADD-ON [id number]   30 (16-37*) minutes    Time: 20 minutes  Participants: Met with patient    Therapeutic Intervention Type: behavior modifying psychotherapy, supportive psychotherapy  Why chosen therapy is appropriate versus another modality: relevant to diagnosis, patient responds to this modality, evidence based practice    Target symptoms: adjustment-related depression   Primary focus: improving adjustment-related depression  Psychotherapeutic techniques: supportive and psychodynamic techniques; psycho-education; safe coping skills / behaviors; CBT; problem solving techniques and managing life & medical  stressors    Outcome monitoring methods: self-report, observation    Patient's response to intervention:  The patient's response to intervention is accepting.    Progress toward goals:  The patient's progress toward goals is fair.      ROS:  General ROS: negative for - chills, fever or night sweats; positive for fatigue  Ophthalmic ROS: negative for - blurry vision, double vision or eye pain  ENT ROS: negative for - sinus pain, headaches, sore throat or visual changes  Allergy and Immunology ROS: negative for - hives, itchy/watery eyes or nasal congestion  Hematological and Lymphatic ROS: negative for - bleeding problems, bruising, jaundice or pallor  Endocrine ROS: negative for - galactorrhea, hot flashes, mood swings, palpitations or temperature intolerance  Respiratory ROS: negative for - cough, hemoptysis, shortness of breath, tachypnea or wheezing  Cardiovascular ROS: negative for - chest pain, dyspnea on exertion, loss of consciousness, palpitations, rapid heart rate or shortness of breath  Gastrointestinal ROS: negative for - appetite loss, nausea, abdominal pain, blood in stools, change in bowel habits, constipation or diarrhea  Genito-Urinary ROS: negative for - incontinence, nocturia or pelvic pain  Musculoskeletal ROS: negative for - joint stiffness, joint swelling, or muscle pain; positive for L knee pain    Neurological ROS: negative for - behavioral changes, confusion, dizziness, memory loss, numbness/tingling or seizures  Dermatological ROS: negative for dry skin, hair changes, pruritus or rash; positive for foot wounds   Psychiatric ROS: see detailed psychiatric ROS above in history section       Past Psychiatric History:  Previous Diagnoses: denies   Previous Medication Trials: denies  Previous Psychiatric Hospitalizations: denies   Previous Suicide Attempts: denies   History of Violence: denies   Outpatient Psychiatrist: denies     Social History:  Marital Status:   Children: 3   Employment  Status/Info: 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[place], [place]   Education: some college  Special Ed:  denies  : denies  Christian: Jew   Housing Status: lives with wife and 3 children in [place], [place]   Hobbies/Leisure time: time with children / family   History of phys/sexual abuse: denies  Access to gun: owns firearm due to occupation ; keeps locked up with ammo  ; counseled on risk reduction methods     Family Psychiatric History: denies    Substance Abuse History:  Recreational Drugs: denies  Use of Alcohol: denies  Rehab History: denies    Tobacco Use: denies   Use of Caffeine: denies  Use of OTC: denies   Legal consequences of chemical use: denies     Legal History:  Past Charges/Incarcerations: denies   Pending charges: denies     Psychosocial Stressors: health  Functioning Relationships: good support system in family   Strengths AND Liabilities: Strength: Patient accepts guidance/feedback, Strength: Patient is expressive/articulate., Strength: Patient is motivated for change., Strength: Patient has positive support network., Liability: Patient has poor health., Liability: Patient lacks coping skills.      PAST MEDICAL & SURGICAL HISTORY   Past Medical History:   Diagnosis Date    Anticoagulant long-term use     COVID-19 virus infection     Diabetes mellitus     Diabetes mellitus, type 2     Hypertension     May-Thurner syndrome      Past Surgical History:   Procedure Laterality Date    APPENDECTOMY      ARTHROTOMY OF KNEE Left 12/29/2023    Procedure: ARTHROTOMY, KNEE;  Surgeon: Edy Bocanegra Jr., MD;  [facility];  Service: Orthopedics;  Laterality: Left;    ESOPHAGOGASTRODUODENOSCOPY N/A 1/31/2022    Procedure: EGD (ESOPHAGOGASTRODUODENOSCOPY);  Surgeon: Cindy Quiros MD;  Location: [facility];  Service: Endoscopy;  Laterality: N/A;    ESOPHAGOGASTRODUODENOSCOPY N/A 3/21/2022    Procedure: EGD (ESOPHAGOGASTRODUODENOSCOPY);  Surgeon: Tejas Mann MD;   Location: [facility] ENDO;  Service: Endoscopy;  Laterality: N/A;    INCISION AND DRAINAGE FOOT Left 11/28/2021    Procedure: INCISION AND DRAINAGE, FOOT;  Surgeon: Bj Alicea DPM;  Location: [facility] OR;  Service: Podiatry;  Laterality: Left;    stents in bilateral legs         NEUROLOGIC HISTORY  Seizures: denies   Head trauma with LOC: denies  CVA: denies      FAMILY HISTORY   Family History   Problem Relation Age of Onset    Cancer Mother     Cancer Paternal Uncle     Cancer Paternal Grandfather     Irritable bowel syndrome Paternal Grandfather     Cancer Maternal Grandfather     Colon cancer Neg Hx     Esophageal cancer Neg Hx     Rectal cancer Neg Hx     Stomach cancer Neg Hx     Ulcerative colitis Neg Hx     Crohn's disease Neg Hx     Celiac disease Neg Hx        ALLERGIES   Review of patient's allergies indicates:   Allergen Reactions    Heparin analogues Nausea And Vomiting       CURRENT MEDICATION REGIMEN   Home Meds:   Prior to Admission medications    Medication Sig Start Date End Date Taking? Authorizing Provider   blood-glucose meter,continuous (DEXCOM G7 ) Misc 1 Device by Misc.(Non-Drug; Combo Route) route once daily. 6/27/23 6/26/24  Almaz Santiago FNP   blood-glucose sensor (DEXCOM G7 SENSOR) Justyna 1 Device by Misc.(Non-Drug; Combo Route) route every 10 days. 7/11/23 7/10/24  Almaz Santiago FNP   empagliflozin (JARDIANCE) 25 mg tablet Take 1 tablet (25 mg total) by mouth once daily. 9/14/23 9/13/24  Almaz Santiago FNP   furosemide (LASIX) 20 MG tablet Take 1 tablet (20 mg total) by mouth once daily. 1/26/23 1/26/24  Tyree Duff MD   glucagon (GVOKE HYPOPEN 2-PACK) 1 mg/0.2 mL AtIn Inject 1 mg into the skin as needed (SEVERE HYPOGLYCEMIA). 8/28/23   Almaz Santiago FNP   insulin aspart U-100 (NOVOLOG U-100 INSULIN ASPART) 100 unit/mL injection Inject 14 Units into the skin 3 (three) times daily before meals. 7/11/23 7/10/24  Almaz Santiago FNP  "  metoclopramide HCl (REGLAN) 10 MG tablet Take 0.5 tablets (5 mg total) by mouth 3 (three) times daily before meals. 7/27/23   Tejas Mann MD   pantoprazole (PROTONIX) 40 MG tablet Take 1 tablet (40 mg total) by mouth once daily. 7/26/23 10/31/23  Cindy Quiros MD   pen needle, diabetic (BD ULTRA-FINE DIYA PEN NEEDLE) 32 gauge x 5/32" Ndle Use with Tresiba daily and Humalog 3-4 times daily as directed. 3/10/23   Lisa Bro, NP   prednisoLONE acetate (PRED FORTE) 1 % DrpS Instill one drop to the left eye every 2 hours while awake for 2 days then instill one drop 4 time daily for 5 days 2/17/23   Yariel Pantoja, OD   prochlorperazine (COMPAZINE) 10 MG tablet Take 1 tablet (10 mg total) by mouth 3 (three) times daily as needed (nausea or vomiting). 3/11/22   Yessenia Santos MD   promethazine (PHENERGAN) 25 MG suppository Place 1 suppository (25 mg total) rectally every 6 (six) hours as needed for Nausea. 12/27/23   Mary Steele NP   rivaroxaban (XARELTO) 10 mg Tab Take 1 tablet (10 mg total) by mouth daily with dinner or evening meal. 12/13/23 12/12/24  Salvador Tapia MD   TRESIBA FLEXTOUCH U-200 200 unit/mL (3 mL) insulin pen Inject 56 Units into the skin once daily. 11/20/23 11/19/24  Almaz Santiago FNP   DEXCOM G6 TRANSMITTER Justyna CHANGE TRANSMITTER EVERY 90 DAYS. 1/21/22 3/21/22  Lisa Bro NP       OTC Meds: denies     Scheduled Meds:    DAPTOmycin (CUBICIN) IV (PEDS and ADULTS)  10 mg/kg (Adjusted) Intravenous Q24H    enoxparin  1 mg/kg Subcutaneous Q12H (treatment, non-standard time)    insulin detemir U-100  25 Units Subcutaneous BID    LIDOcaine (PF) 10 mg/ml (1%)  3 mL Other 1 time in Clinic/HOD    metoclopramide  5 mg Intravenous TID AC    metoprolol succinate  12.5 mg Oral Daily    oxyCODONE-acetaminophen  1 tablet Oral Q8H    [START ON 1/4/2024] pantoprazole  40 mg Oral Daily    polyethylene glycol  17 g Oral BID    [START ON 1/4/2024] rivaroxaban  10 mg Oral " Daily with dinner    senna  8.6 mg Oral QHS      PRN Meds: dextrose 10 % in water (D10W), dextrose 10 % in water (D10W), dextrose 10%, dextrose 10%, droPERidol, glucagon (human recombinant), glucose, glucose, HYDROmorphone, insulin aspart U-100, oxyCODONE, scopolamine, sodium chloride 0.9%, sodium chloride 0.9%   Psychotherapeutics (From admission, onward)      Start     Stop Route Frequency Ordered    12/28/23 2244  droPERidol injection 0.625 mg         -- IV Every 6 hours PRN 12/28/23 2152            LABORATORY DATA   Recent Results (from the past 72 hour(s))   POCT glucose    Collection Time: 12/31/23  5:24 PM   Result Value Ref Range    POCT Glucose 178 (H) 70 - 110 mg/dL   POCT glucose    Collection Time: 12/31/23  8:48 PM   Result Value Ref Range    POCT Glucose 165 (H) 70 - 110 mg/dL   Blood culture    Collection Time: 01/01/24  3:59 AM    Specimen: Blood   Result Value Ref Range    Blood Culture, Routine       Gram stain aer bottle: Gram positive cocci in clusters resembling Staph    Blood Culture, Routine       Positive results previously called 01/02/2024  10:09    Blood Culture, Routine (A)      STAPHYLOCOCCUS AUREUS  Susceptibility pending  ID consult required at [facility],[place] and [facility] locations.     CBC auto differential    Collection Time: 01/01/24  4:00 AM   Result Value Ref Range    WBC 24.35 (H) 3.90 - 12.70 K/uL    RBC 4.43 (L) 4.60 - 6.20 M/uL    Hemoglobin 12.2 (L) 14.0 - 18.0 g/dL    Hematocrit 37.5 (L) 40.0 - 54.0 %    MCV 85 82 - 98 fL    MCH 27.5 27.0 - 31.0 pg    MCHC 32.5 32.0 - 36.0 g/dL    RDW 14.4 11.5 - 14.5 %    Platelets 337 150 - 450 K/uL    MPV 10.9 9.2 - 12.9 fL    Immature Granulocytes 1.9 (H) 0.0 - 0.5 %    Gran # (ANC) 20.8 (H) 1.8 - 7.7 K/uL    Immature Grans (Abs) 0.46 (H) 0.00 - 0.04 K/uL    Lymph # 1.6 1.0 - 4.8 K/uL    Mono # 1.4 (H) 0.3 - 1.0 K/uL    Eos # 0.0 0.0 - 0.5 K/uL    Baso # 0.11 0.00 - 0.20 K/uL    nRBC 0 0 /100 WBC    Gran % 85.4 (H) 38.0 - 73.0 %     Lymph % 6.4 (L) 18.0 - 48.0 %    Mono % 5.8 4.0 - 15.0 %    Eosinophil % 0.0 0.0 - 8.0 %    Basophil % 0.5 0.0 - 1.9 %    Differential Method Automated    Phosphorus    Collection Time: 01/01/24  4:00 AM   Result Value Ref Range    Phosphorus 3.4 2.7 - 4.5 mg/dL   Comprehensive metabolic panel    Collection Time: 01/01/24  4:00 AM   Result Value Ref Range    Sodium 133 (L) 136 - 145 mmol/L    Potassium 4.0 3.5 - 5.1 mmol/L    Chloride 96 95 - 110 mmol/L    CO2 23 23 - 29 mmol/L    Glucose 192 (H) 70 - 110 mg/dL    BUN 14 6 - 20 mg/dL    Creatinine 0.8 0.5 - 1.4 mg/dL    Calcium 9.2 8.7 - 10.5 mg/dL    Total Protein 8.1 6.0 - 8.4 g/dL    Albumin 1.7 (L) 3.5 - 5.2 g/dL    Total Bilirubin 0.5 0.1 - 1.0 mg/dL    Alkaline Phosphatase 201 (H) 55 - 135 U/L    AST 57 (H) 10 - 40 U/L    ALT 85 (H) 10 - 44 U/L    eGFR >60 >60 mL/min/1.73 m^2    Anion Gap 14 8 - 16 mmol/L   Blood culture    Collection Time: 01/01/24  4:00 AM    Specimen: Blood   Result Value Ref Range    Blood Culture, Routine       Gram stain aer bottle: Gram positive cocci in clusters resembling Staph    Blood Culture, Routine       Results called to and read back by:Carmita Davenport RN 01/02/2024  09:55    Blood Culture, Routine (A)      STAPHYLOCOCCUS AUREUS  ID consult required at [facility] and [place] locations.  For susceptibility see order #[id number]     POCT glucose    Collection Time: 01/01/24  6:08 AM   Result Value Ref Range    POCT Glucose 169 (H) 70 - 110 mg/dL   VANCOMYCIN, TROUGH    Collection Time: 01/01/24  8:07 AM   Result Value Ref Range    Vancomycin-Trough 17.8 10.0 - 22.0 ug/mL   POCT glucose    Collection Time: 01/01/24 12:06 PM   Result Value Ref Range    POCT Glucose 261 (H) 70 - 110 mg/dL   CK    Collection Time: 01/01/24  1:56 PM   Result Value Ref Range     20 - 200 U/L   POCT glucose    Collection Time: 01/01/24  4:20 PM   Result Value Ref Range    POCT Glucose 210 (H) 70 - 110 mg/dL   POCT glucose    Collection  Time: 01/02/24  2:27 AM   Result Value Ref Range    POCT Glucose 155 (H) 70 - 110 mg/dL   POCT glucose    Collection Time: 01/02/24  5:11 AM   Result Value Ref Range    POCT Glucose 143 (H) 70 - 110 mg/dL   CBC auto differential    Collection Time: 01/02/24  6:23 AM   Result Value Ref Range    WBC 28.28 (H) 3.90 - 12.70 K/uL    RBC 3.94 (L) 4.60 - 6.20 M/uL    Hemoglobin 11.1 (L) 14.0 - 18.0 g/dL    Hematocrit 33.3 (L) 40.0 - 54.0 %    MCV 85 82 - 98 fL    MCH 28.2 27.0 - 31.0 pg    MCHC 33.3 32.0 - 36.0 g/dL    RDW 14.2 11.5 - 14.5 %    Platelets 369 150 - 450 K/uL    MPV 10.7 9.2 - 12.9 fL    Immature Granulocytes 3.4 (H) 0.0 - 0.5 %    Gran # (ANC) 23.4 (H) 1.8 - 7.7 K/uL    Immature Grans (Abs) 0.97 (H) 0.00 - 0.04 K/uL    Lymph # 2.2 1.0 - 4.8 K/uL    Mono # 1.6 (H) 0.3 - 1.0 K/uL    Eos # 0.0 0.0 - 0.5 K/uL    Baso # 0.15 0.00 - 0.20 K/uL    nRBC 0 0 /100 WBC    Gran % 82.7 (H) 38.0 - 73.0 %    Lymph % 7.9 (L) 18.0 - 48.0 %    Mono % 5.5 4.0 - 15.0 %    Eosinophil % 0.0 0.0 - 8.0 %    Basophil % 0.5 0.0 - 1.9 %    Platelet Estimate Appears normal     Differential Method Automated    Phosphorus    Collection Time: 01/02/24  6:23 AM   Result Value Ref Range    Phosphorus 3.2 2.7 - 4.5 mg/dL   Comprehensive metabolic panel    Collection Time: 01/02/24  6:23 AM   Result Value Ref Range    Sodium 134 (L) 136 - 145 mmol/L    Potassium 3.6 3.5 - 5.1 mmol/L    Chloride 96 95 - 110 mmol/L    CO2 26 23 - 29 mmol/L    Glucose 155 (H) 70 - 110 mg/dL    BUN 12 6 - 20 mg/dL    Creatinine 0.9 0.5 - 1.4 mg/dL    Calcium 8.9 8.7 - 10.5 mg/dL    Total Protein 7.7 6.0 - 8.4 g/dL    Albumin 1.6 (L) 3.5 - 5.2 g/dL    Total Bilirubin 0.6 0.1 - 1.0 mg/dL    Alkaline Phosphatase 163 (H) 55 - 135 U/L    AST 88 (H) 10 - 40 U/L    ALT 83 (H) 10 - 44 U/L    eGFR >60 >60 mL/min/1.73 m^2    Anion Gap 12 8 - 16 mmol/L   Blood culture    Collection Time: 01/02/24  6:23 AM    Specimen: Peripheral, Right Wrist; Blood   Result Value Ref Range     Blood Culture, Routine       Gram stain aer bottle: Gram positive cocci in clusters resembling Staph    Blood Culture, Routine       Positive results previously called 01/03/2024  13:15   Blood culture    Collection Time: 01/02/24  6:23 AM    Specimen: Peripheral, Left Wrist; Blood   Result Value Ref Range    Blood Culture, Routine       Gram stain aer bottle: Gram positive cocci in clusters resembling Staph    Blood Culture, Routine       Results called to and read back by:Charla Hassan RN 01/03/2024  05:52   MRSA/SA Rapid ID by PCR from Blood culture    Collection Time: 01/02/24  6:23 AM   Result Value Ref Range    Staph aureus ID by PCR Positive (A) Negative    Methicillin Resistant ID by PCR Positive (A) Negative   POCT glucose    Collection Time: 01/02/24 11:35 AM   Result Value Ref Range    POCT Glucose 183 (H) 70 - 110 mg/dL   POCT glucose    Collection Time: 01/02/24  5:58 PM   Result Value Ref Range    POCT Glucose 145 (H) 70 - 110 mg/dL   POCT glucose    Collection Time: 01/02/24  9:23 PM   Result Value Ref Range    POCT Glucose 186 (H) 70 - 110 mg/dL   CBC auto differential    Collection Time: 01/03/24  5:48 AM   Result Value Ref Range    WBC 30.46 (H) 3.90 - 12.70 K/uL    RBC 3.66 (L) 4.60 - 6.20 M/uL    Hemoglobin 10.1 (L) 14.0 - 18.0 g/dL    Hematocrit 31.2 (L) 40.0 - 54.0 %    MCV 85 82 - 98 fL    MCH 27.6 27.0 - 31.0 pg    MCHC 32.4 32.0 - 36.0 g/dL    RDW 14.3 11.5 - 14.5 %    Platelets 364 150 - 450 K/uL    MPV 10.7 9.2 - 12.9 fL    Immature Granulocytes 4.4 (H) 0.0 - 0.5 %    Gran # (ANC) 25.5 (H) 1.8 - 7.7 K/uL    Immature Grans (Abs) 1.33 (H) 0.00 - 0.04 K/uL    Lymph # 2.0 1.0 - 4.8 K/uL    Mono # 1.5 (H) 0.3 - 1.0 K/uL    Eos # 0.0 0.0 - 0.5 K/uL    Baso # 0.12 0.00 - 0.20 K/uL    nRBC 0 0 /100 WBC    Gran % 83.7 (H) 38.0 - 73.0 %    Lymph % 6.7 (L) 18.0 - 48.0 %    Mono % 4.8 4.0 - 15.0 %    Eosinophil % 0.0 0.0 - 8.0 %    Basophil % 0.4 0.0 - 1.9 %    Differential Method Automated   "  Phosphorus    Collection Time: 01/03/24  5:48 AM   Result Value Ref Range    Phosphorus 3.1 2.7 - 4.5 mg/dL   Comprehensive metabolic panel    Collection Time: 01/03/24  5:48 AM   Result Value Ref Range    Sodium 133 (L) 136 - 145 mmol/L    Potassium 3.8 3.5 - 5.1 mmol/L    Chloride 94 (L) 95 - 110 mmol/L    CO2 26 23 - 29 mmol/L    Glucose 129 (H) 70 - 110 mg/dL    BUN 15 6 - 20 mg/dL    Creatinine 0.9 0.5 - 1.4 mg/dL    Calcium 8.7 8.7 - 10.5 mg/dL    Total Protein 7.5 6.0 - 8.4 g/dL    Albumin 1.4 (L) 3.5 - 5.2 g/dL    Total Bilirubin 0.6 0.1 - 1.0 mg/dL    Alkaline Phosphatase 145 (H) 55 - 135 U/L     (H) 10 - 40 U/L     (H) 10 - 44 U/L    eGFR >60 >60 mL/min/1.73 m^2    Anion Gap 13 8 - 16 mmol/L   POCT glucose    Collection Time: 01/03/24  6:17 AM   Result Value Ref Range    POCT Glucose 203 (H) 70 - 110 mg/dL   Transesophageal echo (ROBY)    Collection Time: 01/03/24 11:45 AM   Result Value Ref Range    BSA 2.43 m2   POCT glucose    Collection Time: 01/03/24  1:06 PM   Result Value Ref Range    POCT Glucose 189 (H) 70 - 110 mg/dL      No results found for: "PHENYTOIN", "PHENOBARB", "VALPROATE", "CBMZ"      EXAMINATION    VITALS   Vitals:    01/03/24 1015 01/03/24 1030 01/03/24 1248 01/03/24 1304   BP: 134/69 134/70  (!) 157/84   BP Location: Left arm Left arm     Patient Position:       Pulse: 95 95  (!) 111   Resp: 18 20 18 20   Temp:    98.2 °F (36.8 °C)   TempSrc:       SpO2: 95% 95%  95%   Weight:       Height:            CONSTITUTIONAL  General Appearance: NAD, unremarkable, age appropriate, lying in bed, overweight, calm    MUSCULOSKELETAL  Muscle Strength and Tone: WNL    Abnormal Involuntary Movements: none observed   Gait and Station: Attempted but unable to assess due to medical acuity     PSYCHIATRIC   Behavior/Cooperation:  cooperative, eye contact normal, calm  Speech:  normal tone, normal rate, normal pitch, normal volume  Language: grossly intact, able to name, able to repeat " "with spontaneous speech  Mood: "I'm good doc"  Affect:  constricted   Associations: intact; no ILENE  Thought Process: Linear   Thought Content: denies SI, HI, AH, VH, TH, delusions, or paranoia (no RIS observed)   Sensorium:  Awake  Alert and Oriented: to person, place, city, state, month, year, and situation   Memory: 3/3 immediate, 3/3 at 5 minutes    Recent: Intact; able to report recent events   Remote: Intact; Named 4/4 past presidents   Attention/concentration: Intact. Appropriate for age/education. Able to spell w-o-r-l-d & d-l-r-o-w.   Similarities: Intact (difference between apple and orange?)  Abstract reasoning: Intact  Fund of Knowledge: Named 4/4 past presidents   Insight: Intact  Judgment: Intact    CAM ICU Delirium Assessment - NEGATIVE    Is the patient aware of the biomedical complications associated with substance abuse and mental illness? yes        MEDICAL DECISION MAKING    ASSESSMENT        Adjustment Disorder with Depressed Mood   (Rule out MDD)       RECOMMENDATIONS       - Patient does not currently meet PEC criteria due to not being an imminent threat to self/others and not being gravely disabled 2/2 mental illness at this time.      - At this time, the patient denies any interest in antidepressant pharmacotherapy.  If patient changes his mind and desires pharmacotherapy, Cymbalta would be a good option given it's ability to treat depression and pain (discussed risks/benefits/alt vs no treatment with patient).     - Psychotherapy was performed with patient as noted above with a focus on improving adjustment-related depression.    - Patient's most recent resulted labs, imaging, and EKG were reviewed today.       - If patient desires, please have CM/SW assist patient with outpatient mental health f/u for s/p discharge from this facility.      - Patient was instructed to call 911 and/or [phone] and return to the nearest ED if he begins feeling suicidal, homicidal, or gravely disabled (for s/p this " hospitalization).       - Thank you for this consult ; will continue to follow patient while at [facility]         Total time spent with patient and/or managing/coordinating patient's care today (excluding the time spent on psychotherapy): 77 minutes   Time spent on psychotherapy today (as noted above): 20 minutes   Total time for encounter today including psychotherapy: 97 minutes      More than 50% of the time was spent counseling/coordinating care.     Consulting clinician was informed of the encounter and consult note.       STAFF:  Abdoulaye Londono MD  [facility]   1/3/2024

## 2024-01-03 NOTE — PLAN OF CARE
Patient remains cath lab bay #1 in bed with side rails up x2. Pt drowsy but able to follow commands. Pt is stable when connecting to cardiac monitors. VSS.    +2 debi radial pulses palpated. Skin normal in color and warm to touch, <3 sec cap refill.  Fall risk precautions given and patient acknowledges.  AIDET completed to pt.  Will continue to monitor patient.

## 2024-01-03 NOTE — PLAN OF CARE
Patient transferred via bed to Room 522 5th floor [facility] on assigned cardiac tele monitor. VSS, AAOx4. No c/o pain.   Assessed by ADRIEL Jimenes at bedside.  All questions answered.   Father at the bedside.

## 2024-01-03 NOTE — PT/OT/SLP PROGRESS
Physical Therapy      Patient Name:  Kulwant Menardcaro Benítez   MRN:  [id number]    Patient not seen today secondary to Off the floor for procedure/surgery (PEACE in cath lab). Will follow-up as able.    1/3/2024

## 2024-01-03 NOTE — PROGRESS NOTES
[facility]  Infectious Disease  Progress Note    Patient Name: Kluwant Mueller Jr.  MRN: [id number]  Admission Date: 12/27/2023  Length of Stay: 6 days  Attending Physician: Jazmin Goode MD  Primary Care Provider: Shellie, Primary Doctor    Isolation Status: Contact  Assessment/Plan:      No new Assessment & Plan notes have been filed under this hospital service since the last note was generated.  Service: Infectious Diseases    MRSA bacteremia  38 y.o. 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 MRI foot shows no osteomyelitis and CT left shoulder shows possible cellulits.     -blood cxs from 12/27,12/29, 12/31, 1/1, 1/2 positive for MRSA, repeat blood cxs daily to document clearance  -TTE without vegetation, follow up on ROBY results  -Continue daptomycin and check CK weekly  -Pt will need 6 week course of IV abx from the date of first negative blood culture    Thank you for your consult. Please contact us if you have any additional questions. Please appreciate any variation in plan noted in attending attestation.       Carlos Lyon DO  [facility], PGY-1  Infectious Disease  [facility]    Subjective:     Principal Problem:Staphylococcal arthritis of left knee    HPI: 38 y.o. 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 The patient presented to [facility] on 12/27/2023 with a primary complaint of Intractable N/V and diarrhea for over 6 days, generalized weakness for 6 days. He was in his usual state of health until around 1 week ago when he developed NBNB N/V and decreased oral intake. His family members had recently been ill as well, but have tested negative for COVID/flu. He reports he continued to have N/V whether  or not he was able to have any oral intake. He presented to an ER for evaluation yesterday, where it was noted he had a WBC 33K, with an unremarkable CT A/P with negative COVID/flu. He was discharged with GI follow-up, however at the appointment today, there was concern for elevated WBC, and patient was instructed to present to the ER for further evaluation. Does endorse some abdominal discomfort and 1 episode of non-mucoid non-bloody diarrhea this afternoon. Denies  fevers, chills, shortness of breath, chest pain, palpitations, dysuria. He was admitted for DKA and found to have MRSa bacteremia. Initially source was thought to be right foot heel ulcer but bedside US was concerning for left knee bursitis.     Past Medical History:   Diagnosis Date    Anticoagulant long-term use     COVID-19 virus infection     Diabetes mellitus     Diabetes mellitus, type 2     Hypertension     May-Thurner syndrome      Microbiology Results (last 7 days)       Procedure Component Value Units Date/Time    Blood culture [[id number]] Collected: 01/02/24 0623    Order Status: Completed Specimen: Blood from Peripheral, Right Wrist Updated: 01/03/24 1315     Blood Culture, Routine Gram stain aer bottle: Gram positive cocci in clusters resembling Staph      Positive results previously called 01/03/2024  13:15    Blood culture [[id number]] Collected: 01/03/24 0548    Order Status: Sent Specimen: Blood Updated: 01/03/24 1033    Culture, Fluid  (Aerobic) [[id number]] Collected: 12/29/23 1238    Order Status: Completed Specimen: Joint Fluid from Knee, Left Updated: 01/03/24 1017     AEROBIC CULTURE - FLUID Further report to follow     Gram Stain Result Few WBC's      No organisms seen    Blood culture [[id number]]  (Abnormal) Collected: 12/31/23 0418    Order Status: Completed Specimen: Blood Updated: 01/03/24 0947     Blood Culture, Routine Gram stain aer bottle: Gram positive cocci in clusters resembling Staph      Results called to and read  back by: Carmita Davenport RN 01/01/2024  09:26      METHICILLIN RESISTANT STAPHYLOCOCCUS AUREUS  ID consult required at [facility].  For susceptibility see order #[id number]      Narrative:      Set 2    Blood culture [[id number]]  (Abnormal) Collected: 12/31/23 0416    Order Status: Completed Specimen: Blood from Antecubital, Left Arm Updated: 01/03/24 0947     Blood Culture, Routine Gram stain aer bottle: Gram positive cocci in clusters resembling Staph      Positive results previously called 01/01/2024      METHICILLIN RESISTANT STAPHYLOCOCCUS AUREUS  ID consult required at [facility].  For susceptibility see order #[id number]      Narrative:      Set 1    Blood culture [[id number]]  (Abnormal) Collected: 01/01/24 0400    Order Status: Completed Specimen: Blood Updated: 01/03/24 0712     Blood Culture, Routine Gram stain aer bottle: Gram positive cocci in clusters resembling Staph      Results called to and read back by:Carmita Davenport RN 01/02/2024  09:55      STAPHYLOCOCCUS AUREUS  ID consult required at [facility].  For susceptibility see order #[id number]      Blood culture [[id number]]  (Abnormal) Collected: 01/01/24 0359    Order Status: Completed Specimen: Blood Updated: 01/03/24 0710     Blood Culture, Routine Gram stain aer bottle: Gram positive cocci in clusters resembling Staph      Positive results previously called 01/02/2024  10:09      STAPHYLOCOCCUS AUREUS  Susceptibility pending  ID consult required at [facility].      MRSA/SA Rapid ID by PCR from Blood culture [[id number]]  (Abnormal) Collected: 01/02/24 0623    Order Status: Completed Updated: 01/03/24 0659     Staph aureus ID by PCR Positive     Methicillin Resistant ID by PCR Positive    Culture, Anaerobe [[id number]] Collected: 12/29/23 1830    Order Status: Completed Specimen: Wound from Knee, Left Updated: 01/03/24  0652     Anaerobic Culture No anaerobes isolated    Narrative:      Before Incision #1    Culture, Anaerobe [[id number]] Collected: 12/29/23 1830    Order Status: Completed Specimen: Wound from Knee, Left Updated: 01/03/24 0651     Anaerobic Culture Culture in progress    Narrative:      After Incision #2    Culture, Anaerobe [[id number]] Collected: 12/29/23 1238    Order Status: Completed Specimen: Joint Fluid from Knee, Left Updated: 01/03/24 0651     Anaerobic Culture No anaerobes isolated    Narrative:      From left knee bursa    Blood culture [[id number]] Collected: 01/02/24 0623    Order Status: Completed Specimen: Blood from Peripheral, Left Wrist Updated: 01/03/24 0553     Blood Culture, Routine Gram stain aer bottle: Gram positive cocci in clusters resembling Staph      Results called to and read back by:Charla Hassan RN 01/03/2024  05:52    Aerobic culture [[id number]] Collected: 12/29/23 1830    Order Status: Completed Specimen: Wound from Knee, Left Updated: 01/02/24 1053     Aerobic Bacterial Culture No growth    Narrative:      After Incision #2    Blood culture [[id number]]  (Abnormal) Collected: 12/29/23 2045    Order Status: Completed Specimen: Blood Updated: 01/01/24 0825     Blood Culture, Routine Gram stain aer bottle: Gram positive cocci in clusters resembling Staph      Positive results previously called 12/30/2023      METHICILLIN RESISTANT STAPHYLOCOCCUS AUREUS  ID consult required at [facility],[place] and [facility] locations.  For susceptibility see order #[id number]      Narrative:      Collection has been rescheduled by [email or web address] at 12/29/2023 14:04 Reason:   Patient Refused/ 2nd set of blood cultures   Collection has been rescheduled by [email or web address] at 12/29/2023 17:54 Reason:   Patient unavailable/pt not in room   Collection has been rescheduled by [email or web address] at 12/29/2023 14:04 Reason:   Patient Refused/ 2nd set of blood cultures   Collection has been rescheduled by [email or web address] at 12/29/2023 17:54 Reason:    Patient unavailable/pt not in room     Blood culture [[id number]]  (Abnormal)  (Susceptibility) Collected: 12/29/23 1357    Order Status: Completed Specimen: Blood Updated: 01/01/24 0824     Blood Culture, Routine Gram stain aer bottle: Gram positive cocci in clusters resembling Staph      Results called to and read back by; Haley Horne RN 12/30/2023  10:56      Gram stain patsy bottle: Gram positive cocci in clusters resembling Staph      12/30/2023  13:23  Positive results previously called      METHICILLIN RESISTANT STAPHYLOCOCCUS AUREUS  ID consult required at [facility].      Aerobic culture [[id number]]  (Abnormal)  (Susceptibility) Collected: 12/29/23 1830    Order Status: Completed Specimen: Wound from Knee, Left Updated: 01/01/24 0653     Aerobic Bacterial Culture METHICILLIN RESISTANT STAPHYLOCOCCUS AUREUS  Many      Narrative:      Before Incision #1    Blood culture [[id number]]     Order Status: Canceled Specimen: Blood     Blood culture [[id number]]     Order Status: Canceled Specimen: Blood     Blood culture [[id number]]  (Abnormal) Collected: 12/27/23 2329    Order Status: Completed Specimen: Blood from Peripheral, Hand, Right Updated: 12/31/23 1121     Blood Culture, Routine Gram stain aer bottle: Gram positive cocci in clusters resembling Staph      Positive results previously called 12/29/2023  00:06      Gram stain patsy bottle: Gram positive cocci in clusters resembling Staph      12/29/2023  02:47      METHICILLIN RESISTANT STAPHYLOCOCCUS AUREUS  ID consult required at [facility].  For susceptibility see order # [id number]      Blood culture [[id number]]  (Abnormal)  (Susceptibility) Collected: 12/27/23 2242    Order Status: Completed Specimen: Blood Updated: 12/30/23 1034     Blood Culture, Routine Gram stain aer bottle: Gram positive cocci in clusters resembling Staph      Results called to and read back by:Haroon Bravo RN  12/28/2023  15:59      Gram stain patsy bottle: Gram positive cocci in clusters resembling Staph      METHICILLIN RESISTANT STAPHYLOCOCCUS AUREUS  ID consult required at [facility] [place] and [facility] locations.      Urine culture [[id number]]  (Abnormal)  (Susceptibility) Collected: 12/27/23 2318    Order Status: Completed Specimen: Urine Updated: 12/30/23 0919     Urine Culture, Routine METHICILLIN RESISTANT STAPHYLOCOCCUS AUREUS  10,000 - 49,999 cfu/ml      Narrative:      Specimen Source->Urine    Gram stain [[id number]] Collected: 12/29/23 1830    Order Status: Completed Specimen: Wound from Knee, Left Updated: 12/29/23 2325     Gram Stain Result Many WBC's      No organisms seen    Narrative:      Before Incision #1    Gram stain [[id number]] Collected: 12/29/23 1830    Order Status: Completed Specimen: Wound from Knee, Left Updated: 12/29/23 2309     Gram Stain Result Rare WBC's      Rare Gram positive cocci    Narrative:      After Incision #2    Gram stain [[id number]] Collected: 12/29/23 1238    Order Status: Completed Specimen: Joint Fluid from Knee, Left Updated: 12/29/23 2115     Gram Stain Result Moderate WBC's      No organisms seen    Narrative:      From left knee bursa    Gram stain [[id number]]     Order Status: Canceled Specimen: Joint Fluid from Knee, Left     Culture, Anaerobe [[id number]]     Order Status: Canceled Specimen: Joint Fluid from Knee, Left     Aerobic culture [[id number]]     Order Status: Canceled Specimen: Incision site from Knee, Left     Culture, Anaerobe [[id number]]     Order Status: Canceled Specimen: Joint Fluid from Knee, Left     Aerobic culture [[id number]]     Order Status: Canceled Specimen: Wound from Knee, Left     Gram stain [[id number]]     Order Status: Canceled Specimen: Joint Fluid from Knee, Left     Culture, Anaerobe [[id number]]     Order Status: Canceled Specimen: Joint Fluid from Knee, Left     Aerobic culture [[id number]]     Order Status: Canceled  Specimen: Incision site from Knee, Left     Gram stain [[id number]]     Order Status: Canceled Specimen: Joint Fluid from Knee, Left     MRSA/SA Rapid ID by PCR from Blood culture [[id number]]  (Abnormal) Collected: 12/28/23 1600    Order Status: Completed Updated: 12/28/23 1731     Staph aureus ID by PCR Positive     Methicillin Resistant ID by PCR Positive    Respiratory Infection Panel (PCR), Nasopharyngeal [[id number]] Collected: 12/28/23 0047    Order Status: Completed Specimen: Nasopharyngeal Swab Updated: 12/28/23 1224     Respiratory Infection Panel Source NP Swab     Adenovirus Not Detected     Coronavirus 229E, Common Cold Virus Not Detected     Coronavirus HKU1, Common Cold Virus Not Detected     Coronavirus NL63, Common Cold Virus Not Detected     Coronavirus OC43, Common Cold Virus Not Detected     Comment: The Coronavirus strains detected in this test cause the common cold.  These strains are not the COVID-19 (novel Coronavirus)strain   associated with the respiratory disease outbreak.          SARS-CoV2 (COVID-19) Qualitative PCR Not Detected     Human Metapneumovirus Not Detected     Human Rhinovirus/Enterovirus Not Detected     Influenza A (subtypes H1, H1-2009,H3) Not Detected     Influenza B Not Detected     Parainfluenza Virus 1 Not Detected     Parainfluenza Virus 2 Not Detected     Parainfluenza Virus 3 Not Detected     Parainfluenza Virus 4 Not Detected     Respiratory Syncytial Virus Not Detected     Bordetella Parapertussis (XZ1507) Not Detected     Bordetella pertussis (ptxP) Not Detected     Chlamydia pneumoniae Not Detected     Mycoplasma pneumoniae Not Detected    Narrative:      For all other respiratory sources, order [id number] -  Respiratory Viral Panel by PCR    Influenza A & B by Molecular [[id number]] Collected: 12/27/23 2229    Order Status: Completed Specimen: Nasopharyngeal Swab Updated: 12/27/23 2313     Influenza A, Molecular Negative     Influenza B, Molecular Negative     Flu  A & B Source Nasal swab    Blood culture [[id number]] Collected: 12/27/23 1600    Order Status: Canceled Specimen: Blood from Peripheral, Antecubital, Right           Antibiotics (From admission, onward)      Start     Stop Route Frequency Ordered    01/01/24 1445  DAPTOmycin (CUBICIN) 945 mg in sodium chloride 0.9% SolP 50 mL IVPB         -- IV Every 24 hours (non-standard times) 01/01/24 1342           Objective     Temp:  [98.1 °F (36.7 °C)-98.6 °F (37 °C)] 98.2 °F (36.8 °C)  Pulse:  [] 111  Resp:  [18-20] 20  SpO2:  [94 %-99 %] 95 %  BP: (118-157)/(67-85) 157/84    Physical Exam  Vitals and nursing note reviewed.   Constitutional:       Appearance: He is well-developed.      Comments: Fatigued, uncomfortable.   HENT:      Head: Normocephalic and atraumatic.   Eyes:      Pupils: Pupils are equal, round, and reactive to light.   Cardiovascular:      Rate and Rhythm: Regular rhythm. Tachycardia present.   Pulmonary:      Effort: Pulmonary effort is normal.      Breath sounds: Normal breath sounds.   Abdominal:      General: Bowel sounds are normal.      Palpations: Abdomen is soft.   Musculoskeletal:         General: Normal range of motion.      Cervical back: Normal range of motion and neck supple.      Comments: Left knee with bandage   Neurological:      Mental Status: He is alert and oriented to person, place, and time.   Psychiatric:         Behavior: Behavior normal.         Thought Content: Thought content normal.         Judgment: Judgment normal.     Significant Labs: All pertinent labs within the past 24 hours have been reviewed.     Significant Imaging: I have reviewed all pertinent imaging results/findings within the past 24 hours.

## 2024-01-03 NOTE — TRANSFER OF CARE
"Anesthesia Transfer of Care Note    Patient: Kulwant Muellre Jr.    Procedure(s) Performed: Procedure(s) (LRB):  ECHOCARDIOGRAM, TRANSESOPHAGEAL (N/A)    Patient location: Cath Lab    Anesthesia Type: general    Transport from OR: Transported from OR on 6-10 L/min O2 by face mask with adequate spontaneous ventilation    Post pain: adequate analgesia    Post assessment: no apparent anesthetic complications    Post vital signs: stable    Level of consciousness: awake and alert    Nausea/Vomiting: no nausea/vomiting    Complications: none    Transfer of care protocol was followed      Last vitals: Visit Vitals  BP (!) 144/84 (BP Location: Left arm, Patient Position: Lying)   Pulse 100   Temp 37 °C (98.6 °F)   Resp 18   Ht 6' 1" (1.854 m)   Wt 118 kg (260 lb 2.3 oz)   SpO2 95%   BMI 34.32 kg/m²     "

## 2024-01-03 NOTE — PROGRESS NOTES
[facility] Medicine Progress Note    Primary Team: [facility] Hospitalist Team A  Attending Physician: Jazmin Goode MD  Resident: Roly  Intern: Rhonda    Subjective/Interval History     Patient asleep on morning exam and easily arousal. He expressed frustration with the continued lab draws and not being able to eat this morning for his ROBY. He states that his shoulder pain from yesterday has resolved and denies chest pain, shortness of breath, abdominal pain, or any other complaints at this time. Has not had a bowel movement since admission.     Objective     Physical Examination:  Temp:  [98.1 °F (36.7 °C)-99.2 °F (37.3 °C)] 98.1 °F (36.7 °C)  Pulse:  [] 100  Resp:  [18-20] 20  SpO2:  [94 %-99 %] 96 %  BP: (118-139)/(67-80) 130/73    Gen: No acute distress, comfortable appearing  HEENT: NC/AT, EOMI grossly, moist MM  CV:  regular rate and rhythm, no murmurs, rubs, or gallops.  Resp: CTAB. No wheezes, rales, rhonchi. Normal work of breathing. Normal effort. Equal chest rise. No respiratory distress  Abd: Soft, nontender, nondistended, no rebound/guarding  MSK/Ext: Moves all four extremities spontaneously without obvious decreased ROM  Neuro: no focal deficits   Skin: Warm, dry  Psych: Normal mood and affect     Intake/Output:    Intake/Output Summary (Last 24 hours) at 1/3/2024 0725  Last data filed at 1/3/2024 0449  Gross per 24 hour   Intake 120 ml   Output 1800 ml   Net -1680 ml     Net IO Since Admission: 198.34 mL [01/03/24 0725]    Laboratory:  Recent Labs   Lab 12/27/23  2158 12/28/23  0355 12/28/23  0812 12/29/23  0400 12/29/23  2045 12/30/23  0346 12/30/23  0804 12/31/23  0416 01/01/24  0400 01/02/24  0623 01/03/24  0548   WBC 44.27* 47.31*   < > 38.53*  --  25.58*  --  27.91* 24.35* 28.28* 30.46*   HGB 14.1 13.7*   < > 12.6*  --  12.0*  --  11.7* 12.2* 11.1* 10.1*    405   < > 417  --  353  --  351 337 369 364   MCV 84 86   < > 84  --  85  --  86 85 85 85   * 138   < > 138   < >  --     < > 136 133* 134* 133*   K 4.5 3.7   < > 3.6   < >  --    < > 3.7 4.0 3.6 3.8   CL 95 101   < > 103   < >  --    < > 100 96 96 94*   CO2 9* 15*   < > 21*   < >  --    < > 25 23 26 26   BUN 33* 30*   < > 19   < >  --    < > 16 14 12 15   CREATININE 1.9* 1.7*   < > 1.5*   < >  --    < > 0.8 0.8 0.9 0.9   * 177*   < > 235*   < >  --    < > 153* 192* 155* 129*   CALCIUM 9.7 9.4   < > 9.5   < >  --    < > 9.3 9.2 8.9 8.7   PROT 9.2*  --   --   --   --   --   --  8.1 8.1 7.7 7.5   ALBUMIN 2.7*  --   --   --   --   --   --  1.7* 1.7* 1.6* 1.4*   PHOS  --  2.7  --   --   --   --    < > 3.2 3.4 3.2 3.1   MG  --  2.9*  --  2.8*  --  2.6  --  2.4  --   --   --    AST 22  --   --   --   --   --   --  200* 57* 88* 136*   ALT 31  --   --   --   --   --   --  125* 85* 83* 116*   ALKPHOS 131  --   --   --   --   --   --  237* 201* 163* 145*   TROPONINI 0.009  --   --   --   --   --   --   --   --   --   --     < > = values in this interval not displayed.     All laboratory data reviewed    Microbiology: reviewed   Respiratory PCR negative  12/27, 12/29, 12/31, 1/1 bcx MRSA  12/27 urine cx MRSA  12/29 L knee aspiration cx MRSA  1/2 bcx pending    Radiology:   CT Shoulder Without Contrast Left   Final Result      Findings which may represent cellulitis of the soft tissues about the left shoulder in the appropriate clinical setting.  No unenhanced CT findings to indicate septic arthritis.         Electronically signed by: Randolph Khan MD   Date:    01/02/2024   Time:    14:28      X-Ray Shoulder 2 or More Views Left   Final Result      No significant left shoulder pathology.         Electronically signed by: Randolph Khan MD   Date:    01/02/2024   Time:    11:28      CT Knee Without Contrast Left   Final Result      1. No acute abnormality.   2. Small joint effusion and mild degenerative changes.         Electronically signed by: Isai Villela   Date:    12/29/2023   Time:    16:45      X-Ray Knee 1 or 2 View Left    Final Result      1. No acute osseous abnormality.   2. Small joint effusion.         Electronically signed by: Isai Villela   Date:    12/29/2023   Time:    16:46      US Upper Extremity Veins Bilateral   Final Result      Thrombus within 1 of the right paired brachial veins.         Electronically signed by: Gui Brush MD   Date:    12/28/2023   Time:    08:41      US Lower Extremity Veins Bilateral   Final Result      No evidence of deep venous thrombosis in either lower extremity.         Electronically signed by: Adrienne Elizondo MD   Date:    12/28/2023   Time:    08:39      X-Ray Foot Complete Right   Final Result      No acute osseous abnormality of the foot.         Electronically signed by: Ben Roman   Date:    12/28/2023   Time:    00:05      X-Ray Chest AP Portable   Final Result      No acute abnormality.         Electronically signed by: Ben Roman   Date:    12/27/2023   Time:    22:14      MRI Foot (Hindfoot) Right Without Contrast    (Results Pending)        Current Medications:     Infusions:         Scheduled:   DAPTOmycin (CUBICIN) IV (PEDS and ADULTS)  10 mg/kg (Adjusted) Intravenous Q24H    enoxparin  1 mg/kg Subcutaneous Q12H (treatment, non-standard time)    insulin detemir U-100  25 Units Subcutaneous BID    LIDOcaine (PF) 10 mg/ml (1%)  3 mL Other 1 time in Clinic/HOD    metoclopramide  5 mg Intravenous TID AC    metoprolol succinate  12.5 mg Oral Daily    oxyCODONE-acetaminophen  1 tablet Oral Q8H    pantoprazole  40 mg Intravenous Daily    polyethylene glycol  17 g Oral BID    senna  8.6 mg Oral QHS        PRN:  dextrose 10 % in water (D10W), dextrose 10 % in water (D10W), dextrose 10%, dextrose 10%, droPERidol, glucagon (human recombinant), glucose, glucose, HYDROmorphone, insulin aspart U-100, oxyCODONE, scopolamine, sodium chloride 0.9%, sodium chloride 0.9%    Antibiotics and Day Number of Therapy:  Vanc 12/27-present  Zosyn 12/27-12/29    Assessment/Plan:      Kulwant Mueller Jr. is a 38 y.o. 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 The patient presented to [facility] on 12/27/2023 with a primary complaint of Intractable N/V and diarrhea for over 6 days, generalized weakness for 6 days. Admitted for DKA, which has now resolved s/p insulin gtt with bridge to long-acting insulin. Found to have MRSA bacteremia, suspect source L knee septic arthritis/bursitis      Sepsis  MRSA bacteremia 2/2 Septic arthritis/bursitis of L knee  MRSA UTI  WBC at OSH ER 33K, elevated to 47K with neutrophil predominance and patient febrile on admission. Suspect leukocytosis 2/2 DKA and bacteremia   - TTE neg for vegetation  - 12/27, 12/29, 12/31, 1/1 bcx MRSA  - Ucx 12/27 with MRSA  - L knee aspiration with 118k WBC on 12/29  - s/p L knee arthrotomy with synovectomy on 12/29. Cx with staph aureus  - 1/2 bcx 1/2 positive for MRSA  Plan:  - ID on board. daptomycin weekly CK  - daily blood cultures to document clearance  - cards consulted for ROBY today  - R foot MRI w/wo to evaluate for osteo as another potential source; pending final read  - Percocet 5 q8h scheduled plus PRN hydromorphone 0.5mg q6h, oxycodone f5mg q6h prn for pain    L shoulder pain; resolved   - decreased ROM, TTP, some increased warmth. Patient reports this has been an issue since 12/26 when he went to urgent care but acutely worsening in the last few days. Denies recent trauma  - concern joint is infected given daily fevers and slow clearance of blood cultures  - ortho notified  - XR and CT shoulder ordered; findings may represent cellulitis of soft tissue about L shoulder. No unenhanced findings to indicate septic arthritis.     Intractable N/V  Gastroparesis  - was being worked up for gastroparesis outpatient, however was unable to complete testing as he vomiting during testing. Sx lasted for several weeks and spontaneously  resolved, and he did not pursue further workup outpatient. He has been taking reglan outpatient, but is unsure if it does anything to help him.   Plan:  - continue home metoclopramide  - zofran not helping with N/V. PRN droperidol and scopolamine ordered. QTc 448, caution in QTc prolonging agents  - advance diet as tolerated  - continue IV PPI    DKA - resolved  DM2 with long-term use insulin  - anion gap 31, beta-hydroxybutyric acid elevated, with bicarb 9 and elevated blood glucose; UA with ketones and glucose  - suspect due to intractable N/V, not taking home insulin, and infection  - DKA resolved s/p insulin gtt with bridge to long-acting insulin  - A1c 7.4 on 12/28, prior A1c as high as 14  - per most recent visit with diabetes NP, home regimen includes tresiba 55u daily, sliding scale humalog, jardiance 25mg daily  Plan:  - continue levemir 25u BID + SSI, modify as patient able to tolerate diet    Newly dx HFmrEF  - 12/29 TTE: EF 48%  - will slowly initiate GDMT - start metoprolol succinate 12.5mg daily  - already on jardiance at home    Acute kidney injury, resolved  - on admit, Cr 1.9; baseline 1  - suspect prerenal due to decreased PO intake  - Cr improving with IVF, currently at baseline    Chronic R heel wound  - 12/27 XR right foot with no osseous abnormality; has been following with wound care and has been off PO antibiotics for at least 1 month with no drainage or swelling from wound site  - MRI R hindfoot ordered; pending read  - wound care consulted    May-Thadener syndrome  Peripheral vascular disease  - has Hx May-Thurner syndrome and was following with Dr. Duff, who had kept patient on xarelto 10 mg daily  - patient reports has not been able to take xarelto for past 1-2 weeks 2' inability to tolerate PO  - DVT US BLE negative. DVT BUE with thrombus within 1 of the right paired brachial veins  - has noted Hx allergy to heparin analogues (reaction N/V) from when he received heparin drip prior to  vascular intervention years ago; per chart review and discussion with pharmacy patient had received enoxaparin in 2018 and 2021 and appeared to tolerate; needs anticoagulation but unable to utilize PO regimen  - continue full dose lovenox     HTN  - patient reports not currently on any anti-hypertensive regimen  - SBP 130s throughout ER course  - Had an episode of SBP over 180 night on 12/29 s/p L knee I&D, improved with pain regimen  - BP should improve while slowly initiating GDMT    Concern for depression  - patient noted to have flat affect, decreased motivation, sleeping for several hours during the day since admission, decreased appetite. Per wife, she has been concerned there may be underlying depression. Patient was laid off from the 's office in 2020 and has not been taking care of himself in terms of taking his medicines, etc since then  - psych consulted    Diet: diabetic  VTE PPx: full dose lovenox  Code Status: full    Dispo: pending clearance of cultures, ROBY, tolerating diet    Aria Kelley   [facility] Internal Medicine HO-I    [facility] Medicine Hospitalist Pager numbers:   [facility] Hospitalist Medicine Team A (Russel/Emmanuel): 2005  [facility] Hospitalist Medicine Team B (Odin/Kameron):  2006

## 2024-01-04 LAB
ALBUMIN SERPL BCP-MCNC: 1.4 G/DL (ref 3.5–5.2)
ALP SERPL-CCNC: 168 U/L (ref 55–135)
ALT SERPL W/O P-5'-P-CCNC: 133 U/L (ref 10–44)
ANION GAP SERPL CALC-SCNC: 10 MMOL/L (ref 8–16)
AST SERPL-CCNC: 142 U/L (ref 10–40)
BACTERIA BLD CULT: ABNORMAL
BASOPHILS # BLD AUTO: ABNORMAL K/UL (ref 0–0.2)
BASOPHILS NFR BLD: 0 % (ref 0–1.9)
BILIRUB SERPL-MCNC: 0.4 MG/DL (ref 0.1–1)
BUN SERPL-MCNC: 13 MG/DL (ref 6–20)
CALCIUM SERPL-MCNC: 8.7 MG/DL (ref 8.7–10.5)
CHLORIDE SERPL-SCNC: 96 MMOL/L (ref 95–110)
CO2 SERPL-SCNC: 26 MMOL/L (ref 23–29)
CREAT SERPL-MCNC: 0.9 MG/DL (ref 0.5–1.4)
DIFFERENTIAL METHOD BLD: ABNORMAL
EOSINOPHIL # BLD AUTO: ABNORMAL K/UL (ref 0–0.5)
EOSINOPHIL NFR BLD: 0 % (ref 0–8)
ERYTHROCYTE [DISTWIDTH] IN BLOOD BY AUTOMATED COUNT: 14.3 % (ref 11.5–14.5)
EST. GFR  (NO RACE VARIABLE): >60 ML/MIN/1.73 M^2
GLUCOSE SERPL-MCNC: 149 MG/DL (ref 70–110)
HAV IGM SERPL QL IA: NORMAL
HBV CORE IGM SERPL QL IA: NORMAL
HBV SURFACE AG SERPL QL IA: NORMAL
HCT VFR BLD AUTO: 29.6 % (ref 40–54)
HCV AB SERPL QL IA: NORMAL
HGB BLD-MCNC: 9.6 G/DL (ref 14–18)
IMM GRANULOCYTES # BLD AUTO: ABNORMAL K/UL (ref 0–0.04)
IMM GRANULOCYTES NFR BLD AUTO: ABNORMAL % (ref 0–0.5)
LYMPHOCYTES # BLD AUTO: ABNORMAL K/UL (ref 1–4.8)
LYMPHOCYTES NFR BLD: 8 % (ref 18–48)
MAGNESIUM SERPL-MCNC: 2 MG/DL (ref 1.6–2.6)
MCH RBC QN AUTO: 28 PG (ref 27–31)
MCHC RBC AUTO-ENTMCNC: 32.4 G/DL (ref 32–36)
MCV RBC AUTO: 86 FL (ref 82–98)
MONOCYTES # BLD AUTO: ABNORMAL K/UL (ref 0.3–1)
MONOCYTES NFR BLD: 6 % (ref 4–15)
NEUTROPHILS NFR BLD: 78 % (ref 38–73)
NEUTS BAND NFR BLD MANUAL: 8 %
NRBC BLD-RTO: 0 /100 WBC
PHOSPHATE SERPL-MCNC: 3.1 MG/DL (ref 2.7–4.5)
PLATELET # BLD AUTO: 400 K/UL (ref 150–450)
PLATELET BLD QL SMEAR: ABNORMAL
PMV BLD AUTO: 11 FL (ref 9.2–12.9)
POCT GLUCOSE: 166 MG/DL (ref 70–110)
POCT GLUCOSE: 233 MG/DL (ref 70–110)
POCT GLUCOSE: 233 MG/DL (ref 70–110)
POCT GLUCOSE: 250 MG/DL (ref 70–110)
POTASSIUM SERPL-SCNC: 3.9 MMOL/L (ref 3.5–5.1)
PROT SERPL-MCNC: 7.5 G/DL (ref 6–8.4)
RBC # BLD AUTO: 3.43 M/UL (ref 4.6–6.2)
SODIUM SERPL-SCNC: 132 MMOL/L (ref 136–145)
WBC # BLD AUTO: 28.53 K/UL (ref 3.9–12.7)

## 2024-01-04 PROCEDURE — 51798 US URINE CAPACITY MEASURE: CPT

## 2024-01-04 PROCEDURE — 97110 THERAPEUTIC EXERCISES: CPT

## 2024-01-04 PROCEDURE — 36415 COLL VENOUS BLD VENIPUNCTURE: CPT

## 2024-01-04 PROCEDURE — 63600175 PHARM REV CODE 636 W HCPCS

## 2024-01-04 PROCEDURE — 85027 COMPLETE CBC AUTOMATED: CPT

## 2024-01-04 PROCEDURE — 84100 ASSAY OF PHOSPHORUS: CPT

## 2024-01-04 PROCEDURE — 80074 ACUTE HEPATITIS PANEL: CPT

## 2024-01-04 PROCEDURE — 99233 SBSQ HOSP IP/OBS HIGH 50: CPT | Mod: ,,, | Performed by: PSYCHIATRY & NEUROLOGY

## 2024-01-04 PROCEDURE — 25000003 PHARM REV CODE 250: Performed by: INTERNAL MEDICINE

## 2024-01-04 PROCEDURE — 25000003 PHARM REV CODE 250

## 2024-01-04 PROCEDURE — 87040 BLOOD CULTURE FOR BACTERIA: CPT

## 2024-01-04 PROCEDURE — 63600175 PHARM REV CODE 636 W HCPCS: Mod: JZ,JG | Performed by: INTERNAL MEDICINE

## 2024-01-04 PROCEDURE — 90833 PSYTX W PT W E/M 30 MIN: CPT | Mod: ,,, | Performed by: PSYCHIATRY & NEUROLOGY

## 2024-01-04 PROCEDURE — 80053 COMPREHEN METABOLIC PANEL: CPT

## 2024-01-04 PROCEDURE — 87186 SC STD MICRODIL/AGAR DIL: CPT

## 2024-01-04 PROCEDURE — 97530 THERAPEUTIC ACTIVITIES: CPT

## 2024-01-04 PROCEDURE — 87077 CULTURE AEROBIC IDENTIFY: CPT

## 2024-01-04 PROCEDURE — 85007 BL SMEAR W/DIFF WBC COUNT: CPT

## 2024-01-04 PROCEDURE — 21400001 HC TELEMETRY ROOM

## 2024-01-04 PROCEDURE — 27000207 HC ISOLATION

## 2024-01-04 PROCEDURE — 99232 SBSQ HOSP IP/OBS MODERATE 35: CPT | Mod: ,,, | Performed by: INTERNAL MEDICINE

## 2024-01-04 PROCEDURE — 83735 ASSAY OF MAGNESIUM: CPT

## 2024-01-04 RX ORDER — POLYETHYLENE GLYCOL 3350 17 G/17G
17 POWDER, FOR SOLUTION ORAL 3 TIMES DAILY
Status: DISCONTINUED | OUTPATIENT
Start: 2024-01-04 | End: 2024-01-06

## 2024-01-04 RX ORDER — METOPROLOL SUCCINATE 25 MG/1
25 TABLET, EXTENDED RELEASE ORAL DAILY
Status: DISCONTINUED | OUTPATIENT
Start: 2024-01-04 | End: 2024-01-09

## 2024-01-04 RX ORDER — OXYCODONE HYDROCHLORIDE 5 MG/1
5 TABLET ORAL EVERY 6 HOURS PRN
Status: DISCONTINUED | OUTPATIENT
Start: 2024-01-04 | End: 2024-01-10 | Stop reason: HOSPADM

## 2024-01-04 RX ORDER — METOCLOPRAMIDE 5 MG/1
5 TABLET ORAL
Status: DISCONTINUED | OUTPATIENT
Start: 2024-01-04 | End: 2024-01-10 | Stop reason: HOSPADM

## 2024-01-04 RX ORDER — SENNOSIDES 8.6 MG/1
8.6 TABLET ORAL 2 TIMES DAILY
Status: DISCONTINUED | OUTPATIENT
Start: 2024-01-04 | End: 2024-01-06

## 2024-01-04 RX ORDER — OXYCODONE AND ACETAMINOPHEN 5; 325 MG/1; MG/1
1 TABLET ORAL EVERY 8 HOURS PRN
Status: DISCONTINUED | OUTPATIENT
Start: 2024-01-04 | End: 2024-01-04

## 2024-01-04 RX ORDER — TALC
6 POWDER (GRAM) TOPICAL NIGHTLY PRN
Status: DISCONTINUED | OUTPATIENT
Start: 2024-01-04 | End: 2024-01-10 | Stop reason: HOSPADM

## 2024-01-04 RX ADMIN — RIVAROXABAN 10 MG: 10 TABLET, FILM COATED ORAL at 04:01

## 2024-01-04 RX ADMIN — METOCLOPRAMIDE 5 MG: 5 INJECTION, SOLUTION INTRAMUSCULAR; INTRAVENOUS at 12:01

## 2024-01-04 RX ADMIN — METOCLOPRAMIDE 5 MG: 5 TABLET ORAL at 04:01

## 2024-01-04 RX ADMIN — INSULIN ASPART 4 UNITS: 100 INJECTION, SOLUTION INTRAVENOUS; SUBCUTANEOUS at 12:01

## 2024-01-04 RX ADMIN — POLYETHYLENE GLYCOL 3350 17 G: 17 POWDER, FOR SOLUTION ORAL at 10:01

## 2024-01-04 RX ADMIN — INSULIN ASPART 2 UNITS: 100 INJECTION, SOLUTION INTRAVENOUS; SUBCUTANEOUS at 06:01

## 2024-01-04 RX ADMIN — PANTOPRAZOLE SODIUM 40 MG: 40 TABLET, DELAYED RELEASE ORAL at 09:01

## 2024-01-04 RX ADMIN — POLYETHYLENE GLYCOL 3350 17 G: 17 POWDER, FOR SOLUTION ORAL at 09:01

## 2024-01-04 RX ADMIN — CEFTAROLINE FOSAMIL 600 MG: 600 POWDER, FOR SOLUTION INTRAVENOUS at 04:01

## 2024-01-04 RX ADMIN — CEFTAROLINE FOSAMIL 600 MG: 600 POWDER, FOR SOLUTION INTRAVENOUS at 10:01

## 2024-01-04 RX ADMIN — METOCLOPRAMIDE 5 MG: 5 INJECTION, SOLUTION INTRAMUSCULAR; INTRAVENOUS at 06:01

## 2024-01-04 RX ADMIN — DAPTOMYCIN 945 MG: 500 INJECTION, POWDER, LYOPHILIZED, FOR SOLUTION INTRAVENOUS at 05:01

## 2024-01-04 RX ADMIN — SENNOSIDES 8.6 MG: 8.6 TABLET, FILM COATED ORAL at 10:01

## 2024-01-04 RX ADMIN — INSULIN ASPART 4 UNITS: 100 INJECTION, SOLUTION INTRAVENOUS; SUBCUTANEOUS at 05:01

## 2024-01-04 RX ADMIN — INSULIN ASPART 2 UNITS: 100 INJECTION, SOLUTION INTRAVENOUS; SUBCUTANEOUS at 10:01

## 2024-01-04 RX ADMIN — OXYCODONE HYDROCHLORIDE AND ACETAMINOPHEN 1 TABLET: 5; 325 TABLET ORAL at 06:01

## 2024-01-04 RX ADMIN — METOPROLOL SUCCINATE 25 MG: 25 TABLET, EXTENDED RELEASE ORAL at 09:01

## 2024-01-04 NOTE — ANESTHESIA POSTPROCEDURE EVALUATION
Anesthesia Post Evaluation    Patient: Kulwant Mueller Jr.    Procedure(s) Performed: Procedure(s) (LRB):  ECHOCARDIOGRAM, TRANSESOPHAGEAL (N/A)    Final Anesthesia Type: general      Patient location during evaluation: Cath Lab  Patient participation: Yes- Able to Participate  Level of consciousness: awake and alert  Post-procedure vital signs: reviewed and stable  Pain management: adequate  Airway patency: patent    PONV status at discharge: No PONV  Anesthetic complications: no      Cardiovascular status: blood pressure returned to baseline  Respiratory status: unassisted  Hydration status: euvolemic  Follow-up not needed.              Vitals Value Taken Time   /61 01/04/24 1633   Temp 37.2 °C (99 °F) 01/04/24 1633   Pulse 102 01/04/24 1633   Resp 18 01/04/24 1633   SpO2 93 % 01/04/24 1633         Event Time   Out of Recovery 01/03/2024 10:40:00         Pain/Tana Score: Pain Rating Prior to Med Admin: 5 (1/4/2024  6:24 AM)  Pain Rating Post Med Admin: 4 (1/3/2024  8:54 PM)  Tana Score: 10 (1/3/2024  9:26 AM)

## 2024-01-04 NOTE — PROGRESS NOTES
PSYCHIATRY INPATIENT PROGRESS NOTE  SUBSEQUENT HOSPITAL VISIT      1/4/2024 9:23 AM   Kulwant Mueller Jr.   1985   [id number]           DATE OF ADMISSION: 12/27/2023  9:28 PM    SITE: [facility]    CURRENT LEGAL STATUS: Patient does not currently meet PEC criteria due to not currently being an imminent threat to self/others and not being gravely disabled 2/2 mental illness at this time.     HISTORY    Per Initial History from Primary Team:  Kulwant Mueller Jr. is a 38 y.o. male with PMHx May-Thurner syndrome on xarelto, DM2 on insulin, chronic foot wounds following with wound care, HTN. The patient presented to [facility] on 12/27/2023 with a primary complaint of Intractable N/V and diarrhea for over 6 days, generalized weakness for 6 days.   The patient was in their usual state of health until around 1 week ago when he developed NBNB N/V and decreased oral intake. His family members had recently been ill as well, but have tested negative for COVID/flu. He reports he continued to have N/V whether or not he was able to have any oral intake. He presented to an ER for evaluation yesterday, where it was noted he had a WBC 33K, with an unremarkable CT A/P with negative COVID/flu. He was discharged with GI follow-up, however at the appointment today, there was concern for elevated WBC, and patient was instructed to present to the ER for further evaluation. Does endorse some abdominal discomfort and 1 episode of non-mucoid non-bloody diarrhea this afternoon. Denies  fevers, chills, shortness of breath, chest pain, palpitations, dysuria.  He has experienced similar symptoms in the past, and was being worked up for gastroparesis, however was unable to complete testing as he vomiting during testing. The symptoms lasted for several weeks and spontaneously resolved, and he did not pursue further workup outpatient. He has been taking reglan outpatient, but is unsure if it does anything to help him.    He has not been able to take his jardiance, aspart, tresiba for the past week as has not been eating, and had not kept good track of his blood sugar. He also has been unable to take his xarelto 2' inability to tolerate oral intake, which cardiology restarted given his multiple DVTs 2' May-Thurner syndrome.   Interval History from Primary Team on 01/04/2024:  Kulwant Mueller Jr. is a 38 y.o. 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 Presented 12/27 with DKA found to have MRSA bacteremia.   Patient tolerated ROBY well yesterday. No evidence of vegetation. Very diffusely weak on working with PT/OT. On morning exam, patient was resting comfortably stating he's been eating and drinking well and attempting to move his arms and legs while in bed for strengthening. He continues to report pain in his left shoulder with motion limited due to pain. Denies chest pain, SOB, abdominal pain. Has not had a bowel movement since admission.        Chief Complaint / Reason for Original Psychiatry Consult: Concern for Depression        Subjective / Interval Psychiatric History Today (01/04/2024) (with Psychiatric ROS below):   Kulwant Mueller Jr. is a 38 y.o. 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  Psychiatry was originally consulted as noted above.  The patient was seen and examined again this morning.  The chart was reviewed again this morning.  Of note, the patient's 3 older brothers were in the room yesterday with the patient during my initial assessment (with patient's verbal consent & request).  I wanted to follow up again today for a re-assessment to see if the patient had things that he wanted to discuss without his brothers present.  On examination today, the  patient remains alert and oriented to person, place, city, state, month, year, and situation.  He remains CAM-ICU negative for delirium.  We discussed his firing from the [employer]'s office that occurred 2-3 years ago (given that his wife was concerned that he struggled with depression after this).  He states that he does feel that he was wrongly terminated from that job, but he states that he feels like he has mentally bounced back from that.  He continues to endorse some intermittent adjustment-related low mood and irritability that he attributes to his current medical complications rather that to this previous firing 2-3 years ago (again doesn't appear to meet MDD criteria).  He again denies any past psychiatric diagnoses or medication / treatment.  He again denies any current or prior s/s consistent with MDD, BIRD, psychosis, sue, panic, OCD, PTSD, eating disorders, or substance abuse.  He again denies any current or prior active or passive SI / HI.  He again denies any current or prior AH, VH, TH, delusions, paranoia, or DIGFAST (sue) s/s.  He endorses sleeping about 8 hours last night and endorses a good appetite but again voices dissatisfaction with the hospital food.  He again denies any adverse effects to his current medication regimen.  Regarding current medical/physical complaints, he continues to endorse L knee pain and foot wounds.  He denies any other medical complaints at this time.  NAD was observed during the examination.  Psychotherapy was again implemented as noted below with a focus on improving adjustment-related depression.  See detailed psych ROS below.  See A/P below.          Psychiatric Review Of Systems - Currently, the patient is endorsing and/or denying the following:  (patient's endorsements are BOLDED below; if not BOLDED, then patient denied):     Endorses intermittent Symptoms of Depression (adjustment-related): diminished mood, low motivation, loss of interest/anhedonia,  irritability, diminished energy, change in sleep, change in appetite, diminished concentration or cognition or indecisiveness, PMA/R, excessive guilt or hopelessness or worthlessness, suicidal ideations     Denies issues with Sleep: initiation, maintenance, early morning awakening with inability to return to sleep     Denies Suicidal/Homicidal ideations: active/passive ideations, organized plans, future intentions     Denies Symptoms of psychosis: hallucinations, delusions, disorganized thinking, disorganized behavior or abnormal motor behavior, or negative symptoms (diminshed emotional expression, avolition, anhedonia, alogia, asociality      Denies Symptoms of sue or hypomania: elevated, expansive, or irritable mood with increased energy or activity; with inflated self-esteem or grandiosity, decreased need for sleep, increased rate of speech, FOI or racing thoughts, distractibility, increased goal directed activity or PMA, risky/disinhibited behavior     Denies Symptoms of Anxiety: excessive anxiety/worry/fear, more days than not, about numerous issues, difficult to control, with restlessness, fatigue, poor concentration, irritability, muscle tension, sleep disturbance; causes functionally impairing distress      Denies Symptoms of Panic Disorder: recurrent panic attacks, precipitated or un-precipitated, source of worry and/or behavioral changes secondary; with or without agoraphobia     Denies Symptoms of PTSD: h/o trauma; re-experiencing/intrusive symptoms, avoidant behavior, negative alterations in cognition or mood, or hyperarousal symptoms; with or without dissociative symptoms      Denies Symptoms of OCD: obsessions or compulsions      Denies Symptoms of Eating Disorders: anorexia, bulimia or binging     Denies Substance Use: intoxication, withdrawal, tolerance, used in larger amounts or duration than intended, unsuccessful attempts to limit or quit, increased time engaging in or seeking out, cravings or  strong desire to use, failure to fulfill obligations, negative consequences in social/interpersonal/occupational,/recreational areas, use in dangerous situations, medical or psychological consequences         [facility] Toolkit ASQ Suicide Screening Tool:  In the past few weeks, have you wished you were dead? Denies   In the past few weeks, have you felt that you or your family would be better off if you were dead? Denies  In the past week, have you been having thoughts about killing yourself? Denies  Have you ever tried to kill yourself? Denies  Are you having thoughts of killing yourself right now? Denies         PSYCHOTHERAPY ADD-ON +[id number]   30 (16-37*) minutes     Time: 19 minutes  Participants: Met with patient     Therapeutic Intervention Type: behavior modifying psychotherapy, supportive psychotherapy  Why chosen therapy is appropriate versus another modality: relevant to diagnosis, patient responds to this modality, evidence based practice     Target symptoms: adjustment-related depression   Primary focus: improving adjustment-related depression  Psychotherapeutic techniques: supportive and psychodynamic techniques; psycho-education; safe coping skills / behaviors; CBT; problem solving techniques and managing life & medical stressors     Outcome monitoring methods: self-report, observation     Patient's response to intervention:  The patient's response to intervention is accepting / improving.      Progress toward goals:  The patient's progress toward goals is fair / improving.         ROS:  General ROS: negative for - chills, fever or night sweats; positive for fatigue  Ophthalmic ROS: negative for - blurry vision, double vision or eye pain  ENT ROS: negative for - sinus pain, headaches, sore throat or visual changes  Allergy and Immunology ROS: negative for - hives, itchy/watery eyes or nasal congestion  Hematological and Lymphatic ROS: negative for - bleeding problems, bruising, jaundice or pallor  Endocrine  ROS: negative for - galactorrhea, hot flashes, mood swings, palpitations or temperature intolerance  Respiratory ROS: negative for - cough, hemoptysis, shortness of breath, tachypnea or wheezing  Cardiovascular ROS: negative for - chest pain, dyspnea on exertion, loss of consciousness, palpitations, rapid heart rate or shortness of breath  Gastrointestinal ROS: negative for - appetite loss, nausea, abdominal pain, blood in stools, change in bowel habits, constipation or diarrhea  Genito-Urinary ROS: negative for - incontinence, nocturia or pelvic pain  Musculoskeletal ROS: negative for - joint stiffness, joint swelling, or muscle pain; positive for L knee pain    Neurological ROS: negative for - behavioral changes, confusion, dizziness, memory loss, numbness/tingling or seizures  Dermatological ROS: negative for dry skin, hair changes, pruritus or rash; positive for foot wounds   Psychiatric ROS: see detailed psychiatric ROS above in subjective section       PAST MEDICAL & SURGICAL HISTORY   Past Medical History:   Diagnosis Date    Anticoagulant long-term use     COVID-19 virus infection     Diabetes mellitus     Diabetes mellitus, type 2     Hypertension     May-Thurner syndrome      Past Surgical History:   Procedure Laterality Date    APPENDECTOMY      ARTHROTOMY OF KNEE Left 12/29/2023    Procedure: ARTHROTOMY, KNEE;  Surgeon: Edy Bocanegra Jr., MD;  Location: [facility];  Service: Orthopedics;  Laterality: Left;    ESOPHAGOGASTRODUODENOSCOPY N/A 1/31/2022    Procedure: EGD (ESOPHAGOGASTRODUODENOSCOPY);  Surgeon: Cindy Quiros MD;  Location: [facility];  Service: Endoscopy;  Laterality: N/A;    ESOPHAGOGASTRODUODENOSCOPY N/A 3/21/2022    Procedure: EGD (ESOPHAGOGASTRODUODENOSCOPY);  Surgeon: Tejas Mann MD;  Location: [facility];  Service: Endoscopy;  Laterality: N/A;    INCISION AND DRAINAGE FOOT Left 11/28/2021    Procedure: INCISION AND DRAINAGE, FOOT;  Surgeon: Bj Alicea DPM;  Location: [facility]  OR;  Service: Podiatry;  Laterality: Left;    stents in bilateral legs       NEUROLOGIC HISTORY  Seizures: denies   Head trauma with LOC: denies  CVA: denies      FAMILY HISTORY   Family History   Problem Relation Age of Onset    Cancer Mother     Cancer Paternal Uncle     Cancer Paternal Grandfather     Irritable bowel syndrome Paternal Grandfather     Cancer Maternal Grandfather     Colon cancer Neg Hx     Esophageal cancer Neg Hx     Rectal cancer Neg Hx     Stomach cancer Neg Hx     Ulcerative colitis Neg Hx     Crohn's disease Neg Hx     Celiac disease Neg Hx        ALLERGIES   Review of patient's allergies indicates:   Allergen Reactions    Heparin analogues Nausea And Vomiting       CURRENT MEDICATION REGIMEN   Home Meds:   Prior to Admission medications    Medication Sig Start Date End Date Taking? Authorizing Provider   blood-glucose meter,continuous (DEXCOM G7 ) Misc 1 Device by Misc.(Non-Drug; Combo Route) route once daily. 6/27/23 6/26/24  Almaz Santiago FNP   blood-glucose sensor (DEXCOM G7 SENSOR) Justyna 1 Device by Misc.(Non-Drug; Combo Route) route every 10 days. 7/11/23 7/10/24  Almaz Santiago FNP   empagliflozin (JARDIANCE) 25 mg tablet Take 1 tablet (25 mg total) by mouth once daily. 9/14/23 9/13/24  Almaz Santiago FNP   furosemide (LASIX) 20 MG tablet Take 1 tablet (20 mg total) by mouth once daily. 1/26/23 1/26/24  Tyree Duff MD   glucagon (GVOKE HYPOPEN 2-PACK) 1 mg/0.2 mL AtIn Inject 1 mg into the skin as needed (SEVERE HYPOGLYCEMIA). 8/28/23   Almaz Santiago FNP   insulin aspart U-100 (NOVOLOG U-100 INSULIN ASPART) 100 unit/mL injection Inject 14 Units into the skin 3 (three) times daily before meals. 7/11/23 7/10/24  Almaz Santiago FNP   metoclopramide HCl (REGLAN) 10 MG tablet Take 0.5 tablets (5 mg total) by mouth 3 (three) times daily before meals. 7/27/23   Tejas Mann MD   pantoprazole (PROTONIX) 40 MG tablet Take 1 tablet  "(40 mg total) by mouth once daily. 7/26/23 10/31/23  Cindy Quiros MD   pen needle, diabetic (BD ULTRA-FINE DIYA PEN NEEDLE) 32 gauge x 5/32" Ndle Use with Tresiba daily and Humalog 3-4 times daily as directed. 3/10/23   Lisa Bro, NP   prednisoLONE acetate (PRED FORTE) 1 % DrpS Instill one drop to the left eye every 2 hours while awake for 2 days then instill one drop 4 time daily for 5 days 2/17/23   Yariel Pantoja, OD   prochlorperazine (COMPAZINE) 10 MG tablet Take 1 tablet (10 mg total) by mouth 3 (three) times daily as needed (nausea or vomiting). 3/11/22   Yessenia Santos MD   promethazine (PHENERGAN) 25 MG suppository Place 1 suppository (25 mg total) rectally every 6 (six) hours as needed for Nausea. 12/27/23   Mary Steele NP   rivaroxaban (XARELTO) 10 mg Tab Take 1 tablet (10 mg total) by mouth daily with dinner or evening meal. 12/13/23 12/12/24  Salvador Tapia MD   TRESIBA FLEXTOUCH U-200 200 unit/mL (3 mL) insulin pen Inject 56 Units into the skin once daily. 11/20/23 11/19/24  Almaz Santiago FNP   DEXCOM G6 TRANSMITTER Justyna CHANGE TRANSMITTER EVERY 90 DAYS. 1/21/22 3/21/22  Lisa Bro, NP       OTC Meds: denies     Scheduled Meds:    DAPTOmycin (CUBICIN) IV (PEDS and ADULTS)  10 mg/kg (Adjusted) Intravenous Q24H    insulin detemir U-100  30 Units Subcutaneous BID    LIDOcaine (PF) 10 mg/ml (1%)  3 mL Other 1 time in Clinic/HOD    metoclopramide  5 mg Intravenous TID AC    metoprolol succinate  25 mg Oral Daily    pantoprazole  40 mg Oral Daily    polyethylene glycol  17 g Oral BID    rivaroxaban  10 mg Oral Daily with dinner    senna  8.6 mg Oral QHS      PRN Meds: dextrose 10 % in water (D10W), dextrose 10 % in water (D10W), dextrose 10%, dextrose 10%, droPERidol, glucagon (human recombinant), glucose, glucose, insulin aspart U-100, oxyCODONE, scopolamine, sodium chloride 0.9%, sodium chloride 0.9%   Psychotherapeutics (From admission, onward)      Start     " Stop Route Frequency Ordered    12/28/23 2240  droPERidol injection 0.625 mg         -- IV Every 6 hours PRN 12/28/23 5653            LABORATORY DATA   Recent Results (from the past 72 hour(s))   POCT glucose    Collection Time: 01/01/24 12:06 PM   Result Value Ref Range    POCT Glucose 261 (H) 70 - 110 mg/dL   CK    Collection Time: 01/01/24  1:56 PM   Result Value Ref Range     20 - 200 U/L   POCT glucose    Collection Time: 01/01/24  4:20 PM   Result Value Ref Range    POCT Glucose 210 (H) 70 - 110 mg/dL   POCT glucose    Collection Time: 01/02/24  2:27 AM   Result Value Ref Range    POCT Glucose 155 (H) 70 - 110 mg/dL   POCT glucose    Collection Time: 01/02/24  5:11 AM   Result Value Ref Range    POCT Glucose 143 (H) 70 - 110 mg/dL   CBC auto differential    Collection Time: 01/02/24  6:23 AM   Result Value Ref Range    WBC 28.28 (H) 3.90 - 12.70 K/uL    RBC 3.94 (L) 4.60 - 6.20 M/uL    Hemoglobin 11.1 (L) 14.0 - 18.0 g/dL    Hematocrit 33.3 (L) 40.0 - 54.0 %    MCV 85 82 - 98 fL    MCH 28.2 27.0 - 31.0 pg    MCHC 33.3 32.0 - 36.0 g/dL    RDW 14.2 11.5 - 14.5 %    Platelets 369 150 - 450 K/uL    MPV 10.7 9.2 - 12.9 fL    Immature Granulocytes 3.4 (H) 0.0 - 0.5 %    Gran # (ANC) 23.4 (H) 1.8 - 7.7 K/uL    Immature Grans (Abs) 0.97 (H) 0.00 - 0.04 K/uL    Lymph # 2.2 1.0 - 4.8 K/uL    Mono # 1.6 (H) 0.3 - 1.0 K/uL    Eos # 0.0 0.0 - 0.5 K/uL    Baso # 0.15 0.00 - 0.20 K/uL    nRBC 0 0 /100 WBC    Gran % 82.7 (H) 38.0 - 73.0 %    Lymph % 7.9 (L) 18.0 - 48.0 %    Mono % 5.5 4.0 - 15.0 %    Eosinophil % 0.0 0.0 - 8.0 %    Basophil % 0.5 0.0 - 1.9 %    Platelet Estimate Appears normal     Differential Method Automated    Phosphorus    Collection Time: 01/02/24  6:23 AM   Result Value Ref Range    Phosphorus 3.2 2.7 - 4.5 mg/dL   Comprehensive metabolic panel    Collection Time: 01/02/24  6:23 AM   Result Value Ref Range    Sodium 134 (L) 136 - 145 mmol/L    Potassium 3.6 3.5 - 5.1 mmol/L    Chloride 96 95 -  110 mmol/L    CO2 26 23 - 29 mmol/L    Glucose 155 (H) 70 - 110 mg/dL    BUN 12 6 - 20 mg/dL    Creatinine 0.9 0.5 - 1.4 mg/dL    Calcium 8.9 8.7 - 10.5 mg/dL    Total Protein 7.7 6.0 - 8.4 g/dL    Albumin 1.6 (L) 3.5 - 5.2 g/dL    Total Bilirubin 0.6 0.1 - 1.0 mg/dL    Alkaline Phosphatase 163 (H) 55 - 135 U/L    AST 88 (H) 10 - 40 U/L    ALT 83 (H) 10 - 44 U/L    eGFR >60 >60 mL/min/1.73 m^2    Anion Gap 12 8 - 16 mmol/L   Blood culture    Collection Time: 01/02/24  6:23 AM    Specimen: Peripheral, Right Wrist; Blood   Result Value Ref Range    Blood Culture, Routine       Gram stain aer bottle: Gram positive cocci in clusters resembling Staph    Blood Culture, Routine       Positive results previously called 01/03/2024  13:15    Blood Culture, Routine (A)      METHICILLIN RESISTANT STAPHYLOCOCCUS AUREUS  ID consult required at [facility] and [facility].  For susceptibility see order #[id number]     Blood culture    Collection Time: 01/02/24  6:23 AM    Specimen: Peripheral, Left Wrist; Blood   Result Value Ref Range    Blood Culture, Routine       Gram stain aer bottle: Gram positive cocci in clusters resembling Staph    Blood Culture, Routine       Results called to and read back by:Charla Hassan RN 01/03/2024  05:52    Blood Culture, Routine (A)      METHICILLIN RESISTANT STAPHYLOCOCCUS AUREUS  For susceptibility see order #[id number]     MRSA/SA Rapid ID by PCR from Blood culture    Collection Time: 01/02/24  6:23 AM   Result Value Ref Range    Staph aureus ID by PCR Positive (A) Negative    Methicillin Resistant ID by PCR Positive (A) Negative   POCT glucose    Collection Time: 01/02/24 11:35 AM   Result Value Ref Range    POCT Glucose 183 (H) 70 - 110 mg/dL   POCT glucose    Collection Time: 01/02/24  5:58 PM   Result Value Ref Range    POCT Glucose 145 (H) 70 - 110 mg/dL   POCT glucose    Collection Time: 01/02/24  9:23 PM   Result Value Ref Range    POCT Glucose 186 (H) 70 - 110 mg/dL   CBC  auto differential    Collection Time: 01/03/24  5:48 AM   Result Value Ref Range    WBC 30.46 (H) 3.90 - 12.70 K/uL    RBC 3.66 (L) 4.60 - 6.20 M/uL    Hemoglobin 10.1 (L) 14.0 - 18.0 g/dL    Hematocrit 31.2 (L) 40.0 - 54.0 %    MCV 85 82 - 98 fL    MCH 27.6 27.0 - 31.0 pg    MCHC 32.4 32.0 - 36.0 g/dL    RDW 14.3 11.5 - 14.5 %    Platelets 364 150 - 450 K/uL    MPV 10.7 9.2 - 12.9 fL    Immature Granulocytes 4.4 (H) 0.0 - 0.5 %    Gran # (ANC) 25.5 (H) 1.8 - 7.7 K/uL    Immature Grans (Abs) 1.33 (H) 0.00 - 0.04 K/uL    Lymph # 2.0 1.0 - 4.8 K/uL    Mono # 1.5 (H) 0.3 - 1.0 K/uL    Eos # 0.0 0.0 - 0.5 K/uL    Baso # 0.12 0.00 - 0.20 K/uL    nRBC 0 0 /100 WBC    Gran % 83.7 (H) 38.0 - 73.0 %    Lymph % 6.7 (L) 18.0 - 48.0 %    Mono % 4.8 4.0 - 15.0 %    Eosinophil % 0.0 0.0 - 8.0 %    Basophil % 0.4 0.0 - 1.9 %    Differential Method Automated    Phosphorus    Collection Time: 01/03/24  5:48 AM   Result Value Ref Range    Phosphorus 3.1 2.7 - 4.5 mg/dL   Comprehensive metabolic panel    Collection Time: 01/03/24  5:48 AM   Result Value Ref Range    Sodium 133 (L) 136 - 145 mmol/L    Potassium 3.8 3.5 - 5.1 mmol/L    Chloride 94 (L) 95 - 110 mmol/L    CO2 26 23 - 29 mmol/L    Glucose 129 (H) 70 - 110 mg/dL    BUN 15 6 - 20 mg/dL    Creatinine 0.9 0.5 - 1.4 mg/dL    Calcium 8.7 8.7 - 10.5 mg/dL    Total Protein 7.5 6.0 - 8.4 g/dL    Albumin 1.4 (L) 3.5 - 5.2 g/dL    Total Bilirubin 0.6 0.1 - 1.0 mg/dL    Alkaline Phosphatase 145 (H) 55 - 135 U/L     (H) 10 - 40 U/L     (H) 10 - 44 U/L    eGFR >60 >60 mL/min/1.73 m^2    Anion Gap 13 8 - 16 mmol/L   Blood culture    Collection Time: 01/03/24  5:48 AM    Specimen: Blood   Result Value Ref Range    Blood Culture, Routine No Growth to date    POCT glucose    Collection Time: 01/03/24  6:17 AM   Result Value Ref Range    POCT Glucose 203 (H) 70 - 110 mg/dL   Transesophageal echo (ROBY)    Collection Time: 01/03/24 11:45 AM   Result Value Ref Range    BSA 2.43 m2    POCT glucose    Collection Time: 01/03/24  1:06 PM   Result Value Ref Range    POCT Glucose 189 (H) 70 - 110 mg/dL   POCT glucose    Collection Time: 01/03/24  4:30 PM   Result Value Ref Range    POCT Glucose 178 (H) 70 - 110 mg/dL   POCT glucose    Collection Time: 01/03/24  9:20 PM   Result Value Ref Range    POCT Glucose 303 (H) 70 - 110 mg/dL   CBC auto differential    Collection Time: 01/04/24  5:23 AM   Result Value Ref Range    WBC 28.53 (H) 3.90 - 12.70 K/uL    RBC 3.43 (L) 4.60 - 6.20 M/uL    Hemoglobin 9.6 (L) 14.0 - 18.0 g/dL    Hematocrit 29.6 (L) 40.0 - 54.0 %    MCV 86 82 - 98 fL    MCH 28.0 27.0 - 31.0 pg    MCHC 32.4 32.0 - 36.0 g/dL    RDW 14.3 11.5 - 14.5 %    Platelets 400 150 - 450 K/uL    MPV 11.0 9.2 - 12.9 fL    Immature Granulocytes CANCELED 0.0 - 0.5 %    Immature Grans (Abs) CANCELED 0.00 - 0.04 K/uL    Lymph # CANCELED 1.0 - 4.8 K/uL    Mono # CANCELED 0.3 - 1.0 K/uL    Eos # CANCELED 0.0 - 0.5 K/uL    Baso # CANCELED 0.00 - 0.20 K/uL    nRBC 0 0 /100 WBC    Gran % 78.0 (H) 38.0 - 73.0 %    Lymph % 8.0 (L) 18.0 - 48.0 %    Mono % 6.0 4.0 - 15.0 %    Eosinophil % 0.0 0.0 - 8.0 %    Basophil % 0.0 0.0 - 1.9 %    Bands 8.0 %    Platelet Estimate Increased (A)     Differential Method Manual    Phosphorus    Collection Time: 01/04/24  5:23 AM   Result Value Ref Range    Phosphorus 3.1 2.7 - 4.5 mg/dL   Comprehensive metabolic panel    Collection Time: 01/04/24  5:23 AM   Result Value Ref Range    Sodium 132 (L) 136 - 145 mmol/L    Potassium 3.9 3.5 - 5.1 mmol/L    Chloride 96 95 - 110 mmol/L    CO2 26 23 - 29 mmol/L    Glucose 149 (H) 70 - 110 mg/dL    BUN 13 6 - 20 mg/dL    Creatinine 0.9 0.5 - 1.4 mg/dL    Calcium 8.7 8.7 - 10.5 mg/dL    Total Protein 7.5 6.0 - 8.4 g/dL    Albumin 1.4 (L) 3.5 - 5.2 g/dL    Total Bilirubin 0.4 0.1 - 1.0 mg/dL    Alkaline Phosphatase 168 (H) 55 - 135 U/L     (H) 10 - 40 U/L     (H) 10 - 44 U/L    eGFR >60 >60 mL/min/1.73 m^2    Anion Gap 10 8 -  "16 mmol/L   Magnesium    Collection Time: 01/04/24  5:23 AM   Result Value Ref Range    Magnesium 2.0 1.6 - 2.6 mg/dL   POCT glucose    Collection Time: 01/04/24  5:41 AM   Result Value Ref Range    POCT Glucose 166 (H) 70 - 110 mg/dL      No results found for: "PHENYTOIN", "PHENOBARB", "VALPROATE", "CBMZ"      EXAMINATION    VITALS   Vitals:    01/04/24 0423 01/04/24 0453 01/04/24 0624 01/04/24 0807   BP: 138/77   124/69   BP Location: Right arm      Patient Position: Lying      Pulse: 96 102  95   Resp: 20  18 20   Temp: 98.1 °F (36.7 °C)   98.1 °F (36.7 °C)   TempSrc: Oral      SpO2: 100%   95%   Weight:       Height:          CONSTITUTIONAL  General Appearance: NAD, unremarkable, age appropriate, lying in bed, overweight, calm     MUSCULOSKELETAL  Muscle Strength and Tone: WNL    Abnormal Involuntary Movements: none observed   Gait and Station: Attempted but unable to assess due to medical acuity      PSYCHIATRIC   Behavior/Cooperation:  cooperative, eye contact normal, calm  Speech:  normal tone, normal rate, normal pitch, normal volume  Language: grossly intact, able to name, able to repeat with spontaneous speech  Mood: "OK"  Affect:  constricted but does laugh and smile to appropriate comedic social cues   Associations: intact; no ILENE  Thought Process: Linear   Thought Content: denies SI, HI, AH, VH, TH, delusions, or paranoia (no RIS observed)   Sensorium:  Awake  Alert and Oriented: to person, place, city, state, month, year, and situation   Memory: 3/3 immediate, 3/3 at 5 minutes               Recent: Intact; able to report recent events              Remote: Intact; Named 4/4 past presidents   Attention/concentration: Intact. Appropriate for age/education. Able to spell w-o-r-l-d & d-l-r-o-w.   Similarities: Intact (difference between apple and orange?)  Abstract reasoning: Intact  Fund of Knowledge: Named 4/4 past presidents   Insight: Intact  Judgment: Intact     CAM ICU Delirium Assessment - NEGATIVE   "   Is the patient aware of the biomedical complications associated with substance abuse and mental illness? yes           MEDICAL DECISION MAKING     ASSESSMENT         Adjustment Disorder with Depressed Mood   (Rule out MDD)        RECOMMENDATIONS       - Patient does not currently meet PEC criteria due to not being an imminent threat to self/others and not being gravely disabled 2/2 mental illness at this time.       - At this time, the patient continues to deny any interest in antidepressant pharmacotherapy.  If patient changes his mind and desires pharmacotherapy, Cymbalta would be a good option given it's ability to treat depression and pain (discussed risks/benefits/alt vs no treatment with patient).     - Psychotherapy was again performed with patient as noted above with a focus on improving adjustment-related depression.     - Patient's most recent resulted labs, imaging, and EKG were again reviewed today.       - If patient desires, please have CM/SW assist patient with outpatient mental health f/u for s/p discharge from this facility.      - Patient was instructed to call 911 and/or [phone] and return to the nearest ED if he begins feeling suicidal, homicidal, or gravely disabled (for s/p this hospitalization).       - Thank you for this consult ; will continue to follow patient while at [facility]         Total time spent with patient and/or managing/coordinating patient's care today (excluding the time spent on psychotherapy): 56 minutes   Time spent on psychotherapy today (as noted above): 19 minutes   Total time for encounter today including psychotherapy: 75 minutes      More than 50% of the time was spent counseling/coordinating care.     Consulting clinician was informed of the encounter and consult note.      STAFF:  Abdoulaye Londono MD  [facility]  1/4/2024

## 2024-01-04 NOTE — PROGRESS NOTES
[facility]  Orthopedics  Progress Note    Patient Name: Kulwant Mueller Jr.  MRN: [id number]  Admission Date: 12/27/2023  Hospital Length of Stay: 7 days  Attending Provider: Jazmin Goode MD  Primary Care Provider: Shellie Primary Doctor  Follow-up For: Procedure(s) (LRB):  ECHOCARDIOGRAM, TRANSESOPHAGEAL (N/A)    Post-Operative Day: 1 Day Post-Op  Subjective:     Principal Problem:Staphylococcal arthritis of left knee    Principal Orthopedic Problem:  Septic arthritis left knee    Interval History:  Left knee and left shoulder improving    Review of patient's allergies indicates:   Allergen Reactions    Heparin analogues Nausea And Vomiting       Current Facility-Administered Medications   Medication    ceftaroline fosamiL (TEFLARO) 600 mg in dextrose 5 % in water (D5W) 50 mL IVPB (MB+)    DAPTOmycin (CUBICIN) 945 mg in sodium chloride 0.9% SolP 50 mL IVPB    dextrose 10 % infusion    dextrose 10 % infusion    dextrose 10% bolus 125 mL 125 mL    dextrose 10% bolus 250 mL 250 mL    droPERidol injection 0.625 mg    glucagon (human recombinant) injection 1 mg    glucose chewable tablet 16 g    glucose chewable tablet 24 g    insulin aspart U-100 pen 0-10 Units    insulin detemir U-100 (Levemir) pen 30 Units    LIDOcaine (PF) 10 mg/ml (1%) injection 30 mg    metoclopramide HCl tablet 5 mg    metoprolol succinate (TOPROL-XL) 24 hr tablet 25 mg    oxyCODONE immediate release tablet 5 mg    pantoprazole EC tablet 40 mg    polyethylene glycol packet 17 g    rivaroxaban tablet 10 mg    scopolamine 1.3-1.5 mg (1 mg over 3 days) 1 patch    senna tablet 8.6 mg    sodium chloride 0.9% flush 10 mL    sodium chloride 0.9% flush 10 mL     Objective:     Vital Signs (Most Recent):  Temp: 99 °F (37.2 °C) (01/04/24 1633)  Pulse: 102 (01/04/24 1633)  Resp: 18 (01/04/24 1633)  BP: 115/61 (01/04/24 1633)  SpO2: (!) 93 % (01/04/24 1633) Vital Signs (24h Range):  Temp:  [98.1 °F (36.7 °C)-99.3 °F (37.4 °C)] 99 °F (37.2 °C)  Pulse:   "[] 102  Resp:  [16-20] 18  SpO2:  [93 %-100 %] 93 %  BP: (115-145)/(58-81) 115/61     Weight: 118 kg (260 lb 2.3 oz)  Height: 6' 1" (185.4 cm)  Body mass index is 34.32 kg/m².      Intake/Output Summary (Last 24 hours) at 1/4/2024 1706  Last data filed at 1/4/2024 0511  Gross per 24 hour   Intake 220 ml   Output 650 ml   Net -430 ml       Ortho/SPM Exam left shoulder nontender range of motion improved     Left knee still has a slight amount of bloody drainage no significant effusion range of motion still limited but improved    Significant Labs: All pertinent labs within the past 24 hours have been reviewed.    Significant Imaging: I have reviewed and interpreted all pertinent imaging results/findings.    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 due to underlying condition, limited to breakdown of skin [E08.621, L97.401] 12/30/2023 Yes    Diabetes mellitus [E11.9] 12/30/2023 Yes    MRSA bacteremia [R78.81, B95.62] 12/29/2023 Yes    Other elevated white blood cell count [D72.828] 12/28/2023 Yes    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] 10/03/2018 Yes     Chronic    DKA (diabetic ketoacidoses) [E11.10] 09/02/2018 Yes    Diabetic gastroparesis associated with type 2 diabetes mellitus [E11.43, K31.84] 08/31/2018 Yes    History of intravascular stent placement [Z95.828] 09/14/2017 Not Applicable    Intractable nausea and vomiting [R11.2] 08/23/2017 Yes    Type 2 diabetes mellitus with complication, with long-term current use of insulin [E11.8, Z79.4] 08/19/2017 Not Applicable     Chronic      Problems Resolved During this Admission:     Plan:  Continue " antibiotics   Physical therapy left shoulder and left knee   Advance activities as tolerated    Edy Bocanegra Jr, MD  Orthopedics  [facility]

## 2024-01-04 NOTE — PLAN OF CARE
SW met with pt at bedside and spoke with pt's wife Chloé [phone] to discuss d/c planning. Pt is agreeable to d/c to go to [facility] at time of d/c. Cm will continue to follow pt through transitions of care and assist with any discharge needs.    Sreedhar Dennis, MSW  [phone]  Future Appointments   Date Time Provider Department Center   1/31/2024  2:00 PM Salvador Tapia MD [facility]ARD[facility]   2/20/2024 11:00 AM Lawrence Magana MD [facility]        01/04/24 1055   Post-Acute Status   Post-Acute Authorization Placement   Post-Acute Placement Status Set-up Complete/Auth obtained   Coverage [employer]BS   Other Status See Comments   Discharge Delays None known at this time   Discharge Plan   Discharge Plan [facility]

## 2024-01-04 NOTE — SUBJECTIVE & OBJECTIVE
Interval History: Still with positive blood cultures for MRSA    Review of Systems   Constitutional:  Positive for fatigue and fever.   HENT:  Negative for sore throat.    Eyes:  Negative for visual disturbance.   Respiratory:  Negative for shortness of breath.    Cardiovascular:  Negative for chest pain.   Gastrointestinal:  Positive for diarrhea, nausea and vomiting. Negative for abdominal pain.   Genitourinary:  Negative for dysuria.   Musculoskeletal:  Negative for back pain.   Skin:  Positive for wound. Negative for rash.   Neurological:  Positive for weakness. Negative for headaches.   Hematological:  Negative for adenopathy.   Psychiatric/Behavioral:  The patient is not nervous/anxious.      Objective:     Vital Signs (Most Recent):  Temp: 98.5 °F (36.9 °C) (01/04/24 1159)  Pulse: 99 (01/04/24 1159)  Resp: 20 (01/04/24 1159)  BP: (!) 145/81 (01/04/24 1159)  SpO2: 95 % (01/04/24 1159) Vital Signs (24h Range):  Temp:  [98.1 °F (36.7 °C)-100.2 °F (37.9 °C)] 98.5 °F (36.9 °C)  Pulse:  [] 99  Resp:  [16-20] 20  SpO2:  [93 %-100 %] 95 %  BP: (117-157)/(58-84) 145/81     Weight: 118 kg (260 lb 2.3 oz)  Body mass index is 34.32 kg/m².    Estimated Creatinine Clearance: 146.8 mL/min (based on SCr of 0.9 mg/dL).     Physical Exam  Vitals and nursing note reviewed.   Constitutional:       Appearance: He is well-developed.      Comments: Fatigued, uncomfortable.   HENT:      Head: Normocephalic and atraumatic.   Eyes:      Pupils: Pupils are equal, round, and reactive to light.   Cardiovascular:      Rate and Rhythm: Regular rhythm. Tachycardia present.   Pulmonary:      Effort: Pulmonary effort is normal.      Breath sounds: Normal breath sounds.   Abdominal:      General: Bowel sounds are normal.      Palpations: Abdomen is soft.   Musculoskeletal:         General: Normal range of motion.      Cervical back: Normal range of motion and neck supple.      Comments: Left knee with bandage   Neurological:      Mental  Status: He is alert and oriented to person, place, and time.   Psychiatric:         Behavior: Behavior normal.         Thought Content: Thought content normal.         Judgment: Judgment normal.          Significant Labs: All pertinent labs within the past 24 hours have been reviewed.    Significant Imaging: I have reviewed all pertinent imaging results/findings within the past 24 hours.

## 2024-01-04 NOTE — PROGRESS NOTES
[facility]  Infectious Disease  Progress Note    Patient Name: Kulwant Mueller Jr.  MRN: [id number]  Admission Date: 12/27/2023  Length of Stay: 7 days  Attending Physician: Jazmin Goode MD  Primary Care Provider: Shellie, Primary Doctor    Isolation Status: Contact  Assessment/Plan:      ID  MRSA bacteremia  38 y.o. 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      -blood cxs from 12/27,12/29, 12/31, 1/1, 1/2, 1/3  with MRSA  -repeat blood cxs daily to document clearance  -TTE and ROBY without vegetation  -continue dapto  -check weekly CK while on dapto  -given slow clearance would add ceftaroline   -s/p let knee drainage foor septic arthritis   -MRI right foot without osteo  -CT of left shoulder without septic arthritis           Thank you for your consult. I will follow-up with patient. Please contact us if you have any additional questions.    Marciano Millan MD  Infectious Disease  [facility]    Subjective:     Principal Problem:Staphylococcal arthritis of left knee    HPI: 38 y.o. 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 The patient presented to [facility] on 12/27/2023 with a primary complaint of Intractable N/V and diarrhea for over 6 days, generalized weakness for 6 days. He was in his usual state of health until around 1 week ago when he developed NBNB N/V and decreased oral intake. His family members had recently been ill as well, but have tested negative for COVID/flu. He reports he continued to have N/V whether or not he was able to have any oral intake. He presented to an ER for evaluation yesterday, where it was noted he had a WBC 33K, with an unremarkable CT A/P with negative COVID/flu. He was discharged with GI  follow-up, however at the appointment today, there was concern for elevated WBC, and patient was instructed to present to the ER for further evaluation. Does endorse some abdominal discomfort and 1 episode of non-mucoid non-bloody diarrhea this afternoon. Denies  fevers, chills, shortness of breath, chest pain, palpitations, dysuria. He was admitted for DKA and found to have MRSa bacteremia. Initially source was thought to be right foot heel ulcer but bedside US was concerning for left knee bursitis.   Interval History: Still with positive blood cultures for MRSA    Review of Systems   Constitutional:  Positive for fatigue and fever.   HENT:  Negative for sore throat.    Eyes:  Negative for visual disturbance.   Respiratory:  Negative for shortness of breath.    Cardiovascular:  Negative for chest pain.   Gastrointestinal:  Positive for diarrhea, nausea and vomiting. Negative for abdominal pain.   Genitourinary:  Negative for dysuria.   Musculoskeletal:  Negative for back pain.   Skin:  Positive for wound. Negative for rash.   Neurological:  Positive for weakness. Negative for headaches.   Hematological:  Negative for adenopathy.   Psychiatric/Behavioral:  The patient is not nervous/anxious.      Objective:     Vital Signs (Most Recent):  Temp: 98.5 °F (36.9 °C) (01/04/24 1159)  Pulse: 99 (01/04/24 1159)  Resp: 20 (01/04/24 1159)  BP: (!) 145/81 (01/04/24 1159)  SpO2: 95 % (01/04/24 1159) Vital Signs (24h Range):  Temp:  [98.1 °F (36.7 °C)-100.2 °F (37.9 °C)] 98.5 °F (36.9 °C)  Pulse:  [] 99  Resp:  [16-20] 20  SpO2:  [93 %-100 %] 95 %  BP: (117-157)/(58-84) 145/81     Weight: 118 kg (260 lb 2.3 oz)  Body mass index is 34.32 kg/m².    Estimated Creatinine Clearance: 146.8 mL/min (based on SCr of 0.9 mg/dL).     Physical Exam  Vitals and nursing note reviewed.   Constitutional:       Appearance: He is well-developed.      Comments: Fatigued, uncomfortable.   HENT:      Head: Normocephalic and atraumatic.    Eyes:      Pupils: Pupils are equal, round, and reactive to light.   Cardiovascular:      Rate and Rhythm: Regular rhythm. Tachycardia present.   Pulmonary:      Effort: Pulmonary effort is normal.      Breath sounds: Normal breath sounds.   Abdominal:      General: Bowel sounds are normal.      Palpations: Abdomen is soft.   Musculoskeletal:         General: Normal range of motion.      Cervical back: Normal range of motion and neck supple.      Comments: Left knee with bandage   Neurological:      Mental Status: He is alert and oriented to person, place, and time.   Psychiatric:         Behavior: Behavior normal.         Thought Content: Thought content normal.         Judgment: Judgment normal.          Significant Labs: All pertinent labs within the past 24 hours have been reviewed.    Significant Imaging: I have reviewed all pertinent imaging results/findings within the past 24 hours.

## 2024-01-04 NOTE — PLAN OF CARE
Pt would benefit from cont OT services in order to maximize functional independence. Recommending high intensity therapy upon d/c. Pt requires increased assistance for bed mobility 2/2 pain BLE & LUE. Pt scooting laterally EOB with ModA x2 & increased bed height. Patient's has the ability to actively participate in 3 hours of therapy.  A lower level of care cannot provide the interdisciplinary treatment approach needed. Will progress as able.     Problem: Occupational Therapy  Goal: Occupational Therapy Goal  Description: Goals to be met by: 2/3/2024     Patient will increase functional independence with ADLs by performing:    LE Dressing with Minimal Assistance.  Toileting from bedside commode with Stand-by Assistance for hygiene and clothing management.   Supine to sit with Modified [place].  Stand pivot transfers with Supervision.  Step transfer with Contact Guard Assistance & appropriate AD.  Toilet transfer to bedside commode with Contact Guard Assistance & appropriate AD.    Outcome: Ongoing, Progressing

## 2024-01-04 NOTE — ANESTHESIA POSTPROCEDURE EVALUATION
Anesthesia Post Evaluation    Patient: Kulwant Mueller Jr.    Procedure(s) Performed: Procedure(s) (LRB):  ARTHROTOMY, KNEE (Left)    Final Anesthesia Type: general      Patient location during evaluation: PACU  Patient participation: Yes- Able to Participate  Level of consciousness: awake and alert  Post-procedure vital signs: reviewed and stable  Pain management: adequate  Airway patency: patent    PONV status at discharge: No PONV  Anesthetic complications: no      Cardiovascular status: blood pressure returned to baseline  Respiratory status: unassisted  Hydration status: euvolemic  Follow-up not needed.          Vitals Value Taken Time   /61 01/04/24 1633   Temp 37.2 °C (99 °F) 01/04/24 1633   Pulse 102 01/04/24 1633   Resp 18 01/04/24 1633   SpO2 93 % 01/04/24 1633         No case tracking events are documented in the log.      Pain/Tana Score: Pain Rating Prior to Med Admin: 5 (1/4/2024  6:24 AM)  Pain Rating Post Med Admin: 4 (1/3/2024  8:54 PM)  Tana Score: 10 (1/3/2024  9:26 AM)

## 2024-01-04 NOTE — PROGRESS NOTES
[facility] Medicine Progress Note    Primary Team: [facility] Hospitalist Team A  Attending Physician: Jazmin Goode MD  Resident: Reagan  Intern: Warren    Subjective/Interval History   Kulwant Mueller Jr. is a 38 y.o. 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 Presented 12/27 with DKA found to have MRSA bacteremia.     Patient tolerated ROBY well yesterday. No evidence of vegetation. Very diffusely weak on working with PT/OT. On morning exam, patient was resting comfortably stating he's been eating and drinking well and attempting to move his arms and legs while in bed for strengthening. He continues to report pain in his left shoulder with motion limited due to pain. Denies chest pain, SOB, abdominal pain. Has not had a bowel movement since admission.     Objective     Physical Examination:  Temp:  [98.1 °F (36.7 °C)-100.2 °F (37.9 °C)] 98.1 °F (36.7 °C)  Pulse:  [] 102  Resp:  [16-20] 18  SpO2:  [93 %-100 %] 100 %  BP: (117-157)/(58-85) 138/77    Gen: No acute distress, comfortable appearing  HEENT: NC/AT, EOMI grossly, moist MM  CV:  regular rate and rhythm, no murmurs, rubs, or gallops.  Resp: CTAB. No obvious crackles or wheezes.  Abd: Soft, nontender, nondistended  MSK/Ext: LUE ROM limited secondary to pain. R knee bandage c/d/I without warmth or drainage. R heel wound without drainage or erythema.   Neuro: no focal deficits   Skin: Warm, dry    Intake/Output:    Intake/Output Summary (Last 24 hours) at 1/4/2024 0723  Last data filed at 1/4/2024 0511  Gross per 24 hour   Intake 1040 ml   Output 650 ml   Net 390 ml     Net IO Since Admission: 588.34 mL [01/04/24 0724]    Laboratory:  Recent Labs   Lab 12/29/23  0400 12/29/23  2045 12/30/23  0346 12/30/23  0804 12/31/23  0416 01/01/24  0400 01/02/24  0623 01/03/24  0548 01/04/24  0523   WBC 38.53*  --  25.58*  --  27.91* 24.35* 28.28* 30.46* 28.53*   HGB 12.6*  --  12.0*  --  11.7*  12.2* 11.1* 10.1* 9.6*     --  353  --  351 337 369 364 400   MCV 84  --  85  --  86 85 85 85 86      < >  --    < > 136 133* 134* 133*  --    K 3.6   < >  --    < > 3.7 4.0 3.6 3.8  --       < >  --    < > 100 96 96 94*  --    CO2 21*   < >  --    < > 25 23 26 26  --    BUN 19   < >  --    < > 16 14 12 15  --    CREATININE 1.5*   < >  --    < > 0.8 0.8 0.9 0.9  --    *   < >  --    < > 153* 192* 155* 129*  --    CALCIUM 9.5   < >  --    < > 9.3 9.2 8.9 8.7  --    PROT  --   --   --   --  8.1 8.1 7.7 7.5  --    ALBUMIN  --   --   --   --  1.7* 1.7* 1.6* 1.4*  --    PHOS  --   --   --    < > 3.2 3.4 3.2 3.1  --    MG 2.8*  --  2.6  --  2.4  --   --   --   --    AST  --   --   --   --  200* 57* 88* 136*  --    ALT  --   --   --   --  125* 85* 83* 116*  --    ALKPHOS  --   --   --   --  237* 201* 163* 145*  --     < > = values in this interval not displayed.   All laboratory data reviewed    Microbiology: reviewed     Radiology:   reviewed      Current Medications:     Infusions:       Scheduled:   DAPTOmycin (CUBICIN) IV (PEDS and ADULTS)  10 mg/kg (Adjusted) Intravenous Q24H    insulin detemir U-100  30 Units Subcutaneous BID    LIDOcaine (PF) 10 mg/ml (1%)  3 mL Other 1 time in Clinic/HOD    metoclopramide  5 mg Intravenous TID AC    metoprolol succinate  12.5 mg Oral Daily    pantoprazole  40 mg Oral Daily    polyethylene glycol  17 g Oral BID    rivaroxaban  10 mg Oral Daily with dinner    senna  8.6 mg Oral QHS        PRN:  dextrose 10 % in water (D10W), dextrose 10 % in water (D10W), dextrose 10%, dextrose 10%, droPERidol, glucagon (human recombinant), glucose, glucose, insulin aspart U-100, oxyCODONE, oxyCODONE-acetaminophen, scopolamine, sodium chloride 0.9%, sodium chloride 0.9%    Antibiotics and Day Number of Therapy:  Vanc 12/27-1/1  Zosyn 12/27-12/29  Daptomycin 1/1 - present    Assessment/Plan:     Kulwant Mueller Jr. is a 38 y.o. M with PMHx May-Thurner syndrome (on  xarelto), DM2 (on insulin), chronic foot wounds following with wound care, HTN. The patient presented to [facility] on 12/27/2023 with a primary complaint of Intractable N/V and diarrhea for over 6 days, generalized weakness for 6 days. Admitted for DKA, which has now resolved s/p insulin gtt. Found to have MRSA bacteremia, suspect source L knee septic arthritis/bursitis      MRSA bacteremia   2/2 Septic arthritis/bursitis of L knee  MRSA UTI  WBC 47K with neutrophil predominance and patient febrile on admission. Suspect leukocytosis 2/2 DKA and bacteremia. L knee pain washed out by ortho with positive cx. L shoulder aspirated imaging without evidence of infection. R heel without osteo but has cellulitis, myositis, tenosynovitis. ROBY negative for vegetation. Believe we have source control at this time given patient stopped fevering but continuing to monitor for manifestations of infection.   - s/p L knee arthrotomy with synovectomy on 12/29. Cx with staph aureus  - L shoulder aspirated 1/2 w/o signs of infection   - ID on board; switched to daptomycin on 1/1 w weekly CK  - daily blood cultures to document clearance  - oxycodone 5mg q6h prn for pain    Transaminitis   Admitted with ast/alt wnl. Now continues to rise.   - CT abdomen pelvis on admission without acute abnormalities  - hepatitis panel pending   - discontinued percocet; oxycodone for pain     Intractable N/V  Gastroparesis  was being worked up for gastroparesis outpatient, unable to complete testing due to vomitting. Sx lasted for several weeks and spontaneously resolved. has been taking reglan outpatient, but is unsure if it does anything to help him.   - continue home metoclopramide and PPI  - zofran not helping with N/V. PRN droperidol and scopolamine ordered.   - QTc 448, caution in QTc prolonging agents  - advance diet as tolerated    DKA - resolved  DM2 with long-term use insulin  AG 31, BHB elevated, with bicarb 9 and elevated  blood glucose; UA with ketones. suspect due to intractable N/V, infection  Home regimen: tresiba 55u daily, sliding scale humalog, jardiance 25mg daily  - DKA resolved s/p insulin gtt   - A1c 7.4 on 12/28, prior A1c as high as 14  - continue levemir 30u BID + SSI, modify as patient able to tolerate diet    Newly dx HFmrEF  12/29 TTE: EF 48%  - start metoprolol succinate 12.5mg daily --> 25mg   - already on jardiance at home    Acute kidney injury, resolved  - on admit, Cr 1.9; baseline 1  - suspect prerenal due to decreased PO intake  - Cr improving with IVF, currently at baseline    Chronic R heel wound  12/27 XR right foot with no osseous abnormality; has been following with wound care and has been off PO antibiotics for at least 1 month with no drainage or swelling from wound site  - MRI R hindfoot ordered; no osteomyelitis   - wound care consulted    May-Thurner syndrome  Peripheral vascular disease  Hx May-Thurner syndrome and was following with Dr. Duff, who had kept patient on xarelto 10 mg daily. Patient reports has not been able to take xarelto for past 1-2 weeks 2' inability to tolerate PO. Previous n/v w heparin ggt but pharmacy confirmed has tolerated lovenox in the past.   - DVT US BLE negative. DVT BUE with thrombus within 1 of the right paired brachial veins  - started full dose lovenox  - transition back to home Xarelto today     HTN  patient reports not currently on any anti-hypertensive regimen  - BP should improve with GDMT     Concern for depression  patient noted to have flat affect, decreased motivation, sleeping for several hours during the day since admission, decreased appetite. Per wife, she has been concerned there may be underlying depression.   - psych consulted. Recommended cymbalta but patient declined.     Diet: diabetic  VTE PPx: Xarelto  Code Status: full    Dispo: pending clearance of cultures, IPR with 6 weeks abx      Aria Kelley   [facility] Internal Medicine HO-I    [facility] Medicine  Hospitalist Pager numbers:   [facility] Hospitalist Medicine Team A (Russel/Emmanuel): 2005  [facility] Hospitalist Medicine Team B (Odin/Kameron):  2006

## 2024-01-04 NOTE — PT/OT/SLP EVAL
Occupational Therapy   Evaluation    Name: Kulwant Mueller Jr.  MRN: [id number]  Admitting Diagnosis: Staphylococcal arthritis of left knee  Recent Surgery: Procedure(s) (LRB):  ECHOCARDIOGRAM, TRANSESOPHAGEAL (N/A) Day of Surgery    Recommendations:     Discharge Recommendations: High Intensity Therapy  Discharge Equipment Recommendations:  wheelchair, drop arm commode  Barriers to discharge:  Other (Comment) (Pt requires increased level of assistance)    Assessment:     Kulwant Mueller Jr. is a 38 y.o. male with a medical diagnosis of Staphylococcal arthritis of left knee.  He presents with The primary encounter diagnosis was Diabetic ketoacidosis without coma associated with type 2 diabetes mellitus. Diagnoses of 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 due to underlying condition, limited to breakdown of skin, Tachycardia, Leukocytosis, Acute kidney injury, Dehydration, Nausea and vomiting, unspecified vomiting type, Splinter of right foot without major open wound or infection, initial encounter, Leukocytosis, unspecified type, Cardiac arrhythmia, unspecified cardiac arrhythmia type, Hypertension associated with diabetes, Non-healing wound of right heel, Septic arthritis, due to unspecified organism, septic arthritis of unspecified location, MRSA bacteremia, Septic prepatellar bursitis of left knee, Severe sepsis, 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 due to underlying condition, limited to breakdown of skin, unspecified laterality, Other acute osteomyelitis, unspecified ankle and foot, Diabetic ulcer of right heel associated with diabetes mellitus due to underlying condition, limited to breakdown of skin, Staphylococcal arthritis of left knee, Type 2 diabetes mellitus with other specified complication, with long-term current use of insulin, and Current mild episode of major depressive disorder without prior episode were also pertinent to this visit. Performance deficits  affecting function: weakness, impaired functional mobility, gait instability, impaired endurance, decreased upper extremity function, decreased lower extremity function, decreased ROM, edema, impaired skin, impaired balance, impaired self care skills, decreased coordination, pain, impaired joint extensibility, decreased safety awareness, impaired coordination.      Pt would benefit from cont OT services in order to maximize functional independence. Recommending high intensity therapy upon d/c. Pt requires increased assistance for bed mobility 2/2 pain BLE & LUE. Pt scooting laterally EOB with ModA x2 & increased bed height. Patient's has the ability to actively participate in 3 hours of therapy.  A lower level of care cannot provide the interdisciplinary treatment approach needed. Will progress as able.     Rehab Prognosis: Good and Fair; patient would benefit from acute skilled OT services to address these deficits and reach maximum level of function.       Plan:     Patient to be seen 5 x/week to address the above listed problems via self-care/home management, therapeutic activities, therapeutic exercises  Plan of Care Expires:    Plan of Care Reviewed with: patient    Subjective     Chief Complaint: Pain BLE & L shoulder  Patient/Family Comments/goals: Agreeable to therapy    Occupational Profile:  Patient lives with spouse and 3 kids in a 1 SH, 1 SHANNAN, WIS with shower chair in bathroom.    Prior to admission, patients level of function was Independent, patient works full time as a  at [employer].  Wife works from home.  Patient drives.  Equipment used at home: none.  DME owned (not currently used): none.  Upon discharge, patient will have assistance from spouse.    Pain/Comfort:  Pain Rating 1: 4/10  Location 1: knee (L shoulder)  Pain Addressed 1: Reposition, Distraction, Cessation of Activity, Nurse notified    Patients cultural, spiritual, Spiritism conflicts given the current situation:  no    Objective:     Communicated with: nsg prior to session.  Patient found HOB elevated with bed alarm, telemetry upon OT entry to room.    General Precautions: Standard, fall  Orthopedic Precautions: N/A (no restrictions per Dr. Bocanegra; wound care nurse recommends limiting pressure through R heel (encourage WB to forefoot))  Braces: N/A (fitted pt for regular darco shoe)  Respiratory Status: Room air    Occupational Performance:    Bed Mobility:    Patient completed Scooting/Bridging with moderate assistance and 2 persons for 3 lateral scoot along EOB; OT blocking RLE, increased v/cs for positioning  Patient completed Supine to Sit with moderate assistance and 2  persons  Patient completed Sit to Supine with maximal assistance and 2 persons    Functional Mobility/Transfers:  Patient completed Sit <> Stand Transfer with maximal assistance and of 2+ persons  with  rolling walker and elevated bed height; pt only able to minimally clear buttocks   Functional Mobility: NT    Activities of Daily Living:  Lower Body Dressing: total assistance to lakhwinder L DARCO shoe    Cognitive/Visual Perceptual:  Cognitive/Psychosocial Skills:     -       Oriented to: Person, Place, Time, and Situation   -       Follows Commands/attention:Follows multistep  commands  -       Memory: No Deficits noted  -       Mood/Affect/Coping skills/emotional control: Cooperative and Flat affect    Physical Exam:  Upper Extremity Range of Motion:     -       Right Upper Extremity: limited; ~30-40 decreased shoulder flex AROM, ~60-70 degrees PROM, progressing to ~90 degrees PROM in gravity eliminated position  -       Left Upper Extremity: WFL  Upper Extremity Strength:    -       Right Upper Extremity: 3+/5 distally  -       Left Upper Extremity: 3+/5   Strength:    -       Right Upper Extremity: WFL  -       Left Upper Extremity: WFL    AMPAC 6 Click ADL:  AMPAC Total Score: 16    Treatment & Education:  Pt would benefit from cont OT services in  order to maximize functional independence.   Pt requires increased assistance for bed mobility 2/2 pain BLE & LUE.   Pt scooting laterally EOB with ModA x2 & increased bed height.   Pt educated on importance of AROM as tolerated LUE & LE to prevent frozen shoulder & decreased ROM.  Patient's has the ability to actively participate in 3 hours of therapy.    A lower level of care cannot provide the interdisciplinary treatment approach needed.   Will progress as able.     Patient left HOB elevated with all lines intact, call button in reach, bed alarm on, nsg notified, and family present    GOALS:   Multidisciplinary Problems       Occupational Therapy Goals          Problem: Occupational Therapy    Goal Priority Disciplines Outcome Interventions   Occupational Therapy Goal     OT, PT/OT Ongoing, Progressing    Description: Goals to be met by: 2/3/2024     Patient will increase functional independence with ADLs by performing:    LE Dressing with Minimal Assistance.  Toileting from bedside commode with Stand-by Assistance for hygiene and clothing management.   Supine to sit with Modified [place].  Stand pivot transfers with Supervision.  Step transfer with Contact Guard Assistance & appropriate AD.  Toilet transfer to bedside commode with Contact Guard Assistance & appropriate AD.                         History:     Past Medical History:   Diagnosis Date    Anticoagulant long-term use     COVID-19 virus infection     Diabetes mellitus     Diabetes mellitus, type 2     Hypertension     May-Thurner syndrome          Past Surgical History:   Procedure Laterality Date    APPENDECTOMY      ARTHROTOMY OF KNEE Left 12/29/2023    Procedure: ARTHROTOMY, KNEE;  Surgeon: Edy Bocanegra Jr., MD;  Location: [facility];  Service: Orthopedics;  Laterality: Left;    ESOPHAGOGASTRODUODENOSCOPY N/A 1/31/2022    Procedure: EGD (ESOPHAGOGASTRODUODENOSCOPY);  Surgeon: Cindy Quiros MD;  Location: [facility];  Service: Endoscopy;   Laterality: N/A;    ESOPHAGOGASTRODUODENOSCOPY N/A 3/21/2022    Procedure: EGD (ESOPHAGOGASTRODUODENOSCOPY);  Surgeon: Tejas Mann MD;  Location: [facility];  Service: Endoscopy;  Laterality: N/A;    INCISION AND DRAINAGE FOOT Left 11/28/2021    Procedure: INCISION AND DRAINAGE, FOOT;  Surgeon: Bj Alicea DPM;  Location: [facility] OR;  Service: Podiatry;  Laterality: Left;    stents in bilateral legs         Time Tracking:     OT Date of Treatment: 01/03/24  OT Start Time: 1330  OT Stop Time: 1401  OT Total Time (min): 31 min w/PT     Billable Minutes:Evaluation 8  Therapeutic Activity 23    1/3/2024

## 2024-01-04 NOTE — PT/OT/SLP PROGRESS
Occupational Therapy   Treatment    Name: Kulwant Mueller Jr.  MRN: [id number]  Admitting Diagnosis:  Staphylococcal arthritis of left knee  1 Day Post-Op    Recommendations:     Discharge Recommendations: High Intensity Therapy  Discharge Equipment Recommendations:  drop arm commode, wheelchair, to be determined by next level of care  Barriers to discharge:  Other (Comment) (Pt requires increased level of assistance)    Assessment:     Kulwant Mueller Jr. is a 38 y.o. male with a medical diagnosis of Staphylococcal arthritis of left knee.  He presents with The primary encounter diagnosis was Diabetic ketoacidosis without coma associated with type 2 diabetes mellitus. Diagnoses of 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 due to underlying condition, limited to breakdown of skin, Tachycardia, Leukocytosis, Acute kidney injury, Dehydration, Nausea and vomiting, unspecified vomiting type, Splinter of right foot without major open wound or infection, initial encounter, Leukocytosis, unspecified type, Cardiac arrhythmia, unspecified cardiac arrhythmia type, Hypertension associated with diabetes, Non-healing wound of right heel, Septic arthritis, due to unspecified organism, septic arthritis of unspecified location, MRSA bacteremia, Septic prepatellar bursitis of left knee, Severe sepsis, 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 due to underlying condition, limited to breakdown of skin, unspecified laterality, Other acute osteomyelitis, unspecified ankle and foot, Diabetic ulcer of right heel associated with diabetes mellitus due to underlying condition, limited to breakdown of skin, Staphylococcal arthritis of left knee, Type 2 diabetes mellitus with other specified complication, with long-term current use of insulin, Current mild episode of major depressive disorder without prior episode, and Normocytic anemia were also pertinent to this visit. Performance deficits affecting function  are weakness, impaired functional mobility, gait instability, impaired endurance, decreased coordination, decreased upper extremity function, decreased lower extremity function, impaired balance, impaired self care skills, impaired skin, decreased ROM, pain, impaired joint extensibility, impaired coordination, decreased safety awareness, impaired muscle length.     Rehab Prognosis:  Good; patient would benefit from acute skilled OT services to address these deficits and reach maximum level of function.       Plan:     Patient to be seen 5 x/week to address the above listed problems via self-care/home management, therapeutic activities, therapeutic exercises  Plan of Care Expires: 2/3/2024   Plan of Care Reviewed with: patient    Subjective     Chief Complaint: Pain L knee w/flexion  Patient/Family Comments/goals: Agreeable to therapy  Pain/Comfort:  Pain Rating 1: 0/10    Objective:     Communicated with: nsg prior to session.  Patient found HOB elevated with bed alarm, telemetry upon OT entry to room.    General Precautions: Standard, fall    Orthopedic Precautions: (no restrictions per Dr. Bocanegra; wound care nurse recommends limiting pressure through R heel (encourage WB to forefoot))  Braces:  (DARCO shoe RLE)  Respiratory Status: Room air     Occupational Performance:     Bed Mobility:    Patient completed Supine to Sit with moderate assistance and 2 persons  Patient completed Sit to Supine with maximal assistance and 2 persons     Functional Mobility/Transfers:  Patient completed Sit <> Stand Transfer with moderate assistance and of 2 persons  with  SaraStedy/elevated bed height x3 trials.   Functional Mobility: NT    Activities of Daily Living:  Lower Body Dressing: total assistance to lakhwinder R DARCO shoe seated EOB      AMPAC 6 Click ADL: 16    Treatment & Education:  Pt agreeable to therapy this date.  AROM performed LUE followed by PROM, increased PROM this date ~140 degrees  BLE exs performed with assistance  from PT.  Pt performing sit<>stand as above.  Increased L knee flexion noted this date with increased time/assistance provided by PT.  Pt participating well in therapy session this date.    Patient left HOB elevated with all lines intact, call button in reach, bed alarm on, and nsg notified    GOALS:   Multidisciplinary Problems       Occupational Therapy Goals          Problem: Occupational Therapy    Goal Priority Disciplines Outcome Interventions   Occupational Therapy Goal     OT, PT/OT Ongoing, Progressing    Description: Goals to be met by: 2/3/2024     Patient will increase functional independence with ADLs by performing:    LE Dressing with Minimal Assistance.  Toileting from bedside commode with Stand-by Assistance for hygiene and clothing management.   Supine to sit with Modified [place].  Stand pivot transfers with Supervision.  Step transfer with Contact Guard Assistance & appropriate AD.  Toilet transfer to bedside commode with Contact Guard Assistance & appropriate AD.                         Time Tracking:     OT Date of Treatment: 01/04/24  OT Start Time: 1054  OT Stop Time: 1132  OT Total Time (min): 38 min w/PT    Billable Minutes:Therapeutic Activity 30  Therapeutic Exercise 8    OT/RUFINA: OT     Number of RUFINA visits since last OT visit: 0    1/4/2024

## 2024-01-04 NOTE — PT/OT/SLP PROGRESS
Physical Therapy Treatment    Patient Name:  Kulwant Mueller Jr.   MRN:  [id number]    Recommendations:     Discharge Recommendations: High Intensity Therapy  Discharge Equipment Recommendations: drop arm commode, to be determined by next level of care, wheelchair  Barriers to discharge:  significant assist required    Assessment:     Kulwant Mueller Jr. is a 38 y.o. male admitted with a medical diagnosis of Staphylococcal arthritis of left knee.  He presents with the following impairments/functional limitations: weakness, gait instability, impaired balance, impaired endurance, impaired self care skills, impaired functional mobility, decreased coordination, decreased lower extremity function, decreased upper extremity function, decreased ROM, decreased safety awareness, pain, impaired skin, edema, orthopedic precautions, impaired coordination, impaired joint extensibility . Pt is progressing towards goals. Pt was able to perform 3 stands this date with use of lanre Anzodedy device and assistance.    Rehab Prognosis: Good; patient would benefit from acute skilled PT services to address these deficits and reach maximum level of function.    Recent Surgery: Procedure(s) (LRB):  ECHOCARDIOGRAM, TRANSESOPHAGEAL (N/A) 1 Day Post-Op    Plan:     During this hospitalization, patient to be seen 5 x/week to address the identified rehab impairments via therapeutic activities, therapeutic exercises, gait training, neuromuscular re-education, wheelchair management/training and progress toward the following goals:    Plan of Care Expires:  01/31/24    Subjective     Chief Complaint: L shoulder and L knee pain  Patient/Family Comments/goals: pt agreeable to participate  Pain/Comfort:  Pain Rating 1: 0/10 (reports pain in L shoulder and L knee with movement, especially L knee flexion)      Objective:     Communicated with nsg prior to session.  Patient found HOB elevated with bed alarm, telemetry upon PT entry to room.     General  Precautions: Standard, fall, contact  Orthopedic Precautions:  (no restrictions per Dr. Bocanegra; wound care nurse recommends limiting pressure through R heel (encourage WB to forefoot))  Braces:  (fitted pt for regular darco shoe)  Respiratory Status: Room air     Functional Mobility:  Bed Mobility:     Scooting: moderate assistance scooting forward EOB  Supine to Sit: moderate assistance and of 2 persons  Sit to Supine: maximal assistance and of 2 persons  Transfers:     Sit to Stand:  moderate assistance and of 2 persons with  lanre stedy and elevated bed height X 3 trials      AM-PAC 6 CLICK MOBILITY  Turning over in bed (including adjusting bedclothes, sheets and blankets)?: 2  Sitting down on and standing up from a chair with arms (e.g., wheelchair, bedside commode, etc.): 2  Moving from lying on back to sitting on the side of the bed?: 2  Moving to and from a bed to a chair (including a wheelchair)?: 1  Need to walk in hospital room?: 1  Climbing 3-5 steps with a railing?: 1  Basic Mobility Total Score: 9       Treatment & Education:  Pt transferred supine>sit and scooting forward EOB with increased assistance and increased caution with LLE due to pt reporting increased pain with knee flexion  Performed ~15 reps ankle pumps and LAQs BLE (L knee flex/ext with AAROM)  Regular darco donned to R foot  Used bed function to elevate height of bed and promote gravity assisted knee flexion to gain more ROM, pt able to obtain ~70-80 deg of knee flexion  Utilized the lanre stedy device to practice standing  Pt assisted with L hand placement on bar initially then pt able to grab bar with continued trials  Rest breaks taken in between  Pt with increased forward flexion in standing and required verbal and tactile cues to promote hip extension for upright posture  Pt returned supine and therapist brought ice pack into pt's room for L shoulder and knee and pt declined stating it would make him too cold  Therapist encouraged use  to reduce edema and pain and pt continued to decline  Pressure relief boots donned     Patient left HOB elevated with all lines intact, call button in reach, bed alarm on, and nsg notified..    GOALS:   Multidisciplinary Problems       Physical Therapy Goals          Problem: Physical Therapy    Goal Priority Disciplines Outcome Goal Variances Interventions   Physical Therapy Goal     PT, PT/OT Ongoing, Not Progressing     Description: Goals to be met by: 2024     Patient will increase functional independence with mobility by performin. Supine to sit with Modified [place]  2. Sit to supine with Modified [place]  3. Rolling to Left and Right with Modified [place].  4. Sit to stand transfer with Modified [place]  5. Bed to chair transfer with Modified [place] using Rolling Walker  6. Gait  x 150 feet with Modified [place] using Rolling Walker.   7. Ascend/descend 1 stair with Modified [place] using Rolling Walker.                          Time Tracking:     PT Received On: 24  PT Start Time: 1054     PT Stop Time: 1130  PT Total Time (min): 36 min with OT    Billable Minutes: Therapeutic Activity 26 and Therapeutic Exercise 10    Treatment Type: Treatment  PT/PTA: PT     Number of PTA visits since last PT visit: 0     2024

## 2024-01-04 NOTE — ASSESSMENT & PLAN NOTE
38 y.o. 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      -blood cxs from 12/27,12/29, 12/31, 1/1, 1/2, 1/3  with MRSA  -repeat blood cxs daily to document clearance  -TTE and ROBY without vegetation  -continue dapto  -check weekly CK while on dapto  -given slow clearance would add ceftaroline   -s/p let knee drainage foor septic arthritis   -MRI right foot without osteo  -CT of left shoulder without septic arthritis

## 2024-01-05 PROBLEM — B95.61 STAPHYLOCOCCUS AUREUS BACTEREMIA: Status: ACTIVE | Noted: 2023-12-29

## 2024-01-05 LAB
ALBUMIN SERPL BCP-MCNC: 1.4 G/DL (ref 3.5–5.2)
ALP SERPL-CCNC: 177 U/L (ref 55–135)
ALT SERPL W/O P-5'-P-CCNC: 141 U/L (ref 10–44)
ANION GAP SERPL CALC-SCNC: 12 MMOL/L (ref 8–16)
AST SERPL-CCNC: 123 U/L (ref 10–40)
BACTERIA BLD CULT: ABNORMAL
BACTERIA FLD AEROBE CULT: ABNORMAL
BASOPHILS # BLD AUTO: ABNORMAL K/UL (ref 0–0.2)
BASOPHILS NFR BLD: 0 % (ref 0–1.9)
BILIRUB SERPL-MCNC: 0.4 MG/DL (ref 0.1–1)
BUN SERPL-MCNC: 11 MG/DL (ref 6–20)
CALCIUM SERPL-MCNC: 8.7 MG/DL (ref 8.7–10.5)
CHLORIDE SERPL-SCNC: 94 MMOL/L (ref 95–110)
CO2 SERPL-SCNC: 25 MMOL/L (ref 23–29)
CREAT SERPL-MCNC: 0.8 MG/DL (ref 0.5–1.4)
DIFFERENTIAL METHOD BLD: ABNORMAL
EOSINOPHIL # BLD AUTO: ABNORMAL K/UL (ref 0–0.5)
EOSINOPHIL NFR BLD: 0 % (ref 0–8)
ERYTHROCYTE [DISTWIDTH] IN BLOOD BY AUTOMATED COUNT: 14.5 % (ref 11.5–14.5)
EST. GFR  (NO RACE VARIABLE): >60 ML/MIN/1.73 M^2
FERRITIN SERPL-MCNC: 2434 NG/ML (ref 20–300)
FOLATE SERPL-MCNC: 13.5 NG/ML (ref 4–24)
GLUCOSE SERPL-MCNC: 188 MG/DL (ref 70–110)
GRAM STN SPEC: ABNORMAL
GRAM STN SPEC: ABNORMAL
HCT VFR BLD AUTO: 28.7 % (ref 40–54)
HGB BLD-MCNC: 9.3 G/DL (ref 14–18)
IMM GRANULOCYTES # BLD AUTO: ABNORMAL K/UL (ref 0–0.04)
IMM GRANULOCYTES NFR BLD AUTO: ABNORMAL % (ref 0–0.5)
IRON SERPL-MCNC: 14 UG/DL (ref 45–160)
LYMPHOCYTES # BLD AUTO: ABNORMAL K/UL (ref 1–4.8)
LYMPHOCYTES NFR BLD: 10 % (ref 18–48)
MAGNESIUM SERPL-MCNC: 1.9 MG/DL (ref 1.6–2.6)
MCH RBC QN AUTO: 28.2 PG (ref 27–31)
MCHC RBC AUTO-ENTMCNC: 32.4 G/DL (ref 32–36)
MCV RBC AUTO: 87 FL (ref 82–98)
MONOCYTES # BLD AUTO: ABNORMAL K/UL (ref 0.3–1)
MONOCYTES NFR BLD: 3 % (ref 4–15)
NEUTROPHILS NFR BLD: 79 % (ref 38–73)
NEUTS BAND NFR BLD MANUAL: 8 %
NRBC BLD-RTO: 0 /100 WBC
PHOSPHATE SERPL-MCNC: 3.2 MG/DL (ref 2.7–4.5)
PLATELET # BLD AUTO: 410 K/UL (ref 150–450)
PLATELET BLD QL SMEAR: ABNORMAL
PMV BLD AUTO: 10.6 FL (ref 9.2–12.9)
POCT GLUCOSE: 128 MG/DL (ref 70–110)
POCT GLUCOSE: 172 MG/DL (ref 70–110)
POCT GLUCOSE: 210 MG/DL (ref 70–110)
POCT GLUCOSE: 310 MG/DL (ref 70–110)
POTASSIUM SERPL-SCNC: 4.4 MMOL/L (ref 3.5–5.1)
PROT SERPL-MCNC: 7.5 G/DL (ref 6–8.4)
RBC # BLD AUTO: 3.3 M/UL (ref 4.6–6.2)
SATURATED IRON: 11 % (ref 20–50)
SODIUM SERPL-SCNC: 131 MMOL/L (ref 136–145)
TOTAL IRON BINDING CAPACITY: 130 UG/DL (ref 250–450)
TRANSFERRIN SERPL-MCNC: 88 MG/DL (ref 200–375)
VIT B12 SERPL-MCNC: 666 PG/ML (ref 210–950)
WBC # BLD AUTO: 27.65 K/UL (ref 3.9–12.7)

## 2024-01-05 PROCEDURE — 25000003 PHARM REV CODE 250

## 2024-01-05 PROCEDURE — 36415 COLL VENOUS BLD VENIPUNCTURE: CPT

## 2024-01-05 PROCEDURE — 97535 SELF CARE MNGMENT TRAINING: CPT | Mod: CO

## 2024-01-05 PROCEDURE — 83540 ASSAY OF IRON: CPT

## 2024-01-05 PROCEDURE — 87040 BLOOD CULTURE FOR BACTERIA: CPT

## 2024-01-05 PROCEDURE — 82746 ASSAY OF FOLIC ACID SERUM: CPT

## 2024-01-05 PROCEDURE — 83735 ASSAY OF MAGNESIUM: CPT

## 2024-01-05 PROCEDURE — 99232 SBSQ HOSP IP/OBS MODERATE 35: CPT | Mod: ,,, | Performed by: INTERNAL MEDICINE

## 2024-01-05 PROCEDURE — 84100 ASSAY OF PHOSPHORUS: CPT

## 2024-01-05 PROCEDURE — 27000207 HC ISOLATION

## 2024-01-05 PROCEDURE — 63600175 PHARM REV CODE 636 W HCPCS: Mod: JZ,JG | Performed by: INTERNAL MEDICINE

## 2024-01-05 PROCEDURE — 97530 THERAPEUTIC ACTIVITIES: CPT

## 2024-01-05 PROCEDURE — 85027 COMPLETE CBC AUTOMATED: CPT

## 2024-01-05 PROCEDURE — 82607 VITAMIN B-12: CPT

## 2024-01-05 PROCEDURE — 82728 ASSAY OF FERRITIN: CPT

## 2024-01-05 PROCEDURE — 97110 THERAPEUTIC EXERCISES: CPT

## 2024-01-05 PROCEDURE — 21400001 HC TELEMETRY ROOM

## 2024-01-05 PROCEDURE — 97530 THERAPEUTIC ACTIVITIES: CPT | Mod: CO

## 2024-01-05 PROCEDURE — 80053 COMPREHEN METABOLIC PANEL: CPT

## 2024-01-05 PROCEDURE — 25000003 PHARM REV CODE 250: Performed by: INTERNAL MEDICINE

## 2024-01-05 PROCEDURE — 63600175 PHARM REV CODE 636 W HCPCS

## 2024-01-05 PROCEDURE — 85007 BL SMEAR W/DIFF WBC COUNT: CPT

## 2024-01-05 RX ADMIN — INSULIN ASPART 2 UNITS: 100 INJECTION, SOLUTION INTRAVENOUS; SUBCUTANEOUS at 10:01

## 2024-01-05 RX ADMIN — SENNOSIDES 8.6 MG: 8.6 TABLET, FILM COATED ORAL at 08:01

## 2024-01-05 RX ADMIN — INSULIN ASPART 2 UNITS: 100 INJECTION, SOLUTION INTRAVENOUS; SUBCUTANEOUS at 01:01

## 2024-01-05 RX ADMIN — METOCLOPRAMIDE 5 MG: 5 TABLET ORAL at 06:01

## 2024-01-05 RX ADMIN — DAPTOMYCIN 945 MG: 500 INJECTION, POWDER, LYOPHILIZED, FOR SOLUTION INTRAVENOUS at 07:01

## 2024-01-05 RX ADMIN — CEFTAROLINE FOSAMIL 600 MG: 600 POWDER, FOR SOLUTION INTRAVENOUS at 06:01

## 2024-01-05 RX ADMIN — PANTOPRAZOLE SODIUM 40 MG: 40 TABLET, DELAYED RELEASE ORAL at 08:01

## 2024-01-05 RX ADMIN — CEFTAROLINE FOSAMIL 600 MG: 600 POWDER, FOR SOLUTION INTRAVENOUS at 10:01

## 2024-01-05 RX ADMIN — CEFTAROLINE FOSAMIL 600 MG: 600 POWDER, FOR SOLUTION INTRAVENOUS at 04:01

## 2024-01-05 RX ADMIN — METOPROLOL SUCCINATE 25 MG: 25 TABLET, EXTENDED RELEASE ORAL at 08:01

## 2024-01-05 RX ADMIN — INSULIN ASPART 8 UNITS: 100 INJECTION, SOLUTION INTRAVENOUS; SUBCUTANEOUS at 06:01

## 2024-01-05 RX ADMIN — METOCLOPRAMIDE 5 MG: 5 TABLET ORAL at 07:01

## 2024-01-05 RX ADMIN — RIVAROXABAN 10 MG: 10 TABLET, FILM COATED ORAL at 07:01

## 2024-01-05 NOTE — NURSING
Assumed care of patient from ADRIEL Jimenes, patient is AAOX4, flat personality, room air, vitals WNL, no c/o pain or discomfort, left foot diabetic wound, treated with iodine, RUFINA, left knee wrapped in ace bandage clean dry and intact, urinal in reach, all safety precautions are in place, call bell and tray table are within reach of the patient and no additional questions or concerns from the patient a this time.

## 2024-01-05 NOTE — PLAN OF CARE
Problem: Physical Therapy  Goal: Physical Therapy Goal  Description: Goals to be met by: 2024     Patient will increase functional independence with mobility by performin. Supine to sit with Modified [place]  2. Sit to supine with Modified [place]  3. Rolling to Left and Right with Modified [place].  4. Sit to stand transfer with Modified [place]  5. Bed to chair transfer with Modified [place] using Rolling Walker  6. Gait  x 150 feet with Modified [place] using Rolling Walker.   7. Ascend/descend 1 stair with Modified [place] using Rolling Walker.     Outcome: Ongoing, Progressing     Pt participates in bed mobility and transfers to standing with use of lanre steady; co-tx session to ensure safety with progress of mobility. Therapy will continue to progress pt as able.

## 2024-01-05 NOTE — PT/OT/SLP PROGRESS
Occupational Therapy   Treatment    Name: Kulwant Mueller Jr.  MRN: [id number]  Admitting Diagnosis:  Staphylococcus aureus bacteremia  2 Days Post-Op    Recommendations:     Discharge Recommendations: High Intensity Therapy  Discharge Equipment Recommendations:  to be determined by next level of care  Barriers to discharge:  Other (Comment) (increased level of assistance)    Assessment:     Kulwant Mueller Jr. is a 38 y.o. male with a medical diagnosis of Staphylococcus aureus bacteremia.   Performance deficits affecting function are weakness, impaired endurance, impaired self care skills, impaired functional mobility, gait instability, impaired balance, decreased upper extremity function, decreased lower extremity function, pain, decreased ROM, impaired skin, edema, impaired joint extensibility.    Pt found in bed, agreeable to therapy.  Pt limited by pain. Continue OT services to address functional goals, progressing as able.      Rehab Prognosis:  Good; patient would benefit from acute skilled OT services to address these deficits and reach maximum level of function.       Plan:     Patient to be seen 5 x/week to address the above listed problems via self-care/home management, therapeutic activities, therapeutic exercises  Plan of Care Expires:    Plan of Care Reviewed with: patient    Subjective     Chief Complaint: B knee pain, L greater than R  Patient/Family Comments/goals: to return to PLOF  Pain/Comfort:  Pain Rating 1: 10/10  Location - Side 1: Left  Location - Orientation 1: generalized  Location 1:  (shld and knee)  Pain Addressed 1: Distraction, Reposition  Pain Rating Post-Intervention 1: 10/10  Pain Rating 2:  (RLE-did not rate-less pain than LLE)    Objective:     Communicated with: RN prior to session.  Patient found HOB elevated with bed alarm, peripheral IV, telemetry upon OT entry to room.    General Precautions: Standard, fall, contact    Orthopedic Precautions: (no restrictions per   Daljit; wound care nurse recommends limiting pressure through R heel (encourage WB to forefoot))  Braces:  (DARCO shoe RLE)  Respiratory Status: Room air     Occupational Performance:     Bed Mobility:    Patient completed Rolling/Turning to Right with maximal assistance and 2 persons with bed rail  Patient completed Scooting/Bridging with minimum assistance, supine to HOB, with bed in trendelenburg and pulling on head rails, pushing with RLE  Patient completed Supine to Sit with maximal assistance and 2 persons  Patient completed Sit to Supine with maximal assistance and 2 persons     Functional Mobility/Transfers:  Patient completed Sit <> Stand Transfer with Min A x 2 using Haley 7digitaldy standing device x 2 trials.  Unsuccessful using RW x 2 trials withTot A x 2 with bed elevated.   Functional Mobility: Unable     Activities of Daily Living:  Grooming: stand by assistance seated EOB  Lower Body Dressing: total assistance don/dof R Darco shoe      AMPAC 6 Click ADL: 17    Treatment & Education:  Pt sat EOB ~20 min with Supervision while performing BLE AAROM exercises with PT and G/H task with OT.       Patient left supine with all lines intact, call button in reach, bed alarm on, and B Z Flex boots donned    GOALS:   Multidisciplinary Problems       Occupational Therapy Goals          Problem: Occupational Therapy    Goal Priority Disciplines Outcome Interventions   Occupational Therapy Goal     OT, PT/OT Ongoing, Progressing    Description: Goals to be met by: 2/3/2024     Patient will increase functional independence with ADLs by performing:    LE Dressing with Minimal Assistance.  Toileting from bedside commode with Stand-by Assistance for hygiene and clothing management.   Supine to sit with Modified [place].  Stand pivot transfers with Supervision.  Step transfer with Contact Guard Assistance & appropriate AD.  Toilet transfer to bedside commode with Contact Guard Assistance & appropriate AD.                          Time Tracking:     OT Date of Treatment: 01/05/24  OT Start Time: 1258  OT Stop Time: 1332  OT Total Time (min): 34 min    Billable Minutes:Self Care/Home Management 10  Therapeutic Activity 24            1/5/2024   Principal Discharge DX:	Laceration of abdomen, initial encounter

## 2024-01-05 NOTE — PLAN OF CARE
Problem: Occupational Therapy  Goal: Occupational Therapy Goal  Description: Goals to be met by: 2/3/2024     Patient will increase functional independence with ADLs by performing:    LE Dressing with Minimal Assistance.  Toileting from bedside commode with Stand-by Assistance for hygiene and clothing management.   Supine to sit with Modified [place].  Stand pivot transfers with Supervision.  Step transfer with Contact Guard Assistance & appropriate AD.  Toilet transfer to bedside commode with Contact Guard Assistance & appropriate AD.    Outcome: Ongoing, Progressing   Kulwant Blair Ami Benítez is a 38 y.o. male with a medical diagnosis of Staphylococcus aureus bacteremia.   Performance deficits affecting function are weakness, impaired endurance, impaired self care skills, impaired functional mobility, gait instability, impaired balance, decreased upper extremity function, decreased lower extremity function, pain, decreased ROM, impaired skin, edema, impaired joint extensibility.    Pt found in bed, agreeable to therapy.  Pt limited by pain. Continue OT services to address functional goals, progressing as able.

## 2024-01-05 NOTE — NURSING
Pt Ox4. Flat affect and seems depressed. Pt staed he had used urinal to void, but none recorded. Bladder scan showed >1500. Pt was able to void per urinal once prompted and voided 1300 per urinal. MD notified. VSS. Orders placed.

## 2024-01-05 NOTE — PLAN OF CARE
Recommendations  Recommendation:   1. When medically able resume diabetic/cardiac diet   2. When diet initiated continue Boost Glucose Control TID   3. Monitor need for additional protein supplements   4. Monitor weight and labs    Goals: Pt will be on diet by RD follow up    Nutrition Goal Status: new  Communication of RD Recs: other (comment) (POC)

## 2024-01-05 NOTE — CONSULTS
"  [place] - Med Surg  Adult Nutrition  Consult Note    SUMMARY     Recommendations  Recommendation:   1. When medically able resume diabetic/cardiac diet   2. When diet initiated continue Boost Glucose Control TID   3. Monitor need for additional protein supplements   4. Monitor weight and labs    Goals: Pt will be on diet by RD follow up    Nutrition Goal Status: new  Communication of RD Recs: other (comment) (POC)    Assessment and Plan  Nutrition Problem  Increased protein needs    Related to (etiology):   Wound healing    Signs and Symptoms (as evidenced by):   Right heel DM foot ulcer, left leg incision     Interventions:  Collaboration with other providers    Nutrition Diagnosis Status:   New    Malnutrition Assessment  Weight Loss (Malnutrition):  (Noted 4% weight loss since 10/31)     Reason for Assessment  Reason For Assessment: consult (DM related wounds)  Diagnosis: other (see comments) (Staphylococcus aureus bacteremia)  Relevant Medical History: HTN, DM2, May-thurner syndrome  Interdisciplinary Rounds: did not attend  General Information Comments: Pt is currently NPO for a procedure.Prior to NPO status pt was eating 100% of meals and was given Boost glucose TID. Prior to admit pt had been experiencing N/V and diarrhea for 6 days. Ashish- 15/ right heel DM foot ulcer, left leg. Noted 12# weight lose since 10/31. Unable to conduct NFPE b/c pt on isolation.  Nutrition Discharge Planning: d/c on diabetic/ cardiac diet    Nutrition Risk Screen  Nutrition Risk Screen: no indicators present    Nutrition/Diet History  Patient Reported Diet/Restrictions/Preferences: diabetic diet, heart healthy  Spiritual, Cultural Beliefs, Sikhism Practices, Values that Affect Care:  (unable to assess at this time)  Factors Affecting Nutritional Intake: nausea/vomiting, NPO, constipation    Anthropometrics  Temp: 98.7 °F (37.1 °C)  Height: 6' 1" (185.4 cm)  Height (inches): 73 in  Weight Method: Bed Scale  Weight: 118 kg (260 " lb 2.3 oz)  Weight (lb): 260.15 lb  Ideal Body Weight (IBW), Male: 184 lb  % Ideal Body Weight, Male (lb): 141.39 %  BMI (Calculated): 34.3  BMI Grade: 30 - 34.9- obesity - grade I  Usual Body Weight (UBW), k kg  % Usual Body Weight: 95.36  % Weight Change From Usual Weight: -4.84 %     Lab/Procedures/Meds  Pertinent Labs Reviewed: reviewed  Pertinent Labs Comments: hgb 9.3L,  hct 28.7L, Na 131L, BG 188H, A1C 7.4  Pertinent Medications Reviewed: reviewed  Pertinent Medications Comments: insulin, pantaprazole    Estimated/Assessed Needs  Weight Used For Calorie Calculations: 83.6 kg (184 lb 4.9 oz) (IBW)  Energy Calorie Requirements (kcal): 2508 kcals (30 kcals/kg IBW)  Energy Need Method: Kcal/kg  Protein Requirements: 100g (1.2g/kg IBW)  Weight Used For Protein Calculations: 83.6 kg (184 lb 4.9 oz) (IBW)     Estimated Fluid Requirement Method: RDA Method  RDA Method (mL): 2508  CHO Requirement: 300g    Nutrition Prescription Ordered  Current Diet Order: NPO    Evaluation of Received Nutrient/Fluid Intake  I/O: 480/2800  Energy Calories Required: not meeting needs  Protein Required: not meeting needs  Fluid Required: not meeting needs  Comments: LBM-  Tolerance: not tolerating  % Intake of Estimated Energy Needs: Other: NPO  % Meal Intake: NPO    Nutrition Risk  Level of Risk/Frequency of Follow-up: moderate - high (2x/weekly)     Monitor and Evaluation  Food and Nutrient Intake: energy intake, food and beverage intake  Food and Nutrient Adminstration: diet order  Anthropometric Measurements: weight, body mass index, weight change  Biochemical Data, Medical Tests and Procedures: electrolyte and renal panel, gastrointestinal profile, glucose/endocrine profile, inflammatory profile     Nutrition Follow-Up  RD Follow-up?: Yes

## 2024-01-05 NOTE — PROGRESS NOTES
[facility] Hospital Medicine Progress Note    Primary Team: [facility] Hospitalist Team A  Attending Physician: Jazmin Goode MD  Resident: Reagan  Intern: Warren    Subjective/Interval History   Kulwant Mueller Jr. is a 38 y.o. 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 Presented 12/27 with DKA found to have MRSA bacteremia.     No acute events overnight. Patient resting comfortably on morning exam. His only complaint is that he is NPO for an abdominal ultrasound. He has yet to have a bowel movement. Statets this is only because he cannot get up to the bathroom to go on his own. Denies abdominal pain, shortness of breath, fevers/chills.     Objective     Physical Examination:  Temp:  [98 °F (36.7 °C)-99 °F (37.2 °C)] 98.7 °F (37.1 °C)  Pulse:  [] 95  Resp:  [18-20] 20  SpO2:  [93 %-96 %] 96 %  BP: (115-146)/(61-82) 141/82    Gen: No acute distress, comfortable appearing  HEENT: NC/AT, EOMI grossly, moist MM  CV:  regular rate and rhythm, no murmurs, rubs, or gallops.  Resp: CTAB. No obvious crackles or wheezes.  Abd: Soft, nontender, nondistended  MSK/Ext: LUE ROM limited secondary to pain. R knee bandage c/d/I without warmth or drainage. R heel wound without drainage or erythema.   Neuro: no focal deficits   Skin: Warm, dry    Intake/Output:    Intake/Output Summary (Last 24 hours) at 1/5/2024 1159  Last data filed at 1/5/2024 0625  Gross per 24 hour   Intake 480 ml   Output 2800 ml   Net -2320 ml     Net IO Since Admission: -1,731.66 mL [01/05/24 1159]    Laboratory:  Recent Labs   Lab 12/30/23  0346 12/30/23  0804 12/31/23  0416 01/01/24  0400 01/02/24  0623 01/03/24  0548 01/04/24  0523 01/05/24  0435   WBC 25.58*  --  27.91*   < > 28.28* 30.46* 28.53* 27.65*   HGB 12.0*  --  11.7*   < > 11.1* 10.1* 9.6* 9.3*     --  351   < > 369 364 400 410   MCV 85  --  86   < > 85 85 86 87   NA  --    < > 136   < > 134* 133* 132* 131*   K  --    < >  3.7   < > 3.6 3.8 3.9 4.4   CL  --    < > 100   < > 96 94* 96 94*   CO2  --    < > 25   < > 26 26 26 25   BUN  --    < > 16   < > 12 15 13 11   CREATININE  --    < > 0.8   < > 0.9 0.9 0.9 0.8   GLU  --    < > 153*   < > 155* 129* 149* 188*   CALCIUM  --    < > 9.3   < > 8.9 8.7 8.7 8.7   PROT  --   --  8.1   < > 7.7 7.5 7.5 7.5   ALBUMIN  --   --  1.7*   < > 1.6* 1.4* 1.4* 1.4*   PHOS  --    < > 3.2   < > 3.2 3.1 3.1 3.2   MG 2.6  --  2.4  --   --   --  2.0 1.9   AST  --   --  200*   < > 88* 136* 142* 123*   ALT  --   --  125*   < > 83* 116* 133* 141*   ALKPHOS  --   --  237*   < > 163* 145* 168* 177*    < > = values in this interval not displayed.   All laboratory data reviewed    Microbiology: reviewed     Radiology:   reviewed      Current Medications:     Infusions:       Scheduled:   ceftaroline fosamiL (TEFLARO) 600 mg in dextrose 5 % in water (D5W) 50 mL IVPB (MB+)  600 mg Intravenous Q8H    DAPTOmycin (CUBICIN) IV (PEDS and ADULTS)  10 mg/kg (Adjusted) Intravenous Q24H    insulin detemir U-100  35 Units Subcutaneous BID    LIDOcaine (PF) 10 mg/ml (1%)  3 mL Other 1 time in Clinic/HOD    metoclopramide HCl  5 mg Oral TID AC    metoprolol succinate  25 mg Oral Daily    pantoprazole  40 mg Oral Daily    polyethylene glycol  17 g Oral TID    rivaroxaban  10 mg Oral Daily with dinner    senna  8.6 mg Oral BID        PRN:  dextrose 10 % in water (D10W), dextrose 10 % in water (D10W), dextrose 10%, dextrose 10%, droPERidol, glucagon (human recombinant), glucose, glucose, insulin aspart U-100, melatonin, oxyCODONE, scopolamine, sodium chloride 0.9%, sodium chloride 0.9%    Antibiotics and Day Number of Therapy:  Vanc 12/27-1/1  Zosyn 12/27-12/29  Daptomycin 1/1 - present    Assessment/Plan:     Kulwant Blair Ami Benítez is a 38 y.o. 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 The patient presented to [facility] on 12/27/2023  with a primary complaint of Intractable N/V and diarrhea for over 6 days, generalized weakness for 6 days. Admitted for DKA, which has now resolved s/p insulin gtt. Found to have MRSA bacteremia, suspect source L knee septic arthritis/bursitis      MRSA bacteremia   2/2 Septic arthritis/bursitis of L knee  MRSA UTI  WBC 47K with neutrophil predominance and patient febrile on admission. Suspect leukocytosis 2/2 DKA and bacteremia. L knee pain washed out by ortho with positive cx. L shoulder aspirated imaging without evidence of infection. R heel without osteo but has cellulitis, myositis, tenosynovitis. ROBY negative for vegetation. Believe we have source control at this time given patient stopped fevering but continuing to monitor for manifestations of infection.   - s/p L knee arthrotomy with synovectomy on 12/29. Cx with staph aureus  - L shoulder aspirated 1/2 w/o signs of infection   - ID on board; switched to daptomycin on 1/1 w weekly CK  - 1/4 added ceftaroline adjunct therapy   - daily blood cultures to document clearance  - oxycodone 5mg q6h prn for pain    Transaminitis   Admitted with ast/alt wnl. Now continues to rise.   - CT abdomen pelvis on admission without acute abnormalities  - hepatitis panel negative   - discontinued percocet; oxycodone for pain   - RUQ ultrasound pending     Intractable N/V; resolved  Hx Gastroparesis ?  was being worked up for gastroparesis outpatient, unable to complete testing due to vomitting. Sx lasted for several weeks and spontaneously resolved. has been taking reglan outpatient, but is unsure if it does anything to help him.   - continue home metoclopramide and PPI  - zofran not helping with N/V. PRN droperidol and scopolamine ordered.   - QTc 448, caution in QTc prolonging agents  - advance diet as tolerated    DKA - resolved  DM2 with long-term use insulin  AG 31, BHB elevated, with bicarb 9 and elevated blood glucose; UA with ketones. suspect due to intractable N/V,  infection  Home regimen: tresiba 55u daily, sliding scale humalog, jardiance 25mg daily  - DKA resolved s/p insulin gtt   - A1c 7.4 on 12/28, prior A1c as high as 14  - continue levemir 35u BID + SSI, modify as patient able to tolerate diet    Newly dx HFmrEF  12/29 TTE: EF 48%  - start metoprolol succinate 12.5mg daily --> 25mg   - already on jardiance at home    Acute kidney injury, resolved  - on admit, Cr 1.9; baseline 1  - suspect prerenal due to decreased PO intake  - Cr improving with IVF, currently at baseline    Chronic R heel wound  12/27 XR right foot with no osseous abnormality; has been following with wound care and has been off PO antibiotics for at least 1 month with no drainage or swelling from wound site  - MRI R hindfoot ordered; no osteomyelitis   - wound care consulted    May-Thurner syndrome  Peripheral vascular disease  Hx May-Thurner syndrome and was following with Dr. Duff, who had kept patient on xarelto 10 mg daily. Patient reports has not been able to take xarelto for past 1-2 weeks 2' inability to tolerate PO. Previous n/v w heparin ggt but pharmacy confirmed has tolerated lovenox in the past.   - DVT US BLE negative. DVT BUE with thrombus within 1 of the right paired brachial veins  - started full dose lovenox  - transition back to home Xarelto today     HTN  patient reports not currently on any anti-hypertensive regimen  - BP should improve with GDMT     Concern for depression  patient noted to have flat affect, decreased motivation, sleeping for several hours during the day since admission, decreased appetite. Per wife, she has been concerned there may be underlying depression.   - psych consulted. Recommended cymbalta but patient declined.     Diet: diabetic  VTE PPx: Xarelto  Code Status: full    Dispo: pending clearance of cultures, IPR with 6 weeks abx      Aria Kelley   [facility] Internal Medicine HO-I    [facility] Medicine Hospitalist Pager numbers:   [facility] Hospitalist Medicine Team A  (Russel/Emmanuel): 2005  [facility] Hospitalist Medicine Team B (Odin/Kameron):  2006

## 2024-01-05 NOTE — PT/OT/SLP PROGRESS
Physical Therapy Treatment    Patient Name:  Kulwant Mueller Jr.   MRN:  [id number]    Recommendations:     Discharge Recommendations: High Intensity Therapy  Discharge Equipment Recommendations:  (TBD)  Barriers to discharge:  requires increased physical caregiver assistance due to weakness and pain    Assessment:     Kulwant Mueller Jr. is a 38 y.o. male admitted with a medical diagnosis of Staphylococcus aureus bacteremia.  He presents with the following impairments/functional limitations: weakness, impaired endurance, impaired self care skills, impaired functional mobility, gait instability, impaired balance, decreased lower extremity function, decreased upper extremity function, pain, decreased ROM, impaired skin, edema.    Pt participates in bed mobility and transfers to standing with use of lanre steady; co-tx session to ensure safety with progress of mobility. Therapy will continue to progress pt as able.     Rehab Prognosis: Good; patient would benefit from acute skilled PT services to address these deficits and reach maximum level of function.    Recent Surgery: Procedure(s) (LRB):  ECHOCARDIOGRAM, TRANSESOPHAGEAL (N/A) 2 Days Post-Op    Plan:     During this hospitalization, patient to be seen 5 x/week to address the identified rehab impairments via gait training, therapeutic activities, therapeutic exercises, neuromuscular re-education, wheelchair management/training and progress toward the following goals:    Plan of Care Expires:  01/31/24    Subjective     Chief Complaint: pain  Patient/Family Comments/goals: to return to PLOF  Pain/Comfort:  Pain Rating 1: 10/10  Location - Side 1: Left  Location 1:  (L shoulder and knee)  Pain Addressed 1: Reposition, Cessation of Activity, Nurse notified  Pain Rating Post-Intervention 1: 10/10      Objective:     Communicated with Nurse prior to session.  Patient found HOB elevated with bed alarm, peripheral IV, telemetry upon PT entry to room.     General  Precautions: Standard, fall, contact  Orthopedic Precautions:  (no restrictions per Dr. Bocanegra; wound care nurse recommends limiting pressure through R heel (encourage WB to forefoot))  Braces:  (DARCO shoe RLE)  Respiratory Status: Room air     Functional Mobility:  Bed Mobility:     Scooting: minimum assistance in supine with bed in trendelenburg and use rails with BUE and pushing with RLE  Supine to Sit: maximal assistance and of 2 persons  Sit to Supine: maximal assistance and of 2 persons  Transfers:     Sit to Stand: attempted with use of RW with Total Ax 2 but unsuccessful; completed with use of lanre steady x2 trials with MIN Ax2       AM-PAC 6 CLICK MOBILITY  Turning over in bed (including adjusting bedclothes, sheets and blankets)?: 2  Sitting down on and standing up from a chair with arms (e.g., wheelchair, bedside commode, etc.): 2  Moving from lying on back to sitting on the side of the bed?: 2  Moving to and from a bed to a chair (including a wheelchair)?: 1  Need to walk in hospital room?: 1  Climbing 3-5 steps with a railing?: 1  Basic Mobility Total Score: 9       Treatment & Education:  Pt requires increased verbal cues for safe positioning and sequencing with transfers.  Pt not able achieve standing position with use of RW requires use of lanre steady for glute clearance.   Pt limited by fatigue and pain (specifically LLE and LUE).   RLE Darco shoe donned by therapist prior to standing transfers.   Pt able to maintain static sitting balance with SBA and participate in assisted L/R LE knee extension exercises.   Z-flex boots donned at end of session.      Patient left HOB elevated with all lines intact, call button in reach, bed alarm on, and Nurse notified.    GOALS:   Multidisciplinary Problems       Physical Therapy Goals          Problem: Physical Therapy    Goal Priority Disciplines Outcome Goal Variances Interventions   Physical Therapy Goal     PT, PT/OT Ongoing, Progressing     Description:  Goals to be met by: 2024     Patient will increase functional independence with mobility by performin. Supine to sit with Modified [place]  2. Sit to supine with Modified [place]  3. Rolling to Left and Right with Modified [place].  4. Sit to stand transfer with Modified [place]  5. Bed to chair transfer with Modified [place] using Rolling Walker  6. Gait  x 150 feet with Modified [place] using Rolling Walker.   7. Ascend/descend 1 stair with Modified [place] using Rolling Walker.                          Time Tracking:     PT Received On: 24  PT Start Time: 1258     PT Stop Time: 1332  PT Total Time (min): 34 min With OT    Billable Minutes: Therapeutic Activity 10 and Therapeutic Exercise 15    Treatment Type: Treatment  PT/PTA: PT     Number of PTA visits since last PT visit: 0     2024

## 2024-01-05 NOTE — PROGRESS NOTES
[facility]  Infectious Disease  Progress Note    Patient Name: Kulwant Mueller Jr.  MRN: [id number]  Admission Date: 12/27/2023  Length of Stay: 8 days  Attending Physician: Jazmin Goode MD  Primary Care Provider: Shellie, Primary Doctor    Isolation Status: Contact  Assessment/Plan:      MRSA bacteremia  38 y.o. 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      -daily blood cxs from 12/27- 1/4 positive for MRSA  -repeat blood cxs daily to document clearance  -continue Ceftaroline, can discontinue once blood cultures clear  -continue Daptomycin, check weekly CK  -s/p left knee drainage for septic arthritis   -MRI right foot without osteo and CT of left shoulder without septic arthritis   -TTE and ROBY without vegetation    Thank you for your consult. Please contact us if you have any additional questions. Please appreciate any variation in plan noted in attending attestation.       Carlos Lyon DO  [facility], PGY-1  Infectious Disease  [place] [facility] Surg    Subjective:     Principal Problem:Staphylococcus aureus bacteremia    HPI: 38 y.o. 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 The patient presented to [facility] on 12/27/2023 with a primary complaint of Intractable N/V and diarrhea for over 6 days, generalized weakness for 6 days. He was in his usual state of health until around 1 week ago when he developed NBNB N/V and decreased oral intake. His family members had recently been ill as well, but have tested negative for COVID/flu. He reports he continued to have N/V whether or not he was able to have any oral intake. He presented to an ER for evaluation yesterday, where it was noted he had a WBC 33K, with an unremarkable  CT A/P with negative COVID/flu. He was discharged with GI follow-up, however at the appointment today, there was concern for elevated WBC, and patient was instructed to present to the ER for further evaluation. Does endorse some abdominal discomfort and 1 episode of non-mucoid non-bloody diarrhea this afternoon. Denies  fevers, chills, shortness of breath, chest pain, palpitations, dysuria. He was admitted for DKA and found to have MRSa bacteremia. Initially source was thought to be right foot heel ulcer but bedside US was concerning for left knee bursitis.     Past Medical History:   Diagnosis Date    Anticoagulant long-term use     COVID-19 virus infection     Diabetes mellitus     Diabetes mellitus, type 2     Hypertension     May-Thurner syndrome      Microbiology Results (last 7 days)       Procedure Component Value Units Date/Time    Blood culture [[id number]] Collected: 01/04/24 0523    Order Status: Completed Specimen: Blood Updated: 01/05/24 1212     Blood Culture, Routine No Growth to date      No Growth to date    Blood culture [[id number]]  (Abnormal) Collected: 01/02/24 0623    Order Status: Completed Specimen: Blood from Peripheral, Left Wrist Updated: 01/05/24 1026     Blood Culture, Routine Gram stain aer bottle: Gram positive cocci in clusters resembling Staph      Results called to and read back by:Charla Hassan RN 01/03/2024  05:52      METHICILLIN RESISTANT STAPHYLOCOCCUS AUREUS  For susceptibility see order #[id number]  ID consult required at [facility] and [facility].      Blood culture [[id number]]  (Abnormal) Collected: 01/02/24 0623    Order Status: Completed Specimen: Blood from Peripheral, Right Wrist Updated: 01/05/24 1026     Blood Culture, Routine Gram stain aer bottle: Gram positive cocci in clusters resembling Staph      Positive results previously called 01/03/2024  13:15      METHICILLIN RESISTANT STAPHYLOCOCCUS AUREUS  ID consult required at [facility] and  [facility].  For susceptibility see order #[id number]      Blood culture [[id number]]  (Abnormal) Collected: 01/03/24 0548    Order Status: Completed Specimen: Blood Updated: 01/05/24 0844     Blood Culture, Routine Gram stain aer bottle: Gram positive cocci in clusters resembling Staph      Positive results previously called 01/04/2024  10:16      METHICILLIN RESISTANT STAPHYLOCOCCUS AUREUS  ID consult required at [facility].  For susceptibility see order #[id number]      Blood culture [[id number]] Collected: 01/05/24 0716    Order Status: Sent Specimen: Blood Updated: 01/05/24 0716    Blood culture [[id number]] Collected: 01/05/24 0716    Order Status: Sent Specimen: Blood Updated: 01/05/24 0716    Blood culture [[id number]] Collected: 01/04/24 0522    Order Status: Completed Specimen: Blood Updated: 01/05/24 0419     Blood Culture, Routine Gram stain aer bottle: Gram positive cocci in clusters resembling Staph      Results called to and read back by: Ms. Ramón RN 01/05/2024  04:19    Blood culture [[id number]]  (Abnormal)  (Susceptibility) Collected: 01/01/24 0359    Order Status: Completed Specimen: Blood Updated: 01/04/24 1233     Blood Culture, Routine Gram stain aer bottle: Gram positive cocci in clusters resembling Staph      Positive results previously called 01/02/2024  10:09      METHICILLIN RESISTANT STAPHYLOCOCCUS AUREUS  ID consult required at [facility].  ID consult required at [facility].       Comment: Previous comment was modified by [facility] at 06:55 on 01/04/2024 ID   consult required at [facility].         Blood culture [[id number]]  (Abnormal) Collected: 01/01/24 0400    Order Status: Completed Specimen: Blood Updated: 01/04/24 0819     Blood Culture, Routine Gram stain aer bottle: Gram positive cocci in clusters resembling Staph      Results called to and read back  by:Carmita Davenport RN 01/02/2024  09:55      METHICILLIN RESISTANT STAPHYLOCOCCUS AUREUS  ID consult required at [facility].  For susceptibility see order #[id number]      Culture, Fluid  (Aerobic) [[id number]] Collected: 12/29/23 1238    Order Status: Completed Specimen: Joint Fluid from Knee, Left Updated: 01/03/24 1017     AEROBIC CULTURE - FLUID Further report to follow     Gram Stain Result Few WBC's      No organisms seen    Blood culture [[id number]]  (Abnormal) Collected: 12/31/23 0418    Order Status: Completed Specimen: Blood Updated: 01/03/24 0947     Blood Culture, Routine Gram stain aer bottle: Gram positive cocci in clusters resembling Staph      Results called to and read back by: Carmita Davenport RN 01/01/2024  09:26      METHICILLIN RESISTANT STAPHYLOCOCCUS AUREUS  ID consult required at [facility].  For susceptibility see order #[id number]      Narrative:      Set 2    Blood culture [[id number]]  (Abnormal) Collected: 12/31/23 0416    Order Status: Completed Specimen: Blood from Antecubital, Left Arm Updated: 01/03/24 0947     Blood Culture, Routine Gram stain aer bottle: Gram positive cocci in clusters resembling Staph      Positive results previously called 01/01/2024      METHICILLIN RESISTANT STAPHYLOCOCCUS AUREUS  ID consult required at [facility].  For susceptibility see order #[id number]      Narrative:      Set 1    MRSA/SA Rapid ID by PCR from Blood culture [[id number]]  (Abnormal) Collected: 01/02/24 0623    Order Status: Completed Updated: 01/03/24 0659     Staph aureus ID by PCR Positive     Methicillin Resistant ID by PCR Positive    Culture, Anaerobe [[id number]] Collected: 12/29/23 1830    Order Status: Completed Specimen: Wound from Knee, Left Updated: 01/03/24 0652     Anaerobic Culture No anaerobes isolated    Narrative:      Before Incision #1    Culture, Anaerobe [[id number]] Collected:  12/29/23 1830    Order Status: Completed Specimen: Wound from Knee, Left Updated: 01/03/24 0651     Anaerobic Culture Culture in progress    Narrative:      After Incision #2    Culture, Anaerobe [[id number]] Collected: 12/29/23 1238    Order Status: Completed Specimen: Joint Fluid from Knee, Left Updated: 01/03/24 0651     Anaerobic Culture No anaerobes isolated    Narrative:      From left knee bursa    Aerobic culture [[id number]] Collected: 12/29/23 1830    Order Status: Completed Specimen: Wound from Knee, Left Updated: 01/02/24 1053     Aerobic Bacterial Culture No growth    Narrative:      After Incision #2    Blood culture [[id number]]  (Abnormal) Collected: 12/29/23 2045    Order Status: Completed Specimen: Blood Updated: 01/01/24 0825     Blood Culture, Routine Gram stain aer bottle: Gram positive cocci in clusters resembling Staph      Positive results previously called 12/30/2023      METHICILLIN RESISTANT STAPHYLOCOCCUS AUREUS  ID consult required at [facility],[place] and [facility].  For susceptibility see order #[id number]      Narrative:      Collection has been rescheduled by [email or web address] at 12/29/2023 14:04 Reason:   Patient Refused/ 2nd set of blood cultures   Collection has been rescheduled by [email or web address] at 12/29/2023 17:54 Reason:   Patient unavailable/pt not in room   Collection has been rescheduled by [email or web address] at 12/29/2023 14:04 Reason:   Patient Refused/ 2nd set of blood cultures   Collection has been rescheduled by [email or web address] at 12/29/2023 17:54 Reason:   Patient unavailable/pt not in room     Blood culture [[id number]]  (Abnormal)  (Susceptibility) Collected: 12/29/23 1357    Order Status: Completed Specimen: Blood Updated: 01/01/24 0824     Blood Culture, Routine Gram stain aer bottle: Gram positive cocci in clusters resembling Staph      Results called to and read back by; Haley Horne RN 12/30/2023  10:56      Gram stain patsy bottle: Gram positive cocci in clusters resembling Staph      12/30/2023   13:23  Positive results previously called      METHICILLIN RESISTANT STAPHYLOCOCCUS AUREUS  ID consult required at [facility].      Aerobic culture [[id number]]  (Abnormal)  (Susceptibility) Collected: 12/29/23 1830    Order Status: Completed Specimen: Wound from Knee, Left Updated: 01/01/24 0653     Aerobic Bacterial Culture METHICILLIN RESISTANT STAPHYLOCOCCUS AUREUS  Many      Narrative:      Before Incision #1    Blood culture [[id number]]     Order Status: Canceled Specimen: Blood     Blood culture [[id number]]     Order Status: Canceled Specimen: Blood     Blood culture [[id number]]  (Abnormal) Collected: 12/27/23 2329    Order Status: Completed Specimen: Blood from Peripheral, Hand, Right Updated: 12/31/23 1121     Blood Culture, Routine Gram stain aer bottle: Gram positive cocci in clusters resembling Staph      Positive results previously called 12/29/2023  00:06      Gram stain patsy bottle: Gram positive cocci in clusters resembling Staph      12/29/2023  02:47      METHICILLIN RESISTANT STAPHYLOCOCCUS AUREUS  ID consult required at [facility].  For susceptibility see order # [id number]      Blood culture [[id number]]  (Abnormal)  (Susceptibility) Collected: 12/27/23 2242    Order Status: Completed Specimen: Blood Updated: 12/30/23 1034     Blood Culture, Routine Gram stain aer bottle: Gram positive cocci in clusters resembling Staph      Results called to and read back by:Haroon Bravo RN 12/28/2023  15:59      Gram stain patsy bottle: Gram positive cocci in clusters resembling Staph      METHICILLIN RESISTANT STAPHYLOCOCCUS AUREUS  ID consult required at [facility].      Urine culture [[id number]]  (Abnormal)  (Susceptibility) Collected: 12/27/23 2318    Order Status: Completed Specimen: Urine Updated: 12/30/23 0919     Urine Culture, Routine METHICILLIN RESISTANT STAPHYLOCOCCUS AUREUS  10,000 - 49,999 cfu/ml       Narrative:      Specimen Source->Urine    Gram stain [[id number]] Collected: 12/29/23 1830    Order Status: Completed Specimen: Wound from Knee, Left Updated: 12/29/23 2325     Gram Stain Result Many WBC's      No organisms seen    Narrative:      Before Incision #1    Gram stain [[id number]] Collected: 12/29/23 1830    Order Status: Completed Specimen: Wound from Knee, Left Updated: 12/29/23 2309     Gram Stain Result Rare WBC's      Rare Gram positive cocci    Narrative:      After Incision #2    Gram stain [[id number]] Collected: 12/29/23 1238    Order Status: Completed Specimen: Joint Fluid from Knee, Left Updated: 12/29/23 2115     Gram Stain Result Moderate WBC's      No organisms seen    Narrative:      From left knee bursa    Gram stain [[id number]]     Order Status: Canceled Specimen: Joint Fluid from Knee, Left     Culture, Anaerobe [[id number]]     Order Status: Canceled Specimen: Joint Fluid from Knee, Left     Aerobic culture [[id number]]     Order Status: Canceled Specimen: Incision site from Knee, Left     Culture, Anaerobe [[id number]]     Order Status: Canceled Specimen: Joint Fluid from Knee, Left     Aerobic culture [[id number]]     Order Status: Canceled Specimen: Wound from Knee, Left     Gram stain [[id number]]     Order Status: Canceled Specimen: Joint Fluid from Knee, Left     Culture, Anaerobe [[id number]]     Order Status: Canceled Specimen: Joint Fluid from Knee, Left     Aerobic culture [[id number]]     Order Status: Canceled Specimen: Incision site from Knee, Left     Gram stain [[id number]]     Order Status: Canceled Specimen: Joint Fluid from Knee, Left           Antibiotics (From admission, onward)      Start     Stop Route Frequency Ordered    01/04/24 1415  ceftaroline fosamiL (TEFLARO) 600 mg in dextrose 5 % in water (D5W) 50 mL IVPB (MB+)         -- IV Every 8 hours (non-standard times) 01/04/24 1305    01/01/24 1445  DAPTOmycin (CUBICIN) 945 mg in sodium chloride 0.9% SolP 50 mL  IVPB         -- IV Every 24 hours (non-standard times) 01/01/24 1342          Objective     Temp:  [98 °F (36.7 °C)-99 °F (37.2 °C)] 98.2 °F (36.8 °C)  Pulse:  [] 95  Resp:  [18-20] 20  SpO2:  [93 %-96 %] 95 %  BP: (115-171)/(61-86) 171/86    Physical Exam  Vitals and nursing note reviewed.   Constitutional:       Appearance: He is well-developed.      Comments: Fatigued, uncomfortable.   HENT:      Head: Normocephalic and atraumatic.   Eyes:      Pupils: Pupils are equal, round, and reactive to light.   Cardiovascular:      Rate and Rhythm: Regular rhythm. Tachycardia present.   Pulmonary:      Effort: Pulmonary effort is normal.      Breath sounds: Normal breath sounds.   Abdominal:      General: Bowel sounds are normal.      Palpations: Abdomen is soft.   Musculoskeletal:         General: Normal range of motion.      Cervical back: Normal range of motion and neck supple.      Comments: Left knee with bandage   Neurological:      Mental Status: He is alert and oriented to person, place, and time.   Psychiatric:         Behavior: Behavior normal.         Thought Content: Thought content normal.         Judgment: Judgment normal.      Significant Labs: All pertinent labs within the past 24 hours have been reviewed.     Significant Imaging: I have reviewed all pertinent imaging results/findings within the past 24 hours.

## 2024-01-05 NOTE — NURSING
New Results - Micro  Sorted by update time    Updated   Order  01/05/24 0419  Blood culture  Collected: 01/04/24 0522  Preliminary result  Specimen: Blood     Blood Culture, Routine Gram stain aer bottle: Gram positive cocci in clusters resembling Staph P Blood Culture, Routine Results called to and read back by: Ms. Ramón RN 01/05/2024  04:19 P

## 2024-01-06 LAB
ALBUMIN SERPL BCP-MCNC: 1.3 G/DL (ref 3.5–5.2)
ALP SERPL-CCNC: 148 U/L (ref 55–135)
ALT SERPL W/O P-5'-P-CCNC: 123 U/L (ref 10–44)
ANION GAP SERPL CALC-SCNC: 11 MMOL/L (ref 8–16)
AST SERPL-CCNC: 108 U/L (ref 10–40)
BACTERIA BLD CULT: ABNORMAL
BASOPHILS # BLD AUTO: 0.06 K/UL (ref 0–0.2)
BASOPHILS NFR BLD: 0.2 % (ref 0–1.9)
BILIRUB SERPL-MCNC: 0.4 MG/DL (ref 0.1–1)
BUN SERPL-MCNC: 8 MG/DL (ref 6–20)
CALCIUM SERPL-MCNC: 8.7 MG/DL (ref 8.7–10.5)
CHLORIDE SERPL-SCNC: 94 MMOL/L (ref 95–110)
CO2 SERPL-SCNC: 27 MMOL/L (ref 23–29)
CREAT SERPL-MCNC: 0.8 MG/DL (ref 0.5–1.4)
DIFFERENTIAL METHOD BLD: ABNORMAL
EOSINOPHIL # BLD AUTO: 0 K/UL (ref 0–0.5)
EOSINOPHIL NFR BLD: 0.1 % (ref 0–8)
ERYTHROCYTE [DISTWIDTH] IN BLOOD BY AUTOMATED COUNT: 14.3 % (ref 11.5–14.5)
EST. GFR  (NO RACE VARIABLE): >60 ML/MIN/1.73 M^2
GLUCOSE SERPL-MCNC: 159 MG/DL (ref 70–110)
HCT VFR BLD AUTO: 29 % (ref 40–54)
HGB BLD-MCNC: 9.3 G/DL (ref 14–18)
IMM GRANULOCYTES # BLD AUTO: 0.25 K/UL (ref 0–0.04)
IMM GRANULOCYTES NFR BLD AUTO: 0.9 % (ref 0–0.5)
LYMPHOCYTES # BLD AUTO: 1.5 K/UL (ref 1–4.8)
LYMPHOCYTES NFR BLD: 5.7 % (ref 18–48)
MAGNESIUM SERPL-MCNC: 1.8 MG/DL (ref 1.6–2.6)
MCH RBC QN AUTO: 27.9 PG (ref 27–31)
MCHC RBC AUTO-ENTMCNC: 32.1 G/DL (ref 32–36)
MCV RBC AUTO: 87 FL (ref 82–98)
MONOCYTES # BLD AUTO: 1.1 K/UL (ref 0.3–1)
MONOCYTES NFR BLD: 4 % (ref 4–15)
NEUTROPHILS # BLD AUTO: 23.7 K/UL (ref 1.8–7.7)
NEUTROPHILS NFR BLD: 89.1 % (ref 38–73)
NRBC BLD-RTO: 0 /100 WBC
PLATELET # BLD AUTO: 485 K/UL (ref 150–450)
PMV BLD AUTO: 10.3 FL (ref 9.2–12.9)
POCT GLUCOSE: 103 MG/DL (ref 70–110)
POCT GLUCOSE: 122 MG/DL (ref 70–110)
POCT GLUCOSE: 131 MG/DL (ref 70–110)
POCT GLUCOSE: 177 MG/DL (ref 70–110)
POTASSIUM SERPL-SCNC: 4 MMOL/L (ref 3.5–5.1)
PROT SERPL-MCNC: 7.2 G/DL (ref 6–8.4)
RBC # BLD AUTO: 3.33 M/UL (ref 4.6–6.2)
SODIUM SERPL-SCNC: 132 MMOL/L (ref 136–145)
WBC # BLD AUTO: 26.56 K/UL (ref 3.9–12.7)

## 2024-01-06 PROCEDURE — 25000003 PHARM REV CODE 250: Performed by: INTERNAL MEDICINE

## 2024-01-06 PROCEDURE — 63600175 PHARM REV CODE 636 W HCPCS

## 2024-01-06 PROCEDURE — 93010 ELECTROCARDIOGRAM REPORT: CPT | Mod: ,,, | Performed by: INTERNAL MEDICINE

## 2024-01-06 PROCEDURE — 85025 COMPLETE CBC W/AUTO DIFF WBC: CPT

## 2024-01-06 PROCEDURE — 63600175 PHARM REV CODE 636 W HCPCS: Mod: JZ,JG | Performed by: INTERNAL MEDICINE

## 2024-01-06 PROCEDURE — 99900035 HC TECH TIME PER 15 MIN (STAT)

## 2024-01-06 PROCEDURE — 80053 COMPREHEN METABOLIC PANEL: CPT

## 2024-01-06 PROCEDURE — 25000003 PHARM REV CODE 250

## 2024-01-06 PROCEDURE — 93005 ELECTROCARDIOGRAM TRACING: CPT

## 2024-01-06 PROCEDURE — 27000207 HC ISOLATION

## 2024-01-06 PROCEDURE — 36415 COLL VENOUS BLD VENIPUNCTURE: CPT

## 2024-01-06 PROCEDURE — 21400001 HC TELEMETRY ROOM

## 2024-01-06 PROCEDURE — 83735 ASSAY OF MAGNESIUM: CPT

## 2024-01-06 RX ORDER — MAGNESIUM SULFATE HEPTAHYDRATE 40 MG/ML
2 INJECTION, SOLUTION INTRAVENOUS ONCE
Status: COMPLETED | OUTPATIENT
Start: 2024-01-06 | End: 2024-01-06

## 2024-01-06 RX ORDER — SENNOSIDES 8.6 MG/1
8.6 TABLET ORAL DAILY PRN
Status: DISCONTINUED | OUTPATIENT
Start: 2024-01-06 | End: 2024-01-08

## 2024-01-06 RX ORDER — ONDANSETRON 2 MG/ML
8 INJECTION INTRAMUSCULAR; INTRAVENOUS ONCE
Status: COMPLETED | OUTPATIENT
Start: 2024-01-06 | End: 2024-01-06

## 2024-01-06 RX ORDER — METOCLOPRAMIDE HYDROCHLORIDE 5 MG/ML
5 INJECTION INTRAMUSCULAR; INTRAVENOUS ONCE
Status: COMPLETED | OUTPATIENT
Start: 2024-01-06 | End: 2024-01-06

## 2024-01-06 RX ORDER — POLYETHYLENE GLYCOL 3350 17 G/17G
17 POWDER, FOR SOLUTION ORAL DAILY
Status: DISCONTINUED | OUTPATIENT
Start: 2024-01-06 | End: 2024-01-08

## 2024-01-06 RX ADMIN — METOPROLOL SUCCINATE 25 MG: 25 TABLET, EXTENDED RELEASE ORAL at 10:01

## 2024-01-06 RX ADMIN — DAPTOMYCIN 945 MG: 500 INJECTION, POWDER, LYOPHILIZED, FOR SOLUTION INTRAVENOUS at 06:01

## 2024-01-06 RX ADMIN — SCOPALAMINE 1 PATCH: 1 PATCH, EXTENDED RELEASE TRANSDERMAL at 11:01

## 2024-01-06 RX ADMIN — RIVAROXABAN 10 MG: 10 TABLET, FILM COATED ORAL at 05:01

## 2024-01-06 RX ADMIN — CEFTAROLINE FOSAMIL 600 MG: 600 POWDER, FOR SOLUTION INTRAVENOUS at 09:01

## 2024-01-06 RX ADMIN — CEFTAROLINE FOSAMIL 600 MG: 600 POWDER, FOR SOLUTION INTRAVENOUS at 05:01

## 2024-01-06 RX ADMIN — INSULIN ASPART 2 UNITS: 100 INJECTION, SOLUTION INTRAVENOUS; SUBCUTANEOUS at 05:01

## 2024-01-06 RX ADMIN — METOCLOPRAMIDE 5 MG: 5 TABLET ORAL at 10:01

## 2024-01-06 RX ADMIN — PANTOPRAZOLE SODIUM 40 MG: 40 TABLET, DELAYED RELEASE ORAL at 10:01

## 2024-01-06 RX ADMIN — CEFTAROLINE FOSAMIL 600 MG: 600 POWDER, FOR SOLUTION INTRAVENOUS at 02:01

## 2024-01-06 RX ADMIN — ONDANSETRON 8 MG: 2 INJECTION INTRAMUSCULAR; INTRAVENOUS at 12:01

## 2024-01-06 RX ADMIN — METOCLOPRAMIDE 5 MG: 5 TABLET ORAL at 05:01

## 2024-01-06 RX ADMIN — MAGNESIUM SULFATE HEPTAHYDRATE 2 G: 40 INJECTION, SOLUTION INTRAVENOUS at 12:01

## 2024-01-06 RX ADMIN — METOCLOPRAMIDE 5 MG: 5 INJECTION, SOLUTION INTRAMUSCULAR; INTRAVENOUS at 08:01

## 2024-01-06 NOTE — PLAN OF CARE
Pt is AAOx4. Pt experienced nausea and vomiting throughout shift. PRN reglan and zofran administered. Pt also received IV magnesium. Pt reported no pain during shift. Antibx and care administered as ordered.

## 2024-01-06 NOTE — PROGRESS NOTES
[facility] Hospital Medicine Progress Note    Primary Team: [facility] Hospitalist Team A  Attending Physician: Jazmin Goode MD  Resident: Reagan  Intern: Warren    Subjective/Interval History   Kulwant Mueller Jr. is a 38 y.o. 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 Presented 12/27 with DKA found to have MRSA bacteremia.     No acute events overnight. Patient resting comfortably on morning exam. Discussed RUQ results and recent blood cultures which have now been negative for 24 hours. Pt expressed understanding of plan. Otherwise doing well with no other complaints    Objective     Physical Examination:  Temp:  [98.1 °F (36.7 °C)-98.9 °F (37.2 °C)] 98.1 °F (36.7 °C)  Pulse:  [] 95  Resp:  [18-20] 20  SpO2:  [91 %-97 %] 95 %  BP: (117-171)/(63-90) 142/82    Gen: No acute distress, comfortable appearing  HEENT: NC/AT, EOMI grossly, moist MM  CV:  regular rate and rhythm, no murmurs, rubs, or gallops.  Resp: CTAB. No obvious crackles or wheezes.  Abd: Soft, nontender, nondistended  MSK/Ext: LUE ROM limited secondary to pain. R knee bandage c/d/I without warmth or drainage. R heel wound without drainage or erythema.   Neuro: no focal deficits   Skin: Warm, dry    Intake/Output:    Intake/Output Summary (Last 24 hours) at 1/6/2024 0913  Last data filed at 1/6/2024 0400  Gross per 24 hour   Intake 600 ml   Output 900 ml   Net -300 ml       Net IO Since Admission: -2,031.66 mL [01/06/24 0913]    Laboratory:  Recent Labs   Lab 12/31/23  0416 01/01/24  0400 01/02/24  0623 01/03/24  0548 01/04/24  0523 01/05/24  0435 01/06/24  0647   WBC 27.91*   < > 28.28* 30.46* 28.53* 27.65* 26.56*   HGB 11.7*   < > 11.1* 10.1* 9.6* 9.3* 9.3*      < > 369 364 400 410 485*   MCV 86   < > 85 85 86 87 87      < > 134* 133* 132* 131* 132*   K 3.7   < > 3.6 3.8 3.9 4.4 4.0      < > 96 94* 96 94* 94*   CO2 25   < > 26 26 26 25 27   BUN 16   < > 12 15 13  11 8   CREATININE 0.8   < > 0.9 0.9 0.9 0.8 0.8   *   < > 155* 129* 149* 188* 159*   CALCIUM 9.3   < > 8.9 8.7 8.7 8.7 8.7   PROT 8.1   < > 7.7 7.5 7.5 7.5 7.2   ALBUMIN 1.7*   < > 1.6* 1.4* 1.4* 1.4* 1.3*   PHOS 3.2   < > 3.2 3.1 3.1 3.2  --    MG 2.4  --   --   --  2.0 1.9 1.8   *   < > 88* 136* 142* 123* 108*   *   < > 83* 116* 133* 141* 123*   ALKPHOS 237*   < > 163* 145* 168* 177* 148*    < > = values in this interval not displayed.     All laboratory data reviewed    Microbiology: reviewed     Radiology:   reviewed      Current Medications:     Infusions:       Scheduled:   ceftaroline fosamiL (TEFLARO) 600 mg in dextrose 5 % in water (D5W) 50 mL IVPB (MB+)  600 mg Intravenous Q8H    DAPTOmycin (CUBICIN) IV (PEDS and ADULTS)  10 mg/kg (Adjusted) Intravenous Q24H    insulin detemir U-100  35 Units Subcutaneous BID    metoclopramide HCl  5 mg Oral TID AC    metoprolol succinate  25 mg Oral Daily    pantoprazole  40 mg Oral Daily    polyethylene glycol  17 g Oral Daily    rivaroxaban  10 mg Oral Daily with dinner        PRN:  dextrose 10 % in water (D10W), dextrose 10 % in water (D10W), dextrose 10%, dextrose 10%, droPERidol, glucagon (human recombinant), glucose, glucose, insulin aspart U-100, melatonin, oxyCODONE, scopolamine, senna, sodium chloride 0.9%, sodium chloride 0.9%    Antibiotics and Day Number of Therapy:  Vanc 12/27-1/1  Zosyn 12/27-12/29  Daptomycin 1/1 - present  Ceftaroline 1/04/2024-1/06/2024    Assessment/Plan:     Kulwant Blair Ami Benítez is a 38 y.o. 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 The patient presented to [facility] on 12/27/2023 with a primary complaint of Intractable N/V and diarrhea for over 6 days, generalized weakness for 6 days. Admitted for DKA, which has now resolved s/p insulin gtt. Found to have MRSA bacteremia, suspect source L knee septic arthritis/bursitis       MRSA bacteremia   2/2 Septic arthritis/bursitis of L knee  MRSA UTI  WBC 47K with neutrophil predominance and patient febrile on admission. Suspect leukocytosis 2/2 DKA and bacteremia. L knee pain washed out by ortho with positive cx. L shoulder aspirated imaging without evidence of infection. R heel without osteo but has cellulitis, myositis, tenosynovitis. ROBY negative for vegetation. Believe we have source control at this time given patient stopped fevering but continuing to monitor for manifestations of infection.   - s/p L knee arthrotomy with synovectomy on 12/29. Cx with staph aureus  - L shoulder aspirated 1/2 w/o signs of infection   - ID on board; switched to daptomycin on 1/1 w weekly CK  - 1/4 added ceftaroline adjunct therapy   - daily blood cultures to document clearance; most recent Bcx 1/05/2024 with NGTD  - oxycodone 5mg q6h prn for pain    Transaminitis   Admitted with ast/alt wnl. Now continues to rise.   - CT abdomen pelvis on admission without acute abnormalities  - hepatitis panel negative   - discontinued percocet; oxycodone for pain   - RUQ ultrasound with hepatomegaly and biliary sludge; but without other obvious abnormalities  -AST/ALT/Alk phos improved from day prior; continue to avoid hepato-toxic agents and monitor    Intractable N/V; resolved  Hx Gastroparesis ?  was being worked up for gastroparesis outpatient, unable to complete testing due to vomitting. Sx lasted for several weeks and spontaneously resolved. has been taking reglan outpatient, but is unsure if it does anything to help him.   - continue home metoclopramide and PPI  - zofran not helping with N/V. PRN droperidol and scopolamine ordered.   - QTc 448, caution in QTc prolonging agents    DKA - resolved  DM2 with long-term use insulin  AG 31, BHB elevated, with bicarb 9 and elevated blood glucose; UA with ketones. suspect due to intractable N/V, infection  Home regimen: tresiba 55u daily, sliding scale humalog,  jardiance 25mg daily  - DKA resolved s/p insulin gtt   - A1c 7.4 on 12/28, prior A1c as high as 14  - continue levemir 35u BID + SSI, modify as patient able to tolerate diet    Newly dx HFmrEF  12/29 TTE: EF 48%  - start metoprolol succinate 12.5mg daily --> 25mg   - already on jardiance at home    Acute kidney injury, resolved  - on admit, Cr 1.9; baseline 1  - suspect prerenal due to decreased PO intake  - Cr improving with IVF, currently at baseline    Chronic R heel wound  12/27 XR right foot with no osseous abnormality; has been following with wound care and has been off PO antibiotics for at least 1 month with no drainage or swelling from wound site  - MRI R hindfoot ordered; no osteomyelitis   - wound care consulted    May-Thurner syndrome  Peripheral vascular disease  Hx May-Thurner syndrome and was following with Dr. Duff, who had kept patient on xarelto 10 mg daily. Patient reports has not been able to take xarelto for past 1-2 weeks 2' inability to tolerate PO. Previous n/v w heparin ggt but pharmacy confirmed has tolerated lovenox in the past.   - DVT US BLE negative. DVT BUE with thrombus within 1 of the right paired brachial veins  - started full dose lovenox  - transition back to home Xarelto today     HTN  patient reports not currently on any anti-hypertensive regimen  - BP should improve with GDMT     Concern for depression  patient noted to have flat affect, decreased motivation, sleeping for several hours during the day since admission, decreased appetite. Per wife, she has been concerned there may be underlying depression.   - psych consulted. Recommended cymbalta but patient declined.     Diet: diabetic  VTE PPx: Xarelto  Code Status: full    Dispo: pending clearance of cultures, IPR with 6 weeks abx      Tobi Kirk MD  [facility] Internal Medicine HO-II    [facility] Medicine Hospitalist Pager numbers:   [facility] Hospitalist Medicine Team A (Russel/Emmanuel): 2005  [facility] Hospitalist Medicine Team B  (Odin/Kameron):  2006

## 2024-01-06 NOTE — PLAN OF CARE
Pt is AAOx4. Pt reported no pain or nausea during shift. Abd US performed. Urinal is at bedside. Pt is on diabetic diet. Antibx and care administered as ordered.

## 2024-01-06 NOTE — NURSING
Assumed care of patient from ADRIEL Dumont, patient is AAOX4, flat personality, room air, vitals WNL, no c/o pain or discomfort, right foot diabetic wound, treated with iodine, RUFINA, left knee wrapped in ace bandage clean dry and intact, urinal in reach, all safety precautions are in place, call bell and tray table are within reach of the patient and no additional questions or concerns from the patient a this time.

## 2024-01-06 NOTE — PLAN OF CARE
Problem: Adult Inpatient Plan of Care  Goal: Plan of Care Review  Outcome: Ongoing, Progressing  Goal: Patient-Specific Goal (Individualized)  Outcome: Ongoing, Progressing  Goal: Absence of Hospital-Acquired Illness or Injury  Outcome: Ongoing, Progressing  Goal: Optimal Comfort and Wellbeing  Outcome: Ongoing, Progressing  Goal: Readiness for Transition of Care  Outcome: Ongoing, Progressing     Problem: Diabetes Comorbidity  Goal: Blood Glucose Level Within Targeted Range  Outcome: Ongoing, Progressing     Problem: Skin Injury Risk Increased  Goal: Skin Health and Integrity  Outcome: Ongoing, Progressing     Problem: Fall Injury Risk  Goal: Absence of Fall and Fall-Related Injury  Outcome: Ongoing, Progressing     Problem: Impaired Wound Healing  Goal: Optimal Wound Healing  Outcome: Ongoing, Progressing     Problem: Infection  Goal: Absence of Infection Signs and Symptoms  Outcome: Ongoing, Progressing     Problem: Adjustment to Illness (Sepsis/Septic Shock)  Goal: Optimal Coping  Outcome: Ongoing, Progressing     Problem: Bleeding (Sepsis/Septic Shock)  Goal: Absence of Bleeding  Outcome: Ongoing, Progressing     Problem: Glycemic Control Impaired (Sepsis/Septic Shock)  Goal: Blood Glucose Level Within Desired Range  Outcome: Ongoing, Progressing     Problem: Infection Progression (Sepsis/Septic Shock)  Goal: Absence of Infection Signs and Symptoms  Outcome: Ongoing, Progressing     Problem: Nutrition Impaired (Sepsis/Septic Shock)  Goal: Optimal Nutrition Intake  Outcome: Ongoing, Progressing     Problem: Fluid and Electrolyte Imbalance (Acute Kidney Injury/Impairment)  Goal: Fluid and Electrolyte Balance  Outcome: Ongoing, Progressing     Problem: Oral Intake Inadequate (Acute Kidney Injury/Impairment)  Goal: Optimal Nutrition Intake  Outcome: Ongoing, Progressing     Problem: Renal Function Impairment (Acute Kidney Injury/Impairment)  Goal: Effective Renal Function  Outcome: Ongoing, Progressing      Problem: Bleeding (Surgery Nonspecified)  Goal: Absence of Bleeding  Outcome: Ongoing, Progressing     Problem: Bowel Motility Impaired (Surgery Nonspecified)  Goal: Effective Bowel Elimination  Outcome: Ongoing, Progressing     Problem: Fluid and Electrolyte Imbalance (Surgery Nonspecified)  Goal: Fluid and Electrolyte Balance  Outcome: Ongoing, Progressing     Problem: Glycemic Control Impaired (Surgery Nonspecified)  Goal: Blood Glucose Level Within Targeted Range  Outcome: Ongoing, Progressing     Problem: Pain (Surgery Nonspecified)  Goal: Acceptable Pain Control  Outcome: Ongoing, Progressing     Problem: Hypertension Comorbidity  Goal: Blood Pressure in Desired Range  Outcome: Ongoing, Progressing     Problem: Diabetic Ketoacidosis  Goal: Fluid and Electrolyte Balance with Absence of Ketosis  Outcome: Ongoing, Progressing     Problem: UTI (Urinary Tract Infection)  Goal: Improved Infection Symptoms  Outcome: Ongoing, Progressing     Problem: Adjustment to Illness (Heart Failure)  Goal: Optimal Coping  Outcome: Ongoing, Progressing     Problem: Cardiac Output Decreased (Heart Failure)  Goal: Optimal Cardiac Output  Outcome: Ongoing, Progressing     Problem: Dysrhythmia (Heart Failure)  Goal: Stable Heart Rate and Rhythm  Outcome: Ongoing, Progressing     Problem: Fluid Imbalance (Heart Failure)  Goal: Fluid Balance  Outcome: Ongoing, Progressing     Problem: Functional Ability Impaired (Heart Failure)  Goal: Optimal Functional Ability  Outcome: Ongoing, Progressing     Problem: Oral Intake Inadequate (Heart Failure)  Goal: Optimal Nutrition Intake  Outcome: Ongoing, Progressing     Problem: Respiratory Compromise (Heart Failure)  Goal: Effective Oxygenation and Ventilation  Outcome: Ongoing, Progressing     Problem: Sleep Disordered Breathing (Heart Failure)  Goal: Effective Breathing Pattern During Sleep  Outcome: Ongoing, Progressing

## 2024-01-07 LAB
ALBUMIN SERPL BCP-MCNC: 1.6 G/DL (ref 3.5–5.2)
ALP SERPL-CCNC: 166 U/L (ref 55–135)
ALT SERPL W/O P-5'-P-CCNC: 119 U/L (ref 10–44)
ANION GAP SERPL CALC-SCNC: 10 MMOL/L (ref 8–16)
AST SERPL-CCNC: 86 U/L (ref 10–40)
BACTERIA BLD CULT: ABNORMAL
BASOPHILS # BLD AUTO: 0.04 K/UL (ref 0–0.2)
BASOPHILS NFR BLD: 0.2 % (ref 0–1.9)
BILIRUB SERPL-MCNC: 0.5 MG/DL (ref 0.1–1)
BUN SERPL-MCNC: 8 MG/DL (ref 6–20)
CALCIUM SERPL-MCNC: 9.2 MG/DL (ref 8.7–10.5)
CHLORIDE SERPL-SCNC: 94 MMOL/L (ref 95–110)
CO2 SERPL-SCNC: 30 MMOL/L (ref 23–29)
CREAT SERPL-MCNC: 0.9 MG/DL (ref 0.5–1.4)
DIFFERENTIAL METHOD BLD: ABNORMAL
EOSINOPHIL # BLD AUTO: 0 K/UL (ref 0–0.5)
EOSINOPHIL NFR BLD: 0.1 % (ref 0–8)
ERYTHROCYTE [DISTWIDTH] IN BLOOD BY AUTOMATED COUNT: 14.3 % (ref 11.5–14.5)
EST. GFR  (NO RACE VARIABLE): >60 ML/MIN/1.73 M^2
GLUCOSE SERPL-MCNC: 160 MG/DL (ref 70–110)
HCT VFR BLD AUTO: 33.6 % (ref 40–54)
HGB BLD-MCNC: 10.8 G/DL (ref 14–18)
IMM GRANULOCYTES # BLD AUTO: 0.19 K/UL (ref 0–0.04)
IMM GRANULOCYTES NFR BLD AUTO: 0.9 % (ref 0–0.5)
LYMPHOCYTES # BLD AUTO: 1.2 K/UL (ref 1–4.8)
LYMPHOCYTES NFR BLD: 5.6 % (ref 18–48)
MAGNESIUM SERPL-MCNC: 2.2 MG/DL (ref 1.6–2.6)
MCH RBC QN AUTO: 27.9 PG (ref 27–31)
MCHC RBC AUTO-ENTMCNC: 32.1 G/DL (ref 32–36)
MCV RBC AUTO: 87 FL (ref 82–98)
MONOCYTES # BLD AUTO: 0.8 K/UL (ref 0.3–1)
MONOCYTES NFR BLD: 3.7 % (ref 4–15)
NEUTROPHILS # BLD AUTO: 18.6 K/UL (ref 1.8–7.7)
NEUTROPHILS NFR BLD: 89.5 % (ref 38–73)
NRBC BLD-RTO: 0 /100 WBC
PLATELET # BLD AUTO: 558 K/UL (ref 150–450)
PMV BLD AUTO: 9.6 FL (ref 9.2–12.9)
POCT GLUCOSE: 111 MG/DL (ref 70–110)
POCT GLUCOSE: 145 MG/DL (ref 70–110)
POCT GLUCOSE: 166 MG/DL (ref 70–110)
POCT GLUCOSE: 170 MG/DL (ref 70–110)
POCT GLUCOSE: 51 MG/DL (ref 70–110)
POCT GLUCOSE: 60 MG/DL (ref 70–110)
POCT GLUCOSE: 88 MG/DL (ref 70–110)
POTASSIUM SERPL-SCNC: 5 MMOL/L (ref 3.5–5.1)
PROT SERPL-MCNC: 8.7 G/DL (ref 6–8.4)
RBC # BLD AUTO: 3.87 M/UL (ref 4.6–6.2)
SODIUM SERPL-SCNC: 134 MMOL/L (ref 136–145)
WBC # BLD AUTO: 20.81 K/UL (ref 3.9–12.7)

## 2024-01-07 PROCEDURE — 63600175 PHARM REV CODE 636 W HCPCS

## 2024-01-07 PROCEDURE — 97530 THERAPEUTIC ACTIVITIES: CPT | Mod: CQ

## 2024-01-07 PROCEDURE — 63600175 PHARM REV CODE 636 W HCPCS: Mod: JZ,JG | Performed by: INTERNAL MEDICINE

## 2024-01-07 PROCEDURE — 21400001 HC TELEMETRY ROOM

## 2024-01-07 PROCEDURE — 87040 BLOOD CULTURE FOR BACTERIA: CPT | Mod: 59

## 2024-01-07 PROCEDURE — 85025 COMPLETE CBC W/AUTO DIFF WBC: CPT

## 2024-01-07 PROCEDURE — 27000207 HC ISOLATION

## 2024-01-07 PROCEDURE — 25000003 PHARM REV CODE 250

## 2024-01-07 PROCEDURE — 36415 COLL VENOUS BLD VENIPUNCTURE: CPT

## 2024-01-07 PROCEDURE — 25000003 PHARM REV CODE 250: Performed by: INTERNAL MEDICINE

## 2024-01-07 PROCEDURE — 83735 ASSAY OF MAGNESIUM: CPT

## 2024-01-07 PROCEDURE — 80053 COMPREHEN METABOLIC PANEL: CPT

## 2024-01-07 RX ORDER — ONDANSETRON 2 MG/ML
8 INJECTION INTRAMUSCULAR; INTRAVENOUS ONCE
Status: COMPLETED | OUTPATIENT
Start: 2024-01-07 | End: 2024-01-07

## 2024-01-07 RX ORDER — MAGNESIUM SULFATE HEPTAHYDRATE 40 MG/ML
2 INJECTION, SOLUTION INTRAVENOUS ONCE
Status: COMPLETED | OUTPATIENT
Start: 2024-01-07 | End: 2024-01-07

## 2024-01-07 RX ADMIN — DAPTOMYCIN 945 MG: 500 INJECTION, POWDER, LYOPHILIZED, FOR SOLUTION INTRAVENOUS at 03:01

## 2024-01-07 RX ADMIN — INSULIN ASPART 2 UNITS: 100 INJECTION, SOLUTION INTRAVENOUS; SUBCUTANEOUS at 12:01

## 2024-01-07 RX ADMIN — CEFTAROLINE FOSAMIL 600 MG: 600 POWDER, FOR SOLUTION INTRAVENOUS at 01:01

## 2024-01-07 RX ADMIN — CEFTAROLINE FOSAMIL 600 MG: 600 POWDER, FOR SOLUTION INTRAVENOUS at 05:01

## 2024-01-07 RX ADMIN — ONDANSETRON 8 MG: 2 INJECTION INTRAMUSCULAR; INTRAVENOUS at 01:01

## 2024-01-07 RX ADMIN — METOCLOPRAMIDE 5 MG: 5 TABLET ORAL at 10:01

## 2024-01-07 RX ADMIN — METOCLOPRAMIDE 5 MG: 5 TABLET ORAL at 05:01

## 2024-01-07 RX ADMIN — RIVAROXABAN 10 MG: 10 TABLET, FILM COATED ORAL at 04:01

## 2024-01-07 RX ADMIN — Medication 16 G: at 09:01

## 2024-01-07 RX ADMIN — CEFTAROLINE FOSAMIL 600 MG: 600 POWDER, FOR SOLUTION INTRAVENOUS at 09:01

## 2024-01-07 RX ADMIN — Medication 6 MG: at 09:01

## 2024-01-07 RX ADMIN — METOPROLOL SUCCINATE 25 MG: 25 TABLET, EXTENDED RELEASE ORAL at 10:01

## 2024-01-07 RX ADMIN — MAGNESIUM SULFATE HEPTAHYDRATE 2 G: 40 INJECTION, SOLUTION INTRAVENOUS at 04:01

## 2024-01-07 RX ADMIN — INSULIN ASPART 2 UNITS: 100 INJECTION, SOLUTION INTRAVENOUS; SUBCUTANEOUS at 04:01

## 2024-01-07 RX ADMIN — METOCLOPRAMIDE 5 MG: 5 TABLET ORAL at 04:01

## 2024-01-07 RX ADMIN — PANTOPRAZOLE SODIUM 40 MG: 40 TABLET, DELAYED RELEASE ORAL at 10:01

## 2024-01-07 NOTE — NURSING
Assumed care of patient from ADRIEL Dmuont, patient is AAOX4, flat personality, room air, vitals WNL, no c/o pain or discomfort, right foot diabetic wound, treated with iodine, RUFINA, left knee wrapped in ace bandage clean dry and intact, urinal in reach, all safety precautions are in place, diaper in place, call bell and tray table are within reach of the patient and no additional questions or concerns from the patient a this time

## 2024-01-07 NOTE — PLAN OF CARE
Problem: Physical Therapy  Goal: Physical Therapy Goal  Description: Goals to be met by: 2024     Patient will increase functional independence with mobility by performin. Supine to sit with Modified [place]  2. Sit to supine with Modified [place]  3. Rolling to Left and Right with Modified [place].  4. Sit to stand transfer with Modified [place]  5. Bed to chair transfer with Modified [place] using Rolling Walker  6. Gait  x 150 feet with Modified [place] using Rolling Walker.   7. Ascend/descend 1 stair with Modified [place] using Rolling Walker.     Outcome: Ongoing, Progressing   Pt continues to work toward all goals. Able to perform 1 successful sit to stand transfer trial with KRIS GARDINER, requiring max A of 2 people with bed height elevated.

## 2024-01-07 NOTE — PLAN OF CARE
VN Note: Labs, notes, orders, care plan review available as needed.   Problem: Adult Inpatient Plan of Care  Goal: Plan of Care Review  Outcome: Ongoing, Progressing

## 2024-01-07 NOTE — PT/OT/SLP PROGRESS
Physical Therapy Treatment    Patient Name:  Kulwant Mueller Jr.   MRN:  [id number]    Recommendations:     Discharge Recommendations: High Intensity Therapy  Discharge Equipment Recommendations:  (TBD)  Barriers to discharge:  Requires increased assistance for all functional mobility, decreased functional mobility endurance and tolerance     Assessment:     Kulwant Mueller Jr. is a 38 y.o. male admitted with a medical diagnosis of Staphylococcus aureus bacteremia.  He presents with the following impairments/functional limitations: weakness, impaired endurance, impaired sensation, impaired self care skills, impaired functional mobility, gait instability, impaired balance, decreased coordination, decreased upper extremity function, decreased lower extremity function, decreased safety awareness, pain, decreased ROM, impaired coordination, impaired skin, edema, impaired cardiopulmonary response to activity, orthopedic precautions. Pt able to perform 1 sit to stand transfer trial with RW, RLE Darco shoe donned, requiring max A of 2 people with bed height elevated. BP's taken throughout session- in supine prior to movement 130/86, In sitting 115/71, attempted in standing but unable to register. Pt quickly returned to supine secondary to feelings of increased weakness. BP taken in supine 119/71 and again after ~1-1/2 minutes 131/78. Would benefit from continued PT services to increase pt's out of bed therapeutic activity and exercises.     Rehab Prognosis: Good; patient would benefit from acute skilled PT services to address these deficits and reach maximum level of function.    Recent Surgery: Procedure(s) (LRB):  ECHOCARDIOGRAM, TRANSESOPHAGEAL (N/A) 4 Days Post-Op    Plan:     During this hospitalization, patient to be seen 5 x/week to address the identified rehab impairments via gait training, therapeutic activities, therapeutic exercises, neuromuscular re-education and progress toward the following goals:    Plan  of Care Expires:  01/31/24    Subjective     Chief Complaint: none Expressed  Patient/Family Comments/goals: None Expressed  Pain/Comfort:  Pain Rating 1:  (Not rated)  Location - Side 1: Left  Location - Orientation 1: generalized  Location 1: knee (And L shoulder)  Pain Addressed 1: Reposition, Distraction, Cessation of Activity, Nurse notified  Pain Rating Post-Intervention 1:  (Not rated)      Objective:     Communicated with nurse prior to session.  Patient found HOB elevated with bed alarm, peripheral IV, telemetry upon PT entry to room.     General Precautions: Standard, contact, fall  Orthopedic Precautions:  (No restrictions per Dr. Bocanegra. Wound care nurse recommends limiting pressure through R heel encouraging weight bearing through heel)  Braces:  (RLE Darco shoe)  Respiratory Status: Room air     Functional Mobility:  Bed Mobility:     Rolling Left:  minimum assistance  Rolling Right: minimum assistance  Scooting: contact guard assistance and minimum assistance  Supine to Sit: maximal assistance  Sit to Supine: maximal assistance and of 2 persons  Transfers:     Sit to Stand:  maximal assistance, of 2 persons, and bed height elevated with rolling walker      AM-PAC 6 CLICK MOBILITY  Turning over in bed (including adjusting bedclothes, sheets and blankets)?: 2  Sitting down on and standing up from a chair with arms (e.g., wheelchair, bedside commode, etc.): 2  Moving from lying on back to sitting on the side of the bed?: 2  Moving to and from a bed to a chair (including a wheelchair)?: 1  Need to walk in hospital room?: 1  Climbing 3-5 steps with a railing?: 1  Basic Mobility Total Score: 9       Treatment & Education:  Pt's BP taken throughout session. Please see assessment note above for all readings. Nurse in room to assist PTA with sit to stand transfer and aware of pt's BP's. Pt able to perform 1 successful sit to stand transfer with RW, RLE Darco shoe donned, requiring max A of 2 people with bed  height elevated. Prior to successful trial pt had attempted 2-3 unsuccessful trials. Pt required extra time between all attempts made. Encouragement needed throughout session. BP decreases hindered pt's ability to increase his functional mobility this session.    Patient left HOB elevated with all lines intact, call button in reach, bed alarm on, nurse notified, and nurse present..    GOALS:   Multidisciplinary Problems       Physical Therapy Goals          Problem: Physical Therapy    Goal Priority Disciplines Outcome Goal Variances Interventions   Physical Therapy Goal     PT, PT/OT Ongoing, Progressing     Description: Goals to be met by: 2024     Patient will increase functional independence with mobility by performin. Supine to sit with Modified [place]  2. Sit to supine with Modified [place]  3. Rolling to Left and Right with Modified [place].  4. Sit to stand transfer with Modified [place]  5. Bed to chair transfer with Modified [place] using Rolling Walker  6. Gait  x 150 feet with Modified [place] using Rolling Walker.   7. Ascend/descend 1 stair with Modified [place] using Rolling Walker.                          Time Tracking:     PT Received On: 24  PT Start Time: 0920     PT Stop Time: 1005  PT Total Time (min): 45 min     Billable Minutes: Therapeutic Activity 45    Treatment Type: Treatment  PT/PTA: PTA     Number of PTA visits since last PT visit: 2024

## 2024-01-07 NOTE — PROGRESS NOTES
[facility] IM Resident HO-1 Progress Note    Subjective:      Kulwant Mueller Jr. is a 38 y.o. 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 Presented  with DKA found to have MRSA bacteremia.      Had conversation with patient regarding plan, expressed understanding. Called this AM for critical, positive gram positive cocci, will rpt BCx. One episode of hypoglycemia overnight improved with food. Otherwise doing well with no other complaints from patient    Objective:   Last 24 Hour Vital Signs:  BP  Min: 119/73  Max: 146/80  Temp  Av.1 °F (36.7 °C)  Min: 97.5 °F (36.4 °C)  Max: 98.8 °F (37.1 °C)  Pulse  Av  Min: 83  Max: 99  Resp  Av  Min: 16  Max: 20  SpO2  Av.7 %  Min: 96 %  Max: 99 %  Weight  Av.1 kg (253 lb 12 oz)  Min: 115.1 kg (253 lb 12 oz)  Max: 115.1 kg (253 lb 12 oz)  I/O last 3 completed shifts:  In: 1336.6 [P.O.:660; I.V.:49.7; IV Piggyback:626.9]  Out: 1400 [Urine:1400]    Physical Examination:  Gen: No acute distress, comfortable appearing  HEENT: NC/AT, EOMI grossly, moist MM  CV:  regular rate and rhythm, no murmurs, rubs, or gallops.  Resp: CTAB. No obvious crackles or wheezes.  Abd: Soft, nontender, nondistended  MSK/Ext: LUE ROM limited secondary to pain. L knee bandage c/d/I without warmth or drainage. L heel wound without drainage or erythema.   Neuro: no focal deficits   Skin: Warm, dry    Laboratory:  Laboratory Data Reviewed: yes  Pertinent Findings:  Trended Lab Data:  Recent Labs   Lab 24  0523 24  0435 24  0647   WBC 28.53* 27.65* 26.56*   HGB 9.6* 9.3* 9.3*   HCT 29.6* 28.7* 29.0*    410 485*   MCV 86 87 87   RDW 14.3 14.5 14.3   * 131* 132*   K 3.9 4.4 4.0   CL 96 94* 94*   CO2 26 25 27   BUN 13 11 8   CREATININE 0.9 0.8 0.8   * 188* 159*   PROT 7.5 7.5 7.2   ALBUMIN 1.4* 1.4* 1.3*   BILITOT 0.4 0.4 0.4   * 123* 108*   ALKPHOS 168* 177* 148*   ALT  "133* 141* 123*       Trended Cardiac Data:  No results for input(s): "TROPONINI", "CKTOTAL", "CKMB", "BNP" in the last 168 hours.    Microbiology Data Reviewed: yes  Pertinent Findings:  Bcx 1/5: gram positive cocci in clusters resembling staph      Other Results:  EKG (my interpretation):none new   Radiology Data Reviewed: yes  Pertinent Findings:    Current Medications:     Infusions:       Scheduled:   ceftaroline fosamiL (TEFLARO) 600 mg in dextrose 5 % in water (D5W) 50 mL IVPB (MB+)  600 mg Intravenous Q8H    DAPTOmycin (CUBICIN) IV (PEDS and ADULTS)  10 mg/kg (Adjusted) Intravenous Q24H    insulin detemir U-100  35 Units Subcutaneous BID    metoclopramide HCl  5 mg Oral TID AC    metoprolol succinate  25 mg Oral Daily    pantoprazole  40 mg Oral Daily    polyethylene glycol  17 g Oral Daily    rivaroxaban  10 mg Oral Daily with dinner        PRN:  dextrose 10 % in water (D10W), dextrose 10 % in water (D10W), dextrose 10%, dextrose 10%, droPERidol, glucagon (human recombinant), glucose, glucose, insulin aspart U-100, melatonin, oxyCODONE, scopolamine, senna, sodium chloride 0.9%, sodium chloride 0.9%    Antibiotics and Day Number of Therapy:  Vanc 12/27/23-1/1/24  Zosyn 12/27/23-12/29/23  Daptomycin 1/1/24 - present  Ceftaroline 1/4/24-present     Assessment:     Kulwant Mueller . is a 38 y.o. 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 The patient presented to [facility] on [facility]/2023 with a primary complaint of Intractable N/V and diarrhea for over 6 days, generalized weakness for 6 days. Admitted for DKA, which has now resolved s/p insulin gtt. Found to have MRSA bacteremia, suspect source L knee septic arthritis/bursitis.      Plan:     MRSA bacteremia   2/2 Septic arthritis/bursitis of L knee  MRSA UTI  WBC 47K with neutrophil predominance and patient febrile on admission. Suspect leukocytosis 2/2 DKA and " bacteremia. L knee pain washed out by ortho with positive cx. L shoulder aspirated imaging without evidence of infection. R heel without osteo but has cellulitis, myositis, tenosynovitis. ROBY negative for vegetation. Believe we have source control at this time given patient stopped fevering but continuing to monitor for manifestations of infection.   - s/p L knee arthrotomy with synovectomy on 12/29. Cx with staph aureus  - L shoulder aspirated 1/2 w/o signs of infection   - WBC downtrending   - ID on board; switched to daptomycin on 1/1/24  w/weekly CK  - 1/4 added ceftaroline adjunct therapy   - daily blood cultures to document clearance; most recent Bcx 1/05/2024 positive for gram positive cocci in clusters, will rpt blood Cx  - oxycodone 5mg q6h prn for pain     Transaminitis   Admitted with ast/alt wnl. Now continues to rise.   - CT abdomen pelvis on admission without acute abnormalities  - hepatitis panel negative   - discontinued percocet; oxycodone for pain   - RUQ ultrasound with hepatomegaly and biliary sludge; but without other obvious abnormalities  -AST/ALT/Alk phos improved or stable from day prior; continue to avoid hepato-toxic agents and monitor     Intractable N/V; resolved  Hx Gastroparesis ?  was being worked up for gastroparesis outpatient, unable to complete testing due to vomitting. Sx lasted for several weeks and spontaneously resolved. has been taking reglan outpatient, but is unsure if it does anything to help him.   - continue home metoclopramide and PPI  - zofran not helping with N/V. PRN droperidol and scopolamine ordered.   - QTc 448, caution in QTc prolonging agents     DKA - resolved  DM2 with long-term use insulin  AG 31, BHB elevated, with bicarb 9 and elevated blood glucose; UA with ketones. suspect due to intractable N/V, infection  Home regimen: tresiba 55u daily, sliding scale humalog, jardiance 25mg daily  - one isolated episode of asx hypoglycemia responsive to food on 1/7  -  DKA resolved s/p insulin gtt   - A1c 7.4 on 12/28, prior A1c as high as 14  - continue levemir 35u BID + SSI, modify as patient able to tolerate diet     Newly dx HFmrEF  12/29 TTE: EF 48%  - start metoprolol succinate 12.5mg daily --> 25mg   - already on jardiance at home     Acute kidney injury, resolved  - on admit, Cr 1.9; baseline 1  - suspect prerenal due to decreased PO intake  - Cr improving with IVF, currently at baseline     Chronic R heel wound  12/27 XR right foot with no osseous abnormality; has been following with wound care and has been off PO antibiotics for at least 1 month with no drainage or swelling from wound site  - MRI R hindfoot ordered; no osteomyelitis   - wound care consulted     May-Thurner syndrome  Peripheral vascular disease  Hx May-Thurner syndrome and was following with Dr. Duff, who had kept patient on xarelto 10 mg daily. Patient reports has not been able to take xarelto for past 1-2 weeks 2' inability to tolerate PO. Previous n/v w heparin ggt but pharmacy confirmed has tolerated lovenox in the past.   - DVT US BLE negative. DVT BUE with thrombus within 1 of the right paired brachial veins  - started full dose lovenox  - transition back to home Xarelto today     HTN  patient reports not currently on any anti-hypertensive regimen  - BP should improve with GDMT      Concern for depression  patient noted to have flat affect, decreased motivation, sleeping for several hours during the day since admission, decreased appetite. Per wife, she has been concerned there may be underlying depression.   - psych consulted. Recommended cymbalta but patient declined.      Diet: diabetic  VTE PPx: Xarelto  Code Status: full     Dispo: pending clearance of cultures, IPR with 6 weeks abx    Latosha Cummings D.O.  [facility] Internal Medicine/Emergency Medicine HO-I  [facility] IM Service Team A    [facility] Medicine Hospitalist Pager numbers:   [facility] Hospitalist Medicine Team A (Russel/Emmanuel): [phone]  [facility]  Hospitalist Medicine Team B (Odin/Kameron):  [phone]

## 2024-01-08 LAB
ALBUMIN SERPL BCP-MCNC: 1.3 G/DL (ref 3.5–5.2)
ALP SERPL-CCNC: 144 U/L (ref 55–135)
ALT SERPL W/O P-5'-P-CCNC: 94 U/L (ref 10–44)
ANION GAP SERPL CALC-SCNC: 11 MMOL/L (ref 8–16)
AST SERPL-CCNC: 64 U/L (ref 10–40)
BACTERIA SPEC ANAEROBE CULT: NORMAL
BASOPHILS # BLD AUTO: 0.04 K/UL (ref 0–0.2)
BASOPHILS NFR BLD: 0.2 % (ref 0–1.9)
BILIRUB SERPL-MCNC: 0.4 MG/DL (ref 0.1–1)
BUN SERPL-MCNC: 7 MG/DL (ref 6–20)
CALCIUM SERPL-MCNC: 8.7 MG/DL (ref 8.7–10.5)
CHLORIDE SERPL-SCNC: 95 MMOL/L (ref 95–110)
CK SERPL-CCNC: 243 U/L (ref 20–200)
CK SERPL-CCNC: 251 U/L (ref 20–200)
CO2 SERPL-SCNC: 29 MMOL/L (ref 23–29)
CREAT SERPL-MCNC: 0.9 MG/DL (ref 0.5–1.4)
DIFFERENTIAL METHOD BLD: ABNORMAL
EOSINOPHIL # BLD AUTO: 0 K/UL (ref 0–0.5)
EOSINOPHIL NFR BLD: 0.2 % (ref 0–8)
ERYTHROCYTE [DISTWIDTH] IN BLOOD BY AUTOMATED COUNT: 14.3 % (ref 11.5–14.5)
EST. GFR  (NO RACE VARIABLE): >60 ML/MIN/1.73 M^2
GLUCOSE SERPL-MCNC: 134 MG/DL (ref 70–110)
HCT VFR BLD AUTO: 28.6 % (ref 40–54)
HGB BLD-MCNC: 9.2 G/DL (ref 14–18)
IMM GRANULOCYTES # BLD AUTO: 0.17 K/UL (ref 0–0.04)
IMM GRANULOCYTES NFR BLD AUTO: 1 % (ref 0–0.5)
LYMPHOCYTES # BLD AUTO: 1.4 K/UL (ref 1–4.8)
LYMPHOCYTES NFR BLD: 7.7 % (ref 18–48)
MAGNESIUM SERPL-MCNC: 2.3 MG/DL (ref 1.6–2.6)
MCH RBC QN AUTO: 27.8 PG (ref 27–31)
MCHC RBC AUTO-ENTMCNC: 32.2 G/DL (ref 32–36)
MCV RBC AUTO: 86 FL (ref 82–98)
MONOCYTES # BLD AUTO: 0.9 K/UL (ref 0.3–1)
MONOCYTES NFR BLD: 5.2 % (ref 4–15)
NEUTROPHILS # BLD AUTO: 15.3 K/UL (ref 1.8–7.7)
NEUTROPHILS NFR BLD: 85.7 % (ref 38–73)
NRBC BLD-RTO: 0 /100 WBC
PLATELET # BLD AUTO: 456 K/UL (ref 150–450)
PMV BLD AUTO: 9.6 FL (ref 9.2–12.9)
POCT GLUCOSE: 158 MG/DL (ref 70–110)
POCT GLUCOSE: 186 MG/DL (ref 70–110)
POCT GLUCOSE: 226 MG/DL (ref 70–110)
POCT GLUCOSE: 253 MG/DL (ref 70–110)
POTASSIUM SERPL-SCNC: 4.5 MMOL/L (ref 3.5–5.1)
PROT SERPL-MCNC: 7.4 G/DL (ref 6–8.4)
RBC # BLD AUTO: 3.31 M/UL (ref 4.6–6.2)
SODIUM SERPL-SCNC: 135 MMOL/L (ref 136–145)
WBC # BLD AUTO: 17.81 K/UL (ref 3.9–12.7)

## 2024-01-08 PROCEDURE — 25000003 PHARM REV CODE 250

## 2024-01-08 PROCEDURE — 99232 SBSQ HOSP IP/OBS MODERATE 35: CPT | Mod: ,,, | Performed by: INTERNAL MEDICINE

## 2024-01-08 PROCEDURE — 21400001 HC TELEMETRY ROOM

## 2024-01-08 PROCEDURE — 36415 COLL VENOUS BLD VENIPUNCTURE: CPT | Mod: XB

## 2024-01-08 PROCEDURE — 82550 ASSAY OF CK (CPK): CPT | Performed by: INTERNAL MEDICINE

## 2024-01-08 PROCEDURE — 82550 ASSAY OF CK (CPK): CPT | Mod: 91

## 2024-01-08 PROCEDURE — 80053 COMPREHEN METABOLIC PANEL: CPT

## 2024-01-08 PROCEDURE — A4216 STERILE WATER/SALINE, 10 ML: HCPCS

## 2024-01-08 PROCEDURE — 97530 THERAPEUTIC ACTIVITIES: CPT | Mod: CO

## 2024-01-08 PROCEDURE — 85025 COMPLETE CBC W/AUTO DIFF WBC: CPT

## 2024-01-08 PROCEDURE — 63600175 PHARM REV CODE 636 W HCPCS

## 2024-01-08 PROCEDURE — 83735 ASSAY OF MAGNESIUM: CPT

## 2024-01-08 PROCEDURE — 93005 ELECTROCARDIOGRAM TRACING: CPT

## 2024-01-08 PROCEDURE — 27000207 HC ISOLATION

## 2024-01-08 PROCEDURE — 36415 COLL VENOUS BLD VENIPUNCTURE: CPT

## 2024-01-08 PROCEDURE — 02HV33Z INSERTION OF INFUSION DEVICE INTO SUPERIOR VENA CAVA, PERCUTANEOUS APPROACH: ICD-10-PCS | Performed by: INTERNAL MEDICINE

## 2024-01-08 PROCEDURE — C1751 CATH, INF, PER/CENT/MIDLINE: HCPCS

## 2024-01-08 PROCEDURE — 36569 INSJ PICC 5 YR+ W/O IMAGING: CPT

## 2024-01-08 PROCEDURE — 93010 ELECTROCARDIOGRAM REPORT: CPT | Mod: ,,, | Performed by: INTERNAL MEDICINE

## 2024-01-08 PROCEDURE — 97530 THERAPEUTIC ACTIVITIES: CPT | Mod: CQ

## 2024-01-08 PROCEDURE — 25000003 PHARM REV CODE 250: Performed by: INTERNAL MEDICINE

## 2024-01-08 PROCEDURE — 25000003 PHARM REV CODE 250: Performed by: STUDENT IN AN ORGANIZED HEALTH CARE EDUCATION/TRAINING PROGRAM

## 2024-01-08 PROCEDURE — 63600175 PHARM REV CODE 636 W HCPCS: Mod: JZ,JG | Performed by: INTERNAL MEDICINE

## 2024-01-08 RX ORDER — SODIUM CHLORIDE 0.9 % (FLUSH) 0.9 %
10 SYRINGE (ML) INJECTION EVERY 6 HOURS
Status: DISCONTINUED | OUTPATIENT
Start: 2024-01-08 | End: 2024-01-10 | Stop reason: HOSPADM

## 2024-01-08 RX ORDER — IBUPROFEN 200 MG
16 TABLET ORAL
Status: DISCONTINUED | OUTPATIENT
Start: 2024-01-08 | End: 2024-01-09

## 2024-01-08 RX ORDER — SODIUM CHLORIDE 0.9 % (FLUSH) 0.9 %
10 SYRINGE (ML) INJECTION
Status: DISCONTINUED | OUTPATIENT
Start: 2024-01-08 | End: 2024-01-10 | Stop reason: HOSPADM

## 2024-01-08 RX ORDER — INSULIN ASPART 100 [IU]/ML
0-5 INJECTION, SOLUTION INTRAVENOUS; SUBCUTANEOUS
Status: DISCONTINUED | OUTPATIENT
Start: 2024-01-08 | End: 2024-01-09

## 2024-01-08 RX ORDER — IBUPROFEN 200 MG
24 TABLET ORAL
Status: DISCONTINUED | OUTPATIENT
Start: 2024-01-08 | End: 2024-01-09

## 2024-01-08 RX ORDER — INSULIN ASPART 100 [IU]/ML
0-10 INJECTION, SOLUTION INTRAVENOUS; SUBCUTANEOUS ONCE
Status: DISCONTINUED | OUTPATIENT
Start: 2024-01-08 | End: 2024-01-08

## 2024-01-08 RX ORDER — GLUCAGON 1 MG
1 KIT INJECTION
Status: DISCONTINUED | OUTPATIENT
Start: 2024-01-08 | End: 2024-01-09

## 2024-01-08 RX ADMIN — METOCLOPRAMIDE 5 MG: 5 TABLET ORAL at 11:01

## 2024-01-08 RX ADMIN — SODIUM CHLORIDE, PRESERVATIVE FREE 10 ML: 5 INJECTION INTRAVENOUS at 06:01

## 2024-01-08 RX ADMIN — DAPTOMYCIN 945 MG: 500 INJECTION, POWDER, LYOPHILIZED, FOR SOLUTION INTRAVENOUS at 03:01

## 2024-01-08 RX ADMIN — INSULIN ASPART 1 UNITS: 100 INJECTION, SOLUTION INTRAVENOUS; SUBCUTANEOUS at 09:01

## 2024-01-08 RX ADMIN — SODIUM CHLORIDE, POTASSIUM CHLORIDE, SODIUM LACTATE AND CALCIUM CHLORIDE 1000 ML: 600; 310; 30; 20 INJECTION, SOLUTION INTRAVENOUS at 09:01

## 2024-01-08 RX ADMIN — CEFTAROLINE FOSAMIL 600 MG: 600 POWDER, FOR SOLUTION INTRAVENOUS at 05:01

## 2024-01-08 RX ADMIN — CEFTAROLINE FOSAMIL 600 MG: 600 POWDER, FOR SOLUTION INTRAVENOUS at 03:01

## 2024-01-08 RX ADMIN — PANTOPRAZOLE SODIUM 40 MG: 40 TABLET, DELAYED RELEASE ORAL at 09:01

## 2024-01-08 RX ADMIN — INSULIN DETEMIR 15 UNITS: 100 INJECTION, SOLUTION SUBCUTANEOUS at 12:01

## 2024-01-08 RX ADMIN — RIVAROXABAN 10 MG: 10 TABLET, FILM COATED ORAL at 06:01

## 2024-01-08 RX ADMIN — METOCLOPRAMIDE 5 MG: 5 TABLET ORAL at 06:01

## 2024-01-08 RX ADMIN — OXYCODONE HYDROCHLORIDE 5 MG: 5 TABLET ORAL at 06:01

## 2024-01-08 RX ADMIN — INSULIN DETEMIR 15 UNITS: 100 INJECTION, SOLUTION SUBCUTANEOUS at 09:01

## 2024-01-08 RX ADMIN — METOPROLOL SUCCINATE 25 MG: 25 TABLET, EXTENDED RELEASE ORAL at 09:01

## 2024-01-08 RX ADMIN — INSULIN ASPART 2 UNITS: 100 INJECTION, SOLUTION INTRAVENOUS; SUBCUTANEOUS at 05:01

## 2024-01-08 RX ADMIN — CEFTAROLINE FOSAMIL 600 MG: 600 POWDER, FOR SOLUTION INTRAVENOUS at 10:01

## 2024-01-08 NOTE — PLAN OF CARE
"   01/08/24 0923   Post-Acute Status   Post-Acute Authorization Placement   Post-Acute Placement Status Pending medical clearance/testing  (Pending final ID cultures for transfer to [facility]. ID following. Pt anticipated to need 6wks of IV ABX.)   Discharge Delays (!) Procedure Scheduling (IR, OR, Labs, Echo, Cath, Echo, EEG)  (Pending final wound cultures)   Discharge Plan   Discharge Plan A Rehab     0933 am - Spoke with spouse via telephone return call request. Updated to dispo.      Future Appointments   Date Time Provider Department Center   1/31/2024  2:00 PM Salvador Tapia MD [facility] CARD[facility]   2/20/2024 11:00 AM Lawrence Magana MD [facility] [place]     /81   Pulse 87   Temp 98.2 °F (36.8 °C)   Resp 20   Ht 6' 1" (1.854 m)   Wt 115.1 kg (253 lb 12 oz)   SpO2 98%   BMI 33.48 kg/m²      ceftaroline fosamiL (TEFLARO) 600 mg in dextrose 5 % in water (D5W) 50 mL IVPB (MB+)  600 mg Intravenous Q8H    DAPTOmycin (CUBICIN) IV (PEDS and ADULTS)  10 mg/kg (Adjusted) Intravenous Q24H    insulin detemir U-100  15 Units Subcutaneous QHS    lactated ringers  1,000 mL Intravenous Once    metoclopramide HCl  5 mg Oral TID AC    metoprolol succinate  25 mg Oral Daily    pantoprazole  40 mg Oral Daily    polyethylene glycol  17 g Oral Daily    rivaroxaban  10 mg Oral Daily with dinner     Consults (From admission, onward)          Status Ordering Provider     Inpatient consult to Registered Dietitian/Nutritionist  Once        Provider:  (Not yet assigned)    Completed ISAI MCKINNEY     [facility]  Once        Provider:  Salvador Tapia MD    Completed EDIN TEMPLE     Inpatient consult to Psychiatry  Once        Provider:  Abdoulaye Londono MD    Completed JESSICA SANTIAGO     Inpatient consult to Infectious Diseases  Once        Provider:  (Not yet assigned)    Completed JESSICA SANTIAGO     Inpatient consult to Hematology/Oncology  Once        Provider:  (Not yet " assigned)    Completed EDIN TEMPLE

## 2024-01-08 NOTE — PLAN OF CARE
Recommendations  Recommendation:   1. Continue Diabetic/cardiac diet   2. Continue Boost Glucose Control TID    3. Monitor need for additional protein  4. Monitor weight and labs    Goals: Pt will consume 50-75% of meals by RD follow up    Nutrition Goal Status: new  Communication of RD Recs: other (comment) (POC)

## 2024-01-08 NOTE — PROCEDURES
"Kulwant Mueller Jr. is a 38 y.o. male patient.    Temp: 98.1 °F (36.7 °C) (01/08/24 1155)  Pulse: 94 (01/08/24 1155)  Resp: 20 (01/08/24 1155)  BP: (!) 150/87 (01/08/24 1155)  SpO2: 97 % (01/08/24 1155)  Weight: 115.1 kg (253 lb 12 oz) (01/08/24 1219)  Height: 6' 1" (185.4 cm) (01/08/24 1219)    PICC  Date/Time: 1/8/2024 2:05 PM  Performed by: Rafal Hernandez RN  Consent Done: Yes  Time out: Immediately prior to procedure a time out was called to verify the correct patient, procedure, equipment, support staff and site/side marked as required  Indications: med administration  Anesthesia: local infiltration  Local anesthetic: lidocaine 1% without epinephrine  Anesthetic Total (mL): 1  Preparation: skin prepped with ChloraPrep  Skin prep agent dried: skin prep agent completely dried prior to procedure  Sterile barriers: all five maximum sterile barriers used - cap, mask, sterile gown, sterile gloves, and large sterile sheet  Hand hygiene: hand hygiene performed prior to central venous catheter insertion  Location details: right basilic  Catheter type: double lumen  Catheter size: 5 Fr  Catheter Length: 43cm    Ultrasound guidance: yes  Vessel Caliber: large and patent, compressibility normal  Needle advanced into vessel with real time Ultrasound guidance.  Guidewire confirmed in vessel.  Sterile sheath used.  Number of attempts: 1  Post-procedure: blood return through all ports, chlorhexidine patch and sterile dressing applied  Estimated blood loss (mL): 0            Name Rafal Hernandez   1/8/2024    "

## 2024-01-08 NOTE — PLAN OF CARE
Problem: Physical Therapy  Goal: Physical Therapy Goal  Description: Goals to be met by: 2024     Patient will increase functional independence with mobility by performin. Supine to sit with Modified [place]  2. Sit to supine with Modified [place]  3. Rolling to Left and Right with Modified [place].  4. Sit to stand transfer with Modified [place]  5. Bed to chair transfer with Modified [place] using Rolling Walker  6. Gait  x 150 feet with Modified [place] using Rolling Walker.   7. Ascend/descend 1 stair with Modified [place] using Rolling Walker.     Outcome: Ongoing, Progressing

## 2024-01-08 NOTE — PT/OT/SLP PROGRESS
Physical Therapy Treatment    Patient Name:  Kulwant Mueller Jr.   MRN:  [id number]    Recommendations:     Discharge Recommendations: High Intensity Therapy  Discharge Equipment Recommendations:  (TBD)  Barriers to discharge:  decreased mobility,strength and endurance    Assessment:     Kulwant Mueller Jr. is a 38 y.o. male admitted with a medical diagnosis of Staphylococcus aureus bacteremia.  He presents with the following impairments/functional limitations: weakness, impaired endurance, impaired functional mobility, gait instability, impaired balance, decreased lower extremity function, pain, decreased ROM, impaired skin, impaired joint extensibility, orthopedic precautions,pt with good participation and requires assistance with all mobility at this time,pt will benefit from high intensity therapy upon discharge.    Rehab Prognosis: Fair; patient would benefit from acute skilled PT services to address these deficits and reach maximum level of function.    Recent Surgery: Procedure(s) (LRB):  ECHOCARDIOGRAM, TRANSESOPHAGEAL (N/A) 5 Days Post-Op    Plan:     During this hospitalization, patient to be seen 5 x/week to address the identified rehab impairments via gait training, therapeutic activities, therapeutic exercises, neuromuscular re-education and progress toward the following goals:    Plan of Care Expires:  01/31/24    Subjective     Chief Complaint: n/a  Patient/Family Comments/goals: pt agreeable to rx  Pain/Comfort:  Pain Rating 1: 6/10  Location - Side 1: Left  Location 1: knee  Pain Addressed 1: Reposition, Distraction, Cessation of Activity      Objective:     Communicated with nsg. prior to session.  Patient found supine with bed alarm, pressure relief boots, peripheral IV, telemetry upon PT entry to room.     General Precautions: Standard, contact, fall  Orthopedic Precautions:  (No restrictions per Dr. Bocanegra. Wound care nurse recommends limiting pressure through R heel encouraging weight  bearing through heel)  Braces:  (RLE Darco shoe)  Respiratory Status: Room air     Functional Mobility:  Bed Mobility:     Supine to Sit: stand by assistance  Sit to Supine: moderate assistance and at le's  Transfers:     Sit to Stand:  moderate assistance, of 2 persons, and X 2 trials with rolling walker  Balance: fair standing balance with RW      AM-PAC 6 CLICK MOBILITY  Turning over in bed (including adjusting bedclothes, sheets and blankets)?: 3  Sitting down on and standing up from a chair with arms (e.g., wheelchair, bedside commode, etc.): 2  Moving from lying on back to sitting on the side of the bed?: 3  Moving to and from a bed to a chair (including a wheelchair)?: 3  Need to walk in hospital room?: 1  Climbing 3-5 steps with a railing?: 1  Basic Mobility Total Score: 13       Treatment & Education: pt sat EOB ~ 20 mins with S,pt wears R le Darco shoe with standing.      Patient left supine with all lines intact, call button in reach, and bed alarm on..    GOALS: see general POC  Multidisciplinary Problems       Physical Therapy Goals          Problem: Physical Therapy    Goal Priority Disciplines Outcome Goal Variances Interventions   Physical Therapy Goal     PT, PT/OT Ongoing, Progressing     Description: Goals to be met by: 2024     Patient will increase functional independence with mobility by performin. Supine to sit with Modified [place]  2. Sit to supine with Modified [place]  3. Rolling to Left and Right with Modified [place].  4. Sit to stand transfer with Modified [place]  5. Bed to chair transfer with Modified [place] using Rolling Walker  6. Gait  x 150 feet with Modified [place] using Rolling Walker.   7. Ascend/descend 1 stair with Modified [place] using Rolling Walker.                          Time Tracking:     PT Received On: 24  PT Start Time: 1105     PT Stop Time: 1131  PT Total Time (min): 26 min     Billable Minutes: Therapeutic  Activity 26       PT/PTA: PTA     Number of PTA visits since last PT visit: 2     01/08/2024

## 2024-01-08 NOTE — PLAN OF CARE
Pt is AAOx4. Pt reported no pain during shift. Pt experienced nausea and vomiting, PRN zofran administered in addition to scheduled reglan. Magnesium administered. Antibx and care administered as ordered.

## 2024-01-08 NOTE — PLAN OF CARE
Patient is AAOx4.  No complains of nausea or pain. Heart and glucose monitoring continued. Dressing changed on Rt heel. IV abx given. On contact precautions. Dressing on left knee CDI. Call light within reach. Urinal within reach. Bed alarm set.

## 2024-01-08 NOTE — PLAN OF CARE
AAOx4. RA. No c/o of pain or nausea. Medications administered per MAR. PICC line placed in right upper arm. Seen by PT/OT. Contact precautions maintained. Tele monitor in place. LR Bolus administered. Glucose monitored. Safety maintained. Call light within reach. Bed alarm active. Pt encouraged to call for assistance.         Problem: Diabetes Comorbidity  Goal: Blood Glucose Level Within Targeted Range  Outcome: Ongoing, Progressing     Problem: Skin Injury Risk Increased  Goal: Skin Health and Integrity  Outcome: Ongoing, Progressing     Problem: Impaired Wound Healing  Goal: Optimal Wound Healing  Outcome: Ongoing, Progressing     Problem: Infection  Goal: Absence of Infection Signs and Symptoms  Outcome: Ongoing, Progressing

## 2024-01-08 NOTE — PROGRESS NOTES
[facility]  Infectious Disease  Progress Note    Patient Name: Kulwant Mueller Jr.  MRN: [id number]  Admission Date: 12/27/2023  Length of Stay: 11 days  Attending Physician: Jazmin Gooed MD  Primary Care Provider: Shellie, Primary Doctor    Isolation Status: Contact  Assessment/Plan:      ID  * Staphylococcus aureus bacteremia  38 y.o. 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      -repeat blood cxs daily to document clearance  -TTE and ROBY without vegetation  -continue dapto x 4 weeks of therapy from 1/7 (stop date is 2/6)  -check weekly CK while on dapto  -continue ceftaroline for now (can stop when blood cultures are clear)  -s/p let knee drainage foor septic arthritis   -MRI right foot without osteo  -CT of left shoulder without septic arthritis   -ID will sign off          Thank you for your consult. I will sign off. Please contact us if you have any additional questions.    Marciano Millan MD  Infectious Disease  [facility] Surg    Subjective:     Principal Problem:Staphylococcus aureus bacteremia    HPI: 38 y.o. 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 The patient presented to [facility] on 12/27/2023 with a primary complaint of Intractable N/V and diarrhea for over 6 days, generalized weakness for 6 days. He was in his usual state of health until around 1 week ago when he developed NBNB N/V and decreased oral intake. His family members had recently been ill as well, but have tested negative for COVID/flu. He reports he continued to have N/V whether or not he was able to have any oral intake. He presented to an ER for evaluation yesterday, where it was noted he had a WBC 33K, with an unremarkable CT A/P with negative  COVID/flu. He was discharged with GI follow-up, however at the appointment today, there was concern for elevated WBC, and patient was instructed to present to the ER for further evaluation. Does endorse some abdominal discomfort and 1 episode of non-mucoid non-bloody diarrhea this afternoon. Denies  fevers, chills, shortness of breath, chest pain, palpitations, dysuria. He was admitted for DKA and found to have MRSa bacteremia. Initially source was thought to be right foot heel ulcer but bedside US was concerning for left knee bursitis.   Interval History: Blood cxs from 1/5 are 1/2 positvive. NGTD from 1/7    Review of Systems   Constitutional:  Positive for fatigue and fever.   HENT:  Negative for sore throat.    Eyes:  Negative for visual disturbance.   Respiratory:  Negative for shortness of breath.    Cardiovascular:  Negative for chest pain.   Gastrointestinal:  Positive for diarrhea, nausea and vomiting. Negative for abdominal pain.   Genitourinary:  Negative for dysuria.   Musculoskeletal:  Negative for back pain.   Skin:  Positive for wound. Negative for rash.   Neurological:  Positive for weakness. Negative for headaches.   Hematological:  Negative for adenopathy.   Psychiatric/Behavioral:  The patient is not nervous/anxious.      Objective:     Vital Signs (Most Recent):  Temp: 98.1 °F (36.7 °C) (01/08/24 1155)  Pulse: 94 (01/08/24 1155)  Resp: 20 (01/08/24 1155)  BP: (!) 150/87 (01/08/24 1155)  SpO2: 97 % (01/08/24 1155) Vital Signs (24h Range):  Temp:  [97.8 °F (36.6 °C)-98.2 °F (36.8 °C)] 98.1 °F (36.7 °C)  Pulse:  [] 94  Resp:  [16-20] 20  SpO2:  [94 %-98 %] 97 %  BP: (112-150)/(63-87) 150/87     Weight: 115.1 kg (253 lb 12 oz)  Body mass index is 33.48 kg/m².    Estimated Creatinine Clearance: 145.1 mL/min (based on SCr of 0.9 mg/dL).     Physical Exam  Vitals and nursing note reviewed.   Constitutional:       Appearance: He is well-developed.      Comments: Fatigued, uncomfortable.   HENT:       Head: Normocephalic and atraumatic.   Eyes:      Pupils: Pupils are equal, round, and reactive to light.   Cardiovascular:      Rate and Rhythm: Regular rhythm. Tachycardia present.   Pulmonary:      Effort: Pulmonary effort is normal.      Breath sounds: Normal breath sounds.   Abdominal:      General: Bowel sounds are normal.      Palpations: Abdomen is soft.   Musculoskeletal:         General: Normal range of motion.      Cervical back: Normal range of motion and neck supple.      Comments: Left knee with bandage   Neurological:      Mental Status: He is alert and oriented to person, place, and time.   Psychiatric:         Behavior: Behavior normal.         Thought Content: Thought content normal.         Judgment: Judgment normal.          Significant Labs: All pertinent labs within the past 24 hours have been reviewed.    Significant Imaging: I have reviewed all pertinent imaging results/findings within the past 24 hours.

## 2024-01-08 NOTE — SUBJECTIVE & OBJECTIVE
Interval History: Blood cxs from 1/5 are 1/2 positvive. NGTD from 1/7    Review of Systems   Constitutional:  Positive for fatigue and fever.   HENT:  Negative for sore throat.    Eyes:  Negative for visual disturbance.   Respiratory:  Negative for shortness of breath.    Cardiovascular:  Negative for chest pain.   Gastrointestinal:  Positive for diarrhea, nausea and vomiting. Negative for abdominal pain.   Genitourinary:  Negative for dysuria.   Musculoskeletal:  Negative for back pain.   Skin:  Positive for wound. Negative for rash.   Neurological:  Positive for weakness. Negative for headaches.   Hematological:  Negative for adenopathy.   Psychiatric/Behavioral:  The patient is not nervous/anxious.      Objective:     Vital Signs (Most Recent):  Temp: 98.1 °F (36.7 °C) (01/08/24 1155)  Pulse: 94 (01/08/24 1155)  Resp: 20 (01/08/24 1155)  BP: (!) 150/87 (01/08/24 1155)  SpO2: 97 % (01/08/24 1155) Vital Signs (24h Range):  Temp:  [97.8 °F (36.6 °C)-98.2 °F (36.8 °C)] 98.1 °F (36.7 °C)  Pulse:  [] 94  Resp:  [16-20] 20  SpO2:  [94 %-98 %] 97 %  BP: (112-150)/(63-87) 150/87     Weight: 115.1 kg (253 lb 12 oz)  Body mass index is 33.48 kg/m².    Estimated Creatinine Clearance: 145.1 mL/min (based on SCr of 0.9 mg/dL).     Physical Exam  Vitals and nursing note reviewed.   Constitutional:       Appearance: He is well-developed.      Comments: Fatigued, uncomfortable.   HENT:      Head: Normocephalic and atraumatic.   Eyes:      Pupils: Pupils are equal, round, and reactive to light.   Cardiovascular:      Rate and Rhythm: Regular rhythm. Tachycardia present.   Pulmonary:      Effort: Pulmonary effort is normal.      Breath sounds: Normal breath sounds.   Abdominal:      General: Bowel sounds are normal.      Palpations: Abdomen is soft.   Musculoskeletal:         General: Normal range of motion.      Cervical back: Normal range of motion and neck supple.      Comments: Left knee with bandage   Neurological:       Mental Status: He is alert and oriented to person, place, and time.   Psychiatric:         Behavior: Behavior normal.         Thought Content: Thought content normal.         Judgment: Judgment normal.          Significant Labs: All pertinent labs within the past 24 hours have been reviewed.    Significant Imaging: I have reviewed all pertinent imaging results/findings within the past 24 hours.

## 2024-01-08 NOTE — PROGRESS NOTES
[facility] IM Resident HO-1 Progress Note    Subjective:      Kulwant Mueller Jr. is a 38 y.o. 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 Presented  with DKA found to have MRSA bacteremia.        Resting comfortably on morning exam. Stated he vomitted 6x yesterday small volume not associated with eating or drinking. Unable to describe its appearance. Bowel movement last night. No other complaints.     Objective:   Last 24 Hour Vital Signs:  BP  Min: 112/63  Max: 146/80  Temp  Av °F (36.7 °C)  Min: 97.8 °F (36.6 °C)  Max: 98.1 °F (36.7 °C)  Pulse  Av.5  Min: 84  Max: 103  Resp  Av.3  Min: 16  Max: 20  SpO2  Av.7 %  Min: 94 %  Max: 97 %  I/O last 3 completed shifts:  In: 544.2 [P.O.:300; I.V.:49.1; IV Piggyback:195.2]  Out: 2450 [Urine:2450]    Physical Examination:   Gen: No acute distress, comfortable appearing  HEENT: NC/AT, EOMI grossly, moist MM  CV:  regular rate and rhythm, no murmurs, rubs, or gallop  Resp: CTAB. No obvious crackles or wheezes  Abd: Soft, nontender, nondistended  MSK/Ext: ROM in all 4 extremities grossly intact. L knee bandage c/d/I without warmth or drainage. L heel wound without drainage or erythema.   Neuro: no focal deficits   Skin: Warm, dry    Laboratory:  Laboratory Data Reviewed: yes  Pertinent Findings:  Trended Lab Data:  reviewed  Microbiology Data Reviewed: yes  Pertinent Findings:  reviewed    Other Results:  EKG (my interpretation):none new   Radiology Data Reviewed: yes  Pertinent Findings:    Current Medications:     Infusions:       Scheduled:   ceftaroline fosamiL (TEFLARO) 600 mg in dextrose 5 % in water (D5W) 50 mL IVPB (MB+)  600 mg Intravenous Q8H    DAPTOmycin (CUBICIN) IV (PEDS and ADULTS)  10 mg/kg (Adjusted) Intravenous Q24H    insulin detemir U-100  15 Units Subcutaneous Once    metoclopramide HCl  5 mg Oral TID AC    metoprolol succinate  25 mg Oral Daily    pantoprazole   40 mg Oral Daily    polyethylene glycol  17 g Oral Daily    rivaroxaban  10 mg Oral Daily with dinner        PRN:  dextrose 10 % in water (D10W), dextrose 10%, dextrose 10%, droPERidol, glucagon (human recombinant), glucose, glucose, insulin aspart U-100, melatonin, oxyCODONE, scopolamine, senna, sodium chloride 0.9%    Antibiotics and Day Number of Therapy:  Vanc 12/27/23-1/1/24  Zosyn 12/27/23-12/29/23  Daptomycin 1/1/24 - present  Ceftaroline 1/4/24-present     Assessment:     Kulwant Mueller Jr. is a 38 y.o. 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 The patient presented to [facility] on 12/27/2023 with a primary complaint of Intractable N/V and diarrhea for over 6 days, generalized weakness for 6 days. Admitted for DKA, which has now resolved s/p insulin gtt. Found to have MRSA bacteremia, suspect source L knee septic arthritis/bursitis.      Plan:     MRSA bacteremia   2/2 Septic arthritis/bursitis of L knee  MRSA UTI  WBC 47K with neutrophil predominance and patient febrile on admission. Suspect leukocytosis 2/2 DKA and bacteremia. L knee pain washed out by ortho with positive cx. L shoulder aspirated imaging without evidence of infection. R heel without osteo but has cellulitis, myositis, tenosynovitis. ROBY negative for vegetation. Believe we have source control at this time given patient stopped fevering but continuing to monitor for manifestations of infection.   - s/p L knee arthrotomy with synovectomy on 12/29. Cx with staph aureus  - L shoulder aspirated 1/2 w/o signs of infection   - WBC downtrending   - ID on board; switched to daptomycin on 1/1/24  w/weekly CK  - 1/4 added ceftaroline adjunct therapy   - daily blood cultures to document clearance  - oxycodone 5mg q6h prn for pain     Elevated CK   Mildly elevated CK likely secondary to daptomycin  - 1L LR this morning   - will recheck this  afternoon    Transaminitis   Admitted with ast/alt wnl. Now continues to rise.   - CT abdomen pelvis on admission without acute abnormalities  - hepatitis panel negative   - discontinued percocet; oxycodone for pain   - RUQ ultrasound with hepatomegaly and biliary sludge; but without other obvious abnormalities  -AST/ALT/Alk phos improved or stable from day prior; continue to avoid hepato-toxic agents and monitor     Intractable N/V  Hx Gastroparesis ?  was being worked up for gastroparesis outpatient, unable to complete testing due to vomitting. Sx lasted for several weeks and spontaneously resolved. has been taking reglan outpatient, but is unsure if it does anything to help him.   - continue home metoclopramide and PPI  - zofran not helping with N/V. PRN droperidol and scopolamine ordered.   - QTc 448, caution in QTc prolonging agents     DKA - resolved  DM2 with long-term use insulin  AG 31, BHB elevated, with bicarb 9 and elevated blood glucose; UA with ketones. suspect due to intractable N/V, infection  Home regimen: tresiba 55u daily, sliding scale humalog, jardiance 25mg daily  - one isolated episode of asx hypoglycemia responsive to food on 1/7  - DKA resolved s/p insulin gtt   - A1c 7.4 on 12/28, prior A1c as high as 14  - continue levemir 35u BID + SSI     Newly dx HFmrEF  12/29 TTE: EF 48%  - start metoprolol succinate 12.5mg daily --> 25mg   - already on jardiance at home     Acute kidney injury, resolved  - on admit, Cr 1.9; baseline 1  - suspect prerenal due to decreased PO intake  - Cr improving with IVF, currently at baseline     Chronic R heel wound  12/27 XR right foot with no osseous abnormality; has been following with wound care and has been off PO antibiotics for at least 1 month with no drainage or swelling from wound site  - MRI R hindfoot ordered; no osteomyelitis   - wound care consulted     May-Thurner syndrome  Peripheral vascular disease  Hx May-Thurner syndrome and was following with  Dr. Duff, who had kept patient on xarelto 10 mg daily. Patient reports has not been able to take xarelto for past 1-2 weeks 2' inability to tolerate PO. Previous n/v w heparin ggt but pharmacy confirmed has tolerated lovenox in the past.   - DVT US BLE negative. DVT BUE with thrombus within 1 of the right paired brachial veins  - started full dose lovenox  - transition back to home Xarelto      HTN  patient reports not currently on any anti-hypertensive regimen  - BP should improve with GDMT      Concern for depression  patient noted to have flat affect, decreased motivation, sleeping for several hours during the day since admission, decreased appetite. Per wife, she has been concerned there may be underlying depression.   - psych consulted. Recommended cymbalta but patient declined.      Diet: diabetic  VTE PPx: Xarelto  Code Status: full     Dispo: pending clearance of cultures, IPR with 6 weeks abx    Aria Kelley M.D.   [facility] Internal Medicine HO-I    [facility] Medicine Hospitalist Pager numbers:   [facility] Hospitalist Medicine Team A (Russel/Emmanuel): [phone]  [facility] Hospitalist Medicine Team B (Odin/Kameron):  [phone]

## 2024-01-08 NOTE — ASSESSMENT & PLAN NOTE
38 y.o. 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      -repeat blood cxs daily to document clearance  -TTE and ROBY without vegetation  -continue dapto x 4 weeks of therapy from 1/7 (stop date is 2/6)  -check weekly CK while on dapto  -continue ceftaroline for now (can stop when blood cultures are clear)  -s/p let knee drainage foor septic arthritis   -MRI right foot without osteo  -CT of left shoulder without septic arthritis   -ID will sign off

## 2024-01-08 NOTE — PT/OT/SLP PROGRESS
Occupational Therapy   Treatment    Name: Kulwant Mueller Jr.  MRN: [id number]  Admitting Diagnosis:  Staphylococcus aureus bacteremia  5 Days Post-Op    Recommendations:     Discharge Recommendations: High Intensity Therapy  Discharge Equipment Recommendations:  to be determined by next level of care  Barriers to discharge:  Other (Comment) (Increased level of assistance)    Assessment:     Kulwant Mueller Jr. is a 38 y.o. male with a medical diagnosis of Staphylococcus aureus bacteremia.  Performance deficits affecting function are weakness, impaired endurance, impaired self care skills, impaired functional mobility, gait instability, impaired balance, decreased upper extremity function, decreased lower extremity function, pain, decreased ROM, impaired skin, edema, orthopedic precautions, impaired joint extensibility.     Pt found in bed, agreeable to therapy.  Pt with improved performance and tolerance of therapy despite pain. Continue OT services to address functional goals, progressing as able.      Rehab Prognosis:  Good; patient would benefit from acute skilled OT services to address these deficits and reach maximum level of function.       Plan:     Patient to be seen 5 x/week to address the above listed problems via self-care/home management, therapeutic activities, therapeutic exercises  Plan of Care Expires:    Plan of Care Reviewed with: patient    Subjective     Chief Complaint: L knee pain   Patient/Family Comments/goals: to return to PLOF  Pain/Comfort:  Pain Rating 1: 6/10  Location - Side 1: Left  Location - Orientation 1: generalized  Location 1: knee  Pain Addressed 1: Reposition, Distraction, Cessation of Activity  Pain Rating Post-Intervention 1: 6/10    Objective:     Communicated with: RN prior to session.  Patient found HOB elevated with bed alarm, peripheral IV, pressure relief boots, telemetry upon OT entry to room.    General Precautions: Standard, contact, fall    Orthopedic  "Precautions: (no restrictions per Dr. Bocanegra; wound care nurse recommends limiting pressure through R heel (encourage WB to forefoot))  Braces:  (DARCO shoe RLE)  Respiratory Status: Room air     Occupational Performance:     Bed Mobility:    Patient completed Rolling/Turning to Left with stand by assistance  Patient completed Scooting/Bridging with stand by assistance supine to HOB  Patient completed Supine to Sit with stand by assistance, HOB elevated, increased time and effort, vc's for effective technique  Patient completed Sit to Supine with moderate assistance for BLEs    Functional Mobility/Transfers:  Patient completed Sit <> Stand Transfer with moderate assistance and of 2 persons with rolling walker x 2 trials with vcs for hand placement/effect tech. Bed height elevated.   Functional Mobility: Pt took 3 steps for/back and laterally with CGA using RW on second stand.     Activities of Daily Living:  Grooming: modified independence seated EOB with Supervision   Lower Body Dressing: maximal assistance. Pt declined to attempt to bed over to don shoe 2/2 pain.       [facility] 6 Click ADL: 17    Treatment & Education:  Pt sat EOB ~20 min with Supervision while performing BLE AROM and G/H task.    1st stand, pt stood ~30" and 2nd stand, pt took steps.  B knees slightly flexed.      Patient left HOB elevated with all lines intact, call button in reach, and bed alarm on    GOALS:   Multidisciplinary Problems       Occupational Therapy Goals          Problem: Occupational Therapy    Goal Priority Disciplines Outcome Interventions   Occupational Therapy Goal     OT, PT/OT Ongoing, Progressing    Description: Goals to be met by: 2/3/2024     Patient will increase functional independence with ADLs by performing:    LE Dressing with Minimal Assistance.  Toileting from bedside commode with Stand-by Assistance for hygiene and clothing management.   Supine to sit with Modified [place].  Stand pivot transfers with " Supervision.  Step transfer with Contact Guard Assistance & appropriate AD.  Toilet transfer to bedside commode with Contact Guard Assistance & appropriate AD.                         Time Tracking:     OT Date of Treatment: 01/08/24  OT Start Time: 1105  OT Stop Time: 1131  OT Total Time (min): 26 min    Billable Minutes:Therapeutic Activity 26 1/8/2024

## 2024-01-08 NOTE — PLAN OF CARE
Problem: Occupational Therapy  Goal: Occupational Therapy Goal  Description: Goals to be met by: 2/3/2024     Patient will increase functional independence with ADLs by performing:    LE Dressing with Minimal Assistance.  Toileting from bedside commode with Stand-by Assistance for hygiene and clothing management.   Supine to sit with Modified [place].  Stand pivot transfers with Supervision.  Step transfer with Contact Guard Assistance & appropriate AD.  Toilet transfer to bedside commode with Contact Guard Assistance & appropriate AD.    Outcome: Ongoing, Progressing   Kulwant Menardcaro Benítez is a 38 y.o. male with a medical diagnosis of Staphylococcus aureus bacteremia.  Performance deficits affecting function are weakness, impaired endurance, impaired self care skills, impaired functional mobility, gait instability, impaired balance, decreased upper extremity function, decreased lower extremity function, pain, decreased ROM, impaired skin, edema, orthopedic precautions, impaired joint extensibility.     Pt found in bed, agreeable to therapy.  Pt with improved performance and tolerance of therapy despite pain. Continue OT services to address functional goals, progressing as able.

## 2024-01-09 PROBLEM — E11.10 DKA (DIABETIC KETOACIDOSIS): Status: RESOLVED | Noted: 2018-09-02 | Resolved: 2024-01-09

## 2024-01-09 PROBLEM — A41.9 SEVERE SEPSIS: Status: RESOLVED | Noted: 2023-12-30 | Resolved: 2024-01-09

## 2024-01-09 PROBLEM — R65.20 SEVERE SEPSIS: Status: RESOLVED | Noted: 2023-12-30 | Resolved: 2024-01-09

## 2024-01-09 PROBLEM — R74.01 TRANSAMINITIS: Status: ACTIVE | Noted: 2024-01-09

## 2024-01-09 LAB
ALBUMIN SERPL BCP-MCNC: 1.4 G/DL (ref 3.5–5.2)
ALP SERPL-CCNC: 153 U/L (ref 55–135)
ALT SERPL W/O P-5'-P-CCNC: 94 U/L (ref 10–44)
ANION GAP SERPL CALC-SCNC: 9 MMOL/L (ref 8–16)
AST SERPL-CCNC: 74 U/L (ref 10–40)
BACTERIA BLD CULT: ABNORMAL
BASOPHILS # BLD AUTO: 0.04 K/UL (ref 0–0.2)
BASOPHILS NFR BLD: 0.2 % (ref 0–1.9)
BILIRUB SERPL-MCNC: 0.5 MG/DL (ref 0.1–1)
BUN SERPL-MCNC: 8 MG/DL (ref 6–20)
CALCIUM SERPL-MCNC: 8.8 MG/DL (ref 8.7–10.5)
CHLORIDE SERPL-SCNC: 95 MMOL/L (ref 95–110)
CO2 SERPL-SCNC: 29 MMOL/L (ref 23–29)
CREAT SERPL-MCNC: 0.9 MG/DL (ref 0.5–1.4)
DIFFERENTIAL METHOD BLD: ABNORMAL
EOSINOPHIL # BLD AUTO: 0.1 K/UL (ref 0–0.5)
EOSINOPHIL NFR BLD: 0.4 % (ref 0–8)
ERYTHROCYTE [DISTWIDTH] IN BLOOD BY AUTOMATED COUNT: 14.2 % (ref 11.5–14.5)
EST. GFR  (NO RACE VARIABLE): >60 ML/MIN/1.73 M^2
GLUCOSE SERPL-MCNC: 187 MG/DL (ref 70–110)
HCT VFR BLD AUTO: 30.1 % (ref 40–54)
HGB BLD-MCNC: 9.3 G/DL (ref 14–18)
IMM GRANULOCYTES # BLD AUTO: 0.11 K/UL (ref 0–0.04)
IMM GRANULOCYTES NFR BLD AUTO: 0.7 % (ref 0–0.5)
LYMPHOCYTES # BLD AUTO: 1.4 K/UL (ref 1–4.8)
LYMPHOCYTES NFR BLD: 8.9 % (ref 18–48)
MAGNESIUM SERPL-MCNC: 1.9 MG/DL (ref 1.6–2.6)
MAGNESIUM SERPL-MCNC: 1.9 MG/DL (ref 1.6–2.6)
MCH RBC QN AUTO: 27.3 PG (ref 27–31)
MCHC RBC AUTO-ENTMCNC: 30.9 G/DL (ref 32–36)
MCV RBC AUTO: 88 FL (ref 82–98)
MONOCYTES # BLD AUTO: 0.8 K/UL (ref 0.3–1)
MONOCYTES NFR BLD: 4.7 % (ref 4–15)
NEUTROPHILS # BLD AUTO: 13.7 K/UL (ref 1.8–7.7)
NEUTROPHILS NFR BLD: 85.1 % (ref 38–73)
NRBC BLD-RTO: 0 /100 WBC
PLATELET # BLD AUTO: 492 K/UL (ref 150–450)
PMV BLD AUTO: 9.4 FL (ref 9.2–12.9)
POCT GLUCOSE: 183 MG/DL (ref 70–110)
POCT GLUCOSE: 188 MG/DL (ref 70–110)
POCT GLUCOSE: 237 MG/DL (ref 70–110)
POCT GLUCOSE: 247 MG/DL (ref 70–110)
POTASSIUM SERPL-SCNC: 4.9 MMOL/L (ref 3.5–5.1)
PROT SERPL-MCNC: 7.6 G/DL (ref 6–8.4)
RBC # BLD AUTO: 3.41 M/UL (ref 4.6–6.2)
SODIUM SERPL-SCNC: 133 MMOL/L (ref 136–145)
WBC # BLD AUTO: 16.06 K/UL (ref 3.9–12.7)

## 2024-01-09 PROCEDURE — 97116 GAIT TRAINING THERAPY: CPT | Mod: CQ

## 2024-01-09 PROCEDURE — 97110 THERAPEUTIC EXERCISES: CPT | Mod: CQ

## 2024-01-09 PROCEDURE — 80053 COMPREHEN METABOLIC PANEL: CPT

## 2024-01-09 PROCEDURE — 63600175 PHARM REV CODE 636 W HCPCS

## 2024-01-09 PROCEDURE — 63600175 PHARM REV CODE 636 W HCPCS: Mod: JZ,JG | Performed by: INTERNAL MEDICINE

## 2024-01-09 PROCEDURE — 25000003 PHARM REV CODE 250

## 2024-01-09 PROCEDURE — 85025 COMPLETE CBC W/AUTO DIFF WBC: CPT

## 2024-01-09 PROCEDURE — 83735 ASSAY OF MAGNESIUM: CPT | Performed by: INTERNAL MEDICINE

## 2024-01-09 PROCEDURE — 25000003 PHARM REV CODE 250: Performed by: INTERNAL MEDICINE

## 2024-01-09 PROCEDURE — A4216 STERILE WATER/SALINE, 10 ML: HCPCS

## 2024-01-09 PROCEDURE — 21400001 HC TELEMETRY ROOM

## 2024-01-09 PROCEDURE — 97530 THERAPEUTIC ACTIVITIES: CPT | Mod: CO

## 2024-01-09 PROCEDURE — 27000207 HC ISOLATION

## 2024-01-09 RX ORDER — INSULIN ASPART 100 [IU]/ML
0-5 INJECTION, SOLUTION INTRAVENOUS; SUBCUTANEOUS
Status: DISCONTINUED | OUTPATIENT
Start: 2024-01-09 | End: 2024-01-10 | Stop reason: HOSPADM

## 2024-01-09 RX ORDER — METOPROLOL SUCCINATE 25 MG/1
25 TABLET, EXTENDED RELEASE ORAL DAILY
Qty: 30 TABLET | Refills: 11 | Status: CANCELLED | OUTPATIENT
Start: 2024-01-10 | End: 2025-01-09

## 2024-01-09 RX ORDER — GLUCAGON 1 MG
1 KIT INJECTION
Status: DISCONTINUED | OUTPATIENT
Start: 2024-01-09 | End: 2024-01-10 | Stop reason: HOSPADM

## 2024-01-09 RX ORDER — IBUPROFEN 200 MG
24 TABLET ORAL
Status: DISCONTINUED | OUTPATIENT
Start: 2024-01-09 | End: 2024-01-10 | Stop reason: HOSPADM

## 2024-01-09 RX ORDER — SCOLOPAMINE TRANSDERMAL SYSTEM 1 MG/1
1 PATCH, EXTENDED RELEASE TRANSDERMAL
Start: 2024-01-09

## 2024-01-09 RX ORDER — METOPROLOL SUCCINATE 25 MG/1
12.5 TABLET, EXTENDED RELEASE ORAL DAILY
Qty: 15 TABLET | Refills: 11
Start: 2024-01-10 | End: 2024-01-10

## 2024-01-09 RX ORDER — IBUPROFEN 200 MG
16 TABLET ORAL
Status: DISCONTINUED | OUTPATIENT
Start: 2024-01-09 | End: 2024-01-10 | Stop reason: HOSPADM

## 2024-01-09 RX ADMIN — METOCLOPRAMIDE 5 MG: 5 TABLET ORAL at 06:01

## 2024-01-09 RX ADMIN — SODIUM CHLORIDE, PRESERVATIVE FREE 10 ML: 5 INJECTION INTRAVENOUS at 06:01

## 2024-01-09 RX ADMIN — DAPTOMYCIN 945 MG: 500 INJECTION, POWDER, LYOPHILIZED, FOR SOLUTION INTRAVENOUS at 03:01

## 2024-01-09 RX ADMIN — RIVAROXABAN 10 MG: 10 TABLET, FILM COATED ORAL at 06:01

## 2024-01-09 RX ADMIN — CEFTAROLINE FOSAMIL 600 MG: 600 POWDER, FOR SOLUTION INTRAVENOUS at 06:01

## 2024-01-09 RX ADMIN — INSULIN ASPART 2 UNITS: 100 INJECTION, SOLUTION INTRAVENOUS; SUBCUTANEOUS at 01:01

## 2024-01-09 RX ADMIN — SODIUM CHLORIDE, POTASSIUM CHLORIDE, SODIUM LACTATE AND CALCIUM CHLORIDE 1000 ML: 600; 310; 30; 20 INJECTION, SOLUTION INTRAVENOUS at 09:01

## 2024-01-09 RX ADMIN — SODIUM CHLORIDE, PRESERVATIVE FREE 10 ML: 5 INJECTION INTRAVENOUS at 01:01

## 2024-01-09 RX ADMIN — METOPROLOL SUCCINATE 25 MG: 25 TABLET, EXTENDED RELEASE ORAL at 09:01

## 2024-01-09 RX ADMIN — METOCLOPRAMIDE 5 MG: 5 TABLET ORAL at 11:01

## 2024-01-09 RX ADMIN — SODIUM CHLORIDE, PRESERVATIVE FREE 10 ML: 5 INJECTION INTRAVENOUS at 12:01

## 2024-01-09 RX ADMIN — INSULIN DETEMIR 15 UNITS: 100 INJECTION, SOLUTION SUBCUTANEOUS at 09:01

## 2024-01-09 RX ADMIN — PANTOPRAZOLE SODIUM 40 MG: 40 TABLET, DELAYED RELEASE ORAL at 09:01

## 2024-01-09 RX ADMIN — INSULIN ASPART 1 UNITS: 100 INJECTION, SOLUTION INTRAVENOUS; SUBCUTANEOUS at 09:01

## 2024-01-09 RX ADMIN — OXYCODONE HYDROCHLORIDE 5 MG: 5 TABLET ORAL at 06:01

## 2024-01-09 NOTE — PLAN OF CARE
Problem: Physical Therapy  Goal: Physical Therapy Goal  Description: Goals to be met by: 2024     Patient will increase functional independence with mobility by performin. Supine to sit with Modified [place]  2. Sit to supine with Modified [place]  3. Rolling to Left and Right with Modified [place].  4. Sit to stand transfer with Modified [place]  5. Bed to chair transfer with Modified [place] using Rolling Walker  6. Gait  x 150 feet with Modified [place] using Rolling Walker.   7. Ascend/descend 1 stair with Modified [place] using Rolling Walker.     Outcome: Ongoing, Progressing

## 2024-01-09 NOTE — PLAN OF CARE
Problem: Adult Inpatient Plan of Care  Goal: Plan of Care Review  Outcome: Ongoing, Progressing     Problem: Adult Inpatient Plan of Care  Goal: Absence of Hospital-Acquired Illness or Injury  Outcome: Ongoing, Progressing     Problem: Adult Inpatient Plan of Care  Goal: Optimal Comfort and Wellbeing  Outcome: Ongoing, Progressing     Problem: Diabetes Comorbidity  Goal: Blood Glucose Level Within Targeted Range  Outcome: Ongoing, Progressing     Problem: Adult Inpatient Plan of Care  Goal: Readiness for Transition of Care  Outcome: Ongoing, Progressing     Problem: Skin Injury Risk Increased  Goal: Skin Health and Integrity  Outcome: Ongoing, Progressing     Problem: Fall Injury Risk  Goal: Absence of Fall and Fall-Related Injury  Outcome: Ongoing, Progressing     Problem: Impaired Wound Healing  Goal: Optimal Wound Healing  Outcome: Ongoing, Progressing     Problem: Infection Progression (Sepsis/Septic Shock)  Goal: Absence of Infection Signs and Symptoms  Outcome: Ongoing, Progressing

## 2024-01-09 NOTE — NURSING
Patient remains AAO x4 overnight Medications given as scheduled. Telemetry in progress. Blood glucose checked and sliding scale insulin given as ordered. Safety maintained, call light in reach, bed alarm in use. SCD's in progress with Z flex boots in place. Patient weight shifted in bed prn.

## 2024-01-09 NOTE — PT/OT/SLP PROGRESS
Occupational Therapy   Treatment    Name: Kulwant Mueller Jr.  MRN: [id number]  Admitting Diagnosis:  Staphylococcus aureus bacteremia  6 Days Post-Op    Recommendations:     Discharge Recommendations: High Intensity Therapy  Discharge Equipment Recommendations:  to be determined by next level of care  Barriers to discharge:  Other (Comment) (Increased level of assistance/Pt Ind and working PTA)    Assessment:     Kulwant Mueller Jr. is a 38 y.o. male with a medical diagnosis of Staphylococcus aureus bacteremia.  Performance deficits affecting function are weakness, impaired endurance, impaired self care skills, impaired functional mobility, gait instability, impaired balance, decreased lower extremity function, pain, decreased ROM, impaired skin, edema, impaired joint extensibility.    Pt found in bed, agreeable to therapy.  Pt progressed to transfer to chair with Min A x 1-2 for safety using RW.  Pt tolerated well. Rec High Intensity Therapy Post Acute Care at time of discharge to maximize [place] and safety with ADL's and functional mobility. Pt was independent and working prior to admit. Continue OT services to address functional goals, progressing as able.        Rehab Prognosis:  Good; patient would benefit from acute skilled OT services to address these deficits and reach maximum level of function.       Plan:     Patient to be seen 5 x/week to address the above listed problems via self-care/home management, therapeutic activities, therapeutic exercises  Plan of Care Expires:    Plan of Care Reviewed with: patient    Subjective     Chief Complaint: L knee pain   Patient/Family Comments/goals: to return to PLOF  Pain/Comfort:  Pain Rating 1: 6/10  Location - Side 1: Left  Location - Orientation 1: generalized  Location 1: knee  Pain Addressed 1: Distraction, Reposition  Pain Rating Post-Intervention 1: 6/10    Objective:     Communicated with: RN prior to session.  Patient found HOB elevated with bed  alarm, peripheral IV, pressure relief boots, telemetry upon OT entry to room.    General Precautions: Standard, contact, fall    Orthopedic Precautions: (no restrictions per Dr. Bocanegra; wound care nurse recommends limiting pressure through R heel (encourage WB to forefoot))  Braces:  (DARCO shoe RLE)  Respiratory Status: Room air     Occupational Performance:     Bed Mobility:    Patient completed Supine to Sit with stand by assistance HOB elevated, increased time and effort, vc's for effective technique    Functional Mobility/Transfers:  Patient completed Sit <> Stand Transfer with minimum assistance x 2 for elevated bed, moderate assistance x 2 persons from low chair with  rolling walker.   Patient completed Bed <> Chair Transfer using Stand Pivot technique with minimum assistance with rolling walker  Functional Mobility: Pt ambulated short distance ~10 steps with Min A using RW followed by chair for safety.  Pt uses 3 pt gait pattern 2/2 LLE pain.     Activities of Daily Living:  Lower Body Dressing: maximal assistance        [facility] 6 Click ADL: 17    Treatment & Education:  Pt reports feeling dizzy after gait.  BP 85/52.  Reclined chair and elevated feet-BP 95/55.  After 5 min, /60. Pt reports feeling better.  Dr. Goode present and aware.  RN notified.   Encouraged OOB in chair 1-2 hours and to call for assistance for back to bed. Pt verbalizes understanding.    Pt setup with G/H supplies to perform when ready at chair level.        Patient left up in chair with all lines intact, call button in reach, chair alarm on, and RN notified    GOALS:   Multidisciplinary Problems       Occupational Therapy Goals          Problem: Occupational Therapy    Goal Priority Disciplines Outcome Interventions   Occupational Therapy Goal     OT, PT/OT Ongoing, Progressing    Description: Goals to be met by: 2/3/2024     Patient will increase functional independence with ADLs by performing:    LE Dressing with Minimal  Assistance.  Toileting from bedside commode with Stand-by Assistance for hygiene and clothing management.   Supine to sit with Modified [place].  Stand pivot transfers with Supervision.  Step transfer with Contact Guard Assistance & appropriate AD.  Toilet transfer to bedside commode with Contact Guard Assistance & appropriate AD.                         Time Tracking:     OT Date of Treatment: 01/09/24  OT Start Time: 0950  OT Stop Time: 1017  OT Total Time (min): 27 min    Billable Minutes:Therapeutic Activity 27 1/9/2024

## 2024-01-09 NOTE — PT/OT/SLP PROGRESS
Physical Therapy Treatment    Patient Name:  Kulwant Mueller Jr.   MRN:  [id number]    Recommendations:     Discharge Recommendations: High Intensity Therapy  Discharge Equipment Recommendations:  (TBD next level of care)  Barriers to discharge:  decreased mobility,strength and endurance    Assessment:     Kulwant Mueller Jr. is a 38 y.o. male admitted with a medical diagnosis of Staphylococcus aureus bacteremia.  He presents with the following impairments/functional limitations: weakness, impaired endurance, impaired functional mobility, gait instability, impaired balance, decreased lower extremity function, pain, decreased ROM, impaired coordination, impaired joint extensibility,pt with good participation and improving mobility,pt requires assistance with all mobility at this time and will benefit from high intensity therapy upon discharge.    Rehab Prognosis: Fair; patient would benefit from acute skilled PT services to address these deficits and reach maximum level of function.    Recent Surgery: Procedure(s) (LRB):  ECHOCARDIOGRAM, TRANSESOPHAGEAL (N/A) 6 Days Post-Op    Plan:     During this hospitalization, patient to be seen 5 x/week to address the identified rehab impairments via gait training, therapeutic activities, therapeutic exercises, neuromuscular re-education and progress toward the following goals:    Plan of Care Expires:  01/31/24    Subjective     Chief Complaint: n/a  Patient/Family Comments/goals: pt agreeable to up in chair.  Pain/Comfort:  Pain Rating 1: 6/10  Location - Side 1: Left  Location 1: knee  Pain Addressed 1: Reposition, Distraction, Cessation of Activity  Pain Rating Post-Intervention 1: 6/10      Objective:     Communicated with nsg prior to session.  Patient found supine with bed alarm, pressure relief boots, peripheral IV, telemetry upon PT entry to room.     General Precautions: Standard, contact, fall  Orthopedic Precautions:  (No restrictions per Dr. Bocanegra. Wound care  nurse recommends limiting pressure through R heel encouraging weight bearing through heel)  Braces:  (R le Darco)  Respiratory Status: Room air     Functional Mobility:  Bed Mobility:     Supine to Sit: stand by assistance and contact guard assistance  Transfers:     Sit to Stand:  minimum assistance, of 2 persons, and from bed  and Min/Mod AX 2 from chair with rolling walker  Bed to Chair: minimum assistance with  rolling walker  using  Step Transfer  Gait: amb ~10' with RW and Min A f/b chair  Balance: fair standing balance with RW      AM-PAC 6 CLICK MOBILITY  Turning over in bed (including adjusting bedclothes, sheets and blankets)?: 3  Sitting down on and standing up from a chair with arms (e.g., wheelchair, bedside commode, etc.): 2  Moving from lying on back to sitting on the side of the bed?: 3  Moving to and from a bed to a chair (including a wheelchair)?: 3  Need to walk in hospital room?: 3 (f/b chair)  Climbing 3-5 steps with a railing?: 1  Basic Mobility Total Score: 15       Treatment & Education: seated le ex's inc: ap,laq's X 10 reps,rest periods PRN with rx,see OT note for pt's BP's.      Patient left up in chair with all lines intact, call button in reach, chair alarm on, and nsg notified..    GOALS: see general POC  Multidisciplinary Problems       Physical Therapy Goals          Problem: Physical Therapy    Goal Priority Disciplines Outcome Goal Variances Interventions   Physical Therapy Goal     PT, PT/OT Ongoing, Progressing     Description: Goals to be met by: 2024     Patient will increase functional independence with mobility by performin. Supine to sit with Modified [place]  2. Sit to supine with Modified [place]  3. Rolling to Left and Right with Modified [place].  4. Sit to stand transfer with Modified [place]  5. Bed to chair transfer with Modified [place] using Rolling Walker  6. Gait  x 150 feet with Modified [place] using Rolling Walker.   7.  Ascend/descend 1 stair with Modified [place] using Rolling Walker.                          Time Tracking:     PT Received On: 01/09/24  PT Start Time: 0950     PT Stop Time: 1017  PT Total Time (min): 27 min     Billable Minutes: Gait Training 15 and Therapeutic Exercise 12       PT/PTA: PTA     Number of PTA visits since last PT visit: 3     01/09/2024

## 2024-01-09 NOTE — PROGRESS NOTES
"[facility] IM Resident HO-1 Progress Note    Subjective:      Kulwant Mueller Jr. is a 38 y.o. 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 Presented  with DKA found to have MRSA bacteremia.      No acute events overnight. Patient stated his nausea and vomitting improved yesterday. He has no complaints this morning and denies shortness of breath, chest pain, abdominal pain, nausea/vomitting, diarrhea.     Objective:   Last 24 Hour Vital Signs:  BP  Min: 115/70  Max: 150/87  Temp  Av.1 °F (36.7 °C)  Min: 97.6 °F (36.4 °C)  Max: 98.6 °F (37 °C)  Pulse  Av.8  Min: 86  Max: 94  Resp  Av.3  Min: 16  Max: 20  SpO2  Av.3 %  Min: 95 %  Max: 98 %  Height  Av' 1" (185.4 cm)  Min: 6' 1" (185.4 cm)  Max: 6' 1" (185.4 cm)  Weight  Av kg (249 lb 0.2 oz)  Min: 110.8 kg (244 lb 4.3 oz)  Max: 115.1 kg (253 lb 12 oz)  I/O last 3 completed shifts:  In: 613.8 [P.O.:180; I.V.:49.1; IV Piggyback:384.7]  Out: 2551 [Urine:2550; Stool:1]    Physical Examination:   Gen: No acute distress, comfortable appearing  HEENT: NC/AT, EOMI grossly, moist MM  CV:  regular rate and rhythm, no murmurs, rubs, or gallop  Resp: CTAB. No obvious crackles or wheezes  Abd: Soft, nontender, nondistended  MSK/Ext: ROM in all 4 extremities grossly intact. L knee bandage c/d/I without warmth or drainage. L heel wound without drainage or erythema.   Neuro: no focal deficits   Skin: Warm, dry    Laboratory:  Laboratory Data Reviewed: yes  Pertinent Findings:  Trended Lab Data:  reviewed  Microbiology Data Reviewed: yes  Pertinent Findings:  reviewed    Other Results:  EKG (my interpretation):none new   Radiology Data Reviewed: yes  Pertinent Findings:    Current Medications:     Infusions:       Scheduled:   ceftaroline fosamiL (TEFLARO) 600 mg in dextrose 5 % in water (D5W) 50 mL IVPB (MB+)  600 mg Intravenous Q8H    DAPTOmycin (CUBICIN) IV (PEDS and ADULTS)  10 " mg/kg (Adjusted) Intravenous Q24H    metoclopramide HCl  5 mg Oral TID AC    metoprolol succinate  25 mg Oral Daily    pantoprazole  40 mg Oral Daily    rivaroxaban  10 mg Oral Daily with dinner    sodium chloride 0.9%  10 mL Intravenous Q6H        PRN:  dextrose 10 % in water (D10W), dextrose 10%, dextrose 10%, droPERidol, glucagon (human recombinant), glucose, glucose, insulin aspart U-100, melatonin, oxyCODONE, scopolamine, sodium chloride 0.9%, Flushing PICC/Midline Protocol **AND** sodium chloride 0.9% **AND** sodium chloride 0.9%    Antibiotics and Day Number of Therapy:  Vanc 12/27/23-1/1/24  Zosyn 12/27/23-12/29/23  Daptomycin 1/1/24 - present  Ceftaroline 1/4/24-present     Assessment:     Kulwant Mueller Jr. is a 38 y.o. 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 The patient presented to [facility] on [facility]/2023 with a primary complaint of Intractable N/V and diarrhea for over 6 days, generalized weakness for 6 days. Admitted for DKA, which has now resolved s/p insulin gtt. Found to have MRSA bacteremia, suspect source L knee septic arthritis/bursitis.      Plan:     MRSA bacteremia   2/2 Septic arthritis/bursitis of L knee  MRSA UTI  WBC 47K with neutrophil predominance and patient febrile on admission. Suspect leukocytosis 2/2 DKA and bacteremia. L knee pain washed out by ortho with positive cx. L shoulder aspirated imaging without evidence of infection. R heel without osteo but has cellulitis, myositis, tenosynovitis. ROBY negative for vegetation. Believe we have source control at this time given patient stopped fevering but continuing to monitor for manifestations of infection.   - s/p L knee arthrotomy with synovectomy on 12/29. Cx with staph aureus  - L shoulder aspirated 1/2 w/o signs of infection   - WBC downtrending   - ID on board; switched to daptomycin on 1/1/24  w/weekly CK  - 1/4 added ceftaroline  adjunct therapy   - daily blood cultures to document clearance  - oxycodone 5mg q6h prn for pain     Elevated CK   Mildly elevated CK likely secondary to daptomycin  - 1L LR     Transaminitis   Admitted with ast/alt wnl. Now continues to rise.   - CT abdomen pelvis on admission without acute abnormalities  - hepatitis panel negative   - discontinued percocet; oxycodone for pain   - RUQ ultrasound with hepatomegaly and biliary sludge; but without other obvious abnormalities  -AST/ALT/Alk phos improved or stable from day prior; continue to avoid hepato-toxic agents and monitor     Intractable N/V  Hx Gastroparesis ?  was being worked up for gastroparesis outpatient, unable to complete testing due to vomitting. Sx lasted for several weeks and spontaneously resolved. has been taking reglan outpatient, but is unsure if it does anything to help him.   - continue home metoclopramide and PPI  - zofran not helping with N/V. PRN droperidol and scopolamine ordered.   - QTc 448, caution in QTc prolonging agents     DKA - resolved  DM2 with long-term use insulin  AG 31, BHB elevated, with bicarb 9 and elevated blood glucose; UA with ketones. suspect due to intractable N/V, infection  Home regimen: tresiba 55u daily, sliding scale humalog, jardiance 25mg daily  - one isolated episode of asx hypoglycemia responsive to food on 1/7  - DKA resolved s/p insulin gtt   - A1c 7.4 on 12/28, prior A1c as high as 14  - continue levemir 35u BID + SSI     Newly dx HFmrEF  12/29 TTE: EF 48%  - start metoprolol succinate 12.5mg daily --> 25mg   - already on jardiance at home     Acute kidney injury, resolved  - on admit, Cr 1.9; baseline 1  - suspect prerenal due to decreased PO intake  - Cr improving with IVF, currently at baseline     Chronic R heel wound  12/27 XR right foot with no osseous abnormality; has been following with wound care and has been off PO antibiotics for at least 1 month with no drainage or swelling from wound site  - MRI  R hindfoot ordered; no osteomyelitis   - wound care consulted     May-Thurner syndrome  Peripheral vascular disease  Hx May-Thurner syndrome and was following with Dr. Duff, who had kept patient on xarelto 10 mg daily. Patient reports has not been able to take xarelto for past 1-2 weeks 2' inability to tolerate PO. Previous n/v w heparin ggt but pharmacy confirmed has tolerated lovenox in the past.   - DVT US BLE negative. DVT BUE with thrombus within 1 of the right paired brachial veins  - started full dose lovenox  - transition back to home Xarelto      HTN  patient reports not currently on any anti-hypertensive regimen  - BP should improve with GDMT      Concern for depression  patient noted to have flat affect, decreased motivation, sleeping for several hours during the day since admission, decreased appetite. Per wife, she has been concerned there may be underlying depression.   - psych consulted. Recommended cymbalta but patient declined.      Diet: diabetic  VTE PPx: Xarelto  Code Status: full     Dispo: pending clearance of cultures, IPR with 6 weeks abx    Aria Kelley M.D.   [facility] Internal Medicine HO-I    [facility] Medicine Hospitalist Pager numbers:   [facility] Hospitalist Medicine Team A (Russel/Emmanuel): [phone]  [facility] Hospitalist Medicine Team B (Odin/Kameron):  [phone]

## 2024-01-09 NOTE — PLAN OF CARE
Problem: Occupational Therapy  Goal: Occupational Therapy Goal  Description: Goals to be met by: 2/3/2024     Patient will increase functional independence with ADLs by performing:    LE Dressing with Minimal Assistance.  Toileting from bedside commode with Stand-by Assistance for hygiene and clothing management.   Supine to sit with Modified Kimball.  Stand pivot transfers with Supervision.  Step transfer with Contact Guard Assistance & appropriate AD.  Toilet transfer to bedside commode with Contact Guard Assistance & appropriate AD.    Outcome: Ongoing, Progressing   Kulwant Mueller Jr. is a 38 y.o. male with a medical diagnosis of Staphylococcus aureus bacteremia.  Performance deficits affecting function are weakness, impaired endurance, impaired self care skills, impaired functional mobility, gait instability, impaired balance, decreased lower extremity function, pain, decreased ROM, impaired skin, edema, impaired joint extensibility.    Pt found in bed, agreeable to therapy.  Pt progressed to transfer to chair with Min A x 1-2 for safety using RW.  Pt tolerated well. Rec High Intensity Therapy Post Acute Care at time of discharge to maximize Kimball and safety with ADL's and functional mobility. Pt was independent and working prior to admit. Continue OT services to address functional goals, progressing as able.

## 2024-01-09 NOTE — PLAN OF CARE
[facility]    FACILITY TRANSFER ORDERS      Patient Name: Kulwant Mueller Jr.  YOB: 1985    PCP: No, Primary Doctor   Referral sent    Encounter Date: 01/10/2024    Admit to: IPR    Vital Signs:  Routine    Diagnoses:   Active Hospital Problems    Diagnosis  POA    *Staphylococcus aureus bacteremia [R78.81, B95.61]  Yes    Transaminitis [R74.01]  Unknown    Depression [F32.A]  Yes    Septic prepatellar bursitis of left knee [M71.162]  Yes    Acute kidney injury [N17.9]  Yes    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 due to underlying condition, limited to breakdown of skin [E08.621, L97.401]  Yes    Diabetes mellitus [E11.9]  Yes    Staphylococcal arthritis of left knee [M00.062]  Yes    Other elevated white blood cell count [D72.828]  Yes    Elevated CK [R74.8]  Yes    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]  Yes     Chronic    Diabetic gastroparesis associated with type 2 diabetes mellitus [E11.43, K31.84]  Yes    History of intravascular stent placement [Z95.828]  Not Applicable     Bilateral iliac vein stenting for May Thurner's Syndrome      Intractable nausea and vomiting [R11.2]  Yes    Type 2 diabetes mellitus with complication, with long-term current use of insulin [E11.8, Z79.4]  Not Applicable     Chronic      Resolved Hospital Problems    Diagnosis Date Resolved POA    Severe sepsis [A41.9, R65.20] 01/09/2024 Yes    DKA (diabetic ketoacidoses) [E11.10] 01/09/2024 Yes     2021 IMO Regulatory Import       Allergies:  Review of patient's allergies indicates:   Allergen Reactions    Heparin analogues Nausea And Vomiting     Diet: Diabetic    Activities: Activity as tolerated    Goals of Care Treatment Preferences:  Code Status: Full Code    Nursing: midline care per IPR    Labs: weekly CK; other labs per IPR    CONSULTS:    Physical Therapy to evaluate and treat. , Occupational Therapy to evaluate and treat., and  to evaluate for community  resources/long-range planning.    MISCELLANEOUS CARE:  Diabetes Care:   SN to perform and educate Diabetic management with blood glucose monitoring: and Fingerstick blood sugar AC and HS    WOUND CARE ORDERS  None    Medications: Review discharge medications with patient and family and provide education.      Current Discharge Medication List        START taking these medications    Details   metoprolol succinate (TOPROL-XL) 25 MG 24 hr tablet Take 0.5 tablets (12.5 mg total) by mouth once daily.  Qty: 15 tablet, Refills: 11    Comments: Per IPR  Ok to give if BP >120/80.      scopolamine (TRANSDERM-SCOP) 1.3-1.5 mg (1 mg over 3 days) Place 1 patch onto the skin every 72 hours as needed (intractable nausea/vomiting).    Comments: Per IPR      sodium chloride 0.9% SolP 50 mL with DAPTOmycin 500 mg SolR 947 mg Inject 947 mg into the vein once daily.  Refills: 0    Comments: Per IPR           CONTINUE these medications which have NOT CHANGED    Details   blood-glucose meter,continuous (DEXCOM G7 ) Misc 1 Device by Misc.(Non-Drug; Combo Route) route once daily.  Qty: 1 each, Refills: 0    Associated Diagnoses: Type 2 diabetes mellitus with complication, with long-term current use of insulin      blood-glucose sensor (DEXCOM G7 SENSOR) Justyna 1 Device by Misc.(Non-Drug; Combo Route) route every 10 days.  Qty: 3 each, Refills: 11    Associated Diagnoses: Type 2 diabetes mellitus with complication, with long-term current use of insulin      empagliflozin (JARDIANCE) 25 mg tablet Take 1 tablet (25 mg total) by mouth once daily.  Qty: 90 tablet, Refills: 3    Associated Diagnoses: Type 2 diabetes mellitus with complication, with long-term current use of insulin      furosemide (LASIX) 20 MG tablet Take 1 tablet (20 mg total) by mouth once daily.  Qty: 90 tablet, Refills: 3      glucagon (GVOKE HYPOPEN 2-PACK) 1 mg/0.2 mL AtIn Inject 1 mg into the skin as needed (SEVERE HYPOGLYCEMIA).  Qty: 2 each, Refills: 5     "Associated Diagnoses: Type 2 diabetes mellitus with complication, with long-term current use of insulin      insulin aspart U-100 (NOVOLOG U-100 INSULIN ASPART) 100 unit/mL injection Inject 14 Units into the skin 3 (three) times daily before meals.  Qty: 12.6 mL, Refills: 3      metoclopramide HCl (REGLAN) 10 MG tablet Take 0.5 tablets (5 mg total) by mouth 3 (three) times daily before meals.  Qty: 270 tablet, Refills: 3    Comments: DX Code Needed  .  Associated Diagnoses: Type 2 diabetes mellitus with hyperglycemia, with long-term current use of insulin      pantoprazole (PROTONIX) 40 MG tablet Take 1 tablet (40 mg total) by mouth once daily.  Qty: 90 tablet, Refills: 0      pen needle, diabetic (BD ULTRA-FINE DIYA PEN NEEDLE) 32 gauge x 5/32" Ndle Use with Tresiba daily and Humalog 3-4 times daily as directed.  Qty: 200 each, Refills: 5    Associated Diagnoses: Uncontrolled type 2 diabetes mellitus with hyperglycemia, with long-term current use of insulin      prednisoLONE acetate (PRED FORTE) 1 % DrpS Instill one drop to the left eye every 2 hours while awake for 2 days then instill one drop 4 time daily for 5 days  Qty: 5 mL, Refills: 0    Associated Diagnoses: Iritis of left eye      prochlorperazine (COMPAZINE) 10 MG tablet Take 1 tablet (10 mg total) by mouth 3 (three) times daily as needed (nausea or vomiting).  Qty: 15 tablet, Refills: 0      promethazine (PHENERGAN) 25 MG suppository Place 1 suppository (25 mg total) rectally every 6 (six) hours as needed for Nausea.  Qty: 10 suppository, Refills: 0    Associated Diagnoses: Leukocytosis, unspecified type; Intractable vomiting; Diarrhea, unspecified type      rivaroxaban (XARELTO) 10 mg Tab Take 1 tablet (10 mg total) by mouth daily with dinner or evening meal.  Qty: 90 tablet, Refills: 3      TRESIBA FLEXTOUCH U-200 200 unit/mL (3 mL) insulin pen Inject 56 Units into the skin once daily.  Qty: 3 pen , Refills: 11    Associated Diagnoses: Uncontrolled type " 2 diabetes mellitus with hyperglycemia, with long-term current use of insulin                Immunizations Administered as of 1/10/2024       Name Date Dose VIS Date Route Exp Date    COVID-19, MRNA, LN-S, PF (Pfizer) (Purple Cap) 2/11/2022 11:47 AM 0.3 mL 9/22/2021 Intramuscular 5/31/2022    Site: Left deltoid     Given By: Kassy Mendoza RN     : [employer]     Lot: [id number]     COVID-19, MRNA, LN-S, PF (Pfizer) (Purple Cap) 1/19/2021 10:50 AM 0.3 mL 12/12/2020 Intramuscular 5/31/2021    Site: Left deltoid     Given By: Neris San RN     : [employer]     Lot: [id number]     COVID-19, MRNA, LN-S, PF (Pfizer) (Purple Cap) 12/28/2020  3:42 PM 0.3 mL 12/12/2020 Intramuscular 4/30/2021    Site: Left deltoid     Given By: Grace Anguiano LPN     : [employer]     Lot: [id number]           _________________________________  Aria Kelley MD  01/10/2024

## 2024-01-10 VITALS
OXYGEN SATURATION: 96 % | SYSTOLIC BLOOD PRESSURE: 125 MMHG | WEIGHT: 239.19 LBS | BODY MASS INDEX: 31.7 KG/M2 | RESPIRATION RATE: 18 BRPM | TEMPERATURE: 98 F | DIASTOLIC BLOOD PRESSURE: 70 MMHG | HEIGHT: 73 IN | HEART RATE: 88 BPM

## 2024-01-10 LAB
ALBUMIN SERPL BCP-MCNC: 1.4 G/DL (ref 3.5–5.2)
ALP SERPL-CCNC: 135 U/L (ref 55–135)
ALT SERPL W/O P-5'-P-CCNC: 67 U/L (ref 10–44)
ANION GAP SERPL CALC-SCNC: 11 MMOL/L (ref 8–16)
AST SERPL-CCNC: 36 U/L (ref 10–40)
BACTERIA BLD CULT: NORMAL
BASOPHILS # BLD AUTO: 0.03 K/UL (ref 0–0.2)
BASOPHILS NFR BLD: 0.2 % (ref 0–1.9)
BILIRUB SERPL-MCNC: 0.5 MG/DL (ref 0.1–1)
BUN SERPL-MCNC: 8 MG/DL (ref 6–20)
CALCIUM SERPL-MCNC: 8.8 MG/DL (ref 8.7–10.5)
CHLORIDE SERPL-SCNC: 94 MMOL/L (ref 95–110)
CK SERPL-CCNC: 113 U/L (ref 20–200)
CO2 SERPL-SCNC: 29 MMOL/L (ref 23–29)
CREAT SERPL-MCNC: 0.9 MG/DL (ref 0.5–1.4)
DIFFERENTIAL METHOD BLD: ABNORMAL
EOSINOPHIL # BLD AUTO: 0.1 K/UL (ref 0–0.5)
EOSINOPHIL NFR BLD: 0.6 % (ref 0–8)
ERYTHROCYTE [DISTWIDTH] IN BLOOD BY AUTOMATED COUNT: 13.9 % (ref 11.5–14.5)
EST. GFR  (NO RACE VARIABLE): >60 ML/MIN/1.73 M^2
GLUCOSE SERPL-MCNC: 154 MG/DL (ref 70–110)
HCT VFR BLD AUTO: 27.8 % (ref 40–54)
HGB BLD-MCNC: 9 G/DL (ref 14–18)
IMM GRANULOCYTES # BLD AUTO: 0.11 K/UL (ref 0–0.04)
IMM GRANULOCYTES NFR BLD AUTO: 0.7 % (ref 0–0.5)
LYMPHOCYTES # BLD AUTO: 1.7 K/UL (ref 1–4.8)
LYMPHOCYTES NFR BLD: 10.5 % (ref 18–48)
MAGNESIUM SERPL-MCNC: 1.9 MG/DL (ref 1.6–2.6)
MCH RBC QN AUTO: 28 PG (ref 27–31)
MCHC RBC AUTO-ENTMCNC: 32.4 G/DL (ref 32–36)
MCV RBC AUTO: 87 FL (ref 82–98)
MONOCYTES # BLD AUTO: 0.7 K/UL (ref 0.3–1)
MONOCYTES NFR BLD: 4.5 % (ref 4–15)
NEUTROPHILS # BLD AUTO: 13.3 K/UL (ref 1.8–7.7)
NEUTROPHILS NFR BLD: 83.5 % (ref 38–73)
NRBC BLD-RTO: 0 /100 WBC
PLATELET # BLD AUTO: 384 K/UL (ref 150–450)
PMV BLD AUTO: 9.1 FL (ref 9.2–12.9)
POCT GLUCOSE: 201 MG/DL (ref 70–110)
POTASSIUM SERPL-SCNC: 4.5 MMOL/L (ref 3.5–5.1)
PROT SERPL-MCNC: 7.5 G/DL (ref 6–8.4)
RBC # BLD AUTO: 3.21 M/UL (ref 4.6–6.2)
SODIUM SERPL-SCNC: 134 MMOL/L (ref 136–145)
WBC # BLD AUTO: 15.88 K/UL (ref 3.9–12.7)

## 2024-01-10 PROCEDURE — 82550 ASSAY OF CK (CPK): CPT

## 2024-01-10 PROCEDURE — A4216 STERILE WATER/SALINE, 10 ML: HCPCS

## 2024-01-10 PROCEDURE — 80053 COMPREHEN METABOLIC PANEL: CPT

## 2024-01-10 PROCEDURE — 25000003 PHARM REV CODE 250

## 2024-01-10 PROCEDURE — 83735 ASSAY OF MAGNESIUM: CPT | Performed by: INTERNAL MEDICINE

## 2024-01-10 PROCEDURE — 85025 COMPLETE CBC W/AUTO DIFF WBC: CPT

## 2024-01-10 RX ORDER — METOPROLOL SUCCINATE 25 MG/1
12.5 TABLET, EXTENDED RELEASE ORAL DAILY
Qty: 15 TABLET | Refills: 11
Start: 2024-01-10 | End: 2024-02-26

## 2024-01-10 RX ADMIN — INSULIN ASPART 1 UNITS: 100 INJECTION, SOLUTION INTRAVENOUS; SUBCUTANEOUS at 06:01

## 2024-01-10 RX ADMIN — SODIUM CHLORIDE, PRESERVATIVE FREE 10 ML: 5 INJECTION INTRAVENOUS at 04:01

## 2024-01-10 RX ADMIN — INSULIN DETEMIR 15 UNITS: 100 INJECTION, SOLUTION SUBCUTANEOUS at 09:01

## 2024-01-10 RX ADMIN — METOCLOPRAMIDE 5 MG: 5 TABLET ORAL at 06:01

## 2024-01-10 RX ADMIN — SODIUM CHLORIDE, PRESERVATIVE FREE 10 ML: 5 INJECTION INTRAVENOUS at 12:01

## 2024-01-10 RX ADMIN — PANTOPRAZOLE SODIUM 40 MG: 40 TABLET, DELAYED RELEASE ORAL at 09:01

## 2024-01-10 RX ADMIN — METOCLOPRAMIDE 5 MG: 5 TABLET ORAL at 10:01

## 2024-01-10 RX ADMIN — METOPROLOL SUCCINATE 12.5 MG: 25 TABLET, EXTENDED RELEASE ORAL at 09:01

## 2024-01-10 NOTE — PLAN OF CARE
Problem: Adult Inpatient Plan of Care  Goal: Plan of Care Review  Outcome: Ongoing, Progressing     Problem: Adult Inpatient Plan of Care  Goal: Absence of Hospital-Acquired Illness or Injury  Outcome: Ongoing, Progressing     Problem: Adult Inpatient Plan of Care  Goal: Readiness for Transition of Care  Outcome: Ongoing, Progressing     Problem: Skin Injury Risk Increased  Goal: Skin Health and Integrity  Outcome: Ongoing, Progressing     Problem: Fall Injury Risk  Goal: Absence of Fall and Fall-Related Injury  Outcome: Ongoing, Progressing     Problem: Impaired Wound Healing  Goal: Optimal Wound Healing  Outcome: Ongoing, Progressing

## 2024-01-10 NOTE — PLAN OF CARE
Anisa met with pt at bedside this AM to complete final d/c assessment with the pt. MD signed and completed new form for the family. ANISA spoke with pt's wife Chloé [phone] to inform her that the pt will d/c later today. SW set up wc van transport to [facility] for 12pm. Report can be called to [phone] . Pt and family did not have any additional questions or concerns for sw at thsi time. White board updated with CM name and contact information.  Discharge brochure provided.  Pt encouraged to call with any questions or concerns.  Cm will continue to follow pt through transitions of care and assist with any discharge needs.    SreedharMAYUR Larson  [phone]    Future Appointments   Date Time Provider Department Center   1/31/2024  2:00 PM Salvador Tapia MD [facility]   2/20/2024 11:00 AM Lawrence Magana MD [facility]        01/10/24 1104   Final Note   Assessment Type Final Discharge Note   Anticipated Discharge Disposition Rehab   What phone number can be called within the next 1-3 days to see how you are doing after discharge? [phone]   Hospital Resources/Appts/Education Provided Provided patient/caregiver with written discharge plan information   Post-Acute Status   Post-Acute Authorization Placement   Post-Acute Placement Status Set-up Complete/Auth obtained   Coverage BCBS   Discharge Delays (!) Ambulance Transport/Facility Transport

## 2024-01-10 NOTE — DISCHARGE SUMMARY
[facility] Hospital Medicine Discharge Summary    Primary Team: [facility] Hospitalist Team A  Attending Physician: Jazmin Goode MD  Resident: Reagan  Intern: Warren    Date of Admit: 12/27/2023  Date of Discharge: 1/10/2024    Discharge to: Home/Self-Care  Condition: Stable    Discharge Diagnoses     Patient Active Problem List   Diagnosis    Penile lesion    Type 2 diabetes mellitus with complication, with long-term current use of insulin    Intractable nausea and vomiting    History of intravascular stent placement    Diabetic gastroparesis associated with type 2 diabetes mellitus    Hypertension associated with diabetes    Recurrent deep vein thrombosis (DVT) of left lower extremity    Chronic anticoagulation    Post-thrombotic syndrome of left lower extremity    May-Thurner syndrome    Infected Diabetic ulcer of left foot with Cellulitis  associated with type 2 diabetes mellitus    Elevated CK    Esophagitis determined by endoscopy    [place]os grade C esophagitis    GERD (gastroesophageal reflux disease)    Other elevated white blood cell count    Staphylococcus aureus bacteremia    Staphylococcal arthritis of left knee    Septic prepatellar bursitis of left knee    Acute kidney injury    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 due to underlying condition, limited to breakdown of skin    Diabetes mellitus    Depression    Transaminitis     Brief History of Present Illness      Kulwant Mueller Jr. is a 38 y.o. male with PMHx May-Thurner syndrome on xarelto, DM2 on insulin, chronic foot wounds following with wound care, HTN. The patient presented to [facility] on 12/27/2023 with a primary complaint of Intractable N/V and diarrhea for over 6 days, generalized weakness for 6 days.      The patient was in their usual state of health until around 1 week prior to admission when he developed NBNB N/V and decreased oral intake. He presented to an ER for evaluation day prior to admission,  where it was noted he had a WBC 33K, with an unremarkable CT A/P with negative COVID/flu. He was discharged with GI follow-up, however at the appointment today, there was concern for elevated WBC, and patient was instructed to present to the ER for further evaluation.    Hospital Course By Problem with Pertinent Findings     MRSA bacteremia   2/2 Septic arthritis/bursitis of L knee  MRSA UTI  WBC 47K with neutrophil predominance and patient febrile on admission. Suspect leukocytosis 2/2 DKA and bacteremia. L knee pain washed out by ortho with positive cx. L shoulder aspirated imaging without evidence of infection. R heel without osteo but has cellulitis, myositis, tenosynovitis. ROBY negative for vegetation. Believe we have source control at this time given patient stopped fevering but continuing to monitor for manifestations of infection.   - s/p L knee arthrotomy with synovectomy on 12/29. Cx with staph aureus  - L shoulder aspirated 1/2 w/o signs of infection   - WBC downtrending   - ID on board; switched to daptomycin on 1/1/24  w/weekly CK  - 1/4 added ceftaroline adjunct therapy stopped 1/10  - daily blood cultures to document clearance  - oxycodone 5mg q6h prn for pain     Elevated CK   Mildly elevated CK likely secondary to daptomycin  - 1L LR      Transaminitis   Admitted with ast/alt wnl. Now continues to rise.   - CT abdomen pelvis on admission without acute abnormalities  - hepatitis panel negative   - discontinued percocet; oxycodone for pain   - RUQ ultrasound with hepatomegaly and biliary sludge; but without other obvious abnormalities  -AST/ALT/Alk phos improved or stable from day prior; continue to avoid hepato-toxic agents and monitor     Intractable N/V  Hx Gastroparesis ?  was being worked up for gastroparesis outpatient, unable to complete testing due to vomitting. Sx lasted for several weeks and spontaneously resolved. has been taking reglan outpatient, but is unsure if it does anything to help  him.   - continue home metoclopramide and PPI  - zofran not helping with N/V. PRN droperidol and scopolamine ordered.   - QTc 448, caution in QTc prolonging agents     DKA - resolved  DM2 with long-term use insulin  AG 31, BHB elevated, with bicarb 9 and elevated blood glucose; UA with ketones. suspect due to intractable N/V, infection  Home regimen: tresiba 55u daily, sliding scale humalog, jardiance 25mg daily  - one isolated episode of asx hypoglycemia responsive to food on 1/7  - DKA resolved s/p insulin gtt   - A1c 7.4 on 12/28, prior A1c as high as 14  - continue levemir 35u BID + SSI     Newly dx HFmrEF  12/29 TTE: EF 48%  - start metoprolol succinate 12.5mg daily  - already on jardiance at home     Acute kidney injury, resolved  - on admit, Cr 1.9; baseline 1  - suspect prerenal due to decreased PO intake  - Cr improving with IVF, currently at baseline     Chronic R heel wound  12/27 XR right foot with no osseous abnormality; has been following with wound care and has been off PO antibiotics for at least 1 month with no drainage or swelling from wound site  - MRI R hindfoot ordered; no osteomyelitis   - wound care consulted     May-Thurner syndrome  Peripheral vascular disease  Hx May-Thurner syndrome and was following with Dr. Duff, who had kept patient on xarelto 10 mg daily. Patient reports has not been able to take xarelto for past 1-2 weeks 2' inability to tolerate PO. Previous n/v w heparin ggt but pharmacy confirmed has tolerated lovenox in the past.   - DVT US BLE negative. DVT BUE with thrombus within 1 of the right paired brachial veins  - started full dose lovenox  - transition back to home Xarelto      HTN  patient reports not currently on any anti-hypertensive regimen  - BP should improve with GDMT      Concern for depression  patient noted to have flat affect, decreased motivation, sleeping for several hours during the day since admission, decreased appetite. Per wife, she has been concerned  there may be underlying depression.   - psych consulted. Recommended cymbalta but patient declined.      Consultants and Procedures     Consultants:  Psychiatry   Cardiology   Infectious disease   Wound care   Hematology  ICU  Ortho     Procedures:   L shoulder aspiration   L knee wash out     Vitals on discharge:   Vitals:    01/10/24 0452   BP:    Pulse: 88   Resp:    Temp:         Discharge Physical Examination:  Gen: No acute distress, comfortable appearing  HEENT: NC/AT, EOMI grossly, moist MM  CV:  regular rate and rhythm, no murmurs, rubs, or gallop  Resp: CTAB. No obvious crackles or wheezes  Abd: Soft, nontender, nondistended  MSK/Ext: ROM in all 4 extremities grossly intact. L knee bandage c/d/I without warmth or drainage. L heel wound without drainage or erythema.   Neuro: no focal deficits   Skin: Warm, dry    Discharge Medications        Medication List        START taking these medications      metoprolol succinate 25 MG 24 hr tablet  Commonly known as: TOPROL-XL  Take 0.5 tablets (12.5 mg total) by mouth once daily.     scopolamine 1.3-1.5 mg (1 mg over 3 days)  Commonly known as: TRANSDERM-SCOP  Place 1 patch onto the skin every 72 hours as needed (intractable nausea/vomiting).     sodium chloride 0.9% SolP 50 mL with DAPTOmycin 500 mg SolR 947 mg  Inject 947 mg into the vein once daily.            CONTINUE taking these medications      DEXCOM G7  Misc  Generic drug: blood-glucose meter,continuous  1 Device by Misc.(Non-Drug; Combo Route) route once daily.     DEXCOM G7 SENSOR Justyna  Generic drug: blood-glucose sensor  1 Device by Misc.(Non-Drug; Combo Route) route every 10 days.     empagliflozin 25 mg tablet  Commonly known as: JARDIANCE  Take 1 tablet (25 mg total) by mouth once daily.     furosemide 20 MG tablet  Commonly known as: LASIX  Take 1 tablet (20 mg total) by mouth once daily.     GVOKE HYPOPEN 2-PACK 1 mg/0.2 mL Atin  Generic drug: glucagon  Inject 1 mg into the skin as needed  "(SEVERE HYPOGLYCEMIA).     insulin aspart U-100 100 unit/mL injection  Commonly known as: NovoLOG U-100 Insulin aspart  Inject 14 Units into the skin 3 (three) times daily before meals.     metoclopramide HCl 10 MG tablet  Commonly known as: REGLAN  Take 0.5 tablets (5 mg total) by mouth 3 (three) times daily before meals.     pantoprazole 40 MG tablet  Commonly known as: PROTONIX  Take 1 tablet (40 mg total) by mouth once daily.     pen needle, diabetic 32 gauge x 5/32" Ndle  Commonly known as: BD ULTRA-FINE DIYA PEN NEEDLE  Use with Tresiba daily and Humalog 3-4 times daily as directed.     prednisoLONE acetate 1 % Drps  Commonly known as: PRED FORTE  Instill one drop to the left eye every 2 hours while awake for 2 days then instill one drop 4 time daily for 5 days     prochlorperazine 10 MG tablet  Commonly known as: COMPAZINE  Take 1 tablet (10 mg total) by mouth 3 (three) times daily as needed (nausea or vomiting).     promethazine 25 MG suppository  Commonly known as: PHENERGAN  Place 1 suppository (25 mg total) rectally every 6 (six) hours as needed for Nausea.     rivaroxaban 10 mg Tab  Commonly known as: XARELTO  Take 1 tablet (10 mg total) by mouth daily with dinner or evening meal.     TRESIBA FLEXTOUCH U-200 200 unit/mL (3 mL) insulin pen  Generic drug: insulin degludec  Inject 56 Units into the skin once daily.               Where to Get Your Medications        Information about where to get these medications is not yet available    Ask your nurse or doctor about these medications  metoprolol succinate 25 MG 24 hr tablet  scopolamine 1.3-1.5 mg (1 mg over 3 days)  sodium chloride 0.9% SolP 50 mL with DAPTOmycin 500 mg SolR 947 mg          Discharge Information:   Diet:  diabetic    Physical Activity:  As tolerated              Instructions:  1. Take all medications as prescribed  2. Keep all follow-up appointments  3. Return to the hospital or call your primary care physicians if any worsening symptoms " such as fever, chest pain, shortness of breath, return of symptoms, or any other concerns.    Follow-Up Appointments:  PCP   Infectious disease   Orthopedics     Aria Kelley MD  [facility] Internal Medicine PGY-I  [facility] Hospitalist Team A  01/10/2024 7:01 AM

## 2024-01-10 NOTE — NURSING
[facility], nurse not available. Call back number provided. [facility] POOLE transport at bedside to transport patient. SHAHBAZ PICC line left in place per orders.

## 2024-01-10 NOTE — NURSING
Medications given as scheduled. Telemetry in progress. Blood glucose checked and sliding scale insulin given prn. PICC line flushed and patient. Wound care provided to right heel. Z flex boots in place and scd's in use. Contact isolation maintained. Patient refuses to turn at times in bed. Call light in reach, bed alarm in use.

## 2024-01-10 NOTE — PLAN OF CARE
Problem: Diabetes Comorbidity  Goal: Blood Glucose Level Within Targeted Range  Outcome: Ongoing, Progressing     Problem: Skin Injury Risk Increased  Goal: Skin Health and Integrity  Outcome: Ongoing, Progressing     Problem: Impaired Wound Healing  Goal: Optimal Wound Healing  Outcome: Ongoing, Progressing     Problem: Infection  Goal: Absence of Infection Signs and Symptoms  Outcome: Ongoing, Progressing     Problem: Glycemic Control Impaired (Sepsis/Septic Shock)  Goal: Blood Glucose Level Within Desired Range  Outcome: Ongoing, Progressing

## 2024-01-12 LAB
BACTERIA BLD CULT: NORMAL
BACTERIA BLD CULT: NORMAL

## 2024-01-14 ENCOUNTER — HOSPITAL ENCOUNTER (INPATIENT)
Facility: HOSPITAL | Age: 39
LOS: 9 days | Discharge: NURSING, REHAB OR LONG-TERM CARE | DRG: 486 | End: 2024-01-25
Attending: EMERGENCY MEDICINE | Admitting: HOSPITALIST
Payer: COMMERCIAL

## 2024-01-14 DIAGNOSIS — M00.062 STAPHYLOCOCCAL ARTHRITIS OF LEFT KNEE: Primary | ICD-10-CM

## 2024-01-14 DIAGNOSIS — R50.9 FEVER: ICD-10-CM

## 2024-01-14 DIAGNOSIS — R07.9 CHEST PAIN: ICD-10-CM

## 2024-01-14 DIAGNOSIS — L02.415 ABSCESS OF RIGHT THIGH: ICD-10-CM

## 2024-01-14 PROBLEM — M00.9 KNEE PYOGENIC ARTHRITIS: Status: ACTIVE | Noted: 2024-01-10

## 2024-01-14 PROBLEM — E87.1 HYPONATREMIA: Status: ACTIVE | Noted: 2024-01-14

## 2024-01-14 PROBLEM — N17.9 ACUTE KIDNEY INJURY: Status: RESOLVED | Noted: 2023-12-30 | Resolved: 2024-01-14

## 2024-01-14 LAB
ALBUMIN SERPL BCP-MCNC: 1.5 G/DL (ref 3.5–5.2)
ALLENS TEST: NORMAL
ALP SERPL-CCNC: 257 U/L (ref 55–135)
ALT SERPL W/O P-5'-P-CCNC: 121 U/L (ref 10–44)
ANION GAP SERPL CALC-SCNC: 9 MMOL/L (ref 8–16)
AST SERPL-CCNC: 135 U/L (ref 10–40)
BACTERIA #/AREA URNS AUTO: ABNORMAL /HPF
BASOPHILS # BLD AUTO: 0.03 K/UL (ref 0–0.2)
BASOPHILS NFR BLD: 0.2 % (ref 0–1.9)
BILIRUB SERPL-MCNC: 0.6 MG/DL (ref 0.1–1)
BILIRUB UR QL STRIP: NEGATIVE
BUN SERPL-MCNC: 10 MG/DL (ref 6–20)
CALCIUM SERPL-MCNC: 8.9 MG/DL (ref 8.7–10.5)
CHLORIDE SERPL-SCNC: 93 MMOL/L (ref 95–110)
CK SERPL-CCNC: 114 U/L (ref 20–200)
CLARITY UR REFRACT.AUTO: CLEAR
CO2 SERPL-SCNC: 28 MMOL/L (ref 23–29)
COLOR UR AUTO: YELLOW
CREAT SERPL-MCNC: 1.1 MG/DL (ref 0.5–1.4)
DIFFERENTIAL METHOD BLD: ABNORMAL
EOSINOPHIL # BLD AUTO: 0.1 K/UL (ref 0–0.5)
EOSINOPHIL NFR BLD: 0.6 % (ref 0–8)
ERYTHROCYTE [DISTWIDTH] IN BLOOD BY AUTOMATED COUNT: 13.8 % (ref 11.5–14.5)
EST. GFR  (NO RACE VARIABLE): >60 ML/MIN/1.73 M^2
GLUCOSE SERPL-MCNC: 300 MG/DL (ref 70–110)
GLUCOSE UR QL STRIP: ABNORMAL
HCT VFR BLD AUTO: 30.1 % (ref 40–54)
HGB BLD-MCNC: 9.4 G/DL (ref 14–18)
HGB UR QL STRIP: NEGATIVE
HYALINE CASTS UR QL AUTO: 0 /LPF
IMM GRANULOCYTES # BLD AUTO: 0.07 K/UL (ref 0–0.04)
IMM GRANULOCYTES NFR BLD AUTO: 0.4 % (ref 0–0.5)
INFLUENZA A, MOLECULAR: NEGATIVE
INFLUENZA B, MOLECULAR: NEGATIVE
KETONES UR QL STRIP: NEGATIVE
LDH SERPL L TO P-CCNC: 1.03 MMOL/L (ref 0.5–2.2)
LEUKOCYTE ESTERASE UR QL STRIP: NEGATIVE
LYMPHOCYTES # BLD AUTO: 1.5 K/UL (ref 1–4.8)
LYMPHOCYTES NFR BLD: 9.4 % (ref 18–48)
MCH RBC QN AUTO: 27.6 PG (ref 27–31)
MCHC RBC AUTO-ENTMCNC: 31.2 G/DL (ref 32–36)
MCV RBC AUTO: 89 FL (ref 82–98)
MICROSCOPIC COMMENT: ABNORMAL
MONOCYTES # BLD AUTO: 0.8 K/UL (ref 0.3–1)
MONOCYTES NFR BLD: 4.8 % (ref 4–15)
NEUTROPHILS # BLD AUTO: 13.4 K/UL (ref 1.8–7.7)
NEUTROPHILS NFR BLD: 84.6 % (ref 38–73)
NITRITE UR QL STRIP: NEGATIVE
NRBC BLD-RTO: 0 /100 WBC
PH UR STRIP: 6 [PH] (ref 5–8)
PLATELET # BLD AUTO: 401 K/UL (ref 150–450)
PMV BLD AUTO: 9.6 FL (ref 9.2–12.9)
POTASSIUM SERPL-SCNC: 4.6 MMOL/L (ref 3.5–5.1)
PROT SERPL-MCNC: 8.7 G/DL (ref 6–8.4)
PROT UR QL STRIP: ABNORMAL
RBC # BLD AUTO: 3.4 M/UL (ref 4.6–6.2)
RBC #/AREA URNS AUTO: 2 /HPF (ref 0–4)
SAMPLE: NORMAL
SARS-COV-2 RDRP RESP QL NAA+PROBE: NEGATIVE
SITE: NORMAL
SODIUM SERPL-SCNC: 130 MMOL/L (ref 136–145)
SP GR UR STRIP: 1.02 (ref 1–1.03)
SPECIMEN SOURCE: NORMAL
SQUAMOUS #/AREA URNS AUTO: 0 /HPF
URN SPEC COLLECT METH UR: ABNORMAL
WBC # BLD AUTO: 15.86 K/UL (ref 3.9–12.7)
WBC #/AREA URNS AUTO: 6 /HPF (ref 0–5)
YEAST UR QL AUTO: ABNORMAL

## 2024-01-14 PROCEDURE — 83935 ASSAY OF URINE OSMOLALITY: CPT | Performed by: EMERGENCY MEDICINE

## 2024-01-14 PROCEDURE — 99900035 HC TECH TIME PER 15 MIN (STAT)

## 2024-01-14 PROCEDURE — 82570 ASSAY OF URINE CREATININE: CPT | Performed by: EMERGENCY MEDICINE

## 2024-01-14 PROCEDURE — 82550 ASSAY OF CK (CPK): CPT | Performed by: EMERGENCY MEDICINE

## 2024-01-14 PROCEDURE — 25000003 PHARM REV CODE 250: Performed by: EMERGENCY MEDICINE

## 2024-01-14 PROCEDURE — 87502 INFLUENZA DNA AMP PROBE: CPT | Performed by: EMERGENCY MEDICINE

## 2024-01-14 PROCEDURE — 99285 EMERGENCY DEPT VISIT HI MDM: CPT | Mod: 25

## 2024-01-14 PROCEDURE — 84300 ASSAY OF URINE SODIUM: CPT | Performed by: EMERGENCY MEDICINE

## 2024-01-14 PROCEDURE — G0378 HOSPITAL OBSERVATION PER HR: HCPCS

## 2024-01-14 PROCEDURE — 99239 HOSP IP/OBS DSCHRG MGMT >30: CPT | Mod: ,,, | Performed by: STUDENT IN AN ORGANIZED HEALTH CARE EDUCATION/TRAINING PROGRAM

## 2024-01-14 PROCEDURE — 83605 ASSAY OF LACTIC ACID: CPT

## 2024-01-14 PROCEDURE — 81001 URINALYSIS AUTO W/SCOPE: CPT | Performed by: EMERGENCY MEDICINE

## 2024-01-14 PROCEDURE — 87040 BLOOD CULTURE FOR BACTERIA: CPT | Performed by: EMERGENCY MEDICINE

## 2024-01-14 PROCEDURE — 93005 ELECTROCARDIOGRAM TRACING: CPT

## 2024-01-14 PROCEDURE — 85025 COMPLETE CBC W/AUTO DIFF WBC: CPT | Performed by: EMERGENCY MEDICINE

## 2024-01-14 PROCEDURE — 94761 N-INVAS EAR/PLS OXIMETRY MLT: CPT

## 2024-01-14 PROCEDURE — 82436 ASSAY OF URINE CHLORIDE: CPT | Performed by: EMERGENCY MEDICINE

## 2024-01-14 PROCEDURE — U0002 COVID-19 LAB TEST NON-CDC: HCPCS | Performed by: EMERGENCY MEDICINE

## 2024-01-14 PROCEDURE — 93010 ELECTROCARDIOGRAM REPORT: CPT | Mod: ,,, | Performed by: INTERNAL MEDICINE

## 2024-01-14 PROCEDURE — 80053 COMPREHEN METABOLIC PANEL: CPT | Performed by: EMERGENCY MEDICINE

## 2024-01-14 RX ORDER — ACETAMINOPHEN 500 MG
1000 TABLET ORAL
Status: COMPLETED | OUTPATIENT
Start: 2024-01-14 | End: 2024-01-14

## 2024-01-14 RX ADMIN — ACETAMINOPHEN 1000 MG: 500 TABLET ORAL at 09:01

## 2024-01-15 PROBLEM — I50.22 CHRONIC HFREF (HEART FAILURE WITH REDUCED EJECTION FRACTION): Status: ACTIVE | Noted: 2024-01-15

## 2024-01-15 LAB
ALBUMIN SERPL BCP-MCNC: 1.4 G/DL (ref 3.5–5.2)
ALP SERPL-CCNC: 237 U/L (ref 55–135)
ALT SERPL W/O P-5'-P-CCNC: 105 U/L (ref 10–44)
ANION GAP SERPL CALC-SCNC: 10 MMOL/L (ref 8–16)
APPEARANCE FLD: NORMAL
APTT PPP: 28.4 SEC (ref 21–32)
AST SERPL-CCNC: 90 U/L (ref 10–40)
BASOPHILS # BLD AUTO: 0.04 K/UL (ref 0–0.2)
BASOPHILS NFR BLD: 0.3 % (ref 0–1.9)
BILIRUB SERPL-MCNC: 0.7 MG/DL (ref 0.1–1)
BODY FLD TYPE: ABNORMAL
BODY FLD TYPE: NORMAL
BODY FLUID COMMENTS: NORMAL
BUN SERPL-MCNC: 8 MG/DL (ref 6–20)
CALCIUM SERPL-MCNC: 9.2 MG/DL (ref 8.7–10.5)
CHLORIDE SERPL-SCNC: 96 MMOL/L (ref 95–110)
CHLORIDE UR-SCNC: 21 MMOL/L (ref 25–200)
CO2 SERPL-SCNC: 25 MMOL/L (ref 23–29)
COLOR FLD: NORMAL
CREAT SERPL-MCNC: 0.8 MG/DL (ref 0.5–1.4)
CREAT UR-MCNC: 197 MG/DL (ref 23–375)
CRP SERPL-MCNC: 211.9 MG/L (ref 0–8.2)
CRYSTALS FLD MICRO: POSITIVE
DIFFERENTIAL METHOD BLD: ABNORMAL
EOSINOPHIL # BLD AUTO: 0.1 K/UL (ref 0–0.5)
EOSINOPHIL NFR BLD: 0.8 % (ref 0–8)
ERYTHROCYTE [DISTWIDTH] IN BLOOD BY AUTOMATED COUNT: 14 % (ref 11.5–14.5)
ERYTHROCYTE [SEDIMENTATION RATE] IN BLOOD BY PHOTOMETRIC METHOD: 97 MM/HR (ref 0–23)
EST. GFR  (NO RACE VARIABLE): >60 ML/MIN/1.73 M^2
GGT SERPL-CCNC: 205 U/L (ref 8–55)
GLUCOSE SERPL-MCNC: 231 MG/DL (ref 70–110)
GRAM STN SPEC: NORMAL
GRAM STN SPEC: NORMAL
HCT VFR BLD AUTO: 28.2 % (ref 40–54)
HGB BLD-MCNC: 8.8 G/DL (ref 14–18)
IMM GRANULOCYTES # BLD AUTO: 0.08 K/UL (ref 0–0.04)
IMM GRANULOCYTES NFR BLD AUTO: 0.6 % (ref 0–0.5)
INR PPP: 1.3 (ref 0.8–1.2)
LACTATE SERPL-SCNC: 1.4 MMOL/L (ref 0.5–2.2)
LYMPHOCYTES # BLD AUTO: 1.5 K/UL (ref 1–4.8)
LYMPHOCYTES NFR BLD: 10.8 % (ref 18–48)
LYMPHOCYTES NFR FLD MANUAL: 5 %
MAGNESIUM SERPL-MCNC: 1.9 MG/DL (ref 1.6–2.6)
MCH RBC QN AUTO: 26.7 PG (ref 27–31)
MCHC RBC AUTO-ENTMCNC: 31.2 G/DL (ref 32–36)
MCV RBC AUTO: 86 FL (ref 82–98)
MONOCYTES # BLD AUTO: 0.7 K/UL (ref 0.3–1)
MONOCYTES NFR BLD: 4.8 % (ref 4–15)
MONOS+MACROS NFR FLD MANUAL: 6 %
NEUTROPHILS # BLD AUTO: 11.5 K/UL (ref 1.8–7.7)
NEUTROPHILS NFR BLD: 82.7 % (ref 38–73)
NEUTROPHILS NFR FLD MANUAL: 89 %
NRBC BLD-RTO: 0 /100 WBC
OSMOLALITY SERPL: 286 MOSM/KG (ref 280–300)
OSMOLALITY UR: 576 MOSM/KG (ref 50–1200)
PHOSPHATE SERPL-MCNC: 3.5 MG/DL (ref 2.7–4.5)
PLATELET # BLD AUTO: 343 K/UL (ref 150–450)
PMV BLD AUTO: 9.6 FL (ref 9.2–12.9)
POCT GLUCOSE: 202 MG/DL (ref 70–110)
POCT GLUCOSE: 232 MG/DL (ref 70–110)
POCT GLUCOSE: 249 MG/DL (ref 70–110)
POCT GLUCOSE: 252 MG/DL (ref 70–110)
POTASSIUM SERPL-SCNC: 4.3 MMOL/L (ref 3.5–5.1)
PROCALCITONIN SERPL IA-MCNC: 0.32 NG/ML
PROT SERPL-MCNC: 7.8 G/DL (ref 6–8.4)
PROTHROMBIN TIME: 14.1 SEC (ref 9–12.5)
RBC # BLD AUTO: 3.3 M/UL (ref 4.6–6.2)
SODIUM SERPL-SCNC: 131 MMOL/L (ref 136–145)
SODIUM UR-SCNC: 13 MMOL/L (ref 20–250)
T4 FREE SERPL-MCNC: 0.91 NG/DL (ref 0.71–1.51)
TSH SERPL DL<=0.005 MIU/L-ACNC: 0.26 UIU/ML (ref 0.4–4)
WBC # BLD AUTO: 13.92 K/UL (ref 3.9–12.7)
WBC # FLD: NORMAL /CU MM

## 2024-01-15 PROCEDURE — 86140 C-REACTIVE PROTEIN: CPT | Performed by: HOSPITALIST

## 2024-01-15 PROCEDURE — 96372 THER/PROPH/DIAG INJ SC/IM: CPT | Performed by: HOSPITALIST

## 2024-01-15 PROCEDURE — 83930 ASSAY OF BLOOD OSMOLALITY: CPT | Performed by: HOSPITALIST

## 2024-01-15 PROCEDURE — 83735 ASSAY OF MAGNESIUM: CPT | Performed by: HOSPITALIST

## 2024-01-15 PROCEDURE — G0378 HOSPITAL OBSERVATION PER HR: HCPCS

## 2024-01-15 PROCEDURE — 87116 MYCOBACTERIA CULTURE: CPT

## 2024-01-15 PROCEDURE — 84145 PROCALCITONIN (PCT): CPT | Performed by: HOSPITALIST

## 2024-01-15 PROCEDURE — 36415 COLL VENOUS BLD VENIPUNCTURE: CPT | Mod: XB | Performed by: HOSPITALIST

## 2024-01-15 PROCEDURE — 85730 THROMBOPLASTIN TIME PARTIAL: CPT | Performed by: HOSPITALIST

## 2024-01-15 PROCEDURE — 85025 COMPLETE CBC W/AUTO DIFF WBC: CPT | Performed by: HOSPITALIST

## 2024-01-15 PROCEDURE — 87206 SMEAR FLUORESCENT/ACID STAI: CPT

## 2024-01-15 PROCEDURE — 84439 ASSAY OF FREE THYROXINE: CPT | Performed by: HOSPITALIST

## 2024-01-15 PROCEDURE — 25000003 PHARM REV CODE 250: Performed by: HOSPITALIST

## 2024-01-15 PROCEDURE — 87070 CULTURE OTHR SPECIMN AEROBIC: CPT

## 2024-01-15 PROCEDURE — 25500020 PHARM REV CODE 255: Performed by: HOSPITALIST

## 2024-01-15 PROCEDURE — 25000003 PHARM REV CODE 250

## 2024-01-15 PROCEDURE — 87205 SMEAR GRAM STAIN: CPT

## 2024-01-15 PROCEDURE — 84100 ASSAY OF PHOSPHORUS: CPT | Performed by: HOSPITALIST

## 2024-01-15 PROCEDURE — 80053 COMPREHEN METABOLIC PANEL: CPT | Performed by: HOSPITALIST

## 2024-01-15 PROCEDURE — 85610 PROTHROMBIN TIME: CPT | Performed by: HOSPITALIST

## 2024-01-15 PROCEDURE — A9585 GADOBUTROL INJECTION: HCPCS | Performed by: HOSPITALIST

## 2024-01-15 PROCEDURE — 89051 BODY FLUID CELL COUNT: CPT

## 2024-01-15 PROCEDURE — 0S9D3ZZ DRAINAGE OF LEFT KNEE JOINT, PERCUTANEOUS APPROACH: ICD-10-PCS | Performed by: ORTHOPAEDIC SURGERY

## 2024-01-15 PROCEDURE — 87102 FUNGUS ISOLATION CULTURE: CPT

## 2024-01-15 PROCEDURE — 94761 N-INVAS EAR/PLS OXIMETRY MLT: CPT

## 2024-01-15 PROCEDURE — 63600175 PHARM REV CODE 636 W HCPCS: Performed by: HOSPITALIST

## 2024-01-15 PROCEDURE — 89060 EXAM SYNOVIAL FLUID CRYSTALS: CPT

## 2024-01-15 PROCEDURE — 87075 CULTR BACTERIA EXCEPT BLOOD: CPT

## 2024-01-15 PROCEDURE — 84443 ASSAY THYROID STIM HORMONE: CPT | Performed by: HOSPITALIST

## 2024-01-15 PROCEDURE — 82977 ASSAY OF GGT: CPT | Performed by: HOSPITALIST

## 2024-01-15 PROCEDURE — 87076 CULTURE ANAEROBE IDENT EACH: CPT

## 2024-01-15 PROCEDURE — 85652 RBC SED RATE AUTOMATED: CPT | Performed by: HOSPITALIST

## 2024-01-15 PROCEDURE — 83605 ASSAY OF LACTIC ACID: CPT | Performed by: HOSPITALIST

## 2024-01-15 RX ORDER — PROCHLORPERAZINE EDISYLATE 5 MG/ML
5 INJECTION INTRAMUSCULAR; INTRAVENOUS EVERY 6 HOURS PRN
Status: DISCONTINUED | OUTPATIENT
Start: 2024-01-15 | End: 2024-01-25 | Stop reason: HOSPADM

## 2024-01-15 RX ORDER — OXYCODONE HYDROCHLORIDE 10 MG/1
10 TABLET ORAL EVERY 4 HOURS PRN
Status: DISCONTINUED | OUTPATIENT
Start: 2024-01-15 | End: 2024-01-25 | Stop reason: HOSPADM

## 2024-01-15 RX ORDER — AMOXICILLIN 250 MG
1 CAPSULE ORAL DAILY PRN
Status: DISCONTINUED | OUTPATIENT
Start: 2024-01-15 | End: 2024-01-19

## 2024-01-15 RX ORDER — NALOXONE HCL 0.4 MG/ML
0.02 VIAL (ML) INJECTION
Status: DISCONTINUED | OUTPATIENT
Start: 2024-01-15 | End: 2024-01-25 | Stop reason: HOSPADM

## 2024-01-15 RX ORDER — GADOBUTROL 604.72 MG/ML
10 INJECTION INTRAVENOUS
Status: COMPLETED | OUTPATIENT
Start: 2024-01-15 | End: 2024-01-15

## 2024-01-15 RX ORDER — METHOCARBAMOL 500 MG/1
500 TABLET, FILM COATED ORAL 4 TIMES DAILY
Status: DISCONTINUED | OUTPATIENT
Start: 2024-01-15 | End: 2024-01-25 | Stop reason: HOSPADM

## 2024-01-15 RX ORDER — INSULIN ASPART 100 [IU]/ML
6 INJECTION, SOLUTION INTRAVENOUS; SUBCUTANEOUS
Status: DISCONTINUED | OUTPATIENT
Start: 2024-01-15 | End: 2024-01-17

## 2024-01-15 RX ORDER — TALC
6 POWDER (GRAM) TOPICAL NIGHTLY PRN
Status: DISCONTINUED | OUTPATIENT
Start: 2024-01-15 | End: 2024-01-25 | Stop reason: HOSPADM

## 2024-01-15 RX ORDER — AMMONIUM LACTATE 12 G/100G
LOTION TOPICAL 2 TIMES DAILY
Status: DISCONTINUED | OUTPATIENT
Start: 2024-01-15 | End: 2024-01-25 | Stop reason: HOSPADM

## 2024-01-15 RX ORDER — OXYCODONE HYDROCHLORIDE 5 MG/1
5 TABLET ORAL EVERY 4 HOURS PRN
Status: DISCONTINUED | OUTPATIENT
Start: 2024-01-15 | End: 2024-01-25 | Stop reason: HOSPADM

## 2024-01-15 RX ORDER — METOCLOPRAMIDE 5 MG/1
5 TABLET ORAL
Status: DISCONTINUED | OUTPATIENT
Start: 2024-01-15 | End: 2024-01-25 | Stop reason: HOSPADM

## 2024-01-15 RX ORDER — IBUPROFEN 200 MG
24 TABLET ORAL
Status: DISCONTINUED | OUTPATIENT
Start: 2024-01-15 | End: 2024-01-25 | Stop reason: HOSPADM

## 2024-01-15 RX ORDER — SODIUM CHLORIDE 0.9 % (FLUSH) 0.9 %
10 SYRINGE (ML) INJECTION
Status: DISCONTINUED | OUTPATIENT
Start: 2024-01-15 | End: 2024-01-23

## 2024-01-15 RX ORDER — SODIUM CHLORIDE 0.9 % (FLUSH) 0.9 %
10 SYRINGE (ML) INJECTION EVERY 6 HOURS PRN
Status: DISCONTINUED | OUTPATIENT
Start: 2024-01-15 | End: 2024-01-23

## 2024-01-15 RX ORDER — OXYCODONE HYDROCHLORIDE 5 MG/1
5 TABLET ORAL EVERY 6 HOURS PRN
Status: DISCONTINUED | OUTPATIENT
Start: 2024-01-15 | End: 2024-01-21

## 2024-01-15 RX ORDER — GLUCAGON 1 MG
1 KIT INJECTION
Status: DISCONTINUED | OUTPATIENT
Start: 2024-01-15 | End: 2024-01-25 | Stop reason: HOSPADM

## 2024-01-15 RX ORDER — SCOLOPAMINE TRANSDERMAL SYSTEM 1 MG/1
1 PATCH, EXTENDED RELEASE TRANSDERMAL
Status: DISCONTINUED | OUTPATIENT
Start: 2024-01-15 | End: 2024-01-25 | Stop reason: HOSPADM

## 2024-01-15 RX ORDER — ACETAMINOPHEN 500 MG
1000 TABLET ORAL EVERY 8 HOURS
Status: DISCONTINUED | OUTPATIENT
Start: 2024-01-15 | End: 2024-01-25 | Stop reason: HOSPADM

## 2024-01-15 RX ORDER — PANTOPRAZOLE SODIUM 40 MG/1
40 TABLET, DELAYED RELEASE ORAL DAILY
Status: DISCONTINUED | OUTPATIENT
Start: 2024-01-15 | End: 2024-01-25 | Stop reason: HOSPADM

## 2024-01-15 RX ORDER — INSULIN ASPART 100 [IU]/ML
0-5 INJECTION, SOLUTION INTRAVENOUS; SUBCUTANEOUS
Status: DISCONTINUED | OUTPATIENT
Start: 2024-01-15 | End: 2024-01-25 | Stop reason: HOSPADM

## 2024-01-15 RX ORDER — MORPHINE SULFATE 4 MG/ML
4 INJECTION, SOLUTION INTRAMUSCULAR; INTRAVENOUS EVERY 4 HOURS PRN
Status: DISCONTINUED | OUTPATIENT
Start: 2024-01-15 | End: 2024-01-25 | Stop reason: HOSPADM

## 2024-01-15 RX ORDER — SODIUM CHLORIDE 9 MG/ML
INJECTION, SOLUTION INTRAVENOUS CONTINUOUS
Status: ACTIVE | OUTPATIENT
Start: 2024-01-15 | End: 2024-01-15

## 2024-01-15 RX ORDER — POLYETHYLENE GLYCOL 3350 17 G/17G
17 POWDER, FOR SOLUTION ORAL 2 TIMES DAILY PRN
Status: DISCONTINUED | OUTPATIENT
Start: 2024-01-15 | End: 2024-01-19

## 2024-01-15 RX ORDER — ONDANSETRON HYDROCHLORIDE 2 MG/ML
4 INJECTION, SOLUTION INTRAVENOUS EVERY 8 HOURS PRN
Status: DISCONTINUED | OUTPATIENT
Start: 2024-01-15 | End: 2024-01-25 | Stop reason: HOSPADM

## 2024-01-15 RX ORDER — IBUPROFEN 200 MG
16 TABLET ORAL
Status: DISCONTINUED | OUTPATIENT
Start: 2024-01-15 | End: 2024-01-25 | Stop reason: HOSPADM

## 2024-01-15 RX ORDER — ACETAMINOPHEN 325 MG/1
650 TABLET ORAL EVERY 4 HOURS PRN
Status: DISCONTINUED | OUTPATIENT
Start: 2024-01-15 | End: 2024-01-15

## 2024-01-15 RX ADMIN — RIVAROXABAN 10 MG: 10 TABLET, FILM COATED ORAL at 05:01

## 2024-01-15 RX ADMIN — METOPROLOL SUCCINATE 12.5 MG: 25 TABLET, EXTENDED RELEASE ORAL at 10:01

## 2024-01-15 RX ADMIN — DAPTOMYCIN 945 MG: 500 INJECTION, POWDER, LYOPHILIZED, FOR SOLUTION INTRAVENOUS at 04:01

## 2024-01-15 RX ADMIN — INSULIN ASPART 1 UNITS: 100 INJECTION, SOLUTION INTRAVENOUS; SUBCUTANEOUS at 11:01

## 2024-01-15 RX ADMIN — GADOBUTROL 10 ML: 604.72 INJECTION INTRAVENOUS at 12:01

## 2024-01-15 RX ADMIN — ACETAMINOPHEN 1000 MG: 500 TABLET ORAL at 10:01

## 2024-01-15 RX ADMIN — PIPERACILLIN SODIUM AND TAZOBACTAM SODIUM 4.5 G: 4; .5 INJECTION, POWDER, FOR SOLUTION INTRAVENOUS at 02:01

## 2024-01-15 RX ADMIN — METHOCARBAMOL 500 MG: 500 TABLET ORAL at 02:01

## 2024-01-15 RX ADMIN — METOCLOPRAMIDE 5 MG: 5 TABLET ORAL at 04:01

## 2024-01-15 RX ADMIN — AMMONIUM LACTATE: 12 LOTION TOPICAL at 11:01

## 2024-01-15 RX ADMIN — INSULIN DETEMIR 6 UNITS: 100 INJECTION, SOLUTION SUBCUTANEOUS at 03:01

## 2024-01-15 RX ADMIN — SODIUM CHLORIDE: 9 INJECTION, SOLUTION INTRAVENOUS at 06:01

## 2024-01-15 RX ADMIN — ACETAMINOPHEN 1000 MG: 500 TABLET ORAL at 11:01

## 2024-01-15 RX ADMIN — OXYCODONE HYDROCHLORIDE 5 MG: 5 TABLET ORAL at 11:01

## 2024-01-15 RX ADMIN — METOCLOPRAMIDE 5 MG: 5 TABLET ORAL at 10:01

## 2024-01-15 RX ADMIN — INSULIN ASPART 3 UNITS: 100 INJECTION, SOLUTION INTRAVENOUS; SUBCUTANEOUS at 06:01

## 2024-01-15 RX ADMIN — PIPERACILLIN SODIUM AND TAZOBACTAM SODIUM 4.5 G: 4; .5 INJECTION, POWDER, FOR SOLUTION INTRAVENOUS at 11:01

## 2024-01-15 RX ADMIN — INSULIN DETEMIR 15 UNITS: 100 INJECTION, SOLUTION SUBCUTANEOUS at 11:01

## 2024-01-15 RX ADMIN — METHOCARBAMOL 500 MG: 500 TABLET ORAL at 11:01

## 2024-01-15 RX ADMIN — METOCLOPRAMIDE 5 MG: 5 TABLET ORAL at 06:01

## 2024-01-15 RX ADMIN — INSULIN DETEMIR 15 UNITS: 100 INJECTION, SOLUTION SUBCUTANEOUS at 10:01

## 2024-01-15 RX ADMIN — INSULIN ASPART 6 UNITS: 100 INJECTION, SOLUTION INTRAVENOUS; SUBCUTANEOUS at 10:01

## 2024-01-15 RX ADMIN — PIPERACILLIN SODIUM AND TAZOBACTAM SODIUM 4.5 G: 4; .5 INJECTION, POWDER, FOR SOLUTION INTRAVENOUS at 10:01

## 2024-01-15 RX ADMIN — METHOCARBAMOL 500 MG: 500 TABLET ORAL at 10:01

## 2024-01-15 RX ADMIN — PANTOPRAZOLE SODIUM 40 MG: 40 TABLET, DELAYED RELEASE ORAL at 10:01

## 2024-01-15 RX ADMIN — AMMONIUM LACTATE: 12 LOTION TOPICAL at 10:01

## 2024-01-15 RX ADMIN — INSULIN ASPART 6 UNITS: 100 INJECTION, SOLUTION INTRAVENOUS; SUBCUTANEOUS at 06:01

## 2024-01-15 RX ADMIN — METHOCARBAMOL 500 MG: 500 TABLET ORAL at 04:01

## 2024-01-15 NOTE — SUBJECTIVE & OBJECTIVE
Past Medical History:   Diagnosis Date    Anticoagulant long-term use     COVID-19 virus infection     Diabetes mellitus     Diabetes mellitus, type 2     Hypertension     May-Thurner syndrome        Past Surgical History:   Procedure Laterality Date    APPENDECTOMY      ARTHROTOMY OF KNEE Left 12/29/2023    Procedure: ARTHROTOMY, KNEE;  Surgeon: Edy Bocanegra Jr., MD;  Location: [facility] OR;  Service: Orthopedics;  Laterality: Left;    ESOPHAGOGASTRODUODENOSCOPY N/A 1/31/2022    Procedure: EGD (ESOPHAGOGASTRODUODENOSCOPY);  Surgeon: Cindy Quiros MD;  Location: [facility] ENDO;  Service: Endoscopy;  Laterality: N/A;    ESOPHAGOGASTRODUODENOSCOPY N/A 3/21/2022    Procedure: EGD (ESOPHAGOGASTRODUODENOSCOPY);  Surgeon: Tejas Mann MD;  Location: [facility] ENDO;  Service: Endoscopy;  Laterality: N/A;    INCISION AND DRAINAGE FOOT Left 11/28/2021    Procedure: INCISION AND DRAINAGE, FOOT;  Surgeon: Bj Alicea DPM;  Location: [facility] OR;  Service: Podiatry;  Laterality: Left;    stents in bilateral legs      TRANSESOPHAGEAL ECHOCARDIOGRAPHY N/A 1/3/2024    Procedure: ECHOCARDIOGRAM, TRANSESOPHAGEAL;  Surgeon: Douglas Doss MD;  Location: [facility] CATH LAB/EP;  Service: Cardiology;  Laterality: N/A;       Review of patient's allergies indicates:   Allergen Reactions    Heparin analogues Nausea And Vomiting       Medications:  Medications Prior to Admission   Medication Sig    blood-glucose meter,continuous (DEXCOM G7 ) Misc 1 Device by Misc.(Non-Drug; Combo Route) route once daily.    blood-glucose sensor (DEXCOM G7 SENSOR) Justyna 1 Device by Misc.(Non-Drug; Combo Route) route every 10 days.    empagliflozin (JARDIANCE) 25 mg tablet Take 1 tablet (25 mg total) by mouth once daily.    furosemide (LASIX) 20 MG tablet Take 1 tablet (20 mg total) by mouth once daily.    glucagon (GVOKE HYPOPEN 2-PACK) 1 mg/0.2 mL AtIn Inject 1 mg into the skin as needed (SEVERE HYPOGLYCEMIA).    insulin aspart U-100 (NOVOLOG U-100  "INSULIN ASPART) 100 unit/mL injection Inject 14 Units into the skin 3 (three) times daily before meals.    metoclopramide HCl (REGLAN) 10 MG tablet Take 0.5 tablets (5 mg total) by mouth 3 (three) times daily before meals.    metoprolol succinate (TOPROL-XL) 25 MG 24 hr tablet Take 0.5 tablets (12.5 mg total) by mouth once daily.    pantoprazole (PROTONIX) 40 MG tablet Take 1 tablet (40 mg total) by mouth once daily.    pen needle, diabetic (BD ULTRA-FINE DIYA PEN NEEDLE) 32 gauge x 5/32" Ndle Use with Tresiba daily and Humalog 3-4 times daily as directed.    prochlorperazine (COMPAZINE) 10 MG tablet Take 1 tablet (10 mg total) by mouth 3 (three) times daily as needed (nausea or vomiting).    promethazine (PHENERGAN) 25 MG suppository Place 1 suppository (25 mg total) rectally every 6 (six) hours as needed for Nausea.    rivaroxaban (XARELTO) 10 mg Tab Take 1 tablet (10 mg total) by mouth daily with dinner or evening meal.    scopolamine (TRANSDERM-SCOP) 1.3-1.5 mg (1 mg over 3 days) Place 1 patch onto the skin every 72 hours as needed (intractable nausea/vomiting).    sodium chloride 0.9% SolP 50 mL with DAPTOmycin 500 mg SolR 947 mg Inject 947 mg into the vein once daily.    TRESIBA FLEXTOUCH U-200 200 unit/mL (3 mL) insulin pen Inject 56 Units into the skin once daily.     Antibiotics (From admission, onward)      Start     Stop Route Frequency Ordered    01/15/24 1400  DAPTOmycin (CUBICIN) 945 mg in sodium chloride 0.9% SolP 50 mL IVPB         -- IV Every 24 hours (non-standard times) 01/15/24 0229    01/15/24 0030  piperacillin-tazobactam (ZOSYN) 4.5 g in dextrose 5 % in water (D5W) 100 mL IVPB (MB+)         -- IV Every 8 hours (non-standard times) 01/14/24 2316          Antifungals (From admission, onward)      None          Antivirals (From admission, onward)      None             Immunization History   Administered Date(s) Administered    COVID-19, MRNA, LN-S PF (Pfizer) (Purple Cap) 12/28/2020, 01/19/2021, " 02/11/2022    Influenza - Quadrivalent - PF *Preferred* (6 months and older) 09/29/2020, 09/30/2021, 10/20/2022, 10/20/2023       Family History       Problem Relation (Age of Onset)    Cancer Mother, Paternal Uncle, Paternal Grandfather, Maternal Grandfather    Irritable bowel syndrome Paternal Grandfather          Social History     Socioeconomic History    Marital status:    Tobacco Use    Smoking status: Former     Types: Cigarettes    Smokeless tobacco: Former    Tobacco comments:     occasionally    Substance and Sexual Activity    Alcohol use: Yes     Comment: occ    Drug use: No     Social Determinants of Health     Financial Resource Strain: Low Risk  (12/28/2023)    Overall Financial Resource Strain (CARDIA)     Difficulty of Paying Living Expenses: Not hard at all   Food Insecurity: No Food Insecurity (12/28/2023)    Hunger Vital Sign     Worried About Running Out of Food in the Last Year: Never true     Ran Out of Food in the Last Year: Never true   Transportation Needs: No Transportation Needs (12/28/2023)    PRAPARE - Transportation     Lack of Transportation (Medical): No     Lack of Transportation (Non-Medical): No   Physical Activity: Sufficiently Active (12/28/2023)    Exercise Vital Sign     Days of Exercise per Week: 3 days     Minutes of Exercise per Session: 60 min   Stress: Stress Concern Present (12/28/2023)    [place] [employer] - Occupational Stress Questionnaire     Feeling of Stress : Very much   Social Connections: Moderately Integrated (12/28/2023)    Social Connection and Isolation Panel [NHANES]     Frequency of Communication with Friends and Family: More than three times a week     Frequency of Social Gatherings with Friends and Family: More than three times a week     Attends Yazidi Services: More than 4 times per year     Active Member of Clubs or Organizations: No     Attends Club or Organization Meetings: Never     Marital Status:    Housing  Stability: Low Risk  (12/28/2023)    Housing Stability Vital Sign     Unable to Pay for Housing in the Last Year: No     Number of Places Lived in the Last Year: 1     Unstable Housing in the Last Year: No     Review of Systems   Constitutional:  Positive for fever.   HENT:  Negative for trouble swallowing.    Respiratory:  Negative for cough and shortness of breath.    Gastrointestinal:  Negative for abdominal pain, blood in stool, diarrhea and vomiting.   Genitourinary:  Negative for dysuria and hematuria.   Musculoskeletal:  Positive for arthralgias and joint swelling. Negative for neck stiffness.   Neurological:  Negative for syncope.   Psychiatric/Behavioral:  Negative for confusion.    All other systems reviewed and are negative.    Objective:     Vital Signs (Most Recent):  Temp: 97.8 °F (36.6 °C) (01/15/24 0830)  Pulse: 95 (01/15/24 0830)  Resp: 18 (01/15/24 0830)  BP: 129/76 (01/15/24 0830)  SpO2: 95 % (01/15/24 0830) Vital Signs (24h Range):  Temp:  [97.8 °F (36.6 °C)-99 °F (37.2 °C)] 97.8 °F (36.6 °C)  Pulse:  [67-99] 95  Resp:  [16-18] 18  SpO2:  [95 %-96 %] 95 %  BP: (129-138)/(72-88) 129/76     Weight: 107.8 kg (237 lb 10.5 oz)  Body mass index is 31.35 kg/m².    Estimated Creatinine Clearance: 158.2 mL/min (based on SCr of 0.8 mg/dL).     Physical Exam  Vitals and nursing note reviewed.   Constitutional:       Appearance: Normal appearance.   HENT:      Head: Normocephalic and atraumatic.      Nose: Nose normal.      Mouth/Throat:      Mouth: Mucous membranes are moist.      Pharynx: Oropharynx is clear.   Eyes:      Conjunctiva/sclera: Conjunctivae normal.   Cardiovascular:      Rate and Rhythm: Normal rate and regular rhythm.      Pulses: Normal pulses.      Heart sounds: Normal heart sounds.   Pulmonary:      Effort: Pulmonary effort is normal.      Breath sounds: Normal breath sounds.   Abdominal:      General: Bowel sounds are normal.      Palpations: Abdomen is soft.      Tenderness: There is no  guarding or rebound.   Musculoskeletal:         General: Tenderness present. No deformity.      Cervical back: Neck supple.   Skin:     General: Skin is warm and dry.      Coloration: Skin is not jaundiced.      Findings: No rash.   Neurological:      Mental Status: He is alert and oriented to person, place, and time. Mental status is at baseline.   Psychiatric:         Mood and Affect: Mood normal.         Thought Content: Thought content normal.         Judgment: Judgment normal.          Significant Labs:   Pathology Results  (Last 10 years)                 03/21/22 1419  Specimen to Pathology, Surgery Gastrointestinal tract Final result    Narrative:  Pre-op Diagnosis: Bailey grade C esophagitis [K20.80]   Candida esophagitis [B37.81]   Procedure(s):   EGD (ESOPHAGOGASTRODUODENOSCOPY)   1. Antrum bx r/o h pylori   2. Distal esophagus bx   Release to patient->Immediate   Specimen total (fresh, frozen, permanent):->2       01/31/22 1156  Specimen to Pathology, Surgery Gastrointestinal tract Final result    Narrative:  Pre-op Diagnosis: Nausea and vomiting, intractability of   vomiting not specified, unspecified vomiting type [R11.2]   Diabetic gastroparesis associated with type 2 diabetes   mellitus [E11.43, K31.84]   Procedure(s):   EGD (ESOPHAGOGASTRODUODENOSCOPY)   Number of specimens: 2   Name of specimens:   #1 Gastric Bxs r/o H Pylori   #2 Esophageal Bxs r/o Candida   Release to patient->Immediate   Specimen total (fresh, frozen, permanent):->2             All pertinent labs within the past 24 hours have been reviewed.    Significant Imaging: I have reviewed all pertinent imaging results/findings within the past 24 hours.

## 2024-01-15 NOTE — ED PROVIDER NOTES
Encounter Date: 1/14/2024       History     Chief Complaint   Patient presents with    Fever     Pt arrives via EMS from [facility] facility c/o increased fever; hx left lower limb surgery in december receiving IV antibiotics dt possible MRSA infection in blood     Kulwant Mueller is a 38-year-old male past medical history of diabetes, hypertension, May Thurner syndrome, recent admission for MRSA bacteremia complicated by left septic arthritis sent from [facility] for fever.  Wife at bedside provides history, states she got a call from the facility yesterday stating that he spiked a temperature to 103.  They have given him Tylenol.  They tried to transfer him here to the emergency department however he refused transfer.  This morning, his fever was 100.  He finally agreed to transferred to the emergency department.  He did not feel that he had or actual fever.  He has no pain at this time.  He just has generalized weakness from his prolonged hospitalization.      Per ED pharmacist, patient received daptomycin at 2:00 p.m. today      Review of patient's allergies indicates:   Allergen Reactions    Heparin analogues Nausea And Vomiting     Past Medical History:   Diagnosis Date    Anticoagulant long-term use     COVID-19 virus infection     Diabetes mellitus     Diabetes mellitus, type 2     Hypertension     May-Thurner syndrome      Past Surgical History:   Procedure Laterality Date    APPENDECTOMY      ARTHROTOMY OF KNEE Left 12/29/2023    Procedure: ARTHROTOMY, KNEE;  Surgeon: Edy Bocanegra Jr., MD;  Location: [facility];  Service: Orthopedics;  Laterality: Left;    ESOPHAGOGASTRODUODENOSCOPY N/A 1/31/2022    Procedure: EGD (ESOPHAGOGASTRODUODENOSCOPY);  Surgeon: Cindy Quiros MD;  [facility]: [facility];  Service: Endoscopy;  Laterality: N/A;    ESOPHAGOGASTRODUODENOSCOPY N/A 3/21/2022    Procedure: EGD (ESOPHAGOGASTRODUODENOSCOPY);  Surgeon: Tejas Mann MD;  Location: [facility];  Service: Endoscopy;   Laterality: N/A;    INCISION AND DRAINAGE FOOT Left 11/28/2021    Procedure: INCISION AND DRAINAGE, FOOT;  Surgeon: Bj Alicea DPM;  Location: [facility] OR;  Service: Podiatry;  Laterality: Left;    stents in bilateral legs      TRANSESOPHAGEAL ECHOCARDIOGRAPHY N/A 1/3/2024    Procedure: ECHOCARDIOGRAM, TRANSESOPHAGEAL;  Surgeon: Douglas Doss MD;  Location: [facility] CATH LAB/EP;  Service: Cardiology;  Laterality: N/A;     Family History   Problem Relation Age of Onset    Cancer Mother     Cancer Paternal Uncle     Cancer Paternal Grandfather     Irritable bowel syndrome Paternal Grandfather     Cancer Maternal Grandfather     Colon cancer Neg Hx     Esophageal cancer Neg Hx     Rectal cancer Neg Hx     Stomach cancer Neg Hx     Ulcerative colitis Neg Hx     Crohn's disease Neg Hx     Celiac disease Neg Hx      Social History     Tobacco Use    Smoking status: Former     Types: Cigarettes    Smokeless tobacco: Former    Tobacco comments:     occasionally    Substance Use Topics    Alcohol use: Yes     Comment: occ    Drug use: No     Review of Systems    Physical Exam     Initial Vitals [01/14/24 1726]   BP Pulse Resp Temp SpO2   134/88 99 18 99 °F (37.2 °C) 96 %      MAP       --         Physical Exam    Nursing note and vitals reviewed.  Constitutional: He appears well-developed and well-nourished. No distress.   HENT:   Mouth/Throat: Oropharynx is clear and moist.   Eyes: Conjunctivae are normal.   Neck: Neck supple.   Cardiovascular:  Normal rate, regular rhythm and intact distal pulses.           + RUE picc line     Pulmonary/Chest: Breath sounds normal. He has no wheezes. He has no rales.   Abdominal: Abdomen is soft. He exhibits no distension. There is no abdominal tenderness. There is no rebound and no guarding.   Musculoskeletal:         General: No edema.      Cervical back: Neck supple.      Comments: + left knee with mild tenderness, sutures in place- c/d/i     Lymphadenopathy:     He has no  cervical adenopathy.   Neurological: He is alert and oriented to person, place, and time.   Skin: Skin is warm. Capillary refill takes less than 2 seconds. No rash noted.   Psychiatric: He has a normal mood and affect.         ED Course   Procedures  Labs Reviewed   CBC W/ AUTO DIFFERENTIAL - Abnormal; Notable for the following components:       Result Value    WBC 15.86 (*)     RBC 3.40 (*)     Hemoglobin 9.4 (*)     Hematocrit 30.1 (*)     MCHC 31.2 (*)     Gran # (ANC) 13.4 (*)     Immature Grans (Abs) 0.07 (*)     Gran % 84.6 (*)     Lymph % 9.4 (*)     All other components within normal limits   COMPREHENSIVE METABOLIC PANEL - Abnormal; Notable for the following components:    Sodium 130 (*)     Chloride 93 (*)     Glucose 300 (*)     Total Protein 8.7 (*)     Albumin 1.5 (*)     Alkaline Phosphatase 257 (*)      (*)      (*)     All other components within normal limits   URINALYSIS, REFLEX TO URINE CULTURE - Abnormal; Notable for the following components:    Protein, UA 1+ (*)     Glucose, UA 3+ (*)     All other components within normal limits    Narrative:     Specimen Source->Urine   URINALYSIS MICROSCOPIC - Abnormal; Notable for the following components:    WBC, UA 6 (*)     All other components within normal limits    Narrative:     Specimen Source->Urine   INFLUENZA A & B BY MOLECULAR   SARS-COV-2 RNA AMPLIFICATION, QUAL   CK   CK    Narrative:     Add on CK per Dr. Gerber @ 23:07 pm to order # [id number]   ISTAT LACTATE     EKG Readings: (Independently Interpreted)   EKG: SR at 98, nl axis, poor r wave progression     ECG Results              EKG 12-lead (Final result)  Result time 01/14/24 21:24:59      Final result by Interface, Lab In [facility] (01/14/24 21:24:59)                   Narrative:    Test Reason : R50.9,    Vent. Rate : 098 BPM     Atrial Rate : 098 BPM     P-R Int : 160 ms          QRS Dur : 084 ms      QT Int : 342 ms       P-R-T Axes : 054 020 062 degrees     QTc Int :  436 ms    Normal sinus rhythm  Abnormal anterior R wave progression  Abnormal ECG  When compared with ECG of 08-JAN-2024 11:40,  No significant change was found  Confirmed by Bernard CLARK MD ([id number]) on 1/14/2024 9:24:55 PM    Referred By: SOFÍA   SELF           Confirmed By:Bernard CLARK MD                                  Imaging Results              X-Ray Chest AP Portable (Final result)  Result time 01/14/24 19:30:39      Final result by Isai Akins MD (01/14/24 19:30:39)                   Impression:      No acute radiographic abnormality.      Electronically signed by: Isai Akins  Date:    01/14/2024  Time:    19:30               Narrative:    EXAMINATION:  XR CHEST AP PORTABLE    CLINICAL HISTORY:  Sepsis;    TECHNIQUE:  Single frontal view of the chest was performed.    COMPARISON:  01/08/2024    FINDINGS:  PICC catheter entering from the right upper extremity with tip superimposed over the SVC.    The lungs are clear, with normal appearance of pulmonary vasculature and no pleural effusion or pneumothorax.    The cardiac silhouette is normal in size. The hilar and mediastinal contours are unremarkable.    Bones are intact.                                    X-Rays:   Independently Interpreted Readings:   Chest X-Ray: No acute abnormalities.  No infiltrates.     Medications   piperacillin-tazobactam (ZOSYN) 4.5 g in dextrose 5 % in water (D5W) 100 mL IVPB (MB+) (4.5 g Intravenous New Bag 1/15/24 1029)   oxyCODONE immediate release tablet 5 mg (has no administration in time range)   DAPTOmycin (CUBICIN) 945 mg in sodium chloride 0.9% SolP 50 mL IVPB (has no administration in time range)   scopolamine 1.3-1.5 mg (1 mg over 3 days) 1 patch (has no administration in time range)   metoclopramide HCl tablet 5 mg (5 mg Oral Given 1/15/24 1035)   metoprolol succinate (TOPROL-XL) 24 hr split tablet 12.5 mg (12.5 mg Oral Given 1/15/24 1029)   pantoprazole EC tablet 40 mg (40 mg Oral Given 1/15/24 1030)   insulin  detemir U-100 (Levemir) pen 15 Units (15 Units Subcutaneous Given 1/15/24 1031)   rivaroxaban tablet 10 mg (has no administration in time range)   sodium chloride 0.9% flush 10 mL (has no administration in time range)   melatonin tablet 6 mg (has no administration in time range)   polyethylene glycol packet 17 g (has no administration in time range)   senna-docusate 8.6-50 mg per tablet 1 tablet (has no administration in time range)   naloxone 0.4 mg/mL injection 0.02 mg (has no administration in time range)   ondansetron injection 4 mg (has no administration in time range)   prochlorperazine injection Soln 5 mg (has no administration in time range)   glucose chewable tablet 16 g (has no administration in time range)   glucose chewable tablet 24 g (has no administration in time range)   glucagon (human recombinant) injection 1 mg (has no administration in time range)   insulin aspart U-100 pen 0-5 Units (has no administration in time range)   insulin aspart U-100 pen 6 Units (6 Units Subcutaneous Not Given 1/15/24 1130)   dextrose 10% bolus 125 mL 125 mL (has no administration in time range)   dextrose 10% bolus 250 mL 250 mL (has no administration in time range)   ammonium lactate 12 % lotion ( Topical (Top) Given 1/15/24 1037)   0.9%  NaCl infusion ( Intravenous New Bag 1/15/24 0614)   acetaminophen tablet 1,000 mg (1,000 mg Oral Given 1/15/24 1035)   oxyCODONE immediate release tablet 5 mg (has no administration in time range)   oxyCODONE immediate release tablet Tab 10 mg (has no administration in time range)   morphine injection 4 mg (has no administration in time range)   methocarbamoL tablet 500 mg (500 mg Oral Given 1/15/24 1035)   acetaminophen tablet 1,000 mg (1,000 mg Oral Given 1/14/24 2118)   insulin detemir U-100 (Levemir) pen 6 Units (6 Units Subcutaneous Given 1/15/24 0315)   gadobutroL (GADAVIST) injection 10 mL (10 mLs Intravenous Given 1/15/24 1203)     Medical Decision Making  Emergent evaluation  a 38-year-old male presenting today for fever, recent admission for MRSA bacteremia    Vital signs currently stable.  Overall well-appearing, no acute distress.    Differential includes but not limited to COVID, flu, UTI, pneumonia, recurrent bacteremia    Patient received antibiotics today, no need for repeat bolus        Amount and/or Complexity of Data Reviewed  Labs:  Decision-making details documented in ED Course.    Risk  OTC drugs.               ED Course as of 01/15/24 1341   Sun Jan 14, 2024   1938 Chest x-ray reviewed and independently interpreted by me as no acute consolidation or effusion [GM]   2153 POC Lactate: 1.03 [GM]   2153 WBC(!): 15.86 [GM]   2153 BUN: 10 [GM]   2153 Creatinine: 1.1 [GM]   2207 Leukocytosis is similar to previous.  His glucose is elevated at 300, anion gap is normal at 9. [GM]   2209 Transaminitis noted, similar to previous.    Lactate normal. [GM]   2210 Plan to place in observation under hospital medicine for ID consult, possible PICC line exchange, blood cultures. [GM]      ED Course User Index  [GM] Julisa Norman MD                           Clinical Impression:  Final diagnoses:  [R50.9] Fever          ED Disposition Condition    Observation Stable                Julisa Norman MD  01/15/24 1342

## 2024-01-15 NOTE — PLAN OF CARE
[facility] - Observation 11H  Initial Discharge Assessment       Primary Care Provider: Shellie, Primary Doctor    Admission Diagnosis: Fever [R50.9]    Admission Date: 1/14/2024  Expected Discharge Date: 1/17/2024    Transition of Care Barriers: None    Payor: BLUE CROSS BLUE [employer] / Plan: BCBS ALL OUT OF STATE / Product Type: PPO /     Extended Emergency Contact Information  Primary Emergency Contact: Chloé Mueller  Address: [address] [place] [address]           [place] [address] [place]  Mobile Phone: [phone]  Relation: Spouse  Secondary Emergency Contact: Hari Mueller  Mobile Phone: [phone]  Relation: Brother    Discharge Plan A: Rehab  Discharge Plan B: Skilled Nursing Facility      [id number] [place] - [address]  [address]  [place] [address]  Phone: [phone] Fax: [phone]    [facility] - [place] - [address]  [address]  [place] [address]  Phone: [phone] Fax: [phone]    [id number] - [place] - [address] [facility] [address]  [address]  [place] [address]  Phone: [phone] Fax: [phone]    [facility] [employer] [id number]  [place]  [address] [place] [address]  Phone: [phone] Fax: [phone]    [facility] - [facility] [address] [facility]  [address] [facility] [address]  Phone: [phone] Fax: [phone]    ANISA spoke with patient's spouse Chloé Ami [phone] to complete discharge planning assessment.  Per Chloé, patient is receiving treatment at [facility] and will return upon discharge.      Initial Assessment (most recent)       Adult Discharge Assessment - 01/15/24 1447          Discharge Assessment    Assessment Type Discharge Planning Assessment     Confirmed/corrected address, phone  number and insurance Yes     Confirmed Demographics Correct on Facesheet     Source of Information family     Communicated RAEANN with patient/caregiver Date not available/Unable to determine     Facility Arrived From: [facility]     Prior to hospitilization cognitive status: Unable to Assess     Current cognitive status: Unable to Assess     Readmission within 30 days? Yes     Do you take prescription medications? Yes     Do you have prescription coverage? Yes     Do you have any problems affording any of your prescribed medications? No     Is the patient taking medications as prescribed? yes     How do you get to doctors appointments? health plan transportation;agency     Discharge Plan A Rehab     Discharge Plan B Skilled Nursing Facility     DME Needed Upon Discharge  none     Discharge Plan discussed with: Spouse/sig other     Transition of Care Barriers None

## 2024-01-15 NOTE — ASSESSMENT & PLAN NOTE
S/p Left knee arthrotomy and synovectomy (12/29/23)  -re-order pT/ot  Pt with left knee sutures, tenderness to palpation near medial tibial plateau  - ortho consulted, bedside aspiration completed

## 2024-01-15 NOTE — HOSPITAL COURSE
Kulwant Mueller Jr. Is a 38 y.o. male who was admitted to hospital medicine for fever. WBC 15.86 >> 13.92. Sed rate 97, CRPP 211.9. On Daptomycin from previous discharge, continuing and added zosyn. ID consulted. Blood cultures NGTD. Orthopedics consulted, joint aspiration performed at bedside. 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 Taken to the OR for L knee washout. MRI pelvis: Myositis and fasciitis, with soft tissue gas dissecting along the anterior compartment myofascial planes of the proximal left thigh. MRI L shoulder: Prominent subdeltoid enhancement/ bursitis. Superimposed infection may be present. No drainable fluid collections. MRI knee: s/p I&D w/ minimal residual fluid collection within the joint space. There is a large air-fluid collection extending supero-medially from the joint space along the medial femoral shaft, beyond the field of view. Prominent surrounding soft tissue inflammatory changes noted, with involvement of the vastus medialis, vastus lateralis, and popliteus musculature. 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 No spondylodiscitis or facet joint septic arthritis. 1/19 IR performed aspiration of fluid collection seen on MRI. Ortho performed a repeat I&D of left knee and an I&D of R thigh. CRP downtrending. Ortho removed drain from L knee. On my evaluation this morning, the patient reports feeling well and is eager to discharge to rehab. All questions were answered. Patient acknowledged understanding of discharge instructions and feels safe to discharge home. Patient was discharged on 1/25/2024 in stable condition with ortho follow-up. Education regarding condition provided and return precautions given.

## 2024-01-15 NOTE — SUBJECTIVE & OBJECTIVE
Past Medical History:   Diagnosis Date    Anticoagulant long-term use     COVID-19 virus infection     Diabetes mellitus     Diabetes mellitus, type 2     Hypertension     May-Thurner syndrome        Past Surgical History:   Procedure Laterality Date    APPENDECTOMY      ARTHROTOMY OF KNEE Left 12/29/2023    Procedure: ARTHROTOMY, KNEE;  Surgeon: Edy Bocanegra Jr., MD;  Location: [facility] OR;  Service: Orthopedics;  Laterality: Left;    ESOPHAGOGASTRODUODENOSCOPY N/A 1/31/2022    Procedure: EGD (ESOPHAGOGASTRODUODENOSCOPY);  Surgeon: Cindy Quiros MD;  Location: [facility] ENDO;  Service: Endoscopy;  Laterality: N/A;    ESOPHAGOGASTRODUODENOSCOPY N/A 3/21/2022    Procedure: EGD (ESOPHAGOGASTRODUODENOSCOPY);  Surgeon: Tejas Mann MD;  Location: [facility] ENDO;  Service: Endoscopy;  Laterality: N/A;    INCISION AND DRAINAGE FOOT Left 11/28/2021    Procedure: INCISION AND DRAINAGE, FOOT;  Surgeon: Bj Alicea DPM;  Location: [facility] OR;  Service: Podiatry;  Laterality: Left;    stents in bilateral legs      TRANSESOPHAGEAL ECHOCARDIOGRAPHY N/A 1/3/2024    Procedure: ECHOCARDIOGRAM, TRANSESOPHAGEAL;  Surgeon: Douglas Doss MD;  Location: [facility] CATH LAB/EP;  Service: Cardiology;  Laterality: N/A;       Review of patient's allergies indicates:   Allergen Reactions    Heparin analogues Nausea And Vomiting       No current facility-administered medications on file prior to encounter.     Current Outpatient Medications on File Prior to Encounter   Medication Sig    blood-glucose meter,continuous (DEXCOM G7 ) Misc 1 Device by Misc.(Non-Drug; Combo Route) route once daily.    blood-glucose sensor (DEXCOM G7 SENSOR) Justyna 1 Device by Misc.(Non-Drug; Combo Route) route every 10 days.    empagliflozin (JARDIANCE) 25 mg tablet Take 1 tablet (25 mg total) by mouth once daily.    furosemide (LASIX) 20 MG tablet Take 1 tablet (20 mg total) by mouth once daily.    glucagon (GVOKE HYPOPEN 2-PACK) 1 mg/0.2 mL AtIn Inject 1  "mg into the skin as needed (SEVERE HYPOGLYCEMIA).    insulin aspart U-100 (NOVOLOG U-100 INSULIN ASPART) 100 unit/mL injection Inject 14 Units into the skin 3 (three) times daily before meals.    metoclopramide HCl (REGLAN) 10 MG tablet Take 0.5 tablets (5 mg total) by mouth 3 (three) times daily before meals.    metoprolol succinate (TOPROL-XL) 25 MG 24 hr tablet Take 0.5 tablets (12.5 mg total) by mouth once daily.    pantoprazole (PROTONIX) 40 MG tablet Take 1 tablet (40 mg total) by mouth once daily.    pen needle, diabetic (BD ULTRA-FINE DIYA PEN NEEDLE) 32 gauge x 5/32" Ndle Use with Tresiba daily and Humalog 3-4 times daily as directed.    prochlorperazine (COMPAZINE) 10 MG tablet Take 1 tablet (10 mg total) by mouth 3 (three) times daily as needed (nausea or vomiting).    promethazine (PHENERGAN) 25 MG suppository Place 1 suppository (25 mg total) rectally every 6 (six) hours as needed for Nausea.    rivaroxaban (XARELTO) 10 mg Tab Take 1 tablet (10 mg total) by mouth daily with dinner or evening meal.    scopolamine (TRANSDERM-SCOP) 1.3-1.5 mg (1 mg over 3 days) Place 1 patch onto the skin every 72 hours as needed (intractable nausea/vomiting).    sodium chloride 0.9% SolP 50 mL with DAPTOmycin 500 mg SolR 947 mg Inject 947 mg into the vein once daily.    TRESIBA FLEXTOUCH U-200 200 unit/mL (3 mL) insulin pen Inject 56 Units into the skin once daily.    [DISCONTINUED] DEXCOM G6 TRANSMITTER Justyna CHANGE TRANSMITTER EVERY 90 DAYS.    [DISCONTINUED] prednisoLONE acetate (PRED FORTE) 1 % DrpS Instill one drop to the left eye every 2 hours while awake for 2 days then instill one drop 4 time daily for 5 days     Family History       Problem Relation (Age of Onset)    Cancer Mother, Paternal Uncle, Paternal Grandfather, Maternal Grandfather    Irritable bowel syndrome Paternal Grandfather          Tobacco Use    Smoking status: Former     Types: Cigarettes    Smokeless tobacco: Former    Tobacco comments:     " occasionally    Substance and Sexual Activity    Alcohol use: Yes     Comment: occ    Drug use: No    Sexual activity: Not on file     Review of Systems   Constitutional:  Positive for chills.   Respiratory:  Negative for cough and shortness of breath.    Cardiovascular:  Negative for leg swelling.   Gastrointestinal:  Negative for nausea.   Genitourinary:  Negative for difficulty urinating and dysuria.     Objective:     Vital Signs (Most Recent):  Temp: 98.3 °F (36.8 °C) (01/15/24 0129)  Pulse: 92 (01/15/24 0129)  Resp: 18 (01/15/24 0129)  BP: 130/73 (01/15/24 0129)  SpO2: 95 % (01/15/24 0129) Vital Signs (24h Range):  Temp:  [98.3 °F (36.8 °C)-99 °F (37.2 °C)] 98.3 °F (36.8 °C)  Pulse:  [92-99] 92  Resp:  [17-18] 18  SpO2:  [95 %-96 %] 95 %  BP: (130-138)/(73-88) 130/73     Weight: 107.8 kg (237 lb 10.5 oz)  Body mass index is 31.35 kg/m².     Physical Exam  Vitals and nursing note reviewed.   Constitutional:       General: He is not in acute distress.  Eyes:      General: No scleral icterus.  Cardiovascular:      Rate and Rhythm: Normal rate and regular rhythm.      Heart sounds: No murmur heard.  Pulmonary:      Effort: Pulmonary effort is normal. No respiratory distress.      Breath sounds: No wheezing.   Abdominal:      General: Bowel sounds are normal. There is no distension.      Palpations: Abdomen is soft.      Tenderness: There is no abdominal tenderness. There is no guarding.   Musculoskeletal:         General: Tenderness (left knee - near tibial plateau, swelling noted, sutures present) present.      Cervical back: Neck supple.   Skin:     General: Skin is warm and dry.      Comments: Right heel resolving ulceration   Neurological:      General: No focal deficit present.      Mental Status: He is alert and oriented to person, place, and time.   Psychiatric:         Mood and Affect: Affect is blunt.         Behavior: Behavior is withdrawn.                Significant Labs: All pertinent labs within the  "past 24 hours have been reviewed.  A1C:   Recent Labs   Lab 07/20/23  0729 09/27/23  1116 12/28/23  0355   HGBA1C 10.5* 8.2*  8.2* 7.4*     ABGs: No results for input(s): "PH", "PCO2", "HCO3", "POCSATURATED", "BE", "TOTALHB", "COHB", "METHB", "O2HB", "POCFIO2", "PO2" in the last 48 hours.  Blood Culture: No results for input(s): "LABBLOO" in the last 48 hours.  CBC:   Recent Labs   Lab 01/14/24 2039   WBC 15.86*   HGB 9.4*   HCT 30.1*        CMP:   Recent Labs   Lab 01/14/24 2039   *   K 4.6   CL 93*   CO2 28   *   BUN 10   CREATININE 1.1   CALCIUM 8.9   PROT 8.7*   ALBUMIN 1.5*   BILITOT 0.6   ALKPHOS 257*   *   *   ANIONGAP 9     Cardiac Markers: No results for input(s): "CKMB", "MYOGLOBIN", "BNP", "TROPISTAT" in the last 48 hours.  Lactic Acid:   Recent Labs   Lab 01/15/24  0052   LACTATE 1.4     Magnesium: No results for input(s): "MG" in the last 48 hours.  Troponin: No results for input(s): "TROPONINI", "TROPONINIHS" in the last 48 hours.  TSH:   Recent Labs   Lab 12/28/23  0100   TSH 0.202*     Urine Studies:   Recent Labs   Lab 01/14/24 2029   COLORU Yellow   APPEARANCEUA Clear   PHUR 6.0   SPECGRAV 1.025   PROTEINUA 1+*   GLUCUA 3+*   KETONESU Negative   BILIRUBINUA Negative   OCCULTUA Negative   NITRITE Negative   LEUKOCYTESUR Negative   RBCUA 2   WBCUA 6*   BACTERIA None   SQUAMEPITHEL 0   HYALINECASTS 0       Significant Imaging: I have reviewed all pertinent imaging results/findings within the past 24 hours.  XR CHEST AP PORTABLE     CLINICAL HISTORY:  Sepsis;     TECHNIQUE:  Single frontal view of the chest was performed.     COMPARISON:  01/08/2024     FINDINGS:  PICC catheter entering from the right upper extremity with tip superimposed over the SVC.     The lungs are clear, with normal appearance of pulmonary vasculature and no pleural effusion or pneumothorax.     The cardiac silhouette is normal in size. The hilar and mediastinal contours are unremarkable.   "   Bones are intact.     Impression:     No acute radiographic abnormality.        Electronically signed by: Isai Villela  Date:                                            01/14/2024  Time:                                           19:30

## 2024-01-15 NOTE — H&P
[facility] - Observation [address] [place] [address] Medicine  History & Physical    Patient Name: Kulwant Mueller Jr.  MRN: [id number]  Patient Class: OP- Observation  Admission Date: 1/14/2024  Attending Physician: Rian Garcia MD [facility]  Admitting Physician: Atif Gerber MD  Primary Care Provider: Shellie, Primary Doctor      Patient information was obtained from patient, past medical records, ER records, and Care Everywhere [facility] records .     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 from [facility] for concerns of new fevers.     Patient was admitted to [facility] 12/27/23-01/10/24 due to left knee septic arthritis with persistent MRSA bacteremia, s/p Left knee orthopedic surgery, surgical cultures positive for MRSA. Right heel also of concern for source.   He was followed by ID,  he had been on Daptomycin + Ceftaroline, s/p ROBY w/o endocarditis, left shoulder arthrocentesis w/o infection,  surveillance blood culture negative from 01/07/24    Report per ED and care everywhere notes patient with fevers on 1/13 to 103degreese @ 1800, 2000, ER transfer recommended but pt refused, physician gave order for blood culture, pt reported feeling fine.   Today he developed temperature of 100.5 @ 15:56 and was transferred [place] [facility] [place].     Today he reports he can walk with asssitance, using a walker, performing ADLs, main complaint is right sided back pain    ED - blood cultures drawn.             Past Medical History:   Diagnosis Date    Anticoagulant  long-term use     COVID-19 virus infection     Diabetes mellitus     Diabetes mellitus, type 2     Hypertension     May-Thurner syndrome        Past Surgical History:   Procedure Laterality Date    APPENDECTOMY      ARTHROTOMY OF KNEE Left 12/29/2023    Procedure: ARTHROTOMY, KNEE;  Surgeon: Edy Bocanegra Jr., MD;  Location: [facility] OR;  Service: Orthopedics;  Laterality: Left;    ESOPHAGOGASTRODUODENOSCOPY N/A 1/31/2022    Procedure: EGD (ESOPHAGOGASTRODUODENOSCOPY);  Surgeon: Cindy Quiros MD;  Location: [facility] ENDO;  Service: Endoscopy;  Laterality: N/A;    ESOPHAGOGASTRODUODENOSCOPY N/A 3/21/2022    Procedure: EGD (ESOPHAGOGASTRODUODENOSCOPY);  Surgeon: Tejas Mann MD;  Location: [facility] ENDO;  Service: Endoscopy;  Laterality: N/A;    INCISION AND DRAINAGE FOOT Left 11/28/2021    Procedure: INCISION AND DRAINAGE, FOOT;  Surgeon: Bj Alicea DPM;  Location: [facility] OR;  Service: Podiatry;  Laterality: Left;    stents in bilateral legs      TRANSESOPHAGEAL ECHOCARDIOGRAPHY N/A 1/3/2024    Procedure: ECHOCARDIOGRAM, TRANSESOPHAGEAL;  Surgeon: Douglas Doss MD;  Location: [facility] CATH LAB/EP;  Service: Cardiology;  Laterality: N/A;       Review of patient's allergies indicates:   Allergen Reactions    Heparin analogues Nausea And Vomiting       No current facility-administered medications on file prior to encounter.     Current Outpatient Medications on File Prior to Encounter   Medication Sig    blood-glucose meter,continuous (DEXCOM G7 ) Misc 1 Device by Misc.(Non-Drug; Combo Route) route once daily.    blood-glucose sensor (DEXCOM G7 SENSOR) Justyna 1 Device by Misc.(Non-Drug; Combo Route) route every 10 days.    empagliflozin (JARDIANCE) 25 mg tablet Take 1 tablet (25 mg total) by mouth once daily.    furosemide (LASIX) 20 MG tablet Take 1 tablet (20 mg total) by mouth once daily.    glucagon (GVOKE HYPOPEN 2-PACK) 1 mg/0.2 mL AtIn Inject 1 mg into the skin as needed (SEVERE HYPOGLYCEMIA).     "insulin aspart U-100 (NOVOLOG U-100 INSULIN ASPART) 100 unit/mL injection Inject 14 Units into the skin 3 (three) times daily before meals.    metoclopramide HCl (REGLAN) 10 MG tablet Take 0.5 tablets (5 mg total) by mouth 3 (three) times daily before meals.    metoprolol succinate (TOPROL-XL) 25 MG 24 hr tablet Take 0.5 tablets (12.5 mg total) by mouth once daily.    pantoprazole (PROTONIX) 40 MG tablet Take 1 tablet (40 mg total) by mouth once daily.    pen needle, diabetic (BD ULTRA-FINE DIYA PEN NEEDLE) 32 gauge x 5/32" Ndle Use with Tresiba daily and Humalog 3-4 times daily as directed.    prochlorperazine (COMPAZINE) 10 MG tablet Take 1 tablet (10 mg total) by mouth 3 (three) times daily as needed (nausea or vomiting).    promethazine (PHENERGAN) 25 MG suppository Place 1 suppository (25 mg total) rectally every 6 (six) hours as needed for Nausea.    rivaroxaban (XARELTO) 10 mg Tab Take 1 tablet (10 mg total) by mouth daily with dinner or evening meal.    scopolamine (TRANSDERM-SCOP) 1.3-1.5 mg (1 mg over 3 days) Place 1 patch onto the skin every 72 hours as needed (intractable nausea/vomiting).    sodium chloride 0.9% SolP 50 mL with DAPTOmycin 500 mg SolR 947 mg Inject 947 mg into the vein once daily.    TRESIBA FLEXTOUCH U-200 200 unit/mL (3 mL) insulin pen Inject 56 Units into the skin once daily.    [DISCONTINUED] DEXCOM G6 TRANSMITTER Justyna CHANGE TRANSMITTER EVERY 90 DAYS.    [DISCONTINUED] prednisoLONE acetate (PRED FORTE) 1 % DrpS Instill one drop to the left eye every 2 hours while awake for 2 days then instill one drop 4 time daily for 5 days     Family History       Problem Relation (Age of Onset)    Cancer Mother, Paternal Uncle, Paternal Grandfather, Maternal Grandfather    Irritable bowel syndrome Paternal Grandfather          Tobacco Use    Smoking status: Former     Types: Cigarettes    Smokeless tobacco: Former    Tobacco comments:     occasionally    Substance and Sexual Activity    Alcohol " use: Yes     Comment: occ    Drug use: No    Sexual activity: Not on file     Review of Systems   Constitutional:  Positive for chills.   Respiratory:  Negative for cough and shortness of breath.    Cardiovascular:  Negative for leg swelling.   Gastrointestinal:  Negative for nausea.   Genitourinary:  Negative for difficulty urinating and dysuria.     Objective:     Vital Signs (Most Recent):  Temp: 98.3 °F (36.8 °C) (01/15/24 0129)  Pulse: 92 (01/15/24 0129)  Resp: 18 (01/15/24 0129)  BP: 130/73 (01/15/24 0129)  SpO2: 95 % (01/15/24 0129) Vital Signs (24h Range):  Temp:  [98.3 °F (36.8 °C)-99 °F (37.2 °C)] 98.3 °F (36.8 °C)  Pulse:  [92-99] 92  Resp:  [17-18] 18  SpO2:  [95 %-96 %] 95 %  BP: (130-138)/(73-88) 130/73     Weight: 107.8 kg (237 lb 10.5 oz)  Body mass index is 31.35 kg/m².     Physical Exam  Vitals and nursing note reviewed.   Constitutional:       General: He is not in acute distress.  Eyes:      General: No scleral icterus.  Cardiovascular:      Rate and Rhythm: Normal rate and regular rhythm.      Heart sounds: No murmur heard.  Pulmonary:      Effort: Pulmonary effort is normal. No respiratory distress.      Breath sounds: No wheezing.   Abdominal:      General: Bowel sounds are normal. There is no distension.      Palpations: Abdomen is soft.      Tenderness: There is no abdominal tenderness. There is no guarding.   Musculoskeletal:         General: Tenderness (left knee - near tibial plateau, swelling noted, sutures present) present.      Cervical back: Neck supple.   Skin:     General: Skin is warm and dry.      Comments: Right heel resolving ulceration   Neurological:      General: No focal deficit present.      Mental Status: He is alert and oriented to person, place, and time.   Psychiatric:         Mood and Affect: Affect is blunt.         Behavior: Behavior is withdrawn.                Significant Labs: All pertinent labs within the past 24 hours have been reviewed.  A1C:   Recent Labs  "  Lab 07/20/23  0729 09/27/23  1116 12/28/23  0355   HGBA1C 10.5* 8.2*  8.2* 7.4*     ABGs: No results for input(s): "PH", "PCO2", "HCO3", "POCSATURATED", "BE", "TOTALHB", "COHB", "METHB", "O2HB", "POCFIO2", "PO2" in the last 48 hours.  Blood Culture: No results for input(s): "LABBLOO" in the last 48 hours.  CBC:   Recent Labs   Lab 01/14/24 2039   WBC 15.86*   HGB 9.4*   HCT 30.1*        CMP:   Recent Labs   Lab 01/14/24 2039   *   K 4.6   CL 93*   CO2 28   *   BUN 10   CREATININE 1.1   CALCIUM 8.9   PROT 8.7*   ALBUMIN 1.5*   BILITOT 0.6   ALKPHOS 257*   *   *   ANIONGAP 9     Cardiac Markers: No results for input(s): "CKMB", "MYOGLOBIN", "BNP", "TROPISTAT" in the last 48 hours.  Lactic Acid:   Recent Labs   Lab 01/15/24  0052   LACTATE 1.4     Magnesium: No results for input(s): "MG" in the last 48 hours.  Troponin: No results for input(s): "TROPONINI", "TROPONINIHS" in the last 48 hours.  TSH:   Recent Labs   Lab 12/28/23  0100   TSH 0.202*     Urine Studies:   Recent Labs   Lab 01/14/24 2029   COLORU Yellow   APPEARANCEUA Clear   PHUR 6.0   SPECGRAV 1.025   PROTEINUA 1+*   GLUCUA 3+*   KETONESU Negative   BILIRUBINUA Negative   OCCULTUA Negative   NITRITE Negative   LEUKOCYTESUR Negative   RBCUA 2   WBCUA 6*   BACTERIA None   SQUAMEPITHEL 0   HYALINECASTS 0       Significant Imaging: I have reviewed all pertinent imaging results/findings within the past 24 hours.  XR CHEST AP PORTABLE     CLINICAL HISTORY:  Sepsis;     TECHNIQUE:  Single frontal view of the chest was performed.     COMPARISON:  01/08/2024     FINDINGS:  PICC catheter entering from the right upper extremity with tip superimposed over the SVC.     The lungs are clear, with normal appearance of pulmonary vasculature and no pleural effusion or pneumothorax.     The cardiac silhouette is normal in size. The hilar and mediastinal contours are unremarkable.     Bones are intact.     Impression:     No acute " radiographic abnormality.        Electronically signed by: Isai Villela  Date:                                            01/14/2024  Time:                                           19:30  Assessment/Plan:     * Fever  -reported fevers of 103 @ [facility] on Saturday and temp 100 prior to transfer  -s/p blood cultures   -add Zosyn for empiric gram negative coverage  -Pt with c/o of right sided Back pain - given MRSA bacteremia, pt is at risk for metastatic site of infection obtain MRI T-L spine with Contrast to r/o spinal source of infection   -Check ESR/CRP   -Infectious disease consult - to assist in continuing Daptomycin approval & infectious w/u    >patient had ROBY performed on 1/02 to r/o endocarditis.       Staphylococcus aureus bacteremia  -continue daptomycin - last dose @ rehab was @14;00 (01/14) - continue q24  -add-on CPK tonight and check weekly  -esr/crp  -repeat blood cultures pending        Staphylococcal arthritis of left knee  S/p Left knee arthrotomy and synovectomy (12/29/23)  -re-order pT/ot  Pt with left knee sutures, tenderness to palpation near medial tibial plateau      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 due to underlying condition, limited to breakdown of skin  Patient with right heel - resolving ulcer  -prior wound care notes indicate - Paint with betadine and leave open to air  -elevate heels to reduce pressure   -WOund care consult pending.     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  Initially on therapeutic lovenox and transitioned back to Oral Xarelto 10mg daily   -pt previously followed with hematology - Dr. Duff.     Other elevated white blood cell count  He has persistent elevation but downtrending  leukocytosis.       Type 2 diabetes mellitus  with complication, with long-term current use of insulin  Patient's FSGs are uncontrolled due to hyperglycemia on current medication regimen.  Last A1c reviewed-   Lab Results   Component Value Date    HGBA1C 7.4 (H) 12/28/2023     Most recent fingerstick glucose reviewed-   Recent Labs   Lab 01/15/24  0133   POCTGLUCOSE 232*     Current correctional scale  Low  Maintain anti-hyperglycemic dose as follows-   Antihyperglycemics (From admission, onward)      Start     Stop Route Frequency Ordered    01/15/24 0900  insulin detemir U-100 (Levemir) pen 15 Units         -- SubQ 2 times daily 01/15/24 0229    01/15/24 0715  insulin aspart U-100 pen 6 Units         -- SubQ 3 times daily with meals 01/15/24 0231    01/15/24 0329  insulin aspart U-100 pen 0-5 Units         -- SubQ Before meals & nightly PRN 01/15/24 0231    01/15/24 0224  insulin detemir U-100 (Levemir) pen 6 Units         -- SubQ Once 01/15/24 0229          Hold Oral hypoglycemics while patient is in the hospital.        Diabetic gastroparesis associated with type 2 diabetes mellitus  -continue reglan 5mg PO TID    Depression  Noted At [facility] hospitalization - patient noted to have flat affect, decrease motivation, excessive sleeping, was seen by psychiatry consult and Cymbalta recommended but pt declined.   -Description of his affect seems similar here, pending above workup would re-address with patient prior to discharge  Reports he was living with wife & 2 kids prior to current hospitalization.     Transaminitis  Now again with rising transaminase levels   -trend CMP daily   -check GGT with rising Alk phos levels    -Prior w/u @ [facility] included - CT A/P, w/o abnormality, Hepatitis panel negative, RUQ ultrasound with hepatomegaly and biliary sludge; but without other obvious abnormalities         Elevated CK  Repeat CK here in normal range      Chronic HFrEF (heart failure with reduced ejection fraction)  New finding at last  hospitalization  -EF 48% with systolic dysfunction, normal diastolic function  -patient on Jardiance as outpatient as well as Toprol XL and Lasix 20mg po daily   -Jardiance not continued @ IP rehab - would hold given concern for possible repeat infection - resume when clinically stable.   -Resume furosemide when stable      Hyponatremia  Patient has hyponatremia which is uncontrolled,  We will aim to correct the sodium by 4-6mEq in 24 hours.   We will monitor sodium Daily.   The hyponatremia is due to Dehydration/hypovolemia.   We will obtain the following studies: Urine sodium, urine osmolality, serum osmolality or TSH, T4.   We will treat the hyponatremia with IV fluids as follows: Start Saline 200cc/hr x 7 hours. The patient's sodium results have been reviewed and are listed below.  Recent Labs   Lab 01/14/24 2039   *         VTE Risk Mitigation (From admission, onward)           Ordered     rivaroxaban tablet 10 mg  With dinner         01/15/24 0229     Reason for No Pharmacological VTE Prophylaxis  Once        Question:  Reasons:  Answer:  Already adequately anticoagulated on oral Anticoagulants    01/15/24 0229     IP VTE HIGH RISK PATIENT  Once         01/15/24 0229     Place sequential compression device  Until discontinued         01/15/24 0229                       On 01/14/2024, patient should be placed in hospital observation services under my care.             Atif Gerber MD  Department of Hospital Medicine  [facility] - Observation 11H

## 2024-01-15 NOTE — HPI
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     Patient was admitted to [facility] 12/27/23-01/10/24 due to left knee septic arthritis with persistent MRSA bacteremia, s/p Left knee orthopedic surgery, surgical cultures positive for MRSA. Right heel also of concern for source.   He was followed by ID,  he had been on Daptomycin + Ceftaroline, s/p ROBY w/o endocarditis, left shoulder arthrocentesis w/o infection,  surveillance blood culture negative from 01/07/24    Report per ED and care everywhere notes patient with fevers on 1/13 to 103degreese @ 1800, 2000, ER transfer recommended but pt refused, physician gave order for blood culture, pt reported feeling fine.   Today he developed temperature of 100.5 @ 15:56 and was transferred to [facility] for further evaluation.     Today he reports he can walk with asssitance, using a walker, performing ADLs, main complaint is right sided back pain    ED - blood cultures drawn.

## 2024-01-15 NOTE — ASSESSMENT & PLAN NOTE
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  Initially on therapeutic lovenox and transitioned back to Oral Xarelto 10mg daily   -pt previously followed with hematology - Dr. Duff.

## 2024-01-15 NOTE — ASSESSMENT & PLAN NOTE
Now again with rising transaminase levels   -trend CMP daily   -check GGT with rising Alk phos levels    -Prior w/u @ [facility] included - CT A/P, w/o abnormality, Hepatitis panel negative, RUQ ultrasound with hepatomegaly and biliary sludge; but without other obvious abnormalities

## 2024-01-15 NOTE — ASSESSMENT & PLAN NOTE
Noted At [facility] hospitalization - patient noted to have flat affect, decrease motivation, excessive sleeping, was seen by psychiatry consult and Cymbalta recommended but pt declined.   -Description of his affect seems similar here, pending above workup would re-address with patient prior to discharge  Reports he was living with wife & 2 kids prior to current hospitalization.

## 2024-01-15 NOTE — ASSESSMENT & PLAN NOTE
-reported fevers of 103 @ [facility] on Saturday and temp 100 prior to transfer  -s/p blood cultures   -add Zosyn for empiric gram negative coverage  -Pt with c/o of right sided Back pain - given MRSA bacteremia, pt is at risk for metastatic site of infection obtain MRI T-L spine with Contrast to r/o spinal source of infection   -Check ESR/CRP   -Infectious disease consult - to assist in continuing Daptomycin approval & infectious w/u    >patient had ROBY performed on 1/02 to r/o endocarditis.

## 2024-01-15 NOTE — ASSESSMENT & PLAN NOTE
-reported fevers of 103 @ [facility] on Saturday and temp 100 prior to transfer  -s/p blood cultures   -add Zosyn for empiric gram negative coverage  -Pt with c/o of right sided Back pain - given MRSA bacteremia, pt is at risk for metastatic site of infection obtain MRI T-L spine with Contrast to r/o spinal source of infection    - no acute findings on MRI   -Check ESR/CRP    - ESR 97/ .9   -Infectious disease consult - to assist in continuing Daptomycin approval & infectious w/u    >patient had ROBY performed on 1/02 to r/o endocarditis.

## 2024-01-15 NOTE — ASSESSMENT & PLAN NOTE
Patient has hyponatremia which is uncontrolled,  We will aim to correct the sodium by 4-6mEq in 24 hours.   We will monitor sodium Daily.   The hyponatremia is due to Dehydration/hypovolemia.   We will obtain the following studies: Urine sodium, urine osmolality, serum osmolality or TSH, T4.   We will treat the hyponatremia with IV fluids as follows: Start Saline 200cc/hr x 7 hours. The patient's sodium results have been reviewed and are listed below.  Recent Labs   Lab 01/14/24 2039   *

## 2024-01-15 NOTE — ASSESSMENT & PLAN NOTE
Patient's FSGs are uncontrolled due to hyperglycemia on current medication regimen.  Last A1c reviewed-   Lab Results   Component Value Date    HGBA1C 7.4 (H) 12/28/2023     Most recent fingerstick glucose reviewed-   Recent Labs   Lab 01/15/24  0133   POCTGLUCOSE 232*     Current correctional scale  Low  Maintain anti-hyperglycemic dose as follows-   Antihyperglycemics (From admission, onward)      Start     Stop Route Frequency Ordered    01/15/24 0900  insulin detemir U-100 (Levemir) pen 15 Units         -- SubQ 2 times daily 01/15/24 0229    01/15/24 0715  insulin aspart U-100 pen 6 Units         -- SubQ 3 times daily with meals 01/15/24 0231    01/15/24 0329  insulin aspart U-100 pen 0-5 Units         -- SubQ Before meals & nightly PRN 01/15/24 0231    01/15/24 0224  insulin detemir U-100 (Levemir) pen 6 Units         -- SubQ Once 01/15/24 0229          Hold Oral hypoglycemics while patient is in the hospital.

## 2024-01-15 NOTE — PROGRESS NOTES
[facility] - Observation [address] [place] [address] Medicine  Progress Note    Patient Name: Kulwant Mueller Jr.  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     Patient was admitted to [facility] 12/27/23-01/10/24 due to left knee septic arthritis with persistent MRSA bacteremia, s/p Left knee orthopedic surgery, surgical cultures positive for MRSA. Right heel also of concern for source.   He was followed by ID,  he had been on Daptomycin + Ceftaroline, s/p ROBY w/o endocarditis, left shoulder arthrocentesis w/o infection,  surveillance blood culture negative from 01/07/24    Report per ED and care everywhere notes patient with fevers on 1/13 to 103degreese @ 1800, 2000, ER transfer recommended but pt refused, physician gave order for blood culture, pt reported feeling fine.   Today he developed temperature of 100.5 @ 15:56 and was transferred to [facility] for further evaluation.     Today he reports he can walk with asssitance, using a walker, performing ADLs, main complaint is right sided back pain    ED - blood cultures drawn.             Overview/Hospital Course:  Kulwant Mueller Jr. Is a 38 y.o. male who was admitted to hospital medicine for fever. WBC 15.86 >> 13.92. Sed rate 97, CRPP 211.9. On Daptomycin from previous discharge, continuing and added zosyn. ID consulted. Blood cultures pending. Orthopedics consulted, joint aspiration performed at bedside. Patient will be discharged when medically ready.     Interval History: NAEON. VSS. ID and ortho consulted. MRI T and L spine without findings  concerning for source of bacteremia. Following blood cultures.     Review of Systems   Constitutional:  Positive for activity change, appetite change and fatigue. Negative for chills and fever.   Respiratory:  Negative for chest tightness and shortness of breath.    Cardiovascular:  Negative for chest pain and leg swelling.   Gastrointestinal:  Negative for abdominal pain and nausea.   Musculoskeletal:  Positive for arthralgias and back pain.   Neurological:  Negative for dizziness and weakness.     Objective:     Vital Signs (Most Recent):  Temp: 97.8 °F (36.6 °C) (01/15/24 0830)  Pulse: 95 (01/15/24 0830)  Resp: 18 (01/15/24 0830)  BP: 129/76 (01/15/24 0830)  SpO2: 95 % (01/15/24 0830) Vital Signs (24h Range):  Temp:  [97.8 °F (36.6 °C)-99 °F (37.2 °C)] 97.8 °F (36.6 °C)  Pulse:  [67-99] 95  Resp:  [16-18] 18  SpO2:  [95 %-96 %] 95 %  BP: (129-138)/(72-88) 129/76     Weight: 107.8 kg (237 lb 10.5 oz)  Body mass index is 31.35 kg/m².  No intake or output data in the 24 hours ending 01/15/24 1543      Physical Exam  Vitals and nursing note reviewed.   Constitutional:       Appearance: He is well-developed.   Eyes:      Pupils: Pupils are equal, round, and reactive to light.   Cardiovascular:      Rate and Rhythm: Normal rate and regular rhythm.   Pulmonary:      Effort: Pulmonary effort is normal.      Breath sounds: Normal breath sounds.   Abdominal:      Palpations: Abdomen is soft.      Tenderness: There is no abdominal tenderness.   Musculoskeletal:         General: Swelling (L knee warm, swollen and tender) present. No tenderness.      Comments: No point tenderness to back, no midline tenderness   Skin:     General: Skin is warm and dry.   Neurological:      Mental Status: He is alert and oriented to person, place, and time.   Psychiatric:      Comments: Flat affect             Significant Labs: All pertinent labs within the past 24 hours have been reviewed.  CBC:   Recent Labs   Lab 01/14/24  1029  01/15/24  0513   WBC 15.86* 13.92*   HGB 9.4* 8.8*   HCT 30.1* 28.2*    343     CMP:   Recent Labs   Lab 01/14/24  2039 01/15/24  0513   * 131*   K 4.6 4.3   CL 93* 96   CO2 28 25   * 231*   BUN 10 8   CREATININE 1.1 0.8   CALCIUM 8.9 9.2   PROT 8.7* 7.8   ALBUMIN 1.5* 1.4*   BILITOT 0.6 0.7   ALKPHOS 257* 237*   * 90*   * 105*   ANIONGAP 9 10       Significant Imaging: I have reviewed all pertinent imaging results/findings within the past 24 hours.    Assessment/Plan:      * Fever  -reported fevers of 103 @ [facility] on Saturday and temp 100 prior to transfer  -s/p blood cultures   -add Zosyn for empiric gram negative coverage  -Pt with c/o of right sided Back pain - given MRSA bacteremia, pt is at risk for metastatic site of infection obtain MRI T-L spine with Contrast to r/o spinal source of infection    - no acute findings on MRI   -Check ESR/CRP    - ESR 97/ .9   -Infectious disease consult - to assist in continuing Daptomycin approval & infectious w/u    >patient had ROBY performed on 1/02 to r/o endocarditis.       Chronic HFrEF (heart failure with reduced ejection fraction)  New finding at last hospitalization  -EF 48% with systolic dysfunction, normal diastolic function  -patient on Jardiance as outpatient as well as Toprol XL and Lasix 20mg po daily   -Jardiance not continued @ IP rehab - would hold given concern for possible repeat infection - resume when clinically stable.   -Resume furosemide when stable      Hyponatremia  Patient has hyponatremia which is uncontrolled,  We will aim to correct the sodium by 4-6mEq in 24 hours.   We will monitor sodium Daily.   The hyponatremia is due to Dehydration/hypovolemia.   We will obtain the following studies: Urine sodium, urine osmolality, serum osmolality or TSH, T4.   We will treat the hyponatremia with IV fluids as follows: Start Saline 200cc/hr x 7 hours. The patient's sodium results have been reviewed and  are listed below.  Recent Labs   Lab 01/14/24 2039   *       Transaminitis  Now again with rising transaminase levels   -trend CMP daily   -check GGT with rising Alk phos levels    -Prior w/u @ [facility] included - CT A/P, w/o abnormality, Hepatitis panel negative, RUQ ultrasound with hepatomegaly and biliary sludge; but without other obvious abnormalities         Depression  Noted At [facility] hospitalization - patient noted to have flat affect, decrease motivation, excessive sleeping, was seen by psychiatry consult and Cymbalta recommended but pt declined.   -Description of his affect seems similar here, pending above workup would re-address with patient prior to discharge  Reports he was living with wife & 2 kids prior to current hospitalization.     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     Staphylococcal arthritis of left knee  S/p Left knee arthrotomy and synovectomy (12/29/23)  -re-order pT/ot  Pt with left knee sutures, tenderness to palpation near medial tibial plateau  - ortho consulted, bedside aspiration completed      Staphylococcus aureus bacteremia  -continue daptomycin - last dose @ rehab was @14;00 (01/14) - continue q24  -add-on CPK tonight and check weekly  -esr/crp  -repeat blood cultures pending        Other elevated white blood cell count  He has persistent elevation but downtrending  leukocytosis.       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  Initially on therapeutic  lovenox and transitioned back to Oral Xarelto 10mg daily   -pt previously followed with hematology - Dr. Duff.     Diabetic gastroparesis associated with type 2 diabetes mellitus  -continue reglan 5mg PO TID    Type 2 diabetes mellitus with complication, with long-term current use of insulin  Patient's FSGs are uncontrolled due to hyperglycemia on current medication regimen.  Last A1c reviewed-   Lab Results   Component Value Date    HGBA1C 7.4 (H) 12/28/2023     Most recent fingerstick glucose reviewed-   Recent Labs   Lab 01/15/24  0133   POCTGLUCOSE 232*     Current correctional scale  Low  Maintain anti-hyperglycemic dose as follows-   Antihyperglycemics (From admission, onward)      Start     Stop Route Frequency Ordered    01/15/24 0900  insulin detemir U-100 (Levemir) pen 15 Units         -- SubQ 2 times daily 01/15/24 0229    01/15/24 0715  insulin aspart U-100 pen 6 Units         -- SubQ 3 times daily with meals 01/15/24 0231    01/15/24 0329  insulin aspart U-100 pen 0-5 Units         -- SubQ Before meals & nightly PRN 01/15/24 0231    01/15/24 0224  insulin detemir U-100 (Levemir) pen 6 Units         -- SubQ Once 01/15/24 0229          Hold Oral hypoglycemics while patient is in the hospital.          VTE Risk Mitigation (From admission, onward)           Ordered     rivaroxaban tablet 10 mg  With dinner         01/15/24 0229     Reason for No Pharmacological VTE Prophylaxis  Once        Question:  Reasons:  Answer:  Already adequately anticoagulated on oral Anticoagulants    01/15/24 0229     IP VTE HIGH RISK PATIENT  Once         01/15/24 0229     Place sequential compression device  Until discontinued         01/15/24 0229                    Discharge Planning   RAEANN: 1/17/2024     Code Status: Full Code   Is the patient medically ready for discharge?: No    Reason for patient still in hospital (select all that apply): Patient trending condition, Laboratory test, Treatment, Imaging, Consult  recommendations, and PT / OT recommendations  Discharge Plan A: Rehab                  Fany Emerson PA-C  Department of Hospital Medicine   [facility] - Observation 11H

## 2024-01-15 NOTE — ASSESSMENT & PLAN NOTE
New finding at last hospitalization  -EF 48% with systolic dysfunction, normal diastolic function  -patient on Jardiance as outpatient as well as Toprol XL and Lasix 20mg po daily   -Jardiance not continued @ IP rehab - would hold given concern for possible repeat infection - resume when clinically stable.   -Resume furosemide when stable

## 2024-01-15 NOTE — PLAN OF CARE
Problem: Adult Inpatient Plan of Care  Goal: Plan of Care Review  1/15/2024 1126 by Rowan Langston RN  Outcome: Ongoing, Progressing  1/15/2024 1125 by Rowan Langston RN  Outcome: Ongoing, Progressing  Goal: Patient-Specific Goal (Individualized)  1/15/2024 1126 by Rowan Langston RN  Outcome: Ongoing, Progressing  1/15/2024 1125 by Rowan Langston RN  Outcome: Ongoing, Progressing  Goal: Absence of Hospital-Acquired Illness or Injury  1/15/2024 1126 by Rowan Langston RN  Outcome: Ongoing, Progressing  1/15/2024 1125 by Rowan Langston RN  Outcome: Ongoing, Progressing  Goal: Optimal Comfort and Wellbeing  1/15/2024 1126 by Rowan Langston RN  Outcome: Ongoing, Progressing  1/15/2024 1125 by Rowan Langston RN  Outcome: Ongoing, Progressing  Goal: Readiness for Transition of Care  1/15/2024 1126 by Rowan Langston RN  Outcome: Ongoing, Progressing  1/15/2024 1125 by Rowan Langston RN  Outcome: Ongoing, Progressing

## 2024-01-15 NOTE — SUBJECTIVE & OBJECTIVE
Interval History: NAEON. VSS. ID and ortho consulted. MRI T and L spine without findings concerning for source of bacteremia. Following blood cultures.     Review of Systems   Constitutional:  Positive for activity change, appetite change and fatigue. Negative for chills and fever.   Respiratory:  Negative for chest tightness and shortness of breath.    Cardiovascular:  Negative for chest pain and leg swelling.   Gastrointestinal:  Negative for abdominal pain and nausea.   Musculoskeletal:  Positive for arthralgias and back pain.   Neurological:  Negative for dizziness and weakness.     Objective:     Vital Signs (Most Recent):  Temp: 97.8 °F (36.6 °C) (01/15/24 0830)  Pulse: 95 (01/15/24 0830)  Resp: 18 (01/15/24 0830)  BP: 129/76 (01/15/24 0830)  SpO2: 95 % (01/15/24 0830) Vital Signs (24h Range):  Temp:  [97.8 °F (36.6 °C)-99 °F (37.2 °C)] 97.8 °F (36.6 °C)  Pulse:  [67-99] 95  Resp:  [16-18] 18  SpO2:  [95 %-96 %] 95 %  BP: (129-138)/(72-88) 129/76     Weight: 107.8 kg (237 lb 10.5 oz)  Body mass index is 31.35 kg/m².  No intake or output data in the 24 hours ending 01/15/24 1543      Physical Exam  Vitals and nursing note reviewed.   Constitutional:       Appearance: He is well-developed.   Eyes:      Pupils: Pupils are equal, round, and reactive to light.   Cardiovascular:      Rate and Rhythm: Normal rate and regular rhythm.   Pulmonary:      Effort: Pulmonary effort is normal.      Breath sounds: Normal breath sounds.   Abdominal:      Palpations: Abdomen is soft.      Tenderness: There is no abdominal tenderness.   Musculoskeletal:         General: Swelling (L knee warm, swollen and tender) present. No tenderness.      Comments: No point tenderness to back, no midline tenderness   Skin:     General: Skin is warm and dry.   Neurological:      Mental Status: He is alert and oriented to person, place, and time.   Psychiatric:      Comments: Flat affect             Significant Labs: All pertinent labs within  the past 24 hours have been reviewed.  CBC:   Recent Labs   Lab 01/14/24  2039 01/15/24  0513   WBC 15.86* 13.92*   HGB 9.4* 8.8*   HCT 30.1* 28.2*    343     CMP:   Recent Labs   Lab 01/14/24  2039 01/15/24  0513   * 131*   K 4.6 4.3   CL 93* 96   CO2 28 25   * 231*   BUN 10 8   CREATININE 1.1 0.8   CALCIUM 8.9 9.2   PROT 8.7* 7.8   ALBUMIN 1.5* 1.4*   BILITOT 0.6 0.7   ALKPHOS 257* 237*   * 90*   * 105*   ANIONGAP 9 10       Significant Imaging: I have reviewed all pertinent imaging results/findings within the past 24 hours.

## 2024-01-15 NOTE — ASSESSMENT & PLAN NOTE
Patient with right heel - resolving ulcer  -prior wound care notes indicate - Paint with betadine and leave open to air  -elevate heels to reduce pressure   -WOund care consult pending.

## 2024-01-15 NOTE — ED NOTES
Telemetry Verification   Patient placed on Telemetry Box  Verified with War Room  Box # [id number]   Monitor Tech    Rate 92   Rhythm Normal sinus

## 2024-01-15 NOTE — HPI
Mr. De Anda is a pleasant 38 y.o. man with May-Thurner syndrome on Xarelto, DM2 on insulin, chronic foot wounds following with wound care, and HTN, recent admission at [facility] for MRSA bacteremia. Index Bcx + 12/27/23, complicated by left knee septic arthritis, s/p L knee arthrotomy with synovectomy on 12/29. Cx with MRSA, ROBY 1/02/24 neg, required salvage therapy & cleared 1/07, placed on daptomycin monotherapy. Of note, the patient also had L shoulder aspirated imaging without evidence of infection. R heel without osteo but had cellulitis, myositis, tenosynovitis. He started having new fevers at rehab, and is now admitted for workup. Fevers were up to 103 F, blood cultures collected 1/14/24 are NGTD. ID was consulted for recent MRSA bacteremia from septic arthritis - new fevers @ IP rehab, on daptomycin. In the meantime, the patient was taken to the OR on the 15th for arthroscopic washout. Cultures are also NGTD. The patient is feeling better - denies fever or chills.

## 2024-01-15 NOTE — PLAN OF CARE
Recommendations     1.) Recommend continuing with DM diet as tolerated.      2.) Recommend increasing Boost GC TID to help optimize PRO/Kcal intake.      3.) Consider adding 500mg VitC BID to help boost immunity.     4.) Consider adding 220mg of zinc daily x30days to help boost immunity.      5.) RD to monitor wt, PO intake, skin, labs.        Goals: to meet % of EEN/EPN by next RD f/u  Nutrition Goal Status: new  Communication of RD Recs:  (POC)

## 2024-01-15 NOTE — ASSESSMENT & PLAN NOTE
S/p Left knee arthrotomy and synovectomy (12/29/23)  -re-order pT/ot  Pt with left knee sutures, tenderness to palpation near medial tibial plateau

## 2024-01-15 NOTE — CONSULTS
"    [facility] - Observation 11H  Adult Nutrition  Consult Note    SUMMARY     Recommendations    1.) Recommend continuing with DM diet as tolerated.     2.) Recommend increasing Boost GC TID to help optimize PRO/Kcal intake.     3.) Consider adding 500mg VitC BID to help boost immunity.    4.) Consider adding 220mg of zinc daily x30days to help boost immunity.     5.) RD to monitor wt, PO intake, skin, labs.      Goals: to meet % of EEN/EPN by next RD f/u  Nutrition Goal Status: new  Communication of RD Recs:  (POC)    Assessment and Plan    Nutrition Problem  Increased PRO/vitamin and mineral needs    Related to (etiology):   Increased physiological needs    Signs and Symptoms (as evidenced by):    Fever, MRSA    Interventions/Recommendations (treatment strategy):  Collaboration of nutritional care with other providers.  ONS  Vitamins and minerals    Nutrition Diagnosis Status:   New     Reason for Assessment    Reason For Assessment: consult  Diagnosis: infection/sepsis (MRSA)  Relevant Medical History: DM2, May-Thurer syndrome, Chronic Diabetic foot wounds, recent Left knee septic arthritis  Interdisciplinary Rounds: did not attend    General Information Comments: RD consulted for nutritional assessment. Pt was not seen, d/t [facility] for MRI. RD to obtain nutritional hx next f/u. RD to perform NFPE if medically appropriate next RD f/u. Mild left leg edema noted. RD to monitor and f/u.    Nutrition Discharge Planning: Diabetic diet    Nutrition Risk Screen    Nutrition Risk Screen: no indicators present    Nutrition/Diet History    Spiritual, Cultural Beliefs, Mosque Practices, Values that Affect Care: no (obtained from previous note)    Anthropometrics    Temp: 97.8 °F (36.6 °C)  Height: 6' 1" (185.4 cm)  Height (inches): 73 in  Weight Method: Bed Scale  Weight: 107.8 kg (237 lb 10.5 oz)  Weight (lb): 237.66 lb  Ideal Body Weight (IBW), Male: 184 lb    Lab/Procedures/Meds    Pertinent Labs Reviewed: " reviewed  Pertinent Labs Comments: Na: 131, gluc: 231, ALP: 237, alb: 1.4, AST: 90, ALT: 105, GGT: 205, CRP: 211.9    Pertinent Medications Reviewed: reviewed  Pertinent Medications Comments: Abx, insulin, pantoprazole, NaCl    Estimated/Assessed Needs    Weight Used For Calorie Calculations: 107.8 kg (237 lb 10.5 oz)  Energy Calorie Requirements (kcal): 2042 kcal  Energy Need Method: Corson-St Jeor (MSJ x 1.0)    Protein Requirements: 126- 168g (1.5-2.0g/kg of IBW)  Weight Used For Protein Calculations: 84 kg (185 lb 3 oz) (IBW d/t BMI >30.0)    Fluid Requirements (mL): 1ml/1kcal or per MD  Estimated Fluid Requirement Method: RDA Method  RDA Method (mL): 2042    CHO Requirement: 255g    Nutrition Prescription Ordered    Current Diet Order: DM, 2000kcal diet  Oral Nutrition Supplement: Boost GC BID    Evaluation of Received Nutrient/Fluid Intake    I/O: incomplete  Energy Calories Required: not meeting needs  Protein Required: not meeting needs  Fluid Required:  (1ml/1kcal or per MD)  Comments: LBM 1/10  Tolerance: tolerating  % Intake of Estimated Energy Needs: 0 - 25 %  % Meal Intake: NPO    Nutrition Risk    Level of Risk/Frequency of Follow-up:  (RD to f/u x 1/week)     Monitor and Evaluation    Food and Nutrient Intake: energy intake, food and beverage intake  Food and Nutrient Adminstration: diet order  Physical Activity and Function: nutrition-related ADLs and IADLs  Anthropometric Measurements: weight, weight change, body mass index  Biochemical Data, Medical Tests and Procedures: electrolyte and renal panel, glucose/endocrine profile, inflammatory profile  Nutrition-Focused Physical Findings: overall appearance, skin     Nutrition Follow-Up    RD Follow-up?: Yes

## 2024-01-15 NOTE — ASSESSMENT & PLAN NOTE
-continue daptomycin - last dose @ rehab was @14;00 (01/14) - continue q24  -add-on CPK tonight and check weekly  -esr/crp  -repeat blood cultures pending

## 2024-01-15 NOTE — CONSULTS
Patient in MRI at the time of rounds. Chart reviewed and discussed with staff. Full note to follow tomorrow.     Briefly, this is a 38 year old male with history of May-Thurer syndrome, diabetes with chronic diabetic foot wounds, recent admission for MRSA bacteremia.     MRSA bacteremia noted on index admission blood cultures 12/27, complicated by left knee septic arthritis, s/p L knee arthrotomy with synovectomy on 12/29- cx w/ MRSA, s/p left shoulder arthrocentesis reportedly with no evidence of infection, ROBY 1/02/24 without vegetations, required salvage therapy & cleared 1/07, subsequently placed on daptomycin monotherapy and sent to rehab.     This admission he is admitted for new fevers and back pain, MRI scans are pending. He does not have eosinophilia or evidence of new pneumonia on imaging. Concern for unresolved vs new source of infection, doubt antibiotic failure (MRSA was daptomycin susceptible), will evaluate PICC line site. Left knee appears edematous and could be concerning for recurrent/unresolved source, unlikely heel since wound appears dry.     Recommendation    Continue IV daptomycin and pip-tazo, consider picc line removal if any new episodes of fever occur or if blood cultures show growth of a G + organism.     Follow up blood cultures from 1/14 to ensure no new bacteremia.

## 2024-01-15 NOTE — ED TRIAGE NOTES
Kulwant Mueller Jr., a 38 y.o. male presents to the ED w/ complaint of fever. Pt states that while being treated at rehab facility post knee surgery he ran fever. Pt states fever got as high as 103 overnight but did not want to come to ED. Facility states that fever was elevated again this AM and that pt needed to be seen by ED. Pt reports some fatigue but no other symptoms at this time. Pt has hx of MRSA infection.     Triage note:  Chief Complaint   Patient presents with    Fever     Pt arrives via EMS from [facility] c/o increased fever; hx left lower limb surgery in december receiving IV antibiotics dt possible MRSA infection in blood     Review of patient's allergies indicates:   Allergen Reactions    Heparin analogues Nausea And Vomiting     Past Medical History:   Diagnosis Date    Anticoagulant long-term use     COVID-19 virus infection     Diabetes mellitus     Diabetes mellitus, type 2     Hypertension     May-Thurner syndrome

## 2024-01-16 ENCOUNTER — ANESTHESIA EVENT (OUTPATIENT)
Dept: SURGERY | Facility: HOSPITAL | Age: 39
DRG: 486 | End: 2024-01-16
Payer: COMMERCIAL

## 2024-01-16 ENCOUNTER — TELEPHONE (OUTPATIENT)
Dept: ORTHOPEDICS | Facility: CLINIC | Age: 39
End: 2024-01-16
Payer: COMMERCIAL

## 2024-01-16 ENCOUNTER — ANESTHESIA (OUTPATIENT)
Dept: SURGERY | Facility: HOSPITAL | Age: 39
DRG: 486 | End: 2024-01-16
Payer: COMMERCIAL

## 2024-01-16 LAB
ALBUMIN SERPL BCP-MCNC: 1.4 G/DL (ref 3.5–5.2)
ALP SERPL-CCNC: 192 U/L (ref 55–135)
ALT SERPL W/O P-5'-P-CCNC: 84 U/L (ref 10–44)
ANION GAP SERPL CALC-SCNC: 8 MMOL/L (ref 8–16)
AST SERPL-CCNC: 53 U/L (ref 10–40)
BASOPHILS # BLD AUTO: 0.03 K/UL (ref 0–0.2)
BASOPHILS NFR BLD: 0.2 % (ref 0–1.9)
BILIRUB SERPL-MCNC: 0.4 MG/DL (ref 0.1–1)
BUN SERPL-MCNC: 7 MG/DL (ref 6–20)
CALCIUM SERPL-MCNC: 8.8 MG/DL (ref 8.7–10.5)
CHLORIDE SERPL-SCNC: 100 MMOL/L (ref 95–110)
CO2 SERPL-SCNC: 27 MMOL/L (ref 23–29)
CREAT SERPL-MCNC: 0.7 MG/DL (ref 0.5–1.4)
DIFFERENTIAL METHOD BLD: ABNORMAL
EOSINOPHIL # BLD AUTO: 0.2 K/UL (ref 0–0.5)
EOSINOPHIL NFR BLD: 1.8 % (ref 0–8)
ERYTHROCYTE [DISTWIDTH] IN BLOOD BY AUTOMATED COUNT: 13.7 % (ref 11.5–14.5)
EST. GFR  (NO RACE VARIABLE): >60 ML/MIN/1.73 M^2
GLUCOSE SERPL-MCNC: 155 MG/DL (ref 70–110)
HCT VFR BLD AUTO: 29.5 % (ref 40–54)
HGB BLD-MCNC: 9.1 G/DL (ref 14–18)
IMM GRANULOCYTES # BLD AUTO: 0.05 K/UL (ref 0–0.04)
IMM GRANULOCYTES NFR BLD AUTO: 0.4 % (ref 0–0.5)
LYMPHOCYTES # BLD AUTO: 1.5 K/UL (ref 1–4.8)
LYMPHOCYTES NFR BLD: 12 % (ref 18–48)
MAGNESIUM SERPL-MCNC: 1.8 MG/DL (ref 1.6–2.6)
MCH RBC QN AUTO: 27.4 PG (ref 27–31)
MCHC RBC AUTO-ENTMCNC: 30.8 G/DL (ref 32–36)
MCV RBC AUTO: 89 FL (ref 82–98)
MONOCYTES # BLD AUTO: 0.6 K/UL (ref 0.3–1)
MONOCYTES NFR BLD: 4.6 % (ref 4–15)
NEUTROPHILS # BLD AUTO: 9.8 K/UL (ref 1.8–7.7)
NEUTROPHILS NFR BLD: 81 % (ref 38–73)
NRBC BLD-RTO: 0 /100 WBC
PATH INTERP FLD-IMP: NORMAL
PHOSPHATE SERPL-MCNC: 3.6 MG/DL (ref 2.7–4.5)
PLATELET # BLD AUTO: 382 K/UL (ref 150–450)
PMV BLD AUTO: 9.7 FL (ref 9.2–12.9)
POCT GLUCOSE: 164 MG/DL (ref 70–110)
POCT GLUCOSE: 167 MG/DL (ref 70–110)
POCT GLUCOSE: 223 MG/DL (ref 70–110)
POCT GLUCOSE: 246 MG/DL (ref 70–110)
POTASSIUM SERPL-SCNC: 3.8 MMOL/L (ref 3.5–5.1)
PROT SERPL-MCNC: 7.6 G/DL (ref 6–8.4)
RBC # BLD AUTO: 3.32 M/UL (ref 4.6–6.2)
SODIUM SERPL-SCNC: 135 MMOL/L (ref 136–145)
WBC # BLD AUTO: 12.12 K/UL (ref 3.9–12.7)

## 2024-01-16 PROCEDURE — 0SBD4ZZ EXCISION OF LEFT KNEE JOINT, PERCUTANEOUS ENDOSCOPIC APPROACH: ICD-10-PCS | Performed by: ORTHOPAEDIC SURGERY

## 2024-01-16 PROCEDURE — 63600175 PHARM REV CODE 636 W HCPCS: Performed by: STUDENT IN AN ORGANIZED HEALTH CARE EDUCATION/TRAINING PROGRAM

## 2024-01-16 PROCEDURE — 71000016 HC POSTOP RECOV ADDL HR: Performed by: ORTHOPAEDIC SURGERY

## 2024-01-16 PROCEDURE — 25000003 PHARM REV CODE 250

## 2024-01-16 PROCEDURE — 82962 GLUCOSE BLOOD TEST: CPT | Performed by: ORTHOPAEDIC SURGERY

## 2024-01-16 PROCEDURE — 25000003 PHARM REV CODE 250: Performed by: HOSPITALIST

## 2024-01-16 PROCEDURE — 25000003 PHARM REV CODE 250: Performed by: STUDENT IN AN ORGANIZED HEALTH CARE EDUCATION/TRAINING PROGRAM

## 2024-01-16 PROCEDURE — 71000033 HC RECOVERY, INTIAL HOUR: Performed by: ORTHOPAEDIC SURGERY

## 2024-01-16 PROCEDURE — 21400001 HC TELEMETRY ROOM

## 2024-01-16 PROCEDURE — 63600175 PHARM REV CODE 636 W HCPCS

## 2024-01-16 PROCEDURE — 37000008 HC ANESTHESIA 1ST 15 MINUTES: Performed by: ORTHOPAEDIC SURGERY

## 2024-01-16 PROCEDURE — 85025 COMPLETE CBC W/AUTO DIFF WBC: CPT | Performed by: HOSPITALIST

## 2024-01-16 PROCEDURE — 83735 ASSAY OF MAGNESIUM: CPT | Performed by: HOSPITALIST

## 2024-01-16 PROCEDURE — 80053 COMPREHEN METABOLIC PANEL: CPT | Performed by: HOSPITALIST

## 2024-01-16 PROCEDURE — 37000009 HC ANESTHESIA EA ADD 15 MINS: Performed by: ORTHOPAEDIC SURGERY

## 2024-01-16 PROCEDURE — 63600175 PHARM REV CODE 636 W HCPCS: Performed by: ORTHOPAEDIC SURGERY

## 2024-01-16 PROCEDURE — 36000711: Performed by: ORTHOPAEDIC SURGERY

## 2024-01-16 PROCEDURE — 99223 1ST HOSP IP/OBS HIGH 75: CPT | Mod: 57,,, | Performed by: ORTHOPAEDIC SURGERY

## 2024-01-16 PROCEDURE — 94761 N-INVAS EAR/PLS OXIMETRY MLT: CPT

## 2024-01-16 PROCEDURE — 36415 COLL VENOUS BLD VENIPUNCTURE: CPT | Performed by: HOSPITALIST

## 2024-01-16 PROCEDURE — 63600175 PHARM REV CODE 636 W HCPCS: Performed by: HOSPITALIST

## 2024-01-16 PROCEDURE — 36000710: Performed by: ORTHOPAEDIC SURGERY

## 2024-01-16 PROCEDURE — 27000207 HC ISOLATION

## 2024-01-16 PROCEDURE — 27201423 OPTIME MED/SURG SUP & DEVICES STERILE SUPPLY: Performed by: ORTHOPAEDIC SURGERY

## 2024-01-16 PROCEDURE — 99900035 HC TECH TIME PER 15 MIN (STAT)

## 2024-01-16 PROCEDURE — D9220A PRA ANESTHESIA: Mod: CRNA,,, | Performed by: STUDENT IN AN ORGANIZED HEALTH CARE EDUCATION/TRAINING PROGRAM

## 2024-01-16 PROCEDURE — D9220A PRA ANESTHESIA: Mod: ANES,,, | Performed by: ANESTHESIOLOGY

## 2024-01-16 PROCEDURE — 84100 ASSAY OF PHOSPHORUS: CPT | Performed by: HOSPITALIST

## 2024-01-16 PROCEDURE — 71000015 HC POSTOP RECOV 1ST HR: Performed by: ORTHOPAEDIC SURGERY

## 2024-01-16 PROCEDURE — 27000221 HC OXYGEN, UP TO 24 HOURS

## 2024-01-16 PROCEDURE — 29876 ARTHRS KNEE SURG SYNVCT MAJ: CPT | Mod: LT,,, | Performed by: ORTHOPAEDIC SURGERY

## 2024-01-16 RX ORDER — VANCOMYCIN HYDROCHLORIDE 1 G/20ML
INJECTION, POWDER, LYOPHILIZED, FOR SOLUTION INTRAVENOUS
Status: DISCONTINUED | OUTPATIENT
Start: 2024-01-16 | End: 2024-01-16 | Stop reason: HOSPADM

## 2024-01-16 RX ORDER — PROPOFOL 10 MG/ML
VIAL (ML) INTRAVENOUS
Status: DISCONTINUED | OUTPATIENT
Start: 2024-01-16 | End: 2024-01-16

## 2024-01-16 RX ORDER — ROCURONIUM BROMIDE 10 MG/ML
INJECTION, SOLUTION INTRAVENOUS
Status: DISCONTINUED | OUTPATIENT
Start: 2024-01-16 | End: 2024-01-16

## 2024-01-16 RX ORDER — NAPROXEN SODIUM 220 MG/1
81 TABLET, FILM COATED ORAL 2 TIMES DAILY
Status: DISCONTINUED | OUTPATIENT
Start: 2024-01-16 | End: 2024-01-16

## 2024-01-16 RX ORDER — FENTANYL CITRATE 50 UG/ML
INJECTION, SOLUTION INTRAMUSCULAR; INTRAVENOUS
Status: DISCONTINUED | OUTPATIENT
Start: 2024-01-16 | End: 2024-01-16

## 2024-01-16 RX ORDER — MIDAZOLAM HYDROCHLORIDE 1 MG/ML
INJECTION, SOLUTION INTRAMUSCULAR; INTRAVENOUS
Status: DISCONTINUED | OUTPATIENT
Start: 2024-01-16 | End: 2024-01-16

## 2024-01-16 RX ORDER — HYDROMORPHONE HYDROCHLORIDE 1 MG/ML
0.2 INJECTION, SOLUTION INTRAMUSCULAR; INTRAVENOUS; SUBCUTANEOUS EVERY 5 MIN PRN
Status: DISCONTINUED | OUTPATIENT
Start: 2024-01-16 | End: 2024-01-16 | Stop reason: HOSPADM

## 2024-01-16 RX ORDER — EPINEPHRINE 1 MG/ML
INJECTION, SOLUTION, CONCENTRATE INTRAVENOUS
Status: DISCONTINUED | OUTPATIENT
Start: 2024-01-16 | End: 2024-01-16 | Stop reason: HOSPADM

## 2024-01-16 RX ORDER — DEXAMETHASONE SODIUM PHOSPHATE 4 MG/ML
INJECTION, SOLUTION INTRA-ARTICULAR; INTRALESIONAL; INTRAMUSCULAR; INTRAVENOUS; SOFT TISSUE
Status: DISCONTINUED | OUTPATIENT
Start: 2024-01-16 | End: 2024-01-16

## 2024-01-16 RX ORDER — HYDROMORPHONE HYDROCHLORIDE 1 MG/ML
0.5 INJECTION, SOLUTION INTRAMUSCULAR; INTRAVENOUS; SUBCUTANEOUS EVERY 5 MIN PRN
Status: DISCONTINUED | OUTPATIENT
Start: 2024-01-16 | End: 2024-01-16

## 2024-01-16 RX ORDER — LIDOCAINE HYDROCHLORIDE 20 MG/ML
INJECTION, SOLUTION EPIDURAL; INFILTRATION; INTRACAUDAL; PERINEURAL
Status: DISCONTINUED | OUTPATIENT
Start: 2024-01-16 | End: 2024-01-16

## 2024-01-16 RX ORDER — CEFAZOLIN 2 G/1
INJECTION, POWDER, FOR SOLUTION INTRAMUSCULAR; INTRAVENOUS
Status: DISCONTINUED | OUTPATIENT
Start: 2024-01-16 | End: 2024-01-16

## 2024-01-16 RX ORDER — MUPIROCIN 20 MG/G
OINTMENT TOPICAL
Status: DISCONTINUED | OUTPATIENT
Start: 2024-01-16 | End: 2024-01-16 | Stop reason: HOSPADM

## 2024-01-16 RX ORDER — SODIUM CHLORIDE 0.9 % (FLUSH) 0.9 %
10 SYRINGE (ML) INJECTION
Status: DISCONTINUED | OUTPATIENT
Start: 2024-01-16 | End: 2024-01-16 | Stop reason: HOSPADM

## 2024-01-16 RX ORDER — ONDANSETRON HYDROCHLORIDE 2 MG/ML
4 INJECTION, SOLUTION INTRAVENOUS ONCE AS NEEDED
Status: DISCONTINUED | OUTPATIENT
Start: 2024-01-16 | End: 2024-01-16 | Stop reason: HOSPADM

## 2024-01-16 RX ORDER — ACETAMINOPHEN 10 MG/ML
INJECTION, SOLUTION INTRAVENOUS
Status: DISCONTINUED | OUTPATIENT
Start: 2024-01-16 | End: 2024-01-16

## 2024-01-16 RX ORDER — ONDANSETRON HYDROCHLORIDE 2 MG/ML
INJECTION, SOLUTION INTRAVENOUS
Status: DISCONTINUED | OUTPATIENT
Start: 2024-01-16 | End: 2024-01-16

## 2024-01-16 RX ADMIN — METHOCARBAMOL 500 MG: 500 TABLET ORAL at 10:01

## 2024-01-16 RX ADMIN — HYDROMORPHONE HYDROCHLORIDE 0.2 MG: 1 INJECTION, SOLUTION INTRAMUSCULAR; INTRAVENOUS; SUBCUTANEOUS at 01:01

## 2024-01-16 RX ADMIN — CEFAZOLIN 2 G: 330 INJECTION, POWDER, FOR SOLUTION INTRAMUSCULAR; INTRAVENOUS at 11:01

## 2024-01-16 RX ADMIN — FENTANYL CITRATE 25 MCG: 50 INJECTION, SOLUTION INTRAMUSCULAR; INTRAVENOUS at 11:01

## 2024-01-16 RX ADMIN — INSULIN ASPART 2 UNITS: 100 INJECTION, SOLUTION INTRAVENOUS; SUBCUTANEOUS at 05:01

## 2024-01-16 RX ADMIN — VANCOMYCIN HYDROCHLORIDE 1000 MG: 1 INJECTION, POWDER, LYOPHILIZED, FOR SOLUTION INTRAVENOUS at 08:01

## 2024-01-16 RX ADMIN — PROPOFOL 200 MG: 10 INJECTION, EMULSION INTRAVENOUS at 10:01

## 2024-01-16 RX ADMIN — LIDOCAINE HYDROCHLORIDE 100 MG: 20 INJECTION, SOLUTION EPIDURAL; INFILTRATION; INTRACAUDAL; PERINEURAL at 10:01

## 2024-01-16 RX ADMIN — INSULIN ASPART 6 UNITS: 100 INJECTION, SOLUTION INTRAVENOUS; SUBCUTANEOUS at 05:01

## 2024-01-16 RX ADMIN — DAPTOMYCIN 945 MG: 500 INJECTION, POWDER, LYOPHILIZED, FOR SOLUTION INTRAVENOUS at 07:01

## 2024-01-16 RX ADMIN — ACETAMINOPHEN 1000 MG: 500 TABLET ORAL at 10:01

## 2024-01-16 RX ADMIN — FENTANYL CITRATE 50 MCG: 50 INJECTION, SOLUTION INTRAMUSCULAR; INTRAVENOUS at 10:01

## 2024-01-16 RX ADMIN — SODIUM CHLORIDE: 0.9 INJECTION, SOLUTION INTRAVENOUS at 10:01

## 2024-01-16 RX ADMIN — METOCLOPRAMIDE 5 MG: 5 TABLET ORAL at 05:01

## 2024-01-16 RX ADMIN — ACETAMINOPHEN 1000 MG: 10 INJECTION, SOLUTION INTRAVENOUS at 11:01

## 2024-01-16 RX ADMIN — METHOCARBAMOL 500 MG: 500 TABLET ORAL at 05:01

## 2024-01-16 RX ADMIN — OXYCODONE HYDROCHLORIDE 10 MG: 10 TABLET ORAL at 12:01

## 2024-01-16 RX ADMIN — ONDANSETRON 4 MG: 2 INJECTION INTRAMUSCULAR; INTRAVENOUS at 11:01

## 2024-01-16 RX ADMIN — HYDROMORPHONE HYDROCHLORIDE 0.2 MG: 1 INJECTION, SOLUTION INTRAMUSCULAR; INTRAVENOUS; SUBCUTANEOUS at 12:01

## 2024-01-16 RX ADMIN — MUPIROCIN: 20 OINTMENT TOPICAL at 08:01

## 2024-01-16 RX ADMIN — SUGAMMADEX 200 MG: 100 INJECTION, SOLUTION INTRAVENOUS at 12:01

## 2024-01-16 RX ADMIN — METHOCARBAMOL 500 MG: 500 TABLET ORAL at 12:01

## 2024-01-16 RX ADMIN — MIDAZOLAM HYDROCHLORIDE 2 MG: 1 INJECTION, SOLUTION INTRAMUSCULAR; INTRAVENOUS at 10:01

## 2024-01-16 RX ADMIN — RIVAROXABAN 10 MG: 10 TABLET, FILM COATED ORAL at 05:01

## 2024-01-16 RX ADMIN — PIPERACILLIN SODIUM AND TAZOBACTAM SODIUM 4.5 G: 4; .5 INJECTION, POWDER, FOR SOLUTION INTRAVENOUS at 10:01

## 2024-01-16 RX ADMIN — PANTOPRAZOLE SODIUM 40 MG: 40 TABLET, DELAYED RELEASE ORAL at 05:01

## 2024-01-16 RX ADMIN — ROCURONIUM BROMIDE 30 MG: 10 INJECTION, SOLUTION INTRAVENOUS at 10:01

## 2024-01-16 RX ADMIN — PIPERACILLIN SODIUM AND TAZOBACTAM SODIUM 4.5 G: 4; .5 INJECTION, POWDER, FOR SOLUTION INTRAVENOUS at 06:01

## 2024-01-16 RX ADMIN — INSULIN DETEMIR 17 UNITS: 100 INJECTION, SOLUTION SUBCUTANEOUS at 10:01

## 2024-01-16 RX ADMIN — DEXAMETHASONE SODIUM PHOSPHATE 4 MG: 4 INJECTION, SOLUTION INTRAMUSCULAR; INTRAVENOUS at 11:01

## 2024-01-16 RX ADMIN — FENTANYL CITRATE 25 MCG: 50 INJECTION, SOLUTION INTRAMUSCULAR; INTRAVENOUS at 12:01

## 2024-01-16 RX ADMIN — PIPERACILLIN SODIUM AND TAZOBACTAM SODIUM 4.5 G: 4; .5 INJECTION, POWDER, FOR SOLUTION INTRAVENOUS at 02:01

## 2024-01-16 RX ADMIN — AMMONIUM LACTATE: 12 LOTION TOPICAL at 10:01

## 2024-01-16 NOTE — SUBJECTIVE & OBJECTIVE
Past Medical History:   Diagnosis Date    Anticoagulant long-term use     COVID-19 virus infection     Diabetes mellitus     Diabetes mellitus, type 2     Hypertension     May-Thurner syndrome        Past Surgical History:   Procedure Laterality Date    APPENDECTOMY      ARTHROTOMY OF KNEE Left 12/29/2023    Procedure: ARTHROTOMY, KNEE;  Surgeon: Edy Bocanegra Jr., MD;  Location: [facility] OR;  Service: Orthopedics;  Laterality: Left;    ESOPHAGOGASTRODUODENOSCOPY N/A 1/31/2022    Procedure: EGD (ESOPHAGOGASTRODUODENOSCOPY);  Surgeon: Cindy Quiros MD;  Location: [facility] ENDO;  Service: Endoscopy;  Laterality: N/A;    ESOPHAGOGASTRODUODENOSCOPY N/A 3/21/2022    Procedure: EGD (ESOPHAGOGASTRODUODENOSCOPY);  Surgeon: Tejas Mann MD;  Location: [facility] ENDO;  Service: Endoscopy;  Laterality: N/A;    INCISION AND DRAINAGE FOOT Left 11/28/2021    Procedure: INCISION AND DRAINAGE, FOOT;  Surgeon: Bj Alicea DPM;  Location: [facility] OR;  Service: Podiatry;  Laterality: Left;    stents in bilateral legs      TRANSESOPHAGEAL ECHOCARDIOGRAPHY N/A 1/3/2024    Procedure: ECHOCARDIOGRAM, TRANSESOPHAGEAL;  Surgeon: Douglas Doss MD;  Location: [facility] CATH LAB/EP;  Service: Cardiology;  Laterality: N/A;       Review of patient's allergies indicates:   Allergen Reactions    Heparin analogues Nausea And Vomiting       Current Facility-Administered Medications   Medication    acetaminophen tablet 1,000 mg    ammonium lactate 12 % lotion    DAPTOmycin (CUBICIN) 945 mg in sodium chloride 0.9% SolP 50 mL IVPB    dextrose 10% bolus 125 mL 125 mL    dextrose 10% bolus 250 mL 250 mL    glucagon (human recombinant) injection 1 mg    glucose chewable tablet 16 g    glucose chewable tablet 24 g    insulin aspart U-100 pen 0-5 Units    insulin aspart U-100 pen 6 Units    insulin detemir U-100 (Levemir) pen 15 Units    melatonin tablet 6 mg    methocarbamoL tablet 500 mg    metoclopramide HCl tablet 5 mg    metoprolol succinate  "(TOPROL-XL) 24 hr split tablet 12.5 mg    morphine injection 4 mg    naloxone 0.4 mg/mL injection 0.02 mg    ondansetron injection 4 mg    oxyCODONE immediate release tablet 5 mg    oxyCODONE immediate release tablet 5 mg    oxyCODONE immediate release tablet Tab 10 mg    pantoprazole EC tablet 40 mg    piperacillin-tazobactam (ZOSYN) 4.5 g in dextrose 5 % in water (D5W) 100 mL IVPB (MB+)    polyethylene glycol packet 17 g    prochlorperazine injection Soln 5 mg    rivaroxaban tablet 10 mg    scopolamine 1.3-1.5 mg (1 mg over 3 days) 1 patch    senna-docusate 8.6-50 mg per tablet 1 tablet    sodium chloride 0.9% flush 10 mL    sodium chloride 0.9% flush 10 mL     Family History       Problem Relation (Age of Onset)    Cancer Mother, Paternal Uncle, Paternal Grandfather, Maternal Grandfather    Irritable bowel syndrome Paternal Grandfather          Tobacco Use    Smoking status: Former     Types: Cigarettes    Smokeless tobacco: Former    Tobacco comments:     occasionally    Substance and Sexual Activity    Alcohol use: Yes     Comment: occ    Drug use: No    Sexual activity: Not on file     ROS    Constitutional: negative for fevers  Eyes: negative visual changes  ENT: negative for hearing loss  Respiratory: negative for dyspnea  Cardiovascular: negative for chest pain  Gastrointestinal: negative for abdominal pain  Genitourinary: negative for dysuria  Neurological: negative for headaches  Behavioral/Psych: negative for hallucinations  Endocrine: negative for temperature intolerance      Objective:     Vital Signs (Most Recent):  Temp: 98.3 °F (36.8 °C) (01/15/24 1924)  Pulse: 89 (01/15/24 1924)  Resp: 18 (01/15/24 1613)  BP: 112/63 (01/15/24 1924)  SpO2: 97 % (01/15/24 1924) Vital Signs (24h Range):  Temp:  [97.8 °F (36.6 °C)-98.3 °F (36.8 °C)] 98.3 °F (36.8 °C)  Pulse:  [67-96] 89  Resp:  [16-18] 18  SpO2:  [94 %-97 %] 97 %  BP: (112-138)/(63-76) 112/63     Weight: 107.8 kg (237 lb 10.5 oz)  Height: 6' 1" (185.4 " "cm)  Body mass index is 31.35 kg/m².    No intake or output data in the 24 hours ending 01/15/24 2004     Ortho/SPM Exam     Gen:  No acute distress, well-developed, well nourished.  CV:  1+ DP pulses bilaterally  Respiratory:  Normal respiratory effort. No accessory muscle use.   Head/Neck:  Normocephalic.  Atraumatic. Sclera anicteric. TM. Neck supple.  Neuro: CN 2-12 grossly intact. No FND. Awake. Alert. Oriented to person, place, time, and situation.   Abdomen: Soft, NTND.      MSK:  Left Upper Extremity  Inspection  - Skin intact throughout, no open wounds  - No swelling  - No ecchymosis, erythema, or signs of cellulitis  Palpation  - Nontender to palpation of the left shoulder and throughout LUE  Range of motion  - AROM and PROM of the elbow, wrist, and hand intact  - AROM and PROM of the left shoulder limited by pain, pain with passive abduction greater than 90 degrees  Stability  - No evidence of joint dislocation or abnormal laxity Neurovascular  - AIN/PIN/Radial/Median/Ulnar Nerves assessed in isolation without deficit  - Able to give thumbs up, make "OK" sign, cross IF/LF, abduct/adduct fingers, make fist  - SILT throughout  - Compartments soft  - Radial artery palpated  - Capillary Refill <3s  - Muscle tone normal    Right Upper Extremity  Inspection  - Skin intact throughout, no open wounds  - No swelling  - No ecchymosis, erythema, or signs of cellulitis  Palpation  - NonTTP throughout, no palpable abnormality   Range of motion  - AROM and PROM of the shoulder, elbow, wrist, and hand intact  Stability  - No evidence of joint dislocation or abnormal laxity Neurovascular  - AIN/PIN/Radial/Median/Ulnar Nerves assessed in isolation without deficit  - Able to give thumbs up, make "OK" sign, cross IF/LF, abduct/adduct fingers, make fist  - SILT throughout  - Compartments soft  - Radial artery palpated  - Capillary Refill <3s  - Muscle tone normal      Left Lower Extremity  Inspection  - Left knee " significantly swollen, surgical incision site in place without dehiscence or drainage  Palpation  - difficultly tender throughout the knee   Range of motion  - AROM and PROM of the hip, ankle, and foot intact  - active and passive range of motion of the left knee limited due to pain  Stability  - No evidence of joint dislocation or abnormal laxity, Neurovascular  - TA/EHL/Gastroc/FHL assessed in isolation without deficit  - SILT throughout  - Compartments soft  - DP palpated   - Capillary Refill <3s  - Negative Log roll  - Negative Stinchfield  - Muscle tone normal    Right Lower Extremity  Inspection  - Skin intact throughout, no open wounds  - No swelling  - No ecchymosis, erythema, or signs of cellulitis  Palpation  - tenderness to palpation of the right hip anteriorly, nontender otherwise throughout the right lower extremity  Range of motion  - AROM and PROM of the hip, knee, ankle, and foot intact  Stability  - No evidence of joint dislocation or abnormal laxity  Neurovascular  - TA/EHL/Gastroc/FHL assessed in isolation without deficit  - SILT throughout  - Compartments soft  - DP palpated   - Capillary Refill <3s  - positive Log roll  - positive Stinchfield  - Muscle tone normal    Spine/pelvis/axial body:  No tenderness to palpation of cervical, thoracic, or lumbar spine  No pain with compression of pelvis  No chest wall or abdominal tenderness  No decubitus ulcers  Muscle tone normal      Significant Labs: CBC:   Recent Labs   Lab 01/14/24  2039 01/15/24  0513   WBC 15.86* 13.92*   HGB 9.4* 8.8*   HCT 30.1* 28.2*    343     CMP:   Recent Labs   Lab 01/14/24  2039 01/15/24  0513   * 131*   K 4.6 4.3   CL 93* 96   CO2 28 25   * 231*   BUN 10 8   CREATININE 1.1 0.8   CALCIUM 8.9 9.2   PROT 8.7* 7.8   ALBUMIN 1.5* 1.4*   BILITOT 0.6 0.7   ALKPHOS 257* 237*   * 90*   * 105*   ANIONGAP 9 10     CRP:   Recent Labs   Lab 01/15/24  0513   .9*     All pertinent labs within the  past 24 hours have been reviewed.    Significant Imaging: CT: I have reviewed all pertinent results/findings and my personal findings are:  CT of the left shoulder shows potential findings of cellulitis of the soft tissue about the left shoulder without findings indicative of septic arthritis on 01/02.  X-Ray: I have reviewed all pertinent results/findings and my personal findings are:  X-ray of the left shoulder, pelvis, left knee show no acute fracture tumors at this time  MRI: I have reviewed all pertinent results/findings and my personal findings are:  MRI of the left shoulder and right hip ordered at this time; we will follow up.  I have reviewed and interpreted all pertinent imaging results/findings.

## 2024-01-16 NOTE — OP NOTE
OP NOTE    DOS:  01/16/2024    Preop Dx: Septic arthritis left knee with MRSA    CPPD left knee    Postop Dx: Septic arthritis left knee with MRSA    CPPD left knee    Procedure: Left knee arthroscopy with extensive synovectomy/debridement for septic arthritis    Surgeon: Mandeep Gatica M.D.    Asst:  David France M.D    Anesthesia: GETA    EBL:  Minimal    IVF:  500 cc crystalloid    Implants: None    Specimens: None (aspiration already taken)    Findings: Purulence.  Synovitis.    Dispo:  To PACU extubated/stable       Indications for Procedure:      38-year-old male with diabetes the recently had septic arthritis of the left knee status post arthrotomy with washout that grew MRSA presented with recurrent fevers and was found to still have septic arthritis of the left knee with another exacerbation.  130K white blood cells on aspiration.  Of note, he also has CPPD in that knee.  To OR for arthroscopic irrigation and debridement.  The risks, benefits and alternatives to surgery discussed the patient prior to going the operating room.  Informed consent was obtained.    Procedure in Detail:    Patient identified the preoperative holding area the site was marked.  Sutures from prior arthrotomy incision still in place.  Patient was wheeled to the operating room and placed on the operating table in supine position.  General anesthesia was induced.  The left lower extremity was prepped draped in sterile fashion with use of a lateral post.  A time-out was undertaken to confirm patient, side, site, surgery, surgeon.  Patient has been on antibiotics.  All agreed we proceeded.      An inferolateral portal was established with an 11 blade.  The cannula was placed into the suprapatellar space liberating some purulent fluid.  Camera was placed.  Patient has significant synovitis in that area.  This was run down around the medial gutter and into the notch.  Under direct visualization a spinal needle was used followed by an 11  blade to develop a inferomedial portal.  Arthroscopic shaver was introduced and significant amount of synovium was removed from the notch as well as the anterior portions of the medial and lateral joint lines.  The ACL was intact.      Going to the medial joint line the medial meniscus and the cartilage appeared to be intact.  Some more synovitis was removed with the arthroscopic shaver in this area.  Moving up into a figure of 4 on the lateral joint line the meniscus and cartilage were again were found to be intact and some more synovitis was removed with the arthroscopic shaver.    We moved back around through the notch into the suprapatellar space and removed some more synovitis and loose debris.  In total 9 L of fluid were run through the knee during the time of the synovectomy.  At this point the inflow was stopped and all excess fluid was suctioned from the knee.    The portal sites were closed with 3-0 nylon suture in figure-of-eight fashion and then 1 g vancomycin diluted in 5 cc of normal saline were injected back into the knee through the inferolateral side.  Sterile dressings were then applied.    All instrument and sponge counts were reported correct at the end of the case.  There were no complications.  The patient was extubated, awakened and taken to recovery room stable condition.      Plan the patient:    Continue treating him from the MRSA.  Hopefully he will not require another washout although it is not that uncommon.  Good blood glucose control is essential for fighting his infection.  Multimodal pain management limiting narcotics with weight-bearing as tolerated left lower extremity.    Mandeep Gatica MD

## 2024-01-16 NOTE — TELEPHONE ENCOUNTER
----- Message from Marylin Sorto MA sent at 1/16/2024  2:01 PM CST -----  Contact: Chloé-wife    ----- Message -----  From: Kelsi Swanson  Sent: 1/16/2024   1:15 PM CST  To: Gavi LUCIO Staff    Pt wife is calling in regards to getting information on pts surgery. Please call back at [phone]          Thanks

## 2024-01-16 NOTE — PROGRESS NOTES
[facility] [address] [place] [address] Medicine  Progress Note    Patient Name: Kulwant Mueller Jr.  MRN: [id number]  Patient Class: IP- Inpatient   Admission Date: 1/14/2024  Length of Stay: 0 days  Attending Physician: Rian Garcia MD  Primary Care Provider: Shellie, Primary Doctor        Subjective:     Principal Problem:Fever        HPI:  37 Y/O AAM with Type 2 DM, May-Thurer syndrome (hypercoagulable state), Chronic Diabetic foot wounds, recent Left knee septic arthritis with MRSA bacteremia presents to [facility] ED from [facility] for concerns of new fevers.     Patient was admitted to [facility] 12/27/23-01/10/24 due to left knee septic arthritis with persistent MRSA bacteremia, s/p Left knee orthopedic surgery, surgical cultures positive for MRSA. Right heel also of concern for source.   He was followed by ID,  he had been on Daptomycin + Ceftaroline, s/p ROBY w/o endocarditis, left shoulder arthrocentesis w/o infection,  surveillance blood culture negative from 01/07/24    Report per ED and care everywhere notes patient with fevers on 1/13 to 103degreese @ 1800, 2000, ER transfer recommended but pt refused, physician gave order for blood culture, pt reported feeling fine.   Today he developed temperature of 100.5 @ 15:56 and was transferred to [facility] for further evaluation.     Today he reports he can walk with asssitance, using a walker, performing ADLs, main complaint is right sided back pain    ED - blood cultures drawn.             Overview/Hospital Course:  Kulwant Mueller Jr. Is a 38 y.o. male who was admitted to hospital medicine for fever. WBC 15.86 >> 13.92. Sed rate 97, CRPP 211.9. On Daptomycin from previous discharge, continuing and added zosyn. ID consulted. Blood cultures pending. Orthopedics consulted, joint aspiration performed at bedside. Taken to the OR for L knee washout. Patient will be discharged when medically ready.     Interval History: MYLA HOLBROOK. Went to the OR today with  ortho for wound wash out. Patient tolerated well. ID following, continuing zosyn and dapto. Following blood and wound cultures     Review of Systems   Constitutional:  Positive for activity change, appetite change and fatigue. Negative for chills and fever.   Respiratory:  Negative for chest tightness and shortness of breath.    Cardiovascular:  Negative for chest pain and leg swelling.   Gastrointestinal:  Negative for abdominal pain and nausea.   Musculoskeletal:  Positive for arthralgias and back pain.   Neurological:  Negative for dizziness and weakness.     Objective:     Vital Signs (Most Recent):  Temp: 98.4 °F (36.9 °C) (01/16/24 1600)  Pulse: 94 (01/16/24 1600)  Resp: 16 (01/16/24 1600)  BP: 125/71 (01/16/24 1600)  SpO2: 95 % (01/16/24 1600) Vital Signs (24h Range):  Temp:  [97.7 °F (36.5 °C)-98.9 °F (37.2 °C)] 98.4 °F (36.9 °C)  Pulse:  [84-98] 94  Resp:  [14-22] 16  SpO2:  [94 %-100 %] 95 %  BP: (112-157)/(63-88) 125/71     Weight: 109.3 kg (240 lb 15.4 oz)  Body mass index is 31.79 kg/m².    Intake/Output Summary (Last 24 hours) at 1/16/2024 1637  Last data filed at 1/16/2024 1216  Gross per 24 hour   Intake 960 ml   Output 1550 ml   Net -590 ml         Physical Exam  Vitals and nursing note reviewed.   Constitutional:       Appearance: He is well-developed.   Eyes:      Pupils: Pupils are equal, round, and reactive to light.   Cardiovascular:      Rate and Rhythm: Normal rate and regular rhythm.   Pulmonary:      Effort: Pulmonary effort is normal.      Breath sounds: Normal breath sounds.   Abdominal:      Palpations: Abdomen is soft.      Tenderness: There is no abdominal tenderness.   Musculoskeletal:         General: Swelling (L knee warm, swollen and tender) present. No tenderness.      Comments: No point tenderness to back, no midline tenderness   Skin:     General: Skin is warm and dry.   Neurological:      Mental Status: He is alert and oriented to person, place, and time.   Psychiatric:       Comments: Flat affect             Significant Labs: All pertinent labs within the past 24 hours have been reviewed.  CBC:   Recent Labs   Lab 01/14/24  2039 01/15/24  0513 01/16/24  0330   WBC 15.86* 13.92* 12.12   HGB 9.4* 8.8* 9.1*   HCT 30.1* 28.2* 29.5*    343 382     CMP:   Recent Labs   Lab 01/14/24  2039 01/15/24  0513 01/16/24  0330   * 131* 135*   K 4.6 4.3 3.8   CL 93* 96 100   CO2 28 25 27   * 231* 155*   BUN 10 8 7   CREATININE 1.1 0.8 0.7   CALCIUM 8.9 9.2 8.8   PROT 8.7* 7.8 7.6   ALBUMIN 1.5* 1.4* 1.4*   BILITOT 0.6 0.7 0.4   ALKPHOS 257* 237* 192*   * 90* 53*   * 105* 84*   ANIONGAP 9 10 8       Significant Imaging: I have reviewed all pertinent imaging results/findings within the past 24 hours.    Assessment/Plan:      * Fever  -reported fevers of 103 @ [facility] on Saturday and temp 100 prior to transfer  -s/p blood cultures   -add Zosyn for empiric gram negative coverage  -Pt with c/o of right sided Back pain - given MRSA bacteremia, pt is at risk for metastatic site of infection obtain MRI T-L spine with Contrast to r/o spinal source of infection    - no acute findings on MRI   -Check ESR/CRP    - ESR 97/ .9   -Infectious disease consult - to assist in continuing Daptomycin approval & infectious w/u    >patient had ROBY performed on 1/02 to r/o endocarditis.       Chronic HFrEF (heart failure with reduced ejection fraction)  New finding at last hospitalization  -EF 48% with systolic dysfunction, normal diastolic function  -patient on Jardiance as outpatient as well as Toprol XL and Lasix 20mg po daily   -Jardiance not continued @ IP rehab - would hold given concern for possible repeat infection - resume when clinically stable.   -Resume furosemide when stable      Hyponatremia  Patient has hyponatremia which is uncontrolled,  We will aim to correct the sodium by 4-6mEq in 24 hours.   We will monitor sodium Daily.   The hyponatremia is due to  Dehydration/hypovolemia.   We will obtain the following studies: Urine sodium, urine osmolality, serum osmolality or TSH, T4.   We will treat the hyponatremia with IV fluids as follows: Start Saline 200cc/hr x 7 hours. The patient's sodium results have been reviewed and are listed below.  Recent Labs   Lab 01/14/24 2039   *       Transaminitis  Now again with rising transaminase levels   -trend CMP daily   -check GGT with rising Alk phos levels    -Prior w/u @ [facility] included - CT A/P, w/o abnormality, Hepatitis panel negative, RUQ ultrasound with hepatomegaly and biliary sludge; but without other obvious abnormalities         Depression  Noted At [facility] hospitalization - patient noted to have flat affect, decrease motivation, excessive sleeping, was seen by psychiatry consult and Cymbalta recommended but pt declined.   -Description of his affect seems similar here, pending above workup would re-address with patient prior to discharge  Reports he was living with wife & 2 kids prior to current hospitalization.     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     Staphylococcal arthritis of left knee  S/p Left knee arthrotomy and synovectomy (12/29/23)  -re-order pT/ot  Pt with left knee sutures, tenderness to palpation near medial tibial plateau  - ortho consulted, bedside aspiration completed   - taken to OR today for wound wash out       Staphylococcus aureus bacteremia  -continue daptomycin - last dose @ rehab was @14;00 (01/14) - continue q24  -add-on CPK tonight and check weekly  -esr/crp  -repeat blood cultures pending        Other elevated white blood cell count  He has persistent elevation but downtrending  leukocytosis.        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  Initially on therapeutic lovenox and transitioned back to Oral Xarelto 10mg daily   -pt previously followed with hematology - Dr. Duff.     Diabetic gastroparesis associated with type 2 diabetes mellitus  -continue reglan 5mg PO TID    Type 2 diabetes mellitus with complication, with long-term current use of insulin  Patient's FSGs are uncontrolled due to hyperglycemia on current medication regimen.  Last A1c reviewed-   Lab Results   Component Value Date    HGBA1C 7.4 (H) 12/28/2023     Most recent fingerstick glucose reviewed-   Recent Labs   Lab 01/15/24  0133   POCTGLUCOSE 232*     Current correctional scale  Low  Maintain anti-hyperglycemic dose as follows-   Antihyperglycemics (From admission, onward)      Start     Stop Route Frequency Ordered    01/15/24 0900  insulin detemir U-100 (Levemir) pen 15 Units         -- SubQ 2 times daily 01/15/24 0229    01/15/24 0715  insulin aspart U-100 pen 6 Units         -- SubQ 3 times daily with meals 01/15/24 0231    01/15/24 0329  insulin aspart U-100 pen 0-5 Units         -- SubQ Before meals & nightly PRN 01/15/24 0231    01/15/24 0224  insulin detemir U-100 (Levemir) pen 6 Units         -- SubQ Once 01/15/24 0229          Hold Oral hypoglycemics while patient is in the hospital.          VTE Risk Mitigation (From admission, onward)           Ordered     rivaroxaban tablet 10 mg  With dinner         01/15/24 0229     Reason for No Pharmacological VTE Prophylaxis  Once        Question:  Reasons:  Answer:  Already adequately anticoagulated on oral Anticoagulants    01/15/24 0229     IP VTE HIGH RISK PATIENT  Once         01/15/24 0229     Place sequential compression  device  Until discontinued         01/15/24 0229                    Discharge Planning   RAEANN: 1/18/2024     Code Status: Full Code   Is the patient medically ready for discharge?: No    Reason for patient still in hospital (select all that apply): Patient trending condition, Laboratory test, Treatment, Imaging, Consult recommendations, and PT / OT recommendations  Discharge Plan A: Rehab                  Fany Emerson PA-C  Department of Hospital Medicine   [facility] - Observation 11H

## 2024-01-16 NOTE — PLAN OF CARE
Chart reviewed. In OR at time of eval.     MRSA bacteremia  Lt knee septic arthritis  38M w hx of May-Thurner syndrome, DM2, chronic foot wounds following with wound care,  recent admission for MRSA bacteremia c/b L knee septic arthritis s/p  s/p L knee arthrotomy with synovectomy on 12/29, L heel myositis and tenosynovitis and L shoulder cellulitis who represented with fevers, found to have septic L knee s/p washout today.  --continue daptomycin  --ok to continue pip-tazo pending surgical cx  --weekly cpk while on dapto  --f/u cultures.  --if bacteremic or has persistent fevers will need picc line removal    ID will follow.  Farhana Armenta MD  Infectious Disease

## 2024-01-16 NOTE — TRANSFER OF CARE
"Anesthesia Transfer of Care Note    Patient: Kulwant Mueller Jr.    Procedure(s) Performed: Procedure(s) (LRB):  ARTHROSCOPY, KNEE, LEFT (Left)    Patient location: PACU    Anesthesia Type: general    Transport from OR: Transported from OR on 6-10 L/min O2 by face mask with adequate spontaneous ventilation    Post pain: adequate analgesia    Post assessment: no apparent anesthetic complications    Post vital signs: stable    Level of consciousness: responds to stimulation    Nausea/Vomiting: no nausea/vomiting    Complications: none    Transfer of care protocol was followed      Last vitals: Visit Vitals  BP (!) 142/84 (BP Location: Right arm, Patient Position: Lying)   Pulse 97   Temp 37.2 °C (98.9 °F) (Temporal)   Resp 20   Ht 6' 1" (1.854 m)   Wt 109.3 kg (240 lb 15.4 oz)   SpO2 96%   BMI 31.79 kg/m²     "

## 2024-01-16 NOTE — SUBJECTIVE & OBJECTIVE
Interval History: NAEON. VSS. Went to the OR today with ortho for wound wash out. Patient tolerated well. ID following, continuing zosyn and dapto. Following blood and wound cultures     Review of Systems   Constitutional:  Positive for activity change, appetite change and fatigue. Negative for chills and fever.   Respiratory:  Negative for chest tightness and shortness of breath.    Cardiovascular:  Negative for chest pain and leg swelling.   Gastrointestinal:  Negative for abdominal pain and nausea.   Musculoskeletal:  Positive for arthralgias and back pain.   Neurological:  Negative for dizziness and weakness.     Objective:     Vital Signs (Most Recent):  Temp: 98.4 °F (36.9 °C) (01/16/24 1600)  Pulse: 94 (01/16/24 1600)  Resp: 16 (01/16/24 1600)  BP: 125/71 (01/16/24 1600)  SpO2: 95 % (01/16/24 1600) Vital Signs (24h Range):  Temp:  [97.7 °F (36.5 °C)-98.9 °F (37.2 °C)] 98.4 °F (36.9 °C)  Pulse:  [84-98] 94  Resp:  [14-22] 16  SpO2:  [94 %-100 %] 95 %  BP: (112-157)/(63-88) 125/71     Weight: 109.3 kg (240 lb 15.4 oz)  Body mass index is 31.79 kg/m².    Intake/Output Summary (Last 24 hours) at 1/16/2024 1637  Last data filed at 1/16/2024 1216  Gross per 24 hour   Intake 960 ml   Output 1550 ml   Net -590 ml         Physical Exam  Vitals and nursing note reviewed.   Constitutional:       Appearance: He is well-developed.   Eyes:      Pupils: Pupils are equal, round, and reactive to light.   Cardiovascular:      Rate and Rhythm: Normal rate and regular rhythm.   Pulmonary:      Effort: Pulmonary effort is normal.      Breath sounds: Normal breath sounds.   Abdominal:      Palpations: Abdomen is soft.      Tenderness: There is no abdominal tenderness.   Musculoskeletal:         General: Swelling (L knee warm, swollen and tender) present. No tenderness.      Comments: No point tenderness to back, no midline tenderness   Skin:     General: Skin is warm and dry.   Neurological:      Mental Status: He is alert and  oriented to person, place, and time.   Psychiatric:      Comments: Flat affect             Significant Labs: All pertinent labs within the past 24 hours have been reviewed.  CBC:   Recent Labs   Lab 01/14/24  2039 01/15/24  0513 01/16/24  0330   WBC 15.86* 13.92* 12.12   HGB 9.4* 8.8* 9.1*   HCT 30.1* 28.2* 29.5*    343 382     CMP:   Recent Labs   Lab 01/14/24  2039 01/15/24  0513 01/16/24  0330   * 131* 135*   K 4.6 4.3 3.8   CL 93* 96 100   CO2 28 25 27   * 231* 155*   BUN 10 8 7   CREATININE 1.1 0.8 0.7   CALCIUM 8.9 9.2 8.8   PROT 8.7* 7.8 7.6   ALBUMIN 1.5* 1.4* 1.4*   BILITOT 0.6 0.7 0.4   ALKPHOS 257* 237* 192*   * 90* 53*   * 105* 84*   ANIONGAP 9 10 8       Significant Imaging: I have reviewed all pertinent imaging results/findings within the past 24 hours.

## 2024-01-16 NOTE — ASSESSMENT & PLAN NOTE
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     S/p aspiration of L knee 1/15 consistent w septic arthritis.

## 2024-01-16 NOTE — ASSESSMENT & PLAN NOTE
S/p Left knee arthrotomy and synovectomy (12/29/23)  -re-order pT/ot  Pt with left knee sutures, tenderness to palpation near medial tibial plateau  - ortho consulted, bedside aspiration completed   - taken to OR today for wound wash out

## 2024-01-16 NOTE — ASSESSMENT & PLAN NOTE
Kulwant Blair Ami Miguel. is a 38 y.o. male admitted for workup of fevers of unknown source, recent irrigation and debridement of left knee performed at [facility] on 12/29.  Aspiration results indicative of recurrent septic arthritis of left knee at this time.  Patient is neurovascularly intact on the left side.  He is marked booked and consented for arthroscopic irrigation and debridement of left knee today. Remain NPO.

## 2024-01-16 NOTE — ASSESSMENT & PLAN NOTE
Kulwant Mueller Jr. is a 38 y.o. male admitted for workup of fevers of unknown source, recent irrigation and debridement of left knee performed at [facility] on .  Aspiration results indicative of recurrent septic arthritis of left knee at this time.  Patient is neurovascularly intact on the left side.  He is marked booked and consented for arthroscopic irrigation and debridement of left knee tomorrow.  NPO at midnight.  Left knee was aspirated, see procedure note below for more details.    MRI of left shoulder and right hip ordered, patient on contrast hold until  at noon tomorrow due to recent spine MRIs.  Neither of which showed signs of infection.      Procedure Note: Left knee aspiration  Patient was explained risks, benefits, and alternatives to treatment and verbalized consent to proceed. Time out was performed and patient name, , site, and procedure were confirmed. Skin was sterilely prepared with alcohol solution. 18 gauge needle on a 60 cc syringe was used for aspiration through superolateral approach. 85 cc of murky, dark colored fluid collected. Fluid and cultures were obtained and sent for analysis. Aspiration site was covered with a bandaid.      Lab Results   Component Value Date    WBCBF 742299 01/15/2024    SEGS 89 01/15/2024    CRYST Positive (A) 01/15/2024    BODYFLUIDTYP Joint Fluid, Left Knee 01/15/2024     Lab Results   Component Value Date    LABGRAM No WBC's 01/15/2024    LABGRAM No organisms seen 01/15/2024     Lab Results   Component Value Date    SEDRATE 97 (H) 01/15/2024    .9 (H) 01/15/2024    WBC 13.92 (H) 01/15/2024    LACTATE 1.4 01/15/2024     Albumin   Date Value Ref Range Status   01/15/2024 1.4 (L) 3.5 - 5.2 g/dL Final     Hemoglobin   Date Value Ref Range Status   01/15/2024 8.8 (L) 14.0 - 18.0 g/dL Final     POC Hematocrit   Date Value Ref Range Status   2016 54 36 - 54 %PCV Final     Hematocrit   Date Value Ref Range Status   01/15/2024 28.2 (L)  40.0 - 54.0 % Final     WBC   Date Value Ref Range Status   01/15/2024 13.92 (H) 3.90 - 12.70 K/uL Final     INR   Date Value Ref Range Status   01/15/2024 1.3 (H) 0.8 - 1.2 Final     Comment:     Coumadin Therapy:  2.0 - 3.0 for INR for all indicators except mechanical heart valves  and antiphospholipid syndromes which should use 2.5 - 3.5.

## 2024-01-16 NOTE — PLAN OF CARE
Problem: Adult Inpatient Plan of Care  Goal: Plan of Care Review  Outcome: Ongoing, Progressing  Goal: Patient-Specific Goal (Individualized)  Outcome: Ongoing, Progressing  Goal: Absence of Hospital-Acquired Illness or Injury  Outcome: Ongoing, Progressing     Problem: Diabetes Comorbidity  Goal: Blood Glucose Level Within Targeted Range  Outcome: Ongoing, Progressing     Problem: Infection  Goal: Absence of Infection Signs and Symptoms  Outcome: Ongoing, Progressing     Problem: Impaired Wound Healing  Goal: Optimal Wound Healing  Outcome: Ongoing, Progressing     Problem: Skin Injury Risk Increased  Goal: Skin Health and Integrity  Outcome: Ongoing, Progressing     Problem: Fall Injury Risk  Goal: Absence of Fall and Fall-Related Injury  Outcome: Ongoing, Progressing     Problem: Surgical Site Infection  Goal: Absence of Infection Signs and Symptoms  Outcome: Ongoing, Progressing

## 2024-01-16 NOTE — ANESTHESIA PREPROCEDURE EVALUATION
[facility]  Anesthesia Pre-Operative Evaluation         Patient Name: Kulwant Mueller Jr.  YOB: 1985  MRN: [id number]    SUBJECTIVE:     Pre-operative evaluation for Procedure(s) (LRB):  ARTHROSCOPY, KNEE, LEFT (Left)     01/16/2024    Kulwant Mueller Jr. is a 38 y.o. male w/ a significant PMHx of Type 2 DM, May-Thurer syndrome (hypercoagulable state), Chronic Diabetic foot wounds, recent Left knee septic arthritis with MRSA bacteremia presents, s/p irrigation and debridement of left knee performed at [facility] on 12/29 presents for recurrent septic arthritis of left knee     Patient now presents for the above procedure(s).    Echo Summary  Results for orders placed during the hospital encounter of 12/27/23    Echo    Interpretation Summary    Left Ventricle: The left ventricle is normal in size. There is concentric remodeling. Regional wall motion abnormalities present. See diagram for wall motion findings. There is reduced systolic function. Biplane (2D) method of discs ejection fraction is 48%. There is normal diastolic function.    Right Ventricle: Normal right ventricular cavity size. Systolic function is normal. TAPSE is 2.54 cm.    Normal left atrial size. The left atrium volume index is 21.2 mL/m2.    Normal right atrial size.    The aortic valve is structurally normal. There is normal leaflet mobility.    The mitral valve is structurally normal. There is normal leaflet mobility.    The tricuspid valve is structurally normal. There is normal leaflet mobility.    IVC/SVC: Elevated venous pressure at 15 mmHg.    Overall the study quality was technically difficult.       Prev airway: None documented.    LDA:   PICC Double Lumen 01/08/24 1405 right basilic (Active)   Line Necessity Review Longterm central access required 01/10/24 1100   Verification by X-ray Yes 01/10/24 1100   Site Assessment No drainage;No redness;No swelling;No warmth 01/10/24 1100   Extremity  Assessment Distal to IV No warmth;No swelling;No redness;No abnormal discoloration 01/10/24 1100   Line Securement Device Secured with sutureless device 01/10/24 1100   Dressing Type CHG impregnated dressing/sponge;Central line dressing;Transparent (Tegaderm) 01/10/24 1100   Dressing Status Clean;Dry;Intact 01/10/24 1100   Dressing Intervention Integrity maintained 01/10/24 1100   Date on Dressing 01/08/24 01/10/24 1100   Dressing Due to be Changed 01/15/24 01/10/24 1100   Distal Patency/Care flushed w/o difficulty 01/10/24 1100   Proximal 1 Patency/Care flushed w/o difficulty 01/10/24 1100   Current Insertion Depth (cm) 43 cm 01/09/24 1935   Current Exposed Catheter (cm) 0 cm 01/09/24 1935   Extremity Circumference (cm) 34 cm 01/09/24 1935   Waveform Not being transduced 01/10/24 1100   Number of days: 7            Peripheral IV - Single Lumen 01/14/24 2330 20 G Anterior;Left;Proximal Forearm (Active)   Site Assessment Clean;Dry;Intact;No redness;No swelling;No warmth;No drainage 01/15/24 1924   Extremity Assessment Distal to IV No redness;No swelling;No warmth 01/15/24 1924   Line Status Flushed;Saline locked 01/15/24 1924   Dressing Status Clean;Dry;Intact 01/15/24 1924   Dressing Intervention Integrity maintained 01/15/24 1924   Dressing Change Due 01/18/24 01/15/24 1924   Site Change Due 01/18/24 01/15/24 1924   Reason Not Rotated Not due 01/15/24 1924   Number of days: 1       Drips: None documented.      Patient Active Problem List   Diagnosis    Penile lesion    Type 2 diabetes mellitus with complication, with long-term current use of insulin    Intractable nausea and vomiting    History of intravascular stent placement    Diabetic gastroparesis associated with type 2 diabetes mellitus    Hypertension associated with diabetes    Recurrent deep vein thrombosis (DVT) of left lower extremity    Chronic anticoagulation    Post-thrombotic syndrome of left lower extremity    May-Thurner syndrome    Infected  Diabetic ulcer of left foot with Cellulitis  associated with type 2 diabetes mellitus    Elevated CK    Esophagitis determined by endoscopy    Eyota grade C esophagitis    GERD (gastroesophageal reflux disease)    Other elevated white blood cell count    Staphylococcus aureus bacteremia    Staphylococcal arthritis of left knee    Septic prepatellar bursitis of left knee    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 due to underlying condition, limited to breakdown of skin    Diabetes mellitus    Depression    Transaminitis    Knee pyogenic arthritis    Fever    Hyponatremia    Chronic HFrEF (heart failure with reduced ejection fraction)       Review of patient's allergies indicates:   Allergen Reactions    Heparin analogues Nausea And Vomiting       Current Inpatient Medications:   acetaminophen  1,000 mg Oral Q8H    ammonium lactate   Topical (Top) BID    DAPTOmycin (CUBICIN) 945 mg in sodium chloride 0.9% SolP 50 mL IVPB  10 mg/kg (Adjusted) Intravenous Q24H    insulin aspart U-100  6 Units Subcutaneous TIDWM    insulin detemir U-100  15 Units Subcutaneous BID    methocarbamoL  500 mg Oral QID    metoclopramide HCl  5 mg Oral TID AC    metoprolol succinate  12.5 mg Oral Daily    pantoprazole  40 mg Oral Daily    piperacillin-tazobactam (Zosyn) IV (PEDS and ADULTS) (extended infusion is not appropriate)  4.5 g Intravenous Q8H    rivaroxaban  10 mg Oral Daily with dinner       No current facility-administered medications on file prior to encounter.     Current Outpatient Medications on File Prior to Encounter   Medication Sig Dispense Refill    blood-glucose meter,continuous (DEXCOM G7 ) Misc 1 Device by Misc.(Non-Drug; Combo Route) route once daily. 1 each 0    blood-glucose sensor (DEXCOM G7 SENSOR) Justyna 1 Device by Misc.(Non-Drug; Combo Route) route every 10 days. 3 each 11    empagliflozin (JARDIANCE) 25 mg tablet Take 1 tablet (25 mg total) by mouth once daily. 90 tablet 3     "furosemide (LASIX) 20 MG tablet Take 1 tablet (20 mg total) by mouth once daily. 90 tablet 3    glucagon (GVOKE HYPOPEN 2-PACK) 1 mg/0.2 mL AtIn Inject 1 mg into the skin as needed (SEVERE HYPOGLYCEMIA). 2 each 5    insulin aspart U-100 (NOVOLOG U-100 INSULIN ASPART) 100 unit/mL injection Inject 14 Units into the skin 3 (three) times daily before meals. 12.6 mL 3    metoclopramide HCl (REGLAN) 10 MG tablet Take 0.5 tablets (5 mg total) by mouth 3 (three) times daily before meals. 270 tablet 3    metoprolol succinate (TOPROL-XL) 25 MG 24 hr tablet Take 0.5 tablets (12.5 mg total) by mouth once daily. 15 tablet 11    pantoprazole (PROTONIX) 40 MG tablet Take 1 tablet (40 mg total) by mouth once daily. 90 tablet 0    pen needle, diabetic (BD ULTRA-FINE DIYA PEN NEEDLE) 32 gauge x 5/32" Ndle Use with Tresiba daily and Humalog 3-4 times daily as directed. 200 each 5    prochlorperazine (COMPAZINE) 10 MG tablet Take 1 tablet (10 mg total) by mouth 3 (three) times daily as needed (nausea or vomiting). 15 tablet 0    promethazine (PHENERGAN) 25 MG suppository Place 1 suppository (25 mg total) rectally every 6 (six) hours as needed for Nausea. 10 suppository 0    rivaroxaban (XARELTO) 10 mg Tab Take 1 tablet (10 mg total) by mouth daily with dinner or evening meal. 90 tablet 3    scopolamine (TRANSDERM-SCOP) 1.3-1.5 mg (1 mg over 3 days) Place 1 patch onto the skin every 72 hours as needed (intractable nausea/vomiting).      sodium chloride 0.9% SolP 50 mL with DAPTOmycin 500 mg SolR 947 mg Inject 947 mg into the vein once daily.  0    TRESIBA FLEXTOUCH U-200 200 unit/mL (3 mL) insulin pen Inject 56 Units into the skin once daily. 3 pen 11    [DISCONTINUED] DEXCOM G6 TRANSMITTER Justyna CHANGE TRANSMITTER EVERY 90 DAYS. 1 each 3       Past Surgical History:   Procedure Laterality Date    APPENDECTOMY      ARTHROTOMY OF KNEE Left 12/29/2023    Procedure: ARTHROTOMY, KNEE;  Surgeon: Edy Bocanegra Jr., MD;  Location: [facility] OR;  " Service: Orthopedics;  Laterality: Left;    ESOPHAGOGASTRODUODENOSCOPY N/A 1/31/2022    Procedure: EGD (ESOPHAGOGASTRODUODENOSCOPY);  Surgeon: Cindy Quiros MD;  Location: [facility] ENDO;  Service: Endoscopy;  Laterality: N/A;    ESOPHAGOGASTRODUODENOSCOPY N/A 3/21/2022    Procedure: EGD (ESOPHAGOGASTRODUODENOSCOPY);  Surgeon: Tejas Mann MD;  Location: [facility] ENDO;  Service: Endoscopy;  Laterality: N/A;    INCISION AND DRAINAGE FOOT Left 11/28/2021    Procedure: INCISION AND DRAINAGE, FOOT;  Surgeon: Bj Alicea DPM;  Location: [facility] OR;  Service: Podiatry;  Laterality: Left;    stents in bilateral legs      TRANSESOPHAGEAL ECHOCARDIOGRAPHY N/A 1/3/2024    Procedure: ECHOCARDIOGRAM, TRANSESOPHAGEAL;  Surgeon: Douglas Doss MD;  Location: [facility] CATH LAB/EP;  Service: Cardiology;  Laterality: N/A;       Social History:  Tobacco Use: Medium Risk (1/14/2024)    Patient History     Smoking Tobacco Use: Former     Smokeless Tobacco Use: Former     Passive Exposure: Not on file      Alcohol Use: Not At Risk (12/28/2023)    AUDIT-C     Frequency of Alcohol Consumption: Monthly or less     Average Number of Drinks: 1 or 2     Frequency of Binge Drinking: Never        OBJECTIVE:     Vital Signs Range (Last 24H):  Temp:  [36.6 °C (97.8 °F)-36.8 °C (98.3 °F)]   Pulse:  [88-95]   Resp:  [16-18]   BP: (112-143)/(63-84)   SpO2:  [94 %-97 %]       Significant Labs:  Lab Results   Component Value Date    WBC 13.92 (H) 01/15/2024    HGB 8.8 (L) 01/15/2024    HCT 28.2 (L) 01/15/2024     01/15/2024    CHOL 178 09/27/2023    TRIG 127 09/27/2023    HDL 32 (L) 09/27/2023     (H) 01/15/2024    AST 90 (H) 01/15/2024     (L) 01/15/2024    K 4.3 01/15/2024    CL 96 01/15/2024    CREATININE 0.8 01/15/2024    BUN 8 01/15/2024    CO2 25 01/15/2024    TSH 0.262 (L) 01/15/2024    INR 1.3 (H) 01/15/2024    HGBA1C 7.4 (H) 12/28/2023       Diagnostic Studies: No relevant studies.    EKG:   Results for orders placed  or performed during the hospital encounter of 01/14/24   EKG 12-lead    Collection Time: 01/14/24  7:10 PM    Narrative    Test Reason : R50.9,    Vent. Rate : 098 BPM     Atrial Rate : 098 BPM     P-R Int : 160 ms          QRS Dur : 084 ms      QT Int : 342 ms       P-R-T Axes : 054 020 062 degrees     QTc Int : 436 ms    Normal sinus rhythm  Abnormal anterior R wave progression  Abnormal ECG  When compared with ECG of 08-JAN-2024 11:40,  No significant change was found  Confirmed by Bernard CLARK MD ([id number]) on 1/14/2024 9:24:55 PM    Referred By: [email or web address]   SELF           Confirmed By:Bernard CLARK MD       2D ECHO:  TTE:  Results for orders placed or performed during the hospital encounter of 12/27/23   Echo   Result Value Ref Range    RA Width 3.87 cm    LA volume (mod) 53.47 cm3    Left Atrium Major Axis 5.05 cm    Left Atrium Minor Axis 5.12 cm    RA Major Axis 4.87 cm    LV Diastolic Volume 123.17 mL    LV Systolic Volume 50.52 mL    MV Peak A Leo 0.72 m/s    MV stenosis pressure 1/2 time 30.02 ms    MV VTI 11.6 cm    MV Peak E Leo 0.49 m/s    MV peak gradient 3 mmHg    Ao VTI 15.00 cm    Ao peak leo 1.10 m/s    LVOT peak VTI 11.60 cm    LVOT peak leo 0.81 m/s    LVOT diameter 2.59 cm    E wave deceleration time 104.56 msec    MV mean gradient 1 mmHg    AV mean gradient 3 mmHg    RV S' 26.76 cm/s    TAPSE 2.54 cm    RVDD 3.33 cm    LA size 3.33 cm    Sinus 3.87 cm    LVIDs 3.49 2.1 - 4.0 cm    Posterior Wall 1.29 (A) 0.6 - 1.1 cm    IVS 1.29 (A) 0.6 - 1.1 cm    LVIDd 5.09 3.5 - 6.0 cm    TDI LATERAL 0.10 m/s    Left Ventricular Outflow Tract Mean Gradient 1.62 mmHg    Left Ventricular Outflow Tract Mean Velocity 0.61 cm/s    Urbano's Biplane MOD Ejection Fraction 48 %    IVC diameter 2.15 cm    TDI SEPTAL 0.10 m/s    LV LATERAL E/E' RATIO 4.90 m/s    LV SEPTAL E/E' RATIO 4.90 m/s    FS 31 28 - 44 %    LV mass 266.30 g    ZLVIDD -7.21     ZLVIDS -4.64     Left Ventricle Relative Wall Thickness 0.51 cm    AV valve  area 4.07 cm²    AV Velocity Ratio 0.74     AV index (prosthetic) 0.77     MV valve area p 1/2 method 7.33 cm2    MV valve area by continuity eq 5.27 cm2    E/A ratio 0.68     Mean e' 0.10 m/s    LVOT area 5.3 cm2    LVOT stroke volume 61.08 cm3    AV peak gradient 5 mmHg    E/E' ratio 4.90 m/s    LV Systolic Volume Index 21.2 mL/m2    LV Diastolic Volume Index 51.75 mL/m2    LV Mass Index 112 g/m2    LA Volume Index (Mod) 22.5 mL/m2    RICCI by Velocity Ratio 3.88 cm²    BSA 2.43 m2    LA Volume Index 21.2 mL/m2    LA volume 50.37 cm3    LA WIDTH 3.5 cm    Est. RA pres 15 mmHg    Narrative      Left Ventricle: The left ventricle is normal in size. There is   concentric remodeling. Regional wall motion abnormalities present. See   diagram for wall motion findings. There is reduced systolic function.   Biplane (2D) method of discs ejection fraction is 48%. There is normal   diastolic function.    Right Ventricle: Normal right ventricular cavity size. Systolic   function is normal. TAPSE is 2.54 cm.    Normal left atrial size. The left atrium volume index is 21.2 mL/m2.    Normal right atrial size.    The aortic valve is structurally normal. There is normal leaflet   mobility.    The mitral valve is structurally normal. There is normal leaflet   mobility.    The tricuspid valve is structurally normal. There is normal leaflet   mobility.    IVC/SVC: Elevated venous pressure at 15 mmHg.    Overall the study quality was technically difficult.         ROBY:  Results for orders placed or performed during the hospital encounter of 12/27/23   Transesophageal echo (ROBY)   Result Value Ref Range    BSA 2.43 m2    Narrative      There is no obvious mass/vegation present.    Left Ventricle: Regional wall motion abnormalities present. There is   reduced systolic function.    Right Ventricle: Normal right ventricular cavity size. Systolic   function is normal.         ASSESSMENT/PLAN:           Pre-op Assessment       I have reviewed  the Medications.     Review of Systems  Anesthesia Hx:             Denies Family Hx of Anesthesia complications.     Cardiovascular:     Hypertension                                        Hepatic/GI:     GERD             Endocrine:  Diabetes, type 2               Physical Exam  General: Well nourished    Airway:  Mallampati: II   TM Distance: Normal  Neck ROM: Normal ROM    Dental:  Intact        Anesthesia Plan  Type of Anesthesia, risks & benefits discussed:    Anesthesia Type: Gen ETT  Intra-op Monitoring Plan: Standard ASA Monitors  Post Op Pain Control Plan: multimodal analgesia  Informed Consent: Informed consent signed with the Patient and all parties understand the risks and agree with anesthesia plan.  All questions answered.   ASA Score: 3    Ready For Surgery From Anesthesia Perspective.     .

## 2024-01-16 NOTE — NURSING
Chlorhexadine bath given to patient due to surgery for tomorrow. Bed linen changed. Hospital gown and socks changed.

## 2024-01-16 NOTE — ANESTHESIA PROCEDURE NOTES
Intubation    Date/Time: 1/16/2024 10:57 AM    Performed by: Nichole Branch CRNA  Authorized by: Mary Moran MD    Intubation:     Induction:  Intravenous    Intubated:  Postinduction    Mask Ventilation:  Easy mask    Attempts:  1    Attempted By:  CRNA    Method of Intubation:  Video laryngoscopy    Blade:  Vann 3    Laryngeal View Grade: Grade I - full view of cords      Difficult Airway Encountered?: No      Complications:  None    Airway Device:  Oral endotracheal tube    Airway Device Size:  7.5    Style/Cuff Inflation:  Cuffed    Inflation Amount (mL):  7    Tube secured:  22    Secured at:  The lips    Placement Verified By:  Capnometry    Complicating Factors:  None    Findings Post-Intubation:  BS equal bilateral

## 2024-01-16 NOTE — SUBJECTIVE & OBJECTIVE
Principal Problem:Fever    Principal Orthopedic Problem: Septic Arthritis of Left Knee    Interval History: NAEON. VSS. AF. Patient states that pain is well controlled. Voiding spontaneously. Recent aspiration cultures NGTD, growing MRSA from blood cultures collected on 01/05. Hgb stable at 9.1.     Plan for irrigation and debridement of left knee today, remain NPO.       Review of patient's allergies indicates:   Allergen Reactions    Heparin analogues Nausea And Vomiting       Current Facility-Administered Medications   Medication    acetaminophen tablet 1,000 mg    ammonium lactate 12 % lotion    DAPTOmycin (CUBICIN) 945 mg in sodium chloride 0.9% SolP 50 mL IVPB    dextrose 10% bolus 125 mL 125 mL    dextrose 10% bolus 250 mL 250 mL    glucagon (human recombinant) injection 1 mg    glucose chewable tablet 16 g    glucose chewable tablet 24 g    insulin aspart U-100 pen 0-5 Units    insulin aspart U-100 pen 6 Units    insulin detemir U-100 (Levemir) pen 15 Units    melatonin tablet 6 mg    methocarbamoL tablet 500 mg    metoclopramide HCl tablet 5 mg    metoprolol succinate (TOPROL-XL) 24 hr split tablet 12.5 mg    morphine injection 4 mg    naloxone 0.4 mg/mL injection 0.02 mg    ondansetron injection 4 mg    oxyCODONE immediate release tablet 5 mg    oxyCODONE immediate release tablet 5 mg    oxyCODONE immediate release tablet Tab 10 mg    pantoprazole EC tablet 40 mg    piperacillin-tazobactam (ZOSYN) 4.5 g in dextrose 5 % in water (D5W) 100 mL IVPB (MB+)    polyethylene glycol packet 17 g    prochlorperazine injection Soln 5 mg    rivaroxaban tablet 10 mg    scopolamine 1.3-1.5 mg (1 mg over 3 days) 1 patch    senna-docusate 8.6-50 mg per tablet 1 tablet    sodium chloride 0.9% flush 10 mL    sodium chloride 0.9% flush 10 mL     Objective:     Vital Signs (Most Recent):  Temp: 98.1 °F (36.7 °C) (01/16/24 0432)  Pulse: 91 (01/16/24 0432)  Resp: 16 (01/16/24 0432)  BP: (!) 140/84 (01/16/24 0432)  SpO2: 97 %  "(01/16/24 0432) Vital Signs (24h Range):  Temp:  [97.8 °F (36.6 °C)-98.3 °F (36.8 °C)] 98.1 °F (36.7 °C)  Pulse:  [88-95] 91  Resp:  [16-18] 16  SpO2:  [94 %-97 %] 97 %  BP: (112-143)/(63-84) 140/84     Weight: 107.8 kg (237 lb 10.5 oz)  Height: 6' 1" (185.4 cm)  Body mass index is 31.35 kg/m².      Intake/Output Summary (Last 24 hours) at 1/16/2024 0530  Last data filed at 1/16/2024 0030  Gross per 24 hour   Intake 360 ml   Output 1000 ml   Net -640 ml        Ortho/SPM Exam     Left Lower Extremity  Inspection  - Left knee significantly swollen, surgical incision site in place without dehiscence or drainage  Palpation  - difficultly tender throughout the knee   Range of motion  - AROM and PROM of the hip, ankle, and foot intact  - active and passive range of motion of the left knee limited due to pain  Stability  - No evidence of joint dislocation or abnormal laxity, Neurovascular  - TA/EHL/Gastroc/FHL assessed in isolation without deficit  - SILT throughout  - Compartments soft  - DP palpated   - Capillary Refill <3s  - Negative Log roll  - Negative Stinchfield  - Muscle tone normal    Significant Labs: CBC:   Recent Labs   Lab 01/14/24  2039 01/15/24  0513 01/16/24  0330   WBC 15.86* 13.92* 12.12   HGB 9.4* 8.8* 9.1*   HCT 30.1* 28.2* 29.5*    343 [id number]     All pertinent labs within the past 24 hours have been reviewed.    Significant Imaging: I have reviewed and interpreted all pertinent imaging results/findings.  "

## 2024-01-16 NOTE — NURSING TRANSFER
Nursing Transfer Note      1/16/2024   4:23 PM    Nurse giving handoff:OMAR Truong RN PACU  Nurse receiving handoff: Maxx MOREL OBS    Reason patient is being transferred: post surgery    Transfer To: 1151    Transfer via bed    Transfer with cardiac monitoring, IV pump/fuid    Transported by transport personnel x2    Transfer Vital Signs:  Please see flow sheet    Telemetry: Box Number [id number]  Order for Tele Monitor? Yes    Additional Lines: none    4eyes on Skin: yes    Medicines sent: bacitracin     Any special needs or follow-up needed: routine    Patient belongings transferred with patient: Yes (socks)    Chart send with patient: Yes    Notified: spouse    Patient reassessed at: 1/16/2024 at 1500   1  Upon arrival to floor: cardiac monitor applied, patient oriented to room, and bed in lowest position

## 2024-01-16 NOTE — HPI
"Kulwant Mueller Jr. is a 38 y.o. 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 Orthopedics consulted to evaluate for potential recurrent septic arthritis of left knee. Patient endorses pain of left shoulder, right hip, and left knee. Patient denies any head trauma or LOC. The patient denies prior hx of falls. Patient denies numbness and tingling. Denies any other musculoskeletal pain or injuries. Walks w/out assisted devices at baseline. Takes Xarelto at baseline. Dr. Bocanegra attempted aspiration of left shoulder on 01/02 without obtaining any fluid at that time.     They deny IV drug use.   They endorse history of prior tobacco use.   They deny alcohol use.   They deny immunosuppressant medications.  They deny chemotherapy.  They deny radiation therapy.     Per  HPI,     "Patient was admitted to [facility] 12/27/23-01/10/24 due to left knee septic arthritis with persistent MRSA bacteremia, s/p Left knee orthopedic surgery, surgical cultures positive for MRSA. Right heel also of concern for source.   He was followed by ID,  he had been on Daptomycin + Ceftaroline, s/p ROBY w/o endocarditis, left shoulder arthrocentesis w/o infection, surveillance blood culture negative from 01/07/24.    Patient with fevers on 1/13 to 103degreese @ 1800, 2000, ER transfer recommended but pt refused, physician gave order for blood culture, pt reported feeling fine.   Today he developed temperature of 100.5 @ 15:56 and was [place] [facility] [place]."  "

## 2024-01-16 NOTE — CONSULTS
"[address] - Observ[facility]  Orthopedics  Consult Note    Patient Name: Kulwant Mueller Jr.  MRN: [id number]  Admission Date: 1/14/2024  Hospital Length of Stay: 0 days  Attending Provider: Rian Garcia MD  Primary Care Provider: Shellie, Primary Doctor    Inpatient consult to Orthopedics  Consult performed by: Mumtaz Smith MD  Consult ordered by: Fany Emerson PA-C        Subjective:     Principal Problem:Fever    Chief Complaint:   Chief Complaint   Patient presents with    Fever     Pt arrives via EMS from [facility] facility c/o increased fever; hx left lower limb surgery in december receiving IV antibiotics dt possible MRSA infection in blood        HPI: Kulwant Mueller Jr. is a 38 y.o. 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 Orthopedics consulted to evaluate for potential recurrent septic arthritis of left knee. Patient endorses pain of left shoulder, right hip, and left knee. Patient denies any head trauma or LOC. The patient denies prior hx of falls. Patient denies numbness and tingling. Denies any other musculoskeletal pain or injuries. Walks w/out assisted devices at baseline. Takes Xarelto at baseline. Dr. Bocanegra attempted aspiration of left shoulder on 01/02 without obtaining any fluid at that time.     They deny IV drug use.   They endorse history of prior tobacco use.   They deny alcohol use.   They deny immunosuppressant medications.  They deny chemotherapy.  They deny radiation therapy.     Per  HPI,     "Patient was admitted to [facility] 12/27/23-01/10/24 due to left knee septic arthritis with persistent MRSA bacteremia, s/p Left knee orthopedic surgery, surgical cultures positive for MRSA. Right heel also of concern for source.   He was followed by ID,  he had been on Daptomycin " "+ Ceftaroline, s/p ROBY w/o endocarditis, left shoulder arthrocentesis w/o infection, surveillance blood culture negative from 01/07/24.    Patient with fevers on 1/13 to 103degreese @ 1800, 2000, ER transfer recommended but pt refused, physician gave order for blood culture, pt reported feeling fine.   Today he developed temperature of 100.5 @ 15:56 and was transferred to [facility] for further evaluation."    Past Medical History:   Diagnosis Date    Anticoagulant long-term use     COVID-19 virus infection     Diabetes mellitus     Diabetes mellitus, type 2     Hypertension     May-Thurner syndrome        Past Surgical History:   Procedure Laterality Date    APPENDECTOMY      ARTHROTOMY OF KNEE Left 12/29/2023    Procedure: ARTHROTOMY, KNEE;  Surgeon: Edy Bocanegra Jr., MD;  Location: [facility] OR;  Service: Orthopedics;  Laterality: Left;    ESOPHAGOGASTRODUODENOSCOPY N/A 1/31/2022    Procedure: EGD (ESOPHAGOGASTRODUODENOSCOPY);  Surgeon: Cindy Quiros MD;  Location: [facility];  Service: Endoscopy;  Laterality: N/A;    ESOPHAGOGASTRODUODENOSCOPY N/A 3/21/2022    Procedure: EGD (ESOPHAGOGASTRODUODENOSCOPY);  Surgeon: Tejas Mann MD;  Location: [facility];  Service: Endoscopy;  Laterality: N/A;    INCISION AND DRAINAGE FOOT Left 11/28/2021    Procedure: INCISION AND DRAINAGE, FOOT;  Surgeon: Bj Alicea DPM;  Location: [facility] OR;  Service: Podiatry;  Laterality: Left;    stents in bilateral legs      TRANSESOPHAGEAL ECHOCARDIOGRAPHY N/A 1/3/2024    Procedure: ECHOCARDIOGRAM, TRANSESOPHAGEAL;  Surgeon: Douglas Doss MD;  Location: [facility] CATH LAB/EP;  Service: Cardiology;  Laterality: N/A;       Review of patient's allergies indicates:   Allergen Reactions    Heparin analogues Nausea And Vomiting       Current Facility-Administered Medications   Medication    acetaminophen tablet 1,000 mg    ammonium lactate 12 % lotion    DAPTOmycin (CUBICIN) 945 mg in sodium chloride 0.9% SolP 50 mL IVPB    dextrose 10% " bolus 125 mL 125 mL    dextrose 10% bolus 250 mL 250 mL    glucagon (human recombinant) injection 1 mg    glucose chewable tablet 16 g    glucose chewable tablet 24 g    insulin aspart U-100 pen 0-5 Units    insulin aspart U-100 pen 6 Units    insulin detemir U-100 (Levemir) pen 15 Units    melatonin tablet 6 mg    methocarbamoL tablet 500 mg    metoclopramide HCl tablet 5 mg    metoprolol succinate (TOPROL-XL) 24 hr split tablet 12.5 mg    morphine injection 4 mg    naloxone 0.4 mg/mL injection 0.02 mg    ondansetron injection 4 mg    oxyCODONE immediate release tablet 5 mg    oxyCODONE immediate release tablet 5 mg    oxyCODONE immediate release tablet Tab 10 mg    pantoprazole EC tablet 40 mg    piperacillin-tazobactam (ZOSYN) 4.5 g in dextrose 5 % in water (D5W) 100 mL IVPB (MB+)    polyethylene glycol packet 17 g    prochlorperazine injection Soln 5 mg    rivaroxaban tablet 10 mg    scopolamine 1.3-1.5 mg (1 mg over 3 days) 1 patch    senna-docusate 8.6-50 mg per tablet 1 tablet    sodium chloride 0.9% flush 10 mL    sodium chloride 0.9% flush 10 mL     Family History       Problem Relation (Age of Onset)    Cancer Mother, Paternal Uncle, Paternal Grandfather, Maternal Grandfather    Irritable bowel syndrome Paternal Grandfather          Tobacco Use    Smoking status: Former     Types: Cigarettes    Smokeless tobacco: Former    Tobacco comments:     occasionally    Substance and Sexual Activity    Alcohol use: Yes     Comment: occ    Drug use: No    Sexual activity: Not on file     ROS    Constitutional: negative for fevers  Eyes: negative visual changes  ENT: negative for hearing loss  Respiratory: negative for dyspnea  Cardiovascular: negative for chest pain  Gastrointestinal: negative for abdominal pain  Genitourinary: negative for dysuria  Neurological: negative for headaches  Behavioral/Psych: negative for hallucinations  Endocrine: negative for temperature intolerance      Objective:     Vital Signs (Most  "Recent):  Temp: 98.3 °F (36.8 °C) (01/15/24 1924)  Pulse: 89 (01/15/24 1924)  Resp: 18 (01/15/24 1613)  BP: 112/63 (01/15/24 1924)  SpO2: 97 % (01/15/24 1924) Vital Signs (24h Range):  Temp:  [97.8 °F (36.6 °C)-98.3 °F (36.8 °C)] 98.3 °F (36.8 °C)  Pulse:  [67-96] 89  Resp:  [16-18] 18  SpO2:  [94 %-97 %] 97 %  BP: (112-138)/(63-76) 112/63     Weight: 107.8 kg (237 lb 10.5 oz)  Height: 6' 1" (185.4 cm)  Body mass index is 31.35 kg/m².    No intake or output data in the 24 hours ending 01/15/24 2004     Ortho/SPM Exam     Gen:  No acute distress, well-developed, well nourished.  CV:  1+ DP pulses bilaterally  Respiratory:  Normal respiratory effort. No accessory muscle use.   Head/Neck:  Normocephalic.  Atraumatic. Sclera anicteric. TM. Neck supple.  Neuro: CN 2-12 grossly intact. No FND. Awake. Alert. Oriented to person, place, time, and situation.   Abdomen: Soft, NTND.      MSK:  Left Upper Extremity  Inspection  - Skin intact throughout, no open wounds  - No swelling  - No ecchymosis, erythema, or signs of cellulitis  Palpation  - Nontender to palpation of the left shoulder and throughout LUE  Range of motion  - AROM and PROM of the elbow, wrist, and hand intact  - AROM and PROM of the left shoulder limited by pain, pain with passive abduction greater than 90 degrees  Stability  - No evidence of joint dislocation or abnormal laxity Neurovascular  - AIN/PIN/Radial/Median/Ulnar Nerves assessed in isolation without deficit  - Able to give thumbs up, make "OK" sign, cross IF/LF, abduct/adduct fingers, make fist  - SILT throughout  - Compartments soft  - Radial artery palpated  - Capillary Refill <3s  - Muscle tone normal    Right Upper Extremity  Inspection  - Skin intact throughout, no open wounds  - No swelling  - No ecchymosis, erythema, or signs of cellulitis  Palpation  - NonTTP throughout, no palpable abnormality   Range of motion  - AROM and PROM of the shoulder, elbow, wrist, and hand intact  Stability  - No " "evidence of joint dislocation or abnormal laxity Neurovascular  - AIN/PIN/Radial/Median/Ulnar Nerves assessed in isolation without deficit  - Able to give thumbs up, make "OK" sign, cross IF/LF, abduct/adduct fingers, make fist  - SILT throughout  - Compartments soft  - Radial artery palpated  - Capillary Refill <3s  - Muscle tone normal      Left Lower Extremity  Inspection  - Left knee significantly swollen, surgical incision site in place without dehiscence or drainage  Palpation  - difficultly tender throughout the knee   Range of motion  - AROM and PROM of the hip, ankle, and foot intact  - active and passive range of motion of the left knee limited due to pain  Stability  - No evidence of joint dislocation or abnormal laxity, Neurovascular  - TA/EHL/Gastroc/FHL assessed in isolation without deficit  - SILT throughout  - Compartments soft  - DP palpated   - Capillary Refill <3s  - Negative Log roll  - Negative Stinchfield  - Muscle tone normal    Right Lower Extremity  Inspection  - Skin intact throughout, no open wounds  - No swelling  - No ecchymosis, erythema, or signs of cellulitis  Palpation  - tenderness to palpation of the right hip anteriorly, nontender otherwise throughout the right lower extremity  Range of motion  - AROM and PROM of the hip, knee, ankle, and foot intact  Stability  - No evidence of joint dislocation or abnormal laxity  Neurovascular  - TA/EHL/Gastroc/FHL assessed in isolation without deficit  - SILT throughout  - Compartments soft  - DP palpated   - Capillary Refill <3s  - positive Log roll  - positive Stinchfield  - Muscle tone normal    Spine/pelvis/axial body:  No tenderness to palpation of cervical, thoracic, or lumbar spine  No pain with compression of pelvis  No chest wall or abdominal tenderness  No decubitus ulcers  Muscle tone normal      Significant Labs: CBC:   Recent Labs   Lab 01/14/24  2039 01/15/24  0513   WBC 15.86* 13.92*   HGB 9.4* 8.8*   HCT 30.1* 28.2*    " 343     CMP:   Recent Labs   Lab 01/14/24  2039 01/15/24  0513   * 131*   K 4.6 4.3   CL 93* 96   CO2 28 25   * 231*   BUN 10 8   CREATININE 1.1 0.8   CALCIUM 8.9 9.2   PROT 8.7* 7.8   ALBUMIN 1.5* 1.4*   BILITOT 0.6 0.7   ALKPHOS 257* 237*   * 90*   * 105*   ANIONGAP 9 10     CRP:   Recent Labs   Lab 01/15/24  0513   .9*     All pertinent labs within the past 24 hours have been reviewed.    Significant Imaging: CT: I have reviewed all pertinent results/findings and my personal findings are:  CT of the left shoulder shows potential findings of cellulitis of the soft tissue about the left shoulder without findings indicative of septic arthritis on 01/02.  X-Ray: I have reviewed all pertinent results/findings and my personal findings are:  X-ray of the left shoulder, pelvis, left knee show no acute fracture tumors at this time  MRI: I have reviewed all pertinent results/findings and my personal findings are:  MRI of the left shoulder and right hip ordered at this time; we will follow up.  I have reviewed and interpreted all pertinent imaging results/findings.  Assessment/Plan:     Staphylococcal arthritis of left knee  Kulwant Mueller Jr. is a 38 y.o. male admitted for workup of fevers of unknown source, recent irrigation and debridement of left knee performed at [facility] on 12/29.  Aspiration results indicative of recurrent septic arthritis of left knee at this time.  Patient is neurovascularly intact on the left side.  He is marked booked and consented for arthroscopic irrigation and debridement of left knee tomorrow.  NPO at midnight.  Left knee was aspirated, see procedure note below for more details.    MRI of left shoulder and right hip ordered, patient on contrast hold until 1/16 at noon tomorrow due to recent spine MRIs.  Neither of which showed signs of infection.      Procedure Note: Left knee aspiration  Patient was explained risks, benefits, and alternatives to  treatment and verbalized consent to proceed. Time out was performed and patient name, , site, and procedure were confirmed. Skin was sterilely prepared with alcohol solution. 18 gauge needle on a 60 cc syringe was used for aspiration through superolateral approach. 85 cc of murky, dark colored fluid collected. Fluid and cultures were obtained and sent for analysis. Aspiration site was covered with a bandaid.      Lab Results   Component Value Date    WBCBF 308789 01/15/2024    SEGS 89 01/15/2024    CRYST Positive (A) 01/15/2024    BODYFLUIDTYP Joint Fluid, Left Knee 01/15/2024     Lab Results   Component Value Date    LABGRAM No WBC's 01/15/2024    LABGRAM No organisms seen 01/15/2024     Lab Results   Component Value Date    SEDRATE 97 (H) 01/15/2024    .9 (H) 01/15/2024    WBC 13.92 (H) 01/15/2024    LACTATE 1.4 01/15/2024     Albumin   Date Value Ref Range Status   01/15/2024 1.4 (L) 3.5 - 5.2 g/dL Final     Hemoglobin   Date Value Ref Range Status   01/15/2024 8.8 (L) 14.0 - 18.0 g/dL Final     POC Hematocrit   Date Value Ref Range Status   2016 54 36 - 54 %PCV Final     Hematocrit   Date Value Ref Range Status   01/15/2024 28.2 (L) 40.0 - 54.0 % Final     WBC   Date Value Ref Range Status   01/15/2024 13.92 (H) 3.90 - 12.70 K/uL Final     INR   Date Value Ref Range Status   01/15/2024 1.3 (H) 0.8 - 1.2 Final     Comment:     Coumadin Therapy:  2.0 - 3.0 for INR for all indicators except mechanical heart valves  and antiphospholipid syndromes which should use 2.5 - 3.5.                 ODALIS MCDANIELS MD  Orthopedics  [facility] - Observation [facility]

## 2024-01-16 NOTE — PROGRESS NOTES
[facility] - [facility]  Orthopedics  Progress Note    Patient Name: Kulwant Mueller Jr.  MRN: [id number]  Admission Date: 1/14/2024  Hospital Length of Stay: 0 days  Attending Provider: Rian Garcia MD  Primary Care Provider: Shellie Primary Doctor  Follow-up For: Procedure(s) (LRB):  ARTHROSCOPY, KNEE, LEFT (Left)    Post-Operative Day:    Subjective:     Principal Problem:Fever    Principal Orthopedic Problem: Septic Arthritis of Left Knee    Interval History: NAEON. VSS. AF. Patient states that pain is well controlled. Voiding spontaneously. Recent aspiration cultures NGTD, growing MRSA from blood cultures collected on 01/05. Hgb stable at 9.1.     Plan for irrigation and debridement of left knee today, remain NPO.       Review of patient's allergies indicates:   Allergen Reactions    Heparin analogues Nausea And Vomiting       Current Facility-Administered Medications   Medication    acetaminophen tablet 1,000 mg    ammonium lactate 12 % lotion    DAPTOmycin (CUBICIN) 945 mg in sodium chloride 0.9% SolP 50 mL IVPB    dextrose 10% bolus 125 mL 125 mL    dextrose 10% bolus 250 mL 250 mL    glucagon (human recombinant) injection 1 mg    glucose chewable tablet 16 g    glucose chewable tablet 24 g    insulin aspart U-100 pen 0-5 Units    insulin aspart U-100 pen 6 Units    insulin detemir U-100 (Levemir) pen 15 Units    melatonin tablet 6 mg    methocarbamoL tablet 500 mg    metoclopramide HCl tablet 5 mg    metoprolol succinate (TOPROL-XL) 24 hr split tablet 12.5 mg    morphine injection 4 mg    naloxone 0.4 mg/mL injection 0.02 mg    ondansetron injection 4 mg    oxyCODONE immediate release tablet 5 mg    oxyCODONE immediate release tablet 5 mg    oxyCODONE immediate release tablet Tab 10 mg    pantoprazole EC tablet 40 mg    piperacillin-tazobactam (ZOSYN) 4.5 g in dextrose 5 % in water (D5W) 100 mL IVPB (MB+)    polyethylene glycol packet 17 g    prochlorperazine injection Soln 5 mg    rivaroxaban tablet  "10 mg    scopolamine 1.3-1.5 mg (1 mg over 3 days) 1 patch    senna-docusate 8.6-50 mg per tablet 1 tablet    sodium chloride 0.9% flush 10 mL    sodium chloride 0.9% flush 10 mL     Objective:     Vital Signs (Most Recent):  Temp: 98.1 °F (36.7 °C) (01/16/24 0432)  Pulse: 91 (01/16/24 0432)  Resp: 16 (01/16/24 0432)  BP: (!) 140/84 (01/16/24 0432)  SpO2: 97 % (01/16/24 0432) Vital Signs (24h Range):  Temp:  [97.8 °F (36.6 °C)-98.3 °F (36.8 °C)] 98.1 °F (36.7 °C)  Pulse:  [88-95] 91  Resp:  [16-18] 16  SpO2:  [94 %-97 %] 97 %  BP: (112-143)/(63-84) 140/84     Weight: 107.8 kg (237 lb 10.5 oz)  Height: 6' 1" (185.4 cm)  Body mass index is 31.35 kg/m².      Intake/Output Summary (Last 24 hours) at 1/16/2024 0530  Last data filed at 1/16/2024 0030  Gross per 24 hour   Intake 360 ml   Output 1000 ml   Net -640 ml        Ortho/SPM Exam     Left Lower Extremity  Inspection  - Left knee significantly swollen, surgical incision site in place without dehiscence or drainage  Palpation  - difficultly tender throughout the knee   Range of motion  - AROM and PROM of the hip, ankle, and foot intact  - active and passive range of motion of the left knee limited due to pain  Stability  - No evidence of joint dislocation or abnormal laxity, Neurovascular  - TA/EHL/Gastroc/FHL assessed in isolation without deficit  - SILT throughout  - Compartments soft  - DP palpated   - Capillary Refill <3s  - Negative Log roll  - Negative Stinchfield  - Muscle tone normal    Significant Labs: CBC:   Recent Labs   Lab 01/14/24  2039 01/15/24  0513 01/16/24  0330   WBC 15.86* 13.92* 12.12   HGB 9.4* 8.8* 9.1*   HCT 30.1* 28.2* 29.5*    343 382     All pertinent labs within the past 24 hours have been reviewed.    Significant Imaging: I have reviewed and interpreted all pertinent imaging results/findings.  Assessment/Plan:     Staphylococcal arthritis of left knee  Kulwant Mueller Jr. is a 38 y.o. male admitted for workup of fevers of " unknown source, recent irrigation and debridement of left knee performed at [facility] on 12/29.  Aspiration results indicative of recurrent septic arthritis of left knee at this time.  Patient is neurovascularly intact on the left side.  He is marked booked and consented for arthroscopic irrigation and debridement of left knee today. Remain NPO.          ODALIS MCDANIELS MD  Orthopedics  [facility]

## 2024-01-17 LAB
ALBUMIN SERPL BCP-MCNC: 1.3 G/DL (ref 3.5–5.2)
ALP SERPL-CCNC: 202 U/L (ref 55–135)
ALT SERPL W/O P-5'-P-CCNC: 52 U/L (ref 10–44)
ANION GAP SERPL CALC-SCNC: 9 MMOL/L (ref 8–16)
AST SERPL-CCNC: 34 U/L (ref 10–40)
BASOPHILS # BLD AUTO: 0.03 K/UL (ref 0–0.2)
BASOPHILS NFR BLD: 0.2 % (ref 0–1.9)
BILIRUB SERPL-MCNC: 0.4 MG/DL (ref 0.1–1)
BUN SERPL-MCNC: 9 MG/DL (ref 6–20)
CALCIUM SERPL-MCNC: 8.6 MG/DL (ref 8.7–10.5)
CHLORIDE SERPL-SCNC: 100 MMOL/L (ref 95–110)
CO2 SERPL-SCNC: 25 MMOL/L (ref 23–29)
CREAT SERPL-MCNC: 0.8 MG/DL (ref 0.5–1.4)
DIFFERENTIAL METHOD BLD: ABNORMAL
EOSINOPHIL # BLD AUTO: 0 K/UL (ref 0–0.5)
EOSINOPHIL NFR BLD: 0.1 % (ref 0–8)
ERYTHROCYTE [DISTWIDTH] IN BLOOD BY AUTOMATED COUNT: 14.1 % (ref 11.5–14.5)
EST. GFR  (NO RACE VARIABLE): >60 ML/MIN/1.73 M^2
GLUCOSE SERPL-MCNC: 275 MG/DL (ref 70–110)
HCT VFR BLD AUTO: 28.2 % (ref 40–54)
HGB BLD-MCNC: 8.5 G/DL (ref 14–18)
IMM GRANULOCYTES # BLD AUTO: 0.07 K/UL (ref 0–0.04)
IMM GRANULOCYTES NFR BLD AUTO: 0.5 % (ref 0–0.5)
LYMPHOCYTES # BLD AUTO: 1.2 K/UL (ref 1–4.8)
LYMPHOCYTES NFR BLD: 8.2 % (ref 18–48)
MAGNESIUM SERPL-MCNC: 1.8 MG/DL (ref 1.6–2.6)
MCH RBC QN AUTO: 26.7 PG (ref 27–31)
MCHC RBC AUTO-ENTMCNC: 30.1 G/DL (ref 32–36)
MCV RBC AUTO: 89 FL (ref 82–98)
MONOCYTES # BLD AUTO: 0.4 K/UL (ref 0.3–1)
MONOCYTES NFR BLD: 3.1 % (ref 4–15)
NEUTROPHILS # BLD AUTO: 12.5 K/UL (ref 1.8–7.7)
NEUTROPHILS NFR BLD: 87.9 % (ref 38–73)
NRBC BLD-RTO: 0 /100 WBC
PHOSPHATE SERPL-MCNC: 3.1 MG/DL (ref 2.7–4.5)
PLATELET # BLD AUTO: 414 K/UL (ref 150–450)
PMV BLD AUTO: 9.7 FL (ref 9.2–12.9)
POCT GLUCOSE: 100 MG/DL (ref 70–110)
POCT GLUCOSE: 115 MG/DL (ref 70–110)
POCT GLUCOSE: 143 MG/DL (ref 70–110)
POCT GLUCOSE: 242 MG/DL (ref 70–110)
POTASSIUM SERPL-SCNC: 4.3 MMOL/L (ref 3.5–5.1)
PROT SERPL-MCNC: 7.4 G/DL (ref 6–8.4)
RBC # BLD AUTO: 3.18 M/UL (ref 4.6–6.2)
SODIUM SERPL-SCNC: 134 MMOL/L (ref 136–145)
WBC # BLD AUTO: 14.17 K/UL (ref 3.9–12.7)

## 2024-01-17 PROCEDURE — A9585 GADOBUTROL INJECTION: HCPCS | Performed by: HOSPITALIST

## 2024-01-17 PROCEDURE — 25000003 PHARM REV CODE 250: Performed by: INTERNAL MEDICINE

## 2024-01-17 PROCEDURE — 36415 COLL VENOUS BLD VENIPUNCTURE: CPT | Performed by: HOSPITALIST

## 2024-01-17 PROCEDURE — 99223 1ST HOSP IP/OBS HIGH 75: CPT | Mod: ,,, | Performed by: INTERNAL MEDICINE

## 2024-01-17 PROCEDURE — 25000003 PHARM REV CODE 250: Performed by: HOSPITALIST

## 2024-01-17 PROCEDURE — 21400001 HC TELEMETRY ROOM

## 2024-01-17 PROCEDURE — 85025 COMPLETE CBC W/AUTO DIFF WBC: CPT | Performed by: HOSPITALIST

## 2024-01-17 PROCEDURE — 63600175 PHARM REV CODE 636 W HCPCS: Performed by: HOSPITALIST

## 2024-01-17 PROCEDURE — 80053 COMPREHEN METABOLIC PANEL: CPT | Performed by: HOSPITALIST

## 2024-01-17 PROCEDURE — 25000003 PHARM REV CODE 250

## 2024-01-17 PROCEDURE — 63600175 PHARM REV CODE 636 W HCPCS: Mod: JZ,JG | Performed by: INTERNAL MEDICINE

## 2024-01-17 PROCEDURE — 84100 ASSAY OF PHOSPHORUS: CPT | Performed by: HOSPITALIST

## 2024-01-17 PROCEDURE — 83735 ASSAY OF MAGNESIUM: CPT | Performed by: HOSPITALIST

## 2024-01-17 PROCEDURE — 25500020 PHARM REV CODE 255: Performed by: HOSPITALIST

## 2024-01-17 PROCEDURE — 27000207 HC ISOLATION

## 2024-01-17 RX ORDER — INSULIN ASPART 100 [IU]/ML
8 INJECTION, SOLUTION INTRAVENOUS; SUBCUTANEOUS
Status: DISCONTINUED | OUTPATIENT
Start: 2024-01-17 | End: 2024-01-23

## 2024-01-17 RX ORDER — GADOBUTROL 604.72 MG/ML
10 INJECTION INTRAVENOUS
Status: COMPLETED | OUTPATIENT
Start: 2024-01-17 | End: 2024-01-17

## 2024-01-17 RX ADMIN — METOCLOPRAMIDE 5 MG: 5 TABLET ORAL at 06:01

## 2024-01-17 RX ADMIN — METOCLOPRAMIDE 5 MG: 5 TABLET ORAL at 01:01

## 2024-01-17 RX ADMIN — RIVAROXABAN 10 MG: 10 TABLET, FILM COATED ORAL at 05:01

## 2024-01-17 RX ADMIN — INSULIN ASPART 8 UNITS: 100 INJECTION, SOLUTION INTRAVENOUS; SUBCUTANEOUS at 05:01

## 2024-01-17 RX ADMIN — METHOCARBAMOL 500 MG: 500 TABLET ORAL at 01:01

## 2024-01-17 RX ADMIN — AMMONIUM LACTATE: 12 LOTION TOPICAL at 08:01

## 2024-01-17 RX ADMIN — CEFTAROLINE FOSAMIL 600 MG: 600 POWDER, FOR SOLUTION INTRAVENOUS at 08:01

## 2024-01-17 RX ADMIN — INSULIN DETEMIR 17 UNITS: 100 INJECTION, SOLUTION SUBCUTANEOUS at 08:01

## 2024-01-17 RX ADMIN — METOCLOPRAMIDE 5 MG: 5 TABLET ORAL at 05:01

## 2024-01-17 RX ADMIN — ACETAMINOPHEN 1000 MG: 500 TABLET ORAL at 01:01

## 2024-01-17 RX ADMIN — INSULIN ASPART 6 UNITS: 100 INJECTION, SOLUTION INTRAVENOUS; SUBCUTANEOUS at 08:01

## 2024-01-17 RX ADMIN — METHOCARBAMOL 500 MG: 500 TABLET ORAL at 08:01

## 2024-01-17 RX ADMIN — OXYCODONE HYDROCHLORIDE 10 MG: 10 TABLET ORAL at 02:01

## 2024-01-17 RX ADMIN — PIPERACILLIN SODIUM AND TAZOBACTAM SODIUM 4.5 G: 4; .5 INJECTION, POWDER, FOR SOLUTION INTRAVENOUS at 06:01

## 2024-01-17 RX ADMIN — ACETAMINOPHEN 1000 MG: 500 TABLET ORAL at 08:01

## 2024-01-17 RX ADMIN — INSULIN ASPART 8 UNITS: 100 INJECTION, SOLUTION INTRAVENOUS; SUBCUTANEOUS at 01:01

## 2024-01-17 RX ADMIN — PIPERACILLIN SODIUM AND TAZOBACTAM SODIUM 4.5 G: 4; .5 INJECTION, POWDER, FOR SOLUTION INTRAVENOUS at 03:01

## 2024-01-17 RX ADMIN — DAPTOMYCIN 945 MG: 500 INJECTION, POWDER, LYOPHILIZED, FOR SOLUTION INTRAVENOUS at 01:01

## 2024-01-17 RX ADMIN — PANTOPRAZOLE SODIUM 40 MG: 40 TABLET, DELAYED RELEASE ORAL at 08:01

## 2024-01-17 RX ADMIN — OXYCODONE HYDROCHLORIDE 10 MG: 10 TABLET ORAL at 06:01

## 2024-01-17 RX ADMIN — GADOBUTROL 10 ML: 604.72 INJECTION INTRAVENOUS at 12:01

## 2024-01-17 RX ADMIN — ACETAMINOPHEN 1000 MG: 500 TABLET ORAL at 06:01

## 2024-01-17 RX ADMIN — METOPROLOL SUCCINATE 12.5 MG: 25 TABLET, EXTENDED RELEASE ORAL at 08:01

## 2024-01-17 RX ADMIN — METHOCARBAMOL 500 MG: 500 TABLET ORAL at 05:01

## 2024-01-17 RX ADMIN — INSULIN ASPART 2 UNITS: 100 INJECTION, SOLUTION INTRAVENOUS; SUBCUTANEOUS at 08:01

## 2024-01-17 RX ADMIN — OXYCODONE HYDROCHLORIDE 5 MG: 5 TABLET ORAL at 03:01

## 2024-01-17 NOTE — PROGRESS NOTES
Pt MRI done moved back to stretcher and placed back to Tele monitor / transport to room pending / pt with no acute change noted

## 2024-01-17 NOTE — PROGRESS NOTES
[facility] - Observation [address] [place] [address] Medicine  Progress Note    Patient Name: Kulwant Mueller Jr.  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 [place] from [facility] for concerns of new fevers.     Patient was admitted to [facility] 12/27/23-01/10/24 due to left knee septic arthritis with persistent MRSA bacteremia, s/p Left knee orthopedic surgery, surgical cultures positive for MRSA. Right heel also of concern for source.   He was followed by ID,  he had been on Daptomycin + Ceftaroline, s/p ROBY w/o endocarditis, left shoulder arthrocentesis w/o infection,  surveillance blood culture negative from 01/07/24    Report per ED and care everywhere notes patient with fevers on 1/13 to 103degreese @ 1800, 2000, ER transfer recommended but pt refused, physician gave order for blood culture, pt reported feeling fine.   Today he developed temperature of 100.5 @ 15:56 and was transferred [place] [facility] [place].     Today he reports he can walk with asssitance, using a walker, performing ADLs, main complaint is right sided back pain    ED - blood cultures drawn.             Overview/Hospital Course:  Kulwant Mueller Jr. Is a 38 y.o. male who was admitted to hospital medicine for fever. WBC 15.86 >> 13.92. Sed rate 97, CRPP 211.9. On Daptomycin from previous discharge, continuing and added zosyn. ID consulted. Blood cultures pending. Orthopedics consulted, joint aspiration performed at bedside. Taken to the OR for L knee washout. MRI ordered of pelivs and L shoulder to rule out other sites of infection.  Patient will be discharged when medically ready.     Interval History: NAEON. VSS. Patient feeling slightly better today. Still reporting pain to L knee and L thigh. WBC 12.12 >> 14.17. MRI pelvis, L knee and L shoulder pending. Ortho and ID continuing to follow     Review of Systems   Constitutional:  Positive for activity change, appetite change and fatigue. Negative for chills and fever.   Respiratory:  Negative for chest tightness and shortness of breath.    Cardiovascular:  Negative for chest pain and leg swelling.   Gastrointestinal:  Negative for abdominal pain and nausea.   Musculoskeletal:  Positive for arthralgias and back pain.   Neurological:  Negative for dizziness and weakness.     Objective:     Vital Signs (Most Recent):  Temp: 99 °F (37.2 °C) (01/17/24 1345)  Pulse: 98 (01/17/24 1345)  Resp: 18 (01/17/24 1345)  BP: 110/63 (01/17/24 1345)  SpO2: 95 % (01/17/24 1345) Vital Signs (24h Range):  Temp:  [97.5 °F (36.4 °C)-99 °F (37.2 °C)] 99 °F (37.2 °C)  Pulse:  [74-98] 98  Resp:  [14-19] 18  SpO2:  [94 %-98 %] 95 %  BP: (110-129)/(63-75) 110/63     Weight: 109.3 kg (240 lb 15.4 oz)  Body mass index is 31.79 kg/m².  No intake or output data in the 24 hours ending 01/17/24 1348      Physical Exam  Vitals and nursing note reviewed.   Constitutional:       Appearance: He is well-developed.   Eyes:      Pupils: Pupils are equal, round, and reactive to light.   Cardiovascular:      Rate and Rhythm: Normal rate and regular rhythm.   Pulmonary:      Effort: Pulmonary effort is normal.      Breath sounds: Normal breath sounds.   Abdominal:      Palpations: Abdomen is soft.      Tenderness: There is no abdominal tenderness.   Musculoskeletal:         General: Swelling (L knee warm, swollen and tender) present. No tenderness.      Comments: No point tenderness to back, no midline tenderness   Skin:     General: Skin is warm and dry.   Neurological:      Mental Status: He is alert and oriented to person, place, and  time.   Psychiatric:      Comments: Flat affect             Significant Labs: All pertinent labs within the past 24 hours have been reviewed.  CBC:   Recent Labs   Lab 01/16/24 0330 01/17/24 0348   WBC 12.12 14.17*   HGB 9.1* 8.5*   HCT 29.5* 28.2*    414     CMP:   Recent Labs   Lab 01/16/24 0330 01/17/24 0348   * 134*   K 3.8 4.3    100   CO2 27 25   * 275*   BUN 7 9   CREATININE 0.7 0.8   CALCIUM 8.8 8.6*   PROT 7.6 7.4   ALBUMIN 1.4* 1.3*   BILITOT 0.4 0.4   ALKPHOS 192* 202*   AST 53* 34   ALT 84* 52*   ANIONGAP 8 9       Significant Imaging: I have reviewed all pertinent imaging results/findings within the past 24 hours.    Assessment/Plan:      * Staphylococcal arthritis of left knee  S/p Left knee arthrotomy and synovectomy (12/29/23)  -re-order pT/ot  Pt with left knee sutures, tenderness to palpation near medial tibial plateau  - ortho consulted, bedside aspiration completed   - taken to OR for wound wash out    - MRI of L knee, pelvis and L shoulder pending       Chronic HFrEF (heart failure with reduced ejection fraction)  New finding at last hospitalization  -EF 48% with systolic dysfunction, normal diastolic function  -patient on Jardiance as outpatient as well as Toprol XL and Lasix 20mg po daily   -Jardiance not continued @ IP rehab - would hold given concern for possible repeat infection - resume when clinically stable.   -Resume furosemide when stable      Hyponatremia  Patient has hyponatremia which is uncontrolled,  We will aim to correct the sodium by 4-6mEq in 24 hours.   We will monitor sodium Daily.   The hyponatremia is due to Dehydration/hypovolemia.   We will obtain the following studies: Urine sodium, urine osmolality, serum osmolality or TSH, T4.   We will treat the hyponatremia with IV fluids as follows: Start Saline 200cc/hr x 7 hours. The patient's sodium results have been reviewed and are listed below.  Recent Labs   Lab 01/14/24 2039   *        Fever  -reported fevers of 103 @ [facility] on Saturday and temp 100 prior to transfer  -s/p blood cultures   -add Zosyn for empiric gram negative coverage  -Pt with c/o of right sided Back pain - given MRSA bacteremia, pt is at risk for metastatic site of infection obtain MRI T-L spine with Contrast to r/o spinal source of infection    - no acute findings on MRI   -Check ESR/CRP    - ESR 97/ .9   -Infectious disease consult - to assist in continuing Daptomycin approval & infectious w/u    >patient had ROBY performed on 1/02 to r/o endocarditis.       Transaminitis  Now again with rising transaminase levels   -trend CMP daily   -check GGT with rising Alk phos levels    -Prior w/u @ [facility] included - CT A/P, w/o abnormality, Hepatitis panel negative, RUQ ultrasound with hepatomegaly and biliary sludge; but without other obvious abnormalities         Depression  Noted At [facility] hospitalization - patient noted to have flat affect, decrease motivation, excessive sleeping, was seen by psychiatry consult and Cymbalta recommended but pt declined.   -Description of his affect seems similar here, pending above workup would re-address with patient prior to discharge  Reports he was living with wife & 2 kids prior to current hospitalization.     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     Staphylococcus aureus bacteremia  -continue daptomycin - last dose @ rehab was @14;00 (01/14) - continue q24  -add-on CPK tonight and check weekly  -esr/crp  -repeat blood cultures NGTD         Other elevated white blood cell count  He has persistent elevation but downtrending  leukocytosis.       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  Initially on therapeutic lovenox and transitioned back to Oral Xarelto 10mg daily   -pt previously followed with hematology - Dr. Duff.     Diabetic gastroparesis associated with type 2 diabetes mellitus  -continue reglan 5mg PO TID    Type 2 diabetes mellitus with complication, with long-term current use of insulin  Patient's FSGs are uncontrolled due to hyperglycemia on current medication regimen.  Last A1c reviewed-   Lab Results   Component Value Date    HGBA1C 7.4 (H) 12/28/2023     Most recent fingerstick glucose reviewed-   Recent Labs   Lab 01/15/24  0133   POCTGLUCOSE 232*     Current correctional scale  Low  Maintain anti-hyperglycemic dose as follows-   Antihyperglycemics (From admission, onward)      Start     Stop Route Frequency Ordered    01/15/24 0900  insulin detemir U-100 (Levemir) pen 15 Units         -- SubQ 2 times daily 01/15/24 0229    01/15/24 0715  insulin aspart U-100 pen 6 Units         -- SubQ 3 times daily with meals 01/15/24 0231    01/15/24 0329  insulin aspart U-100 pen 0-5 Units         -- SubQ Before meals & nightly PRN 01/15/24 0231    01/15/24 0224  insulin detemir U-100 (Levemir) pen 6 Units         -- SubQ Once 01/15/24 0229          Hold Oral hypoglycemics while patient is in the hospital.          VTE Risk Mitigation (From admission, onward)           Ordered     rivaroxaban tablet 10 mg  With dinner         01/15/24 0229     Reason for No Pharmacological VTE Prophylaxis  Once        Question:  Reasons:  Answer:  Already adequately anticoagulated on oral Anticoagulants    01/15/24 0229     IP VTE HIGH RISK PATIENT  Once         01/15/24 0229     Place sequential compression device  Until discontinued         01/15/24 0229                     Discharge Planning   RAEANN: 1/19/2024     Code Status: Full Code   Is the patient medically ready for discharge?: No    Reason for patient still in hospital (select all that apply): Patient trending condition, Laboratory test, Treatment, Imaging, and Consult recommendations  Discharge Plan A: Skilled Nursing Facility                  Fany Emerson PA-C  Department of Hospital Medicine   [facility] - Observation 11H

## 2024-01-17 NOTE — ASSESSMENT & PLAN NOTE
S/p Left knee arthrotomy and synovectomy (12/29/23)  -re-order pT/ot  Pt with left knee sutures, tenderness to palpation near medial tibial plateau  - ortho consulted, bedside aspiration completed   - taken to OR for wound wash out    - MRI of L knee, pelvis and L shoulder pending

## 2024-01-17 NOTE — PLAN OF CARE
Return to Rehab referral sent to [facility]     01/17/24 0902   Post-Acute Status   Post-Acute Authorization Placement   Post-Acute Placement Status Referrals Sent   Discharge Plan   Discharge Plan A Rehab   Discharge Plan B Rehab     Marciano Alvarado RN,BSN

## 2024-01-17 NOTE — ANESTHESIA POSTPROCEDURE EVALUATION
Anesthesia Post Evaluation    Patient: Kulwant Mueller Jr.    Procedure(s) Performed: Procedure(s) (LRB):  ARTHROSCOPY, KNEE, LEFT (Left)    Final Anesthesia Type: general      Patient location during evaluation: PACU  Patient participation: Yes- Able to Participate  Level of consciousness: awake and alert  Post-procedure vital signs: reviewed and stable  Pain management: adequate  Airway patency: patent    PONV status at discharge: No PONV  Anesthetic complications: no      Cardiovascular status: blood pressure returned to baseline  Respiratory status: unassisted  Hydration status: euvolemic  Follow-up not needed.              Vitals Value Taken Time   /75 01/17/24 0723   Temp 36.5 °C (97.7 °F) 01/17/24 0723   Pulse 87 01/17/24 0723   Resp 16 01/17/24 0723   SpO2 95 % 01/17/24 0723         Event Time   Out of Recovery 01/16/2024 12:25:00         Pain/Tana Score: Pain Rating Prior to Med Admin: 0 (1/17/2024  6:00 AM)  Pain Rating Post Med Admin: 0 (1/17/2024  4:38 AM)  Tana Score: 9 (1/16/2024 12:25 PM)

## 2024-01-17 NOTE — PROGRESS NOTES
01/16/24 1600   Vital Signs   Temp 98.4 °F (36.9 °C)   Temp Source Oral   Pulse 94   Resp 16   SpO2 95 %   /71     Pt AAOX4. No distress noted. Bed in lowest position. Side rails up x2. Call bell and personal belongs within reach. Telemetry maintained. Safety precautions maintained. Pt free of falls or injuries. No further issues or concerns at this time. Plan of care continues.

## 2024-01-17 NOTE — PLAN OF CARE
Problem: Adult Inpatient Plan of Care  Goal: Plan of Care Review  Outcome: Ongoing, Progressing  Goal: Patient-Specific Goal (Individualized)  Outcome: Ongoing, Progressing  Goal: Absence of Hospital-Acquired Illness or Injury  Outcome: Ongoing, Progressing  Goal: Optimal Comfort and Wellbeing  Outcome: Ongoing, Progressing  Goal: Readiness for Transition of Care  Outcome: Ongoing, Progressing     Problem: Diabetes Comorbidity  Goal: Blood Glucose Level Within Targeted Range  Outcome: Ongoing, Progressing     Problem: Infection  Goal: Absence of Infection Signs and Symptoms  Outcome: Ongoing, Progressing     Problem: Impaired Wound Healing  Goal: Optimal Wound Healing  Outcome: Ongoing, Progressing     Problem: Skin Injury Risk Increased  Goal: Skin Health and Integrity  Outcome: Ongoing, Progressing     Problem: Fall Injury Risk  Goal: Absence of Fall and Fall-Related Injury  Outcome: Ongoing, Progressing     Problem: Surgical Site Infection  Goal: Absence of Infection Signs and Symptoms  Outcome: Ongoing, Progressing

## 2024-01-17 NOTE — PT/OT/SLP PROGRESS
Physical Therapy  Consult    Patient Name:  Kulwant Mueller Jr.   MRN:  [id number]  Admitting Diagnosis:  Staphylococcal arthritis of left knee   Recent Surgery: Procedure(s) (LRB):  ARTHROSCOPY, KNEE, LEFT (Left) 1 Day Post-Op  Admit Date: 1/14/2024  Length of Stay: 1 days    Physical Therapy orders received. Patient not seen today due to Testing/imaging. Pt leaving with transport for MRI upon attempt at 1015.  PT to attempt when Kulwant Mueller Jr. is medically appropriate for progressive mobility.    1/17/2024

## 2024-01-17 NOTE — CONSULTS
[facility] - Observ[facility] 11  Wound Care    Patient Name:  Kulwant Mueller Jr.   MRN:  [id number]  Date: 1/17/2024  Diagnosis: Staphylococcal arthritis of left knee    History:     Past Medical History:   Diagnosis Date    Anticoagulant long-term use     COVID-19 virus infection     Diabetes mellitus     Diabetes mellitus, type 2     Hypertension     May-Thurner syndrome        Social History     Socioeconomic History    Marital status:    Tobacco Use    Smoking status: Former     Types: Cigarettes    Smokeless tobacco: Former    Tobacco comments:     occasionally    Substance and Sexual Activity    Alcohol use: Yes     Comment: occ    Drug use: No     Social Determinants of Health     Financial Resource Strain: Low Risk  (12/28/2023)    Overall Financial Resource Strain (CARDIA)     Difficulty of Paying Living Expenses: Not hard at all   Food Insecurity: No Food Insecurity (12/28/2023)    Hunger Vital Sign     Worried About Running Out of Food in the Last Year: Never true     Ran Out of Food in the Last Year: Never true   Transportation Needs: No Transportation Needs (12/28/2023)    PRAPARE - Transportation     Lack of Transportation (Medical): No     Lack of Transportation (Non-Medical): No   Physical Activity: Sufficiently Active (12/28/2023)    Exercise Vital Sign     Days of Exercise per Week: 3 days     Minutes of Exercise per Session: 60 min   Stress: Stress Concern Present (12/28/2023)    [employer] [employer] - Occupational Stress Questionnaire     Feeling of Stress : Very much   Social Connections: Moderately Integrated (12/28/2023)    Social Connection and Isolation Panel [NHANES]     Frequency of Communication with Friends and Family: More than three times a week     Frequency of Social Gatherings with Friends and Family: More than three times a week     Attends Sikhism Services: More than 4 times per year     Active Member of Clubs or Organizations: No     Attends Club or  Organization Meetings: Never     Marital Status:    Housing Stability: Low Risk  (12/28/2023)    Housing Stability Vital Sign     Unable to Pay for Housing in the Last Year: No     Number of Places Lived in the Last Year: 1     Unstable Housing in the Last Year: No       Precautions:     Allergies as of 01/14/2024 - Reviewed 01/14/2024   Allergen Reaction Noted    Heparin analogues Nausea And Vomiting 09/01/2018       WO Assessment Details/Treatment   Patient seen for wound care consultation.   Reviewed chart for this encounter.   See Flow Sheet for findings.    Pt in bed and agreeable to care. Intact, dry callous with eschar present to the right heel. Pt stated he sees outpatient wound care for treatment. Foam dressing in place. The left ankle is intact with pink scar tissue. Left open to air.     RECOMMENDATIONS  - Right heel: bedside nursing to paint with betadine, let dry and apply a foam dressing daily   - Turning every 2 hours  - Heel protecting boots  - Bedside nursing to maintain pressure injury prevention interventions      01/17/24 1621        Altered Skin Integrity 12/28/23 Right Heel Diabetic Ulcer Full thickness tissue loss. Base is covered by slough and/or eschar in the wound bed   Date First Assessed: 12/28/23   Altered Skin Integrity Present on Admission - Did Patient arrive to the hospital with altered skin?: yes  Side: Right  Location: Heel  Primary Wound Type: Diabetic Ulcer  Description of Altered Skin Integrity: Full thic...   Wound Image    Dressing Appearance Open to air   Drainage Amount None   Appearance Intact;Dry   Periwound Area Intact;Dry   Care Cleansed with:;Sterile normal saline   Dressing Foam       Recommendations made to primary team for above plan via secure chat. Orders placed. Wound care will follow-up as needed.     01/17/2024

## 2024-01-17 NOTE — CONSULTS
Inpatient consult to Physical Medicine Rehab  Consult performed by: Jessica Duron NP  Consult ordered by: Fany Emerson PA-C        Consult received.  Reviewed patient history and current admission.  PM&R following.    Jessica Duron NP  Physical Medicine & Rehabilitation   01/17/2024

## 2024-01-17 NOTE — ASSESSMENT & PLAN NOTE
-continue daptomycin - last dose @ rehab was @14;00 (01/14) - continue q24  -add-on CPK tonight and check weekly  -esr/crp  -repeat blood cultures NGTD

## 2024-01-17 NOTE — PROGRESS NOTES
[facility] - Observation [facility]  Orthopedics  Progress Note    Patient Name: Kulwant Mueller Jr.  MRN: [id number]  Admission Date: 1/14/2024  Hospital Length of Stay: 1 days  Attending Provider: Rian Garcia MD  Primary Care Provider: Shellie Primary Doctor  Follow-up For: Procedure(s) (LRB):  ARTHROSCOPY, KNEE, LEFT (Left)    Post-Operative Day: 1 Day Post-Op  Subjective:     Principal Problem:Staphylococcal arthritis of left knee    Principal Orthopedic Problem: s/p L knee arthroscopic I&D on 1/17    Interval History: NAEON. VSS. Afebrile overnight. L knee pain improved some since yesterday. Trend CRP daily.    Review of patient's allergies indicates:   Allergen Reactions    Heparin analogues Nausea And Vomiting       Current Facility-Administered Medications   Medication    acetaminophen tablet 1,000 mg    ammonium lactate 12 % lotion    DAPTOmycin (CUBICIN) 945 mg in sodium chloride 0.9% SolP 50 mL IVPB    dextrose 10% bolus 125 mL 125 mL    dextrose 10% bolus 250 mL 250 mL    glucagon (human recombinant) injection 1 mg    glucose chewable tablet 16 g    glucose chewable tablet 24 g    insulin aspart U-100 pen 0-5 Units    insulin aspart U-100 pen 8 Units    insulin detemir U-100 (Levemir) pen 17 Units    melatonin tablet 6 mg    methocarbamoL tablet 500 mg    metoclopramide HCl tablet 5 mg    metoprolol succinate (TOPROL-XL) 24 hr split tablet 12.5 mg    morphine injection 4 mg    naloxone 0.4 mg/mL injection 0.02 mg    ondansetron injection 4 mg    oxyCODONE immediate release tablet 5 mg    oxyCODONE immediate release tablet 5 mg    oxyCODONE immediate release tablet Tab 10 mg    pantoprazole EC tablet 40 mg    piperacillin-tazobactam (ZOSYN) 4.5 g in dextrose 5 % in water (D5W) 100 mL IVPB (MB+)    polyethylene glycol packet 17 g    prochlorperazine injection Soln 5 mg    rivaroxaban tablet 10 mg    scopolamine 1.3-1.5 mg (1 mg over 3 days) 1 patch    senna-docusate 8.6-50 mg per tablet 1 tablet    sodium  "chloride 0.9% flush 10 mL    sodium chloride 0.9% flush 10 mL     Objective:     Vital Signs (Most Recent):  Temp: 97.7 °F (36.5 °C) (01/17/24 0723)  Pulse: 87 (01/17/24 0723)  Resp: 16 (01/17/24 0723)  BP: 129/75 (01/17/24 0723)  SpO2: 95 % (01/17/24 0723) Vital Signs (24h Range):  Temp:  [97.5 °F (36.4 °C)-98.4 °F (36.9 °C)] 97.7 °F (36.5 °C)  Pulse:  [74-98] 87  Resp:  [14-22] 16  SpO2:  [94 %-100 %] 95 %  BP: (113-157)/(63-88) 129/75     Weight: 109.3 kg (240 lb 15.4 oz)  Height: 6' 1" (185.4 cm)  Body mass index is 31.79 kg/m².      Intake/Output Summary (Last 24 hours) at 1/17/2024 1003  Last data filed at 1/16/2024 1216  Gross per 24 hour   Intake 600 ml   Output --   Net 600 ml        Ortho/SPM Exam     Awake/alert/oriented x3, No acute distress, Afebrile, Vital signs stable  Good inspiratory effort with unlaboured breathing  Dressings c/d/I  Pain with knee PROM 5-70  NVI in operative limb       Significant Labs: All pertinent labs within the past 24 hours have been reviewed.    Significant Imaging: I have reviewed all pertinent imaging results/findings.  Assessment/Plan:     * Staphylococcal arthritis of left knee  Kulwant Mueller . is a 38 y.o. male admitted for workup of fevers of unknown source, recent irrigation and debridement of left knee performed at [facility] on 12/29.  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 Now s/p left knee arthroscopic I&D on 1/17.    Plan to trend CRP q.48h.  Follow-up most recent aspiration cultures and final ID recommendations.      Pain control:  Multimodal  PT/OT: WBAT left lower extremity  DVT PPx:  Xarelto, SCDs at all times when not ambulating  Abx:  Zosyn daptomycin per ID, pending final cultures and ID recs  Labs:  White count up to 14 today    Cx: Prior left knee cultures growing MRSA    Dispo: f/u final cultures and ID recs            David France MD  Orthopedics  [facility] - Observation 11H    "

## 2024-01-17 NOTE — ASSESSMENT & PLAN NOTE
Kulwant Mueller . is a 38 y.o. male admitted for workup of fevers of unknown source, recent irrigation and debridement of left knee performed at [facility] on 12/29.  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 Now s/p left knee arthroscopic I&D on 1/17.    Plan to trend CRP q.48h.  Follow-up most recent aspiration cultures and final ID recommendations.      Pain control:  Multimodal  PT/OT: WBAT left lower extremity  DVT PPx:  Xarelto, SCDs at all times when not ambulating  Abx:  Zosyn daptomycin per ID, pending final cultures and ID recs  Labs:  White count up to 14 today    Cx: Prior left knee cultures growing MRSA    Dispo: f/u final cultures and ID recs

## 2024-01-17 NOTE — PLAN OF CARE
Referral for SNF sent to [facility] via [facility].      01/17/24 1050   Post-Acute Status   Post-Acute Authorization Placement   Post-Acute Placement Status Referrals Sent   Hospital Resources/Appts/Education Provided Provided patient/caregiver with written discharge plan information;Appointment suggestion unavailable   Discharge Plan   Discharge Plan A Skilled Nursing Facility   Discharge Plan B Skilled Nursing Facility     Marciano Alvarado RN,BSN

## 2024-01-17 NOTE — ASSESSMENT & PLAN NOTE
Mr. Mueller is a pleasant 38 y.o. man with May-Thurner syndrome on Xarelto, DM2 on insulin, chronic foot wounds following with wound care, and HTN, recent admission for MRSA bacteremia. Index Bcx + 12/27/23, complicated by left knee septic arthritis, s/p L knee arthrotomy with synovectomy on 12/29. Cx with MRSA, ROBY 1/02/24 neg, required salvage therapy & cleared 1/07, placed on daptomycin monotherapy. Of note, the patient also had L shoulder aspirated imaging without evidence of infection. R heel without osteo but had cellulitis, myositis, tenosynovitis. He started having new fevers at rehab, and is now admitted for workup. Fevers were up to 103 F, blood cultures collected 1/14/24 are NGTD. ID was consulted for recent MRSA bacteremia from septic arthritis - new fevers @ [facility] rehab, on daptomycin. In the meantime, the patient was taken to the OR on the 15th for arthroscopic washout. While his cell count was c/e septic arthritis, his synovial cultures are also NGTD. Etiology of fever is unclear to me.    Recommendations  Continue daptomycin for now  Change Zosyn to ceftaroline  Will follow but anticipating another 4 weeks of abx     Diagnostic testing not indicated for today's encounter

## 2024-01-17 NOTE — PLAN OF CARE
Problem: Adult Inpatient Plan of Care  Goal: Plan of Care Review  Outcome: Ongoing, Progressing  Goal: Patient-Specific Goal (Individualized)  Outcome: Ongoing, Progressing  Goal: Absence of Hospital-Acquired Illness or Injury  Outcome: Ongoing, Progressing  Goal: Optimal Comfort and Wellbeing  Outcome: Ongoing, Progressing  Goal: Readiness for Transition of Care  Outcome: Ongoing, Progressing     Problem: Diabetes Comorbidity  Goal: Blood Glucose Level Within Targeted Range  Outcome: Ongoing, Progressing     Problem: Infection  Goal: Absence of Infection Signs and Symptoms  Outcome: Ongoing, Progressing     Problem: Impaired Wound Healing  Goal: Optimal Wound Healing  Outcome: Ongoing, Progressing     Problem: Skin Injury Risk Increased  Goal: Skin Health and Integrity  Outcome: Ongoing, Progressing     Problem: Fall Injury Risk  Goal: Absence of Fall and Fall-Related Injury  Outcome: Ongoing, Progressing     Problem: Surgical Site Infection  Goal: Absence of Infection Signs and Symptoms  Outcome: Ongoing, Progressing   Pt progressing toward goals. No distress noted. No falls or injuries during shift. Pt bed in lowest position. Side rails x2. Bed alarm activated. Call bell and personal belongs within reach. Telemetry maintained. Safety precautions maintained.

## 2024-01-17 NOTE — CONSULTS
[facility] - [facility]  Infectious Disease  Consult Note    Patient Name: Kulwant Mueller Jr.  MRN: [id number]  Admission Date: 1/14/2024  Hospital Length of Stay: 1 days  Attending Physician: Rian Garcia MD  Primary Care Provider: Shellie, Primary Doctor     Isolation Status: Contact    Patient information was obtained from patient, past medical records, and ER records.      Consults  Assessment/Plan:     ID  Staphylococcus aureus bacteremia  38M w hx of May-Thurner syndrome, DM2,, chronic foot wounds following with wound care,  recent admission for MRSA bacteremia c/b L knee septic arthritis s/p  s/p L knee arthrotomy with synovectomy on 12/29, L heel myositis and tenosynovitis and L shoulder cellulitis who represented with fevers.     S/p aspiration of L knee 1/15 consistent w septic arthritis.     Other  Fever  Mr. Mueller is a pleasant 38 y.o. man with May-Thurner syndrome on Xarelto, DM2 on insulin, chronic foot wounds following with wound care, and HTN, recent admission for MRSA bacteremia. Index Bcx + 12/27/23, complicated by left knee septic arthritis, s/p L knee arthrotomy with synovectomy on 12/29. Cx with MRSA, ROBY 1/02/24 neg, required salvage therapy & cleared 1/07, placed on daptomycin monotherapy. Of note, the patient also had L shoulder aspirated imaging without evidence of infection. R heel without osteo but had cellulitis, myositis, tenosynovitis. He started having new fevers at rehab, and is now admitted for workup. Fevers were up to 103 F, blood cultures collected 1/14/24 are NGTD. ID was consulted for recent MRSA bacteremia from septic arthritis - new fevers @ [facility] rehab, on daptomycin. In the meantime, the patient was taken to the OR on the 15th for arthroscopic washout. While his cell count was c/e septic arthritis, his synovial cultures are also NGTD. Etiology of fever is unclear to me.    Recommendations  Continue daptomycin for now  Change Zosyn to ceftaroline  Will follow but  anticipating another 4 weeks of abx      Thank you for your consult. I will follow-up with patient. Please contact us if you have any additional questions.    Derick Gonzalez MD  Infectious Disease  [facility] - Observation 11H    Subjective:     Principal Problem: Staphylococcal arthritis of left knee    HPI: Mr. De Anda is a pleasant 38 y.o. man with May-Thurner syndrome on Xarelto, DM2 on insulin, chronic foot wounds following with wound care, and HTN, recent admission for MRSA bacteremia. Index Bcx + 12/27/23, complicated by left knee septic arthritis, s/p L knee arthrotomy with synovectomy on 12/29. Cx with MRSA, ROBY 1/02/24 neg, required salvage therapy & cleared 1/07, placed on daptomycin monotherapy. Of note, the patient also had L shoulder aspirated imaging without evidence of infection. R heel without osteo but had cellulitis, myositis, tenosynovitis. He started having new fevers at rehab, and is now admitted for workup. Fevers were up to 103 F, blood cultures collected 1/14/24 are NGTD. ID was consulted for recent MRSA bacteremia from septic arthritis - new fevers @ [facility] rehab, on daptomycin. In the meantime, the patient was taken to the OR on the 15th for arthroscopic washout. Cultures are also NGTD. The patient is feeling better - denies fever or chills.        Past Medical History:   Diagnosis Date    Anticoagulant long-term use     COVID-19 virus infection     Diabetes mellitus     Diabetes mellitus, type 2     Hypertension     May-Thurner syndrome        Past Surgical History:   Procedure Laterality Date    APPENDECTOMY      ARTHROTOMY OF KNEE Left 12/29/2023    Procedure: ARTHROTOMY, KNEE;  Surgeon: Edy Bocanegra Jr., MD;  Location: [facility] OR;  Service: Orthopedics;  Laterality: Left;    ESOPHAGOGASTRODUODENOSCOPY N/A 1/31/2022    Procedure: EGD (ESOPHAGOGASTRODUODENOSCOPY);  Surgeon: Cindy Quiros MD;  Location: [facility];  Service: Endoscopy;  Laterality: N/A;    ESOPHAGOGASTRODUODENOSCOPY  "N/A 3/21/2022    Procedure: EGD (ESOPHAGOGASTRODUODENOSCOPY);  Surgeon: Tejas Mann MD;  Location: [facility] ENDO;  Service: Endoscopy;  Laterality: N/A;    INCISION AND DRAINAGE FOOT Left 11/28/2021    Procedure: INCISION AND DRAINAGE, FOOT;  Surgeon: Bj Alicea DPM;  Location: [facility] OR;  Service: Podiatry;  Laterality: Left;    stents in bilateral legs      TRANSESOPHAGEAL ECHOCARDIOGRAPHY N/A 1/3/2024    Procedure: ECHOCARDIOGRAM, TRANSESOPHAGEAL;  Surgeon: Douglas Doss MD;  Location: [facility] CATH LAB/EP;  Service: Cardiology;  Laterality: N/A;       Review of patient's allergies indicates:   Allergen Reactions    Heparin analogues Nausea And Vomiting       Medications:  Medications Prior to Admission   Medication Sig    blood-glucose meter,continuous (DEXCOM G7 ) Misc 1 Device by Misc.(Non-Drug; Combo Route) route once daily.    blood-glucose sensor (DEXCOM G7 SENSOR) Justyna 1 Device by Misc.(Non-Drug; Combo Route) route every 10 days.    empagliflozin (JARDIANCE) 25 mg tablet Take 1 tablet (25 mg total) by mouth once daily.    furosemide (LASIX) 20 MG tablet Take 1 tablet (20 mg total) by mouth once daily.    glucagon (GVOKE HYPOPEN 2-PACK) 1 mg/0.2 mL AtIn Inject 1 mg into the skin as needed (SEVERE HYPOGLYCEMIA).    insulin aspart U-100 (NOVOLOG U-100 INSULIN ASPART) 100 unit/mL injection Inject 14 Units into the skin 3 (three) times daily before meals.    metoclopramide HCl (REGLAN) 10 MG tablet Take 0.5 tablets (5 mg total) by mouth 3 (three) times daily before meals.    metoprolol succinate (TOPROL-XL) 25 MG 24 hr tablet Take 0.5 tablets (12.5 mg total) by mouth once daily.    pantoprazole (PROTONIX) 40 MG tablet Take 1 tablet (40 mg total) by mouth once daily.    pen needle, diabetic (BD ULTRA-FINE DIYA PEN NEEDLE) 32 gauge x 5/32" Ndle Use with Tresiba daily and Humalog 3-4 times daily as directed.    prochlorperazine (COMPAZINE) 10 MG tablet Take 1 tablet (10 mg total) by mouth 3 (three) " times daily as needed (nausea or vomiting).    promethazine (PHENERGAN) 25 MG suppository Place 1 suppository (25 mg total) rectally every 6 (six) hours as needed for Nausea.    rivaroxaban (XARELTO) 10 mg Tab Take 1 tablet (10 mg total) by mouth daily with dinner or evening meal.    scopolamine (TRANSDERM-SCOP) 1.3-1.5 mg (1 mg over 3 days) Place 1 patch onto the skin every 72 hours as needed (intractable nausea/vomiting).    sodium chloride 0.9% SolP 50 mL with DAPTOmycin 500 mg SolR 947 mg Inject 947 mg into the vein once daily.    TRESIBA FLEXTOUCH U-200 200 unit/mL (3 mL) insulin pen Inject 56 Units into the skin once daily.     Antibiotics (From admission, onward)      Start     Stop Route Frequency Ordered    01/15/24 1400  DAPTOmycin (CUBICIN) 945 mg in sodium chloride 0.9% SolP 50 mL IVPB         -- IV Every 24 hours (non-standard times) 01/15/24 0229    01/15/24 0030  piperacillin-tazobactam (ZOSYN) 4.5 g in dextrose 5 % in water (D5W) 100 mL IVPB (MB+)         -- IV Every 8 hours (non-standard times) 01/14/24 2316          Antifungals (From admission, onward)      None          Antivirals (From admission, onward)      None             Immunization History   Administered Date(s) Administered    COVID-19, MRNA, LN-S, PF (Pfizer) (Purple Cap) 12/28/2020, 01/19/2021, 02/11/2022    Influenza - Quadrivalent - PF *Preferred* (6 months and older) 09/29/2020, 09/30/2021, 10/20/2022, 10/20/2023       Family History       Problem Relation (Age of Onset)    Cancer Mother, Paternal Uncle, Paternal Grandfather, Maternal Grandfather    Irritable bowel syndrome Paternal Grandfather          Social History     Socioeconomic History    Marital status:    Tobacco Use    Smoking status: Former     Types: Cigarettes    Smokeless tobacco: Former    Tobacco comments:     occasionally    Substance and Sexual Activity    Alcohol use: Yes     Comment: occ    Drug use: No     Social Determinants of Health     Financial  Resource Strain: Low Risk  (12/28/2023)    Overall Financial Resource Strain (CARDIA)     Difficulty of Paying Living Expenses: Not hard at all   Food Insecurity: No Food Insecurity (12/28/2023)    Hunger Vital Sign     Worried About Running Out of Food in the Last Year: Never true     Ran Out of Food in the Last Year: Never true   Transportation Needs: No Transportation Needs (12/28/2023)    PRAPARE - Transportation     Lack of Transportation (Medical): No     Lack of Transportation (Non-Medical): No   Physical Activity: Sufficiently Active (12/28/2023)    Exercise Vital Sign     Days of Exercise per Week: 3 days     Minutes of Exercise per Session: 60 min   Stress: Stress Concern Present (12/28/2023)    [employer] - Occupational Stress Questionnaire     Feeling of Stress : Very much   Social Connections: Moderately Integrated (12/28/2023)    Social Connection and Isolation Panel [NHANES]     Frequency of Communication with Friends and Family: More than three times a week     Frequency of Social Gatherings with Friends and Family: More than three times a week     Attends Restoration Services: More than 4 times per year     Active Member of Clubs or Organizations: No     Attends Club or Organization Meetings: Never     Marital Status:    Housing Stability: Low Risk  (12/28/2023)    Housing Stability Vital Sign     Unable to Pay for Housing in the Last Year: No     Number of Places Lived in the Last Year: 1     Unstable Housing in the Last Year: No     Review of Systems   Constitutional:  Positive for fever.   HENT:  Negative for trouble swallowing.    Respiratory:  Negative for cough and shortness of breath.    Gastrointestinal:  Negative for abdominal pain, blood in stool, diarrhea and vomiting.   Genitourinary:  Negative for dysuria and hematuria.   Musculoskeletal:  Positive for arthralgias and joint swelling. Negative for neck stiffness.   Neurological:  Negative for syncope.    Psychiatric/Behavioral:  Negative for confusion.    All other systems reviewed and are negative.    Objective:     Vital Signs (Most Recent):  Temp: 97.8 °F (36.6 °C) (01/15/24 0830)  Pulse: 95 (01/15/24 0830)  Resp: 18 (01/15/24 0830)  BP: 129/76 (01/15/24 0830)  SpO2: 95 % (01/15/24 0830) Vital Signs (24h Range):  Temp:  [97.8 °F (36.6 °C)-99 °F (37.2 °C)] 97.8 °F (36.6 °C)  Pulse:  [67-99] 95  Resp:  [16-18] 18  SpO2:  [95 %-96 %] 95 %  BP: (129-138)/(72-88) 129/76     Weight: 107.8 kg (237 lb 10.5 oz)  Body mass index is 31.35 kg/m².    Estimated Creatinine Clearance: 158.2 mL/min (based on SCr of 0.8 mg/dL).     Physical Exam  Vitals and nursing note reviewed.   Constitutional:       Appearance: Normal appearance.   HENT:      Head: Normocephalic and atraumatic.      Nose: Nose normal.      Mouth/Throat:      Mouth: Mucous membranes are moist.      Pharynx: Oropharynx is clear.   Eyes:      Conjunctiva/sclera: Conjunctivae normal.   Cardiovascular:      Rate and Rhythm: Normal rate and regular rhythm.      Pulses: Normal pulses.      Heart sounds: Normal heart sounds.   Pulmonary:      Effort: Pulmonary effort is normal.      Breath sounds: Normal breath sounds.   Abdominal:      General: Bowel sounds are normal.      Palpations: Abdomen is soft.      Tenderness: There is no guarding or rebound.   Musculoskeletal:         General: Tenderness present. No deformity.      Cervical back: Neck supple.   Skin:     General: Skin is warm and dry.      Coloration: Skin is not jaundiced.      Findings: No rash.   Neurological:      Mental Status: He is alert and oriented to person, place, and time. Mental status is at baseline.   Psychiatric:         Mood and Affect: Mood normal.         Thought Content: Thought content normal.         Judgment: Judgment normal.          Significant Labs:   Pathology Results  (Last 10 years)                 03/21/22 1419  Specimen to Pathology, Surgery Gastrointestinal tract Final  result    Narrative:  Pre-op Diagnosis: [place] grade C esophagitis [K20.80]   Candida esophagitis [B37.81]   Procedure(s):   EGD (ESOPHAGOGASTRODUODENOSCOPY)   1. Antrum bx r/o h pylori   2. Distal esophagus bx   Release to patient->Immediate   Specimen total (fresh, frozen, permanent):->2       01/31/22 1156  Specimen to Pathology, Surgery Gastrointestinal tract Final result    Narrative:  Pre-op Diagnosis: Nausea and vomiting, intractability of   vomiting not specified, unspecified vomiting type [R11.2]   Diabetic gastroparesis associated with type 2 diabetes   mellitus [E11.43, K31.84]   Procedure(s):   EGD (ESOPHAGOGASTRODUODENOSCOPY)   Number of specimens: 2   Name of specimens:   #1 Gastric Bxs r/o H Pylori   #2 Esophageal Bxs r/o Candida   Release to patient->Immediate   Specimen total (fresh, frozen, permanent):->2             All pertinent labs within the past 24 hours have been reviewed.    Significant Imaging: I have reviewed all pertinent imaging results/findings within the past 24 hours.

## 2024-01-17 NOTE — SUBJECTIVE & OBJECTIVE
Principal Problem:Staphylococcal arthritis of left knee    Principal Orthopedic Problem: s/p L knee arthroscopic I&D on 1/17    Interval History: NAEON. VSS. Afebrile overnight. L knee pain improved some since yesterday. Trend CRP daily.    Review of patient's allergies indicates:   Allergen Reactions    Heparin analogues Nausea And Vomiting       Current Facility-Administered Medications   Medication    acetaminophen tablet 1,000 mg    ammonium lactate 12 % lotion    DAPTOmycin (CUBICIN) 945 mg in sodium chloride 0.9% SolP 50 mL IVPB    dextrose 10% bolus 125 mL 125 mL    dextrose 10% bolus 250 mL 250 mL    glucagon (human recombinant) injection 1 mg    glucose chewable tablet 16 g    glucose chewable tablet 24 g    insulin aspart U-100 pen 0-5 Units    insulin aspart U-100 pen 8 Units    insulin detemir U-100 (Levemir) pen 17 Units    melatonin tablet 6 mg    methocarbamoL tablet 500 mg    metoclopramide HCl tablet 5 mg    metoprolol succinate (TOPROL-XL) 24 hr split tablet 12.5 mg    morphine injection 4 mg    naloxone 0.4 mg/mL injection 0.02 mg    ondansetron injection 4 mg    oxyCODONE immediate release tablet 5 mg    oxyCODONE immediate release tablet 5 mg    oxyCODONE immediate release tablet Tab 10 mg    pantoprazole EC tablet 40 mg    piperacillin-tazobactam (ZOSYN) 4.5 g in dextrose 5 % in water (D5W) 100 mL IVPB (MB+)    polyethylene glycol packet 17 g    prochlorperazine injection Soln 5 mg    rivaroxaban tablet 10 mg    scopolamine 1.3-1.5 mg (1 mg over 3 days) 1 patch    senna-docusate 8.6-50 mg per tablet 1 tablet    sodium chloride 0.9% flush 10 mL    sodium chloride 0.9% flush 10 mL     Objective:     Vital Signs (Most Recent):  Temp: 97.7 °F (36.5 °C) (01/17/24 0723)  Pulse: 87 (01/17/24 0723)  Resp: 16 (01/17/24 0723)  BP: 129/75 (01/17/24 0723)  SpO2: 95 % (01/17/24 0723) Vital Signs (24h Range):  Temp:  [97.5 °F (36.4 °C)-98.4 °F (36.9 °C)] 97.7 °F (36.5 °C)  Pulse:  [74-98] 87  Resp:  [14-22]  "16  SpO2:  [94 %-100 %] 95 %  BP: (113-157)/(63-88) 129/75     Weight: 109.3 kg (240 lb 15.4 oz)  Height: 6' 1" (185.4 cm)  Body mass index is 31.79 kg/m².      Intake/Output Summary (Last 24 hours) at 1/17/2024 1003  Last data filed at 1/16/2024 1216  Gross per 24 hour   Intake 600 ml   Output --   Net 600 ml        Ortho/SPM Exam     Awake/alert/oriented x3, No acute distress, Afebrile, Vital signs stable  Good inspiratory effort with unlaboured breathing  Dressings c/d/I  Pain with knee PROM 5-70  NVI in operative limb       Significant Labs: All pertinent labs within the past 24 hours have been reviewed.    Significant Imaging: I have reviewed all pertinent imaging results/findings.  "

## 2024-01-17 NOTE — SUBJECTIVE & OBJECTIVE
Interval History: NAEON. VSS. Patient feeling slightly better today. Still reporting pain to L knee and L thigh. WBC 12.12 >> 14.17. MRI pelvis, L knee and L shoulder pending. Ortho and ID continuing to follow     Review of Systems   Constitutional:  Positive for activity change, appetite change and fatigue. Negative for chills and fever.   Respiratory:  Negative for chest tightness and shortness of breath.    Cardiovascular:  Negative for chest pain and leg swelling.   Gastrointestinal:  Negative for abdominal pain and nausea.   Musculoskeletal:  Positive for arthralgias and back pain.   Neurological:  Negative for dizziness and weakness.     Objective:     Vital Signs (Most Recent):  Temp: 99 °F (37.2 °C) (01/17/24 1345)  Pulse: 98 (01/17/24 1345)  Resp: 18 (01/17/24 1345)  BP: 110/63 (01/17/24 1345)  SpO2: 95 % (01/17/24 1345) Vital Signs (24h Range):  Temp:  [97.5 °F (36.4 °C)-99 °F (37.2 °C)] 99 °F (37.2 °C)  Pulse:  [74-98] 98  Resp:  [14-19] 18  SpO2:  [94 %-98 %] 95 %  BP: (110-129)/(63-75) 110/63     Weight: 109.3 kg (240 lb 15.4 oz)  Body mass index is 31.79 kg/m².  No intake or output data in the 24 hours ending 01/17/24 1348      Physical Exam  Vitals and nursing note reviewed.   Constitutional:       Appearance: He is well-developed.   Eyes:      Pupils: Pupils are equal, round, and reactive to light.   Cardiovascular:      Rate and Rhythm: Normal rate and regular rhythm.   Pulmonary:      Effort: Pulmonary effort is normal.      Breath sounds: Normal breath sounds.   Abdominal:      Palpations: Abdomen is soft.      Tenderness: There is no abdominal tenderness.   Musculoskeletal:         General: Swelling (L knee warm, swollen and tender) present. No tenderness.      Comments: No point tenderness to back, no midline tenderness   Skin:     General: Skin is warm and dry.   Neurological:      Mental Status: He is alert and oriented to person, place, and time.   Psychiatric:      Comments: Flat affect              Significant Labs: All pertinent labs within the past 24 hours have been reviewed.  CBC:   Recent Labs   Lab 01/16/24  0330 01/17/24 0348   WBC 12.12 14.17*   HGB 9.1* 8.5*   HCT 29.5* 28.2*    414     CMP:   Recent Labs   Lab 01/16/24 0330 01/17/24 0348   * 134*   K 3.8 4.3    100   CO2 27 25   * 275*   BUN 7 9   CREATININE 0.7 0.8   CALCIUM 8.8 8.6*   PROT 7.6 7.4   ALBUMIN 1.4* 1.3*   BILITOT 0.4 0.4   ALKPHOS 192* 202*   AST 53* 34   ALT 84* 52*   ANIONGAP 8 9       Significant Imaging: I have reviewed all pertinent imaging results/findings within the past 24 hours.

## 2024-01-17 NOTE — PT/OT/SLP PROGRESS
Occupational Therapy      Patient Name:  Kulwant Menardcaro Miguel.   MRN:  [id number]    Patient not seen today secondary to Pt going [facility] for MRI. Assisted with bed sheet transfer to stretcher. Unable to return for second attempt. Will follow-up on 1/18.    1/17/2024

## 2024-01-18 LAB
ALBUMIN SERPL BCP-MCNC: 1.4 G/DL (ref 3.5–5.2)
ALP SERPL-CCNC: 156 U/L (ref 55–135)
ALT SERPL W/O P-5'-P-CCNC: 35 U/L (ref 10–44)
ANION GAP SERPL CALC-SCNC: 6 MMOL/L (ref 8–16)
AST SERPL-CCNC: 26 U/L (ref 10–40)
BACTERIA SPEC AEROBE CULT: NO GROWTH
BASOPHILS # BLD AUTO: 0.02 K/UL (ref 0–0.2)
BASOPHILS NFR BLD: 0.2 % (ref 0–1.9)
BILIRUB SERPL-MCNC: 0.3 MG/DL (ref 0.1–1)
BUN SERPL-MCNC: 8 MG/DL (ref 6–20)
CALCIUM SERPL-MCNC: 8.7 MG/DL (ref 8.7–10.5)
CHLORIDE SERPL-SCNC: 101 MMOL/L (ref 95–110)
CK SERPL-CCNC: 119 U/L (ref 20–200)
CO2 SERPL-SCNC: 26 MMOL/L (ref 23–29)
CREAT SERPL-MCNC: 0.7 MG/DL (ref 0.5–1.4)
CRP SERPL-MCNC: 105.8 MG/L (ref 0–8.2)
DIFFERENTIAL METHOD BLD: ABNORMAL
EOSINOPHIL # BLD AUTO: 0.1 K/UL (ref 0–0.5)
EOSINOPHIL NFR BLD: 1.1 % (ref 0–8)
ERYTHROCYTE [DISTWIDTH] IN BLOOD BY AUTOMATED COUNT: 14.4 % (ref 11.5–14.5)
EST. GFR  (NO RACE VARIABLE): >60 ML/MIN/1.73 M^2
GLUCOSE SERPL-MCNC: 82 MG/DL (ref 70–110)
HCT VFR BLD AUTO: 27.8 % (ref 40–54)
HGB BLD-MCNC: 8.2 G/DL (ref 14–18)
IMM GRANULOCYTES # BLD AUTO: 0.07 K/UL (ref 0–0.04)
IMM GRANULOCYTES NFR BLD AUTO: 0.5 % (ref 0–0.5)
LYMPHOCYTES # BLD AUTO: 1.9 K/UL (ref 1–4.8)
LYMPHOCYTES NFR BLD: 14.5 % (ref 18–48)
MCH RBC QN AUTO: 26.7 PG (ref 27–31)
MCHC RBC AUTO-ENTMCNC: 29.5 G/DL (ref 32–36)
MCV RBC AUTO: 91 FL (ref 82–98)
MONOCYTES # BLD AUTO: 0.5 K/UL (ref 0.3–1)
MONOCYTES NFR BLD: 3.6 % (ref 4–15)
NEUTROPHILS # BLD AUTO: 10.7 K/UL (ref 1.8–7.7)
NEUTROPHILS NFR BLD: 80.1 % (ref 38–73)
NRBC BLD-RTO: 0 /100 WBC
PLATELET # BLD AUTO: 416 K/UL (ref 150–450)
PMV BLD AUTO: 9.5 FL (ref 9.2–12.9)
POCT GLUCOSE: 107 MG/DL (ref 70–110)
POCT GLUCOSE: 138 MG/DL (ref 70–110)
POCT GLUCOSE: 82 MG/DL (ref 70–110)
POCT GLUCOSE: 83 MG/DL (ref 70–110)
POTASSIUM SERPL-SCNC: 3.7 MMOL/L (ref 3.5–5.1)
PROT SERPL-MCNC: 7.2 G/DL (ref 6–8.4)
RBC # BLD AUTO: 3.07 M/UL (ref 4.6–6.2)
SODIUM SERPL-SCNC: 133 MMOL/L (ref 136–145)
WBC # BLD AUTO: 13.31 K/UL (ref 3.9–12.7)

## 2024-01-18 PROCEDURE — 63600175 PHARM REV CODE 636 W HCPCS: Mod: JZ,JG | Performed by: INTERNAL MEDICINE

## 2024-01-18 PROCEDURE — 80053 COMPREHEN METABOLIC PANEL: CPT | Performed by: PHYSICIAN ASSISTANT

## 2024-01-18 PROCEDURE — 86140 C-REACTIVE PROTEIN: CPT | Performed by: STUDENT IN AN ORGANIZED HEALTH CARE EDUCATION/TRAINING PROGRAM

## 2024-01-18 PROCEDURE — 97530 THERAPEUTIC ACTIVITIES: CPT

## 2024-01-18 PROCEDURE — 83935 ASSAY OF URINE OSMOLALITY: CPT | Performed by: PHYSICIAN ASSISTANT

## 2024-01-18 PROCEDURE — 25000003 PHARM REV CODE 250: Performed by: HOSPITALIST

## 2024-01-18 PROCEDURE — 25000003 PHARM REV CODE 250: Performed by: STUDENT IN AN ORGANIZED HEALTH CARE EDUCATION/TRAINING PROGRAM

## 2024-01-18 PROCEDURE — 97161 PT EVAL LOW COMPLEX 20 MIN: CPT

## 2024-01-18 PROCEDURE — 82570 ASSAY OF URINE CREATININE: CPT | Performed by: PHYSICIAN ASSISTANT

## 2024-01-18 PROCEDURE — 99222 1ST HOSP IP/OBS MODERATE 55: CPT | Mod: ,,, | Performed by: NURSE PRACTITIONER

## 2024-01-18 PROCEDURE — 63600175 PHARM REV CODE 636 W HCPCS: Performed by: HOSPITALIST

## 2024-01-18 PROCEDURE — 25000003 PHARM REV CODE 250

## 2024-01-18 PROCEDURE — 27000207 HC ISOLATION

## 2024-01-18 PROCEDURE — 85025 COMPLETE CBC W/AUTO DIFF WBC: CPT | Performed by: PHYSICIAN ASSISTANT

## 2024-01-18 PROCEDURE — 21400001 HC TELEMETRY ROOM

## 2024-01-18 PROCEDURE — 82550 ASSAY OF CK (CPK): CPT | Performed by: INTERNAL MEDICINE

## 2024-01-18 PROCEDURE — 84300 ASSAY OF URINE SODIUM: CPT | Performed by: PHYSICIAN ASSISTANT

## 2024-01-18 PROCEDURE — 99232 SBSQ HOSP IP/OBS MODERATE 35: CPT | Mod: ,,, | Performed by: INTERNAL MEDICINE

## 2024-01-18 PROCEDURE — 36415 COLL VENOUS BLD VENIPUNCTURE: CPT | Performed by: STUDENT IN AN ORGANIZED HEALTH CARE EDUCATION/TRAINING PROGRAM

## 2024-01-18 PROCEDURE — 97166 OT EVAL MOD COMPLEX 45 MIN: CPT

## 2024-01-18 PROCEDURE — 25000003 PHARM REV CODE 250: Performed by: INTERNAL MEDICINE

## 2024-01-18 PROCEDURE — A4216 STERILE WATER/SALINE, 10 ML: HCPCS | Performed by: STUDENT IN AN ORGANIZED HEALTH CARE EDUCATION/TRAINING PROGRAM

## 2024-01-18 RX ORDER — SODIUM CHLORIDE 0.9 % (FLUSH) 0.9 %
10 SYRINGE (ML) INJECTION
Status: DISCONTINUED | OUTPATIENT
Start: 2024-01-18 | End: 2024-01-25 | Stop reason: HOSPADM

## 2024-01-18 RX ORDER — SODIUM CHLORIDE 0.9 % (FLUSH) 0.9 %
10 SYRINGE (ML) INJECTION EVERY 6 HOURS
Status: DISCONTINUED | OUTPATIENT
Start: 2024-01-18 | End: 2024-01-25 | Stop reason: HOSPADM

## 2024-01-18 RX ADMIN — METOCLOPRAMIDE 5 MG: 5 TABLET ORAL at 12:01

## 2024-01-18 RX ADMIN — AMMONIUM LACTATE: 12 LOTION TOPICAL at 09:01

## 2024-01-18 RX ADMIN — INSULIN DETEMIR 17 UNITS: 100 INJECTION, SOLUTION SUBCUTANEOUS at 09:01

## 2024-01-18 RX ADMIN — METHOCARBAMOL 500 MG: 500 TABLET ORAL at 09:01

## 2024-01-18 RX ADMIN — INSULIN ASPART 8 UNITS: 100 INJECTION, SOLUTION INTRAVENOUS; SUBCUTANEOUS at 09:01

## 2024-01-18 RX ADMIN — DAPTOMYCIN 945 MG: 500 INJECTION, POWDER, LYOPHILIZED, FOR SOLUTION INTRAVENOUS at 02:01

## 2024-01-18 RX ADMIN — PANTOPRAZOLE SODIUM 40 MG: 40 TABLET, DELAYED RELEASE ORAL at 09:01

## 2024-01-18 RX ADMIN — RIVAROXABAN 10 MG: 10 TABLET, FILM COATED ORAL at 04:01

## 2024-01-18 RX ADMIN — METOCLOPRAMIDE 5 MG: 5 TABLET ORAL at 05:01

## 2024-01-18 RX ADMIN — METOCLOPRAMIDE 5 MG: 5 TABLET ORAL at 04:01

## 2024-01-18 RX ADMIN — Medication 10 ML: at 06:01

## 2024-01-18 RX ADMIN — CEFTAROLINE FOSAMIL 600 MG: 600 POWDER, FOR SOLUTION INTRAVENOUS at 05:01

## 2024-01-18 RX ADMIN — OXYCODONE HYDROCHLORIDE 10 MG: 10 TABLET ORAL at 09:01

## 2024-01-18 RX ADMIN — METHOCARBAMOL 500 MG: 500 TABLET ORAL at 12:01

## 2024-01-18 RX ADMIN — METHOCARBAMOL 500 MG: 500 TABLET ORAL at 04:01

## 2024-01-18 RX ADMIN — METOPROLOL SUCCINATE 12.5 MG: 25 TABLET, EXTENDED RELEASE ORAL at 09:01

## 2024-01-18 RX ADMIN — INSULIN ASPART 2 UNITS: 100 INJECTION, SOLUTION INTRAVENOUS; SUBCUTANEOUS at 09:01

## 2024-01-18 RX ADMIN — Medication 10 ML: at 01:01

## 2024-01-18 RX ADMIN — ACETAMINOPHEN 1000 MG: 500 TABLET ORAL at 05:01

## 2024-01-18 RX ADMIN — ACETAMINOPHEN 1000 MG: 500 TABLET ORAL at 09:01

## 2024-01-18 NOTE — ASSESSMENT & PLAN NOTE
Patient with right heel - resolving ulcer  - prior wound care notes indicate - Paint with betadine and leave open to air  - elevate heels to reduce pressure   - Wound care consult pending.

## 2024-01-18 NOTE — ASSESSMENT & PLAN NOTE
- reported fevers of 103 @ [facility] on Saturday and temp 100 prior to transfer  - s/p blood cultures   - add Zosyn for empiric gram negative coverage  - Pt with c/o of right sided Back pain - given MRSA bacteremia, pt is at risk for metastatic site of infection obtain MRI T-L spine with Contrast to r/o spinal source of infection    - no acute findings on MRI   - Check ESR/CRP    - ESR 97/ .9   - Infectious disease consult - to assist in continuing Daptomycin approval & infectious w/u    >patient had ROBY performed on 1/02 to r/o endocarditis.

## 2024-01-18 NOTE — ASSESSMENT & PLAN NOTE
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 Bcx + 12/27/23, c/b left knee septic arthritis, s/p L knee arthrotomy with synovectomy on 12/29. Cx with MRSA, ROBY 1/02/24 neg, required salvage therapy & cleared 1/07, placed on daptomycin monotherapy. Of note, the patient also had L shoulder aspirated imaging without evidence of infection. R heel without osteo but had cellulitis, myositis, tenosynovitis. He started having new fevers at rehab, and is now admitted for workup. Fevers were up to 103 F, blood cultures collected 1/14/24 are NGTD. ID was consulted for recent MRSA bacteremia from septic arthritis - new fevers @ [facility] rehab, on daptomycin. In the meantime, the patient was taken to the OR on the 15th for arthroscopic washout. While his cell count was c/e septic arthritis, his synovial cultures are also NGTD.     Recommendations  Continue daptomycin/ceftaroline for now  Will follow but anticipating another 4 weeks of abx from last washout (est end date: 2/11/24)  Trend CPK and CRP with weekly labs

## 2024-01-18 NOTE — ASSESSMENT & PLAN NOTE
Patient's FSGs are uncontrolled due to hyperglycemia on current medication regimen.  Last A1c reviewed-   Lab Results   Component Value Date    HGBA1C 7.4 (H) 12/28/2023     Most recent fingerstick glucose reviewed-   Recent Labs   Lab 01/17/24  1336 01/17/24  1744 01/17/24  1943 01/18/24  0841   POCTGLUCOSE 143* 115* 100 107       Current correctional scale  Low  Maintain anti-hyperglycemic dose as follows-   Antihyperglycemics (From admission, onward)      Start     Stop Route Frequency Ordered    01/17/24 1130  insulin aspart U-100 pen 8 Units         -- SubQ 3 times daily with meals 01/17/24 0819    01/16/24 2100  insulin detemir U-100 (Levemir) pen 17 Units         -- SubQ 2 times daily 01/16/24 1657    01/15/24 0329  insulin aspart U-100 pen 0-5 Units         -- SubQ Before meals & nightly PRN 01/15/24 0231          Hold Oral hypoglycemics while patient is in the hospital.

## 2024-01-18 NOTE — ASSESSMENT & PLAN NOTE
New finding at last hospitalization  - EF 48% with systolic dysfunction, normal diastolic function  - patient on Jardiance as outpatient as well as Toprol XL and Lasix 20mg po daily   - Jardiance not continued @ IP rehab - would hold given concern for possible repeat infection - resume when clinically stable.   - resume furosemide when stable

## 2024-01-18 NOTE — PT/OT/SLP EVAL
Physical Therapy Co-Evaluation and Co-Treatment    Patient Name:  Kulwant Mueller Jr.   MRN:  [id number]  Admit Date: 1/14/2024  Admitting Diagnosis:  Staphylococcal arthritis of left knee   Length of Stay: 2 days  Recent Surgery: Procedure(s) (LRB):  ARTHROSCOPY, KNEE, LEFT (Left) 2 Days Post-Op    Recommendations:     Discharge Recommendations: High Intensity Therapy  Discharge Equipment Recommendations: walker, rolling, wheelchair, bedside commode   Barriers to discharge:  increased assist needed    Appropriate transfer level with nursing staff: sitting EOB    Plan:     During this hospitalization, patient to be seen 4 x/week to address the identified rehab impairments via gait training, therapeutic activities, therapeutic exercises, neuromuscular re-education and progress towards the established goals.  Plan of Care Expires:  02/17/24  Plan of Care Reviewed with: patient    Assessment     Kulwant Mueller Jr. is a 38 y.o. male admitted with a medical diagnosis of Staphylococcal arthritis of left knee. Pt tolerated evaluation fair to poor on this date. He presents s/p Lt knee I&D on 1/16. He was primarily limited by increased pain in Lt knee greatly limiting functional mobility and activity tolerance on this date. Pt was only able  to sit EOB for 5 minutes due to pain and requested to be returned to supine. Pt will continue to benefit from skilled PT services during this hospital admit to continue to improve transfer ability and efficiency as well as continue to progress pt's ambulation distance and cardiopulmonary endurance to maximize pt's functional independence and return to PLOF. Patient presents with fair participation and motivation to return to prior level of function with high intensity therapy.  The patient demonstrates appropriate endurance and strength to participate in up to 3 hours or 15hrs of combined therapy post acute.       Problem List: weakness, impaired endurance, impaired self care skills,  "gait instability, impaired functional mobility, impaired balance, decreased upper extremity function, decreased lower extremity function, decreased safety awareness, pain, decreased ROM, impaired cardiopulmonary response to activity, impaired skin, edema.  Rehab Prognosis: Good; patient would benefit from acute skilled PT services to address these deficits and reach maximum level of function.      Goals:   Multidisciplinary Problems       Physical Therapy Goals          Problem: Physical Therapy    Goal Priority Disciplines Outcome Goal Variances Interventions   Physical Therapy Goal     PT, PT/OT Ongoing, Progressing     Description: Goals to be met by: 2024     Patient will increase functional independence with mobility by performin. Supine to sit with Modified Cavalier  2. Sit to supine with Modified Cavalier  3. Sit to stand transfer with Moderate Assistance  4. Bed to chair transfer with Maximum Assistance using RW or LRAD  5. Gait  x 20 feet with Moderate Assistance using RW or LRAD.   6. Lower extremity exercise program x30 reps per handout, with independence                         Subjective     RN notified prior to session. No family/friends present upon PT entrance into room. Patient agreeable to PT evaluation.    Chief Complaint: Fever (Pt arrives via EMS from [facility] facility c/o increased fever; hx left lower limb surgery in december receiving IV antibiotics dt possible MRSA infection in blood)  Patient/Family Comments/goals: pt stated, "I'm sorry I didn't do more"  Pain/Comfort:  Pain Rating 1: 4/10 (at rest)  Location - Side 1: Left  Location - Orientation 1: generalized  Location 1: knee  Pain Addressed 1: Reposition, Distraction, Cessation of Activity  Pain Rating Post-Intervention 1: 7/10 (with activity)    Social History:  Residence: Patient lives with their spouse in a single story house with number of outside stair(s): 0 . Pt's bathroom has a walk in shower with built " "in seat.  Equipment Owned (not using): none  Equipment Used: none  Prior level of function:  Prior to admission, patient was independent. He presented to [facility] from [facility] after a knee surgery in December.  Work: Employed - pt is a  at [employer]  Drive: yes.   Managing Medicines/Managing Home: yes.   Hobbies: watching kids play sports  Assistance Upon Discharge: family    Objective:     Additional staff present: OT; OT for co-evaluation due to suspected patient need for two skilled therapists to appropriately assess patient's functional deficits as well as ensure patient safety, accommodate for limited activity tolerance, and provide appropriate, skilled assistance to maximize functional potential during evaluation.    Patient found HOB elevated with: telemetry, peripheral IV, PICC line     General Precautions: Standard, Cardiac fall   Orthopedic Precautions:N/A   Braces: N/A   Body mass index is 32.2 kg/m².  Oxygen Device: Room Air  Vitals: /70 (BP Location: Left arm, Patient Position: Sitting)   Pulse 90   Temp 98.1 °F (36.7 °C) (Oral)   Resp 18   Ht 6' 1" (1.854 m)   Wt 110.7 kg (244 lb 0.8 oz)   SpO2 97%   BMI 32.20 kg/m²     Exams:  Cognition:   Alert and Cooperative   Patient is oriented to Person, Place, Time, Situation  Command following: Follows multistep verbal commands  Fluency: clear/fluent  Hearing: Intact  Vision:  Intact  Skin Integrity: Visible skin intact  Postural Assessment: slouched posture and rounded shoulders  Physical Exam:    Left UE Left LE Right UE Right LE   Edema absent present absent absent   ROM AROM WFL AROM WFL except knee limited 2/2 pain AROM WFL AROM WFL   Strength adequate ROM, adequate strength 4/5 except knee 3/5 due to pain/swelling adequate ROM, adequate strength 4/5   Sensation intact to light touch intact to light touch intact to light touch intact to light touch   Coordination normal not tested due to strength or pain deficits normal normal     Outcome " Measures:  AM-PAC 6 CLICK MOBILITY  Turning over in bed (including adjusting bedclothes, sheets and blankets)?: 2  Sitting down on and standing up from a chair with arms (e.g., wheelchair, bedside commode, etc.): 1  Moving from lying on back to sitting on the side of the bed?: 2  Moving to and from a bed to a chair (including a wheelchair)?: 1  Need to walk in hospital room?: 1  Climbing 3-5 steps with a railing?: 1  Basic Mobility Total Score: 8     Functional Mobility:    Bed Mobility:   Supine to Sit: maximal assistance for LE management; to Rt side of bed  Scooting anteriorly to EOB to have both feet planted on floor: contact guard assistance   Sit to Supine: moderate assistance for LE management; to Rt side of bed    Sitting Balance at Edge of Bed:  Static Sitting Balance: Good- : able to accept minimal resistance  Dynamic Sitting Balance: Good- : able to sit unsupported and weight shift across midline minimally  Assistance Level Required: Stand-by Assistance  Time: 5 minutes  Postural deviations noted: slouched posture and rounded shoulders  Comments: Time EOB focused primarily on tolerance to upright positioning, cardiopulmonary response and endurance for activities, and strength of postural musculature to perform dynamic sitting to prepare for tasks in the home. Pt very limited by pain and requesting to return to supine after ~5 minutes    Transfers: deferred as pt refused further mobility due to pain    Education:  Time provided for education, counseling and discussion of health disposition in regards to patient's current status  All questions answered within PT scope of practice and to patient's satisfaction  PT role in POC to address current functional deficits  Pt educated on proper body mechanics, safety techniques, and energy conservation with PT facilitation and cueing throughout session  Whiteboard updated with therapist name and pt's current mobility status documented above    Patient left HOB  elevated with all lines intact, call button in reach, and RN notified.    History:     Past Medical History:   Diagnosis Date    Anticoagulant long-term use     COVID-19 virus infection     Diabetes mellitus     Diabetes mellitus, type 2     Hypertension     May-Thurner syndrome        Past Surgical History:   Procedure Laterality Date    APPENDECTOMY      ARTHROSCOPY OF KNEE Left 1/16/2024    Procedure: ARTHROSCOPY, KNEE, LEFT;  Surgeon: Mandeep Gatica MD;  Location: [address];  Service: Orthopedics;  Laterality: Left;    ARTHROTOMY OF KNEE Left 12/29/2023    Procedure: ARTHROTOMY, KNEE;  Surgeon: Edy Bocanegra Jr., MD;  Location: [facility];  Service: Orthopedics;  Laterality: Left;    ESOPHAGOGASTRODUODENOSCOPY N/A 1/31/2022    Procedure: EGD (ESOPHAGOGASTRODUODENOSCOPY);  Surgeon: Cindy Quiros MD;  Location: [facility];  Service: Endoscopy;  Laterality: N/A;    ESOPHAGOGASTRODUODENOSCOPY N/A 3/21/2022    Procedure: EGD (ESOPHAGOGASTRODUODENOSCOPY);  Surgeon: Tejas Mann MD;  Location: [facility];  Service: Endoscopy;  Laterality: N/A;    INCISION AND DRAINAGE FOOT Left 11/28/2021    Procedure: INCISION AND DRAINAGE, FOOT;  Surgeon: Bj Alicea DPM;  Location: [facility];  Service: Podiatry;  Laterality: Left;    stents in bilateral legs      TRANSESOPHAGEAL ECHOCARDIOGRAPHY N/A 1/3/2024    Procedure: ECHOCARDIOGRAM, TRANSESOPHAGEAL;  Surgeon: Douglas Doss MD;  Location: [facility] CATH LAB/EP;  Service: Cardiology;  Laterality: N/A;       Family History   Problem Relation Age of Onset    Cancer Mother     Cancer Paternal Uncle     Cancer Paternal Grandfather     Irritable bowel syndrome Paternal Grandfather     Cancer Maternal Grandfather     Colon cancer Neg Hx     Esophageal cancer Neg Hx     Rectal cancer Neg Hx     Stomach cancer Neg Hx     Ulcerative colitis Neg Hx     Crohn's disease Neg Hx     Celiac disease Neg Hx        Social History     Socioeconomic History    Marital status:     Tobacco Use    Smoking status: Former     Types: Cigarettes    Smokeless tobacco: Former    Tobacco comments:     occasionally    Substance and Sexual Activity    Alcohol use: Yes     Comment: occ    Drug use: No     Social Determinants of Health     Financial Resource Strain: Low Risk  (12/28/2023)    Overall Financial Resource Strain (CARDIA)     Difficulty of Paying Living Expenses: Not hard at all   Food Insecurity: No Food Insecurity (12/28/2023)    Hunger Vital Sign     Worried About Running Out of Food in the Last Year: Never true     Ran Out of Food in the Last Year: Never true   Transportation Needs: No Transportation Needs (12/28/2023)    PRAPARE - Transportation     Lack of Transportation (Medical): No     Lack of Transportation (Non-Medical): No   Physical Activity: Sufficiently Active (12/28/2023)    Exercise Vital Sign     Days of Exercise per Week: 3 days     Minutes of Exercise per Session: 60 min   Stress: Stress Concern Present (12/28/2023)    [employer] [place] [employer] - Occupational Stress Questionnaire     Feeling of Stress : Very much   Social Connections: Moderately Integrated (12/28/2023)    Social Connection and Isolation Panel [NHANES]     Frequency of Communication with Friends and Family: More than three times a week     Frequency of Social Gatherings with Friends and Family: More than three times a week     Attends [employer] Services: More than 4 times per year     Active Member of Clubs or Organizations: No     Attends Club or Organization Meetings: Never     Marital Status:    Housing Stability: Low Risk  (12/28/2023)    Housing Stability Vital Sign     Unable to Pay for Housing in the Last Year: No     Number of Places Lived in the Last Year: 1     Unstable Housing in the Last Year: No       Time Tracking:     PT Received On: 01/18/24  PT Start Time: 0917     PT Stop Time: 0941  PT Total Time (min): 24 min     Billable Minutes: Evaluation 10 minutes and  Therapeutic Activity 14 minutes    1/18/2024

## 2024-01-18 NOTE — PLAN OF CARE
OT logan complete.  Problem: Occupational Therapy  Goal: Occupational Therapy Goal  Description: Goals to be met by: 2/18/2024     Patient will increase functional independence with ADLs by performing:    UE Dressing with Supervision.  LE Dressing with Minimal Assistance.  Grooming while seated with Set-up Assistance.  Toileting from bedside commode with Minimal Assistance for hygiene and clothing management.   Toilet transfer to bedside commode with Minimal Assistance.    Outcome: Ongoing, Progressing

## 2024-01-18 NOTE — ASSESSMENT & PLAN NOTE
Kulwant Mueller . is a 38 y.o. male admitted for workup of fevers of unknown source, recent irrigation and debridement of left knee performed at [facility] on 12/29.  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 Now s/p left knee arthroscopic I&D on 1/17.    Plan to trend CRP q.48h.  Follow-up most recent aspiration cultures and final ID recommendations.      Pain control:  Multimodal  PT/OT: WBAT left lower extremity  DVT PPx:  Xarelto, SCDs at all times when not ambulating  Abx:  Ceftaroline/ daptomycin per ID, pending final cultures and ID recs  Labs:  CRP down to 100 today, continue to trend    Cx: Prior left knee cultures growing MRSA    Dispo: f/u final cultures and ID recs

## 2024-01-18 NOTE — PT/OT/SLP EVAL
Occupational Therapy  Co Evaluation  Co-treatment performed due to patient's multiple deficits requiring two skilled therapists to safely facilitate functional tasks concurrently with functional mobility in addition to accommodating for patient's activity tolerance.    Name: Kulwant Mueller Jr.  MRN: [id number]  Admitting Diagnosis: Staphylococcal arthritis of left knee  Recent Surgery: Procedure(s) (LRB):  ARTHROSCOPY, KNEE, LEFT (Left) 2 Days Post-Op    Recommendations:     Discharge Recommendations: High Intensity Therapy  Discharge Equipment Recommendations:  to be determined by next level of care, bedside commode, walker, rolling, wheelchair  Barriers to discharge:  Other (Comment) (increased assistance req)    Assessment:     Kulwant Mueller Jr. is a 38 y.o. male with a medical diagnosis of Staphylococcal arthritis of left knee.  He presents with pain limiting fx mob. Pt demo's flat affect and decreased motivation. Pt demo's inability to bear weight through LLE on eval. Performance deficits affecting function: weakness, impaired balance, impaired endurance, impaired self care skills, pain, impaired functional mobility, decreased lower extremity function, decreased upper extremity function, decreased ROM, orthopedic precautions.      Rehab Prognosis: Fair; patient would benefit from acute skilled OT services to address these deficits and reach maximum level of function.       Plan:     Patient to be seen 4 x/week to address the above listed problems via self-care/home management, therapeutic activities, therapeutic exercises  Plan of Care Expires: 02/18/24  Plan of Care Reviewed with: patient    Subjective     Chief Complaint: pain in LLE (knee but also surrounding knee)  Patient/Family Comments/goals: none stated    Occupational Profile:  Living Environment: Pt lives c/ spouse and children in [facility] house c/ level entry. Pt has walk-in shower c/ built-in seat and without grab bars or handheld showerhead.    Previous level of function: indep ADL and fx mob no DME  Roles and Routines: was employed as security for [employer]  Equipment Used at Home: none  Assistance upon Discharge: spouse     Pain/Comfort:  Pain Rating 1: 4/10  Location - Side 1: Left  Location - Orientation 1: generalized  Location 1: knee  Pain Addressed 1: Reposition, Distraction, Cessation of Activity  Pain Rating Post-Intervention 1: 7/10    Patients cultural, spiritual, Congregation conflicts given the current situation: no    Objective:     Communicated with: RN and PT prior to session.  Patient found supine with peripheral IV, PICC line, telemetry upon OT entry to room.    General Precautions: Standard, fall  Orthopedic Precautions: N/A  Braces: N/A  Respiratory Status: Room air    Occupational Performance:    Bed Mobility:    Patient completed Scooting/Bridging with maximal assistance  Patient completed Supine to Sit with maximal assistance  Patient completed Sit to Supine with moderate assistance    Functional Mobility/Transfers:  Pt unable to perform sit<>stand 2/2 pain    Activities of Daily Living:  Lower Body Dressing: total assistance socks    Cognitive/Visual Perceptual:  Cognitive/Psychosocial Skills:     -       Oriented to: Person, Place, Time, and Situation   -       Follows Commands/attention:Follows multistep  commands  -       Communication: clear/fluent  -       Memory: No Deficits noted  -       Safety awareness/insight to disability: intact   -       Mood/Affect/Coping skills/emotional control: Flat affect and Withdrawn    Physical Exam:  Balance:    -       sit EOB c/ SBA  Upper Extremity Range of Motion:     -       Right Upper Extremity: WFL  -       Left Upper Extremity: Deficits: pt c/ rotator cuff injury and limited shoulder AROM; WFL distally  Upper Extremity Strength:    -       Right Upper Extremity: WFL  -       Left Upper Extremity: not tested 2/2 pain in LUE   Strength:    -       Right Upper Extremity: WFL  -        Left Upper Extremity: WFL  Fine Motor Coordination:    -       Intact  -       Intact  Left hand thumb/finger opposition skills, Right hand thumb/finger opposition skills, Left hand, manipulation of objects, and Right hand, manipulation of objects    AMPAC 6 Click ADL:  AMPAC Total Score: 14    Treatment & Education:  Role of OT and POC  Call light use for needs  ADL retraining  Functional mobility training  Safety  Importance EOB/OOB activity    Patient left supine with all lines intact, call button in reach, and nurse notified    GOALS:   Multidisciplinary Problems       Occupational Therapy Goals          Problem: Occupational Therapy    Goal Priority Disciplines Outcome Interventions   Occupational Therapy Goal     OT, PT/OT Ongoing, Progressing    Description: Goals to be met by: 2/18/2024     Patient will increase functional independence with ADLs by performing:    UE Dressing with Supervision.  LE Dressing with Minimal Assistance.  Grooming while seated with Set-up Assistance.  Toileting from bedside commode with Minimal Assistance for hygiene and clothing management.   Toilet transfer to bedside commode with Minimal Assistance.                         History:     Past Medical History:   Diagnosis Date    Anticoagulant long-term use     COVID-19 virus infection     Diabetes mellitus     Diabetes mellitus, type 2     Hypertension     May-Thurner syndrome          Past Surgical History:   Procedure Laterality Date    APPENDECTOMY      ARTHROSCOPY OF KNEE Left 1/16/2024    Procedure: ARTHROSCOPY, KNEE, LEFT;  Surgeon: Mandeep Gatica MD;  Location: [address];  Service: Orthopedics;  Laterality: Left;    ARTHROTOMY OF KNEE Left 12/29/2023    Procedure: ARTHROTOMY, KNEE;  Surgeon: Edy Bocanegra Jr., MD;  Location: [facility];  Service: Orthopedics;  Laterality: Left;    ESOPHAGOGASTRODUODENOSCOPY N/A 1/31/2022    Procedure: EGD (ESOPHAGOGASTRODUODENOSCOPY);  Surgeon: Cindy Qurios MD;  Location:  [facility];  Service: Endoscopy;  Laterality: N/A;    ESOPHAGOGASTRODUODENOSCOPY N/A 3/21/2022    Procedure: EGD (ESOPHAGOGASTRODUODENOSCOPY);  Surgeon: Tejas Mann MD;  Location: [facility] ENDO;  Service: Endoscopy;  Laterality: N/A;    INCISION AND DRAINAGE FOOT Left 11/28/2021    Procedure: INCISION AND DRAINAGE, FOOT;  Surgeon: Bj Alicea DPM;  Location: [facility] OR;  Service: Podiatry;  Laterality: Left;    stents in bilateral legs      TRANSESOPHAGEAL ECHOCARDIOGRAPHY N/A 1/3/2024    Procedure: ECHOCARDIOGRAM, TRANSESOPHAGEAL;  Surgeon: Douglas Doss MD;  Location: [facility] CATH LAB/EP;  Service: Cardiology;  Laterality: N/A;       Time Tracking:     OT Date of Treatment: 01/18/24  OT Start Time: 0916  OT Stop Time: 0941  OT Total Time (min): 25 min    Billable Minutes:Evaluation 10  Therapeutic Activity 15    1/18/2024

## 2024-01-18 NOTE — PROGRESS NOTES
[facility] [address] [place] [address] Medicine  Progress Note    Patient Name: Kulawnt Mueller Jr.  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     Patient was admitted to [facility] 12/27/23-01/10/24 due to left knee septic arthritis with persistent MRSA bacteremia, s/p Left knee orthopedic surgery, surgical cultures positive for MRSA. Right heel also of concern for source.   He was followed by ID,  he had been on Daptomycin + Ceftaroline, s/p ROBY w/o endocarditis, left shoulder arthrocentesis w/o infection,  surveillance blood culture negative from 01/07/24    Report per ED and care everywhere notes patient with fevers on 1/13 to 103degreese @ 1800, 2000, ER transfer recommended but pt refused, physician gave order for blood culture, pt reported feeling fine.   Today he developed temperature of 100.5 @ 15:56 and was transferred to [facility] for further evaluation.     Today he reports he can walk with asssitance, using a walker, performing ADLs, main complaint is right sided back pain    ED - blood cultures drawn.             Overview/Hospital Course:  Kulwant Mueller Jr. Is a 38 y.o. male who was admitted to hospital medicine for fever. WBC 15.86 >> 13.92. Sed rate 97, CRPP 211.9. On Daptomycin from previous discharge, continuing and added zosyn. ID consulted. Blood cultures pending. Orthopedics consulted, joint aspiration performed at bedside. Taken to the OR for L knee washout. MRI pelvis: Myositis and fasciitis, with soft tissue gas dissecting along the  anterior compartment myofascial planes of the proximal left thigh. MRI L shoulder: Prominent subdeltoid enhancement/ bursitis. Superimposed infection may be present. No drainable fluid collections. MRI knee: s/p I&D w/ minimal residual fluid collection within the joint space. There is a large air-fluid collection extending supero-medially from the joint space along the medial femoral shaft, beyond the field of view. Prominent surrounding soft tissue inflammatory changes noted, with involvement of the vastus medialis, vastus lateralis, and popliteus musculature. 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 No spondylodiscitis or facet joint septic arthritis  Patient will be discharged when medically ready.     Interval History: NAEON. Pt seen and evaluated at bedside this am. Cx w/ NGTD. Labs pending. Following ortho recommendations. Continuing IV abx per ID. CM following for post acute needs. PTOT evaluation.    Review of Systems   Constitutional:  Positive for activity change and appetite change. Negative for chills, diaphoresis, fatigue and fever.   HENT:  Negative for congestion.    Respiratory:  Negative for cough, chest tightness, shortness of breath and wheezing.    Cardiovascular:  Negative for chest pain, palpitations and leg swelling.   Gastrointestinal:  Negative for abdominal distention, abdominal pain, blood in stool, constipation, diarrhea, nausea and vomiting.   Genitourinary:  Negative for difficulty urinating, dysuria, frequency, hematuria and urgency.   Musculoskeletal:  Positive for arthralgias and back pain. Negative for neck stiffness.   Neurological:  Negative for dizziness, tremors, seizures, syncope, weakness, light-headedness, numbness and headaches.   Psychiatric/Behavioral:  Negative for agitation, confusion and hallucinations.      Objective:     Vital Signs (Most Recent):  Temp: 98.4 °F (36.9 °C) (01/18/24 0839)  Pulse: 84 (01/18/24  0839)  Resp: 18 (01/18/24 0927)  BP: 134/84 (01/18/24 0839)  SpO2: 96 % (01/18/24 0839) Vital Signs (24h Range):  Temp:  [98 °F (36.7 °C)-99 °F (37.2 °C)] 98.4 °F (36.9 °C)  Pulse:  [78-98] 84  Resp:  [18-20] 18  SpO2:  [95 %-97 %] 96 %  BP: (110-134)/(63-84) 134/84     Weight: 110.7 kg (244 lb 0.8 oz)  Body mass index is 32.2 kg/m².    Intake/Output Summary (Last 24 hours) at 1/18/2024 0938  Last data filed at 1/18/2024 0342  Gross per 24 hour   Intake --   Output 700 ml   Net -700 ml         Physical Exam  Vitals and nursing note reviewed.   Constitutional:       General: He is not in acute distress.     Appearance: He is well-developed. He is not diaphoretic.   HENT:      Head: Normocephalic and atraumatic.      Right Ear: External ear normal.      Left Ear: External ear normal.      Nose: Nose normal. No congestion.      Mouth/Throat:      Pharynx: Oropharynx is clear.   Eyes:      General: No scleral icterus.     Extraocular Movements: Extraocular movements intact.      Pupils: Pupils are equal, round, and reactive to light.   Cardiovascular:      Rate and Rhythm: Normal rate and regular rhythm.      Pulses: Normal pulses.      Heart sounds: Normal heart sounds. No murmur heard.  Pulmonary:      Effort: Pulmonary effort is normal. No respiratory distress.      Breath sounds: Normal breath sounds. No wheezing or rales.   Abdominal:      General: Bowel sounds are normal. There is no distension.      Palpations: Abdomen is soft.      Tenderness: There is no abdominal tenderness. There is no guarding or rebound.   Musculoskeletal:         General: Swelling (L knee warm, swollen and tender) present. No tenderness.      Cervical back: Normal range of motion.      Right lower leg: No edema.      Left lower leg: No edema.      Comments: L knee in clean, intact dressing. No point tenderness to back, no midline tenderness   Skin:     General: Skin is warm and dry.      Capillary Refill: Capillary refill takes less than 2  seconds.   Neurological:      General: No focal deficit present.      Mental Status: He is alert and oriented to person, place, and time. Mental status is at baseline.   Psychiatric:         Mood and Affect: Mood normal.         Behavior: Behavior normal.         Thought Content: Thought content normal.      Comments: Flat affect             Significant Labs: All pertinent labs within the past 24 hours have been reviewed.    Significant Imaging: I have reviewed all pertinent imaging results/findings within the past 24 hours.    Assessment/Plan:      * Staphylococcal arthritis of left knee  S/p Left knee arthrotomy and synovectomy (12/29/23)  -re-order pT/ot  Pt with left knee sutures, tenderness to palpation near medial tibial plateau  - ortho consulted, bedside aspiration completed   - taken to OR for wound wash out   - MRI pelvis: Myositis and fasciitis, with soft tissue gas dissecting along the anterior compartment myofascial planes of the proximal left thigh.   - MRI L shoulder: Prominent subdeltoid enhancement/ bursitis. Superimposed infection may be present. No drainable fluid collections.   - MRI knee: s/p I&D w/ minimal residual fluid collection within the joint space. There is a large air-fluid collection extending supero-medially from the joint space along the medial femoral shaft, beyond the field of view. Prominent surrounding soft tissue inflammatory changes noted, with involvement of the vastus medialis, vastus lateralis, and popliteus musculature.  - MRI T/L spine: No MR imaging findings to account for reported history of MRSA bacteremia. No spondylodiscitis or facet joint septic arthritis     Chronic HFrEF (heart failure with reduced ejection fraction)  New finding at last hospitalization  - EF 48% with systolic dysfunction, normal diastolic function  - patient on Jardiance as outpatient as well as Toprol XL and Lasix 20mg po daily   - Jardiance not continued @ IP rehab - would hold given concern for  "possible repeat infection - resume when clinically stable.   - resume furosemide when stable    Hyponatremia  Patient has hyponatremia which is uncontrolled,  We will aim to correct the sodium by 4-6mEq in 24 hours.   We will monitor sodium Daily.   The hyponatremia is due to Dehydration/hypovolemia.   We will obtain the following studies: Urine sodium, urine osmolality, serum osmolality or TSH, T4.   We will treat the hyponatremia with IV fluids as follows: Start Saline 200cc/hr x 7 hours. The patient's sodium results have been reviewed and are listed below.  No results for input(s): "NA" in the last 24 hours.      Fever  - reported fevers of 103 @ [facility] on Saturday and temp 100 prior to transfer  - s/p blood cultures   - add Zosyn for empiric gram negative coverage  - Pt with c/o of right sided Back pain - given MRSA bacteremia, pt is at risk for metastatic site of infection obtain MRI T-L spine with Contrast to r/o spinal source of infection    - no acute findings on MRI   - Check ESR/CRP    - ESR 97/ .9   - Infectious disease consult - to assist in continuing Daptomycin approval & infectious w/u    >patient had ROBY performed on 1/02 to r/o endocarditis.     Transaminitis  - trend CMP daily, improving  - check GGT with rising Alk phos levels  - Prior w/u @ [facility] included - CT A/P, w/o abnormality, Hepatitis panel negative, RUQ ultrasound with hepatomegaly and biliary sludge; but without other obvious abnormalities    Depression  Noted At [facility] hospitalization - patient noted to have flat affect, decrease motivation, excessive sleeping, was seen by psychiatry consult and Cymbalta recommended but pt declined.   - Description of his affect seems similar here, pending above workup would re-address with patient prior to discharge  Reports he was living with wife & 2 kids prior to current hospitalization.     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     Staphylococcus aureus bacteremia  -continue daptomycin - last dose @ rehab was @14;00 (01/14) - continue q24  -add-on CPK tonight and check weekly  -esr/crp  -repeat blood cultures NGTD     Other elevated white blood cell count  He has persistent elevation but downtrending leukocytosis.     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  Initially on therapeutic lovenox and transitioned back to Oral Xarelto 10mg daily   -pt previously followed with hematology - Dr. Duff.     Diabetic gastroparesis associated with type 2 diabetes mellitus  -continue reglan 5mg PO TID    Type 2 diabetes mellitus with complication, with long-term current use of insulin  Patient's FSGs are uncontrolled due to hyperglycemia on current medication regimen.  Last A1c reviewed-   Lab Results   Component Value Date    HGBA1C 7.4 (H) 12/28/2023     Most recent fingerstick glucose reviewed-   Recent Labs   Lab 01/17/24  1336 01/17/24  1744 01/17/24  1943 01/18/24  0841   POCTGLUCOSE 143* 115* 100 107       Current correctional scale  Low  Maintain anti-hyperglycemic dose as follows-   Antihyperglycemics (From admission, onward)      Start     Stop Route Frequency Ordered    01/17/24 1130  insulin aspart U-100 pen 8 Units         -- SubQ 3 times daily with meals 01/17/24 0819    01/16/24 2100  insulin detemir U-100 (Levemir) pen 17 Units         -- SubQ 2 times daily 01/16/24 1657    01/15/24 0329  insulin aspart U-100 pen 0-5 Units         -- SubQ Before meals &  nightly PRN 01/15/24 0231          Hold Oral hypoglycemics while patient is in the hospital.      VTE Risk Mitigation (From admission, onward)           Ordered     rivaroxaban tablet 10 mg  With dinner         01/15/24 0229     Reason for No Pharmacological VTE Prophylaxis  Once        Question:  Reasons:  Answer:  Already adequately anticoagulated on oral Anticoagulants    01/15/24 0229     IP VTE HIGH RISK PATIENT  Once         01/15/24 0229     Place sequential compression device  Until discontinued         01/15/24 0229                    Discharge Planning   RAEANN: 1/24/2024     Code Status: Full Code   Is the patient medically ready for discharge?: No    Reason for patient still in hospital (select all that apply): Patient trending condition, Laboratory test, Treatment, Imaging, Consult recommendations, PT / OT recommendations, and Pending disposition  Discharge Plan A: Skilled Nursing Facility                  Raul Pal PA-C  Department of Hospital Medicine   [facility] 11H

## 2024-01-18 NOTE — SUBJECTIVE & OBJECTIVE
Interval History: NAEON. Pt seen and evaluated at bedside this am. Cx w/ NGTD. Labs pending. Following ortho recommendations. Continuing IV abx per ID. CM following for post acute needs. PTOT evaluation.    Review of Systems   Constitutional:  Positive for activity change and appetite change. Negative for chills, diaphoresis, fatigue and fever.   HENT:  Negative for congestion.    Respiratory:  Negative for cough, chest tightness, shortness of breath and wheezing.    Cardiovascular:  Negative for chest pain, palpitations and leg swelling.   Gastrointestinal:  Negative for abdominal distention, abdominal pain, blood in stool, constipation, diarrhea, nausea and vomiting.   Genitourinary:  Negative for difficulty urinating, dysuria, frequency, hematuria and urgency.   Musculoskeletal:  Positive for arthralgias and back pain. Negative for neck stiffness.   Neurological:  Negative for dizziness, tremors, seizures, syncope, weakness, light-headedness, numbness and headaches.   Psychiatric/Behavioral:  Negative for agitation, confusion and hallucinations.      Objective:     Vital Signs (Most Recent):  Temp: 98.4 °F (36.9 °C) (01/18/24 0839)  Pulse: 84 (01/18/24 0839)  Resp: 18 (01/18/24 0927)  BP: 134/84 (01/18/24 0839)  SpO2: 96 % (01/18/24 0839) Vital Signs (24h Range):  Temp:  [98 °F (36.7 °C)-99 °F (37.2 °C)] 98.4 °F (36.9 °C)  Pulse:  [78-98] 84  Resp:  [18-20] 18  SpO2:  [95 %-97 %] 96 %  BP: (110-134)/(63-84) 134/84     Weight: 110.7 kg (244 lb 0.8 oz)  Body mass index is 32.2 kg/m².    Intake/Output Summary (Last 24 hours) at 1/18/2024 0938  Last data filed at 1/18/2024 0342  Gross per 24 hour   Intake --   Output 700 ml   Net -700 ml         Physical Exam  Vitals and nursing note reviewed.   Constitutional:       General: He is not in acute distress.     Appearance: He is well-developed. He is not diaphoretic.   HENT:      Head: Normocephalic and atraumatic.      Right Ear: External ear normal.      Left Ear:  External ear normal.      Nose: Nose normal. No congestion.      Mouth/Throat:      Pharynx: Oropharynx is clear.   Eyes:      General: No scleral icterus.     Extraocular Movements: Extraocular movements intact.      Pupils: Pupils are equal, round, and reactive to light.   Cardiovascular:      Rate and Rhythm: Normal rate and regular rhythm.      Pulses: Normal pulses.      Heart sounds: Normal heart sounds. No murmur heard.  Pulmonary:      Effort: Pulmonary effort is normal. No respiratory distress.      Breath sounds: Normal breath sounds. No wheezing or rales.   Abdominal:      General: Bowel sounds are normal. There is no distension.      Palpations: Abdomen is soft.      Tenderness: There is no abdominal tenderness. There is no guarding or rebound.   Musculoskeletal:         General: Swelling (L knee warm, swollen and tender) present. No tenderness.      Cervical back: Normal range of motion.      Right lower leg: No edema.      Left lower leg: No edema.      Comments: L knee in clean, intact dressing. No point tenderness to back, no midline tenderness   Skin:     General: Skin is warm and dry.      Capillary Refill: Capillary refill takes less than 2 seconds.   Neurological:      General: No focal deficit present.      Mental Status: He is alert and oriented to person, place, and time. Mental status is at baseline.   Psychiatric:         Mood and Affect: Mood normal.         Behavior: Behavior normal.         Thought Content: Thought content normal.      Comments: Flat affect             Significant Labs: All pertinent labs within the past 24 hours have been reviewed.    Significant Imaging: I have reviewed all pertinent imaging results/findings within the past 24 hours.

## 2024-01-18 NOTE — PLAN OF CARE
Post-Acute Therapy Recommendation: high intensity     Evaluation completed today. PT goals appropriate.    Please continue Progressive Mobility Protocol as appropriate.    Appropriate transfer level with nursing staff: bed level therex    2024    Problem: Physical Therapy  Goal: Physical Therapy Goal  Description: Goals to be met by: 2024     Patient will increase functional independence with mobility by performin. Supine to sit with Modified [place]  2. Sit to supine with Modified [place]  3. Sit to stand transfer with Moderate Assistance  4. Bed to chair transfer with Maximum Assistance using RW or LRAD  5. Gait  x 20 feet with Moderate Assistance using RW or LRAD.   6. Lower extremity exercise program x30 reps per handout, with independence    Outcome: Ongoing, Progressing

## 2024-01-18 NOTE — ASSESSMENT & PLAN NOTE
Patient has hyponatremia which is uncontrolled,  We will monitor sodium Daily.   The hyponatremia is due to Dehydration/hypovolemia.  We will obtain the following studies: Urine sodium, urine osmolality, serum osmolality or TSH, T4.   S/p IVF   Recent Labs   Lab 01/18/24  0818   *

## 2024-01-18 NOTE — ASSESSMENT & PLAN NOTE
S/p Left knee arthrotomy and synovectomy (12/29/23)  -re-order pT/ot  Pt with left knee sutures, tenderness to palpation near medial tibial plateau  - ortho consulted, bedside aspiration completed   - taken to OR for wound wash out   - MRI pelvis: Myositis and fasciitis, with soft tissue gas dissecting along the anterior compartment myofascial planes of the proximal left thigh.   - MRI L shoulder: Prominent subdeltoid enhancement/ bursitis. Superimposed infection may be present. No drainable fluid collections.   - MRI knee: s/p I&D w/ minimal residual fluid collection within the joint space. There is a large air-fluid collection extending supero-medially from the joint space along the medial femoral shaft, beyond the field of view. Prominent surrounding soft tissue inflammatory changes noted, with involvement of the vastus medialis, vastus lateralis, and popliteus musculature.  - MRI T/L spine: No MR imaging findings to account for reported history of MRSA bacteremia. No spondylodiscitis or facet joint septic arthritis

## 2024-01-18 NOTE — CONSULTS
[address] [facility] Observation [facility]  Infectious Disease  Consult Note    Patient Name: Kulwant Mueller Jr.  MRN: [id number]  Admission Date: 1/14/2024  Hospital Length of Stay: 2 days  Attending Physician: Lizbeth Marquez MD  Primary Care Provider: Shellie, Primary Doctor     Isolation Status: Contact    Patient information was obtained from patient and ER records.      Consults  Assessment/Plan:     Other  Fever  38M with h/o May-Thurner syndrome on Xarelto, DM2 on insulin, chronic foot wounds following with wound care, and HTN, recent admission for MRSA bacteremia. Bcx + 12/27/23, c/b left knee septic arthritis, s/p L knee arthrotomy with synovectomy on 12/29. Cx with MRSA, ROBY 1/02/24 neg, required salvage therapy & cleared 1/07, placed on daptomycin monotherapy. Of note, the patient also had L shoulder aspirated imaging without evidence of infection. R heel without osteo but had cellulitis, myositis, tenosynovitis. He started having new fevers at rehab, and is now admitted for workup. Fevers were up to 103 F, blood cultures collected 1/14/24 are NGTD. ID was consulted for recent MRSA bacteremia from septic arthritis - new fevers @ IP rehab, on daptomycin. In the meantime, the patient was taken to the OR on the 15th for arthroscopic washout. While his cell count was c/e septic arthritis, his synovial cultures are also NGTD.     Recommendations  Continue daptomycin/ceftaroline for now  Will follow but anticipating another 4 weeks of abx from last washout (est end date: 2/11/24)  Trend CPK and CRP with weekly labs          Thank you for your consult. I will follow-up with patient. Please contact us if you have any additional questions.    Kelsey Ayoub MD  Infectious Disease  [address] - Observ[facility] 11[facility]    Subjective:     Principal Problem: Staphylococcal arthritis of left knee    HPI: Mr. De Anda is a pleasant 38 y.o. man with May-Thurner syndrome on Xarelto, DM2 on insulin, chronic foot wounds following with wound care, and HTN,  recent admission for MRSA bacteremia. Index Bcx + 12/27/23, complicated by left knee septic arthritis, s/p L knee arthrotomy with synovectomy on 12/29. Cx with MRSA, ROBY 1/02/24 neg, required salvage therapy & cleared 1/07, placed on daptomycin monotherapy. Of note, the patient also had L shoulder aspirated imaging without evidence of infection. R heel without osteo but had cellulitis, myositis, tenosynovitis. He started having new fevers at rehab, and is now admitted for workup. Fevers were up to 103 F, blood cultures collected 1/14/24 are NGTD. ID was consulted for recent MRSA bacteremia from septic arthritis - new fevers @ IP rehab, on daptomycin. In the meantime, the patient was taken to the OR on the 15th for arthroscopic washout. Cultures are also NGTD. The patient is feeling better - denies fever or chills.        Interval history: naeo. Tolerating abx. Expects to go to rehab before going back home. 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 Denies trauma prior to arrival. Injects insulin, but denies ever reusing needles. Denies boils.       Past Surgical History:   Procedure Laterality Date    APPENDECTOMY      ARTHROSCOPY OF KNEE Left 1/16/2024    Procedure: ARTHROSCOPY, KNEE, LEFT;  Surgeon: Mandeep Gatica MD;  Location: [address];  Service: Orthopedics;  Laterality: Left;    ARTHROTOMY OF KNEE Left 12/29/2023    Procedure: ARTHROTOMY, KNEE;  Surgeon: Edy Bocanegra Jr., MD;  Location: [facility] OR;  Service: Orthopedics;  Laterality: Left;    ESOPHAGOGASTRODUODENOSCOPY N/A 1/31/2022    Procedure: EGD (ESOPHAGOGASTRODUODENOSCOPY);  Surgeon: Cindy Quiros MD;  Location: [facility];  Service: Endoscopy;  Laterality: N/A;    ESOPHAGOGASTRODUODENOSCOPY N/A 3/21/2022    Procedure: EGD (ESOPHAGOGASTRODUODENOSCOPY);  Surgeon: Tejas Mann MD;  Location: [facility] ENDO;  Service: Endoscopy;  Laterality: N/A;    INCISION AND DRAINAGE FOOT Left 11/28/2021    Procedure:  "INCISION AND DRAINAGE, FOOT;  Surgeon: Bj Alicea DPM;  Location: [facility] OR;  Service: Podiatry;  Laterality: Left;    stents in bilateral legs      TRANSESOPHAGEAL ECHOCARDIOGRAPHY N/A 1/3/2024    Procedure: ECHOCARDIOGRAM, TRANSESOPHAGEAL;  Surgeon: Douglas Doss MD;  Location: [facility] CATH LAB/EP;  Service: Cardiology;  Laterality: N/A;       Review of patient's allergies indicates:   Allergen Reactions    Heparin analogues Nausea And Vomiting       Medications:  Medications Prior to Admission   Medication Sig    metoprolol succinate (TOPROL-XL) 25 MG 24 hr tablet Take 0.5 tablets (12.5 mg total) by mouth once daily.    blood-glucose meter,continuous (DEXCOM G7 ) Misc 1 Device by Misc.(Non-Drug; Combo Route) route once daily.    blood-glucose sensor (DEXCOM G7 SENSOR) Justyna 1 Device by Misc.(Non-Drug; Combo Route) route every 10 days.    empagliflozin (JARDIANCE) 25 mg tablet Take 1 tablet (25 mg total) by mouth once daily.    furosemide (LASIX) 20 MG tablet Take 1 tablet (20 mg total) by mouth once daily.    glucagon (GVOKE HYPOPEN 2-PACK) 1 mg/0.2 mL AtIn Inject 1 mg into the skin as needed (SEVERE HYPOGLYCEMIA).    insulin aspart U-100 (NOVOLOG U-100 INSULIN ASPART) 100 unit/mL injection Inject 14 Units into the skin 3 (three) times daily before meals.    metoclopramide HCl (REGLAN) 10 MG tablet Take 0.5 tablets (5 mg total) by mouth 3 (three) times daily before meals.    pantoprazole (PROTONIX) 40 MG tablet Take 1 tablet (40 mg total) by mouth once daily.    pen needle, diabetic (BD ULTRA-FINE DIYA PEN NEEDLE) 32 gauge x 5/32" Ndle Use with Tresiba daily and Humalog 3-4 times daily as directed.    prochlorperazine (COMPAZINE) 10 MG tablet Take 1 tablet (10 mg total) by mouth 3 (three) times daily as needed (nausea or vomiting).    promethazine (PHENERGAN) 25 MG suppository Place 1 suppository (25 mg total) rectally every 6 (six) hours as needed for Nausea.    rivaroxaban (XARELTO) 10 mg Tab " Take 1 tablet (10 mg total) by mouth daily with dinner or evening meal.    scopolamine (TRANSDERM-SCOP) 1.3-1.5 mg (1 mg over 3 days) Place 1 patch onto the skin every 72 hours as needed (intractable nausea/vomiting).    sodium chloride 0.9% SolP 50 mL with DAPTOmycin 500 mg SolR 947 mg Inject 947 mg into the vein once daily.    TRESIBA FLEXTOUCH U-200 200 unit/mL (3 mL) insulin pen Inject 56 Units into the skin once daily.     Antibiotics (From admission, onward)      Start     Stop Route Frequency Ordered    01/17/24 1830  ceftaroline fosamiL (TEFLARO) 600 mg in dextrose 5 % in water (D5W) 50 mL IVPB (MB+)         -- IV Every 12 hours (non-standard times) 01/17/24 1728    01/15/24 1400  DAPTOmycin (CUBICIN) 945 mg in sodium chloride 0.9% SolP 50 mL IVPB         -- IV Every 24 hours (non-standard times) 01/15/24 0229          Antifungals (From admission, onward)      None          Antivirals (From admission, onward)      None             Immunization History   Administered Date(s) Administered    COVID-19, MRNA, LN-S, PF (Pfizer) (Purple Cap) 12/28/2020, 01/19/2021, 02/11/2022    Influenza - Quadrivalent - PF *Preferred* (6 months and older) 09/29/2020, 09/30/2021, 10/20/2022, 10/20/2023       Family History       Problem Relation (Age of Onset)    Cancer Mother, Paternal Uncle, Paternal Grandfather, Maternal Grandfather    Irritable bowel syndrome Paternal Grandfather          Social History     Socioeconomic History    Marital status:    Tobacco Use    Smoking status: Former     Types: Cigarettes    Smokeless tobacco: Former    Tobacco comments:     occasionally    Substance and Sexual Activity    Alcohol use: Yes     Comment: occ    Drug use: No     Social Determinants of Health     Financial Resource Strain: Low Risk  (12/28/2023)    Overall Financial Resource Strain (CARDIA)     Difficulty of Paying Living Expenses: Not hard at all   Food Insecurity: No Food Insecurity (12/28/2023)    Hunger Vital Sign      Worried About Running Out of Food in the Last Year: Never true     Ran Out of Food in the Last Year: Never true   Transportation Needs: No Transportation Needs (12/28/2023)    PRAPARE - Transportation     Lack of Transportation (Medical): No     Lack of Transportation (Non-Medical): No   Physical Activity: Sufficiently Active (12/28/2023)    Exercise Vital Sign     Days of Exercise per Week: 3 days     Minutes of Exercise per Session: 60 min   Stress: Stress Concern Present (12/28/2023)    [employer] [place] [employer] - Occupational Stress Questionnaire     Feeling of Stress : Very much   Social Connections: Moderately Integrated (12/28/2023)    Social Connection and Isolation Panel [NHANES]     Frequency of Communication with Friends and Family: More than three times a week     Frequency of Social Gatherings with Friends and Family: More than three times a week     Attends Jew Services: More than 4 times per year     Active Member of Clubs or Organizations: No     Attends Club or Organization Meetings: Never     Marital Status:    Housing Stability: Low Risk  (12/28/2023)    Housing Stability Vital Sign     Unable to Pay for Housing in the Last Year: No     Number of Places Lived in the Last Year: 1     Unstable Housing in the Last Year: No     Review of Systems   Constitutional:  Positive for fever.   HENT:  Negative for trouble swallowing.    Respiratory:  Negative for cough and shortness of breath.    Gastrointestinal:  Negative for abdominal pain, blood in stool, diarrhea and vomiting.   Genitourinary:  Negative for dysuria and hematuria.   Musculoskeletal:  Positive for arthralgias and joint swelling. Negative for neck stiffness.   Neurological:  Negative for syncope.   Psychiatric/Behavioral:  Negative for confusion.    All other systems reviewed and are negative.    Objective:     Vital Signs (Most Recent):  Temp: 98.1 °F (36.7 °C) (01/18/24 1200)  Pulse: 90 (01/18/24  1200)  Resp: 18 (01/18/24 0927)  BP: 122/70 (01/18/24 1200)  SpO2: 97 % (01/18/24 1200) Vital Signs (24h Range):  Temp:  [98 °F (36.7 °C)-99 °F (37.2 °C)] 98.1 °F (36.7 °C)  Pulse:  [78-98] 90  Resp:  [18-20] 18  SpO2:  [95 %-97 %] 97 %  BP: (110-134)/(63-84) 122/70     Weight: 110.7 kg (244 lb 0.8 oz)  Body mass index is 32.2 kg/m².    Estimated Creatinine Clearance: 182.9 mL/min (based on SCr of 0.7 mg/dL).     Physical Exam  Vitals and nursing note reviewed.   Constitutional:       Appearance: Normal appearance.   HENT:      Head: Normocephalic and atraumatic.      Nose: Nose normal.      Mouth/Throat:      Mouth: Mucous membranes are moist.      Pharynx: Oropharynx is clear.   Eyes:      Conjunctiva/sclera: Conjunctivae normal.   Cardiovascular:      Rate and Rhythm: Normal rate and regular rhythm.      Pulses: Normal pulses.      Heart sounds: Normal heart sounds.   Pulmonary:      Effort: Pulmonary effort is normal.      Breath sounds: Normal breath sounds.   Abdominal:      General: Bowel sounds are normal.      Palpations: Abdomen is soft.      Tenderness: There is no guarding or rebound.   Musculoskeletal:         General: Tenderness present. No deformity.      Cervical back: Neck supple.      Comments: L knee swelling and tenerness. Surgical dressings c/d/I. No drains   Skin:     General: Skin is warm and dry.      Coloration: Skin is not jaundiced.      Findings: No rash.   Neurological:      Mental Status: He is alert and oriented to person, place, and time. Mental status is at baseline.   Psychiatric:         Mood and Affect: Mood normal.         Thought Content: Thought content normal.         Judgment: Judgment normal.          Significant Labs:   Pathology Results  (Last 10 years)                 03/21/22 1419  Specimen to Pathology, Surgery Gastrointestinal tract Final result    Narrative:  Pre-op Diagnosis: [place] grade C esophagitis [K20.80]   Candida esophagitis [B37.81]   Procedure(s):    EGD (ESOPHAGOGASTRODUODENOSCOPY)   1. Antrum bx r/o h pylori   2. Distal esophagus bx   Release to patient->Immediate   Specimen total (fresh, frozen, permanent):->2       01/31/22 1156  Specimen to Pathology, Surgery Gastrointestinal tract Final result    Narrative:  Pre-op Diagnosis: Nausea and vomiting, intractability of   vomiting not specified, unspecified vomiting type [R11.2]   Diabetic gastroparesis associated with type 2 diabetes   mellitus [E11.43, K31.84]   Procedure(s):   EGD (ESOPHAGOGASTRODUODENOSCOPY)   Number of specimens: 2   Name of specimens:   #1 Gastric Bxs r/o H Pylori   #2 Esophageal Bxs r/o Candida   Release to patient->Immediate   Specimen total (fresh, frozen, permanent):->2             All pertinent labs within the past 24 hours have been reviewed.    Significant Imaging: I have reviewed all pertinent imaging results/findings within the past 24 hours.

## 2024-01-18 NOTE — SUBJECTIVE & OBJECTIVE
Interval history: naeo. Tolerating abx. Expects to go to rehab before going back home. 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 Denies trauma prior to arrival. Injects insulin, but denies ever reusing needles. Denies boils.       Past Surgical History:   Procedure Laterality Date    APPENDECTOMY      ARTHROSCOPY OF KNEE Left 1/16/2024    Procedure: ARTHROSCOPY, KNEE, LEFT;  Surgeon: Mandeep Gatica MD;  Location: [address];  Service: Orthopedics;  Laterality: Left;    ARTHROTOMY OF KNEE Left 12/29/2023    Procedure: ARTHROTOMY, KNEE;  Surgeon: Edy Bocanegra Jr., MD;  Location: [facility] OR;  Service: Orthopedics;  Laterality: Left;    ESOPHAGOGASTRODUODENOSCOPY N/A 1/31/2022    Procedure: EGD (ESOPHAGOGASTRODUODENOSCOPY);  Surgeon: Cindy Quiros MD;  Location: [facility];  Service: Endoscopy;  Laterality: N/A;    ESOPHAGOGASTRODUODENOSCOPY N/A 3/21/2022    Procedure: EGD (ESOPHAGOGASTRODUODENOSCOPY);  Surgeon: Tejas Mann MD;  Location: [facility];  Service: Endoscopy;  Laterality: N/A;    INCISION AND DRAINAGE FOOT Left 11/28/2021    Procedure: INCISION AND DRAINAGE, FOOT;  Surgeon: Bj Alicea DPM;  Location: [facility] OR;  Service: Podiatry;  Laterality: Left;    stents in bilateral legs      TRANSESOPHAGEAL ECHOCARDIOGRAPHY N/A 1/3/2024    Procedure: ECHOCARDIOGRAM, TRANSESOPHAGEAL;  Surgeon: Douglas Doss MD;  Location: [facility] CATH LAB/EP;  Service: Cardiology;  Laterality: N/A;       Review of patient's allergies indicates:   Allergen Reactions    Heparin analogues Nausea And Vomiting       Medications:  Medications Prior to Admission   Medication Sig    metoprolol succinate (TOPROL-XL) 25 MG 24 hr tablet Take 0.5 tablets (12.5 mg total) by mouth once daily.    blood-glucose meter,continuous (DEXCOM G7 ) Misc 1 Device by Misc.(Non-Drug; Combo Route) route once daily.    blood-glucose sensor (DEXCOM G7 SENSOR) Justyna 1 Device by Misc.(Non-Drug; Combo  "Route) route every 10 days.    empagliflozin (JARDIANCE) 25 mg tablet Take 1 tablet (25 mg total) by mouth once daily.    furosemide (LASIX) 20 MG tablet Take 1 tablet (20 mg total) by mouth once daily.    glucagon (GVOKE HYPOPEN 2-PACK) 1 mg/0.2 mL AtIn Inject 1 mg into the skin as needed (SEVERE HYPOGLYCEMIA).    insulin aspart U-100 (NOVOLOG U-100 INSULIN ASPART) 100 unit/mL injection Inject 14 Units into the skin 3 (three) times daily before meals.    metoclopramide HCl (REGLAN) 10 MG tablet Take 0.5 tablets (5 mg total) by mouth 3 (three) times daily before meals.    pantoprazole (PROTONIX) 40 MG tablet Take 1 tablet (40 mg total) by mouth once daily.    pen needle, diabetic (BD ULTRA-FINE DIYA PEN NEEDLE) 32 gauge x 5/32" Ndle Use with Tresiba daily and Humalog 3-4 times daily as directed.    prochlorperazine (COMPAZINE) 10 MG tablet Take 1 tablet (10 mg total) by mouth 3 (three) times daily as needed (nausea or vomiting).    promethazine (PHENERGAN) 25 MG suppository Place 1 suppository (25 mg total) rectally every 6 (six) hours as needed for Nausea.    rivaroxaban (XARELTO) 10 mg Tab Take 1 tablet (10 mg total) by mouth daily with dinner or evening meal.    scopolamine (TRANSDERM-SCOP) 1.3-1.5 mg (1 mg over 3 days) Place 1 patch onto the skin every 72 hours as needed (intractable nausea/vomiting).    sodium chloride 0.9% SolP 50 mL with DAPTOmycin 500 mg SolR 947 mg Inject 947 mg into the vein once daily.    TRESIBA FLEXTOUCH U-200 200 unit/mL (3 mL) insulin pen Inject 56 Units into the skin once daily.     Antibiotics (From admission, onward)      Start     Stop Route Frequency Ordered    01/17/24 1830  ceftaroline fosamiL (TEFLARO) 600 mg in dextrose 5 % in water (D5W) 50 mL IVPB (MB+)         -- IV Every 12 hours (non-standard times) 01/17/24 1728    01/15/24 1400  DAPTOmycin (CUBICIN) 945 mg in sodium chloride 0.9% SolP 50 mL IVPB         -- IV Every 24 hours (non-standard times) 01/15/24 0229      "     Antifungals (From admission, onward)      None          Antivirals (From admission, onward)      None             Immunization History   Administered Date(s) Administered    COVID-19, MRNA, LN-S, PF (Pfizer) (Purple Cap) 12/28/2020, 01/19/2021, 02/11/2022    Influenza - Quadrivalent - PF *Preferred* (6 months and older) 09/29/2020, 09/30/2021, 10/20/2022, 10/20/2023       Family History       Problem Relation (Age of Onset)    Cancer Mother, Paternal Uncle, Paternal Grandfather, Maternal Grandfather    Irritable bowel syndrome Paternal Grandfather          Social History     Socioeconomic History    Marital status:    Tobacco Use    Smoking status: Former     Types: Cigarettes    Smokeless tobacco: Former    Tobacco comments:     occasionally    Substance and Sexual Activity    Alcohol use: Yes     Comment: occ    Drug use: No     Social Determinants of Health     Financial Resource Strain: Low Risk  (12/28/2023)    Overall Financial Resource Strain (CARDIA)     Difficulty of Paying Living Expenses: Not hard at all   Food Insecurity: No Food Insecurity (12/28/2023)    Hunger Vital Sign     Worried About Running Out of Food in the Last Year: Never true     Ran Out of Food in the Last Year: Never true   Transportation Needs: No Transportation Needs (12/28/2023)    PRAPARE - Transportation     Lack of Transportation (Medical): No     Lack of Transportation (Non-Medical): No   Physical Activity: Sufficiently Active (12/28/2023)    Exercise Vital Sign     Days of Exercise per Week: 3 days     Minutes of Exercise per Session: 60 min   Stress: Stress Concern Present (12/28/2023)    [place] [place] of [employer]ational [employer] - Occupational Stress Questionnaire     Feeling of Stress : Very much   Social Connections: Moderately Integrated (12/28/2023)    Social Connection and Isolation Panel [NHANES]     Frequency of Communication with Friends and Family: More than three times a week     Frequency of Social  Gatherings with Friends and Family: More than three times a week     Attends [employer] Services: More than 4 times per year     Active Member of Clubs or Organizations: No     Attends Club or Organization Meetings: Never     Marital Status:    Housing Stability: Low Risk  (12/28/2023)    Housing Stability Vital Sign     Unable to Pay for Housing in the Last Year: No     Number of Places Lived in the Last Year: 1     Unstable Housing in the Last Year: No     Review of Systems   Constitutional:  Positive for fever.   HENT:  Negative for trouble swallowing.    Respiratory:  Negative for cough and shortness of breath.    Gastrointestinal:  Negative for abdominal pain, blood in stool, diarrhea and vomiting.   Genitourinary:  Negative for dysuria and hematuria.   Musculoskeletal:  Positive for arthralgias and joint swelling. Negative for neck stiffness.   Neurological:  Negative for syncope.   Psychiatric/Behavioral:  Negative for confusion.    All other systems reviewed and are negative.    Objective:     Vital Signs (Most Recent):  Temp: 98.1 °F (36.7 °C) (01/18/24 1200)  Pulse: 90 (01/18/24 1200)  Resp: 18 (01/18/24 0927)  BP: 122/70 (01/18/24 1200)  SpO2: 97 % (01/18/24 1200) Vital Signs (24h Range):  Temp:  [98 °F (36.7 °C)-99 °F (37.2 °C)] 98.1 °F (36.7 °C)  Pulse:  [78-98] 90  Resp:  [18-20] 18  SpO2:  [95 %-97 %] 97 %  BP: (110-134)/(63-84) 122/70     Weight: 110.7 kg (244 lb 0.8 oz)  Body mass index is 32.2 kg/m².    Estimated Creatinine Clearance: 182.9 mL/min (based on SCr of 0.7 mg/dL).     Physical Exam  Vitals and nursing note reviewed.   Constitutional:       Appearance: Normal appearance.   HENT:      Head: Normocephalic and atraumatic.      Nose: Nose normal.      Mouth/Throat:      Mouth: Mucous membranes are moist.      Pharynx: Oropharynx is clear.   Eyes:      Conjunctiva/sclera: Conjunctivae normal.   Cardiovascular:      Rate and Rhythm: Normal rate and regular rhythm.      Pulses: Normal  pulses.      Heart sounds: Normal heart sounds.   Pulmonary:      Effort: Pulmonary effort is normal.      Breath sounds: Normal breath sounds.   Abdominal:      General: Bowel sounds are normal.      Palpations: Abdomen is soft.      Tenderness: There is no guarding or rebound.   Musculoskeletal:         General: Tenderness present. No deformity.      Cervical back: Neck supple.      Comments: L knee swelling and tenerness. Surgical dressings c/d/I. No drains   Skin:     General: Skin is warm and dry.      Coloration: Skin is not jaundiced.      Findings: No rash.   Neurological:      Mental Status: He is alert and oriented to person, place, and time. Mental status is at baseline.   Psychiatric:         Mood and Affect: Mood normal.         Thought Content: Thought content normal.         Judgment: Judgment normal.          Significant Labs:   Pathology Results  (Last 10 years)                 03/21/22 1419  Specimen to Pathology, Surgery Gastrointestinal tract Final result    Narrative:  Pre-op Diagnosis: [place] grade C esophagitis [K20.80]   Candida esophagitis [B37.81]   Procedure(s):   EGD (ESOPHAGOGASTRODUODENOSCOPY)   1. Antrum bx r/o h pylori   2. Distal esophagus bx   Release to patient->Immediate   Specimen total (fresh, frozen, permanent):->2       01/31/22 1156  Specimen to Pathology, Surgery Gastrointestinal tract Final result    Narrative:  Pre-op Diagnosis: Nausea and vomiting, intractability of   vomiting not specified, unspecified vomiting type [R11.2]   Diabetic gastroparesis associated with type 2 diabetes   mellitus [E11.43, K31.84]   Procedure(s):   EGD (ESOPHAGOGASTRODUODENOSCOPY)   Number of specimens: 2   Name of specimens:   #1 Gastric Bxs r/o H Pylori   #2 Esophageal Bxs r/o Candida   Release to patient->Immediate   Specimen total (fresh, frozen, permanent):->2             All pertinent labs within the past 24 hours have been reviewed.    Significant Imaging: I have reviewed all  pertinent imaging results/findings within the past 24 hours.

## 2024-01-18 NOTE — PROGRESS NOTES
[facility] [place]  Orthopedics  Progress Note    Patient Name: Kulwant Mueller Jr.  MRN: [id number]  Admission Date: 1/14/2024  Hospital Length of Stay: 2 days  Attending Provider: Lizbeth Marquez MD  Primary Care Provider: Shellie Primary Doctor  Follow-up For: Procedure(s) (LRB):  ARTHROSCOPY, KNEE, LEFT (Left)    Post-Operative Day: 2 Days Post-Op  Subjective:     Principal Problem:Staphylococcal arthritis of left knee    Principal Orthopedic Problem: s/p L knee arthroscopic I&D on 1/17    Interval History: NAEON. VSS. Afebrile overnight. Continued L knee pain. CRP down to 100 today. WBC down to 13.     Review of patient's allergies indicates:   Allergen Reactions    Heparin analogues Nausea And Vomiting       Current Facility-Administered Medications   Medication    acetaminophen tablet 1,000 mg    ammonium lactate 12 % lotion    ceftaroline fosamiL (TEFLARO) 600 mg in dextrose 5 % in water (D5W) 50 mL IVPB (MB+)    DAPTOmycin (CUBICIN) 945 mg in sodium chloride 0.9% SolP 50 mL IVPB    dextrose 10% bolus 125 mL 125 mL    dextrose 10% bolus 250 mL 250 mL    glucagon (human recombinant) injection 1 mg    glucose chewable tablet 16 g    glucose chewable tablet 24 g    insulin aspart U-100 pen 0-5 Units    insulin aspart U-100 pen 8 Units    insulin detemir U-100 (Levemir) pen 17 Units    melatonin tablet 6 mg    methocarbamoL tablet 500 mg    metoclopramide HCl tablet 5 mg    metoprolol succinate (TOPROL-XL) 24 hr split tablet 12.5 mg    morphine injection 4 mg    naloxone 0.4 mg/mL injection 0.02 mg    ondansetron injection 4 mg    oxyCODONE immediate release tablet 5 mg    oxyCODONE immediate release tablet 5 mg    oxyCODONE immediate release tablet Tab 10 mg    pantoprazole EC tablet 40 mg    polyethylene glycol packet 17 g    prochlorperazine injection Soln 5 mg    rivaroxaban tablet 10 mg    scopolamine 1.3-1.5 mg (1 mg over 3 days) 1 patch    senna-docusate 8.6-50 mg per tablet 1 tablet    sodium chloride  "0.9% flush 10 mL    sodium chloride 0.9% flush 10 mL    sodium chloride 0.9% flush 10 mL    And    sodium chloride 0.9% flush 10 mL     Objective:     Vital Signs (Most Recent):  Temp: 98.1 °F (36.7 °C) (01/18/24 1200)  Pulse: 90 (01/18/24 1200)  Resp: 18 (01/18/24 0927)  BP: 122/70 (01/18/24 1200)  SpO2: 97 % (01/18/24 1200) Vital Signs (24h Range):  Temp:  [98 °F (36.7 °C)-98.5 °F (36.9 °C)] 98.1 °F (36.7 °C)  Pulse:  [78-92] 90  Resp:  [18-20] 18  SpO2:  [96 %-97 %] 97 %  BP: (111-134)/(65-84) 122/70     Weight: 110.7 kg (244 lb 0.8 oz)  Height: 6' 1" (185.4 cm)  Body mass index is 32.2 kg/m².      Intake/Output Summary (Last 24 hours) at 1/18/2024 1451  Last data filed at 1/18/2024 0342  Gross per 24 hour   Intake --   Output 700 ml   Net -700 ml         Ortho/SPM Exam     Awake/alert/oriented x3, No acute distress, Afebrile, Vital signs stable  Good inspiratory effort with unlaboured breathing  Dressings c/d/I  Pain with knee PROM 5-70  NVI in operative limb       Significant Labs: All pertinent labs within the past 24 hours have been reviewed.    Significant Imaging: I have reviewed all pertinent imaging results/findings.  Assessment/Plan:     * Staphylococcal arthritis of left knee  Kulwant Mueller Jr. is a 38 y.o. male admitted for workup of fevers of unknown source, recent irrigation and debridement of left knee performed at [facility] on 12/29.  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 Now s/p left knee arthroscopic I&D on 1/17.    Plan to trend CRP q.48h.  Follow-up most recent aspiration cultures and final ID recommendations.      Pain control:  Multimodal  PT/OT: WBAT left lower extremity  DVT PPx:  Xarelto, SCDs at all times when not ambulating  Abx:  Ceftaroline/ daptomycin per ID, pending final cultures and ID recs  Labs:  CRP down to 100 today, continue to trend    Cx: Prior left knee cultures growing MRSA    Dispo: f/u final cultures and ID " recs            David France MD  Orthopedics  [facility] - [facility]

## 2024-01-18 NOTE — SUBJECTIVE & OBJECTIVE
Principal Problem:Staphylococcal arthritis of left knee    Principal Orthopedic Problem: s/p L knee arthroscopic I&D on 1/17    Interval History: NAEON. VSS. Afebrile overnight. Continued L knee pain. CRP down to 100 today. WBC down to 13.     Review of patient's allergies indicates:   Allergen Reactions    Heparin analogues Nausea And Vomiting       Current Facility-Administered Medications   Medication    acetaminophen tablet 1,000 mg    ammonium lactate 12 % lotion    ceftaroline fosamiL (TEFLARO) 600 mg in dextrose 5 % in water (D5W) 50 mL IVPB (MB+)    DAPTOmycin (CUBICIN) 945 mg in sodium chloride 0.9% SolP 50 mL IVPB    dextrose 10% bolus 125 mL 125 mL    dextrose 10% bolus 250 mL 250 mL    glucagon (human recombinant) injection 1 mg    glucose chewable tablet 16 g    glucose chewable tablet 24 g    insulin aspart U-100 pen 0-5 Units    insulin aspart U-100 pen 8 Units    insulin detemir U-100 (Levemir) pen 17 Units    melatonin tablet 6 mg    methocarbamoL tablet 500 mg    metoclopramide HCl tablet 5 mg    metoprolol succinate (TOPROL-XL) 24 hr split tablet 12.5 mg    morphine injection 4 mg    naloxone 0.4 mg/mL injection 0.02 mg    ondansetron injection 4 mg    oxyCODONE immediate release tablet 5 mg    oxyCODONE immediate release tablet 5 mg    oxyCODONE immediate release tablet Tab 10 mg    pantoprazole EC tablet 40 mg    polyethylene glycol packet 17 g    prochlorperazine injection Soln 5 mg    rivaroxaban tablet 10 mg    scopolamine 1.3-1.5 mg (1 mg over 3 days) 1 patch    senna-docusate 8.6-50 mg per tablet 1 tablet    sodium chloride 0.9% flush 10 mL    sodium chloride 0.9% flush 10 mL    sodium chloride 0.9% flush 10 mL    And    sodium chloride 0.9% flush 10 mL     Objective:     Vital Signs (Most Recent):  Temp: 98.1 °F (36.7 °C) (01/18/24 1200)  Pulse: 90 (01/18/24 1200)  Resp: 18 (01/18/24 0927)  BP: 122/70 (01/18/24 1200)  SpO2: 97 % (01/18/24 1200) Vital Signs (24h Range):  Temp:  [98 °F (36.7  "°C)-98.5 °F (36.9 °C)] 98.1 °F (36.7 °C)  Pulse:  [78-92] 90  Resp:  [18-20] 18  SpO2:  [96 %-97 %] 97 %  BP: (111-134)/(65-84) 122/70     Weight: 110.7 kg (244 lb 0.8 oz)  Height: 6' 1" (185.4 cm)  Body mass index is 32.2 kg/m².      Intake/Output Summary (Last 24 hours) at 1/18/2024 1451  Last data filed at 1/18/2024 0342  Gross per 24 hour   Intake --   Output 700 ml   Net -700 ml          Ortho/SPM Exam     Awake/alert/oriented x3, No acute distress, Afebrile, Vital signs stable  Good inspiratory effort with unlaboured breathing  Dressings c/d/I  Pain with knee PROM 5-70  NVI in operative limb       Significant Labs: All pertinent labs within the past 24 hours have been reviewed.    Significant Imaging: I have reviewed all pertinent imaging results/findings.  "

## 2024-01-18 NOTE — ASSESSMENT & PLAN NOTE
- trend CMP daily, improving  - check GGT with rising Alk phos levels  - Prior w/u @ [facility] included - CT A/P, w/o abnormality, Hepatitis panel negative, RUQ ultrasound with hepatomegaly and biliary sludge; but without other obvious abnormalities

## 2024-01-18 NOTE — PLAN OF CARE
[facility]  Discharge Reassessment    Primary Care Provider: No, Primary Doctor    Expected Discharge Date: 1/24/2024    Reassessment (most recent)       Discharge Reassessment - 01/18/24 1334          Discharge Reassessment    Assessment Type Discharge Planning Reassessment     Did the patient's condition or plan change since previous assessment? No     Discharge Plan discussed with: Spouse/sig other;Patient     Communicated RAEANN with patient/caregiver Date not available/Unable to determine     Discharge Plan A Rehab     Discharge Plan B Skilled Nursing Facility     DME Needed Upon Discharge  none     Transition of Care Barriers None     Why the patient remains in the hospital Requires continued medical care        Post-Acute Status    Post-Acute Authorization Placement     Post-Acute Placement Status Referrals Sent     Discharge Delays None known at this time                   Kulwant Mueller Jr. Is a 38 y.o. male who was admitted to hospital medicine for fever. WBC 15.86 >> 13.92. Sed rate 97, CRPP 211.9. On Daptomycin from previous discharge, continuing and added zosyn. ID consulted. Blood cultures pending. Orthopedics consulted, joint aspiration performed at bedside. Taken to the OR for L knee washout. MRI pelvis: Myositis and fasciitis, with soft tissue gas dissecting along the anterior compartment myofascial planes of the proximal left thigh. MRI L shoulder: Prominent subdeltoid enhancement/ bursitis. Superimposed infection may be present. No drainable fluid collections. MRI knee: s/p I&D w/ minimal residual fluid collection within the joint space. There is a large air-fluid collection extending supero-medially from the joint space along the medial femoral shaft, beyond the field of view. Prominent surrounding soft tissue inflammatory changes noted, with involvement of the vastus medialis, vastus lateralis, and popliteus musculature. MRI T/L spine: No MR imaging findings to account for  reported history of MRSA bacteremia. No spondylodiscitis or facet joint septic arthritis  Patient will be discharged when medically ready.      Interval History: NAEON. Pt seen and evaluated at bedside this am. Cx w/ NGTD. Labs pending. Following ortho recommendations. Continuing IV abx per ID. CM following for post acute needs. PTOT evaluation.  Will continue to update plan as needed.  Marciano Alvarado RN,BSN

## 2024-01-18 NOTE — ASSESSMENT & PLAN NOTE
"Patient has hyponatremia which is uncontrolled,  We will aim to correct the sodium by 4-6mEq in 24 hours.   We will monitor sodium Daily.   The hyponatremia is due to Dehydration/hypovolemia.   We will obtain the following studies: Urine sodium, urine osmolality, serum osmolality or TSH, T4.   We will treat the hyponatremia with IV fluids as follows: Start Saline 200cc/hr x 7 hours. The patient's sodium results have been reviewed and are listed below.  No results for input(s): "NA" in the last 24 hours.    "

## 2024-01-19 LAB
ANION GAP SERPL CALC-SCNC: 6 MMOL/L (ref 8–16)
APPEARANCE FLD: NORMAL
BACTERIA BLD CULT: NORMAL
BACTERIA BLD CULT: NORMAL
BASOPHILS # BLD AUTO: 0.03 K/UL (ref 0–0.2)
BASOPHILS NFR BLD: 0.2 % (ref 0–1.9)
BODY FLD TYPE: NORMAL
BODY FLUID COMMENTS: NORMAL
BUN SERPL-MCNC: 7 MG/DL (ref 6–20)
CALCIUM SERPL-MCNC: 8.6 MG/DL (ref 8.7–10.5)
CHLORIDE SERPL-SCNC: 101 MMOL/L (ref 95–110)
CO2 SERPL-SCNC: 26 MMOL/L (ref 23–29)
COLOR FLD: NORMAL
CREAT SERPL-MCNC: 0.7 MG/DL (ref 0.5–1.4)
CREAT UR-MCNC: 158 MG/DL (ref 23–375)
DIFFERENTIAL METHOD BLD: ABNORMAL
EOSINOPHIL # BLD AUTO: 0.1 K/UL (ref 0–0.5)
EOSINOPHIL NFR BLD: 0.7 % (ref 0–8)
ERYTHROCYTE [DISTWIDTH] IN BLOOD BY AUTOMATED COUNT: 14.2 % (ref 11.5–14.5)
EST. GFR  (NO RACE VARIABLE): >60 ML/MIN/1.73 M^2
GLUCOSE SERPL-MCNC: 188 MG/DL (ref 70–110)
GRAM STN SPEC: NORMAL
GRAM STN SPEC: NORMAL
HCT VFR BLD AUTO: 28.3 % (ref 40–54)
HGB BLD-MCNC: 8.4 G/DL (ref 14–18)
IMM GRANULOCYTES # BLD AUTO: 0.08 K/UL (ref 0–0.04)
IMM GRANULOCYTES NFR BLD AUTO: 0.7 % (ref 0–0.5)
LYMPHOCYTES # BLD AUTO: 1.4 K/UL (ref 1–4.8)
LYMPHOCYTES NFR BLD: 11.4 % (ref 18–48)
LYMPHOCYTES NFR FLD MANUAL: 2 %
MCH RBC QN AUTO: 26.9 PG (ref 27–31)
MCHC RBC AUTO-ENTMCNC: 29.7 G/DL (ref 32–36)
MCV RBC AUTO: 91 FL (ref 82–98)
MONOCYTES # BLD AUTO: 0.5 K/UL (ref 0.3–1)
MONOCYTES NFR BLD: 4 % (ref 4–15)
MONOS+MACROS NFR FLD MANUAL: 10 %
NEUTROPHILS # BLD AUTO: 10.2 K/UL (ref 1.8–7.7)
NEUTROPHILS NFR BLD: 83 % (ref 38–73)
NEUTROPHILS NFR FLD MANUAL: 88 %
NRBC BLD-RTO: 0 /100 WBC
OSMOLALITY UR: 576 MOSM/KG (ref 50–1200)
PLATELET # BLD AUTO: 409 K/UL (ref 150–450)
PMV BLD AUTO: 9.3 FL (ref 9.2–12.9)
POCT GLUCOSE: 124 MG/DL (ref 70–110)
POCT GLUCOSE: 147 MG/DL (ref 70–110)
POCT GLUCOSE: 176 MG/DL (ref 70–110)
POTASSIUM SERPL-SCNC: 3.9 MMOL/L (ref 3.5–5.1)
RBC # BLD AUTO: 3.12 M/UL (ref 4.6–6.2)
SODIUM SERPL-SCNC: 133 MMOL/L (ref 136–145)
SODIUM UR-SCNC: 41 MMOL/L (ref 20–250)
WBC # BLD AUTO: 12.27 K/UL (ref 3.9–12.7)
WBC # FLD: NORMAL /CU MM

## 2024-01-19 PROCEDURE — 87075 CULTR BACTERIA EXCEPT BLOOD: CPT | Performed by: STUDENT IN AN ORGANIZED HEALTH CARE EDUCATION/TRAINING PROGRAM

## 2024-01-19 PROCEDURE — 27000207 HC ISOLATION

## 2024-01-19 PROCEDURE — 36415 COLL VENOUS BLD VENIPUNCTURE: CPT | Performed by: PHYSICIAN ASSISTANT

## 2024-01-19 PROCEDURE — 25000003 PHARM REV CODE 250: Performed by: HOSPITALIST

## 2024-01-19 PROCEDURE — 25000003 PHARM REV CODE 250: Performed by: STUDENT IN AN ORGANIZED HEALTH CARE EDUCATION/TRAINING PROGRAM

## 2024-01-19 PROCEDURE — 0J9L30Z DRAINAGE OF RIGHT UPPER LEG SUBCUTANEOUS TISSUE AND FASCIA WITH DRAINAGE DEVICE, PERCUTANEOUS APPROACH: ICD-10-PCS | Performed by: STUDENT IN AN ORGANIZED HEALTH CARE EDUCATION/TRAINING PROGRAM

## 2024-01-19 PROCEDURE — 63600175 PHARM REV CODE 636 W HCPCS: Mod: JZ,JG | Performed by: INTERNAL MEDICINE

## 2024-01-19 PROCEDURE — 89051 BODY FLUID CELL COUNT: CPT | Performed by: STUDENT IN AN ORGANIZED HEALTH CARE EDUCATION/TRAINING PROGRAM

## 2024-01-19 PROCEDURE — 25000003 PHARM REV CODE 250: Performed by: INTERNAL MEDICINE

## 2024-01-19 PROCEDURE — 97530 THERAPEUTIC ACTIVITIES: CPT

## 2024-01-19 PROCEDURE — 80048 BASIC METABOLIC PNL TOTAL CA: CPT | Performed by: PHYSICIAN ASSISTANT

## 2024-01-19 PROCEDURE — 21400001 HC TELEMETRY ROOM

## 2024-01-19 PROCEDURE — 99223 1ST HOSP IP/OBS HIGH 75: CPT | Mod: ,,, | Performed by: PHYSICIAN ASSISTANT

## 2024-01-19 PROCEDURE — 87186 SC STD MICRODIL/AGAR DIL: CPT | Performed by: STUDENT IN AN ORGANIZED HEALTH CARE EDUCATION/TRAINING PROGRAM

## 2024-01-19 PROCEDURE — 87077 CULTURE AEROBIC IDENTIFY: CPT | Performed by: STUDENT IN AN ORGANIZED HEALTH CARE EDUCATION/TRAINING PROGRAM

## 2024-01-19 PROCEDURE — 63600175 PHARM REV CODE 636 W HCPCS: Performed by: HOSPITALIST

## 2024-01-19 PROCEDURE — 63600175 PHARM REV CODE 636 W HCPCS: Performed by: INTERNAL MEDICINE

## 2024-01-19 PROCEDURE — 97116 GAIT TRAINING THERAPY: CPT

## 2024-01-19 PROCEDURE — 87205 SMEAR GRAM STAIN: CPT | Performed by: STUDENT IN AN ORGANIZED HEALTH CARE EDUCATION/TRAINING PROGRAM

## 2024-01-19 PROCEDURE — 87102 FUNGUS ISOLATION CULTURE: CPT | Performed by: STUDENT IN AN ORGANIZED HEALTH CARE EDUCATION/TRAINING PROGRAM

## 2024-01-19 PROCEDURE — A4216 STERILE WATER/SALINE, 10 ML: HCPCS | Performed by: STUDENT IN AN ORGANIZED HEALTH CARE EDUCATION/TRAINING PROGRAM

## 2024-01-19 PROCEDURE — 99232 SBSQ HOSP IP/OBS MODERATE 35: CPT | Mod: ,,, | Performed by: INTERNAL MEDICINE

## 2024-01-19 PROCEDURE — 97110 THERAPEUTIC EXERCISES: CPT

## 2024-01-19 PROCEDURE — 87070 CULTURE OTHR SPECIMN AEROBIC: CPT | Performed by: STUDENT IN AN ORGANIZED HEALTH CARE EDUCATION/TRAINING PROGRAM

## 2024-01-19 PROCEDURE — 85025 COMPLETE CBC W/AUTO DIFF WBC: CPT | Performed by: PHYSICIAN ASSISTANT

## 2024-01-19 PROCEDURE — 25000003 PHARM REV CODE 250

## 2024-01-19 PROCEDURE — 25000003 PHARM REV CODE 250: Performed by: PHYSICIAN ASSISTANT

## 2024-01-19 RX ORDER — FENTANYL CITRATE 50 UG/ML
INJECTION, SOLUTION INTRAMUSCULAR; INTRAVENOUS
Status: COMPLETED | OUTPATIENT
Start: 2024-01-19 | End: 2024-01-19

## 2024-01-19 RX ORDER — LOPERAMIDE HYDROCHLORIDE 2 MG/1
2 CAPSULE ORAL 4 TIMES DAILY PRN
Status: DISCONTINUED | OUTPATIENT
Start: 2024-01-19 | End: 2024-01-25 | Stop reason: HOSPADM

## 2024-01-19 RX ORDER — SODIUM CHLORIDE 0.9 % (FLUSH) 0.9 %
10 SYRINGE (ML) INJECTION
Status: DISCONTINUED | OUTPATIENT
Start: 2024-01-19 | End: 2024-01-23

## 2024-01-19 RX ORDER — MUPIROCIN 20 MG/G
OINTMENT TOPICAL
Status: CANCELLED | OUTPATIENT
Start: 2024-01-19

## 2024-01-19 RX ADMIN — Medication 10 ML: at 12:01

## 2024-01-19 RX ADMIN — Medication 10 ML: at 01:01

## 2024-01-19 RX ADMIN — METHOCARBAMOL 500 MG: 500 TABLET ORAL at 01:01

## 2024-01-19 RX ADMIN — METHOCARBAMOL 500 MG: 500 TABLET ORAL at 06:01

## 2024-01-19 RX ADMIN — INSULIN ASPART 8 UNITS: 100 INJECTION, SOLUTION INTRAVENOUS; SUBCUTANEOUS at 09:01

## 2024-01-19 RX ADMIN — Medication 10 ML: at 05:01

## 2024-01-19 RX ADMIN — METHOCARBAMOL 500 MG: 500 TABLET ORAL at 11:01

## 2024-01-19 RX ADMIN — FENTANYL CITRATE 50 MCG: 0.05 INJECTION, SOLUTION INTRAMUSCULAR; INTRAVENOUS at 03:01

## 2024-01-19 RX ADMIN — INSULIN ASPART 8 UNITS: 100 INJECTION, SOLUTION INTRAVENOUS; SUBCUTANEOUS at 06:01

## 2024-01-19 RX ADMIN — DAPTOMYCIN 945 MG: 500 INJECTION, POWDER, LYOPHILIZED, FOR SOLUTION INTRAVENOUS at 01:01

## 2024-01-19 RX ADMIN — METOCLOPRAMIDE 5 MG: 5 TABLET ORAL at 06:01

## 2024-01-19 RX ADMIN — RIVAROXABAN 10 MG: 10 TABLET, FILM COATED ORAL at 06:01

## 2024-01-19 RX ADMIN — METOCLOPRAMIDE 5 MG: 5 TABLET ORAL at 05:01

## 2024-01-19 RX ADMIN — PANTOPRAZOLE SODIUM 40 MG: 40 TABLET, DELAYED RELEASE ORAL at 09:01

## 2024-01-19 RX ADMIN — CEFTAROLINE FOSAMIL 600 MG: 600 POWDER, FOR SOLUTION INTRAVENOUS at 05:01

## 2024-01-19 RX ADMIN — OXYCODONE HYDROCHLORIDE 5 MG: 5 TABLET ORAL at 11:01

## 2024-01-19 RX ADMIN — INSULIN DETEMIR 17 UNITS: 100 INJECTION, SOLUTION SUBCUTANEOUS at 09:01

## 2024-01-19 RX ADMIN — LOPERAMIDE HYDROCHLORIDE 2 MG: 2 CAPSULE ORAL at 11:01

## 2024-01-19 RX ADMIN — ACETAMINOPHEN 1000 MG: 500 TABLET ORAL at 01:01

## 2024-01-19 RX ADMIN — INSULIN ASPART 8 UNITS: 100 INJECTION, SOLUTION INTRAVENOUS; SUBCUTANEOUS at 01:01

## 2024-01-19 RX ADMIN — Medication 10 ML: at 06:01

## 2024-01-19 RX ADMIN — METOPROLOL SUCCINATE 12.5 MG: 25 TABLET, EXTENDED RELEASE ORAL at 09:01

## 2024-01-19 RX ADMIN — LOPERAMIDE HYDROCHLORIDE 2 MG: 2 CAPSULE ORAL at 06:01

## 2024-01-19 RX ADMIN — INSULIN DETEMIR 17 UNITS: 100 INJECTION, SOLUTION SUBCUTANEOUS at 11:01

## 2024-01-19 RX ADMIN — AMMONIUM LACTATE: 12 LOTION TOPICAL at 01:01

## 2024-01-19 RX ADMIN — ACETAMINOPHEN 1000 MG: 500 TABLET ORAL at 05:01

## 2024-01-19 RX ADMIN — METHOCARBAMOL 500 MG: 500 TABLET ORAL at 09:01

## 2024-01-19 RX ADMIN — METOCLOPRAMIDE 5 MG: 5 TABLET ORAL at 01:01

## 2024-01-19 RX ADMIN — ACETAMINOPHEN 1000 MG: 500 TABLET ORAL at 11:01

## 2024-01-19 NOTE — ASSESSMENT & PLAN NOTE
Patient's FSGs are uncontrolled due to hyperglycemia on current medication regimen.  Last A1c reviewed-   Lab Results   Component Value Date    HGBA1C 7.4 (H) 12/28/2023     Most recent fingerstick glucose reviewed-   Recent Labs   Lab 01/18/24  1621 01/18/24  2024 01/19/24  0932 01/19/24  1252   POCTGLUCOSE 83 138* 176* 124*       Current correctional scale  Low  Maintain anti-hyperglycemic dose as follows-   Antihyperglycemics (From admission, onward)    Start     Stop Route Frequency Ordered    01/17/24 1130  insulin aspart U-100 pen 8 Units         -- SubQ 3 times daily with meals 01/17/24 0819    01/16/24 2100  insulin detemir U-100 (Levemir) pen 17 Units         -- SubQ 2 times daily 01/16/24 1657    01/15/24 0329  insulin aspart U-100 pen 0-5 Units         -- SubQ Before meals & nightly PRN 01/15/24 0231        Hold Oral hypoglycemics while patient is in the hospital.

## 2024-01-19 NOTE — CONSULTS
[facility]  Physical Medicine & Rehab  Consult Note    Patient Name: Kulwant Mueller Jr.  MRN: [id number]  Admission Date: 1/14/2024  Hospital Length of Stay: 3 days  Attending Physician: Lizbeth Marquez MD     Inpatient consult to Physical Medicine & Rehabilitation  Consult performed by: Yudy Whitmore NP  Consult requested by:  Lizbeth Marquez MD    Collaborating Physician: Etelvina Keys MD  Reason for Consult:  Assess rehabilitation needs      Consults  Subjective:     Principal Problem: Staphylococcal arthritis of left knee    HPI: Kulwant Mueller is a 38-year-old male with PMHx of May-Thurner syndrome (Xarelto), DM2, HTN, Chronic foot ulcer. Recently admitted to [facility] on 12/27/23 for nausea, vomiting, diarrhea, and generalized weakness x 6 days. Upon arrival, found to be in DKA. Hospital course complicated by MRSA bacteremia from L knee septic arthritis (S/p L knee arthrotomy with synovectomy on 12/29. Bcx with staph aureus),  L shoulder pain (s/p aspiration. ID was consulted and recommending IV Daptomycin x 6 weeks and Added Ceftaroline 1/4/2024), R heel wound (MRI foot with no evidence of osteomyelitis), and R UE DVT (continues on Xarelto). Discharged to [facility] rehab on 1/10/24 and [place] [facility] [place]24 for fever. Taken to OR and s/p L knee arthroscopic I&D on 1/17, Cxs were NGTD. WBAT to LLE.  ID consulted and recommend continue daptomycin/ceftaroline for now and will follow but anticipating another 4 weeks of abx from last washout (est end date: 2/11/24).     Functional History: Patient lives with spouse in a single story home and was I with no use of DME prior to last hospitalization.     Hospital Course: 1/18/24: Participated w/ OT. Bed mob mod-maxA. LBD totalA.     Past Medical History:   Diagnosis Date    Anticoagulant long-term use     COVID-19 virus infection     Diabetes mellitus     Diabetes mellitus, type 2     Hypertension     May-Thurner syndrome      Past  Surgical History:   Procedure Laterality Date    APPENDECTOMY      ARTHROSCOPY OF KNEE Left 1/16/2024    Procedure: ARTHROSCOPY, KNEE, LEFT;  Surgeon: Mandeep Gatica MD;  Location: [address];  Service: Orthopedics;  Laterality: Left;    ARTHROTOMY OF KNEE Left 12/29/2023    Procedure: ARTHROTOMY, KNEE;  Surgeon: Edy Bocanegra Jr., MD;  Location: [facility] OR;  Service: Orthopedics;  Laterality: Left;    ESOPHAGOGASTRODUODENOSCOPY N/A 1/31/2022    Procedure: EGD (ESOPHAGOGASTRODUODENOSCOPY);  Surgeon: Cindy Quiros MD;  Location: [facility] ENDO;  Service: Endoscopy;  Laterality: N/A;    ESOPHAGOGASTRODUODENOSCOPY N/A 3/21/2022    Procedure: EGD (ESOPHAGOGASTRODUODENOSCOPY);  Surgeon: Tejas Mann MD;  Location: [facility] ENDO;  Service: Endoscopy;  Laterality: N/A;    INCISION AND DRAINAGE FOOT Left 11/28/2021    Procedure: INCISION AND DRAINAGE, FOOT;  Surgeon: Bj Alicea DPM;  Location: [facility] OR;  Service: Podiatry;  Laterality: Left;    stents in bilateral legs      TRANSESOPHAGEAL ECHOCARDIOGRAPHY N/A 1/3/2024    Procedure: ECHOCARDIOGRAM, TRANSESOPHAGEAL;  Surgeon: Douglas Doss MD;  Location: [facility] CATH LAB/EP;  Service: Cardiology;  Laterality: N/A;     Review of patient's allergies indicates:   Allergen Reactions    Heparin analogues Nausea And Vomiting       Scheduled Medications:    acetaminophen  1,000 mg Oral Q8H    ammonium lactate   Topical (Top) BID    ceftaroline (TEFLARO) IVPB  600 mg Intravenous Q12H    DAPTOmycin (CUBICIN) 945 mg in sodium chloride 0.9% SolP 50 mL IVPB  10 mg/kg (Adjusted) Intravenous Q24H    insulin aspart U-100  8 Units Subcutaneous TIDWM    insulin detemir U-100  17 Units Subcutaneous BID    methocarbamoL  500 mg Oral QID    metoclopramide HCl  5 mg Oral TID AC    metoprolol succinate  12.5 mg Oral Daily    pantoprazole  40 mg Oral Daily    rivaroxaban  10 mg Oral Daily with dinner    sodium chloride 0.9%  10 mL Intravenous Q6H       PRN Medications: dextrose  10%, dextrose 10%, glucagon (human recombinant), glucose, glucose, insulin aspart U-100, melatonin, morphine, naloxone, ondansetron, oxyCODONE, oxyCODONE, oxyCODONE, polyethylene glycol, prochlorperazine, scopolamine, senna-docusate 8.6-50 mg, sodium chloride 0.9%, sodium chloride 0.9%, Flushing PICC/Midline Protocol **AND** sodium chloride 0.9% **AND** sodium chloride 0.9%    Family History       Problem Relation (Age of Onset)    Cancer Mother, Paternal Uncle, Paternal Grandfather, Maternal Grandfather    Irritable bowel syndrome Paternal Grandfather          Tobacco Use    Smoking status: Former     Types: Cigarettes    Smokeless tobacco: Former    Tobacco comments:     occasionally    Substance and Sexual Activity    Alcohol use: Yes     Comment: occ    Drug use: No    Sexual activity: Not on file     Review of Systems   Constitutional:  Positive for activity change and fatigue. Negative for fever.   Respiratory:  Negative for cough and shortness of breath.    Musculoskeletal:  Positive for gait problem.   Neurological:  Positive for weakness (LLE and LUE).   Psychiatric/Behavioral:  Negative for agitation, behavioral problems and confusion.      Objective:     Vital Signs (Most Recent):  Temp: 97.7 °F (36.5 °C) (01/19/24 0822)  Pulse: 84 (01/19/24 0822)  Resp: 18 (01/19/24 0822)  BP: 138/66 (01/19/24 0822)  SpO2: (!) 94 % (01/19/24 0822)    Vital Signs (24h Range):  Temp:  [97.7 °F (36.5 °C)-98.6 °F (37 °C)] 97.7 °F (36.5 °C)  Pulse:  [84-90] 84  Resp:  [16-20] 18  SpO2:  [94 %-97 %] 94 %  BP: (119-138)/(66-73) 138/66     Body mass index is 32.2 kg/m².     Physical Exam  Vitals and nursing note reviewed.   Constitutional:       Appearance: Normal appearance.   HENT:      Head: Normocephalic and atraumatic.      Mouth/Throat:      Mouth: Mucous membranes are moist.   Eyes:      Extraocular Movements: Extraocular movements intact.      Pupils: Pupils are equal, round, and reactive to light.   Pulmonary:       Effort: Pulmonary effort is normal. No respiratory distress.   Musculoskeletal:      Cervical back: Normal range of motion and neck supple.      Comments: LUE 4/5  LLE 3/5 with dressings in place     Skin:     General: Skin is warm.   Neurological:      Mental Status: He is alert and oriented to person, place, and time. Mental status is at baseline.      Motor: Weakness present.      Gait: Gait abnormal.   Psychiatric:         Mood and Affect: Mood normal.         Behavior: Behavior normal.     Diagnostic Results:   Labs: Reviewed  ECG: Reviewed  X-Ray: Reviewed  CT: Reviewed    Assessment/Plan:     * Staphylococcal arthritis of left knee  - S/p L knee arthrotomy with synovectomy on 12/29. Bcx with staph aureus  - Taken to OR and s/p L knee arthroscopic I&D on 1/17, Cxs were NGTD.   - Per Ortho WBAT to LLE.    - D consulted and recommend continue daptomycin/ceftaroline for now and will follow but anticipating another 4 weeks of abx from last washout (est end date: 2/11/24).     Recommendations  -  Encourage mobility, OOB in chair at least 3 hours per day, and early ambulation as appropriate  -  PT/OT evaluate and treat  -  Pain management  -  Monitor for and prevent skin breakdown and pressure ulcers  Early mobility, repositioning/weight shifting every 20-30 minutes when sitting, turn patient every 2 hours, proper mattress/overlay and chair cushioning, pressure relief/heel protector boots  -  DVT prophylaxis    -  Reviewed discharge options (IP rehab, SNF, HH therapy, and OP therapy)     Elevated CK  - Trending     May-Thurner syndrome  - Xarelto    PM&R Recommendation:     At this time, the PM&R team has reviewed this patient's ongoing medical case including inpatient diagnosis, medical history, clinical examination, labs, vitals, current social and functional history. The patient was recently transferred to [facility] from [facility]. We will continue to follow for CPK down trending and final ID plan.     Thank you  for your consult.     Yudy Whitmore NP  Department of Physical Medicine & Rehab  [facility] - [facility]

## 2024-01-19 NOTE — ASSESSMENT & PLAN NOTE
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 Bcx + 12/27/23, c/b left knee septic arthritis, s/p L knee arthrotomy with synovectomy on 12/29. Cx with MRSA, ROBY 1/02/24 neg, required salvage therapy & cleared 1/07, placed on daptomycin monotherapy. Of note, the patient also had L shoulder aspirated imaging without evidence of infection. R heel without osteo but had cellulitis, myositis, tenosynovitis. He started having new fevers at rehab, and is now admitted for workup. Fevers were up to 103 F, blood cultures collected 1/14/24 are NGTD. ID was consulted for recent MRSA bacteremia from septic arthritis - new fevers @ IP rehab, on daptomycin. In the meantime, the patient was taken to the OR on the 15th for arthroscopic washout. While his cell count was c/e septic arthritis, his synovial cultures are also NGTD. R sided lower back pain; MRI 1/17- [id number] fluid collection R iliac crest- hematoma vs abscess.     Recommendations  Continue daptomycin/ceftaroline for now; anticipating stopping ceftaroline at d/c (and continuing dapto)  Consider IR aspirate of large fluid collection at R iliac crest- send for cell count/culture to r/o abscess  Anticipate at least 4 weeks of abx from last washout (est end date: 2/11/24) and until radiographic resolution of abscess if present  Trend CPK and CRP with weekly labs  Routine hiv screening ordered

## 2024-01-19 NOTE — PLAN OF CARE
Pt tolerated abscess aspiration/drain placement well, dressing c/d/I, called report to floor nurse, specimen collected and sent to lab, transport request placed

## 2024-01-19 NOTE — SUBJECTIVE & OBJECTIVE
Interval history: naeo. Tolerating abx. Reports some R sided lower back pain, stable. Wife on phone.         Past Surgical History:   Procedure Laterality Date    APPENDECTOMY      ARTHROSCOPY OF KNEE Left 1/16/2024    Procedure: ARTHROSCOPY, KNEE, LEFT;  Surgeon: Mandeep Gatica MD;  Location: [address];  Service: Orthopedics;  Laterality: Left;    ARTHROTOMY OF KNEE Left 12/29/2023    Procedure: ARTHROTOMY, KNEE;  Surgeon: Edy Bocanegra Jr., MD;  Location: [facility] OR;  Service: Orthopedics;  Laterality: Left;    ESOPHAGOGASTRODUODENOSCOPY N/A 1/31/2022    Procedure: EGD (ESOPHAGOGASTRODUODENOSCOPY);  Surgeon: Cindy Quiros MD;  Location: [facility];  Service: Endoscopy;  Laterality: N/A;    ESOPHAGOGASTRODUODENOSCOPY N/A 3/21/2022    Procedure: EGD (ESOPHAGOGASTRODUODENOSCOPY);  Surgeon: Tejas Mann MD;  Location: [facility];  Service: Endoscopy;  Laterality: N/A;    INCISION AND DRAINAGE FOOT Left 11/28/2021    Procedure: INCISION AND DRAINAGE, FOOT;  Surgeon: Bj Alicea DPM;  Location: [facility] OR;  Service: Podiatry;  Laterality: Left;    stents in bilateral legs      TRANSESOPHAGEAL ECHOCARDIOGRAPHY N/A 1/3/2024    Procedure: ECHOCARDIOGRAM, TRANSESOPHAGEAL;  Surgeon: Douglas Doss MD;  Location: [facility] CATH LAB/EP;  Service: Cardiology;  Laterality: N/A;       Review of patient's allergies indicates:   Allergen Reactions    Heparin analogues Nausea And Vomiting       Medications:  Medications Prior to Admission   Medication Sig    metoprolol succinate (TOPROL-XL) 25 MG 24 hr tablet Take 0.5 tablets (12.5 mg total) by mouth once daily.    blood-glucose meter,continuous (DEXCOM G7 ) Misc 1 Device by Misc.(Non-Drug; Combo Route) route once daily.    blood-glucose sensor (DEXCOM G7 SENSOR) Justyna 1 Device by Misc.(Non-Drug; Combo Route) route every 10 days.    empagliflozin (JARDIANCE) 25 mg tablet Take 1 tablet (25 mg total) by mouth once daily.    furosemide (LASIX) 20 MG tablet  "Take 1 tablet (20 mg total) by mouth once daily.    glucagon (GVOKE HYPOPEN 2-PACK) 1 mg/0.2 mL AtIn Inject 1 mg into the skin as needed (SEVERE HYPOGLYCEMIA).    insulin aspart U-100 (NOVOLOG U-100 INSULIN ASPART) 100 unit/mL injection Inject 14 Units into the skin 3 (three) times daily before meals.    metoclopramide HCl (REGLAN) 10 MG tablet Take 0.5 tablets (5 mg total) by mouth 3 (three) times daily before meals.    pantoprazole (PROTONIX) 40 MG tablet Take 1 tablet (40 mg total) by mouth once daily.    pen needle, diabetic (BD ULTRA-FINE DIYA PEN NEEDLE) 32 gauge x 5/32" Ndle Use with Tresiba daily and Humalog 3-4 times daily as directed.    prochlorperazine (COMPAZINE) 10 MG tablet Take 1 tablet (10 mg total) by mouth 3 (three) times daily as needed (nausea or vomiting).    promethazine (PHENERGAN) 25 MG suppository Place 1 suppository (25 mg total) rectally every 6 (six) hours as needed for Nausea.    rivaroxaban (XARELTO) 10 mg Tab Take 1 tablet (10 mg total) by mouth daily with dinner or evening meal.    scopolamine (TRANSDERM-SCOP) 1.3-1.5 mg (1 mg over 3 days) Place 1 patch onto the skin every 72 hours as needed (intractable nausea/vomiting).    sodium chloride 0.9% SolP 50 mL with DAPTOmycin 500 mg SolR 947 mg Inject 947 mg into the vein once daily.    TRESIBA FLEXTOUCH U-200 200 unit/mL (3 mL) insulin pen Inject 56 Units into the skin once daily.     Antibiotics (From admission, onward)      Start     Stop Route Frequency Ordered    01/17/24 1830  ceftaroline fosamiL (TEFLARO) 600 mg in dextrose 5 % in water (D5W) 50 mL IVPB (MB+)         -- IV Every 12 hours (non-standard times) 01/17/24 1728    01/15/24 1400  DAPTOmycin (CUBICIN) 945 mg in sodium chloride 0.9% SolP 50 mL IVPB         -- IV Every 24 hours (non-standard times) 01/15/24 0229          Antifungals (From admission, onward)      None          Antivirals (From admission, onward)      None             Immunization History   Administered " Date(s) Administered    COVID-19, MRNA, LN-S, PF (Pfizer) (Purple Cap) 12/28/2020, 01/19/2021, 02/11/2022    Influenza - Quadrivalent - PF *Preferred* (6 months and older) 09/29/2020, 09/30/2021, 10/20/2022, 10/20/2023       Family History       Problem Relation (Age of Onset)    Cancer Mother, Paternal Uncle, Paternal Grandfather, Maternal Grandfather    Irritable bowel syndrome Paternal Grandfather          Social History     Socioeconomic History    Marital status:    Tobacco Use    Smoking status: Former     Types: Cigarettes    Smokeless tobacco: Former    Tobacco comments:     occasionally    Substance and Sexual Activity    Alcohol use: Yes     Comment: occ    Drug use: No     Social Determinants of Health     Financial Resource Strain: Low Risk  (12/28/2023)    Overall Financial Resource Strain (CARDIA)     Difficulty of Paying Living Expenses: Not hard at all   Food Insecurity: No Food Insecurity (12/28/2023)    Hunger Vital Sign     Worried About Running Out of Food in the Last Year: Never true     Ran Out of Food in the Last Year: Never true   Transportation Needs: No Transportation Needs (12/28/2023)    PRAPARE - Transportation     Lack of Transportation (Medical): No     Lack of Transportation (Non-Medical): No   Physical Activity: Sufficiently Active (12/28/2023)    Exercise Vital Sign     Days of Exercise per Week: 3 days     Minutes of Exercise per Session: 60 min   Stress: Stress Concern Present (12/28/2023)    [employer] - Occupational Stress Questionnaire     Feeling of Stress : Very much   Social Connections: Moderately Integrated (12/28/2023)    Social Connection and Isolation Panel [NHANES]     Frequency of Communication with Friends and Family: More than three times a week     Frequency of Social Gatherings with Friends and Family: More than three times a week     Attends Restorationism Services: More than 4 times per year     Active Member of Clubs or  Organizations: No     Attends Club or Organization Meetings: Never     Marital Status:    Housing Stability: Low Risk  (12/28/2023)    Housing Stability Vital Sign     Unable to Pay for Housing in the Last Year: No     Number of Places Lived in the Last Year: 1     Unstable Housing in the Last Year: No     Review of Systems   Constitutional:  Positive for fever.   HENT:  Negative for trouble swallowing.    Respiratory:  Negative for cough and shortness of breath.    Gastrointestinal:  Negative for abdominal pain, blood in stool, diarrhea and vomiting.   Genitourinary:  Negative for dysuria and hematuria.   Musculoskeletal:  Positive for arthralgias and joint swelling. Negative for neck stiffness.   Neurological:  Negative for syncope.   Psychiatric/Behavioral:  Negative for confusion.    All other systems reviewed and are negative.    Objective:     Vital Signs (Most Recent):  Temp: 98.2 °F (36.8 °C) (01/19/24 1144)  Pulse: 89 (01/19/24 1144)  Resp: 18 (01/19/24 1144)  BP: 136/82 (01/19/24 1144)  SpO2: 96 % (01/19/24 1144) Vital Signs (24h Range):  Temp:  [97.7 °F (36.5 °C)-98.6 °F (37 °C)] 98.2 °F (36.8 °C)  Pulse:  [84-90] 89  Resp:  [16-20] 18  SpO2:  [94 %-97 %] 96 %  BP: (119-138)/(66-82) 136/82     Weight: 110.7 kg (244 lb 0.8 oz)  Body mass index is 32.2 kg/m².    Estimated Creatinine Clearance: 182.9 mL/min (based on SCr of 0.7 mg/dL).     Physical Exam  Vitals and nursing note reviewed.   Constitutional:       Appearance: Normal appearance.   HENT:      Head: Normocephalic and atraumatic.      Nose: Nose normal.      Mouth/Throat:      Mouth: Mucous membranes are moist.      Pharynx: Oropharynx is clear.   Eyes:      Conjunctiva/sclera: Conjunctivae normal.   Cardiovascular:      Rate and Rhythm: Normal rate and regular rhythm.      Pulses: Normal pulses.      Heart sounds: Normal heart sounds.   Pulmonary:      Effort: Pulmonary effort is normal.      Breath sounds: Normal breath sounds.    Abdominal:      General: Bowel sounds are normal.      Palpations: Abdomen is soft.      Tenderness: There is no guarding or rebound.   Musculoskeletal:         General: Tenderness present. No deformity.      Cervical back: Neck supple.      Comments: L knee swelling and tenerness. Surgical dressings c/d/I. No drains   Skin:     General: Skin is warm and dry.      Coloration: Skin is not jaundiced.      Findings: No rash.   Neurological:      Mental Status: He is alert and oriented to person, place, and time. Mental status is at baseline.   Psychiatric:         Mood and Affect: Mood normal.         Thought Content: Thought content normal.         Judgment: Judgment normal.          Significant Labs:   Pathology Results  (Last 10 years)                 03/21/22 1419  Specimen to Pathology, Surgery Gastrointestinal tract Final result    Narrative:  Pre-op Diagnosis: Twisp grade C esophagitis [K20.80]   Candida esophagitis [B37.81]   Procedure(s):   EGD (ESOPHAGOGASTRODUODENOSCOPY)   1. Antrum bx r/o h pylori   2. Distal esophagus bx   Release to patient->Immediate   Specimen total (fresh, frozen, permanent):->2       01/31/22 1156  Specimen to Pathology, Surgery Gastrointestinal tract Final result    Narrative:  Pre-op Diagnosis: Nausea and vomiting, intractability of   vomiting not specified, unspecified vomiting type [R11.2]   Diabetic gastroparesis associated with type 2 diabetes   mellitus [E11.43, K31.84]   Procedure(s):   EGD (ESOPHAGOGASTRODUODENOSCOPY)   Number of specimens: 2   Name of specimens:   #1 Gastric Bxs r/o H Pylori   #2 Esophageal Bxs r/o Candida   Release to patient->Immediate   Specimen total (fresh, frozen, permanent):->2             All pertinent labs within the past 24 hours have been reviewed.    Significant Imaging: I have reviewed all pertinent imaging results/findings within the past 24 hours.

## 2024-01-19 NOTE — ASSESSMENT & PLAN NOTE
Kulwant Mueller Jr. is a 38 y.o. male admitted for workup of fevers of unknown source, recent irrigation and debridement of left knee performed at [facility] on 12/29.  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 Now s/p left knee arthroscopic I&D on 1/17.    MRI of L shoulder showing SS tear without retraction, no fluid collections. MRI of pelvis showing right lateral hip rim enhancing fluid collection, no bl hip effusions. Aspiration of R lateral thigh attempted with no fluid aspirated.    Attempted aspiration of right lateral thigh abscess at bedside with no fluid obtained. IR aspiration of R thigh abscess with 120 cc of bloody purulent fluid. Gram stain negative, cx pending. Despite negative cx, given report from IR of purulence in aspirate in setting of known infection. Plan for OR tomorrow for I&D of right thigh abscess and repeat I&D of L knee.     Pain control:  Multimodal  PT/OT: WBAT left lower extremity  DVT PPx:  Xarelto, SCDs at all times when not ambulating  Abx:  Ceftaroline/ daptomycin per ID, pending final cultures and ID recs  Labs:  CRP down to 100 today, continue to trend    Cx: Prior left knee cultures growing MRSA    Dispo: OR tomorrow for I&D of right thigh and repeat I&D of left knee

## 2024-01-19 NOTE — PT/OT/SLP PROGRESS
Occupational Therapy   Treatment    Name: Kulwant Mueller Jr.  MRN: [id number]  Admitting Diagnosis:  Staphylococcal arthritis of left knee  3 Days Post-Op    Recommendations:     Discharge Recommendations: High Intensity Therapy  Discharge Equipment Recommendations:  to be determined by next level of care, bedside commode, walker, rolling, wheelchair  Barriers to discharge:  None    Assessment:     Kulwant Mueller Jr. is a 38 y.o. male with a medical diagnosis of Staphylococcal arthritis of left knee. Pt is progressing but is limited by increased pain. Performance deficits affecting function are weakness, impaired balance, impaired endurance, impaired self care skills, decreased coordination, impaired functional mobility, gait instability, decreased ROM, decreased lower extremity function.     Rehab Prognosis:  Good; patient would benefit from acute skilled OT services to address these deficits and reach maximum level of function.       Plan:     Patient to be seen 4 x/week to address the above listed problems via self-care/home management, therapeutic activities, therapeutic exercises  Plan of Care Expires: 02/18/24  Plan of Care Reviewed with: patient    Subjective     Pain/Comfort:  Pain Rating 1: 0/10    Objective:     Communicated with: rn prior to session.  Patient found supine with peripheral IV, telemetry upon OT entry to room.    General Precautions: Standard, fall    Orthopedic Precautions:N/A  Braces: N/A  Respiratory Status: Room air     Occupational Performance:     Bed Mobility:    Patient completed Scooting/Bridging with minimum assistance  Patient completed Supine to Sit with contact guard assistance     Functional Mobility/Transfers:  Patient completed Sit <> Stand Transfer with minimum assistance  with  rolling walker   Patient completed Bed <> Chair Transfer using Step Transfer technique with contact guard assistance with rolling walker  Functional Mobility: Pt walked 40' CGA with a  RW.    Activities of Daily Living:  Grooming: independence seated.    [facility] 6 Click ADL: 17    Treatment & Education:  Discussed OT POC and progress.  Educated on proper RW use and gait pattern.  Encouraged activity / OOB with staff over the weekend.  Educated on doing UE/LE ROM independently.    Patient left up in chair with all lines intact and call button in reach    GOALS:   Multidisciplinary Problems       Occupational Therapy Goals          Problem: Occupational Therapy    Goal Priority Disciplines Outcome Interventions   Occupational Therapy Goal     OT, PT/OT Ongoing, Progressing    Description: Goals to be met by: 2/18/2024     Patient will increase functional independence with ADLs by performing:    UE Dressing with Supervision.  LE Dressing with Minimal Assistance.  Grooming while seated with Set-up Assistance.  Toileting from bedside commode with Minimal Assistance for hygiene and clothing management.   Toilet transfer to bedside commode with Minimal Assistance.                         Time Tracking:     OT Date of Treatment: 01/19/24  OT Start Time: 0927  OT Stop Time: 0959  OT Total Time (min): 32 min    Billable Minutes:Therapeutic Activity 22  Therapeutic Exercise 10    OT/RUFINA: OT          1/19/2024

## 2024-01-19 NOTE — SUBJECTIVE & OBJECTIVE
Interval History: NAEON. Pt seen and evaluated at bedside this am. Pt is doing well this am. Worked well w/ PTOT w/ recommendations for high skilled care. CM following for post acute needs. Continuing IV abx per ID. IR consulted for potential joint aspiration.     Review of Systems   Constitutional:  Positive for activity change and fatigue. Negative for appetite change, chills, diaphoresis and fever.   HENT:  Negative for congestion.    Respiratory:  Negative for cough, chest tightness, shortness of breath and wheezing.    Cardiovascular:  Negative for chest pain, palpitations and leg swelling.   Gastrointestinal:  Negative for abdominal distention, abdominal pain, blood in stool, constipation, diarrhea, nausea and vomiting.   Genitourinary:  Negative for difficulty urinating, dysuria, frequency, hematuria and urgency.   Musculoskeletal:  Positive for arthralgias, back pain and gait problem. Negative for neck stiffness.   Neurological:  Positive for weakness (LLE and LUE). Negative for dizziness, tremors, seizures, syncope, light-headedness, numbness and headaches.   Psychiatric/Behavioral:  Negative for agitation, behavioral problems, confusion and hallucinations.      Objective:     Vital Signs (Most Recent):  Temp: 98.2 °F (36.8 °C) (01/19/24 1144)  Pulse: 89 (01/19/24 1144)  Resp: 18 (01/19/24 1144)  BP: 136/82 (01/19/24 1144)  SpO2: 96 % (01/19/24 1144) Vital Signs (24h Range):  Temp:  [97.7 °F (36.5 °C)-98.6 °F (37 °C)] 98.2 °F (36.8 °C)  Pulse:  [84-90] 89  Resp:  [16-20] 18  SpO2:  [94 %-97 %] 96 %  BP: (119-138)/(66-82) 136/82     Weight: 110.7 kg (244 lb 0.8 oz)  Body mass index is 32.2 kg/m².    Intake/Output Summary (Last 24 hours) at 1/19/2024 1319  Last data filed at 1/19/2024 0254  Gross per 24 hour   Intake --   Output 800 ml   Net -800 ml         Physical Exam  Vitals and nursing note reviewed.   Constitutional:       General: He is not in acute distress.     Appearance: He is well-developed. He is  not diaphoretic.   HENT:      Head: Normocephalic and atraumatic.      Right Ear: External ear normal.      Left Ear: External ear normal.      Nose: Nose normal. No congestion.      Mouth/Throat:      Pharynx: Oropharynx is clear.   Eyes:      General: No scleral icterus.     Extraocular Movements: Extraocular movements intact.      Pupils: Pupils are equal, round, and reactive to light.   Cardiovascular:      Rate and Rhythm: Normal rate and regular rhythm.      Pulses: Normal pulses.      Heart sounds: Normal heart sounds. No murmur heard.  Pulmonary:      Effort: Pulmonary effort is normal. No respiratory distress.      Breath sounds: Normal breath sounds. No wheezing or rales.   Abdominal:      General: Bowel sounds are normal. There is no distension.      Palpations: Abdomen is soft.      Tenderness: There is no abdominal tenderness. There is no guarding or rebound.   Musculoskeletal:         General: Swelling (L knee warm, swollen and tender) present. No tenderness.      Cervical back: Normal range of motion.      Right lower leg: No edema.      Left lower leg: No edema.      Comments: L knee in clean, intact dressing. No point tenderness to back, no midline tenderness   Skin:     General: Skin is warm and dry.      Capillary Refill: Capillary refill takes less than 2 seconds.   Neurological:      General: No focal deficit present.      Mental Status: He is alert and oriented to person, place, and time. Mental status is at baseline.   Psychiatric:         Mood and Affect: Mood normal.         Behavior: Behavior normal.         Thought Content: Thought content normal.      Comments: Flat affect             Significant Labs: All pertinent labs within the past 24 hours have been reviewed.    Significant Imaging: I have reviewed all pertinent imaging results/findings within the past 24 hours.

## 2024-01-19 NOTE — SUBJECTIVE & OBJECTIVE
Principal Problem:Staphylococcal arthritis of left knee    Principal Orthopedic Problem: s/p L knee arthroscopic I&D on 1/17    Interval History: NAEON. VSS. Afebrile. WBC to 12 today. Pt still reporting L knee pain and right low back pain. Denies any right hip or R lateral thigh pain at this time.     Review of patient's allergies indicates:   Allergen Reactions    Heparin analogues Nausea And Vomiting       Current Facility-Administered Medications   Medication    acetaminophen tablet 1,000 mg    ammonium lactate 12 % lotion    ceftaroline fosamiL (TEFLARO) 600 mg in dextrose 5 % in water (D5W) 50 mL IVPB (MB+)    DAPTOmycin (CUBICIN) 945 mg in sodium chloride 0.9% SolP 50 mL IVPB    dextrose 10% bolus 125 mL 125 mL    dextrose 10% bolus 250 mL 250 mL    glucagon (human recombinant) injection 1 mg    glucose chewable tablet 16 g    glucose chewable tablet 24 g    insulin aspart U-100 pen 0-5 Units    insulin aspart U-100 pen 8 Units    insulin detemir U-100 (Levemir) pen 17 Units    melatonin tablet 6 mg    methocarbamoL tablet 500 mg    metoclopramide HCl tablet 5 mg    metoprolol succinate (TOPROL-XL) 24 hr split tablet 12.5 mg    morphine injection 4 mg    naloxone 0.4 mg/mL injection 0.02 mg    ondansetron injection 4 mg    oxyCODONE immediate release tablet 5 mg    oxyCODONE immediate release tablet 5 mg    oxyCODONE immediate release tablet Tab 10 mg    pantoprazole EC tablet 40 mg    polyethylene glycol packet 17 g    prochlorperazine injection Soln 5 mg    rivaroxaban tablet 10 mg    scopolamine 1.3-1.5 mg (1 mg over 3 days) 1 patch    senna-docusate 8.6-50 mg per tablet 1 tablet    sodium chloride 0.9% flush 10 mL    sodium chloride 0.9% flush 10 mL    sodium chloride 0.9% flush 10 mL    And    sodium chloride 0.9% flush 10 mL     Objective:     Vital Signs (Most Recent):  Temp: 98.2 °F (36.8 °C) (01/19/24 1144)  Pulse: 97 (01/19/24 1539)  Resp: 19 (01/19/24 1539)  BP: (!) 143/90 (01/19/24 1539)  SpO2:  "100 % (01/19/24 1539) Vital Signs (24h Range):  Temp:  [97.7 °F (36.5 °C)-98.6 °F (37 °C)] 98.2 °F (36.8 °C)  Pulse:  [84-97] 97  Resp:  [16-20] 19  SpO2:  [94 %-100 %] 100 %  BP: (119-143)/(66-90) 143/90     Weight: 110.7 kg (244 lb 0.8 oz)  Height: 6' 1" (185.4 cm)  Body mass index is 32.2 kg/m².      Intake/Output Summary (Last 24 hours) at 1/19/2024 1545  Last data filed at 1/19/2024 0254  Gross per 24 hour   Intake --   Output 800 ml   Net -800 ml          Ortho/SPM Exam     Awake/alert/oriented x3, No acute distress, Afebrile, Vital signs stable  Good inspiratory effort with unlaboured breathing  Dressings c/d/I  Pain with knee PROM 5-70  No pain with R hip ROM, axial loading, full PROM. No TTP of R lateral hip.   Active and PROM of L shoulder intact with ain with L shoulder ROM (FF over 90, ABD over 90), +hawkens, + neer,       Significant Labs: All pertinent labs within the past 24 hours have been reviewed.    Significant Imaging: I have reviewed all pertinent imaging results/findings.    MRI pelvis showing right lateral thigh rim enhancing fluid collection, no bl hip effusions    MRI L shoulder showing SS tear without retraction, subdeltoid bursitis, no fluid collections   "

## 2024-01-19 NOTE — PROGRESS NOTES
[facility] [address] [place] [address] Medicine  Progress Note    Patient Name: Kulwant Mueller Jr.  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     Patient was admitted to [facility] 12/27/23-01/10/24 due to left knee septic arthritis with persistent MRSA bacteremia, s/p Left knee orthopedic surgery, surgical cultures positive for MRSA. Right heel also of concern for source.   He was followed by ID,  he had been on Daptomycin + Ceftaroline, s/p ROBY w/o endocarditis, left shoulder arthrocentesis w/o infection,  surveillance blood culture negative from 01/07/24    Report per ED and care everywhere notes patient with fevers on 1/13 to 103degreese @ 1800, 2000, ER transfer recommended but pt refused, physician gave order for blood culture, pt reported feeling fine.   Today he developed temperature of 100.5 @ 15:56 and was transferred to [facility] for further evaluation.     Today he reports he can walk with asssitance, using a walker, performing ADLs, main complaint is right sided back pain    ED - blood cultures drawn.             Overview/Hospital Course:  Kulwant Mueller Jr. Is a 38 y.o. male who was admitted to hospital medicine for fever. WBC 15.86 >> 13.92. Sed rate 97, CRPP 211.9. On Daptomycin from previous discharge, continuing and added zosyn. ID consulted. Blood cultures pending. Orthopedics consulted, joint aspiration performed at bedside. Taken to the OR for L knee washout. MRI pelvis: Myositis and fasciitis, with soft tissue gas dissecting along the  anterior compartment myofascial planes of the proximal left thigh. MRI L shoulder: Prominent subdeltoid enhancement/ bursitis. Superimposed infection may be present. No drainable fluid collections. MRI knee: s/p I&D w/ minimal residual fluid collection within the joint space. There is a large air-fluid collection extending supero-medially from the joint space along the medial femoral shaft, beyond the field of view. Prominent surrounding soft tissue inflammatory changes noted, with involvement of the vastus medialis, vastus lateralis, and popliteus musculature. 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 No spondylodiscitis or facet joint septic arthritis  Patient will be discharged when medically ready.     Interval History: NAEON. Pt seen and evaluated at bedside this am. Pt is doing well this am. Worked well w/ PTOT w/ recommendations for high skilled care. CM following for post acute needs. Continuing IV abx per ID. IR consulted for potential joint aspiration.     Review of Systems   Constitutional:  Positive for activity change and fatigue. Negative for appetite change, chills, diaphoresis and fever.   HENT:  Negative for congestion.    Respiratory:  Negative for cough, chest tightness, shortness of breath and wheezing.    Cardiovascular:  Negative for chest pain, palpitations and leg swelling.   Gastrointestinal:  Negative for abdominal distention, abdominal pain, blood in stool, constipation, diarrhea, nausea and vomiting.   Genitourinary:  Negative for difficulty urinating, dysuria, frequency, hematuria and urgency.   Musculoskeletal:  Positive for arthralgias, back pain and gait problem. Negative for neck stiffness.   Neurological:  Positive for weakness (LLE and LUE). Negative for dizziness, tremors, seizures, syncope, light-headedness, numbness and headaches.   Psychiatric/Behavioral:  Negative for agitation, behavioral problems, confusion and hallucinations.       Objective:     Vital Signs (Most Recent):  Temp: 98.2 °F (36.8 °C) (01/19/24 1144)  Pulse: 89 (01/19/24 1144)  Resp: 18 (01/19/24 1144)  BP: 136/82 (01/19/24 1144)  SpO2: 96 % (01/19/24 1144) Vital Signs (24h Range):  Temp:  [97.7 °F (36.5 °C)-98.6 °F (37 °C)] 98.2 °F (36.8 °C)  Pulse:  [84-90] 89  Resp:  [16-20] 18  SpO2:  [94 %-97 %] 96 %  BP: (119-138)/(66-82) 136/82     Weight: 110.7 kg (244 lb 0.8 oz)  Body mass index is 32.2 kg/m².    Intake/Output Summary (Last 24 hours) at 1/19/2024 1319  Last data filed at 1/19/2024 0254  Gross per 24 hour   Intake --   Output 800 ml   Net -800 ml         Physical Exam  Vitals and nursing note reviewed.   Constitutional:       General: He is not in acute distress.     Appearance: He is well-developed. He is not diaphoretic.   HENT:      Head: Normocephalic and atraumatic.      Right Ear: External ear normal.      Left Ear: External ear normal.      Nose: Nose normal. No congestion.      Mouth/Throat:      Pharynx: Oropharynx is clear.   Eyes:      General: No scleral icterus.     Extraocular Movements: Extraocular movements intact.      Pupils: Pupils are equal, round, and reactive to light.   Cardiovascular:      Rate and Rhythm: Normal rate and regular rhythm.      Pulses: Normal pulses.      Heart sounds: Normal heart sounds. No murmur heard.  Pulmonary:      Effort: Pulmonary effort is normal. No respiratory distress.      Breath sounds: Normal breath sounds. No wheezing or rales.   Abdominal:      General: Bowel sounds are normal. There is no distension.      Palpations: Abdomen is soft.      Tenderness: There is no abdominal tenderness. There is no guarding or rebound.   Musculoskeletal:         General: Swelling (L knee warm, swollen and tender) present. No tenderness.      Cervical back: Normal range of motion.      Right lower leg: No edema.      Left lower leg: No edema.      Comments: L knee in clean, intact dressing. No point tenderness to back, no midline  tenderness   Skin:     General: Skin is warm and dry.      Capillary Refill: Capillary refill takes less than 2 seconds.   Neurological:      General: No focal deficit present.      Mental Status: He is alert and oriented to person, place, and time. Mental status is at baseline.   Psychiatric:         Mood and Affect: Mood normal.         Behavior: Behavior normal.         Thought Content: Thought content normal.      Comments: Flat affect             Significant Labs: All pertinent labs within the past 24 hours have been reviewed.    Significant Imaging: I have reviewed all pertinent imaging results/findings within the past 24 hours.    Assessment/Plan:      * Staphylococcal arthritis of left knee  S/p Left knee arthrotomy and synovectomy (12/29/23)  -re-order pT/ot  Pt with left knee sutures, tenderness to palpation near medial tibial plateau  - ortho consulted, bedside aspiration completed   - taken to OR for wound wash out   - MRI pelvis: Myositis and fasciitis, with soft tissue gas dissecting along the anterior compartment myofascial planes of the proximal left thigh.   - MRI L shoulder: Prominent subdeltoid enhancement/ bursitis. Superimposed infection may be present. No drainable fluid collections.   - MRI knee: s/p I&D w/ minimal residual fluid collection within the joint space. There is a large air-fluid collection extending supero-medially from the joint space along the medial femoral shaft, beyond the field of view. Prominent surrounding soft tissue inflammatory changes noted, with involvement of the vastus medialis, vastus lateralis, and popliteus musculature.  - MRI T/L spine: No MR imaging findings to account for reported history of MRSA bacteremia. No spondylodiscitis or facet joint septic arthritis     Chronic HFrEF (heart failure with reduced ejection fraction)  New finding at last hospitalization  - EF 48% with systolic dysfunction, normal diastolic function  - patient on Jardiance as outpatient as  well as Toprol XL and Lasix 20mg po daily   - Jardiance not continued @ IP rehab - would hold given concern for possible repeat infection - resume when clinically stable.   - resume furosemide when stable    Hyponatremia  Patient has hyponatremia which is uncontrolled,  We will monitor sodium Daily.   The hyponatremia is due to Dehydration/hypovolemia.  We will obtain the following studies: Urine sodium, urine osmolality, serum osmolality or TSH, T4.   S/p IVF   Recent Labs   Lab 01/18/24  0818   *       Fever  - reported fevers of 103 @ [facility] on Saturday and temp 100 prior to transfer  - s/p blood cultures   - add Zosyn for empiric gram negative coverage  - Pt with c/o of right sided Back pain - given MRSA bacteremia, pt is at risk for metastatic site of infection obtain MRI T-L spine with Contrast to r/o spinal source of infection    - no acute findings on MRI   - Check ESR/CRP    - ESR 97/ .9   - Infectious disease consult - to assist in continuing Daptomycin approval & infectious w/u    >patient had ROBY performed on 1/02 to r/o endocarditis.     Transaminitis  - trend CMP daily, improving  - check GGT with rising Alk phos levels  - Prior w/u @ [facility] included - CT A/P, w/o abnormality, Hepatitis panel negative, RUQ ultrasound with hepatomegaly and biliary sludge; but without other obvious abnormalities    Depression  Noted At [facility] hospitalization - patient noted to have flat affect, decrease motivation, excessive sleeping, was seen by psychiatry consult and Cymbalta recommended but pt declined.   - Description of his affect seems similar here, pending above workup would re-address with patient prior to discharge  Reports he was living with wife & 2 kids prior to current hospitalization.     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 due to underlying condition, limited to breakdown of skin  Patient with right heel - resolving ulcer  - prior wound care  notes indicate - Paint with betadine and leave open to air  - elevate heels to reduce pressure   - Wound care consult   - Right heel: bedside nursing to paint with betadine, let dry and apply a foam dressing daily   - Turning every 2 hours  - Heel protecting boots  - Bedside nursing to maintain pressure injury prevention interventions    Staphylococcus aureus bacteremia  -continue daptomycin - last dose @ rehab was @14;00 (01/14) - continue q24  -add-on CPK tonight and check weekly  -esr/crp  -repeat blood cultures NGTD     Other elevated white blood cell count  He has persistent elevation but downtrending leukocytosis.     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  Initially on therapeutic lovenox and transitioned back to Oral Xarelto 10mg daily   -pt previously followed with hematology - Dr. Duff.     Diabetic gastroparesis associated with type 2 diabetes mellitus  -continue reglan 5mg PO TID    Type 2 diabetes mellitus with complication, with long-term current use of insulin  Patient's FSGs are uncontrolled due to hyperglycemia on current medication regimen.  Last A1c reviewed-   Lab Results   Component Value Date    HGBA1C 7.4 (H) 12/28/2023     Most recent fingerstick glucose reviewed-   Recent Labs   Lab 01/18/24  1621 01/18/24  2024 01/19/24  0932 01/19/24  1252   POCTGLUCOSE 83 138* 176* 124*       Current correctional scale  Low  Maintain anti-hyperglycemic dose as follows-   Antihyperglycemics (From admission, onward)      Start     Stop Route Frequency Ordered    01/17/24 1130  insulin aspart U-100 pen 8 Units         -- SubQ 3 times daily with meals 01/17/24 0819    01/16/24 2100  insulin detemir U-100 (Levemir) pen 17 Units         -- SubQ 2 times daily  01/16/24 1657    01/15/24 0329  insulin aspart U-100 pen 0-5 Units         -- SubQ Before meals & nightly PRN 01/15/24 0231          Hold Oral hypoglycemics while patient is in the hospital.      VTE Risk Mitigation (From admission, onward)           Ordered     rivaroxaban tablet 10 mg  With dinner         01/15/24 0229     Reason for No Pharmacological VTE Prophylaxis  Once        Question:  Reasons:  Answer:  Already adequately anticoagulated on oral Anticoagulants    01/15/24 0229     IP VTE HIGH RISK PATIENT  Once         01/15/24 0229     Place sequential compression device  Until discontinued         01/15/24 0229                    Discharge Planning   RAEANN: 1/24/2024     Code Status: Full Code   Is the patient medically ready for discharge?: No    Reason for patient still in hospital (select all that apply): Patient trending condition, Laboratory test, Treatment, and Consult recommendations  Discharge Plan A: Rehab   Discharge Delays: None known at this time              Raul Pal PA-C  Department of Hospital Medicine   [facility]

## 2024-01-19 NOTE — ASSESSMENT & PLAN NOTE
Noted At [facility] hospitalization - patient noted to have flat affect, decrease motivation, excessive sleeping, was seen by psychiatry consult and Cymbalta recommended but pt declined.   - Description of his affect seems similar here, pending above workup would re-address with patient prior to discharge  Reports he was living with wife & 2 kids prior to current hospitalization.

## 2024-01-19 NOTE — CONSULTS
Percutaneous Drain Placement/Aspiration Consult Note  Interventional Radiology    Consult Requested By: Raul Pal PA-C  Reason for Consult: large fluid collection at R iliac crest ID requesting aspiration- send for cell count/culture to r/o abscess    SUBJECTIVE:     Chief Complaint:  R lower back pain    History of Present Illness:  Kulwant Mueller Jr. is a 38 y.o. male with a PMHx of May-Thurner syndrome on Xarelto, DM2 on insulin, chronic foot wounds following with wound care, and HTN, recent admission for MRSA bacteremia. Bcx + 12/27/23, c/b left knee septic arthritis, s/p L knee arthrotomy with synovectomy on 12/29 who was admitted from IRF on 1/14/23 for fevers despite being on IV daptomycin. Hospital course notable for OR TB for arthroscopic washout on 1/15/24, OR cultures NGTD although cell count consistent with septic arthritis. Pt also having new R lower back pain on 1/17/24 prompting MRI pelvis which revealed a new R lateral thigh fluid collection vs hematoma. Interventional Radiology has been consulted for image guided percutaneous drain aspiration for management of  his R lateral thigh fluid collection . Pt has had recent imaging including a  MRI pelvis  on 1/17/24 which revealed a large, complex, mixed signal fluid collection beginning at the level of the R iliac crest and dissecting deep to the superficial gluteus muscle fascia and tensor fascia jonah caudally measuring 17 by 7 x 5 cm. ID following who recommend aspiration of this collection.The pt's WBC is 12.27 and is trending down. Blood cultures on 1/14/24 are NGTD.He is currently afebrile. The pt is hemodynamically stable.       Scheduled Meds:   acetaminophen  1,000 mg Oral Q8H    ammonium lactate   Topical (Top) BID    ceftaroline (TEFLARO) IVPB  600 mg Intravenous Q12H    DAPTOmycin (CUBICIN) 945 mg in sodium chloride 0.9% SolP 50 mL IVPB  10 mg/kg (Adjusted) Intravenous Q24H    insulin aspart U-100  8 Units Subcutaneous TIDWM     insulin detemir U-100  17 Units Subcutaneous BID    methocarbamoL  500 mg Oral QID    metoclopramide HCl  5 mg Oral TID AC    metoprolol succinate  12.5 mg Oral Daily    pantoprazole  40 mg Oral Daily    rivaroxaban  10 mg Oral Daily with dinner    sodium chloride 0.9%  10 mL Intravenous Q6H     Continuous Infusions:  PRN Meds:dextrose 10%, dextrose 10%, glucagon (human recombinant), glucose, glucose, insulin aspart U-100, melatonin, morphine, naloxone, ondansetron, oxyCODONE, oxyCODONE, oxyCODONE, polyethylene glycol, prochlorperazine, scopolamine, senna-docusate 8.6-50 mg, sodium chloride 0.9%, sodium chloride 0.9%, Flushing PICC/Midline Protocol **AND** sodium chloride 0.9% **AND** sodium chloride 0.9%    Review of patient's allergies indicates:   Allergen Reactions    Heparin analogues Nausea And Vomiting       Past Medical History:   Diagnosis Date    Anticoagulant long-term use     COVID-19 virus infection     Diabetes mellitus     Diabetes mellitus, type 2     Hypertension     May-Thurner syndrome      Past Surgical History:   Procedure Laterality Date    APPENDECTOMY      ARTHROSCOPY OF KNEE Left 1/16/2024    Procedure: ARTHROSCOPY, KNEE, LEFT;  Surgeon: Mandeep Gatica MD;  Location: [address];  Service: Orthopedics;  Laterality: Left;    ARTHROTOMY OF KNEE Left 12/29/2023    Procedure: ARTHROTOMY, KNEE;  Surgeon: Edy Bocanegra Jr., MD;  Location: [facility];  Service: Orthopedics;  Laterality: Left;    ESOPHAGOGASTRODUODENOSCOPY N/A 1/31/2022    Procedure: EGD (ESOPHAGOGASTRODUODENOSCOPY);  Surgeon: Cindy Quiros MD;  Location: [facility] ENDO;  Service: Endoscopy;  Laterality: N/A;    ESOPHAGOGASTRODUODENOSCOPY N/A 3/21/2022    Procedure: EGD (ESOPHAGOGASTRODUODENOSCOPY);  Surgeon: Tejas Mann MD;  Location: [facility];  Service: Endoscopy;  Laterality: N/A;    INCISION AND DRAINAGE FOOT Left 11/28/2021    Procedure: INCISION AND DRAINAGE, FOOT;  Surgeon: Bj Alicea DPM;  Location:  [facility] OR;  Service: Podiatry;  Laterality: Left;    stents in bilateral legs      TRANSESOPHAGEAL ECHOCARDIOGRAPHY N/A 1/3/2024    Procedure: ECHOCARDIOGRAM, TRANSESOPHAGEAL;  Surgeon: Douglas Doss MD;  Location: [facility] CATH LAB/EP;  Service: Cardiology;  Laterality: N/A;     Family History   Problem Relation Age of Onset    Cancer Mother     Cancer Paternal Uncle     Cancer Paternal Grandfather     Irritable bowel syndrome Paternal Grandfather     Cancer Maternal Grandfather     Colon cancer Neg Hx     Esophageal cancer Neg Hx     Rectal cancer Neg Hx     Stomach cancer Neg Hx     Ulcerative colitis Neg Hx     Crohn's disease Neg Hx     Celiac disease Neg Hx      Social History     Tobacco Use    Smoking status: Former     Types: Cigarettes    Smokeless tobacco: Former    Tobacco comments:     occasionally    Substance Use Topics    Alcohol use: Yes     Comment: occ    Drug use: No       OBJECTIVE:     Vital Signs (Most Recent)  Temp: 98.2 °F (36.8 °C) (01/19/24 1144)  Pulse: 89 (01/19/24 1144)  Resp: 18 (01/19/24 1144)  BP: 136/82 (01/19/24 1144)  SpO2: 96 % (01/19/24 1144)    Physical Exam:  Physical Exam  Vitals and nursing note reviewed.   Constitutional:       General: He is not in acute distress.     Appearance: He is ill-appearing.   HENT:      Head: Normocephalic and atraumatic.   Eyes:      Extraocular Movements: Extraocular movements intact.      Conjunctiva/sclera: Conjunctivae normal.      Pupils: Pupils are equal, round, and reactive to light.   Pulmonary:      Effort: Pulmonary effort is normal. No respiratory distress.   Abdominal:      General: Abdomen is flat. There is no distension.   Musculoskeletal:      Comments: L knee incision with dressing   Skin:     General: Skin is warm and dry.      Coloration: Skin is not jaundiced.   Neurological:      General: No focal deficit present.      Mental Status: He is alert and oriented to person, place, and time.   Psychiatric:         Mood and  Affect: Mood normal.         Behavior: Behavior normal.         Thought Content: Thought content normal.         Judgment: Judgment normal.         Laboratory  I have reviewed all pertinent lab results within the past 24 hours.  CBC:   Recent Labs   Lab 01/19/24  0815   WBC 12.27   RBC 3.12*   HGB 8.4*   HCT 28.3*      MCV 91   MCH 26.9*   MCHC 29.7*     BMP:   Recent Labs   Lab 01/17/24  0348 01/18/24  0818 01/19/24  0815   *   < > 188*   *   < > 133*   K 4.3   < > 3.9      < > 101   CO2 25   < > 26   BUN 9   < > 7   CREATININE 0.8   < > 0.7   CALCIUM 8.6*   < > 8.6*   MG 1.8  --   --     < > = values in this interval not displayed.     CMP:   Recent Labs   Lab 01/18/24  0818 01/19/24  0815   GLU 82 188*   CALCIUM 8.7 8.6*   ALBUMIN 1.4*  --    PROT 7.2  --    * 133*   K 3.7 3.9   CO2 26 26    101   BUN 8 7   CREATININE 0.7 0.7   ALKPHOS 156*  --    ALT 35  --    AST 26  --    BILITOT 0.3  --      LFTs:   Recent Labs   Lab 01/18/24  0818   ALT 35   AST 26   ALKPHOS 156*   BILITOT 0.3   PROT 7.2   ALBUMIN 1.4*     Coagulation:   Recent Labs   Lab 01/15/24  0513   LABPROT 14.1*   INR 1.3*   APTT 28.4     Microbiology Results (last 7 days)       Procedure Component Value Units Date/Time    Culture, Anaerobe [[id number]] Collected: 01/15/24 1418    Order Status: Completed Specimen: Joint Fluid from Knee, Left Updated: 01/19/24 0718     Anaerobic Culture Culture in progress    Blood culture x two cultures. Draw prior to antibiotics. [[id number]] Collected: 01/14/24 2039    Order Status: Completed Specimen: Blood from Peripheral, Upper Arm, Right Updated: 01/18/24 2212     Blood Culture, Routine No Growth to date      No Growth to date      No Growth to date      No Growth to date      No Growth to date    Narrative:      Aerobic and anaerobic    Blood culture x two cultures. Draw prior to antibiotics. [[id number]] Collected: 01/14/24 2039    Order Status: Completed Specimen: Blood  from Peripheral, Forearm, Right Updated: 01/18/24 2212     Blood Culture, Routine No Growth to date      No Growth to date      No Growth to date      No Growth to date      No Growth to date    Narrative:      Aerobic and anaerobic    Aerobic culture [[id number]] Collected: 01/15/24 1419    Order Status: Completed Specimen: Knee, Left Updated: 01/18/24 0955     Aerobic Bacterial Culture No growth    AFB Culture & Smear [[id number]] Collected: 01/15/24 1418    Order Status: Completed Specimen: Joint Fluid from Knee, Left Updated: 01/16/24 2128     AFB Culture & Smear Culture in progress     AFB CULTURE STAIN No acid fast bacilli seen.    Fungus culture [[id number]] Collected: 01/15/24 1418    Order Status: Completed Specimen: Joint Fluid from Knee, Left Updated: 01/16/24 0920     Fungus (Mycology) Culture Culture in progress    Gram stain [[id number]] Collected: 01/15/24 1418    Order Status: Completed Specimen: Joint Fluid from Knee, Left Updated: 01/15/24 1647     Gram Stain Result No WBC's      No organisms seen    Influenza A & B by Molecular [[id number]] Collected: 01/14/24 2030    Order Status: Completed Specimen: Nasopharyngeal Swab Updated: 01/14/24 2105     Influenza A, Molecular Negative     Influenza B, Molecular Negative     Flu A & B Source Nasal swab            ASA/Mallampati  ASA: 2  Mallampati: 2    Imaging:  Recent imaging studies including MRI pelvis on 1/17/24 which was independently reviewed by Alondra Garcia MD.     EXAMINATION:  MRI PELVIS W WO CONTRAST     CLINICAL HISTORY:  Soft tissue infection suspected, pelvis, xray done;pain;     TECHNIQUE:  Multiplanar multisequence imaging of the pelvis prior to and following 10cc intravenous gadolinium.     COMPARISON:  Recent x-ray of the pelvis 01/15/2024 is nondiagnostic.  Knee MRI performed same day.     Study limitations: There is significant artifact due to multiple factors including significant coil contact artifact, artifact from the patient's  arms over the pelvis, and some susceptibility artifact due to iliac venous wall stents.     FINDINGS:  Bone Marrow: The visualized portion of the lumbar spine reveals low signal within the vertebral bodies suggesting red marrow reconversion.  Marrow signal within the pelvic bones is normal.     Hip and Sacroiliac joints: The SI joints are congruent.  There are bilateral small hip joint effusions, right greater than left.No osseous erosion is seen.  But articular cartilage grossly preserved.     Bursae: No evidence of bursitis.     Soft Tissues: There is extensive soft tissue abnormality.  The soft tissues of the anteromedial aspect of the left thigh demonstrate multiple punctate foci of signal void dissecting within the fat planes suggesting soft tissue gas.  There is Grisel myofascial free fluid and soft tissue and muscular enhancement throughout the left anterior compartment muscles as well as the distal gluteal muscles on the left side.  There is some edema and enhancement of the abductor compartment muscles bilaterally.     On the patient's right side, there is a large complex T1 and T2 bright but mixed signal fluid collection beginning at the level of the iliac crest and dissecting deep to the superficial gluteus muscle fascia and tensor fascia jonah caudally, possibly incompletely imaged, with the fluid collection measuring 17 by 7 x 5 cm.  Again, diffuse Grisel myofascial edema and enhancement predominantly affects the anterior compartment muscles and inferior gluteal muscles.     Pelvic organs: The bladder is distended.  Uterus appears absent.     Impression:     Myositis and fasciitis, with soft tissue gas dissecting along the anterior compartment myofascial planes of the proximal left thigh.  While this may be related to recent surgical intervention in this patient with recent arthrotomy, necrotizing fasciitis is not excluded.     This report was flagged in Epic as abnormal.     Similar soft tissue changes  without gas of the right proximal thigh but with a large lateral thigh fluid collection that may reflect hematoma or abscess.     No evidence of osteomyelitis.        Electronically signed by: Roderick Juan Jr  Date:                                            01/17/2024  Time:                                           13:39    ASSESSMENT/PLAN:     Assessment:  38 y.o. male with a PMHx of May-Thurner syndrome on Xarelto, DM2 on insulin, chronic foot wounds following with wound care, and HTN, recent admission for MRSA bacteremia. Bcx + 12/27/23, c/b left knee septic arthritis, s/p L knee arthrotomy with synovectomy on 12/29 who has been referred to IR for image guided percutaneous drain placement/aspiration for management of  R lateral thigh fluid collection . Case discussed with staff. Plan to leave a drain in place if fluid appears grossly infectious/purulent, will perform just aspiration without drain placement if fluid appears serous/serosanguinous or is just a hematoma. The procedure was discussed in great detail with the patient including thorough explanations of the potential risks and benefits of percutaneous drain placement/aspiration. Risks include sepsis, severe infection, hemorrhage, damage to surrounding structures, catheter malfunction, inability to remove catheter, and catheter dislodgment. The patient is a candidate for US guided percutaneous drain placement/aspiration under local anesthesia with IV fentanyl only. Plan discussed with ordering physician.The pt verbalized understanding of the plan and would like to proceed.    Plan:  Will proceed with US guided percutaneous drain placement/aspiration under local anesthesia with IV fentanyl only on 1/19/24.   No need for NPO status  Primary team to order any labs/cultures to be drawn on fluid sample collected during the procedure.  Anticoagulation history reviewed. Pt on xarelto, no need to hold.  Coagulation labs reviewed.  Thank you for the consult.  Please contact with questions via [employer] secure chat or [employer] Link    Melissa Plummer PA-C  Interventional Radiology  Spectra: [phone]

## 2024-01-19 NOTE — PROCEDURES
VIR Post-Procedure Note      Pre Op Diagnosis:  Thigh fluid collection    Post Op Diagnosis:  same    Procedure:  Image Guided Drain Placement    Procedure performed by:  Lex Purcell MD  /  Noa Garcia MD    Written Informed Consent Obtained: Yes    Specimen Removed:  Yes; aspirate from fluid collection    Estimated Blood Loss: minimal    Findings:    CT and US was used for localization of abnormal fluid collection.     Successful placement of 10 Maori all-purpose drainage catheter in the right lateral thigh.  Approximately  120 mL of blood tinged mildly purulant fluid was removed. A specimen was sent to the lab for further analysis and culture.    The patient tolerated procedure well and there were no complications. Please see procedure report under Imaging for further details.    Plan:    Connect tube to suction bulb.    Flush drain with 10 cc normal saline daily.     Consider drain removal if there is clinical improvement, patient afebrile, and drain output < 10 cc daily for 2 consecutive days. Recommend imaging confirmation of fluid resolution before drain removal.         Lex Purcell MD  VIR Fellow

## 2024-01-19 NOTE — PLAN OF CARE
Pt arrived to 173 for rg lateral thigh abscess/Possible drain placement. Pt oriented to unit and staff, Pt safely transferred from stretcher to procedural table. Fall risk reviewed and comfort measures utilized with interventions. Safety strap applied, position pillows to minimize pressure points. Blankets applied. Pt prepped and draped utilizing standard sterile technique. Patient placed on continuous monitoring, as required by sedation policy. Timeouts implemented utilizing standard universal time-out per department and facility policy. RN to remain at bedside with continuous monitoring. Pt resting comfortably. Denies pain/discomfort. Will continue to monitor. See flow sheets for monitoring, medication administration, and updates. patient verbalizes understanding.

## 2024-01-19 NOTE — SUBJECTIVE & OBJECTIVE
Past Medical History:   Diagnosis Date    Anticoagulant long-term use     COVID-19 virus infection     Diabetes mellitus     Diabetes mellitus, type 2     Hypertension     May-Thurner syndrome      Past Surgical History:   Procedure Laterality Date    APPENDECTOMY      ARTHROSCOPY OF KNEE Left 1/16/2024    Procedure: ARTHROSCOPY, KNEE, LEFT;  Surgeon: Mandeep Gatica MD;  [address];  Service: Orthopedics;  Laterality: Left;    ARTHROTOMY OF KNEE Left 12/29/2023    Procedure: ARTHROTOMY, KNEE;  Surgeon: Edy Bocanegra Jr., MD;  Location: [facility];  Service: Orthopedics;  Laterality: Left;    ESOPHAGOGASTRODUODENOSCOPY N/A 1/31/2022    Procedure: EGD (ESOPHAGOGASTRODUODENOSCOPY);  Surgeon: Cindy Quiros MD;  Location: [facility];  Service: Endoscopy;  Laterality: N/A;    ESOPHAGOGASTRODUODENOSCOPY N/A 3/21/2022    Procedure: EGD (ESOPHAGOGASTRODUODENOSCOPY);  Surgeon: Tejas Mann MD;  Location: [facility];  Service: Endoscopy;  Laterality: N/A;    INCISION AND DRAINAGE FOOT Left 11/28/2021    Procedure: INCISION AND DRAINAGE, FOOT;  Surgeon: Bj Alicea DPM;  Location: [facility];  Service: Podiatry;  Laterality: Left;    stents in bilateral legs      TRANSESOPHAGEAL ECHOCARDIOGRAPHY N/A 1/3/2024    Procedure: ECHOCARDIOGRAM, TRANSESOPHAGEAL;  Surgeon: Douglas Doss MD;  Location: [facility] CATH LAB/EP;  Service: Cardiology;  Laterality: N/A;     Review of patient's allergies indicates:   Allergen Reactions    Heparin analogues Nausea And Vomiting       Scheduled Medications:    acetaminophen  1,000 mg Oral Q8H    ammonium lactate   Topical (Top) BID    ceftaroline (TEFLARO) IVPB  600 mg Intravenous Q12H    DAPTOmycin (CUBICIN) 945 mg in sodium chloride 0.9% SolP 50 mL IVPB  10 mg/kg (Adjusted) Intravenous Q24H    insulin aspart U-100  8 Units Subcutaneous TIDWM    insulin detemir U-100  17 Units Subcutaneous BID    methocarbamoL  500 mg Oral QID    metoclopramide HCl  5 mg Oral TID AC     metoprolol succinate  12.5 mg Oral Daily    pantoprazole  40 mg Oral Daily    rivaroxaban  10 mg Oral Daily with dinner    sodium chloride 0.9%  10 mL Intravenous Q6H       PRN Medications: dextrose 10%, dextrose 10%, glucagon (human recombinant), glucose, glucose, insulin aspart U-100, melatonin, morphine, naloxone, ondansetron, oxyCODONE, oxyCODONE, oxyCODONE, polyethylene glycol, prochlorperazine, scopolamine, senna-docusate 8.6-50 mg, sodium chloride 0.9%, sodium chloride 0.9%, Flushing PICC/Midline Protocol **AND** sodium chloride 0.9% **AND** sodium chloride 0.9%    Family History       Problem Relation (Age of Onset)    Cancer Mother, Paternal Uncle, Paternal Grandfather, Maternal Grandfather    Irritable bowel syndrome Paternal Grandfather          Tobacco Use    Smoking status: Former     Types: Cigarettes    Smokeless tobacco: Former    Tobacco comments:     occasionally    Substance and Sexual Activity    Alcohol use: Yes     Comment: occ    Drug use: No    Sexual activity: Not on file     Review of Systems   Constitutional:  Positive for activity change and fatigue. Negative for fever.   Respiratory:  Negative for cough and shortness of breath.    Musculoskeletal:  Positive for gait problem.   Neurological:  Positive for weakness (LLE and LUE).   Psychiatric/Behavioral:  Negative for agitation, behavioral problems and confusion.      Objective:     Vital Signs (Most Recent):  Temp: 97.7 °F (36.5 °C) (01/19/24 0822)  Pulse: 84 (01/19/24 0822)  Resp: 18 (01/19/24 0822)  BP: 138/66 (01/19/24 0822)  SpO2: (!) 94 % (01/19/24 0822)    Vital Signs (24h Range):  Temp:  [97.7 °F (36.5 °C)-98.6 °F (37 °C)] 97.7 °F (36.5 °C)  Pulse:  [84-90] 84  Resp:  [16-20] 18  SpO2:  [94 %-97 %] 94 %  BP: (119-138)/(66-73) 138/66     Body mass index is 32.2 kg/m².     Physical Exam  Vitals and nursing note reviewed.   Constitutional:       Appearance: Normal appearance.   HENT:      Head: Normocephalic and atraumatic.       Mouth/Throat:      Mouth: Mucous membranes are moist.   Eyes:      Extraocular Movements: Extraocular movements intact.      Pupils: Pupils are equal, round, and reactive to light.   Pulmonary:      Effort: Pulmonary effort is normal. No respiratory distress.   Musculoskeletal:      Cervical back: Normal range of motion and neck supple.      Comments: LUE 4/5  LLE 3/5 with dressings in place     Skin:     General: Skin is warm.   Neurological:      Mental Status: He is alert and oriented to person, place, and time. Mental status is at baseline.      Motor: Weakness present.      Gait: Gait abnormal.   Psychiatric:         Mood and Affect: Mood normal.         Behavior: Behavior normal.          NEUROLOGICAL EXAMINATION:     MENTAL STATUS   Oriented to person, place, and time.     CRANIAL NERVES     CN III, IV, VI   Pupils are equal, round, and reactive to light.      Diagnostic Results: Labs: Reviewed  ECG: Reviewed  X-Ray: Reviewed  CT: Reviewed

## 2024-01-19 NOTE — ASSESSMENT & PLAN NOTE
Patient with right heel - resolving ulcer  - prior wound care notes indicate - Paint with betadine and leave open to air  - elevate heels to reduce pressure   - Wound care consult   - Right heel: bedside nursing to paint with betadine, let dry and apply a foam dressing daily   - Turning every 2 hours  - Heel protecting boots  - Bedside nursing to maintain pressure injury prevention interventions

## 2024-01-19 NOTE — PT/OT/SLP PROGRESS
Physical Therapy Co-Treatment    Patient Name:  Kulwant Mueller Jr.   MRN:  [id number]  Admitting Diagnosis:  Staphylococcal arthritis of left knee   Recent Surgery: Procedure(s) (LRB):  ARTHROSCOPY, KNEE, LEFT (Left) 3 Days Post-Op  Admit Date: 1/14/2024  Length of Stay: 3 days    Recommendations:     Discharge Recommendations: High Intensity Therapy  Discharge Equipment Recommendations: bedside commode, wheelchair, walker, rolling   Barriers to discharge: None    Appropriate transfer level with nursing staff: bed <> chair with assist x 1 and RW    Plan:     During this hospitalization, patient to be seen 4 x/week to address the identified rehab impairments via gait training, therapeutic activities, therapeutic exercises, neuromuscular re-education and progress towards the established goals.  Plan of Care Expires:  02/17/24  Plan of Care Reviewed with: patient    Assessment     Kulwant Mueller Jr. is a 38 y.o. male admitted with a medical diagnosis of Staphylococcal arthritis of left knee. Pt tolerated session well today. He showed good improvements in activity tolerance and pain control today and was able to to perform sit <> stand transfers as well as ambulation with use of RW. Pt remains with decreased ROM of Lt knee as well as increased pain with weightbearing which greatly limit's pt's independence and functional mobility. Pt does requirement encouragement to mobilize and participate in session. Pt will continue to benefit from skilled PT services during this hospital admit to continue to improve transfer ability and efficiency as well as continue to progress pt's ambulation distance and cardiopulmonary endurance to maximize pt's functional independence and return to PLOF. Patient has demonstrated sufficient progression to warrant high intensity therapy evidenced by objectives noted below.     Problem List: weakness, impaired endurance, impaired self care skills, impaired functional mobility, gait  "instability, impaired balance, decreased lower extremity function, decreased safety awareness, pain, decreased ROM, impaired skin, edema, impaired muscle length, impaired joint extensibility.  Rehab Prognosis: Good; patient would benefit from acute skilled PT services to address these deficits and reach maximum level of function.      Goals:   Multidisciplinary Problems       Physical Therapy Goals          Problem: Physical Therapy    Goal Priority Disciplines Outcome Goal Variances Interventions   Physical Therapy Goal     PT, PT/OT Ongoing, Progressing     Description: Goals to be met by: 2024     Patient will increase functional independence with mobility by performin. Supine to sit with Modified [place]  2. Sit to supine with Modified [place]  3. Sit to stand transfer with Moderate Assistance  4. Bed to chair transfer with Maximum Assistance using RW or LRAD  5. Gait  x 20 feet with Moderate Assistance using RW or LRAD.   6. Lower extremity exercise program x30 reps per handout, with independence                         Subjective     RN notified prior to session. RN present upon PT entrance into room. Patient agreeable to PT evaluation.    Chief Complaint: "I'm never saying yes to sitting in the chair again"  Patient/Family Comments/goals: go home  Pain/Comfort:  Pain Rating 1: 0/10 (at rest)  Location - Side 1: Left  Location - Orientation 1: generalized  Location 1: knee  Pain Addressed 1: Pre-medicate for activity, Reposition, Distraction  Pain Rating Post-Intervention 1: other (see comments) (reported increased pain with mobility)    Objective:     Additional staff present: OT; OT for cotx due to pt's multiple medical comorbidities and functional deficits req'ing two skilled therapists to appropriately maximize functional potential by progressing pt's musculoskeletal strength and endurance, facilitating neuromuscular postural balance and control ,and accommodating for patient's " "impaired cardiopulmonary tolerance to activities.     Patient found HOB elevated with: telemetry, peripheral IV   Cognition:   Alert and Cooperative; flat affect and withdrawn  Patient is oriented to Person, Place, Time, Situation  Command following: Follows multistep verbal commands  Fluency: clear/fluent  General Precautions: Standard, Cardiac fall   Orthopedic Precautions:LLE weight bearing as tolerated   Braces: N/A   Body mass index is 32.2 kg/m².  Oxygen Device: Room Air  Vitals: BP (!) 141/78 (BP Location: Left arm)   Pulse 96   Temp 98.2 °F (36.8 °C)   Resp 11   Ht 6' 1" (1.854 m)   Wt 110.7 kg (244 lb 0.8 oz)   SpO2 100%   BMI 32.20 kg/m²     Outcome Measures:  AM-PAC 6 CLICK MOBILITY  Turning over in bed (including adjusting bedclothes, sheets and blankets)?: 2  Sitting down on and standing up from a chair with arms (e.g., wheelchair, bedside commode, etc.): 3  Moving from lying on back to sitting on the side of the bed?: 2  Moving to and from a bed to a chair (including a wheelchair)?: 3  Need to walk in hospital room?: 3  Climbing 3-5 steps with a railing?: 1  Basic Mobility Total Score: 14     Functional Mobility:    Bed Mobility:   Supine to Sit: stand by assistance with HOB elevated; to RT side of bed  Scooting anteriorly to EOB to have both feet planted on floor: stand by assistance     Sitting Balance at Edge of Bed:  Static Sitting Balance: Good : able to maintain balance against moderate resistance  Dynamic Sitting Balance: Good : able to sit unsupported and weight shift across midline moderately  Assistance Level Required: Stand-by Assistance  Comments: Increased time spent sitting EOB (~8 minutes) for pt to tolerate increased knee flexion and upright posture    Transfers:   Sit <> Stand Transfer: minimum assistance with rolling walker. [id number] from EOB    Standing Balance:  Static Standing Balance: Fair- : requires minimal assistance or UE support in order to stand without LOB  Dynamic " Standing Balance: Poor+ : able to stand with minimal assistance and reach ipsilaterally. Unable to weight shift.  Assistance Level Required: Contact Guard Assistance  Patient used: rolling walker       Gait:   Patient ambulated: 40 ft   Patient required: contact guard assistance  Patient used:  rolling walker   Gait Pattern observed: step-to  Gait Deviation(s): decreased step length, decreased weight shift, flexed posture, decreased nathaniel, and antalgic gait  Impairments due to: pain and decreased endurance  all lines remained intact throughout ambulation trial  Comments: Patient required cues for heel strike, position in walker, stride length, and upright posture to increase independence and safety. Patient required cues ~ 25% of the time. Pt with no LOB throughout and gait primarily limited in distance due to increasing pain in Lt knee    Education:  Time provided for education, counseling and discussion of health disposition in regards to patient's current status  All questions answered within PT scope of practice and to patient's satisfaction  PT role in POC to address current functional deficits  Pt educated on proper body mechanics, safety techniques, and energy conservation with PT facilitation and cueing throughout session  Call nursing/pct to transfer to chair/use bathroom. Pt stated understanding.  Whiteboard updated with therapist name and pt's current mobility status documented above  Safe to perform step transfer to/from chair/bedside commode assist x 1 and RW w/ nursing/PCT present  Importance of OOB tolerance prn hrs/day to improve lung ventilation and expansion as well as strengthen postural musculature    Patient left up in chair with all lines intact and call button in reach.    Time Tracking:     PT Received On: 01/19/24  PT Start Time: 0927     PT Stop Time: 1000  PT Total Time (min): 33 min     Billable Minutes:   Gait Training 12 minutes and Therapeutic Activity 12 minutes    Treatment Type:  Treatment  PT/PTA: PT       1/19/2024

## 2024-01-19 NOTE — HPI
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 Recently admitted to [facility] on 12/27/23 for nausea, vomiting, diarrhea, and generalized weakness x 6 days. Upon arrival, found to be in DKA. Hospital course complicated by MRSA bacteremia from L knee septic arthritis (S/p L knee arthrotomy with synovectomy on 12/29. Bcx with staph aureus),  L shoulder pain (s/p aspiration. ID was consulted and recommending IV Daptomycin x 6 weeks and Added Ceftaroline 1/4/2024), R heel wound (MRI foot with no evidence of osteomyelitis), and R UE DVT (continues on Xarelto). Discharged to [facility] rehab on 1/10/24 and presented to [facility] on 1/14/24 for fever. Taken to OR and s/p L knee arthroscopic I&D on 1/17, Cxs were NGTD. WBAT to LLE.  ID consulted and on 1/19 Recommendations Continue daptomycin/ceftaroline for now; anticipating stopping ceftaroline at d/c (and continuing dapto)est end date: 2/11/24. Consider IR aspirate of large fluid collection at R iliac crest- send for cell count/culture to r/o abscess. IR aspiration of R thigh abscess with 120 cc of bloody purulent fluid. Gram stain negative, cx pending. Despite negative cx, given report from IR of purulence in aspirate in setting of known infection. Ortho proceeded with I&D of right thigh abscess with drain remain in  place and repeat I&D of L knee 1/21    Dispo: If drain output and CRP continues to decrease tomorrow, pt stable for dc to rehab     Functional History: Patient lives with spouse in a single story home and was I with no use of DME prior to last hospitalization.

## 2024-01-19 NOTE — CONSULTS
[facility] - Observation 11H  Infectious Disease  Consult Note    Patient Name: Kulwant Mueller Jr.  MRN: [id number]  Admission Date: 1/14/2024  Hospital Length of Stay: 3 days  Attending Physician: Lizbeth Marquez MD  Primary Care Provider: Shellie, Primary Doctor     Isolation Status: Contact    Patient information was obtained from patient and ER records.      Consults  Assessment/Plan:     ID  * Staphylococcal arthritis of left knee  38M with h/o May-Thurner syndrome on Xarelto, DM2 on insulin, chronic foot wounds following with wound care, and HTN, recent admission for MRSA bacteremia. Bcx + 12/27/23, c/b left knee septic arthritis, s/p L knee arthrotomy with synovectomy on 12/29. Cx with MRSA, ROBY 1/02/24 neg, required salvage therapy & cleared 1/07, placed on daptomycin monotherapy. Of note, the patient also had L shoulder aspirated imaging without evidence of infection. R heel without osteo but had cellulitis, myositis, tenosynovitis. He started having new fevers at rehab, and is now admitted for workup. Fevers were up to 103 F, blood cultures collected 1/14/24 are NGTD. ID was consulted for recent MRSA bacteremia from septic arthritis - new fevers @ IP rehab, on daptomycin. In the meantime, the patient was taken to the OR on the 15th for arthroscopic washout. While his cell count was c/e septic arthritis, his synovial cultures are also NGTD. R sided lower back pain; MRI 1/17- [id number] fluid collection R iliac crest- hematoma vs abscess.     Recommendations  Continue daptomycin/ceftaroline for now; anticipating stopping ceftaroline at d/c (and continuing dapto)  Consider IR aspirate of large fluid collection at R iliac crest- send for cell count/culture to r/o abscess  Anticipate at least 4 weeks of abx from last washout (est end date: 2/11/24) and until radiographic resolution of abscess if present  Trend CPK and CRP with weekly labs  Routine hiv screening ordered          Thank you for your consult. I will  follow-up with patient. Please contact us if you have any additional questions.    Kelsey Ayoub MD  Infectious Disease  [facility] - Observation 11H    Subjective:     Principal Problem: Staphylococcal arthritis of left knee    HPI: Mr. De Anda is a pleasant 38 y.o. man with May-Thurner syndrome on Xarelto, DM2 on insulin, chronic foot wounds following with wound care, and HTN, recent admission for MRSA bacteremia. Index Bcx + 12/27/23, complicated by left knee septic arthritis, s/p L knee arthrotomy with synovectomy on 12/29. Cx with MRSA, ROBY 1/02/24 neg, required salvage therapy & cleared 1/07, placed on daptomycin monotherapy. Of note, the patient also had L shoulder aspirated imaging without evidence of infection. R heel without osteo but had cellulitis, myositis, tenosynovitis. He started having new fevers at rehab, and is now admitted for workup. Fevers were up to 103 F, blood cultures collected 1/14/24 are NGTD. ID was consulted for recent MRSA bacteremia from septic arthritis - new fevers @ IP rehab, on daptomycin. In the meantime, the patient was taken to the OR on the 15th for arthroscopic washout. Cultures are also NGTD. The patient is feeling better - denies fever or chills.        Interval history: naeo. Tolerating abx. Reports some R sided lower back pain, stable. Wife on phone.         Past Surgical History:   Procedure Laterality Date    APPENDECTOMY      ARTHROSCOPY OF KNEE Left 1/16/2024    Procedure: ARTHROSCOPY, KNEE, LEFT;  Surgeon: Mandeep Gatica MD;  Location: [address];  Service: Orthopedics;  Laterality: Left;    ARTHROTOMY OF KNEE Left 12/29/2023    Procedure: ARTHROTOMY, KNEE;  Surgeon: Edy Bocanegra Jr., MD;  Location: [facility] OR;  Service: Orthopedics;  Laterality: Left;    ESOPHAGOGASTRODUODENOSCOPY N/A 1/31/2022    Procedure: EGD (ESOPHAGOGASTRODUODENOSCOPY);  Surgeon: Cindy Quiros MD;  Location: [facility];  Service: Endoscopy;  Laterality: N/A;     "ESOPHAGOGASTRODUODENOSCOPY N/A 3/21/2022    Procedure: EGD (ESOPHAGOGASTRODUODENOSCOPY);  Surgeon: Tejas Mann MD;  Location: [facility] ENDO;  Service: Endoscopy;  Laterality: N/A;    INCISION AND DRAINAGE FOOT Left 11/28/2021    Procedure: INCISION AND DRAINAGE, FOOT;  Surgeon: Bj Alicea DPM;  Location: [facility] OR;  Service: Podiatry;  Laterality: Left;    stents in bilateral legs      TRANSESOPHAGEAL ECHOCARDIOGRAPHY N/A 1/3/2024    Procedure: ECHOCARDIOGRAM, TRANSESOPHAGEAL;  Surgeon: Douglas Doss MD;  Location: [facility] CATH LAB/EP;  Service: Cardiology;  Laterality: N/A;       Review of patient's allergies indicates:   Allergen Reactions    Heparin analogues Nausea And Vomiting       Medications:  Medications Prior to Admission   Medication Sig    metoprolol succinate (TOPROL-XL) 25 MG 24 hr tablet Take 0.5 tablets (12.5 mg total) by mouth once daily.    blood-glucose meter,continuous (DEXCOM G7 ) Misc 1 Device by Misc.(Non-Drug; Combo Route) route once daily.    blood-glucose sensor (DEXCOM G7 SENSOR) Justyna 1 Device by Misc.(Non-Drug; Combo Route) route every 10 days.    empagliflozin (JARDIANCE) 25 mg tablet Take 1 tablet (25 mg total) by mouth once daily.    furosemide (LASIX) 20 MG tablet Take 1 tablet (20 mg total) by mouth once daily.    glucagon (GVOKE HYPOPEN 2-PACK) 1 mg/0.2 mL AtIn Inject 1 mg into the skin as needed (SEVERE HYPOGLYCEMIA).    insulin aspart U-100 (NOVOLOG U-100 INSULIN ASPART) 100 unit/mL injection Inject 14 Units into the skin 3 (three) times daily before meals.    metoclopramide HCl (REGLAN) 10 MG tablet Take 0.5 tablets (5 mg total) by mouth 3 (three) times daily before meals.    pantoprazole (PROTONIX) 40 MG tablet Take 1 tablet (40 mg total) by mouth once daily.    pen needle, diabetic (BD ULTRA-FINE DIYA PEN NEEDLE) 32 gauge x 5/32" Ndle Use with Tresiba daily and Humalog 3-4 times daily as directed.    prochlorperazine (COMPAZINE) 10 MG tablet Take 1 tablet (10 mg " total) by mouth 3 (three) times daily as needed (nausea or vomiting).    promethazine (PHENERGAN) 25 MG suppository Place 1 suppository (25 mg total) rectally every 6 (six) hours as needed for Nausea.    rivaroxaban (XARELTO) 10 mg Tab Take 1 tablet (10 mg total) by mouth daily with dinner or evening meal.    scopolamine (TRANSDERM-SCOP) 1.3-1.5 mg (1 mg over 3 days) Place 1 patch onto the skin every 72 hours as needed (intractable nausea/vomiting).    sodium chloride 0.9% SolP 50 mL with DAPTOmycin 500 mg SolR 947 mg Inject 947 mg into the vein once daily.    TRESIBA FLEXTOUCH U-200 200 unit/mL (3 mL) insulin pen Inject 56 Units into the skin once daily.     Antibiotics (From admission, onward)      Start     Stop Route Frequency Ordered    01/17/24 1830  ceftaroline fosamiL (TEFLARO) 600 mg in dextrose 5 % in water (D5W) 50 mL IVPB (MB+)         -- IV Every 12 hours (non-standard times) 01/17/24 1728    01/15/24 1400  DAPTOmycin (CUBICIN) 945 mg in sodium chloride 0.9% SolP 50 mL IVPB         -- IV Every 24 hours (non-standard times) 01/15/24 0229          Antifungals (From admission, onward)      None          Antivirals (From admission, onward)      None             Immunization History   Administered Date(s) Administered    COVID-19, MRNA, LN-S, PF (Pfizer) (Purple Cap) 12/28/2020, 01/19/2021, 02/11/2022    Influenza - Quadrivalent - PF *Preferred* (6 months and older) 09/29/2020, 09/30/2021, 10/20/2022, 10/20/2023       Family History       Problem Relation (Age of Onset)    Cancer Mother, Paternal Uncle, Paternal Grandfather, Maternal Grandfather    Irritable bowel syndrome Paternal Grandfather          Social History     Socioeconomic History    Marital status:    Tobacco Use    Smoking status: Former     Types: Cigarettes    Smokeless tobacco: Former    Tobacco comments:     occasionally    Substance and Sexual Activity    Alcohol use: Yes     Comment: occ    Drug use: No     Social Determinants of  Health     Financial Resource Strain: Low Risk  (12/28/2023)    Overall Financial Resource Strain (CARDIA)     Difficulty of Paying Living Expenses: Not hard at all   Food Insecurity: No Food Insecurity (12/28/2023)    Hunger Vital Sign     Worried About Running Out of Food in the Last Year: Never true     Ran Out of Food in the Last Year: Never true   Transportation Needs: No Transportation Needs (12/28/2023)    PRAPARE - Transportation     Lack of Transportation (Medical): No     Lack of Transportation (Non-Medical): No   Physical Activity: Sufficiently Active (12/28/2023)    Exercise Vital Sign     Days of Exercise per Week: 3 days     Minutes of Exercise per Session: 60 min   Stress: Stress Concern Present (12/28/2023)    [employer] Occupational [employer] - Occupational Stress Questionnaire     Feeling of Stress : Very much   Social Connections: Moderately Integrated (12/28/2023)    Social Connection and Isolation Panel [NHANES]     Frequency of Communication with Friends and Family: More than three times a week     Frequency of Social Gatherings with Friends and Family: More than three times a week     Attends Protestant Services: More than 4 times per year     Active Member of Clubs or Organizations: No     Attends Club or Organization Meetings: Never     Marital Status:    Housing Stability: Low Risk  (12/28/2023)    Housing Stability Vital Sign     Unable to Pay for Housing in the Last Year: No     Number of Places Lived in the Last Year: 1     Unstable Housing in the Last Year: No     Review of Systems   Constitutional:  Positive for fever.   HENT:  Negative for trouble swallowing.    Respiratory:  Negative for cough and shortness of breath.    Gastrointestinal:  Negative for abdominal pain, blood in stool, diarrhea and vomiting.   Genitourinary:  Negative for dysuria and hematuria.   Musculoskeletal:  Positive for arthralgias and joint swelling. Negative for neck stiffness.   Neurological:   Negative for syncope.   Psychiatric/Behavioral:  Negative for confusion.    All other systems reviewed and are negative.    Objective:     Vital Signs (Most Recent):  Temp: 98.2 °F (36.8 °C) (01/19/24 1144)  Pulse: 89 (01/19/24 1144)  Resp: 18 (01/19/24 1144)  BP: 136/82 (01/19/24 1144)  SpO2: 96 % (01/19/24 1144) Vital Signs (24h Range):  Temp:  [97.7 °F (36.5 °C)-98.6 °F (37 °C)] 98.2 °F (36.8 °C)  Pulse:  [84-90] 89  Resp:  [16-20] 18  SpO2:  [94 %-97 %] 96 %  BP: (119-138)/(66-82) 136/82     Weight: 110.7 kg (244 lb 0.8 oz)  Body mass index is 32.2 kg/m².    Estimated Creatinine Clearance: 182.9 mL/min (based on SCr of 0.7 mg/dL).     Physical Exam  Vitals and nursing note reviewed.   Constitutional:       Appearance: Normal appearance.   HENT:      Head: Normocephalic and atraumatic.      Nose: Nose normal.      Mouth/Throat:      Mouth: Mucous membranes are moist.      Pharynx: Oropharynx is clear.   Eyes:      Conjunctiva/sclera: Conjunctivae normal.   Cardiovascular:      Rate and Rhythm: Normal rate and regular rhythm.      Pulses: Normal pulses.      Heart sounds: Normal heart sounds.   Pulmonary:      Effort: Pulmonary effort is normal.      Breath sounds: Normal breath sounds.   Abdominal:      General: Bowel sounds are normal.      Palpations: Abdomen is soft.      Tenderness: There is no guarding or rebound.   Musculoskeletal:         General: Tenderness present. No deformity.      Cervical back: Neck supple.      Comments: L knee swelling and tenerness. Surgical dressings c/d/I. No drains   Skin:     General: Skin is warm and dry.      Coloration: Skin is not jaundiced.      Findings: No rash.   Neurological:      Mental Status: He is alert and oriented to person, place, and time. Mental status is at baseline.   Psychiatric:         Mood and Affect: Mood normal.         Thought Content: Thought content normal.         Judgment: Judgment normal.          Significant Labs:   Pathology Results  (Last 10  years)                 03/21/22 1419  Specimen to Pathology, Surgery Gastrointestinal tract Final result    Narrative:  Pre-op Diagnosis: [place] grade C esophagitis [K20.80]   Candida esophagitis [B37.81]   Procedure(s):   EGD (ESOPHAGOGASTRODUODENOSCOPY)   1. Antrum bx r/o h pylori   2. Distal esophagus bx   Release to patient->Immediate   Specimen total (fresh, frozen, permanent):->2       01/31/22 1156  Specimen to Pathology, Surgery Gastrointestinal tract Final result    Narrative:  Pre-op Diagnosis: Nausea and vomiting, intractability of   vomiting not specified, unspecified vomiting type [R11.2]   Diabetic gastroparesis associated with type 2 diabetes   mellitus [E11.43, K31.84]   Procedure(s):   EGD (ESOPHAGOGASTRODUODENOSCOPY)   Number of specimens: 2   Name of specimens:   #1 Gastric Bxs r/o H Pylori   #2 Esophageal Bxs r/o Candida   Release to patient->Immediate   Specimen total (fresh, frozen, permanent):->2             All pertinent labs within the past 24 hours have been reviewed.    Significant Imaging: I have reviewed all pertinent imaging results/findings within the past 24 hours.

## 2024-01-19 NOTE — HOSPITAL COURSE
1/18/24: Participated w/ OT. Bed mob mod-maxA. LBD totalA.   1/24/24: Participated w/ PT. Gait: 8 steps(4 forward/backward) 4 side steps with RW and Min A with balance/safety/RW management. Pt. amb. with decreased step length/nathaniel/floor clearance.

## 2024-01-19 NOTE — ASSESSMENT & PLAN NOTE
- S/p L knee arthrotomy with synovectomy on 12/29. Bcx with staph aureus  - Taken to OR and s/p L knee arthroscopic I&D on 1/17, Cxs were NGTD.   - Per Ortho WBAT to LLE.    - D consulted and recommend continue daptomycin/ceftaroline for now and will follow but anticipating another 4 weeks of abx from last washout (est end date: 2/11/24).     Recommendations  -  Encourage mobility, OOB in chair at least 3 hours per day, and early ambulation as appropriate  -  PT/OT evaluate and treat  -  Pain management  -  Monitor for and prevent skin breakdown and pressure ulcers  Early mobility, repositioning/weight shifting every 20-30 minutes when sitting, turn patient every 2 hours, proper mattress/overlay and chair cushioning, pressure relief/heel protector boots  -  DVT prophylaxis    -  Reviewed discharge options (IP rehab, SNF, HH therapy, and OP therapy)

## 2024-01-20 ENCOUNTER — ANESTHESIA EVENT (OUTPATIENT)
Dept: SURGERY | Facility: HOSPITAL | Age: 39
DRG: 486 | End: 2024-01-20
Payer: COMMERCIAL

## 2024-01-20 PROBLEM — L02.415 ABSCESS OF RIGHT THIGH: Status: ACTIVE | Noted: 2024-01-20

## 2024-01-20 PROBLEM — R50.9 FEVER: Status: RESOLVED | Noted: 2024-01-14 | Resolved: 2024-01-20

## 2024-01-20 LAB
ANION GAP SERPL CALC-SCNC: 7 MMOL/L (ref 8–16)
BASOPHILS # BLD AUTO: 0.02 K/UL (ref 0–0.2)
BASOPHILS NFR BLD: 0.2 % (ref 0–1.9)
BUN SERPL-MCNC: 8 MG/DL (ref 6–20)
CALCIUM SERPL-MCNC: 8.8 MG/DL (ref 8.7–10.5)
CHLORIDE SERPL-SCNC: 101 MMOL/L (ref 95–110)
CO2 SERPL-SCNC: 28 MMOL/L (ref 23–29)
CREAT SERPL-MCNC: 0.7 MG/DL (ref 0.5–1.4)
CRP SERPL-MCNC: 139.4 MG/L (ref 0–8.2)
DIFFERENTIAL METHOD BLD: ABNORMAL
EOSINOPHIL # BLD AUTO: 0.1 K/UL (ref 0–0.5)
EOSINOPHIL NFR BLD: 1.1 % (ref 0–8)
ERYTHROCYTE [DISTWIDTH] IN BLOOD BY AUTOMATED COUNT: 14.3 % (ref 11.5–14.5)
EST. GFR  (NO RACE VARIABLE): >60 ML/MIN/1.73 M^2
GLUCOSE SERPL-MCNC: 150 MG/DL (ref 70–110)
HCT VFR BLD AUTO: 27 % (ref 40–54)
HGB BLD-MCNC: 8.3 G/DL (ref 14–18)
IMM GRANULOCYTES # BLD AUTO: 0.07 K/UL (ref 0–0.04)
IMM GRANULOCYTES NFR BLD AUTO: 0.7 % (ref 0–0.5)
LYMPHOCYTES # BLD AUTO: 1.6 K/UL (ref 1–4.8)
LYMPHOCYTES NFR BLD: 14.7 % (ref 18–48)
MCH RBC QN AUTO: 26.9 PG (ref 27–31)
MCHC RBC AUTO-ENTMCNC: 30.7 G/DL (ref 32–36)
MCV RBC AUTO: 88 FL (ref 82–98)
MONOCYTES # BLD AUTO: 0.6 K/UL (ref 0.3–1)
MONOCYTES NFR BLD: 5.2 % (ref 4–15)
NEUTROPHILS # BLD AUTO: 8.3 K/UL (ref 1.8–7.7)
NEUTROPHILS NFR BLD: 78.1 % (ref 38–73)
NRBC BLD-RTO: 0 /100 WBC
PLATELET # BLD AUTO: 440 K/UL (ref 150–450)
PMV BLD AUTO: 9.2 FL (ref 9.2–12.9)
POCT GLUCOSE: 119 MG/DL (ref 70–110)
POCT GLUCOSE: 148 MG/DL (ref 70–110)
POCT GLUCOSE: 166 MG/DL (ref 70–110)
POCT GLUCOSE: 175 MG/DL (ref 70–110)
POTASSIUM SERPL-SCNC: 3.7 MMOL/L (ref 3.5–5.1)
RBC # BLD AUTO: 3.08 M/UL (ref 4.6–6.2)
SODIUM SERPL-SCNC: 136 MMOL/L (ref 136–145)
WBC # BLD AUTO: 10.65 K/UL (ref 3.9–12.7)

## 2024-01-20 PROCEDURE — 25000003 PHARM REV CODE 250: Performed by: PHYSICIAN ASSISTANT

## 2024-01-20 PROCEDURE — 25000003 PHARM REV CODE 250: Performed by: STUDENT IN AN ORGANIZED HEALTH CARE EDUCATION/TRAINING PROGRAM

## 2024-01-20 PROCEDURE — 21400001 HC TELEMETRY ROOM

## 2024-01-20 PROCEDURE — 25000003 PHARM REV CODE 250: Performed by: HOSPITALIST

## 2024-01-20 PROCEDURE — 25000003 PHARM REV CODE 250: Performed by: INTERNAL MEDICINE

## 2024-01-20 PROCEDURE — 80048 BASIC METABOLIC PNL TOTAL CA: CPT | Performed by: PHYSICIAN ASSISTANT

## 2024-01-20 PROCEDURE — 85025 COMPLETE CBC W/AUTO DIFF WBC: CPT | Performed by: PHYSICIAN ASSISTANT

## 2024-01-20 PROCEDURE — 63600175 PHARM REV CODE 636 W HCPCS: Performed by: HOSPITALIST

## 2024-01-20 PROCEDURE — A4216 STERILE WATER/SALINE, 10 ML: HCPCS | Performed by: STUDENT IN AN ORGANIZED HEALTH CARE EDUCATION/TRAINING PROGRAM

## 2024-01-20 PROCEDURE — 94761 N-INVAS EAR/PLS OXIMETRY MLT: CPT

## 2024-01-20 PROCEDURE — 25000003 PHARM REV CODE 250

## 2024-01-20 PROCEDURE — 36415 COLL VENOUS BLD VENIPUNCTURE: CPT | Performed by: STUDENT IN AN ORGANIZED HEALTH CARE EDUCATION/TRAINING PROGRAM

## 2024-01-20 PROCEDURE — 63600175 PHARM REV CODE 636 W HCPCS: Mod: JZ,JG | Performed by: INTERNAL MEDICINE

## 2024-01-20 PROCEDURE — 99233 SBSQ HOSP IP/OBS HIGH 50: CPT | Mod: ,,, | Performed by: INTERNAL MEDICINE

## 2024-01-20 PROCEDURE — 27000207 HC ISOLATION

## 2024-01-20 PROCEDURE — 86140 C-REACTIVE PROTEIN: CPT | Performed by: STUDENT IN AN ORGANIZED HEALTH CARE EDUCATION/TRAINING PROGRAM

## 2024-01-20 RX ADMIN — ACETAMINOPHEN 1000 MG: 500 TABLET ORAL at 06:01

## 2024-01-20 RX ADMIN — METHOCARBAMOL 500 MG: 500 TABLET ORAL at 10:01

## 2024-01-20 RX ADMIN — INSULIN DETEMIR 17 UNITS: 100 INJECTION, SOLUTION SUBCUTANEOUS at 08:01

## 2024-01-20 RX ADMIN — AMMONIUM LACTATE: 12 LOTION TOPICAL at 08:01

## 2024-01-20 RX ADMIN — ACETAMINOPHEN 1000 MG: 500 TABLET ORAL at 02:01

## 2024-01-20 RX ADMIN — AMMONIUM LACTATE: 12 LOTION TOPICAL at 10:01

## 2024-01-20 RX ADMIN — METOCLOPRAMIDE 5 MG: 5 TABLET ORAL at 10:01

## 2024-01-20 RX ADMIN — Medication 10 ML: at 12:01

## 2024-01-20 RX ADMIN — INSULIN ASPART 8 UNITS: 100 INJECTION, SOLUTION INTRAVENOUS; SUBCUTANEOUS at 04:01

## 2024-01-20 RX ADMIN — RIVAROXABAN 10 MG: 10 TABLET, FILM COATED ORAL at 04:01

## 2024-01-20 RX ADMIN — PANTOPRAZOLE SODIUM 40 MG: 40 TABLET, DELAYED RELEASE ORAL at 10:01

## 2024-01-20 RX ADMIN — CEFTAROLINE FOSAMIL 600 MG: 600 POWDER, FOR SOLUTION INTRAVENOUS at 06:01

## 2024-01-20 RX ADMIN — METOCLOPRAMIDE 5 MG: 5 TABLET ORAL at 06:01

## 2024-01-20 RX ADMIN — INSULIN DETEMIR 17 UNITS: 100 INJECTION, SOLUTION SUBCUTANEOUS at 10:01

## 2024-01-20 RX ADMIN — DAPTOMYCIN 945 MG: 500 INJECTION, POWDER, LYOPHILIZED, FOR SOLUTION INTRAVENOUS at 04:01

## 2024-01-20 RX ADMIN — Medication 10 ML: at 06:01

## 2024-01-20 RX ADMIN — METHOCARBAMOL 500 MG: 500 TABLET ORAL at 08:01

## 2024-01-20 RX ADMIN — LOPERAMIDE HYDROCHLORIDE 2 MG: 2 CAPSULE ORAL at 10:01

## 2024-01-20 RX ADMIN — METHOCARBAMOL 500 MG: 500 TABLET ORAL at 04:01

## 2024-01-20 RX ADMIN — METOPROLOL SUCCINATE 12.5 MG: 25 TABLET, EXTENDED RELEASE ORAL at 10:01

## 2024-01-20 RX ADMIN — METOCLOPRAMIDE 5 MG: 5 TABLET ORAL at 04:01

## 2024-01-20 RX ADMIN — METHOCARBAMOL 500 MG: 500 TABLET ORAL at 02:01

## 2024-01-20 NOTE — ASSESSMENT & PLAN NOTE
Patient's FSGs are uncontrolled due to hyperglycemia on current medication regimen.  Last A1c reviewed-   Lab Results   Component Value Date    HGBA1C 7.4 (H) 12/28/2023     Most recent fingerstick glucose reviewed-   Recent Labs   Lab 01/19/24  1252 01/19/24  1947 01/20/24  0829   POCTGLUCOSE 124* 147* 148*       Current correctional scale  Low  Maintain anti-hyperglycemic dose as follows-   Antihyperglycemics (From admission, onward)    Start     Stop Route Frequency Ordered    01/17/24 1130  insulin aspart U-100 pen 8 Units         -- SubQ 3 times daily with meals 01/17/24 0819    01/16/24 2100  insulin detemir U-100 (Levemir) pen 17 Units         -- SubQ 2 times daily 01/16/24 1657    01/15/24 0329  insulin aspart U-100 pen 0-5 Units         -- SubQ Before meals & nightly PRN 01/15/24 0231        Hold Oral hypoglycemics while patient is in the hospital.

## 2024-01-20 NOTE — PLAN OF CARE
Problem: Adult Inpatient Plan of Care  Goal: Plan of Care Review  Outcome: Ongoing, Progressing  Goal: Patient-Specific Goal (Individualized)  Outcome: Ongoing, Progressing     Problem: Diabetes Comorbidity  Goal: Blood Glucose Level Within Targeted Range  Outcome: Ongoing, Progressing     Problem: Infection  Goal: Absence of Infection Signs and Symptoms  Outcome: Ongoing, Progressing     Problem: Impaired Wound Healing  Goal: Optimal Wound Healing  Outcome: Ongoing, Progressing     Problem: Skin Injury Risk Increased  Goal: Skin Health and Integrity  Outcome: Ongoing, Progressing     Problem: Fall Injury Risk  Goal: Absence of Fall and Fall-Related Injury  Outcome: Ongoing, Progressing     Problem: Surgical Site Infection  Goal: Absence of Infection Signs and Symptoms  Outcome: Ongoing, Progressing

## 2024-01-20 NOTE — ASSESSMENT & PLAN NOTE
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 Bcx + 12/27/23, c/b left knee septic arthritis, s/p L knee arthrotomy with synovectomy on 12/29. Cx with MRSA, RBOY 1/02/24 neg, required salvage therapy & cleared 1/07, placed on daptomycin monotherapy. Of note, the patient also had L shoulder aspirated imaging without evidence of infection. R heel without osteo but had cellulitis, myositis, tenosynovitis. He started having new fevers at rehab, and is now admitted for workup. Fevers were up to 103 F, blood cultures collected 1/14/24 are NGTD. ID was consulted for recent MRSA bacteremia from septic arthritis - new fevers @ IP rehab, on daptomycin. In the meantime, the patient was taken to the OR on the 15th for arthroscopic washout. While his cell count was c/e septic arthritis, his synovial cultures are also NGTD. R sided lower back pain; MRI 1/17- [id number] fluid collection R iliac crest- hematoma vs abscess.     Recommendations  Continue daptomycin/ceftaroline for now; anticipating stopping ceftaroline at d/c (and continuing dapto)  To OR? If not, consider IR aspirate of large fluid collection at R iliac crest- send for cell count/culture to r/o abscess  Anticipate at least 4 weeks of abx from last washout (est end date: 2/11/24) and until radiographic resolution of abscess if present  Trend CPK and CRP with weekly labs  Following with you

## 2024-01-20 NOTE — PLAN OF CARE
Problem: Adult Inpatient Plan of Care  Goal: Patient-Specific Goal (Individualized)  Outcome: Ongoing, Progressing  Goal: Absence of Hospital-Acquired Illness or Injury  Outcome: Ongoing, Progressing  Goal: Optimal Comfort and Wellbeing  Outcome: Ongoing, Progressing  Goal: Readiness for Transition of Care  Outcome: Ongoing, Progressing     Problem: Diabetes Comorbidity  Goal: Blood Glucose Level Within Targeted Range  Outcome: Ongoing, Progressing     Problem: Infection  Goal: Absence of Infection Signs and Symptoms  Outcome: Ongoing, Progressing     Problem: Impaired Wound Healing  Goal: Optimal Wound Healing  Outcome: Ongoing, Progressing     Problem: Skin Injury Risk Increased  Goal: Skin Health and Integrity  Outcome: Ongoing, Progressing     Problem: Fall Injury Risk  Goal: Absence of Fall and Fall-Related Injury  Outcome: Ongoing, Progressing     Problem: Surgical Site Infection  Goal: Absence of Infection Signs and Symptoms  Outcome: Ongoing, Progressing

## 2024-01-20 NOTE — ASSESSMENT & PLAN NOTE
Patient has hyponatremia which is controlled,  We will monitor sodium Daily.   The hyponatremia is due to Dehydration/hypovolemia.  Urine sodium, urine osmolality, serum osmolality or TSH, T4 reviewed  S/p IVF   Recent Labs   Lab 01/20/24  0826

## 2024-01-20 NOTE — SUBJECTIVE & OBJECTIVE
Interval History: NAEON. Pt seen and evaluated at bedside this am. Pt is doing well this am. Going for I&D on b/l knees w/ ortho today. Continue to monitor following procedure. Continue IV abx.    Review of Systems   Constitutional:  Negative for activity change, chills and fever.   Respiratory:  Negative for cough, chest tightness, shortness of breath and wheezing.    Cardiovascular:  Negative for chest pain, palpitations and leg swelling.   Gastrointestinal:  Negative for abdominal distention, abdominal pain, blood in stool, constipation, diarrhea, nausea and vomiting.   Genitourinary:  Negative for difficulty urinating and dysuria.   Musculoskeletal:  Positive for gait problem. Negative for arthralgias, back pain and neck stiffness.   Neurological:  Negative for dizziness, weakness, light-headedness and headaches.     Objective:     Vital Signs (Most Recent):  Temp: 97.3 °F (36.3 °C) (01/20/24 0743)  Pulse: 82 (01/20/24 0743)  Resp: 16 (01/20/24 0743)  BP: 115/63 (01/20/24 0743)  SpO2: 95 % (01/20/24 0743) Vital Signs (24h Range):  Temp:  [97.3 °F (36.3 °C)-98.7 °F (37.1 °C)] 97.3 °F (36.3 °C)  Pulse:  [82-98] 82  Resp:  [11-19] 16  SpO2:  [95 %-100 %] 95 %  BP: (115-150)/(63-90) 115/63     Weight: 110.7 kg (244 lb 0.8 oz)  Body mass index is 32.2 kg/m².    Intake/Output Summary (Last 24 hours) at 1/20/2024 1104  Last data filed at 1/20/2024 0300  Gross per 24 hour   Intake --   Output 150 ml   Net -150 ml         Physical Exam  Vitals and nursing note reviewed.   Constitutional:       General: He is not in acute distress.     Appearance: He is well-developed. He is not diaphoretic.   HENT:      Head: Normocephalic and atraumatic.      Right Ear: External ear normal.      Left Ear: External ear normal.      Nose: Nose normal. No congestion.      Mouth/Throat:      Pharynx: Oropharynx is clear.   Eyes:      General: No scleral icterus.     Extraocular Movements: Extraocular movements intact.      Pupils: Pupils  are equal, round, and reactive to light.   Cardiovascular:      Rate and Rhythm: Normal rate and regular rhythm.      Pulses: Normal pulses.      Heart sounds: Normal heart sounds. No murmur heard.  Pulmonary:      Effort: Pulmonary effort is normal. No respiratory distress.      Breath sounds: Normal breath sounds. No wheezing or rales.   Abdominal:      General: Bowel sounds are normal. There is no distension.      Palpations: Abdomen is soft.      Tenderness: There is no abdominal tenderness. There is no guarding or rebound.   Musculoskeletal:         General: Swelling (L knee warm, swollen and tender) present. No tenderness.      Cervical back: Normal range of motion.      Right lower leg: No edema.      Left lower leg: No edema.      Comments: L knee in clean, intact dressing. No point tenderness to back, no midline tenderness   Skin:     General: Skin is warm and dry.      Capillary Refill: Capillary refill takes less than 2 seconds.   Neurological:      General: No focal deficit present.      Mental Status: He is alert and oriented to person, place, and time. Mental status is at baseline.   Psychiatric:         Mood and Affect: Mood normal.         Behavior: Behavior normal.         Thought Content: Thought content normal.      Comments: Flat affect             Significant Labs: All pertinent labs within the past 24 hours have been reviewed.    Significant Imaging: I have reviewed all pertinent imaging results/findings within the past 24 hours.

## 2024-01-20 NOTE — ASSESSMENT & PLAN NOTE
Kulwant Mueller Jr. is a 38 y.o. male admitted for workup of fevers of unknown source, recent irrigation and debridement of left knee performed at [facility] on 12/29.  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 Now s/p left knee arthroscopic I&D on 1/17.    MRI of L shoulder showing SS tear without retraction, no fluid collections. MRI of pelvis showing right lateral hip rim enhancing fluid collection, no bl hip effusions. Aspiration of R lateral thigh attempted with no fluid aspirated.    Attempted aspiration of right lateral thigh abscess at bedside with no fluid obtained. IR aspiration of R thigh abscess with 120 cc of bloody purulent fluid. Gram stain negative, cx pending. Despite negative gram stain, given report from IR of purulence in aspirate in setting of known infection. Plan for OR tomorrow for I&D of right thigh abscess and repeat I&D of L knee.     Pain control:  Multimodal  PT/OT: WBAT left lower extremity  DVT PPx:  Xarelto, SCDs at all times when not ambulating  Abx:  Ceftaroline/ daptomycin per ID, pending final cultures and ID recs  Labs:  CRP down to 100 today, continue to trend    Cx: Prior left knee cultures growing MRSA    Dispo: OR today for I&D of right thigh and repeat I&D of left knee

## 2024-01-20 NOTE — SUBJECTIVE & OBJECTIVE
Interval History: Frustrated that he is being told different things regarding surgery. No fever or chills.    Review of Systems   Constitutional:  Negative for chills and fever.   Musculoskeletal:  Positive for arthralgias.   All other systems reviewed and are negative.    Objective:     Vital Signs (Most Recent):  Temp: 97.9 °F (36.6 °C) (01/20/24 1211)  Pulse: 86 (01/20/24 1252)  Resp: 18 (01/20/24 1252)  BP: 125/68 (01/20/24 1211)  SpO2: 96 % (01/20/24 1252) Vital Signs (24h Range):  Temp:  [97.3 °F (36.3 °C)-98.7 °F (37.1 °C)] 97.9 °F (36.6 °C)  Pulse:  [78-98] 86  Resp:  [11-19] 18  SpO2:  [95 %-100 %] 96 %  BP: (115-150)/(63-90) 125/68     Weight: 110.7 kg (244 lb 0.8 oz)  Body mass index is 32.2 kg/m².    Estimated Creatinine Clearance: 182.9 mL/min (based on SCr of 0.7 mg/dL).     Physical Exam  Vitals and nursing note reviewed.   Constitutional:       General: He is not in acute distress.     Appearance: Normal appearance. He is not ill-appearing, toxic-appearing or diaphoretic.   HENT:      Head: Normocephalic and atraumatic.   Eyes:      Extraocular Movements: Extraocular movements intact.      Conjunctiva/sclera: Conjunctivae normal.      Pupils: Pupils are equal, round, and reactive to light.   Neurological:      General: No focal deficit present.      Mental Status: He is alert and oriented to person, place, and time.   Psychiatric:         Mood and Affect: Mood normal.         Behavior: Behavior normal.         Thought Content: Thought content normal.         Judgment: Judgment normal.          Significant Labs: Blood Culture:   Recent Labs   Lab 01/04/24  0522 01/04/24  0523 01/05/24  0716 01/07/24  1145 01/14/24 2039   LABBLOO Gram stain aer bottle: Gram positive cocci in clusters resembling Staph  Results called to and read back by: Ms. Ramón RN 01/05/2024  04:19  METHICILLIN RESISTANT STAPHYLOCOCCUS AUREUS  ID consult required at [facility].[place] [place] and [place] locations.  * Gram stain aer  bottle: Gram positive cocci in clusters resembling Staph  Results called to and read back by: Kalyn Correa RN  01/05/2024  14:32  METHICILLIN RESISTANT STAPHYLOCOCCUS AUREUS  ID consult required at [facility].[place] and [place].  For susceptibility see order #[id number]  * No growth after 5 days.  Gram stain aer bottle: Gram positive cocci in clusters resembling Staph  Results called to and read back by: Tim Correa RN 01/07/2024  10:08  METHICILLIN RESISTANT STAPHYLOCOCCUS AUREUS  For susceptibility see order #[id number]  * No growth after 5 days.  No growth after 5 days. No growth after 5 days.  No growth after 5 days.     CBC:   Recent Labs   Lab 01/19/24  0815 01/20/24  0826   WBC 12.27 10.65   HGB 8.4* 8.3*   HCT 28.3* 27.0*    440     CMP:   Recent Labs   Lab 01/19/24  0815 01/20/24  0826   * 136   K 3.9 3.7    101   CO2 26 28   * 150*   BUN 7 8   CREATININE 0.7 0.7   CALCIUM 8.6* 8.8   ANIONGAP 6* 7*     Urine Culture:   Recent Labs   Lab 12/27/23  2318   LABURIN METHICILLIN RESISTANT STAPHYLOCOCCUS AUREUS  10,000 - 49,999 cfu/ml  *     Wound Culture:   Recent Labs   Lab 12/29/23  1830 01/15/24  1419 01/19/24  1604   LABAERO No growth  METHICILLIN RESISTANT STAPHYLOCOCCUS AUREUS  Many  * No growth No growth       Significant Imaging: I have reviewed all pertinent imaging results/findings within the past 24 hours.

## 2024-01-20 NOTE — PROGRESS NOTES
[facility]  Infectious Disease  Progress Note    Patient Name: Kulwant Mueller Jr.  MRN: [id number]  Admission Date: 1/14/2024  Length of Stay: 4 days  Attending Physician: Lizbeth Marquez MD  Primary Care Provider: Shellie, Primary Doctor    Isolation Status: Contact  Assessment/Plan:      ID  * Staphylococcal arthritis of left knee  38M with h/o May-Thurner syndrome on Xarelto, DM2 on insulin, chronic foot wounds following with wound care, and HTN, recent admission for MRSA bacteremia. Bcx + 12/27/23, c/b left knee septic arthritis, s/p L knee arthrotomy with synovectomy on 12/29. Cx with MRSA, ROBY 1/02/24 neg, required salvage therapy & cleared 1/07, placed on daptomycin monotherapy. Of note, the patient also had L shoulder aspirated imaging without evidence of infection. R heel without osteo but had cellulitis, myositis, tenosynovitis. He started having new fevers at rehab, and is now admitted for workup. Fevers were up to 103 F, blood cultures collected 1/14/24 are NGTD. ID was consulted for recent MRSA bacteremia from septic arthritis - new fevers @ IP rehab, on daptomycin. In the meantime, the patient was taken to the OR on the 15th for arthroscopic washout. While his cell count was c/e septic arthritis, his synovial cultures are also NGTD. R sided lower back pain; MRI 1/17- [id number] fluid collection R iliac crest- hematoma vs abscess.     Recommendations  Continue daptomycin/ceftaroline for now; anticipating stopping ceftaroline at d/c (and continuing dapto)  To OR? If not, consider IR aspirate of large fluid collection at R iliac crest- send for cell count/culture to r/o abscess  Anticipate at least 4 weeks of abx from last washout (est end date: 2/11/24) and until radiographic resolution of abscess if present  Trend CPK and CRP with weekly labs  Following with you        Derick Gonzalez MD  Infectious Disease  [facility]    Subjective:     Principal Problem:Staphylococcal  arthritis of left knee    HPI: Mr. De Anda is a pleasant 38 y.o. man with May-Thurner syndrome on Xarelto, DM2 on insulin, chronic foot wounds following with wound care, and HTN, recent admission for MRSA bacteremia. Index Bcx + 12/27/23, complicated by left knee septic arthritis, s/p L knee arthrotomy with synovectomy on 12/29. Cx with MRSA, ROBY 1/02/24 neg, required salvage therapy & cleared 1/07, placed on daptomycin monotherapy. Of note, the patient also had L shoulder aspirated imaging without evidence of infection. R heel without osteo but had cellulitis, myositis, tenosynovitis. He started having new fevers at rehab, and is now admitted for workup. Fevers were up to 103 F, blood cultures collected 1/14/24 are NGTD. ID was consulted for recent MRSA bacteremia from septic arthritis - new fevers @ IP rehab, on daptomycin. In the meantime, the patient was taken to the OR on the 15th for arthroscopic washout. Cultures are also NGTD. The patient is feeling better - denies fever or chills.      Interval History: Frustrated that he is being told different things regarding surgery. No fever or chills.    Review of Systems   Constitutional:  Negative for chills and fever.   Musculoskeletal:  Positive for arthralgias.   All other systems reviewed and are negative.    Objective:     Vital Signs (Most Recent):  Temp: 97.9 °F (36.6 °C) (01/20/24 1211)  Pulse: 86 (01/20/24 1252)  Resp: 18 (01/20/24 1252)  BP: 125/68 (01/20/24 1211)  SpO2: 96 % (01/20/24 1252) Vital Signs (24h Range):  Temp:  [97.3 °F (36.3 °C)-98.7 °F (37.1 °C)] 97.9 °F (36.6 °C)  Pulse:  [78-98] 86  Resp:  [11-19] 18  SpO2:  [95 %-100 %] 96 %  BP: (115-150)/(63-90) 125/68     Weight: 110.7 kg (244 lb 0.8 oz)  Body mass index is 32.2 kg/m².    Estimated Creatinine Clearance: 182.9 mL/min (based on SCr of 0.7 mg/dL).     Physical Exam  Vitals and nursing note reviewed.   Constitutional:       General: He is not in acute distress.     Appearance: Normal  appearance. He is not ill-appearing, toxic-appearing or diaphoretic.   HENT:      Head: Normocephalic and atraumatic.   Eyes:      Extraocular Movements: Extraocular movements intact.      Conjunctiva/sclera: Conjunctivae normal.      Pupils: Pupils are equal, round, and reactive to light.   Neurological:      General: No focal deficit present.      Mental Status: He is alert and oriented to person, place, and time.   Psychiatric:         Mood and Affect: Mood normal.         Behavior: Behavior normal.         Thought Content: Thought content normal.         Judgment: Judgment normal.          Significant Labs: Blood Culture:   Recent Labs   Lab 01/04/24  0522 01/04/24  0523 01/05/24  0716 01/07/24  1145 01/14/24 2039   LABBLOO Gram stain aer bottle: Gram positive cocci in clusters resembling Staph  Results called to and read back by: Ms. Ramón RN 01/05/2024  04:19  METHICILLIN RESISTANT STAPHYLOCOCCUS AUREUS  ID consult required at [facility] locations.  * Gram stain aer bottle: Gram positive cocci in clusters resembling Staph  Results called to and read back by: Kalyn Correa RN  01/05/2024  14:32  METHICILLIN RESISTANT STAPHYLOCOCCUS AUREUS  ID consult required at [facility] and [facility] locations.  For susceptibility see order #[id number]  * No growth after 5 days.  Gram stain aer bottle: Gram positive cocci in clusters resembling Staph  Results called to and read back by: Tim Correa RN 01/07/2024  10:08  METHICILLIN RESISTANT STAPHYLOCOCCUS AUREUS  For susceptibility see order #[id number]  * No growth after 5 days.  No growth after 5 days. No growth after 5 days.  No growth after 5 days.     CBC:   Recent Labs   Lab 01/19/24  0815 01/20/24  0826   WBC 12.27 10.65   HGB 8.4* 8.3*   HCT 28.3* 27.0*    440     CMP:   Recent Labs   Lab 01/19/24  0815 01/20/24  0826   * 136   K 3.9 3.7    101   CO2 26 28   * 150*   BUN 7 8   CREATININE 0.7 0.7    CALCIUM 8.6* 8.8   ANIONGAP 6* 7*     Urine Culture:   Recent Labs   Lab 12/27/23  2318   LABURIN METHICILLIN RESISTANT STAPHYLOCOCCUS AUREUS  10,000 - 49,999 cfu/ml  *     Wound Culture:   Recent Labs   Lab 12/29/23  1830 01/15/24  1419 01/19/24  1604   LABAERO No growth  METHICILLIN RESISTANT STAPHYLOCOCCUS AUREUS  Many  * No growth No growth       Significant Imaging: I have reviewed all pertinent imaging results/findings within the past 24 hours.

## 2024-01-20 NOTE — ASSESSMENT & PLAN NOTE
S/p Left knee arthrotomy and synovectomy (12/29/23)  -re-order PTOT  Pt with left knee sutures, tenderness to palpation near medial tibial plateau  - ortho consulted, bedside aspiration completed   - taken to OR for wound wash out   - MRI pelvis: Myositis and fasciitis, with soft tissue gas dissecting along the anterior compartment myofascial planes of the proximal left thigh.   - MRI L shoulder: Prominent subdeltoid enhancement/ bursitis. Superimposed infection may be present. No drainable fluid collections.   - MRI knee: s/p I&D w/ minimal residual fluid collection within the joint space. There is a large air-fluid collection extending supero-medially from the joint space along the medial femoral shaft, beyond the field of view. Prominent surrounding soft tissue inflammatory changes noted, with involvement of the vastus medialis, vastus lateralis, and popliteus musculature.  - MRI T/L spine: No MR imaging findings to account for reported history of MRSA bacteremia. No spondylodiscitis or facet joint septic arthritis   - s/p aspiration w/ IR 1/19  - repeat L knee I&D, R knee I&D today w/ ortho

## 2024-01-20 NOTE — PROGRESS NOTES
[facility] - Observ[facility]  Orthopedics  Progress Note    Patient Name: Kulwant Mueller Jr.  MRN: [id number]  Admission Date: 1/14/2024  Hospital Length of Stay: 4 days  Attending Provider: Lizbeth Marquez MD  Primary Care Provider: Shellie Primary Doctor  Follow-up For: Procedure(s) (LRB):  ARTHROSCOPY, KNEE, LEFT (Left)    Post-Operative Day: 4 Days Post-Op  Subjective:     Principal Problem:Staphylococcal arthritis of left knee    Principal Orthopedic Problem: s/p L knee arthroscopic I&D on 1/17    Interval History: NAEON. VSS. Afebrile. Plan for OR today for I&D of R thigh abscess and repeat left knee arthroscopic I&D.     Review of patient's allergies indicates:   Allergen Reactions    Heparin analogues Nausea And Vomiting       Current Facility-Administered Medications   Medication    acetaminophen tablet 1,000 mg    ammonium lactate 12 % lotion    ceftaroline fosamiL (TEFLARO) 600 mg in dextrose 5 % in water (D5W) 50 mL IVPB (MB+)    DAPTOmycin (CUBICIN) 945 mg in sodium chloride 0.9% SolP 50 mL IVPB    dextrose 10% bolus 125 mL 125 mL    dextrose 10% bolus 250 mL 250 mL    glucagon (human recombinant) injection 1 mg    glucose chewable tablet 16 g    glucose chewable tablet 24 g    insulin aspart U-100 pen 0-5 Units    insulin aspart U-100 pen 8 Units    insulin detemir U-100 (Levemir) pen 17 Units    loperamide capsule 2 mg    melatonin tablet 6 mg    methocarbamoL tablet 500 mg    metoclopramide HCl tablet 5 mg    metoprolol succinate (TOPROL-XL) 24 hr split tablet 12.5 mg    morphine injection 4 mg    naloxone 0.4 mg/mL injection 0.02 mg    ondansetron injection 4 mg    oxyCODONE immediate release tablet 5 mg    oxyCODONE immediate release tablet 5 mg    oxyCODONE immediate release tablet Tab 10 mg    pantoprazole EC tablet 40 mg    prochlorperazine injection Soln 5 mg    rivaroxaban tablet 10 mg    scopolamine 1.3-1.5 mg (1 mg over 3 days) 1 patch    sodium chloride 0.9% flush 10 mL    sodium chloride 0.9%  "flush 10 mL    sodium chloride 0.9% flush 10 mL    And    sodium chloride 0.9% flush 10 mL    sodium chloride 0.9% flush 10 mL     Objective:     Vital Signs (Most Recent):  Temp: 98.7 °F (37.1 °C) (01/19/24 2037)  Pulse: 87 (01/20/24 0246)  Resp: 18 (01/19/24 2309)  BP: 124/65 (01/19/24 2037)  SpO2: 95 % (01/19/24 2037) Vital Signs (24h Range):  Temp:  [97.7 °F (36.5 °C)-98.7 °F (37.1 °C)] 98.7 °F (37.1 °C)  Pulse:  [84-98] 87  Resp:  [11-19] 18  SpO2:  [94 %-100 %] 95 %  BP: (124-150)/(65-90) 124/65     Weight: 110.7 kg (244 lb 0.8 oz)  Height: 6' 1" (185.4 cm)  Body mass index is 32.2 kg/m².      Intake/Output Summary (Last 24 hours) at 1/20/2024 0656  Last data filed at 1/20/2024 0300  Gross per 24 hour   Intake --   Output 150 ml   Net -150 ml         Ortho/SPM Exam     Awake/alert/oriented x3, No acute distress, Afebrile, Vital signs stable  Good inspiratory effort with unlaboured breathing  Dressings c/d/I  Pain with knee PROM 5-70  No pain with R hip ROM, axial loading, full PROM. No TTP of R lateral hip.   Active and PROM of L shoulder intact with ain with L shoulder ROM (FF over 90, ABD over 90), +hawkens, + neer,       Significant Labs: All pertinent labs within the past 24 hours have been reviewed.    Significant Imaging: I have reviewed all pertinent imaging results/findings.    MRI pelvis showing right lateral thigh rim enhancing fluid collection, no bl hip effusions    MRI L shoulder showing SS tear without retraction, subdeltoid bursitis, no fluid collections   Assessment/Plan:     * Staphylococcal arthritis of left knee  Kulwant Mueller Jr. is a 38 y.o. male admitted for workup of fevers of unknown source, recent irrigation and debridement of left knee performed at [facility] on 12/29.  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 Now s/p left knee arthroscopic I&D on 1/17.    MRI of L shoulder showing SS tear without retraction, no fluid collections. MRI of " pelvis showing right lateral hip rim enhancing fluid collection, no bl hip effusions. Aspiration of R lateral thigh attempted with no fluid aspirated.    Attempted aspiration of right lateral thigh abscess at bedside with no fluid obtained. IR aspiration of R thigh abscess with 120 cc of bloody purulent fluid. Gram stain negative, cx pending. Despite negative gram stain, given report from IR of purulence in aspirate in setting of known infection. Plan for OR tomorrow for I&D of right thigh abscess and repeat I&D of L knee.     Pain control:  Multimodal  PT/OT: WBAT left lower extremity  DVT PPx:  Xarelto, SCDs at all times when not ambulating  Abx:  Ceftaroline/ daptomycin per ID, pending final cultures and ID recs  Labs:  CRP down to 100 today, continue to trend    Cx: Prior left knee cultures growing MRSA    Dispo: OR today for I&D of right thigh and repeat I&D of left knee            David France MD  Orthopedics  [facility] - Observation 11H

## 2024-01-20 NOTE — ASSESSMENT & PLAN NOTE
Fever  - reported fevers of 103 @ [facility] on Saturday and temp 100 prior to transfer  - s/p blood cultures   - Pt with c/o of right sided Back pain - given MRSA bacteremia, pt is at risk for metastatic site of infection obtain MRI T-L spine with Contrast to r/o spinal source of infection   - ESR 97/ .9, trending and improved  - Infectious disease consult - to assist in continuing Daptomycin approval & infectious w/u    >patient had ROBY performed on 1/02 to r/o endocarditis.   - continue daptomycin and cerftaroline  - CPK improving  - CRP trending, 105.8>139.4  - repeat blood cultures NGTD

## 2024-01-20 NOTE — SUBJECTIVE & OBJECTIVE
Principal Problem:Staphylococcal arthritis of left knee    Principal Orthopedic Problem: s/p L knee arthroscopic I&D on 1/17    Interval History: NAEON. VSS. Afebrile. Plan for OR today for I&D of R thigh abscess and repeat left knee arthroscopic I&D.     Review of patient's allergies indicates:   Allergen Reactions    Heparin analogues Nausea And Vomiting       Current Facility-Administered Medications   Medication    acetaminophen tablet 1,000 mg    ammonium lactate 12 % lotion    ceftaroline fosamiL (TEFLARO) 600 mg in dextrose 5 % in water (D5W) 50 mL IVPB (MB+)    DAPTOmycin (CUBICIN) 945 mg in sodium chloride 0.9% SolP 50 mL IVPB    dextrose 10% bolus 125 mL 125 mL    dextrose 10% bolus 250 mL 250 mL    glucagon (human recombinant) injection 1 mg    glucose chewable tablet 16 g    glucose chewable tablet 24 g    insulin aspart U-100 pen 0-5 Units    insulin aspart U-100 pen 8 Units    insulin detemir U-100 (Levemir) pen 17 Units    loperamide capsule 2 mg    melatonin tablet 6 mg    methocarbamoL tablet 500 mg    metoclopramide HCl tablet 5 mg    metoprolol succinate (TOPROL-XL) 24 hr split tablet 12.5 mg    morphine injection 4 mg    naloxone 0.4 mg/mL injection 0.02 mg    ondansetron injection 4 mg    oxyCODONE immediate release tablet 5 mg    oxyCODONE immediate release tablet 5 mg    oxyCODONE immediate release tablet Tab 10 mg    pantoprazole EC tablet 40 mg    prochlorperazine injection Soln 5 mg    rivaroxaban tablet 10 mg    scopolamine 1.3-1.5 mg (1 mg over 3 days) 1 patch    sodium chloride 0.9% flush 10 mL    sodium chloride 0.9% flush 10 mL    sodium chloride 0.9% flush 10 mL    And    sodium chloride 0.9% flush 10 mL    sodium chloride 0.9% flush 10 mL     Objective:     Vital Signs (Most Recent):  Temp: 98.7 °F (37.1 °C) (01/19/24 2037)  Pulse: 87 (01/20/24 0246)  Resp: 18 (01/19/24 2309)  BP: 124/65 (01/19/24 2037)  SpO2: 95 % (01/19/24 2037) Vital Signs (24h Range):  Temp:  [97.7 °F (36.5  "°C)-98.7 °F (37.1 °C)] 98.7 °F (37.1 °C)  Pulse:  [84-98] 87  Resp:  [11-19] 18  SpO2:  [94 %-100 %] 95 %  BP: (124-150)/(65-90) 124/65     Weight: 110.7 kg (244 lb 0.8 oz)  Height: 6' 1" (185.4 cm)  Body mass index is 32.2 kg/m².      Intake/Output Summary (Last 24 hours) at 1/20/2024 0656  Last data filed at 1/20/2024 0300  Gross per 24 hour   Intake --   Output 150 ml   Net -150 ml          Ortho/SPM Exam     Awake/alert/oriented x3, No acute distress, Afebrile, Vital signs stable  Good inspiratory effort with unlaboured breathing  Dressings c/d/I  Pain with knee PROM 5-70  No pain with R hip ROM, axial loading, full PROM. No TTP of R lateral hip.   Active and PROM of L shoulder intact with ain with L shoulder ROM (FF over 90, ABD over 90), +hawkens, + neer,       Significant Labs: All pertinent labs within the past 24 hours have been reviewed.    Significant Imaging: I have reviewed all pertinent imaging results/findings.    MRI pelvis showing right lateral thigh rim enhancing fluid collection, no bl hip effusions    MRI L shoulder showing SS tear without retraction, subdeltoid bursitis, no fluid collections   "

## 2024-01-20 NOTE — PROGRESS NOTES
[facility] - Observation [facility]  Orthopedics  Progress Note    Patient Name: Kulwant Mueller Jr.  MRN: [id number]  Admission Date: 1/14/2024  Hospital Length of Stay: 3 days  Attending Provider: Lizbeth Marquez MD  Primary Care Provider: Shellie Primary Doctor  Follow-up For: Procedure(s) (LRB):  ARTHROSCOPY, KNEE, LEFT (Left)    Post-Operative Day: 3 Days Post-Op  Subjective:     Principal Problem:Staphylococcal arthritis of left knee    Principal Orthopedic Problem: s/p L knee arthroscopic I&D on 1/17    Interval History: NAEON. VSS. Afebrile. WBC to 12 today. Pt still reporting L knee pain and right low back pain. Denies any right hip or R lateral thigh pain at this time.     Review of patient's allergies indicates:   Allergen Reactions    Heparin analogues Nausea And Vomiting       Current Facility-Administered Medications   Medication    acetaminophen tablet 1,000 mg    ammonium lactate 12 % lotion    ceftaroline fosamiL (TEFLARO) 600 mg in dextrose 5 % in water (D5W) 50 mL IVPB (MB+)    DAPTOmycin (CUBICIN) 945 mg in sodium chloride 0.9% SolP 50 mL IVPB    dextrose 10% bolus 125 mL 125 mL    dextrose 10% bolus 250 mL 250 mL    glucagon (human recombinant) injection 1 mg    glucose chewable tablet 16 g    glucose chewable tablet 24 g    insulin aspart U-100 pen 0-5 Units    insulin aspart U-100 pen 8 Units    insulin detemir U-100 (Levemir) pen 17 Units    melatonin tablet 6 mg    methocarbamoL tablet 500 mg    metoclopramide HCl tablet 5 mg    metoprolol succinate (TOPROL-XL) 24 hr split tablet 12.5 mg    morphine injection 4 mg    naloxone 0.4 mg/mL injection 0.02 mg    ondansetron injection 4 mg    oxyCODONE immediate release tablet 5 mg    oxyCODONE immediate release tablet 5 mg    oxyCODONE immediate release tablet Tab 10 mg    pantoprazole EC tablet 40 mg    polyethylene glycol packet 17 g    prochlorperazine injection Soln 5 mg    rivaroxaban tablet 10 mg    scopolamine 1.3-1.5 mg (1 mg over 3 days) 1 patch     "senna-docusate 8.6-50 mg per tablet 1 tablet    sodium chloride 0.9% flush 10 mL    sodium chloride 0.9% flush 10 mL    sodium chloride 0.9% flush 10 mL    And    sodium chloride 0.9% flush 10 mL     Objective:     Vital Signs (Most Recent):  Temp: 98.2 °F (36.8 °C) (01/19/24 1144)  Pulse: 97 (01/19/24 1539)  Resp: 19 (01/19/24 1539)  BP: (!) 143/90 (01/19/24 1539)  SpO2: 100 % (01/19/24 1539) Vital Signs (24h Range):  Temp:  [97.7 °F (36.5 °C)-98.6 °F (37 °C)] 98.2 °F (36.8 °C)  Pulse:  [84-97] 97  Resp:  [16-20] 19  SpO2:  [94 %-100 %] 100 %  BP: (119-143)/(66-90) 143/90     Weight: 110.7 kg (244 lb 0.8 oz)  Height: 6' 1" (185.4 cm)  Body mass index is 32.2 kg/m².      Intake/Output Summary (Last 24 hours) at 1/19/2024 1545  Last data filed at 1/19/2024 0254  Gross per 24 hour   Intake --   Output 800 ml   Net -800 ml         Ortho/SPM Exam     Awake/alert/oriented x3, No acute distress, Afebrile, Vital signs stable  Good inspiratory effort with unlaboured breathing  Dressings c/d/I  Pain with knee PROM 5-70  No pain with R hip ROM, axial loading, full PROM. No TTP of R lateral hip.   Active and PROM of L shoulder intact with ain with L shoulder ROM (FF over 90, ABD over 90), +hawkens, + neer,       Significant Labs: All pertinent labs within the past 24 hours have been reviewed.    Significant Imaging: I have reviewed all pertinent imaging results/findings.    MRI pelvis showing right lateral thigh rim enhancing fluid collection, no bl hip effusions    MRI L shoulder showing SS tear without retraction, subdeltoid bursitis, no fluid collections   Assessment/Plan:     * Staphylococcal arthritis of left knee  Kulwant Blair Ami Benítez is a 38 y.o. male admitted for workup of fevers of unknown source, recent irrigation and debridement of left knee performed at [facility] on 12/29.  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 Now s/p left knee arthroscopic I&D on 1/17.    MRI of L " shoulder showing SS tear without retraction, no fluid collections. MRI of pelvis showing right lateral hip rim enhancing fluid collection, no bl hip effusions. Aspiration of R lateral thigh attempted with no fluid aspirated.    Attempted aspiration of right lateral thigh abscess at bedside with no fluid obtained. IR aspiration of R thigh abscess with 120 cc of bloody purulent fluid. Gram stain negative, cx pending. Despite negative cx, given report from IR of purulence in aspirate in setting of known infection. Plan for OR tomorrow for I&D of right thigh abscess and repeat I&D of L knee.     Pain control:  Multimodal  PT/OT: WBAT left lower extremity  DVT PPx:  Xarelto, SCDs at all times when not ambulating  Abx:  Ceftaroline/ daptomycin per ID, pending final cultures and ID recs  Labs:  CRP down to 100 today, continue to trend    Cx: Prior left knee cultures growing MRSA    Dispo: OR tomorrow for I&D of right thigh and repeat I&D of left knee            David France MD  Orthopedics  [facility] - Observation 11[facility]

## 2024-01-20 NOTE — ANESTHESIA PREPROCEDURE EVALUATION
[facility]  Anesthesia Pre-Operative Evaluation         Patient Name: Kulwant Mueller Jr.  YOB: 1985  MRN: [id number]    SUBJECTIVE:     Pre-operative evaluation for Procedure(s) (LRB):  ARTHROSCOPY, KNEE, left, supine, slider, lateral post, conmed, (Left)  I&D lateral thigh abscess, right, supine, slider, cysto tubing, 6L NS, betadine, peroxide, vanc powder, 10 [place] channel winter drain, (Right)     01/20/2024    Kulwant Mueller Jr. is a 38 y.o. 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 Now s/p left knee arthroscopic I&D on 1/1.     Patient now presents for the above procedure(s).    Echo Summary  Results for orders placed during the hospital encounter of 12/27/23    Echo    Interpretation Summary    Left Ventricle: The left ventricle is normal in size. There is concentric remodeling. Regional wall motion abnormalities present. See diagram for wall motion findings. There is reduced systolic function. Biplane (2D) method of discs ejection fraction is 48%. There is normal diastolic function.    Right Ventricle: Normal right ventricular cavity size. Systolic function is normal. TAPSE is 2.54 cm.    Normal left atrial size. The left atrium volume index is 21.2 mL/m2.    Normal right atrial size.    The aortic valve is structurally normal. There is normal leaflet mobility.    The mitral valve is structurally normal. There is normal leaflet mobility.    The tricuspid valve is structurally normal. There is normal leaflet mobility.    IVC/SVC: Elevated venous pressure at 15 mmHg.    Overall the study quality was technically difficult.       Prev  airway:     Intubation     Date/Time: 1/16/2024 10:57 AM     Performed by: Nichole Branch CRNA  Authorized by: Mary Moran MD    Intubation:     Induction:  Intravenous    Intubated:  Postinduction    Mask Ventilation:  Easy mask    Attempts:  1    Attempted By:  CRNA    Method of Intubation:  Video laryngoscopy    Blade:  Vann 3    Laryngeal View Grade: Grade I - full view of cords      Difficult Airway Encountered?: No      Complications:  None    Airway Device:  Oral endotracheal tube    Airway Device Size:  7.5    Style/Cuff Inflation:  Cuffed    Inflation Amount (mL):  7    Tube secured:  22    Secured at:  The lips    Placement Verified By:  Capnometry    Complicating Factors:  None    LDA:   PICC Double Lumen 01/08/24 1405 right basilic (Active)   Line Necessity Review Longterm central access required 01/10/24 1100   Verification by X-ray Yes 01/10/24 1100   Site Assessment No drainage;No redness;No swelling;No warmth 01/18/24 2000   Extremity Assessment Distal to IV No warmth;No swelling;No redness;No abnormal discoloration 01/18/24 1148   Line Securement Device Secured with sutureless device 01/18/24 1148   Dressing Type CHG impregnated dressing/sponge;Central line dressing 01/18/24 1148   Dressing Status Clean;Dry;Intact 01/18/24 1148   Dressing Intervention Sterile dressing change 01/18/24 1148   Date on Dressing 01/18/24 01/18/24 1148   Dressing Due to be Changed 01/25/24 01/18/24 1148   Distal Patency/Care blood return present;normal saline locked 01/16/24 1330   Proximal 1 Patency/Care blood return present;normal saline locked 01/16/24 1330   Current Insertion Depth (cm) 43 cm 01/09/24 1935   Current Exposed Catheter (cm) 0 cm 01/09/24 1935   Extremity Circumference (cm) 34 cm 01/09/24 1935   Waveform Not being transduced 01/10/24 1100   Number of days: 11            Peripheral IV - Single Lumen 01/14/24 2330 20 G Anterior;Left;Proximal Forearm (Active)   Site Assessment  Clean;Dry;Intact;No redness;No swelling 01/19/24 0800   Extremity Assessment Distal to IV No warmth;No redness;No swelling;No abnormal discoloration 01/19/24 0800   Line Status Saline locked 01/19/24 0800   Dressing Status Clean;Dry;Intact 01/19/24 0800   Dressing Intervention Integrity maintained 01/19/24 0800   Dressing Change Due 01/18/24 01/15/24 1924   Site Change Due 01/18/24 01/15/24 1924   Reason Not Rotated Not due 01/18/24 2000   Number of days: 4            Closed/Suction Drain 01/19/24 1603 Tube - 1 Right Thigh Bulb 10 Fr. (Active)   Number of days: 0       Drips: None documented.      Patient Active Problem List   Diagnosis    Penile lesion    Type 2 diabetes mellitus with complication, with long-term current use of insulin    Intractable nausea and vomiting    History of intravascular stent placement    Diabetic gastroparesis associated with type 2 diabetes mellitus    Hypertension associated with diabetes    Recurrent deep vein thrombosis (DVT) of left lower extremity    Chronic anticoagulation    Post-thrombotic syndrome of left lower extremity    May-Thurner syndrome    Infected Diabetic ulcer of left foot with Cellulitis  associated with type 2 diabetes mellitus    Elevated CK    Esophagitis determined by endoscopy    Gratiot grade C esophagitis    GERD (gastroesophageal reflux disease)    Other elevated white blood cell count    Staphylococcus aureus bacteremia    Staphylococcal arthritis of left knee    Septic prepatellar bursitis of left knee    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 due to underlying condition, limited to breakdown of skin    Diabetes mellitus    Depression    Transaminitis    Knee pyogenic arthritis    Fever    Hyponatremia    Chronic HFrEF (heart failure with reduced ejection fraction)    Abscess of right thigh       Review of patient's allergies indicates:   Allergen Reactions    Heparin analogues Nausea And Vomiting       Current Inpatient  "Medications:      No current facility-administered medications on file prior to encounter.     Current Outpatient Medications on File Prior to Encounter   Medication Sig Dispense Refill    metoprolol succinate (TOPROL-XL) 25 MG 24 hr tablet Take 0.5 tablets (12.5 mg total) by mouth once daily. 15 tablet 11    blood-glucose meter,continuous (DEXCOM G7 ) Misc 1 Device by Misc.(Non-Drug; Combo Route) route once daily. 1 each 0    blood-glucose sensor (DEXCOM G7 SENSOR) Justyna 1 Device by Misc.(Non-Drug; Combo Route) route every 10 days. 3 each 11    empagliflozin (JARDIANCE) 25 mg tablet Take 1 tablet (25 mg total) by mouth once daily. 90 tablet 3    furosemide (LASIX) 20 MG tablet Take 1 tablet (20 mg total) by mouth once daily. 90 tablet 3    glucagon (GVOKE HYPOPEN 2-PACK) 1 mg/0.2 mL AtIn Inject 1 mg into the skin as needed (SEVERE HYPOGLYCEMIA). 2 each 5    insulin aspart U-100 (NOVOLOG U-100 INSULIN ASPART) 100 unit/mL injection Inject 14 Units into the skin 3 (three) times daily before meals. 12.6 mL 3    metoclopramide HCl (REGLAN) 10 MG tablet Take 0.5 tablets (5 mg total) by mouth 3 (three) times daily before meals. 270 tablet 3    pantoprazole (PROTONIX) 40 MG tablet Take 1 tablet (40 mg total) by mouth once daily. 90 tablet 0    pen needle, diabetic (BD ULTRA-FINE DIYA PEN NEEDLE) 32 gauge x 5/32" Ndle Use with Tresiba daily and Humalog 3-4 times daily as directed. 200 each 5    prochlorperazine (COMPAZINE) 10 MG tablet Take 1 tablet (10 mg total) by mouth 3 (three) times daily as needed (nausea or vomiting). 15 tablet 0    promethazine (PHENERGAN) 25 MG suppository Place 1 suppository (25 mg total) rectally every 6 (six) hours as needed for Nausea. 10 suppository 0    rivaroxaban (XARELTO) 10 mg Tab Take 1 tablet (10 mg total) by mouth daily with dinner or evening meal. 90 tablet 3    scopolamine (TRANSDERM-SCOP) 1.3-1.5 mg (1 mg over 3 days) Place 1 patch onto the skin every 72 hours as needed " (intractable nausea/vomiting).      sodium chloride 0.9% SolP 50 mL with DAPTOmycin 500 mg SolR 947 mg Inject 947 mg into the vein once daily.  0    TRESIBA FLEXTOUCH U-200 200 unit/mL (3 mL) insulin pen Inject 56 Units into the skin once daily. 3 pen 11    [DISCONTINUED] DEXCOM G6 TRANSMITTER Justyna CHANGE TRANSMITTER EVERY 90 DAYS. 1 each 3       Past Surgical History:   Procedure Laterality Date    APPENDECTOMY      ARTHROSCOPY OF KNEE Left 1/16/2024    Procedure: ARTHROSCOPY, KNEE, LEFT;  Surgeon: Mandeep Gatica MD;  Location: [address];  Service: Orthopedics;  Laterality: Left;    ARTHROTOMY OF KNEE Left 12/29/2023    Procedure: ARTHROTOMY, KNEE;  Surgeon: Edy Bocanegra Jr., MD;  Location: [facility] OR;  Service: Orthopedics;  Laterality: Left;    ESOPHAGOGASTRODUODENOSCOPY N/A 1/31/2022    Procedure: EGD (ESOPHAGOGASTRODUODENOSCOPY);  Surgeon: Cindy Quiros MD;  Location: [facility];  Service: Endoscopy;  Laterality: N/A;    ESOPHAGOGASTRODUODENOSCOPY N/A 3/21/2022    Procedure: EGD (ESOPHAGOGASTRODUODENOSCOPY);  Surgeon: Tejas Mann MD;  Location: [facility];  Service: Endoscopy;  Laterality: N/A;    INCISION AND DRAINAGE FOOT Left 11/28/2021    Procedure: INCISION AND DRAINAGE, FOOT;  Surgeon: Bj Alicea DPM;  Location: [facility] OR;  Service: Podiatry;  Laterality: Left;    stents in bilateral legs      TRANSESOPHAGEAL ECHOCARDIOGRAPHY N/A 1/3/2024    Procedure: ECHOCARDIOGRAM, TRANSESOPHAGEAL;  Surgeon: Douglas Doss MD;  Location: [facility] CATH LAB/EP;  Service: Cardiology;  Laterality: N/A;       Social History:  Tobacco Use: Medium Risk (1/20/2024)    Patient History     Smoking Tobacco Use: Former     Smokeless Tobacco Use: Former     Passive Exposure: Not on file      Alcohol Use: Not At Risk (12/28/2023)    AUDIT-C     Frequency of Alcohol Consumption: Monthly or less     Average Number of Drinks: 1 or 2     Frequency of Binge Drinking: Never        OBJECTIVE:     Vital Signs Range  (Last 24H):  Temp:  [36.3 °C (97.3 °F)-37.1 °C (98.7 °F)]   Pulse:  [78-98]   Resp:  [11-19]   BP: (115-150)/(63-90)   SpO2:  [95 %-100 %]       Significant Labs:  Lab Results   Component Value Date    WBC 10.65 01/20/2024    HGB 8.3 (L) 01/20/2024    HCT 27.0 (L) 01/20/2024     01/20/2024    CHOL 178 09/27/2023    TRIG 127 09/27/2023    HDL 32 (L) 09/27/2023    ALT 35 01/18/2024    AST 26 01/18/2024     01/20/2024    K 3.7 01/20/2024     01/20/2024    CREATININE 0.7 01/20/2024    BUN 8 01/20/2024    CO2 28 01/20/2024    TSH 0.262 (L) 01/15/2024    INR 1.3 (H) 01/15/2024    HGBA1C 7.4 (H) 12/28/2023       Diagnostic Studies: No relevant studies.    EKG:   Results for orders placed or performed during the hospital encounter of 01/14/24   EKG 12-lead    Collection Time: 01/14/24  7:10 PM    Narrative    Test Reason : R50.9,    Vent. Rate : 098 BPM     Atrial Rate : 098 BPM     P-R Int : 160 ms          QRS Dur : 084 ms      QT Int : 342 ms       P-R-T Axes : 054 020 062 degrees     QTc Int : 436 ms    Normal sinus rhythm  Abnormal anterior R wave progression  Abnormal ECG  When compared with ECG of 08-JAN-2024 11:40,  No significant change was found  Confirmed by Bernard CLARK MD ([id number]) on 1/14/2024 9:24:55 PM    Referred By: [email or web address]   SELF           Confirmed By:Bernard CLARK MD       2D ECHO:  TTE:  Results for orders placed or performed during the hospital encounter of 12/27/23   Echo   Result Value Ref Range    RA Width 3.87 cm    LA volume (mod) 53.47 cm3    Left Atrium Major Axis 5.05 cm    Left Atrium Minor Axis 5.12 cm    RA Major Axis 4.87 cm    LV Diastolic Volume 123.17 mL    LV Systolic Volume 50.52 mL    MV Peak A Leo 0.72 m/s    MV stenosis pressure 1/2 time 30.02 ms    MV VTI 11.6 cm    MV Peak E Leo 0.49 m/s    MV peak gradient 3 mmHg    Ao VTI 15.00 cm    Ao peak leo 1.10 m/s    LVOT peak VTI 11.60 cm    LVOT peak leo 0.81 m/s    LVOT diameter 2.59 cm    E wave deceleration time 104.56  msec    MV mean gradient 1 mmHg    AV mean gradient 3 mmHg    RV S' 26.76 cm/s    TAPSE 2.54 cm    RVDD 3.33 cm    LA size 3.33 cm    Sinus 3.87 cm    LVIDs 3.49 2.1 - 4.0 cm    Posterior Wall 1.29 (A) 0.6 - 1.1 cm    IVS 1.29 (A) 0.6 - 1.1 cm    LVIDd 5.09 3.5 - 6.0 cm    TDI LATERAL 0.10 m/s    Left Ventricular Outflow Tract Mean Gradient 1.62 mmHg    Left Ventricular Outflow Tract Mean Velocity 0.61 cm/s    Urbano's Biplane MOD Ejection Fraction 48 %    IVC diameter 2.15 cm    TDI SEPTAL 0.10 m/s    LV LATERAL E/E' RATIO 4.90 m/s    LV SEPTAL E/E' RATIO 4.90 m/s    FS 31 28 - 44 %    LV mass 266.30 g    ZLVIDD -7.21     ZLVIDS -4.64     Left Ventricle Relative Wall Thickness 0.51 cm    AV valve area 4.07 cm²    AV Velocity Ratio 0.74     AV index (prosthetic) 0.77     MV valve area p 1/2 method 7.33 cm2    MV valve area by continuity eq 5.27 cm2    E/A ratio 0.68     Mean e' 0.10 m/s    LVOT area 5.3 cm2    LVOT stroke volume 61.08 cm3    AV peak gradient 5 mmHg    E/E' ratio 4.90 m/s    LV Systolic Volume Index 21.2 mL/m2    LV Diastolic Volume Index 51.75 mL/m2    LV Mass Index 112 g/m2    LA Volume Index (Mod) 22.5 mL/m2    RICCI by Velocity Ratio 3.88 cm²    BSA 2.43 m2    LA Volume Index 21.2 mL/m2    LA volume 50.37 cm3    LA WIDTH 3.5 cm    Est. RA pres 15 mmHg    Narrative      Left Ventricle: The left ventricle is normal in size. There is   concentric remodeling. Regional wall motion abnormalities present. See   diagram for wall motion findings. There is reduced systolic function.   Biplane (2D) method of discs ejection fraction is 48%. There is normal   diastolic function.    Right Ventricle: Normal right ventricular cavity size. Systolic   function is normal. TAPSE is 2.54 cm.    Normal left atrial size. The left atrium volume index is 21.2 mL/m2.    Normal right atrial size.    The aortic valve is structurally normal. There is normal leaflet   mobility.    The mitral valve is structurally normal. There  is normal leaflet   mobility.    The tricuspid valve is structurally normal. There is normal leaflet   mobility.    IVC/SVC: Elevated venous pressure at 15 mmHg.    Overall the study quality was technically difficult.         ROBY:  Results for orders placed or performed during the hospital encounter of 12/27/23   Transesophageal echo (ROBY)   Result Value Ref Range    BSA 2.43 m2    Narrative      There is no obvious mass/vegation present.    Left Ventricle: Regional wall motion abnormalities present. There is   reduced systolic function.    Right Ventricle: Normal right ventricular cavity size. Systolic   function is normal.         ASSESSMENT/PLAN:         Pre-op Assessment       I have reviewed the Medications.     Review of Systems  Anesthesia Hx:             Denies Family Hx of Anesthesia complications.     Cardiovascular:     Hypertension                                        Hepatic/GI:     GERD             Endocrine:  Diabetes, type 2               Physical Exam  General: Well nourished    Airway:  Mallampati: II   TM Distance: Normal  Neck ROM: Normal ROM    Dental:  Intact        Anesthesia Plan  Type of Anesthesia, risks & benefits discussed:    Anesthesia Type: Gen ETT, Gen Supraglottic Airway, Gen Natural Airway, MAC, Regional  Intra-op Monitoring Plan: Standard ASA Monitors  Post Op Pain Control Plan: multimodal analgesia  Informed Consent: Informed consent signed with the Patient and all parties understand the risks and agree with anesthesia plan.  All questions answered.   ASA Score: 3    Ready For Surgery From Anesthesia Perspective.     .

## 2024-01-20 NOTE — PROGRESS NOTES
[facility] - Observation [address] [place] [address] Medicine  Progress Note    Patient Name: Kulwant Mueller Jr.  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     Patient was admitted to [facility] 12/27/23-01/10/24 due to left knee septic arthritis with persistent MRSA bacteremia, s/p Left knee orthopedic surgery, surgical cultures positive for MRSA. Right heel also of concern for source.   He was followed by ID,  he had been on Daptomycin + Ceftaroline, s/p ROBY w/o endocarditis, left shoulder arthrocentesis w/o infection,  surveillance blood culture negative from 01/07/24    Report per ED and care everywhere notes patient with fevers on 1/13 to 103degreese @ 1800, 2000, ER transfer recommended but pt refused, physician gave order for blood culture, pt reported feeling fine.   Today he developed temperature of 100.5 @ 15:56 and was transferred to [facility] for further evaluation.     Today he reports he can walk with asssitance, using a walker, performing ADLs, main complaint is right sided back pain    ED - blood cultures drawn.             Overview/Hospital Course:  Kulwant Mueller Jr. Is a 38 y.o. male who was admitted to hospital medicine for fever. WBC 15.86 >> 13.92. Sed rate 97, CRPP 211.9. On Daptomycin from previous discharge, continuing and added zosyn. ID consulted. Blood cultures NGTD. Orthopedics consulted, joint aspiration performed at bedside. 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 Taken to the OR for L knee washout. MRI pelvis: Myositis and  fasciitis, with soft tissue gas dissecting along the anterior compartment myofascial planes of the proximal left thigh. MRI L shoulder: Prominent subdeltoid enhancement/ bursitis. Superimposed infection may be present. No drainable fluid collections. MRI knee: s/p I&D w/ minimal residual fluid collection within the joint space. There is a large air-fluid collection extending supero-medially from the joint space along the medial femoral shaft, beyond the field of view. Prominent surrounding soft tissue inflammatory changes noted, with involvement of the vastus medialis, vastus lateralis, and popliteus musculature. 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 No spondylodiscitis or facet joint septic arthritis. 1/19 IR performed aspiration of fluid collection seen on MRI. Ortho performed a repeat I&D of left knee and an I&D of R thigh.     Interval History: NAEON. Pt seen and evaluated at bedside this am. Pt is doing well this am. Going for I&D on b/l knees w/ ortho today. Continue to monitor following procedure. Continue IV abx.    Review of Systems   Constitutional:  Negative for activity change, chills and fever.   Respiratory:  Negative for cough, chest tightness, shortness of breath and wheezing.    Cardiovascular:  Negative for chest pain, palpitations and leg swelling.   Gastrointestinal:  Negative for abdominal distention, abdominal pain, blood in stool, constipation, diarrhea, nausea and vomiting.   Genitourinary:  Negative for difficulty urinating and dysuria.   Musculoskeletal:  Positive for gait problem. Negative for arthralgias, back pain and neck stiffness.   Neurological:  Negative for dizziness, weakness, light-headedness and headaches.     Objective:     Vital Signs (Most Recent):  Temp: 97.3 °F (36.3 °C) (01/20/24 0743)  Pulse: 82 (01/20/24 0743)  Resp: 16 (01/20/24 0743)  BP: 115/63 (01/20/24 0743)  SpO2: 95 % (01/20/24 0743) Vital Signs (24h  Range):  Temp:  [97.3 °F (36.3 °C)-98.7 °F (37.1 °C)] 97.3 °F (36.3 °C)  Pulse:  [82-98] 82  Resp:  [11-19] 16  SpO2:  [95 %-100 %] 95 %  BP: (115-150)/(63-90) 115/63     Weight: 110.7 kg (244 lb 0.8 oz)  Body mass index is 32.2 kg/m².    Intake/Output Summary (Last 24 hours) at 1/20/2024 1104  Last data filed at 1/20/2024 0300  Gross per 24 hour   Intake --   Output 150 ml   Net -150 ml         Physical Exam  Vitals and nursing note reviewed.   Constitutional:       General: He is not in acute distress.     Appearance: He is well-developed. He is not diaphoretic.   HENT:      Head: Normocephalic and atraumatic.      Right Ear: External ear normal.      Left Ear: External ear normal.      Nose: Nose normal. No congestion.      Mouth/Throat:      Pharynx: Oropharynx is clear.   Eyes:      General: No scleral icterus.     Extraocular Movements: Extraocular movements intact.      Pupils: Pupils are equal, round, and reactive to light.   Cardiovascular:      Rate and Rhythm: Normal rate and regular rhythm.      Pulses: Normal pulses.      Heart sounds: Normal heart sounds. No murmur heard.  Pulmonary:      Effort: Pulmonary effort is normal. No respiratory distress.      Breath sounds: Normal breath sounds. No wheezing or rales.   Abdominal:      General: Bowel sounds are normal. There is no distension.      Palpations: Abdomen is soft.      Tenderness: There is no abdominal tenderness. There is no guarding or rebound.   Musculoskeletal:         General: Swelling (L knee warm, swollen and tender) present. No tenderness.      Cervical back: Normal range of motion.      Right lower leg: No edema.      Left lower leg: No edema.      Comments: L knee in clean, intact dressing. No point tenderness to back, no midline tenderness   Skin:     General: Skin is warm and dry.      Capillary Refill: Capillary refill takes less than 2 seconds.   Neurological:      General: No focal deficit present.      Mental Status: He is alert  and oriented to person, place, and time. Mental status is at baseline.   Psychiatric:         Mood and Affect: Mood normal.         Behavior: Behavior normal.         Thought Content: Thought content normal.      Comments: Flat affect             Significant Labs: All pertinent labs within the past 24 hours have been reviewed.    Significant Imaging: I have reviewed all pertinent imaging results/findings within the past 24 hours.    Assessment/Plan:      * Staphylococcal arthritis of left knee  S/p Left knee arthrotomy and synovectomy (12/29/23)  -re-order PTOT  Pt with left knee sutures, tenderness to palpation near medial tibial plateau  - ortho consulted, bedside aspiration completed   - taken to OR for wound wash out   - MRI pelvis: Myositis and fasciitis, with soft tissue gas dissecting along the anterior compartment myofascial planes of the proximal left thigh.   - MRI L shoulder: Prominent subdeltoid enhancement/ bursitis. Superimposed infection may be present. No drainable fluid collections.   - MRI knee: s/p I&D w/ minimal residual fluid collection within the joint space. There is a large air-fluid collection extending supero-medially from the joint space along the medial femoral shaft, beyond the field of view. Prominent surrounding soft tissue inflammatory changes noted, with involvement of the vastus medialis, vastus lateralis, and popliteus musculature.  - MRI T/L spine: No MR imaging findings to account for reported history of MRSA bacteremia. No spondylodiscitis or facet joint septic arthritis   - s/p aspiration w/ IR 1/19  - repeat L knee I&D, R knee I&D today w/ ortho    Chronic HFrEF (heart failure with reduced ejection fraction)  New finding at last hospitalization  - EF 48% with systolic dysfunction, normal diastolic function  - patient on Jardiance as outpatient as well as Toprol XL and Lasix 20mg po daily   - Jardiance not continued @ IP rehab - would hold given concern for possible repeat  infection - resume when clinically stable.   - resume furosemide when stable    Hyponatremia  Patient has hyponatremia which is controlled,  We will monitor sodium Daily.   The hyponatremia is due to Dehydration/hypovolemia.  Urine sodium, urine osmolality, serum osmolality or TSH, T4 reviewed  S/p IVF   Recent Labs   Lab 01/20/24  0826            Fever  - reported fevers of 103 @ [facility] on Saturday and temp 100 prior to transfer  - s/p blood cultures   - add Zosyn for empiric gram negative coverage  - Pt with c/o of right sided Back pain - given MRSA bacteremia, pt is at risk for metastatic site of infection obtain MRI T-L spine with Contrast to r/o spinal source of infection    - no acute findings on MRI   - Check ESR/CRP    - ESR 97/ .9   - Infectious disease consult - to assist in continuing Daptomycin approval & infectious w/u    >patient had ROBY performed on 1/02 to r/o endocarditis.     Transaminitis  - trend CMP daily, improving  - check GGT with rising Alk phos levels  - Prior w/u @ [facility] included - CT A/P, w/o abnormality, Hepatitis panel negative, RUQ ultrasound with hepatomegaly and biliary sludge; but without other obvious abnormalities    Depression  Noted At [facility] hospitalization - patient noted to have flat affect, decrease motivation, excessive sleeping, was seen by psychiatry consult and Cymbalta recommended but pt declined.   - Description of his affect seems similar here, pending above workup would re-address with patient prior to discharge  Reports he was living with wife & 2 kids prior to current hospitalization.     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 due to underlying condition, limited to breakdown of skin  Patient with right heel - resolving ulcer  - prior wound care notes indicate - Paint with betadine and leave open to air  - elevate heels to reduce pressure   - Wound care consult   - Right heel: bedside nursing to paint  with betadine, let dry and apply a foam dressing daily   - Turning every 2 hours  - Heel protecting boots  - Bedside nursing to maintain pressure injury prevention interventions    Staphylococcus aureus bacteremia  Fever  - reported fevers of 103 @ [facility] on Saturday and temp 100 prior to transfer  - s/p blood cultures   - Pt with c/o of right sided Back pain - given MRSA bacteremia, pt is at risk for metastatic site of infection obtain MRI T-L spine with Contrast to r/o spinal source of infection   - ESR 97/ .9, trending and improved  - Infectious disease consult - to assist in continuing Daptomycin approval & infectious w/u    >patient had ROBY performed on 1/02 to r/o endocarditis.   - continue daptomycin and cerftaroline  - CPK improving  - CRP trending, 105.8>139.4  - repeat blood cultures NGTD     Other elevated white blood cell count  He has persistent elevation but downtrending leukocytosis.     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  Initially on therapeutic lovenox and transitioned back to Oral Xarelto 10mg daily   -pt previously followed with hematology - Dr. Duff.     Diabetic gastroparesis associated with type 2 diabetes mellitus  -continue reglan 5mg PO TID    Type 2 diabetes mellitus with complication, with long-term current use of insulin  Patient's FSGs are uncontrolled due to hyperglycemia on current medication regimen.  Last A1c reviewed-   Lab Results   Component Value Date    HGBA1C 7.4 (H) 12/28/2023     Most recent fingerstick glucose reviewed-   Recent Labs   Lab 01/19/24  1252 01/19/24  1947 01/20/24  0829   POCTGLUCOSE 124* 147* 148*       Current correctional scale  Low  Maintain anti-hyperglycemic dose as follows-    Antihyperglycemics (From admission, onward)      Start     Stop Route Frequency Ordered    01/17/24 1130  insulin aspart U-100 pen 8 Units         -- SubQ 3 times daily with meals 01/17/24 0819    01/16/24 2100  insulin detemir U-100 (Levemir) pen 17 Units         -- SubQ 2 times daily 01/16/24 1657    01/15/24 0329  insulin aspart U-100 pen 0-5 Units         -- SubQ Before meals & nightly PRN 01/15/24 0231          Hold Oral hypoglycemics while patient is in the hospital.      VTE Risk Mitigation (From admission, onward)           Ordered     rivaroxaban tablet 10 mg  With dinner         01/15/24 0229     Reason for No Pharmacological VTE Prophylaxis  Once        Question:  Reasons:  Answer:  Already adequately anticoagulated on oral Anticoagulants    01/15/24 0229     IP VTE HIGH RISK PATIENT  Once         01/15/24 0229     Place sequential compression device  Until discontinued         01/15/24 0229                    Discharge Planning   RAEANN: 1/22/2024     Code Status: Full Code   Is the patient medically ready for discharge?: No    Reason for patient still in hospital (select all that apply): Patient trending condition, Laboratory test, Treatment, Consult recommendations, and Pending disposition  Discharge Plan A: Rehab   Discharge Delays: None known at this time              Raul Pal PA-C  Department of Hospital Medicine   [facility] 11[facility]

## 2024-01-21 ENCOUNTER — ANESTHESIA (OUTPATIENT)
Dept: SURGERY | Facility: HOSPITAL | Age: 39
DRG: 486 | End: 2024-01-21
Payer: COMMERCIAL

## 2024-01-21 LAB
ANION GAP SERPL CALC-SCNC: 6 MMOL/L (ref 8–16)
BASOPHILS # BLD AUTO: 0.03 K/UL (ref 0–0.2)
BASOPHILS NFR BLD: 0.3 % (ref 0–1.9)
BUN SERPL-MCNC: 7 MG/DL (ref 6–20)
CALCIUM SERPL-MCNC: 8.6 MG/DL (ref 8.7–10.5)
CHLORIDE SERPL-SCNC: 100 MMOL/L (ref 95–110)
CO2 SERPL-SCNC: 27 MMOL/L (ref 23–29)
CREAT SERPL-MCNC: 0.6 MG/DL (ref 0.5–1.4)
DIFFERENTIAL METHOD BLD: ABNORMAL
EOSINOPHIL # BLD AUTO: 0.1 K/UL (ref 0–0.5)
EOSINOPHIL NFR BLD: 1.3 % (ref 0–8)
ERYTHROCYTE [DISTWIDTH] IN BLOOD BY AUTOMATED COUNT: 14.4 % (ref 11.5–14.5)
EST. GFR  (NO RACE VARIABLE): >60 ML/MIN/1.73 M^2
GLUCOSE SERPL-MCNC: 152 MG/DL (ref 70–110)
GRAM STN SPEC: NORMAL
HCT VFR BLD AUTO: 28.3 % (ref 40–54)
HGB BLD-MCNC: 8.6 G/DL (ref 14–18)
HIV 1+2 AB+HIV1 P24 AG SERPL QL IA: NORMAL
IMM GRANULOCYTES # BLD AUTO: 0.04 K/UL (ref 0–0.04)
IMM GRANULOCYTES NFR BLD AUTO: 0.4 % (ref 0–0.5)
LYMPHOCYTES # BLD AUTO: 1.2 K/UL (ref 1–4.8)
LYMPHOCYTES NFR BLD: 11.5 % (ref 18–48)
MCH RBC QN AUTO: 26.8 PG (ref 27–31)
MCHC RBC AUTO-ENTMCNC: 30.4 G/DL (ref 32–36)
MCV RBC AUTO: 88 FL (ref 82–98)
MONOCYTES # BLD AUTO: 0.6 K/UL (ref 0.3–1)
MONOCYTES NFR BLD: 5.2 % (ref 4–15)
NEUTROPHILS # BLD AUTO: 8.8 K/UL (ref 1.8–7.7)
NEUTROPHILS NFR BLD: 81.3 % (ref 38–73)
NRBC BLD-RTO: 0 /100 WBC
PLATELET # BLD AUTO: 488 K/UL (ref 150–450)
PMV BLD AUTO: 9.4 FL (ref 9.2–12.9)
POCT GLUCOSE: 147 MG/DL (ref 70–110)
POCT GLUCOSE: 151 MG/DL (ref 70–110)
POCT GLUCOSE: 156 MG/DL (ref 70–110)
POTASSIUM SERPL-SCNC: 3.8 MMOL/L (ref 3.5–5.1)
RBC # BLD AUTO: 3.21 M/UL (ref 4.6–6.2)
SODIUM SERPL-SCNC: 133 MMOL/L (ref 136–145)
WBC # BLD AUTO: 10.8 K/UL (ref 3.9–12.7)

## 2024-01-21 PROCEDURE — 21400001 HC TELEMETRY ROOM

## 2024-01-21 PROCEDURE — 87206 SMEAR FLUORESCENT/ACID STAI: CPT | Performed by: ORTHOPAEDIC SURGERY

## 2024-01-21 PROCEDURE — 0SBD4ZZ EXCISION OF LEFT KNEE JOINT, PERCUTANEOUS ENDOSCOPIC APPROACH: ICD-10-PCS | Performed by: ORTHOPAEDIC SURGERY

## 2024-01-21 PROCEDURE — 63600175 PHARM REV CODE 636 W HCPCS: Performed by: ANESTHESIOLOGY

## 2024-01-21 PROCEDURE — 99900035 HC TECH TIME PER 15 MIN (STAT)

## 2024-01-21 PROCEDURE — 29876 ARTHRS KNEE SURG SYNVCT MAJ: CPT | Mod: 58,LT,, | Performed by: ORTHOPAEDIC SURGERY

## 2024-01-21 PROCEDURE — 63600175 PHARM REV CODE 636 W HCPCS: Performed by: ORTHOPAEDIC SURGERY

## 2024-01-21 PROCEDURE — 25000003 PHARM REV CODE 250: Performed by: HOSPITALIST

## 2024-01-21 PROCEDURE — 25000003 PHARM REV CODE 250: Performed by: NURSE ANESTHETIST, CERTIFIED REGISTERED

## 2024-01-21 PROCEDURE — 63600175 PHARM REV CODE 636 W HCPCS

## 2024-01-21 PROCEDURE — 36415 COLL VENOUS BLD VENIPUNCTURE: CPT | Performed by: PHYSICIAN ASSISTANT

## 2024-01-21 PROCEDURE — 87389 HIV-1 AG W/HIV-1&-2 AB AG IA: CPT | Performed by: PHYSICIAN ASSISTANT

## 2024-01-21 PROCEDURE — 80048 BASIC METABOLIC PNL TOTAL CA: CPT | Performed by: PHYSICIAN ASSISTANT

## 2024-01-21 PROCEDURE — 71000015 HC POSTOP RECOV 1ST HR: Performed by: ORTHOPAEDIC SURGERY

## 2024-01-21 PROCEDURE — 87075 CULTR BACTERIA EXCEPT BLOOD: CPT | Performed by: ORTHOPAEDIC SURGERY

## 2024-01-21 PROCEDURE — 87116 MYCOBACTERIA CULTURE: CPT | Mod: 59 | Performed by: ORTHOPAEDIC SURGERY

## 2024-01-21 PROCEDURE — 37000009 HC ANESTHESIA EA ADD 15 MINS: Performed by: ORTHOPAEDIC SURGERY

## 2024-01-21 PROCEDURE — 27000221 HC OXYGEN, UP TO 24 HOURS

## 2024-01-21 PROCEDURE — 71000033 HC RECOVERY, INTIAL HOUR: Performed by: ORTHOPAEDIC SURGERY

## 2024-01-21 PROCEDURE — D9220A PRA ANESTHESIA: Mod: CRNA,,, | Performed by: NURSE ANESTHETIST, CERTIFIED REGISTERED

## 2024-01-21 PROCEDURE — 25000003 PHARM REV CODE 250: Performed by: INTERNAL MEDICINE

## 2024-01-21 PROCEDURE — 82962 GLUCOSE BLOOD TEST: CPT | Performed by: ORTHOPAEDIC SURGERY

## 2024-01-21 PROCEDURE — 87102 FUNGUS ISOLATION CULTURE: CPT | Performed by: ORTHOPAEDIC SURGERY

## 2024-01-21 PROCEDURE — 25000003 PHARM REV CODE 250: Performed by: STUDENT IN AN ORGANIZED HEALTH CARE EDUCATION/TRAINING PROGRAM

## 2024-01-21 PROCEDURE — 99499 UNLISTED E&M SERVICE: CPT | Mod: ,,, | Performed by: ORTHOPAEDIC SURGERY

## 2024-01-21 PROCEDURE — 87205 SMEAR GRAM STAIN: CPT | Mod: 59 | Performed by: ORTHOPAEDIC SURGERY

## 2024-01-21 PROCEDURE — 27301 DRAIN THIGH/KNEE LESION: CPT | Mod: 58,51,RT, | Performed by: ORTHOPAEDIC SURGERY

## 2024-01-21 PROCEDURE — 37000008 HC ANESTHESIA 1ST 15 MINUTES: Performed by: ORTHOPAEDIC SURGERY

## 2024-01-21 PROCEDURE — 87070 CULTURE OTHR SPECIMN AEROBIC: CPT | Mod: 59 | Performed by: ORTHOPAEDIC SURGERY

## 2024-01-21 PROCEDURE — 27000207 HC ISOLATION

## 2024-01-21 PROCEDURE — 0J9L0ZZ DRAINAGE OF RIGHT UPPER LEG SUBCUTANEOUS TISSUE AND FASCIA, OPEN APPROACH: ICD-10-PCS | Performed by: ORTHOPAEDIC SURGERY

## 2024-01-21 PROCEDURE — 88304 TISSUE EXAM BY PATHOLOGIST: CPT | Performed by: PATHOLOGY

## 2024-01-21 PROCEDURE — 27201423 OPTIME MED/SURG SUP & DEVICES STERILE SUPPLY: Performed by: ORTHOPAEDIC SURGERY

## 2024-01-21 PROCEDURE — 85025 COMPLETE CBC W/AUTO DIFF WBC: CPT | Performed by: PHYSICIAN ASSISTANT

## 2024-01-21 PROCEDURE — 36000710: Performed by: ORTHOPAEDIC SURGERY

## 2024-01-21 PROCEDURE — A4216 STERILE WATER/SALINE, 10 ML: HCPCS | Performed by: STUDENT IN AN ORGANIZED HEALTH CARE EDUCATION/TRAINING PROGRAM

## 2024-01-21 PROCEDURE — 88304 TISSUE EXAM BY PATHOLOGIST: CPT | Mod: 26,,, | Performed by: PATHOLOGY

## 2024-01-21 PROCEDURE — 36000711: Performed by: ORTHOPAEDIC SURGERY

## 2024-01-21 PROCEDURE — 63600175 PHARM REV CODE 636 W HCPCS: Performed by: HOSPITALIST

## 2024-01-21 PROCEDURE — 63600175 PHARM REV CODE 636 W HCPCS: Performed by: NURSE ANESTHETIST, CERTIFIED REGISTERED

## 2024-01-21 PROCEDURE — D9220A PRA ANESTHESIA: Mod: ANES,,, | Performed by: SURGERY

## 2024-01-21 PROCEDURE — 25000003 PHARM REV CODE 250

## 2024-01-21 PROCEDURE — 94761 N-INVAS EAR/PLS OXIMETRY MLT: CPT

## 2024-01-21 PROCEDURE — 63600175 PHARM REV CODE 636 W HCPCS: Mod: JZ,JG | Performed by: INTERNAL MEDICINE

## 2024-01-21 RX ORDER — HYDROMORPHONE HYDROCHLORIDE 1 MG/ML
INJECTION, SOLUTION INTRAMUSCULAR; INTRAVENOUS; SUBCUTANEOUS
Status: COMPLETED
Start: 2024-01-21 | End: 2024-01-21

## 2024-01-21 RX ORDER — ONDANSETRON HYDROCHLORIDE 2 MG/ML
INJECTION, SOLUTION INTRAVENOUS
Status: DISCONTINUED | OUTPATIENT
Start: 2024-01-21 | End: 2024-01-21

## 2024-01-21 RX ORDER — SUCCINYLCHOLINE CHLORIDE 20 MG/ML
INJECTION INTRAMUSCULAR; INTRAVENOUS
Status: DISCONTINUED | OUTPATIENT
Start: 2024-01-21 | End: 2024-01-21

## 2024-01-21 RX ORDER — VANCOMYCIN HYDROCHLORIDE 1 G/20ML
INJECTION, POWDER, LYOPHILIZED, FOR SOLUTION INTRAVENOUS
Status: DISCONTINUED | OUTPATIENT
Start: 2024-01-21 | End: 2024-01-21 | Stop reason: HOSPADM

## 2024-01-21 RX ORDER — FENTANYL CITRATE 50 UG/ML
INJECTION, SOLUTION INTRAMUSCULAR; INTRAVENOUS
Status: DISCONTINUED | OUTPATIENT
Start: 2024-01-21 | End: 2024-01-21

## 2024-01-21 RX ORDER — HYDROMORPHONE HYDROCHLORIDE 1 MG/ML
0.2 INJECTION, SOLUTION INTRAMUSCULAR; INTRAVENOUS; SUBCUTANEOUS EVERY 5 MIN PRN
Status: DISCONTINUED | OUTPATIENT
Start: 2024-01-21 | End: 2024-01-22

## 2024-01-21 RX ORDER — PROPOFOL 10 MG/ML
VIAL (ML) INTRAVENOUS
Status: DISCONTINUED | OUTPATIENT
Start: 2024-01-21 | End: 2024-01-21

## 2024-01-21 RX ORDER — LIDOCAINE HYDROCHLORIDE 20 MG/ML
INJECTION INTRAVENOUS
Status: DISCONTINUED | OUTPATIENT
Start: 2024-01-21 | End: 2024-01-21

## 2024-01-21 RX ORDER — ROCURONIUM BROMIDE 10 MG/ML
INJECTION, SOLUTION INTRAVENOUS
Status: DISCONTINUED | OUTPATIENT
Start: 2024-01-21 | End: 2024-01-21

## 2024-01-21 RX ORDER — CEFAZOLIN SODIUM 1 G/3ML
INJECTION, POWDER, FOR SOLUTION INTRAMUSCULAR; INTRAVENOUS
Status: DISCONTINUED | OUTPATIENT
Start: 2024-01-21 | End: 2024-01-21

## 2024-01-21 RX ORDER — TOBRAMYCIN 1.2 G/30ML
INJECTION, POWDER, LYOPHILIZED, FOR SOLUTION INTRAVENOUS
Status: DISCONTINUED | OUTPATIENT
Start: 2024-01-21 | End: 2024-01-21 | Stop reason: HOSPADM

## 2024-01-21 RX ORDER — EPINEPHRINE 1 MG/ML
INJECTION, SOLUTION, CONCENTRATE INTRAVENOUS
Status: DISCONTINUED | OUTPATIENT
Start: 2024-01-21 | End: 2024-01-21 | Stop reason: HOSPADM

## 2024-01-21 RX ORDER — OXYCODONE HYDROCHLORIDE 5 MG/1
5 TABLET ORAL EVERY 6 HOURS
Status: DISCONTINUED | OUTPATIENT
Start: 2024-01-21 | End: 2024-01-25 | Stop reason: HOSPADM

## 2024-01-21 RX ORDER — DROPERIDOL 2.5 MG/ML
0.62 INJECTION, SOLUTION INTRAMUSCULAR; INTRAVENOUS ONCE AS NEEDED
Status: DISCONTINUED | OUTPATIENT
Start: 2024-01-21 | End: 2024-01-22

## 2024-01-21 RX ORDER — ONDANSETRON HYDROCHLORIDE 2 MG/ML
4 INJECTION, SOLUTION INTRAVENOUS ONCE AS NEEDED
Status: DISCONTINUED | OUTPATIENT
Start: 2024-01-21 | End: 2024-01-23

## 2024-01-21 RX ADMIN — INSULIN DETEMIR 17 UNITS: 100 INJECTION, SOLUTION SUBCUTANEOUS at 09:01

## 2024-01-21 RX ADMIN — RIVAROXABAN 10 MG: 10 TABLET, FILM COATED ORAL at 06:01

## 2024-01-21 RX ADMIN — ONDANSETRON 4 MG: 2 INJECTION INTRAMUSCULAR; INTRAVENOUS at 03:01

## 2024-01-21 RX ADMIN — METOPROLOL SUCCINATE 12.5 MG: 25 TABLET, EXTENDED RELEASE ORAL at 09:01

## 2024-01-21 RX ADMIN — CEFTAROLINE FOSAMIL 600 MG: 600 POWDER, FOR SOLUTION INTRAVENOUS at 06:01

## 2024-01-21 RX ADMIN — DAPTOMYCIN 945 MG: 500 INJECTION, POWDER, LYOPHILIZED, FOR SOLUTION INTRAVENOUS at 05:01

## 2024-01-21 RX ADMIN — AMMONIUM LACTATE: 12 LOTION TOPICAL at 09:01

## 2024-01-21 RX ADMIN — PANTOPRAZOLE SODIUM 40 MG: 40 TABLET, DELAYED RELEASE ORAL at 09:01

## 2024-01-21 RX ADMIN — OXYCODONE HYDROCHLORIDE 10 MG: 10 TABLET ORAL at 05:01

## 2024-01-21 RX ADMIN — METHOCARBAMOL 500 MG: 500 TABLET ORAL at 06:01

## 2024-01-21 RX ADMIN — SODIUM CHLORIDE, SODIUM GLUCONATE, SODIUM ACETATE, POTASSIUM CHLORIDE, MAGNESIUM CHLORIDE, SODIUM PHOSPHATE, DIBASIC, AND POTASSIUM PHOSPHATE: .53; .5; .37; .037; .03; .012; .00082 INJECTION, SOLUTION INTRAVENOUS at 12:01

## 2024-01-21 RX ADMIN — PROPOFOL 200 MG: 10 INJECTION, EMULSION INTRAVENOUS at 12:01

## 2024-01-21 RX ADMIN — ACETAMINOPHEN 1000 MG: 500 TABLET ORAL at 05:01

## 2024-01-21 RX ADMIN — MORPHINE SULFATE 4 MG: 4 INJECTION INTRAVENOUS at 09:01

## 2024-01-21 RX ADMIN — SUGAMMADEX 200 MG: 100 INJECTION, SOLUTION INTRAVENOUS at 03:01

## 2024-01-21 RX ADMIN — HYDROMORPHONE HYDROCHLORIDE 0.2 MG: 1 INJECTION, SOLUTION INTRAMUSCULAR; INTRAVENOUS; SUBCUTANEOUS at 03:01

## 2024-01-21 RX ADMIN — SUCCINYLCHOLINE CHLORIDE 200 MG: 20 INJECTION, SOLUTION INTRAMUSCULAR; INTRAVENOUS at 12:01

## 2024-01-21 RX ADMIN — ROCURONIUM BROMIDE 45 MG: 10 INJECTION, SOLUTION INTRAVENOUS at 01:01

## 2024-01-21 RX ADMIN — FENTANYL CITRATE 50 MCG: 0.05 INJECTION, SOLUTION INTRAMUSCULAR; INTRAVENOUS at 02:01

## 2024-01-21 RX ADMIN — FENTANYL CITRATE 50 MCG: 0.05 INJECTION, SOLUTION INTRAMUSCULAR; INTRAVENOUS at 01:01

## 2024-01-21 RX ADMIN — HYDROMORPHONE HYDROCHLORIDE 0.2 MG: 1 INJECTION, SOLUTION INTRAMUSCULAR; INTRAVENOUS; SUBCUTANEOUS at 04:01

## 2024-01-21 RX ADMIN — Medication 10 ML: at 06:01

## 2024-01-21 RX ADMIN — METOCLOPRAMIDE 5 MG: 5 TABLET ORAL at 05:01

## 2024-01-21 RX ADMIN — Medication 10 ML: at 09:01

## 2024-01-21 RX ADMIN — ROCURONIUM BROMIDE 5 MG: 10 INJECTION, SOLUTION INTRAVENOUS at 12:01

## 2024-01-21 RX ADMIN — CEFAZOLIN 2 G: 330 INJECTION, POWDER, FOR SOLUTION INTRAMUSCULAR; INTRAVENOUS at 01:01

## 2024-01-21 RX ADMIN — METHOCARBAMOL 500 MG: 500 TABLET ORAL at 09:01

## 2024-01-21 RX ADMIN — ACETAMINOPHEN 1000 MG: 500 TABLET ORAL at 09:01

## 2024-01-21 RX ADMIN — Medication 10 ML: at 05:01

## 2024-01-21 RX ADMIN — LIDOCAINE HYDROCHLORIDE 100 MG: 20 INJECTION INTRAVENOUS at 12:01

## 2024-01-21 RX ADMIN — FENTANYL CITRATE 50 MCG: 0.05 INJECTION, SOLUTION INTRAMUSCULAR; INTRAVENOUS at 12:01

## 2024-01-21 NOTE — ASSESSMENT & PLAN NOTE
Patient's FSGs are uncontrolled due to hyperglycemia on current medication regimen.  Last A1c reviewed-   Lab Results   Component Value Date    HGBA1C 7.4 (H) 12/28/2023     Most recent fingerstick glucose reviewed-   Recent Labs   Lab 01/20/24  1243 01/20/24  1631 01/20/24  2041 01/21/24  0725   POCTGLUCOSE 119* 175* 166* 151*       Current correctional scale  Low  Maintain anti-hyperglycemic dose as follows-   Antihyperglycemics (From admission, onward)    Start     Stop Route Frequency Ordered    01/17/24 1130  insulin aspart U-100 pen 8 Units         -- SubQ 3 times daily with meals 01/17/24 0819    01/16/24 2100  insulin detemir U-100 (Levemir) pen 17 Units         -- SubQ 2 times daily 01/16/24 1657    01/15/24 0329  insulin aspart U-100 pen 0-5 Units         -- SubQ Before meals & nightly PRN 01/15/24 0231        Hold Oral hypoglycemics while patient is in the hospital.

## 2024-01-21 NOTE — ASSESSMENT & PLAN NOTE
Patient has hyponatremia which is controlled,  We will monitor sodium Daily.   The hyponatremia is due to Dehydration/hypovolemia.  Urine sodium, urine osmolality, serum osmolality or TSH, T4 reviewed  S/p IVF   Recent Labs   Lab 01/21/24  0626   *

## 2024-01-21 NOTE — BRIEF OP NOTE
[facility]  Brief Operative Note    SUMMARY     Surgery Date: 1/21/2024     Surgeon(s) and Role:  Panel 1:     * Eric Liang MD - Primary     * David France MD - Resident - Assisting     * Jean-Claude Roman MD - Resident - Assisting  Panel 2:     * Eric Liang MD - Primary        Pre-op Diagnosis:  Staphylococcal arthritis of left knee [M00.062]  Abscess of right thigh [L02.415]    Post-op Diagnosis:  Post-Op Diagnosis Codes:     * Staphylococcal arthritis of left knee [M00.062]     * Abscess of right thigh [L02.415]    Procedure(s) (LRB):  ARTHROSCOPY, KNEE, WITH DEBRIDEMENT - LEFT, SUPINE, SLIDER, LATERAL POST, CONMED (Left)  INCISION AND DRAINAGE, THIGH - right, (Right)    Anesthesia: Choice    Implants:  WINTER drain    Operative Findings: See full op note    Estimated Blood Loss: Minimal           Specimens:   Specimen (24h ago, onward)       Start     Ordered    01/21/24 1446  Specimen to Pathology, Surgery Orthopedics  Once        Comments: Pre-op Diagnosis: Staphylococcal arthritis of left knee [M00.062]Abscess of right thigh [L02.415]Procedure(s):ARTHROSCOPY, KNEE, WITH DEBRIDEMENT - LEFT, SUPINE, SLIDER, LATERAL POST, CONMEDINCISION AND DRAINAGE, THIGH - right, lateral, slider, cysto tubing, 6L NS, betadine, peroxide, vanc powder, 10 [place] winter drain Number of specimens: 1Name of specimens: right thigh abscess     References:    Click here for ordering Quick Tip   Question Answer Comment   Procedure Type: Orthopedics    Which provider would you like to cc? ERIC LIANG        01/21/24 1446                    [phone]

## 2024-01-21 NOTE — SUBJECTIVE & OBJECTIVE
Principal Problem:Staphylococcal arthritis of left knee    Principal Orthopedic Problem: s/p L knee arthroscopic I&D on 1/17    Interval History: NAEON. VSS. Afebrile. Plan for OR today for I&D of R thigh abscess and repeat left knee arthroscopic I&D.     Review of patient's allergies indicates:   Allergen Reactions    Heparin analogues Nausea And Vomiting       Current Facility-Administered Medications   Medication    acetaminophen tablet 1,000 mg    ammonium lactate 12 % lotion    ceftaroline fosamiL (TEFLARO) 600 mg in dextrose 5 % in water (D5W) 50 mL IVPB (MB+)    DAPTOmycin (CUBICIN) 945 mg in sodium chloride 0.9% SolP 50 mL IVPB    dextrose 10% bolus 125 mL 125 mL    dextrose 10% bolus 250 mL 250 mL    glucagon (human recombinant) injection 1 mg    glucose chewable tablet 16 g    glucose chewable tablet 24 g    insulin aspart U-100 pen 0-5 Units    insulin aspart U-100 pen 8 Units    insulin detemir U-100 (Levemir) pen 17 Units    loperamide capsule 2 mg    melatonin tablet 6 mg    methocarbamoL tablet 500 mg    metoclopramide HCl tablet 5 mg    metoprolol succinate (TOPROL-XL) 24 hr split tablet 12.5 mg    morphine injection 4 mg    naloxone 0.4 mg/mL injection 0.02 mg    ondansetron injection 4 mg    oxyCODONE immediate release tablet 5 mg    oxyCODONE immediate release tablet 5 mg    oxyCODONE immediate release tablet Tab 10 mg    pantoprazole EC tablet 40 mg    prochlorperazine injection Soln 5 mg    rivaroxaban tablet 10 mg    scopolamine 1.3-1.5 mg (1 mg over 3 days) 1 patch    sodium chloride 0.9% flush 10 mL    sodium chloride 0.9% flush 10 mL    sodium chloride 0.9% flush 10 mL    And    sodium chloride 0.9% flush 10 mL    sodium chloride 0.9% flush 10 mL     Objective:     Vital Signs (Most Recent):  Temp: 98 °F (36.7 °C) (01/21/24 0746)  Pulse: 82 (01/21/24 0746)  Resp: 20 (01/21/24 0746)  BP: 117/61 (01/21/24 0746)  SpO2: 97 % (01/21/24 0746) Vital Signs (24h Range):  Temp:  [97.9 °F (36.6  "°C)-98.5 °F (36.9 °C)] 98 °F (36.7 °C)  Pulse:  [78-89] 82  Resp:  [16-20] 20  SpO2:  [94 %-98 %] 97 %  BP: (110-132)/(61-80) 117/61     Weight: 110.8 kg (244 lb 4.3 oz)  Height: 6' 1" (185.4 cm)  Body mass index is 32.23 kg/m².      Intake/Output Summary (Last 24 hours) at 1/21/2024 0915  Last data filed at 1/21/2024 0430  Gross per 24 hour   Intake 60 ml   Output 1250 ml   Net -1190 ml          Ortho/SPM Exam     Awake/alert/oriented x3, No acute distress, Afebrile, Vital signs stable  Good inspiratory effort with unlaboured breathing  Dressings c/d/I  Pain with knee PROM 5-70  No pain with R hip ROM, axial loading, full PROM. No TTP of R lateral hip.   Active and PROM of L shoulder intact with ain with L shoulder ROM (FF over 90, ABD over 90), +hawkens, + neer,       Significant Labs: All pertinent labs within the past 24 hours have been reviewed.    Significant Imaging: I have reviewed all pertinent imaging results/findings.    MRI pelvis showing right lateral thigh rim enhancing fluid collection, no bl hip effusions    MRI L shoulder showing SS tear without retraction, subdeltoid bursitis, no fluid collections   "

## 2024-01-21 NOTE — TRANSFER OF CARE
"Anesthesia Transfer of Care Note    Patient: Kulwant Mueller Jr.    Procedure(s) Performed: Procedure(s) (LRB):  ARTHROSCOPY, KNEE, WITH DEBRIDEMENT - LEFT, SUPINE, SLIDER, LATERAL POST, CONMED (Left)  INCISION AND DRAINAGE, THIGH - right, (Right)    Patient location: PACU    Anesthesia Type: general    Transport from OR: Transported from OR on 6-10 L/min O2 by face mask with adequate spontaneous ventilation    Post pain: adequate analgesia    Post assessment: no apparent anesthetic complications and tolerated procedure well    Post vital signs: stable    Level of consciousness: awake, alert and oriented    Nausea/Vomiting: no nausea/vomiting    Complications: none    Transfer of care protocol was followed      Last vitals: Visit Vitals  /79   Pulse 83   Temp 36.8 °C (98.3 °F)   Resp 20   Ht 6' 1" (1.854 m)   Wt 110.8 kg (244 lb 4.3 oz)   SpO2 (!) 94%   BMI 32.23 kg/m²     "

## 2024-01-21 NOTE — PT/OT/SLP PROGRESS
Occupational Therapy      Patient Name:  Kulwant Mueller Jr.   MRN:  [id number]    Patient not seen today secondary to patient refused to participate due to patient stating that he was awaiting to have an procedure/surgery on his knee and did not feel motivated to do therapy due to being worry and anxious of the outcome after procedure.Will follow-up on the next schedule visit accordingly to OT POC.    1/21/2024

## 2024-01-21 NOTE — PT/OT/SLP PROGRESS
"Physical Therapy      Patient Name:  Kulwant Mueller Jr.   MRN:  [id number]    Attempt at 9:51 am and 10:04 am with OT.    Patient not seen today secondary to (Pt declining despite encouragement at this time 2/2 stating "I'm just focused on getting my procedure done today, I will do it tomorrow").     Will follow-up 1/22/24.    "

## 2024-01-21 NOTE — NURSING TRANSFER
Nursing Transfer Note      1/21/2024   4:53 PM    Nurse giving handoff:stu rn   Nurse receiving handoff:11th floor rn     Reason patient is being transferred: post procedure    Transfer To: 1151    Transfer via bed    Transfer with cardiac monitoring    Transported by rn x 2      Telemetry: Rate 95, Rhythm nsr, and Telemetry  araceli  Order for Tele Monitor? Yes      Patient belongings transferred with patient: No    Chart send with patient: Yes    Notified: spouse    Patient reassessed at: 1/21/24 @1640

## 2024-01-21 NOTE — SUBJECTIVE & OBJECTIVE
Interval History: NAEON. Pt seen and evaluated at bedside this am. Pt has been NPO since midnight in prep for washout w/ ortho today. Na 133, continue to monitor.    Review of Systems   Constitutional:  Negative for activity change, chills and fever.   Respiratory:  Negative for cough, chest tightness, shortness of breath and wheezing.    Cardiovascular:  Negative for chest pain, palpitations and leg swelling.   Gastrointestinal:  Negative for abdominal distention, abdominal pain, blood in stool, constipation, diarrhea, nausea and vomiting.   Genitourinary:  Negative for difficulty urinating and dysuria.   Musculoskeletal:  Positive for gait problem. Negative for arthralgias, back pain and neck stiffness.   Neurological:  Negative for dizziness, weakness, light-headedness and headaches.     Objective:     Vital Signs (Most Recent):  Temp: 98 °F (36.7 °C) (01/21/24 0746)  Pulse: 82 (01/21/24 0746)  Resp: 20 (01/21/24 0746)  BP: 117/61 (01/21/24 0746)  SpO2: 97 % (01/21/24 0746) Vital Signs (24h Range):  Temp:  [97.9 °F (36.6 °C)-98.5 °F (36.9 °C)] 98 °F (36.7 °C)  Pulse:  [78-89] 82  Resp:  [16-20] 20  SpO2:  [94 %-98 %] 97 %  BP: (110-132)/(61-80) 117/61     Weight: 110.8 kg (244 lb 4.3 oz)  Body mass index is 32.23 kg/m².    Intake/Output Summary (Last 24 hours) at 1/21/2024 0924  Last data filed at 1/21/2024 0430  Gross per 24 hour   Intake 60 ml   Output 1250 ml   Net -1190 ml         Physical Exam  Vitals and nursing note reviewed.   Constitutional:       General: He is not in acute distress.     Appearance: He is well-developed. He is not diaphoretic.   HENT:      Head: Normocephalic and atraumatic.      Right Ear: External ear normal.      Left Ear: External ear normal.      Nose: Nose normal. No congestion.      Mouth/Throat:      Pharynx: Oropharynx is clear.   Eyes:      General: No scleral icterus.     Extraocular Movements: Extraocular movements intact.      Pupils: Pupils are equal, round, and reactive  to light.   Cardiovascular:      Rate and Rhythm: Normal rate and regular rhythm.      Pulses: Normal pulses.      Heart sounds: Normal heart sounds. No murmur heard.  Pulmonary:      Effort: Pulmonary effort is normal. No respiratory distress.      Breath sounds: Normal breath sounds. No wheezing or rales.   Abdominal:      General: Bowel sounds are normal. There is no distension.      Palpations: Abdomen is soft.      Tenderness: There is no abdominal tenderness. There is no guarding or rebound.   Musculoskeletal:         General: Swelling (L knee warm, swollen and tender) present. No tenderness.      Cervical back: Normal range of motion.      Right lower leg: No edema.      Left lower leg: No edema.      Comments: L knee in clean, intact dressing. No point tenderness to back, no midline tenderness   Skin:     General: Skin is warm and dry.      Capillary Refill: Capillary refill takes less than 2 seconds.   Neurological:      General: No focal deficit present.      Mental Status: He is alert and oriented to person, place, and time. Mental status is at baseline.   Psychiatric:         Mood and Affect: Mood normal.         Behavior: Behavior normal.         Thought Content: Thought content normal.             Significant Labs: All pertinent labs within the past 24 hours have been reviewed.    Significant Imaging: I have reviewed all pertinent imaging results/findings within the past 24 hours.

## 2024-01-21 NOTE — PROGRESS NOTES
[facility]  Orthopedics  Progress Note    Patient Name: Kulwant Mueller Jr.  MRN: [id number]  Admission Date: 1/14/2024  Hospital Length of Stay: 5 days  Attending Provider: Lizbeth Marquez MD  Primary Care Provider: Shellie, Primary Doctor  Follow-up For: Procedure(s) (LRB):  ARTHROSCOPY, KNEE, WITH DEBRIDEMENT - LEFT, SUPINE, SLIDER, LATERAL POST, CONMED (Left)  INCISION AND DRAINAGE, THIGH - right, lateral, slider, cysto tubing, 6L NS, betadine, peroxide, vanc powder, 10 [place] of the [place] channel winter drain (Right)    Post-Operative Day:    Subjective:     Principal Problem:Staphylococcal arthritis of left knee    Principal Orthopedic Problem: s/p L knee arthroscopic I&D on 1/17    Interval History: NAEON. VSS. Afebrile. Plan for OR today for I&D of R thigh abscess and repeat left knee arthroscopic I&D.     Review of patient's allergies indicates:   Allergen Reactions    Heparin analogues Nausea And Vomiting       Current Facility-Administered Medications   Medication    acetaminophen tablet 1,000 mg    ammonium lactate 12 % lotion    ceftaroline fosamiL (TEFLARO) 600 mg in dextrose 5 % in water (D5W) 50 mL IVPB (MB+)    DAPTOmycin (CUBICIN) 945 mg in sodium chloride 0.9% SolP 50 mL IVPB    dextrose 10% bolus 125 mL 125 mL    dextrose 10% bolus 250 mL 250 mL    glucagon (human recombinant) injection 1 mg    glucose chewable tablet 16 g    glucose chewable tablet 24 g    insulin aspart U-100 pen 0-5 Units    insulin aspart U-100 pen 8 Units    insulin detemir U-100 (Levemir) pen 17 Units    loperamide capsule 2 mg    melatonin tablet 6 mg    methocarbamoL tablet 500 mg    metoclopramide HCl tablet 5 mg    metoprolol succinate (TOPROL-XL) 24 hr split tablet 12.5 mg    morphine injection 4 mg    naloxone 0.4 mg/mL injection 0.02 mg    ondansetron injection 4 mg    oxyCODONE immediate release tablet 5 mg    oxyCODONE immediate release tablet 5 mg    oxyCODONE immediate release tablet Tab 10 mg    pantoprazole EC tablet  "40 mg    prochlorperazine injection Soln 5 mg    rivaroxaban tablet 10 mg    scopolamine 1.3-1.5 mg (1 mg over 3 days) 1 patch    sodium chloride 0.9% flush 10 mL    sodium chloride 0.9% flush 10 mL    sodium chloride 0.9% flush 10 mL    And    sodium chloride 0.9% flush 10 mL    sodium chloride 0.9% flush 10 mL     Objective:     Vital Signs (Most Recent):  Temp: 98 °F (36.7 °C) (01/21/24 0746)  Pulse: 82 (01/21/24 0746)  Resp: 20 (01/21/24 0746)  BP: 117/61 (01/21/24 0746)  SpO2: 97 % (01/21/24 0746) Vital Signs (24h Range):  Temp:  [97.9 °F (36.6 °C)-98.5 °F (36.9 °C)] 98 °F (36.7 °C)  Pulse:  [78-89] 82  Resp:  [16-20] 20  SpO2:  [94 %-98 %] 97 %  BP: (110-132)/(61-80) 117/61     Weight: 110.8 kg (244 lb 4.3 oz)  Height: 6' 1" (185.4 cm)  Body mass index is 32.23 kg/m².      Intake/Output Summary (Last 24 hours) at 1/21/2024 0915  Last data filed at 1/21/2024 0430  Gross per 24 hour   Intake 60 ml   Output 1250 ml   Net -1190 ml         Ortho/SPM Exam     Awake/alert/oriented x3, No acute distress, Afebrile, Vital signs stable  Good inspiratory effort with unlaboured breathing  Dressings c/d/I  Pain with knee PROM 5-70  No pain with R hip ROM, axial loading, full PROM. No TTP of R lateral hip.   Active and PROM of L shoulder intact with ain with L shoulder ROM (FF over 90, ABD over 90), +hawkens, + neer,       Significant Labs: All pertinent labs within the past 24 hours have been reviewed.    Significant Imaging: I have reviewed all pertinent imaging results/findings.    MRI pelvis showing right lateral thigh rim enhancing fluid collection, no bl hip effusions    MRI L shoulder showing SS tear without retraction, subdeltoid bursitis, no fluid collections   Assessment/Plan:     * Staphylococcal arthritis of left knee  Kulwant Menardley Leti is a 38 y.o. male admitted for workup of fevers of unknown source, recent irrigation and debridement of left knee performed at [facility] on 12/29.  Aspiration results " indicative of recurrent septic arthritis of left knee. Now s/p left knee arthroscopic I&D on 1/17.    MRI of L shoulder showing SS tear without retraction, no fluid collections. MRI of pelvis showing right lateral hip rim enhancing fluid collection, no bl hip effusions. Aspiration of R lateral thigh attempted with no fluid aspirated.    Attempted aspiration of right lateral thigh abscess at bedside with no fluid obtained. IR aspiration of R thigh abscess with 120 cc of bloody purulent fluid. Gram stain negative, cx pending. Despite negative gram stain, given report from IR of purulence in aspirate in setting of known infection. Plan for OR tomorrow for I&D of right thigh abscess and repeat I&D of L knee.     Pain control:  Multimodal  PT/OT: WBAT left lower extremity  DVT PPx:  Xarelto, SCDs at all times when not ambulating  Abx:  Ceftaroline/ daptomycin per ID, pending final cultures and ID recs  Labs:  CRP down to 100 today, continue to trend    Cx: Prior left knee cultures growing MRSA    Dispo: OR today for I&D of right thigh and repeat I&D of left knee            David France MD  Orthopedics  [facility] - [facility]

## 2024-01-21 NOTE — NURSING
Patient awake c/o pain , requesting pain meds and wanting a diet and something to drink , staff notified of resume diet , family at bedside , bed in low position , side rails up x2 emptied MULUGETA drain 15 cc of bloody drainage , ace bandage wrap to left knee , discoloration noted to lower extremeties greater on left foot , lt foot 1 plus to upper thigh 2 plus  edema.   Family at bedside.

## 2024-01-21 NOTE — PROGRESS NOTES
[facility] [address] [place] [address] Medicine  Progress Note    Patient Name: Kulwant Mueller Jr.  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     Patient was admitted to [facility] 12/27/23-01/10/24 due to left knee septic arthritis with persistent MRSA bacteremia, s/p Left knee orthopedic surgery, surgical cultures positive for MRSA. Right heel also of concern for source.   He was followed by ID,  he had been on Daptomycin + Ceftaroline, s/p ROBY w/o endocarditis, left shoulder arthrocentesis w/o infection,  surveillance blood culture negative from 01/07/24    Report per ED and care everywhere notes patient with fevers on 1/13 to 103degreese @ 1800, 2000, ER transfer recommended but pt refused, physician gave order for blood culture, pt reported feeling fine.   Today he developed temperature of 100.5 @ 15:56 and was transferred [place] [facility] [place].     Today he reports he can walk with asssitance, using a walker, performing ADLs, main complaint is right sided back pain    ED - blood cultures drawn.             Overview/Hospital Course:  Kulwant Mueller Jr. Is a 38 y.o. male who was admitted to hospital medicine for fever. WBC 15.86 >> 13.92. Sed rate 97, CRPP 211.9. On Daptomycin from previous discharge, continuing and added zosyn. ID consulted. Blood cultures NGTD. Orthopedics consulted, joint aspiration performed at bedside. 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 Taken to the OR for L knee washout. MRI pelvis: Myositis and  fasciitis, with soft tissue gas dissecting along the anterior compartment myofascial planes of the proximal left thigh. MRI L shoulder: Prominent subdeltoid enhancement/ bursitis. Superimposed infection may be present. No drainable fluid collections. MRI knee: s/p I&D w/ minimal residual fluid collection within the joint space. There is a large air-fluid collection extending supero-medially from the joint space along the medial femoral shaft, beyond the field of view. Prominent surrounding soft tissue inflammatory changes noted, with involvement of the vastus medialis, vastus lateralis, and popliteus musculature. 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 No spondylodiscitis or facet joint septic arthritis. 1/19 IR performed aspiration of fluid collection seen on MRI. Ortho performed a repeat I&D of left knee and an I&D of R thigh.     Interval History: NAEON. Pt seen and evaluated at bedside this am. Pt has been NPO since midnight in prep for washout w/ ortho today. Na 133, continue to monitor.    Review of Systems   Constitutional:  Negative for activity change, chills and fever.   Respiratory:  Negative for cough, chest tightness, shortness of breath and wheezing.    Cardiovascular:  Negative for chest pain, palpitations and leg swelling.   Gastrointestinal:  Negative for abdominal distention, abdominal pain, blood in stool, constipation, diarrhea, nausea and vomiting.   Genitourinary:  Negative for difficulty urinating and dysuria.   Musculoskeletal:  Positive for gait problem. Negative for arthralgias, back pain and neck stiffness.   Neurological:  Negative for dizziness, weakness, light-headedness and headaches.     Objective:     Vital Signs (Most Recent):  Temp: 98 °F (36.7 °C) (01/21/24 0746)  Pulse: 82 (01/21/24 0746)  Resp: 20 (01/21/24 0746)  BP: 117/61 (01/21/24 0746)  SpO2: 97 % (01/21/24 0746) Vital Signs (24h Range):  Temp:  [97.9 °F (36.6 °C)-98.5 °F  (36.9 °C)] 98 °F (36.7 °C)  Pulse:  [78-89] 82  Resp:  [16-20] 20  SpO2:  [94 %-98 %] 97 %  BP: (110-132)/(61-80) 117/61     Weight: 110.8 kg (244 lb 4.3 oz)  Body mass index is 32.23 kg/m².    Intake/Output Summary (Last 24 hours) at 1/21/2024 0900  Last data filed at 1/21/2024 0430  Gross per 24 hour   Intake 60 ml   Output 1250 ml   Net -1190 ml         Physical Exam  Vitals and nursing note reviewed.   Constitutional:       General: He is not in acute distress.     Appearance: He is well-developed. He is not diaphoretic.   HENT:      Head: Normocephalic and atraumatic.      Right Ear: External ear normal.      Left Ear: External ear normal.      Nose: Nose normal. No congestion.      Mouth/Throat:      Pharynx: Oropharynx is clear.   Eyes:      General: No scleral icterus.     Extraocular Movements: Extraocular movements intact.      Pupils: Pupils are equal, round, and reactive to light.   Cardiovascular:      Rate and Rhythm: Normal rate and regular rhythm.      Pulses: Normal pulses.      Heart sounds: Normal heart sounds. No murmur heard.  Pulmonary:      Effort: Pulmonary effort is normal. No respiratory distress.      Breath sounds: Normal breath sounds. No wheezing or rales.   Abdominal:      General: Bowel sounds are normal. There is no distension.      Palpations: Abdomen is soft.      Tenderness: There is no abdominal tenderness. There is no guarding or rebound.   Musculoskeletal:         General: Swelling (L knee warm, swollen and tender) present. No tenderness.      Cervical back: Normal range of motion.      Right lower leg: No edema.      Left lower leg: No edema.      Comments: L knee in clean, intact dressing. No point tenderness to back, no midline tenderness   Skin:     General: Skin is warm and dry.      Capillary Refill: Capillary refill takes less than 2 seconds.   Neurological:      General: No focal deficit present.      Mental Status: He is alert and oriented to person, place, and time.  Mental status is at baseline.   Psychiatric:         Mood and Affect: Mood normal.         Behavior: Behavior normal.         Thought Content: Thought content normal.             Significant Labs: All pertinent labs within the past 24 hours have been reviewed.    Significant Imaging: I have reviewed all pertinent imaging results/findings within the past 24 hours.    Assessment/Plan:      * Staphylococcal arthritis of left knee  S/p Left knee arthrotomy and synovectomy (12/29/23)  -re-order PTOT  Pt with left knee sutures, tenderness to palpation near medial tibial plateau  - ortho consulted, bedside aspiration completed   - taken to OR for wound wash out   - MRI pelvis: Myositis and fasciitis, with soft tissue gas dissecting along the anterior compartment myofascial planes of the proximal left thigh.   - MRI L shoulder: Prominent subdeltoid enhancement/ bursitis. Superimposed infection may be present. No drainable fluid collections.   - MRI knee: s/p I&D w/ minimal residual fluid collection within the joint space. There is a large air-fluid collection extending supero-medially from the joint space along the medial femoral shaft, beyond the field of view. Prominent surrounding soft tissue inflammatory changes noted, with involvement of the vastus medialis, vastus lateralis, and popliteus musculature.  - MRI T/L spine: No MR imaging findings to account for reported history of MRSA bacteremia. No spondylodiscitis or facet joint septic arthritis   - s/p aspiration w/ IR 1/19  - repeat L knee I&D, R knee I&D today w/ ortho    Chronic HFrEF (heart failure with reduced ejection fraction)  New finding at last hospitalization  - EF 48% with systolic dysfunction, normal diastolic function  - patient on Jardiance as outpatient as well as Toprol XL and Lasix 20mg po daily   - Jardiance not continued @ IP rehab - would hold given concern for possible repeat infection - resume when clinically stable.   - resume furosemide when  stable    Hyponatremia  Patient has hyponatremia which is controlled,  We will monitor sodium Daily.   The hyponatremia is due to Dehydration/hypovolemia.  Urine sodium, urine osmolality, serum osmolality or TSH, T4 reviewed  S/p IVF   Recent Labs   Lab 01/21/24  0626   *         Fever  - reported fevers of 103 @ [facility] on Saturday and temp 100 prior to transfer  - s/p blood cultures   - add Zosyn for empiric gram negative coverage  - Pt with c/o of right sided Back pain - given MRSA bacteremia, pt is at risk for metastatic site of infection obtain MRI T-L spine with Contrast to r/o spinal source of infection    - no acute findings on MRI   - Check ESR/CRP    - ESR 97/ .9   - Infectious disease consult - to assist in continuing Daptomycin approval & infectious w/u    >patient had ROBY performed on 1/02 to r/o endocarditis.     Transaminitis  - trend CMP daily, improving  - check GGT with rising Alk phos levels  - Prior w/u @ [facility] included - CT A/P, w/o abnormality, Hepatitis panel negative, RUQ ultrasound with hepatomegaly and biliary sludge; but without other obvious abnormalities    Depression  Noted At [facility] hospitalization - patient noted to have flat affect, decrease motivation, excessive sleeping, was seen by psychiatry consult and Cymbalta recommended but pt declined.   - Description of his affect seems similar here, pending above workup would re-address with patient prior to discharge  Reports he was living with wife & 2 kids prior to current hospitalization.     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 due to underlying condition, limited to breakdown of skin  Patient with right heel - resolving ulcer  - prior wound care notes indicate - Paint with betadine and leave open to air  - elevate heels to reduce pressure   - Wound care consult   - Right heel: bedside nursing to paint with betadine, let dry and apply a foam dressing daily   - Turning  every 2 hours  - Heel protecting boots  - Bedside nursing to maintain pressure injury prevention interventions    Staphylococcus aureus bacteremia  Fever  - reported fevers of 103 @ [facility] on Saturday and temp 100 prior to transfer  - s/p blood cultures   - Pt with c/o of right sided Back pain - given MRSA bacteremia, pt is at risk for metastatic site of infection obtain MRI T-L spine with Contrast to r/o spinal source of infection   - ESR 97/ .9, trending and improved  - Infectious disease consult - to assist in continuing Daptomycin approval & infectious w/u    >patient had ROBY performed on 1/02 to r/o endocarditis.   - continue daptomycin and cerftaroline  - CPK improving  - CRP trending, 105.8>139.4  - repeat blood cultures NGTD     Other elevated white blood cell count  He has persistent elevation but downtrending leukocytosis.     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  Initially on therapeutic lovenox and transitioned back to Oral Xarelto 10mg daily   -pt previously followed with hematology - Dr. Duff.     Diabetic gastroparesis associated with type 2 diabetes mellitus  -continue reglan 5mg PO TID    Type 2 diabetes mellitus with complication, with long-term current use of insulin  Patient's FSGs are uncontrolled due to hyperglycemia on current medication regimen.  Last A1c reviewed-   Lab Results   Component Value Date    HGBA1C 7.4 (H) 12/28/2023     Most recent fingerstick glucose reviewed-   Recent Labs   Lab 01/20/24  1243 01/20/24  1631 01/20/24  2041 01/21/24  0725   POCTGLUCOSE 119* 175* 166* 151*       Current correctional scale  Low  Maintain anti-hyperglycemic dose as follows-   Antihyperglycemics (From admission, onward)       Start     Stop Route Frequency Ordered    01/17/24 1130  insulin aspart U-100 pen 8 Units         -- SubQ 3 times daily with meals 01/17/24 0819    01/16/24 2100  insulin detemir U-100 (Levemir) pen 17 Units         -- SubQ 2 times daily 01/16/24 1657    01/15/24 0329  insulin aspart U-100 pen 0-5 Units         -- SubQ Before meals & nightly PRN 01/15/24 0231          Hold Oral hypoglycemics while patient is in the hospital.      VTE Risk Mitigation (From admission, onward)           Ordered     rivaroxaban tablet 10 mg  With dinner         01/15/24 0229     Reason for No Pharmacological VTE Prophylaxis  Once        Question:  Reasons:  Answer:  Already adequately anticoagulated on oral Anticoagulants    01/15/24 0229     IP VTE HIGH RISK PATIENT  Once         01/15/24 0229     Place sequential compression device  Until discontinued         01/15/24 0229                    Discharge Planning   RAEANN: 1/22/2024     Code Status: Full Code   Is the patient medically ready for discharge?: No    Reason for patient still in hospital (select all that apply): Patient trending condition, Laboratory test, Treatment, Consult recommendations, PT / OT recommendations, and Pending disposition  Discharge Plan A: Rehab   Discharge Delays: None known at this time              Raul Pal PA-C  Department of Hospital Medicine   [facility]

## 2024-01-21 NOTE — ANESTHESIA PROCEDURE NOTES
Intubation    Date/Time: 1/21/2024 12:59 PM    Performed by: Nichole Amezcua CRNA  Authorized by: Lefty Roe MD    Intubation:     Induction:  Rapid sequence induction    Intubated:  Postinduction    Mask Ventilation:  N/a    Attempts:  1    Attempted By:  CRNA    Method of Intubation:  Video laryngoscopy    Blade:  Vann 3    Laryngeal View Grade: Grade I - full view of cords      Difficult Airway Encountered?: No      Complications:  None    Airway Device:  Oral endotracheal tube    Airway Device Size:  7.5    Style/Cuff Inflation:  Cuffed (inflated to minimal occlusive pressure)    Tube secured:  22    Secured at:  The lips    Placement Verified By:  Capnometry    Complicating Factors:  None    Findings Post-Intubation:  BS equal bilateral and atraumatic/condition of teeth unchanged

## 2024-01-21 NOTE — PLAN OF CARE
Problem: Adult Inpatient Plan of Care  Goal: Plan of Care Review  1/20/2024 1843 by Flynn Fontanez RN  Outcome: Ongoing, Progressing  1/20/2024 1704 by Flynn Fontanez RN  Flowsheets (Taken 1/20/2024 1704)  Plan of Care Reviewed With: patient  Goal: Patient-Specific Goal (Individualized)  1/20/2024 1843 by Flynn Fontanez RN  Outcome: Ongoing, Progressing  1/20/2024 1704 by Flynn Fontanez RN  Outcome: Ongoing, Progressing  Goal: Absence of Hospital-Acquired Illness or Injury  1/20/2024 1843 by Flynn Fontanez RN  Outcome: Ongoing, Progressing  1/20/2024 1704 by Flynn Fontanez RN  Outcome: Ongoing, Progressing  Goal: Optimal Comfort and Wellbeing  1/20/2024 1843 by Flynn Fontanez RN  Outcome: Ongoing, Progressing  1/20/2024 1704 by Flynn Fontanez RN  Outcome: Ongoing, Progressing  Goal: Readiness for Transition of Care  1/20/2024 1843 by Flynn Fontanez RN  Outcome: Ongoing, Progressing  1/20/2024 1704 by Flynn Fontanez RN  Outcome: Ongoing, Progressing     Problem: Diabetes Comorbidity  Goal: Blood Glucose Level Within Targeted Range  1/20/2024 1843 by Flynn Fontanez RN  Outcome: Ongoing, Progressing  1/20/2024 1704 by Flynn Fontanez RN  Outcome: Ongoing, Progressing     Problem: Infection  Goal: Absence of Infection Signs and Symptoms  1/20/2024 1843 by Flynn Fontanez RN  Outcome: Ongoing, Progressing  1/20/2024 1704 by Flynn Fontanez RN  Outcome: Ongoing, Progressing     Problem: Impaired Wound Healing  Goal: Optimal Wound Healing  1/20/2024 1843 by Flynn Fontanez RN  Outcome: Ongoing, Progressing  1/20/2024 1704 by Flynn Fontanez RN  Outcome: Ongoing, Progressing     Problem: Skin Injury Risk Increased  Goal: Skin Health and Integrity  1/20/2024 1843 by Flynn Fontanez RN  Outcome: Ongoing, Progressing  1/20/2024 1704 by Flynn Fontanez RN  Outcome: Ongoing, Progressing     Problem: Fall Injury Risk  Goal: Absence of Fall and Fall-Related Injury  1/20/2024 1843 by  Flynn Fontanez RN  Outcome: Ongoing, Progressing  1/20/2024 1704 by Flynn Fontanez RN  Outcome: Ongoing, Progressing     Problem: Surgical Site Infection  Goal: Absence of Infection Signs and Symptoms  1/20/2024 1843 by Flynn Fontanez RN  Outcome: Ongoing, Progressing  1/20/2024 1704 by Flynn Fontanez RN  Outcome: Ongoing, Progressing

## 2024-01-21 NOTE — PLAN OF CARE
Problem: Adult Inpatient Plan of Care  Goal: Plan of Care Review  Outcome: Ongoing, Progressing  Flowsheets (Taken 1/21/2024 1102)  Plan of Care Reviewed With: patient  Goal: Patient-Specific Goal (Individualized)  Outcome: Ongoing, Progressing  Goal: Absence of Hospital-Acquired Illness or Injury  Outcome: Ongoing, Progressing  Goal: Optimal Comfort and Wellbeing  Outcome: Ongoing, Progressing  Goal: Readiness for Transition of Care  Outcome: Ongoing, Progressing     Problem: Diabetes Comorbidity  Goal: Blood Glucose Level Within Targeted Range  Outcome: Ongoing, Progressing     Problem: Infection  Goal: Absence of Infection Signs and Symptoms  Outcome: Ongoing, Progressing     Problem: Impaired Wound Healing  Goal: Optimal Wound Healing  Outcome: Ongoing, Progressing     Problem: Skin Injury Risk Increased  Goal: Skin Health and Integrity  Outcome: Ongoing, Progressing     Problem: Fall Injury Risk  Goal: Absence of Fall and Fall-Related Injury  Outcome: Ongoing, Progressing     Problem: Surgical Site Infection  Goal: Absence of Infection Signs and Symptoms  Outcome: Ongoing, Progressing  Intervention: Prevent or Manage Infection  Flowsheets (Taken 1/21/2024 1102)  Fever Reduction/Comfort Measures:   lightweight clothing   fluid intake increased

## 2024-01-21 NOTE — NURSING
To surgery via bed , reviewed anesthesia and surgical consent on chart , Instructed patient to leave glasses at bedside

## 2024-01-22 LAB
ANION GAP SERPL CALC-SCNC: 8 MMOL/L (ref 8–16)
BASOPHILS # BLD AUTO: 0.02 K/UL (ref 0–0.2)
BASOPHILS NFR BLD: 0.2 % (ref 0–1.9)
BUN SERPL-MCNC: 8 MG/DL (ref 6–20)
CALCIUM SERPL-MCNC: 8.6 MG/DL (ref 8.7–10.5)
CHLORIDE SERPL-SCNC: 103 MMOL/L (ref 95–110)
CK SERPL-CCNC: 316 U/L (ref 20–200)
CO2 SERPL-SCNC: 26 MMOL/L (ref 23–29)
CREAT SERPL-MCNC: 0.9 MG/DL (ref 0.5–1.4)
CRP SERPL-MCNC: 170.5 MG/L (ref 0–8.2)
DIFFERENTIAL METHOD BLD: ABNORMAL
EOSINOPHIL # BLD AUTO: 0.1 K/UL (ref 0–0.5)
EOSINOPHIL NFR BLD: 0.6 % (ref 0–8)
ERYTHROCYTE [DISTWIDTH] IN BLOOD BY AUTOMATED COUNT: 14.4 % (ref 11.5–14.5)
ERYTHROCYTE [SEDIMENTATION RATE] IN BLOOD BY PHOTOMETRIC METHOD: 102 MM/HR (ref 0–23)
EST. GFR  (NO RACE VARIABLE): >60 ML/MIN/1.73 M^2
GLUCOSE SERPL-MCNC: 122 MG/DL (ref 70–110)
HCT VFR BLD AUTO: 28 % (ref 40–54)
HGB BLD-MCNC: 8.3 G/DL (ref 14–18)
IMM GRANULOCYTES # BLD AUTO: 0.07 K/UL (ref 0–0.04)
IMM GRANULOCYTES NFR BLD AUTO: 0.6 % (ref 0–0.5)
LYMPHOCYTES # BLD AUTO: 1.2 K/UL (ref 1–4.8)
LYMPHOCYTES NFR BLD: 10.3 % (ref 18–48)
MCH RBC QN AUTO: 26.8 PG (ref 27–31)
MCHC RBC AUTO-ENTMCNC: 29.6 G/DL (ref 32–36)
MCV RBC AUTO: 90 FL (ref 82–98)
MONOCYTES # BLD AUTO: 0.7 K/UL (ref 0.3–1)
MONOCYTES NFR BLD: 6.2 % (ref 4–15)
NEUTROPHILS # BLD AUTO: 9.8 K/UL (ref 1.8–7.7)
NEUTROPHILS NFR BLD: 82.1 % (ref 38–73)
NRBC BLD-RTO: 0 /100 WBC
PLATELET # BLD AUTO: 419 K/UL (ref 150–450)
PMV BLD AUTO: 9.2 FL (ref 9.2–12.9)
POCT GLUCOSE: 129 MG/DL (ref 70–110)
POCT GLUCOSE: 149 MG/DL (ref 70–110)
POCT GLUCOSE: 157 MG/DL (ref 70–110)
POCT GLUCOSE: 172 MG/DL (ref 70–110)
POCT GLUCOSE: 221 MG/DL (ref 70–110)
POTASSIUM SERPL-SCNC: 4.2 MMOL/L (ref 3.5–5.1)
RBC # BLD AUTO: 3.1 M/UL (ref 4.6–6.2)
SODIUM SERPL-SCNC: 137 MMOL/L (ref 136–145)
WBC # BLD AUTO: 11.97 K/UL (ref 3.9–12.7)

## 2024-01-22 PROCEDURE — A4216 STERILE WATER/SALINE, 10 ML: HCPCS | Performed by: STUDENT IN AN ORGANIZED HEALTH CARE EDUCATION/TRAINING PROGRAM

## 2024-01-22 PROCEDURE — 25000003 PHARM REV CODE 250: Performed by: INTERNAL MEDICINE

## 2024-01-22 PROCEDURE — 97530 THERAPEUTIC ACTIVITIES: CPT | Mod: CO

## 2024-01-22 PROCEDURE — 82550 ASSAY OF CK (CPK): CPT | Performed by: HOSPITALIST

## 2024-01-22 PROCEDURE — 97110 THERAPEUTIC EXERCISES: CPT | Mod: CO

## 2024-01-22 PROCEDURE — 36415 COLL VENOUS BLD VENIPUNCTURE: CPT | Performed by: HOSPITALIST

## 2024-01-22 PROCEDURE — 25000003 PHARM REV CODE 250: Performed by: HOSPITALIST

## 2024-01-22 PROCEDURE — 25000003 PHARM REV CODE 250

## 2024-01-22 PROCEDURE — 63600175 PHARM REV CODE 636 W HCPCS: Mod: JZ,JG | Performed by: INTERNAL MEDICINE

## 2024-01-22 PROCEDURE — 85025 COMPLETE CBC W/AUTO DIFF WBC: CPT | Performed by: PHYSICIAN ASSISTANT

## 2024-01-22 PROCEDURE — 25000003 PHARM REV CODE 250: Performed by: STUDENT IN AN ORGANIZED HEALTH CARE EDUCATION/TRAINING PROGRAM

## 2024-01-22 PROCEDURE — 27000207 HC ISOLATION

## 2024-01-22 PROCEDURE — 21400001 HC TELEMETRY ROOM

## 2024-01-22 PROCEDURE — 97535 SELF CARE MNGMENT TRAINING: CPT | Mod: CO

## 2024-01-22 PROCEDURE — 99232 SBSQ HOSP IP/OBS MODERATE 35: CPT | Mod: ,,, | Performed by: NURSE PRACTITIONER

## 2024-01-22 PROCEDURE — 80048 BASIC METABOLIC PNL TOTAL CA: CPT | Performed by: PHYSICIAN ASSISTANT

## 2024-01-22 PROCEDURE — 99233 SBSQ HOSP IP/OBS HIGH 50: CPT | Mod: ,,, | Performed by: STUDENT IN AN ORGANIZED HEALTH CARE EDUCATION/TRAINING PROGRAM

## 2024-01-22 PROCEDURE — 86140 C-REACTIVE PROTEIN: CPT | Performed by: STUDENT IN AN ORGANIZED HEALTH CARE EDUCATION/TRAINING PROGRAM

## 2024-01-22 PROCEDURE — 85652 RBC SED RATE AUTOMATED: CPT | Performed by: HOSPITALIST

## 2024-01-22 PROCEDURE — 63600175 PHARM REV CODE 636 W HCPCS: Performed by: HOSPITALIST

## 2024-01-22 RX ADMIN — CEFTAROLINE FOSAMIL 600 MG: 600 POWDER, FOR SOLUTION INTRAVENOUS at 05:01

## 2024-01-22 RX ADMIN — ACETAMINOPHEN 1000 MG: 500 TABLET ORAL at 09:01

## 2024-01-22 RX ADMIN — METOCLOPRAMIDE 5 MG: 5 TABLET ORAL at 11:01

## 2024-01-22 RX ADMIN — ACETAMINOPHEN 1000 MG: 500 TABLET ORAL at 02:01

## 2024-01-22 RX ADMIN — Medication 10 ML: at 09:01

## 2024-01-22 RX ADMIN — OXYCODONE 5 MG: 5 TABLET ORAL at 06:01

## 2024-01-22 RX ADMIN — PANTOPRAZOLE SODIUM 40 MG: 40 TABLET, DELAYED RELEASE ORAL at 09:01

## 2024-01-22 RX ADMIN — METOCLOPRAMIDE 5 MG: 5 TABLET ORAL at 06:01

## 2024-01-22 RX ADMIN — METHOCARBAMOL 500 MG: 500 TABLET ORAL at 05:01

## 2024-01-22 RX ADMIN — INSULIN DETEMIR 17 UNITS: 100 INJECTION, SOLUTION SUBCUTANEOUS at 09:01

## 2024-01-22 RX ADMIN — OXYCODONE HYDROCHLORIDE 10 MG: 10 TABLET ORAL at 10:01

## 2024-01-22 RX ADMIN — METHOCARBAMOL 500 MG: 500 TABLET ORAL at 09:01

## 2024-01-22 RX ADMIN — RIVAROXABAN 10 MG: 10 TABLET, FILM COATED ORAL at 07:01

## 2024-01-22 RX ADMIN — METHOCARBAMOL 500 MG: 500 TABLET ORAL at 01:01

## 2024-01-22 RX ADMIN — METOPROLOL SUCCINATE 12.5 MG: 25 TABLET, EXTENDED RELEASE ORAL at 09:01

## 2024-01-22 RX ADMIN — Medication 10 ML: at 06:01

## 2024-01-22 RX ADMIN — AMMONIUM LACTATE: 12 LOTION TOPICAL at 09:01

## 2024-01-22 RX ADMIN — DAPTOMYCIN 945 MG: 500 INJECTION, POWDER, LYOPHILIZED, FOR SOLUTION INTRAVENOUS at 03:01

## 2024-01-22 RX ADMIN — CEFTAROLINE FOSAMIL 600 MG: 600 POWDER, FOR SOLUTION INTRAVENOUS at 06:01

## 2024-01-22 RX ADMIN — INSULIN ASPART 8 UNITS: 100 INJECTION, SOLUTION INTRAVENOUS; SUBCUTANEOUS at 09:01

## 2024-01-22 RX ADMIN — METOCLOPRAMIDE 5 MG: 5 TABLET ORAL at 05:01

## 2024-01-22 RX ADMIN — ACETAMINOPHEN 1000 MG: 500 TABLET ORAL at 06:01

## 2024-01-22 RX ADMIN — INSULIN ASPART 8 UNITS: 100 INJECTION, SOLUTION INTRAVENOUS; SUBCUTANEOUS at 05:01

## 2024-01-22 RX ADMIN — INSULIN ASPART 8 UNITS: 100 INJECTION, SOLUTION INTRAVENOUS; SUBCUTANEOUS at 11:01

## 2024-01-22 NOTE — NURSING
Left arm swollen and is painful to movement, site saline lock is infiltrated and swollen warm and pain  noted to mild tactile touch, patient asked for me to delay removing site catheter , agreed will add pillow to decrease swelling if patient is a

## 2024-01-22 NOTE — ASSESSMENT & PLAN NOTE
- MRI pelvis revealed a large lateral thigh fluid collection that may reflect hematoma or abscess.   - 1/21/22 s/p washout w/ ortho. Drain in place  - cx taken, pending results  - continuing IV abx

## 2024-01-22 NOTE — OP NOTE
OP NOTE     DOS:               01/21/2024     Preop Dx:        Septic arthritis left knee with MRSA                          Right thigh/hip abscess     Postop Dx:      Septic arthritis left knee with MRSA                          Right thigh/hip abscess     Procedure:        Left knee arthroscopy with extensive synovectomy/debridement for septic arthritis  Incision and drainage right hip/thigh deep abscess     Surgeon:         Derick Sloan M.D.     Asst:                MATTHIAS Draper MD     EBL:                20cc     Implants:         None     Specimens:     Right thigh abscess culture    Left knee joint culture     Dispo:              To PACU extubated/stable             Indications for Procedure:       38-year-old male with diabetes and recent MRSA septic arthritis s/p L knee I&D on 12/29, 1/16, who was recently found to have right thigh abscess on MRI. Aspiration was attempted at bedside with no fluid obtained. Pt was then sent to IR for aspiration and drain placement with 120cc of purulent fluid obtained. He also continued to hand Left knee pain and swelling concerning for persistent infection Left knee.    Discussed diagnosis, nonoperative and operative management options.    Plan To OR today for repeat arthroscopic irrigation and debridement of L knee and open I&D of right thigh abscess in hopes of clearing infection.  The risks, benefits and alternatives to surgery discussed the patient prior to going the operating room.  Informed consent was obtained.     Procedure in Detail:     Patient identified the preoperative holding area the site was marked.  Sutures from prior arthrotomy incision still in place.  Patient was wheeled to the operating room and placed on the operating table in supine position.  General anesthesia was induced.      The left lower extremity was prepped draped in sterile fashion with use of a lateral post.  A time-out was undertaken to confirm patient, side,  site, surgery, surgeon.  Patient has been on antibiotics.  All agreed we proceeded.       Turned our attention to Left knee arthroscopy.  An inferolateral portal was established with an 11 blade.  The cannula was placed into the suprapatellar space liberating some purulent fluid.  Camera was placed.  Patient has significant synovitis in that area.  This was run down around the medial gutter and into the notch.  I then used an 11 blade to develop a inferomedial portal.  Arthroscopic shaver was introduced and significant amount of synovium was removed from the notch as well as the anterior portions of the medial and lateral joint lines.  The ACL was intact.       Going to the medial joint line the medial meniscus and the cartilage appeared to be intact.  Some more synovitis was removed with the arthroscopic shaver in this area.  Moving up into a figure of 4 on the lateral joint line the meniscus and cartilage were again were found to be intact and some more synovitis was removed with the arthroscopic shaver.     We moved back around through the notch into the suprapatellar space and removed some more synovitis and loose debris.  In total 9 L of fluid were run through the knee during the time of the synovectomy.  At this point the inflow was stopped and all excess fluid was suctioned from the knee.     The portal sites were closed with 3-0 nylon suture in figure-of-eight fashion and then 1 g vancomycin/tobramycin diluted in 10 cc of normal saline were injected back into the knee through the inferolateral side.  Sterile dressings were then applied.    We then turned our attention to incision and drainage of the contralateral right hip/thigh abscess. Patient was positioned lateral on beanbag, bony prominences appropriately padded.    I removed the drain placed by IR. I then reprepped and draped the right thigh.     I made a 6cm incision from over the center of the abscess on the lateral thigh. I dissected using  electrocautery to the IT band and split it. A small area of the glute max fascia was split to open up the entire abscess cavity.     Purulence was found and cultures was obtained. I bluntly broke loculations in the abscess cavity proximally and distally.     I irrigated the wound with 6L of NS, then betadine and saline again.    I then placed a 10 Moroccan drain in the deep cavity. I placed vanc/tobramycin powder into the wound. I closed the IT band with 0 vicryl. I closed deep dermal with 0 vicryl and subcutaneous tissue with 2-0 vicryl. I closed skin with 3-0 nylon suture in running fashion. The wound was dressed.     All instrument and sponge counts were reported correct at the end of the case.  There were no complications.  The patient was extubated, awakened and taken to recovery room stable condition.       Postop plan     38M, DM, bacteremia w/ L knee septic arthritis and R hip/thigh abscess    1.21.24 - L knee scope for septic arthritis + I&D R hip    Abx per ID  Medical management and anticoagulation per primary team    WBAT, ROMAT BLE    F/u 2 wks postop for suture removal  6 wks  Then prn

## 2024-01-22 NOTE — PT/OT/SLP PROGRESS
Occupational Therapy   Treatment    Name: Kulwant Mueller Jr.  MRN: [id number]  Admitting Diagnosis:  Staphylococcal arthritis of left knee  1 Day Post-Op    Recommendations:     Discharge Recommendations: No Therapy Indicated  Discharge Equipment Recommendations:  wheelchair, bedside commode, walker, rolling, shower chair  Barriers to discharge:  Other (Comment) (patient requires increase skilled  level of assistance)    Assessment:     Kulwant Mueller Jr. is a 38 y.o. male with a medical diagnosis of Staphylococcal arthritis of left knee.  He presents with the fallowing performance deficits affecting function are weakness, impaired endurance, gait instability, impaired functional mobility, decreased lower extremity function, impaired cardiopulmonary response to activity, pain, edema, decreased ROM, decreased safety awareness, decreased coordination, impaired balance, impaired self care skills, impaired skin, decreased upper extremity function. Patient exhibits poor activity tolerance due to pain and weakness 2/2 to recent procedure. Patient below functional baseline decreased ability to performed self-care task and mobility task, therefore; patient would benefit from High intensity therapy intervention to address patient's over all functional decline with functional mobility, endurance, and ADLs in order to gain functional independence and return to PLOF. Patient agreeable to participate with therapy but was seen at bedside due to patient stated that he probably will not able to tolerate sitting up at EOB due to increase pain. PT reached out to patient's nurse to inform her that the patient might need a different type on pain medication since pain is hindering patient from progressing with mobility. Accordingly to patient's nurse he refused to take his pain medication in am when offer by the nurse. Therapy will make sure that patient is pre-medicated prior to tx session.    Rehab Prognosis:  Good; patient would  "benefit from acute skilled OT services to address these deficits and reach maximum level of function.       Plan:     Patient to be seen 4 x/week to address the above listed problems via self-care/home management, therapeutic activities, therapeutic exercises  Plan of Care Expires: 02/18/24  Plan of Care Reviewed with: patient    Subjective     Chief Complaint: left knee and right hip pain   Patient/Family Comments/goals: " I do not think I can seat up but I would do exercises in bed. I promised my son that I will be home soon"  Pain/Comfort:  Pain Rating 1: 10/10  Location - Side 1: Bilateral  Location - Orientation 1: generalized  Location 1: knee  Pain Addressed 1: Reposition, Distraction (pain refused pain medicine in am per nurse)  Pain Rating Post-Intervention 1: 10/10  Pain Rating 2: 10/10  Location - Side 2: Right  Location - Orientation 2: generalized  Location 2: hip  Pain Addressed 2: Reposition, Distraction  Pain Rating Post-Intervention 2: 10/10    Objective:     Communicated with: Nurse prior to session.  Patient found HOB elevated with telemetry, peripheral IV, MULUGETA drain upon OT entry to room.  A client care conference was completed by the OTR and the VAZQUEZ prior to treatment by the VAZQUEZ to discuss the patient's POC and current status.   General Precautions: Standard, fall    Orthopedic Precautions:LLE weight bearing as tolerated  Braces: N/A  Respiratory Status: Room air     Occupational Performance:     Functional Mobility/Transfers:  Patient performed AROM exercises of B UE to promote joint mobility, promote blood flow, and to impact with functional endurance.  Therapist performed PROM/AAROM of B LE to prevent stiffness and promote joint mobility.     Activities of Daily Living:  Grooming: stand by assistance to complete an oral care hygiene seated up with HOB elevated in a seated position  Lower Body Dressing: Patient requires totals assistance to don socks     [facility] 6 Click ADL: 16    Treatment & " Education:  Educated patient on the importance to continue to participate with therapy session while at the hospital and ounce transition to a different facility to work on gait training, balance, strengthening, and to impact with functional activity tolerance to impact with gaining functional indepence.  Addressed patient's questions and concerns within VAZQUEZ scope practice.    Patient left HOB elevated with all lines intact and call button in reach    GOALS:   Multidisciplinary Problems       Occupational Therapy Goals          Problem: Occupational Therapy    Goal Priority Disciplines Outcome Interventions   Occupational Therapy Goal     OT, PT/OT Ongoing, Progressing    Description: Goals to be met by: 2/18/2024     Patient will increase functional independence with ADLs by performing:    UE Dressing with Supervision.  LE Dressing with Minimal Assistance.  Grooming while seated with Set-up Assistance.  Toileting from bedside commode with Minimal Assistance for hygiene and clothing management.   Toilet transfer to bedside commode with Minimal Assistance.                         Time Tracking:     OT Date of Treatment: 01/22/24  OT Start Time: 0943  OT Stop Time: 1039  OT Total Time (min): 56 min    Billable Minutes:Self Care/Home Management 23  Therapeutic Activity 11  Therapeutic Exercise 22    OT/RUFINA: RUFINA     Number of RUFINA visits since last OT visit: 1 1/22/2024

## 2024-01-22 NOTE — SUBJECTIVE & OBJECTIVE
Principal Problem:Staphylococcal arthritis of left knee    Principal Orthopedic Problem: s/p L knee arthroscopic I&D on 1/17, s/p repeat L knee I&D and right thigh abscess I&D on 1/21    Interval History: NAEON. VSS. Afebrile. Drain output minimal in drain. Mobilize with PT today.     Review of patient's allergies indicates:   Allergen Reactions    Heparin analogues Nausea And Vomiting       Current Facility-Administered Medications   Medication    acetaminophen tablet 1,000 mg    ammonium lactate 12 % lotion    ceftaroline fosamiL (TEFLARO) 600 mg in dextrose 5 % in water (D5W) 50 mL IVPB (MB+)    DAPTOmycin (CUBICIN) 945 mg in sodium chloride 0.9% SolP 50 mL IVPB    dextrose 10% bolus 125 mL 125 mL    dextrose 10% bolus 250 mL 250 mL    droPERidol injection 0.625 mg    glucagon (human recombinant) injection 1 mg    glucose chewable tablet 16 g    glucose chewable tablet 24 g    HYDROmorphone injection 0.2 mg    HYDROmorphone injection 0.2 mg    insulin aspart U-100 pen 0-5 Units    insulin aspart U-100 pen 8 Units    insulin detemir U-100 (Levemir) pen 17 Units    loperamide capsule 2 mg    melatonin tablet 6 mg    methocarbamoL tablet 500 mg    metoclopramide HCl tablet 5 mg    metoprolol succinate (TOPROL-XL) 24 hr split tablet 12.5 mg    morphine injection 4 mg    naloxone 0.4 mg/mL injection 0.02 mg    ondansetron injection 4 mg    ondansetron injection 4 mg    oxyCODONE immediate release tablet 5 mg    oxyCODONE immediate release tablet 5 mg    oxyCODONE immediate release tablet Tab 10 mg    pantoprazole EC tablet 40 mg    prochlorperazine injection Soln 5 mg    rivaroxaban tablet 10 mg    scopolamine 1.3-1.5 mg (1 mg over 3 days) 1 patch    sodium chloride 0.9% flush 10 mL    sodium chloride 0.9% flush 10 mL    sodium chloride 0.9% flush 10 mL    And    sodium chloride 0.9% flush 10 mL    sodium chloride 0.9% flush 10 mL     Objective:     Vital Signs (Most Recent):  Temp: 98 °F (36.7 °C) (01/22/24  "1235)  Pulse: 95 (01/22/24 1235)  Resp: 16 (01/22/24 1235)  BP: 125/71 (01/22/24 1235)  SpO2: 98 % (01/22/24 1235) Vital Signs (24h Range):  Temp:  [98 °F (36.7 °C)-99.2 °F (37.3 °C)] 98 °F (36.7 °C)  Pulse:  [] 95  Resp:  [11-18] 16  SpO2:  [95 %-100 %] 98 %  BP: (125-162)/(67-83) 125/71     Weight: 110.8 kg (244 lb 4.3 oz)  Height: 6' 1" (185.4 cm)  Body mass index is 32.23 kg/m².      Intake/Output Summary (Last 24 hours) at 1/22/2024 1248  Last data filed at 1/21/2024 1546  Gross per 24 hour   Intake 800 ml   Output --   Net 800 ml          Ortho/SPM Exam     Awake/alert/oriented x3, No acute distress, Afebrile, Vital signs stable  Good inspiratory effort with unlaboured breathing  Dressings c/d/I  Pain with knee PROM 5-70  No pain with R hip ROM, axial loading, full PROM. No TTP of R lateral hip.   Active and PROM of L shoulder intact with ain with L shoulder ROM (FF over 90, ABD over 90), +hawkens, + neer,       Significant Labs: All pertinent labs within the past 24 hours have been reviewed.    Significant Imaging: I have reviewed all pertinent imaging results/findings.    MRI pelvis showing right lateral thigh rim enhancing fluid collection, no bl hip effusions    MRI L shoulder showing SS tear without retraction, subdeltoid bursitis, no fluid collections   "

## 2024-01-22 NOTE — ASSESSMENT & PLAN NOTE
Fever  - reported fevers of 103 @ [facility] on Saturday and temp 100 prior to transfer  - s/p blood cultures   - Pt with c/o of right sided Back pain - given MRSA bacteremia, pt is at risk for metastatic site of infection obtain MRI T-L spine with Contrast to r/o spinal source of infection   - ESR 97/ .9, trending and improved  - Infectious disease consult - to assist in continuing Daptomycin approval & infectious w/u    >patient had ROBY performed on 1/02 to r/o endocarditis.   - continue daptomycin and cerftaroline  -   - CRP trending, 105.8>139.4>170  - repeat blood cultures NGTD    [Patient seen for WTC Monitoring ___] : Patient was seen for WTC monitoring [unfilled] [Please See Note in Chart and Documentation in Trial DB] : Please see note in chart and documentation in Trial DB. 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 \par \par PMH/PSH: sinusitis \par Family History: brother diagnosed at age 60 with colon cancer(now age 69)\par Preventive Screening: declined colonoscopy in past and today again \par  stated that had one years ago \par Allergies: Indocin caused hives which took 6 months to resolve \par Meds: see trial db and allscripts \par Soc Hx: 2 kids and 3 grandsons \par Smoking Status: quit completely in 1994 \par Review of Systems—IAMQ reviewed with patient\par \par PE:in trial db\par \par Results:\par \par CXR:2019 \par \par received covid vaccine\par patient was offered but  declined influenza vaccination\par \par \par A/P: CBC, CMP, lipids, UA ordered ; pt declined colonoscopy. \par see Occ MEd note. \par

## 2024-01-22 NOTE — ASSESSMENT & PLAN NOTE
Kulwant Blair Ami Miguel. is a 38 y.o. male admitted for workup of fevers of unknown source, recent irrigation and debridement of left knee performed at [facility] on 12/29.  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 Now s/p left knee arthroscopic I&D on 1/17.    Pt found to have right thigh abscess on MRI. IR aspiration performed and drain placed 1/19/24. Now s/p repeat L knee arthroscopic I&D and right thigh abscess I&D with drain placement on 1/21    Pain control:  Multimodal  PT/OT: WBAT left lower extremity  DVT PPx:  Xarelto, SCDs at all times when not ambulating  Abx:  Ceftaroline/ daptomycin per ID, pending final cultures and ID recs  Labs:  CRP down to 100 today, continue to trend  Drain: minimal since OR    Cx: IPR pending final ID recs for long term abx; picc in place    Dispo: OR today for I&D of right thigh and repeat I&D of left knee

## 2024-01-22 NOTE — ASSESSMENT & PLAN NOTE
Patient's FSGs are uncontrolled due to hyperglycemia on current medication regimen.  Last A1c reviewed-   Lab Results   Component Value Date    HGBA1C 7.4 (H) 12/28/2023     Most recent fingerstick glucose reviewed-   Recent Labs   Lab 01/21/24  1159 01/21/24 2056 01/22/24  0722   POCTGLUCOSE 147* 156* 129*       Current correctional scale  Low  Maintain anti-hyperglycemic dose as follows-   Antihyperglycemics (From admission, onward)    Start     Stop Route Frequency Ordered    01/17/24 1130  insulin aspart U-100 pen 8 Units         -- SubQ 3 times daily with meals 01/17/24 0819    01/16/24 2100  insulin detemir U-100 (Levemir) pen 17 Units         -- SubQ 2 times daily 01/16/24 1657    01/15/24 0329  insulin aspart U-100 pen 0-5 Units         -- SubQ Before meals & nightly PRN 01/15/24 0231        Hold Oral hypoglycemics while patient is in the hospital.

## 2024-01-22 NOTE — PT/OT/SLP PROGRESS
"Physical Therapy Missed Treatment Note      Patient Name:  Kulwant Mueller Jr.   MRN:  [id number]  Admitting Diagnosis:  Staphylococcal arthritis of left knee   Recent Surgery: Procedure(s) (LRB):  ARTHROSCOPY, KNEE, WITH DEBRIDEMENT - LEFT, SUPINE, SLIDER, LATERAL POST, CONMED (Left)  INCISION AND DRAINAGE, THIGH - right, (Right) 1 Day Post-Op  Admit Date: 1/14/2024  Length of Stay: 6 days    Patient not seen today due to Patient unwilling to participate, Pain. PT with x2 attempts. On 1st attempt at ~1130 pt working with VAZQUEZ at bed level with VAZQUEZ stating pt declined EOB/OOB mobility due to pain. Pt minimally talkative with decreased eye contact but nodded yes when asked if he was okay with PT checking with RN for pain medication so pt could participate in PT session. PT called pt's RN who stated pt refused pain medication this morning but would check with pt again. PT returned 1 hr later at 1139 and pt again declining any therapy. Pt with minimal to no eye contact and required PT prompting to respond to therapist's questions with verbal answers instead of head nods. Pt declining PT session on this date despite max encouragement and education from PT. Pt said to therapist, "you're just gonna force me to do it aren't you?" PT educated pt that he will not be forced to participate and he has every right to refuse. Discussed with pt that PT's role is to assist pt with mobilizing to improve functional independence to go home and doing so in a way that the patient feels comfortable with. PT notified medical team of pt's withdrawn affect and poor pain control. Kulwant Mueller Jr.'s plan of care and PT goals reviewed on this date and remain appropriate. Will follow-up for progressive mobility pending continued medical stability and patient participation.    1/22/2024    "

## 2024-01-22 NOTE — SUBJECTIVE & OBJECTIVE
Interval History: NAEON. Pt seen and evaluated at bedside this am. Pt is s/p 2nd washout w/ ortho. Not seen by therapy yesterday 2/2 procedure. CRP and CK uptrending this am. Continuing to monitor.    Review of Systems   Constitutional:  Negative for activity change, chills, diaphoresis, fatigue and fever.   HENT:  Negative for congestion, rhinorrhea and sore throat.    Respiratory:  Negative for cough, chest tightness, shortness of breath and wheezing.    Cardiovascular:  Negative for chest pain, palpitations and leg swelling.   Gastrointestinal:  Negative for abdominal distention, abdominal pain, blood in stool, constipation, diarrhea, nausea and vomiting.   Genitourinary:  Negative for difficulty urinating, dysuria, frequency, hematuria and urgency.   Musculoskeletal:  Positive for arthralgias, gait problem and joint swelling. Negative for back pain and neck stiffness.   Neurological:  Negative for dizziness, tremors, seizures, syncope, weakness, light-headedness, numbness and headaches.   Psychiatric/Behavioral:  Negative for agitation, confusion and hallucinations.      Objective:     Vital Signs (Most Recent):  Temp: 98.7 °F (37.1 °C) (01/22/24 0429)  Pulse: 93 (01/22/24 0429)  Resp: 16 (01/22/24 0429)  BP: (!) 145/80 (01/22/24 0429)  SpO2: 95 % (01/22/24 0429) Vital Signs (24h Range):  Temp:  [98 °F (36.7 °C)-99.2 °F (37.3 °C)] 98.7 °F (37.1 °C)  Pulse:  [] 93  Resp:  [11-18] 16  SpO2:  [94 %-100 %] 95 %  BP: (126-162)/(67-83) 145/80     Weight: 110.8 kg (244 lb 4.3 oz)  Body mass index is 32.23 kg/m².    Intake/Output Summary (Last 24 hours) at 1/22/2024 0820  Last data filed at 1/21/2024 1546  Gross per 24 hour   Intake 800 ml   Output --   Net 800 ml         Physical Exam  Vitals and nursing note reviewed.   Constitutional:       General: He is not in acute distress.     Appearance: He is well-developed. He is not diaphoretic.   HENT:      Head: Normocephalic and atraumatic.      Right Ear: External  ear normal.      Left Ear: External ear normal.      Nose: Nose normal. No congestion.      Mouth/Throat:      Pharynx: Oropharynx is clear.   Eyes:      General: No scleral icterus.     Extraocular Movements: Extraocular movements intact.      Pupils: Pupils are equal, round, and reactive to light.   Cardiovascular:      Rate and Rhythm: Normal rate and regular rhythm.      Pulses: Normal pulses.      Heart sounds: Normal heart sounds. No murmur heard.  Pulmonary:      Effort: Pulmonary effort is normal. No respiratory distress.      Breath sounds: Normal breath sounds. No wheezing or rales.   Abdominal:      General: Bowel sounds are normal. There is no distension.      Palpations: Abdomen is soft.      Tenderness: There is no abdominal tenderness. There is no guarding or rebound.   Musculoskeletal:         General: Swelling (L knee warm, swollen and tender) present. No tenderness.      Cervical back: Normal range of motion.      Right lower leg: No edema.      Left lower leg: No edema.      Comments: L knee in clean, intact dressing. No point tenderness to back, no midline tenderness   Skin:     General: Skin is warm and dry.      Capillary Refill: Capillary refill takes less than 2 seconds.   Neurological:      General: No focal deficit present.      Mental Status: He is alert and oriented to person, place, and time. Mental status is at baseline.   Psychiatric:         Mood and Affect: Mood normal.         Behavior: Behavior normal.         Thought Content: Thought content normal.             Significant Labs: All pertinent labs within the past 24 hours have been reviewed.    Significant Imaging: I have reviewed all pertinent imaging results/findings within the past 24 hours.

## 2024-01-22 NOTE — PROGRESS NOTES
"[facility]  Adult Nutrition  Progress Note    SUMMARY       Recommendations    1.) Recommend continuing with DM diet as tolerated.     2.) Recommend discontinuing Boost Glucose Control d/t pt refusal.    3.) Consider adding 500mg VitC BID to help boost immunity/aid in wound healing.     4.) Consider adding 220mg of zinc daily x30days to help boost immunity/ aid in wound healing.     5.) RD to monitor wt, PO intake, skin, labs.      Goals: to meet % of EEN/EPN by next RD f/u  Nutrition Goal Status: progressing towards goal  Communication of RD Recs:  (POC)    Assessment and Plan    Nutrition Problem  Increased PRO/vitamin and mineral needs     Related to (etiology):   Increased physiological needs     Signs and Symptoms (as evidenced by):    Fever, MRSA     Interventions/Recommendations (treatment strategy):  Collaboration of nutritional care with other providers.  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 Pt endorses low to fair appetite, consuming around 75% of the meals provided if they like what is served. Pt appears to be nourished, with no visible s/s of malnutrition, NFPE not warranted. Pt states that they do not like to drink Boost as ordered. RD to continue to monitor and f/u.    Nutrition Discharge Planning: Diabetic diet    Nutrition Risk Screen    Nutrition Risk Screen: no indicators present    Nutrition/Diet History    Spiritual, Cultural Beliefs, Judaism Practices, Values that Affect Care: no    Anthropometrics    Temp: 98 °F (36.7 °C)  Height Method: Stated  Height: 6' 1" (185.4 cm)  Height (inches): 73 in  Weight Method: Bed " Scale  Weight: 110.8 kg (244 lb 4.3 oz)  Weight (lb): 244.27 lb  Ideal Body Weight (IBW), Male: 184 lb  % Ideal Body Weight, Male (lb): 132.64 %  BMI (Calculated): 32.2    Lab/Procedures/Meds    Pertinent Labs Reviewed: reviewed  Pertinent Labs Comments: gluc: 155, CRP: 170.5    Pertinent Medications Reviewed: reviewed  Pertinent Medications Comments: Abx, insulin, pantoprazole, NaCl    Estimated/Assessed Needs    Weight Used For Calorie Calculations: 107.8 kg (237 lb 10.5 oz)  Energy Calorie Requirements (kcal): 2042 kcal  Energy Need Method: Cedar-St Jeor (MSJ x 1.0)    Protein Requirements: 126- 168g (1.5-2.0g/kg of IBW)  Weight Used For Protein Calculations: 84 kg (185 lb 3 oz) (IBW d/t BMI >30.0)    Fluid Requirements (mL): 1ml/1kcal or per MD  Estimated Fluid Requirement Method: RDA Method  RDA Method (mL): 2042    CHO Requirement: 255g    Nutrition Prescription Ordered    Current Diet Order: DM, 2000kcal diet  Oral Nutrition Supplement: Boost GC BID    Evaluation of Received Nutrient/Fluid Intake    I/O: -3.3L since admit  Energy Calories Required: not meeting needs  Protein Required: not meeting needs  Fluid Required:  (1ml/1kcal or per MD)  Comments: LBM 1/20  Tolerance: tolerating  % Intake of Estimated Energy Needs: 50 - 75 %  % Meal Intake: 50 - 75 %    Nutrition Risk    Level of Risk/Frequency of Follow-up:  (RD to f/u x 1/week)     Monitor and Evaluation    Food and Nutrient Intake: energy intake, food and beverage intake  Food and Nutrient Adminstration: diet order  Physical Activity and Function: nutrition-related ADLs and IADLs  Anthropometric Measurements: weight, weight change, body mass index  Biochemical Data, Medical Tests and Procedures: electrolyte and renal panel, glucose/endocrine profile, inflammatory profile  Nutrition-Focused Physical Findings: overall appearance, skin     Nutrition Follow-Up    RD Follow-up?: Yes

## 2024-01-22 NOTE — PROGRESS NOTES
[facility] [address] [place] [address] Medicine  Progress Note    Patient Name: Kulwatn Mueller Jr.  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     Patient was admitted to [facility] 12/27/23-01/10/24 due to left knee septic arthritis with persistent MRSA bacteremia, s/p Left knee orthopedic surgery, surgical cultures positive for MRSA. Right heel also of concern for source.   He was followed by ID,  he had been on Daptomycin + Ceftaroline, s/p ROBY w/o endocarditis, left shoulder arthrocentesis w/o infection,  surveillance blood culture negative from 01/07/24    Report per ED and care everywhere notes patient with fevers on 1/13 to 103degreese @ 1800, 2000, ER transfer recommended but pt refused, physician gave order for blood culture, pt reported feeling fine.   Today he developed temperature of 100.5 @ 15:56 and was transferred to [facility] for further evaluation.     Today he reports he can walk with asssitance, using a walker, performing ADLs, main complaint is right sided back pain    ED - blood cultures drawn.             Overview/Hospital Course:  Kulawnt Mueller Jr. Is a 38 y.o. male who was admitted to hospital medicine for fever. WBC 15.86 >> 13.92. Sed rate 97, CRPP 211.9. On Daptomycin from previous discharge, continuing and added zosyn. ID consulted. Blood cultures NGTD. Orthopedics consulted, joint aspiration performed at bedside. 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 Taken to the OR for L knee washout. MRI pelvis: Myositis and  fasciitis, with soft tissue gas dissecting along the anterior compartment myofascial planes of the proximal left thigh. MRI L shoulder: Prominent subdeltoid enhancement/ bursitis. Superimposed infection may be present. No drainable fluid collections. MRI knee: s/p I&D w/ minimal residual fluid collection within the joint space. There is a large air-fluid collection extending supero-medially from the joint space along the medial femoral shaft, beyond the field of view. Prominent surrounding soft tissue inflammatory changes noted, with involvement of the vastus medialis, vastus lateralis, and popliteus musculature. 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 No spondylodiscitis or facet joint septic arthritis. 1/19 IR performed aspiration of fluid collection seen on MRI. Ortho performed a repeat I&D of left knee and an I&D of R thigh.     Interval History: NAEON. Pt seen and evaluated at bedside this am. Pt is s/p 2nd washout w/ ortho. Not seen by therapy yesterday 2/2 procedure. CRP and CK uptrending this am. Continuing to monitor.    Review of Systems   Constitutional:  Negative for activity change, chills, diaphoresis, fatigue and fever.   HENT:  Negative for congestion, rhinorrhea and sore throat.    Respiratory:  Negative for cough, chest tightness, shortness of breath and wheezing.    Cardiovascular:  Negative for chest pain, palpitations and leg swelling.   Gastrointestinal:  Negative for abdominal distention, abdominal pain, blood in stool, constipation, diarrhea, nausea and vomiting.   Genitourinary:  Negative for difficulty urinating, dysuria, frequency, hematuria and urgency.   Musculoskeletal:  Positive for arthralgias, gait problem and joint swelling. Negative for back pain and neck stiffness.   Neurological:  Negative for dizziness, tremors, seizures, syncope, weakness, light-headedness, numbness and headaches.   Psychiatric/Behavioral:  Negative for  agitation, confusion and hallucinations.      Objective:     Vital Signs (Most Recent):  Temp: 98.7 °F (37.1 °C) (01/22/24 0429)  Pulse: 93 (01/22/24 0429)  Resp: 16 (01/22/24 0429)  BP: (!) 145/80 (01/22/24 0429)  SpO2: 95 % (01/22/24 0429) Vital Signs (24h Range):  Temp:  [98 °F (36.7 °C)-99.2 °F (37.3 °C)] 98.7 °F (37.1 °C)  Pulse:  [] 93  Resp:  [11-18] 16  SpO2:  [94 %-100 %] 95 %  BP: (126-162)/(67-83) 145/80     Weight: 110.8 kg (244 lb 4.3 oz)  Body mass index is 32.23 kg/m².    Intake/Output Summary (Last 24 hours) at 1/22/2024 0820  Last data filed at 1/21/2024 1546  Gross per 24 hour   Intake 800 ml   Output --   Net 800 ml         Physical Exam  Vitals and nursing note reviewed.   Constitutional:       General: He is not in acute distress.     Appearance: He is well-developed. He is not diaphoretic.   HENT:      Head: Normocephalic and atraumatic.      Right Ear: External ear normal.      Left Ear: External ear normal.      Nose: Nose normal. No congestion.      Mouth/Throat:      Pharynx: Oropharynx is clear.   Eyes:      General: No scleral icterus.     Extraocular Movements: Extraocular movements intact.      Pupils: Pupils are equal, round, and reactive to light.   Cardiovascular:      Rate and Rhythm: Normal rate and regular rhythm.      Pulses: Normal pulses.      Heart sounds: Normal heart sounds. No murmur heard.  Pulmonary:      Effort: Pulmonary effort is normal. No respiratory distress.      Breath sounds: Normal breath sounds. No wheezing or rales.   Abdominal:      General: Bowel sounds are normal. There is no distension.      Palpations: Abdomen is soft.      Tenderness: There is no abdominal tenderness. There is no guarding or rebound.   Musculoskeletal:         General: Swelling (L knee warm, swollen and tender) present. No tenderness.      Cervical back: Normal range of motion.      Right lower leg: No edema.      Left lower leg: No edema.      Comments: L knee in clean, intact  dressing. No point tenderness to back, no midline tenderness   Skin:     General: Skin is warm and dry.      Capillary Refill: Capillary refill takes less than 2 seconds.   Neurological:      General: No focal deficit present.      Mental Status: He is alert and oriented to person, place, and time. Mental status is at baseline.   Psychiatric:         Mood and Affect: Mood normal.         Behavior: Behavior normal.         Thought Content: Thought content normal.             Significant Labs: All pertinent labs within the past 24 hours have been reviewed.    Significant Imaging: I have reviewed all pertinent imaging results/findings within the past 24 hours.    Assessment/Plan:      * Staphylococcal arthritis of left knee  S/p Left knee arthrotomy and synovectomy (12/29/23)  -re-order PTOT  Pt with left knee sutures, tenderness to palpation near medial tibial plateau  - ortho consulted, bedside aspiration completed   - taken to OR for wound wash out   - MRI pelvis: Myositis and fasciitis, with soft tissue gas dissecting along the anterior compartment myofascial planes of the proximal left thigh.   - MRI L shoulder: Prominent subdeltoid enhancement/ bursitis. Superimposed infection may be present. No drainable fluid collections.   - MRI knee: s/p I&D w/ minimal residual fluid collection within the joint space. There is a large air-fluid collection extending supero-medially from the joint space along the medial femoral shaft, beyond the field of view. Prominent surrounding soft tissue inflammatory changes noted, with involvement of the vastus medialis, vastus lateralis, and popliteus musculature.  - MRI T/L spine: No MR imaging findings to account for reported history of MRSA bacteremia. No spondylodiscitis or facet joint septic arthritis   - s/p aspiration w/ IR 1/19  - s/p L knee I&D, R knee I&D 1/21    Staphylococcus aureus bacteremia  Fever  - reported fevers of 103 @ [facility] on Saturday and temp 100  prior to transfer  - s/p blood cultures   - Pt with c/o of right sided Back pain - given MRSA bacteremia, pt is at risk for metastatic site of infection obtain MRI T-L spine with Contrast to r/o spinal source of infection   - ESR 97/ .9, trending and improved  - Infectious disease consult - to assist in continuing Daptomycin approval & infectious w/u    >patient had ROBY performed on 1/02 to r/o endocarditis.   - continue daptomycin and cerftaroline  -   - CRP trending, 105.8>139.4>170  - repeat blood cultures NGTD     Abscess of right thigh  - MRI pelvis revealed a large lateral thigh fluid collection that may reflect hematoma or abscess.   - 1/21/22 s/p washout w/ ortho. Drain in place  - cx taken, pending results  - continuing IV abx    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 due to underlying condition, limited to breakdown of skin  Patient with right heel - resolving ulcer  - prior wound care notes indicate - Paint with betadine and leave open to air  - elevate heels to reduce pressure   - Wound care consult   - Right heel: bedside nursing to paint with betadine, let dry and apply a foam dressing daily   - Turning every 2 hours  - Heel protecting boots  - Bedside nursing to maintain pressure injury prevention interventions    Chronic HFrEF (heart failure with reduced ejection fraction)  New finding at last hospitalization  - EF 48% with systolic dysfunction, normal diastolic function  - patient on Jardiance as outpatient as well as Toprol XL and Lasix 20mg po daily   - Jardiance not continued @ IP rehab - would hold given concern for possible repeat infection - resume when clinically stable.   - resume furosemide when stable    Hyponatremia  Patient has hyponatremia which is controlled,  We will monitor sodium Daily.   The hyponatremia is due to Dehydration/hypovolemia.  Urine sodium, urine osmolality, serum osmolality or TSH, T4 reviewed  S/p IVF   Recent Labs   Lab 01/22/24  0500         Transaminitis  - trend CMP daily, improving  - check GGT with rising Alk phos levels  - Prior w/u @ [facility] included - CT A/P, w/o abnormality, Hepatitis panel negative, RUQ ultrasound with hepatomegaly and biliary sludge; but without other obvious abnormalities    Depression  Noted At [facility] hospitalization - patient noted to have flat affect, decrease motivation, excessive sleeping, was seen by psychiatry consult and Cymbalta recommended but pt declined.   - Description of his affect seems similar here, pending above workup would re-address with patient prior to discharge  Reports he was living with wife & 2 kids prior to current hospitalization.     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  Initially on therapeutic lovenox and transitioned back to Oral Xarelto 10mg daily   -pt previously followed with hematology - Dr. Duff.     Diabetic gastroparesis associated with type 2 diabetes mellitus  -continue reglan 5mg PO TID    Type 2 diabetes mellitus with complication, with long-term current use of insulin  Patient's FSGs are uncontrolled due to hyperglycemia on current medication regimen.  Last A1c reviewed-   Lab Results   Component Value Date    HGBA1C 7.4 (H) 12/28/2023     Most recent fingerstick glucose reviewed-   Recent Labs   Lab 01/21/24  1159 01/21/24  2056 01/22/24  0722   POCTGLUCOSE 147* 156* 129*       Current correctional scale  Low  Maintain anti-hyperglycemic dose as follows-   Antihyperglycemics (From admission, onward)      Start     Stop Route Frequency Ordered    01/17/24 1130  insulin aspart U-100 pen 8 Units         -- SubQ 3 times daily with meals 01/17/24  0819    01/16/24 2100  insulin detemir U-100 (Levemir) pen 17 Units         -- SubQ 2 times daily 01/16/24 1657    01/15/24 0329  insulin aspart U-100 pen 0-5 Units         -- SubQ Before meals & nightly PRN 01/15/24 0231          Hold Oral hypoglycemics while patient is in the hospital.      VTE Risk Mitigation (From admission, onward)           Ordered     rivaroxaban tablet 10 mg  With dinner         01/15/24 0229     Reason for No Pharmacological VTE Prophylaxis  Once        Question:  Reasons:  Answer:  Already adequately anticoagulated on oral Anticoagulants    01/15/24 0229     IP VTE HIGH RISK PATIENT  Once         01/15/24 0229     Place sequential compression device  Until discontinued         01/15/24 0229                    Discharge Planning   RAEANN: 1/23/2024     Code Status: Full Code   Is the patient medically ready for discharge?: No    Reason for patient still in hospital (select all that apply): Patient trending condition, Laboratory test, Treatment, Consult recommendations, PT / OT recommendations, and Pending disposition  Discharge Plan A: Rehab   Discharge Delays: None known at this time              Raul Pal PA-C  Department of Hospital Medicine   [facility]

## 2024-01-22 NOTE — ASSESSMENT & PLAN NOTE
Patient has hyponatremia which is controlled,  We will monitor sodium Daily.   The hyponatremia is due to Dehydration/hypovolemia.  Urine sodium, urine osmolality, serum osmolality or TSH, T4 reviewed  S/p IVF   Recent Labs   Lab 01/22/24  0500

## 2024-01-22 NOTE — PROGRESS NOTES
[facility] - Observation [facility]  Orthopedics  Progress Note    Patient Name: Kulwant Mueller Jr.  MRN: [id number]  Admission Date: 1/14/2024  Hospital Length of Stay: 6 days  Attending Provider: Lizbeth Marquez MD  Primary Care Provider: Shellie, Primary Doctor  Follow-up For: Procedure(s) (LRB):  ARTHROSCOPY, KNEE, WITH DEBRIDEMENT - LEFT, SUPINE, SLIDER, LATERAL POST, CONMED (Left)  INCISION AND DRAINAGE, THIGH - right, (Right)    Post-Operative Day: 1 Day Post-Op  Subjective:     Principal Problem:Staphylococcal arthritis of left knee    Principal Orthopedic Problem: s/p L knee arthroscopic I&D on 1/17, s/p repeat L knee I&D and right thigh abscess I&D on 1/21    Interval History: NAEON. VSS. Afebrile. Drain output minimal in drain. Mobilize with PT today.     Review of patient's allergies indicates:   Allergen Reactions    Heparin analogues Nausea And Vomiting       Current Facility-Administered Medications   Medication    acetaminophen tablet 1,000 mg    ammonium lactate 12 % lotion    ceftaroline fosamiL (TEFLARO) 600 mg in dextrose 5 % in water (D5W) 50 mL IVPB (MB+)    DAPTOmycin (CUBICIN) 945 mg in sodium chloride 0.9% SolP 50 mL IVPB    dextrose 10% bolus 125 mL 125 mL    dextrose 10% bolus 250 mL 250 mL    droPERidol injection 0.625 mg    glucagon (human recombinant) injection 1 mg    glucose chewable tablet 16 g    glucose chewable tablet 24 g    HYDROmorphone injection 0.2 mg    HYDROmorphone injection 0.2 mg    insulin aspart U-100 pen 0-5 Units    insulin aspart U-100 pen 8 Units    insulin detemir U-100 (Levemir) pen 17 Units    loperamide capsule 2 mg    melatonin tablet 6 mg    methocarbamoL tablet 500 mg    metoclopramide HCl tablet 5 mg    metoprolol succinate (TOPROL-XL) 24 hr split tablet 12.5 mg    morphine injection 4 mg    naloxone 0.4 mg/mL injection 0.02 mg    ondansetron injection 4 mg    ondansetron injection 4 mg    oxyCODONE immediate release tablet 5 mg    oxyCODONE immediate release tablet 5 mg  "   oxyCODONE immediate release tablet Tab 10 mg    pantoprazole EC tablet 40 mg    prochlorperazine injection Soln 5 mg    rivaroxaban tablet 10 mg    scopolamine 1.3-1.5 mg (1 mg over 3 days) 1 patch    sodium chloride 0.9% flush 10 mL    sodium chloride 0.9% flush 10 mL    sodium chloride 0.9% flush 10 mL    And    sodium chloride 0.9% flush 10 mL    sodium chloride 0.9% flush 10 mL     Objective:     Vital Signs (Most Recent):  Temp: 98 °F (36.7 °C) (01/22/24 1235)  Pulse: 95 (01/22/24 1235)  Resp: 16 (01/22/24 1235)  BP: 125/71 (01/22/24 1235)  SpO2: 98 % (01/22/24 1235) Vital Signs (24h Range):  Temp:  [98 °F (36.7 °C)-99.2 °F (37.3 °C)] 98 °F (36.7 °C)  Pulse:  [] 95  Resp:  [11-18] 16  SpO2:  [95 %-100 %] 98 %  BP: (125-162)/(67-83) 125/71     Weight: 110.8 kg (244 lb 4.3 oz)  Height: 6' 1" (185.4 cm)  Body mass index is 32.23 kg/m².      Intake/Output Summary (Last 24 hours) at 1/22/2024 1248  Last data filed at 1/21/2024 1546  Gross per 24 hour   Intake 800 ml   Output --   Net 800 ml         Ortho/SPM Exam     Awake/alert/oriented x3, No acute distress, Afebrile, Vital signs stable  Good inspiratory effort with unlaboured breathing  Dressings c/d/I  Pain with knee PROM 5-70  No pain with R hip ROM, axial loading, full PROM. No TTP of R lateral hip.   Active and PROM of L shoulder intact with ain with L shoulder ROM (FF over 90, ABD over 90), +hawkens, + neer,       Significant Labs: All pertinent labs within the past 24 hours have been reviewed.    Significant Imaging: I have reviewed all pertinent imaging results/findings.    MRI pelvis showing right lateral thigh rim enhancing fluid collection, no bl hip effusions    MRI L shoulder showing SS tear without retraction, subdeltoid bursitis, no fluid collections   Assessment/Plan:     * Staphylococcal arthritis of left knee  Kulwant Rossy Mueller Jr. is a 38 y.o. male admitted for workup of fevers of unknown source, recent irrigation and debridement of " left knee performed at [facility] on 12/29.  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 Now s/p left knee arthroscopic I&D on 1/17.    Pt found to have right thigh abscess on MRI. IR aspiration performed and drain placed 1/19/24. Now s/p repeat L knee arthroscopic I&D and right thigh abscess I&D with drain placement on 1/21    Pain control:  Multimodal  PT/OT: WBAT left lower extremity  DVT PPx:  Xarelto, SCDs at all times when not ambulating  Abx:  Ceftaroline/ daptomycin per ID, pending final cultures and ID recs  Labs:  CRP down to 100 today, continue to trend  Drain: minimal since OR    Cx: IPR pending final ID recs for long term abx; picc in place    Dispo: OR today for I&D of right thigh and repeat I&D of left knee            David France MD  Orthopedics  [facility] - Observation 11H

## 2024-01-22 NOTE — ANESTHESIA POSTPROCEDURE EVALUATION
Anesthesia Post Evaluation    Patient: Kulwant Mueller Jr.    Procedure(s) Performed: Procedure(s) (LRB):  ARTHROSCOPY, KNEE, WITH DEBRIDEMENT - LEFT, SUPINE, SLIDER, LATERAL POST, CONMED (Left)  INCISION AND DRAINAGE, THIGH - right, (Right)    Final Anesthesia Type: general      Patient location during evaluation: PACU  Patient participation: Yes- Able to Participate  Level of consciousness: awake and alert and oriented  Post-procedure vital signs: reviewed and stable  Pain management: adequate  Airway patency: patent  AIDA mitigation strategies: Multimodal analgesia  PONV status at discharge: No PONV  Anesthetic complications: no      Cardiovascular status: blood pressure returned to baseline and hemodynamically stable  Respiratory status: unassisted, spontaneous ventilation and room air  Hydration status: euvolemic  Follow-up not needed.              Vitals Value Taken Time   /73 01/21/24 1652   Temp 36.7 °C (98 °F) 01/21/24 1652   Pulse 95 01/21/24 1652   Resp 17 01/21/24 1716   SpO2 97 % 01/21/24 1625   Vitals shown include unvalidated device data.      Event Time   Out of Recovery 01/21/2024 16:15:00         Pain/Tana Score: Pain Rating Prior to Med Admin: 9 (1/21/2024  5:16 PM)  Pain Rating Post Med Admin: 4 (1/20/2024 12:09 AM)  Tana Score: 9 (1/21/2024  4:15 PM)

## 2024-01-22 NOTE — PLAN OF CARE
01/22/24 1110   Post-Acute Status   Post-Acute Authorization Placement   Post-Acute Placement Status Set-up Complete/Auth obtained   Discharge Delays None known at this time   Discharge Plan   Discharge Plan A Rehab     SW informed by [facility] they have receive authorization for this patient to return once medically stable.         Iman Stroud LMSW  PRN - [facility]  [phone]

## 2024-01-22 NOTE — PLAN OF CARE
Recommendations     1.) Recommend continuing with DM diet as tolerated.      2.) Recommend discontinuing Boost Glucose Control d/t pt refusal.     3.) Consider adding 500mg VitC BID to help boost immunity/aid in wound healing.      4.) Consider adding 220mg of zinc daily x30days to help boost immunity/ aid in wound healing.      5.) RD to monitor wt, PO intake, skin, labs.        Goals: to meet % of EEN/EPN by next RD f/u  Nutrition Goal Status: progressing towards goal  Communication of RD Recs:  (POC)

## 2024-01-22 NOTE — ASSESSMENT & PLAN NOTE
S/p Left knee arthrotomy and synovectomy (12/29/23)  -re-order PTOT  Pt with left knee sutures, tenderness to palpation near medial tibial plateau  - ortho consulted, bedside aspiration completed   - taken to OR for wound wash out   - MRI pelvis: Myositis and fasciitis, with soft tissue gas dissecting along the anterior compartment myofascial planes of the proximal left thigh.   - MRI L shoulder: Prominent subdeltoid enhancement/ bursitis. Superimposed infection may be present. No drainable fluid collections.   - MRI knee: s/p I&D w/ minimal residual fluid collection within the joint space. There is a large air-fluid collection extending supero-medially from the joint space along the medial femoral shaft, beyond the field of view. Prominent surrounding soft tissue inflammatory changes noted, with involvement of the vastus medialis, vastus lateralis, and popliteus musculature.  - MRI T/L spine: No MR imaging findings to account for reported history of MRSA bacteremia. No spondylodiscitis or facet joint septic arthritis   - s/p aspiration w/ IR 1/19  - s/p L knee I&D, R knee I&D 1/21

## 2024-01-23 PROBLEM — Z74.09 IMPAIRED MOBILITY: Status: ACTIVE | Noted: 2024-01-23

## 2024-01-23 LAB
ANION GAP SERPL CALC-SCNC: 7 MMOL/L (ref 8–16)
BACTERIA SPEC AEROBE CULT: ABNORMAL
BASOPHILS # BLD AUTO: 0.03 K/UL (ref 0–0.2)
BASOPHILS NFR BLD: 0.2 % (ref 0–1.9)
BUN SERPL-MCNC: 7 MG/DL (ref 6–20)
CALCIUM SERPL-MCNC: 8.8 MG/DL (ref 8.7–10.5)
CHLORIDE SERPL-SCNC: 101 MMOL/L (ref 95–110)
CO2 SERPL-SCNC: 26 MMOL/L (ref 23–29)
CREAT SERPL-MCNC: 0.7 MG/DL (ref 0.5–1.4)
DIFFERENTIAL METHOD BLD: ABNORMAL
EOSINOPHIL # BLD AUTO: 0.1 K/UL (ref 0–0.5)
EOSINOPHIL NFR BLD: 0.8 % (ref 0–8)
ERYTHROCYTE [DISTWIDTH] IN BLOOD BY AUTOMATED COUNT: 14.3 % (ref 11.5–14.5)
EST. GFR  (NO RACE VARIABLE): >60 ML/MIN/1.73 M^2
GLUCOSE SERPL-MCNC: 124 MG/DL (ref 70–110)
HCT VFR BLD AUTO: 28.2 % (ref 40–54)
HGB BLD-MCNC: 8.5 G/DL (ref 14–18)
IMM GRANULOCYTES # BLD AUTO: 0.04 K/UL (ref 0–0.04)
IMM GRANULOCYTES NFR BLD AUTO: 0.3 % (ref 0–0.5)
LYMPHOCYTES # BLD AUTO: 1.1 K/UL (ref 1–4.8)
LYMPHOCYTES NFR BLD: 8.3 % (ref 18–48)
MCH RBC QN AUTO: 26.8 PG (ref 27–31)
MCHC RBC AUTO-ENTMCNC: 30.1 G/DL (ref 32–36)
MCV RBC AUTO: 89 FL (ref 82–98)
MONOCYTES # BLD AUTO: 0.8 K/UL (ref 0.3–1)
MONOCYTES NFR BLD: 5.8 % (ref 4–15)
NEUTROPHILS # BLD AUTO: 11 K/UL (ref 1.8–7.7)
NEUTROPHILS NFR BLD: 84.6 % (ref 38–73)
NRBC BLD-RTO: 0 /100 WBC
PLATELET # BLD AUTO: 462 K/UL (ref 150–450)
PMV BLD AUTO: 8.8 FL (ref 9.2–12.9)
POCT GLUCOSE: 104 MG/DL (ref 70–110)
POCT GLUCOSE: 117 MG/DL (ref 70–110)
POCT GLUCOSE: 120 MG/DL (ref 70–110)
POCT GLUCOSE: 150 MG/DL (ref 70–110)
POTASSIUM SERPL-SCNC: 3.8 MMOL/L (ref 3.5–5.1)
RBC # BLD AUTO: 3.17 M/UL (ref 4.6–6.2)
SODIUM SERPL-SCNC: 134 MMOL/L (ref 136–145)
WBC # BLD AUTO: 12.99 K/UL (ref 3.9–12.7)

## 2024-01-23 PROCEDURE — 97535 SELF CARE MNGMENT TRAINING: CPT

## 2024-01-23 PROCEDURE — 25000003 PHARM REV CODE 250

## 2024-01-23 PROCEDURE — 25000003 PHARM REV CODE 250: Performed by: HOSPITALIST

## 2024-01-23 PROCEDURE — 63600175 PHARM REV CODE 636 W HCPCS: Performed by: HOSPITALIST

## 2024-01-23 PROCEDURE — 25000003 PHARM REV CODE 250: Performed by: INTERNAL MEDICINE

## 2024-01-23 PROCEDURE — 63600175 PHARM REV CODE 636 W HCPCS

## 2024-01-23 PROCEDURE — 80048 BASIC METABOLIC PNL TOTAL CA: CPT | Performed by: PHYSICIAN ASSISTANT

## 2024-01-23 PROCEDURE — 25000003 PHARM REV CODE 250: Performed by: STUDENT IN AN ORGANIZED HEALTH CARE EDUCATION/TRAINING PROGRAM

## 2024-01-23 PROCEDURE — A4216 STERILE WATER/SALINE, 10 ML: HCPCS | Performed by: STUDENT IN AN ORGANIZED HEALTH CARE EDUCATION/TRAINING PROGRAM

## 2024-01-23 PROCEDURE — 94761 N-INVAS EAR/PLS OXIMETRY MLT: CPT

## 2024-01-23 PROCEDURE — 97530 THERAPEUTIC ACTIVITIES: CPT

## 2024-01-23 PROCEDURE — 85025 COMPLETE CBC W/AUTO DIFF WBC: CPT | Performed by: PHYSICIAN ASSISTANT

## 2024-01-23 PROCEDURE — 36415 COLL VENOUS BLD VENIPUNCTURE: CPT | Performed by: PHYSICIAN ASSISTANT

## 2024-01-23 PROCEDURE — 63600175 PHARM REV CODE 636 W HCPCS: Mod: JZ,JG | Performed by: INTERNAL MEDICINE

## 2024-01-23 PROCEDURE — 27000207 HC ISOLATION

## 2024-01-23 PROCEDURE — 21400001 HC TELEMETRY ROOM

## 2024-01-23 RX ORDER — INSULIN ASPART 100 [IU]/ML
3 INJECTION, SOLUTION INTRAVENOUS; SUBCUTANEOUS
Status: DISCONTINUED | OUTPATIENT
Start: 2024-01-23 | End: 2024-01-24

## 2024-01-23 RX ADMIN — INSULIN ASPART 3 UNITS: 100 INJECTION, SOLUTION INTRAVENOUS; SUBCUTANEOUS at 01:01

## 2024-01-23 RX ADMIN — METOCLOPRAMIDE 5 MG: 5 TABLET ORAL at 05:01

## 2024-01-23 RX ADMIN — PANTOPRAZOLE SODIUM 40 MG: 40 TABLET, DELAYED RELEASE ORAL at 09:01

## 2024-01-23 RX ADMIN — AMMONIUM LACTATE: 12 LOTION TOPICAL at 09:01

## 2024-01-23 RX ADMIN — METHOCARBAMOL 500 MG: 500 TABLET ORAL at 05:01

## 2024-01-23 RX ADMIN — INSULIN DETEMIR 17 UNITS: 100 INJECTION, SOLUTION SUBCUTANEOUS at 09:01

## 2024-01-23 RX ADMIN — OXYCODONE 5 MG: 5 TABLET ORAL at 01:01

## 2024-01-23 RX ADMIN — METHOCARBAMOL 500 MG: 500 TABLET ORAL at 09:01

## 2024-01-23 RX ADMIN — OXYCODONE 5 MG: 5 TABLET ORAL at 11:01

## 2024-01-23 RX ADMIN — MORPHINE SULFATE 4 MG: 4 INJECTION INTRAVENOUS at 09:01

## 2024-01-23 RX ADMIN — CEFTAROLINE FOSAMIL 600 MG: 600 POWDER, FOR SOLUTION INTRAVENOUS at 05:01

## 2024-01-23 RX ADMIN — Medication 10 ML: at 12:01

## 2024-01-23 RX ADMIN — Medication 10 ML: at 05:01

## 2024-01-23 RX ADMIN — Medication 10 ML: at 09:01

## 2024-01-23 RX ADMIN — METOCLOPRAMIDE 5 MG: 5 TABLET ORAL at 01:01

## 2024-01-23 RX ADMIN — METOPROLOL SUCCINATE 12.5 MG: 25 TABLET, EXTENDED RELEASE ORAL at 09:01

## 2024-01-23 RX ADMIN — RIVAROXABAN 10 MG: 10 TABLET, FILM COATED ORAL at 06:01

## 2024-01-23 RX ADMIN — Medication 10 ML: at 06:01

## 2024-01-23 RX ADMIN — METHOCARBAMOL 500 MG: 500 TABLET ORAL at 01:01

## 2024-01-23 RX ADMIN — OXYCODONE 5 MG: 5 TABLET ORAL at 06:01

## 2024-01-23 RX ADMIN — DAPTOMYCIN 945 MG: 500 INJECTION, POWDER, LYOPHILIZED, FOR SOLUTION INTRAVENOUS at 06:01

## 2024-01-23 NOTE — SUBJECTIVE & OBJECTIVE
Principal Problem:Staphylococcal arthritis of left knee    Principal Orthopedic Problem: s/p L knee arthroscopic I&D on 1/17, s/p repeat L knee I&D and right thigh abscess I&D on 1/21    Interval History: NAEON. VSS. Afebrile. Drain output 30cc/24 hr. Right thigh and left knee pain controlled. Discussed with pt importance of mobilization with PT today.     Review of patient's allergies indicates:   Allergen Reactions    Heparin analogues Nausea And Vomiting       Current Facility-Administered Medications   Medication    acetaminophen tablet 1,000 mg    ammonium lactate 12 % lotion    DAPTOmycin (CUBICIN) 945 mg in sodium chloride 0.9% SolP 50 mL IVPB    dextrose 10% bolus 125 mL 125 mL    dextrose 10% bolus 250 mL 250 mL    glucagon (human recombinant) injection 1 mg    glucose chewable tablet 16 g    glucose chewable tablet 24 g    insulin aspart U-100 pen 0-5 Units    insulin aspart U-100 pen 3 Units    insulin detemir U-100 (Levemir) pen 17 Units    loperamide capsule 2 mg    melatonin tablet 6 mg    methocarbamoL tablet 500 mg    metoclopramide HCl tablet 5 mg    metoprolol succinate (TOPROL-XL) 24 hr split tablet 12.5 mg    morphine injection 4 mg    naloxone 0.4 mg/mL injection 0.02 mg    ondansetron injection 4 mg    oxyCODONE immediate release tablet 5 mg    oxyCODONE immediate release tablet 5 mg    oxyCODONE immediate release tablet Tab 10 mg    pantoprazole EC tablet 40 mg    prochlorperazine injection Soln 5 mg    rivaroxaban tablet 10 mg    scopolamine 1.3-1.5 mg (1 mg over 3 days) 1 patch    sodium chloride 0.9% flush 10 mL    And    sodium chloride 0.9% flush 10 mL     Objective:     Vital Signs (Most Recent):  Temp: 98.6 °F (37 °C) (01/23/24 0837)  Pulse: 92 (01/23/24 0837)  Resp: 12 (01/23/24 0940)  BP: (!) 141/79 (01/23/24 0927)  SpO2: 97 % (01/23/24 0837) Vital Signs (24h Range):  Temp:  [97.3 °F (36.3 °C)-98.6 °F (37 °C)] 98.6 °F (37 °C)  Pulse:  [76-95] 92  Resp:  [12-20] 12  SpO2:  [92 %-98 %]  "97 %  BP: (109-141)/(59-79) 141/79     Weight: 110.9 kg (244 lb 7.8 oz)  Height: 6' 1" (185.4 cm)  Body mass index is 32.26 kg/m².      Intake/Output Summary (Last 24 hours) at 1/23/2024 0901  Last data filed at 1/23/2024 0706  Gross per 24 hour   Intake 700 ml   Output 1670 ml   Net -970 ml          Ortho/SPM Exam     Awake/alert/oriented x3, No acute distress, Afebrile, Vital signs stable  Good inspiratory effort with unlaboured breathing  Dressings c/d/I  Pain with knee PROM 5-70  No pain with R hip ROM, axial loading, full PROM. No TTP of R lateral hip.   Active and PROM of L shoulder intact with ain with L shoulder ROM (FF over 90, ABD over 90), +hawkens, + neer,       Significant Labs: All pertinent labs within the past 24 hours have been reviewed.    Significant Imaging: I have reviewed all pertinent imaging results/findings.    MRI pelvis showing right lateral thigh rim enhancing fluid collection, no bl hip effusions    MRI L shoulder showing SS tear without retraction, subdeltoid bursitis, no fluid collections   "

## 2024-01-23 NOTE — NURSING
Patient refuses to let staff turn him. Patient frequently instructed to shift weight and offered assistance. Patient educated about shifting weight to avoid pressure injuries. Patient verbalized understanding.

## 2024-01-23 NOTE — ASSESSMENT & PLAN NOTE
Kulwant Menardcaro Miguel. is a 38 y.o. male admitted for workup of fevers of unknown source, recent irrigation and debridement of left knee performed at [facility] on 12/29.  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 Now s/p left knee arthroscopic I&D on 1/17.    Pt found to have right thigh abscess on MRI. IR aspiration performed and drain placed 1/19/24. Now s/p repeat L knee arthroscopic I&D and right thigh abscess I&D with drain placement on 1/21    Pain control:  Multimodal  PT/OT: WBAT left lower extremity  DVT PPx:  Xarelto, SCDs at all times when not ambulating  Abx:  Ceftaroline/ daptomycin per ID, pending final cultures and ID recs  Labs:  trend CRP tomorrow  Cx: R thigh aspiration cx growing staph (sens pending), prior left knee cx growing MRSA  Drain: 30cc/24 hr      Dispo: If drain output and CRP continues to decrease tomorrow, pt stable for dc to rehab

## 2024-01-23 NOTE — PT/OT/SLP PROGRESS
Occupational Therapy   Treatment    Name: Kulwant Mueller Jr.  MRN: [id number]  Admitting Diagnosis:  Staphylococcal arthritis of left knee  2 Days Post-Op    Recommendations:     Discharge Recommendations: High Intensity Therapy  Discharge Equipment Recommendations:  walker, rolling, bedside commode, shower chair  Barriers to discharge:  Other (Comment) (increasing Assistance)    Assessment:     Kulwant Mueller Jr. is a 38 y.o. male with a medical diagnosis of Staphylococcal arthritis of left knee.  He presents with pain in BL LE. Performance deficits affecting function are weakness, impaired endurance, gait instability, impaired functional mobility, decreased lower extremity function, impaired cardiopulmonary response to activity, pain, edema, decreased ROM, decreased safety awareness, decreased coordination, impaired balance, impaired self care skills, impaired skin, decreased upper extremity function.     Rehab Prognosis:  Good; patient would benefit from acute skilled OT services to address these deficits and reach maximum level of function.       Plan:     Patient to be seen 4 x/week to address the above listed problems via self-care/home management, therapeutic activities, therapeutic exercises  Plan of Care Expires: 02/18/24  Plan of Care Reviewed with: patient    Subjective     Chief Complaint: pain in LEs  Patient/Family Comments/goals: return home  Pain/Comfort:  Pain Rating 1: 8/10  Location - Side 1: Bilateral  Location - Orientation 1: generalized  Location 1: leg  Pain Addressed 1: Pre-medicate for activity, Reposition, Distraction    Objective:     Communicated with: Gurwinder prior to session.  Patient found supine with telemetry, peripheral IV, MULUGETA drain upon OT entry to room.    General Precautions: Standard, fall, contact    Orthopedic Precautions:LLE weight bearing as tolerated  Braces: N/A  Respiratory Status: Room air     Occupational Performance:     Bed Mobility:    Pt declined to sit EOB 2/2 to  just finishing with PT and stating he was in 8/10 pain with movement   Pt used hospital bed to elevate HOB to sit up for ADL task in bed    Activities of Daily Living:  Grooming: setup A for oral and facial H in bed       [facility] 6 Click ADL: 17    Treatment & Education:  Pt educated on role and purpose of therapy  Pt educated on goal setting  Pt educated on benefits of OOB activity  Pt educated on self advocacy     Patient left supine with all lines intact, call button in reach, and Nsg notified    GOALS:   Multidisciplinary Problems       Occupational Therapy Goals          Problem: Occupational Therapy    Goal Priority Disciplines Outcome Interventions   Occupational Therapy Goal     OT, PT/OT Ongoing, Progressing    Description: Goals to be met by: 2/18/2024     Patient will increase functional independence with ADLs by performing:    UE Dressing with Supervision.  LE Dressing with Minimal Assistance.  Grooming while seated with Set-up Assistance.  Toileting from bedside commode with Minimal Assistance for hygiene and clothing management.   Toilet transfer to bedside commode with Minimal Assistance.                         Time Tracking:     OT Date of Treatment: 01/23/24  OT Start Time: 1030  OT Stop Time: 1050  OT Total Time (min): 20 min    Billable Minutes:Self Care/Home Management 20    OT/RUFINA: OT     Number of RUFINA visits since last OT visit: 1 1/23/2024

## 2024-01-23 NOTE — SUBJECTIVE & OBJECTIVE
Interval History 1/23/2024:  Patient is seen for follow-up PM&R evaluation and recommendations: Drain in place. Working with therapy this morning.     HPI, Past Medical, Family, and Social History remains the same as documented in the initial encounter.    Scheduled Medications:    acetaminophen  1,000 mg Oral Q8H    ammonium lactate   Topical (Top) BID    DAPTOmycin (CUBICIN) 945 mg in sodium chloride 0.9% SolP 50 mL IVPB  10 mg/kg (Adjusted) Intravenous Q24H    insulin aspart U-100  8 Units Subcutaneous TIDWM    insulin detemir U-100  17 Units Subcutaneous BID    methocarbamoL  500 mg Oral QID    metoclopramide HCl  5 mg Oral TID AC    metoprolol succinate  12.5 mg Oral Daily    oxyCODONE  5 mg Oral Q6H    pantoprazole  40 mg Oral Daily    rivaroxaban  10 mg Oral Daily with dinner    sodium chloride 0.9%  10 mL Intravenous Q6H       Diagnostic Results: Labs: Reviewed  ECG: Reviewed  CT: Reviewed    PRN Medications: dextrose 10%, dextrose 10%, glucagon (human recombinant), glucose, glucose, insulin aspart U-100, loperamide, melatonin, morphine, naloxone, ondansetron, ondansetron, oxyCODONE, oxyCODONE, prochlorperazine, scopolamine, sodium chloride 0.9%, sodium chloride 0.9%, Flushing PICC/Midline Protocol **AND** sodium chloride 0.9% **AND** sodium chloride 0.9%, sodium chloride 0.9%    Review of Systems   Constitutional:  Positive for activity change and fatigue. Negative for fever.   Respiratory:  Negative for cough and shortness of breath.    Musculoskeletal:  Positive for gait problem.   Neurological:  Positive for weakness (LLE and LUE).   Psychiatric/Behavioral:  Negative for agitation, behavioral problems and confusion.      Objective:     Vital Signs (Most Recent):  Temp: 98.3 °F (36.8 °C) (01/23/24 0515)  Pulse: 89 (01/23/24 0515)  Resp: 20 (01/23/24 0515)  BP: 137/79 (01/23/24 0515)  SpO2: (!) 92 % (01/23/24 0515)    Vital Signs (24h Range):  Temp:  [97.3 °F (36.3 °C)-98.4 °F (36.9 °C)] 98.3 °F (36.8  °C)  Pulse:  [76-95] 89  Resp:  [14-20] 20  SpO2:  [92 %-98 %] 92 %  BP: (109-137)/(59-79) 137/79         Physical Exam  Vitals and nursing note reviewed.   Constitutional:       Appearance: Normal appearance.   HENT:      Head: Normocephalic and atraumatic.      Mouth/Throat:      Mouth: Mucous membranes are moist.   Eyes:      Extraocular Movements: Extraocular movements intact.      Pupils: Pupils are equal, round, and reactive to light.   Pulmonary:      Effort: Pulmonary effort is normal. No respiratory distress.   Musculoskeletal:      Cervical back: Normal range of motion and neck supple.      Comments: LUE 4/5  LLE 3/5 with dressings in place     Skin:     General: Skin is warm.   Neurological:      Mental Status: He is alert and oriented to person, place, and time. Mental status is at baseline.      Motor: Weakness present.      Gait: Gait abnormal.   Psychiatric:         Mood and Affect: Mood normal.         Behavior: Behavior normal.          NEUROLOGICAL EXAMINATION:     MENTAL STATUS   Oriented to person, place, and time.     CRANIAL NERVES     CN III, IV, VI   Pupils are equal, round, and reactive to light.

## 2024-01-23 NOTE — SUBJECTIVE & OBJECTIVE
Interval History:   -- OR 01/15 for surgical I&D native L knee septic joint. (Cell count wbc 130K, +Calcium pyrophosphate); washout cxs 01/15 NGTD.    -- (01/17) MRI of L knee and pelvis obtained; which revealed extensive spread of inflammation / infection to thighs bilaterally.          -Left knee: large air-fluid collection extending supero-medially from knee joint space along medial femoral shaft, beyond field of view.          -Left thigh (per MRI pelvis): extensive myositis / fasciitis of Left thigh proximally, with soft tissue gas dissecting along the anterior compartment myofascial planes of the proximal left thigh.          -Right thigh (per MRI pelvis): myositis of largely the anterior compartment, with large complex lateral thigh abscess ([id number]), beginning at level of iliac crest and dissecting deep to superficial gluteal muscle fascia and TFL distally, possibly incompletely imaged.      -- IR drain placed 01/19 R lateral thigh abscess, cxs +MRSA.  Taken to OR 01/21 for repeat surgical I&D of L knee (#3), and R thigh abscess with new R thigh drain placed. Surgical cxs (01/21) of L knee and R thigh NGTD.    TODAY (01/25): Remains AF, VSS, HDS, wbc nml. CRP / CPK pending; previously uptrending; but now downtrend.  (down from 180).  nml (down from 316). Pain controlled. Pt improving.     Review of Systems   Constitutional:  Negative for chills and fever.   Respiratory:  Negative for cough.    Cardiovascular:  Positive for leg swelling.   Gastrointestinal:  Negative for abdominal pain and diarrhea.   Musculoskeletal:  Positive for arthralgias, gait problem, joint swelling and myalgias.   Skin:  Positive for wound. Negative for rash.   Neurological:  Negative for dizziness and seizures.   Psychiatric/Behavioral:  Negative for confusion.    All other systems reviewed and are negative.    Objective:     Vital Signs (Most Recent):  Temp: 98.6 °F (37 °C) (01/23/24 0837)  Pulse: 92 (01/23/24  0837)  Resp: 12 (01/23/24 0940)  BP: (!) 141/79 (01/23/24 0927)  SpO2: 97 % (01/23/24 0837) Vital Signs (24h Range):  Temp:  [97.3 °F (36.3 °C)-98.6 °F (37 °C)] 98.6 °F (37 °C)  Pulse:  [76-95] 92  Resp:  [12-20] 12  SpO2:  [92 %-98 %] 97 %  BP: (109-141)/(59-79) 141/79     Weight: 110.9 kg (244 lb 7.8 oz)  Body mass index is 32.26 kg/m².    Estimated Creatinine Clearance: 183.1 mL/min (based on SCr of 0.7 mg/dL).     Physical Exam  Vitals and nursing note reviewed.   Constitutional:       General: He is not in acute distress.     Appearance: Normal appearance. He is not ill-appearing, toxic-appearing or diaphoretic.   HENT:      Head: Normocephalic and atraumatic.   Eyes:      General: No scleral icterus.     Conjunctiva/sclera: Conjunctivae normal.   Cardiovascular:      Rate and Rhythm: Normal rate.      Heart sounds: Normal heart sounds. No murmur heard.  Pulmonary:      Effort: No respiratory distress.      Breath sounds: Normal breath sounds.   Abdominal:      General: Bowel sounds are normal. There is no distension.      Palpations: Abdomen is soft.      Tenderness: There is no abdominal tenderness.   Musculoskeletal:         General: Swelling and tenderness present.      Cervical back: No rigidity or tenderness.      Comments: Exam of LE limited by surgical dressings in place. Surgical drain at R thigh, draining bloody-SS fluid.    Skin:     General: Skin is warm and dry.      Coloration: Skin is not jaundiced.      Findings: No rash.   Neurological:      General: No focal deficit present.      Mental Status: He is alert and oriented to person, place, and time.   Psychiatric:         Behavior: Behavior normal.         Thought Content: Thought content normal.         Judgment: Judgment normal.          Significant Labs: Blood Culture:   Recent Labs   Lab 01/04/24  0522 01/04/24  0523 01/05/24  0716 01/07/24  1145 01/14/24 2039   LABBLOO Gram stain aer bottle: Gram positive cocci in clusters resembling Staph   Results called to and read back by: Ms. Ramón RN 01/05/2024  04:19  METHICILLIN RESISTANT STAPHYLOCOCCUS AUREUS  ID consult required at [facility] and [facility] locations.  * Gram stain aer bottle: Gram positive cocci in clusters resembling Staph  Results called to and read back by: Kalyn Correa RN  01/05/2024  14:32  METHICILLIN RESISTANT STAPHYLOCOCCUS AUREUS  ID consult required at [facility].  For susceptibility see order #[id number]  * No growth after 5 days.  Gram stain aer bottle: Gram positive cocci in clusters resembling Staph  Results called to and read back by: Tim Correa RN 01/07/2024  10:08  METHICILLIN RESISTANT STAPHYLOCOCCUS AUREUS  For susceptibility see order #[id number]  * No growth after 5 days.  No growth after 5 days. No growth after 5 days.  No growth after 5 days.       CBC:   Recent Labs   Lab 01/22/24  0500 01/23/24  0755   WBC 11.97 12.99*   HGB 8.3* 8.5*   HCT 28.0* 28.2*    462*       CMP:   Recent Labs   Lab 01/22/24  0500 01/23/24  0754    134*   K 4.2 3.8    101   CO2 26 26   * 124*   BUN 8 7   CREATININE 0.9 0.7   CALCIUM 8.6* 8.8   ANIONGAP 8 7*       Microbiology Results (last 7 days)       Procedure Component Value Units Date/Time    Culture, Anaerobe [[id number]] Collected: 01/19/24 1604    Order Status: Completed Specimen: Abscess from Leg, Right Updated: 01/23/24 0722     Anaerobic Culture Culture in progress    Narrative:      Rg thigh not leg    Culture, Anaerobic [[id number]] Collected: 01/21/24 1415    Order Status: Completed Specimen: Knee, Left Updated: 01/23/24 0628     Anaerobic Culture Culture in progress    Narrative:      Left knee    Culture, Anaerobic [[id number]] Collected: 01/21/24 1428    Order Status: Completed Specimen: Abscess from Leg, Right Updated: 01/23/24 0628     Anaerobic Culture Culture in progress    Narrative:      Right thigh abscess    AFB Culture & Smear [[id number]]  Collected: 01/21/24 1415    Order Status: Completed Specimen: Knee, Left Updated: 01/22/24 2127     AFB Culture & Smear Culture in progress     AFB CULTURE STAIN No acid fast bacilli seen.    Narrative:      Left knee    AFB Culture & Smear [[id number]] Collected: 01/21/24 1428    Order Status: Completed Specimen: Abscess from Leg, Right Updated: 01/22/24 2127     AFB Culture & Smear Culture in progress     AFB CULTURE STAIN No acid fast bacilli seen.    Narrative:      Right thigh abscess    Aerobic culture [[id number]] Collected: 01/21/24 1415    Order Status: Completed Specimen: Knee, Left Updated: 01/22/24 1015     Aerobic Bacterial Culture No growth    Narrative:      Left knee    Aerobic culture [[id number]] Collected: 01/21/24 1428    Order Status: Completed Specimen: Abscess from Leg, Right Updated: 01/22/24 1015     Aerobic Bacterial Culture No growth    Narrative:      Right thigh abscess    Aerobic culture [[id number]]  (Abnormal) Collected: 01/19/24 1604    Order Status: Completed Specimen: Abscess from Leg, Right Updated: 01/22/24 0949     Aerobic Bacterial Culture STAPHYLOCOCCUS AUREUS  Rare  Susceptibility pending      Narrative:      Rg thigh not leg    Culture, Anaerobe [[id number]] Collected: 01/15/24 1418    Order Status: Completed Specimen: Joint Fluid from Knee, Left Updated: 01/22/24 0721     Anaerobic Culture Culture in progress    Gram stain [[id number]] Collected: 01/21/24 1415    Order Status: Completed Specimen: Knee, Left Updated: 01/21/24 1621     Gram Stain Result Few WBC's      No organisms seen    Narrative:      Left knee    Gram stain [[id number]] Collected: 01/21/24 1428    Order Status: Completed Specimen: Abscess from Leg, Right Updated: 01/21/24 1557     Gram Stain Result Rare WBC's      No organisms seen    Narrative:      Right thigh abscess    Fungus culture [[id number]] Collected: 01/21/24 1428    Order Status: Sent Specimen: Abscess from Leg, Right Updated: 01/21/24 1450     Fungus culture [[id number]] Collected: 01/21/24 1415    Order Status: Sent Specimen: Knee, Left Updated: 01/21/24 1446    Blood culture x two cultures. Draw prior to antibiotics. [[id number]] Collected: 01/14/24 2039    Order Status: Completed Specimen: Blood from Peripheral, Upper Arm, Right Updated: 01/19/24 2212     Blood Culture, Routine No growth after 5 days.    Narrative:      Aerobic and anaerobic    Blood culture x two cultures. Draw prior to antibiotics. [[id number]] Collected: 01/14/24 2039    Order Status: Completed Specimen: Blood from Peripheral, Forearm, Right Updated: 01/19/24 2212     Blood Culture, Routine No growth after 5 days.    Narrative:      Aerobic and anaerobic    Gram stain [[id number]] Collected: 01/19/24 1604    Order Status: Completed Specimen: Abscess from Leg, Right Updated: 01/19/24 1849     Gram Stain Result Rare WBC's      No organisms seen    Narrative:      Rg thigh not leg    Fungus culture [[id number]] Collected: 01/19/24 1604    Order Status: Sent Specimen: Abscess from Leg, Right Updated: 01/19/24 1749    Aerobic culture [[id number]] Collected: 01/15/24 1419    Order Status: Completed Specimen: Knee, Left Updated: 01/18/24 0955     Aerobic Bacterial Culture No growth    AFB Culture & Smear [[id number]] Collected: 01/15/24 1418    Order Status: Completed Specimen: Joint Fluid from Knee, Left Updated: 01/16/24 2128     AFB Culture & Smear Culture in progress     AFB CULTURE STAIN No acid fast bacilli seen.          Pathology Results  (Last 10 years)                 03/21/22 1419  Specimen to Pathology, Surgery Gastrointestinal tract Final result    Narrative:  Pre-op Diagnosis: Peterson grade C esophagitis [K20.80]   Candida esophagitis [B37.81]   Procedure(s):   EGD (ESOPHAGOGASTRODUODENOSCOPY)   1. Antrum bx r/o h pylori   2. Distal esophagus bx   Release to patient->Immediate   Specimen total (fresh, frozen, permanent):->2       01/31/22 1156  Specimen to Pathology,  Surgery Gastrointestinal tract Final result    Narrative:  Pre-op Diagnosis: Nausea and vomiting, intractability of   vomiting not specified, unspecified vomiting type [R11.2]   Diabetic gastroparesis associated with type 2 diabetes   mellitus [E11.43, K31.84]   Procedure(s):   EGD (ESOPHAGOGASTRODUODENOSCOPY)   Number of specimens: 2   Name of specimens:   #1 Gastric Bxs r/o H Pylori   #2 Esophageal Bxs r/o Candida   Release to patient->Immediate   Specimen total (fresh, frozen, permanent):->2             All pertinent labs within the past 24 hours have been reviewed.    Significant Imaging: I have reviewed all pertinent imaging results/findings within the past 24 hours.    I have personally reviewed records / hospital notes from   service and other specialty providers. I have also reviewed CBC, CMP/BMP,  cultures and imaging with my interpretation as documented in my assessment / plan.    Patient is high risk for infectious complications given pt's age, multiple co-morbidities, and case complexity.      Time: 50 minutes   50% of time spent on face-to-face counseling and coordination of care. Counseling included review of test results, diagnosis, and treatment plan with patient and/or family.

## 2024-01-23 NOTE — PROGRESS NOTES
[facility]  Physical Medicine & Rehab  Progress Note    Patient Name: Kulwant Mueller Jr.  MRN: [id number]  Admission Date: 1/14/2024  Length of Stay: 7 days  Attending Physician: Lizbeth Marquez MD    Subjective:     Principal Problem:Staphylococcal arthritis of left knee    Hospital Course:   1/18/24: Participated w/ OT. Bed mob mod-maxA. LBD totalA.     Interval History 1/23/2024:  Patient is seen for follow-up PM&R evaluation and recommendations: Drain in place. Working with therapy this morning.     HPI, Past Medical, Family, and Social History remains the same as documented in the initial encounter.    Scheduled Medications:    acetaminophen  1,000 mg Oral Q8H    ammonium lactate   Topical (Top) BID    DAPTOmycin (CUBICIN) 945 mg in sodium chloride 0.9% SolP 50 mL IVPB  10 mg/kg (Adjusted) Intravenous Q24H    insulin aspart U-100  8 Units Subcutaneous TIDWM    insulin detemir U-100  17 Units Subcutaneous BID    methocarbamoL  500 mg Oral QID    metoclopramide HCl  5 mg Oral TID AC    metoprolol succinate  12.5 mg Oral Daily    oxyCODONE  5 mg Oral Q6H    pantoprazole  40 mg Oral Daily    rivaroxaban  10 mg Oral Daily with dinner    sodium chloride 0.9%  10 mL Intravenous Q6H     Diagnostic Results:   Labs: Reviewed  ECG: Reviewed  CT: Reviewed    PRN Medications: dextrose 10%, dextrose 10%, glucagon (human recombinant), glucose, glucose, insulin aspart U-100, loperamide, melatonin, morphine, naloxone, ondansetron, ondansetron, oxyCODONE, oxyCODONE, prochlorperazine, scopolamine, sodium chloride 0.9%, sodium chloride 0.9%, Flushing PICC/Midline Protocol **AND** sodium chloride 0.9% **AND** sodium chloride 0.9%, sodium chloride 0.9%    Review of Systems   Constitutional:  Positive for activity change and fatigue. Negative for fever.   Respiratory:  Negative for cough and shortness of breath.    Musculoskeletal:  Positive for gait problem.   Neurological:  Positive for weakness (LLE and LUE).    Psychiatric/Behavioral:  Negative for agitation, behavioral problems and confusion.      Objective:     Vital Signs (Most Recent):  Temp: 98.3 °F (36.8 °C) (01/23/24 0515)  Pulse: 89 (01/23/24 0515)  Resp: 20 (01/23/24 0515)  BP: 137/79 (01/23/24 0515)  SpO2: (!) 92 % (01/23/24 0515)    Vital Signs (24h Range):  Temp:  [97.3 °F (36.3 °C)-98.4 °F (36.9 °C)] 98.3 °F (36.8 °C)  Pulse:  [76-95] 89  Resp:  [14-20] 20  SpO2:  [92 %-98 %] 92 %  BP: (109-137)/(59-79) 137/79     Physical Exam  Vitals and nursing note reviewed.   Constitutional:       Appearance: Normal appearance.   HENT:      Head: Normocephalic and atraumatic.      Mouth/Throat:      Mouth: Mucous membranes are moist.   Eyes:      Extraocular Movements: Extraocular movements intact.      Pupils: Pupils are equal, round, and reactive to light.   Pulmonary:      Effort: Pulmonary effort is normal. No respiratory distress.   Musculoskeletal:      Cervical back: Normal range of motion and neck supple.      Comments: LUE 4/5  LLE 3/5 with dressings in place     Skin:     General: Skin is warm.   Neurological:      Mental Status: He is alert and oriented to person, place, and time. Mental status is at baseline.      Motor: Weakness present.      Gait: Gait abnormal.   Psychiatric:         Mood and Affect: Mood normal.         Behavior: Behavior normal.     Assessment/Plan:      * Staphylococcal arthritis of left knee  - S/p L knee arthrotomy with synovectomy on 12/29. Bcx with staph aureus  - Taken to OR and s/p L knee arthroscopic I&D on 1/17, Cxs were NGTD.   - Per Ortho WBAT to LLE.    - ID consulted recommending continue daptomycin/ceftaroline for now and will follow but anticipating another 4 weeks of abx from last washout (est end date: 2/11/24).   - IR aspiration of R thigh abscess with 120 cc of bloody purulent fluid. Gram stain negative, cx pending. Despite negative cx, given report from IR of purulence in aspirate in setting of known infection. Ortho  proceeded with I&D of right thigh abscess with drain remain in  place and repeat I&D of L knee 1/21    Impaired mobility  - Related to prolonged/acute hospital course.     Recommendations  -  Encourage mobility, OOB in chair at least 3 hours per day, and early ambulation as appropriate  -  PT/OT evaluate and treat  -  Pain management  -  Monitor for and prevent skin breakdown and pressure ulcers  Early mobility, repositioning/weight shifting every 20-30 minutes when sitting, turn patient every 2 hours, proper mattress/overlay and chair cushioning, pressure relief/heel protector boots  -  DVT prophylaxis    -  Reviewed discharge options (IP rehab, SNF, HH therapy, and OP therapy)     Abscess of right thigh  - Ortho proceeded with I&D of right thigh abscess with drain remain in place on 1/21    Elevated CK  - Trending     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     The patient will be admitted for comprehensive interdisciplinary inpatient rehabilitation to address the impairments due to medical diagnosis of Staphylococcal arthritis of left knee. The patient will benefit from an inpatient rehabilitation program to promote functional recovery, implement compensatory strategies and will undergo assessment for needs for durable medical equipment for safe discharge to the community. This patient will benefit from a coordinated interdisciplinary rehabilitation program services that require close monitoring and treatment with 24-hour rehabilitative nursing and  physical/occupational therapies for 3 hours/day for 5 days/week.This interdisciplinary program will be performed under the direction of a physiatrist.    MEDICAL STABILITY:     At this time, barriers for post-acute rehab admission include continue to trend CPK and plan for drain.     We will continue to follow.     Yudy Whitmore NP  Department of Physical Medicine & Rehab   [facility]

## 2024-01-23 NOTE — ASSESSMENT & PLAN NOTE
Patient has hyponatremia which is controlled,  We will monitor sodium Daily.   The hyponatremia is due to Dehydration/hypovolemia.  Urine sodium, urine osmolality, serum osmolality or TSH, T4 reviewed  S/p IVF   Recent Labs   Lab 01/23/24  0754   *

## 2024-01-23 NOTE — PLAN OF CARE
Problem: Adult Inpatient Plan of Care  Goal: Plan of Care Review  Outcome: Ongoing, Progressing  Goal: Patient-Specific Goal (Individualized)  Outcome: Ongoing, Progressing  Goal: Absence of Hospital-Acquired Illness or Injury  Outcome: Ongoing, Progressing  Goal: Optimal Comfort and Wellbeing  Outcome: Ongoing, Progressing  Goal: Readiness for Transition of Care  Outcome: Ongoing, Progressing     Problem: Diabetes Comorbidity  Goal: Blood Glucose Level Within Targeted Range  Outcome: Ongoing, Progressing     Problem: Infection  Goal: Absence of Infection Signs and Symptoms  Outcome: Ongoing, Progressing   Informed family no surgery indicated  for tomorrow per notes , Father at bedside

## 2024-01-23 NOTE — ASSESSMENT & PLAN NOTE
I have reviewed hospital notes from   service and other specialty providers. I have also reviewed CBC, CMP/BMP,  cultures and imaging with my interpretation as documented.      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 (Surgical cxs +MRSA). ROBY 1/02/24 neg. Pt required salvage therapy dapto/ceftaroline & bld cxs finally cleared 1/07, and then placed on daptomycin monotherapy to complete 4wks duration (ASYA 02/06) per [facility] (Yana). Of note, L shoulder imaging showed subdeltoid bursitis w/ SS tear; superimposed infection could be present but no drainable fluid collections. Also, R heel without osteo but had cellulitis, myositis, tenosynovitis.            Pt was discharged (01/10) to rehab with daptomycin monotherapy; but started having new fevers (up to 103F) at rehab, and is now re-admitted (01/14) for workup. Blood cultures 01/14/24 NGTD. , mild leukocytosis 15. MRI T/L spine negative for osteo-discitis. ID was consulted for recent MRSA bacteremia from septic arthritis - new fevers @ IP rehab, on daptomycin.     -- OR 01/15 for surgical I&D native L knee septic joint. (Cell count wbc 130K, +Calcium pyrophosphate); washout cxs 01/15 NGTD.  -- (01/17) MRI of L knee and pelvis obtained; which revealed extensive spread of inflammation / infection to thighs bilaterally.          -Left knee: large air-fluid collection extending supero-medially from knee joint space along medial femoral shaft, beyond field of view.          -Left thigh (per MRI pelvis): extensive myositis / fasciitis of Left thigh proximally, with soft tissue gas dissecting along the anterior compartment myofascial planes of the proximal left thigh.          -Right thigh (per MRI pelvis):  myositis of largely the anterior compartment, with large complex lateral thigh abscess ([id number]), beginning at level of iliac crest and dissecting deep to superficial gluteal muscle fascia and TFL distally, possibly incompletely imaged.     -- IR drain placed 01/19 R lateral thigh abscess, cxs +MRSA.  Taken to OR 01/21 for repeat surgical I&D of L knee (#3), and R thigh abscess with new R thigh drain placed. Surgical cxs (01/21) of L knee and R thigh NGTD.    -- Pt maintained on Daptomycin monotherapy; overall clinically improving with abx and surgical interventions for improved source control. Since surgical wash-out #2 done (01/15), pt initially had up-trending CRP (180) / ; but now down-trend;  / ; with further improvement in leg swelling / pain bilaterally. Otherwise remains AF, VSS, HDS.       Recommendations / Plan:  Continue Iv-daptomycin 8mg/kg Q24h ()  To complete 6 weeks of abx from last washout (01/21); ASYA 03/03/24  Recommend repeat imaging of R thigh near / prior to ASYA 03/03/24 -- to ensure R thigh abscess / myositis resolved. May consider repeat imaging of L thigh / knee as well if pain / swelling persists or worsens.   Plans for hosp d/c back to [facility] rehab today      -- ID will schedule pt for ID clinic f/u appt   -- Discussed with ID staff and primary team.  -- ID will sign off at this time. Please see OPAT note below for outpt abx plans.       Outpatient Antibiotic Therapy Plan:    Please send referral to [facility].    1) Infection :   Disseminated MRSA bacteremia; with L native knee septic joint with L thigh myositis, and R thigh abscess w/ myositis.    2) Discharge Antibiotics:     Intravenous antibiotics:  IV - Daptomycin 8mg/kg  Q24h      3) Therapy Duration:  6wks s/p 01/21    Estimated end date of IV antibiotics:   03/03/24    4) Outpatient Weekly Labs:    Order the following labs to be drawn on Mondays:   CBC  CMP   CRP  CPK  (when on Daptomycin)    5) Fax Lab Results to Infectious Diseases Provider:  Lefty Amanda PA-C    [facility] Fax Number: [phone]    6) Outpatient Infectious Diseases Follow-up    Follow-up appointment will be arranged by the ID clinic and will be found in the patient's appointments tab.    Prior to discharge, please ensure the patient's follow-up appt has been scheduled with ID-Clinic -- for patient convenience and continuity of care.  If there is still no follow-up scheduled prior to discharge, please send an EPIC message to  Ana Seymour LPN  in Infectious Diseases.

## 2024-01-23 NOTE — PROGRESS NOTES
[facility] - Observation 11H  Infectious Disease  Progress Note    Patient Name: Kulwant Mueller Jr.  MRN: [id number]  Admission Date: 1/14/2024  Length of Stay: 6 days  Attending Physician: Lizbeth Marquez MD  Primary Care Provider: Shellie, Primary Doctor    Isolation Status: Contact  Assessment/Plan:      ID  * Staphylococcal arthritis of left knee  4I have reviewed hospital notes from   service and other specialty providers. I have also reviewed CBC, CMP/BMP,  cultures and imaging with my interpretation as documented.      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 (Surgical cxs +MRSA). ROBY 1/02/24 neg. Pt required salvage therapy dapto/ceftaroline & bld cxs finally cleared 1/07, and then placed on daptomycin monotherapy to complete 4wks duration (ASYA 02/06) per [place] [facility] (Yana). Of note, L shoulder imaging showed subdeltoid bursitis w/ SS tear; superimposed infection could be present but no drainable fluid collections. Also, R heel without osteo but had cellulitis, myositis, tenosynovitis.            Pt was discharged (01/10) to rehab with daptomycin monotherapy; but started having new fevers (up to 103F) at rehab, and is now re-admitted (01/14) for workup. Blood cultures 01/14/24 NGTD. , mild leukocytosis 15. MRI T/L spine negative for osteo-discitis. ID was consulted for recent MRSA bacteremia from septic arthritis - new fevers @ IP rehab, on daptomycin.     -- OR 01/15 for surgical I&D native L knee septic joint. (Cell count wbc 130K, +Calcium pyrophosphate); washout cxs 01/15 NGTD.  -- (01/17) MRI of L knee and pelvis obtained; which revealed extensive spread of inflammation / infection to thighs bilaterally.          -Left knee: large air-fluid  collection extending supero-medially from knee joint space along medial femoral shaft, beyond field of view.          -Left thigh (per MRI pelvis): extensive myositis / fasciitis of Left thigh proximally, with soft tissue gas dissecting along the anterior compartment myofascial planes of the proximal left thigh.          -Right thigh (per MRI pelvis): myositis of largely the anterior compartment, with large complex lateral thigh abscess ([id number]), beginning at level of iliac crest and dissecting deep to superficial gluteal muscle fascia and TFL distally, possibly incompletely imaged.     -- IR drain placed 01/19 R lateral thigh abscess, cxs +MRSA.  Taken to OR 01/21 for repeat surgical I&D of L knee (#3), and R thigh abscess with new R thigh drain placed. Surgical cxs (01/21) of L knee and R thigh NGTD.    -- Pt maintained on Daptomycin monotherapy; overall clinically improving with abx and surgical interventions for improved source control. Pt remains afebrile, wbc nml, VSS, HDS. CRP uptrending 170 (up from 140); possibly d/t post-op surgical I&D 01/21 of L knee and R thigh abscess.       Recommendations / Plan:  Continue Iv-daptomycin 8mg/kg Q24h ()  Anticipate at least 4 weeks of abx from last washout; but ultimately abx duration until radiographic resolution of abscess / SSTI.  Continue to trend CPK, CRP, wbc, vitals, and pt clinical response       -- Discussed with ID staff and primary team   -- ID will continue to follow w/ further recs.      Thank you for your consult. I will follow-up with patient. Please contact us if you have any additional questions.    Lefty Amanda PA-C  Infectious Disease  [facility]    Subjective:     Principal Problem:Staphylococcal arthritis of left knee    HPI: Mr. De Anda is a pleasant 38 y.o. man with May-Thurner syndrome on Xarelto, DM2 on insulin, chronic foot wounds following with wound care, and HTN, recent admission at [facility] for MRSA  bacteremia. Index Bcx + 12/27/23, complicated by left knee septic arthritis, s/p L knee arthrotomy with synovectomy on 12/29. Cx with MRSA, ROBY 1/02/24 neg, required salvage therapy & cleared 1/07, placed on daptomycin monotherapy. Of note, the patient also had L shoulder aspirated imaging without evidence of infection. R heel without osteo but had cellulitis, myositis, tenosynovitis. He started having new fevers at rehab, and is now admitted for workup. Fevers were up to 103 F, blood cultures collected 1/14/24 are NGTD. ID was consulted for recent MRSA bacteremia from septic arthritis - new fevers @ IP rehab, on daptomycin. In the meantime, the patient was taken to the OR on the 15th for arthroscopic washout. Cultures are also NGTD. The patient is feeling better - denies fever or chills.      Interval History: NAEON. Remains AF, VSS, HDS, wbc nml. Maintained on daptomycin, overall improving with source control efforts. CRP uptrending 170 (up from 130), possibly d/t post-op surgical I&D L knee and R thigh 01/21. Pt stable.     Review of Systems   Constitutional:  Negative for chills and fever.   Respiratory:  Negative for cough.    Cardiovascular:  Positive for leg swelling.   Gastrointestinal:  Negative for abdominal pain and diarrhea.   Musculoskeletal:  Positive for arthralgias, gait problem, joint swelling and myalgias.   Skin:  Positive for wound. Negative for rash.   Neurological:  Negative for dizziness and seizures.   Psychiatric/Behavioral:  Negative for confusion.    All other systems reviewed and are negative.    Objective:     Vital Signs (Most Recent):  Temp: 98 °F (36.7 °C) (01/22/24 1731)  Pulse: 95 (01/22/24 1731)  Resp: 14 (01/22/24 1731)  BP: 131/67 (01/22/24 1731)  SpO2: 98 % (01/22/24 1731) Vital Signs (24h Range):  Temp:  [98 °F (36.7 °C)-99.2 °F (37.3 °C)] 98 °F (36.7 °C)  Pulse:  [89-96] 95  Resp:  [14-16] 14  SpO2:  [95 %-98 %] 98 %  BP: (125-145)/(67-81) 131/67     Weight: 110.8 kg (244 lb  4.3 oz)  Body mass index is 32.23 kg/m².    Estimated Creatinine Clearance: 142.4 mL/min (based on SCr of 0.9 mg/dL).     Physical Exam  Vitals and nursing note reviewed.   Constitutional:       General: He is not in acute distress.     Appearance: Normal appearance. He is not ill-appearing, toxic-appearing or diaphoretic.   HENT:      Head: Normocephalic and atraumatic.   Eyes:      General: No scleral icterus.     Conjunctiva/sclera: Conjunctivae normal.   Cardiovascular:      Rate and Rhythm: Normal rate.      Heart sounds: Normal heart sounds. No murmur heard.  Pulmonary:      Effort: No respiratory distress.      Breath sounds: Normal breath sounds.   Abdominal:      General: Bowel sounds are normal. There is no distension.      Palpations: Abdomen is soft.      Tenderness: There is no abdominal tenderness.   Musculoskeletal:         General: Swelling and tenderness present.      Cervical back: No rigidity or tenderness.      Comments: Exam of LE limited by surgical dressings in place. Surgical drain at R thigh, draining bloody-SS fluid.    Skin:     General: Skin is warm and dry.      Coloration: Skin is not jaundiced.      Findings: No rash.   Neurological:      General: No focal deficit present.      Mental Status: He is alert and oriented to person, place, and time.   Psychiatric:         Behavior: Behavior normal.         Thought Content: Thought content normal.         Judgment: Judgment normal.          Significant Labs: Blood Culture:   Recent Labs   Lab 01/04/24  0522 01/04/24  0523 01/05/24  0716 01/07/24  1145 01/14/24 2039   LABBLOO Gram stain aer bottle: Gram positive cocci in clusters resembling Staph  Results called to and read back by: Ms. Ramón RN 01/05/2024  04:19  METHICILLIN RESISTANT STAPHYLOCOCCUS AUREUS  ID consult required at [facility] and [facility] locations.  * Gram stain aer bottle: Gram positive cocci in clusters resembling Staph  Results called to and read back by:  Kalyn Correa RN  01/05/2024  14:32  METHICILLIN RESISTANT STAPHYLOCOCCUS AUREUS  ID consult required at [facility], [facility],[place] and [facility] locations.  For susceptibility see order #[id number]  * No growth after 5 days.  Gram stain aer bottle: Gram positive cocci in clusters resembling Staph  Results called to and read back by: Tim Correa RN 01/07/2024  10:08  METHICILLIN RESISTANT STAPHYLOCOCCUS AUREUS  For susceptibility see order #[id number]  * No growth after 5 days.  No growth after 5 days. No growth after 5 days.  No growth after 5 days.     CBC:   Recent Labs   Lab 01/21/24  0626 01/22/24  0500   WBC 10.80 11.97   HGB 8.6* 8.3*   HCT 28.3* 28.0*   * 419     CMP:   Recent Labs   Lab 01/21/24  0626 01/22/24  0500   * 137   K 3.8 4.2    103   CO2 27 26   * 122*   BUN 7 8   CREATININE 0.6 0.9   CALCIUM 8.6* 8.6*   ANIONGAP 6* 8     Microbiology Results (last 7 days)       Procedure Component Value Units Date/Time    AFB Culture & Smear [[id number]] Collected: 01/21/24 1415    Order Status: Completed Specimen: Knee, Left Updated: 01/22/24 1344     AFB CULTURE STAIN No acid fast bacilli seen.    Narrative:      Left knee    AFB Culture & Smear [[id number]] Collected: 01/21/24 1428    Order Status: Completed Specimen: Abscess from Leg, Right Updated: 01/22/24 1344     AFB CULTURE STAIN No acid fast bacilli seen.    Narrative:      Right thigh abscess    Aerobic culture [[id number]] Collected: 01/21/24 1415    Order Status: Completed Specimen: Knee, Left Updated: 01/22/24 1015     Aerobic Bacterial Culture No growth    Narrative:      Left knee    Aerobic culture [[id number]] Collected: 01/21/24 1428    Order Status: Completed Specimen: Abscess from Leg, Right Updated: 01/22/24 1015     Aerobic Bacterial Culture No growth    Narrative:      Right thigh abscess    Aerobic culture [[id number]]  (Abnormal) Collected: 01/19/24 1604    Order Status: Completed Specimen: Abscess from Leg, Right  Updated: 01/22/24 0949     Aerobic Bacterial Culture STAPHYLOCOCCUS AUREUS  Rare  Susceptibility pending      Narrative:      Rg thigh not leg    Culture, Anaerobe [[id number]] Collected: 01/15/24 1418    Order Status: Completed Specimen: Joint Fluid from Knee, Left Updated: 01/22/24 0721     Anaerobic Culture Culture in progress    Gram stain [[id number]] Collected: 01/21/24 1415    Order Status: Completed Specimen: Knee, Left Updated: 01/21/24 1621     Gram Stain Result Few WBC's      No organisms seen    Narrative:      Left knee    Gram stain [[id number]] Collected: 01/21/24 1428    Order Status: Completed Specimen: Abscess from Leg, Right Updated: 01/21/24 1557     Gram Stain Result Rare WBC's      No organisms seen    Narrative:      Right thigh abscess    Culture, Anaerobic [[id number]] Collected: 01/21/24 1428    Order Status: Sent Specimen: Abscess from Leg, Right Updated: 01/21/24 1451    Fungus culture [[id number]] Collected: 01/21/24 1428    Order Status: Sent Specimen: Abscess from Leg, Right Updated: 01/21/24 1450    Culture, Anaerobic [[id number]] Collected: 01/21/24 1415    Order Status: Sent Specimen: Knee, Left Updated: 01/21/24 1448    Fungus culture [[id number]] Collected: 01/21/24 1415    Order Status: Sent Specimen: Knee, Left Updated: 01/21/24 1446    Culture, Anaerobe [[id number]] Collected: 01/19/24 1604    Order Status: Completed Specimen: Abscess from Leg, Right Updated: 01/20/24 1451     Anaerobic Culture Culture in progress    Narrative:      Rg thigh not leg    Blood culture x two cultures. Draw prior to antibiotics. [[id number]] Collected: 01/14/24 2039    Order Status: Completed Specimen: Blood from Peripheral, Upper Arm, Right Updated: 01/19/24 2212     Blood Culture, Routine No growth after 5 days.    Narrative:      Aerobic and anaerobic    Blood culture x two cultures. Draw prior to antibiotics. [[id number]] Collected: 01/14/24 2039    Order Status: Completed Specimen: Blood from  Peripheral, Forearm, Right Updated: 01/19/24 2212     Blood Culture, Routine No growth after 5 days.    Narrative:      Aerobic and anaerobic    Gram stain [[id number]] Collected: 01/19/24 1604    Order Status: Completed Specimen: Abscess from Leg, Right Updated: 01/19/24 1849     Gram Stain Result Rare WBC's      No organisms seen    Narrative:      Rg thigh not leg    Fungus culture [[id number]] Collected: 01/19/24 1604    Order Status: Sent Specimen: Abscess from Leg, Right Updated: 01/19/24 1749    Aerobic culture [[id number]] Collected: 01/15/24 1419    Order Status: Completed Specimen: Knee, Left Updated: 01/18/24 0955     Aerobic Bacterial Culture No growth    AFB Culture & Smear [[id number]] Collected: 01/15/24 1418    Order Status: Completed Specimen: Joint Fluid from Knee, Left Updated: 01/16/24 2128     AFB Culture & Smear Culture in progress     AFB CULTURE STAIN No acid fast bacilli seen.    Fungus culture [[id number]] Collected: 01/15/24 1418    Order Status: Completed Specimen: Joint Fluid from Knee, Left Updated: 01/16/24 0920     Fungus (Mycology) Culture Culture in progress          Pathology Results  (Last 10 years)                 03/21/22 1419  Specimen to Pathology, Surgery Gastrointestinal tract Final result    Narrative:  Pre-op Diagnosis: [place] grade C esophagitis [K20.80]   Candida esophagitis [B37.81]   Procedure(s):   EGD (ESOPHAGOGASTRODUODENOSCOPY)   1. Antrum bx r/o h pylori   2. Distal esophagus bx   Release to patient->Immediate   Specimen total (fresh, frozen, permanent):->2       01/31/22 1156  Specimen to Pathology, Surgery Gastrointestinal tract Final result    Narrative:  Pre-op Diagnosis: Nausea and vomiting, intractability of   vomiting not specified, unspecified vomiting type [R11.2]   Diabetic gastroparesis associated with type 2 diabetes   mellitus [E11.43, K31.84]   Procedure(s):   EGD (ESOPHAGOGASTRODUODENOSCOPY)   Number of specimens: 2   Name of specimens:   #1  Gastric Bxs r/o H Pylori   #2 Esophageal Bxs r/o Candida   Release to patient->Immediate   Specimen total (fresh, frozen, permanent):->2             All pertinent labs within the past 24 hours have been reviewed.    Significant Imaging: I have reviewed all pertinent imaging results/findings within the past 24 hours.    I have personally reviewed records / hospital notes from   service and other specialty providers. I have also reviewed CBC, CMP/BMP,  cultures and imaging with my interpretation as documented in my assessment / plan.    Patient is high risk for infectious complications given pt's age, multiple co-morbidities, and case complexity.      Time: 50 minutes   50% of time spent on face-to-face counseling and coordination of care. Counseling included review of test results, diagnosis, and treatment plan with patient and/or family.

## 2024-01-23 NOTE — ASSESSMENT & PLAN NOTE
Fever  - reported fevers of 103 @ [facility] on Saturday and temp 100 prior to transfer  - s/p blood cultures   - Pt with c/o of right sided Back pain - given MRSA bacteremia, pt is at risk for metastatic site of infection obtain MRI T-L spine with Contrast to r/o spinal source of infection   - ESR 97/ .9, trending and improved  - Infectious disease consult - to assist in continuing Daptomycin approval & infectious w/u    >patient had ROBY performed on 1/02 to r/o endocarditis.   - continue daptomycin and cerftaroline  - , trending  - CRP trending, 105.8>139.4>170  - repeat blood cultures NGTD

## 2024-01-23 NOTE — ASSESSMENT & PLAN NOTE
Patient's FSGs are uncontrolled due to hyperglycemia on current medication regimen.  Last A1c reviewed-   Lab Results   Component Value Date    HGBA1C 7.4 (H) 12/28/2023     Most recent fingerstick glucose reviewed-   Recent Labs   Lab 01/22/24  1628 01/22/24  2135 01/23/24  0838 01/23/24  1239   POCTGLUCOSE 172* 149* 120* 150*       Current correctional scale  Low  Maintain anti-hyperglycemic dose as follows-   Antihyperglycemics (From admission, onward)    Start     Stop Route Frequency Ordered    01/23/24 1130  insulin aspart U-100 pen 3 Units         -- SubQ 3 times daily with meals 01/23/24 0933    01/16/24 2100  insulin detemir U-100 (Levemir) pen 17 Units         -- SubQ 2 times daily 01/16/24 1657    01/15/24 0329  insulin aspart U-100 pen 0-5 Units         -- SubQ Before meals & nightly PRN 01/15/24 0231        Hold Oral hypoglycemics while patient is in the hospital.

## 2024-01-23 NOTE — NURSING
Pt AAOX4. No distress noted. Bed in lowest position. Side rails up x2. Call bell and personal belongs within reach. Safety precautions maintained. Pt free of falls or injuries. No further issues or concerns at this time. Plan of care continues

## 2024-01-23 NOTE — ASSESSMENT & PLAN NOTE
- S/p L knee arthrotomy with synovectomy on 12/29. Bcx with staph aureus  - Taken to OR and s/p L knee arthroscopic I&D on 1/17, Cxs were NGTD.   - Per Ortho WBAT to LLE.    - ID consulted recommending continue daptomycin/ceftaroline for now and will follow but anticipating another 4 weeks of abx from last washout (est end date: 2/11/24).   - IR aspiration of R thigh abscess with 120 cc of bloody purulent fluid. Gram stain negative, cx pending. Despite negative cx, given report from IR of purulence in aspirate in setting of known infection. Ortho proceeded with I&D of right thigh abscess with drain remain in  place and repeat I&D of L knee 1/21

## 2024-01-23 NOTE — ASSESSMENT & PLAN NOTE
4I have reviewed hospital notes from   service and other specialty providers. I have also reviewed CBC, CMP/BMP,  cultures and imaging with my interpretation as documented.      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 (Surgical cxs +MRSA). ROBY 1/02/24 neg. Pt required salvage therapy dapto/ceftaroline & bld cxs finally cleared 1/07, and then placed on daptomycin monotherapy to complete 4wks duration (ASYA 02/06) per [place] [facility] (Yana). Of note, L shoulder imaging showed subdeltoid bursitis w/ SS tear; superimposed infection could be present but no drainable fluid collections. Also, R heel without osteo but had cellulitis, myositis, tenosynovitis.            Pt was discharged (01/10) to rehab with daptomycin monotherapy; but started having new fevers (up to 103F) at rehab, and is now re-admitted (01/14) for workup. Blood cultures 01/14/24 NGTD. , mild leukocytosis 15. MRI T/L spine negative for osteo-discitis. ID was consulted for recent MRSA bacteremia from septic arthritis - new fevers @ IP rehab, on daptomycin.     -- OR 01/15 for surgical I&D native L knee septic joint. (Cell count wbc 130K, +Calcium pyrophosphate); washout cxs 01/15 NGTD.  -- (01/17) MRI of L knee and pelvis obtained; which revealed extensive spread of inflammation / infection to thighs bilaterally.          -Left knee: large air-fluid collection extending supero-medially from knee joint space along medial femoral shaft, beyond field of view.          -Left thigh (per MRI pelvis): extensive myositis / fasciitis of Left thigh proximally, with soft tissue gas dissecting along the anterior compartment myofascial planes of the proximal left thigh.          -Right thigh (per MRI pelvis):  myositis of largely the anterior compartment, with large complex lateral thigh abscess ([id number]), beginning at level of iliac crest and dissecting deep to superficial gluteal muscle fascia and TFL distally, possibly incompletely imaged.     -- IR drain placed 01/19 R lateral thigh abscess, cxs +MRSA.  Taken to OR 01/21 for repeat surgical I&D of L knee (#3), and R thigh abscess with new R thigh drain placed. Surgical cxs (01/21) of L knee and R thigh NGTD.    -- Pt maintained on Daptomycin monotherapy; overall clinically improving with abx and surgical interventions for improved source control. Pt remains afebrile, wbc nml, VSS, HDS. CRP uptrending 170 (up from 140); possibly d/t post-op surgical I&D 01/21 of L knee and R thigh abscess.       Recommendations / Plan:  Continue Iv-daptomycin 8mg/kg Q24h ()  Anticipate at least 4 weeks of abx from last washout; but ultimately abx duration until radiographic resolution of abscess / SSTI.  Continue to trend CPK, CRP, wbc, vitals, and pt clinical response       -- Discussed with ID staff and primary team   -- ID will continue to follow w/ further recs.

## 2024-01-23 NOTE — SUBJECTIVE & OBJECTIVE
Interval History: NAEON. Pt seen and evaluated at bedside this am. Pt is doing well this am and reports pain is well controlled. Small amount of blood seen in drainage. Pt switched to dapto monotherapy per ID today. Will obtain CRP and CK for trending tomorrow.     Review of Systems   Constitutional:  Positive for activity change. Negative for chills and fever.   Respiratory:  Negative for cough.    Cardiovascular:  Positive for leg swelling.   Gastrointestinal:  Negative for abdominal pain and diarrhea.   Musculoskeletal:  Positive for arthralgias, gait problem, joint swelling and myalgias.   Skin:  Positive for wound. Negative for rash.   Neurological:  Negative for dizziness and seizures.   Psychiatric/Behavioral:  Negative for confusion.    All other systems reviewed and are negative.    Objective:     Vital Signs (Most Recent):  Temp: 98.6 °F (37 °C) (01/23/24 0837)  Pulse: 92 (01/23/24 0837)  Resp: 12 (01/23/24 0940)  BP: (!) 141/79 (01/23/24 0927)  SpO2: 97 % (01/23/24 0837) Vital Signs (24h Range):  Temp:  [97.3 °F (36.3 °C)-98.6 °F (37 °C)] 98.6 °F (37 °C)  Pulse:  [76-95] 92  Resp:  [12-20] 12  SpO2:  [92 %-98 %] 97 %  BP: (109-141)/(59-79) 141/79     Weight: 110.9 kg (244 lb 7.8 oz)  Body mass index is 32.26 kg/m².    Intake/Output Summary (Last 24 hours) at 1/23/2024 0954  Last data filed at 1/23/2024 0706  Gross per 24 hour   Intake 700 ml   Output 1670 ml   Net -970 ml         Physical Exam  Vitals and nursing note reviewed.   Constitutional:       General: He is not in acute distress.     Appearance: Normal appearance. He is not ill-appearing, toxic-appearing or diaphoretic.   HENT:      Head: Normocephalic and atraumatic.   Eyes:      General: No scleral icterus.     Conjunctiva/sclera: Conjunctivae normal.   Cardiovascular:      Rate and Rhythm: Normal rate.      Heart sounds: Normal heart sounds. No murmur heard.  Pulmonary:      Effort: No respiratory distress.      Breath sounds: Normal breath  sounds.   Abdominal:      General: Bowel sounds are normal. There is no distension.      Palpations: Abdomen is soft.      Tenderness: There is no abdominal tenderness.   Musculoskeletal:         General: Swelling and tenderness present.      Cervical back: No rigidity or tenderness.      Comments: Exam of LE limited by surgical dressings in place. Surgical drain at R thigh, draining bloody-SS fluid.    Skin:     General: Skin is warm and dry.      Coloration: Skin is not jaundiced.      Findings: No rash.   Neurological:      General: No focal deficit present.      Mental Status: He is alert and oriented to person, place, and time.   Psychiatric:         Behavior: Behavior normal.         Thought Content: Thought content normal.         Judgment: Judgment normal.             Significant Labs: All pertinent labs within the past 24 hours have been reviewed.    Significant Imaging: I have reviewed all pertinent imaging results/findings within the past 24 hours.

## 2024-01-23 NOTE — SUBJECTIVE & OBJECTIVE
Interval History: NAEON. Remains AF, VSS, HDS, wbc nml. Maintained on daptomycin, overall improving with source control efforts. CRP uptrending 170 (up from 130), possibly d/t post-op surgical I&D L knee and R thigh 01/21. Pt stable.     Review of Systems   Constitutional:  Negative for chills and fever.   Respiratory:  Negative for cough.    Cardiovascular:  Positive for leg swelling.   Gastrointestinal:  Negative for abdominal pain and diarrhea.   Musculoskeletal:  Positive for arthralgias, gait problem, joint swelling and myalgias.   Skin:  Positive for wound. Negative for rash.   Neurological:  Negative for dizziness and seizures.   Psychiatric/Behavioral:  Negative for confusion.    All other systems reviewed and are negative.    Objective:     Vital Signs (Most Recent):  Temp: 98 °F (36.7 °C) (01/22/24 1731)  Pulse: 95 (01/22/24 1731)  Resp: 14 (01/22/24 1731)  BP: 131/67 (01/22/24 1731)  SpO2: 98 % (01/22/24 1731) Vital Signs (24h Range):  Temp:  [98 °F (36.7 °C)-99.2 °F (37.3 °C)] 98 °F (36.7 °C)  Pulse:  [89-96] 95  Resp:  [14-16] 14  SpO2:  [95 %-98 %] 98 %  BP: (125-145)/(67-81) 131/67     Weight: 110.8 kg (244 lb 4.3 oz)  Body mass index is 32.23 kg/m².    Estimated Creatinine Clearance: 142.4 mL/min (based on SCr of 0.9 mg/dL).     Physical Exam  Vitals and nursing note reviewed.   Constitutional:       General: He is not in acute distress.     Appearance: Normal appearance. He is not ill-appearing, toxic-appearing or diaphoretic.   HENT:      Head: Normocephalic and atraumatic.   Eyes:      General: No scleral icterus.     Conjunctiva/sclera: Conjunctivae normal.   Cardiovascular:      Rate and Rhythm: Normal rate.      Heart sounds: Normal heart sounds. No murmur heard.  Pulmonary:      Effort: No respiratory distress.      Breath sounds: Normal breath sounds.   Abdominal:      General: Bowel sounds are normal. There is no distension.      Palpations: Abdomen is soft.      Tenderness: There is no  abdominal tenderness.   Musculoskeletal:         General: Swelling and tenderness present.      Cervical back: No rigidity or tenderness.      Comments: Exam of LE limited by surgical dressings in place. Surgical drain at R thigh, draining bloody-SS fluid.    Skin:     General: Skin is warm and dry.      Coloration: Skin is not jaundiced.      Findings: No rash.   Neurological:      General: No focal deficit present.      Mental Status: He is alert and oriented to person, place, and time.   Psychiatric:         Behavior: Behavior normal.         Thought Content: Thought content normal.         Judgment: Judgment normal.          Significant Labs: Blood Culture:   Recent Labs   Lab 01/04/24  0522 01/04/24  0523 01/05/24  0716 01/07/24  1145 01/14/24 2039   LABBLOO Gram stain aer bottle: Gram positive cocci in clusters resembling Staph  Results called to and read back by: Ms. Ramón RN 01/05/2024  04:19  METHICILLIN RESISTANT STAPHYLOCOCCUS AUREUS  ID consult required at [facility] and [facility] locations.  * Gram stain aer bottle: Gram positive cocci in clusters resembling Staph  Results called to and read back by: Kalyn Correa RN  01/05/2024  14:32  METHICILLIN RESISTANT STAPHYLOCOCCUS AUREUS  ID consult required at [facility] and [facility].  For susceptibility see order #[id number]  * No growth after 5 days.  Gram stain aer bottle: Gram positive cocci in clusters resembling Staph  Results called to and read back by: Tim Correa RN 01/07/2024  10:08  METHICILLIN RESISTANT STAPHYLOCOCCUS AUREUS  For susceptibility see order #[id number]  * No growth after 5 days.  No growth after 5 days. No growth after 5 days.  No growth after 5 days.     CBC:   Recent Labs   Lab 01/21/24  0626 01/22/24  0500   WBC 10.80 11.97   HGB 8.6* 8.3*   HCT 28.3* 28.0*   * 419     CMP:   Recent Labs   Lab 01/21/24  0626 01/22/24  0500   * 137   K 3.8 4.2    103   CO2 27 26   * 122*    BUN 7 8   CREATININE 0.6 0.9   CALCIUM 8.6* 8.6*   ANIONGAP 6* 8     Microbiology Results (last 7 days)       Procedure Component Value Units Date/Time    AFB Culture & Smear [[id number]] Collected: 01/21/24 1415    Order Status: Completed Specimen: Knee, Left Updated: 01/22/24 1344     AFB CULTURE STAIN No acid fast bacilli seen.    Narrative:      Left knee    AFB Culture & Smear [[id number]] Collected: 01/21/24 1428    Order Status: Completed Specimen: Abscess from Leg, Right Updated: 01/22/24 1344     AFB CULTURE STAIN No acid fast bacilli seen.    Narrative:      Right thigh abscess    Aerobic culture [[id number]] Collected: 01/21/24 1415    Order Status: Completed Specimen: Knee, Left Updated: 01/22/24 1015     Aerobic Bacterial Culture No growth    Narrative:      Left knee    Aerobic culture [[id number]] Collected: 01/21/24 1428    Order Status: Completed Specimen: Abscess from Leg, Right Updated: 01/22/24 1015     Aerobic Bacterial Culture No growth    Narrative:      Right thigh abscess    Aerobic culture [[id number]]  (Abnormal) Collected: 01/19/24 1604    Order Status: Completed Specimen: Abscess from Leg, Right Updated: 01/22/24 0949     Aerobic Bacterial Culture STAPHYLOCOCCUS AUREUS  Rare  Susceptibility pending      Narrative:      Rg thigh not leg    Culture, Anaerobe [[id number]] Collected: 01/15/24 1418    Order Status: Completed Specimen: Joint Fluid from Knee, Left Updated: 01/22/24 0721     Anaerobic Culture Culture in progress    Gram stain [[id number]] Collected: 01/21/24 1415    Order Status: Completed Specimen: Knee, Left Updated: 01/21/24 1621     Gram Stain Result Few WBC's      No organisms seen    Narrative:      Left knee    Gram stain [[id number]] Collected: 01/21/24 1428    Order Status: Completed Specimen: Abscess from Leg, Right Updated: 01/21/24 1557     Gram Stain Result Rare WBC's      No organisms seen    Narrative:      Right thigh abscess    Culture, Anaerobic [[id number]]  Collected: 01/21/24 1428    Order Status: Sent Specimen: Abscess from Leg, Right Updated: 01/21/24 1451    Fungus culture [[id number]] Collected: 01/21/24 1428    Order Status: Sent Specimen: Abscess from Leg, Right Updated: 01/21/24 1450    Culture, Anaerobic [[id number]] Collected: 01/21/24 1415    Order Status: Sent Specimen: Knee, Left Updated: 01/21/24 1448    Fungus culture [[id number]] Collected: 01/21/24 1415    Order Status: Sent Specimen: Knee, Left Updated: 01/21/24 1446    Culture, Anaerobe [[id number]] Collected: 01/19/24 1604    Order Status: Completed Specimen: Abscess from Leg, Right Updated: 01/20/24 1451     Anaerobic Culture Culture in progress    Narrative:      Rg thigh not leg    Blood culture x two cultures. Draw prior to antibiotics. [[id number]] Collected: 01/14/24 2039    Order Status: Completed Specimen: Blood from Peripheral, Upper Arm, Right Updated: 01/19/24 2212     Blood Culture, Routine No growth after 5 days.    Narrative:      Aerobic and anaerobic    Blood culture x two cultures. Draw prior to antibiotics. [[id number]] Collected: 01/14/24 2039    Order Status: Completed Specimen: Blood from Peripheral, Forearm, Right Updated: 01/19/24 2212     Blood Culture, Routine No growth after 5 days.    Narrative:      Aerobic and anaerobic    Gram stain [[id number]] Collected: 01/19/24 1604    Order Status: Completed Specimen: Abscess from Leg, Right Updated: 01/19/24 1849     Gram Stain Result Rare WBC's      No organisms seen    Narrative:      Rg thigh not leg    Fungus culture [[id number]] Collected: 01/19/24 1604    Order Status: Sent Specimen: Abscess from Leg, Right Updated: 01/19/24 1749    Aerobic culture [[id number]] Collected: 01/15/24 1419    Order Status: Completed Specimen: Knee, Left Updated: 01/18/24 0955     Aerobic Bacterial Culture No growth    AFB Culture & Smear [[id number]] Collected: 01/15/24 1418    Order Status: Completed Specimen: Joint Fluid from Knee, Left  Updated: 01/16/24 2128     AFB Culture & Smear Culture in progress     AFB CULTURE STAIN No acid fast bacilli seen.    Fungus culture [[id number]] Collected: 01/15/24 1418    Order Status: Completed Specimen: Joint Fluid from Knee, Left Updated: 01/16/24 0920     Fungus (Mycology) Culture Culture in progress          Pathology Results  (Last 10 years)                 03/21/22 1419  Specimen to Pathology, Surgery Gastrointestinal tract Final result    Narrative:  Pre-op Diagnosis: [place] grade C esophagitis [K20.80]   Candida esophagitis [B37.81]   Procedure(s):   EGD (ESOPHAGOGASTRODUODENOSCOPY)   1. Antrum bx r/o h pylori   2. Distal esophagus bx   Release to patient->Immediate   Specimen total (fresh, frozen, permanent):->2       01/31/22 1156  Specimen to Pathology, Surgery Gastrointestinal tract Final result    Narrative:  Pre-op Diagnosis: Nausea and vomiting, intractability of   vomiting not specified, unspecified vomiting type [R11.2]   Diabetic gastroparesis associated with type 2 diabetes   mellitus [E11.43, K31.84]   Procedure(s):   EGD (ESOPHAGOGASTRODUODENOSCOPY)   Number of specimens: 2   Name of specimens:   #1 Gastric Bxs r/o H Pylori   #2 Esophageal Bxs r/o Candida   Release to patient->Immediate   Specimen total (fresh, frozen, permanent):->2             All pertinent labs within the past 24 hours have been reviewed.    Significant Imaging: I have reviewed all pertinent imaging results/findings within the past 24 hours.    I have personally reviewed records / hospital notes from   service and other specialty providers. I have also reviewed CBC, CMP/BMP,  cultures and imaging with my interpretation as documented in my assessment / plan.    Patient is high risk for infectious complications given pt's age, multiple co-morbidities, and case complexity.      Time: 50 minutes   50% of time spent on face-to-face counseling and coordination of care. Counseling included review of test results,  diagnosis, and treatment plan with patient and/or family.

## 2024-01-23 NOTE — PROGRESS NOTES
[facility]  Orthopedics  Progress Note    Patient Name: Kulwant Mueller Jr.  MRN: [id number]  Admission Date: 1/14/2024  Hospital Length of Stay: 7 days  Attending Provider: Lizbeth Marquez MD  Primary Care Provider: Shellie, Primary Doctor  Follow-up For: Procedure(s) (LRB):  ARTHROSCOPY, KNEE, WITH DEBRIDEMENT - LEFT, SUPINE, SLIDER, LATERAL POST, CONMED (Left)  INCISION AND DRAINAGE, THIGH - right, (Right)    Post-Operative Day: 2 Days Post-Op  Subjective:     Principal Problem:Staphylococcal arthritis of left knee    Principal Orthopedic Problem: s/p L knee arthroscopic I&D on 1/17, s/p repeat L knee I&D and right thigh abscess I&D on 1/21    Interval History: NAEON. VSS. Afebrile. Drain output 30cc/24 hr. Right thigh and left knee pain controlled. Discussed with pt importance of mobilization with PT today.     Review of patient's allergies indicates:   Allergen Reactions    Heparin analogues Nausea And Vomiting       Current Facility-Administered Medications   Medication    acetaminophen tablet 1,000 mg    ammonium lactate 12 % lotion    DAPTOmycin (CUBICIN) 945 mg in sodium chloride 0.9% SolP 50 mL IVPB    dextrose 10% bolus 125 mL 125 mL    dextrose 10% bolus 250 mL 250 mL    glucagon (human recombinant) injection 1 mg    glucose chewable tablet 16 g    glucose chewable tablet 24 g    insulin aspart U-100 pen 0-5 Units    insulin aspart U-100 pen 3 Units    insulin detemir U-100 (Levemir) pen 17 Units    loperamide capsule 2 mg    melatonin tablet 6 mg    methocarbamoL tablet 500 mg    metoclopramide HCl tablet 5 mg    metoprolol succinate (TOPROL-XL) 24 hr split tablet 12.5 mg    morphine injection 4 mg    naloxone 0.4 mg/mL injection 0.02 mg    ondansetron injection 4 mg    oxyCODONE immediate release tablet 5 mg    oxyCODONE immediate release tablet 5 mg    oxyCODONE immediate release tablet Tab 10 mg    pantoprazole EC tablet 40 mg    prochlorperazine injection Soln 5 mg    rivaroxaban tablet 10  "mg    scopolamine 1.3-1.5 mg (1 mg over 3 days) 1 patch    sodium chloride 0.9% flush 10 mL    And    sodium chloride 0.9% flush 10 mL     Objective:     Vital Signs (Most Recent):  Temp: 98.6 °F (37 °C) (01/23/24 0837)  Pulse: 92 (01/23/24 0837)  Resp: 12 (01/23/24 0940)  BP: (!) 141/79 (01/23/24 0927)  SpO2: 97 % (01/23/24 0837) Vital Signs (24h Range):  Temp:  [97.3 °F (36.3 °C)-98.6 °F (37 °C)] 98.6 °F (37 °C)  Pulse:  [76-95] 92  Resp:  [12-20] 12  SpO2:  [92 %-98 %] 97 %  BP: (109-141)/(59-79) 141/79     Weight: 110.9 kg (244 lb 7.8 oz)  Height: 6' 1" (185.4 cm)  Body mass index is 32.26 kg/m².      Intake/Output Summary (Last 24 hours) at 1/23/2024 0947  Last data filed at 1/23/2024 0706  Gross per 24 hour   Intake 700 ml   Output 1670 ml   Net -970 ml         Ortho/SPM Exam     Awake/alert/oriented x3, No acute distress, Afebrile, Vital signs stable  Good inspiratory effort with unlaboured breathing  Dressings c/d/I  Pain with knee PROM 5-70  No pain with R hip ROM, axial loading, full PROM. No TTP of R lateral hip.   Active and PROM of L shoulder intact with ain with L shoulder ROM (FF over 90, ABD over 90), +hawkens, + neer,       Significant Labs: All pertinent labs within the past 24 hours have been reviewed.    Significant Imaging: I have reviewed all pertinent imaging results/findings.    MRI pelvis showing right lateral thigh rim enhancing fluid collection, no bl hip effusions    MRI L shoulder showing SS tear without retraction, subdeltoid bursitis, no fluid collections   Assessment/Plan:     * Staphylococcal arthritis of left knee  Kulwant Mueller JrLeti is a 38 y.o. male admitted for workup of fevers of unknown source, recent irrigation and debridement of left knee performed at [facility] on 12/29.  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 Now s/p left knee arthroscopic I&D on 1/17.    Pt found to have right thigh abscess on MRI. IR aspiration performed and " drain placed 1/19/24. Now s/p repeat L knee arthroscopic I&D and right thigh abscess I&D with drain placement on 1/21    Pain control:  Multimodal  PT/OT: WBAT left lower extremity  DVT PPx:  Xarelto, SCDs at all times when not ambulating  Abx:  Ceftaroline/ daptomycin per ID, pending final cultures and ID recs  Labs:  trend CRP tomorrow  Cx: R thigh aspiration cx growing staph (sens pending), prior left knee cx growing MRSA  Drain: 30cc/24 hr      Dispo: If drain output and CRP continues to decrease tomorrow, pt stable for dc to rehab            David France MD  Orthopedics  [facility]

## 2024-01-23 NOTE — PT/OT/SLP PROGRESS
Physical Therapy Treatment    Patient Name:  Kulwant Mueller Jr.   MRN:  [id number]    Recommendations:     Discharge Recommendations: High Intensity Therapy  Discharge Equipment Recommendations: bedside commode, walker, rolling, shower chair  Barriers to discharge: None    Assessment:     Kulwant Mueller Jr. is a 38 y.o. male admitted with a medical diagnosis of Staphylococcal arthritis of left knee.  He presents with the following impairments/functional limitations: weakness, impaired endurance, impaired self care skills, impaired functional mobility, gait instability, impaired balance, pain, decreased lower extremity function, decreased safety awareness Pt. cooperative but had limited tolerance to treatment session due to c/o (B) LE pain despite being pre-medicated. Pt. was able to stand with RW, but required Max-total A and bed elevated.    Rehab Prognosis: Good; patient would benefit from acute skilled PT services to address these deficits and reach maximum level of function.    Recent Surgery: Procedure(s) (LRB):  ARTHROSCOPY, KNEE, WITH DEBRIDEMENT - LEFT, SUPINE, SLIDER, LATERAL POST, CONMED (Left)  INCISION AND DRAINAGE, THIGH - right, (Right) 2 Days Post-Op    Plan:     During this hospitalization, patient to be seen 4 x/week to address the identified rehab impairments via gait training, therapeutic activities, therapeutic exercises and progress toward the following goals:    Plan of Care Expires:  02/17/24    Subjective     Chief Complaint: (B) LE pain  Patient/Family Comments/goals: pt. Agreeable to PT  Pain/Comfort:  Pain Rating 1:  (pt. did not rate)  Location - Side 1: Bilateral  Location - Orientation 1: generalized  Location 1: leg  Pain Addressed 1: Pre-medicate for activity, Reposition, Distraction, Cessation of Activity      Objective:     Communicated with nursing prior to session.  Patient found supine with MULUGETA drain, peripheral IV, telemetry upon PT entry to room.     General Precautions:  Standard, contact, fall  Orthopedic Precautions: LLE weight bearing as tolerated  Braces: N/A  Respiratory Status: Room air     Functional Mobility:  Bed Mobility:     Rolling Right: minimum assistance  Scooting: moderate assistance  Supine to Sit: moderate assistance  Sit to Supine: moderate assistance  Transfers:     Sit to Stand:  maximal assistance and total assistance with rolling walker  Balance: fair sitting and poor standing      AM-PAC 6 CLICK MOBILITY  Turning over in bed (including adjusting bedclothes, sheets and blankets)?: 3  Sitting down on and standing up from a chair with arms (e.g., wheelchair, bedside commode, etc.): 2  Moving from lying on back to sitting on the side of the bed?: 2  Moving to and from a bed to a chair (including a wheelchair)?: 2  Need to walk in hospital room?: 1  Climbing 3-5 steps with a railing?: 1  Basic Mobility Total Score: 11       Treatment & Education:  Pt. performed sitting balance/tolerance along EOB for >10 min. with SBA. Pt. performed static standing with RW for ~30 sec. with CGA before requiring to sit. Discussed pt.'s progress, goals, bed mobility/transfer techniques, safety with mobility, and POC.    Patient left supine with all lines intact and call button in reach..    GOALS:   Multidisciplinary Problems       Physical Therapy Goals          Problem: Physical Therapy    Goal Priority Disciplines Outcome Goal Variances Interventions   Physical Therapy Goal     PT, PT/OT Ongoing, Progressing     Description: Goals to be met by: 2024     Patient will increase functional independence with mobility by performin. Supine to sit with Modified [place]  2. Sit to supine with Modified [place]  3. Sit to stand transfer with Moderate Assistance  4. Bed to chair transfer with Maximum Assistance using RW or LRAD  5. Gait  x 20 feet with Moderate Assistance using RW or LRAD.   6. Lower extremity exercise program x30 reps per handout, with independence                          Time Tracking:     PT Received On: 01/23/24  PT Start Time: 0959     PT Stop Time: 1037  PT Total Time (min): 38 min     Billable Minutes: Therapeutic Activity 38    Treatment Type: Treatment  PT/PTA: PT     Number of PTA visits since last PT visit: 0     01/23/2024

## 2024-01-23 NOTE — PROGRESS NOTES
[facility] - [facility] [address] [place] [address] Medicine  Progress Note    Patient Name: Kulwant Mueller Jr.  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     Patient was admitted to [facility] 12/27/23-01/10/24 due to left knee septic arthritis with persistent MRSA bacteremia, s/p Left knee orthopedic surgery, surgical cultures positive for MRSA. Right heel also of concern for source.   He was followed by ID,  he had been on Daptomycin + Ceftaroline, s/p ROBY w/o endocarditis, left shoulder arthrocentesis w/o infection,  surveillance blood culture negative from 01/07/24    Report per ED and care everywhere notes patient with fevers on 1/13 to 103degreese @ 1800, 2000, ER transfer recommended but pt refused, physician gave order for blood culture, pt reported feeling fine.   Today he developed temperature of 100.5 @ 15:56 and was transferred [place] [facility] [place].     Today he reports he can walk with asssitance, using a walker, performing ADLs, main complaint is right sided back pain    ED - blood cultures drawn.             Overview/Hospital Course:  Kulwant Mueller Jr. Is a 38 y.o. male who was admitted to hospital medicine for fever. WBC 15.86 >> 13.92. Sed rate 97, CRPP 211.9. On Daptomycin from previous discharge, continuing and added zosyn. ID consulted. Blood cultures NGTD. Orthopedics consulted, joint aspiration performed at bedside. 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 Taken to the OR for L knee washout. MRI pelvis: Myositis and  fasciitis, with soft tissue gas dissecting along the anterior compartment myofascial planes of the proximal left thigh. MRI L shoulder: Prominent subdeltoid enhancement/ bursitis. Superimposed infection may be present. No drainable fluid collections. MRI knee: s/p I&D w/ minimal residual fluid collection within the joint space. There is a large air-fluid collection extending supero-medially from the joint space along the medial femoral shaft, beyond the field of view. Prominent surrounding soft tissue inflammatory changes noted, with involvement of the vastus medialis, vastus lateralis, and popliteus musculature. 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 No spondylodiscitis or facet joint septic arthritis. 1/19 IR performed aspiration of fluid collection seen on MRI. Ortho performed a repeat I&D of left knee and an I&D of R thigh.     Interval History: NAEON. Pt seen and evaluated at bedside this am. Pt is doing well this am and reports pain is well controlled. Small amount of blood seen in drainage. Pt switched to dapto monotherapy per ID today. Will obtain CRP and CK for trending tomorrow.     Review of Systems   Constitutional:  Positive for activity change. Negative for chills and fever.   Respiratory:  Negative for cough.    Cardiovascular:  Positive for leg swelling.   Gastrointestinal:  Negative for abdominal pain and diarrhea.   Musculoskeletal:  Positive for arthralgias, gait problem, joint swelling and myalgias.   Skin:  Positive for wound. Negative for rash.   Neurological:  Negative for dizziness and seizures.   Psychiatric/Behavioral:  Negative for confusion.    All other systems reviewed and are negative.    Objective:     Vital Signs (Most Recent):  Temp: 98.6 °F (37 °C) (01/23/24 0837)  Pulse: 92 (01/23/24 0837)  Resp: 12 (01/23/24 0940)  BP: (!) 141/79 (01/23/24 0927)  SpO2: 97 % (01/23/24 0837) Vital Signs (24h Range):  Temp:  [97.3 °F (36.3 °C)-98.6  °F (37 °C)] 98.6 °F (37 °C)  Pulse:  [76-95] 92  Resp:  [12-20] 12  SpO2:  [92 %-98 %] 97 %  BP: (109-141)/(59-79) 141/79     Weight: 110.9 kg (244 lb 7.8 oz)  Body mass index is 32.26 kg/m².    Intake/Output Summary (Last 24 hours) at 1/23/2024 0954  Last data filed at 1/23/2024 0706  Gross per 24 hour   Intake 700 ml   Output 1670 ml   Net -970 ml         Physical Exam  Vitals and nursing note reviewed.   Constitutional:       General: He is not in acute distress.     Appearance: Normal appearance. He is not ill-appearing, toxic-appearing or diaphoretic.   HENT:      Head: Normocephalic and atraumatic.   Eyes:      General: No scleral icterus.     Conjunctiva/sclera: Conjunctivae normal.   Cardiovascular:      Rate and Rhythm: Normal rate.      Heart sounds: Normal heart sounds. No murmur heard.  Pulmonary:      Effort: No respiratory distress.      Breath sounds: Normal breath sounds.   Abdominal:      General: Bowel sounds are normal. There is no distension.      Palpations: Abdomen is soft.      Tenderness: There is no abdominal tenderness.   Musculoskeletal:         General: Swelling and tenderness present.      Cervical back: No rigidity or tenderness.      Comments: Exam of LE limited by surgical dressings in place. Surgical drain at R thigh, draining bloody-SS fluid.    Skin:     General: Skin is warm and dry.      Coloration: Skin is not jaundiced.      Findings: No rash.   Neurological:      General: No focal deficit present.      Mental Status: He is alert and oriented to person, place, and time.   Psychiatric:         Behavior: Behavior normal.         Thought Content: Thought content normal.         Judgment: Judgment normal.             Significant Labs: All pertinent labs within the past 24 hours have been reviewed.    Significant Imaging: I have reviewed all pertinent imaging results/findings within the past 24 hours.    Assessment/Plan:      * Staphylococcal arthritis of left knee  S/p Left knee  arthrotomy and synovectomy (12/29/23)  -re-order PTOT  Pt with left knee sutures, tenderness to palpation near medial tibial plateau  - ortho consulted, bedside aspiration completed   - taken to OR for wound wash out   - MRI pelvis: Myositis and fasciitis, with soft tissue gas dissecting along the anterior compartment myofascial planes of the proximal left thigh.   - MRI L shoulder: Prominent subdeltoid enhancement/ bursitis. Superimposed infection may be present. No drainable fluid collections.   - MRI knee: s/p I&D w/ minimal residual fluid collection within the joint space. There is a large air-fluid collection extending supero-medially from the joint space along the medial femoral shaft, beyond the field of view. Prominent surrounding soft tissue inflammatory changes noted, with involvement of the vastus medialis, vastus lateralis, and popliteus musculature.  - MRI T/L spine: No MR imaging findings to account for reported history of MRSA bacteremia. No spondylodiscitis or facet joint septic arthritis   - s/p aspiration w/ IR 1/19  - s/p L knee I&D, R knee I&D 1/21    Staphylococcus aureus bacteremia  Fever  - reported fevers of 103 @ [facility] on Saturday and temp 100 prior to transfer  - s/p blood cultures   - Pt with c/o of right sided Back pain - given MRSA bacteremia, pt is at risk for metastatic site of infection obtain MRI T-L spine with Contrast to r/o spinal source of infection   - ESR 97/ .9, trending and improved  - Infectious disease consult - to assist in continuing Daptomycin approval & infectious w/u    >patient had ROBY performed on 1/02 to r/o endocarditis.   - continue daptomycin and cerftaroline  - , trending  - CRP trending, 105.8>139.4>170  - repeat blood cultures NGTD     Abscess of right thigh  - MRI pelvis revealed a large lateral thigh fluid collection that may reflect hematoma or abscess.   - 1/21/22 s/p washout w/ ortho. Drain in place  - cx taken, pending  results  - continuing IV abx    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 due to underlying condition, limited to breakdown of skin  Patient with right heel - resolving ulcer  - prior wound care notes indicate - Paint with betadine and leave open to air  - elevate heels to reduce pressure   - Wound care consult   - Right heel: bedside nursing to paint with betadine, let dry and apply a foam dressing daily   - Turning every 2 hours  - Heel protecting boots  - Bedside nursing to maintain pressure injury prevention interventions    Chronic HFrEF (heart failure with reduced ejection fraction)  New finding at last hospitalization  - EF 48% with systolic dysfunction, normal diastolic function  - patient on Jardiance as outpatient as well as Toprol XL and Lasix 20mg po daily   - Jardiance not continued @ IP rehab - would hold given concern for possible repeat infection - resume when clinically stable.   - resume furosemide when stable    Hyponatremia  Patient has hyponatremia which is controlled,  We will monitor sodium Daily.   The hyponatremia is due to Dehydration/hypovolemia.  Urine sodium, urine osmolality, serum osmolality or TSH, T4 reviewed  S/p IVF   Recent Labs   Lab 01/23/24  0754   *         Fever  - reported fevers of 103 @ [facility] on Saturday and temp 100 prior to transfer  - s/p blood cultures   - add Zosyn for empiric gram negative coverage  - Pt with c/o of right sided Back pain - given MRSA bacteremia, pt is at risk for metastatic site of infection obtain MRI T-L spine with Contrast to r/o spinal source of infection    - no acute findings on MRI   - Check ESR/CRP    - ESR 97/ .9   - Infectious disease consult - to assist in continuing Daptomycin approval & infectious w/u    >patient had ROBY performed on 1/02 to r/o endocarditis.     Transaminitis  - trend CMP daily, improving  - check GGT with rising Alk phos levels  - Prior w/u @ [facility] included - CT A/P,  w/o abnormality, Hepatitis panel negative, RUQ ultrasound with hepatomegaly and biliary sludge; but without other obvious abnormalities    Depression  Noted At [facility] hospitalization - patient noted to have flat affect, decrease motivation, excessive sleeping, was seen by psychiatry consult and Cymbalta recommended but pt declined.   - Description of his affect seems similar here, pending above workup would re-address with patient prior to discharge  Reports he was living with wife & 2 kids prior to current hospitalization.     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  Initially on therapeutic lovenox and transitioned back to Oral Xarelto 10mg daily   -pt previously followed with hematology - Dr. Duff.     Diabetic gastroparesis associated with type 2 diabetes mellitus  -continue reglan 5mg PO TID    Type 2 diabetes mellitus with complication, with long-term current use of insulin  Patient's FSGs are uncontrolled due to hyperglycemia on current medication regimen.  Last A1c reviewed-   Lab Results   Component Value Date    HGBA1C 7.4 (H) 12/28/2023     Most recent fingerstick glucose reviewed-   Recent Labs   Lab 01/22/24  1628 01/22/24  2135 01/23/24  0838 01/23/24  1239   POCTGLUCOSE 172* 149* 120* 150*       Current correctional scale  Low  Maintain anti-hyperglycemic dose as follows-   Antihyperglycemics (From admission, onward)      Start     Stop Route Frequency Ordered    01/23/24 1130  insulin aspart U-100 pen 3 Units         -- SubQ 3 times daily with meals 01/23/24 0933    01/16/24 2100  insulin detemir U-100 (Levemir) pen 17 Units         -- SubQ 2 times daily 01/16/24 1657    01/15/24 4106   insulin aspart U-100 pen 0-5 Units         -- SubQ Before meals & nightly PRN 01/15/24 0231          Hold Oral hypoglycemics while patient is in the hospital.      VTE Risk Mitigation (From admission, onward)           Ordered     rivaroxaban tablet 10 mg  With dinner         01/15/24 0229     Reason for No Pharmacological VTE Prophylaxis  Once        Question:  Reasons:  Answer:  Already adequately anticoagulated on oral Anticoagulants    01/15/24 0229     IP VTE HIGH RISK PATIENT  Once         01/15/24 0229     Place sequential compression device  Until discontinued         01/15/24 0229                    Discharge Planning   RAAENN: 1/24/2024     Code Status: Full Code   Is the patient medically ready for discharge?: No    Reason for patient still in hospital (select all that apply): Patient trending condition, Laboratory test, Treatment, Consult recommendations, and Pending disposition  Discharge Plan A: Rehab   Discharge Delays: None known at this time              Raul Pal PA-C  Department of Hospital Medicine   [facility] - [facility] 11H

## 2024-01-24 LAB
BACTERIA SPEC AEROBE CULT: NO GROWTH
BACTERIA SPEC AEROBE CULT: NO GROWTH
BACTERIA SPEC ANAEROBE CULT: ABNORMAL
BASOPHILS # BLD AUTO: 0.03 K/UL (ref 0–0.2)
BASOPHILS NFR BLD: 0.3 % (ref 0–1.9)
CK SERPL-CCNC: 163 U/L (ref 20–200)
CRP SERPL-MCNC: 187.1 MG/L (ref 0–8.2)
DIFFERENTIAL METHOD BLD: ABNORMAL
EOSINOPHIL # BLD AUTO: 0.1 K/UL (ref 0–0.5)
EOSINOPHIL NFR BLD: 0.9 % (ref 0–8)
ERYTHROCYTE [DISTWIDTH] IN BLOOD BY AUTOMATED COUNT: 14.5 % (ref 11.5–14.5)
FINAL PATHOLOGIC DIAGNOSIS: NORMAL
GROSS: NORMAL
HCT VFR BLD AUTO: 26.6 % (ref 40–54)
HGB BLD-MCNC: 8 G/DL (ref 14–18)
IMM GRANULOCYTES # BLD AUTO: 0.06 K/UL (ref 0–0.04)
IMM GRANULOCYTES NFR BLD AUTO: 0.5 % (ref 0–0.5)
LYMPHOCYTES # BLD AUTO: 1.4 K/UL (ref 1–4.8)
LYMPHOCYTES NFR BLD: 12.4 % (ref 18–48)
Lab: NORMAL
MCH RBC QN AUTO: 26.4 PG (ref 27–31)
MCHC RBC AUTO-ENTMCNC: 30.1 G/DL (ref 32–36)
MCV RBC AUTO: 88 FL (ref 82–98)
MONOCYTES # BLD AUTO: 0.7 K/UL (ref 0.3–1)
MONOCYTES NFR BLD: 6.4 % (ref 4–15)
NEUTROPHILS # BLD AUTO: 8.9 K/UL (ref 1.8–7.7)
NEUTROPHILS NFR BLD: 79.5 % (ref 38–73)
NRBC BLD-RTO: 0 /100 WBC
PLATELET # BLD AUTO: 493 K/UL (ref 150–450)
PMV BLD AUTO: 9.2 FL (ref 9.2–12.9)
POCT GLUCOSE: 111 MG/DL (ref 70–110)
POCT GLUCOSE: 133 MG/DL (ref 70–110)
POCT GLUCOSE: 93 MG/DL (ref 70–110)
POCT GLUCOSE: 97 MG/DL (ref 70–110)
RBC # BLD AUTO: 3.03 M/UL (ref 4.6–6.2)
WBC # BLD AUTO: 11.17 K/UL (ref 3.9–12.7)

## 2024-01-24 PROCEDURE — 97535 SELF CARE MNGMENT TRAINING: CPT | Mod: CO

## 2024-01-24 PROCEDURE — 82550 ASSAY OF CK (CPK): CPT | Performed by: STUDENT IN AN ORGANIZED HEALTH CARE EDUCATION/TRAINING PROGRAM

## 2024-01-24 PROCEDURE — 27000207 HC ISOLATION

## 2024-01-24 PROCEDURE — 25000003 PHARM REV CODE 250: Performed by: STUDENT IN AN ORGANIZED HEALTH CARE EDUCATION/TRAINING PROGRAM

## 2024-01-24 PROCEDURE — 97530 THERAPEUTIC ACTIVITIES: CPT

## 2024-01-24 PROCEDURE — 36415 COLL VENOUS BLD VENIPUNCTURE: CPT | Performed by: STUDENT IN AN ORGANIZED HEALTH CARE EDUCATION/TRAINING PROGRAM

## 2024-01-24 PROCEDURE — 85025 COMPLETE CBC W/AUTO DIFF WBC: CPT | Performed by: PHYSICIAN ASSISTANT

## 2024-01-24 PROCEDURE — 25000003 PHARM REV CODE 250: Performed by: HOSPITALIST

## 2024-01-24 PROCEDURE — 63600175 PHARM REV CODE 636 W HCPCS: Performed by: HOSPITALIST

## 2024-01-24 PROCEDURE — 25000003 PHARM REV CODE 250

## 2024-01-24 PROCEDURE — 97116 GAIT TRAINING THERAPY: CPT

## 2024-01-24 PROCEDURE — A4216 STERILE WATER/SALINE, 10 ML: HCPCS | Performed by: STUDENT IN AN ORGANIZED HEALTH CARE EDUCATION/TRAINING PROGRAM

## 2024-01-24 PROCEDURE — 86140 C-REACTIVE PROTEIN: CPT | Performed by: STUDENT IN AN ORGANIZED HEALTH CARE EDUCATION/TRAINING PROGRAM

## 2024-01-24 PROCEDURE — 97110 THERAPEUTIC EXERCISES: CPT | Mod: CO

## 2024-01-24 PROCEDURE — 21400001 HC TELEMETRY ROOM

## 2024-01-24 RX ADMIN — OXYCODONE 5 MG: 5 TABLET ORAL at 05:01

## 2024-01-24 RX ADMIN — PANTOPRAZOLE SODIUM 40 MG: 40 TABLET, DELAYED RELEASE ORAL at 09:01

## 2024-01-24 RX ADMIN — DAPTOMYCIN 945 MG: 500 INJECTION, POWDER, LYOPHILIZED, FOR SOLUTION INTRAVENOUS at 05:01

## 2024-01-24 RX ADMIN — METHOCARBAMOL 500 MG: 500 TABLET ORAL at 05:01

## 2024-01-24 RX ADMIN — INSULIN DETEMIR 17 UNITS: 100 INJECTION, SOLUTION SUBCUTANEOUS at 09:01

## 2024-01-24 RX ADMIN — METOPROLOL SUCCINATE 12.5 MG: 25 TABLET, EXTENDED RELEASE ORAL at 09:01

## 2024-01-24 RX ADMIN — OXYCODONE 5 MG: 5 TABLET ORAL at 11:01

## 2024-01-24 RX ADMIN — METOCLOPRAMIDE 5 MG: 5 TABLET ORAL at 05:01

## 2024-01-24 RX ADMIN — Medication 10 ML: at 05:01

## 2024-01-24 RX ADMIN — METOCLOPRAMIDE 5 MG: 5 TABLET ORAL at 06:01

## 2024-01-24 RX ADMIN — METHOCARBAMOL 500 MG: 500 TABLET ORAL at 09:01

## 2024-01-24 RX ADMIN — AMMONIUM LACTATE: 12 LOTION TOPICAL at 09:01

## 2024-01-24 RX ADMIN — METHOCARBAMOL 500 MG: 500 TABLET ORAL at 01:01

## 2024-01-24 RX ADMIN — Medication 10 ML: at 12:01

## 2024-01-24 RX ADMIN — OXYCODONE HYDROCHLORIDE 10 MG: 10 TABLET ORAL at 09:01

## 2024-01-24 RX ADMIN — RIVAROXABAN 10 MG: 10 TABLET, FILM COATED ORAL at 05:01

## 2024-01-24 RX ADMIN — Medication 10 ML: at 09:01

## 2024-01-24 RX ADMIN — METOCLOPRAMIDE 5 MG: 5 TABLET ORAL at 12:01

## 2024-01-24 RX ADMIN — Medication 10 ML: at 06:01

## 2024-01-24 RX ADMIN — OXYCODONE 5 MG: 5 TABLET ORAL at 12:01

## 2024-01-24 NOTE — ASSESSMENT & PLAN NOTE
Kulwant Menardcaro Miguel. is a 38 y.o. male admitted for workup of fevers of unknown source, recent irrigation and debridement of left knee performed at [facility] on 12/29.  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 Now s/p left knee arthroscopic I&D on 1/17.    Pt found to have right thigh abscess on MRI. IR aspiration performed and drain placed 1/19/24. Now s/p repeat L knee arthroscopic I&D and right thigh abscess I&D with drain placement on 1/21    Pain control:  Multimodal  PT/OT: WBAT left lower extremity  DVT PPx:  Xarelto, SCDs at all times when not ambulating  Abx:   daptomycin per ID  Labs:  CRP up to 187 today; trend CRP tomorrow  Cx: R thigh aspiration cx growing staph (sens pending), prior left knee cx growing MRSA  Drain: 40cc/24 hr      Dispo: Drain output 40cc/24 hr and CRP up today; will continue to monitor as in-patient to evaluate if patient needs additional I&D

## 2024-01-24 NOTE — ASSESSMENT & PLAN NOTE
"Patient has hyponatremia which is controlled,  We will monitor sodium Daily.   The hyponatremia is due to Dehydration/hypovolemia.  Urine sodium, urine osmolality, serum osmolality or TSH, T4 reviewed  S/p IVF   No results for input(s): "NA" in the last 24 hours.    "

## 2024-01-24 NOTE — ASSESSMENT & PLAN NOTE
Patient's FSGs are uncontrolled due to hyperglycemia on current medication regimen.  Last A1c reviewed-   Lab Results   Component Value Date    HGBA1C 7.4 (H) 12/28/2023     Most recent fingerstick glucose reviewed-   Recent Labs   Lab 01/23/24  1655 01/23/24 2023 01/24/24  0829 01/24/24  1157   POCTGLUCOSE 117* 104 93 133*       Current correctional scale  Low  Maintain anti-hyperglycemic dose as follows-   Antihyperglycemics (From admission, onward)    Start     Stop Route Frequency Ordered    01/16/24 2100  insulin detemir U-100 (Levemir) pen 17 Units         -- SubQ 2 times daily 01/16/24 1657    01/15/24 0329  insulin aspart U-100 pen 0-5 Units         -- SubQ Before meals & nightly PRN 01/15/24 0231        Hold Oral hypoglycemics while patient is in the hospital.

## 2024-01-24 NOTE — SUBJECTIVE & OBJECTIVE
Principal Problem:Staphylococcal arthritis of left knee    Principal Orthopedic Problem: s/p L knee arthroscopic I&D on 1/17, s/p repeat L knee I&D and right thigh abscess I&D on 1/21    Interval History: NAEON. VSS. Afebrile. Drain output 40cc/24 hr. Right thigh and left knee pain controlled. Pt mobilized a few steps with PT today. CRP up to 187 today.     Review of patient's allergies indicates:   Allergen Reactions    Heparin analogues Nausea And Vomiting       Current Facility-Administered Medications   Medication    acetaminophen tablet 1,000 mg    ammonium lactate 12 % lotion    DAPTOmycin (CUBICIN) 945 mg in sodium chloride 0.9% SolP 50 mL IVPB    dextrose 10% bolus 125 mL 125 mL    dextrose 10% bolus 250 mL 250 mL    glucagon (human recombinant) injection 1 mg    glucose chewable tablet 16 g    glucose chewable tablet 24 g    insulin aspart U-100 pen 0-5 Units    insulin detemir U-100 (Levemir) pen 17 Units    loperamide capsule 2 mg    melatonin tablet 6 mg    methocarbamoL tablet 500 mg    metoclopramide HCl tablet 5 mg    metoprolol succinate (TOPROL-XL) 24 hr split tablet 12.5 mg    morphine injection 4 mg    naloxone 0.4 mg/mL injection 0.02 mg    ondansetron injection 4 mg    oxyCODONE immediate release tablet 5 mg    oxyCODONE immediate release tablet 5 mg    oxyCODONE immediate release tablet Tab 10 mg    pantoprazole EC tablet 40 mg    prochlorperazine injection Soln 5 mg    rivaroxaban tablet 10 mg    scopolamine 1.3-1.5 mg (1 mg over 3 days) 1 patch    sodium chloride 0.9% flush 10 mL    And    sodium chloride 0.9% flush 10 mL     Objective:     Vital Signs (Most Recent):  Temp: 98 °F (36.7 °C) (01/24/24 1158)  Pulse: 91 (01/24/24 1158)  Resp: 18 (01/24/24 1158)  BP: 129/75 (01/24/24 1158)  SpO2: 96 % (01/24/24 1158) Vital Signs (24h Range):  Temp:  [97.9 °F (36.6 °C)-99 °F (37.2 °C)] 98 °F (36.7 °C)  Pulse:  [82-92] 91  Resp:  [18-20] 18  SpO2:  [94 %-96 %] 96 %  BP: (118-144)/(63-80) 129/75  "    Weight: 112 kg (246 lb 14.6 oz)  Height: 6' 1" (185.4 cm)  Body mass index is 32.58 kg/m².      Intake/Output Summary (Last 24 hours) at 1/24/2024 1252  Last data filed at 1/24/2024 0432  Gross per 24 hour   Intake 480 ml   Output 830 ml   Net -350 ml          Ortho/SPM Exam     Awake/alert/oriented x3, No acute distress, Afebrile, Vital signs stable  Good inspiratory effort with unlaboured breathing  Dressings c/d/I  Pain with knee PROM 5-70  No pain with R hip ROM, axial loading, full PROM. No TTP of R lateral hip.   Active and PROM of L shoulder intact with ain with L shoulder ROM (FF over 90, ABD over 90), +hawkens, + neer,       Significant Labs: All pertinent labs within the past 24 hours have been reviewed.    Significant Imaging: I have reviewed all pertinent imaging results/findings.    MRI pelvis showing right lateral thigh rim enhancing fluid collection, no bl hip effusions    MRI L shoulder showing SS tear without retraction, subdeltoid bursitis, no fluid collections   "

## 2024-01-24 NOTE — PT/OT/SLP PROGRESS
Physical Therapy Treatment    Patient Name:  Kulwant Mueller Jr.   MRN:  [id number]    Recommendations:     Discharge Recommendations: High Intensity Therapy  Discharge Equipment Recommendations: bedside commode, walker, rolling, shower chair  Barriers to discharge: None    Assessment:     Kulwant Mueller Jr. is a 38 y.o. male admitted with a medical diagnosis of Staphylococcal arthritis of left knee.  He presents with the following impairments/functional limitations: weakness, impaired endurance, impaired self care skills, impaired functional mobility, gait instability, impaired balance, pain, decreased lower extremity function, decreased safety awareness Pt. cooperative and tolerated treatment better today with more self-initiation. Pt. progressing with mobility.    Rehab Prognosis: Good; patient would benefit from acute skilled PT services to address these deficits and reach maximum level of function.    Recent Surgery: Procedure(s) (LRB):  ARTHROSCOPY, KNEE, WITH DEBRIDEMENT - LEFT, SUPINE, SLIDER, LATERAL POST, CONMED (Left)  INCISION AND DRAINAGE, THIGH - right, (Right) 3 Days Post-Op    Plan:     During this hospitalization, patient to be seen 4 x/week to address the identified rehab impairments via gait training, therapeutic activities, therapeutic exercises and progress toward the following goals:    Plan of Care Expires:  02/17/24    Subjective     Chief Complaint: LE weakness/pain  Patient/Family Comments/goals: pt. Agreeable to PT  Pain/Comfort:  Pain Rating 1:  (pt. did not rate)  Location - Side 1: Bilateral  Location - Orientation 1: generalized  Location 1: leg      Objective:     Communicated with nursing prior to session.  Patient found supine with MULUGETA drain, peripheral IV, telemetry upon PT entry to room.     General Precautions: Standard, contact, fall  Orthopedic Precautions: LLE weight bearing as tolerated  Braces: N/A  Respiratory Status: Room air     Functional Mobility:  Bed Mobility:      Rolling Right: stand by assistance  Scooting: minimum assistance  Supine to Sit: minimum assistance  Sit to Supine: minimum assistance and moderate assistance  Transfers:     Sit to Stand:  maximal assistance with rolling walker  Gait: 8 steps(4 forward/backward) 4 side steps with RW and Min A with balance/safety/RW management. Pt. amb. with decreased step length/nathaniel/floor clearance.  Balance: fair sitting and poor standing      AM-PAC 6 CLICK MOBILITY  Turning over in bed (including adjusting bedclothes, sheets and blankets)?: 3  Sitting down on and standing up from a chair with arms (e.g., wheelchair, bedside commode, etc.): 2  Moving from lying on back to sitting on the side of the bed?: 3  Moving to and from a bed to a chair (including a wheelchair)?: 3  Need to walk in hospital room?: 3  Climbing 3-5 steps with a railing?: 2  Basic Mobility Total Score: 16       Treatment & Education:  Discussed pt.'s progress, goals, and POC. Pt. Performed sitting balance/tolerance on EOB for ~8 min. and performed ADLs in sitting with SBA.    Patient left supine with all lines intact and call button in reach..    GOALS:   Multidisciplinary Problems       Physical Therapy Goals          Problem: Physical Therapy    Goal Priority Disciplines Outcome Goal Variances Interventions   Physical Therapy Goal     PT, PT/OT Ongoing, Progressing     Description: Goals to be met by: 2024     Patient will increase functional independence with mobility by performin. Supine to sit with Modified [place]  2. Sit to supine with Modified [place]  3. Sit to stand transfer with Moderate Assistance  4. Bed to chair transfer with Maximum Assistance using RW or LRAD  5. Gait  x 20 feet with Moderate Assistance using RW or LRAD.   6. Lower extremity exercise program x30 reps per handout, with independence                         Time Tracking:     PT Received On: 24  PT Start Time: 0950     PT Stop Time: 1019  PT Total  Time (min): 29 min     Billable Minutes: Gait Training 15 and Therapeutic Activity 14    Treatment Type: Treatment  PT/PTA: PT     Number of PTA visits since last PT visit: 0     01/24/2024

## 2024-01-24 NOTE — SUBJECTIVE & OBJECTIVE
Interval History: NAEON. Pt seen and evaluated at bedside this am. Pt is doing well this am and is motivated to work w/ therapy at the rehab center. CRP trending up this am, will repeat tomorrow per ortho. ID also recommending another day of treatment and surveillance today to ensure clinical improvement and ready for discharge.     Review of Systems   Constitutional:  Positive for activity change. Negative for chills and fever.   Respiratory:  Negative for cough.    Cardiovascular:  Positive for leg swelling.   Gastrointestinal:  Negative for abdominal pain and diarrhea.   Musculoskeletal:  Positive for arthralgias, gait problem, joint swelling and myalgias.   Skin:  Positive for wound. Negative for rash.   Neurological:  Negative for dizziness and seizures.   Psychiatric/Behavioral:  Negative for confusion.    All other systems reviewed and are negative.    Objective:     Vital Signs (Most Recent):  Temp: 98 °F (36.7 °C) (01/24/24 1158)  Pulse: 91 (01/24/24 1158)  Resp: 18 (01/24/24 1158)  BP: 129/75 (01/24/24 1158)  SpO2: 96 % (01/24/24 1158) Vital Signs (24h Range):  Temp:  [97.9 °F (36.6 °C)-99 °F (37.2 °C)] 98 °F (36.7 °C)  Pulse:  [82-92] 91  Resp:  [18-20] 18  SpO2:  [94 %-96 %] 96 %  BP: (118-144)/(63-80) 129/75     Weight: 112 kg (246 lb 14.6 oz)  Body mass index is 32.58 kg/m².    Intake/Output Summary (Last 24 hours) at 1/24/2024 1406  Last data filed at 1/24/2024 0432  Gross per 24 hour   Intake 480 ml   Output 830 ml   Net -350 ml         Physical Exam  Vitals and nursing note reviewed.   Constitutional:       General: He is not in acute distress.     Appearance: Normal appearance. He is not ill-appearing, toxic-appearing or diaphoretic.   HENT:      Head: Normocephalic and atraumatic.   Eyes:      General: No scleral icterus.     Conjunctiva/sclera: Conjunctivae normal.   Cardiovascular:      Rate and Rhythm: Normal rate.      Heart sounds: Normal heart sounds. No murmur heard.  Pulmonary:       Effort: No respiratory distress.      Breath sounds: Normal breath sounds.   Abdominal:      General: Bowel sounds are normal. There is no distension.      Palpations: Abdomen is soft.      Tenderness: There is no abdominal tenderness.   Musculoskeletal:         General: Swelling and tenderness present.      Cervical back: No rigidity or tenderness.      Comments: Exam of LE limited by surgical dressings in place. Surgical drain at R thigh, draining bloody-SS fluid.    Skin:     General: Skin is warm and dry.      Coloration: Skin is not jaundiced.      Findings: No rash.   Neurological:      General: No focal deficit present.      Mental Status: He is alert and oriented to person, place, and time.   Psychiatric:         Behavior: Behavior normal.         Thought Content: Thought content normal.         Judgment: Judgment normal.             Significant Labs: All pertinent labs within the past 24 hours have been reviewed.    Significant Imaging: I have reviewed all pertinent imaging results/findings within the past 24 hours.

## 2024-01-24 NOTE — NURSING
Will give daptomycin when it is located. Not in patient bin or refrigerator or at the dest.  Contacted pharmacy.

## 2024-01-24 NOTE — PROGRESS NOTES
[facility] \[facility] [place] [facility]\""  Orthopedics  Progress Note    Patient Name: Kulwant Mueller Jr.  MRN: [id number]  Admission Date: 1/14/2024  Hospital Length of Stay: 8 days  Attending Provider: Lizbeth Marquez MD  Primary Care Provider: Shellie, Primary Doctor  Follow-up For: Procedure(s) (LRB):  ARTHROSCOPY, KNEE, WITH DEBRIDEMENT - LEFT, SUPINE, SLIDER, LATERAL POST, CONMED (Left)  INCISION AND DRAINAGE, THIGH - right, (Right)    Post-Operative Day: 3 Days Post-Op  Subjective:     Principal Problem:Staphylococcal arthritis of left knee    Principal Orthopedic Problem: s/p L knee arthroscopic I&D on 1/17, s/p repeat L knee I&D and right thigh abscess I&D on 1/21    Interval History: NAEON. VSS. Afebrile. Drain output 40cc/24 hr. Right thigh and left knee pain controlled. Pt mobilized a few steps with PT today. CRP up to 187 today.     Review of patient's allergies indicates:   Allergen Reactions    Heparin analogues Nausea And Vomiting       Current Facility-Administered Medications   Medication    acetaminophen tablet 1,000 mg    ammonium lactate 12 % lotion    DAPTOmycin (CUBICIN) 945 mg in sodium chloride 0.9% SolP 50 mL IVPB    dextrose 10% bolus 125 mL 125 mL    dextrose 10% bolus 250 mL 250 mL    glucagon (human recombinant) injection 1 mg    glucose chewable tablet 16 g    glucose chewable tablet 24 g    insulin aspart U-100 pen 0-5 Units    insulin detemir U-100 (Levemir) pen 17 Units    loperamide capsule 2 mg    melatonin tablet 6 mg    methocarbamoL tablet 500 mg    metoclopramide HCl tablet 5 mg    metoprolol succinate (TOPROL-XL) 24 hr split tablet 12.5 mg    morphine injection 4 mg    naloxone 0.4 mg/mL injection 0.02 mg    ondansetron injection 4 mg    oxyCODONE immediate release tablet 5 mg    oxyCODONE immediate release tablet 5 mg    oxyCODONE immediate release tablet Tab 10 mg    pantoprazole EC tablet 40 mg    prochlorperazine injection Soln 5 mg    rivaroxaban tablet 10 mg    scopolamine 1.3-1.5 mg (1 mg  "over 3 days) 1 patch    sodium chloride 0.9% flush 10 mL    And    sodium chloride 0.9% flush 10 mL     Objective:     Vital Signs (Most Recent):  Temp: 98 °F (36.7 °C) (01/24/24 1158)  Pulse: 91 (01/24/24 1158)  Resp: 18 (01/24/24 1158)  BP: 129/75 (01/24/24 1158)  SpO2: 96 % (01/24/24 1158) Vital Signs (24h Range):  Temp:  [97.9 °F (36.6 °C)-99 °F (37.2 °C)] 98 °F (36.7 °C)  Pulse:  [82-92] 91  Resp:  [18-20] 18  SpO2:  [94 %-96 %] 96 %  BP: (118-144)/(63-80) 129/75     Weight: 112 kg (246 lb 14.6 oz)  Height: 6' 1" (185.4 cm)  Body mass index is 32.58 kg/m².      Intake/Output Summary (Last 24 hours) at 1/24/2024 1252  Last data filed at 1/24/2024 0432  Gross per 24 hour   Intake 480 ml   Output 830 ml   Net -350 ml         Ortho/SPM Exam     Awake/alert/oriented x3, No acute distress, Afebrile, Vital signs stable  Good inspiratory effort with unlaboured breathing  Dressings c/d/I  Pain with knee PROM 5-70  No pain with R hip ROM, axial loading, full PROM. No TTP of R lateral hip.   Active and PROM of L shoulder intact with ain with L shoulder ROM (FF over 90, ABD over 90), +hawkens, + neer,       Significant Labs: All pertinent labs within the past 24 hours have been reviewed.    Significant Imaging: I have reviewed all pertinent imaging results/findings.    MRI pelvis showing right lateral thigh rim enhancing fluid collection, no bl hip effusions    MRI L shoulder showing SS tear without retraction, subdeltoid bursitis, no fluid collections   Assessment/Plan:     * Staphylococcal arthritis of left knee  Kulwant Mueller Jr. is a 38 y.o. male admitted for workup of fevers of unknown source, recent irrigation and debridement of left knee performed at [facility] on 12/29.  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 Now s/p left knee arthroscopic I&D on 1/17.    Pt found to have right thigh abscess on MRI. IR aspiration performed and drain placed 1/19/24. Now s/p repeat L knee " arthroscopic I&D and right thigh abscess I&D with drain placement on 1/21    Pain control:  Multimodal  PT/OT: WBAT left lower extremity  DVT PPx:  Xarelto, SCDs at all times when not ambulating  Abx:   daptomycin per ID  Labs:  CRP up to 187 today; trend CRP tomorrow  Cx: R thigh aspiration cx growing staph (sens pending), prior left knee cx growing MRSA  Drain: 40cc/24 hr      Dispo: Drain output 40cc/24 hr and CRP up today; will continue to monitor as in-patient to evaluate if patient needs additional I&D            David France MD  Orthopedics  [facility] - Observation 11H

## 2024-01-24 NOTE — PROGRESS NOTES
[facility] [address] [place] [address] Medicine  Progress Note    Patient Name: Kulwant Mueller Jr.  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     Patient was admitted to [facility] 12/27/23-01/10/24 due to left knee septic arthritis with persistent MRSA bacteremia, s/p Left knee orthopedic surgery, surgical cultures positive for MRSA. Right heel also of concern for source.   He was followed by ID,  he had been on Daptomycin + Ceftaroline, s/p ROBY w/o endocarditis, left shoulder arthrocentesis w/o infection,  surveillance blood culture negative from 01/07/24    Report per ED and care everywhere notes patient with fevers on 1/13 to 103degreese @ 1800, 2000, ER transfer recommended but pt refused, physician gave order for blood culture, pt reported feeling fine.   Today he developed temperature of 100.5 @ 15:56 and was transferred to [facility] for further evaluation.     Today he reports he can walk with asssitance, using a walker, performing ADLs, main complaint is right sided back pain    ED - blood cultures drawn.             Overview/Hospital Course:  Kulwant Mueller Jr. Is a 38 y.o. male who was admitted to hospital medicine for fever. WBC 15.86 >> 13.92. Sed rate 97, CRPP 211.9. On Daptomycin from previous discharge, continuing and added zosyn. ID consulted. Blood cultures NGTD. Orthopedics consulted, joint aspiration performed at bedside. 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 Taken to the OR for L knee washout. MRI pelvis: Myositis and  fasciitis, with soft tissue gas dissecting along the anterior compartment myofascial planes of the proximal left thigh. MRI L shoulder: Prominent subdeltoid enhancement/ bursitis. Superimposed infection may be present. No drainable fluid collections. MRI knee: s/p I&D w/ minimal residual fluid collection within the joint space. There is a large air-fluid collection extending supero-medially from the joint space along the medial femoral shaft, beyond the field of view. Prominent surrounding soft tissue inflammatory changes noted, with involvement of the vastus medialis, vastus lateralis, and popliteus musculature. 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 No spondylodiscitis or facet joint septic arthritis. 1/19 IR performed aspiration of fluid collection seen on MRI. Ortho performed a repeat I&D of left knee and an I&D of R thigh.     Interval History: NAEON. Pt seen and evaluated at bedside this am. Pt is doing well this am and is motivated to work w/ therapy at the rehab center. CRP trending up this am, will repeat tomorrow per ortho. ID also recommending another day of treatment and surveillance today to ensure clinical improvement and ready for discharge.     Review of Systems   Constitutional:  Positive for activity change. Negative for chills and fever.   Respiratory:  Negative for cough.    Cardiovascular:  Positive for leg swelling.   Gastrointestinal:  Negative for abdominal pain and diarrhea.   Musculoskeletal:  Positive for arthralgias, gait problem, joint swelling and myalgias.   Skin:  Positive for wound. Negative for rash.   Neurological:  Negative for dizziness and seizures.   Psychiatric/Behavioral:  Negative for confusion.    All other systems reviewed and are negative.    Objective:     Vital Signs (Most Recent):  Temp: 98 °F (36.7 °C) (01/24/24 1158)  Pulse: 91 (01/24/24 1158)  Resp: 18 (01/24/24 1158)  BP: 129/75 (01/24/24 1158)  SpO2: 96 %  (01/24/24 1158) Vital Signs (24h Range):  Temp:  [97.9 °F (36.6 °C)-99 °F (37.2 °C)] 98 °F (36.7 °C)  Pulse:  [82-92] 91  Resp:  [18-20] 18  SpO2:  [94 %-96 %] 96 %  BP: (118-144)/(63-80) 129/75     Weight: 112 kg (246 lb 14.6 oz)  Body mass index is 32.58 kg/m².    Intake/Output Summary (Last 24 hours) at 1/24/2024 1406  Last data filed at 1/24/2024 0432  Gross per 24 hour   Intake 480 ml   Output 830 ml   Net -350 ml         Physical Exam  Vitals and nursing note reviewed.   Constitutional:       General: He is not in acute distress.     Appearance: Normal appearance. He is not ill-appearing, toxic-appearing or diaphoretic.   HENT:      Head: Normocephalic and atraumatic.   Eyes:      General: No scleral icterus.     Conjunctiva/sclera: Conjunctivae normal.   Cardiovascular:      Rate and Rhythm: Normal rate.      Heart sounds: Normal heart sounds. No murmur heard.  Pulmonary:      Effort: No respiratory distress.      Breath sounds: Normal breath sounds.   Abdominal:      General: Bowel sounds are normal. There is no distension.      Palpations: Abdomen is soft.      Tenderness: There is no abdominal tenderness.   Musculoskeletal:         General: Swelling and tenderness present.      Cervical back: No rigidity or tenderness.      Comments: Exam of LE limited by surgical dressings in place. Surgical drain at R thigh, draining bloody-SS fluid.    Skin:     General: Skin is warm and dry.      Coloration: Skin is not jaundiced.      Findings: No rash.   Neurological:      General: No focal deficit present.      Mental Status: He is alert and oriented to person, place, and time.   Psychiatric:         Behavior: Behavior normal.         Thought Content: Thought content normal.         Judgment: Judgment normal.             Significant Labs: All pertinent labs within the past 24 hours have been reviewed.    Significant Imaging: I have reviewed all pertinent imaging results/findings within the past 24  hours.    Assessment/Plan:      * Staphylococcal arthritis of left knee  S/p Left knee arthrotomy and synovectomy (12/29/23)  -re-order PTOT  Pt with left knee sutures, tenderness to palpation near medial tibial plateau  - ortho consulted, bedside aspiration completed   - taken to OR for wound wash out   - MRI pelvis: Myositis and fasciitis, with soft tissue gas dissecting along the anterior compartment myofascial planes of the proximal left thigh.   - MRI L shoulder: Prominent subdeltoid enhancement/ bursitis. Superimposed infection may be present. No drainable fluid collections.   - MRI knee: s/p I&D w/ minimal residual fluid collection within the joint space. There is a large air-fluid collection extending supero-medially from the joint space along the medial femoral shaft, beyond the field of view. Prominent surrounding soft tissue inflammatory changes noted, with involvement of the vastus medialis, vastus lateralis, and popliteus musculature.  - MRI T/L spine: No MR imaging findings to account for reported history of MRSA bacteremia. No spondylodiscitis or facet joint septic arthritis   - s/p aspiration w/ IR 1/19  - s/p L knee I&D, R knee I&D 1/21    Staphylococcus aureus bacteremia  Fever  - [facility] Saturday and temp 100 prior to transfer  - s/p blood cultures   - Pt with c/o of right sided Back pain - given MRSA bacteremia, pt is at risk for metastatic site of infection obtain MRI T-L spine with Contrast to r/o spinal source of infection   - ESR 97/ .9, trending and improved  - Infectious disease consult - to assist in continuing Daptomycin approval & infectious w/u    >patient had ROBY performed on 1/02 to r/o endocarditis.   - continue daptomycin and cerftaroline  - , trending  - CRP trending, 105.8>139.4>170  - repeat blood cultures NGTD     Abscess of right thigh  - MRI pelvis revealed a large lateral thigh fluid collection that may reflect hematoma  "or abscess.   - 1/21/22 s/p washout w/ ortho. Drain in place  - cx taken, pending results  - continuing IV abx    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 due to underlying condition, limited to breakdown of skin  Patient with right heel - resolving ulcer  - prior wound care notes indicate - Paint with betadine and leave open to air  - elevate heels to reduce pressure   - Wound care consult   - Right heel: bedside nursing to paint with betadine, let dry and apply a foam dressing daily   - Turning every 2 hours  - Heel protecting boots  - Bedside nursing to maintain pressure injury prevention interventions    Chronic HFrEF (heart failure with reduced ejection fraction)  New finding at last hospitalization  - EF 48% with systolic dysfunction, normal diastolic function  - patient on Jardiance as outpatient as well as Toprol XL and Lasix 20mg po daily   - Jardiance not continued @ IP rehab - would hold given concern for possible repeat infection - resume when clinically stable.   - resume furosemide when stable    Hyponatremia  Patient has hyponatremia which is controlled,  We will monitor sodium Daily.   The hyponatremia is due to Dehydration/hypovolemia.  Urine sodium, urine osmolality, serum osmolality or TSH, T4 reviewed  S/p IVF   No results for input(s): "NA" in the last 24 hours.      Fever  - reported fevers of 103 @ [facility] on Saturday and temp 100 prior to transfer  - s/p blood cultures   - add Zosyn for empiric gram negative coverage  - Pt with c/o of right sided Back pain - given MRSA bacteremia, pt is at risk for metastatic site of infection obtain MRI T-L spine with Contrast to r/o spinal source of infection    - no acute findings on MRI   - Check ESR/CRP    - ESR 97/ .9   - Infectious disease consult - to assist in continuing Daptomycin approval & infectious w/u    >patient had ROBY performed on 1/02 to r/o endocarditis.     Transaminitis  - trend CMP daily, improving  - " check GGT with rising Alk phos levels  - Prior w/u @ [facility] included - CT A/P, w/o abnormality, Hepatitis panel negative, RUQ ultrasound with hepatomegaly and biliary sludge; but without other obvious abnormalities    Depression  Noted At [facility] hospitalization - patient noted to have flat affect, decrease motivation, excessive sleeping, was seen by psychiatry consult and Cymbalta recommended but pt declined.   - Description of his affect seems similar here, pending above workup would re-address with patient prior to discharge  Reports he was living with wife & 2 kids prior to current hospitalization.     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  Initially on therapeutic lovenox and transitioned back to Oral Xarelto 10mg daily   -pt previously followed with hematology - Dr. Duff.     Diabetic gastroparesis associated with type 2 diabetes mellitus  -continue reglan 5mg PO TID    Type 2 diabetes mellitus with complication, with long-term current use of insulin  Patient's FSGs are uncontrolled due to hyperglycemia on current medication regimen.  Last A1c reviewed-   Lab Results   Component Value Date    HGBA1C 7.4 (H) 12/28/2023     Most recent fingerstick glucose reviewed-   Recent Labs   Lab 01/23/24  1655 01/23/24 2023 01/24/24  0829 01/24/24  1157   POCTGLUCOSE 117* 104 93 133*       Current correctional scale  Low  Maintain anti-hyperglycemic dose as follows-   Antihyperglycemics (From admission, onward)      Start     Stop Route Frequency Ordered    01/16/24 2100  insulin detemir U-100 (Levemir) pen 17 Units         -- SubQ 2 times daily 01/16/24 1657    01/15/24 0329  insulin aspart  U-100 pen 0-5 Units         -- SubQ Before meals & nightly PRN 01/15/24 0231          Hold Oral hypoglycemics while patient is in the hospital.      VTE Risk Mitigation (From admission, onward)           Ordered     rivaroxaban tablet 10 mg  With dinner         01/15/24 0229     Reason for No Pharmacological VTE Prophylaxis  Once        Question:  Reasons:  Answer:  Already adequately anticoagulated on oral Anticoagulants    01/15/24 0229     IP VTE HIGH RISK PATIENT  Once         01/15/24 0229     Place sequential compression device  Until discontinued         01/15/24 0229                    Discharge Planning   RAEANN: 1/24/2024     Code Status: Full Code   Is the patient medically ready for discharge?: No    Reason for patient still in hospital (select all that apply): Patient trending condition, Laboratory test, Treatment, Consult recommendations, and Pending disposition  Discharge Plan A: Rehab   Discharge Delays: None known at this time              Raul Pal PA-C  Department of Hospital Medicine   [facility] - Observation 11H

## 2024-01-25 VITALS
HEART RATE: 89 BPM | OXYGEN SATURATION: 94 % | BODY MASS INDEX: 32.73 KG/M2 | DIASTOLIC BLOOD PRESSURE: 69 MMHG | HEIGHT: 73 IN | TEMPERATURE: 98 F | WEIGHT: 246.94 LBS | RESPIRATION RATE: 18 BRPM | SYSTOLIC BLOOD PRESSURE: 130 MMHG

## 2024-01-25 LAB
BASOPHILS # BLD AUTO: 0.04 K/UL (ref 0–0.2)
BASOPHILS NFR BLD: 0.4 % (ref 0–1.9)
CRP SERPL-MCNC: 141.1 MG/L (ref 0–8.2)
DIFFERENTIAL METHOD BLD: ABNORMAL
EOSINOPHIL # BLD AUTO: 0.1 K/UL (ref 0–0.5)
EOSINOPHIL NFR BLD: 0.9 % (ref 0–8)
ERYTHROCYTE [DISTWIDTH] IN BLOOD BY AUTOMATED COUNT: 14.4 % (ref 11.5–14.5)
HCT VFR BLD AUTO: 26.1 % (ref 40–54)
HGB BLD-MCNC: 7.9 G/DL (ref 14–18)
IMM GRANULOCYTES # BLD AUTO: 0.05 K/UL (ref 0–0.04)
IMM GRANULOCYTES NFR BLD AUTO: 0.5 % (ref 0–0.5)
LYMPHOCYTES # BLD AUTO: 1.6 K/UL (ref 1–4.8)
LYMPHOCYTES NFR BLD: 15.7 % (ref 18–48)
MCH RBC QN AUTO: 26.8 PG (ref 27–31)
MCHC RBC AUTO-ENTMCNC: 30.3 G/DL (ref 32–36)
MCV RBC AUTO: 89 FL (ref 82–98)
MONOCYTES # BLD AUTO: 0.7 K/UL (ref 0.3–1)
MONOCYTES NFR BLD: 7.2 % (ref 4–15)
NEUTROPHILS # BLD AUTO: 7.8 K/UL (ref 1.8–7.7)
NEUTROPHILS NFR BLD: 75.3 % (ref 38–73)
NRBC BLD-RTO: 0 /100 WBC
PLATELET # BLD AUTO: 439 K/UL (ref 150–450)
PMV BLD AUTO: 9.1 FL (ref 9.2–12.9)
POCT GLUCOSE: 115 MG/DL (ref 70–110)
POCT GLUCOSE: 149 MG/DL (ref 70–110)
RBC # BLD AUTO: 2.95 M/UL (ref 4.6–6.2)
WBC # BLD AUTO: 10.33 K/UL (ref 3.9–12.7)

## 2024-01-25 PROCEDURE — 99232 SBSQ HOSP IP/OBS MODERATE 35: CPT | Mod: ,,, | Performed by: NURSE PRACTITIONER

## 2024-01-25 PROCEDURE — A4216 STERILE WATER/SALINE, 10 ML: HCPCS | Performed by: STUDENT IN AN ORGANIZED HEALTH CARE EDUCATION/TRAINING PROGRAM

## 2024-01-25 PROCEDURE — 25000003 PHARM REV CODE 250: Performed by: STUDENT IN AN ORGANIZED HEALTH CARE EDUCATION/TRAINING PROGRAM

## 2024-01-25 PROCEDURE — 25000003 PHARM REV CODE 250

## 2024-01-25 PROCEDURE — 63600175 PHARM REV CODE 636 W HCPCS: Performed by: HOSPITALIST

## 2024-01-25 PROCEDURE — 99233 SBSQ HOSP IP/OBS HIGH 50: CPT | Mod: ,,, | Performed by: STUDENT IN AN ORGANIZED HEALTH CARE EDUCATION/TRAINING PROGRAM

## 2024-01-25 PROCEDURE — 25000003 PHARM REV CODE 250: Performed by: HOSPITALIST

## 2024-01-25 PROCEDURE — 36415 COLL VENOUS BLD VENIPUNCTURE: CPT | Mod: XB | Performed by: STUDENT IN AN ORGANIZED HEALTH CARE EDUCATION/TRAINING PROGRAM

## 2024-01-25 PROCEDURE — 36415 COLL VENOUS BLD VENIPUNCTURE: CPT | Performed by: PHYSICIAN ASSISTANT

## 2024-01-25 PROCEDURE — 85025 COMPLETE CBC W/AUTO DIFF WBC: CPT | Performed by: PHYSICIAN ASSISTANT

## 2024-01-25 PROCEDURE — 86140 C-REACTIVE PROTEIN: CPT | Performed by: STUDENT IN AN ORGANIZED HEALTH CARE EDUCATION/TRAINING PROGRAM

## 2024-01-25 RX ORDER — OXYCODONE HYDROCHLORIDE 5 MG/1
5 TABLET ORAL EVERY 4 HOURS PRN
Refills: 0 | Status: ON HOLD
Start: 2024-01-25 | End: 2024-02-07 | Stop reason: HOSPADM

## 2024-01-25 RX ORDER — OXYCODONE HYDROCHLORIDE 10 MG/1
10 TABLET ORAL EVERY 4 HOURS PRN
Refills: 0
Start: 2024-01-25 | End: 2024-02-26

## 2024-01-25 RX ORDER — METHOCARBAMOL 500 MG/1
500 TABLET, FILM COATED ORAL 4 TIMES DAILY PRN
Qty: 40 TABLET | Refills: 0 | Status: ON HOLD
Start: 2024-01-25 | End: 2024-02-07 | Stop reason: HOSPADM

## 2024-01-25 RX ADMIN — METOCLOPRAMIDE 5 MG: 5 TABLET ORAL at 05:01

## 2024-01-25 RX ADMIN — METHOCARBAMOL 500 MG: 500 TABLET ORAL at 01:01

## 2024-01-25 RX ADMIN — Medication 10 ML: at 11:01

## 2024-01-25 RX ADMIN — METOCLOPRAMIDE 5 MG: 5 TABLET ORAL at 11:01

## 2024-01-25 RX ADMIN — DAPTOMYCIN 945 MG: 500 INJECTION, POWDER, LYOPHILIZED, FOR SOLUTION INTRAVENOUS at 01:01

## 2024-01-25 RX ADMIN — OXYCODONE 5 MG: 5 TABLET ORAL at 11:01

## 2024-01-25 RX ADMIN — PANTOPRAZOLE SODIUM 40 MG: 40 TABLET, DELAYED RELEASE ORAL at 09:01

## 2024-01-25 RX ADMIN — AMMONIUM LACTATE: 12 LOTION TOPICAL at 09:01

## 2024-01-25 RX ADMIN — METHOCARBAMOL 500 MG: 500 TABLET ORAL at 09:01

## 2024-01-25 RX ADMIN — METOPROLOL SUCCINATE 12.5 MG: 25 TABLET, EXTENDED RELEASE ORAL at 09:01

## 2024-01-25 RX ADMIN — Medication 10 ML: at 12:01

## 2024-01-25 RX ADMIN — Medication 10 ML: at 06:01

## 2024-01-25 RX ADMIN — OXYCODONE 5 MG: 5 TABLET ORAL at 05:01

## 2024-01-25 RX ADMIN — INSULIN DETEMIR 17 UNITS: 100 INJECTION, SOLUTION SUBCUTANEOUS at 09:01

## 2024-01-25 NOTE — PROGRESS NOTES
[facility]  Physical Medicine & Rehab  Progress Note    Patient Name: Kulawnt Mueller Jr.  MRN: [id number]  Admission Date: 1/14/2024  Length of Stay: 9 days  Attending Physician: Suly Olguin MD    Subjective:     Principal Problem:Staphylococcal arthritis of left knee    Hospital Course:   1/18/24: Participated w/ OT. Bed mob mod-maxA. LBD totalA.   1/24/24: Participated w/ PT. Gait: 8 steps(4 forward/backward) 4 side steps with RW and Min A with balance/safety/RW management. Pt. amb. with decreased step length/nathaniel/floor clearance.    Interval History 1/25/2024:  Patient is seen for follow-up PM&R evaluation and recommendations: Participating with therapy. Continues to have goals for rehab. CRP pending for today. Drain removed and pain controlled.     HPI, Past Medical, Family, and Social History remains the same as documented in the initial encounter.    Scheduled Medications:    acetaminophen  1,000 mg Oral Q8H    ammonium lactate   Topical (Top) BID    DAPTOmycin (CUBICIN) 945 mg in sodium chloride 0.9% SolP 50 mL IVPB  10 mg/kg (Adjusted) Intravenous Q24H    insulin detemir U-100  17 Units Subcutaneous BID    methocarbamoL  500 mg Oral QID    metoclopramide HCl  5 mg Oral TID AC    metoprolol succinate  12.5 mg Oral Daily    oxyCODONE  5 mg Oral Q6H    pantoprazole  40 mg Oral Daily    rivaroxaban  10 mg Oral Daily with dinner    sodium chloride 0.9%  10 mL Intravenous Q6H     Diagnostic Results:   Labs: Reviewed  ECG: Reviewed  CT: Reviewed    PRN Medications: dextrose 10%, dextrose 10%, glucagon (human recombinant), glucose, glucose, insulin aspart U-100, loperamide, melatonin, morphine, naloxone, ondansetron, oxyCODONE, oxyCODONE, prochlorperazine, scopolamine, Flushing PICC/Midline Protocol **AND** sodium chloride 0.9% **AND** sodium chloride 0.9%    Review of Systems   Constitutional:  Positive for activity change and fatigue. Negative for fever.   Respiratory:  Negative for cough  and shortness of breath.    Musculoskeletal:  Positive for gait problem.   Neurological:  Positive for weakness (LLE and LUE).   Psychiatric/Behavioral:  Negative for agitation, behavioral problems and confusion.      Objective:     Vital Signs (Most Recent):  Temp: 97.8 °F (36.6 °C) (01/25/24 0820)  Pulse: 92 (01/25/24 0820)  Resp: 16 (01/25/24 0820)  BP: 120/75 (01/25/24 0820)  SpO2: 95 % (01/25/24 0820)    Vital Signs (24h Range):  Temp:  [97.8 °F (36.6 °C)-98.5 °F (36.9 °C)] 97.8 °F (36.6 °C)  Pulse:  [80-92] 92  Resp:  [16-19] 16  SpO2:  [95 %-100 %] 95 %  BP: (120-141)/(61-80) 120/75     Physical Exam  Vitals and nursing note reviewed.   Constitutional:       Appearance: Normal appearance.   HENT:      Head: Normocephalic and atraumatic.      Mouth/Throat:      Mouth: Mucous membranes are moist.   Eyes:      Extraocular Movements: Extraocular movements intact.      Pupils: Pupils are equal, round, and reactive to light.   Pulmonary:      Effort: Pulmonary effort is normal. No respiratory distress.   Musculoskeletal:      Cervical back: Normal range of motion and neck supple.      Comments: LUE 4/5  LLE 3/5 with dressings in place  RLE 4/5  Skin:     General: Skin is warm.   Neurological:      Mental Status: He is alert and oriented to person, place, and time. Mental status is at baseline.      Motor: Weakness present.      Gait: Gait abnormal.   Psychiatric:         Mood and Affect: Mood normal.         Behavior: Behavior normal.     Assessment/Plan:      * Staphylococcal arthritis of left knee  - S/p L knee arthrotomy with synovectomy on 12/29. Bcx with staph aureus  - Taken to OR and s/p L knee arthroscopic I&D on 1/17, Cxs were NGTD.   - Per Ortho WBAT to LLE.    - ID consulted recommending continue daptomycin/ceftaroline for now and will follow but anticipating another 4 weeks of abx from last washout (est end date: 2/11/24).   - IR aspiration of R thigh abscess with 120 cc of bloody purulent fluid. Gram  stain negative, cx pending. Despite negative cx, given report from IR of purulence in aspirate in setting of known infection. Ortho proceeded with I&D of right thigh abscess with drain remain in  place and repeat I&D of L knee 1/21    Impaired mobility  - Related to prolonged/acute hospital course.     Recommendations  -  Encourage mobility, OOB in chair at least 3 hours per day, and early ambulation as appropriate  -  PT/OT evaluate and treat  -  Pain management  -  Monitor for and prevent skin breakdown and pressure ulcers  Early mobility, repositioning/weight shifting every 20-30 minutes when sitting, turn patient every 2 hours, proper mattress/overlay and chair cushioning, pressure relief/heel protector boots  -  DVT prophylaxis    -  Reviewed discharge options (IP rehab, SNF, HH therapy, and OP therapy)     Abscess of right thigh  - Ortho proceeded with I&D of right thigh abscess with drain remain in place on 1/21    Elevated CK  - Trending     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     The patient will be admitted for comprehensive interdisciplinary inpatient rehabilitation to address the impairments due to medical diagnosis of Staphylococcal arthritis of left knee. The patient will benefit from an inpatient rehabilitation program to promote functional recovery, implement compensatory strategies and will undergo assessment for needs for durable medical equipment for safe discharge to the community. This patient will benefit from a coordinated interdisciplinary  rehabilitation program services that require close monitoring and treatment with 24-hour rehabilitative nursing and physical/occupational therapies for 3 hours/day for 5 days/week.This interdisciplinary program will be performed under the direction of a physiatrist.    MEDICAL STABILITY:     At this time, barriers for post-acute rehab admission include repeat CRP and if down trending stable for dc to rehab.     We will continue to follow.     Yudy Whitmore NP  Department of Physical Medicine & Rehab   [facility]

## 2024-01-25 NOTE — SUBJECTIVE & OBJECTIVE
Principal Problem:Staphylococcal arthritis of left knee    Principal Orthopedic Problem: s/p L knee arthroscopic I&D on 1/17, s/p repeat L knee I&D and right thigh abscess I&D on 1/21    Interval History: NAEON. VSS. Afebrile. Drain dced. Right thigh and left knee pain controlled. Pt mobilized a few steps with PT yesterday. Trend CRP today, if downtrending, stable for dc to rehab.     Review of patient's allergies indicates:   Allergen Reactions    Heparin analogues Nausea And Vomiting       Current Facility-Administered Medications   Medication    acetaminophen tablet 1,000 mg    ammonium lactate 12 % lotion    DAPTOmycin (CUBICIN) 945 mg in sodium chloride 0.9% SolP 50 mL IVPB    dextrose 10% bolus 125 mL 125 mL    dextrose 10% bolus 250 mL 250 mL    glucagon (human recombinant) injection 1 mg    glucose chewable tablet 16 g    glucose chewable tablet 24 g    insulin aspart U-100 pen 0-5 Units    insulin detemir U-100 (Levemir) pen 17 Units    loperamide capsule 2 mg    melatonin tablet 6 mg    methocarbamoL tablet 500 mg    metoclopramide HCl tablet 5 mg    metoprolol succinate (TOPROL-XL) 24 hr split tablet 12.5 mg    morphine injection 4 mg    naloxone 0.4 mg/mL injection 0.02 mg    ondansetron injection 4 mg    oxyCODONE immediate release tablet 5 mg    oxyCODONE immediate release tablet 5 mg    oxyCODONE immediate release tablet Tab 10 mg    pantoprazole EC tablet 40 mg    prochlorperazine injection Soln 5 mg    rivaroxaban tablet 10 mg    scopolamine 1.3-1.5 mg (1 mg over 3 days) 1 patch    sodium chloride 0.9% flush 10 mL    And    sodium chloride 0.9% flush 10 mL     Objective:     Vital Signs (Most Recent):  Temp: 97.8 °F (36.6 °C) (01/25/24 0820)  Pulse: 92 (01/25/24 0820)  Resp: 16 (01/25/24 0820)  BP: 120/75 (01/25/24 0820)  SpO2: 95 % (01/25/24 0820) Vital Signs (24h Range):  Temp:  [97.8 °F (36.6 °C)-98.5 °F (36.9 °C)] 97.8 °F (36.6 °C)  Pulse:  [80-92] 92  Resp:  [16-19] 16  SpO2:  [95 %-100 %] 95  "%  BP: (120-141)/(61-80) 120/75     Weight: 112 kg (246 lb 14.6 oz)  Height: 6' 1" (185.4 cm)  Body mass index is 32.58 kg/m².      Intake/Output Summary (Last 24 hours) at 1/25/2024 0891  Last data filed at 1/25/2024 0630  Gross per 24 hour   Intake --   Output 1500 ml   Net -1500 ml          Ortho/SPM Exam     Awake/alert/oriented x3, No acute distress, Afebrile, Vital signs stable  Good inspiratory effort with unlaboured breathing  Dressings c/d/I  Pain with knee PROM 5-70  No pain with R hip ROM, axial loading, full PROM. No TTP of R lateral hip.   Active and PROM of L shoulder intact with ain with L shoulder ROM (FF over 90, ABD over 90), +hawkens, + neer,       Significant Labs: All pertinent labs within the past 24 hours have been reviewed.    Significant Imaging: I have reviewed all pertinent imaging results/findings.    MRI pelvis showing right lateral thigh rim enhancing fluid collection, no bl hip effusions    MRI L shoulder showing SS tear without retraction, subdeltoid bursitis, no fluid collections   "

## 2024-01-25 NOTE — PLAN OF CARE
CM was notified of placement. Patient was accepted to return to [facility] on 1/25/24. Report can be called to [phone].  CM arranged ambulance transport via [facility]. Requested  time is  1345.  Requested  time does not guarantee arrival time.  If transport does not arrive by 1445 please contact assigned SW or on-call for assistance.     01/25/24 1249   Post-Acute Status   Post-Acute Authorization Placement   Post-Acute Placement Status Set-up Complete/Auth obtained   Hospital Resources/Appts/Education Provided Provided patient/caregiver with written discharge plan information;Appointment suggestion unavailable   Discharge Plan   Discharge Plan A Rehab   Discharge Plan B Rehab       Future Appointments   Date Time Provider Department Center   1/31/2024  2:00 PM Salvador Tapia MD [facility]   2/5/2024 11:00 AM Renea, Nicolas K, NP [facility] [address]   2/20/2024 11:00 AM Lawrence Magana MD [facility]   3/4/2024 11:45 AM Nicolas Meier NP [facility] [address] John L. Morgan RN,BSN

## 2024-01-25 NOTE — SUBJECTIVE & OBJECTIVE
Interval History 1/25/2024:  Patient is seen for follow-up PM&R evaluation and recommendations: Participating with therapy. Continues to have goals for rehab. CRP pending for today. Drain removed and pain controlled.     HPI, Past Medical, Family, and Social History remains the same as documented in the initial encounter.    Scheduled Medications:    acetaminophen  1,000 mg Oral Q8H    ammonium lactate   Topical (Top) BID    DAPTOmycin (CUBICIN) 945 mg in sodium chloride 0.9% SolP 50 mL IVPB  10 mg/kg (Adjusted) Intravenous Q24H    insulin detemir U-100  17 Units Subcutaneous BID    methocarbamoL  500 mg Oral QID    metoclopramide HCl  5 mg Oral TID AC    metoprolol succinate  12.5 mg Oral Daily    oxyCODONE  5 mg Oral Q6H    pantoprazole  40 mg Oral Daily    rivaroxaban  10 mg Oral Daily with dinner    sodium chloride 0.9%  10 mL Intravenous Q6H       Diagnostic Results: Labs: Reviewed  ECG: Reviewed  CT: Reviewed    PRN Medications: dextrose 10%, dextrose 10%, glucagon (human recombinant), glucose, glucose, insulin aspart U-100, loperamide, melatonin, morphine, naloxone, ondansetron, oxyCODONE, oxyCODONE, prochlorperazine, scopolamine, Flushing PICC/Midline Protocol **AND** sodium chloride 0.9% **AND** sodium chloride 0.9%    Review of Systems   Constitutional:  Positive for activity change and fatigue. Negative for fever.   Respiratory:  Negative for cough and shortness of breath.    Musculoskeletal:  Positive for gait problem.   Neurological:  Positive for weakness (LLE and LUE).   Psychiatric/Behavioral:  Negative for agitation, behavioral problems and confusion.      Objective:     Vital Signs (Most Recent):  Temp: 97.8 °F (36.6 °C) (01/25/24 0820)  Pulse: 92 (01/25/24 0820)  Resp: 16 (01/25/24 0820)  BP: 120/75 (01/25/24 0820)  SpO2: 95 % (01/25/24 0820)    Vital Signs (24h Range):  Temp:  [97.8 °F (36.6 °C)-98.5 °F (36.9 °C)] 97.8 °F (36.6 °C)  Pulse:  [80-92] 92  Resp:  [16-19] 16  SpO2:  [95 %-100 %] 95  %  BP: (120-141)/(61-80) 120/75         Physical Exam  Vitals and nursing note reviewed.   Constitutional:       Appearance: Normal appearance.   HENT:      Head: Normocephalic and atraumatic.      Mouth/Throat:      Mouth: Mucous membranes are moist.   Eyes:      Extraocular Movements: Extraocular movements intact.      Pupils: Pupils are equal, round, and reactive to light.   Pulmonary:      Effort: Pulmonary effort is normal. No respiratory distress.   Musculoskeletal:      Cervical back: Normal range of motion and neck supple.      Comments: LUE 4/5  LLE 3/5 with dressings in place  RLE 4/5     Skin:     General: Skin is warm.   Neurological:      Mental Status: He is alert and oriented to person, place, and time. Mental status is at baseline.      Motor: Weakness present.      Gait: Gait abnormal.   Psychiatric:         Mood and Affect: Mood normal.         Behavior: Behavior normal.          NEUROLOGICAL EXAMINATION:     MENTAL STATUS   Oriented to person, place, and time.     CRANIAL NERVES     CN III, IV, VI   Pupils are equal, round, and reactive to light.

## 2024-01-25 NOTE — PROGRESS NOTES
[facility]  Infectious Disease  Progress Note    Patient Name: Kulwant Mueller Jr.  MRN: [id number]  Admission Date: 1/14/2024  Length of Stay: 9 days  Attending Physician: Suly Olguin MD  Primary Care Provider: No, Primary Doctor    Isolation Status: Contact  Assessment/Plan:      ID  * Staphylococcal arthritis of left knee  I have reviewed hospital notes from   service and other specialty providers. I have also reviewed CBC, CMP/BMP,  cultures and imaging with my interpretation as documented.      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 (Surgical cxs +MRSA). ROBY 1/02/24 neg. Pt required salvage therapy dapto/ceftaroline & bld cxs finally cleared 1/07, and then placed on daptomycin monotherapy to complete 4wks duration (ASYA 02/06) per [facility] (Y[facility]). Of note, L shoulder imaging showed subdeltoid bursitis w/ SS tear; superimposed infection could be present but no drainable fluid collections. Also, R heel without osteo but had cellulitis, myositis, tenosynovitis.            Pt was discharged (01/10) to rehab with daptomycin monotherapy; but started having new fevers (up to 103F) at rehab, and is now re-admitted (01/14) for workup. Blood cultures 01/14/24 NGTD. , mild leukocytosis 15. MRI T/L spine negative for osteo-discitis. ID was consulted for recent MRSA bacteremia from septic arthritis - new fevers @ IP rehab, on daptomycin.     -- OR 01/15 for surgical I&D native L knee septic joint. (Cell count wbc 130K, +Calcium pyrophosphate); washout cxs 01/15 NGTD.  -- (01/17) MRI of L knee and pelvis obtained; which revealed extensive spread of inflammation / infection to thighs bilaterally.          -Left knee: large air-fluid  collection extending supero-medially from knee joint space along medial femoral shaft, beyond field of view.          -Left thigh (per MRI pelvis): extensive myositis / fasciitis of Left thigh proximally, with soft tissue gas dissecting along the anterior compartment myofascial planes of the proximal left thigh.          -Right thigh (per MRI pelvis): myositis of largely the anterior compartment, with large complex lateral thigh abscess ([id number]), beginning at level of iliac crest and dissecting deep to superficial gluteal muscle fascia and TFL distally, possibly incompletely imaged.     -- IR drain placed 01/19 R lateral thigh abscess, cxs +MRSA.  Taken to OR 01/21 for repeat surgical I&D of L knee (#3), and R thigh abscess with new R thigh drain placed. Surgical cxs (01/21) of L knee and R thigh NGTD.    -- Pt maintained on Daptomycin monotherapy; overall clinically improving with abx and surgical interventions for improved source control. Since surgical wash-out #2 done (01/15), pt initially had up-trending CRP (180) / ; but now down-trend;  / ; with further improvement in leg swelling / pain bilaterally. Otherwise remains AF, VSS, HDS.       Recommendations / Plan:  Continue Iv-daptomycin 8mg/kg Q24h ()  To complete 6 weeks of abx from last washout (01/21); ASYA 03/03/24  Recommend repeat imaging of R thigh near / prior to ASYA 03/03/24 -- to ensure R thigh abscess / myositis resolved. May consider repeat imaging of L thigh / knee as well if pain / swelling persists or worsens.   Plans for hosp d/c back to [facility] rehab today      -- ID will schedule pt for ID clinic f/u appt   -- Discussed with ID staff and primary team.  -- ID will sign off at this time. Please see OPAT note below for outpt abx plans.       Outpatient Antibiotic Therapy Plan:    Please send referral to [facility].    1) Infection :   Disseminated MRSA bacteremia; with L native knee  septic joint with L thigh myositis, and R thigh abscess w/ myositis.    2) Discharge Antibiotics:     Intravenous antibiotics:  IV - Daptomycin 8mg/kg  Q24h      3) Therapy Duration:  6wks s/p 01/21    Estimated end date of IV antibiotics:   03/03/24    4) Outpatient Weekly Labs:    Order the following labs to be drawn on Mondays:   CBC  CMP   CRP  CPK (when on Daptomycin)    5) Fax Lab Results to Infectious Diseases Provider:  Lefty Amanda PA-C    [facility] Fax Number: [phone]    6) Outpatient Infectious Diseases Follow-up    Follow-up appointment will be arranged by the ID clinic and will be found in the patient's appointments tab.    Prior to discharge, please ensure the patient's follow-up appt has been scheduled with ID-Clinic -- for patient convenience and continuity of care.  If there is still no follow-up scheduled prior to discharge, please send an EPIC message to  Ana Seymour LPN  in Infectious Diseases.            Thank you for your consult. I will sign off. Please contact us if you have any additional questions.    Lefty Amanda PA-C  Infectious Disease  [facility]    Subjective:     Principal Problem:Staphylococcal arthritis of left knee    HPI: Mr. De Anda is a pleasant 38 y.o. man with May-Thurner syndrome on Xarelto, DM2 on insulin, chronic foot wounds following with wound care, and HTN, recent admission at [facility] for MRSA bacteremia. Index Bcx + 12/27/23, complicated by left knee septic arthritis, s/p L knee arthrotomy with synovectomy on 12/29. Cx with MRSA, ROBY 1/02/24 neg, required salvage therapy & cleared 1/07, placed on daptomycin monotherapy. Of note, the patient also had L shoulder aspirated imaging without evidence of infection. R heel without osteo but had cellulitis, myositis, tenosynovitis. He started having new fevers at rehab, and is now admitted for workup. Fevers were up to 103 F, blood cultures collected 1/14/24 are NGTD. ID was  consulted for recent MRSA bacteremia from septic arthritis - new fevers @ IP rehab, on daptomycin. In the meantime, the patient was taken to the OR on the 15th for arthroscopic washout. Cultures are also NGTD. The patient is feeling better - denies fever or chills.      Interval History:   -- OR 01/15 for surgical I&D native L knee septic joint. (Cell count wbc 130K, +Calcium pyrophosphate); washout cxs 01/15 NGTD.    -- (01/17) MRI of L knee and pelvis obtained; which revealed extensive spread of inflammation / infection to thighs bilaterally.          -Left knee: large air-fluid collection extending supero-medially from knee joint space along medial femoral shaft, beyond field of view.          -Left thigh (per MRI pelvis): extensive myositis / fasciitis of Left thigh proximally, with soft tissue gas dissecting along the anterior compartment myofascial planes of the proximal left thigh.          -Right thigh (per MRI pelvis): myositis of largely the anterior compartment, with large complex lateral thigh abscess ([id number]), beginning at level of iliac crest and dissecting deep to superficial gluteal muscle fascia and TFL distally, possibly incompletely imaged.      -- IR drain placed 01/19 R lateral thigh abscess, cxs +MRSA.  Taken to OR 01/21 for repeat surgical I&D of L knee (#3), and R thigh abscess with new R thigh drain placed. Surgical cxs (01/21) of L knee and R thigh NGTD.    TODAY (01/25): Remains AF, VSS, HDS, wbc nml. CRP / CPK pending; previously uptrending; but now downtrend.  (down from 180).  nml (down from 316). Pain controlled. Pt improving.     Review of Systems   Constitutional:  Negative for chills and fever.   Respiratory:  Negative for cough.    Cardiovascular:  Positive for leg swelling.   Gastrointestinal:  Negative for abdominal pain and diarrhea.   Musculoskeletal:  Positive for arthralgias, gait problem, joint swelling and myalgias.   Skin:  Positive for wound. Negative for  rash.   Neurological:  Negative for dizziness and seizures.   Psychiatric/Behavioral:  Negative for confusion.    All other systems reviewed and are negative.    Objective:     Vital Signs (Most Recent):  Temp: 98.6 °F (37 °C) (01/23/24 0837)  Pulse: 92 (01/23/24 0837)  Resp: 12 (01/23/24 0940)  BP: (!) 141/79 (01/23/24 0927)  SpO2: 97 % (01/23/24 0837) Vital Signs (24h Range):  Temp:  [97.3 °F (36.3 °C)-98.6 °F (37 °C)] 98.6 °F (37 °C)  Pulse:  [76-95] 92  Resp:  [12-20] 12  SpO2:  [92 %-98 %] 97 %  BP: (109-141)/(59-79) 141/79     Weight: 110.9 kg (244 lb 7.8 oz)  Body mass index is 32.26 kg/m².    Estimated Creatinine Clearance: 183.1 mL/min (based on SCr of 0.7 mg/dL).     Physical Exam  Vitals and nursing note reviewed.   Constitutional:       General: He is not in acute distress.     Appearance: Normal appearance. He is not ill-appearing, toxic-appearing or diaphoretic.   HENT:      Head: Normocephalic and atraumatic.   Eyes:      General: No scleral icterus.     Conjunctiva/sclera: Conjunctivae normal.   Cardiovascular:      Rate and Rhythm: Normal rate.      Heart sounds: Normal heart sounds. No murmur heard.  Pulmonary:      Effort: No respiratory distress.      Breath sounds: Normal breath sounds.   Abdominal:      General: Bowel sounds are normal. There is no distension.      Palpations: Abdomen is soft.      Tenderness: There is no abdominal tenderness.   Musculoskeletal:         General: Swelling and tenderness present.      Cervical back: No rigidity or tenderness.      Comments: Exam of LE limited by surgical dressings in place. Surgical drain at R thigh, draining bloody-SS fluid.    Skin:     General: Skin is warm and dry.      Coloration: Skin is not jaundiced.      Findings: No rash.   Neurological:      General: No focal deficit present.      Mental Status: He is alert and oriented to person, place, and time.   Psychiatric:         Behavior: Behavior normal.         Thought Content: Thought  content normal.         Judgment: Judgment normal.          Significant Labs: Blood Culture:   Recent Labs   Lab 01/04/24  0522 01/04/24  0523 01/05/24  0716 01/07/24  1145 01/14/24 2039   LABBLOO Gram stain aer bottle: Gram positive cocci in clusters resembling Staph  Results called to and read back by: Ms. Ramón RN 01/05/2024  04:19  METHICILLIN RESISTANT STAPHYLOCOCCUS AUREUS  ID consult required at [facility].  * Gram stain aer bottle: Gram positive cocci in clusters resembling Staph  Results called to and read back by: Kalyn Correa RN  01/05/2024  14:32  METHICILLIN RESISTANT STAPHYLOCOCCUS AUREUS  ID consult required at [facility].  For susceptibility see order #[id number]  * No growth after 5 days.  Gram stain aer bottle: Gram positive cocci in clusters resembling Staph  Results called to and read back by: Tim Correa RN 01/07/2024  10:08  METHICILLIN RESISTANT STAPHYLOCOCCUS AUREUS  For susceptibility see order #[id number]  * No growth after 5 days.  No growth after 5 days. No growth after 5 days.  No growth after 5 days.       CBC:   Recent Labs   Lab 01/22/24  0500 01/23/24  0755   WBC 11.97 12.99*   HGB 8.3* 8.5*   HCT 28.0* 28.2*    462*       CMP:   Recent Labs   Lab 01/22/24  0500 01/23/24  0754    134*   K 4.2 3.8    101   CO2 26 26   * 124*   BUN 8 7   CREATININE 0.9 0.7   CALCIUM 8.6* 8.8   ANIONGAP 8 7*       Microbiology Results (last 7 days)       Procedure Component Value Units Date/Time    Culture, Anaerobe [[id number]] Collected: 01/19/24 1604    Order Status: Completed Specimen: Abscess from Leg, Right Updated: 01/23/24 0722     Anaerobic Culture Culture in progress    Narrative:      Rg thigh not leg    Culture, Anaerobic [[id number]] Collected: 01/21/24 1415    Order Status: Completed Specimen: Knee, Left Updated: 01/23/24 0628     Anaerobic Culture Culture in progress    Narrative:      Left  knee    Culture, Anaerobic [[id number]] Collected: 01/21/24 1428    Order Status: Completed Specimen: Abscess from Leg, Right Updated: 01/23/24 0628     Anaerobic Culture Culture in progress    Narrative:      Right thigh abscess    AFB Culture & Smear [[id number]] Collected: 01/21/24 1415    Order Status: Completed Specimen: Knee, Left Updated: 01/22/24 2127     AFB Culture & Smear Culture in progress     AFB CULTURE STAIN No acid fast bacilli seen.    Narrative:      Left knee    AFB Culture & Smear [[id number]] Collected: 01/21/24 1428    Order Status: Completed Specimen: Abscess from Leg, Right Updated: 01/22/24 2127     AFB Culture & Smear Culture in progress     AFB CULTURE STAIN No acid fast bacilli seen.    Narrative:      Right thigh abscess    Aerobic culture [[id number]] Collected: 01/21/24 1415    Order Status: Completed Specimen: Knee, Left Updated: 01/22/24 1015     Aerobic Bacterial Culture No growth    Narrative:      Left knee    Aerobic culture [[id number]] Collected: 01/21/24 1428    Order Status: Completed Specimen: Abscess from Leg, Right Updated: 01/22/24 1015     Aerobic Bacterial Culture No growth    Narrative:      Right thigh abscess    Aerobic culture [[id number]]  (Abnormal) Collected: 01/19/24 1604    Order Status: Completed Specimen: Abscess from Leg, Right Updated: 01/22/24 0949     Aerobic Bacterial Culture STAPHYLOCOCCUS AUREUS  Rare  Susceptibility pending      Narrative:      Rg thigh not leg    Culture, Anaerobe [[id number]] Collected: 01/15/24 1418    Order Status: Completed Specimen: Joint Fluid from Knee, Left Updated: 01/22/24 0721     Anaerobic Culture Culture in progress    Gram stain [[id number]] Collected: 01/21/24 1415    Order Status: Completed Specimen: Knee, Left Updated: 01/21/24 1621     Gram Stain Result Few WBC's      No organisms seen    Narrative:      Left knee    Gram stain [[id number]] Collected: 01/21/24 1428    Order Status: Completed Specimen: Abscess from  Leg, Right Updated: 01/21/24 1557     Gram Stain Result Rare WBC's      No organisms seen    Narrative:      Right thigh abscess    Fungus culture [[id number]] Collected: 01/21/24 1428    Order Status: Sent Specimen: Abscess from Leg, Right Updated: 01/21/24 1450    Fungus culture [[id number]] Collected: 01/21/24 1415    Order Status: Sent Specimen: Knee, Left Updated: 01/21/24 1446    Blood culture x two cultures. Draw prior to antibiotics. [[id number]] Collected: 01/14/24 2039    Order Status: Completed Specimen: Blood from Peripheral, Upper Arm, Right Updated: 01/19/24 2212     Blood Culture, Routine No growth after 5 days.    Narrative:      Aerobic and anaerobic    Blood culture x two cultures. Draw prior to antibiotics. [[id number]] Collected: 01/14/24 2039    Order Status: Completed Specimen: Blood from Peripheral, Forearm, Right Updated: 01/19/24 2212     Blood Culture, Routine No growth after 5 days.    Narrative:      Aerobic and anaerobic    Gram stain [[id number]] Collected: 01/19/24 1604    Order Status: Completed Specimen: Abscess from Leg, Right Updated: 01/19/24 1849     Gram Stain Result Rare WBC's      No organisms seen    Narrative:      Rg thigh not leg    Fungus culture [[id number]] Collected: 01/19/24 1604    Order Status: Sent Specimen: Abscess from Leg, Right Updated: 01/19/24 1749    Aerobic culture [[id number]] Collected: 01/15/24 1419    Order Status: Completed Specimen: Knee, Left Updated: 01/18/24 0955     Aerobic Bacterial Culture No growth    AFB Culture & Smear [[id number]] Collected: 01/15/24 1418    Order Status: Completed Specimen: Joint Fluid from Knee, Left Updated: 01/16/24 2128     AFB Culture & Smear Culture in progress     AFB CULTURE STAIN No acid fast bacilli seen.          Pathology Results  (Last 10 years)                 03/21/22 1419  Specimen to Pathology, Surgery Gastrointestinal tract Final result    Narrative:  Pre-op Diagnosis: Fisher grade C esophagitis  [K20.80]   Candida esophagitis [B37.81]   Procedure(s):   EGD (ESOPHAGOGASTRODUODENOSCOPY)   1. Antrum bx r/o h pylori   2. Distal esophagus bx   Release to patient->Immediate   Specimen total (fresh, frozen, permanent):->2       01/31/22 1156  Specimen to Pathology, Surgery Gastrointestinal tract Final result    Narrative:  Pre-op Diagnosis: Nausea and vomiting, intractability of   vomiting not specified, unspecified vomiting type [R11.2]   Diabetic gastroparesis associated with type 2 diabetes   mellitus [E11.43, K31.84]   Procedure(s):   EGD (ESOPHAGOGASTRODUODENOSCOPY)   Number of specimens: 2   Name of specimens:   #1 Gastric Bxs r/o H Pylori   #2 Esophageal Bxs r/o Candida   Release to patient->Immediate   Specimen total (fresh, frozen, permanent):->2             All pertinent labs within the past 24 hours have been reviewed.    Significant Imaging: I have reviewed all pertinent imaging results/findings within the past 24 hours.    I have personally reviewed records / hospital notes from   service and other specialty providers. I have also reviewed CBC, CMP/BMP,  cultures and imaging with my interpretation as documented in my assessment / plan.    Patient is high risk for infectious complications given pt's age, multiple co-morbidities, and case complexity.      Time: 50 minutes   50% of time spent on face-to-face counseling and coordination of care. Counseling included review of test results, diagnosis, and treatment plan with patient and/or family.

## 2024-01-25 NOTE — PLAN OF CARE
[facility]    FACILITY TRANSFER ORDERS      Patient Name: Kulwant Mueller Jr.  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 due to underlying condition, limited to breakdown of skin [E08.621, L97.401]  Yes    Staphylococcus aureus bacteremia [R78.81, B95.61]  Yes    Other elevated white blood cell count [D72.828]  Yes    Elevated CK [R74.8]  Yes    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       Allergies:  Review of patient's allergies indicates:   Allergen Reactions    Heparin analogues Nausea And Vomiting       Diet: diabetic diet: 2000 calorie    Activities: Activity as tolerated    Goals of Care Treatment Preferences:  Code Status: Full Code      Labs: CRP  weekly      CONSULTS:    Physical Therapy to evaluate and treat.  and Occupational Therapy to evaluate and treat.    MISCELLANEOUS CARE:  Diabetes Care:   SN to perform and educate Diabetic management with blood glucose monitoring:, Fingerstick blood sugar AC and HS, and  Report CBG < 60 or > 350 to physician.    WOUND CARE ORDERS  None    Medications: Review discharge medications with patient and family and provide education.      Current Discharge Medication List        START taking these medications    Details   methocarbamoL (ROBAXIN) 500 MG Tab Take 1 tablet (500 mg total) by mouth 4 (four) times daily as needed (muscle spasm).  Qty: 40 tablet, Refills: 0      !! oxyCODONE (ROXICODONE) 10 mg Tab immediate release tablet Take 1 tablet (10 mg total) by mouth every 4 (four) hours as needed for Pain.  Refills: 0    Comments: Quantity prescribed more than 7 day supply? No      !! oxyCODONE (ROXICODONE) 5 MG immediate release tablet Take 1 tablet (5 mg total) by mouth every 4 (four) hours as needed for Pain.  Refills: 0    Comments: Quantity prescribed more than 7 day supply? No       !! - Potential duplicate medications found. Please discuss with provider.        CONTINUE these medications which have NOT CHANGED    Details   metoprolol succinate (TOPROL-XL) 25 MG 24 hr tablet Take 0.5 tablets (12.5 mg total) by mouth once daily.  Qty: 15 tablet, Refills: 11    Comments: Per IPR  Ok to give if BP >120/80.      blood-glucose meter,continuous (DEXCOM G7 ) Misc 1 Device by Misc.(Non-Drug; Combo Route) route once daily.  Qty: 1 each, Refills: 0    Associated Diagnoses: Type 2 diabetes mellitus with complication, with long-term current use of insulin      blood-glucose sensor (DEXCOM G7 SENSOR) Justyna 1 Device by Misc.(Non-Drug; Combo Route) route every 10 days.  Qty: 3 each, Refills: 11    Associated Diagnoses: Type 2 diabetes mellitus with complication, with long-term current use of insulin      empagliflozin (JARDIANCE) 25 mg tablet Take 1 tablet (25 mg total) by mouth once daily.  Qty: 90 tablet, Refills: 3    Associated Diagnoses: Type 2 diabetes mellitus with complication, with long-term current use of insulin      furosemide (LASIX) 20 MG tablet Take 1 tablet (20 mg total) by  "mouth once daily.  Qty: 90 tablet, Refills: 3      glucagon (GVOKE HYPOPEN 2-PACK) 1 mg/0.2 mL AtIn Inject 1 mg into the skin as needed (SEVERE HYPOGLYCEMIA).  Qty: 2 each, Refills: 5    Associated Diagnoses: Type 2 diabetes mellitus with complication, with long-term current use of insulin      insulin aspart U-100 (NOVOLOG U-100 INSULIN ASPART) 100 unit/mL injection Inject 14 Units into the skin 3 (three) times daily before meals.  Qty: 12.6 mL, Refills: 3      metoclopramide HCl (REGLAN) 10 MG tablet Take 0.5 tablets (5 mg total) by mouth 3 (three) times daily before meals.  Qty: 270 tablet, Refills: 3    Comments: DX Code Needed  .  Associated Diagnoses: Type 2 diabetes mellitus with hyperglycemia, with long-term current use of insulin      pantoprazole (PROTONIX) 40 MG tablet Take 1 tablet (40 mg total) by mouth once daily.  Qty: 90 tablet, Refills: 0      pen needle, diabetic (BD ULTRA-FINE DIYA PEN NEEDLE) 32 gauge x 5/32" Ndle Use with Tresiba daily and Humalog 3-4 times daily as directed.  Qty: 200 each, Refills: 5    Associated Diagnoses: Uncontrolled type 2 diabetes mellitus with hyperglycemia, with long-term current use of insulin      prochlorperazine (COMPAZINE) 10 MG tablet Take 1 tablet (10 mg total) by mouth 3 (three) times daily as needed (nausea or vomiting).  Qty: 15 tablet, Refills: 0      promethazine (PHENERGAN) 25 MG suppository Place 1 suppository (25 mg total) rectally every 6 (six) hours as needed for Nausea.  Qty: 10 suppository, Refills: 0    Associated Diagnoses: Leukocytosis, unspecified type; Intractable vomiting; Diarrhea, unspecified type      rivaroxaban (XARELTO) 10 mg Tab Take 1 tablet (10 mg total) by mouth daily with dinner or evening meal.  Qty: 90 tablet, Refills: 3      scopolamine (TRANSDERM-SCOP) 1.3-1.5 mg (1 mg over 3 days) Place 1 patch onto the skin every 72 hours as needed (intractable nausea/vomiting).    Comments: Per IPR      sodium chloride 0.9% SolP 50 mL with " DAPTOmycin 500 mg SolR 947 mg Inject 947 mg into the vein once daily.  Refills: 0    Comments: Per IPR      TRESIBA FLEXTOUCH U-200 200 unit/mL (3 mL) insulin pen Inject 56 Units into the skin once daily.  Qty: 3 pen , Refills: 11    Associated Diagnoses: Uncontrolled type 2 diabetes mellitus with hyperglycemia, with long-term current use of insulin                Immunizations Administered as of 1/25/2024       Name Date Dose VIS Date Route Exp Date    COVID-19, MRNA, LN-S, PF (Pfizer) (Purple Cap) 2/11/2022 11:47 AM 0.3 mL 9/22/2021 Intramuscular 5/31/2022    Site: Left deltoid     Given By: Kassy Mendoza RN     : [employer]     Lot: [id number]     COVID-19, MRNA, LN-S, PF (Pfizer) (Purple Cap) 1/19/2021 10:50 AM 0.3 mL 12/12/2020 Intramuscular 5/31/2021    Site: Left deltoid     Given By: Neris San RN     : [employer]     Lot: [id number]     COVID-19, MRNA, LN-S, PF (Pfizer) (Purple Cap) 12/28/2020  3:42 PM 0.3 mL 12/12/2020 Intramuscular 4/30/2021    Site: Left deltoid     Given By: Grace Anguiano LPN     : [employer]     Lot: [id number]             This patient has had both covid vaccinations    Some patients may experience side effects after vaccination.  These may include fever, headache, muscle or joint aches.  Most symptoms resolve with 24-48 hours and do not require urgent medical evaluation unless they persist for more than 72 hours or symptoms are concerning for an unrelated medical condition.          _________________________________  Fany Emerson PA-C  01/25/2024

## 2024-01-25 NOTE — PT/OT/SLP PROGRESS
Occupational Therapy   Treatment    Name: Kulwant Mueller Jr.  MRN: [id number]  Admitting Diagnosis:  Staphylococcal arthritis of left knee  3 Days Post-Op    Recommendations:     Discharge Recommendations: High Intensity Therapy  Discharge Equipment Recommendations:  bedside commode, walker, rolling, shower chair  Barriers to discharge:  Other (Comment) (patient requires increased level of assistance)    Assessment:     Kulwant Mueller Jr. is a 38 y.o. male with a medical diagnosis of Staphylococcal arthritis of left knee.  He presents with the fallowing performance deficits affecting function are weakness, impaired endurance, gait instability, decreased lower extremity function, impaired cardiopulmonary response to activity, edema, pain, impaired self care skills, impaired balance, impaired skin, orthopedic precautions, impaired functional mobility, decreased upper extremity function, decreased coordination. Patient agreeable to tx session, patient exhibits poor endurance due to pain and weakness. Patient noticed with increase functional ROM of Left arm and is attempting to utilize both UE to complete a task. However; patient is below functional level of baseline. Patient would benefit from High intensity therapy to addressed functional decline with functional endurance, self-care, and mobility.     Rehab Prognosis:  Good; patient would benefit from acute skilled OT services to address these deficits and reach maximum level of function.       Plan:     Patient to be seen 4 x/week to address the above listed problems via self-care/home management, therapeutic activities, therapeutic exercises  Plan of Care Expires: 01/18/24  Plan of Care Reviewed with: patient    Subjective     Chief Complaint: tired and pain   Patient/Family Comments/goals: to return to PLOF  Pain/Comfort:  Pain Rating 1:  (pain not rated)  Location - Side 1: Left  Location - Orientation 1: generalized  Location 1: knee  Pain Addressed 1:  Pre-medicate for activity, Reposition, Distraction  Pain Rating Post-Intervention 1: 0/10  Location - Side 2: Right  Location - Orientation 2: generalized  Location 2: hip  Pain Addressed 2: Pre-medicate for activity, Reposition, Distraction  Pain Rating Post-Intervention 2: 0/10    Objective:     Communicated with: Nurse prior to session.  Patient found HOB elevated with telemetry, peripheral IV, MULUGETA drain upon OT entry to room.    General Precautions: Standard, fall, contact    Orthopedic Precautions:LLE weight bearing as tolerated  Braces: N/A  Respiratory Status: Room air     Occupational Performance:      Functional Mobility/Transfers:  Patient performed resistance exercises utilizing thera band for B UE strengthening to impact with functional endurance.  Patient decline further mobility task due to patient stated that he was tired and in pain from working recently with PT  Activities of Daily Living:  Grooming: patient completed a oral care task with Setup A    [facility] 6 Click ADL: 17    Treatment & Education:  RUFINA educated patient on the importance to continue to participate with therapy and performed EOB/OOB activities to reduce debility from progressing.   Therapist trained patient on arm exercises to perform while in bed   Addressed patient questions and concerns within VAZQUEZ scope of practice     Patient left HOB elevated with all lines intact and call button in reach    GOALS:   Multidisciplinary Problems       Occupational Therapy Goals          Problem: Occupational Therapy    Goal Priority Disciplines Outcome Interventions   Occupational Therapy Goal     OT, PT/OT Ongoing, Progressing    Description: Goals to be met by: 2/18/2024     Patient will increase functional independence with ADLs by performing:    UE Dressing with Supervision.  LE Dressing with Minimal Assistance.  Grooming while seated with Set-up Assistance.  Toileting from bedside commode with Minimal Assistance for hygiene and clothing  management.   Toilet transfer to bedside commode with Minimal Assistance.                         Time Tracking:     OT Date of Treatment: 01/24/24  OT Start Time: 1200  OT Stop Time: 1249  OT Total Time (min): 49 min    Billable Minutes:Self Care/Home Management 24  Therapeutic Exercise 25    OT/RUFINA: RUFINA     Number of RUFINA visits since last OT visit: 1 1/24/2024

## 2024-01-25 NOTE — PLAN OF CARE
Problem: Adult Inpatient Plan of Care  Goal: Plan of Care Review  Outcome: Ongoing, Progressing  Goal: Patient-Specific Goal (Individualized)  Outcome: Ongoing, Progressing  Goal: Absence of Hospital-Acquired Illness or Injury  Outcome: Ongoing, Progressing  Goal: Optimal Comfort and Wellbeing  Outcome: Ongoing, Progressing  Goal: Readiness for Transition of Care  Outcome: Ongoing, Progressing     Problem: Diabetes Comorbidity  Goal: Blood Glucose Level Within Targeted Range  Outcome: Ongoing, Progressing     Problem: Infection  Goal: Absence of Infection Signs and Symptoms  Outcome: Ongoing, Progressing     Problem: Impaired Wound Healing  Goal: Optimal Wound Healing  Outcome: Ongoing, Progressing     Problem: Skin Injury Risk Increased  Goal: Skin Health and Integrity  Outcome: Ongoing, Progressing     Problem: Fall Injury Risk  Goal: Absence of Fall and Fall-Related Injury  Outcome: Ongoing, Progressing     Problem: Surgical Site Infection  Goal: Absence of Infection Signs and Symptoms  Outcome: Ongoing, Progressing       used

## 2024-01-25 NOTE — PLAN OF CARE
Problem: Adult Inpatient Plan of Care  Goal: Plan of Care Review  Outcome: Met  Goal: Patient-Specific Goal (Individualized)  Outcome: Met  Goal: Absence of Hospital-Acquired Illness or Injury  Outcome: Met  Goal: Optimal Comfort and Wellbeing  Outcome: Met  Goal: Readiness for Transition of Care  Outcome: Met     Problem: Diabetes Comorbidity  Goal: Blood Glucose Level Within Targeted Range  Outcome: Met     Problem: Infection  Goal: Absence of Infection Signs and Symptoms  Outcome: Met     Problem: Impaired Wound Healing  Goal: Optimal Wound Healing  Outcome: Met     Problem: Skin Injury Risk Increased  Goal: Skin Health and Integrity  Outcome: Met     Problem: Fall Injury Risk  Goal: Absence of Fall and Fall-Related Injury  Outcome: Met     Problem: Surgical Site Infection  Goal: Absence of Infection Signs and Symptoms  Outcome: Met

## 2024-01-25 NOTE — ASSESSMENT & PLAN NOTE
Kulwant Menardcaro Miguel. is a 38 y.o. male admitted for workup of fevers of unknown source, recent irrigation and debridement of left knee performed at [facility] on 12/29.  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 Now s/p left knee arthroscopic I&D on 1/17.    Pt found to have right thigh abscess on MRI. IR aspiration performed and drain placed 1/19/24. Now s/p repeat L knee arthroscopic I&D and right thigh abscess I&D with drain placement on 1/21    Pain control:  Multimodal  PT/OT: WBAT left lower extremity  DVT PPx:  Xarelto, SCDs at all times when not ambulating  Abx:   daptomycin per ID  Labs:  CRP up to 187 yesterday; trend CRP today  Cx: R thigh aspiration cx growing staph (sens pending), prior left knee cx growing MRSA  Drain: 40cc/24 hr      Dispo: If CRP downtrending today, stable for dc to rehab

## 2024-01-25 NOTE — PT/OT/SLP PROGRESS
Physical Therapy      Patient Name:  Kulwant Mueller Jr.   MRN:  [id number]    Patient not seen today secondary to  (pt. awaiting transport to transfer to  Rehab today). Will follow-up tomorrow, if not discharged.    Roderick Botello, PT  1/25/2024

## 2024-01-25 NOTE — PROGRESS NOTES
[facility]  Orthopedics  Progress Note    Patient Name: Kulwant Mueller Jr.  MRN: [id number]  Admission Date: 1/14/2024  Hospital Length of Stay: 9 days  Attending Provider: Suly Olguin MD  Primary Care Provider: Shellie, Primary Doctor  Follow-up For: Procedure(s) (LRB):  ARTHROSCOPY, KNEE, WITH DEBRIDEMENT - LEFT, SUPINE, SLIDER, LATERAL POST, CONMED (Left)  INCISION AND DRAINAGE, THIGH - right, (Right)    Post-Operative Day: 4 Days Post-Op  Subjective:     Principal Problem:Staphylococcal arthritis of left knee    Principal Orthopedic Problem: s/p L knee arthroscopic I&D on 1/17, s/p repeat L knee I&D and right thigh abscess I&D on 1/21    Interval History: NAEON. VSS. Afebrile. Drain dced. Right thigh and left knee pain controlled. Pt mobilized a few steps with PT yesterday. Trend CRP today, if downtrending, stable for dc to rehab.     Review of patient's allergies indicates:   Allergen Reactions    Heparin analogues Nausea And Vomiting       Current Facility-Administered Medications   Medication    acetaminophen tablet 1,000 mg    ammonium lactate 12 % lotion    DAPTOmycin (CUBICIN) 945 mg in sodium chloride 0.9% SolP 50 mL IVPB    dextrose 10% bolus 125 mL 125 mL    dextrose 10% bolus 250 mL 250 mL    glucagon (human recombinant) injection 1 mg    glucose chewable tablet 16 g    glucose chewable tablet 24 g    insulin aspart U-100 pen 0-5 Units    insulin detemir U-100 (Levemir) pen 17 Units    loperamide capsule 2 mg    melatonin tablet 6 mg    methocarbamoL tablet 500 mg    metoclopramide HCl tablet 5 mg    metoprolol succinate (TOPROL-XL) 24 hr split tablet 12.5 mg    morphine injection 4 mg    naloxone 0.4 mg/mL injection 0.02 mg    ondansetron injection 4 mg    oxyCODONE immediate release tablet 5 mg    oxyCODONE immediate release tablet 5 mg    oxyCODONE immediate release tablet Tab 10 mg    pantoprazole EC tablet 40 mg    prochlorperazine injection Soln 5 mg    rivaroxaban tablet 10 mg  "   scopolamine 1.3-1.5 mg (1 mg over 3 days) 1 patch    sodium chloride 0.9% flush 10 mL    And    sodium chloride 0.9% flush 10 mL     Objective:     Vital Signs (Most Recent):  Temp: 97.8 °F (36.6 °C) (01/25/24 0820)  Pulse: 92 (01/25/24 0820)  Resp: 16 (01/25/24 0820)  BP: 120/75 (01/25/24 0820)  SpO2: 95 % (01/25/24 0820) Vital Signs (24h Range):  Temp:  [97.8 °F (36.6 °C)-98.5 °F (36.9 °C)] 97.8 °F (36.6 °C)  Pulse:  [80-92] 92  Resp:  [16-19] 16  SpO2:  [95 %-100 %] 95 %  BP: (120-141)/(61-80) 120/75     Weight: 112 kg (246 lb 14.6 oz)  Height: 6' 1" (185.4 cm)  Body mass index is 32.58 kg/m².      Intake/Output Summary (Last 24 hours) at 1/25/2024 0851  Last data filed at 1/25/2024 0630  Gross per 24 hour   Intake --   Output 1500 ml   Net -1500 ml         Ortho/SPM Exam     Awake/alert/oriented x3, No acute distress, Afebrile, Vital signs stable  Good inspiratory effort with unlaboured breathing  Dressings c/d/I  Pain with knee PROM 5-70  No pain with R hip ROM, axial loading, full PROM. No TTP of R lateral hip.   Active and PROM of L shoulder intact with ain with L shoulder ROM (FF over 90, ABD over 90), +hawkens, + neer,       Significant Labs: All pertinent labs within the past 24 hours have been reviewed.    Significant Imaging: I have reviewed all pertinent imaging results/findings.    MRI pelvis showing right lateral thigh rim enhancing fluid collection, no bl hip effusions    MRI L shoulder showing SS tear without retraction, subdeltoid bursitis, no fluid collections   Assessment/Plan:     * Staphylococcal arthritis of left knee  Kulwant Mueller Jr. is a 38 y.o. male admitted for workup of fevers of unknown source, recent irrigation and debridement of left knee performed at [facility] on 12/29.  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 Now s/p left knee arthroscopic I&D on 1/17.    Pt found to have right thigh abscess on MRI. IR aspiration performed and drain " placed 1/19/24. Now s/p repeat L knee arthroscopic I&D and right thigh abscess I&D with drain placement on 1/21    Pain control:  Multimodal  PT/OT: WBAT left lower extremity  DVT PPx:  Xarelto, SCDs at all times when not ambulating  Abx:   daptomycin per ID  Labs:  CRP up to 187 yesterday; trend CRP today  Cx: R thigh aspiration cx growing staph (sens pending), prior left knee cx growing MRSA  Drain: 40cc/24 hr      Dispo: If CRP downtrending today, stable for dc to rehab            David France MD  Orthopedics  [facility] - Observation 11[facility]

## 2024-01-26 NOTE — DISCHARGE SUMMARY
[facility] [address] [place] [address] Medicine  Discharge Summary      Patient Name: Kulwant Mueller Jr.  MRN: [id number]  CAITLIN: [id number]  Patient Class: IP- Inpatient  Admission Date: 1/14/2024  Hospital Length of Stay: 9 days  Discharge Date and Time: 1/25/2024  4:13 PM  Attending Physician: Shellie att. 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     Patient was admitted to [facility] 12/27/23-01/10/24 due to left knee septic arthritis with persistent MRSA bacteremia, s/p Left knee orthopedic surgery, surgical cultures positive for MRSA. Right heel also of concern for source.   He was followed by ID,  he had been on Daptomycin + Ceftaroline, s/p ROBY w/o endocarditis, left shoulder arthrocentesis w/o infection,  surveillance blood culture negative from 01/07/24    Report per ED and care everywhere notes patient with fevers on 1/13 to 103degreese @ 1800, 2000, ER transfer recommended but pt refused, physician gave order for blood culture, pt reported feeling fine.   Today he developed temperature of 100.5 @ 15:56 and was transferred to [facility] for further evaluation.     Today he reports he can walk with asssitance, using a walker, performing ADLs, main complaint is right sided back pain    ED - blood cultures drawn.             Procedure(s) (LRB):  ARTHROSCOPY, KNEE, WITH DEBRIDEMENT - LEFT, SUPINE, SLIDER, LATERAL POST, CONMED (Left)  INCISION AND DRAINAGE, THIGH - right, (Right)      Hospital Course:   Kulwant Mueller Jr. Is a 38 y.o. male who was admitted to hospital medicine for fever. WBC 15.86 >> 13.92. Sed rate 97, CRPP  211.9. On Daptomycin from previous discharge, continuing and added zosyn. ID consulted. Blood cultures NGTD. Orthopedics consulted, joint aspiration performed at bedside. 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 Taken to the OR for L knee washout. MRI pelvis: Myositis and fasciitis, with soft tissue gas dissecting along the anterior compartment myofascial planes of the proximal left thigh. MRI L shoulder: Prominent subdeltoid enhancement/ bursitis. Superimposed infection may be present. No drainable fluid collections. MRI knee: s/p I&D w/ minimal residual fluid collection within the joint space. There is a large air-fluid collection extending supero-medially from the joint space along the medial femoral shaft, beyond the field of view. Prominent surrounding soft tissue inflammatory changes noted, with involvement of the vastus medialis, vastus lateralis, and popliteus musculature. 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 No spondylodiscitis or facet joint septic arthritis. 1/19 IR performed aspiration of fluid collection seen on MRI. Ortho performed a repeat I&D of left knee and an I&D of R thigh. CRP downtrending. Ortho removed drain from L knee. On my evaluation this morning, the patient reports feeling well and is eager to discharge to rehab. All questions were answered. Patient acknowledged understanding of discharge instructions and feels safe to discharge home. Patient was discharged on 1/25/2024 in stable condition with ortho follow-up. Education regarding condition provided and return precautions given.        Goals of Care Treatment Preferences:  Code Status: Full Code      Consults:   Consults (From admission, onward)          Status Ordering Provider     Inpatient consult to Interventional Radiology  Once        Provider:  (Not yet assigned)    Completed NATHANIEL ARNETT     Inpatient consult to Physical Medicine Rehab  Once        Provider:   (Not yet assigned)    Completed PRAVEENA TAYLOR     Inpatient consult to Orthopedics  Once        Provider:  (Not yet assigned)    Completed PRAVEENA TAYLOR     Inpatient consult to Registered Dietitian/Nutritionist  Once        Provider:  (Not yet assigned)    Completed CRUZITO FINE     Inpatient consult to Infectious Diseases  Once        Provider:  (Not yet assigned)    Completed CRUZITO FINE            No new Assessment & Plan notes have been filed under this hospital service since the last note was generated.  Service: Hospital Medicine    Final Active Diagnoses:    Diagnosis Date Noted POA    PRINCIPAL PROBLEM:  Staphylococcal arthritis of left knee [M00.062] 12/29/2023 Yes    Impaired mobility [Z74.09] 01/23/2024 Yes    Abscess of right thigh [L02.415] 01/20/2024 Yes    Chronic HFrEF (heart failure with reduced ejection fraction) [I50.22] 01/15/2024 Yes    Hyponatremia [E87.1] 01/14/2024 Yes    Transaminitis [R74.01] 01/09/2024 Yes    Depression [F32.A] 01/01/2024 Yes    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 due to underlying condition, limited to breakdown of skin [E08.621, L97.401] 12/30/2023 Yes    Staphylococcus aureus bacteremia [R78.81, B95.61] 12/29/2023 Yes    Other elevated white blood cell count [D72.828] 12/28/2023 Yes    Elevated CK [R74.8] 11/27/2021 Yes    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] 10/03/2018 Yes     Chronic    Diabetic gastroparesis associated with type 2 diabetes mellitus [E11.43, K31.84] 08/31/2018 Yes    History of intravascular stent placement [Z95.828] 09/14/2017 Not Applicable    Type 2 diabetes mellitus with complication, with long-term current use of insulin [E11.8, Z79.4] 08/19/2017 Not Applicable     Chronic      Problems Resolved During this Admission:       Discharged Condition: stable    Disposition: Rehab Facility    Follow Up:    Patient Instructions:      Ambulatory referral/consult to Orthopedics   Standing Status: Future   Referral Priority: Routine  Referral Type: Consultation   Requested Specialty: Orthopedic Surgery   Number of Visits Requested: 1     Notify your health care provider if you experience any of the following:  temperature >100.4     Notify your health care provider if you experience any of the following:  persistent nausea and vomiting or diarrhea     Notify your health care provider if you experience any of the following:  severe uncontrolled pain     Activity as tolerated       Significant Diagnostic Studies: Labs: All labs within the past 24 hours have been reviewed    Pending Diagnostic Studies:       None           Medications:  Reconciled Home Medications:      Medication List        START taking these medications      methocarbamoL 500 MG Tab  Commonly known as: ROBAXIN  Take 1 tablet (500 mg total) by mouth 4 (four) times daily as needed (muscle spasm).     * oxyCODONE 10 mg Tab immediate release tablet  Commonly known as: ROXICODONE  Take 1 tablet (10 mg total) by mouth every 4 (four) hours as needed for Pain.     * oxyCODONE 5 MG immediate release tablet  Commonly known as: ROXICODONE  Take 1 tablet (5 mg total) by mouth every 4 (four) hours as needed for Pain.           * This list has 2 medication(s) that are the same as other medications prescribed for you. Read the directions carefully, and ask your doctor or other care provider to review them with you.                CONTINUE taking these medications      DEXCOM G7  Misc  Generic drug: blood-glucose meter,continuous  1 Device by Misc.(Non-Drug; Combo Route) route once daily.     DEXCOM G7 SENSOR Justyna  Generic drug: blood-glucose sensor  1 Device by Misc.(Non-Drug; Combo Route) route every 10 days.     empagliflozin 25 mg tablet  Commonly known as: JARDIANCE  Take 1 tablet (25 mg total) by mouth once daily.     furosemide 20 MG tablet  Commonly known as: LASIX  Take 1 tablet (20 mg total) by mouth once daily.     GVOKE HYPOPEN 2-PACK 1 mg/0.2 mL Atin  Generic drug:  "glucagon  Inject 1 mg into the skin as needed (SEVERE HYPOGLYCEMIA).     insulin aspart U-100 100 unit/mL injection  Commonly known as: NovoLOG U-100 Insulin aspart  Inject 14 Units into the skin 3 (three) times daily before meals.     metoclopramide HCl 10 MG tablet  Commonly known as: REGLAN  Take 0.5 tablets (5 mg total) by mouth 3 (three) times daily before meals.     metoprolol succinate 25 MG 24 hr tablet  Commonly known as: TOPROL-XL  Take 0.5 tablets (12.5 mg total) by mouth once daily.     pantoprazole 40 MG tablet  Commonly known as: PROTONIX  Take 1 tablet (40 mg total) by mouth once daily.     pen needle, diabetic 32 gauge x 5/32" Ndle  Commonly known as: BD ULTRA-FINE DIYA PEN NEEDLE  Use with Tresiba daily and Humalog 3-4 times daily as directed.     prochlorperazine 10 MG tablet  Commonly known as: COMPAZINE  Take 1 tablet (10 mg total) by mouth 3 (three) times daily as needed (nausea or vomiting).     promethazine 25 MG suppository  Commonly known as: PHENERGAN  Place 1 suppository (25 mg total) rectally every 6 (six) hours as needed for Nausea.     rivaroxaban 10 mg Tab  Commonly known as: XARELTO  Take 1 tablet (10 mg total) by mouth daily with dinner or evening meal.     scopolamine 1.3-1.5 mg (1 mg over 3 days)  Commonly known as: TRANSDERM-SCOP  Place 1 patch onto the skin every 72 hours as needed (intractable nausea/vomiting).     sodium chloride 0.9% SolP 50 mL with DAPTOmycin 500 mg SolR 947 mg  Inject 947 mg into the vein once daily.     TRESIBA FLEXTOUCH U-200 200 unit/mL (3 mL) insulin pen  Generic drug: insulin degludec  Inject 56 Units into the skin once daily.              Indwelling Lines/Drains at time of discharge:   Lines/Drains/Airways       Peripherally Inserted Central Catheter Line  Duration             PICC Double Lumen 01/08/24 1405 right basilic 18 days                    Time spent on the discharge of patient: 36 minutes         Fany Emerson PA-C  Department of Hospital " Medicine  [facility]

## 2024-01-27 LAB — BACTERIA SPEC ANAEROBE CULT: NORMAL

## 2024-01-29 LAB
BACTERIA SPEC ANAEROBE CULT: NORMAL
BACTERIA SPEC ANAEROBE CULT: NORMAL

## 2024-01-31 ENCOUNTER — HOSPITAL ENCOUNTER (INPATIENT)
Facility: HOSPITAL | Age: 39
LOS: 6 days | Discharge: NURSING, REHAB OR LONG-TERM CARE | DRG: 464 | End: 2024-02-07
Attending: EMERGENCY MEDICINE | Admitting: INTERNAL MEDICINE
Payer: COMMERCIAL

## 2024-01-31 DIAGNOSIS — R07.9 CHEST PAIN: ICD-10-CM

## 2024-01-31 DIAGNOSIS — M86.9 OSTEOMYELITIS, UNSPECIFIED SITE, UNSPECIFIED TYPE: ICD-10-CM

## 2024-01-31 DIAGNOSIS — M00.9 PYOGENIC ARTHRITIS OF LEFT KNEE JOINT: ICD-10-CM

## 2024-01-31 DIAGNOSIS — R42 DIZZINESS: ICD-10-CM

## 2024-01-31 DIAGNOSIS — M00.062 STAPHYLOCOCCAL ARTHRITIS OF LEFT KNEE: ICD-10-CM

## 2024-01-31 DIAGNOSIS — E11.65 UNCONTROLLED TYPE 2 DIABETES MELLITUS WITH HYPERGLYCEMIA, WITH LONG-TERM CURRENT USE OF INSULIN: ICD-10-CM

## 2024-01-31 DIAGNOSIS — M00.9 SEPTIC ARTHRITIS, DUE TO UNSPECIFIED ORGANISM, SEPTIC ARTHRITIS OF UNSPECIFIED LOCATION: Primary | ICD-10-CM

## 2024-01-31 DIAGNOSIS — Z79.4 UNCONTROLLED TYPE 2 DIABETES MELLITUS WITH HYPERGLYCEMIA, WITH LONG-TERM CURRENT USE OF INSULIN: ICD-10-CM

## 2024-01-31 DIAGNOSIS — L02.415 ABSCESS OF RIGHT THIGH: ICD-10-CM

## 2024-01-31 DIAGNOSIS — R68.83 CHILLS (WITHOUT FEVER): ICD-10-CM

## 2024-01-31 LAB
ALBUMIN SERPL BCP-MCNC: 1.8 G/DL (ref 3.5–5.2)
ALP SERPL-CCNC: 151 U/L (ref 55–135)
ALT SERPL W/O P-5'-P-CCNC: 34 U/L (ref 10–44)
ANION GAP SERPL CALC-SCNC: 13 MMOL/L (ref 8–16)
AST SERPL-CCNC: 33 U/L (ref 10–40)
BACTERIA #/AREA URNS AUTO: NORMAL /HPF
BASOPHILS # BLD AUTO: 0.03 K/UL (ref 0–0.2)
BASOPHILS NFR BLD: 0.2 % (ref 0–1.9)
BILIRUB SERPL-MCNC: 0.5 MG/DL (ref 0.1–1)
BILIRUB UR QL STRIP: NEGATIVE
BUN SERPL-MCNC: 7 MG/DL (ref 6–20)
CALCIUM SERPL-MCNC: 8.4 MG/DL (ref 8.7–10.5)
CHLORIDE SERPL-SCNC: 94 MMOL/L (ref 95–110)
CLARITY UR REFRACT.AUTO: CLEAR
CO2 SERPL-SCNC: 27 MMOL/L (ref 23–29)
COLOR UR AUTO: YELLOW
CREAT SERPL-MCNC: 0.8 MG/DL (ref 0.5–1.4)
CRP SERPL-MCNC: 131.7 MG/L (ref 0–8.2)
DIFFERENTIAL METHOD BLD: ABNORMAL
EOSINOPHIL # BLD AUTO: 0 K/UL (ref 0–0.5)
EOSINOPHIL NFR BLD: 0.1 % (ref 0–8)
ERYTHROCYTE [DISTWIDTH] IN BLOOD BY AUTOMATED COUNT: 14.6 % (ref 11.5–14.5)
ERYTHROCYTE [SEDIMENTATION RATE] IN BLOOD BY PHOTOMETRIC METHOD: 76 MM/HR (ref 0–23)
EST. GFR  (NO RACE VARIABLE): >60 ML/MIN/1.73 M^2
GLUCOSE SERPL-MCNC: 129 MG/DL (ref 70–110)
GLUCOSE UR QL STRIP: ABNORMAL
HCT VFR BLD AUTO: 25.7 % (ref 40–54)
HGB BLD-MCNC: 8 G/DL (ref 14–18)
HGB UR QL STRIP: NEGATIVE
IMM GRANULOCYTES # BLD AUTO: 0.06 K/UL (ref 0–0.04)
IMM GRANULOCYTES NFR BLD AUTO: 0.5 % (ref 0–0.5)
INFLUENZA A, MOLECULAR: NOT DETECTED
INFLUENZA B, MOLECULAR: NOT DETECTED
KETONES UR QL STRIP: NEGATIVE
LACTATE SERPL-SCNC: 0.8 MMOL/L (ref 0.5–2.2)
LEUKOCYTE ESTERASE UR QL STRIP: NEGATIVE
LYMPHOCYTES # BLD AUTO: 0.7 K/UL (ref 1–4.8)
LYMPHOCYTES NFR BLD: 5.3 % (ref 18–48)
MCH RBC QN AUTO: 26.1 PG (ref 27–31)
MCHC RBC AUTO-ENTMCNC: 31.1 G/DL (ref 32–36)
MCV RBC AUTO: 84 FL (ref 82–98)
MICROSCOPIC COMMENT: NORMAL
MONOCYTES # BLD AUTO: 0.6 K/UL (ref 0.3–1)
MONOCYTES NFR BLD: 4.5 % (ref 4–15)
NEUTROPHILS # BLD AUTO: 11 K/UL (ref 1.8–7.7)
NEUTROPHILS NFR BLD: 89.4 % (ref 38–73)
NITRITE UR QL STRIP: NEGATIVE
NRBC BLD-RTO: 0 /100 WBC
PH UR STRIP: 6 [PH] (ref 5–8)
PLATELET # BLD AUTO: 396 K/UL (ref 150–450)
PMV BLD AUTO: 9.8 FL (ref 9.2–12.9)
POTASSIUM SERPL-SCNC: 3.4 MMOL/L (ref 3.5–5.1)
PROT SERPL-MCNC: 7.4 G/DL (ref 6–8.4)
PROT UR QL STRIP: NEGATIVE
RBC # BLD AUTO: 3.07 M/UL (ref 4.6–6.2)
RBC #/AREA URNS AUTO: 4 /HPF (ref 0–4)
RSV AG BY MOLECULAR METHOD: NOT DETECTED
SARS-COV-2 RNA RESP QL NAA+PROBE: NOT DETECTED
SODIUM SERPL-SCNC: 134 MMOL/L (ref 136–145)
SP GR UR STRIP: 1.01 (ref 1–1.03)
URN SPEC COLLECT METH UR: ABNORMAL
WBC # BLD AUTO: 12.34 K/UL (ref 3.9–12.7)
WBC #/AREA URNS AUTO: 2 /HPF (ref 0–5)
YEAST UR QL AUTO: NORMAL

## 2024-01-31 PROCEDURE — G0378 HOSPITAL OBSERVATION PER HR: HCPCS

## 2024-01-31 PROCEDURE — 87040 BLOOD CULTURE FOR BACTERIA: CPT | Performed by: EMERGENCY MEDICINE

## 2024-01-31 PROCEDURE — 87186 SC STD MICRODIL/AGAR DIL: CPT | Performed by: EMERGENCY MEDICINE

## 2024-01-31 PROCEDURE — 99285 EMERGENCY DEPT VISIT HI MDM: CPT | Mod: 25

## 2024-01-31 PROCEDURE — 81001 URINALYSIS AUTO W/SCOPE: CPT | Performed by: EMERGENCY MEDICINE

## 2024-01-31 PROCEDURE — 85025 COMPLETE CBC W/AUTO DIFF WBC: CPT | Performed by: EMERGENCY MEDICINE

## 2024-01-31 PROCEDURE — 99239 HOSP IP/OBS DSCHRG MGMT >30: CPT | Mod: ,,, | Performed by: STUDENT IN AN ORGANIZED HEALTH CARE EDUCATION/TRAINING PROGRAM

## 2024-01-31 PROCEDURE — 87077 CULTURE AEROBIC IDENTIFY: CPT | Performed by: EMERGENCY MEDICINE

## 2024-01-31 PROCEDURE — 83605 ASSAY OF LACTIC ACID: CPT | Performed by: EMERGENCY MEDICINE

## 2024-01-31 PROCEDURE — 25000003 PHARM REV CODE 250: Performed by: EMERGENCY MEDICINE

## 2024-01-31 PROCEDURE — 25500020 PHARM REV CODE 255: Performed by: EMERGENCY MEDICINE

## 2024-01-31 PROCEDURE — 0241U SARS-COV2 (COVID) WITH FLU/RSV BY PCR: CPT | Performed by: EMERGENCY MEDICINE

## 2024-01-31 PROCEDURE — 80053 COMPREHEN METABOLIC PANEL: CPT | Performed by: EMERGENCY MEDICINE

## 2024-01-31 PROCEDURE — A9585 GADOBUTROL INJECTION: HCPCS | Performed by: EMERGENCY MEDICINE

## 2024-01-31 PROCEDURE — 87154 CUL TYP ID BLD PTHGN 6+ TRGT: CPT | Performed by: EMERGENCY MEDICINE

## 2024-01-31 PROCEDURE — 86140 C-REACTIVE PROTEIN: CPT | Performed by: EMERGENCY MEDICINE

## 2024-01-31 PROCEDURE — 85652 RBC SED RATE AUTOMATED: CPT | Performed by: EMERGENCY MEDICINE

## 2024-01-31 PROCEDURE — 93010 ELECTROCARDIOGRAM REPORT: CPT | Mod: ,,, | Performed by: INTERNAL MEDICINE

## 2024-01-31 PROCEDURE — 93005 ELECTROCARDIOGRAM TRACING: CPT

## 2024-01-31 RX ORDER — INSULIN ASPART 100 [IU]/ML
14 INJECTION, SOLUTION INTRAVENOUS; SUBCUTANEOUS
Status: DISCONTINUED | OUTPATIENT
Start: 2024-02-01 | End: 2024-02-01

## 2024-01-31 RX ORDER — METHOCARBAMOL 500 MG/1
500 TABLET, FILM COATED ORAL 4 TIMES DAILY PRN
Status: DISCONTINUED | OUTPATIENT
Start: 2024-01-31 | End: 2024-02-08 | Stop reason: HOSPADM

## 2024-01-31 RX ORDER — PROCHLORPERAZINE MALEATE 10 MG
10 TABLET ORAL 3 TIMES DAILY PRN
Status: DISCONTINUED | OUTPATIENT
Start: 2024-01-31 | End: 2024-02-08 | Stop reason: HOSPADM

## 2024-01-31 RX ORDER — OXYCODONE HYDROCHLORIDE 10 MG/1
10 TABLET ORAL EVERY 4 HOURS PRN
Status: DISCONTINUED | OUTPATIENT
Start: 2024-01-31 | End: 2024-02-03

## 2024-01-31 RX ORDER — OXYCODONE HYDROCHLORIDE 5 MG/1
5 TABLET ORAL EVERY 4 HOURS PRN
Status: DISCONTINUED | OUTPATIENT
Start: 2024-01-31 | End: 2024-02-03

## 2024-01-31 RX ORDER — GADOBUTROL 604.72 MG/ML
10 INJECTION INTRAVENOUS
Status: COMPLETED | OUTPATIENT
Start: 2024-01-31 | End: 2024-01-31

## 2024-01-31 RX ADMIN — GADOBUTROL 10 ML: 604.72 INJECTION INTRAVENOUS at 09:01

## 2024-01-31 RX ADMIN — RIVAROXABAN 10 MG: 10 TABLET, FILM COATED ORAL at 10:01

## 2024-01-31 NOTE — ED PROVIDER NOTES
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  Was admitted 01/14 to 1/25 for recurrent left knee septic arthritis with MRSA bacteremia followed by ID and orthopedic surgery.  Had a washout done.  On daptomycin and ceftaroline.. MRI pelvis: Myositis and fasciitis, with soft tissue gas dissecting along the anterior compartment myofascial planes of the proximal left thigh. MRI L shoulder: Prominent subdeltoid enhancement/ bursitis. Superimposed infection may be present. No drainable fluid collections. MRI knee: s/p I&D w/ minimal residual fluid collection within the joint space. There is a large air-fluid collection extending supero-medially from the joint space along the medial femoral shaft, beyond the field of view. Prominent surrounding soft tissue inflammatory changes noted, with involvement of the vastus medialis, vastus lateralis, and popliteus musculature. 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 No spondylodiscitis or facet joint septic arthritis. 1/19 IR performed aspiration of fluid collection seen on MRI. Ortho performed a repeat I&D of left knee and an I&D of R thigh. CRP downtrending. Ortho removed drain from L knee. On my evaluation this morning, the patient reports feeling well and is eager to discharge to rehab. All questions were answered. Patient acknowledged understanding of discharge instructions and feels safe to discharge home. Patient was discharged on 1/25/2024 in stable condition with  "ortho follow-up.     Patient reports that he thinks he was simply here for a this understanding.  He told his rehab that he was slightly dizzy and had blurred vision when standing up yesterday.  They gave him IV fluids thinking that he was dehydrated or orthostatic.  Overnight he got chills slightly diaphoretic.  Temperature did not get above 99.  He states that he never had a true fever.  He also reports that he always gets chills and sweaty after getting fluids.  He reports that he will have chills for several days.  He was sent here to rule out worsening infection because of the symptoms.  He denies headache, sore throat, congestion, cough, shortness of breath, chest pain, abdominal pain, nausea or vomiting, diarrhea, urinary symptoms.  He denies any increasing pain in his pelvis or knee.  He was still getting antibiotics through his PICC line.  No redness or pain around the PICC line.  Denies any missed doses of antibiotics.  He reports that he feels fine right now.  Reportedly he was also told he needed an MRI to rule out a worsening infection.    Per nursing at rehab, he was sweating all night, HR was elevated to 110, temperatures of 100, and seemed "different level of alertness".  He refused to come initially with EMS. Also he had CRP/ESR done outpatient which were reducing appropriately but have risen in the last few days      Review of patient's allergies indicates:   Allergen Reactions    Heparin analogues Nausea And Vomiting     Past Medical History:   Diagnosis Date    Anticoagulant long-term use     COVID-19 virus infection     Diabetes mellitus     Diabetes mellitus, type 2     Hypertension     May-Thurner syndrome      Past Surgical History:   Procedure Laterality Date    APPENDECTOMY      ARTHROSCOPY OF KNEE Left 1/16/2024    Procedure: ARTHROSCOPY, KNEE, LEFT;  Surgeon: Mandeep Gatica MD;  Location: [facility] OR [address] [place] [address];  Service: Orthopedics;  Laterality: Left;    ARTHROSCOPY OF KNEE Left 1/21/2024 "    Procedure: ARTHROSCOPY, KNEE, left, supine, slider, lateral post, conmed,;  Surgeon: Derick Sloan MD;  [facility];  Service: Orthopedics;  Laterality: Left;    ARTHROTOMY OF KNEE Left 12/29/2023    Procedure: ARTHROTOMY, KNEE;  Surgeon: Edy Bocanegra Jr., MD;  Location: [facility];  Service: Orthopedics;  Laterality: Left;    ESOPHAGOGASTRODUODENOSCOPY N/A 1/31/2022    Procedure: EGD (ESOPHAGOGASTRODUODENOSCOPY);  Surgeon: Cindy Quiros MD;  Location: [facility];  Service: Endoscopy;  Laterality: N/A;    ESOPHAGOGASTRODUODENOSCOPY N/A 3/21/2022    Procedure: EGD (ESOPHAGOGASTRODUODENOSCOPY);  Surgeon: Tejas Mann MD;  Location: [facility];  Service: Endoscopy;  Laterality: N/A;    INCISION AND DRAINAGE FOOT Left 11/28/2021    Procedure: INCISION AND DRAINAGE, FOOT;  Surgeon: Bj Alicea DPM;  Location: [facility];  Service: Podiatry;  Laterality: Left;    INCISION AND DRAINAGE OF THIGH Right 1/21/2024    Procedure: INCISION AND DRAINAGE, THIGH - right,;  Surgeon: Derick Sloan MD;  [facility];  Service: Orthopedics;  Laterality: Right;  right, lateral, slider, cysto tubing, 6L NS, betadine, peroxide, vanc powder, 10 [place] winter drain    IRRIGATION AND DEBRIDEMENT OF LOWER EXTREMITY Right 1/21/2024    Procedure: I&D lateral thigh abscess, right, supine, slider, cysto tubing, 6L NS, betadine, peroxide, vanc powder, 10 [place] winter drain,;  Surgeon: Derick Sloan MD;  [facility];  Service: Orthopedics;  Laterality: Right;    KNEE ARTHROSCOPY W/ DEBRIDEMENT Left 1/21/2024    Procedure: ARTHROSCOPY, KNEE, WITH DEBRIDEMENT - LEFT, SUPINE, SLIDER, LATERAL POST, CONMED;  Surgeon: Derick Sloan MD;  [facility];  Service: Orthopedics;  Laterality: Left;    stents in bilateral legs      TRANSESOPHAGEAL ECHOCARDIOGRAPHY N/A 1/3/2024    Procedure: ECHOCARDIOGRAM, TRANSESOPHAGEAL;  Surgeon:  Douglas Doss MD;  Location: [facility] CATH LAB/EP;  Service: Cardiology;  Laterality: N/A;     Family History   Problem Relation Age of Onset    Cancer Mother     Cancer Paternal Uncle     Cancer Paternal Grandfather     Irritable bowel syndrome Paternal Grandfather     Cancer Maternal Grandfather     Colon cancer Neg Hx     Esophageal cancer Neg Hx     Rectal cancer Neg Hx     Stomach cancer Neg Hx     Ulcerative colitis Neg Hx     Crohn's disease Neg Hx     Celiac disease Neg Hx      Social History     Tobacco Use    Smoking status: Former     Types: Cigarettes    Smokeless tobacco: Former    Tobacco comments:     occasionally    Substance Use Topics    Alcohol use: Yes     Comment: occ    Drug use: No     Review of Systems    Physical Exam     Initial Vitals [01/31/24 1331]   BP Pulse Resp Temp SpO2   (!) 168/98 110 18 99 °F (37.2 °C) 98 %      MAP       --         Physical Exam    Nursing note and vitals reviewed.  Constitutional: He appears well-developed and well-nourished. No distress.   HENT:   Head: Normocephalic and atraumatic.   Nose: Nose normal.   Eyes: Conjunctivae and EOM are normal. Pupils are equal, round, and reactive to light.   Neck:   Normal range of motion.  Cardiovascular:  Regular rhythm and normal heart sounds.     Exam reveals no gallop and no friction rub.       No murmur heard.  tachycardic   Pulmonary/Chest: Breath sounds normal. No respiratory distress. He has no wheezes. He has no rhonchi. He has no rales.   Abdominal: Abdomen is soft. He exhibits no distension. There is no abdominal tenderness. There is no rebound and no guarding.   Musculoskeletal:      Cervical back: Normal range of motion.      Comments: Moderate swelling with healing scar is still as knee.  Moderately warm in comparison to the right.  No significant tenderness or pain with range of motion.  2+ DP pulses bilateral     Neurological: He is alert and oriented to person, place, and time. He has normal strength.    Skin: Skin is warm and dry. Capillary refill takes less than 2 seconds.   Psychiatric: He has a normal mood and affect.         ED Course   Procedures  Labs Reviewed   CBC W/ AUTO DIFFERENTIAL - Abnormal; Notable for the following components:       Result Value    RBC 3.07 (*)     Hemoglobin 8.0 (*)     Hematocrit 25.7 (*)     MCH 26.1 (*)     MCHC 31.1 (*)     RDW 14.6 (*)     Gran # (ANC) 11.0 (*)     Immature Grans (Abs) 0.06 (*)     Lymph # 0.7 (*)     Gran % 89.4 (*)     Lymph % 5.3 (*)     All other components within normal limits   COMPREHENSIVE METABOLIC PANEL - Abnormal; Notable for the following components:    Sodium 134 (*)     Potassium 3.4 (*)     Chloride 94 (*)     Glucose 129 (*)     Calcium 8.4 (*)     Albumin 1.8 (*)     Alkaline Phosphatase 151 (*)     All other components within normal limits   URINALYSIS, REFLEX TO URINE CULTURE - Abnormal; Notable for the following components:    Glucose, UA 4+ (*)     All other components within normal limits    Narrative:     Specimen Source->Urine   C-REACTIVE PROTEIN - Abnormal; Notable for the following components:    .7 (*)     All other components within normal limits   SEDIMENTATION RATE - Abnormal; Notable for the following components:    Sed Rate 76 (*)     All other components within normal limits   CULTURE, BLOOD   CULTURE, BLOOD   LACTIC ACID, PLASMA   SARS-COV2 (COVID) WITH FLU/RSV BY PCR   URINALYSIS MICROSCOPIC    Narrative:     Specimen Source->Urine   POCT GLUCOSE MONITORING CONTINUOUS        ECG Results              EKG 12-lead (Final result)  Result time 01/31/24 15:57:21      Final result by Interface, Lab In [facility] (01/31/24 15:57:21)                   Narrative:    Test Reason : R42,    Vent. Rate : 105 BPM     Atrial Rate : 105 BPM     P-R Int : 166 ms          QRS Dur : 084 ms      QT Int : 338 ms       P-R-T Axes : 052 033 043 degrees     QTc Int : 446 ms    Sinus tachycardia  Abnormal R wave progression  Cannot rule out  Anterior infarct (cited on or before 31-JAN-2024)  Abnormal ECG  When compared with ECG of 14-JAN-2024 19:10,  No significant change was found  Confirmed by BRIDGER CANDELARIO MD ([id number]) on 1/31/2024 3:57:09 PM    Referred By: SOFÍA   SELF           Confirmed By:BRIDGER CANDELARIO MD                                  Imaging Results              MRI Knee W WO Contrast Left (In process)                      MRI Pelvis W WO Contrast (In process)    Procedure changed from MRI Abdomen-Pelvis w w/o Contrast (XPD)                    X-Ray Chest AP Portable (Final result)  Result time 01/31/24 16:43:00      Final result by Eugenio Cesar MD (01/31/24 16:43:00)                   Impression:      1. Stable appearing interstitial findings noting shallow inspiratory effort.  No large focal consolidation.      Electronically signed by: Eugenio Cesar MD  Date:    01/31/2024  Time:    16:43               Narrative:    EXAMINATION:  XR CHEST AP PORTABLE    CLINICAL HISTORY:  sepsis workup;    TECHNIQUE:  Single frontal view of the chest was performed.    COMPARISON:  01/14/2024    FINDINGS:  Right PICC catheter tip projects over the distal SVC.  The cardiomediastinal silhouette is not enlarged.  There is no pleural effusion.  The trachea is midline.  The lungs are symmetrically expanded bilaterally with coarse interstitial attenuation, accentuated by habitus..  No large focal consolidation seen.  There is no pneumothorax.  The osseous structures are remarkable for degenerative change..                                       Medications   insulin aspart U-100 pen 14 Units (has no administration in time range)   methocarbamoL tablet 500 mg (has no administration in time range)   metoprolol succinate (TOPROL-XL) 24 hr split tablet 12.5 mg (has no administration in time range)   oxyCODONE immediate release tablet 10 mg (has no administration in time range)   oxyCODONE immediate release tablet 5 mg (has no administration in time range)    prochlorperazine tablet 10 mg (has no administration in time range)   rivaroxaban tablet 10 mg (has no administration in time range)   DAPTOmycin (CUBICIN) 945 mg in sodium chloride 0.9% SolP 50 mL IVPB (has no administration in time range)     Medical Decision Making  38-year-old male with a history of May-Thurner syndrome, multiple recent admissions for bacteremia, septic left knee, septic pelvis.  Currently at [facility] receiving IV antibiotics.  Overnight had temperatures of 100, tachycardia, dizziness.  Sent in for sepsis rule out as well as MRI of his left knee and pelvis.  Reportedly the physician there ordered them but they would take at least 24 hours to get.  The patient states that he feels well.  His temperature is 99° and heart rate 110.  He does have some warmth and redness of the left knee however he denies that it is significantly more painful or swollen then when he was discharge.  No other concerning source for sepsis including pneumonia, UTI, intra-abdominal infection, epidural abscess based on symptoms.  We will get labs including blood cultures x2, CRP and ESR, lactate, chest x-ray, flu and COVID testing, UA.  I have ordered MRIs of the abdomen/pelvis and left knee per outpatient rehab physician.  Disposition pending.    ESR and CRP are elevated.  White blood count is normal.  He had 1 slightly low blood pressure but others have been stable.  He is stably anemic.  Otherwise labs are around baseline.  I spoke with the AP P at his rehab facility.  If MRIs are stable, he can be discharged back to the facility and they will continue to monitor his inflammatory markers and recommendations.  However, if MRIs are worsening he will need admission.  The patient would very much like to not be admitted to the hospital which is why we are keeping him in the emergency department until his MRIs are resulted.  Of note, if he does not get back by midnight he may need to be readmitted under observation and  then readmitted back to the facility for his insurance and a BP at the rehab facility. Signed out to Dr Williamson pending imaging read.    Amount and/or Complexity of Data Reviewed  Labs: ordered.  Radiology: ordered.    Risk  Prescription drug management.                                      Clinical Impression:  Final diagnoses:  [R42] Dizziness  [R68.83] Chills (without fever) (Primary)                 Alice Watkins MD  01/31/24 [id number]

## 2024-01-31 NOTE — ED TRIAGE NOTES
Patient is a 38-year-old male presents to the ED via EMS from [facility] for possible sepsis. Patient states he was shivering last night after they gave him IV fluids. Facility took his temperature and it was 99F. Patient denies fever. Facility sent him to ED for further evaluation. Denies SOB or chest pain

## 2024-01-31 NOTE — Clinical Note
Diagnosis: Chills (without fever) [780.64.ICD-9-CM]  Future Attending Provider: SUMIT MACIAS [[id number]]  Is the patient being sent to ED Observation?: Yes

## 2024-02-01 ENCOUNTER — ANESTHESIA EVENT (OUTPATIENT)
Dept: SURGERY | Facility: HOSPITAL | Age: 39
DRG: 464 | End: 2024-02-01
Payer: COMMERCIAL

## 2024-02-01 ENCOUNTER — ANESTHESIA (OUTPATIENT)
Dept: SURGERY | Facility: HOSPITAL | Age: 39
DRG: 464 | End: 2024-02-01
Payer: COMMERCIAL

## 2024-02-01 PROBLEM — R19.7 DIARRHEA: Status: ACTIVE | Noted: 2024-02-01

## 2024-02-01 PROBLEM — I50.20 HFREF (HEART FAILURE WITH REDUCED EJECTION FRACTION): Chronic | Status: ACTIVE | Noted: 2024-02-01

## 2024-02-01 PROBLEM — E87.6 HYPOKALEMIA: Status: ACTIVE | Noted: 2024-02-01

## 2024-02-01 PROBLEM — E11.59 HYPERTENSION ASSOCIATED WITH DIABETES: Chronic | Status: ACTIVE | Noted: 2018-09-26

## 2024-02-01 PROBLEM — I50.20 HFREF (HEART FAILURE WITH REDUCED EJECTION FRACTION): Status: ACTIVE | Noted: 2024-02-01

## 2024-02-01 PROBLEM — I15.2 HYPERTENSION ASSOCIATED WITH DIABETES: Chronic | Status: ACTIVE | Noted: 2018-09-26

## 2024-02-01 LAB
ACINETOBACTER CALCOACETICUS/BAUMANNII COMPLEX: DETECTED
ANION GAP SERPL CALC-SCNC: 14 MMOL/L (ref 8–16)
APPEARANCE FLD: NORMAL
BACTEROIDES FRAGILIS: NOT DETECTED
BASOPHILS # BLD AUTO: 0.02 K/UL (ref 0–0.2)
BASOPHILS NFR BLD: 0.2 % (ref 0–1.9)
BODY FLD TYPE: NORMAL
BODY FLD TYPE: NORMAL
BUN SERPL-MCNC: 8 MG/DL (ref 6–20)
C DIFF GDH STL QL: NEGATIVE
C DIFF TOX A+B STL QL IA: NEGATIVE
CALCIUM SERPL-MCNC: 8.7 MG/DL (ref 8.7–10.5)
CANDIDA ALBICANS: NOT DETECTED
CANDIDA AURIS: NOT DETECTED
CANDIDA GLABRATA: NOT DETECTED
CANDIDA KRUSEI: NOT DETECTED
CANDIDA PARAPSILOSIS: NOT DETECTED
CANDIDA TROPICALIS: NOT DETECTED
CHLORIDE SERPL-SCNC: 97 MMOL/L (ref 95–110)
CK SERPL-CCNC: 91 U/L (ref 20–200)
CO2 SERPL-SCNC: 24 MMOL/L (ref 23–29)
COLOR FLD: NORMAL
CREAT SERPL-MCNC: 0.8 MG/DL (ref 0.5–1.4)
CRYPTOCOCCUS NEOFORMANS/GATTII: NOT DETECTED
CRYSTALS FLD MICRO: NEGATIVE
CTX-M GENE (ESBL PRODUCER): NOT DETECTED
DIFFERENTIAL METHOD BLD: ABNORMAL
ENTEROBACTER CLOACAE COMPLEX: NOT DETECTED
ENTEROBACTERALES: NOT DETECTED
ENTEROCOCCUS FAECALIS: NOT DETECTED
ENTEROCOCCUS FAECIUM: NOT DETECTED
EOSINOPHIL # BLD AUTO: 0 K/UL (ref 0–0.5)
EOSINOPHIL NFR BLD: 0.1 % (ref 0–8)
ERYTHROCYTE [DISTWIDTH] IN BLOOD BY AUTOMATED COUNT: 14.6 % (ref 11.5–14.5)
ESCHERICHIA COLI: NOT DETECTED
EST. GFR  (NO RACE VARIABLE): >60 ML/MIN/1.73 M^2
GLUCOSE SERPL-MCNC: 124 MG/DL (ref 70–110)
GRAM STN SPEC: NORMAL
HAEMOPHILUS INFLUENZAE: NOT DETECTED
HCT VFR BLD AUTO: 28 % (ref 40–54)
HGB BLD-MCNC: 8.5 G/DL (ref 14–18)
IMM GRANULOCYTES # BLD AUTO: 0.04 K/UL (ref 0–0.04)
IMM GRANULOCYTES NFR BLD AUTO: 0.4 % (ref 0–0.5)
IMP GENE (CARBAPENEM RESISTANT): NOT DETECTED
KLEBSIELLA AEROGENES: NOT DETECTED
KLEBSIELLA OXYTOCA: NOT DETECTED
KLEBSIELLA PNEUMONIAE GROUP: NOT DETECTED
KPC RESISTANCE GENE (CARBAPENEM): NOT DETECTED
LISTERIA MONOCYTOGENES: NOT DETECTED
LYMPHOCYTES # BLD AUTO: 1.4 K/UL (ref 1–4.8)
LYMPHOCYTES NFR BLD: 13.1 % (ref 18–48)
LYMPHOCYTES NFR FLD MANUAL: 6 %
MCH RBC QN AUTO: 25.6 PG (ref 27–31)
MCHC RBC AUTO-ENTMCNC: 30.4 G/DL (ref 32–36)
MCR-1: ABNORMAL
MCV RBC AUTO: 84 FL (ref 82–98)
MEC A/C AND MREJ (MRSA): ABNORMAL
MEC A/C: ABNORMAL
MESOTHL CELL NFR FLD MANUAL: 3 %
MONOCYTES # BLD AUTO: 1 K/UL (ref 0.3–1)
MONOCYTES NFR BLD: 9.8 % (ref 4–15)
NDM GENE (CARBAPENEM RESISTANT): NOT DETECTED
NEISSERIA MENINGITIDIS: NOT DETECTED
NEUTROPHILS # BLD AUTO: 8.2 K/UL (ref 1.8–7.7)
NEUTROPHILS NFR BLD: 76.4 % (ref 38–73)
NEUTROPHILS NFR FLD MANUAL: 91 %
NRBC BLD-RTO: 0 /100 WBC
OXA-48-LIKE (CARBAPENEM RESISTANT): ABNORMAL
PATH INTERP FLD-IMP: NORMAL
PLATELET # BLD AUTO: 381 K/UL (ref 150–450)
PMV BLD AUTO: 10.1 FL (ref 9.2–12.9)
POCT GLUCOSE: 113 MG/DL (ref 70–110)
POCT GLUCOSE: 119 MG/DL (ref 70–110)
POCT GLUCOSE: 148 MG/DL (ref 70–110)
POCT GLUCOSE: 170 MG/DL (ref 70–110)
POTASSIUM SERPL-SCNC: 3.4 MMOL/L (ref 3.5–5.1)
PROTEUS SPECIES: NOT DETECTED
PSEUDOMONAS AERUGINOSA: NOT DETECTED
RBC # BLD AUTO: 3.32 M/UL (ref 4.6–6.2)
SALMONELLA SP: NOT DETECTED
SERRATIA MARCESCENS: NOT DETECTED
SODIUM SERPL-SCNC: 135 MMOL/L (ref 136–145)
STAPHYLOCOCCUS AUREUS: NOT DETECTED
STAPHYLOCOCCUS EPIDERMIDIS: NOT DETECTED
STAPHYLOCOCCUS LUGDUNESIS: NOT DETECTED
STAPHYLOCOCCUS SPECIES: NOT DETECTED
STENOTROPHOMONAS MALTOPHILIA: NOT DETECTED
STREPTOCOCCUS AGALACTIAE: NOT DETECTED
STREPTOCOCCUS PNEUMONIAE: NOT DETECTED
STREPTOCOCCUS PYOGENES: NOT DETECTED
STREPTOCOCCUS SPECIES: NOT DETECTED
VAN A/B (VRE GENE): ABNORMAL
VIM GENE (CARBAPENEM RESISTANT): NOT DETECTED
WBC # BLD AUTO: 10.65 K/UL (ref 3.9–12.7)
WBC # FLD: NORMAL /CU MM

## 2024-02-01 PROCEDURE — 99223 1ST HOSP IP/OBS HIGH 75: CPT | Mod: 57,,, | Performed by: ORTHOPAEDIC SURGERY

## 2024-02-01 PROCEDURE — 87070 CULTURE OTHR SPECIMN AEROBIC: CPT | Performed by: ORTHOPAEDIC SURGERY

## 2024-02-01 PROCEDURE — 11000001 HC ACUTE MED/SURG PRIVATE ROOM

## 2024-02-01 PROCEDURE — D9220A PRA ANESTHESIA: Mod: ANES,,, | Performed by: ANESTHESIOLOGY

## 2024-02-01 PROCEDURE — 87449 NOS EACH ORGANISM AG IA: CPT | Mod: 91 | Performed by: EMERGENCY MEDICINE

## 2024-02-01 PROCEDURE — 71000015 HC POSTOP RECOV 1ST HR: Performed by: ORTHOPAEDIC SURGERY

## 2024-02-01 PROCEDURE — 25000003 PHARM REV CODE 250: Performed by: NURSE ANESTHETIST, CERTIFIED REGISTERED

## 2024-02-01 PROCEDURE — 87045 FECES CULTURE AEROBIC BACT: CPT | Performed by: EMERGENCY MEDICINE

## 2024-02-01 PROCEDURE — 87209 SMEAR COMPLEX STAIN: CPT | Performed by: EMERGENCY MEDICINE

## 2024-02-01 PROCEDURE — 25000003 PHARM REV CODE 250: Performed by: STUDENT IN AN ORGANIZED HEALTH CARE EDUCATION/TRAINING PROGRAM

## 2024-02-01 PROCEDURE — 87205 SMEAR GRAM STAIN: CPT | Mod: 59 | Performed by: ORTHOPAEDIC SURGERY

## 2024-02-01 PROCEDURE — 11042 DBRDMT SUBQ TIS 1ST 20SQCM/<: CPT | Mod: 79,59,RT, | Performed by: ORTHOPAEDIC SURGERY

## 2024-02-01 PROCEDURE — 29876 ARTHRS KNEE SURG SYNVCT MAJ: CPT | Mod: 58,LT,, | Performed by: ORTHOPAEDIC SURGERY

## 2024-02-01 PROCEDURE — 11045 DBRDMT SUBQ TISS EACH ADDL: CPT | Mod: 79,59,RT, | Performed by: ORTHOPAEDIC SURGERY

## 2024-02-01 PROCEDURE — 25000003 PHARM REV CODE 250: Performed by: ORTHOPAEDIC SURGERY

## 2024-02-01 PROCEDURE — 80048 BASIC METABOLIC PNL TOTAL CA: CPT | Performed by: PHYSICIAN ASSISTANT

## 2024-02-01 PROCEDURE — 71000016 HC POSTOP RECOV ADDL HR: Performed by: ORTHOPAEDIC SURGERY

## 2024-02-01 PROCEDURE — 63600175 PHARM REV CODE 636 W HCPCS: Performed by: EMERGENCY MEDICINE

## 2024-02-01 PROCEDURE — D9220A PRA ANESTHESIA: Mod: CRNA,,, | Performed by: NURSE ANESTHETIST, CERTIFIED REGISTERED

## 2024-02-01 PROCEDURE — 36000711: Performed by: ORTHOPAEDIC SURGERY

## 2024-02-01 PROCEDURE — 87046 STOOL CULTR AEROBIC BACT EA: CPT | Performed by: EMERGENCY MEDICINE

## 2024-02-01 PROCEDURE — 87070 CULTURE OTHR SPECIMN AEROBIC: CPT | Mod: 59

## 2024-02-01 PROCEDURE — 87102 FUNGUS ISOLATION CULTURE: CPT | Mod: 59

## 2024-02-01 PROCEDURE — 27301 DRAIN THIGH/KNEE LESION: CPT | Mod: 51,79,RT, | Performed by: ORTHOPAEDIC SURGERY

## 2024-02-01 PROCEDURE — 63600175 PHARM REV CODE 636 W HCPCS: Performed by: NURSE ANESTHETIST, CERTIFIED REGISTERED

## 2024-02-01 PROCEDURE — 89060 EXAM SYNOVIAL FLUID CRYSTALS: CPT

## 2024-02-01 PROCEDURE — 71000033 HC RECOVERY, INTIAL HOUR: Performed by: ORTHOPAEDIC SURGERY

## 2024-02-01 PROCEDURE — 97162 PT EVAL MOD COMPLEX 30 MIN: CPT

## 2024-02-01 PROCEDURE — 0K9Q0ZZ DRAINAGE OF RIGHT UPPER LEG MUSCLE, OPEN APPROACH: ICD-10-PCS | Performed by: ORTHOPAEDIC SURGERY

## 2024-02-01 PROCEDURE — 82962 GLUCOSE BLOOD TEST: CPT | Performed by: ORTHOPAEDIC SURGERY

## 2024-02-01 PROCEDURE — 25000003 PHARM REV CODE 250

## 2024-02-01 PROCEDURE — 63600175 PHARM REV CODE 636 W HCPCS: Performed by: ORTHOPAEDIC SURGERY

## 2024-02-01 PROCEDURE — 0JBL0ZZ EXCISION OF RIGHT UPPER LEG SUBCUTANEOUS TISSUE AND FASCIA, OPEN APPROACH: ICD-10-PCS | Performed by: ORTHOPAEDIC SURGERY

## 2024-02-01 PROCEDURE — 87449 NOS EACH ORGANISM AG IA: CPT | Performed by: EMERGENCY MEDICINE

## 2024-02-01 PROCEDURE — 87102 FUNGUS ISOLATION CULTURE: CPT | Performed by: ORTHOPAEDIC SURGERY

## 2024-02-01 PROCEDURE — 87116 MYCOBACTERIA CULTURE: CPT | Mod: 59 | Performed by: ORTHOPAEDIC SURGERY

## 2024-02-01 PROCEDURE — 87206 SMEAR FLUORESCENT/ACID STAI: CPT | Mod: 91 | Performed by: ORTHOPAEDIC SURGERY

## 2024-02-01 PROCEDURE — 63600175 PHARM REV CODE 636 W HCPCS

## 2024-02-01 PROCEDURE — 87075 CULTR BACTERIA EXCEPT BLOOD: CPT | Mod: 59 | Performed by: ORTHOPAEDIC SURGERY

## 2024-02-01 PROCEDURE — 82550 ASSAY OF CK (CPK): CPT | Performed by: PHYSICIAN ASSISTANT

## 2024-02-01 PROCEDURE — 87205 SMEAR GRAM STAIN: CPT

## 2024-02-01 PROCEDURE — 0KDQ0ZZ EXTRACTION OF RIGHT UPPER LEG MUSCLE, OPEN APPROACH: ICD-10-PCS | Performed by: ORTHOPAEDIC SURGERY

## 2024-02-01 PROCEDURE — 89051 BODY FLUID CELL COUNT: CPT

## 2024-02-01 PROCEDURE — 37000008 HC ANESTHESIA 1ST 15 MINUTES: Performed by: ORTHOPAEDIC SURGERY

## 2024-02-01 PROCEDURE — 87116 MYCOBACTERIA CULTURE: CPT

## 2024-02-01 PROCEDURE — 85025 COMPLETE CBC W/AUTO DIFF WBC: CPT | Performed by: PHYSICIAN ASSISTANT

## 2024-02-01 PROCEDURE — 36000710: Performed by: ORTHOPAEDIC SURGERY

## 2024-02-01 PROCEDURE — 87206 SMEAR FLUORESCENT/ACID STAI: CPT | Mod: 91

## 2024-02-01 PROCEDURE — 0S9D3ZX DRAINAGE OF LEFT KNEE JOINT, PERCUTANEOUS APPROACH, DIAGNOSTIC: ICD-10-PCS | Performed by: ORTHOPAEDIC SURGERY

## 2024-02-01 PROCEDURE — 36415 COLL VENOUS BLD VENIPUNCTURE: CPT | Performed by: PHYSICIAN ASSISTANT

## 2024-02-01 PROCEDURE — 94761 N-INVAS EAR/PLS OXIMETRY MLT: CPT

## 2024-02-01 PROCEDURE — 37000009 HC ANESTHESIA EA ADD 15 MINS: Performed by: ORTHOPAEDIC SURGERY

## 2024-02-01 PROCEDURE — 27201423 OPTIME MED/SURG SUP & DEVICES STERILE SUPPLY: Performed by: ORTHOPAEDIC SURGERY

## 2024-02-01 PROCEDURE — 87427 SHIGA-LIKE TOXIN AG IA: CPT | Performed by: EMERGENCY MEDICINE

## 2024-02-01 PROCEDURE — 25000003 PHARM REV CODE 250: Performed by: EMERGENCY MEDICINE

## 2024-02-01 PROCEDURE — 99223 1ST HOSP IP/OBS HIGH 75: CPT | Mod: ,,, | Performed by: INTERNAL MEDICINE

## 2024-02-01 PROCEDURE — 97165 OT EVAL LOW COMPLEX 30 MIN: CPT

## 2024-02-01 PROCEDURE — 87075 CULTR BACTERIA EXCEPT BLOOD: CPT

## 2024-02-01 PROCEDURE — 0SBD4ZZ EXCISION OF LEFT KNEE JOINT, PERCUTANEOUS ENDOSCOPIC APPROACH: ICD-10-PCS | Performed by: ORTHOPAEDIC SURGERY

## 2024-02-01 RX ORDER — LOPERAMIDE HCL 2 MG
2 TABLET ORAL 3 TIMES DAILY PRN
COMMUNITY

## 2024-02-01 RX ORDER — SODIUM CHLORIDE 0.9 % (FLUSH) 0.9 %
10 SYRINGE (ML) INJECTION
Status: DISCONTINUED | OUTPATIENT
Start: 2024-02-01 | End: 2024-02-08 | Stop reason: HOSPADM

## 2024-02-01 RX ORDER — BACITRACIN ZINC 500 UNIT/G
OINTMENT (GRAM) TOPICAL
Status: DISCONTINUED | OUTPATIENT
Start: 2024-02-01 | End: 2024-02-01 | Stop reason: HOSPADM

## 2024-02-01 RX ORDER — CHOLECALCIFEROL (VITAMIN D3) 25 MCG
1000 TABLET ORAL DAILY
Status: DISCONTINUED | OUTPATIENT
Start: 2024-02-02 | End: 2024-02-08 | Stop reason: HOSPADM

## 2024-02-01 RX ORDER — KETAMINE HCL IN 0.9 % NACL 50 MG/5 ML
SYRINGE (ML) INTRAVENOUS
Status: DISCONTINUED | OUTPATIENT
Start: 2024-02-01 | End: 2024-02-01

## 2024-02-01 RX ORDER — DOXYCYCLINE 100 MG/10ML
INJECTION, POWDER, LYOPHILIZED, FOR SOLUTION INTRAVENOUS
Status: DISCONTINUED | OUTPATIENT
Start: 2024-02-01 | End: 2024-02-01 | Stop reason: HOSPADM

## 2024-02-01 RX ORDER — PROPOFOL 10 MG/ML
VIAL (ML) INTRAVENOUS
Status: DISCONTINUED | OUTPATIENT
Start: 2024-02-01 | End: 2024-02-01

## 2024-02-01 RX ORDER — POLYETHYLENE GLYCOL 3350 17 G/17G
17 POWDER, FOR SOLUTION ORAL DAILY
COMMUNITY

## 2024-02-01 RX ORDER — VANCOMYCIN HYDROCHLORIDE 1 G/20ML
INJECTION, POWDER, LYOPHILIZED, FOR SOLUTION INTRAVENOUS
Status: DISCONTINUED | OUTPATIENT
Start: 2024-02-01 | End: 2024-02-01 | Stop reason: HOSPADM

## 2024-02-01 RX ORDER — IBUPROFEN 200 MG
16 TABLET ORAL
Status: DISCONTINUED | OUTPATIENT
Start: 2024-02-01 | End: 2024-02-08 | Stop reason: HOSPADM

## 2024-02-01 RX ORDER — GLUCAGON 1 MG
1 KIT INJECTION
Status: DISCONTINUED | OUTPATIENT
Start: 2024-02-01 | End: 2024-02-08 | Stop reason: HOSPADM

## 2024-02-01 RX ORDER — IBUPROFEN 200 MG
24 TABLET ORAL
Status: DISCONTINUED | OUTPATIENT
Start: 2024-02-01 | End: 2024-02-08 | Stop reason: HOSPADM

## 2024-02-01 RX ORDER — HYDROMORPHONE HYDROCHLORIDE 1 MG/ML
0.2 INJECTION, SOLUTION INTRAMUSCULAR; INTRAVENOUS; SUBCUTANEOUS EVERY 5 MIN PRN
Status: DISCONTINUED | OUTPATIENT
Start: 2024-02-01 | End: 2024-02-01 | Stop reason: HOSPADM

## 2024-02-01 RX ORDER — POTASSIUM CHLORIDE 20 MEQ/1
60 TABLET, EXTENDED RELEASE ORAL ONCE
Status: COMPLETED | OUTPATIENT
Start: 2024-02-01 | End: 2024-02-01

## 2024-02-01 RX ORDER — FENTANYL CITRATE 50 UG/ML
INJECTION, SOLUTION INTRAMUSCULAR; INTRAVENOUS
Status: DISCONTINUED | OUTPATIENT
Start: 2024-02-01 | End: 2024-02-01

## 2024-02-01 RX ORDER — CEFAZOLIN SODIUM 1 G/3ML
INJECTION, POWDER, FOR SOLUTION INTRAMUSCULAR; INTRAVENOUS
Status: DISCONTINUED | OUTPATIENT
Start: 2024-02-01 | End: 2024-02-01

## 2024-02-01 RX ORDER — LIDOCAINE HYDROCHLORIDE 20 MG/ML
INJECTION INTRAVENOUS
Status: DISCONTINUED | OUTPATIENT
Start: 2024-02-01 | End: 2024-02-01

## 2024-02-01 RX ORDER — INSULIN ASPART 100 [IU]/ML
0-5 INJECTION, SOLUTION INTRAVENOUS; SUBCUTANEOUS
Status: DISCONTINUED | OUTPATIENT
Start: 2024-02-01 | End: 2024-02-03

## 2024-02-01 RX ORDER — HYDROMORPHONE HYDROCHLORIDE 2 MG/ML
INJECTION, SOLUTION INTRAMUSCULAR; INTRAVENOUS; SUBCUTANEOUS
Status: DISCONTINUED | OUTPATIENT
Start: 2024-02-01 | End: 2024-02-01

## 2024-02-01 RX ORDER — SODIUM CHLORIDE, SODIUM LACTATE, POTASSIUM CHLORIDE, CALCIUM CHLORIDE 600; 310; 30; 20 MG/100ML; MG/100ML; MG/100ML; MG/100ML
INJECTION, SOLUTION INTRAVENOUS CONTINUOUS PRN
Status: DISCONTINUED | OUTPATIENT
Start: 2024-02-01 | End: 2024-02-01

## 2024-02-01 RX ORDER — EPINEPHRINE 1 MG/ML
INJECTION, SOLUTION, CONCENTRATE INTRAVENOUS
Status: DISCONTINUED | OUTPATIENT
Start: 2024-02-01 | End: 2024-02-01 | Stop reason: HOSPADM

## 2024-02-01 RX ORDER — ACETAMINOPHEN 325 MG/1
650 TABLET ORAL EVERY 4 HOURS PRN
Status: DISCONTINUED | OUTPATIENT
Start: 2024-02-01 | End: 2024-02-08 | Stop reason: HOSPADM

## 2024-02-01 RX ORDER — MIDAZOLAM HYDROCHLORIDE 1 MG/ML
INJECTION INTRAMUSCULAR; INTRAVENOUS
Status: DISCONTINUED | OUTPATIENT
Start: 2024-02-01 | End: 2024-02-01

## 2024-02-01 RX ORDER — ROCURONIUM BROMIDE 10 MG/ML
INJECTION, SOLUTION INTRAVENOUS
Status: DISCONTINUED | OUTPATIENT
Start: 2024-02-01 | End: 2024-02-01

## 2024-02-01 RX ORDER — MUPIROCIN 20 MG/G
OINTMENT TOPICAL
Status: DISCONTINUED | OUTPATIENT
Start: 2024-02-01 | End: 2024-02-01 | Stop reason: HOSPADM

## 2024-02-01 RX ORDER — CLINDAMYCIN PHOSPHATE 900 MG/50ML
INJECTION, SOLUTION INTRAVENOUS
Status: DISCONTINUED | OUTPATIENT
Start: 2024-02-01 | End: 2024-02-01

## 2024-02-01 RX ORDER — BISACODYL 10 MG/1
10 SUPPOSITORY RECTAL DAILY PRN
COMMUNITY

## 2024-02-01 RX ORDER — PHENYLEPHRINE HCL IN 0.9% NACL 1 MG/10 ML
SYRINGE (ML) INTRAVENOUS
Status: DISCONTINUED | OUTPATIENT
Start: 2024-02-01 | End: 2024-02-01

## 2024-02-01 RX ORDER — SODIUM CHLORIDE 0.9 % (FLUSH) 0.9 %
10 SYRINGE (ML) INJECTION
Status: DISCONTINUED | OUTPATIENT
Start: 2024-02-01 | End: 2024-02-01 | Stop reason: HOSPADM

## 2024-02-01 RX ORDER — NAPROXEN SODIUM 220 MG/1
81 TABLET, FILM COATED ORAL 2 TIMES DAILY
Status: DISCONTINUED | OUTPATIENT
Start: 2024-02-01 | End: 2024-02-03

## 2024-02-01 RX ORDER — NALOXONE HCL 0.4 MG/ML
0.02 VIAL (ML) INJECTION
Status: DISCONTINUED | OUTPATIENT
Start: 2024-02-01 | End: 2024-02-08 | Stop reason: HOSPADM

## 2024-02-01 RX ORDER — L. ACIDOPHILUS/L.BULGARICUS 100MM CELL
1 GRANULES IN PACKET (EA) ORAL DAILY
COMMUNITY

## 2024-02-01 RX ORDER — ONDANSETRON HYDROCHLORIDE 2 MG/ML
INJECTION, SOLUTION INTRAVENOUS
Status: DISCONTINUED | OUTPATIENT
Start: 2024-02-01 | End: 2024-02-01

## 2024-02-01 RX ORDER — DEXAMETHASONE SODIUM PHOSPHATE 4 MG/ML
INJECTION, SOLUTION INTRA-ARTICULAR; INTRALESIONAL; INTRAMUSCULAR; INTRAVENOUS; SOFT TISSUE
Status: DISCONTINUED | OUTPATIENT
Start: 2024-02-01 | End: 2024-02-01

## 2024-02-01 RX ORDER — SODIUM CHLORIDE 0.9 % (FLUSH) 0.9 %
10 SYRINGE (ML) INJECTION EVERY 12 HOURS PRN
Status: DISCONTINUED | OUTPATIENT
Start: 2024-02-01 | End: 2024-02-08 | Stop reason: HOSPADM

## 2024-02-01 RX ORDER — INSULIN GLARGINE 100 [IU]/ML
25 INJECTION, SOLUTION SUBCUTANEOUS DAILY
Status: ON HOLD | COMMUNITY
End: 2024-02-07 | Stop reason: HOSPADM

## 2024-02-01 RX ADMIN — CLINDAMYCIN IN 5 PERCENT DEXTROSE 900 MG: 18 INJECTION, SOLUTION INTRAVENOUS at 01:02

## 2024-02-01 RX ADMIN — SUGAMMADEX 200 MG: 100 INJECTION, SOLUTION INTRAVENOUS at 03:02

## 2024-02-01 RX ADMIN — Medication 200 MCG: at 01:02

## 2024-02-01 RX ADMIN — Medication 25 MG: at 01:02

## 2024-02-01 RX ADMIN — OXYCODONE HYDROCHLORIDE 10 MG: 10 TABLET ORAL at 08:02

## 2024-02-01 RX ADMIN — RIVAROXABAN 10 MG: 10 TABLET, FILM COATED ORAL at 09:02

## 2024-02-01 RX ADMIN — CEFAZOLIN 2 G: 2 INJECTION, POWDER, FOR SOLUTION INTRAMUSCULAR; INTRAVENOUS at 09:02

## 2024-02-01 RX ADMIN — MUPIROCIN: 20 OINTMENT TOPICAL at 12:02

## 2024-02-01 RX ADMIN — DAPTOMYCIN 945 MG: 350 INJECTION, POWDER, LYOPHILIZED, FOR SOLUTION INTRAVENOUS at 08:02

## 2024-02-01 RX ADMIN — Medication 200 MCG: at 02:02

## 2024-02-01 RX ADMIN — HYDROMORPHONE HYDROCHLORIDE 0.4 MG: 2 INJECTION INTRAMUSCULAR; INTRAVENOUS; SUBCUTANEOUS at 03:02

## 2024-02-01 RX ADMIN — MIDAZOLAM HYDROCHLORIDE 2 MG: 1 INJECTION INTRAMUSCULAR; INTRAVENOUS at 01:02

## 2024-02-01 RX ADMIN — ROCURONIUM BROMIDE 50 MG: 10 INJECTION, SOLUTION INTRAVENOUS at 01:02

## 2024-02-01 RX ADMIN — SODIUM CHLORIDE: 9 INJECTION, SOLUTION INTRAVENOUS at 01:02

## 2024-02-01 RX ADMIN — DEXAMETHASONE SODIUM PHOSPHATE 8 MG: 4 INJECTION, SOLUTION INTRAMUSCULAR; INTRAVENOUS at 01:02

## 2024-02-01 RX ADMIN — FENTANYL CITRATE 100 MCG: 50 INJECTION, SOLUTION INTRAMUSCULAR; INTRAVENOUS at 01:02

## 2024-02-01 RX ADMIN — CEFAZOLIN 2 G: 330 INJECTION, POWDER, FOR SOLUTION INTRAMUSCULAR; INTRAVENOUS at 01:02

## 2024-02-01 RX ADMIN — LIDOCAINE HYDROCHLORIDE 100 MG: 20 INJECTION INTRAVENOUS at 01:02

## 2024-02-01 RX ADMIN — ROCURONIUM BROMIDE 20 MG: 10 INJECTION, SOLUTION INTRAVENOUS at 02:02

## 2024-02-01 RX ADMIN — ASPIRIN 81 MG CHEWABLE TABLET 81 MG: 81 TABLET CHEWABLE at 09:02

## 2024-02-01 RX ADMIN — Medication 100 MCG: at 03:02

## 2024-02-01 RX ADMIN — METOPROLOL SUCCINATE 12.5 MG: 25 TABLET, EXTENDED RELEASE ORAL at 08:02

## 2024-02-01 RX ADMIN — HYDROMORPHONE HYDROCHLORIDE 0.2 MG: 2 INJECTION INTRAMUSCULAR; INTRAVENOUS; SUBCUTANEOUS at 03:02

## 2024-02-01 RX ADMIN — ONDANSETRON 4 MG: 2 INJECTION INTRAMUSCULAR; INTRAVENOUS at 03:02

## 2024-02-01 RX ADMIN — PROPOFOL 200 MG: 10 INJECTION, EMULSION INTRAVENOUS at 01:02

## 2024-02-01 RX ADMIN — POTASSIUM CHLORIDE 60 MEQ: 1500 TABLET, EXTENDED RELEASE ORAL at 08:02

## 2024-02-01 RX ADMIN — Medication 200 MCG: at 03:02

## 2024-02-01 NOTE — PT/OT/SLP EVAL
Occupational Therapy   Co-Evaluation    Name: Kulwant Mueller Jr.  MRN: [id number]  Admitting Diagnosis: Pyogenic arthritis of left knee joint  Recent Surgery: Procedure(s) (LRB):  ARTHROSCOPY, KNEE, left, lateral post (Left)  IRRIGATION AND DEBRIDEMENT, LOWER EXTREMITY, right, bilateral extremity drapes, cx swabs, cysto tubing (Right) Day of Surgery    Recommendations:     Discharge Recommendations: High Intensity Therapy  Discharge Equipment Recommendations:  to be determined by next level of care  Barriers to discharge:       Assessment:     Kulwant Mueller Jr. is a 38 y.o. male with a medical diagnosis of Pyogenic arthritis of left knee joint.  He presents with impaired ADL and mobility performance deficits. Pt found upright in bed  and agreeable for therapy. Pt limited 2/2 feeling exhausted from multiple providers entering room this morning. Pt did demonstrate standing briefly at bedside requiring RW. PTA, pt was at rehab working short distances with PT. 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 Patient presents with good participation and motivation to return to prior level of function with high intensity therapy.  The patient demonstrates appropriate endurance and strength to participate in up to 3 hours or 15hrs of combined therapy post acute.  Performance deficits affecting function: impaired endurance, impaired balance, weakness, gait instability, decreased lower extremity function, decreased upper extremity function, impaired self care skills, impaired functional mobility.      Rehab Prognosis: Good; patient would benefit from acute skilled OT services to address these deficits and reach maximum level of function.       Plan:     Patient to be seen 4 x/week to address the above listed problems via self-care/home management, therapeutic activities, therapeutic exercises  Plan of Care Expires: 03/01/24  Plan of Care Reviewed with: patient    Subjective  "    Chief Complaint:  feeling overwhelmed   Patient/Family Comments/goals: "What do we need to do so I can get this over with"    Occupational Profile:  Living Environment: Pt lives with his wife and two children in a SSH. Pt has a walk in shower for bathing.  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  Equipment Used at Home: walker, rolling  Assistance upon Discharge: wife     Pain/Comfort:  Pain Rating 1: 0/10  Pain Rating Post-Intervention 1: 0/10  Pain Rating Post-Intervention 2: 0/10    Patients cultural, spiritual, Zoroastrianism conflicts given the current situation: no    Objective:     Communicated with: RN prior to session.  Patient found HOB elevated with PICC line upon OT entry to room.    General Precautions: Standard, fall  Orthopedic Precautions: LLE weight bearing as tolerated  Braces: N/A  Respiratory Status: Room air    Occupational Performance:    Bed Mobility:    Patient completed Rolling/Turning to Right with stand by assistance  Patient completed Scooting/Bridging with stand by assistance  Patient completed Supine to Sit with stand by assistance  Patient completed Sit to Supine with stand by assistance    Functional Mobility/Transfers:  Patient completed Sit <> Stand Transfer with contact guard assistance  with  rolling walker   Functional Mobility: Pt stood tolerating ~30 seconds with CGA using RW. No steps taken, pt deferred.     Activities of Daily Living:  Upper Body Dressing: total assistance donning gown for pt at EOB     Cognitive/Visual Perceptual:  Cognitive/Psychosocial Skills:     -       Oriented to: Person, Place, Time, and Situation   -       Follows Commands/attention:Follows multistep  commands  -       Communication: clear/fluent  -       Memory: No Deficits noted  -       Safety awareness/insight to disability: intact   -       Mood/Affect/Coping skills/emotional control: Appropriate " to situation    Physical Exam:  Balance:    -       demo good sitting balance using no AD, fair standing using RW   Upper Extremity Range of Motion:     -       Right Upper Extremity: WFL  -       Left Upper Extremity: WFL  Upper Extremity Strength:    -       Right Upper Extremity: WFL  -       Left Upper Extremity: WFL   Strength:    -       Right Upper Extremity: WFL  -       Left Upper Extremity: WFL    AMPAC 6 Click ADL:  AMPAC Total Score: 17    Treatment & Education:  Pt educated on role of occupational therapy, POC, and safety during ADLs and functional mobility. Pt and OT discussed importance of safe, continued mobility to optimize daily living skills. Pt verbalized understanding.     White board updated during session. Pt given instruction to call for medical staff/nurse for assistance.       Patient left HOB elevated with all lines intact, call button in reach, and RN notified    GOALS:   Multidisciplinary Problems       Occupational Therapy Goals          Problem: Occupational Therapy    Goal Priority Disciplines Outcome Interventions   Occupational Therapy Goal     OT, PT/OT Ongoing, Progressing    Description: Goals to be met by: 3/1/24 (1 month)     Patient will increase functional independence with ADLs by performing:    UE Dressing with [place].  LE Dressing with [place].  Grooming while standing at sink with Modified [place].  Toileting from toilet with Modified [place] for hygiene and clothing management.   Rolling to Bilateral with [place].   Supine to sit with [place].  Step transfer with Modified [place]  Toilet transfer to toilet with Modified [place].                         History:     Past Medical History:   Diagnosis Date    Anticoagulant long-term use     COVID-19 virus infection     Diabetes mellitus     Diabetes mellitus, type 2     Hypertension     May-Thurner syndrome          Past Surgical History:   Procedure Laterality Date     APPENDECTOMY      ARTHROSCOPY OF KNEE Left 1/16/2024    Procedure: ARTHROSCOPY, KNEE, LEFT;  Surgeon: Mandeep Gatica MD;  Location: [facility] OR 2ND FLR;  Service: Orthopedics;  Laterality: Left;    ARTHROSCOPY OF KNEE Left 1/21/2024    Procedure: ARTHROSCOPY, KNEE, left, supine, slider, lateral post, conmed,;  Surgeon: Derick Sloan MD;  [facility];  Service: Orthopedics;  Laterality: Left;    ARTHROTOMY OF KNEE Left 12/29/2023    Procedure: ARTHROTOMY, KNEE;  Surgeon: Edy Bocanegra Jr., MD;  Location: [facility];  Service: Orthopedics;  Laterality: Left;    ESOPHAGOGASTRODUODENOSCOPY N/A 1/31/2022    Procedure: EGD (ESOPHAGOGASTRODUODENOSCOPY);  Surgeon: Cindy Quiros MD;  Location: [facility];  Service: Endoscopy;  Laterality: N/A;    ESOPHAGOGASTRODUODENOSCOPY N/A 3/21/2022    Procedure: EGD (ESOPHAGOGASTRODUODENOSCOPY);  Surgeon: Tejas Mann MD;  Location: [facility];  Service: Endoscopy;  Laterality: N/A;    INCISION AND DRAINAGE FOOT Left 11/28/2021    Procedure: INCISION AND DRAINAGE, FOOT;  Surgeon: Bj Alicea DPM;  Location: [facility];  Service: Podiatry;  Laterality: Left;    INCISION AND DRAINAGE OF THIGH Right 1/21/2024    Procedure: INCISION AND DRAINAGE, THIGH - right,;  Surgeon: Derick Sloan MD;  [facility];  Service: Orthopedics;  Laterality: Right;  right, lateral, slider, cysto tubing, 6L NS, betadine, peroxide, vanc powder, 10 [place] winter drain    IRRIGATION AND DEBRIDEMENT OF LOWER EXTREMITY Right 1/21/2024    Procedure: I&D lateral thigh abscess, right, supine, slider, cysto tubing, 6L NS, betadine, peroxide, vanc powder, 10 [place] winter drain,;  Surgeon: Derick Sloan MD;  [facility];  Service: Orthopedics;  Laterality: Right;    KNEE ARTHROSCOPY W/ DEBRIDEMENT Left 1/21/2024    Procedure: ARTHROSCOPY, KNEE, WITH DEBRIDEMENT - LEFT, SUPINE, SLIDER, LATERAL POST, CONMED;  Surgeon: Nathalia  Derick CAMPOS MD;  Location: [facility] OR [address] [place] [address];  Service: Orthopedics;  Laterality: Left;    stents in bilateral legs      TRANSESOPHAGEAL ECHOCARDIOGRAPHY N/A 1/3/2024    Procedure: ECHOCARDIOGRAM, TRANSESOPHAGEAL;  Surgeon: Douglas Doss MD;  Location: [facility] CATH LAB/EP;  Service: Cardiology;  Laterality: N/A;       Time Tracking:     OT Date of Treatment: 02/01/24  OT Start Time: 1135  OT Stop Time: 1146  OT Total Time (min): 11 min    Billable Minutes:Evaluation 11 min    2/1/2024

## 2024-02-01 NOTE — ASSESSMENT & PLAN NOTE
Known history of hypercoagulable state.  Reported heparin analog allergy.    - continue home rivaroxaban

## 2024-02-01 NOTE — SUBJECTIVE & OBJECTIVE
Past Medical History:   Diagnosis Date    Anticoagulant long-term use     COVID-19 virus infection     Diabetes mellitus     Diabetes mellitus, type 2     Hypertension     May-Thurner syndrome        Past Surgical History:   Procedure Laterality Date    APPENDECTOMY      ARTHROSCOPY OF KNEE Left 1/16/2024    Procedure: ARTHROSCOPY, KNEE, LEFT;  Surgeon: Mandeep Gatica MD;  [address];  Service: Orthopedics;  Laterality: Left;    ARTHROSCOPY OF KNEE Left 1/21/2024    Procedure: ARTHROSCOPY, KNEE, left, supine, slider, lateral post, conmed,;  Surgeon: Derick Sloan MD;  Location: [address];  Service: Orthopedics;  Laterality: Left;    ARTHROTOMY OF KNEE Left 12/29/2023    Procedure: ARTHROTOMY, KNEE;  Surgeon: Edy Bocanegra Jr., MD;  Location: [facility];  Service: Orthopedics;  Laterality: Left;    ESOPHAGOGASTRODUODENOSCOPY N/A 1/31/2022    Procedure: EGD (ESOPHAGOGASTRODUODENOSCOPY);  Surgeon: Cindy Quiros MD;  Location: [facility];  Service: Endoscopy;  Laterality: N/A;    ESOPHAGOGASTRODUODENOSCOPY N/A 3/21/2022    Procedure: EGD (ESOPHAGOGASTRODUODENOSCOPY);  Surgeon: Tejas Mann MD;  Location: [facility];  Service: Endoscopy;  Laterality: N/A;    INCISION AND DRAINAGE FOOT Left 11/28/2021    Procedure: INCISION AND DRAINAGE, FOOT;  Surgeon: Bj Alicea DPM;  Location: [facility];  Service: Podiatry;  Laterality: Left;    INCISION AND DRAINAGE OF THIGH Right 1/21/2024    Procedure: INCISION AND DRAINAGE, THIGH - right,;  Surgeon: Derick Sloan MD;  [address];  Service: Orthopedics;  Laterality: Right;  right, lateral, slider, cysto tubing, 6L NS, betadine, peroxide, vanc powder, 10 [place] winter drain    IRRIGATION AND DEBRIDEMENT OF LOWER EXTREMITY Right 1/21/2024    Procedure: I&D lateral thigh abscess, right, supine, slider, cysto tubing, 6L NS, betadine, peroxide, vanc powder, 10 [place] winter drain,;  Surgeon: Nathalia  Derick CAMPOS MD;  Location: [facility] [place] [address] [place] [address];  Service: Orthopedics;  Laterality: Right;    KNEE ARTHROSCOPY W/ DEBRIDEMENT Left 1/21/2024    Procedure: ARTHROSCOPY, KNEE, WITH DEBRIDEMENT - LEFT, SUPINE, SLIDER, LATERAL POST, CONMED;  Surgeon: Derick Sloan MD;  [facility];  Service: Orthopedics;  Laterality: Left;    stents in bilateral legs      TRANSESOPHAGEAL ECHOCARDIOGRAPHY N/A 1/3/2024    Procedure: ECHOCARDIOGRAM, TRANSESOPHAGEAL;  Surgeon: Douglas Doss MD;  Location: [facility] CATH LAB/EP;  Service: Cardiology;  Laterality: N/A;       Review of patient's allergies indicates:   Allergen Reactions    Heparin analogues Nausea And Vomiting       Current Facility-Administered Medications   Medication    acetaminophen tablet 650 mg    DAPTOmycin (CUBICIN) 945 mg in sodium chloride 0.9% SolP 50 mL IVPB    dextrose 10% bolus 125 mL 125 mL    dextrose 10% bolus 250 mL 250 mL    glucagon (human recombinant) injection 1 mg    glucose chewable tablet 16 g    glucose chewable tablet 24 g    insulin aspart U-100 pen 0-5 Units    methocarbamoL tablet 500 mg    metoprolol succinate (TOPROL-XL) 24 hr split tablet 12.5 mg    naloxone 0.4 mg/mL injection 0.02 mg    oxyCODONE immediate release tablet 5 mg    oxyCODONE immediate release tablet Tab 10 mg    prochlorperazine tablet 10 mg    rivaroxaban tablet 10 mg    sodium chloride 0.9% flush 10 mL     Family History       Problem Relation (Age of Onset)    Cancer Mother, Paternal Uncle, Paternal Grandfather, Maternal Grandfather    Irritable bowel syndrome Paternal Grandfather          Tobacco Use    Smoking status: Former     Types: Cigarettes    Smokeless tobacco: Former    Tobacco comments:     occasionally    Substance and Sexual Activity    Alcohol use: Yes     Comment: occ    Drug use: No    Sexual activity: Not on file     ROS  Constitutional: Denies fever/chills  Eyes: Denies change in vision  ENT: Denies sore throat or rhinorrhea  "  Respiratory: Denies shortness of breath or cough  Cardiovascular: Denies chest pain or palpitations  Gastrointestinal: Denies abdominal pain, nausea, or vomiting  Genitourinary: Denies dysuria and flank pain  Skin: Denies new rash or skin lesions   Allergic/Immunologic: Denies adverse reactions to current medications  Neurological: Denies headaches or dizziness  Musculoskeletal: see HPI    Objective:     Vital Signs (Most Recent):  Temp: 98.1 °F (36.7 °C) (02/01/24 0733)  Pulse: 94 (02/01/24 0733)  Resp: 17 (02/01/24 0847)  BP: 135/78 (02/01/24 0733)  SpO2: (!) 94 % (02/01/24 0733) Vital Signs (24h Range):  Temp:  [98 °F (36.7 °C)-99.5 °F (37.5 °C)] 98.1 °F (36.7 °C)  Pulse:  [] 94  Resp:  [16-20] 17  SpO2:  [94 %-98 %] 94 %  BP: (114-168)/(72-98) 135/78     Weight: 106.7 kg (235 lb 3.7 oz)  Height: 6' 1" (185.4 cm)  Body mass index is 31.03 kg/m².      Intake/Output Summary (Last 24 hours) at 2/1/2024 1036  Last data filed at 2/1/2024 0835  Gross per 24 hour   Intake 440 ml   Output --   Net 440 ml        Ortho/SPM Exam  Physical Exam:  General:  no apparent distress, WDWN  HENT:  NCAT, Bilateral ears/eyes normal  CV:  Normal pulses, color, and cap refill  Lungs:  Normal respiratory effort  Neuro: No FND, awake, alert  Psych:  Oriented to Person, Place, Time, and Situation    MSK:       LUE:  Inspection: Skin intact throughout, no swelling, no effusions, no ecchymosis   Palpation: Non-TTP throughout, no palpable abnormality.   ROM: AROM and PROM of the shoulder, elbow, wrist, and hand intact without pain.   Neuro: AIN/PIN/Radial/Median/Ulnar Nerves assessed in isolation without deficit.   SILT throughout.    Vascular: Radial artery palpated 2+. Capillary refill <3s.         RUE:  Inspection: Skin intact throughout, no swelling, no effusions, no ecchymosis   Palpation: Non-TTP throughout, no palpable abnormality.   ROM: AROM and PROM of the shoulder, elbow, wrist, and hand intact without pain.   Neuro: " AIN/PIN/Radial/Median/Ulnar Nerves assessed in isolation without deficit.   SILT throughout.    Vascular: Radial artery palpated 2+. Capillary refill <3s.         LLE:  Inspection: Incisions at the anterior knee appear C/D/I  Obvious swelling of the knee   Palpation: TTP at anterior knee; otherwise non-TTP throughout.  Compartments are soft and compressible  Palpable effusion and warmth at the knee   ROM: AROM and PROM of the hip, ankle, foot intact without pain.  Pain with terminal flexion of the knee   Neuro: TA/Gastroc/EHL/FHL assessed in isolation without deficit.   SILT Sa/Naranjo/DP/SP/T nerve distributions   Vascular: DP and PT arteries palpated 2+. Capillary refill <3s.         RLE:  Inspection: Skin intact throughout, incision along the lateral thigh appears C/D/I  No appreciable drainage from prior incision   Palpation: non-TTP throughout.  Palpable fluid collection along the lateral thigh  Compartments are soft and easily compressible   ROM: AROM and PROM of the hip, knee, ankle, foot intact without pain.   Neuro: TA/Gastroc/EHL/FHL assessed in isolation without deficit.   SILT Sa/Naranjo/DP/SP/T nerve distributions   Vascular: DP and PT arteries palpated 2+. Capillary refill <3s.        Spine/pelvis/axial body:  No tenderness to palpation of cervical, thoracic, or lumbar spine  Stable and without pain with direct anterior pressure over ASIS.  No chest wall or abdominal tenderness  Muscle tone normal      Significant Labs: All pertinent labs within the past 24 hours have been reviewed.    Significant Imaging: I have reviewed and interpreted all pertinent imaging results/findings.  MRI of the left knee showing a large effusion; no obvious fractures or ligamentous injuries appreciated  MRI of the right with contrast showing large fluid collection tracking along the iliotibial band

## 2024-02-01 NOTE — ASSESSMENT & PLAN NOTE
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 (Surgical cxs +MRSA). ROBY 1/02/24 neg. Pt required salvage therapy dapto/ceftaroline & bld cxs finally cleared 1/07, and then placed on daptomycin monotherapy to complete 4wks duration (ASYA 02/06) per Gm ID (Yana). Of note, L shoulder imaging showed subdeltoid bursitis w/ SS tear; superimposed infection could be present but no drainable fluid collections. Also, R heel without osteo but had cellulitis, myositis, tenosynovitis.     Pt was discharged (01/10) to rehab with daptomycin monotherapy; but started having new fevers (up to 103F) at rehab, and re-admitted (01/14) for workup. Blood cultures 01/14/24 NGTD. MRI T/L spine negative for osteo-discitis. Found to have persistent left knee septic arthritis and right thigh abscess and underwent L knee arthroscopic I&D on 1/17. IR drain placed 01/19 R lateral thigh abscess, cxs +MRSA.  Taken to OR 01/21 for repeat surgical I&D of L knee (#3), and R thigh abscess with new R thigh drain placed. Surgical cxs (01/21) of L knee and R thigh NGTD.     Discharged to [facility] rehab with IV daptomycin 8mg/kg Q24h. Re-presented 1/31/2024 with tachycardia, fever. MRI left knee with: Findings compatible with persistent septic joint and early changes of osteomyelitis in the femoral condyles patella and tibial spines. And MRI pelvis with: Worsening of fluid enhancement in the right myofascial planes and gluteal muscles suggesting infected subcutaneous fat and tensor fascia jonah bursa with myositis and or infection of lateral gluteal muscles on the right. Evaluated by ortho and s/p aspiration of left knee joint fluid and right thigh fluid collection. Gram stains with moderate WBC, no organisms. Cultures pending.  Bcx from 1/31 NGTD.    -- Continue IV daptomycin for now, will adjust antibiotic therapy with new culture data  -- Follow-up Bcx, left knee cultures, and right thigh cultures  -- Considering Karius testing if no growth from cultures in coming days  -- Consider rheumatologic evaluation if no organisms on culture

## 2024-02-01 NOTE — CONSULTS
[facility] - Med Surg ([place] [id number])  Wound Care    Patient Name:  Kulwant Mueller Jr.   MRN:  [id number]  Date: 2/1/2024  Diagnosis: Pyogenic arthritis of left knee joint    History:     Past Medical History:   Diagnosis Date    Anticoagulant long-term use     COVID-19 virus infection     Diabetes mellitus     Diabetes mellitus, type 2     Hypertension     May-Thurner syndrome        Social History     Socioeconomic History    Marital status:    Tobacco Use    Smoking status: Former     Types: Cigarettes    Smokeless tobacco: Former    Tobacco comments:     occasionally    Substance and Sexual Activity    Alcohol use: Yes     Comment: occ    Drug use: No     Social Determinants of Health     Financial Resource Strain: Low Risk  (1/31/2024)    Overall Financial Resource Strain (CARDIA)     Difficulty of Paying Living Expenses: Not hard at all   Food Insecurity: No Food Insecurity (1/31/2024)    Hunger Vital Sign     Worried About Running Out of Food in the Last Year: Never true     Ran Out of Food in the Last Year: Never true   Transportation Needs: No Transportation Needs (1/31/2024)    PRAPARE - Transportation     Lack of Transportation (Medical): No     Lack of Transportation (Non-Medical): No   Physical Activity: Sufficiently Active (12/28/2023)    Exercise Vital Sign     Days of Exercise per Week: 3 days     Minutes of Exercise per Session: 60 min   Stress: Patient Declined (1/31/2024)    [employer] [employer] - Occupational Stress Questionnaire     Feeling of Stress : Patient declined   Recent Concern: Stress - Stress Concern Present (12/28/2023)    [employer] [employer] - Occupational Stress Questionnaire     Feeling of Stress : Very much   Social Connections: Moderately Integrated (1/31/2024)    Social Connection and Isolation Panel [NHANES]     Frequency of Communication with Friends and Family: More than three times a week     Frequency of Social Gatherings with  Friends and Family: More than three times a week     Attends Islam Services: More than 4 times per year     Active Member of Clubs or Organizations: No     Attends Club or Organization Meetings: Never     Marital Status:    Housing Stability: Low Risk  (1/31/2024)    Housing Stability Vital Sign     Unable to Pay for Housing in the Last Year: No     Number of Places Lived in the Last Year: 1     Unstable Housing in the Last Year: No       Precautions:     Allergies as of 01/31/2024 - Reviewed 01/31/2024   Allergen Reaction Noted    Heparin analogues Nausea And Vomiting 09/01/2018       [facility] Assessment Details/Treatment   Patient seen for wound care consultation.   Reviewed chart for this encounter.   See Flow Sheet for findings.    Pt laying in bed, agreed to assessment.   Socks removed from bilateral feet revealing dry flaky skin, right plantar foot thickened area of gentian violet surrounding what appears to be a callous, all skin intact and thickened. Bilateral feet cleansed with soap, water and washcloth.   Pt left knee reveals well approximated two incision sites with sutures in place, no s/s of infection.   Pt right hip incision well approximated, sutures in place, no s/s of infection.  Groin appears to excoriated that appears like moisture associated fungal area, cleansed with no-rinse foam and wash cloth, applied anti-fungal.   Pt was able to turn with minimal assistance, foam border on sacral area reveals no open wounds with pink scar tissue. Applied foam border.   [place] bed ordered for pt.     RECOMMENDATIONS:  Antifungal BID  Bedside nursing continue care and monitoring     Discussed POC with patient and primary nurse.   See EMR for orders & patient education.    Discussed nutrition and the role of protein in wound healing with the patient. Instructed patient to optimize protein for wound healing.    Bedside nursing to maintain pressure injury prevention interventions.    WC sign off -- no  open wounds noted  Please re-consult if needed.      02/01/24 1045   WOCN Assessment   WOCN Total Time (mins) 30   Visit Date 02/01/24   Visit Time 1045   Consult Type New   WOCN Speciality Wound   Wound yeast;At risk for pressure Injury;surgical   Intervention assessed;changed;applied;chart review;coordination of care;team conference;orders   Teaching on-going        Altered Skin Integrity 01/31/24 1858 Buttocks #2 Ulceration Partial thickness tissue loss. Shallow open ulcer with a red or pink wound bed, without slough. Intact or Open/Ruptured Serum-filled blister.   Date First Assessed/Time First Assessed: 01/31/24 1858   Altered Skin Integrity Present on Admission - Did Patient arrive to the hospital with altered skin?: yes  Location: Buttocks  Wound Number: #2  Is this injury device related?: No  Primary Wound ...   Wound Image    Wound Length (cm) 0 cm   Wound Width (cm) 0 cm   Wound Depth (cm) 0 cm   Wound Volume (cm^3) 0 cm^3   Wound Surface Area (cm^2) 0 cm^2        Altered Skin Integrity 12/28/23 Right Heel Diabetic Ulcer Full thickness tissue loss. Base is covered by slough and/or eschar in the wound bed   Date First Assessed: 12/28/23   Altered Skin Integrity Present on Admission - Did Patient arrive to the hospital with altered skin?: yes  Side: Right  Location: Heel  Primary Wound Type: Diabetic Ulcer  Description of Altered Skin Integrity: Full thic...   Wound Image    Dressing Appearance Clean;Dry;Intact   Drainage Amount None   Drainage Characteristics/Odor No odor   Appearance Dry;Fibrin   Tissue loss description Not applicable   Periwound Area Intact;Dry;Other (see comments)  (callous)   Care Cleansed with:;Soap and water   Dressing Applied;Foam        Altered Skin Integrity 02/01/24 0259 Left anterior;distal;lower Leg   Date First Assessed/Time First Assessed: 02/01/24 0259   Altered Skin Integrity Present on Admission - Did Patient arrive to the hospital with altered skin?: yes  Side: Left   Orientation: anterior;distal;lower  Location: Leg   Wound Image    Wound Length (cm) 0 cm   Wound Width (cm) 0 cm   Wound Depth (cm) 0 cm   Wound Volume (cm^3) 0 cm^3   Wound Surface Area (cm^2) 0 cm^2        Altered Skin Integrity 02/01/24 1045 Groin Moisture associated dermatitis   Date First Assessed/Time First Assessed: 02/01/24 1045   Location: Groin  Primary Wound Type: Moisture associated dermatitis   Wound Image         Incision/Site 01/21/24 1505 Left Knee   Date First Assessed/Time First Assessed: 01/21/24 1505   Side: Left  Location: Knee   Wound Image    Dressing Appearance Open to air   Drainage Amount None   Drainage Characteristics/Odor No odor   Appearance Intact;Sutures intact   Periwound Area Intact;Edematous;Dry   Wound Edges Approximated        Incision/Site Right Hip lateral vertical   No Date First Assessed or Time First Assessed found.   Side: Right  Location: Hip  Orientation: lateral  Incision Type: vertical   Wound Image    Dressing Appearance Clean;Dry;Intact   Drainage Amount None   Drainage Characteristics/Odor No odor   Appearance Intact   Periwound Area Intact;Dry   Wound Edges Approximated   Dressing Removed;Applied;Island/border   Periwound Care Dry periwound area maintained

## 2024-02-01 NOTE — H&P
[facility] Surg ([address] Medicine  History & Physical    Patient Name: Kulwant Mueller Jr.  MRN: [id number]  Patient Class: IP- Inpatient  Admission Date: 1/31/2024  Attending Physician: Soniya Javier*   Primary Care Provider: Shellie Primary Doctor         Patient information was obtained from patient, past medical records, and ER records.     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 Per chart review rehab facility was concern for orthostasis and administered IVF. Rehab staff were also concerned for worsening infection, reporting diaphoresis at nighttime and thus recommended evaluation with imaging at [facility]. On interview, patient states that he feels fine and denies any complaints except for diarrhea.    On presentation he was afebrile but tachycardic to 110 with /98.  Labs showed WBC 12.3, Hgb 8.0, platelets 396, ESR 76 .7, Na 134, K+ 3.4, .     MRI of the knee showed persistent septic joint and seemingly new early changes of osteomyelitis.    MRI of the pelvis showed improved fluid around the left hip but worsening fluid enhancement in the right myofascial planes and gluteal muscles.    C difficile testing was ordered in the emergency  department.  He was admitted to [facility] for orthopedic surgery and Infectious Disease evaluation.    Past Medical History:   Diagnosis Date    Anticoagulant long-term use     COVID-19 virus infection     Diabetes mellitus     Diabetes mellitus, type 2     Hypertension     May-Thurner syndrome        Past Surgical History:   Procedure Laterality Date    APPENDECTOMY      ARTHROSCOPY OF KNEE Left 1/16/2024    Procedure: ARTHROSCOPY, KNEE, LEFT;  Surgeon: Mandeep Gatica MD;  Location: [address];  Service: Orthopedics;  Laterality: Left;    ARTHROSCOPY OF KNEE Left 1/21/2024    Procedure: ARTHROSCOPY, KNEE, left, supine, slider, lateral post, conmed,;  Surgeon: Derick Sloan MD;  Location: [address];  Service: Orthopedics;  Laterality: Left;    ARTHROTOMY OF KNEE Left 12/29/2023    Procedure: ARTHROTOMY, KNEE;  Surgeon: Edy Bocanegra Jr., MD;  Location: [facility];  Service: Orthopedics;  Laterality: Left;    ESOPHAGOGASTRODUODENOSCOPY N/A 1/31/2022    Procedure: EGD (ESOPHAGOGASTRODUODENOSCOPY);  Surgeon: Cindy Quiros MD;  Location: [facility];  Service: Endoscopy;  Laterality: N/A;    ESOPHAGOGASTRODUODENOSCOPY N/A 3/21/2022    Procedure: EGD (ESOPHAGOGASTRODUODENOSCOPY);  Surgeon: Tejas Mann MD;  Location: [facility];  Service: Endoscopy;  Laterality: N/A;    INCISION AND DRAINAGE FOOT Left 11/28/2021    Procedure: INCISION AND DRAINAGE, FOOT;  Surgeon: Bj Alicea DPM;  Location: [facility];  Service: Podiatry;  Laterality: Left;    INCISION AND DRAINAGE OF THIGH Right 1/21/2024    Procedure: INCISION AND DRAINAGE, THIGH - right,;  Surgeon: Derick Sloan MD;  Location: [address];  Service: Orthopedics;  Laterality: Right;  right, lateral, slider, cysto tubing, 6L NS, betadine, peroxide, vanc powder, 10 [place] winter drain    IRRIGATION AND DEBRIDEMENT OF LOWER EXTREMITY Right 1/21/2024    Procedure: I&D lateral thigh abscess, right, supine,  slider, cysto tubing, 6L NS, betadine, peroxide, vanc powder, 10 french channel winter drain,;  Surgeon: Derick Sloan MD;  Location: [facility] OR [facility] [facility];  Service: Orthopedics;  Laterality: Right;    KNEE ARTHROSCOPY W/ DEBRIDEMENT Left 1/21/2024    Procedure: ARTHROSCOPY, KNEE, WITH DEBRIDEMENT - LEFT, SUPINE, SLIDER, LATERAL POST, CONMED;  Surgeon: Derick Sloan MD;  Location: [facility] OR [employer] [facility];  Service: Orthopedics;  Laterality: Left;    stents in bilateral legs      TRANSESOPHAGEAL ECHOCARDIOGRAPHY N/A 1/3/2024    Procedure: ECHOCARDIOGRAM, TRANSESOPHAGEAL;  Surgeon: Douglas Doss MD;  Location: [facility] CATH LAB/EP;  Service: Cardiology;  Laterality: N/A;       Review of patient's allergies indicates:   Allergen Reactions    Heparin analogues Nausea And Vomiting       Current Facility-Administered Medications on File Prior to Encounter   Medication    [DISCONTINUED] GENERIC EXTERNAL MEDICATION    [DISCONTINUED] GENERIC EXTERNAL MEDICATION     Current Outpatient Medications on File Prior to Encounter   Medication Sig    blood-glucose meter,continuous (DEXCOM G7 ) Misc 1 Device by Misc.(Non-Drug; Combo Route) route once daily.    blood-glucose sensor (DEXCOM G7 SENSOR) Justyna 1 Device by Misc.(Non-Drug; Combo Route) route every 10 days.    empagliflozin (JARDIANCE) 25 mg tablet Take 1 tablet (25 mg total) by mouth once daily.    furosemide (LASIX) 20 MG tablet Take 1 tablet (20 mg total) by mouth once daily.    glucagon (GVOKE HYPOPEN 2-PACK) 1 mg/0.2 mL AtIn Inject 1 mg into the skin as needed (SEVERE HYPOGLYCEMIA).    insulin aspart U-100 (NOVOLOG U-100 INSULIN ASPART) 100 unit/mL injection Inject 14 Units into the skin 3 (three) times daily before meals.    methocarbamoL (ROBAXIN) 500 MG Tab Take 1 tablet (500 mg total) by mouth 4 (four) times daily as needed (muscle spasm).    metoclopramide HCl (REGLAN) 10 MG tablet Take 0.5 tablets (5 mg total) by mouth 3 (three) times daily before  "meals.    metoprolol succinate (TOPROL-XL) 25 MG 24 hr tablet Take 0.5 tablets (12.5 mg total) by mouth once daily.    oxyCODONE (ROXICODONE) 10 mg Tab immediate release tablet Take 1 tablet (10 mg total) by mouth every 4 (four) hours as needed for Pain.    oxyCODONE (ROXICODONE) 5 MG immediate release tablet Take 1 tablet (5 mg total) by mouth every 4 (four) hours as needed for Pain.    pantoprazole (PROTONIX) 40 MG tablet Take 1 tablet (40 mg total) by mouth once daily.    pen needle, diabetic (BD ULTRA-FINE DIYA PEN NEEDLE) 32 gauge x 5/32" Ndle Use with Tresiba daily and Humalog 3-4 times daily as directed.    prochlorperazine (COMPAZINE) 10 MG tablet Take 1 tablet (10 mg total) by mouth 3 (three) times daily as needed (nausea or vomiting).    promethazine (PHENERGAN) 25 MG suppository Place 1 suppository (25 mg total) rectally every 6 (six) hours as needed for Nausea.    rivaroxaban (XARELTO) 10 mg Tab Take 1 tablet (10 mg total) by mouth daily with dinner or evening meal.    scopolamine (TRANSDERM-SCOP) 1.3-1.5 mg (1 mg over 3 days) Place 1 patch onto the skin every 72 hours as needed (intractable nausea/vomiting).    sodium chloride 0.9% SolP 50 mL with DAPTOmycin 500 mg SolR 947 mg Inject 947 mg into the vein once daily.    TRESIBA FLEXTOUCH U-200 200 unit/mL (3 mL) insulin pen Inject 56 Units into the skin once daily.    [DISCONTINUED] DEXCOM G6 TRANSMITTER Justyna CHANGE TRANSMITTER EVERY 90 DAYS.     Family History       Problem Relation (Age of Onset)    Cancer Mother, Paternal Uncle, Paternal Grandfather, Maternal Grandfather    Irritable bowel syndrome Paternal Grandfather          Tobacco Use    Smoking status: Former     Types: Cigarettes    Smokeless tobacco: Former    Tobacco comments:     occasionally    Substance and Sexual Activity    Alcohol use: Yes     Comment: occ    Drug use: No    Sexual activity: Not on file     Review of Systems   Constitutional:  Negative for appetite change, chills, " fatigue and fever.   HENT:  Negative for congestion and sore throat.    Eyes:  Negative for visual disturbance.   Respiratory:  Negative for cough, chest tightness, shortness of breath and wheezing.    Cardiovascular:  Negative for chest pain, palpitations and leg swelling.   Gastrointestinal:  Positive for diarrhea. Negative for abdominal pain, constipation, nausea and vomiting.   Genitourinary:  Negative for dysuria, flank pain, frequency and hematuria.   Musculoskeletal:  Positive for arthralgias. Negative for back pain and myalgias.   Skin:  Negative for pallor, rash and wound.   Neurological:  Negative for seizures, light-headedness, numbness and headaches.   Psychiatric/Behavioral:  The patient is not nervous/anxious.      Objective:     Vital Signs (Most Recent):  Temp: 98 °F (36.7 °C) (02/01/24 0445)  Pulse: 98 (02/01/24 0445)  Resp: 16 (02/01/24 0445)  BP: (!) 141/78 (02/01/24 0445)  SpO2: 97 % (02/01/24 0445) Vital Signs (24h Range):  Temp:  [98 °F (36.7 °C)-99.5 °F (37.5 °C)] 98 °F (36.7 °C)  Pulse:  [] 98  Resp:  [16-20] 16  SpO2:  [95 %-98 %] 97 %  BP: (114-168)/(72-98) 141/78     Weight: 106.7 kg (235 lb 3.7 oz)  Body mass index is 31.03 kg/m².     Physical Exam  Constitutional:       General: He is not in acute distress.     Appearance: Normal appearance. He is not ill-appearing.   HENT:      Head: Normocephalic and atraumatic.      Mouth/Throat:      Mouth: Mucous membranes are moist.      Pharynx: Oropharynx is clear. No oropharyngeal exudate.   Eyes:      General: No scleral icterus.     Extraocular Movements: Extraocular movements intact.      Pupils: Pupils are equal, round, and reactive to light.   Cardiovascular:      Rate and Rhythm: Normal rate and regular rhythm.      Heart sounds: No murmur heard.  Pulmonary:      Effort: Pulmonary effort is normal.      Breath sounds: No wheezing or rales.   Abdominal:      General: Abdomen is flat. There is no distension.      Palpations: Abdomen is  soft. There is no mass.      Tenderness: There is no abdominal tenderness. There is no right CVA tenderness or left CVA tenderness.   Musculoskeletal:         General: Swelling present. No tenderness.      Cervical back: Normal range of motion. No tenderness.      Right lower leg: No edema.      Left lower leg: No edema.      Comments: Left knee with mild swelling   Lymphadenopathy:      Cervical: No cervical adenopathy.   Skin:     General: Skin is warm and dry.      Coloration: Skin is not jaundiced.      Findings: No bruising.   Neurological:      General: No focal deficit present.      Mental Status: He is alert and oriented to person, place, and time.      Cranial Nerves: No cranial nerve deficit.            Significant Labs: All pertinent labs within the past 24 hours have been reviewed.    Significant Imaging: I have reviewed all pertinent imaging results/findings within the past 24 hours.  Assessment/Plan:     * Staphylococcal arthritis of left knee  Disseminated MRSA bacteremia    Patient with multiple admissions for septic joint complicated by MRSA bacteremia and status post orthopedic intervention, on daptomycin prior to presentation found to have imaging concerns of worsening right thigh abscess and new concerns for left knee osteomyelitis. Previous expected end date of daptomycin was 03/03/24.  He denies worsening pain or subjective fevers.  No leukocytosis. ESR and CRP with mild elevation from prior.    - continue daptomycin  - orthopedic surgery consulted; appreciate recommendations  - infectious disease consulted; appreciate recommendations  - follow-up repeat blood cultures  - PT/OT      HFrEF (heart failure with reduced ejection fraction)  Patient with known history of HFrEF. Does not appear to be in fluid overload on clinical examination.  Most recent echocardiogram:    Left Ventricle: The left ventricle is normal in size. There is concentric remodeling. Regional wall motion abnormalities present.  See diagram for wall motion findings. There is reduced systolic function. Biplane (2D) method of discs ejection fraction is 48%. There is normal diastolic function.    Right Ventricle: Normal right ventricular cavity size. Systolic function is normal. TAPSE is 2.54 cm.    Normal left atrial size. The left atrium volume index is 21.2 mL/m2.    Normal right atrial size.    The aortic valve is structurally normal. There is normal leaflet mobility.    The mitral valve is structurally normal. There is normal leaflet mobility.    The tricuspid valve is structurally normal. There is normal leaflet mobility.    IVC/SVC: Elevated venous pressure at 15 mmHg.    Overall the study quality was technically difficult.    - assess volume status daily  - HOLD home GDMT in the setting of possible infection and resume as appropriate      Diarrhea  Reports ongoing watery diarrhea for the past few days prior to discharge. Given his ongoing antibiotic administration, there is possibility of infectious etiology.    - follow-up C difficile testing  - once C. difficile was negative, can initiate antidiarrheal agents      Abscess of right thigh  - See staphylococcal arthritis of the left knee      May-Thurner syndrome  Known history of hypercoagulable state.  Reported heparin analog allergy.    - continue home rivaroxaban      Hypertension associated with diabetes  - hold home antihypertensives in the setting of possible infection      Type 2 diabetes mellitus with complication, with long-term current use of insulin  - LDSSI initiated  - up titrate as appropriate        VTE Risk Mitigation (From admission, onward)           Ordered     rivaroxaban tablet 10 mg  With dinner         01/31/24 4027                       Aylin Nolen MD  Internal Medicine, PGY-2  [facility]

## 2024-02-01 NOTE — ASSESSMENT & PLAN NOTE
Reports ongoing watery diarrhea for the past few days prior to discharge. Given his ongoing antibiotic administration, there is possibility of infectious etiology.    - follow-up C difficile testing  - once C. difficile was negative, can initiate antidiarrheal agents

## 2024-02-01 NOTE — PLAN OF CARE
Problem: Occupational Therapy  Goal: Occupational Therapy Goal  Description: Goals to be met by: 3/1/24 (1 month)     Patient will increase functional independence with ADLs by performing:    UE Dressing with Frederick.  LE Dressing with [place].  Grooming while standing at sink with Modified [place].  Toileting from toilet with Modified [place] for hygiene and clothing management.   Rolling to Bilateral with [place].   Supine to sit with [place].  Step transfer with Modified [place]  Toilet transfer to toilet with Modified [place].    Evaluated pt and established OT POC. Continue OT as tolerated.  Imani Rivera OT  2/1/2024    Outcome: Ongoing, Progressing

## 2024-02-01 NOTE — HPI
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 Per chart review rehab facility was concern for orthostasis and administered IVF. Rehab staff were also concerned for worsening infection, reporting diaphoresis at nighttime and thus recommended evaluation with imaging at [facility]. On interview, patient states that he feels fine and denies any complaints except for diarrhea.    On presentation he was afebrile but tachycardic to 110 with /98.  Labs showed WBC 12.3, Hgb 8.0, platelets 396, ESR 76 .7, Na 134, K+ 3.4, .     MRI of the knee showed persistent septic joint and seemingly new early changes of osteomyelitis.    MRI of the pelvis showed improved fluid around the left hip but worsening fluid enhancement in the right myofascial planes and gluteal muscles.    C difficile testing was ordered in the emergency department.  He was admitted to Hospital Medicine for orthopedic surgery and Infectious Disease evaluation.

## 2024-02-01 NOTE — HPI
Kulwant Mueller Jr. is a 38 y.o. 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  Patient had recently been discharged to [facility] and had been receiving daptomycin for previous MRSA positive cultures.  States his knee pain had been improving and denies any drainage from the incisions at his left knee and right thigh.  Reports he has been ambulating well since his discharge.  Denies nausea, vomiting shortness of breath; endorses worsening diarrhea since the beginning of his previous admission two weeks ago.

## 2024-02-01 NOTE — TRANSFER OF CARE
"Anesthesia Transfer of Care Note    Patient: Kulwant Mueller Jr.    Procedure(s) Performed: Procedure(s) (LRB):  ARTHROSCOPY, KNEE, left, lateral post (Left)  IRRIGATION AND DEBRIDEMENT, LOWER EXTREMITY (Right)    Patient location: PACU    Anesthesia Type: general    Transport from OR: Transported from OR on 6-10 L/min O2 by face mask with adequate spontaneous ventilation    Post pain: adequate analgesia    Post assessment: no apparent anesthetic complications    Post vital signs: stable    Level of consciousness: sedated and responds to stimulation    Nausea/Vomiting: no nausea/vomiting    Complications: none    Transfer of care protocol was followed      Last vitals: Visit Vitals  /75   Pulse 91   Temp 36.8 °C (98.3 °F) (Oral)   Resp 18   Ht 6' 1" (1.854 m)   Wt 106.7 kg (235 lb 3.7 oz)   SpO2 98%   BMI 31.03 kg/m²     "

## 2024-02-01 NOTE — SUBJECTIVE & OBJECTIVE
Past Medical History:   Diagnosis Date    Anticoagulant long-term use     COVID-19 virus infection     Diabetes mellitus     Diabetes mellitus, type 2     Hypertension     May-Thurner syndrome        Past Surgical History:   Procedure Laterality Date    APPENDECTOMY      ARTHROSCOPY OF KNEE Left 1/16/2024    Procedure: ARTHROSCOPY, KNEE, LEFT;  Surgeon: Mandeep Gatica MD;  [address];  Service: Orthopedics;  Laterality: Left;    ARTHROSCOPY OF KNEE Left 1/21/2024    Procedure: ARTHROSCOPY, KNEE, left, supine, slider, lateral post, conmed,;  Surgeon: Derick Sloan MD;  Location: [address];  Service: Orthopedics;  Laterality: Left;    ARTHROTOMY OF KNEE Left 12/29/2023    Procedure: ARTHROTOMY, KNEE;  Surgeon: Edy Bocanegra Jr., MD;  Location: [facility];  Service: Orthopedics;  Laterality: Left;    ESOPHAGOGASTRODUODENOSCOPY N/A 1/31/2022    Procedure: EGD (ESOPHAGOGASTRODUODENOSCOPY);  Surgeon: Cindy Quiros MD;  Location: [facility];  Service: Endoscopy;  Laterality: N/A;    ESOPHAGOGASTRODUODENOSCOPY N/A 3/21/2022    Procedure: EGD (ESOPHAGOGASTRODUODENOSCOPY);  Surgeon: Tejas Mann MD;  Location: [facility];  Service: Endoscopy;  Laterality: N/A;    INCISION AND DRAINAGE FOOT Left 11/28/2021    Procedure: INCISION AND DRAINAGE, FOOT;  Surgeon: Bj Alicea DPM;  Location: [facility];  Service: Podiatry;  Laterality: Left;    INCISION AND DRAINAGE OF THIGH Right 1/21/2024    Procedure: INCISION AND DRAINAGE, THIGH - right,;  Surgeon: Derick Sloan MD;  [address];  Service: Orthopedics;  Laterality: Right;  right, lateral, slider, cysto tubing, 6L NS, betadine, peroxide, vanc powder, 10 [place] winter drain    IRRIGATION AND DEBRIDEMENT OF LOWER EXTREMITY Right 1/21/2024    Procedure: I&D lateral thigh abscess, right, supine, slider, cysto tubing, 6L NS, betadine, peroxide, vanc powder, 10 [place] winter drain,;  Surgeon: Nathalia  Derick CAMPOS MD;  [facility];  Service: Orthopedics;  Laterality: Right;    KNEE ARTHROSCOPY W/ DEBRIDEMENT Left 1/21/2024    Procedure: ARTHROSCOPY, KNEE, WITH DEBRIDEMENT - LEFT, SUPINE, SLIDER, LATERAL POST, CONMED;  Surgeon: Derick Sloan MD;  [facility];  Service: Orthopedics;  Laterality: Left;    stents in bilateral legs      TRANSESOPHAGEAL ECHOCARDIOGRAPHY N/A 1/3/2024    Procedure: ECHOCARDIOGRAM, TRANSESOPHAGEAL;  Surgeon: Douglas Doss MD;  Location: [facility] CATH LAB/EP;  Service: Cardiology;  Laterality: N/A;       Review of patient's allergies indicates:   Allergen Reactions    Heparin analogues Nausea And Vomiting       Medications:  Medications Prior to Admission   Medication Sig    blood-glucose meter,continuous (DEXCOM G7 ) Misc 1 Device by Misc.(Non-Drug; Combo Route) route once daily.    blood-glucose sensor (DEXCOM G7 SENSOR) Justyna 1 Device by Misc.(Non-Drug; Combo Route) route every 10 days.    empagliflozin (JARDIANCE) 25 mg tablet Take 1 tablet (25 mg total) by mouth once daily.    furosemide (LASIX) 20 MG tablet Take 1 tablet (20 mg total) by mouth once daily.    glucagon (GVOKE HYPOPEN 2-PACK) 1 mg/0.2 mL AtIn Inject 1 mg into the skin as needed (SEVERE HYPOGLYCEMIA).    insulin aspart U-100 (NOVOLOG U-100 INSULIN ASPART) 100 unit/mL injection Inject 14 Units into the skin 3 (three) times daily before meals.    methocarbamoL (ROBAXIN) 500 MG Tab Take 1 tablet (500 mg total) by mouth 4 (four) times daily as needed (muscle spasm).    metoclopramide HCl (REGLAN) 10 MG tablet Take 0.5 tablets (5 mg total) by mouth 3 (three) times daily before meals.    metoprolol succinate (TOPROL-XL) 25 MG 24 hr tablet Take 0.5 tablets (12.5 mg total) by mouth once daily.    oxyCODONE (ROXICODONE) 10 mg Tab immediate release tablet Take 1 tablet (10 mg total) by mouth every 4 (four) hours as needed for Pain.    oxyCODONE (ROXICODONE) 5 MG immediate  "release tablet Take 1 tablet (5 mg total) by mouth every 4 (four) hours as needed for Pain.    pantoprazole (PROTONIX) 40 MG tablet Take 1 tablet (40 mg total) by mouth once daily.    pen needle, diabetic (BD ULTRA-FINE DIYA PEN NEEDLE) 32 gauge x 5/32" Ndle Use with Tresiba daily and Humalog 3-4 times daily as directed.    prochlorperazine (COMPAZINE) 10 MG tablet Take 1 tablet (10 mg total) by mouth 3 (three) times daily as needed (nausea or vomiting).    promethazine (PHENERGAN) 25 MG suppository Place 1 suppository (25 mg total) rectally every 6 (six) hours as needed for Nausea.    rivaroxaban (XARELTO) 10 mg Tab Take 1 tablet (10 mg total) by mouth daily with dinner or evening meal.    scopolamine (TRANSDERM-SCOP) 1.3-1.5 mg (1 mg over 3 days) Place 1 patch onto the skin every 72 hours as needed (intractable nausea/vomiting).    sodium chloride 0.9% SolP 50 mL with DAPTOmycin 500 mg SolR 947 mg Inject 947 mg into the vein once daily.    TRESIBA FLEXTOUCH U-200 200 unit/mL (3 mL) insulin pen Inject 56 Units into the skin once daily.     Antibiotics (From admission, onward)      Start     Stop Route Frequency Ordered    02/01/24 0900  DAPTOmycin (CUBICIN) 945 mg in sodium chloride 0.9% SolP 50 mL IVPB         -- IV Daily 01/31/24 1734          Antifungals (From admission, onward)      None          Antivirals (From admission, onward)      None             Immunization History   Administered Date(s) Administered    COVID-19, MRNA, LN-S, PF (Pfizer) (Purple Cap) 12/28/2020, 01/19/2021, 02/11/2022    Influenza - Quadrivalent - PF *Preferred* (6 months and older) 09/29/2020, 09/30/2021, 10/20/2022, 10/20/2023       Family History       Problem Relation (Age of Onset)    Cancer Mother, Paternal Uncle, Paternal Grandfather, Maternal Grandfather    Irritable bowel syndrome Paternal Grandfather          Social History     Socioeconomic History    Marital status:    Tobacco Use    Smoking status: Former     Types: " Cigarettes    Smokeless tobacco: Former    Tobacco comments:     occasionally    Substance and Sexual Activity    Alcohol use: Yes     Comment: occ    Drug use: No     Social Determinants of Health     Financial Resource Strain: Low Risk  (1/31/2024)    Overall Financial Resource Strain (CARDIA)     Difficulty of Paying Living Expenses: Not hard at all   Food Insecurity: No Food Insecurity (1/31/2024)    Hunger Vital Sign     Worried About Running Out of Food in the Last Year: Never true     Ran Out of Food in the Last Year: Never true   Transportation Needs: No Transportation Needs (1/31/2024)    PRAPARE - Transportation     Lack of Transportation (Medical): No     Lack of Transportation (Non-Medical): No   Physical Activity: Sufficiently Active (12/28/2023)    Exercise Vital Sign     Days of Exercise per Week: 3 days     Minutes of Exercise per Session: 60 min   Stress: Patient Declined (1/31/2024)    [employer] - Occupational Stress Questionnaire     Feeling of Stress : Patient declined   Recent Concern: Stress - Stress Concern Present (12/28/2023)    [employer] - Occupational Stress Questionnaire     Feeling of Stress : Very much   Social Connections: Moderately Integrated (1/31/2024)    Social Connection and Isolation Panel [NHANES]     Frequency of Communication with Friends and Family: More than three times a week     Frequency of Social Gatherings with Friends and Family: More than three times a week     Attends [employer] Services: More than 4 times per year     Active Member of Clubs or Organizations: No     Attends Club or Organization Meetings: Never     Marital Status:    Housing Stability: Low Risk  (1/31/2024)    Housing Stability Vital Sign     Unable to Pay for Housing in the Last Year: No     Number of Places Lived in the Last Year: 1     Unstable Housing in the Last Year: No     Review of Systems   Constitutional:  Negative for  appetite change, chills, fatigue and fever.   HENT:  Negative for congestion and sore throat.    Eyes:  Negative for visual disturbance.   Respiratory:  Negative for cough, chest tightness, shortness of breath and wheezing.    Cardiovascular:  Negative for chest pain, palpitations and leg swelling.   Gastrointestinal:  Negative for abdominal pain, constipation, diarrhea, nausea and vomiting.   Genitourinary:  Negative for dysuria, flank pain, frequency and hematuria.   Musculoskeletal:  Positive for arthralgias and joint swelling. Negative for back pain, myalgias and neck pain.   Skin:  Negative for pallor, rash and wound.   Neurological:  Negative for seizures, light-headedness, numbness and headaches.   Psychiatric/Behavioral:  The patient is not nervous/anxious.      Objective:     Vital Signs (Most Recent):  Temp: 98.1 °F (36.7 °C) (02/01/24 0733)  Pulse: 94 (02/01/24 0733)  Resp: 17 (02/01/24 0847)  BP: 135/78 (02/01/24 0733)  SpO2: (!) 94 % (02/01/24 0733) Vital Signs (24h Range):  Temp:  [98 °F (36.7 °C)-99.5 °F (37.5 °C)] 98.1 °F (36.7 °C)  Pulse:  [] 94  Resp:  [16-20] 17  SpO2:  [94 %-98 %] 94 %  BP: (114-168)/(72-98) 135/78     Weight: 106.7 kg (235 lb 3.7 oz)  Body mass index is 31.03 kg/m².    Estimated Creatinine Clearance: 157.3 mL/min (based on SCr of 0.8 mg/dL).     Physical Exam  Constitutional:       General: He is not in acute distress.     Appearance: Normal appearance. He is not ill-appearing.   HENT:      Head: Normocephalic and atraumatic.      Mouth/Throat:      Mouth: Mucous membranes are moist.      Pharynx: Oropharynx is clear.   Eyes:      General: No scleral icterus.     Extraocular Movements: Extraocular movements intact.      Conjunctiva/sclera: Conjunctivae normal.   Cardiovascular:      Rate and Rhythm: Normal rate and regular rhythm.      Heart sounds: No murmur heard.  Pulmonary:      Effort: Pulmonary effort is normal. No respiratory distress.      Breath sounds: Normal  breath sounds. No wheezing or rales.   Abdominal:      General: Abdomen is flat. Bowel sounds are normal. There is no distension.      Tenderness: There is no abdominal tenderness. There is no guarding.   Musculoskeletal:      Right lower leg: No edema.      Left lower leg: No edema.      Comments: Mild left knee swelling.  Healed left lower extremity wound.  Right heal wound dressing C/D/I   Skin:     General: Skin is warm.      Coloration: Skin is not jaundiced.   Neurological:      General: No focal deficit present.      Mental Status: He is alert and oriented to person, place, and time.   Psychiatric:         Mood and Affect: Mood normal.       Significant Labs: CBC:   Recent Labs   Lab 01/31/24  1538 02/01/24  0508   WBC 12.34 10.65   HGB 8.0* 8.5*   HCT 25.7* 28.0*    381     CMP:   Recent Labs   Lab 01/31/24  1538 02/01/24  0508   * 135*   K 3.4* 3.4*   CL 94* 97   CO2 27 24   * 124*   BUN 7 8   CREATININE 0.8 0.8   CALCIUM 8.4* 8.7   PROT 7.4  --    ALBUMIN 1.8*  --    BILITOT 0.5  --    ALKPHOS 151*  --    AST 33  --    ALT 34  --    ANIONGAP 13 14     Microbiology Results (last 7 days)       Procedure Component Value Units Date/Time    Gram stain [[id number]] Collected: 02/01/24 0740    Order Status: Completed Specimen: Abscess from Leg, Right Updated: 02/01/24 1108     Gram Stain Result Moderate WBC's      No organisms seen    Gram stain [[id number]] Collected: 02/01/24 0821    Order Status: Completed Specimen: Joint Fluid from Knee, Left Updated: 02/01/24 1033     Gram Stain Result Moderate WBC's      No organisms seen    Aerobic culture [[id number]] Collected: 02/01/24 0951    Order Status: Sent Specimen: Knee, Left Updated: 02/01/24 1000    Clostridium difficile EIA [[id number]] Collected: 02/01/24 0131    Order Status: Completed Specimen: Stool Updated: 02/01/24 0933     C. diff Antigen Negative     C difficile Toxins A+B, EIA Negative     Comment: Testing not recommended for  children <24 months old.       Culture, Anaerobic [[id number]] Collected: 02/01/24 0821    Order Status: Sent Specimen: Joint Fluid from Knee, Left Updated: 02/01/24 0909    Fungus culture [[id number]] Collected: 02/01/24 0821    Order Status: Sent Specimen: Joint Fluid from Knee, Left Updated: 02/01/24 0908    AFB Culture & Smear [[id number]] Collected: 02/01/24 0821    Order Status: Sent Specimen: Joint Fluid from Knee, Left Updated: 02/01/24 0907    Aerobic culture [[id number]] Collected: 02/01/24 0740    Order Status: Sent Specimen: Abscess from Leg, Right Updated: 02/01/24 0905    Culture, Anaerobic [[id number]] Collected: 02/01/24 0740    Order Status: Sent Specimen: Abscess from Leg, Right Updated: 02/01/24 0905    AFB Culture & Smear [[id number]] Collected: 02/01/24 0740    Order Status: Sent Specimen: Abscess from Leg, Right Updated: 02/01/24 0904    Fungus culture [[id number]] Collected: 02/01/24 0740    Order Status: Sent Specimen: Abscess from Leg, Right Updated: 02/01/24 0904    Stool culture [[id number]]     Order Status: Completed Specimen: Stool     Stool culture [[id number]]     Order Status: Canceled Specimen: Stool     Blood culture #1 **CANNOT BE ORDERED STAT** [[id number]] Collected: 01/31/24 1538    Order Status: Completed Specimen: Blood from Line, PICC Right Brachial Updated: 02/01/24 0115     Blood Culture, Routine No Growth to date    Blood culture #2 **CANNOT BE ORDERED STAT** [[id number]] Collected: 01/31/24 1538    Order Status: Completed Specimen: Blood from Peripheral, Antecubital, Right Updated: 02/01/24 0115     Blood Culture, Routine No Growth to date          All pertinent labs within the past 24 hours have been reviewed.    Significant Imaging: I have reviewed all pertinent imaging results/findings within the past 24 hours.

## 2024-02-01 NOTE — ASSESSMENT & PLAN NOTE
Kulwant Mueller Jr. is a 38 y.o. male with a history of MRSA bacteremia,  recent left knee septic arthritis and right thigh abscess s/p L knee arthroscopic I&D on 1/17, s/p repeat L knee I&D and right thigh abscess I&D on 1/21, admitted for infectious workup after 1 day of worsening fevers/chills. Ortho consulted for rule out septic arthritis of the left knee joint. Pertinent physical exam findings include palpable effusion of the left knee in addition to a palpable fluid collection at the lateral aspect of the right thigh. The left knee and right thigh were aspirated at the bedside - see procedure note for further details.      Based on synovial fluid out analysis, patient appears to have recurrence of a septic arthritis of the left knee.  We will plan to keep him NPO for repeat I&D of the left knee today (2/1)    Joint Fluid Analysis:  Cultures pending, will continue to follow   Lab Results   Component Value Date    WBCBF 54933 02/01/2024    SEGS 88 01/19/2024    CRYST Negative 02/01/2024    BODYFLUIDTYP Joint 02/01/2024     Lab Results   Component Value Date    LABGRAM Moderate WBC's 02/01/2024    LABGRAM No organisms seen 02/01/2024     Lab Results   Component Value Date    SEDRATE 76 (H) 01/31/2024    .7 (H) 01/31/2024    WBC 10.65 02/01/2024    LACTATE 0.8 01/31/2024         Procedure Note:  After consent was obtained, using sterile technique the left knee was prepped and the joint was entered from from the superolateral approach.  Ten ml's of cloudy straw-colored fluid was withdrawn and sent for analysis and culture.  Attention was then turned to the fluid collection of the right anterior lateral thigh; 18 gauge needle was introduced in the fluid collection, at approximately 35 cc a brownish yellow fluid was collected.  The procedure was well tolerated, and no complications were noted.

## 2024-02-01 NOTE — HPI
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      MRSA bacteremia (daptomycin susceptible) noted on index admission blood cultures 12/27, complicated by left knee septic arthritis, s/p L knee arthrotomy with synovectomy on 12/29- cx w/ MRSA, s/p left shoulder arthrocentesis reportedly with no evidence of infection, ROBY 1/02/24 without vegetations, required salvage therapy with addition of ceftaroline & cleared 1/07, subsequently placed on daptomycin monotherapy and sent to rehab.     Re-admitted 1/14 for fever while at rehab + back pain, no new or persistent bacteremia, MRI T/L spine negative for osteo-discitis.  That admission he had persistent left knee septic arthritis and right thigh abscess and underwent L knee arthroscopic I&D on 1/17, repeat L knee I&D and right thigh abscess I&D on 1/21.    MRI L Knee this admission showed large air-fluid collection, myositis (involving vastus medialis, lateralis & popliteus musculature),     MRI pelvis with R thigh  17 by 7 x 5 cm collection- s/p IR drain placed 1/19 (cx MRSA). MRI L shoulder with bursitis    Operative course:  OR 01/15 for L knee washout (cell counts c/w/ septic joint +CPPD crystals)  cx w/ NG  OR 1/21 L knee washout (#3) w/ extensive synovectomy/debridement and R thigh I&D (cx NG)    Clinically improved and discharged, ID team had seen him and plan was to complete 6 weeks of abx from last washout (01/21); ASYA 03/03/24     This admission, he is afebrile, without leukocytosis, HDS    He had subjective fever and rigors. MRI 1/31 L knee shows persistent septic joint and early osteomyelitis.   MRI pelvis shows improved fluid around left hip but worsening fluid in R myofascial planes and gluteal muscles. Daptomycin being continued. CPK WNL

## 2024-02-01 NOTE — PLAN OF CARE
Problem: Adult Inpatient Plan of Care  Goal: Plan of Care Review  Outcome: Ongoing, Progressing     Problem: Adult Inpatient Plan of Care  Goal: Patient-Specific Goal (Individualized)  Outcome: Ongoing, Progressing     Problem: Infection  Goal: Absence of Infection Signs and Symptoms  Outcome: Ongoing, Progressing     Problem: Impaired Wound Healing  Goal: Optimal Wound Healing  Outcome: Ongoing, Progressing

## 2024-02-01 NOTE — NURSING
Patient down to [facility] for surg washout. Patient left AAOX3, gown only, arm bands in place. Left via bed with two transporters and chart.

## 2024-02-01 NOTE — OP NOTE
OP NOTE    DOS:  02/01/2024    Preop Dx: Septic arthritis left knee status post arthroscopic irrigation x2    Right thigh abscess status post incision and drainage x1    Postop Dx: Septic arthritis left knee status post arthroscopic irrigation x2    Right thigh abscess status post incision and drainage x1    Left knee contracture    Procedure: Left knee arthroscopy with irrigation and synovectomy for septic arthritis    Manipulation under anesthesia left knee    Incision and drainage abscess left thigh, deep    Excisional debridement skin subcutaneous tissue right thigh 30 sq cm      Surgeon: Mandeep Gatica M.D.    Asst:  Karie Up M.D    Anesthesia: GETA    EBL:  20 cc    IVF:  1000 cc crystalloid    Implants: None    Specimens: Swab cultures right thigh    Drains:  10 round Ren x1 right thigh    Findings: Murky fluid in left knee with synovitis.  Large seroma right thigh    Dispo:  To PACU extubated/stable       Indications for Procedure:      38-year-old male with an IDDM who had septic arthritis of the left knee now status post arthroscopic irrigation x2 and a right thigh abscess status post incision and drainage x1.  Patient presented back with continued pain and increasing inflammatory markers.  I am taking him back to the operating room for repeat left knee arthroscopic irrigation and right thigh incision and drainage.  He is grown MRSA from his prior surgeries.  The risks, benefits and alternatives to surgery were discussed the patient prior to going the operating room.  Informed consent was obtained.    Procedure in Detail:    Patient was identified in preoperative holding area the sites were marked.  Patient was wheeled to the operating room placed on the operating table in supine position.  General endotracheal anesthesia was induced.  The left lower extremity was 1st prepped and draped in sterile fashion.  Time-out was undertaken to confirm patient, sides, sites, surgeries, surgeon.  All agreed we  proceeded.    Sutures removed from arthroscopic portal sites from prior surgery.  At this point the patient only had about 0-30 degrees of flexion the knee and was very stiff.  I slowly bent the knee through several cycles going a few more degrees each time until I got up to about 110-120 degrees of flexion.  Was still quite stiff.  I then cycled through a full range of motion was from 0-110 degrees multiple times until I could feel a good loose range of motion.      I used an 11 blade to get back through those incisions.  I placed the trocar in the patellofemoral space immediately liberating copious murky/purulent fluid.  I inserted the camera and did a cursory irrigation of the joint from the patellofemoral space.  I then worked my way around into the notch.  Patient had a significant amount of synovitis.  I introduced the arthroscopic shaver through the medial portal.  I used this to remove a good deal of synovitis.    I went into the medial joint line removed some more synovium from this area.  Cartilage and meniscus were intact.  ACL was intact the notch was PCL.  I went up into the lateral joint line and also found intact meniscus and cartilage with more synovitis which was removed with the arthroscopic shaver.  At this point I introduced the ablator and with all the bleeding areas of the base of the synovium that I had removed with the shaver and cauterized all these areas to prevent significant hemarthrosis.  I worked my way back around the suprapatellar space and used the arthroscopic shaver to remove more inflamed synovium from that area.  I then used the ablator to cauterize all these areas.  I ran a total of 9 L of fluid through the knee in its entirety.  At this point I suctioned out any remaining fluid from the knee with the inflow off.  I then closed both arthroscopic portal sites with horizontal mattress sutures given the multiple surgeries in this area.  1 g vancomycin 2 g cefepime diluted in 10 cc  of saline were then injected in the left knee.  Sterile dressings were then applied.  I then again cycled the knee through multiple ranges of motion to ensure that I still had adequate range of motion without significant stiffness.    At this point all the drapes were broken down but the back table kept sterile.  We then bumped the patient's right hip and prepped the right lower extremity in sterile fashion.    Sutures were removed from the prior incision here and I used a 10 blade to go through about 75% of the length of this incision were about 15 cm on the thigh.  I carried this through the skin subcutaneous tissue and then through the iliotibial band.  Copious amounts of murky seroma type fluid was liberated from this area.  I swab cultured this.  I suctioned any remaining fluid out.  I then irrigated copiously with normal saline solution.  I then explored and palpated digitally.    At this point the patient had some unhealthy looking fat through that full-thickness layer down to the iliotibial band.  I used a 10 blade to excise a small rim of skin and then a significant amount of subcutaneous fat down through about a 5 cm deep layer anteriorly and posteriorly removing about 30 sq cm of that tissue until a was at a level of healthy bleeding fat.  I then used a Campos to scrape the underlying layer of fascia.  I then used lap sponges to non excisionally debride the deeper tissue until all tissue looked healthy.      At this point I again copiously irrigated normal saline solution.  I then irrigated with Dakin solution and allowed this to sit for several minutes.  I then irrigated with a dilute Betadine solution and saline copiously.      At this point I placed a 10 round Ren channel drain deep around this out through the skin distally.  I placed a combination of vancomycin, cefepime and doxycycline powder within the wound in the right lateral thigh and then closed the fascia with antimicrobial 0 Vicryl suture,  subcutaneous tissue with 3-0 Vicryl suture and the skin with 3-0 nylon suture in running fashion.  Drain was sewn in the skin with 3-0 nylon suture.  Sterile dressings were then applied.      All instrument and sponge counts were reported correct at the end of the case.  There were no complications.  The patient was extubated, awakened and taken to recovery room stable condition.    Plan for the patient:    We will follow cultures from the new knee aspirate as well as from the right lateral thigh.  He has grown MRSA at this point.  Blood sugar control will be crucial.  Weightbearing as tolerated bilateral lower extremities.  Specifically working on range of motion at the left knee to avoid repeat contracture.    Mandeep Gatica MD

## 2024-02-01 NOTE — ANESTHESIA PROCEDURE NOTES
Intubation    Date/Time: 2/1/2024 1:35 PM    Performed by: Carlos Botello CRNA  Authorized by: Alondra Collins MD    Intubation:     Induction:  Intravenous    Intubated:  Postinduction    Mask Ventilation:  Easy mask    Attempts:  1    Attempted By:  CRNA    Method of Intubation:  Direct    Blade:  Vann 4    Laryngeal View Grade: Grade I - full view of cords      Difficult Airway Encountered?: No      Complications:  None    Airway Device:  Oral endotracheal tube    Airway Device Size:  7.0    Inflation Amount (mL):  5    Tube secured:  23    Secured at:  The lips    Placement Verified By:  Capnometry    Complicating Factors:  None    Findings Post-Intubation:  BS equal bilateral

## 2024-02-01 NOTE — MEDICAL/APP STUDENT
History and Physical  Hospital Medicine     Admission Date: 1/31/2024    Chief Complaint:   Staphylococcal arthritis of left knee    Patient information was obtained from patient and ER records and wife.  HPI:   Patient is a 38 y.o. 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 He initially had had left knee pain about 1-2 months ago when it was discovered on admission for a DKA episode that he had  MRSA with a suspected source being a right heel wound. He underwent an arthrotomy on 12/29. The patient recently had two surgeries on his left knee, one on the 1/11 through atrotoscopic  means and the other done 1/16 which required debridement due to reinfection. Due to his condition, he had an extended hospital stay from 1-14-1/25 primarily caused by a recurrent MRSA bacteremia. Patient is currently receiving daptomycin at [facility] and presented to ED with a low grade fever, lightheadedness, fever, chills yesterday.     Pt indicated he was feel fine yesterday and believes he didn't have a fever. He reports that he has no pains in his leg and is doing well. He was receiving his daptomycin therpay from rehab and was admitted from there.Appeared frustrated due to continued hospital stays. He indicated that he has been having right hip pain that was corrobrates findings that were seen on the right hip. Pt complains of no SOB, chest pain, issues with BM, urination issues, fevers, chills. He is currently mostly immobile and requires PT for walking. Patient hx was mostly collected while speaking to his wife.     On presentation he was afebrile but tachycardic to 110 with /98.  Labs showed WBC 12.3, Hgb 8.0, platelets 396, ESR 76 .7, Na 134, K+ 3.4, .     Vitals on arrival-Temp:99 F, MG=189, Resp=18, UD=393/98, Sats=98%    Pt was given IVF upon arrival at  hospital and seems to be doing better since arrival.      Review of Systems:     Pain Scale: 0   Constitutional: no fever or chills   Eyes: no visual changes   ENT: no nasal congestion or sore throat   Respiratory: no cough or shorness of breath   Cardiovascular: no chest pain or palpitations   Gastrointestinal: no abdominal pain, no nausea or vomiting  Genitourinary: no hematuria or dysuria   Integument/Breast: no rash or pruritis   Hematologic/Lymphatic: no easy bruising or lymphadenopathy   Musculoskeletal: pain in the left knee and right hip  Neurological: no seizures or tremors   Behavioral/Psych: no auditory or visual hallucinations   Endocrine: no heat or cold intolerance    Past Medical & Surgical History:     Past Medical History:   Diagnosis Date    Anticoagulant long-term use     COVID-19 virus infection     Diabetes mellitus     Diabetes mellitus, type 2     Hypertension     May-Thurner syndrome         Past Surgical History:   Procedure Laterality Date    APPENDECTOMY      ARTHROSCOPY OF KNEE Left 1/16/2024    Procedure: ARTHROSCOPY, KNEE, LEFT;  Surgeon: Mandeep Gatica MD;  Location: [facility] OR [address] [place] [address];  Service: Orthopedics;  Laterality: Left;    ARTHROSCOPY OF KNEE Left 1/21/2024    Procedure: ARTHROSCOPY, KNEE, left, supine, slider, lateral post, conmed,;  Surgeon: Derick Sloan MD;  Location: [facility] OR [address] [place] [address];  Service: Orthopedics;  Laterality: Left;    ARTHROTOMY OF KNEE Left 12/29/2023    Procedure: ARTHROTOMY, KNEE;  Surgeon: Edy Bocanegra Jr., MD;  Location: [facility];  Service: Orthopedics;  Laterality: Left;    ESOPHAGOGASTRODUODENOSCOPY N/A 1/31/2022    Procedure: EGD (ESOPHAGOGASTRODUODENOSCOPY);  Surgeon: Cindy Quiros MD;  Location: [facility];  Service: Endoscopy;  Laterality: N/A;    ESOPHAGOGASTRODUODENOSCOPY N/A 3/21/2022    Procedure: EGD (ESOPHAGOGASTRODUODENOSCOPY);  Surgeon: Tejas Mann MD;  Location: [facility];  Service: Endoscopy;  Laterality: N/A;     INCISION AND DRAINAGE FOOT Left 11/28/2021    Procedure: INCISION AND DRAINAGE, FOOT;  Surgeon: Bj Alicea DPM;  Location: [facility] OR;  Service: Podiatry;  Laterality: Left;    INCISION AND DRAINAGE OF THIGH Right 1/21/2024    Procedure: INCISION AND DRAINAGE, THIGH - right,;  Surgeon: Derick Sloan MD;  Location: [facility] OR [address] [place] [address];  Service: Orthopedics;  Laterality: Right;  right, lateral, slider, cysto tubing, 6L NS, betadine, peroxide, vanc powder, 10 [place]e channel winter drain    IRRIGATION AND DEBRIDEMENT OF LOWER EXTREMITY Right 1/21/2024    Procedure: I&D lateral thigh abscess, right, supine, slider, cysto tubing, 6L NS, betadine, peroxide, vanc powder, 10 [place]e channel winter drain,;  Surgeon: Derick Sloan MD;  Location: [facility] OR [address] [place] [address];  Service: Orthopedics;  Laterality: Right;    KNEE ARTHROSCOPY W/ DEBRIDEMENT Left 1/21/2024    Procedure: ARTHROSCOPY, KNEE, WITH DEBRIDEMENT - LEFT, SUPINE, SLIDER, LATERAL POST, CONMED;  Surgeon: Derick Sloan MD;  Location: [facility] OR [address] [place] [address];  Service: Orthopedics;  Laterality: Left;    stents in bilateral legs      TRANSESOPHAGEAL ECHOCARDIOGRAPHY N/A 1/3/2024    Procedure: ECHOCARDIOGRAM, TRANSESOPHAGEAL;  Surgeon: Douglas Doss MD;  Location: [facility] CATH LAB/EP;  Service: Cardiology;  Laterality: N/A;        Home Medications:     Prior to Admission medications    Medication Sig Start Date End Date Taking? Authorizing Provider   blood-glucose meter,continuous (DEXCOM G7 ) Misc 1 Device by Misc.(Non-Drug; Combo Route) route once daily. 6/27/23 6/26/24  Almaz Santiago FNP   blood-glucose sensor (DEXCOM G7 SENSOR) Justyna 1 Device by Misc.(Non-Drug; Combo Route) route every 10 days. 7/11/23 7/10/24  Almaz Santiago FNP   empagliflozin (JARDIANCE) 25 mg tablet Take 1 tablet (25 mg total) by mouth once daily. 9/14/23 9/13/24  Muscarello, Almaz M., FNP   furosemide (LASIX) 20 MG tablet Take 1 tablet (20 mg total)  "by mouth once daily. 1/26/23 1/26/24  Tyree Duff MD   glucagon (GVOKE HYPOPEN 2-PACK) 1 mg/0.2 mL AtIn Inject 1 mg into the skin as needed (SEVERE HYPOGLYCEMIA). 8/28/23   Almaz Santiago FNP   insulin aspart U-100 (NOVOLOG U-100 INSULIN ASPART) 100 unit/mL injection Inject 14 Units into the skin 3 (three) times daily before meals. 7/11/23 7/10/24  Almaz Santiago FNP   methocarbamoL (ROBAXIN) 500 MG Tab Take 1 tablet (500 mg total) by mouth 4 (four) times daily as needed (muscle spasm). 1/25/24 2/4/24  Fany Emerson PA-C   metoclopramide HCl (REGLAN) 10 MG tablet Take 0.5 tablets (5 mg total) by mouth 3 (three) times daily before meals. 7/27/23   Tejas Mann MD   metoprolol succinate (TOPROL-XL) 25 MG 24 hr tablet Take 0.5 tablets (12.5 mg total) by mouth once daily. 1/10/24 1/9/25  Aria Kelley MD   oxyCODONE (ROXICODONE) 10 mg Tab immediate release tablet Take 1 tablet (10 mg total) by mouth every 4 (four) hours as needed for Pain. 1/25/24   Fany Emerson PA-C   oxyCODONE (ROXICODONE) 5 MG immediate release tablet Take 1 tablet (5 mg total) by mouth every 4 (four) hours as needed for Pain. 1/25/24   Fany Emerson PA-C   pantoprazole (PROTONIX) 40 MG tablet Take 1 tablet (40 mg total) by mouth once daily. 7/26/23 10/31/23  Cindy Quiros MD   pen needle, diabetic (BD ULTRA-FINE DIYA PEN NEEDLE) 32 gauge x 5/32" Ndle Use with Tresiba daily and Humalog 3-4 times daily as directed. 3/10/23   Lisa Bro NP   prochlorperazine (COMPAZINE) 10 MG tablet Take 1 tablet (10 mg total) by mouth 3 (three) times daily as needed (nausea or vomiting). 3/11/22   Yessenia Santos MD   promethazine (PHENERGAN) 25 MG suppository Place 1 suppository (25 mg total) rectally every 6 (six) hours as needed for Nausea. 12/27/23   Mary Steele NP   rivaroxaban (XARELTO) 10 mg Tab Take 1 tablet (10 mg total) by mouth daily with dinner or evening meal. 12/13/23 12/12/24  Salvador Tapia MD "   scopolamine (TRANSDERM-SCOP) 1.3-1.5 mg (1 mg over 3 days) Place 1 patch onto the skin every 72 hours as needed (intractable nausea/vomiting). 1/9/24   Aria Kelley MD   sodium chloride 0.9% SolP 50 mL with DAPTOmycin 500 mg SolR 947 mg Inject 947 mg into the vein once daily. 1/9/24 2/6/24  Aria Kelley MD   TRESIBA FLEXTOUCH U-200 200 unit/mL (3 mL) insulin pen Inject 56 Units into the skin once daily. 11/20/23 11/19/24  Almaz Santiago FNP   DEXCOM G6 TRANSMITTER Justyna CHANGE TRANSMITTER EVERY 90 DAYS. 1/21/22 3/21/22  Lisa Bro NP       Allergies:     Review of patient's allergies indicates:   Allergen Reactions    Heparin analogues Nausea And Vomiting        Social & Family History:     Social History     Socioeconomic History    Marital status:    Tobacco Use    Smoking status: Former     Types: Cigarettes    Smokeless tobacco: Former    Tobacco comments:     occasionally    Substance and Sexual Activity    Alcohol use: Yes     Comment: occ    Drug use: No     Social Determinants of Health     Financial Resource Strain: Low Risk  (1/31/2024)    Overall Financial Resource Strain (CARDIA)     Difficulty of Paying Living Expenses: Not hard at all   Food Insecurity: No Food Insecurity (1/31/2024)    Hunger Vital Sign     Worried About Running Out of Food in the Last Year: Never true     Ran Out of Food in the Last Year: Never true   Transportation Needs: No Transportation Needs (1/31/2024)    PRAPARE - Transportation     Lack of Transportation (Medical): No     Lack of Transportation (Non-Medical): No   Physical Activity: Sufficiently Active (12/28/2023)    Exercise Vital Sign     Days of Exercise per Week: 3 days     Minutes of Exercise per Session: 60 min   Stress: Patient Declined (1/31/2024)    [employer] [place] [employer] - Occupational Stress Questionnaire     Feeling of Stress : Patient declined   Recent Concern: Stress - Stress Concern Present (12/28/2023)    [place]  [place] [employer] - Occupational Stress Questionnaire     Feeling of Stress : Very much   Social Connections: Moderately Integrated (1/31/2024)    Social Connection and Isolation Panel [NHANES]     Frequency of Communication with Friends and Family: More than three times a week     Frequency of Social Gatherings with Friends and Family: More than three times a week     Attends [employer] Services: More than 4 times per year     Active Member of Clubs or Organizations: No     Attends Club or Organization Meetings: Never     Marital Status:    Housing Stability: Low Risk  (1/31/2024)    Housing Stability Vital Sign     Unable to Pay for Housing in the Last Year: No     Number of Places Lived in the Last Year: 1     Unstable Housing in the Last Year: No        Family History   Problem Relation Age of Onset    Cancer Mother     Cancer Paternal Uncle     Cancer Paternal Grandfather     Irritable bowel syndrome Paternal Grandfather     Cancer Maternal Grandfather     Colon cancer Neg Hx     Esophageal cancer Neg Hx     Rectal cancer Neg Hx     Stomach cancer Neg Hx     Ulcerative colitis Neg Hx     Crohn's disease Neg Hx     Celiac disease Neg Hx         Physical Exam:     Vital Signs (Most Recent)  Temp: 98 °F (36.7 °C) (02/01/24 0445)  Pulse: 98 (02/01/24 0445)  Resp: 16 (02/01/24 0445)  BP: (!) 141/78 (02/01/24 0445)  SpO2: 97 % (02/01/24 0445)    General appearance: well developed, well nourished, appeared to be tired  Head: normocephalic, atraumatic   Eyes: conjunctivae/corneas clear. PERRL.   Nose: Nares normal. Septum midline.   Throat: lips, mucosa, and tongue normal; teeth and gums normal and no throat erythema   Neck: supple, symmetrical, trachea midline, no JVD and thyroid not enlarged, symmetric, no tenderness/mass/nodules   Lungs: clear to auscultation bilaterally and normal respiratory effort   Chest wall: no tenderness   Breasts: deferred   Heart: regular rate and rhythm, S1, S2 normal,  "no murmur, click, rub or gallop   Abdomen: soft non-tender non-distended   Pelvic: deferred   Extremities: no cyanosis or edema, or clubbing, there were some scarring/ulcers on both legs, L>R   Pulses: 2+ and symmetric   Skin: Skin color, texture, turgor normal. No rashes or lesions   Lymph nodes: Cervical, supraclavicular, and axillary nodes normal.   Neurologic: Normal strength and tone. No focal numbness or weakness      Labs and Diagnostic Results:     Laboratory  BMP:   Recent Labs   Lab 02/01/24  0508   *   *   K 3.4*   CL 97   CO2 24   BUN 8   CREATININE 0.8   CALCIUM 8.7     CMP:   Recent Labs   Lab 01/31/24  1538 02/01/24  0508   * 124*   CALCIUM 8.4* 8.7   ALBUMIN 1.8*  --    PROT 7.4  --    * 135*   K 3.4* 3.4*   CO2 27 24   CL 94* 97   BUN 7 8   CREATININE 0.8 0.8   ALKPHOS 151*  --    ALT 34  --    AST 33  --    BILITOT 0.5  --      Coagulation: No results for input(s): "PT", "INR", "APTT" in the last 168 hours.  Recent Labs   Lab 01/31/24  1532   COLORU Yellow   SPECGRAV 1.010   PHUR 6.0   PROTEINUA Negative   BACTERIA Rare   NITRITE Negative   LEUKOCYTESUR Negative   ESR=76        Diagnostic Results:  XR Chest-  Stable appearing interstitial findings noting shallow inspiratory effort.  No large focal consolidation   MRI L. Knee-Findings compatible with persistent septic joint and early changes of osteomyelitis in the femoral condyles patella and tibial spines.  Cellulitis is present  MRI Pelvis-  Improved fluid in the left phi ends subcutaneous tissues around the left hip.     Worsening of fluid enhancement in the right myofascial planes and gluteal muscles suggesting infected subcutaneous fat and tensor fascia jonah bursa with myositis and or infection of lateral gluteal muscles on the right.  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       Plan:    Left knee- continue daptomycine therapy  Myosiitis right hip- consult orthopedic & ID regarding plan  F/u on culutres  PT/OT  If ortho clears for procedures make pt NPO day before  HFrEF- check volume status, monitor I/O, restrict fluids  May-Thurner Syndrome- continue rivoroxiban  T2DM- diabetic food diet, sliding scale     Jacob Alexander  Medical Student Y3  [facility]

## 2024-02-01 NOTE — MEDICAL/APP STUDENT
History and Physical  Hospital Medicine     Admission Date: 1/31/2024    Chief Complaint:   Staphylococcal arthritis of left knee    Patient information was obtained from patient and ER records and wife.  HPI:   Patient is a 38 y.o. 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 He initially had had left knee pain about 1-2 months ago when it was discovered on admission for a DKA episode that he had  MRSA with a suspected source being a right heel wound. He underwent an arthrotomy on 12/29. The patient recently had two surgeries on his left knee, one on the 1/11 through atrotoscopic  means and the other done 1/16 which required debridement due to reinfection. Due to his condition, he had an extended hospital stay from 1-14-1/25 primarily caused by a recurrent MRSA bacteremia. Patient is currently receiving daptomycin at [facility] and presented to ED with a low grade fever, lightheadedness, fever, chills yesterday.     Pt indicated he was feel fine yesterday and believes he didn't have a fever. He reports that he has no pains in his leg and is doing well. He was receiving his daptomycin therpay from rehab and was admitted from there.Appeared frustrated due to continued hospital stays. He indicated that he has been having right hip pain that was corrobrates findings that were seen on the right hip. Pt complains of no SOB, chest pain, issues with BM, urination issues, fevers, chills. He is currently mostly immobile and requires PT for walking. Patient hx was mostly collected while speaking to his wife.     On presentation he was afebrile but tachycardic to 110 with /98.  Labs showed WBC 12.3, Hgb 8.0, platelets 396, ESR 76 .7, Na 134, K+ 3.4, .     Vitals on arrival-Temp:99 F, KS=441, Resp=18, QW=034/98, Sats=98%    Pt was given IVF upon arrival at  hospital and seems to be doing better since arrival.      Review of Systems:     Pain Scale: 0   Constitutional: no fever or chills   Eyes: no visual changes   ENT: no nasal congestion or sore throat   Respiratory: no cough or shorness of breath   Cardiovascular: no chest pain or palpitations   Gastrointestinal: no abdominal pain, no nausea or vomiting  Genitourinary: no hematuria or dysuria   Integument/Breast: no rash or pruritis   Hematologic/Lymphatic: no easy bruising or lymphadenopathy   Musculoskeletal: pain in the left knee and right hip  Neurological: no seizures or tremors   Behavioral/Psych: no auditory or visual hallucinations   Endocrine: no heat or cold intolerance    Past Medical & Surgical History:     Past Medical History:   Diagnosis Date    Anticoagulant long-term use     COVID-19 virus infection     Diabetes mellitus     Diabetes mellitus, type 2     Hypertension     May-Thurner syndrome         Past Surgical History:   Procedure Laterality Date    APPENDECTOMY      ARTHROSCOPY OF KNEE Left 1/16/2024    Procedure: ARTHROSCOPY, KNEE, LEFT;  Surgeon: Mandeep Gatica MD;  Location: [facility] OR [address] [place] [address];  Service: Orthopedics;  Laterality: Left;    ARTHROSCOPY OF KNEE Left 1/21/2024    Procedure: ARTHROSCOPY, KNEE, left, supine, slider, lateral post, conmed,;  Surgeon: Derick Sloan MD;  Location: [facility] OR [address] [place] [address];  Service: Orthopedics;  Laterality: Left;    ARTHROTOMY OF KNEE Left 12/29/2023    Procedure: ARTHROTOMY, KNEE;  Surgeon: Edy Bocanegra Jr., MD;  Location: [facility];  Service: Orthopedics;  Laterality: Left;    ESOPHAGOGASTRODUODENOSCOPY N/A 1/31/2022    Procedure: EGD (ESOPHAGOGASTRODUODENOSCOPY);  Surgeon: Cindy Quiros MD;  Location: [facility];  Service: Endoscopy;  Laterality: N/A;    ESOPHAGOGASTRODUODENOSCOPY N/A 3/21/2022    Procedure: EGD (ESOPHAGOGASTRODUODENOSCOPY);  Surgeon: Tejas Mann MD;  Location: [facility];  Service: Endoscopy;  Laterality: N/A;     INCISION AND DRAINAGE FOOT Left 11/28/2021    Procedure: INCISION AND DRAINAGE, FOOT;  Surgeon: Bj Alicea DPM;  Location: [facility];  Service: Podiatry;  Laterality: Left;    INCISION AND DRAINAGE OF THIGH Right 1/21/2024    Procedure: INCISION AND DRAINAGE, THIGH - right,;  Surgeon: Derick Sloan MD;  Location: [facility] OR [address] [place] [address];  Service: Orthopedics;  Laterality: Right;  right, lateral, slider, cysto tubing, 6L NS, betadine, peroxide, vanc powder, 10 [address] winter drain    IRRIGATION AND DEBRIDEMENT OF LOWER EXTREMITY Right 1/21/2024    Procedure: I&D lateral thigh abscess, right, supine, slider, cysto tubing, 6L NS, betadine, peroxide, vanc powder, 10 [address] winter drain,;  Surgeon: Derick Sloan MD;  Location: [facility] OR [address] [place] [address];  Service: Orthopedics;  Laterality: Right;    KNEE ARTHROSCOPY W/ DEBRIDEMENT Left 1/21/2024    Procedure: ARTHROSCOPY, KNEE, WITH DEBRIDEMENT - LEFT, SUPINE, SLIDER, LATERAL POST, CONMED;  Surgeon: Derick Slaon MD;  Location: [facility] OR [address] [place] [address];  Service: Orthopedics;  Laterality: Left;    stents in bilateral legs      TRANSESOPHAGEAL ECHOCARDIOGRAPHY N/A 1/3/2024    Procedure: ECHOCARDIOGRAM, TRANSESOPHAGEAL;  Surgeon: Douglas Doss MD;  Location: [facility] CATH LAB/EP;  Service: Cardiology;  Laterality: N/A;        Home Medications:     Prior to Admission medications    Medication Sig Start Date End Date Taking? Authorizing Provider   blood-glucose meter,continuous (DEXCOM G7 ) Misc 1 Device by Misc.(Non-Drug; Combo Route) route once daily. 6/27/23 6/26/24  Almaz Santiago FNP   blood-glucose sensor (DEXCOM G7 SENSOR) Justyna 1 Device by Misc.(Non-Drug; Combo Route) route every 10 days. 7/11/23 7/10/24  Almaz Santiago FNP   empagliflozin (JARDIANCE) 25 mg tablet Take 1 tablet (25 mg total) by mouth once daily. 9/14/23 9/13/24  Muscarello, Almaz M., FNP   furosemide (LASIX) 20 MG tablet Take 1 tablet (20 mg total)  "by mouth once daily. 1/26/23 1/26/24  Tyree Duff MD   glucagon (GVOKE HYPOPEN 2-PACK) 1 mg/0.2 mL AtIn Inject 1 mg into the skin as needed (SEVERE HYPOGLYCEMIA). 8/28/23   Almaz Santiago FNP   insulin aspart U-100 (NOVOLOG U-100 INSULIN ASPART) 100 unit/mL injection Inject 14 Units into the skin 3 (three) times daily before meals. 7/11/23 7/10/24  Almaz Santiago FNP   methocarbamoL (ROBAXIN) 500 MG Tab Take 1 tablet (500 mg total) by mouth 4 (four) times daily as needed (muscle spasm). 1/25/24 2/4/24  Fany Emerson PA-C   metoclopramide HCl (REGLAN) 10 MG tablet Take 0.5 tablets (5 mg total) by mouth 3 (three) times daily before meals. 7/27/23   Tejas Mann MD   metoprolol succinate (TOPROL-XL) 25 MG 24 hr tablet Take 0.5 tablets (12.5 mg total) by mouth once daily. 1/10/24 1/9/25  Aria Kelley MD   oxyCODONE (ROXICODONE) 10 mg Tab immediate release tablet Take 1 tablet (10 mg total) by mouth every 4 (four) hours as needed for Pain. 1/25/24   Fany Emerson PA-C   oxyCODONE (ROXICODONE) 5 MG immediate release tablet Take 1 tablet (5 mg total) by mouth every 4 (four) hours as needed for Pain. 1/25/24   Fany Emerson PA-C   pantoprazole (PROTONIX) 40 MG tablet Take 1 tablet (40 mg total) by mouth once daily. 7/26/23 10/31/23  Cindy Quiros MD   pen needle, diabetic (BD ULTRA-FINE DIYA PEN NEEDLE) 32 gauge x 5/32" Ndle Use with Tresiba daily and Humalog 3-4 times daily as directed. 3/10/23   Lisa Bro NP   prochlorperazine (COMPAZINE) 10 MG tablet Take 1 tablet (10 mg total) by mouth 3 (three) times daily as needed (nausea or vomiting). 3/11/22   Yessenia Santos MD   promethazine (PHENERGAN) 25 MG suppository Place 1 suppository (25 mg total) rectally every 6 (six) hours as needed for Nausea. 12/27/23   Mary Steele NP   rivaroxaban (XARELTO) 10 mg Tab Take 1 tablet (10 mg total) by mouth daily with dinner or evening meal. 12/13/23 12/12/24  Salvador Tapia MD "   scopolamine (TRANSDERM-SCOP) 1.3-1.5 mg (1 mg over 3 days) Place 1 patch onto the skin every 72 hours as needed (intractable nausea/vomiting). 1/9/24   Aria Kelley MD   sodium chloride 0.9% SolP 50 mL with DAPTOmycin 500 mg SolR 947 mg Inject 947 mg into the vein once daily. 1/9/24 2/6/24  Aria Kelley MD   TRESIBA FLEXTOUCH U-200 200 unit/mL (3 mL) insulin pen Inject 56 Units into the skin once daily. 11/20/23 11/19/24  Almaz Santiago FNP   DEXCOM G6 TRANSMITTER Justyna CHANGE TRANSMITTER EVERY 90 DAYS. 1/21/22 3/21/22  Lisa Bro NP       Allergies:     Review of patient's allergies indicates:   Allergen Reactions    Heparin analogues Nausea And Vomiting        Social & Family History:     Social History     Socioeconomic History    Marital status:    Tobacco Use    Smoking status: Former     Types: Cigarettes    Smokeless tobacco: Former    Tobacco comments:     occasionally    Substance and Sexual Activity    Alcohol use: Yes     Comment: occ    Drug use: No     Social Determinants of Health     Financial Resource Strain: Low Risk  (1/31/2024)    Overall Financial Resource Strain (CARDIA)     Difficulty of Paying Living Expenses: Not hard at all   Food Insecurity: No Food Insecurity (1/31/2024)    Hunger Vital Sign     Worried About Running Out of Food in the Last Year: Never true     Ran Out of Food in the Last Year: Never true   Transportation Needs: No Transportation Needs (1/31/2024)    PRAPARE - Transportation     Lack of Transportation (Medical): No     Lack of Transportation (Non-Medical): No   Physical Activity: Sufficiently Active (12/28/2023)    Exercise Vital Sign     Days of Exercise per Week: 3 days     Minutes of Exercise per Session: 60 min   Stress: Patient Declined (1/31/2024)    [place] [employer] - Occupational Stress Questionnaire     Feeling of Stress : Patient declined   Recent Concern: Stress - Stress Concern Present (12/28/2023)    [place] [employer] - Occupational Stress Questionnaire     Feeling of Stress : Very much   Social Connections: Moderately Integrated (1/31/2024)    Social Connection and Isolation Panel [NHANES]     Frequency of Communication with Friends and Family: More than three times a week     Frequency of Social Gatherings with Friends and Family: More than three times a week     Attends Jew Services: More than 4 times per year     Active Member of Clubs or Organizations: No     Attends Club or Organization Meetings: Never     Marital Status:    Housing Stability: Low Risk  (1/31/2024)    Housing Stability Vital Sign     Unable to Pay for Housing in the Last Year: No     Number of Places Lived in the Last Year: 1     Unstable Housing in the Last Year: No        Family History   Problem Relation Age of Onset    Cancer Mother     Cancer Paternal Uncle     Cancer Paternal Grandfather     Irritable bowel syndrome Paternal Grandfather     Cancer Maternal Grandfather     Colon cancer Neg Hx     Esophageal cancer Neg Hx     Rectal cancer Neg Hx     Stomach cancer Neg Hx     Ulcerative colitis Neg Hx     Crohn's disease Neg Hx     Celiac disease Neg Hx         Physical Exam:     Vital Signs (Most Recent)  Temp: 98 °F (36.7 °C) (02/01/24 0445)  Pulse: 98 (02/01/24 0445)  Resp: 16 (02/01/24 0445)  BP: (!) 141/78 (02/01/24 0445)  SpO2: 97 % (02/01/24 0445)    General appearance: well developed, well nourished, appeared to be tired  Head: normocephalic, atraumatic   Eyes: conjunctivae/corneas clear. PERRL.   Nose: Nares normal. Septum midline.   Throat: lips, mucosa, and tongue normal; teeth and gums normal and no throat erythema   Neck: supple, symmetrical, trachea midline, no JVD and thyroid not enlarged, symmetric, no tenderness/mass/nodules   Lungs: clear to auscultation bilaterally and normal respiratory effort   Chest wall: no tenderness   Breasts: deferred   Heart: regular rate and rhythm, S1, S2 normal,  "no murmur, click, rub or gallop   Abdomen: soft non-tender non-distended   Pelvic: deferred   Extremities: no cyanosis or edema, or clubbing, there were some scarring/ulcers on both legs, L>R   Pulses: 2+ and symmetric   Skin: Skin color, texture, turgor normal. No rashes or lesions   Lymph nodes: Cervical, supraclavicular, and axillary nodes normal.   Neurologic: Normal strength and tone. No focal numbness or weakness      Labs and Diagnostic Results:     Laboratory  BMP:   Recent Labs   Lab 02/01/24  0508   *   *   K 3.4*   CL 97   CO2 24   BUN 8   CREATININE 0.8   CALCIUM 8.7     CMP:   Recent Labs   Lab 01/31/24  1538 02/01/24  0508   * 124*   CALCIUM 8.4* 8.7   ALBUMIN 1.8*  --    PROT 7.4  --    * 135*   K 3.4* 3.4*   CO2 27 24   CL 94* 97   BUN 7 8   CREATININE 0.8 0.8   ALKPHOS 151*  --    ALT 34  --    AST 33  --    BILITOT 0.5  --      Coagulation: No results for input(s): "PT", "INR", "APTT" in the last 168 hours.  Recent Labs   Lab 01/31/24  1532   COLORU Yellow   SPECGRAV 1.010   PHUR 6.0   PROTEINUA Negative   BACTERIA Rare   NITRITE Negative   LEUKOCYTESUR Negative   ESR=76        Diagnostic Results:  XR Chest-  Stable appearing interstitial findings noting shallow inspiratory effort.  No large focal consolidation   MRI L. Knee-Findings compatible with persistent septic joint and early changes of osteomyelitis in the femoral condyles patella and tibial spines.  Cellulitis is present  MRI Pelvis-  Improved fluid in the left phi ends subcutaneous tissues around the left hip.     Worsening of fluid enhancement in the right myofascial planes and gluteal muscles suggesting infected subcutaneous fat and tensor fascia jonah bursa with myositis and or infection of lateral gluteal muscles on the right.  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       Plan:    Left knee- continue daptomycine therapy  Myosiitis right hip- consult orthopedic & ID regarding plan  F/u on culutres  PT/OT  If ortho clears for procedures make pt NPO day before  HFrEF- check volume status, monitor I/O, restrict fluids  May-Thurner Syndrome- continue rivoroxiban  T2DM- diabetic food diet, sliding scale     Jacob Alexander  Medical Student Y3  [facility]

## 2024-02-01 NOTE — PLAN OF CARE
[facility] - Emergency Dept  Initial Discharge Assessment       Primary Care Provider: Shellie, Primary Doctor    Admission Diagnosis: No admission diagnoses are documented for this encounter.    Admission Date: 1/31/2024  Expected Discharge Date: 02/01/2024    Sw met pt at bedside, pt presented from rehab, pt to return to [facility] after medical clearance.        Laura Nunez LMSW  Case Management  Emergency Department  [phone]     Transition of Care Barriers: (P) None    Payor: [employer] [facility] / Plan: BCBS ALL OUT OF STATE / Product Type: PPO /     Extended Emergency Contact Information  Primary Emergency Contact: AmiChloé elizondo  Address: [address] [place] [address]           [place] [address] [place]  Mobile Phone: [phone]  Relation: Spouse  Secondary Emergency Contact: Hari Mueller  Mobile Phone: [phone]  Relation: Brother    Discharge Plan A: (P) Rehab  Discharge Plan B: (P) Rehab (pt to return to rehab)      [id number] - [place] - [address] AT [facility] [address]  [address]  [place] [address]  Phone: [phone] Fax: [phone]    [facility] - [place] - [address]  [address]  [place] [address]  Phone: [phone] Fax: [phone]    [id number] - [place] - [address] [facility] [address]  [address]  [place] [address]  Phone: [phone] Fax: [phone]    [id number] - [place]  [address]  [place] [address]  Phone: [phone] Fax: [phone]    [facility] - [place], [place] - [address] [facility]  [address] [facility] [address]  Phone: [phone] Fax: [phone]      Initial Assessment (most recent)       Adult Discharge Assessment - 01/31/24 3996          Discharge Assessment    Assessment Type Discharge Planning  Assessment     Confirmed/corrected address, phone number and insurance Yes     Confirmed Demographics Correct on Facesheet     Source of Information patient     Does patient/caregiver understand observation status Yes     Communicated RAEANN with patient/caregiver Date not available/Unable to determine     People in Home child(danny), dependent;spouse     Do you expect to return to your current living situation? Yes     Do you have help at home or someone to help you manage your care at home? No     Prior to hospitilization cognitive status: Alert/Oriented     Current cognitive status: Alert/Oriented     Walking or Climbing Stairs Difficulty yes     Walking or Climbing Stairs ambulation difficulty, requires equipment   pt expects to return to rehab    Dressing/Bathing Difficulty yes     Dressing/Bathing bathing difficulty, requires equipment     Home Accessibility wheelchair accessible     Home Layout Able to live on 1st floor     Equipment Currently Used at Home walker, standard     Patient currently being followed by outpatient case management? No (P)      Do you currently have service(s) that help you manage your care at home? -- (P)    pt presented from [facility]    Do you take prescription medications? Yes (P)      Do you have prescription coverage? Yes (P)      Do you have any problems affording any of your prescribed medications? No (P)      Is the patient taking medications as prescribed? yes (P)      How do you get to doctors appointments? family or friend will provide (P)      Are you on dialysis? No (P)      Do you take coumadin? No (P)      Discharge Plan A Rehab (P)      Discharge Plan B Rehab (P)    pt to return to rehab    DME Needed Upon Discharge  none (P)      Discharge Plan discussed with: Spouse/sig other (P)      Transition of Care Barriers None (P)         Financial Resource Strain    How hard is it for you to pay for the very basics like food, housing, medical care, and heating? Not hard at all  (P)         Housing Stability    In the last 12 months, was there a time when you were not able to pay the mortgage or rent on time? No (P)      In the last 12 months, how many places have you lived? 1 (P)      In the last 12 months, was there a time when you did not have a steady place to sleep or slept in a shelter (including now)? No (P)         Transportation Needs    In the past 12 months, has lack of transportation kept you from medical appointments or from getting medications? No (P)      In the past 12 months, has lack of transportation kept you from meetings, work, or from getting things needed for daily living? No (P)         Food Insecurity    Within the past 12 months, you worried that your food would run out before you got the money to buy more. Never true (P)      Within the past 12 months, the food you bought just didn't last and you didn't have money to get more. Never true (P)         Stress    Do you feel stress - tense, restless, nervous, or anxious, or unable to sleep at night because your mind is troubled all the time - these days? Patient declined (P)         Social Connections    In a typical week, how many times do you talk on the phone with family, friends, or neighbors? More than three times a week (P)      How often do you get together with friends or relatives? More than three times a week (P)      How often do you attend Yarsanism or Catholic services? More than 4 times per year (P)      Do you belong to any clubs or organizations such as Yarsanism groups, unions, fraternal or athletic groups, or school groups? No (P)      How often do you attend meetings of the clubs or organizations you belong to? Never (P)      Are you , , , , never , or living with a partner?  (P)

## 2024-02-01 NOTE — ASSESSMENT & PLAN NOTE
Patient with known history of HFrEF. Does not appear to be in fluid overload on clinical examination.  Most recent echocardiogram:    Left Ventricle: The left ventricle is normal in size. There is concentric remodeling. Regional wall motion abnormalities present. See diagram for wall motion findings. There is reduced systolic function. Biplane (2D) method of discs ejection fraction is 48%. There is normal diastolic function.    Right Ventricle: Normal right ventricular cavity size. Systolic function is normal. TAPSE is 2.54 cm.    Normal left atrial size. The left atrium volume index is 21.2 mL/m2.    Normal right atrial size.    The aortic valve is structurally normal. There is normal leaflet mobility.    The mitral valve is structurally normal. There is normal leaflet mobility.    The tricuspid valve is structurally normal. There is normal leaflet mobility.    IVC/SVC: Elevated venous pressure at 15 mmHg.    Overall the study quality was technically difficult.    - assess volume status daily  - HOLD home GDMT in the setting of possible infection and resume as appropriate

## 2024-02-01 NOTE — ANESTHESIA PREPROCEDURE EVALUATION
02/01/2024  Kulwant Mueller Jr. is a 38 y.o., male.  Pre-operative evaluation for Procedure(s) (LRB):  ARTHROSCOPY, KNEE, left, lateral post (Left)  IRRIGATION AND DEBRIDEMENT, LOWER EXTREMITY, right, bilateral extremity drapes, cx swabs, cysto tubing (Right)    Kulwant Mueller Jr. is a 38 y.o. male     Patient Active Problem List   Diagnosis    Penile lesion    Type 2 diabetes mellitus with complication, with long-term current use of insulin    Intractable nausea and vomiting    History of intravascular stent placement    Diabetic gastroparesis associated with type 2 diabetes mellitus    Hypertension associated with diabetes    Recurrent deep vein thrombosis (DVT) of left lower extremity    Chronic anticoagulation    Post-thrombotic syndrome of left lower extremity    May-Thurner syndrome    Infected Diabetic ulcer of left foot with Cellulitis  associated with type 2 diabetes mellitus    Elevated CK    Esophagitis determined by endoscopy    [place] grade C esophagitis    GERD (gastroesophageal reflux disease)    Other elevated white blood cell count    Staphylococcus aureus bacteremia    Staphylococcal arthritis of left knee    Septic prepatellar bursitis of left knee    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 due to underlying condition, limited to breakdown of skin    Diabetes mellitus    Depression    Transaminitis    Pyogenic arthritis of left knee joint    Fever    Hyponatremia    Chronic HFrEF (heart failure with reduced ejection fraction)    Abscess of right thigh    Impaired mobility    Diarrhea    HFrEF (heart failure with reduced ejection fraction)    Hypokalemia       Review of patient's allergies indicates:   Allergen Reactions    Heparin analogues Nausea And Vomiting       No current facility-administered medications on file prior to encounter.     Current Outpatient  Medications on File Prior to Encounter   Medication Sig Dispense Refill    empagliflozin (JARDIANCE) 25 mg tablet Take 1 tablet (25 mg total) by mouth once daily. 90 tablet 3    furosemide (LASIX) 20 MG tablet Take 1 tablet (20 mg total) by mouth once daily. 90 tablet 3    glucagon (GVOKE HYPOPEN 2-PACK) 1 mg/0.2 mL AtIn Inject 1 mg into the skin as needed (SEVERE HYPOGLYCEMIA). 2 each 5    methocarbamoL (ROBAXIN) 500 MG Tab Take 1 tablet (500 mg total) by mouth 4 (four) times daily as needed (muscle spasm). 40 tablet 0    metoclopramide HCl (REGLAN) 10 MG tablet Take 0.5 tablets (5 mg total) by mouth 3 (three) times daily before meals. 270 tablet 3    metoprolol succinate (TOPROL-XL) 25 MG 24 hr tablet Take 0.5 tablets (12.5 mg total) by mouth once daily. 15 tablet 11    oxyCODONE (ROXICODONE) 10 mg Tab immediate release tablet Take 1 tablet (10 mg total) by mouth every 4 (four) hours as needed for Pain.  0    oxyCODONE (ROXICODONE) 5 MG immediate release tablet Take 1 tablet (5 mg total) by mouth every 4 (four) hours as needed for Pain.  0    pantoprazole (PROTONIX) 40 MG tablet Take 1 tablet (40 mg total) by mouth once daily. 90 tablet 0    prochlorperazine (COMPAZINE) 10 MG tablet Take 1 tablet (10 mg total) by mouth 3 (three) times daily as needed (nausea or vomiting). 15 tablet 0    bisacodyL (DULCOLAX) 10 mg Supp Place 10 mg rectally daily as needed.      blood-glucose meter,continuous (DEXCOM G7 ) Misc 1 Device by Misc.(Non-Drug; Combo Route) route once daily. 1 each 0    blood-glucose sensor (DEXCOM G7 SENSOR) Justyna 1 Device by Misc.(Non-Drug; Combo Route) route every 10 days. 3 each 11    DAPTOMYCIN IN 0.9 % SOD CHLOR IV Inject 1,000 mg into the vein Q24H (prophylaxis, 1700).      insulin glargine (LANTUS) 100 unit/mL injection Inject 25 Units into the skin once daily.      lactobacillus acidophilus & bulgar (LACTINEX) 100 million cell packet Take 1 tablet by mouth once daily.      loperamide  "(IMODIUM A-D) 2 mg Tab Take 2 mg by mouth 3 (three) times daily as needed.      pen needle, diabetic (BD ULTRA-FINE DIYA PEN NEEDLE) 32 gauge x 5/32" Ndle Use with Tresiba daily and Humalog 3-4 times daily as directed. 200 each 5    polyethylene glycol (GLYCOLAX) 17 gram PwPk Take 17 g by mouth once daily.      rivaroxaban (XARELTO) 10 mg Tab Take 1 tablet (10 mg total) by mouth daily with dinner or evening meal. 90 tablet 3    scopolamine (TRANSDERM-SCOP) 1.3-1.5 mg (1 mg over 3 days) Place 1 patch onto the skin every 72 hours as needed (intractable nausea/vomiting).      sodium chloride 0.9% SolP 50 mL with DAPTOmycin 500 mg SolR 947 mg Inject 947 mg into the vein once daily.  0    TRESIBA FLEXTOUCH U-200 200 unit/mL (3 mL) insulin pen Inject 56 Units into the skin once daily. 3 pen 11    [DISCONTINUED] DEXCOM G6 TRANSMITTER Justyna CHANGE TRANSMITTER EVERY 90 DAYS. 1 each 3       Past Surgical History:   Procedure Laterality Date    APPENDECTOMY      ARTHROSCOPY OF KNEE Left 1/16/2024    Procedure: ARTHROSCOPY, KNEE, LEFT;  Surgeon: Mandeep Gatica MD;  Location: [address];  Service: Orthopedics;  Laterality: Left;    ARTHROSCOPY OF KNEE Left 1/21/2024    Procedure: ARTHROSCOPY, KNEE, left, supine, slider, lateral post, conmed,;  Surgeon: Derick Sloan MD;  Location: [address];  Service: Orthopedics;  Laterality: Left;    ARTHROTOMY OF KNEE Left 12/29/2023    Procedure: ARTHROTOMY, KNEE;  Surgeon: Edy Bocanegra Jr., MD;  Location: [facility] OR;  Service: Orthopedics;  Laterality: Left;    ESOPHAGOGASTRODUODENOSCOPY N/A 1/31/2022    Procedure: EGD (ESOPHAGOGASTRODUODENOSCOPY);  Surgeon: Cindy Quiros MD;  Location: [facility];  Service: Endoscopy;  Laterality: N/A;    ESOPHAGOGASTRODUODENOSCOPY N/A 3/21/2022    Procedure: EGD (ESOPHAGOGASTRODUODENOSCOPY);  Surgeon: Tejas Mann MD;  Location: [facility];  Service: Endoscopy;  Laterality: N/A;    INCISION AND DRAINAGE FOOT Left " 11/28/2021    Procedure: INCISION AND DRAINAGE, FOOT;  Surgeon: Bj Alicea DPM;  Location: [facility];  Service: Podiatry;  Laterality: Left;    INCISION AND DRAINAGE OF THIGH Right 1/21/2024    Procedure: INCISION AND DRAINAGE, THIGH - right,;  Surgeon: Derick Sloan MD;  [facility];  Service: Orthopedics;  Laterality: Right;  right, lateral, slider, cysto tubing, 6L NS, betadine, peroxide, vanc powder, 10 [place] winter drain    IRRIGATION AND DEBRIDEMENT OF LOWER EXTREMITY Right 1/21/2024    Procedure: I&D lateral thigh abscess, right, supine, slider, cysto tubing, 6L NS, betadine, peroxide, vanc powder, 10 [place] channel winter drain,;  Surgeon: Derick Sloan MD;  Location: [facility] OR [address] [place] [address];  Service: Orthopedics;  Laterality: Right;    KNEE ARTHROSCOPY W/ DEBRIDEMENT Left 1/21/2024    Procedure: ARTHROSCOPY, KNEE, WITH DEBRIDEMENT - LEFT, SUPINE, SLIDER, LATERAL POST, CONMED;  Surgeon: Derick Sloan MD;  Location: [facility] OR [address] [place] [address];  Service: Orthopedics;  Laterality: Left;    stents in bilateral legs      TRANSESOPHAGEAL ECHOCARDIOGRAPHY N/A 1/3/2024    Procedure: ECHOCARDIOGRAM, TRANSESOPHAGEAL;  Surgeon: Douglas Doss MD;  Location: [facility] CATH LAB/EP;  Service: Cardiology;  Laterality: N/A;       Social History     Socioeconomic History    Marital status:    Tobacco Use    Smoking status: Former     Types: Cigarettes    Smokeless tobacco: Former    Tobacco comments:     occasionally    Substance and Sexual Activity    Alcohol use: Yes     Comment: occ    Drug use: No     Social Determinants of Health     Financial Resource Strain: Low Risk  (1/31/2024)    Overall Financial Resource Strain (CARDIA)     Difficulty of Paying Living Expenses: Not hard at all   Food Insecurity: No Food Insecurity (1/31/2024)    Hunger Vital Sign     Worried About Running Out of Food in the Last Year: Never true     Ran Out of Food in the Last Year: Never true    Transportation Needs: No Transportation Needs (1/31/2024)    PRAPARE - Transportation     Lack of Transportation (Medical): No     Lack of Transportation (Non-Medical): No   Physical Activity: Sufficiently Active (12/28/2023)    Exercise Vital Sign     Days of Exercise per Week: 3 days     Minutes of Exercise per Session: 60 min   Stress: Patient Declined (1/31/2024)    [employer] - Occupational Stress Questionnaire     Feeling of Stress : Patient declined   Recent Concern: Stress - Stress Concern Present (12/28/2023)    [employer] - Occupational Stress Questionnaire     Feeling of Stress : Very much   Social Connections: Moderately Integrated (1/31/2024)    Social Connection and Isolation Panel [NHANES]     Frequency of Communication with Friends and Family: More than three times a week     Frequency of Social Gatherings with Friends and Family: More than three times a week     Attends Islam Services: More than 4 times per year     Active Member of Clubs or Organizations: No     Attends Club or Organization Meetings: Never     Marital Status:    Housing Stability: Low Risk  (1/31/2024)    Housing Stability Vital Sign     Unable to Pay for Housing in the Last Year: No     Number of Places Lived in the Last Year: 1     Unstable Housing in the Last Year: No         CBC:   Recent Labs     01/31/24  1538 02/01/24  0508   WBC 12.34 10.65   RBC 3.07* 3.32*   HGB 8.0* 8.5*   HCT 25.7* 28.0*    381   MCV 84 84   MCH 26.1* 25.6*   MCHC 31.1* 30.4*       CMP:   Recent Labs     01/31/24  1538 02/01/24  0508   * 135*   K 3.4* 3.4*   CL 94* 97   CO2 27 24   BUN 7 8   CREATININE 0.8 0.8   * 124*   CALCIUM 8.4* 8.7   ALBUMIN 1.8*  --    PROT 7.4  --    ALKPHOS 151*  --    ALT 34  --    AST 33  --    BILITOT 0.5  --        Pre-op Assessment    I have reviewed the Patient Summary Reports.    I have reviewed the NPO Status.   I have reviewed  the Medications.     Review of Systems  Anesthesia Hx:  No problems with previous Anesthesia   History of prior surgery of interest to airway management or planning:            Denies Personal Hx of Anesthesia complications.                    Cardiovascular:     Hypertension                                        Hepatic/GI:     GERD             Neurological:  Neurology Normal                                      Endocrine:  Diabetes, type 2               Physical Exam  General: Well nourished, Cooperative, Alert and Oriented    Airway:  Mallampati: / II  Mouth Opening: Normal  TM Distance: Normal  Tongue: Normal  Neck ROM: Normal ROM    Dental:  Intact        Anesthesia Plan  Type of Anesthesia, risks & benefits discussed:    Anesthesia Type: Gen ETT  Intra-op Monitoring Plan: Standard ASA Monitors  Post Op Pain Control Plan: multimodal analgesia  Induction:  IV  Airway Plan: Direct, Post-Induction  Informed Consent: Informed consent signed with the Patient and all parties understand the risks and agree with anesthesia plan.  All questions answered.   ASA Score: 3    Ready For Surgery From Anesthesia Perspective.     .

## 2024-02-01 NOTE — ASSESSMENT & PLAN NOTE
Disseminated MRSA bacteremia    Patient with multiple admissions for septic joint complicated by MRSA bacteremia and status post orthopedic intervention, on daptomycin prior to presentation found to have imaging concerns of worsening right thigh abscess and new concerns for left knee osteomyelitis. Previous expected end date of daptomycin was 03/03/24.  He denies worsening pain or subjective fevers.  No leukocytosis. ESR and CRP with mild elevation from prior.    - continue daptomycin  - orthopedic surgery consulted; appreciate recommendations  - infectious disease consulted; appreciate recommendations  - follow-up repeat blood cultures  - PT/OT

## 2024-02-01 NOTE — NURSING TRANSFER
Nursing Transfer Note      2/1/2024   5:17 PM    Nurse giving handoff:ADRIEL Lopez  Nurse receiving handoff:[address] RN    Reason patient is being transferred: post anesthesia    Transfer To: [facility]    Transfer via bed      Transported by PCT    Transfer Vital Signs:  Blood Pressure:105/54  Heart Rate:80  O2:95%- RA  Temperature:  Respirations:18      Additional Lines: Dodge Catheter    4eyes on Skin: yes    Medicines sent: n/a      Patient belongings transferred with patient: No    Chart send with patient: Yes    Notified: family    Patient reassessed at: 2/1/24@ 6383

## 2024-02-01 NOTE — PLAN OF CARE
PT Evaluation completed and PT POC established.    Problem: Physical Therapy  Goal: Physical Therapy Goal  Description: Goals to be met by: 2024     Patient will increase functional independence with mobility by performin. Supine to sit with Modified [place]  2. Sit to supine with Modified [place]  3. Sit to stand transfer with Supervision  4. Bed to chair transfer with Supervision using LRAD  5. Gait  x 100 feet with Supervision using LRAD.     Outcome: Ongoing, Progressing

## 2024-02-01 NOTE — NURSING
Nurses Note -- 4 Eyes      2/1/2024   2:58 AM      Skin assessed during: Admit      [] No Altered Skin Integrity Present    []Prevention Measures Documented      [x] Yes- Altered Skin Integrity Present or Discovered   [x] LDA Added if Not in Epic (Describe Wound)   [] New Altered Skin Integrity was Present on Admit and Documented in LDA   [] Wound Image Taken    Wound Care Consulted? Yes    Attending Nurse:  Nate MOREL    Second RN/Staff Member:   Beto WHITTEN RN

## 2024-02-01 NOTE — PROGRESS NOTES
[facility] - [facility] S[facility] ([place] [address])  Infectious Disease  Progress Note    Patient Name: Kulwant Mueller Jr.  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 (Surgical cxs +MRSA). ROBY 1/02/24 neg. Pt required salvage therapy dapto/ceftaroline & bld cxs finally cleared 1/07, and then placed on daptomycin monotherapy to complete 4wks duration (ASYA 02/06) per [facility] ID (Yana). Of note, L shoulder imaging showed subdeltoid bursitis w/ SS tear; superimposed infection could be present but no drainable fluid collections. Also, R heel without osteo but had cellulitis, myositis, tenosynovitis.     Pt was discharged (01/10) to rehab with daptomycin monotherapy; but started having new fevers (up to 103F) at rehab, and re-admitted (01/14) for workup. Blood cultures 01/14/24 NGTD. MRI T/L spine negative for osteo-discitis. Found to have persistent left knee septic arthritis and right thigh abscess and underwent L knee arthroscopic I&D on 1/17. IR drain placed 01/19 R lateral thigh abscess, cxs +MRSA.  Taken to OR 01/21 for repeat surgical I&D of L knee (#3), and R thigh abscess with new R thigh drain placed. Surgical cxs (01/21) of L knee and R thigh NGTD.     Discharged to [facility] rehab with IV daptomycin 8mg/kg Q24h. Re-presented 1/31/2024 with tachycardia, fever. MRI left knee with: Findings compatible with persistent septic joint and early changes of osteomyelitis in the femoral condyles patella and tibial spines. And MRI pelvis with:  Worsening of fluid enhancement in the right myofascial planes and gluteal muscles suggesting infected subcutaneous fat and tensor fascia jonah bursa with myositis and or infection of lateral gluteal muscles on the right. Evaluated by ortho and s/p aspiration of left knee joint fluid and right thigh fluid collection. Gram stains with moderate WBC, no organisms. Cultures pending. Bcx from 1/31 NGTD.    -- Continue IV daptomycin for now, will adjust antibiotic therapy with new culture data  -- Follow-up Bcx, left knee cultures, and right thigh cultures  -- Considering Karius testing if no growth from cultures in coming days  -- Consider rheumatologic evaluation if no organisms on culture      Thank you for your consult. I will follow-up with patient. Please contact us if you have any additional questions.    Karan Torres MD  Infectious Disease  [facility] Surg ([facility])    Subjective:     Principal Problem:Pyogenic arthritis of left knee joint    HPI: 38 year old male with history of May-Thurer syndrome, diabetes with chronic diabetic foot wounds, recent admission for MRSA bacteremia.      MRSA bacteremia (daptomycin susceptible) noted on index admission blood cultures 12/27, complicated by left knee septic arthritis, s/p L knee arthrotomy with synovectomy on 12/29- cx w/ MRSA, s/p left shoulder arthrocentesis reportedly with no evidence of infection, ROBY 1/02/24 without vegetations, required salvage therapy with addition of ceftaroline & cleared 1/07, subsequently placed on daptomycin monotherapy and sent to rehab.     Re-admitted 1/14 for fever while at rehab + back pain, no new or persistent bacteremia, MRI T/L spine negative for osteo-discitis.  That admission he had persistent left knee septic arthritis and right thigh abscess and underwent L knee arthroscopic I&D on 1/17, repeat L knee I&D and right thigh abscess I&D on 1/21.    MRI L Knee this admission showed large air-fluid collection, myositis  (involving vastus medialis, lateralis & popliteus musculature),     MRI pelvis with R thigh  17 by 7 x 5 cm collection- s/p IR drain placed 1/19 (cx MRSA). MRI L shoulder with bursitis    Operative course:  OR 01/15 for L knee washout (cell counts c/w/ septic joint +CPPD crystals)  cx w/ NG  OR 1/21 L knee washout (#3) w/ extensive synovectomy/debridement and R thigh I&D (cx NG)    Clinically improved and discharged, ID team had seen him and plan was to complete 6 weeks of abx from last washout (01/21); ASYA 03/03/24     This admission, he is afebrile, without leukocytosis, HDS    He had subjective fever and rigors. MRI 1/31 L knee shows persistent septic joint and early osteomyelitis.   MRI pelvis shows improved fluid around left hip but worsening fluid in R myofascial planes and gluteal muscles. Daptomycin being continued. CPK WNL  Past Medical History:   Diagnosis Date    Anticoagulant long-term use     COVID-19 virus infection     Diabetes mellitus     Diabetes mellitus, type 2     Hypertension     May-Thurner syndrome        Past Surgical History:   Procedure Laterality Date    APPENDECTOMY      ARTHROSCOPY OF KNEE Left 1/16/2024    Procedure: ARTHROSCOPY, KNEE, LEFT;  Surgeon: Mandeep Gatica MD;  Location: [address];  Service: Orthopedics;  Laterality: Left;    ARTHROSCOPY OF KNEE Left 1/21/2024    Procedure: ARTHROSCOPY, KNEE, left, supine, slider, lateral post, conmed,;  Surgeon: Derick Sloan MD;  Location: [address];  Service: Orthopedics;  Laterality: Left;    ARTHROTOMY OF KNEE Left 12/29/2023    Procedure: ARTHROTOMY, KNEE;  Surgeon: Edy Bocanegra Jr., MD;  Location: [facility] OR;  Service: Orthopedics;  Laterality: Left;    ESOPHAGOGASTRODUODENOSCOPY N/A 1/31/2022    Procedure: EGD (ESOPHAGOGASTRODUODENOSCOPY);  Surgeon: Cindy Quiros MD;  Location: [facility];  Service: Endoscopy;  Laterality: N/A;    ESOPHAGOGASTRODUODENOSCOPY N/A 3/21/2022    Procedure: EGD  (ESOPHAGOGASTRODUODENOSCOPY);  Surgeon: Tejas Mann MD;  Location: [facility] ENDO;  Service: Endoscopy;  Laterality: N/A;    INCISION AND DRAINAGE FOOT Left 11/28/2021    Procedure: INCISION AND DRAINAGE, FOOT;  Surgeon: Bj Alicea DPM;  Location: [facility] OR;  Service: Podiatry;  Laterality: Left;    INCISION AND DRAINAGE OF THIGH Right 1/21/2024    Procedure: INCISION AND DRAINAGE, THIGH - right,;  Surgeon: Derick Sloan MD;  Location: [facility] OR [facility] FLR;  Service: Orthopedics;  Laterality: Right;  right, lateral, slider, cysto tubing, 6L NS, betadine, peroxide, vanc powder, 10 [place] winter drain    IRRIGATION AND DEBRIDEMENT OF LOWER EXTREMITY Right 1/21/2024    Procedure: I&D lateral thigh abscess, right, supine, slider, cysto tubing, 6L NS, betadine, peroxide, vanc powder, 10 [place] winter drain,;  Surgeon: Derick Sloan MD;  [facility];  Service: Orthopedics;  Laterality: Right;    KNEE ARTHROSCOPY W/ DEBRIDEMENT Left 1/21/2024    Procedure: ARTHROSCOPY, KNEE, WITH DEBRIDEMENT - LEFT, SUPINE, SLIDER, LATERAL POST, CONMED;  Surgeon: Derick Sloan MD;  Location: [facility] OR [facility] FLR;  Service: Orthopedics;  Laterality: Left;    stents in bilateral legs      TRANSESOPHAGEAL ECHOCARDIOGRAPHY N/A 1/3/2024    Procedure: ECHOCARDIOGRAM, TRANSESOPHAGEAL;  Surgeon: Douglas Doss MD;  [facility]: [facility] CATH LAB/EP;  Service: Cardiology;  Laterality: N/A;       Review of patient's allergies indicates:   Allergen Reactions    Heparin analogues Nausea And Vomiting       Medications:  Medications Prior to Admission   Medication Sig    blood-glucose meter,continuous (DEXCOM G7 ) Misc 1 Device by Misc.(Non-Drug; Combo Route) route once daily.    blood-glucose sensor (DEXCOM G7 SENSOR) Justyna 1 Device by Misc.(Non-Drug; Combo Route) route every 10 days.    empagliflozin (JARDIANCE) 25 mg tablet Take 1 tablet (25 mg total) by mouth once daily.    furosemide (LASIX)  "20 MG tablet Take 1 tablet (20 mg total) by mouth once daily.    glucagon (GVOKE HYPOPEN 2-PACK) 1 mg/0.2 mL AtIn Inject 1 mg into the skin as needed (SEVERE HYPOGLYCEMIA).    insulin aspart U-100 (NOVOLOG U-100 INSULIN ASPART) 100 unit/mL injection Inject 14 Units into the skin 3 (three) times daily before meals.    methocarbamoL (ROBAXIN) 500 MG Tab Take 1 tablet (500 mg total) by mouth 4 (four) times daily as needed (muscle spasm).    metoclopramide HCl (REGLAN) 10 MG tablet Take 0.5 tablets (5 mg total) by mouth 3 (three) times daily before meals.    metoprolol succinate (TOPROL-XL) 25 MG 24 hr tablet Take 0.5 tablets (12.5 mg total) by mouth once daily.    oxyCODONE (ROXICODONE) 10 mg Tab immediate release tablet Take 1 tablet (10 mg total) by mouth every 4 (four) hours as needed for Pain.    oxyCODONE (ROXICODONE) 5 MG immediate release tablet Take 1 tablet (5 mg total) by mouth every 4 (four) hours as needed for Pain.    pantoprazole (PROTONIX) 40 MG tablet Take 1 tablet (40 mg total) by mouth once daily.    pen needle, diabetic (BD ULTRA-FINE DIYA PEN NEEDLE) 32 gauge x 5/32" Ndle Use with Tresiba daily and Humalog 3-4 times daily as directed.    prochlorperazine (COMPAZINE) 10 MG tablet Take 1 tablet (10 mg total) by mouth 3 (three) times daily as needed (nausea or vomiting).    promethazine (PHENERGAN) 25 MG suppository Place 1 suppository (25 mg total) rectally every 6 (six) hours as needed for Nausea.    rivaroxaban (XARELTO) 10 mg Tab Take 1 tablet (10 mg total) by mouth daily with dinner or evening meal.    scopolamine (TRANSDERM-SCOP) 1.3-1.5 mg (1 mg over 3 days) Place 1 patch onto the skin every 72 hours as needed (intractable nausea/vomiting).    sodium chloride 0.9% SolP 50 mL with DAPTOmycin 500 mg SolR 947 mg Inject 947 mg into the vein once daily.    TRESIBA FLEXTOUCH U-200 200 unit/mL (3 mL) insulin pen Inject 56 Units into the skin once daily.     Antibiotics (From admission, onward)      " Start     Stop Route Frequency Ordered    02/01/24 0900  DAPTOmycin (CUBICIN) 945 mg in sodium chloride 0.9% SolP 50 mL IVPB         -- IV Daily 01/31/24 1734          Antifungals (From admission, onward)      None          Antivirals (From admission, onward)      None             Immunization History   Administered Date(s) Administered    COVID-19, MRNA, LN-S, PF (Pfizer) (Purple Cap) 12/28/2020, 01/19/2021, 02/11/2022    Influenza - Quadrivalent - PF *Preferred* (6 months and older) 09/29/2020, 09/30/2021, 10/20/2022, 10/20/2023       Family History       Problem Relation (Age of Onset)    Cancer Mother, Paternal Uncle, Paternal Grandfather, Maternal Grandfather    Irritable bowel syndrome Paternal Grandfather          Social History     Socioeconomic History    Marital status:    Tobacco Use    Smoking status: Former     Types: Cigarettes    Smokeless tobacco: Former    Tobacco comments:     occasionally    Substance and Sexual Activity    Alcohol use: Yes     Comment: occ    Drug use: No     Social Determinants of Health     Financial Resource Strain: Low Risk  (1/31/2024)    Overall Financial Resource Strain (CARDIA)     Difficulty of Paying Living Expenses: Not hard at all   Food Insecurity: No Food Insecurity (1/31/2024)    Hunger Vital Sign     Worried About Running Out of Food in the Last Year: Never true     Ran Out of Food in the Last Year: Never true   Transportation Needs: No Transportation Needs (1/31/2024)    PRAPARE - Transportation     Lack of Transportation (Medical): No     Lack of Transportation (Non-Medical): No   Physical Activity: Sufficiently Active (12/28/2023)    Exercise Vital Sign     Days of Exercise per Week: 3 days     Minutes of Exercise per Session: 60 min   Stress: Patient Declined (1/31/2024)    [employer] [place] - Occupational Stress Questionnaire     Feeling of Stress : Patient declined   Recent Concern: Stress - Stress Concern Present (12/28/2023)     [facility] of Occupational Health - Occupational Stress Questionnaire     Feeling of Stress : Very much   Social Connections: Moderately Integrated (1/31/2024)    Social Connection and Isolation Panel [NHANES]     Frequency of Communication with Friends and Family: More than three times a week     Frequency of Social Gatherings with Friends and Family: More than three times a week     Attends Taoism Services: More than 4 times per year     Active Member of Clubs or Organizations: No     Attends Club or Organization Meetings: Never     Marital Status:    Housing Stability: Low Risk  (1/31/2024)    Housing Stability Vital Sign     Unable to Pay for Housing in the Last Year: No     Number of Places Lived in the Last Year: 1     Unstable Housing in the Last Year: No     Review of Systems   Constitutional:  Negative for appetite change, chills, fatigue and fever.   HENT:  Negative for congestion and sore throat.    Eyes:  Negative for visual disturbance.   Respiratory:  Negative for cough, chest tightness, shortness of breath and wheezing.    Cardiovascular:  Negative for chest pain, palpitations and leg swelling.   Gastrointestinal:  Negative for abdominal pain, constipation, diarrhea, nausea and vomiting.   Genitourinary:  Negative for dysuria, flank pain, frequency and hematuria.   Musculoskeletal:  Positive for arthralgias and joint swelling. Negative for back pain, myalgias and neck pain.   Skin:  Negative for pallor, rash and wound.   Neurological:  Negative for seizures, light-headedness, numbness and headaches.   Psychiatric/Behavioral:  The patient is not nervous/anxious.      Objective:     Vital Signs (Most Recent):  Temp: 98.1 °F (36.7 °C) (02/01/24 0733)  Pulse: 94 (02/01/24 0733)  Resp: 17 (02/01/24 0847)  BP: 135/78 (02/01/24 0733)  SpO2: (!) 94 % (02/01/24 0733) Vital Signs (24h Range):  Temp:  [98 °F (36.7 °C)-99.5 °F (37.5 °C)] 98.1 °F (36.7 °C)  Pulse:  [] 94  Resp:  [16-20]  17  SpO2:  [94 %-98 %] 94 %  BP: (114-168)/(72-98) 135/78     Weight: 106.7 kg (235 lb 3.7 oz)  Body mass index is 31.03 kg/m².    Estimated Creatinine Clearance: 157.3 mL/min (based on SCr of 0.8 mg/dL).     Physical Exam  Constitutional:       General: He is not in acute distress.     Appearance: Normal appearance. He is not ill-appearing.   HENT:      Head: Normocephalic and atraumatic.      Mouth/Throat:      Mouth: Mucous membranes are moist.      Pharynx: Oropharynx is clear.   Eyes:      General: No scleral icterus.     Extraocular Movements: Extraocular movements intact.      Conjunctiva/sclera: Conjunctivae normal.   Cardiovascular:      Rate and Rhythm: Normal rate and regular rhythm.      Heart sounds: No murmur heard.  Pulmonary:      Effort: Pulmonary effort is normal. No respiratory distress.      Breath sounds: Normal breath sounds. No wheezing or rales.   Abdominal:      General: Abdomen is flat. Bowel sounds are normal. There is no distension.      Tenderness: There is no abdominal tenderness. There is no guarding.   Musculoskeletal:      Right lower leg: No edema.      Left lower leg: No edema.      Comments: Mild left knee swelling.  Healed left lower extremity wound.  Right heal wound dressing C/D/I   Skin:     General: Skin is warm.      Coloration: Skin is not jaundiced.   Neurological:      General: No focal deficit present.      Mental Status: He is alert and oriented to person, place, and time.   Psychiatric:         Mood and Affect: Mood normal.       Significant Labs: CBC:   Recent Labs   Lab 01/31/24  1538 02/01/24  0508   WBC 12.34 10.65   HGB 8.0* 8.5*   HCT 25.7* 28.0*    381     CMP:   Recent Labs   Lab 01/31/24  1538 02/01/24  0508   * 135*   K 3.4* 3.4*   CL 94* 97   CO2 27 24   * 124*   BUN 7 8   CREATININE 0.8 0.8   CALCIUM 8.4* 8.7   PROT 7.4  --    ALBUMIN 1.8*  --    BILITOT 0.5  --    ALKPHOS 151*  --    AST 33  --    ALT 34  --    ANIONGAP 13 14      Microbiology Results (last 7 days)       Procedure Component Value Units Date/Time    Gram stain [[id number]] Collected: 02/01/24 0740    Order Status: Completed Specimen: Abscess from Leg, Right Updated: 02/01/24 1108     Gram Stain Result Moderate WBC's      No organisms seen    Gram stain [[id number]] Collected: 02/01/24 0821    Order Status: Completed Specimen: Joint Fluid from Knee, Left Updated: 02/01/24 1033     Gram Stain Result Moderate WBC's      No organisms seen    Aerobic culture [[id number]] Collected: 02/01/24 0951    Order Status: Sent Specimen: Knee, Left Updated: 02/01/24 1000    Clostridium difficile EIA [[id number]] Collected: 02/01/24 0131    Order Status: Completed Specimen: Stool Updated: 02/01/24 0933     C. diff Antigen Negative     C difficile Toxins A+B, EIA Negative     Comment: Testing not recommended for children <24 months old.       Culture, Anaerobic [[id number]] Collected: 02/01/24 0821    Order Status: Sent Specimen: Joint Fluid from Knee, Left Updated: 02/01/24 0909    Fungus culture [[id number]] Collected: 02/01/24 0821    Order Status: Sent Specimen: Joint Fluid from Knee, Left Updated: 02/01/24 0908    AFB Culture & Smear [[id number]] Collected: 02/01/24 0821    Order Status: Sent Specimen: Joint Fluid from Knee, Left Updated: 02/01/24 0907    Aerobic culture [[id number]] Collected: 02/01/24 0740    Order Status: Sent Specimen: Abscess from Leg, Right Updated: 02/01/24 0905    Culture, Anaerobic [[id number]] Collected: 02/01/24 0740    Order Status: Sent Specimen: Abscess from Leg, Right Updated: 02/01/24 0905    AFB Culture & Smear [[id number]] Collected: 02/01/24 0740    Order Status: Sent Specimen: Abscess from Leg, Right Updated: 02/01/24 0904    Fungus culture [[id number]] Collected: 02/01/24 0740    Order Status: Sent Specimen: Abscess from Leg, Right Updated: 02/01/24 0904    Stool culture [[id number]]     Order Status: Completed Specimen: Stool     Stool culture  [[id number]]     Order Status: Canceled Specimen: Stool     Blood culture #1 **CANNOT BE ORDERED STAT** [[id number]] Collected: 01/31/24 1538    Order Status: Completed Specimen: Blood from Line, PICC Right Brachial Updated: 02/01/24 0115     Blood Culture, Routine No Growth to date    Blood culture #2 **CANNOT BE ORDERED STAT** [[id number]] Collected: 01/31/24 1538    Order Status: Completed Specimen: Blood from Peripheral, Antecubital, Right Updated: 02/01/24 0115     Blood Culture, Routine No Growth to date          All pertinent labs within the past 24 hours have been reviewed.    Significant Imaging: I have reviewed all pertinent imaging results/findings within the past 24 hours.

## 2024-02-01 NOTE — PT/OT/SLP EVAL
"Physical Therapy Co-Evaluation    Patient Name:  Kulwant Mueller Jr.   MRN:  [id number]    Recommendations:     Discharge Recommendations: High Intensity Therapy   Discharge Equipment Recommendations: to be determined by next level of care   Barriers to discharge: Pt currently requiring increased level of assistance with mobility    Assessment:     Kulwant Mueller Jr. is a 38 y.o. male admitted with a medical diagnosis of Pyogenic arthritis of left knee joint.  He presents with the following impairments/functional limitations: impaired endurance, weakness, gait instability, impaired balance, pain, decreased lower extremity function.    Cooperative and alert. Pt tolerated 1 rep STS RW with 1 person for safety, <30s fair static standing. Pt imminently going to surgery for I&D, thus returned to supine. Pt will benefit from skilled PT services while in house in order to address the aforementioned deficits.      Rehab Prognosis: Good; patient would benefit from acute skilled PT services to address these deficits and reach maximum level of function.    Recent Surgery: Procedure(s) (LRB):  ARTHROSCOPY, KNEE, left, lateral post (Left)  IRRIGATION AND DEBRIDEMENT, LOWER EXTREMITY, right, bilateral extremity drapes, cx swabs, cysto tubing (Right) Day of Surgery    Plan:     During this hospitalization, patient to be seen 4 x/week to address the identified rehab impairments via gait training, therapeutic activities, therapeutic exercises, neuromuscular re-education and progress toward the following goals:    Plan of Care Expires:  03/04/24    Subjective     "Lets just get this over with"    Pain/Comfort:  Pain Rating 1: 0/10    Patients cultural, spiritual, Methodist conflicts given the current situation: no    Living Environment:  Per pt, Patient lives with their spouse in a single story house with no SHANNAN. Pt's bathroom has a walk in shower with built in seat.   Prior to admission, patients level of function was I, " however reported at rehab amb 67' RW.  Equipment used at home: walker, rolling.  DME owned (not currently used): none.  Upon discharge, patient will have assistance from spouse.    Objective:     Communicated with RN prior to session.  Patient found HOB elevated with  (no lines intact)  upon PT entry to room.    General Precautions: Standard, fall, contact  Orthopedic Precautions: N/A   Braces: N/A  Respiratory Status: Room air    Exams:  RLE ROM: WFL  RLE Strength: grossly 3/5  LLE ROM: WFL  LLE Strength: grossly 3-/5    Functional Mobility:  Bed Mobility:     Supine to Sit: stand by assistance  Sit to Supine: stand by assistance  Transfers:     Sit to Stand:  contact guard assistance with rolling walker  Balance:   Good sitting balance  Fair-good static standing balance      AM-PAC 6 CLICK MOBILITY  Total Score:17       Treatment & Education:  Educated pt on PT role/POC  Educated pt on importance of OOB activity and daily ambulation   Pt educated on proper body mechanics, safety techniques, and energy conservation with PT facilitation and cueing throughout session   Pt verbalized understanding      Patient left HOB elevated with call button in reach, RN notified, and OT and transport aide present.    GOALS:   Multidisciplinary Problems       Physical Therapy Goals          Problem: Physical Therapy    Goal Priority Disciplines Outcome Goal Variances Interventions   Physical Therapy Goal     PT, PT/OT Ongoing, Progressing     Description: Goals to be met by: 2024     Patient will increase functional independence with mobility by performin. Supine to sit with Modified May  2. Sit to supine with Modified May  3. Sit to stand transfer with Supervision  4. Bed to chair transfer with Supervision using LRAD  5. Gait  x 100 feet with Supervision using LRAD.                          History:     Past Medical History:   Diagnosis Date    Anticoagulant long-term use     COVID-19 virus infection      Diabetes mellitus     Diabetes mellitus, type 2     Hypertension     May-Thurner syndrome        Past Surgical History:   Procedure Laterality Date    APPENDECTOMY      ARTHROSCOPY OF KNEE Left 1/16/2024    Procedure: ARTHROSCOPY, KNEE, LEFT;  Surgeon: Mandeep Gatica MD;  [address];  Service: Orthopedics;  Laterality: Left;    ARTHROSCOPY OF KNEE Left 1/21/2024    Procedure: ARTHROSCOPY, KNEE, left, supine, slider, lateral post, conmed,;  Surgeon: Derick Sloan MD;  [address];  Service: Orthopedics;  Laterality: Left;    ARTHROTOMY OF KNEE Left 12/29/2023    Procedure: ARTHROTOMY, KNEE;  Surgeon: Edy Bocanegra Jr., MD;  Location: [facility];  Service: Orthopedics;  Laterality: Left;    ESOPHAGOGASTRODUODENOSCOPY N/A 1/31/2022    Procedure: EGD (ESOPHAGOGASTRODUODENOSCOPY);  Surgeon: Cindy Quiros MD;  Location: [facility];  Service: Endoscopy;  Laterality: N/A;    ESOPHAGOGASTRODUODENOSCOPY N/A 3/21/2022    Procedure: EGD (ESOPHAGOGASTRODUODENOSCOPY);  Surgeon: Tejas Mann MD;  Location: [facility];  Service: Endoscopy;  Laterality: N/A;    INCISION AND DRAINAGE FOOT Left 11/28/2021    Procedure: INCISION AND DRAINAGE, FOOT;  Surgeon: Bj Alicea DPM;  Location: [facility];  Service: Podiatry;  Laterality: Left;    INCISION AND DRAINAGE OF THIGH Right 1/21/2024    Procedure: INCISION AND DRAINAGE, THIGH - right,;  Surgeon: Derick Sloan MD;  Location: [address];  Service: Orthopedics;  Laterality: Right;  right, lateral, slider, cysto tubing, 6L NS, betadine, peroxide, vanc powder, 10 [place] winter drain    IRRIGATION AND DEBRIDEMENT OF LOWER EXTREMITY Right 1/21/2024    Procedure: I&D lateral thigh abscess, right, supine, slider, cysto tubing, 6L NS, betadine, peroxide, vanc powder, 10 [place] winter drain,;  Surgeon: Derick Sloan MD;  Location: [facility] OR 2ND FLR;  Service: Orthopedics;  Laterality: Right;    KNEE  ARTHROSCOPY W/ DEBRIDEMENT Left 1/21/2024    Procedure: ARTHROSCOPY, KNEE, WITH DEBRIDEMENT - LEFT, SUPINE, SLIDER, LATERAL POST, CONMED;  Surgeon: Derick Sloan MD;  Location: [address];  Service: Orthopedics;  Laterality: Left;    stents in bilateral legs      TRANSESOPHAGEAL ECHOCARDIOGRAPHY N/A 1/3/2024    Procedure: ECHOCARDIOGRAM, TRANSESOPHAGEAL;  Surgeon: Douglas Doss MD;  Location: [facility] CATH LAB/EP;  Service: Cardiology;  Laterality: N/A;       Time Tracking:     PT Received On: 02/01/24  PT Start Time: 1135     PT Stop Time: 1146  PT Total Time (min): 11 min     Billable Minutes: Evaluation 11    Co-treatment performed due to patient's multiple deficits requiring two skilled therapists to appropriately and safely assess patient's strength and endurance while facilitating functional tasks in addition to accommodating for patient's activity tolerance.            02/01/2024

## 2024-02-01 NOTE — CONSULTS
[facility] - [facility] ([place] [address])  Orthopedics  Consult Note    Patient Name: Kulwant Mueller Jr.  MRN: [id number]  Admission Date: 1/31/2024  Hospital Length of Stay: 0 days  Attending Provider: Soniya Javier*  Primary Care Provider: Shellie, Primary Doctor    Patient information was obtained from patient and ER records.     Inpatient consult to Orthopedic Surgery  Consult performed by: JESUS Junior MD  Consult ordered by: Zeferino Garcia PA-C        Subjective:     Principal Problem:Pyogenic arthritis of left knee joint    Chief Complaint:   Chief Complaint   Patient presents with    Diaphoresis     Arrived [facility] from  [facility] for sepsis rule, staff reports pt diaphoretic throughout the night, pt diagnosed with abscess to right thigh approx x1 month ago, pt states he was also told he needs an MRI        HPI: Kulwant Mueller Jr. is a 38 y.o. 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  Patient had recently been discharged to [facility] and had been receiving daptomycin for previous MRSA positive cultures.  States his knee pain had been improving and denies any drainage from the incisions at his left knee and right thigh.  Reports he has been ambulating well since his discharge.  Denies nausea, vomiting shortness of breath; endorses worsening diarrhea since the beginning of his previous admission two weeks ago.    Past Medical History:   Diagnosis Date    Anticoagulant long-term use     COVID-19 virus infection     Diabetes mellitus     Diabetes mellitus, type 2     Hypertension     May-Thurner syndrome        Past Surgical History:   Procedure  Laterality Date    APPENDECTOMY      ARTHROSCOPY OF KNEE Left 1/16/2024    Procedure: ARTHROSCOPY, KNEE, LEFT;  Surgeon: Mandeep Gatica MD;  Location: [facility] OR 2ND FLR;  Service: Orthopedics;  Laterality: Left;    ARTHROSCOPY OF KNEE Left 1/21/2024    Procedure: ARTHROSCOPY, KNEE, left, supine, slider, lateral post, conmed,;  Surgeon: Derick Sloan MD;  [facility];  Service: Orthopedics;  Laterality: Left;    ARTHROTOMY OF KNEE Left 12/29/2023    Procedure: ARTHROTOMY, KNEE;  Surgeon: Edy Bocanegra Jr., MD;  Location: [facility];  Service: Orthopedics;  Laterality: Left;    ESOPHAGOGASTRODUODENOSCOPY N/A 1/31/2022    Procedure: EGD (ESOPHAGOGASTRODUODENOSCOPY);  Surgeon: Cindy Quiros MD;  Location: [facility];  Service: Endoscopy;  Laterality: N/A;    ESOPHAGOGASTRODUODENOSCOPY N/A 3/21/2022    Procedure: EGD (ESOPHAGOGASTRODUODENOSCOPY);  Surgeon: Tejas Mann MD;  Location: [facility];  Service: Endoscopy;  Laterality: N/A;    INCISION AND DRAINAGE FOOT Left 11/28/2021    Procedure: INCISION AND DRAINAGE, FOOT;  Surgeon: Bj Alicea DPM;  Location: [facility];  Service: Podiatry;  Laterality: Left;    INCISION AND DRAINAGE OF THIGH Right 1/21/2024    Procedure: INCISION AND DRAINAGE, THIGH - right,;  Surgeon: Derick Sloan MD;  [facility];  Service: Orthopedics;  Laterality: Right;  right, lateral, slider, cysto tubing, 6L NS, betadine, peroxide, vanc powder, 10 [place] winter drain    IRRIGATION AND DEBRIDEMENT OF LOWER EXTREMITY Right 1/21/2024    Procedure: I&D lateral thigh abscess, right, supine, slider, cysto tubing, 6L NS, betadine, peroxide, vanc powder, 10 [place] winter drain,;  Surgeon: Derick Sloan MD;  [facility];  Service: Orthopedics;  Laterality: Right;    KNEE ARTHROSCOPY W/ DEBRIDEMENT Left 1/21/2024    Procedure: ARTHROSCOPY, KNEE, WITH DEBRIDEMENT - LEFT, SUPINE, SLIDER, LATERAL POST, CONMED;   Surgeon: Derick Sloan MD;  [facility] [address] [place] [address];  Service: Orthopedics;  Laterality: Left;    stents in bilateral legs      TRANSESOPHAGEAL ECHOCARDIOGRAPHY N/A 1/3/2024    Procedure: ECHOCARDIOGRAM, TRANSESOPHAGEAL;  Surgeon: Douglas Doss MD;  Location: [facility] CATH LAB/EP;  Service: Cardiology;  Laterality: N/A;       Review of patient's allergies indicates:   Allergen Reactions    Heparin analogues Nausea And Vomiting       Current Facility-Administered Medications   Medication    acetaminophen tablet 650 mg    DAPTOmycin (CUBICIN) 945 mg in sodium chloride 0.9% SolP 50 mL IVPB    dextrose 10% bolus 125 mL 125 mL    dextrose 10% bolus 250 mL 250 mL    glucagon (human recombinant) injection 1 mg    glucose chewable tablet 16 g    glucose chewable tablet 24 g    insulin aspart U-100 pen 0-5 Units    methocarbamoL tablet 500 mg    metoprolol succinate (TOPROL-XL) 24 hr split tablet 12.5 mg    naloxone 0.4 mg/mL injection 0.02 mg    oxyCODONE immediate release tablet 5 mg    oxyCODONE immediate release tablet Tab 10 mg    prochlorperazine tablet 10 mg    rivaroxaban tablet 10 mg    sodium chloride 0.9% flush 10 mL     Family History       Problem Relation (Age of Onset)    Cancer Mother, Paternal Uncle, Paternal Grandfather, Maternal Grandfather    Irritable bowel syndrome Paternal Grandfather          Tobacco Use    Smoking status: Former     Types: Cigarettes    Smokeless tobacco: Former    Tobacco comments:     occasionally    Substance and Sexual Activity    Alcohol use: Yes     Comment: occ    Drug use: No    Sexual activity: Not on file     ROS  Constitutional: Denies fever/chills  Eyes: Denies change in vision  ENT: Denies sore throat or rhinorrhea   Respiratory: Denies shortness of breath or cough  Cardiovascular: Denies chest pain or palpitations  Gastrointestinal: Denies abdominal pain, nausea, or vomiting  Genitourinary: Denies dysuria and flank pain  Skin: Denies new rash or skin  "lesions   Allergic/Immunologic: Denies adverse reactions to current medications  Neurological: Denies headaches or dizziness  Musculoskeletal: see HPI    Objective:     Vital Signs (Most Recent):  Temp: 98.1 °F (36.7 °C) (02/01/24 0733)  Pulse: 94 (02/01/24 0733)  Resp: 17 (02/01/24 0847)  BP: 135/78 (02/01/24 0733)  SpO2: (!) 94 % (02/01/24 0733) Vital Signs (24h Range):  Temp:  [98 °F (36.7 °C)-99.5 °F (37.5 °C)] 98.1 °F (36.7 °C)  Pulse:  [] 94  Resp:  [16-20] 17  SpO2:  [94 %-98 %] 94 %  BP: (114-168)/(72-98) 135/78     Weight: 106.7 kg (235 lb 3.7 oz)  Height: 6' 1" (185.4 cm)  Body mass index is 31.03 kg/m².      Intake/Output Summary (Last 24 hours) at 2/1/2024 1036  Last data filed at 2/1/2024 0835  Gross per 24 hour   Intake 440 ml   Output --   Net 440 ml        Ortho/SPM Exam  Physical Exam:  General:  no apparent distress, WDWN  HENT:  NCAT, Bilateral ears/eyes normal  CV:  Normal pulses, color, and cap refill  Lungs:  Normal respiratory effort  Neuro: No FND, awake, alert  Psych:  Oriented to Person, Place, Time, and Situation    MSK:       LUE:  Inspection: Skin intact throughout, no swelling, no effusions, no ecchymosis   Palpation: Non-TTP throughout, no palpable abnormality.   ROM: AROM and PROM of the shoulder, elbow, wrist, and hand intact without pain.   Neuro: AIN/PIN/Radial/Median/Ulnar Nerves assessed in isolation without deficit.   SILT throughout.    Vascular: Radial artery palpated 2+. Capillary refill <3s.         RUE:  Inspection: Skin intact throughout, no swelling, no effusions, no ecchymosis   Palpation: Non-TTP throughout, no palpable abnormality.   ROM: AROM and PROM of the shoulder, elbow, wrist, and hand intact without pain.   Neuro: AIN/PIN/Radial/Median/Ulnar Nerves assessed in isolation without deficit.   SILT throughout.    Vascular: Radial artery palpated 2+. Capillary refill <3s.         LLE:  Inspection: Incisions at the anterior knee appear C/D/I  Obvious swelling of " the knee   Palpation: TTP at anterior knee; otherwise non-TTP throughout.  Compartments are soft and compressible  Palpable effusion and warmth at the knee   ROM: AROM and PROM of the hip, ankle, foot intact without pain.  Pain with terminal flexion of the knee   Neuro: TA/Gastroc/EHL/FHL assessed in isolation without deficit.   SILT Sa/Naranjo/DP/SP/T nerve distributions   Vascular: DP and PT arteries palpated 2+. Capillary refill <3s.         RLE:  Inspection: Skin intact throughout, incision along the lateral thigh appears C/D/I  No appreciable drainage from prior incision   Palpation: non-TTP throughout.  Palpable fluid collection along the lateral thigh  Compartments are soft and easily compressible   ROM: AROM and PROM of the hip, knee, ankle, foot intact without pain.   Neuro: TA/Gastroc/EHL/FHL assessed in isolation without deficit.   SILT Sa/Naranjo/DP/SP/T nerve distributions   Vascular: DP and PT arteries palpated 2+. Capillary refill <3s.        Spine/pelvis/axial body:  No tenderness to palpation of cervical, thoracic, or lumbar spine  Stable and without pain with direct anterior pressure over ASIS.  No chest wall or abdominal tenderness  Muscle tone normal      Significant Labs: All pertinent labs within the past 24 hours have been reviewed.    Significant Imaging: I have reviewed and interpreted all pertinent imaging results/findings.  MRI of the left knee showing a large effusion; no obvious fractures or ligamentous injuries appreciated  MRI of the right with contrast showing large fluid collection tracking along the iliotibial band  Assessment/Plan:     * Pyogenic arthritis of left knee joint  Kulwant Mueller  is a 38 y.o. male with a history of MRSA bacteremia,  recent left knee septic arthritis and right thigh abscess s/p L knee arthroscopic I&D on 1/17, s/p repeat L knee I&D and right thigh abscess I&D on 1/21, admitted for infectious workup after 1 day of worsening fevers/chills. Ortho consulted for  rule out septic arthritis of the left knee joint. Pertinent physical exam findings include palpable effusion of the left knee in addition to a palpable fluid collection at the lateral aspect of the right thigh. The left knee and right thigh were aspirated at the bedside - see procedure note for further details.      Based on synovial fluid out analysis, patient appears to have recurrence of a septic arthritis of the left knee.  We will plan to keep him NPO for repeat I&D of the left knee and right thigh today (2/1)    Joint Fluid Analysis:  Cultures pending, will continue to follow   Lab Results   Component Value Date    WBCBF 16550 02/01/2024    SEGS 88 01/19/2024    CRYST Negative 02/01/2024    BODYFLUIDTYP Joint 02/01/2024     Lab Results   Component Value Date    LABGRAM Moderate WBC's 02/01/2024    LABGRAM No organisms seen 02/01/2024     Lab Results   Component Value Date    SEDRATE 76 (H) 01/31/2024    .7 (H) 01/31/2024    WBC 10.65 02/01/2024    LACTATE 0.8 01/31/2024         Procedure Note:  After consent was obtained, using sterile technique the left knee was prepped and the joint was entered from from the superolateral approach.  Ten ml's of cloudy straw-colored fluid was withdrawn and sent for analysis and culture.  Attention was then turned to the fluid collection of the right anterior lateral thigh; 18 gauge needle was introduced in the fluid collection, at approximately 35 cc a brownish yellow fluid was collected.  The procedure was well tolerated, and no complications were noted.          JESUS Junior MD  Orthopedics  [facility] ([place])

## 2024-02-01 NOTE — PROGRESS NOTES
"Admission Medication Reconciliation - Pharmacy Consult Note    The home medication history was taken by Suly Jack PharmD.  Based on information gathered and subsequent review by the clinical pharmacist, the items below may need attention.    You may go to "Admission" then "Reconcile Home Medications" tabs to review and/or act upon these items.      Duplicate therapy:    Insulin glargine 25 units daily at rehab hospital --> Tresiba 56 units daily at home       Potential issues to be addressed PRIOR TO DISCHARGE  Reconciling medications from rehab hospital to home meds    Please address this information as you see fit.  Feel free to contact us if you have any questions or require assistance.    Suly Jack PharmD, BCPS  Clinical Pharmacist - Internal Medicine   [id number]    "

## 2024-02-01 NOTE — SUBJECTIVE & OBJECTIVE
Past Medical History:   Diagnosis Date    Anticoagulant long-term use     COVID-19 virus infection     Diabetes mellitus     Diabetes mellitus, type 2     Hypertension     May-Thurner syndrome        Past Surgical History:   Procedure Laterality Date    APPENDECTOMY      ARTHROSCOPY OF KNEE Left 1/16/2024    Procedure: ARTHROSCOPY, KNEE, LEFT;  Surgeon: Mandeep Gatica MD;  [address];  Service: Orthopedics;  Laterality: Left;    ARTHROSCOPY OF KNEE Left 1/21/2024    Procedure: ARTHROSCOPY, KNEE, left, supine, slider, lateral post, conmed,;  Surgeon: Derick Sloan MD;  Location: [address];  Service: Orthopedics;  Laterality: Left;    ARTHROTOMY OF KNEE Left 12/29/2023    Procedure: ARTHROTOMY, KNEE;  Surgeon: Edy Bocanegra Jr., MD;  Location: [facility];  Service: Orthopedics;  Laterality: Left;    ESOPHAGOGASTRODUODENOSCOPY N/A 1/31/2022    Procedure: EGD (ESOPHAGOGASTRODUODENOSCOPY);  Surgeon: Cindy Quiros MD;  Location: [facility];  Service: Endoscopy;  Laterality: N/A;    ESOPHAGOGASTRODUODENOSCOPY N/A 3/21/2022    Procedure: EGD (ESOPHAGOGASTRODUODENOSCOPY);  Surgeon: Tejas Mann MD;  Location: [facility];  Service: Endoscopy;  Laterality: N/A;    INCISION AND DRAINAGE FOOT Left 11/28/2021    Procedure: INCISION AND DRAINAGE, FOOT;  Surgeon: Bj Alicea DPM;  Location: [facility];  Service: Podiatry;  Laterality: Left;    INCISION AND DRAINAGE OF THIGH Right 1/21/2024    Procedure: INCISION AND DRAINAGE, THIGH - right,;  Surgeon: Derick Sloan MD;  [address];  Service: Orthopedics;  Laterality: Right;  right, lateral, slider, cysto tubing, 6L NS, betadine, peroxide, vanc powder, 10 [place] winter drain    IRRIGATION AND DEBRIDEMENT OF LOWER EXTREMITY Right 1/21/2024    Procedure: I&D lateral thigh abscess, right, supine, slider, cysto tubing, 6L NS, betadine, peroxide, vanc powder, 10 [place] winter drain,;  Surgeon: Nathalia  Derick CAMPOS MD;  [facility];  Service: Orthopedics;  Laterality: Right;    KNEE ARTHROSCOPY W/ DEBRIDEMENT Left 1/21/2024    Procedure: ARTHROSCOPY, KNEE, WITH DEBRIDEMENT - LEFT, SUPINE, SLIDER, LATERAL POST, CONMED;  Surgeon: Derick Sloan MD;  [facility];  Service: Orthopedics;  Laterality: Left;    stents in bilateral legs      TRANSESOPHAGEAL ECHOCARDIOGRAPHY N/A 1/3/2024    Procedure: ECHOCARDIOGRAM, TRANSESOPHAGEAL;  Surgeon: Douglas Doss MD;  Location: [facility] CATH LAB/EP;  Service: Cardiology;  Laterality: N/A;       Review of patient's allergies indicates:   Allergen Reactions    Heparin analogues Nausea And Vomiting       Current Facility-Administered Medications on File Prior to Encounter   Medication    [DISCONTINUED] GENERIC EXTERNAL MEDICATION    [DISCONTINUED] GENERIC EXTERNAL MEDICATION     Current Outpatient Medications on File Prior to Encounter   Medication Sig    blood-glucose meter,continuous (DEXCOM G7 ) Misc 1 Device by Misc.(Non-Drug; Combo Route) route once daily.    blood-glucose sensor (DEXCOM G7 SENSOR) Justyna 1 Device by Misc.(Non-Drug; Combo Route) route every 10 days.    empagliflozin (JARDIANCE) 25 mg tablet Take 1 tablet (25 mg total) by mouth once daily.    furosemide (LASIX) 20 MG tablet Take 1 tablet (20 mg total) by mouth once daily.    glucagon (GVOKE HYPOPEN 2-PACK) 1 mg/0.2 mL AtIn Inject 1 mg into the skin as needed (SEVERE HYPOGLYCEMIA).    insulin aspart U-100 (NOVOLOG U-100 INSULIN ASPART) 100 unit/mL injection Inject 14 Units into the skin 3 (three) times daily before meals.    methocarbamoL (ROBAXIN) 500 MG Tab Take 1 tablet (500 mg total) by mouth 4 (four) times daily as needed (muscle spasm).    metoclopramide HCl (REGLAN) 10 MG tablet Take 0.5 tablets (5 mg total) by mouth 3 (three) times daily before meals.    metoprolol succinate (TOPROL-XL) 25 MG 24 hr tablet Take 0.5 tablets (12.5 mg total) by mouth once  "daily.    oxyCODONE (ROXICODONE) 10 mg Tab immediate release tablet Take 1 tablet (10 mg total) by mouth every 4 (four) hours as needed for Pain.    oxyCODONE (ROXICODONE) 5 MG immediate release tablet Take 1 tablet (5 mg total) by mouth every 4 (four) hours as needed for Pain.    pantoprazole (PROTONIX) 40 MG tablet Take 1 tablet (40 mg total) by mouth once daily.    pen needle, diabetic (BD ULTRA-FINE DIYA PEN NEEDLE) 32 gauge x 5/32" Ndle Use with Tresiba daily and Humalog 3-4 times daily as directed.    prochlorperazine (COMPAZINE) 10 MG tablet Take 1 tablet (10 mg total) by mouth 3 (three) times daily as needed (nausea or vomiting).    promethazine (PHENERGAN) 25 MG suppository Place 1 suppository (25 mg total) rectally every 6 (six) hours as needed for Nausea.    rivaroxaban (XARELTO) 10 mg Tab Take 1 tablet (10 mg total) by mouth daily with dinner or evening meal.    scopolamine (TRANSDERM-SCOP) 1.3-1.5 mg (1 mg over 3 days) Place 1 patch onto the skin every 72 hours as needed (intractable nausea/vomiting).    sodium chloride 0.9% SolP 50 mL with DAPTOmycin 500 mg SolR 947 mg Inject 947 mg into the vein once daily.    TRESIBA FLEXTOUCH U-200 200 unit/mL (3 mL) insulin pen Inject 56 Units into the skin once daily.    [DISCONTINUED] DEXCOM G6 TRANSMITTER Justyna CHANGE TRANSMITTER EVERY 90 DAYS.     Family History       Problem Relation (Age of Onset)    Cancer Mother, Paternal Uncle, Paternal Grandfather, Maternal Grandfather    Irritable bowel syndrome Paternal Grandfather          Tobacco Use    Smoking status: Former     Types: Cigarettes    Smokeless tobacco: Former    Tobacco comments:     occasionally    Substance and Sexual Activity    Alcohol use: Yes     Comment: occ    Drug use: No    Sexual activity: Not on file     Review of Systems   Constitutional:  Negative for appetite change, chills, fatigue and fever.   HENT:  Negative for congestion and sore throat.    Eyes:  Negative for visual disturbance. "   Respiratory:  Negative for cough, chest tightness, shortness of breath and wheezing.    Cardiovascular:  Negative for chest pain, palpitations and leg swelling.   Gastrointestinal:  Positive for diarrhea. Negative for abdominal pain, constipation, nausea and vomiting.   Genitourinary:  Negative for dysuria, flank pain, frequency and hematuria.   Musculoskeletal:  Positive for arthralgias. Negative for back pain and myalgias.   Skin:  Negative for pallor, rash and wound.   Neurological:  Negative for seizures, light-headedness, numbness and headaches.   Psychiatric/Behavioral:  The patient is not nervous/anxious.      Objective:     Vital Signs (Most Recent):  Temp: 98 °F (36.7 °C) (02/01/24 0445)  Pulse: 98 (02/01/24 0445)  Resp: 16 (02/01/24 0445)  BP: (!) 141/78 (02/01/24 0445)  SpO2: 97 % (02/01/24 0445) Vital Signs (24h Range):  Temp:  [98 °F (36.7 °C)-99.5 °F (37.5 °C)] 98 °F (36.7 °C)  Pulse:  [] 98  Resp:  [16-20] 16  SpO2:  [95 %-98 %] 97 %  BP: (114-168)/(72-98) 141/78     Weight: 106.7 kg (235 lb 3.7 oz)  Body mass index is 31.03 kg/m².     Physical Exam  Constitutional:       General: He is not in acute distress.     Appearance: Normal appearance. He is not ill-appearing.   HENT:      Head: Normocephalic and atraumatic.      Mouth/Throat:      Mouth: Mucous membranes are moist.      Pharynx: Oropharynx is clear. No oropharyngeal exudate.   Eyes:      General: No scleral icterus.     Extraocular Movements: Extraocular movements intact.      Pupils: Pupils are equal, round, and reactive to light.   Cardiovascular:      Rate and Rhythm: Normal rate and regular rhythm.      Heart sounds: No murmur heard.  Pulmonary:      Effort: Pulmonary effort is normal.      Breath sounds: No wheezing or rales.   Abdominal:      General: Abdomen is flat. There is no distension.      Palpations: Abdomen is soft. There is no mass.      Tenderness: There is no abdominal tenderness. There is no right CVA tenderness or  left CVA tenderness.   Musculoskeletal:         General: Swelling present. No tenderness.      Cervical back: Normal range of motion. No tenderness.      Right lower leg: No edema.      Left lower leg: No edema.      Comments: Left knee with mild swelling   Lymphadenopathy:      Cervical: No cervical adenopathy.   Skin:     General: Skin is warm and dry.      Coloration: Skin is not jaundiced.      Findings: No bruising.   Neurological:      General: No focal deficit present.      Mental Status: He is alert and oriented to person, place, and time.      Cranial Nerves: No cranial nerve deficit.            Significant Labs: All pertinent labs within the past 24 hours have been reviewed.    Significant Imaging: I have reviewed all pertinent imaging results/findings within the past 24 hours.

## 2024-02-02 LAB
ANION GAP SERPL CALC-SCNC: 13 MMOL/L (ref 8–16)
BASOPHILS # BLD AUTO: 0.01 K/UL (ref 0–0.2)
BASOPHILS NFR BLD: 0.1 % (ref 0–1.9)
BUN SERPL-MCNC: 13 MG/DL (ref 6–20)
CALCIUM SERPL-MCNC: 8.4 MG/DL (ref 8.7–10.5)
CHLORIDE SERPL-SCNC: 98 MMOL/L (ref 95–110)
CO2 SERPL-SCNC: 23 MMOL/L (ref 23–29)
CREAT SERPL-MCNC: 0.8 MG/DL (ref 0.5–1.4)
CRP SERPL-MCNC: 93.2 MG/L (ref 0–8.2)
DIFFERENTIAL METHOD BLD: ABNORMAL
EOSINOPHIL # BLD AUTO: 0 K/UL (ref 0–0.5)
EOSINOPHIL NFR BLD: 0 % (ref 0–8)
ERYTHROCYTE [DISTWIDTH] IN BLOOD BY AUTOMATED COUNT: 14.7 % (ref 11.5–14.5)
EST. GFR  (NO RACE VARIABLE): >60 ML/MIN/1.73 M^2
GLUCOSE SERPL-MCNC: 175 MG/DL (ref 70–110)
HCT VFR BLD AUTO: 26.7 % (ref 40–54)
HGB BLD-MCNC: 7.9 G/DL (ref 14–18)
IMM GRANULOCYTES # BLD AUTO: 0.04 K/UL (ref 0–0.04)
IMM GRANULOCYTES NFR BLD AUTO: 0.4 % (ref 0–0.5)
LYMPHOCYTES # BLD AUTO: 1 K/UL (ref 1–4.8)
LYMPHOCYTES NFR BLD: 9.4 % (ref 18–48)
MAGNESIUM SERPL-MCNC: 2.2 MG/DL (ref 1.6–2.6)
MCH RBC QN AUTO: 25.8 PG (ref 27–31)
MCHC RBC AUTO-ENTMCNC: 29.6 G/DL (ref 32–36)
MCV RBC AUTO: 87 FL (ref 82–98)
MONOCYTES # BLD AUTO: 0.7 K/UL (ref 0.3–1)
MONOCYTES NFR BLD: 6.7 % (ref 4–15)
NEUTROPHILS # BLD AUTO: 8.5 K/UL (ref 1.8–7.7)
NEUTROPHILS NFR BLD: 83.4 % (ref 38–73)
NRBC BLD-RTO: 0 /100 WBC
PHOSPHATE SERPL-MCNC: 3.4 MG/DL (ref 2.7–4.5)
PLATELET # BLD AUTO: 395 K/UL (ref 150–450)
PMV BLD AUTO: 10.5 FL (ref 9.2–12.9)
POCT GLUCOSE: 108 MG/DL (ref 70–110)
POCT GLUCOSE: 207 MG/DL (ref 70–110)
POCT GLUCOSE: 213 MG/DL (ref 70–110)
POCT GLUCOSE: 228 MG/DL (ref 70–110)
POCT GLUCOSE: 231 MG/DL (ref 70–110)
POTASSIUM SERPL-SCNC: 4.7 MMOL/L (ref 3.5–5.1)
RBC # BLD AUTO: 3.06 M/UL (ref 4.6–6.2)
SODIUM SERPL-SCNC: 134 MMOL/L (ref 136–145)
WBC # BLD AUTO: 10.18 K/UL (ref 3.9–12.7)

## 2024-02-02 PROCEDURE — 83735 ASSAY OF MAGNESIUM: CPT

## 2024-02-02 PROCEDURE — 11000001 HC ACUTE MED/SURG PRIVATE ROOM

## 2024-02-02 PROCEDURE — 25000003 PHARM REV CODE 250: Performed by: EMERGENCY MEDICINE

## 2024-02-02 PROCEDURE — 63600175 PHARM REV CODE 636 W HCPCS: Performed by: EMERGENCY MEDICINE

## 2024-02-02 PROCEDURE — 25000003 PHARM REV CODE 250: Performed by: INTERNAL MEDICINE

## 2024-02-02 PROCEDURE — 97530 THERAPEUTIC ACTIVITIES: CPT | Mod: CQ

## 2024-02-02 PROCEDURE — 36415 COLL VENOUS BLD VENIPUNCTURE: CPT

## 2024-02-02 PROCEDURE — 25000003 PHARM REV CODE 250

## 2024-02-02 PROCEDURE — 80048 BASIC METABOLIC PNL TOTAL CA: CPT

## 2024-02-02 PROCEDURE — 63600175 PHARM REV CODE 636 W HCPCS: Performed by: INTERNAL MEDICINE

## 2024-02-02 PROCEDURE — 27000207 HC ISOLATION

## 2024-02-02 PROCEDURE — 84100 ASSAY OF PHOSPHORUS: CPT

## 2024-02-02 PROCEDURE — 86140 C-REACTIVE PROTEIN: CPT

## 2024-02-02 PROCEDURE — 36415 COLL VENOUS BLD VENIPUNCTURE: CPT | Mod: XB

## 2024-02-02 PROCEDURE — 99233 SBSQ HOSP IP/OBS HIGH 50: CPT | Mod: ,,, | Performed by: INTERNAL MEDICINE

## 2024-02-02 PROCEDURE — 63600175 PHARM REV CODE 636 W HCPCS

## 2024-02-02 PROCEDURE — 85025 COMPLETE CBC W/AUTO DIFF WBC: CPT

## 2024-02-02 PROCEDURE — 97110 THERAPEUTIC EXERCISES: CPT | Mod: CQ

## 2024-02-02 RX ADMIN — PIPERACILLIN SODIUM AND TAZOBACTAM SODIUM 4.5 G: 4; .5 INJECTION, POWDER, FOR SOLUTION INTRAVENOUS at 09:02

## 2024-02-02 RX ADMIN — OXYCODONE HYDROCHLORIDE 10 MG: 10 TABLET ORAL at 04:02

## 2024-02-02 RX ADMIN — CEFAZOLIN 2 G: 2 INJECTION, POWDER, FOR SOLUTION INTRAMUSCULAR; INTRAVENOUS at 04:02

## 2024-02-02 RX ADMIN — OXYCODONE HYDROCHLORIDE 10 MG: 10 TABLET ORAL at 11:02

## 2024-02-02 RX ADMIN — RIVAROXABAN 10 MG: 10 TABLET, FILM COATED ORAL at 04:02

## 2024-02-02 RX ADMIN — INSULIN ASPART 2 UNITS: 100 INJECTION, SOLUTION INTRAVENOUS; SUBCUTANEOUS at 04:02

## 2024-02-02 RX ADMIN — OXYCODONE HYDROCHLORIDE 5 MG: 5 TABLET ORAL at 02:02

## 2024-02-02 RX ADMIN — INSULIN ASPART 2 UNITS: 100 INJECTION, SOLUTION INTRAVENOUS; SUBCUTANEOUS at 12:02

## 2024-02-02 RX ADMIN — INSULIN ASPART 2 UNITS: 100 INJECTION, SOLUTION INTRAVENOUS; SUBCUTANEOUS at 09:02

## 2024-02-02 RX ADMIN — CHOLECALCIFEROL TAB 25 MCG (1000 UNIT) 1000 UNITS: 25 TAB at 09:02

## 2024-02-02 RX ADMIN — METOPROLOL SUCCINATE 12.5 MG: 25 TABLET, EXTENDED RELEASE ORAL at 09:02

## 2024-02-02 RX ADMIN — ASPIRIN 81 MG CHEWABLE TABLET 81 MG: 81 TABLET CHEWABLE at 09:02

## 2024-02-02 RX ADMIN — OXYCODONE HYDROCHLORIDE 10 MG: 10 TABLET ORAL at 09:02

## 2024-02-02 RX ADMIN — PIPERACILLIN SODIUM AND TAZOBACTAM SODIUM 4.5 G: 4; .5 INJECTION, POWDER, FOR SOLUTION INTRAVENOUS at 11:02

## 2024-02-02 RX ADMIN — DAPTOMYCIN 945 MG: 350 INJECTION, POWDER, LYOPHILIZED, FOR SOLUTION INTRAVENOUS at 09:02

## 2024-02-02 NOTE — PROGRESS NOTES
[facility] Surg ([address] Medicine  Progress Note    Patient Name: Kulwant Mueller Jr.  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 Per chart review rehab facility was concern for orthostasis and administered IVF. Rehab staff were also concerned for worsening infection, reporting diaphoresis at nighttime and thus recommended evaluation with imaging at [facility]. On interview, patient states that he feels fine and denies any complaints except for diarrhea.    On presentation he was afebrile but tachycardic to 110 with /98.  Labs showed WBC 12.3, Hgb 8.0, platelets 396, ESR 76 .7, Na 134, K+ 3.4, .     MRI of the knee showed persistent septic joint and seemingly new early changes of osteomyelitis.    MRI of the pelvis showed improved fluid around the left hip but worsening fluid enhancement in the right myofascial planes and gluteal muscles.    C difficile testing was ordered in the emergency department.  He was admitted to Hospital Medicine for orthopedic surgery and Infectious Disease evaluation.    Overview/Hospital Course:  Arrived to [facility] on 1/31 for lightheadedness, fevers, and chills, found to have L septic knee joing and abscess in lateral R thigh. Underwent joint washout and I&D on 2/1 with orthopedic surgery. BCX grew Acinetobacter baumanii in one  bottle. Infectious disease consulted, continued Daptomycin due to previous MRSA infections in joint washouts, added Zosyn for A. Baumanii, recommended PICC removal and holiday.     Interval History: Pain well controlled postoperatively. Afebrile. Passing gas. No chills, abdominal pain, or chest pain.     Review of Systems   Constitutional:  Negative for appetite change, chills, fatigue and fever.   HENT:  Negative for congestion, mouth sores, nosebleeds and tinnitus.    Eyes:  Negative for discharge and itching.   Respiratory:  Negative for cough, chest tightness and shortness of breath.    Cardiovascular:  Negative for chest pain and leg swelling.   Gastrointestinal:  Negative for abdominal distention and abdominal pain.   Endocrine: Negative for cold intolerance and heat intolerance.   Genitourinary:  Negative for dysuria and frequency.   Musculoskeletal:  Negative for arthralgias, joint swelling and neck pain.   Skin:  Negative for color change and pallor.   Allergic/Immunologic: Negative for environmental allergies and food allergies.   Neurological:  Negative for dizziness and light-headedness.   Hematological:  Negative for adenopathy.   Psychiatric/Behavioral:  Negative for agitation, behavioral problems and confusion.      Objective:     Vital Signs (Most Recent):  Temp: 98.2 °F (36.8 °C) (02/02/24 0808)  Pulse: 81 (02/02/24 0808)  Resp: (P) 18 (02/02/24 1140)  BP: (!) 143/80 (02/02/24 0935)  SpO2: 96 % (02/02/24 0808) Vital Signs (24h Range):  Temp:  [97.7 °F (36.5 °C)-98.3 °F (36.8 °C)] 98.2 °F (36.8 °C)  Pulse:  [79-91] 81  Resp:  [12-18] 18  SpO2:  [95 %-100 %] 96 %  BP: (104-143)/(58-80) 143/80     Weight: 106.7 kg (235 lb 3.7 oz)  Body mass index is 31.03 kg/m².    Intake/Output Summary (Last 24 hours) at 2/2/2024 1141  Last data filed at 2/2/2024 0557  Gross per 24 hour   Intake 2210 ml   Output 2640 ml   Net -430 ml         Physical Exam  Constitutional:       General: He is not in acute distress.      Appearance: Normal appearance. He is not ill-appearing.   HENT:      Head: Normocephalic and atraumatic.      Right Ear: External ear normal.      Left Ear: External ear normal.      Nose: Nose normal.      Mouth/Throat:      Mouth: Mucous membranes are moist.      Pharynx: Oropharynx is clear.   Eyes:      Extraocular Movements: Extraocular movements intact.      Pupils: Pupils are equal, round, and reactive to light.   Cardiovascular:      Rate and Rhythm: Normal rate and regular rhythm.      Pulses: Normal pulses.      Heart sounds: Normal heart sounds.   Pulmonary:      Effort: Pulmonary effort is normal.      Breath sounds: Normal breath sounds.   Abdominal:      General: Abdomen is flat. There is no distension.      Palpations: Abdomen is soft.      Tenderness: There is no abdominal tenderness.   Musculoskeletal:         General: No swelling. Normal range of motion.      Cervical back: Normal range of motion and neck supple.      Comments: Dressing in place LLE, no erythema noted near edges  Able to bend L knee    Skin:     General: Skin is warm and dry.      Capillary Refill: Capillary refill takes less than 2 seconds.   Neurological:      General: No focal deficit present.      Mental Status: He is alert and oriented to person, place, and time.   Psychiatric:         Mood and Affect: Mood normal.         Behavior: Behavior normal.             Significant Labs: All pertinent labs within the past 24 hours have been reviewed.  CBC:   Recent Labs   Lab 01/31/24  1538 02/01/24  0508 02/02/24  0352   WBC 12.34 10.65 10.18   HGB 8.0* 8.5* 7.9*   HCT 25.7* 28.0* 26.7*    381 395     CMP:   Recent Labs   Lab 01/31/24  1538 02/01/24  0508 02/02/24  0352   * 135* 134*   K 3.4* 3.4* 4.7   CL 94* 97 98   CO2 27 24 23   * 124* 175*   BUN 7 8 13   CREATININE 0.8 0.8 0.8   CALCIUM 8.4* 8.7 8.4*   PROT 7.4  --   --    ALBUMIN 1.8*  --   --    BILITOT 0.5  --   --    ALKPHOS 151*  --   --    AST 33  --    --    ALT 34  --   --    ANIONGAP 13 14 13       Significant Imaging: I have reviewed all pertinent imaging results/findings within the past 24 hours.    Assessment/Plan:      * Pyogenic arthritis of left knee joint  See Staphylococcal arthritis of left knee       Hypokalemia  Patient has hypokalemia which is Acute and currently controlled. Most recent potassium levels reviewed-   Lab Results   Component Value Date    K 4.7 02/02/2024   . Will continue potassium replacement per protocol and recheck repeat levels after replacement completed.     HFrEF (heart failure with reduced ejection fraction)  Patient with known history of HFrEF. Does not appear to be in fluid overload on clinical examination.  Most recent echocardiogram:    Left Ventricle: The left ventricle is normal in size. There is concentric remodeling. Regional wall motion abnormalities present. See diagram for wall motion findings. There is reduced systolic function. Biplane (2D) method of discs ejection fraction is 48%. There is normal diastolic function.    Right Ventricle: Normal right ventricular cavity size. Systolic function is normal. TAPSE is 2.54 cm.    Normal left atrial size. The left atrium volume index is 21.2 mL/m2.    Normal right atrial size.    The aortic valve is structurally normal. There is normal leaflet mobility.    The mitral valve is structurally normal. There is normal leaflet mobility.    The tricuspid valve is structurally normal. There is normal leaflet mobility.    IVC/SVC: Elevated venous pressure at 15 mmHg.    Overall the study quality was technically difficult.    - assess volume status daily  - HOLD home GDMT in the setting of possible infection and resume as appropriate      Diarrhea  Reports ongoing watery diarrhea for the past few days prior to discharge. Given his ongoing antibiotic administration, there is possibility of infectious etiology.    - follow-up C difficile testing  - once C. difficile was negative, can  initiate antidiarrheal agents      Abscess of right thigh  - See staphylococcal arthritis of the left knee      Staphylococcal arthritis of left knee  Patient with multiple admissions for septic joint complicated by MRSA bacteremia and status post orthopedic intervention, on daptomycin prior to presentation found to have imaging concerns of worsening right thigh abscess and new concerns for left knee osteomyelitis. Previous expected end date of daptomycin was 03/03/24.  He denies worsening pain or subjective fevers.  No leukocytosis. ESR and CRP with mild elevation from prior.  S/p L knee washout on 2/1 and I&D of R thigh abscess. Cultures pending.   BCX from 1/31 grew Acinetobacter baumanii on 2/2 - Infectious Disease c/s    PLAN:  - continue daptomycin, Zosyn  - orthopedic surgery consulted; s/p washout, trend CRP  - infectious disease consulted - started Zosyn, continue Dapto, remove PICC line (holiday over weekend) - will adjust as sensitivities return   - follow-up repeat blood cultures  - PT/OT      May-Thurner syndrome  Known history of hypercoagulable state.  Reported heparin analog allergy.    - continue home rivaroxaban      Hypertension associated with diabetes  - hold home antihypertensives in the setting of possible infection      Type 2 diabetes mellitus with complication, with long-term current use of insulin  - LDSSI initiated  - up titrate as appropriate        VTE Risk Mitigation (From admission, onward)           Ordered     rivaroxaban tablet 10 mg  With dinner         01/31/24 4644                    Discharge Planning   RAEANN: 2/4/2024     Code Status: Full Code   Is the patient medically ready for discharge?: No    Reason for patient still in hospital (select all that apply): Patient trending condition, Treatment, and Consult recommendations  Discharge Plan A: Rehab                  Stephanie Lunsford MD  Department of Hospital Medicine   [facility] - Med Surg ([place])

## 2024-02-02 NOTE — ANESTHESIA POSTPROCEDURE EVALUATION
Anesthesia Post Evaluation    Patient: Kulwant Mueller Jr.    Procedure(s) Performed: Procedure(s) (LRB):  ARTHROSCOPY, KNEE, left, lateral post (Left)  IRRIGATION AND DEBRIDEMENT, LOWER EXTREMITY (Right)    Final Anesthesia Type: general      Patient location during evaluation: PACU  Patient participation: Yes- Able to Participate  Level of consciousness: awake and alert  Post-procedure vital signs: reviewed and stable  Pain management: adequate  Airway patency: patent    PONV status at discharge: No PONV  Anesthetic complications: no      Cardiovascular status: blood pressure returned to baseline  Respiratory status: unassisted  Hydration status: euvolemic  Follow-up not needed.              Vitals Value Taken Time   /80 02/02/24 0808   Temp 36.8 °C (98.2 °F) 02/02/24 0808   Pulse 81 02/02/24 0808   Resp 18 02/02/24 0808   SpO2 96 % 02/02/24 0808         Event Time   Out of Recovery 02/01/2024 16:45:00         Pain/Tana Score: Pain Rating Prior to Med Admin: 7 (2/1/2024  8:47 AM)  Pain Rating Post Med Admin: 4 (2/1/2024  9:47 AM)  Tana Score: 9 (2/1/2024  7:30 PM)

## 2024-02-02 NOTE — ASSESSMENT & PLAN NOTE
Patient has hypokalemia which is Acute and currently controlled. Most recent potassium levels reviewed-   Lab Results   Component Value Date    K 4.7 02/02/2024   . Will continue potassium replacement per protocol and recheck repeat levels after replacement completed.

## 2024-02-02 NOTE — SUBJECTIVE & OBJECTIVE
Interval History: Pain well controlled postoperatively. Afebrile. Passing gas. No chills, abdominal pain, or chest pain.     Review of Systems   Constitutional:  Negative for appetite change, chills, fatigue and fever.   HENT:  Negative for congestion, mouth sores, nosebleeds and tinnitus.    Eyes:  Negative for discharge and itching.   Respiratory:  Negative for cough, chest tightness and shortness of breath.    Cardiovascular:  Negative for chest pain and leg swelling.   Gastrointestinal:  Negative for abdominal distention and abdominal pain.   Endocrine: Negative for cold intolerance and heat intolerance.   Genitourinary:  Negative for dysuria and frequency.   Musculoskeletal:  Negative for arthralgias, joint swelling and neck pain.   Skin:  Negative for color change and pallor.   Allergic/Immunologic: Negative for environmental allergies and food allergies.   Neurological:  Negative for dizziness and light-headedness.   Hematological:  Negative for adenopathy.   Psychiatric/Behavioral:  Negative for agitation, behavioral problems and confusion.      Objective:     Vital Signs (Most Recent):  Temp: 98.2 °F (36.8 °C) (02/02/24 0808)  Pulse: 81 (02/02/24 0808)  Resp: (P) 18 (02/02/24 1140)  BP: (!) 143/80 (02/02/24 0935)  SpO2: 96 % (02/02/24 0808) Vital Signs (24h Range):  Temp:  [97.7 °F (36.5 °C)-98.3 °F (36.8 °C)] 98.2 °F (36.8 °C)  Pulse:  [79-91] 81  Resp:  [12-18] 18  SpO2:  [95 %-100 %] 96 %  BP: (104-143)/(58-80) 143/80     Weight: 106.7 kg (235 lb 3.7 oz)  Body mass index is 31.03 kg/m².    Intake/Output Summary (Last 24 hours) at 2/2/2024 1141  Last data filed at 2/2/2024 0557  Gross per 24 hour   Intake 2210 ml   Output 2640 ml   Net -430 ml         Physical Exam  Constitutional:       General: He is not in acute distress.     Appearance: Normal appearance. He is not ill-appearing.   HENT:      Head: Normocephalic and atraumatic.      Right Ear: External ear normal.      Left Ear: External ear normal.       Nose: Nose normal.      Mouth/Throat:      Mouth: Mucous membranes are moist.      Pharynx: Oropharynx is clear.   Eyes:      Extraocular Movements: Extraocular movements intact.      Pupils: Pupils are equal, round, and reactive to light.   Cardiovascular:      Rate and Rhythm: Normal rate and regular rhythm.      Pulses: Normal pulses.      Heart sounds: Normal heart sounds.   Pulmonary:      Effort: Pulmonary effort is normal.      Breath sounds: Normal breath sounds.   Abdominal:      General: Abdomen is flat. There is no distension.      Palpations: Abdomen is soft.      Tenderness: There is no abdominal tenderness.   Musculoskeletal:         General: No swelling. Normal range of motion.      Cervical back: Normal range of motion and neck supple.      Comments: Dressing in place LLE, no erythema noted near edges  Able to bend L knee    Skin:     General: Skin is warm and dry.      Capillary Refill: Capillary refill takes less than 2 seconds.   Neurological:      General: No focal deficit present.      Mental Status: He is alert and oriented to person, place, and time.   Psychiatric:         Mood and Affect: Mood normal.         Behavior: Behavior normal.             Significant Labs: All pertinent labs within the past 24 hours have been reviewed.  CBC:   Recent Labs   Lab 01/31/24  1538 02/01/24  0508 02/02/24  0352   WBC 12.34 10.65 10.18   HGB 8.0* 8.5* 7.9*   HCT 25.7* 28.0* 26.7*    381 395     CMP:   Recent Labs   Lab 01/31/24  1538 02/01/24  0508 02/02/24  0352   * 135* 134*   K 3.4* 3.4* 4.7   CL 94* 97 98   CO2 27 24 23   * 124* 175*   BUN 7 8 13   CREATININE 0.8 0.8 0.8   CALCIUM 8.4* 8.7 8.4*   PROT 7.4  --   --    ALBUMIN 1.8*  --   --    BILITOT 0.5  --   --    ALKPHOS 151*  --   --    AST 33  --   --    ALT 34  --   --    ANIONGAP 13 14 13       Significant Imaging: I have reviewed all pertinent imaging results/findings within the past 24 hours.

## 2024-02-02 NOTE — ASSESSMENT & PLAN NOTE
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 (Surgical cxs +MRSA). ROBY 1/02/24 neg. Pt required salvage therapy dapto/ceftaroline & bld cxs finally cleared 1/07, and then placed on daptomycin monotherapy to complete 4wks duration (ASYA 02/06) per [facility] ID (Yana). Of note, L shoulder imaging showed subdeltoid bursitis w/ SS tear; superimposed infection could be present but no drainable fluid collections. Also, R heel without osteo but had cellulitis, myositis, tenosynovitis.     Pt was discharged (01/10) to rehab with daptomycin monotherapy; but started having new fevers (up to 103F) at rehab, and re-admitted (01/14) for workup. Blood cultures 01/14/24 NGTD. MRI T/L spine negative for osteo-discitis. Found to have persistent left knee septic arthritis and right thigh abscess and underwent L knee arthroscopic I&D on 1/17. IR drain placed 01/19 R lateral thigh abscess, cxs +MRSA.  Taken to OR 01/21 for repeat surgical I&D of L knee (#3), and R thigh abscess with new R thigh drain placed. Surgical cxs (01/21) of L knee and R thigh NGTD.     Discharged to [facility] with IV daptomycin 8mg/kg Q24h. Re-presented 1/31/2024 with tachycardia, fever. MRI left knee with: Findings compatible with persistent septic joint and early changes of osteomyelitis in the femoral condyles patella and tibial spines. And MRI pelvis with: Worsening of fluid enhancement in the right myofascial planes and gluteal muscles suggesting infected subcutaneous fat and tensor fascia jonah bursa with myositis and or infection of lateral gluteal muscles on the right. Evaluated by ortho and s/p aspiration of left knee joint fluid and right thigh fluid collection. Gram stains with moderate WBC, no organisms.    Bcx 1/31 growing  GNR in 1/4 (from PICC line); PCR showing Acinetobacter baumannii.    Antibiotics this admission:   Daptomycin 12/27 - present   Zosyn added 2/2/24 - present (added for Acinetobacter baumannii on 1/31 Bcx)    S/p arthroscopic I&D Left knee and right thigh I&D 2/1     -- Continue IV daptomycin and Zosyn, will adjust antibiotic therapy with new culture data  -- Please have PICC line removed and give patient a line holiday over the weekend  -- Follow-up Bcx, left knee cultures, and right thigh cultures  -- Considering Karius testing if no growth from cultures in coming days

## 2024-02-02 NOTE — ASSESSMENT & PLAN NOTE
Patient with multiple admissions for septic joint complicated by MRSA bacteremia and status post orthopedic intervention, on daptomycin prior to presentation found to have imaging concerns of worsening right thigh abscess and new concerns for left knee osteomyelitis. Previous expected end date of daptomycin was 03/03/24.  He denies worsening pain or subjective fevers.  No leukocytosis. ESR and CRP with mild elevation from prior.  S/p L knee washout on 2/1 and I&D of R thigh abscess. Cultures pending.   BCX from 1/31 grew Acinetobacter baumanii on 2/2 - Infectious Disease c/s    PLAN:  - continue daptomycin, Zosyn  - orthopedic surgery consulted; s/p washout, trend CRP  - infectious disease consulted - started Zosyn, continue Dapto, remove PICC line (holiday over weekend) - will adjust as sensitivities return   - follow-up repeat blood cultures  - PT/OT

## 2024-02-02 NOTE — PROGRESS NOTES
[facility] ([place] [id number])  Infectious Disease  Progress Note    Patient Name: Kulwant Mueller Jr.  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 (Surgical cxs +MRSA). ROBY 1/02/24 neg. Pt required salvage therapy dapto/ceftaroline & bld cxs finally cleared 1/07, and then placed on daptomycin monotherapy to complete 4wks duration (ASYA 02/06) per [facility] ID (Yana). Of note, L shoulder imaging showed subdeltoid bursitis w/ SS tear; superimposed infection could be present but no drainable fluid collections. Also, R heel without osteo but had cellulitis, myositis, tenosynovitis.     Pt was discharged (01/10) to rehab with daptomycin monotherapy; but started having new fevers (up to 103F) at rehab, and re-admitted (01/14) for workup. Blood cultures 01/14/24 NGTD. MRI T/L spine negative for osteo-discitis. Found to have persistent left knee septic arthritis and right thigh abscess and underwent L knee arthroscopic I&D on 1/17. IR drain placed 01/19 R lateral thigh abscess, cxs +MRSA.  Taken to OR 01/21 for repeat surgical I&D of L knee (#3), and R thigh abscess with new R thigh drain placed. Surgical cxs (01/21) of L knee and R thigh NGTD.     Discharged to [facility] with IV daptomycin 8mg/kg Q24h. Re-presented 1/31/2024 with tachycardia, fever. MRI left knee with: Findings compatible with persistent septic joint and early changes of osteomyelitis in the femoral condyles patella and tibial spines. And MRI pelvis with: Worsening of  fluid enhancement in the right myofascial planes and gluteal muscles suggesting infected subcutaneous fat and tensor fascia jonah bursa with myositis and or infection of lateral gluteal muscles on the right. Evaluated by ortho and s/p aspiration of left knee joint fluid and right thigh fluid collection. Gram stains with moderate WBC, no organisms.    Bcx 1/31 growing GNR in 1/4 (from PICC line); PCR showing Acinetobacter baumannii.    Antibiotics this admission:   Daptomycin 12/27 - present   Zosyn added 2/2/24 - present (added for Acinetobacter baumannii on 1/31 Bcx)    S/p arthroscopic I&D Left knee and right thigh I&D 2/1     -- Continue IV daptomycin and Zosyn, will adjust antibiotic therapy with new culture data  -- Please have PICC line removed and give patient a line holiday over the weekend  -- Follow-up Bcx, left knee cultures, and right thigh cultures  -- Considering Karius testing if no growth from cultures in coming days      Thank you for your consult. I will follow-up with patient. Please contact us if you have any additional questions.    Karan Torres MD  Infectious Disease  [facility] ([facility]    Subjective:     Principal Problem:Pyogenic arthritis of left knee joint    HPI: 38 year old male with history of May-Thurer syndrome, diabetes with chronic diabetic foot wounds, recent admission for MRSA bacteremia.      MRSA bacteremia (daptomycin susceptible) noted on index admission blood cultures 12/27, complicated by left knee septic arthritis, s/p L knee arthrotomy with synovectomy on 12/29- cx w/ MRSA, s/p left shoulder arthrocentesis reportedly with no evidence of infection, ROBY 1/02/24 without vegetations, required salvage therapy with addition of ceftaroline & cleared 1/07, subsequently placed on daptomycin monotherapy and sent to rehab.     Re-admitted 1/14 for fever while at rehab + back pain, no new or persistent bacteremia, MRI T/L spine negative for osteo-discitis.  That  admission he had persistent left knee septic arthritis and right thigh abscess and underwent L knee arthroscopic I&D on 1/17, repeat L knee I&D and right thigh abscess I&D on 1/21.    MRI L Knee this admission showed large air-fluid collection, myositis (involving vastus medialis, lateralis & popliteus musculature),     MRI pelvis with R thigh  17 by 7 x 5 cm collection- s/p IR drain placed 1/19 (cx MRSA). MRI L shoulder with bursitis    Operative course:  OR 01/15 for L knee washout (cell counts c/w/ septic joint +CPPD crystals)  cx w/ NG  OR 1/21 L knee washout (#3) w/ extensive synovectomy/debridement and R thigh I&D (cx NG)    Clinically improved and discharged, ID team had seen him and plan was to complete 6 weeks of abx from last washout (01/21); ASYA 03/03/24     This admission, he is afebrile, without leukocytosis, HDS    He had subjective fever and rigors. MRI 1/31 L knee shows persistent septic joint and early osteomyelitis.   MRI pelvis shows improved fluid around left hip but worsening fluid in R myofascial planes and gluteal muscles. Daptomycin being continued. CPK WNL  Interval History: No acute events. Patient feeling well this AM after left knee and right thigh washout. Bcx from PICC line on 1/31 growing GNR in 1/4 bottles. PCR showing Acinetobacter baumannii. Will add zosyn and follow-up cultures and sensitivities.    Review of Systems   Constitutional:  Negative for appetite change, chills, fatigue and fever.   HENT:  Negative for congestion and sore throat.    Eyes:  Negative for visual disturbance.   Respiratory:  Negative for cough, chest tightness, shortness of breath and wheezing.    Cardiovascular:  Negative for chest pain, palpitations and leg swelling.   Gastrointestinal:  Negative for abdominal pain, constipation, diarrhea, nausea and vomiting.   Genitourinary:  Negative for dysuria, flank pain, frequency and hematuria.   Musculoskeletal:  Positive for arthralgias and joint swelling.  Negative for back pain, myalgias and neck pain.   Skin:  Negative for pallor, rash and wound.   Neurological:  Negative for seizures, light-headedness, numbness and headaches.   Psychiatric/Behavioral:  The patient is not nervous/anxious.      Objective:     Vital Signs (Most Recent):  Temp: 98.2 °F (36.8 °C) (02/02/24 0808)  Pulse: 81 (02/02/24 0808)  Resp: 18 (02/02/24 0808)  BP: (!) 143/80 (02/02/24 0935)  SpO2: 96 % (02/02/24 0808) Vital Signs (24h Range):  Temp:  [97.7 °F (36.5 °C)-98.3 °F (36.8 °C)] 98.2 °F (36.8 °C)  Pulse:  [79-91] 81  Resp:  [12-18] 18  SpO2:  [95 %-100 %] 96 %  BP: (104-143)/(58-80) 143/80     Weight: 106.7 kg (235 lb 3.7 oz)  Body mass index is 31.03 kg/m².    Estimated Creatinine Clearance: 157.3 mL/min (based on SCr of 0.8 mg/dL).     Physical Exam  Constitutional:       General: He is not in acute distress.     Appearance: Normal appearance. He is not ill-appearing.   HENT:      Head: Normocephalic and atraumatic.      Mouth/Throat:      Mouth: Mucous membranes are moist.      Pharynx: Oropharynx is clear. No oropharyngeal exudate.   Eyes:      General: No scleral icterus.     Extraocular Movements: Extraocular movements intact.      Pupils: Pupils are equal, round, and reactive to light.   Cardiovascular:      Rate and Rhythm: Normal rate and regular rhythm.      Heart sounds: No murmur heard.  Pulmonary:      Effort: Pulmonary effort is normal.      Breath sounds: No wheezing or rales.   Abdominal:      General: Abdomen is flat. There is no distension.      Palpations: Abdomen is soft. There is no mass.      Tenderness: There is no abdominal tenderness. There is no right CVA tenderness or left CVA tenderness.   Musculoskeletal:         General: No swelling or tenderness.      Cervical back: Normal range of motion. No tenderness.      Right lower leg: No edema.      Left lower leg: No edema.      Comments: Left knee dressing clean/dry/intact  Right lateral thigh dressing  clean/dry/intact   Lymphadenopathy:      Cervical: No cervical adenopathy.   Skin:     General: Skin is warm and dry.      Coloration: Skin is not jaundiced.      Findings: No bruising.   Neurological:      General: No focal deficit present.      Mental Status: He is alert and oriented to person, place, and time.      Cranial Nerves: No cranial nerve deficit.       Significant Labs: CBC:   Recent Labs   Lab 01/31/24  1538 02/01/24  0508 02/02/24  0352   WBC 12.34 10.65 10.18   HGB 8.0* 8.5* 7.9*   HCT 25.7* 28.0* 26.7*    381 395     Microbiology Results (last 7 days)       Procedure Component Value Units Date/Time    Aerobic culture [[id number]] Collected: 02/01/24 1530    Order Status: Completed Specimen: Incision site from Leg, Right Updated: 02/02/24 0941     Aerobic Bacterial Culture No growth    Narrative:      Right leg - culture    Aerobic culture [[id number]] Collected: 02/01/24 1530    Order Status: Completed Specimen: Incision site from Leg, Right Updated: 02/02/24 0941     Aerobic Bacterial Culture No growth    Narrative:      Right leg - culture    Aerobic culture [[id number]] Collected: 02/01/24 0951    Order Status: Completed Specimen: Joint Fluid from Knee, Left Updated: 02/02/24 0941     Aerobic Bacterial Culture No growth    Aerobic culture [[id number]] Collected: 02/01/24 0740    Order Status: Completed Specimen: Abscess from Leg, Right Updated: 02/02/24 0941     Aerobic Bacterial Culture No growth    Culture, Anaerobe [[id number]] Collected: 02/01/24 1530    Order Status: Completed Specimen: Incision site from Leg, Right Updated: 02/02/24 0833     Anaerobic Culture Culture in progress    Narrative:      Right leg - culture    Culture, Anaerobe [[id number]] Collected: 02/01/24 1530    Order Status: Completed Specimen: Incision site from Leg, Right Updated: 02/02/24 0833     Anaerobic Culture Culture in progress    Narrative:      Right leg - culture    Culture, Anaerobic [[id number]]  Collected: 02/01/24 0740    Order Status: Completed Specimen: Abscess from Leg, Right Updated: 02/02/24 0833     Anaerobic Culture Culture in progress    Culture, Anaerobic [[id number]] Collected: 02/01/24 0821    Order Status: Completed Specimen: Joint Fluid from Knee, Left Updated: 02/02/24 0833     Anaerobic Culture Culture in progress    E. coli 0157 antigen [[id number]] Collected: 02/01/24 0131    Order Status: No result Specimen: Stool Updated: 02/02/24 0815    Stool culture [[id number]] Collected: 02/01/24 0131    Order Status: No result Specimen: Stool Updated: 02/02/24 0813    Blood culture #1 **CANNOT BE ORDERED STAT** [[id number]]  (Abnormal) Collected: 01/31/24 1538    Order Status: Completed Specimen: Blood from Line, PICC Right Brachial Updated: 02/02/24 0715     Blood Culture, Routine Gram stain aer bottle: Gram negative rods      Results called to and read back by:MADHU Pal RN 02/01/2024  21:08      GRAM NEGATIVE JIHAN  Identification and susceptibility pending      Rapid Organism ID by PCR (from Blood culture) [[id number]]  (Abnormal) Collected: 01/31/24 1538    Order Status: Completed Updated: 02/01/24 2232     Enterococcus faecalis Not Detected     Enterococcus faecium Not Detected     Listeria monocytogenes Not Detected     Staphylococcus spp. Not Detected     Staphylococcus aureus Not Detected     Staphylococcus epidermidis Not Detected     Staphylococcus lugdunensis Not Detected     Streptococcus species Not Detected     Streptococcus agalactiae Not Detected     Streptococcus pneumoniae Not Detected     Streptococcus pyogenes Not Detected     Acinetobacter calcoaceticus/baumannii complex Detected     Bacteroides fragilis Not Detected     Enterobacterales Not Detected     Enterobacter cloacae complex Not Detected     Escherichia coli Not Detected     Klebsiella aerogenes Not Detected     Klebsiella oxytoca Not Detected     Klebsiella pneumoniae group Not Detected     Proteus Not Detected      Salmonella sp Not Detected     Serratia marcescens Not Detected     Haemophilus influenzae Not Detected     Neisseria meningtidis Not Detected     Pseudomonas aeruginosa Not Detected     Stenotrophomonas maltophilia Not Detected     Candida albicans Not Detected     Candida auris Not Detected     Candida glabrata Not Detected     Candida krusei Not Detected     Candida parapsilosis Not Detected     Candida tropicalis Not Detected     Cryptococcus neoformans/gattii Not Detected     CTX-M (ESBL ) Not Detected     IMP (Carbapenem resistant) Not Detected     KPC resistance gene (Carbapenem resistant) Not Detected     mcr-1  Test Not Applicable     mec A/C  Test Not Applicable     mec A/C and MREJ (MRSA) gene Test Not Applicable     NDM (Carbapenem resistant) Not Detected     OXA-48-like (Carbapenem resistant) Test Not Applicable     van A/B (VRE gene) Test Not Applicable     VIM (Carbapenem resistant) Not Detected    Blood culture #2 **CANNOT BE ORDERED STAT** [[id number]] Collected: 01/31/24 1538    Order Status: Completed Specimen: Blood from Peripheral, Antecubital, Right Updated: 02/01/24 1812     Blood Culture, Routine No Growth to date      No Growth to date    Gram stain [[id number]] Collected: 02/01/24 1530    Order Status: Completed Specimen: Incision site from Leg, Right Updated: 02/01/24 1704     Gram Stain Result Few WBC's      No organisms seen    Narrative:      Right leg - culture    Gram stain [[id number]] Collected: 02/01/24 1530    Order Status: Completed Specimen: Incision site from Leg, Right Updated: 02/01/24 1702     Gram Stain Result Few WBC's      No organisms seen    Narrative:      Right leg - culture    AFB Culture & Smear [[id number]] Collected: 02/01/24 1530    Order Status: Sent Specimen: Incision site from Leg, Right Updated: 02/01/24 1548    AFB Culture & Smear [[id number]] Collected: 02/01/24 1530    Order Status: Sent Specimen: Incision site from Leg, Right Updated: 02/01/24  1547    Fungus culture [[id number]] Collected: 02/01/24 1530    Order Status: Sent Specimen: Incision site from Leg, Right Updated: 02/01/24 1546    Fungus culture [[id number]] Collected: 02/01/24 1530    Order Status: Sent Specimen: Incision site from Leg, Right Updated: 02/01/24 1544    Gram stain [[id number]] Collected: 02/01/24 0740    Order Status: Completed Specimen: Abscess from Leg, Right Updated: 02/01/24 1108     Gram Stain Result Moderate WBC's      No organisms seen    Gram stain [[id number]] Collected: 02/01/24 0821    Order Status: Completed Specimen: Joint Fluid from Knee, Left Updated: 02/01/24 1033     Gram Stain Result Moderate WBC's      No organisms seen    Clostridium difficile EIA [[id number]] Collected: 02/01/24 0131    Order Status: Completed Specimen: Stool Updated: 02/01/24 0933     C. diff Antigen Negative     C difficile Toxins A+B, EIA Negative     Comment: Testing not recommended for children <24 months old.       Fungus culture [[id number]] Collected: 02/01/24 0821    Order Status: Sent Specimen: Joint Fluid from Knee, Left Updated: 02/01/24 0908    AFB Culture & Smear [[id number]] Collected: 02/01/24 0821    Order Status: Sent Specimen: Joint Fluid from Knee, Left Updated: 02/01/24 0907    AFB Culture & Smear [[id number]] Collected: 02/01/24 0740    Order Status: Sent Specimen: Abscess from Leg, Right Updated: 02/01/24 0904    Fungus culture [[id number]] Collected: 02/01/24 0740    Order Status: Sent Specimen: Abscess from Leg, Right Updated: 02/01/24 0904    Stool culture [[id number]]     Order Status: Completed Specimen: Stool     Stool culture [[id number]]     Order Status: Canceled Specimen: Stool           All pertinent labs within the past 24 hours have been reviewed.    Significant Imaging: I have reviewed all pertinent imaging results/findings within the past 24 hours.

## 2024-02-02 NOTE — ASSESSMENT & PLAN NOTE
Kulwant Mueller Jr. is a 38 y.o. male with a history of MRSA bacteremia,  recent left knee septic arthritis and right thigh abscess s/p L knee arthroscopic I&D on 1/17, s/p repeat L knee I&D and right thigh abscess I&D on 1/21, admitted for infectious workup after 1 day of worsening fevers/chills. Ortho consulted for rule out septic arthritis of the left knee joint. Pertinent physical exam findings include palpable effusion of the left knee in addition to a palpable fluid collection at the lateral aspect of the right thigh. The left knee and right thigh were aspirated at the bedside - see procedure note for further details.      Now s/p arthroscopic I&D R knee and left thigh I&D 2/1    Pain MM  Trend CRP  Dvt ppx ASA 81BID  PT/OT WBAT, work on flexion of the left knee  Abx per ID, on daptomycin    Dispo: pending cultures and ID recs

## 2024-02-02 NOTE — HOSPITAL COURSE
Arrived to [facility] on 1/31 for lightheadedness, fevers, and chills, found to have L septic knee joint and abscess in lateral R thigh. Underwent joint washout and I&D on 2/1 with orthopedic surgery. BCX grew Acinetobacter baumanii in one bottle. Infectious disease consulted, continued Daptomycin due to previous MRSA infections in joint washouts, added Zosyn for A. Baumanii, recommended PICC removal, which was completed 2/2. Patient to have PICC holiday over the weekend. Sensitivities for A. Baumanii bacteremia returned with near pan-sensitive results. Patient transitioned to Unasyn with intent to treat until 2/17/24 for A. Baumanii bacteremia. Daptomycin to be continued until 3/3/24, both per ID recommendations. Thigh abscess and L knee cultures with no growth to date currently. Inpatient insulin regimen uptitrated throughout stay due to mildly uncontrolled blood glucose. ID OPAT plan for 6 total weeks of daptomycin and 2 weeks of Unasyn . Orthopedic surgery to followup outpatient for drain management. Patient was discharged to [facility] with plans to complete IV antibiotics with labs sent to ID and follow-up scheduled with orthopedic surgery for drain care.

## 2024-02-02 NOTE — PROGRESS NOTES
[facility] - Med S[facility] ([place] [address])  Orthopedics  Progress Note    Patient Name: Kulwant Mueller Jr.  MRN: [id number]  Admission Date: 1/31/2024  Hospital Length of Stay: 1 days  Attending Provider: Soniya Javier*  Primary Care Provider: Shellie, Primary Doctor  Follow-up For: Procedure(s) (LRB):  ARTHROSCOPY, KNEE, left, lateral post (Left)  IRRIGATION AND DEBRIDEMENT, LOWER EXTREMITY (Right)    Post-Operative Day: 1 Day Post-Op  Subjective:     Principal Problem:Pyogenic arthritis of left knee joint    Principal Orthopedic Problem: same as above s/p L knee arthroscopic I&D and R thigh I&D    Interval History: NAEO. VSS. Pain well controlled. Hgb 7.9. Cultures pending. On daptomycin.  65cc MULUGETA drain x24h    Review of patient's allergies indicates:   Allergen Reactions    Heparin analogues Nausea And Vomiting       Current Facility-Administered Medications   Medication    acetaminophen tablet 650 mg    aspirin chewable tablet 81 mg    DAPTOmycin (CUBICIN) 945 mg in sodium chloride 0.9% SolP 50 mL IVPB    dextrose 10% bolus 125 mL 125 mL    dextrose 10% bolus 250 mL 250 mL    glucagon (human recombinant) injection 1 mg    glucose chewable tablet 16 g    glucose chewable tablet 24 g    insulin aspart U-100 pen 0-5 Units    methocarbamoL tablet 500 mg    metoprolol succinate (TOPROL-XL) 24 hr split tablet 12.5 mg    naloxone 0.4 mg/mL injection 0.02 mg    oxyCODONE immediate release tablet 5 mg    oxyCODONE immediate release tablet Tab 10 mg    prochlorperazine tablet 10 mg    rivaroxaban tablet 10 mg    sodium chloride 0.9% flush 10 mL    sodium chloride 0.9% flush 10 mL    vitamin D 1000 units tablet 1,000 Units     Objective:     Vital Signs (Most Recent):  Temp: 98.2 °F (36.8 °C) (02/02/24 0808)  Pulse: 81 (02/02/24 0808)  Resp: 18 (02/02/24 0808)  BP: (!) 143/80 (02/02/24 0808)  SpO2: 96 % (02/02/24 0808) Vital Signs (24h Range):  Temp:  [97.7 °F (36.5 °C)-98.3 °F (36.8 °C)] 98.2 °F (36.8 °C)  Pulse:  [79-91]  "81  Resp:  [12-18] 18  SpO2:  [95 %-100 %] 96 %  BP: (104-143)/(58-80) 143/80     Weight: 106.7 kg (235 lb 3.7 oz)  Height: 6' 1" (185.4 cm)  Body mass index is 31.03 kg/m².      Intake/Output Summary (Last 24 hours) at 2/2/2024 0946  Last data filed at 2/2/2024 0557  Gross per 24 hour   Intake 2260 ml   Output 2640 ml   Net -380 ml        Ortho/SPM Exam  LLE:   Dressing c/d/I  Able to flex/extend the knee  Cap refill <2s    RLE:   Dressing c/d/I  Mild TTP  NVI distally  MULUGETA drain in place     Significant Labs: All pertinent labs within the past 24 hours have been reviewed.    Significant Imaging: I have reviewed and interpreted all pertinent imaging results/findings.  Assessment/Plan:     * Pyogenic arthritis of left knee joint  Kulwant Mueller Jr. is a 38 y.o. male with a history of MRSA bacteremia,  recent left knee septic arthritis and right thigh abscess s/p L knee arthroscopic I&D on 1/17, s/p repeat L knee I&D and right thigh abscess I&D on 1/21, admitted for infectious workup after 1 day of worsening fevers/chills. Ortho consulted for rule out septic arthritis of the left knee joint. Pertinent physical exam findings include palpable effusion of the left knee in addition to a palpable fluid collection at the lateral aspect of the right thigh. The left knee and right thigh were aspirated at the bedside - see procedure note for further details.      Now s/p arthroscopic I&D R knee and left thigh I&D 2/1    Pain MM  Trend CRP  Dvt ppx ASA 81BID  PT/OT WBAT, work on flexion of the left knee  Abx per ID, on daptomycin    Dispo: pending cultures and ID recs          AYAD Up MD  Orthopedics  [facility] - Med Surg ([place])    "

## 2024-02-02 NOTE — PLAN OF CARE
CM sent referral to [facility], where pt was prior to admit, via CP.    ALEXSANDRA BradshawN, BS, RN, CCM

## 2024-02-02 NOTE — SUBJECTIVE & OBJECTIVE
"Principal Problem:Pyogenic arthritis of left knee joint    Principal Orthopedic Problem: same as above s/p L knee arthroscopic I&D and R thigh I&D    Interval History: NAEO. VSS. Pain well controlled. Hgb 7.9. Cultures pending. On daptomycin.  65cc MULUGETA drain x24h    Review of patient's allergies indicates:   Allergen Reactions    Heparin analogues Nausea And Vomiting       Current Facility-Administered Medications   Medication    acetaminophen tablet 650 mg    aspirin chewable tablet 81 mg    DAPTOmycin (CUBICIN) 945 mg in sodium chloride 0.9% SolP 50 mL IVPB    dextrose 10% bolus 125 mL 125 mL    dextrose 10% bolus 250 mL 250 mL    glucagon (human recombinant) injection 1 mg    glucose chewable tablet 16 g    glucose chewable tablet 24 g    insulin aspart U-100 pen 0-5 Units    methocarbamoL tablet 500 mg    metoprolol succinate (TOPROL-XL) 24 hr split tablet 12.5 mg    naloxone 0.4 mg/mL injection 0.02 mg    oxyCODONE immediate release tablet 5 mg    oxyCODONE immediate release tablet Tab 10 mg    prochlorperazine tablet 10 mg    rivaroxaban tablet 10 mg    sodium chloride 0.9% flush 10 mL    sodium chloride 0.9% flush 10 mL    vitamin D 1000 units tablet 1,000 Units     Objective:     Vital Signs (Most Recent):  Temp: 98.2 °F (36.8 °C) (02/02/24 0808)  Pulse: 81 (02/02/24 0808)  Resp: 18 (02/02/24 0808)  BP: (!) 143/80 (02/02/24 0808)  SpO2: 96 % (02/02/24 0808) Vital Signs (24h Range):  Temp:  [97.7 °F (36.5 °C)-98.3 °F (36.8 °C)] 98.2 °F (36.8 °C)  Pulse:  [79-91] 81  Resp:  [12-18] 18  SpO2:  [95 %-100 %] 96 %  BP: (104-143)/(58-80) 143/80     Weight: 106.7 kg (235 lb 3.7 oz)  Height: 6' 1" (185.4 cm)  Body mass index is 31.03 kg/m².      Intake/Output Summary (Last 24 hours) at 2/2/2024 9037  Last data filed at 2/2/2024 0557  Gross per 24 hour   Intake 2260 ml   Output 2640 ml   Net -380 ml        Ortho/SPM Exam  LLE:   Dressing c/d/I  Able to flex/extend the knee  Cap refill <2s    RLE:   Dressing c/d/I  Mild " TTP  NVI distally  MULUGETA drain in place     Significant Labs: All pertinent labs within the past 24 hours have been reviewed.    Significant Imaging: I have reviewed and interpreted all pertinent imaging results/findings.

## 2024-02-02 NOTE — PT/OT/SLP PROGRESS
"Physical Therapy Treatment    Patient Name:  Kulwant Mueller Jr.   MRN:  [id number]    Recommendations:     Discharge Recommendations: High Intensity Therapy  Discharge Equipment Recommendations: to be determined by next level of care  Barriers to discharge: None    Assessment:     Kulwant Mueller Jr. is a 38 y.o. male admitted with a medical diagnosis of Pyogenic arthritis of left knee joint.  He presents with the following impairments/functional limitations: weakness, impaired endurance, impaired self care skills, impaired functional mobility, pain, decreased safety awareness, decreased lower extremity function, decreased ROM, impaired skin Pt tolerated treatment session fairly well today. Pt was hesitant to sit EOB yet was able to with CGA. Pt declined T/fs x 2 reporting he was "scared, afraid and nervous." During session Dr. Gonzalez (infectious disease) came in to speak to pt about his current medical status. Patient remains appropriate for continued skilled services within the acute environment and goals remain appropriate.   .    Rehab Prognosis: Good; patient would benefit from acute skilled PT services to address these deficits and reach maximum level of function.    Recent Surgery: Procedure(s) (LRB):  ARTHROSCOPY, KNEE, left, lateral post (Left)  IRRIGATION AND DEBRIDEMENT, LOWER EXTREMITY (Right) 1 Day Post-Op    Plan:     During this hospitalization, patient to be seen 4 x/week to address the identified rehab impairments via gait training, therapeutic activities, therapeutic exercises, neuromuscular re-education and progress toward the following goals:    Plan of Care Expires:  03/04/24    Subjective     Chief Complaint: R hip pain   Patient/Family Comments/goals: "I'm scared, I'm nervous and I'm afraid."  Pain/Comfort:  Pain Rating 1: 8/10  Location - Side 1: Right  Location 1: hip  Pain Addressed 1: Reposition, Distraction  Pain Rating Post-Intervention 1:  (not rated)      Objective:     Communicated " with Rn prior to session.  Patient found supine with peripheral IV, MULUGETA drain, chadwick catheter upon PT entry to room.     General Precautions: Standard, fall, contact  Orthopedic Precautions:    Braces: N/A  Respiratory Status: Room air     Functional Mobility:  Bed Mobility:     Supine to Sit: contact guard assistance  Pt sat EOB ~ 20 mins with CGA while performing LE exercises and speaking with Dr. Gonzalez   Sit to Supine: minimum assistance for LE clearance   Scooting anteriorly: [place]   Pt performed 10 repetitions A/AAROM of seated B LE exercises consisting of: Marching, LAQ, ABD/ADD, heel raises, and toe raises.   Pt performed 5 repetitions of supine L LE exercises consisting of: HS and QS   ~10 reps of PROM for L knee flexion       AM-PAC 6 CLICK MOBILITY  Turning over in bed (including adjusting bedclothes, sheets and blankets)?: 3  Sitting down on and standing up from a chair with arms (e.g., wheelchair, bedside commode, etc.): 3  Moving from lying on back to sitting on the side of the bed?: 3  Moving to and from a bed to a chair (including a wheelchair)?: 2  Need to walk in hospital room?: 2  Climbing 3-5 steps with a railing?: 2  Basic Mobility Total Score: 15       Treatment & Education:  Therapist provided instruction and educated for safety during transfers and gait training. As well as proper body mechanics, energy conservation, and fall prevention strategies during tasks listed above, and the effects of prolonged immobility and the importance of performing EOB/OOB activity and exercises to promote healing and reduce recovery time.       Patient left supine with all lines intact, call button in reach, and RN notified..    GOALS:   Multidisciplinary Problems       Physical Therapy Goals          Problem: Physical Therapy    Goal Priority Disciplines Outcome Goal Variances Interventions   Physical Therapy Goal     PT, PT/OT Ongoing, Progressing     Description: Goals to be met by: 03/04/2024     Patient will  increase functional independence with mobility by performin. Supine to sit with Modified [place]  2. Sit to supine with Modified [place]  3. Sit to stand transfer with Supervision  4. Bed to chair transfer with Supervision using LRAD  5. Gait  x 100 feet with Supervision using LRAD.                          Time Tracking:     PT Received On: 24  PT Start Time: 1445     PT Stop Time: 1523  PT Total Time (min): 38 min     Billable Minutes: Therapeutic Activity 13 and Therapeutic Exercise 25    Treatment Type: Treatment  PT/PTA: PTA     Number of PTA visits since last PT visit: 2024

## 2024-02-02 NOTE — NURSING
Reported positive blood culture to Med team 3.  Gram negative casimiro in the aerobic bottle of the blood culture drawn at 1330 on 1/31.   No new orders at this time.

## 2024-02-02 NOTE — SUBJECTIVE & OBJECTIVE
Interval History: No acute events. Patient feeling well this AM after left knee and right thigh washout. Bcx from PICC line on 1/31 growing GNR in 1/4 bottles. PCR showing Acinetobacter baumannii. Will add zosyn and follow-up cultures and sensitivities.    Review of Systems   Constitutional:  Negative for appetite change, chills, fatigue and fever.   HENT:  Negative for congestion and sore throat.    Eyes:  Negative for visual disturbance.   Respiratory:  Negative for cough, chest tightness, shortness of breath and wheezing.    Cardiovascular:  Negative for chest pain, palpitations and leg swelling.   Gastrointestinal:  Negative for abdominal pain, constipation, diarrhea, nausea and vomiting.   Genitourinary:  Negative for dysuria, flank pain, frequency and hematuria.   Musculoskeletal:  Positive for arthralgias and joint swelling. Negative for back pain, myalgias and neck pain.   Skin:  Negative for pallor, rash and wound.   Neurological:  Negative for seizures, light-headedness, numbness and headaches.   Psychiatric/Behavioral:  The patient is not nervous/anxious.      Objective:     Vital Signs (Most Recent):  Temp: 98.2 °F (36.8 °C) (02/02/24 0808)  Pulse: 81 (02/02/24 0808)  Resp: 18 (02/02/24 0808)  BP: (!) 143/80 (02/02/24 0935)  SpO2: 96 % (02/02/24 0808) Vital Signs (24h Range):  Temp:  [97.7 °F (36.5 °C)-98.3 °F (36.8 °C)] 98.2 °F (36.8 °C)  Pulse:  [79-91] 81  Resp:  [12-18] 18  SpO2:  [95 %-100 %] 96 %  BP: (104-143)/(58-80) 143/80     Weight: 106.7 kg (235 lb 3.7 oz)  Body mass index is 31.03 kg/m².    Estimated Creatinine Clearance: 157.3 mL/min (based on SCr of 0.8 mg/dL).     Physical Exam  Constitutional:       General: He is not in acute distress.     Appearance: Normal appearance. He is not ill-appearing.   HENT:      Head: Normocephalic and atraumatic.      Mouth/Throat:      Mouth: Mucous membranes are moist.      Pharynx: Oropharynx is clear. No oropharyngeal exudate.   Eyes:      General: No  scleral icterus.     Extraocular Movements: Extraocular movements intact.      Pupils: Pupils are equal, round, and reactive to light.   Cardiovascular:      Rate and Rhythm: Normal rate and regular rhythm.      Heart sounds: No murmur heard.  Pulmonary:      Effort: Pulmonary effort is normal.      Breath sounds: No wheezing or rales.   Abdominal:      General: Abdomen is flat. There is no distension.      Palpations: Abdomen is soft. There is no mass.      Tenderness: There is no abdominal tenderness. There is no right CVA tenderness or left CVA tenderness.   Musculoskeletal:         General: No swelling or tenderness.      Cervical back: Normal range of motion. No tenderness.      Right lower leg: No edema.      Left lower leg: No edema.      Comments: Left knee dressing clean/dry/intact  Right lateral thigh dressing clean/dry/intact   Lymphadenopathy:      Cervical: No cervical adenopathy.   Skin:     General: Skin is warm and dry.      Coloration: Skin is not jaundiced.      Findings: No bruising.   Neurological:      General: No focal deficit present.      Mental Status: He is alert and oriented to person, place, and time.      Cranial Nerves: No cranial nerve deficit.       Significant Labs: CBC:   Recent Labs   Lab 01/31/24  1538 02/01/24  0508 02/02/24  0352   WBC 12.34 10.65 10.18   HGB 8.0* 8.5* 7.9*   HCT 25.7* 28.0* 26.7*    381 395     Microbiology Results (last 7 days)       Procedure Component Value Units Date/Time    Aerobic culture [[id number]] Collected: 02/01/24 1530    Order Status: Completed Specimen: Incision site from Leg, Right Updated: 02/02/24 0941     Aerobic Bacterial Culture No growth    Narrative:      Right leg - culture    Aerobic culture [[id number]] Collected: 02/01/24 1530    Order Status: Completed Specimen: Incision site from Leg, Right Updated: 02/02/24 0941     Aerobic Bacterial Culture No growth    Narrative:      Right leg - culture    Aerobic culture [[id number]]  Collected: 02/01/24 0951    Order Status: Completed Specimen: Joint Fluid from Knee, Left Updated: 02/02/24 0941     Aerobic Bacterial Culture No growth    Aerobic culture [[id number]] Collected: 02/01/24 0740    Order Status: Completed Specimen: Abscess from Leg, Right Updated: 02/02/24 0941     Aerobic Bacterial Culture No growth    Culture, Anaerobe [[id number]] Collected: 02/01/24 1530    Order Status: Completed Specimen: Incision site from Leg, Right Updated: 02/02/24 0833     Anaerobic Culture Culture in progress    Narrative:      Right leg - culture    Culture, Anaerobe [[id number]] Collected: 02/01/24 1530    Order Status: Completed Specimen: Incision site from Leg, Right Updated: 02/02/24 0833     Anaerobic Culture Culture in progress    Narrative:      Right leg - culture    Culture, Anaerobic [[id number]] Collected: 02/01/24 0740    Order Status: Completed Specimen: Abscess from Leg, Right Updated: 02/02/24 0833     Anaerobic Culture Culture in progress    Culture, Anaerobic [[id number]] Collected: 02/01/24 0821    Order Status: Completed Specimen: Joint Fluid from Knee, Left Updated: 02/02/24 0833     Anaerobic Culture Culture in progress    E. coli 0157 antigen [[id number]] Collected: 02/01/24 0131    Order Status: No result Specimen: Stool Updated: 02/02/24 0815    Stool culture [[id number]] Collected: 02/01/24 0131    Order Status: No result Specimen: Stool Updated: 02/02/24 0813    Blood culture #1 **CANNOT BE ORDERED STAT** [[id number]]  (Abnormal) Collected: 01/31/24 1538    Order Status: Completed Specimen: Blood from Line, PICC Right Brachial Updated: 02/02/24 0715     Blood Culture, Routine Gram stain aer bottle: Gram negative rods      Results called to and read back by:MADHU Pal RN 02/01/2024  21:08      GRAM NEGATIVE JIHAN  Identification and susceptibility pending      Rapid Organism ID by PCR (from Blood culture) [[id number]]  (Abnormal) Collected: 01/31/24 1538    Order Status: Completed  Updated: 02/01/24 2232     Enterococcus faecalis Not Detected     Enterococcus faecium Not Detected     Listeria monocytogenes Not Detected     Staphylococcus spp. Not Detected     Staphylococcus aureus Not Detected     Staphylococcus epidermidis Not Detected     Staphylococcus lugdunensis Not Detected     Streptococcus species Not Detected     Streptococcus agalactiae Not Detected     Streptococcus pneumoniae Not Detected     Streptococcus pyogenes Not Detected     Acinetobacter calcoaceticus/baumannii complex Detected     Bacteroides fragilis Not Detected     Enterobacterales Not Detected     Enterobacter cloacae complex Not Detected     Escherichia coli Not Detected     Klebsiella aerogenes Not Detected     Klebsiella oxytoca Not Detected     Klebsiella pneumoniae group Not Detected     Proteus Not Detected     Salmonella sp Not Detected     Serratia marcescens Not Detected     Haemophilus influenzae Not Detected     Neisseria meningtidis Not Detected     Pseudomonas aeruginosa Not Detected     Stenotrophomonas maltophilia Not Detected     Candida albicans Not Detected     Candida auris Not Detected     Candida glabrata Not Detected     Candida krusei Not Detected     Candida parapsilosis Not Detected     Candida tropicalis Not Detected     Cryptococcus neoformans/gattii Not Detected     CTX-M (ESBL ) Not Detected     IMP (Carbapenem resistant) Not Detected     KPC resistance gene (Carbapenem resistant) Not Detected     mcr-1  Test Not Applicable     mec A/C  Test Not Applicable     mec A/C and MREJ (MRSA) gene Test Not Applicable     NDM (Carbapenem resistant) Not Detected     OXA-48-like (Carbapenem resistant) Test Not Applicable     van A/B (VRE gene) Test Not Applicable     VIM (Carbapenem resistant) Not Detected    Blood culture #2 **CANNOT BE ORDERED STAT** [[id number]] Collected: 01/31/24 1538    Order Status: Completed Specimen: Blood from Peripheral, Antecubital, Right Updated: 02/01/24 9442      Blood Culture, Routine No Growth to date      No Growth to date    Gram stain [[id number]] Collected: 02/01/24 1530    Order Status: Completed Specimen: Incision site from Leg, Right Updated: 02/01/24 1704     Gram Stain Result Few WBC's      No organisms seen    Narrative:      Right leg - culture    Gram stain [[id number]] Collected: 02/01/24 1530    Order Status: Completed Specimen: Incision site from Leg, Right Updated: 02/01/24 1702     Gram Stain Result Few WBC's      No organisms seen    Narrative:      Right leg - culture    AFB Culture & Smear [[id number]] Collected: 02/01/24 1530    Order Status: Sent Specimen: Incision site from Leg, Right Updated: 02/01/24 1548    AFB Culture & Smear [[id number]] Collected: 02/01/24 1530    Order Status: Sent Specimen: Incision site from Leg, Right Updated: 02/01/24 1547    Fungus culture [[id number]] Collected: 02/01/24 1530    Order Status: Sent Specimen: Incision site from Leg, Right Updated: 02/01/24 1546    Fungus culture [[id number]] Collected: 02/01/24 1530    Order Status: Sent Specimen: Incision site from Leg, Right Updated: 02/01/24 1544    Gram stain [[id number]] Collected: 02/01/24 0740    Order Status: Completed Specimen: Abscess from Leg, Right Updated: 02/01/24 1108     Gram Stain Result Moderate WBC's      No organisms seen    Gram stain [[id number]] Collected: 02/01/24 0821    Order Status: Completed Specimen: Joint Fluid from Knee, Left Updated: 02/01/24 1033     Gram Stain Result Moderate WBC's      No organisms seen    Clostridium difficile EIA [[id number]] Collected: 02/01/24 0131    Order Status: Completed Specimen: Stool Updated: 02/01/24 0933     C. diff Antigen Negative     C difficile Toxins A+B, EIA Negative     Comment: Testing not recommended for children <24 months old.       Fungus culture [[id number]] Collected: 02/01/24 0821    Order Status: Sent Specimen: Joint Fluid from Knee, Left Updated: 02/01/24 0908    AFB Culture & Smear  [[id number]] Collected: 02/01/24 0821    Order Status: Sent Specimen: Joint Fluid from Knee, Left Updated: 02/01/24 0907    AFB Culture & Smear [[id number]] Collected: 02/01/24 0740    Order Status: Sent Specimen: Abscess from Leg, Right Updated: 02/01/24 0904    Fungus culture [[id number]] Collected: 02/01/24 0740    Order Status: Sent Specimen: Abscess from Leg, Right Updated: 02/01/24 0904    Stool culture [[id number]]     Order Status: Completed Specimen: Stool     Stool culture [[id number]]     Order Status: Canceled Specimen: Stool           All pertinent labs within the past 24 hours have been reviewed.    Significant Imaging: I have reviewed all pertinent imaging results/findings within the past 24 hours.

## 2024-02-03 LAB
ANION GAP SERPL CALC-SCNC: 6 MMOL/L (ref 8–16)
BASOPHILS # BLD AUTO: 0.02 K/UL (ref 0–0.2)
BASOPHILS NFR BLD: 0.2 % (ref 0–1.9)
BUN SERPL-MCNC: 9 MG/DL (ref 6–20)
CALCIUM SERPL-MCNC: 8.3 MG/DL (ref 8.7–10.5)
CHLORIDE SERPL-SCNC: 101 MMOL/L (ref 95–110)
CO2 SERPL-SCNC: 28 MMOL/L (ref 23–29)
CREAT SERPL-MCNC: 0.8 MG/DL (ref 0.5–1.4)
CRP SERPL-MCNC: 67.9 MG/L (ref 0–8.2)
DIFFERENTIAL METHOD BLD: ABNORMAL
EOSINOPHIL # BLD AUTO: 0.1 K/UL (ref 0–0.5)
EOSINOPHIL NFR BLD: 1.1 % (ref 0–8)
ERYTHROCYTE [DISTWIDTH] IN BLOOD BY AUTOMATED COUNT: 14.9 % (ref 11.5–14.5)
EST. GFR  (NO RACE VARIABLE): >60 ML/MIN/1.73 M^2
GLUCOSE SERPL-MCNC: 208 MG/DL (ref 70–110)
HCT VFR BLD AUTO: 24.3 % (ref 40–54)
HGB BLD-MCNC: 7.2 G/DL (ref 14–18)
IMM GRANULOCYTES # BLD AUTO: 0.05 K/UL (ref 0–0.04)
IMM GRANULOCYTES NFR BLD AUTO: 0.6 % (ref 0–0.5)
LYMPHOCYTES # BLD AUTO: 2.3 K/UL (ref 1–4.8)
LYMPHOCYTES NFR BLD: 28.1 % (ref 18–48)
MAGNESIUM SERPL-MCNC: 1.9 MG/DL (ref 1.6–2.6)
MCH RBC QN AUTO: 25.6 PG (ref 27–31)
MCHC RBC AUTO-ENTMCNC: 29.6 G/DL (ref 32–36)
MCV RBC AUTO: 87 FL (ref 82–98)
MONOCYTES # BLD AUTO: 0.7 K/UL (ref 0.3–1)
MONOCYTES NFR BLD: 8.8 % (ref 4–15)
NEUTROPHILS # BLD AUTO: 5.1 K/UL (ref 1.8–7.7)
NEUTROPHILS NFR BLD: 61.2 % (ref 38–73)
NRBC BLD-RTO: 0 /100 WBC
PHOSPHATE SERPL-MCNC: 2.7 MG/DL (ref 2.7–4.5)
PLATELET # BLD AUTO: 370 K/UL (ref 150–450)
PMV BLD AUTO: 10.2 FL (ref 9.2–12.9)
POCT GLUCOSE: 218 MG/DL (ref 70–110)
POCT GLUCOSE: 248 MG/DL (ref 70–110)
POCT GLUCOSE: 337 MG/DL (ref 70–110)
POTASSIUM SERPL-SCNC: 3.9 MMOL/L (ref 3.5–5.1)
RBC # BLD AUTO: 2.81 M/UL (ref 4.6–6.2)
SODIUM SERPL-SCNC: 135 MMOL/L (ref 136–145)
WBC # BLD AUTO: 8.28 K/UL (ref 3.9–12.7)

## 2024-02-03 PROCEDURE — 63600175 PHARM REV CODE 636 W HCPCS: Performed by: STUDENT IN AN ORGANIZED HEALTH CARE EDUCATION/TRAINING PROGRAM

## 2024-02-03 PROCEDURE — 25000003 PHARM REV CODE 250: Performed by: STUDENT IN AN ORGANIZED HEALTH CARE EDUCATION/TRAINING PROGRAM

## 2024-02-03 PROCEDURE — 80048 BASIC METABOLIC PNL TOTAL CA: CPT

## 2024-02-03 PROCEDURE — 97535 SELF CARE MNGMENT TRAINING: CPT

## 2024-02-03 PROCEDURE — 25000003 PHARM REV CODE 250

## 2024-02-03 PROCEDURE — 25000003 PHARM REV CODE 250: Performed by: EMERGENCY MEDICINE

## 2024-02-03 PROCEDURE — 27000207 HC ISOLATION

## 2024-02-03 PROCEDURE — 63600175 PHARM REV CODE 636 W HCPCS: Performed by: INTERNAL MEDICINE

## 2024-02-03 PROCEDURE — 85025 COMPLETE CBC W/AUTO DIFF WBC: CPT

## 2024-02-03 PROCEDURE — 87040 BLOOD CULTURE FOR BACTERIA: CPT

## 2024-02-03 PROCEDURE — 84100 ASSAY OF PHOSPHORUS: CPT

## 2024-02-03 PROCEDURE — 86140 C-REACTIVE PROTEIN: CPT

## 2024-02-03 PROCEDURE — 11000001 HC ACUTE MED/SURG PRIVATE ROOM

## 2024-02-03 PROCEDURE — 94761 N-INVAS EAR/PLS OXIMETRY MLT: CPT

## 2024-02-03 PROCEDURE — 99233 SBSQ HOSP IP/OBS HIGH 50: CPT | Mod: ,,, | Performed by: INTERNAL MEDICINE

## 2024-02-03 PROCEDURE — 63600175 PHARM REV CODE 636 W HCPCS: Performed by: EMERGENCY MEDICINE

## 2024-02-03 PROCEDURE — 25000003 PHARM REV CODE 250: Performed by: INTERNAL MEDICINE

## 2024-02-03 PROCEDURE — 83735 ASSAY OF MAGNESIUM: CPT

## 2024-02-03 RX ORDER — GLUCAGON 1 MG
1 KIT INJECTION
Status: DISCONTINUED | OUTPATIENT
Start: 2024-02-03 | End: 2024-02-03

## 2024-02-03 RX ORDER — HYDROCODONE BITARTRATE AND ACETAMINOPHEN 10; 325 MG/1; MG/1
1 TABLET ORAL EVERY 6 HOURS PRN
Status: DISCONTINUED | OUTPATIENT
Start: 2024-02-03 | End: 2024-02-08 | Stop reason: HOSPADM

## 2024-02-03 RX ORDER — INSULIN ASPART 100 [IU]/ML
0-10 INJECTION, SOLUTION INTRAVENOUS; SUBCUTANEOUS
Status: DISCONTINUED | OUTPATIENT
Start: 2024-02-03 | End: 2024-02-08 | Stop reason: HOSPADM

## 2024-02-03 RX ORDER — IBUPROFEN 200 MG
24 TABLET ORAL
Status: DISCONTINUED | OUTPATIENT
Start: 2024-02-03 | End: 2024-02-03

## 2024-02-03 RX ORDER — OXYCODONE HYDROCHLORIDE 5 MG/1
5 TABLET ORAL EVERY 4 HOURS PRN
Status: DISCONTINUED | OUTPATIENT
Start: 2024-02-03 | End: 2024-02-08 | Stop reason: HOSPADM

## 2024-02-03 RX ORDER — IBUPROFEN 200 MG
16 TABLET ORAL
Status: DISCONTINUED | OUTPATIENT
Start: 2024-02-03 | End: 2024-02-03

## 2024-02-03 RX ADMIN — INSULIN ASPART 4 UNITS: 100 INJECTION, SOLUTION INTRAVENOUS; SUBCUTANEOUS at 05:02

## 2024-02-03 RX ADMIN — PIPERACILLIN SODIUM AND TAZOBACTAM SODIUM 4.5 G: 4; .5 INJECTION, POWDER, FOR SOLUTION INTRAVENOUS at 03:02

## 2024-02-03 RX ADMIN — RIVAROXABAN 10 MG: 10 TABLET, FILM COATED ORAL at 05:02

## 2024-02-03 RX ADMIN — INSULIN ASPART 2 UNITS: 100 INJECTION, SOLUTION INTRAVENOUS; SUBCUTANEOUS at 10:02

## 2024-02-03 RX ADMIN — INSULIN DETEMIR 5 UNITS: 100 INJECTION, SOLUTION SUBCUTANEOUS at 10:02

## 2024-02-03 RX ADMIN — DAPTOMYCIN 945 MG: 350 INJECTION, POWDER, LYOPHILIZED, FOR SOLUTION INTRAVENOUS at 12:02

## 2024-02-03 RX ADMIN — METOPROLOL SUCCINATE 12.5 MG: 25 TABLET, EXTENDED RELEASE ORAL at 09:02

## 2024-02-03 RX ADMIN — HYDROCODONE BITARTRATE AND ACETAMINOPHEN 1 TABLET: 10; 325 TABLET ORAL at 05:02

## 2024-02-03 RX ADMIN — PIPERACILLIN SODIUM AND TAZOBACTAM SODIUM 4.5 G: 4; .5 INJECTION, POWDER, FOR SOLUTION INTRAVENOUS at 10:02

## 2024-02-03 RX ADMIN — PIPERACILLIN SODIUM AND TAZOBACTAM SODIUM 4.5 G: 4; .5 INJECTION, POWDER, FOR SOLUTION INTRAVENOUS at 06:02

## 2024-02-03 RX ADMIN — INSULIN ASPART 4 UNITS: 100 INJECTION, SOLUTION INTRAVENOUS; SUBCUTANEOUS at 12:02

## 2024-02-03 RX ADMIN — CHOLECALCIFEROL TAB 25 MCG (1000 UNIT) 1000 UNITS: 25 TAB at 09:02

## 2024-02-03 RX ADMIN — ASPIRIN 81 MG CHEWABLE TABLET 81 MG: 81 TABLET CHEWABLE at 09:02

## 2024-02-03 NOTE — ASSESSMENT & PLAN NOTE
Patient with multiple admissions for septic joint complicated by MRSA bacteremia and status post orthopedic intervention, on daptomycin prior to presentation found to have imaging concerns of worsening right thigh abscess and new concerns for left knee osteomyelitis. Previous expected end date of daptomycin was 03/03/24.  He denies worsening pain or subjective fevers.  No leukocytosis. ESR and CRP with mild elevation from prior.  S/p L knee washout on 2/1 and I&D of R thigh abscess. Cultures pending.   BCX from 1/31 grew Acinetobacter baumanii on 2/2 - Infectious Disease c/s    PLAN:  - continue daptomycin, Zosyn  - orthopedic surgery consulted; s/p washout, trending CRP  - infectious disease consulted - continue Dapto/Zosyn, removed PICC line (holiday over weekend) - will adjust as sensitivities return   - follow-up repeat blood cultures  - Removed post-op [place]  - PT/OT

## 2024-02-03 NOTE — PROGRESS NOTES
[facility] ([place] [address])  Orthopedics  Progress Note    Patient Name: Kulwant Mueller Jr.  MRN: [id number]  Admission Date: 1/31/2024  Hospital Length of Stay: 2 days  Attending Provider: Soniya Javier*  Primary Care Provider: Shellie, Primary Doctor  Follow-up For: Procedure(s) (LRB):  ARTHROSCOPY, KNEE, left, lateral post (Left)  IRRIGATION AND DEBRIDEMENT, LOWER EXTREMITY (Right)    Post-Operative Day: 2 Days Post-Op  Subjective:     Principal Problem:Pyogenic arthritis of left knee joint    Principal Orthopedic Problem: same as above s/p L knee arthroscopic I&D and R thigh I&D    Interval History: NAEO. VSS. Pain well controlled. Hgb 7.2. Cultures pending, currently no growth. On daptomycin and zosyn.  50 cc from drain overnight.    Review of patient's allergies indicates:   Allergen Reactions    Heparin analogues Nausea And Vomiting       Current Facility-Administered Medications   Medication    acetaminophen tablet 650 mg    aspirin chewable tablet 81 mg    DAPTOmycin (CUBICIN) 945 mg in sodium chloride 0.9% SolP 50 mL IVPB    dextrose 10% bolus 125 mL 125 mL    dextrose 10% bolus 250 mL 250 mL    glucagon (human recombinant) injection 1 mg    glucose chewable tablet 16 g    glucose chewable tablet 24 g    insulin aspart U-100 pen 0-5 Units    methocarbamoL tablet 500 mg    metoprolol succinate (TOPROL-XL) 24 hr split tablet 12.5 mg    naloxone 0.4 mg/mL injection 0.02 mg    oxyCODONE immediate release tablet 5 mg    oxyCODONE immediate release tablet Tab 10 mg    piperacillin-tazobactam (ZOSYN) 4.5 g in dextrose 5 % in water (D5W) 100 mL IVPB (MB+)    prochlorperazine tablet 10 mg    rivaroxaban tablet 10 mg    sodium chloride 0.9% flush 10 mL    sodium chloride 0.9% flush 10 mL    vitamin D 1000 units tablet 1,000 Units     Objective:     Vital Signs (Most Recent):  Temp: 98.1 °F (36.7 °C) (02/03/24 0917)  Pulse: 85 (02/03/24 0917)  Resp: 18 (02/03/24 0917)  BP: 120/64 (02/03/24 0917)  SpO2:  "98 % (02/03/24 0917) Vital Signs (24h Range):  Temp:  [97.5 °F (36.4 °C)-98.1 °F (36.7 °C)] 98.1 °F (36.7 °C)  Pulse:  [77-86] 85  Resp:  [17-20] 18  SpO2:  [95 %-98 %] 98 %  BP: (116-128)/(60-71) 120/64     Weight: 106.7 kg (235 lb 3.7 oz)  Height: 6' 1" (185.4 cm)  Body mass index is 31.03 kg/m².      Intake/Output Summary (Last 24 hours) at 2/3/2024 1010  Last data filed at 2/3/2024 0505  Gross per 24 hour   Intake 628 ml   Output 2350 ml   Net -1722 ml         Ortho/SPM Exam  LLE:   Dressing c/d/I  Able to flex/extend the knee  Cap refill <2s    RLE:   Dressing c/d/I  Mild TTP  NVI distally  MULUGETA drain in place     Significant Labs: All pertinent labs within the past 24 hours have been reviewed.    Significant Imaging: I have reviewed and interpreted all pertinent imaging results/findings.  Assessment/Plan:     * Pyogenic arthritis of left knee joint  Kulwant Mueller Jr. is a 38 y.o. male with a history of MRSA bacteremia,  recent left knee septic arthritis and right thigh abscess s/p L knee arthroscopic I&D on 1/17, s/p repeat L knee I&D and right thigh abscess I&D on 1/21, admitted for infectious workup after 1 day of worsening fevers/chills. Ortho consulted for rule out septic arthritis of the left knee joint. Pertinent physical exam findings include palpable effusion of the left knee in addition to a palpable fluid collection at the lateral aspect of the right thigh. The left knee and right thigh were aspirated at the bedside - see procedure note for further details.      Now s/p arthroscopic I&D of the left knee and thigh with debridement of right thigh on 2/1/24    Pain MM  CRP downtrending, 67.9  Dvt ppx: ASA 81BID  PT/OT WBAT, work on flexion of the left knee  Abx per ID, on daptomycin and Zosyn     Dispo: pending cultures and ID recs          NOE Marie MD  Orthopedics  [facility] S[facility] ([facility])    "

## 2024-02-03 NOTE — ASSESSMENT & PLAN NOTE
Known history of hypercoagulable state.  Reported heparin analog allergy.    - continue home rivaroxaban  - no other need for DVT prophylaxis

## 2024-02-03 NOTE — ASSESSMENT & PLAN NOTE
- Scheduling Detemir 5u qhs  - Increased to MDSSI, as patient's glucose has been uncontrolled since beginning to eat post-op  - up titrate as appropriate  - Diabetic diet  - POCT glucose checks AC/HS  - hypoglycemia protocol in place

## 2024-02-03 NOTE — PROGRESS NOTES
[facility] ([place] [address])  Infectious Disease  Progress Note    Patient Name: Kulwant Mueller Jr.  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 (Surgical cxs +MRSA). ROBY 1/02/24 neg. Pt required salvage therapy dapto/ceftaroline & bld cxs finally cleared 1/07, and then placed on daptomycin monotherapy to complete 4wks duration (ASYA 02/06) per [facility] ID (Yana). Of note, L shoulder imaging showed subdeltoid bursitis w/ SS tear; superimposed infection could be present but no drainable fluid collections. Also, R heel without osteo but had cellulitis, myositis, tenosynovitis.     Pt was discharged (01/10) to rehab with daptomycin monotherapy; but started having new fevers (up to 103F) at rehab, and re-admitted (01/14) for workup. Blood cultures 01/14/24 NGTD. MRI T/L spine negative for osteo-discitis. Found to have persistent left knee septic arthritis and right thigh abscess and underwent L knee arthroscopic I&D on 1/17. IR drain placed 01/19 R lateral thigh abscess, cxs +MRSA.  Taken to OR 01/21 for repeat surgical I&D of L knee (#3), and R thigh abscess with new R thigh drain placed. Surgical cxs (01/21) of L knee and R thigh NGTD.     Discharged to [facility] rehab with IV daptomycin 8mg/kg Q24h. Re-presented 1/31/2024 with tachycardia, fever. MRI left knee with: Findings compatible with persistent septic joint and early changes of osteomyelitis in the femoral condyles patella and tibial spines. And MRI pelvis with: Worsening of  fluid enhancement in the right myofascial planes and gluteal muscles suggesting infected subcutaneous fat and tensor fascia jonah bursa with myositis and or infection of lateral gluteal muscles on the right. Evaluated by ortho and s/p aspiration of left knee joint fluid and right thigh fluid collection. Gram stains with moderate WBC, no organisms.    Bcx 1/31 growing Acinetobacter Baumannii in 1/4 (from PICC line).    Antibiotics this admission:   Daptomycin 12/27 - present   Zosyn added 2/2/24 - present (added for Acinetobacter baumannii on 1/31 Bcx)    S/p arthroscopic I&D Left knee and right thigh I&D 2/1     -- Continue IV daptomycin and Zosyn, will adjust antibiotic therapy as culture data results  -- PICC removed 2/2; plan for line holiday over the weekend  -- Please remove chadwick catheter if indicated (placed in OR)  -- Follow-up Bcx, left knee cultures, and right thigh cultures  -- Considering Karius testing if no growth from cultures in coming days      Thank you for your consult. I will follow-up with patient. Please contact us if you have any additional questions.    Karan Torres MD  Infectious Disease  [facility] ([facility])    Subjective:     Principal Problem:Pyogenic arthritis of left knee joint    HPI: 38 year old male with history of May-Thurer syndrome, diabetes with chronic diabetic foot wounds, recent admission for MRSA bacteremia.      MRSA bacteremia (daptomycin susceptible) noted on index admission blood cultures 12/27, complicated by left knee septic arthritis, s/p L knee arthrotomy with synovectomy on 12/29- cx w/ MRSA, s/p left shoulder arthrocentesis reportedly with no evidence of infection, ROBY 1/02/24 without vegetations, required salvage therapy with addition of ceftaroline & cleared 1/07, subsequently placed on daptomycin monotherapy and sent to rehab.     Re-admitted 1/14 for fever while at rehab + back pain, no new or persistent bacteremia, MRI T/L spine negative for  osteo-discitis.  That admission he had persistent left knee septic arthritis and right thigh abscess and underwent L knee arthroscopic I&D on 1/17, repeat L knee I&D and right thigh abscess I&D on 1/21.    MRI L Knee this admission showed large air-fluid collection, myositis (involving vastus medialis, lateralis & popliteus musculature),     MRI pelvis with R thigh  17 by 7 x 5 cm collection- s/p IR drain placed 1/19 (cx MRSA). MRI L shoulder with bursitis    Operative course:  OR 01/15 for L knee washout (cell counts c/w/ septic joint +CPPD crystals)  cx w/ NG  OR 1/21 L knee washout (#3) w/ extensive synovectomy/debridement and R thigh I&D (cx NG)    Clinically improved and discharged, ID team had seen him and plan was to complete 6 weeks of abx from last washout (01/21); ASYA 03/03/24     This admission, he is afebrile, without leukocytosis, HDS    He had subjective fever and rigors. MRI 1/31 L knee shows persistent septic joint and early osteomyelitis.   MRI pelvis shows improved fluid around left hip but worsening fluid in R myofascial planes and gluteal muscles. Daptomycin being continued. CPK WNL  Interval History: No acute events. Bcx 1/31 speciated in concurrence with PCR as acinetobacter baumannii. Plan to continue daptomycin + zosyn, narrow w/ sensitivites.    Review of Systems   Constitutional:  Negative for appetite change, chills, fatigue and fever.   HENT:  Negative for congestion and sore throat.    Eyes:  Negative for visual disturbance.   Respiratory:  Negative for cough, chest tightness, shortness of breath and wheezing.    Cardiovascular:  Negative for chest pain, palpitations and leg swelling.   Gastrointestinal:  Negative for abdominal pain, constipation, diarrhea, nausea and vomiting.   Genitourinary:  Negative for dysuria, flank pain, frequency and hematuria.   Musculoskeletal:  Negative for arthralgias, back pain, joint swelling, myalgias and neck pain.   Skin:  Negative for pallor, rash and  wound.   Neurological:  Negative for seizures, light-headedness, numbness and headaches.   Psychiatric/Behavioral:  The patient is not nervous/anxious.         Depressed mood     Objective:     Vital Signs (Most Recent):  Temp: 98.1 °F (36.7 °C) (02/03/24 0917)  Pulse: 85 (02/03/24 0917)  Resp: 18 (02/03/24 0917)  BP: 120/64 (02/03/24 0917)  SpO2: 98 % (02/03/24 0917) Vital Signs (24h Range):  Temp:  [97.5 °F (36.4 °C)-98.1 °F (36.7 °C)] 98.1 °F (36.7 °C)  Pulse:  [77-86] 85  Resp:  [17-20] 18  SpO2:  [95 %-98 %] 98 %  BP: (116-128)/(60-71) 120/64     Weight: 106.7 kg (235 lb 3.7 oz)  Body mass index is 31.03 kg/m².    Estimated Creatinine Clearance: 157.3 mL/min (based on SCr of 0.8 mg/dL).     Physical Exam  Constitutional:       General: He is not in acute distress.     Appearance: Normal appearance. He is not ill-appearing.   HENT:      Head: Normocephalic and atraumatic.      Mouth/Throat:      Mouth: Mucous membranes are moist.      Pharynx: Oropharynx is clear. No oropharyngeal exudate.   Eyes:      General: No scleral icterus.     Extraocular Movements: Extraocular movements intact.      Pupils: Pupils are equal, round, and reactive to light.   Cardiovascular:      Rate and Rhythm: Normal rate and regular rhythm.      Heart sounds: No murmur heard.  Pulmonary:      Effort: Pulmonary effort is normal.      Breath sounds: No wheezing or rales.   Abdominal:      General: Abdomen is flat. There is no distension.      Palpations: Abdomen is soft. There is no mass.      Tenderness: There is no abdominal tenderness. There is no right CVA tenderness or left CVA tenderness.   Musculoskeletal:         General: No swelling or tenderness.      Cervical back: Normal range of motion. No tenderness.      Right lower leg: No edema.      Left lower leg: No edema.      Comments: Left knee dressing clean/dry/intact  Right lateral thigh dressing clean/dry/intact   Lymphadenopathy:      Cervical: No cervical adenopathy.    Skin:     General: Skin is warm and dry.      Coloration: Skin is not jaundiced.      Findings: No bruising.   Neurological:      General: No focal deficit present.      Mental Status: He is alert and oriented to person, place, and time.      Cranial Nerves: No cranial nerve deficit.       Significant Labs: CBC:   Recent Labs   Lab 02/02/24 0352 02/03/24 0438   WBC 10.18 8.28   HGB 7.9* 7.2*   HCT 26.7* 24.3*    370     Microbiology Results (last 7 days)       Procedure Component Value Units Date/Time    Blood culture #1 **CANNOT BE ORDERED STAT** [[id number]]  (Abnormal) Collected: 01/31/24 1538    Order Status: Completed Specimen: Blood from Line, PICC Right Brachial Updated: 02/03/24 0943     Blood Culture, Routine Gram stain aer bottle: Gram negative rods      Results called to and read back by:MADHU Pal RN 02/01/2024  21:08      ACINETOBACTER BAUMANNII COMPLEX  Susceptibility pending      Blood culture [[id number]] Collected: 02/03/24 0438    Order Status: Sent Specimen: Blood Updated: 02/03/24 0542    Blood culture [[id number]] Collected: 02/03/24 0438    Order Status: Sent Specimen: Blood Updated: 02/03/24 0542    AFB Culture & Smear [[id number]] Collected: 02/01/24 1530    Order Status: Completed Specimen: Incision site from Leg, Right Updated: 02/02/24 2127     AFB Culture & Smear Culture in progress     AFB CULTURE STAIN No acid fast bacilli seen.    Narrative:      Right leg - culture    AFB Culture & Smear [[id number]] Collected: 02/01/24 1530    Order Status: Completed Specimen: Incision site from Leg, Right Updated: 02/02/24 2127     AFB Culture & Smear Culture in progress     AFB CULTURE STAIN No acid fast bacilli seen.    Narrative:      Right leg - culture    AFB Culture & Smear [[id number]] Collected: 02/01/24 0740    Order Status: Completed Specimen: Abscess from Leg, Right Updated: 02/02/24 2127     AFB Culture & Smear Culture in progress     AFB CULTURE STAIN No acid fast bacilli  seen.    AFB Culture & Smear [[id number]] Collected: 02/01/24 0821    Order Status: Completed Specimen: Joint Fluid from Knee, Left Updated: 02/02/24 2127     AFB Culture & Smear Culture in progress     AFB CULTURE STAIN No acid fast bacilli seen.    Blood culture #2 **CANNOT BE ORDERED STAT** [[id number]] Collected: 01/31/24 1538    Order Status: Completed Specimen: Blood from Peripheral, Antecubital, Right Updated: 02/02/24 1812     Blood Culture, Routine No Growth to date      No Growth to date      No Growth to date    Aerobic culture [[id number]] Collected: 02/01/24 1530    Order Status: Completed Specimen: Incision site from Leg, Right Updated: 02/02/24 0941     Aerobic Bacterial Culture No growth    Narrative:      Right leg - culture    Aerobic culture [[id number]] Collected: 02/01/24 1530    Order Status: Completed Specimen: Incision site from Leg, Right Updated: 02/02/24 0941     Aerobic Bacterial Culture No growth    Narrative:      Right leg - culture    Aerobic culture [[id number]] Collected: 02/01/24 0951    Order Status: Completed Specimen: Joint Fluid from Knee, Left Updated: 02/02/24 0941     Aerobic Bacterial Culture No growth    Aerobic culture [[id number]] Collected: 02/01/24 0740    Order Status: Completed Specimen: Abscess from Leg, Right Updated: 02/02/24 0941     Aerobic Bacterial Culture No growth    Culture, Anaerobe [[id number]] Collected: 02/01/24 1530    Order Status: Completed Specimen: Incision site from Leg, Right Updated: 02/02/24 0833     Anaerobic Culture Culture in progress    Narrative:      Right leg - culture    Culture, Anaerobe [[id number]] Collected: 02/01/24 1530    Order Status: Completed Specimen: Incision site from Leg, Right Updated: 02/02/24 0833     Anaerobic Culture Culture in progress    Narrative:      Right leg - culture    Culture, Anaerobic [[id number]] Collected: 02/01/24 0740    Order Status: Completed Specimen: Abscess from Leg, Right Updated: 02/02/24 0833      Anaerobic Culture Culture in progress    Culture, Anaerobic [[id number]] Collected: 02/01/24 0821    Order Status: Completed Specimen: Joint Fluid from Knee, Left Updated: 02/02/24 0833     Anaerobic Culture Culture in progress    E. coli 0157 antigen [[id number]] Collected: 02/01/24 0131    Order Status: No result Specimen: Stool Updated: 02/02/24 0815    Stool culture [[id number]] Collected: 02/01/24 0131    Order Status: No result Specimen: Stool Updated: 02/02/24 0813    Rapid Organism ID by PCR (from Blood culture) [[id number]]  (Abnormal) Collected: 01/31/24 1538    Order Status: Completed Updated: 02/01/24 2232     Enterococcus faecalis Not Detected     Enterococcus faecium Not Detected     Listeria monocytogenes Not Detected     Staphylococcus spp. Not Detected     Staphylococcus aureus Not Detected     Staphylococcus epidermidis Not Detected     Staphylococcus lugdunensis Not Detected     Streptococcus species Not Detected     Streptococcus agalactiae Not Detected     Streptococcus pneumoniae Not Detected     Streptococcus pyogenes Not Detected     Acinetobacter calcoaceticus/baumannii complex Detected     Bacteroides fragilis Not Detected     Enterobacterales Not Detected     Enterobacter cloacae complex Not Detected     Escherichia coli Not Detected     Klebsiella aerogenes Not Detected     Klebsiella oxytoca Not Detected     Klebsiella pneumoniae group Not Detected     Proteus Not Detected     Salmonella sp Not Detected     Serratia marcescens Not Detected     Haemophilus influenzae Not Detected     Neisseria meningtidis Not Detected     Pseudomonas aeruginosa Not Detected     Stenotrophomonas maltophilia Not Detected     Candida albicans Not Detected     Candida auris Not Detected     Candida glabrata Not Detected     Candida krusei Not Detected     Candida parapsilosis Not Detected     Candida tropicalis Not Detected     Cryptococcus neoformans/gattii Not Detected     CTX-M (ESBL ) Not  Detected     IMP (Carbapenem resistant) Not Detected     KPC resistance gene (Carbapenem resistant) Not Detected     mcr-1  Test Not Applicable     mec A/C  Test Not Applicable     mec A/C and MREJ (MRSA) gene Test Not Applicable     NDM (Carbapenem resistant) Not Detected     OXA-48-like (Carbapenem resistant) Test Not Applicable     van A/B (VRE gene) Test Not Applicable     VIM (Carbapenem resistant) Not Detected    Gram stain [[id number]] Collected: 02/01/24 1530    Order Status: Completed Specimen: Incision site from Leg, Right Updated: 02/01/24 1704     Gram Stain Result Few WBC's      No organisms seen    Narrative:      Right leg - culture    Gram stain [[id number]] Collected: 02/01/24 1530    Order Status: Completed Specimen: Incision site from Leg, Right Updated: 02/01/24 1702     Gram Stain Result Few WBC's      No organisms seen    Narrative:      Right leg - culture    Fungus culture [[id number]] Collected: 02/01/24 1530    Order Status: Sent Specimen: Incision site from Leg, Right Updated: 02/01/24 1546    Fungus culture [[id number]] Collected: 02/01/24 1530    Order Status: Sent Specimen: Incision site from Leg, Right Updated: 02/01/24 1544    Gram stain [[id number]] Collected: 02/01/24 0740    Order Status: Completed Specimen: Abscess from Leg, Right Updated: 02/01/24 1108     Gram Stain Result Moderate WBC's      No organisms seen    Gram stain [[id number]] Collected: 02/01/24 0821    Order Status: Completed Specimen: Joint Fluid from Knee, Left Updated: 02/01/24 1033     Gram Stain Result Moderate WBC's      No organisms seen    Clostridium difficile EIA [[id number]] Collected: 02/01/24 0131    Order Status: Completed Specimen: Stool Updated: 02/01/24 0933     C. diff Antigen Negative     C difficile Toxins A+B, EIA Negative     Comment: Testing not recommended for children <24 months old.       Fungus culture [[id number]] Collected: 02/01/24 0821    Order Status: Sent Specimen: Joint Fluid from  Knee, Left Updated: 02/01/24 0908    Fungus culture [[id number]] Collected: 02/01/24 0740    Order Status: Sent Specimen: Abscess from Leg, Right Updated: 02/01/24 0904    Stool culture [[id number]]     Order Status: Completed Specimen: Stool     Stool culture [[id number]]     Order Status: Canceled Specimen: Stool             Significant Imaging: I have reviewed all pertinent imaging results/findings within the past 24 hours.

## 2024-02-03 NOTE — SUBJECTIVE & OBJECTIVE
Interval History: Patient reports his pain is not controlled at the moment with oxycodone PRN. Otherwise, he reports that he had a bowel movement yesterday afternoon. Will transition to Norco 10 PRN. Glucose uncontrolled since patient began eating post-op; scheduling low dose detemir given patient's home regimen. Still pending susceptibilities.    Review of Systems   Constitutional:  Negative for appetite change, chills, fatigue and fever.   HENT:  Negative for congestion, mouth sores, nosebleeds and tinnitus.    Eyes:  Negative for discharge and itching.   Respiratory:  Negative for cough, chest tightness and shortness of breath.    Cardiovascular:  Negative for chest pain and leg swelling.   Gastrointestinal:  Negative for abdominal distention and abdominal pain.   Endocrine: Negative for cold intolerance and heat intolerance.   Genitourinary:  Negative for dysuria and frequency.   Musculoskeletal:  Negative for arthralgias, joint swelling and neck pain.   Skin:  Negative for color change and pallor.   Allergic/Immunologic: Negative for environmental allergies and food allergies.   Neurological:  Negative for dizziness and light-headedness.   Hematological:  Negative for adenopathy.   Psychiatric/Behavioral:  Negative for agitation, behavioral problems and confusion.      Objective:     Vital Signs (Most Recent):  Temp: 98.4 °F (36.9 °C) (02/03/24 1104)  Pulse: 83 (02/03/24 1104)  Resp: 18 (02/03/24 1104)  BP: (!) 124/59 (02/03/24 1104)  SpO2: 97 % (02/03/24 1229) Vital Signs (24h Range):  Temp:  [97.5 °F (36.4 °C)-98.4 °F (36.9 °C)] 98.4 °F (36.9 °C)  Pulse:  [77-86] 83  Resp:  [17-20] 18  SpO2:  [96 %-98 %] 97 %  BP: (116-128)/(59-71) 124/59     Weight: 106.7 kg (235 lb 3.7 oz)  Body mass index is 31.03 kg/m².    Intake/Output Summary (Last 24 hours) at 2/3/2024 1314  Last data filed at 2/3/2024 1232  Gross per 24 hour   Intake 388 ml   Output 2300 ml   Net -1912 ml           Physical Exam  Constitutional:        General: He is not in acute distress.     Appearance: Normal appearance. He is not ill-appearing.   HENT:      Head: Normocephalic and atraumatic.      Right Ear: External ear normal.      Left Ear: External ear normal.      Nose: Nose normal.      Mouth/Throat:      Mouth: Mucous membranes are moist.      Pharynx: Oropharynx is clear.   Eyes:      Extraocular Movements: Extraocular movements intact.      Pupils: Pupils are equal, round, and reactive to light.   Cardiovascular:      Rate and Rhythm: Normal rate and regular rhythm.      Pulses: Normal pulses.      Heart sounds: Normal heart sounds.   Pulmonary:      Effort: Pulmonary effort is normal.      Breath sounds: Normal breath sounds.   Abdominal:      General: Abdomen is flat. There is no distension.      Palpations: Abdomen is soft.      Tenderness: There is no abdominal tenderness.   Musculoskeletal:         General: No swelling. Normal range of motion.      Cervical back: Normal range of motion and neck supple.      Comments: Dressing in place LLE, no erythema noted near edges  Able to bend L knee    Skin:     General: Skin is warm and dry.      Capillary Refill: Capillary refill takes less than 2 seconds.   Neurological:      General: No focal deficit present.      Mental Status: He is alert and oriented to person, place, and time.   Psychiatric:         Mood and Affect: Mood normal.         Behavior: Behavior normal.             Significant Labs: All pertinent labs within the past 24 hours have been reviewed.  CBC:   Recent Labs   Lab 02/02/24  0352 02/03/24  0438   WBC 10.18 8.28   HGB 7.9* 7.2*   HCT 26.7* 24.3*    370       CMP:   Recent Labs   Lab 02/02/24  0352 02/03/24  0438   * 135*   K 4.7 3.9   CL 98 101   CO2 23 28   * 208*   BUN 13 9   CREATININE 0.8 0.8   CALCIUM 8.4* 8.3*   ANIONGAP 13 6*         Significant Imaging: I have reviewed all pertinent imaging results/findings within the past 24 hours.

## 2024-02-03 NOTE — PROGRESS NOTES
[facility] Surg ([address] Medicine  Progress Note    Patient Name: Kulwant Mueller Jr.  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 Per chart review rehab facility was concern for orthostasis and administered IVF. Rehab staff were also concerned for worsening infection, reporting diaphoresis at nighttime and thus recommended evaluation with imaging at [facility]. On interview, patient states that he feels fine and denies any complaints except for diarrhea.    On presentation he was afebrile but tachycardic to 110 with /98.  Labs showed WBC 12.3, Hgb 8.0, platelets 396, ESR 76 .7, Na 134, K+ 3.4, .     MRI of the knee showed persistent septic joint and seemingly new early changes of osteomyelitis.    MRI of the pelvis showed improved fluid around the left hip but worsening fluid enhancement in the right myofascial planes and gluteal muscles.    C difficile testing was ordered in the emergency department.  He was admitted to Hospital Medicine for orthopedic surgery and Infectious Disease evaluation.    Overview/Hospital Course:  Arrived to [facility] on 1/31 for lightheadedness, fevers, and chills, found to have L septic knee joing and abscess in lateral R thigh. Underwent joint washout and I&D on 2/1 with orthopedic surgery. BCX grew Acinetobacter baumanii in one  bottle. Infectious disease consulted, continued Daptomycin due to previous MRSA infections in joint washouts, added Zosyn for A. Baumanii, recommended PICC removal, which was completed 2/2. Patient to have PICC holiday over the weekend.    Interval History: Patient reports his pain is not controlled at the moment with oxycodone PRN. Otherwise, he reports that he had a bowel movement yesterday afternoon. Will transition to Norco 10 PRN. Glucose uncontrolled since patient began eating post-op; scheduling low dose detemir given patient's home regimen. Still pending susceptibilities.    Review of Systems   Constitutional:  Negative for appetite change, chills, fatigue and fever.   HENT:  Negative for congestion, mouth sores, nosebleeds and tinnitus.    Eyes:  Negative for discharge and itching.   Respiratory:  Negative for cough, chest tightness and shortness of breath.    Cardiovascular:  Negative for chest pain and leg swelling.   Gastrointestinal:  Negative for abdominal distention and abdominal pain.   Endocrine: Negative for cold intolerance and heat intolerance.   Genitourinary:  Negative for dysuria and frequency.   Musculoskeletal:  Negative for arthralgias, joint swelling and neck pain.   Skin:  Negative for color change and pallor.   Allergic/Immunologic: Negative for environmental allergies and food allergies.   Neurological:  Negative for dizziness and light-headedness.   Hematological:  Negative for adenopathy.   Psychiatric/Behavioral:  Negative for agitation, behavioral problems and confusion.      Objective:     Vital Signs (Most Recent):  Temp: 98.4 °F (36.9 °C) (02/03/24 1104)  Pulse: 83 (02/03/24 1104)  Resp: 18 (02/03/24 1104)  BP: (!) 124/59 (02/03/24 1104)  SpO2: 97 % (02/03/24 1229) Vital Signs (24h Range):  Temp:  [97.5 °F (36.4 °C)-98.4 °F (36.9 °C)] 98.4 °F (36.9 °C)  Pulse:  [77-86] 83  Resp:  [17-20] 18  SpO2:  [96 %-98 %] 97 %  BP: (116-128)/(59-71) 124/59     Weight: 106.7 kg (235 lb 3.7  oz)  Body mass index is 31.03 kg/m².    Intake/Output Summary (Last 24 hours) at 2/3/2024 1314  Last data filed at 2/3/2024 1232  Gross per 24 hour   Intake 388 ml   Output 2300 ml   Net -1912 ml           Physical Exam  Constitutional:       General: He is not in acute distress.     Appearance: Normal appearance. He is not ill-appearing.   HENT:      Head: Normocephalic and atraumatic.      Right Ear: External ear normal.      Left Ear: External ear normal.      Nose: Nose normal.      Mouth/Throat:      Mouth: Mucous membranes are moist.      Pharynx: Oropharynx is clear.   Eyes:      Extraocular Movements: Extraocular movements intact.      Pupils: Pupils are equal, round, and reactive to light.   Cardiovascular:      Rate and Rhythm: Normal rate and regular rhythm.      Pulses: Normal pulses.      Heart sounds: Normal heart sounds.   Pulmonary:      Effort: Pulmonary effort is normal.      Breath sounds: Normal breath sounds.   Abdominal:      General: Abdomen is flat. There is no distension.      Palpations: Abdomen is soft.      Tenderness: There is no abdominal tenderness.   Musculoskeletal:         General: No swelling. Normal range of motion.      Cervical back: Normal range of motion and neck supple.      Comments: Dressing in place LLE, no erythema noted near edges  Able to bend L knee    Skin:     General: Skin is warm and dry.      Capillary Refill: Capillary refill takes less than 2 seconds.   Neurological:      General: No focal deficit present.      Mental Status: He is alert and oriented to person, place, and time.   Psychiatric:         Mood and Affect: Mood normal.         Behavior: Behavior normal.             Significant Labs: All pertinent labs within the past 24 hours have been reviewed.  CBC:   Recent Labs   Lab 02/02/24  0352 02/03/24 0438   WBC 10.18 8.28   HGB 7.9* 7.2*   HCT 26.7* 24.3*    370       CMP:   Recent Labs   Lab 02/02/24  0352 02/03/24 0438   * 135*   K 4.7 3.9    CL 98 101   CO2 23 28   * 208*   BUN 13 9   CREATININE 0.8 0.8   CALCIUM 8.4* 8.3*   ANIONGAP 13 6*         Significant Imaging: I have reviewed all pertinent imaging results/findings within the past 24 hours.    Assessment/Plan:      * Pyogenic arthritis of left knee joint  See Staphylococcal arthritis of left knee       Hypokalemia  Patient has hypokalemia which is Acute and currently controlled. Most recent potassium levels reviewed-   Lab Results   Component Value Date    K 3.9 02/03/2024   . Will continue potassium replacement per protocol and recheck repeat levels after replacement completed.     HFrEF (heart failure with reduced ejection fraction)  Patient with known history of HFrEF. Does not appear to be in fluid overload on clinical examination.  Most recent echocardiogram:    Left Ventricle: The left ventricle is normal in size. There is concentric remodeling. Regional wall motion abnormalities present. See diagram for wall motion findings. There is reduced systolic function. Biplane (2D) method of discs ejection fraction is 48%. There is normal diastolic function.    Right Ventricle: Normal right ventricular cavity size. Systolic function is normal. TAPSE is 2.54 cm.    Normal left atrial size. The left atrium volume index is 21.2 mL/m2.    Normal right atrial size.    The aortic valve is structurally normal. There is normal leaflet mobility.    The mitral valve is structurally normal. There is normal leaflet mobility.    The tricuspid valve is structurally normal. There is normal leaflet mobility.    IVC/SVC: Elevated venous pressure at 15 mmHg.    Overall the study quality was technically difficult.    - assess volume status daily  - Initially held home GDMT (Jardiance, Toprol XL 12.5 QD) in the setting of possible infection and resume as appropriate  - 2/1 Initiated Toprol 12.5mg QD     Diarrhea  Reports ongoing watery diarrhea for the past few days prior to discharge. Given his ongoing  antibiotic administration, there is possibility of infectious etiology.    Resolved  - Cdiff negative (2/2/23)  - Patient reports no continued diarrhea; will monitor for symptoms and initiate Loperamide PRN      Abscess of right thigh  - See staphylococcal arthritis of the left knee      Staphylococcal arthritis of left knee  Patient with multiple admissions for septic joint complicated by MRSA bacteremia and status post orthopedic intervention, on daptomycin prior to presentation found to have imaging concerns of worsening right thigh abscess and new concerns for left knee osteomyelitis. Previous expected end date of daptomycin was 03/03/24.  He denies worsening pain or subjective fevers.  No leukocytosis. ESR and CRP with mild elevation from prior.  S/p L knee washout on 2/1 and I&D of R thigh abscess. Cultures pending.   BCX from 1/31 grew Acinetobacter baumanii on 2/2 - Infectious Disease c/s    PLAN:  - continue daptomycin, Zosyn  - orthopedic surgery consulted; s/p washout, trending CRP  - infectious disease consulted - continue Dapto/Zosyn, removed PICC line (holiday over weekend) - will adjust as sensitivities return   - follow-up repeat blood cultures  - Removed post-op [place]  - PT/OT      May-Thurner syndrome  Known history of hypercoagulable state.  Reported heparin analog allergy.    - continue home rivaroxaban  - no other need for DVT prophylaxis    Hypertension associated with diabetes  - hold home antihypertensives in the setting of possible infection      Type 2 diabetes mellitus with complication, with long-term current use of insulin  - Scheduling Detemir 5u qhs  - Increased to MDSSI, as patient's glucose has been uncontrolled since beginning to eat post-op  - up titrate as appropriate  - Diabetic diet  - POCT glucose checks AC/HS  - hypoglycemia protocol in place      VTE Risk Mitigation (From admission, onward)           Ordered     rivaroxaban tablet 10 mg  With dinner         01/31/24 7533                     Discharge Planning   RAEANN: 2/4/2024     Code Status: Full Code   Is the patient medically ready for discharge?: No    Reason for patient still in hospital (select all that apply): Patient trending condition, Treatment, Consult recommendations, and Pending disposition  Discharge Plan A: Rehab                  Aquiles Lewis MD  Department of Hospital Medicine   [facility] ([place])

## 2024-02-03 NOTE — ASSESSMENT & PLAN NOTE
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 (Surgical cxs +MRSA). ROBY 1/02/24 neg. Pt required salvage therapy dapto/ceftaroline & bld cxs finally cleared 1/07, and then placed on daptomycin monotherapy to complete 4wks duration (ASYA 02/06) per Gm ID (Yana). Of note, L shoulder imaging showed subdeltoid bursitis w/ SS tear; superimposed infection could be present but no drainable fluid collections. Also, R heel without osteo but had cellulitis, myositis, tenosynovitis.     Pt was discharged (01/10) to rehab with daptomycin monotherapy; but started having new fevers (up to 103F) at rehab, and re-admitted (01/14) for workup. Blood cultures 01/14/24 NGTD. MRI T/L spine negative for osteo-discitis. Found to have persistent left knee septic arthritis and right thigh abscess and underwent L knee arthroscopic I&D on 1/17. IR drain placed 01/19 R lateral thigh abscess, cxs +MRSA.  Taken to OR 01/21 for repeat surgical I&D of L knee (#3), and R thigh abscess with new R thigh drain placed. Surgical cxs (01/21) of L knee and R thigh NGTD.     Discharged to [facility] with IV daptomycin 8mg/kg Q24h. Re-presented 1/31/2024 with tachycardia, fever. MRI left knee with: Findings compatible with persistent septic joint and early changes of osteomyelitis in the femoral condyles patella and tibial spines. And MRI pelvis with: Worsening of fluid enhancement in the right myofascial planes and gluteal muscles suggesting infected subcutaneous fat and tensor fascia jonah bursa with myositis and or infection of lateral gluteal muscles on the right. Evaluated by ortho and s/p aspiration of left knee joint fluid and right thigh fluid collection. Gram stains with moderate WBC, no organisms.    Bcx 1/31 growing  Detail Level: Detailed Acinetobacter Baumannii in 1/4 (from PICC line).    Antibiotics this admission:   Daptomycin 12/27 - present   Zosyn added 2/2/24 - present (added for Acinetobacter baumannii on 1/31 Bcx)    S/p arthroscopic I&D Left knee and right thigh I&D 2/1     -- Continue IV daptomycin and Zosyn, will adjust antibiotic therapy as culture data results  -- PICC removed 2/2; plan for line holiday over the weekend  -- Please remove chadwick catheter if indicated (placed in OR)  -- Follow-up Bcx, left knee cultures, and right thigh cultures  -- Considering Karius testing if no growth from cultures in coming days

## 2024-02-03 NOTE — SUBJECTIVE & OBJECTIVE
Interval History: No acute events. Bcx 1/31 speciated in concurrence with PCR as acinetobacter baumannii. Plan to continue daptomycin + zosyn, narrow w/ sensitivites.    Review of Systems   Constitutional:  Negative for appetite change, chills, fatigue and fever.   HENT:  Negative for congestion and sore throat.    Eyes:  Negative for visual disturbance.   Respiratory:  Negative for cough, chest tightness, shortness of breath and wheezing.    Cardiovascular:  Negative for chest pain, palpitations and leg swelling.   Gastrointestinal:  Negative for abdominal pain, constipation, diarrhea, nausea and vomiting.   Genitourinary:  Negative for dysuria, flank pain, frequency and hematuria.   Musculoskeletal:  Negative for arthralgias, back pain, joint swelling, myalgias and neck pain.   Skin:  Negative for pallor, rash and wound.   Neurological:  Negative for seizures, light-headedness, numbness and headaches.   Psychiatric/Behavioral:  The patient is not nervous/anxious.         Depressed mood     Objective:     Vital Signs (Most Recent):  Temp: 98.1 °F (36.7 °C) (02/03/24 0917)  Pulse: 85 (02/03/24 0917)  Resp: 18 (02/03/24 0917)  BP: 120/64 (02/03/24 0917)  SpO2: 98 % (02/03/24 0917) Vital Signs (24h Range):  Temp:  [97.5 °F (36.4 °C)-98.1 °F (36.7 °C)] 98.1 °F (36.7 °C)  Pulse:  [77-86] 85  Resp:  [17-20] 18  SpO2:  [95 %-98 %] 98 %  BP: (116-128)/(60-71) 120/64     Weight: 106.7 kg (235 lb 3.7 oz)  Body mass index is 31.03 kg/m².    Estimated Creatinine Clearance: 157.3 mL/min (based on SCr of 0.8 mg/dL).     Physical Exam  Constitutional:       General: He is not in acute distress.     Appearance: Normal appearance. He is not ill-appearing.   HENT:      Head: Normocephalic and atraumatic.      Mouth/Throat:      Mouth: Mucous membranes are moist.      Pharynx: Oropharynx is clear. No oropharyngeal exudate.   Eyes:      General: No scleral icterus.     Extraocular Movements: Extraocular movements intact.      Pupils:  Pupils are equal, round, and reactive to light.   Cardiovascular:      Rate and Rhythm: Normal rate and regular rhythm.      Heart sounds: No murmur heard.  Pulmonary:      Effort: Pulmonary effort is normal.      Breath sounds: No wheezing or rales.   Abdominal:      General: Abdomen is flat. There is no distension.      Palpations: Abdomen is soft. There is no mass.      Tenderness: There is no abdominal tenderness. There is no right CVA tenderness or left CVA tenderness.   Musculoskeletal:         General: No swelling or tenderness.      Cervical back: Normal range of motion. No tenderness.      Right lower leg: No edema.      Left lower leg: No edema.      Comments: Left knee dressing clean/dry/intact  Right lateral thigh dressing clean/dry/intact   Lymphadenopathy:      Cervical: No cervical adenopathy.   Skin:     General: Skin is warm and dry.      Coloration: Skin is not jaundiced.      Findings: No bruising.   Neurological:      General: No focal deficit present.      Mental Status: He is alert and oriented to person, place, and time.      Cranial Nerves: No cranial nerve deficit.       Significant Labs: CBC:   Recent Labs   Lab 02/02/24 0352 02/03/24 0438   WBC 10.18 8.28   HGB 7.9* 7.2*   HCT 26.7* 24.3*    370     Microbiology Results (last 7 days)       Procedure Component Value Units Date/Time    Blood culture #1 **CANNOT BE ORDERED STAT** [[id number]]  (Abnormal) Collected: 01/31/24 1538    Order Status: Completed Specimen: Blood from Line, PICC Right Brachial Updated: 02/03/24 0943     Blood Culture, Routine Gram stain aer bottle: Gram negative rods      Results called to and read back by:MADHU Pal RN 02/01/2024  21:08      ACINETOBACTER BAUMANNII COMPLEX  Susceptibility pending      Blood culture [[id number]] Collected: 02/03/24 0438    Order Status: Sent Specimen: Blood Updated: 02/03/24 0542    Blood culture [[id number]] Collected: 02/03/24 0438    Order Status: Sent Specimen: Blood  Updated: 02/03/24 0542    AFB Culture & Smear [[id number]] Collected: 02/01/24 1530    Order Status: Completed Specimen: Incision site from Leg, Right Updated: 02/02/24 2127     AFB Culture & Smear Culture in progress     AFB CULTURE STAIN No acid fast bacilli seen.    Narrative:      Right leg - culture    AFB Culture & Smear [[id number]] Collected: 02/01/24 1530    Order Status: Completed Specimen: Incision site from Leg, Right Updated: 02/02/24 2127     AFB Culture & Smear Culture in progress     AFB CULTURE STAIN No acid fast bacilli seen.    Narrative:      Right leg - culture    AFB Culture & Smear [[id number]] Collected: 02/01/24 0740    Order Status: Completed Specimen: Abscess from Leg, Right Updated: 02/02/24 2127     AFB Culture & Smear Culture in progress     AFB CULTURE STAIN No acid fast bacilli seen.    AFB Culture & Smear [[id number]] Collected: 02/01/24 0821    Order Status: Completed Specimen: Joint Fluid from Knee, Left Updated: 02/02/24 2127     AFB Culture & Smear Culture in progress     AFB CULTURE STAIN No acid fast bacilli seen.    Blood culture #2 **CANNOT BE ORDERED STAT** [[id number]] Collected: 01/31/24 1538    Order Status: Completed Specimen: Blood from Peripheral, Antecubital, Right Updated: 02/02/24 1812     Blood Culture, Routine No Growth to date      No Growth to date      No Growth to date    Aerobic culture [[id number]] Collected: 02/01/24 1530    Order Status: Completed Specimen: Incision site from Leg, Right Updated: 02/02/24 0941     Aerobic Bacterial Culture No growth    Narrative:      Right leg - culture    Aerobic culture [[id number]] Collected: 02/01/24 1530    Order Status: Completed Specimen: Incision site from Leg, Right Updated: 02/02/24 0941     Aerobic Bacterial Culture No growth    Narrative:      Right leg - culture    Aerobic culture [[id number]] Collected: 02/01/24 0951    Order Status: Completed Specimen: Joint Fluid from Knee, Left Updated: 02/02/24 0941      Aerobic Bacterial Culture No growth    Aerobic culture [[id number]] Collected: 02/01/24 0740    Order Status: Completed Specimen: Abscess from Leg, Right Updated: 02/02/24 0941     Aerobic Bacterial Culture No growth    Culture, Anaerobe [[id number]] Collected: 02/01/24 1530    Order Status: Completed Specimen: Incision site from Leg, Right Updated: 02/02/24 0833     Anaerobic Culture Culture in progress    Narrative:      Right leg - culture    Culture, Anaerobe [[id number]] Collected: 02/01/24 1530    Order Status: Completed Specimen: Incision site from Leg, Right Updated: 02/02/24 0833     Anaerobic Culture Culture in progress    Narrative:      Right leg - culture    Culture, Anaerobic [[id number]] Collected: 02/01/24 0740    Order Status: Completed Specimen: Abscess from Leg, Right Updated: 02/02/24 0833     Anaerobic Culture Culture in progress    Culture, Anaerobic [[id number]] Collected: 02/01/24 0821    Order Status: Completed Specimen: Joint Fluid from Knee, Left Updated: 02/02/24 0833     Anaerobic Culture Culture in progress    E. coli 0157 antigen [[id number]] Collected: 02/01/24 0131    Order Status: No result Specimen: Stool Updated: 02/02/24 0815    Stool culture [[id number]] Collected: 02/01/24 0131    Order Status: No result Specimen: Stool Updated: 02/02/24 0813    Rapid Organism ID by PCR (from Blood culture) [[id number]]  (Abnormal) Collected: 01/31/24 1538    Order Status: Completed Updated: 02/01/24 2232     Enterococcus faecalis Not Detected     Enterococcus faecium Not Detected     Listeria monocytogenes Not Detected     Staphylococcus spp. Not Detected     Staphylococcus aureus Not Detected     Staphylococcus epidermidis Not Detected     Staphylococcus lugdunensis Not Detected     Streptococcus species Not Detected     Streptococcus agalactiae Not Detected     Streptococcus pneumoniae Not Detected     Streptococcus pyogenes Not Detected     Acinetobacter calcoaceticus/baumannii complex  Detected     Bacteroides fragilis Not Detected     Enterobacterales Not Detected     Enterobacter cloacae complex Not Detected     Escherichia coli Not Detected     Klebsiella aerogenes Not Detected     Klebsiella oxytoca Not Detected     Klebsiella pneumoniae group Not Detected     Proteus Not Detected     Salmonella sp Not Detected     Serratia marcescens Not Detected     Haemophilus influenzae Not Detected     Neisseria meningtidis Not Detected     Pseudomonas aeruginosa Not Detected     Stenotrophomonas maltophilia Not Detected     Candida albicans Not Detected     Candida auris Not Detected     Candida glabrata Not Detected     Candida krusei Not Detected     Candida parapsilosis Not Detected     Candida tropicalis Not Detected     Cryptococcus neoformans/gattii Not Detected     CTX-M (ESBL ) Not Detected     IMP (Carbapenem resistant) Not Detected     KPC resistance gene (Carbapenem resistant) Not Detected     mcr-1  Test Not Applicable     mec A/C  Test Not Applicable     mec A/C and MREJ (MRSA) gene Test Not Applicable     NDM (Carbapenem resistant) Not Detected     OXA-48-like (Carbapenem resistant) Test Not Applicable     van A/B (VRE gene) Test Not Applicable     VIM (Carbapenem resistant) Not Detected    Gram stain [[id number]] Collected: 02/01/24 1530    Order Status: Completed Specimen: Incision site from Leg, Right Updated: 02/01/24 1704     Gram Stain Result Few WBC's      No organisms seen    Narrative:      Right leg - culture    Gram stain [[id number]] Collected: 02/01/24 1530    Order Status: Completed Specimen: Incision site from Leg, Right Updated: 02/01/24 1702     Gram Stain Result Few WBC's      No organisms seen    Narrative:      Right leg - culture    Fungus culture [[id number]] Collected: 02/01/24 1530    Order Status: Sent Specimen: Incision site from Leg, Right Updated: 02/01/24 1546    Fungus culture [[id number]] Collected: 02/01/24 1530    Order Status: Sent Specimen:  Incision site from Leg, Right Updated: 02/01/24 1544    Gram stain [[id number]] Collected: 02/01/24 0740    Order Status: Completed Specimen: Abscess from Leg, Right Updated: 02/01/24 1108     Gram Stain Result Moderate WBC's      No organisms seen    Gram stain [[id number]] Collected: 02/01/24 0821    Order Status: Completed Specimen: Joint Fluid from Knee, Left Updated: 02/01/24 1033     Gram Stain Result Moderate WBC's      No organisms seen    Clostridium difficile EIA [[id number]] Collected: 02/01/24 0131    Order Status: Completed Specimen: Stool Updated: 02/01/24 0933     C. diff Antigen Negative     C difficile Toxins A+B, EIA Negative     Comment: Testing not recommended for children <24 months old.       Fungus culture [[id number]] Collected: 02/01/24 0821    Order Status: Sent Specimen: Joint Fluid from Knee, Left Updated: 02/01/24 0908    Fungus culture [[id number]] Collected: 02/01/24 0740    Order Status: Sent Specimen: Abscess from Leg, Right Updated: 02/01/24 0904    Stool culture [[id number]]     Order Status: Completed Specimen: Stool     Stool culture [[id number]]     Order Status: Canceled Specimen: Stool             Significant Imaging: I have reviewed all pertinent imaging results/findings within the past 24 hours.

## 2024-02-03 NOTE — ASSESSMENT & PLAN NOTE
Patient has hypokalemia which is Acute and currently controlled. Most recent potassium levels reviewed-   Lab Results   Component Value Date    K 3.9 02/03/2024   . Will continue potassium replacement per protocol and recheck repeat levels after replacement completed.

## 2024-02-03 NOTE — PLAN OF CARE
Problem: Adult Inpatient Plan of Care  Goal: Optimal Comfort and Wellbeing  Outcome: Ongoing, Not Progressing  Intervention: Monitor Pain and Promote Comfort  Flowsheets (Taken 2/2/2024 1805)  Pain Management Interventions:   around-the-clock dosing utilized   diversional activity provided   breathing exercises utilized   medication offered   pain management plan reviewed with patient/caregiver   pillow support provided   quiet environment facilitated   relaxation techniques promoted  Intervention: Provide Person-Centered Care  Flowsheets (Taken 2/2/2024 1805)  Trust Relationship/Rapport:   care explained   choices provided   emotional support provided   empathic listening provided   thoughts/feelings acknowledged   reassurance provided   questions encouraged   questions answered     Problem: Infection  Goal: Absence of Infection Signs and Symptoms  Outcome: Ongoing, Not Progressing  Intervention: Prevent or Manage Infection  Flowsheets (Taken 2/2/2024 1805)  Infection Management:   aseptic technique maintained   cultures obtained and sent to lab  Isolation Precautions: precautions maintained     Problem: Impaired Wound Healing  Goal: Optimal Wound Healing  Outcome: Ongoing, Not Progressing  Intervention: Promote Wound Healing  Flowsheets (Taken 2/2/2024 1805)  Sleep/Rest Enhancement:   relaxation techniques promoted   awakenings minimized   consistent schedule promoted   regular sleep/rest pattern promoted  Activity Management: Walk with assistive devise and /or staff member - L3  Pain Management Interventions:   around-the-clock dosing utilized   diversional activity provided   breathing exercises utilized   medication offered   pain management plan reviewed with patient/caregiver   pillow support provided   quiet environment facilitated   relaxation techniques promoted     Problem: Skin Injury Risk Increased  Goal: Skin Health and Integrity  Outcome: Ongoing, Not Progressing  Intervention: Optimize Skin  Protection  Flowsheets (Taken 2/2/2024 1805)  Pressure Reduction Techniques:   positioned off wounds   weight shift assistance provided  Pressure Reduction Devices: (air immersion bed) pressure-redistributing mattress utilized  Skin Protection:   incontinence pads utilized   skin-to-skin areas padded   skin-to-device areas padded  Head of Bed (HOB) Positioning: HOB elevated     Problem: Fall Injury Risk  Goal: Absence of Fall and Fall-Related Injury  Outcome: Ongoing, Not Progressing  Intervention: Identify and Manage Contributors  Flowsheets (Taken 2/2/2024 1805)  Self-Care Promotion:   independence encouraged   BADL personal objects within reach   BADL personal routines maintained   safe use of adaptive equipment encouraged   meal set-up provided  Medication Review/Management: medications reviewed     IV abx. Starting on zosyn q8. Drains in place to LLE. Output good. WB as tolerated. Dodge placed on admission. PICC prior to admission removed s/t possible source of infection.

## 2024-02-03 NOTE — ASSESSMENT & PLAN NOTE
Kulwant Mueller Jr. is a 38 y.o. male with a history of MRSA bacteremia,  recent left knee septic arthritis and right thigh abscess s/p L knee arthroscopic I&D on 1/17, s/p repeat L knee I&D and right thigh abscess I&D on 1/21, admitted for infectious workup after 1 day of worsening fevers/chills. Ortho consulted for rule out septic arthritis of the left knee joint. Pertinent physical exam findings include palpable effusion of the left knee in addition to a palpable fluid collection at the lateral aspect of the right thigh. The left knee and right thigh were aspirated at the bedside - see procedure note for further details.      Now s/p arthroscopic I&D of the left knee and thigh with debridement of right thigh on 2/1/24    Pain MM  CRP downtrending, 67.9  Dvt ppx: ASA 81BID  PT/OT WBAT, work on flexion of the left knee  Abx per ID, on daptomycin and Zosyn     Dispo: pending cultures and ID recs

## 2024-02-03 NOTE — ASSESSMENT & PLAN NOTE
Patient with known history of HFrEF. Does not appear to be in fluid overload on clinical examination.  Most recent echocardiogram:    Left Ventricle: The left ventricle is normal in size. There is concentric remodeling. Regional wall motion abnormalities present. See diagram for wall motion findings. There is reduced systolic function. Biplane (2D) method of discs ejection fraction is 48%. There is normal diastolic function.    Right Ventricle: Normal right ventricular cavity size. Systolic function is normal. TAPSE is 2.54 cm.    Normal left atrial size. The left atrium volume index is 21.2 mL/m2.    Normal right atrial size.    The aortic valve is structurally normal. There is normal leaflet mobility.    The mitral valve is structurally normal. There is normal leaflet mobility.    The tricuspid valve is structurally normal. There is normal leaflet mobility.    IVC/SVC: Elevated venous pressure at 15 mmHg.    Overall the study quality was technically difficult.    - assess volume status daily  - Initially held home GDMT (Jardiance, Toprol XL 12.5 QD) in the setting of possible infection and resume as appropriate  - 2/1 Initiated Toprol 12.5mg QD

## 2024-02-03 NOTE — PT/OT/SLP PROGRESS
Occupational Therapy   Treatment    Name: Kulwant Mueller Jr.  MRN: [id number]  Admitting Diagnosis:  Pyogenic arthritis of left knee joint  2 Days Post-Op    Recommendations:     Discharge Recommendations: High Intensity Therapy  Discharge Equipment Recommendations:  to be determined by next level of care  Barriers to discharge:  None    Assessment:     Kulwant Mueller Jr. is a 38 y.o. male with a medical diagnosis of Pyogenic arthritis of left knee joint.  He presents with fair motivation and participation this date. He required min encouragement to participate in self-care tasks. Pt is feeling down d/t length of stay and is ready to discharge. He tolerated therapy well and was able to complete oral hygiene while seated in bedside chair. Performance deficits affecting function are weakness, impaired endurance, impaired self care skills, impaired functional mobility, decreased lower extremity function, impaired skin, decreased safety awareness. Pt would continue to benefit from skilled OT services to maximize functional independence with ADLs and functional mobility, reduce caregiver burden, and facilitate safe discharge in the least restrictive environment. OT recommending high intensity therapy once medically appropriate for discharge.       Rehab Prognosis:  Good; patient would benefit from acute skilled OT services to address these deficits and reach maximum level of function.       Plan:     Patient to be seen 4 x/week to address the above listed problems via self-care/home management, therapeutic activities, therapeutic exercises  Plan of Care Expires: 03/01/24  Plan of Care Reviewed with: patient    Subjective     Chief Complaint: I've been in here for a month  Patient/Family Comments/goals: To get out of hospital  Pain/Comfort:  Pain Rating 1: 0/10  Pain Rating Post-Intervention 1: 0/10    Objective:     Communicated with: RN prior to session.  Patient found up in chair with peripheral IV, chadwick catheter  upon OT entry to room.    General Precautions: Standard, fall    Orthopedic Precautions:LLE weight bearing as tolerated  Braces: N/A  Respiratory Status: Room air     Occupational Performance:     Bed Mobility:    Pt found in bedside chair    Functional Mobility/Transfers:  Pt deferred    Activities of Daily Living:  Grooming: modified independence - completed oral hygiene, facial grooming, and applied deodorant seated in bedside chair      AMPAC 6 Click ADL: 21    Treatment & Education:  -Education on importance of OOB activity to improve overall activity tolerance and promote recovery  -Pt educated to call for assistance and to transfer with hospital staff only  -Provided education regarding role of OT with pt verbalizing understanding.  Pt had no further questions & when asked whether there were any concerns pt reported none.      Patient left up in chair with all lines intact and call button in reach    GOALS:   Multidisciplinary Problems       Occupational Therapy Goals          Problem: Occupational Therapy    Goal Priority Disciplines Outcome Interventions   Occupational Therapy Goal     OT, PT/OT Ongoing, Progressing    Description: Goals to be met by: 3/1/24 (1 month)     Patient will increase functional independence with ADLs by performing:    UE Dressing with Ware.  LE Dressing with Ware.  Grooming while standing at sink with Modified [place].  Toileting from toilet with Modified Ware for hygiene and clothing management.   Rolling to Bilateral with Ware.   Supine to sit with Ware.  Step transfer with Modified Ware  Toilet transfer to toilet with Modified Ware.                         Time Tracking:     OT Date of Treatment: 02/03/24  OT Start Time: 1208  OT Stop Time: 1222  OT Total Time (min): 14 min    Billable Minutes:Self Care/Home Management 14    OT/RUFINA: OT          2/3/2024

## 2024-02-03 NOTE — ASSESSMENT & PLAN NOTE
Reports ongoing watery diarrhea for the past few days prior to discharge. Given his ongoing antibiotic administration, there is possibility of infectious etiology.    Resolved  - Cdiff negative (2/2/23)  - Patient reports no continued diarrhea; will monitor for symptoms and initiate Loperamide PRN

## 2024-02-03 NOTE — SUBJECTIVE & OBJECTIVE
"Principal Problem:Pyogenic arthritis of left knee joint    Principal Orthopedic Problem: same as above s/p L knee arthroscopic I&D and R thigh I&D    Interval History: NAEO. VSS. Pain well controlled. Hgb 7.2. Cultures pending, currently no growth. On daptomycin and zosyn.  50 cc from drain overnight.    Review of patient's allergies indicates:   Allergen Reactions    Heparin analogues Nausea And Vomiting       Current Facility-Administered Medications   Medication    acetaminophen tablet 650 mg    aspirin chewable tablet 81 mg    DAPTOmycin (CUBICIN) 945 mg in sodium chloride 0.9% SolP 50 mL IVPB    dextrose 10% bolus 125 mL 125 mL    dextrose 10% bolus 250 mL 250 mL    glucagon (human recombinant) injection 1 mg    glucose chewable tablet 16 g    glucose chewable tablet 24 g    insulin aspart U-100 pen 0-5 Units    methocarbamoL tablet 500 mg    metoprolol succinate (TOPROL-XL) 24 hr split tablet 12.5 mg    naloxone 0.4 mg/mL injection 0.02 mg    oxyCODONE immediate release tablet 5 mg    oxyCODONE immediate release tablet Tab 10 mg    piperacillin-tazobactam (ZOSYN) 4.5 g in dextrose 5 % in water (D5W) 100 mL IVPB (MB+)    prochlorperazine tablet 10 mg    rivaroxaban tablet 10 mg    sodium chloride 0.9% flush 10 mL    sodium chloride 0.9% flush 10 mL    vitamin D 1000 units tablet 1,000 Units     Objective:     Vital Signs (Most Recent):  Temp: 98.1 °F (36.7 °C) (02/03/24 0917)  Pulse: 85 (02/03/24 0917)  Resp: 18 (02/03/24 0917)  BP: 120/64 (02/03/24 0917)  SpO2: 98 % (02/03/24 0917) Vital Signs (24h Range):  Temp:  [97.5 °F (36.4 °C)-98.1 °F (36.7 °C)] 98.1 °F (36.7 °C)  Pulse:  [77-86] 85  Resp:  [17-20] 18  SpO2:  [95 %-98 %] 98 %  BP: (116-128)/(60-71) 120/64     Weight: 106.7 kg (235 lb 3.7 oz)  Height: 6' 1" (185.4 cm)  Body mass index is 31.03 kg/m².      Intake/Output Summary (Last 24 hours) at 2/3/2024 1010  Last data filed at 2/3/2024 0505  Gross per 24 hour   Intake 628 ml   Output 2350 ml   Net -1722 " ml          Ortho/SPM Exam  LLE:   Dressing c/d/I  Able to flex/extend the knee  Cap refill <2s    RLE:   Dressing c/d/I  Mild TTP  NVI distally  MULUGETA drain in place     Significant Labs: All pertinent labs within the past 24 hours have been reviewed.    Significant Imaging: I have reviewed and interpreted all pertinent imaging results/findings.

## 2024-02-04 LAB
ANION GAP SERPL CALC-SCNC: 11 MMOL/L (ref 8–16)
BASOPHILS # BLD AUTO: ABNORMAL K/UL (ref 0–0.2)
BASOPHILS NFR BLD: 0 % (ref 0–1.9)
BUN SERPL-MCNC: 6 MG/DL (ref 6–20)
CALCIUM SERPL-MCNC: 8.5 MG/DL (ref 8.7–10.5)
CHLORIDE SERPL-SCNC: 99 MMOL/L (ref 95–110)
CO2 SERPL-SCNC: 26 MMOL/L (ref 23–29)
CREAT SERPL-MCNC: 0.7 MG/DL (ref 0.5–1.4)
CRP SERPL-MCNC: 46.3 MG/L (ref 0–8.2)
DIFFERENTIAL METHOD BLD: ABNORMAL
EOSINOPHIL # BLD AUTO: ABNORMAL K/UL (ref 0–0.5)
EOSINOPHIL NFR BLD: 1 % (ref 0–8)
ERYTHROCYTE [DISTWIDTH] IN BLOOD BY AUTOMATED COUNT: 14.8 % (ref 11.5–14.5)
EST. GFR  (NO RACE VARIABLE): >60 ML/MIN/1.73 M^2
GIANT PLATELETS BLD QL SMEAR: PRESENT
GLUCOSE SERPL-MCNC: 175 MG/DL (ref 70–110)
HCT VFR BLD AUTO: 25.5 % (ref 40–54)
HGB BLD-MCNC: 7.7 G/DL (ref 14–18)
HYPOCHROMIA BLD QL SMEAR: ABNORMAL
IMM GRANULOCYTES # BLD AUTO: ABNORMAL K/UL (ref 0–0.04)
IMM GRANULOCYTES NFR BLD AUTO: ABNORMAL % (ref 0–0.5)
LYMPHOCYTES # BLD AUTO: ABNORMAL K/UL (ref 1–4.8)
LYMPHOCYTES NFR BLD: 17 % (ref 18–48)
MAGNESIUM SERPL-MCNC: 1.8 MG/DL (ref 1.6–2.6)
MCH RBC QN AUTO: 26.1 PG (ref 27–31)
MCHC RBC AUTO-ENTMCNC: 30.2 G/DL (ref 32–36)
MCV RBC AUTO: 86 FL (ref 82–98)
MONOCYTES # BLD AUTO: ABNORMAL K/UL (ref 0.3–1)
MONOCYTES NFR BLD: 5 % (ref 4–15)
MYELOCYTES NFR BLD MANUAL: 1 %
NEUTROPHILS NFR BLD: 72 % (ref 38–73)
NEUTS BAND NFR BLD MANUAL: 4 %
NRBC BLD-RTO: 0 /100 WBC
OVALOCYTES BLD QL SMEAR: ABNORMAL
PHOSPHATE SERPL-MCNC: 3 MG/DL (ref 2.7–4.5)
PLATELET # BLD AUTO: 455 K/UL (ref 150–450)
PLATELET BLD QL SMEAR: ABNORMAL
PMV BLD AUTO: 9.9 FL (ref 9.2–12.9)
POCT GLUCOSE: 186 MG/DL (ref 70–110)
POCT GLUCOSE: 188 MG/DL (ref 70–110)
POCT GLUCOSE: 204 MG/DL (ref 70–110)
POCT GLUCOSE: 218 MG/DL (ref 70–110)
POIKILOCYTOSIS BLD QL SMEAR: SLIGHT
POTASSIUM SERPL-SCNC: 4 MMOL/L (ref 3.5–5.1)
RBC # BLD AUTO: 2.95 M/UL (ref 4.6–6.2)
SODIUM SERPL-SCNC: 136 MMOL/L (ref 136–145)
SPHEROCYTES BLD QL SMEAR: ABNORMAL
WBC # BLD AUTO: 10.55 K/UL (ref 3.9–12.7)

## 2024-02-04 PROCEDURE — 80048 BASIC METABOLIC PNL TOTAL CA: CPT

## 2024-02-04 PROCEDURE — 27000207 HC ISOLATION

## 2024-02-04 PROCEDURE — 63600175 PHARM REV CODE 636 W HCPCS: Performed by: INTERNAL MEDICINE

## 2024-02-04 PROCEDURE — 83735 ASSAY OF MAGNESIUM: CPT

## 2024-02-04 PROCEDURE — 63600175 PHARM REV CODE 636 W HCPCS: Performed by: EMERGENCY MEDICINE

## 2024-02-04 PROCEDURE — 85027 COMPLETE CBC AUTOMATED: CPT

## 2024-02-04 PROCEDURE — 84100 ASSAY OF PHOSPHORUS: CPT

## 2024-02-04 PROCEDURE — 99233 SBSQ HOSP IP/OBS HIGH 50: CPT | Mod: ,,, | Performed by: INTERNAL MEDICINE

## 2024-02-04 PROCEDURE — 25000003 PHARM REV CODE 250

## 2024-02-04 PROCEDURE — 25000003 PHARM REV CODE 250: Performed by: EMERGENCY MEDICINE

## 2024-02-04 PROCEDURE — 25000003 PHARM REV CODE 250: Performed by: STUDENT IN AN ORGANIZED HEALTH CARE EDUCATION/TRAINING PROGRAM

## 2024-02-04 PROCEDURE — 63600175 PHARM REV CODE 636 W HCPCS

## 2024-02-04 PROCEDURE — 36415 COLL VENOUS BLD VENIPUNCTURE: CPT

## 2024-02-04 PROCEDURE — 25000003 PHARM REV CODE 250: Performed by: INTERNAL MEDICINE

## 2024-02-04 PROCEDURE — 86140 C-REACTIVE PROTEIN: CPT

## 2024-02-04 PROCEDURE — 11000001 HC ACUTE MED/SURG PRIVATE ROOM

## 2024-02-04 PROCEDURE — 85007 BL SMEAR W/DIFF WBC COUNT: CPT

## 2024-02-04 RX ORDER — MAGNESIUM SULFATE HEPTAHYDRATE 40 MG/ML
2 INJECTION, SOLUTION INTRAVENOUS ONCE
Status: COMPLETED | OUTPATIENT
Start: 2024-02-04 | End: 2024-02-04

## 2024-02-04 RX ORDER — INSULIN ASPART 100 [IU]/ML
5 INJECTION, SOLUTION INTRAVENOUS; SUBCUTANEOUS
Status: DISCONTINUED | OUTPATIENT
Start: 2024-02-04 | End: 2024-02-04

## 2024-02-04 RX ORDER — INSULIN ASPART 100 [IU]/ML
4 INJECTION, SOLUTION INTRAVENOUS; SUBCUTANEOUS
Status: DISCONTINUED | OUTPATIENT
Start: 2024-02-04 | End: 2024-02-06

## 2024-02-04 RX ADMIN — INSULIN ASPART 4 UNITS: 100 INJECTION, SOLUTION INTRAVENOUS; SUBCUTANEOUS at 02:02

## 2024-02-04 RX ADMIN — DAPTOMYCIN 945 MG: 350 INJECTION, POWDER, LYOPHILIZED, FOR SOLUTION INTRAVENOUS at 09:02

## 2024-02-04 RX ADMIN — OXYCODONE 5 MG: 5 TABLET ORAL at 06:02

## 2024-02-04 RX ADMIN — INSULIN ASPART 2 UNITS: 100 INJECTION, SOLUTION INTRAVENOUS; SUBCUTANEOUS at 05:02

## 2024-02-04 RX ADMIN — PIPERACILLIN SODIUM AND TAZOBACTAM SODIUM 4.5 G: 4; .5 INJECTION, POWDER, FOR SOLUTION INTRAVENOUS at 05:02

## 2024-02-04 RX ADMIN — INSULIN ASPART 4 UNITS: 100 INJECTION, SOLUTION INTRAVENOUS; SUBCUTANEOUS at 05:02

## 2024-02-04 RX ADMIN — INSULIN DETEMIR 8 UNITS: 100 INJECTION, SOLUTION SUBCUTANEOUS at 09:02

## 2024-02-04 RX ADMIN — CHOLECALCIFEROL TAB 25 MCG (1000 UNIT) 1000 UNITS: 25 TAB at 09:02

## 2024-02-04 RX ADMIN — PIPERACILLIN SODIUM AND TAZOBACTAM SODIUM 4.5 G: 4; .5 INJECTION, POWDER, FOR SOLUTION INTRAVENOUS at 02:02

## 2024-02-04 RX ADMIN — METOPROLOL SUCCINATE 12.5 MG: 25 TABLET, EXTENDED RELEASE ORAL at 09:02

## 2024-02-04 RX ADMIN — INSULIN ASPART 1 UNITS: 100 INJECTION, SOLUTION INTRAVENOUS; SUBCUTANEOUS at 10:02

## 2024-02-04 RX ADMIN — RIVAROXABAN 10 MG: 10 TABLET, FILM COATED ORAL at 04:02

## 2024-02-04 RX ADMIN — AMPICILLIN SODIUM, SULBACTAM SODIUM 3 G: 2; 1 INJECTION, POWDER, FOR SOLUTION INTRAMUSCULAR; INTRAVENOUS at 09:02

## 2024-02-04 RX ADMIN — MAGNESIUM SULFATE HEPTAHYDRATE 2 G: 40 INJECTION, SOLUTION INTRAVENOUS at 11:02

## 2024-02-04 NOTE — SUBJECTIVE & OBJECTIVE
Interval History: NAEON. Patient reports pain is now appropriately controlled.  A. Baumanii sensitivities return with near pan-sensitive results. ID recommending transition to unasyn. Continue to uptitrate insulin regimen. Pending wound culture results.    Review of Systems   Constitutional:  Negative for appetite change, chills, fatigue and fever.   HENT:  Negative for congestion, mouth sores, nosebleeds and tinnitus.    Eyes:  Negative for discharge and itching.   Respiratory:  Negative for cough, chest tightness and shortness of breath.    Cardiovascular:  Negative for chest pain and leg swelling.   Gastrointestinal:  Negative for abdominal distention and abdominal pain.   Endocrine: Negative for cold intolerance and heat intolerance.   Genitourinary:  Negative for dysuria and frequency.   Musculoskeletal:  Negative for arthralgias, joint swelling and neck pain.   Skin:  Negative for color change and pallor.   Allergic/Immunologic: Negative for environmental allergies and food allergies.   Neurological:  Negative for dizziness and light-headedness.   Hematological:  Negative for adenopathy.   Psychiatric/Behavioral:  Negative for agitation, behavioral problems and confusion.      Objective:     Vital Signs (Most Recent):  Temp: 98.6 °F (37 °C) (02/04/24 1130)  Pulse: 80 (02/04/24 1130)  Resp: 18 (02/04/24 0759)  BP: 123/68 (02/04/24 1130)  SpO2: 98 % (02/04/24 1130) Vital Signs (24h Range):  Temp:  [97.8 °F (36.6 °C)-98.9 °F (37.2 °C)] 98.6 °F (37 °C)  Pulse:  [79-88] 80  Resp:  [18-19] 18  SpO2:  [94 %-98 %] 98 %  BP: (105-128)/(56-75) 123/68     Weight: 106.7 kg (235 lb 3.7 oz)  Body mass index is 31.03 kg/m².    Intake/Output Summary (Last 24 hours) at 2/4/2024 1442  Last data filed at 2/4/2024 1047  Gross per 24 hour   Intake 150 ml   Output 590 ml   Net -440 ml           Physical Exam  Constitutional:       General: He is not in acute distress.     Appearance: Normal appearance. He is not ill-appearing.    HENT:      Head: Normocephalic and atraumatic.      Right Ear: External ear normal.      Left Ear: External ear normal.      Nose: Nose normal.      Mouth/Throat:      Mouth: Mucous membranes are moist.      Pharynx: Oropharynx is clear.   Eyes:      Extraocular Movements: Extraocular movements intact.      Pupils: Pupils are equal, round, and reactive to light.   Cardiovascular:      Rate and Rhythm: Normal rate and regular rhythm.      Pulses: Normal pulses.      Heart sounds: Normal heart sounds.   Pulmonary:      Effort: Pulmonary effort is normal.      Breath sounds: Normal breath sounds.   Abdominal:      General: Abdomen is flat. There is no distension.      Palpations: Abdomen is soft.      Tenderness: There is no abdominal tenderness.   Musculoskeletal:         General: No swelling. Normal range of motion.      Cervical back: Normal range of motion and neck supple.      Comments: Dressing in place LLE, no erythema noted near edges  Able to bend L knee    Skin:     General: Skin is warm and dry.      Capillary Refill: Capillary refill takes less than 2 seconds.   Neurological:      General: No focal deficit present.      Mental Status: He is alert and oriented to person, place, and time.   Psychiatric:         Mood and Affect: Mood normal.         Behavior: Behavior normal.             Significant Labs: All pertinent labs within the past 24 hours have been reviewed.  CBC:   Recent Labs   Lab 02/03/24  0438 02/04/24  0536   WBC 8.28 10.55   HGB 7.2* 7.7*   HCT 24.3* 25.5*    455*       CMP:   Recent Labs   Lab 02/03/24  0438 02/04/24  0536   * 136   K 3.9 4.0    99   CO2 28 26   * 175*   BUN 9 6   CREATININE 0.8 0.7   CALCIUM 8.3* 8.5*   ANIONGAP 6* 11         Significant Imaging: I have reviewed all pertinent imaging results/findings within the past 24 hours.

## 2024-02-04 NOTE — PROGRESS NOTES
[facility] ([address] Medicine  Progress Note    Patient Name: Kulwant Mueller Jr.  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 Per chart review rehab facility was concern for orthostasis and administered IVF. Rehab staff were also concerned for worsening infection, reporting diaphoresis at nighttime and thus recommended evaluation with imaging at [facility]. On interview, patient states that he feels fine and denies any complaints except for diarrhea.    On presentation he was afebrile but tachycardic to 110 with /98.  Labs showed WBC 12.3, Hgb 8.0, platelets 396, ESR 76 .7, Na 134, K+ 3.4, .     MRI of the knee showed persistent septic joint and seemingly new early changes of osteomyelitis.    MRI of the pelvis showed improved fluid around the left hip but worsening fluid enhancement in the right myofascial planes and gluteal muscles.    C difficile testing was ordered in the emergency department.  He was admitted to Hospital Medicine for orthopedic surgery and Infectious Disease evaluation.    Overview/Hospital Course:  Arrived to [facility] on 1/31 for lightheadedness, fevers, and chills, found to have L septic knee joing and abscess in lateral R thigh. Underwent joint washout and I&D on 2/1 with orthopedic surgery. BCX grew Acinetobacter baumanii in one  bottle. Infectious disease consulted, continued Daptomycin due to previous MRSA infections in joint washouts, added Zosyn for A. Baumanii, recommended PICC removal, which was completed 2/2. Patient to have PICC holiday over the weekend. Sensitivities for A. Baumanii bacteremia returned with near pan-sensitive results.    Interval History: NAEON. Patient reports pain is now appropriately controlled.  A. Baumanii sensitivities return with near pan-sensitive results. ID recommending transition to unasyn. Continue to uptitrate insulin regimen. Pending wound culture results.    Review of Systems   Constitutional:  Negative for appetite change, chills, fatigue and fever.   HENT:  Negative for congestion, mouth sores, nosebleeds and tinnitus.    Eyes:  Negative for discharge and itching.   Respiratory:  Negative for cough, chest tightness and shortness of breath.    Cardiovascular:  Negative for chest pain and leg swelling.   Gastrointestinal:  Negative for abdominal distention and abdominal pain.   Endocrine: Negative for cold intolerance and heat intolerance.   Genitourinary:  Negative for dysuria and frequency.   Musculoskeletal:  Negative for arthralgias, joint swelling and neck pain.   Skin:  Negative for color change and pallor.   Allergic/Immunologic: Negative for environmental allergies and food allergies.   Neurological:  Negative for dizziness and light-headedness.   Hematological:  Negative for adenopathy.   Psychiatric/Behavioral:  Negative for agitation, behavioral problems and confusion.      Objective:     Vital Signs (Most Recent):  Temp: 98.6 °F (37 °C) (02/04/24 1130)  Pulse: 80 (02/04/24 1130)  Resp: 18 (02/04/24 0759)  BP: 123/68 (02/04/24 1130)  SpO2: 98 % (02/04/24 1130) Vital Signs (24h Range):  Temp:  [97.8 °F (36.6 °C)-98.9 °F (37.2 °C)] 98.6 °F (37 °C)  Pulse:  [79-88] 80  Resp:  [18-19] 18  SpO2:  [94 %-98 %] 98 %  BP: (105-128)/(56-75) 123/68     Weight: 106.7 kg (235 lb 3.7 oz)  Body mass index  is 31.03 kg/m².    Intake/Output Summary (Last 24 hours) at 2/4/2024 1442  Last data filed at 2/4/2024 1047  Gross per 24 hour   Intake 150 ml   Output 590 ml   Net -440 ml           Physical Exam  Constitutional:       General: He is not in acute distress.     Appearance: Normal appearance. He is not ill-appearing.   HENT:      Head: Normocephalic and atraumatic.      Right Ear: External ear normal.      Left Ear: External ear normal.      Nose: Nose normal.      Mouth/Throat:      Mouth: Mucous membranes are moist.      Pharynx: Oropharynx is clear.   Eyes:      Extraocular Movements: Extraocular movements intact.      Pupils: Pupils are equal, round, and reactive to light.   Cardiovascular:      Rate and Rhythm: Normal rate and regular rhythm.      Pulses: Normal pulses.      Heart sounds: Normal heart sounds.   Pulmonary:      Effort: Pulmonary effort is normal.      Breath sounds: Normal breath sounds.   Abdominal:      General: Abdomen is flat. There is no distension.      Palpations: Abdomen is soft.      Tenderness: There is no abdominal tenderness.   Musculoskeletal:         General: No swelling. Normal range of motion.      Cervical back: Normal range of motion and neck supple.      Comments: Dressing in place LLE, no erythema noted near edges  Able to bend L knee    Skin:     General: Skin is warm and dry.      Capillary Refill: Capillary refill takes less than 2 seconds.   Neurological:      General: No focal deficit present.      Mental Status: He is alert and oriented to person, place, and time.   Psychiatric:         Mood and Affect: Mood normal.         Behavior: Behavior normal.             Significant Labs: All pertinent labs within the past 24 hours have been reviewed.  CBC:   Recent Labs   Lab 02/03/24  0438 02/04/24  0536   WBC 8.28 10.55   HGB 7.2* 7.7*   HCT 24.3* 25.5*    455*       CMP:   Recent Labs   Lab 02/03/24  0438 02/04/24  0536   * 136   K 3.9 4.0    99   CO2 28 26    * 175*   BUN 9 6   CREATININE 0.8 0.7   CALCIUM 8.3* 8.5*   ANIONGAP 6* 11         Significant Imaging: I have reviewed all pertinent imaging results/findings within the past 24 hours.    Assessment/Plan:      * Pyogenic arthritis of left knee joint  See Staphylococcal arthritis of left knee       Hypokalemia  Patient has hypokalemia which is Acute and currently controlled. Most recent potassium levels reviewed-   Lab Results   Component Value Date    K 4.0 02/04/2024   . Will continue potassium replacement per protocol and recheck repeat levels after replacement completed.     HFrEF (heart failure with reduced ejection fraction)  Patient with known history of HFrEF. Does not appear to be in fluid overload on clinical examination.  Most recent echocardiogram:    Left Ventricle: The left ventricle is normal in size. There is concentric remodeling. Regional wall motion abnormalities present. See diagram for wall motion findings. There is reduced systolic function. Biplane (2D) method of discs ejection fraction is 48%. There is normal diastolic function.    Right Ventricle: Normal right ventricular cavity size. Systolic function is normal. TAPSE is 2.54 cm.    Normal left atrial size. The left atrium volume index is 21.2 mL/m2.    Normal right atrial size.    The aortic valve is structurally normal. There is normal leaflet mobility.    The mitral valve is structurally normal. There is normal leaflet mobility.    The tricuspid valve is structurally normal. There is normal leaflet mobility.    IVC/SVC: Elevated venous pressure at 15 mmHg.    Overall the study quality was technically difficult.    - assess volume status daily  - Initially held home GDMT (Jardiance, Toprol XL 12.5 QD) in the setting of possible infection and resume as appropriate  - 2/1 Initiated Toprol 12.5mg QD     Diarrhea  Reports ongoing watery diarrhea for the past few days prior to discharge. Given his ongoing antibiotic administration,  there is possibility of infectious etiology.    Resolved  - Cdiff negative (2/2/23)  - Patient reports no continued diarrhea; will monitor for symptoms and initiate Loperamide PRN      Abscess of right thigh  - See staphylococcal arthritis of the left knee      Staphylococcal arthritis of left knee  Patient with multiple admissions for septic joint complicated by MRSA bacteremia and status post orthopedic intervention, on daptomycin prior to presentation found to have imaging concerns of worsening right thigh abscess and new concerns for left knee osteomyelitis. Previous expected end date of daptomycin was 03/03/24.  He denies worsening pain or subjective fevers.  No leukocytosis. ESR and CRP with mild elevation from prior.  S/p L knee washout on 2/1 and I&D of R thigh abscess. Cultures pending.   BCX from 1/31 grew Acinetobacter baumanii on 2/2 - Infectious Disease c/s  Acinetobacter baumanii sensitivities return near pan-sensitive    PLAN:  - continue daptomycin, Zosyn  - orthopedic surgery consulted; s/p washout, trending CRP  - infectious disease consulted and recommend transition to unasyn from zosyn- continue Dapto/Unasyn  - Continue PICC holiday over the weekend; will plan to place line on 2/5  - follow-up repeat blood cultures  - Removed post-op [place]  - PT/OT      May-Thurner syndrome  Known history of hypercoagulable state.  Reported heparin analog allergy.    - continue home rivaroxaban  - no other need for DVT prophylaxis    Hypertension associated with diabetes  - hold home antihypertensives in the setting of possible infection      Type 2 diabetes mellitus with complication, with long-term current use of insulin  - Increased Detemir 8u qhs  - Scheduled aspart 5u ACHS  - MDSSI  - up titrate as appropriate  - Diabetic diet  - POCT glucose checks AC/HS  - hypoglycemia protocol in place      VTE Risk Mitigation (From admission, onward)           Ordered     rivaroxaban tablet 10 mg  With dinner          01/31/24 1734                    Discharge Planning   RAEANN: 2/5/2024     Code Status: Full Code   Is the patient medically ready for discharge?: No    Reason for patient still in hospital (select all that apply): Patient trending condition, Treatment, Consult recommendations, and Pending disposition  Discharge Plan A: Rehab                  Aquiles Lewis MD  Department of Hospital Medicine   [facility] ([place])

## 2024-02-04 NOTE — ASSESSMENT & PLAN NOTE
- Increased Detemir 8u qhs  - Scheduled aspart 5u ACHS  - MDSSI  - up titrate as appropriate  - Diabetic diet  - POCT glucose checks AC/HS  - hypoglycemia protocol in place

## 2024-02-04 NOTE — NURSING
Patient resting in bed, fully oriented on room air, denies urinating since last occurrence patient denies abdominal pain and fullness. States that he doesn't feel like he has to go. Patient educated on bladder scan and expressed understanding. Will continue to monitor. All needs met.

## 2024-02-04 NOTE — ASSESSMENT & PLAN NOTE
Patient with multiple admissions for septic joint complicated by MRSA bacteremia and status post orthopedic intervention, on daptomycin prior to presentation found to have imaging concerns of worsening right thigh abscess and new concerns for left knee osteomyelitis. Previous expected end date of daptomycin was 03/03/24.  He denies worsening pain or subjective fevers.  No leukocytosis. ESR and CRP with mild elevation from prior.  S/p L knee washout on 2/1 and I&D of R thigh abscess. Cultures pending.   BCX from 1/31 grew Acinetobacter baumanii on 2/2 - Infectious Disease c/s  Acinetobacter baumanii sensitivities return near pan-sensitive    PLAN:  - continue daptomycin, Zosyn  - orthopedic surgery consulted; s/p washout, trending CRP  - infectious disease consulted and recommend transition to unasyn from zosyn- continue Dapto/Unasyn  - Continue PICC holiday over the weekend; will plan to place line on 2/5  - follow-up repeat blood cultures  - Removed post-op [place]  - PT/OT

## 2024-02-04 NOTE — PROGRESS NOTES
[facility] ([place] [address])  Infectious Disease  Progress Note    Patient Name: Kulwant Mueller Jr.  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 (Surgical cxs +MRSA). ROBY 1/02/24 neg. Pt required salvage therapy dapto/ceftaroline & bld cxs finally cleared 1/07, and then placed on daptomycin monotherapy to complete 4wks duration (ASYA 02/06) per [facility] ID (Yana). Of note, L shoulder imaging showed subdeltoid bursitis w/ SS tear; superimposed infection could be present but no drainable fluid collections. Also, R heel without osteo but had cellulitis, myositis, tenosynovitis.     Pt was discharged (01/10) to rehab with daptomycin monotherapy; but started having new fevers (up to 103F) at rehab, and re-admitted (01/14) for workup. Blood cultures 01/14/24 NGTD. MRI T/L spine negative for osteo-discitis. Found to have persistent left knee septic arthritis and right thigh abscess and underwent L knee arthroscopic I&D on 1/17. IR drain placed 01/19 R lateral thigh abscess, cxs +MRSA.  Taken to OR 01/21 for repeat surgical I&D of L knee (#3), and R thigh abscess with new R thigh drain placed. Surgical cxs (01/21) of L knee and R thigh NGTD.     Discharged to [facility] re[facility] with IV daptomycin 8mg/kg Q24h. Re-presented 1/31/2024 with tachycardia, fever. MRI left knee with: Findings compatible with persistent septic joint and early changes of osteomyelitis in the femoral condyles patella and tibial spines. And MRI pelvis with: Worsening of  fluid enhancement in the right myofascial planes and gluteal muscles suggesting infected subcutaneous fat and tensor fascia jonah bursa with myositis and or infection of lateral gluteal muscles on the right. Evaluated by ortho and s/p aspiration of left knee joint fluid and right thigh fluid collection. Gram stains with moderate WBC, no organisms.    Bcx 1/31 growing Acinetobacter Baumannii in 1/4 (from PICC line).    Antibiotics this admission:   Daptomycin 12/27 - present   Zosyn added 2/2/24 - present (added for Acinetobacter baumannii on 1/31 Bcx)    S/p arthroscopic I&D Left knee and right thigh I&D 2/1.    Recommendations  - if OR cultures remain sterile, would complete therapy with daptomycin for MRSA as outlined (EOC 3/3/24)  - PICC removed 2/2; plan for line holiday over the weekend  - Change Zosyn to Unasyn and complete 2 weeks of therapy from clearance (EOC 2/17/24)        Derick Gonzalez MD  Infectious Disease  [facility] Surg ([place] [address])    Subjective:     Principal Problem:Pyogenic arthritis of left knee joint    HPI: 38 year old male with history of May-Thurer syndrome, diabetes with chronic diabetic foot wounds, recent admission for MRSA bacteremia.      MRSA bacteremia (daptomycin susceptible) noted on index admission blood cultures 12/27, complicated by left knee septic arthritis, s/p L knee arthrotomy with synovectomy on 12/29- cx w/ MRSA, s/p left shoulder arthrocentesis reportedly with no evidence of infection, ROBY 1/02/24 without vegetations, required salvage therapy with addition of ceftaroline & cleared 1/07, subsequently placed on daptomycin monotherapy and sent to rehab.     Re-admitted 1/14 for fever while at rehab + back pain, no new or persistent bacteremia, MRI T/L spine negative for osteo-discitis.  That admission he had persistent left knee septic arthritis and right thigh abscess and underwent L knee arthroscopic I&D on 1/17, repeat L knee I&D and right thigh abscess I&D  on 1/21.    MRI L Knee this admission showed large air-fluid collection, myositis (involving vastus medialis, lateralis & popliteus musculature),     MRI pelvis with R thigh  17 by 7 x 5 cm collection- s/p IR drain placed 1/19 (cx MRSA). MRI L shoulder with bursitis    Operative course:  OR 01/15 for L knee washout (cell counts c/w/ septic joint +CPPD crystals)  cx w/ NG  OR 1/21 L knee washout (#3) w/ extensive synovectomy/debridement and R thigh I&D (cx NG)    Clinically improved and discharged, ID team had seen him and plan was to complete 6 weeks of abx from last washout (01/21); ASYA 03/03/24     This admission, he is afebrile, without leukocytosis, HDS    He had subjective fever and rigors. MRI 1/31 L knee shows persistent septic joint and early osteomyelitis.   MRI pelvis shows improved fluid around left hip but worsening fluid in R myofascial planes and gluteal muscles. Daptomycin being continued. CPK WNL  Interval History: Doing well. Sitting up in bed - no complaints. Cultures finalizing.    Review of Systems   Constitutional:  Negative for chills and fever.   Skin:  Positive for wound.   All other systems reviewed and are negative.    Objective:     Vital Signs (Most Recent):  Temp: 98.6 °F (37 °C) (02/04/24 1130)  Pulse: 80 (02/04/24 1130)  Resp: 18 (02/04/24 0759)  BP: 123/68 (02/04/24 1130)  SpO2: 98 % (02/04/24 1130) Vital Signs (24h Range):  Temp:  [97.8 °F (36.6 °C)-98.9 °F (37.2 °C)] 98.6 °F (37 °C)  Pulse:  [79-88] 80  Resp:  [18-19] 18  SpO2:  [94 %-98 %] 98 %  BP: (105-128)/(56-75) 123/68     Weight: 106.7 kg (235 lb 3.7 oz)  Body mass index is 31.03 kg/m².    Estimated Creatinine Clearance: 179.8 mL/min (based on SCr of 0.7 mg/dL).     Physical Exam  Vitals and nursing note reviewed.   Constitutional:       Appearance: Normal appearance.   HENT:      Head: Normocephalic and atraumatic.      Right Ear: External ear normal.      Left Ear: External ear normal.   Eyes:      Extraocular Movements:  "Extraocular movements intact.      Pupils: Pupils are equal, round, and reactive to light.   Musculoskeletal:         General: No swelling or tenderness.   Neurological:      General: No focal deficit present.      Mental Status: He is alert and oriented to person, place, and time. Mental status is at baseline.   Psychiatric:         Mood and Affect: Mood normal.         Behavior: Behavior normal.         Thought Content: Thought content normal.         Judgment: Judgment normal.          Significant Labs: Blood Culture:   Recent Labs   Lab 01/05/24  0716 01/07/24  1145 01/14/24  2039 01/31/24  1538 02/03/24  0438   LABBLOO No growth after 5 days.  Gram stain aer bottle: Gram positive cocci in clusters resembling Staph  Results called to and read back by: Tim Correa RN 01/07/2024  10:08  METHICILLIN RESISTANT STAPHYLOCOCCUS AUREUS  For susceptibility see order #[id number]  * No growth after 5 days.  No growth after 5 days. No growth after 5 days.  No growth after 5 days. Gram stain aer bottle: Gram negative rods  Results called to and read back by:MADHU Pal RN 02/01/2024  21:08  ACINETOBACTER BAUMANNII COMPLEX  Susceptibility pending  *  No Growth to date  No Growth to date  No Growth to date  No Growth to date No Growth to date  No Growth to date  No Growth to date  No Growth to date     CBC:   Recent Labs   Lab 02/03/24  0438 02/04/24  0536   WBC 8.28 10.55   HGB 7.2* 7.7*   HCT 24.3* 25.5*    455*     Respiratory Culture: No results for input(s): "GSRESP", "RESPIRATORYC" in the last 4320 hours.  Urine Culture:   Recent Labs   Lab 12/27/23  2318   LABURIN METHICILLIN RESISTANT STAPHYLOCOCCUS AUREUS  10,000 - 49,999 cfu/ml  *     Wound Culture:   Recent Labs   Lab 01/21/24  1415 01/21/24  1428 02/01/24  0740 02/01/24  0951 02/01/24  1530   LABAERO No growth No growth No growth No growth No growth  No growth       Significant Imaging: I have reviewed all pertinent imaging results/findings " within the past 24 hours.

## 2024-02-04 NOTE — SUBJECTIVE & OBJECTIVE
Interval History: Doing well. Sitting up in bed - no complaints. Cultures finalizing.    Review of Systems   Constitutional:  Negative for chills and fever.   Skin:  Positive for wound.   All other systems reviewed and are negative.    Objective:     Vital Signs (Most Recent):  Temp: 98.6 °F (37 °C) (02/04/24 1130)  Pulse: 80 (02/04/24 1130)  Resp: 18 (02/04/24 0759)  BP: 123/68 (02/04/24 1130)  SpO2: 98 % (02/04/24 1130) Vital Signs (24h Range):  Temp:  [97.8 °F (36.6 °C)-98.9 °F (37.2 °C)] 98.6 °F (37 °C)  Pulse:  [79-88] 80  Resp:  [18-19] 18  SpO2:  [94 %-98 %] 98 %  BP: (105-128)/(56-75) 123/68     Weight: 106.7 kg (235 lb 3.7 oz)  Body mass index is 31.03 kg/m².    Estimated Creatinine Clearance: 179.8 mL/min (based on SCr of 0.7 mg/dL).     Physical Exam  Vitals and nursing note reviewed.   Constitutional:       Appearance: Normal appearance.   HENT:      Head: Normocephalic and atraumatic.      Right Ear: External ear normal.      Left Ear: External ear normal.   Eyes:      Extraocular Movements: Extraocular movements intact.      Pupils: Pupils are equal, round, and reactive to light.   Musculoskeletal:         General: No swelling or tenderness.   Neurological:      General: No focal deficit present.      Mental Status: He is alert and oriented to person, place, and time. Mental status is at baseline.   Psychiatric:         Mood and Affect: Mood normal.         Behavior: Behavior normal.         Thought Content: Thought content normal.         Judgment: Judgment normal.          Significant Labs: Blood Culture:   Recent Labs   Lab 01/05/24  0716 01/07/24  1145 01/14/24  2039 01/31/24  1538 02/03/24  0438   LABBLOO No growth after 5 days.  Gram stain aer bottle: Gram positive cocci in clusters resembling Staph  Results called to and read back by: Tim Correa RN 01/07/2024  10:08  METHICILLIN RESISTANT STAPHYLOCOCCUS AUREUS  For susceptibility see order #[id number]  * No growth after 5 days.  No growth  "after 5 days. No growth after 5 days.  No growth after 5 days. Gram stain aer bottle: Gram negative rods  Results called to and read back by:MADHU Pal RN 02/01/2024  21:08  ACINETOBACTER BAUMANNII COMPLEX  Susceptibility pending  *  No Growth to date  No Growth to date  No Growth to date  No Growth to date No Growth to date  No Growth to date  No Growth to date  No Growth to date     CBC:   Recent Labs   Lab 02/03/24  0438 02/04/24  0536   WBC 8.28 10.55   HGB 7.2* 7.7*   HCT 24.3* 25.5*    455*     Respiratory Culture: No results for input(s): "GSRESP", "RESPIRATORYC" in the last 4320 hours.  Urine Culture:   Recent Labs   Lab 12/27/23  2318   LABURIN METHICILLIN RESISTANT STAPHYLOCOCCUS AUREUS  10,000 - 49,999 cfu/ml  *     Wound Culture:   Recent Labs   Lab 01/21/24  1415 01/21/24  1428 02/01/24  0740 02/01/24  0951 02/01/24  1530   LABAERO No growth No growth No growth No growth No growth  No growth       Significant Imaging: I have reviewed all pertinent imaging results/findings within the past 24 hours.  "

## 2024-02-04 NOTE — PROGRESS NOTES
[facility] ([place] [address])  Orthopedics  Progress Note    Patient Name: Kulwant Mueller Jr.  MRN: [id number]  Admission Date: 1/31/2024  Hospital Length of Stay: 3 days  Attending Provider: Soniya Javier*  Primary Care Provider: Shellie, Primary Doctor  Follow-up For: Procedure(s) (LRB):  ARTHROSCOPY, KNEE, left, lateral post (Left)  IRRIGATION AND DEBRIDEMENT, LOWER EXTREMITY (Right)    Post-Operative Day: 3 Days Post-Op  Subjective:     Principal Problem:Pyogenic arthritis of left knee joint    Principal Orthopedic Problem: same as above s/p L knee arthroscopic I&D and R thigh I&D    Interval History: NAEON. VSS. Pain well controlled. CRP 46.3. Hgb 7.7. Cultures pending, currently no growth. On daptomycin and zosyn. Overnight drain output not recorded by staff.     Review of patient's allergies indicates:   Allergen Reactions    Heparin analogues Nausea And Vomiting       Current Facility-Administered Medications   Medication    acetaminophen tablet 650 mg    DAPTOmycin (CUBICIN) 945 mg in sodium chloride 0.9% SolP 50 mL IVPB    dextrose 10% bolus 125 mL 125 mL    dextrose 10% bolus 250 mL 250 mL    glucagon (human recombinant) injection 1 mg    glucose chewable tablet 16 g    glucose chewable tablet 24 g    HYDROcodone-acetaminophen  mg per tablet 1 tablet    insulin aspart U-100 pen 0-10 Units    insulin aspart U-100 pen 5 Units    insulin detemir U-100 (Levemir) pen 8 Units    magnesium sulfate 2g in water 50mL IVPB (premix)    methocarbamoL tablet 500 mg    metoprolol succinate (TOPROL-XL) 24 hr split tablet 12.5 mg    naloxone 0.4 mg/mL injection 0.02 mg    oxyCODONE immediate release tablet 5 mg    piperacillin-tazobactam (ZOSYN) 4.5 g in dextrose 5 % in water (D5W) 100 mL IVPB (MB+)    prochlorperazine tablet 10 mg    rivaroxaban tablet 10 mg    sodium chloride 0.9% flush 10 mL    sodium chloride 0.9% flush 10 mL    vitamin D 1000 units tablet 1,000 Units     Objective:     Vital Signs  "(Most Recent):  Temp: 98.9 °F (37.2 °C) (02/04/24 0759)  Pulse: 88 (02/04/24 0759)  Resp: 18 (02/04/24 0759)  BP: 126/75 (02/04/24 0759)  SpO2: 96 % (02/04/24 0759) Vital Signs (24h Range):  Temp:  [97.8 °F (36.6 °C)-98.9 °F (37.2 °C)] 98.9 °F (37.2 °C)  Pulse:  [79-88] 88  Resp:  [18-19] 18  SpO2:  [94 %-98 %] 96 %  BP: (105-128)/(56-75) 126/75     Weight: 106.7 kg (235 lb 3.7 oz)  Height: 6' 1" (185.4 cm)  Body mass index is 31.03 kg/m².      Intake/Output Summary (Last 24 hours) at 2/4/2024 0803  Last data filed at 2/3/2024 2208  Gross per 24 hour   Intake 630 ml   Output 940 ml   Net -310 ml         Ortho/SPM Exam  LLE:   Dressing c/d/I  Able to flex/extend the knee  Cap refill <2s  Wiggles all toes   Palpable pedal pulses     RLE:   Dressing c/d/I  Mild TTP  NVI distally  MULUGETA drain in place with SS output      Significant Labs: All pertinent labs within the past 24 hours have been reviewed.    Significant Imaging: I have reviewed and interpreted all pertinent imaging results/findings.  Assessment/Plan:     * Pyogenic arthritis of left knee joint  Kulwant Menardcaro Miguel. is a 38 y.o. male with a history of MRSA bacteremia,  recent left knee septic arthritis and right thigh abscess s/p L knee arthroscopic I&D on 1/17, s/p repeat L knee I&D and right thigh abscess I&D on 1/21, admitted for infectious workup after 1 day of worsening fevers/chills. Ortho consulted for rule out septic arthritis of the left knee joint. Pertinent physical exam findings include palpable effusion of the left knee in addition to a palpable fluid collection at the lateral aspect of the right thigh. The left knee and right thigh were aspirated at the bedside - see procedure note for further details.      Now s/p arthroscopic I&D of the left knee and thigh with debridement of right thigh on 2/1/24    Pain: Multimodal   CRP downtrending, 46.3  Dvt ppx: ASA 81BID  PT/OT WBAT, work on flexion of the left knee  Abx per ID, on daptomycin and Zosyn "   Please chart drain output at the end of each shift    Dispo: pending ID gavin Marie MD  Orthopedics  [facility] ([id number])

## 2024-02-04 NOTE — ASSESSMENT & PLAN NOTE
Kulwant Mueller Jr. is a 38 y.o. male with a history of MRSA bacteremia,  recent left knee septic arthritis and right thigh abscess s/p L knee arthroscopic I&D on 1/17, s/p repeat L knee I&D and right thigh abscess I&D on 1/21, admitted for infectious workup after 1 day of worsening fevers/chills. Ortho consulted for rule out septic arthritis of the left knee joint. Pertinent physical exam findings include palpable effusion of the left knee in addition to a palpable fluid collection at the lateral aspect of the right thigh. The left knee and right thigh were aspirated at the bedside - see procedure note for further details.      Now s/p arthroscopic I&D of the left knee and thigh with debridement of right thigh on 2/1/24    Pain: Multimodal   CRP downtrending, 46.3  Dvt ppx: ASA 81BID  PT/OT WBAT, work on flexion of the left knee  Abx per ID, on daptomycin and Zosyn   Please chart drain output at the end of each shift    Dispo: pending ID recs

## 2024-02-04 NOTE — ASSESSMENT & PLAN NOTE
Patient with known history of HFrEF. Does not appear to be in fluid overload on clinical examination.  Most recent echocardiogram:    Left Ventricle: The left ventricle is normal in size. There is concentric remodeling. Regional wall motion abnormalities present. See diagram for wall motion findings. There is reduced systolic function. Biplane (2D) method of discs ejection fraction is 48%. There is normal diastolic function.    Right Ventricle: Normal right ventricular cavity size. Systolic function is normal. TAPSE is 2.54 cm.    Normal left atrial size. The left atrium volume index is 21.2 mL/m2.    Normal right atrial size.    The aortic valve is structurally normal. There is normal leaflet mobility.    The mitral valve is structurally normal. There is normal leaflet mobility.    The tricuspid valve is structurally normal. There is normal leaflet mobility.    IVC/SVC: Elevated venous pressure at 15 mmHg.    Overall the study quality was technically difficult.    - assess volume status daily  - Initially held home GDMT (Jardiance, Toprol XL 12.5 QD) in the setting of possible infection and resume as appropriate  - 2/1 Initiated Toprol 12.5mg QD

## 2024-02-04 NOTE — ASSESSMENT & PLAN NOTE
----- Message from Юлия Matias sent at 9/24/2018  8:15 AM CDT -----  Contact: self [phone]  Pt called to schedule an appt due to stomach irration    Contact: self [phone]   Patient has hypokalemia which is Acute and currently controlled. Most recent potassium levels reviewed-   Lab Results   Component Value Date    K 4.0 02/04/2024   . Will continue potassium replacement per protocol and recheck repeat levels after replacement completed.

## 2024-02-04 NOTE — SUBJECTIVE & OBJECTIVE
"Principal Problem:Pyogenic arthritis of left knee joint    Principal Orthopedic Problem: same as above s/p L knee arthroscopic I&D and R thigh I&D    Interval History: NAEON. VSS. Pain well controlled. CRP 46.3. Hgb 7.7. Cultures pending, currently no growth. On daptomycin and zosyn. Overnight drain output not recorded by staff.     Review of patient's allergies indicates:   Allergen Reactions    Heparin analogues Nausea And Vomiting       Current Facility-Administered Medications   Medication    acetaminophen tablet 650 mg    DAPTOmycin (CUBICIN) 945 mg in sodium chloride 0.9% SolP 50 mL IVPB    dextrose 10% bolus 125 mL 125 mL    dextrose 10% bolus 250 mL 250 mL    glucagon (human recombinant) injection 1 mg    glucose chewable tablet 16 g    glucose chewable tablet 24 g    HYDROcodone-acetaminophen  mg per tablet 1 tablet    insulin aspart U-100 pen 0-10 Units    insulin aspart U-100 pen 5 Units    insulin detemir U-100 (Levemir) pen 8 Units    magnesium sulfate 2g in water 50mL IVPB (premix)    methocarbamoL tablet 500 mg    metoprolol succinate (TOPROL-XL) 24 hr split tablet 12.5 mg    naloxone 0.4 mg/mL injection 0.02 mg    oxyCODONE immediate release tablet 5 mg    piperacillin-tazobactam (ZOSYN) 4.5 g in dextrose 5 % in water (D5W) 100 mL IVPB (MB+)    prochlorperazine tablet 10 mg    rivaroxaban tablet 10 mg    sodium chloride 0.9% flush 10 mL    sodium chloride 0.9% flush 10 mL    vitamin D 1000 units tablet 1,000 Units     Objective:     Vital Signs (Most Recent):  Temp: 98.9 °F (37.2 °C) (02/04/24 0759)  Pulse: 88 (02/04/24 0759)  Resp: 18 (02/04/24 0759)  BP: 126/75 (02/04/24 0759)  SpO2: 96 % (02/04/24 0759) Vital Signs (24h Range):  Temp:  [97.8 °F (36.6 °C)-98.9 °F (37.2 °C)] 98.9 °F (37.2 °C)  Pulse:  [79-88] 88  Resp:  [18-19] 18  SpO2:  [94 %-98 %] 96 %  BP: (105-128)/(56-75) 126/75     Weight: 106.7 kg (235 lb 3.7 oz)  Height: 6' 1" (185.4 cm)  Body mass index is 31.03 " kg/m².      Intake/Output Summary (Last 24 hours) at 2/4/2024 0803  Last data filed at 2/3/2024 2208  Gross per 24 hour   Intake 630 ml   Output 940 ml   Net -310 ml          Ortho/SPM Exam  LLE:   Dressing c/d/I  Able to flex/extend the knee  Cap refill <2s  Wiggles all toes   Palpable pedal pulses     RLE:   Dressing c/d/I  Mild TTP  NVI distally  MULUGETA drain in place with SS output      Significant Labs: All pertinent labs within the past 24 hours have been reviewed.    Significant Imaging: I have reviewed and interpreted all pertinent imaging results/findings.

## 2024-02-04 NOTE — ASSESSMENT & PLAN NOTE
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 (Surgical cxs +MRSA). ORBY 1/02/24 neg. Pt required salvage therapy dapto/ceftaroline & bld cxs finally cleared 1/07, and then placed on daptomycin monotherapy to complete 4wks duration (ASYA 02/06) per [facility] ID (Yana). Of note, L shoulder imaging showed subdeltoid bursitis w/ SS tear; superimposed infection could be present but no drainable fluid collections. Also, R heel without osteo but had cellulitis, myositis, tenosynovitis.     Pt was discharged (01/10) to rehab with daptomycin monotherapy; but started having new fevers (up to 103F) at rehab, and re-admitted (01/14) for workup. Blood cultures 01/14/24 NGTD. MRI T/L spine negative for osteo-discitis. Found to have persistent left knee septic arthritis and right thigh abscess and underwent L knee arthroscopic I&D on 1/17. IR drain placed 01/19 R lateral thigh abscess, cxs +MRSA.  Taken to OR 01/21 for repeat surgical I&D of L knee (#3), and R thigh abscess with new R thigh drain placed. Surgical cxs (01/21) of L knee and R thigh NGTD.     Discharged to [facility] rehab with IV daptomycin 8mg/kg Q24h. Re-presented 1/31/2024 with tachycardia, fever. MRI left knee with: Findings compatible with persistent septic joint and early changes of osteomyelitis in the femoral condyles patella and tibial spines. And MRI pelvis with: Worsening of fluid enhancement in the right myofascial planes and gluteal muscles suggesting infected subcutaneous fat and tensor fascia jonah bursa with myositis and or infection of lateral gluteal muscles on the right. Evaluated by ortho and s/p aspiration of left knee joint fluid and right thigh fluid collection. Gram stains with moderate WBC, no organisms.    Bcx 1/31 growing  Acinetobacter Baumannii in 1/4 (from PICC line).    Antibiotics this admission:   Daptomycin 12/27 - present   Zosyn added 2/2/24 - present (added for Acinetobacter baumannii on 1/31 Bcx)    S/p arthroscopic I&D Left knee and right thigh I&D 2/1.    Recommendations  - if OR cultures remain sterile, would complete therapy with daptomycin for MRSA as outlined (EOC 3/3/24)  - PICC removed 2/2; plan for line holiday over the weekend  - Change Zosyn to Unasyn and complete 2 weeks of therapy from clearance (EOC 2/17/24)

## 2024-02-05 LAB
ANION GAP SERPL CALC-SCNC: 9 MMOL/L (ref 8–16)
ANISOCYTOSIS BLD QL SMEAR: SLIGHT
BACTERIA BLD CULT: ABNORMAL
BACTERIA BLD CULT: NORMAL
BACTERIA SPEC AEROBE CULT: NO GROWTH
BACTERIA STL CULT: NORMAL
BASOPHILS # BLD AUTO: 0.02 K/UL (ref 0–0.2)
BASOPHILS NFR BLD: 0.2 % (ref 0–1.9)
BUN SERPL-MCNC: 4 MG/DL (ref 6–20)
CALCIUM SERPL-MCNC: 8.6 MG/DL (ref 8.7–10.5)
CHLORIDE SERPL-SCNC: 101 MMOL/L (ref 95–110)
CO2 SERPL-SCNC: 25 MMOL/L (ref 23–29)
CREAT SERPL-MCNC: 0.7 MG/DL (ref 0.5–1.4)
CRP SERPL-MCNC: 38.4 MG/L (ref 0–8.2)
DIFFERENTIAL METHOD BLD: ABNORMAL
E COLI SXT1 STL QL IA: NEGATIVE
E COLI SXT2 STL QL IA: NEGATIVE
EOSINOPHIL # BLD AUTO: 0.1 K/UL (ref 0–0.5)
EOSINOPHIL NFR BLD: 0.8 % (ref 0–8)
ERYTHROCYTE [DISTWIDTH] IN BLOOD BY AUTOMATED COUNT: 15.4 % (ref 11.5–14.5)
EST. GFR  (NO RACE VARIABLE): >60 ML/MIN/1.73 M^2
GLUCOSE SERPL-MCNC: 223 MG/DL (ref 70–110)
HCT VFR BLD AUTO: 27 % (ref 40–54)
HGB BLD-MCNC: 8.1 G/DL (ref 14–18)
HYPOCHROMIA BLD QL SMEAR: ABNORMAL
IMM GRANULOCYTES # BLD AUTO: 0.14 K/UL (ref 0–0.04)
IMM GRANULOCYTES NFR BLD AUTO: 1.3 % (ref 0–0.5)
LYMPHOCYTES # BLD AUTO: 2.5 K/UL (ref 1–4.8)
LYMPHOCYTES NFR BLD: 24 % (ref 18–48)
MCH RBC QN AUTO: 26.3 PG (ref 27–31)
MCHC RBC AUTO-ENTMCNC: 30 G/DL (ref 32–36)
MCV RBC AUTO: 88 FL (ref 82–98)
MONOCYTES # BLD AUTO: 0.6 K/UL (ref 0.3–1)
MONOCYTES NFR BLD: 5.6 % (ref 4–15)
NEUTROPHILS # BLD AUTO: 7.2 K/UL (ref 1.8–7.7)
NEUTROPHILS NFR BLD: 68.1 % (ref 38–73)
NRBC BLD-RTO: 0 /100 WBC
OVALOCYTES BLD QL SMEAR: ABNORMAL
PLATELET # BLD AUTO: 476 K/UL (ref 150–450)
PLATELET BLD QL SMEAR: ABNORMAL
PMV BLD AUTO: 9.7 FL (ref 9.2–12.9)
POCT GLUCOSE: 194 MG/DL (ref 70–110)
POCT GLUCOSE: 212 MG/DL (ref 70–110)
POCT GLUCOSE: 217 MG/DL (ref 70–110)
POIKILOCYTOSIS BLD QL SMEAR: SLIGHT
POLYCHROMASIA BLD QL SMEAR: ABNORMAL
POTASSIUM SERPL-SCNC: 3.9 MMOL/L (ref 3.5–5.1)
RBC # BLD AUTO: 3.08 M/UL (ref 4.6–6.2)
SODIUM SERPL-SCNC: 135 MMOL/L (ref 136–145)
SPHEROCYTES BLD QL SMEAR: ABNORMAL
WBC # BLD AUTO: 10.6 K/UL (ref 3.9–12.7)

## 2024-02-05 PROCEDURE — 63600175 PHARM REV CODE 636 W HCPCS: Performed by: EMERGENCY MEDICINE

## 2024-02-05 PROCEDURE — 97530 THERAPEUTIC ACTIVITIES: CPT | Mod: CQ

## 2024-02-05 PROCEDURE — 76937 US GUIDE VASCULAR ACCESS: CPT

## 2024-02-05 PROCEDURE — 25000003 PHARM REV CODE 250

## 2024-02-05 PROCEDURE — 36410 VNPNXR 3YR/> PHY/QHP DX/THER: CPT

## 2024-02-05 PROCEDURE — 80048 BASIC METABOLIC PNL TOTAL CA: CPT

## 2024-02-05 PROCEDURE — 36415 COLL VENOUS BLD VENIPUNCTURE: CPT

## 2024-02-05 PROCEDURE — 97116 GAIT TRAINING THERAPY: CPT | Mod: CQ

## 2024-02-05 PROCEDURE — 11000001 HC ACUTE MED/SURG PRIVATE ROOM

## 2024-02-05 PROCEDURE — C1751 CATH, INF, PER/CENT/MIDLINE: HCPCS

## 2024-02-05 PROCEDURE — 25000003 PHARM REV CODE 250: Performed by: EMERGENCY MEDICINE

## 2024-02-05 PROCEDURE — 97535 SELF CARE MNGMENT TRAINING: CPT

## 2024-02-05 PROCEDURE — 63600175 PHARM REV CODE 636 W HCPCS: Performed by: INTERNAL MEDICINE

## 2024-02-05 PROCEDURE — 86140 C-REACTIVE PROTEIN: CPT

## 2024-02-05 PROCEDURE — 99233 SBSQ HOSP IP/OBS HIGH 50: CPT | Mod: ,,, | Performed by: INTERNAL MEDICINE

## 2024-02-05 PROCEDURE — 85025 COMPLETE CBC W/AUTO DIFF WBC: CPT

## 2024-02-05 PROCEDURE — 97530 THERAPEUTIC ACTIVITIES: CPT

## 2024-02-05 PROCEDURE — 25000003 PHARM REV CODE 250: Performed by: INTERNAL MEDICINE

## 2024-02-05 PROCEDURE — 63600175 PHARM REV CODE 636 W HCPCS

## 2024-02-05 PROCEDURE — 27000207 HC ISOLATION

## 2024-02-05 RX ADMIN — AMPICILLIN SODIUM, SULBACTAM SODIUM 3 G: 2; 1 INJECTION, POWDER, FOR SOLUTION INTRAMUSCULAR; INTRAVENOUS at 04:02

## 2024-02-05 RX ADMIN — METOPROLOL SUCCINATE 12.5 MG: 25 TABLET, EXTENDED RELEASE ORAL at 09:02

## 2024-02-05 RX ADMIN — DAPTOMYCIN 945 MG: 350 INJECTION, POWDER, LYOPHILIZED, FOR SOLUTION INTRAVENOUS at 10:02

## 2024-02-05 RX ADMIN — RIVAROXABAN 10 MG: 10 TABLET, FILM COATED ORAL at 04:02

## 2024-02-05 RX ADMIN — AMPICILLIN SODIUM, SULBACTAM SODIUM 3 G: 2; 1 INJECTION, POWDER, FOR SOLUTION INTRAMUSCULAR; INTRAVENOUS at 09:02

## 2024-02-05 RX ADMIN — CHOLECALCIFEROL TAB 25 MCG (1000 UNIT) 1000 UNITS: 25 TAB at 09:02

## 2024-02-05 RX ADMIN — INSULIN DETEMIR 8 UNITS: 100 INJECTION, SOLUTION SUBCUTANEOUS at 09:02

## 2024-02-05 RX ADMIN — AMPICILLIN SODIUM, SULBACTAM SODIUM 3 G: 2; 1 INJECTION, POWDER, FOR SOLUTION INTRAMUSCULAR; INTRAVENOUS at 02:02

## 2024-02-05 RX ADMIN — INSULIN ASPART 4 UNITS: 100 INJECTION, SOLUTION INTRAVENOUS; SUBCUTANEOUS at 04:02

## 2024-02-05 RX ADMIN — INSULIN ASPART 4 UNITS: 100 INJECTION, SOLUTION INTRAVENOUS; SUBCUTANEOUS at 08:02

## 2024-02-05 NOTE — PROGRESS NOTES
[facility] ([place] [id number])  Orthopedics  Progress Note    Patient Name: Kulwant Mueller Jr.  MRN: [id number]  Admission Date: 1/31/2024  Hospital Length of Stay: 4 days  Attending Provider: Soniya Javier*  Primary Care Provider: Shellie, Primary Doctor  Follow-up For: Procedure(s) (LRB):  ARTHROSCOPY, KNEE, left, lateral post (Left)  IRRIGATION AND DEBRIDEMENT, LOWER EXTREMITY (Right)    Post-Operative Day: 4 Days Post-Op  Subjective:     Principal Problem:Pyogenic arthritis of left knee joint    Principal Orthopedic Problem: same as above s/p L knee arthroscopic I&D and R thigh I&D    Interval History: NAEON. VSS. Pain well controlled. CRP down to 38 today. Drain output 20cc/24 hr. Pt ambulated 20 ft with PT.     Review of patient's allergies indicates:   Allergen Reactions    Heparin analogues Nausea And Vomiting       Current Facility-Administered Medications   Medication    acetaminophen tablet 650 mg    ampicillin-sulbactam (UNASYN) 3 g in sodium chloride 0.9 % 100 mL IVPB (MB+)    DAPTOmycin (CUBICIN) 945 mg in sodium chloride 0.9% SolP 50 mL IVPB    dextrose 10% bolus 125 mL 125 mL    dextrose 10% bolus 250 mL 250 mL    glucagon (human recombinant) injection 1 mg    glucose chewable tablet 16 g    glucose chewable tablet 24 g    HYDROcodone-acetaminophen  mg per tablet 1 tablet    insulin aspart U-100 pen 0-10 Units    insulin aspart U-100 pen 4 Units    insulin detemir U-100 (Levemir) pen 8 Units    methocarbamoL tablet 500 mg    metoprolol succinate (TOPROL-XL) 24 hr split tablet 12.5 mg    naloxone 0.4 mg/mL injection 0.02 mg    oxyCODONE immediate release tablet 5 mg    prochlorperazine tablet 10 mg    rivaroxaban tablet 10 mg    sodium chloride 0.9% flush 10 mL    sodium chloride 0.9% flush 10 mL    vitamin D 1000 units tablet 1,000 Units     Objective:     Vital Signs (Most Recent):  Temp: 98.7 °F (37.1 °C) (02/05/24 1100)  Pulse: 78 (02/05/24 1100)  Resp: 17 (02/05/24 1100)  BP:  "137/74 (02/05/24 1100)  SpO2: 95 % (02/05/24 1100) Vital Signs (24h Range):  Temp:  [97.8 °F (36.6 °C)-98.8 °F (37.1 °C)] 98.7 °F (37.1 °C)  Pulse:  [78-87] 78  Resp:  [16-18] 17  SpO2:  [95 %-97 %] 95 %  BP: (123-137)/(72-78) 137/74     Weight: 106.7 kg (235 lb 3.7 oz)  Height: 6' 1" (185.4 cm)  Body mass index is 31.03 kg/m².      Intake/Output Summary (Last 24 hours) at 2/5/2024 1449  Last data filed at 2/5/2024 0930  Gross per 24 hour   Intake 600 ml   Output 1130 ml   Net -530 ml         Ortho/SPM Exam  LLE:   Dressing c/d/I  Able to flex/extend the knee  Cap refill <2s  Wiggles all toes   Palpable pedal pulses     RLE:   Dressing c/d/I  Mild TTP  NVI distally  MULUGETA drain in place with SS output      Significant Labs: All pertinent labs within the past 24 hours have been reviewed.    Significant Imaging: I have reviewed and interpreted all pertinent imaging results/findings.  Assessment/Plan:     * Pyogenic arthritis of left knee joint  Kulwant Mueller Jr. is a 38 y.o. male with a history of MRSA bacteremia,  recent left knee septic arthritis and right thigh abscess s/p L knee arthroscopic I&D on 1/17, s/p repeat L knee I&D and right thigh abscess I&D on 1/21, admitted for infectious workup after 1 day of worsening fevers/chills. Ortho consulted for rule out septic arthritis of the left knee joint. Pertinent physical exam findings include palpable effusion of the left knee in addition to a palpable fluid collection at the lateral aspect of the right thigh. The left knee and right thigh were aspirated at the bedside - see procedure note for further details.      Now s/p arthroscopic I&D of the left knee and thigh with debridement of right thigh on 2/1/24    Pain: Multimodal   CRP downtrending, 46.3  Dvt ppx: ASA 81BID  PT/OT WBAT, work on flexion of the left knee  Abx per ID, on daptomycin and Zosyn   Drain output 20cc /24 hr; will consider dc tomorrow    Dispo: pending ID recs          David France, " MD  Orthopedics  [facility] ([place]

## 2024-02-05 NOTE — ASSESSMENT & PLAN NOTE
Patient has hypokalemia which is Acute and currently controlled. Most recent potassium levels reviewed-   Lab Results   Component Value Date    K 3.9 02/05/2024   . Will continue potassium replacement per protocol and recheck repeat levels after replacement completed.

## 2024-02-05 NOTE — SUBJECTIVE & OBJECTIVE
"Principal Problem:Pyogenic arthritis of left knee joint    Principal Orthopedic Problem: same as above s/p L knee arthroscopic I&D and R thigh I&D    Interval History: NAEON. VSS. Pain well controlled. CRP down to 38 today. Drain output 20cc/24 hr. Pt ambulated 20 ft with PT.     Review of patient's allergies indicates:   Allergen Reactions    Heparin analogues Nausea And Vomiting       Current Facility-Administered Medications   Medication    acetaminophen tablet 650 mg    ampicillin-sulbactam (UNASYN) 3 g in sodium chloride 0.9 % 100 mL IVPB (MB+)    DAPTOmycin (CUBICIN) 945 mg in sodium chloride 0.9% SolP 50 mL IVPB    dextrose 10% bolus 125 mL 125 mL    dextrose 10% bolus 250 mL 250 mL    glucagon (human recombinant) injection 1 mg    glucose chewable tablet 16 g    glucose chewable tablet 24 g    HYDROcodone-acetaminophen  mg per tablet 1 tablet    insulin aspart U-100 pen 0-10 Units    insulin aspart U-100 pen 4 Units    insulin detemir U-100 (Levemir) pen 8 Units    methocarbamoL tablet 500 mg    metoprolol succinate (TOPROL-XL) 24 hr split tablet 12.5 mg    naloxone 0.4 mg/mL injection 0.02 mg    oxyCODONE immediate release tablet 5 mg    prochlorperazine tablet 10 mg    rivaroxaban tablet 10 mg    sodium chloride 0.9% flush 10 mL    sodium chloride 0.9% flush 10 mL    vitamin D 1000 units tablet 1,000 Units     Objective:     Vital Signs (Most Recent):  Temp: 98.7 °F (37.1 °C) (02/05/24 1100)  Pulse: 78 (02/05/24 1100)  Resp: 17 (02/05/24 1100)  BP: 137/74 (02/05/24 1100)  SpO2: 95 % (02/05/24 1100) Vital Signs (24h Range):  Temp:  [97.8 °F (36.6 °C)-98.8 °F (37.1 °C)] 98.7 °F (37.1 °C)  Pulse:  [78-87] 78  Resp:  [16-18] 17  SpO2:  [95 %-97 %] 95 %  BP: (123-137)/(72-78) 137/74     Weight: 106.7 kg (235 lb 3.7 oz)  Height: 6' 1" (185.4 cm)  Body mass index is 31.03 kg/m².      Intake/Output Summary (Last 24 hours) at 2/5/2024 1449  Last data filed at 2/5/2024 0930  Gross per 24 hour   Intake 600 ml "   Output 1130 ml   Net -530 ml          Ortho/SPM Exam  LLE:   Dressing c/d/I  Able to flex/extend the knee  Cap refill <2s  Wiggles all toes   Palpable pedal pulses     RLE:   Dressing c/d/I  Mild TTP  NVI distally  MULUGETA drain in place with SS output      Significant Labs: All pertinent labs within the past 24 hours have been reviewed.    Significant Imaging: I have reviewed and interpreted all pertinent imaging results/findings.

## 2024-02-05 NOTE — PT/OT/SLP PROGRESS
Occupational Therapy   Treatment    Name: Kulwant Mueller Jr.  MRN: [id number]  Admitting Diagnosis:  Pyogenic arthritis of left knee joint  4 Days Post-Op    Recommendations:     Discharge Recommendations: High Intensity Therapy  Discharge Equipment Recommendations:  to be determined by next level of care  Barriers to discharge:  None    Assessment:     Kulwant Mueller Jr. is a 38 y.o. male with a medical diagnosis of Pyogenic arthritis of left knee joint.  He presents with reports of difficulty coping with mutliple hospitalizations/post-acute therapy placements. Patient making improvements with functional mobility with encouragement. Performance deficits affecting function are weakness, impaired endurance, impaired self care skills, impaired functional mobility, decreased lower extremity function, decreased safety awareness. Patient has demonstrated sufficient progression to warrant high intensity therapy evidenced by objectives noted below.     Rehab Prognosis:  Good; patient would benefit from acute skilled OT services to address these deficits and reach maximum level of function.       Plan:     Patient to be seen 4 x/week to address the above listed problems via self-care/home management, therapeutic activities, therapeutic exercises, neuromuscular re-education  Plan of Care Expires: 03/01/24  Plan of Care Reviewed with: patient    Subjective     Chief Complaint: lack of sleep due to multiple interruptions during hospitalization  Patient/Family Comments/goals: get better  Pain/Comfort:  Pain Rating 1: 0/10  Pain Rating Post-Intervention 1: 0/10    Objective:     Communicated with: nursing prior to session.  Patient found HOB elevated with peripheral IV upon OT entry to room.    General Precautions: Standard, fall    Orthopedic Precautions:LLE weight bearing as tolerated  Braces: N/A  Respiratory Status: Room air     Occupational Performance:     Bed Mobility:    Patient completed Scooting/Bridging with  contact guard assistance  Patient completed Supine to Sit with contact guard assistance     Functional Mobility/Transfers:  Patient completed Sit <> Stand Transfer with contact guard assistance  with  rolling walker   Functional Mobility: patient ambulated ~5 ft from bed to bedside chair with RW and CGA    Activities of Daily Living:  Grooming: set up assistance to wash face and brush teeth while seated  Lower Body Dressing: total assistance to don socks      AMPAC 6 Click ADL: 20    Treatment & Education:  Extensive time spend with counseling and motivating patient within OT scope due to difficulty coping.    Patient educated on:   -purpose of OT and OT POC  -facilitation and education on proper body mechanics, energy conservation, and safety  -importance of early mobility and out of bed activities with staff assist  -overall benefits of therapy     All questions answered within OT scope and to patient's satisfaction    Patient left up in chair with all lines intact, call button in reach, and PT present    GOALS:   Multidisciplinary Problems       Occupational Therapy Goals          Problem: Occupational Therapy    Goal Priority Disciplines Outcome Interventions   Occupational Therapy Goal     OT, PT/OT Ongoing, Progressing    Description: Goals to be met by: 3/1/24 (1 month)     Patient will increase functional independence with ADLs by performing:    UE Dressing with [place].  LE Dressing with [place].  Grooming while standing at sink with Modified [place].  Toileting from toilet with Modified [place] for hygiene and clothing management.   Rolling to Bilateral with [place].   Supine to sit with [place].  Step transfer with Modified [place]  Toilet transfer to toilet with Modified [place].                         Time Tracking:     OT Date of Treatment: 02/05/24  OT Start Time: 0932  OT Stop Time: 1010  OT Total Time (min): 38 min    Billable Minutes:Self Care/Home  Management 18  Therapeutic Activity 20    OT/RUFINA: OT          2/5/2024

## 2024-02-05 NOTE — PROGRESS NOTES
[facility] Surg ([address] Medicine  Progress Note    Patient Name: Kulwant Mueller Jr.  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 Per chart review rehab facility was concern for orthostasis and administered IVF. Rehab staff were also concerned for worsening infection, reporting diaphoresis at nighttime and thus recommended evaluation with imaging at [facility]. On interview, patient states that he feels fine and denies any complaints except for diarrhea.    On presentation he was afebrile but tachycardic to 110 with /98.  Labs showed WBC 12.3, Hgb 8.0, platelets 396, ESR 76 .7, Na 134, K+ 3.4, .     MRI of the knee showed persistent septic joint and seemingly new early changes of osteomyelitis.    MRI of the pelvis showed improved fluid around the left hip but worsening fluid enhancement in the right myofascial planes and gluteal muscles.    C difficile testing was ordered in the emergency department.  He was admitted to Hospital Medicine for orthopedic surgery and Infectious Disease evaluation.    Overview/Hospital Course:  Arrived to [facility] on 1/31 for lightheadedness, fevers, and chills, found to have L septic knee joint and abscess in lateral R thigh. Underwent joint washout and I&D on 2/1 with orthopedic surgery. BCX grew Acinetobacter baumanii in one  bottle. Infectious disease consulted, continued Daptomycin due to previous MRSA infections in joint washouts, added Zosyn for A. Baumanii, recommended PICC removal, which was completed 2/2. Patient to have PICC holiday over the weekend. Sensitivities for A. Baumanii bacteremia returned with near pan-sensitive results. Patient transitioned to Unasyn with intent to treat until 2/17/24 for A. Baumanii bacteremia. Daptomycin to be continued until 3/3/24, both per ID recommendations. Thigh abscess and L knee cultures with no growth to date currently. Inpatient insulin regimen uptitrated throughout stay due to mildly uncontrolled blood glucose.    Interval History: NAEON. Patient continues on Daptomycin/Unasyn. Planning for midline placement today. Abscess and joint cultures continue with no growth. Pending dispo back to facility.    Review of Systems   Constitutional:  Negative for appetite change, chills, fatigue and fever.   HENT:  Negative for congestion, mouth sores, nosebleeds and tinnitus.    Eyes:  Negative for discharge and itching.   Respiratory:  Negative for cough, chest tightness and shortness of breath.    Cardiovascular:  Negative for chest pain and leg swelling.   Gastrointestinal:  Negative for abdominal distention and abdominal pain.   Endocrine: Negative for cold intolerance and heat intolerance.   Genitourinary:  Negative for dysuria and frequency.   Musculoskeletal:  Negative for arthralgias, joint swelling and neck pain.   Skin:  Negative for color change and pallor.   Allergic/Immunologic: Negative for environmental allergies and food allergies.   Neurological:  Negative for dizziness and light-headedness.   Hematological:  Negative for adenopathy.   Psychiatric/Behavioral:  Negative for agitation, behavioral problems and confusion.      Objective:     Vital Signs (Most Recent):  Temp: 98.7 °F (37.1 °C) (02/05/24 1100)  Pulse: 78 (02/05/24 1100)  Resp: 17 (02/05/24 1100)  BP: 137/74 (02/05/24  1100)  SpO2: 95 % (02/05/24 1100) Vital Signs (24h Range):  Temp:  [97.8 °F (36.6 °C)-98.8 °F (37.1 °C)] 98.7 °F (37.1 °C)  Pulse:  [78-87] 78  Resp:  [16-18] 17  SpO2:  [95 %-97 %] 95 %  BP: (123-137)/(72-78) 137/74     Weight: 106.7 kg (235 lb 3.7 oz)  Body mass index is 31.03 kg/m².    Intake/Output Summary (Last 24 hours) at 2/5/2024 1427  Last data filed at 2/5/2024 0930  Gross per 24 hour   Intake 600 ml   Output 2330 ml   Net -1730 ml           Physical Exam  Constitutional:       General: He is not in acute distress.     Appearance: Normal appearance. He is not ill-appearing.   HENT:      Head: Normocephalic and atraumatic.      Right Ear: External ear normal.      Left Ear: External ear normal.      Nose: Nose normal.      Mouth/Throat:      Mouth: Mucous membranes are moist.      Pharynx: Oropharynx is clear.   Eyes:      Extraocular Movements: Extraocular movements intact.      Pupils: Pupils are equal, round, and reactive to light.   Cardiovascular:      Rate and Rhythm: Normal rate and regular rhythm.      Pulses: Normal pulses.      Heart sounds: Normal heart sounds.   Pulmonary:      Effort: Pulmonary effort is normal.      Breath sounds: Normal breath sounds.   Abdominal:      General: Abdomen is flat. There is no distension.      Palpations: Abdomen is soft.      Tenderness: There is no abdominal tenderness.   Musculoskeletal:         General: No swelling. Normal range of motion.      Cervical back: Normal range of motion and neck supple.      Comments: Dressing in place LLE, no erythema noted near edges  Able to bend L knee    Skin:     General: Skin is warm and dry.      Capillary Refill: Capillary refill takes less than 2 seconds.   Neurological:      General: No focal deficit present.      Mental Status: He is alert and oriented to person, place, and time.   Psychiatric:         Mood and Affect: Mood normal.         Behavior: Behavior normal.             Significant Labs: All pertinent labs  within the past 24 hours have been reviewed.  CBC:   Recent Labs   Lab 02/04/24  0536 02/05/24  0513   WBC 10.55 10.60   HGB 7.7* 8.1*   HCT 25.5* 27.0*   * 476*       CMP:   Recent Labs   Lab 02/04/24  0536 02/05/24  0513    135*   K 4.0 3.9   CL 99 101   CO2 26 25   * 223*   BUN 6 4*   CREATININE 0.7 0.7   CALCIUM 8.5* 8.6*   ANIONGAP 11 9         Significant Imaging: I have reviewed all pertinent imaging results/findings within the past 24 hours.    Assessment/Plan:      * Pyogenic arthritis of left knee joint  See Staphylococcal arthritis of left knee       Hypokalemia  Patient has hypokalemia which is Acute and currently controlled. Most recent potassium levels reviewed-   Lab Results   Component Value Date    K 3.9 02/05/2024   . Will continue potassium replacement per protocol and recheck repeat levels after replacement completed.     HFrEF (heart failure with reduced ejection fraction)  Patient with known history of HFrEF. Does not appear to be in fluid overload on clinical examination.  Most recent echocardiogram:    Left Ventricle: The left ventricle is normal in size. There is concentric remodeling. Regional wall motion abnormalities present. See diagram for wall motion findings. There is reduced systolic function. Biplane (2D) method of discs ejection fraction is 48%. There is normal diastolic function.    Right Ventricle: Normal right ventricular cavity size. Systolic function is normal. TAPSE is 2.54 cm.    Normal left atrial size. The left atrium volume index is 21.2 mL/m2.    Normal right atrial size.    The aortic valve is structurally normal. There is normal leaflet mobility.    The mitral valve is structurally normal. There is normal leaflet mobility.    The tricuspid valve is structurally normal. There is normal leaflet mobility.    IVC/SVC: Elevated venous pressure at 15 mmHg.    Overall the study quality was technically difficult.    - assess volume status daily  - Initially  held home GDMT (Jardiance, Toprol XL 12.5 QD) in the setting of possible infection and resume as appropriate  - 2/1 Initiated Toprol 12.5mg QD     Diarrhea  Reports ongoing watery diarrhea for the past few days prior to discharge. Given his ongoing antibiotic administration, there is possibility of infectious etiology.    Resolved  - Cdiff negative (2/2/23)  - Patient reports no continued diarrhea; will monitor for symptoms and initiate Loperamide PRN      Abscess of right thigh  - See staphylococcal arthritis of the left knee      Staphylococcal arthritis of left knee  Patient with multiple admissions for septic joint complicated by MRSA bacteremia and status post orthopedic intervention, on daptomycin prior to presentation found to have imaging concerns of worsening right thigh abscess and new concerns for left knee osteomyelitis. Previous expected end date of daptomycin was 03/03/24.  He denies worsening pain or subjective fevers.  No leukocytosis. ESR and CRP with mild elevation from prior.  S/p L knee washout on 2/1 and I&D of R thigh abscess. Cultures pending.   BCX from 1/31 grew Acinetobacter baumanii on 2/2 - Infectious Disease c/s  Acinetobacter baumanii sensitivities return near pan-sensitive    PLAN:  - orthopedic surgery consulted; s/p washout, trending CRP  - infectious disease consulted and recommend transition to unasyn from zosyn- continue Dapto/Unasyn  - Continue PICC holiday over the weekend; will plan to place line on 2/5  - repeat blood cultures NGTD  - Removed post-op [place]  - PT/OT      May-Thurner syndrome  Known history of hypercoagulable state.  Reported heparin analog allergy.    - continue home rivaroxaban  - no other need for DVT prophylaxis    Hypertension associated with diabetes  - hold home antihypertensives in the setting of possible infection      Type 2 diabetes mellitus with complication, with long-term current use of insulin  - Increased Detemir 8u qhs  - Scheduled aspart 5u  ACHS  - MDSSI  - up titrate as appropriate  - Diabetic diet  - POCT glucose checks AC/HS  - hypoglycemia protocol in place      VTE Risk Mitigation (From admission, onward)           Ordered     rivaroxaban tablet 10 mg  With dinner         01/31/24 4604                    Discharge Planning   RAEANN: 2/5/2024     Code Status: Full Code   Is the patient medically ready for discharge?: No    Reason for patient still in hospital (select all that apply): Patient trending condition, Treatment, Consult recommendations, and Pending disposition  Discharge Plan A: Rehab   Discharge Delays: None known at this time              Aquiles Lewis MD  Department of Hospital Medicine   [facility] - Med Surg ([place]-[facility])

## 2024-02-05 NOTE — NURSING
1546: Patient refusing afternoon and evening acuchecks and vitals. Attempted to educate the patient on need for both interventions but it was not received, still refusing to allow nurse or PCTs to check.        1632: Pt allowing this nurse to check sugar and administer insulin at this time

## 2024-02-05 NOTE — PT/OT/SLP PROGRESS
"Physical Therapy Treatment    Patient Name:  Kulwant Mueller Jr.   MRN:  [id number]    Recommendations:     Discharge Recommendations: High Intensity Therapy  Discharge Equipment Recommendations: to be determined by next level of care  Barriers to discharge: None    Assessment:     Kulwant Mueller Jr. is a 38 y.o. male admitted with a medical diagnosis of Pyogenic arthritis of left knee joint.  He presents with the following impairments/functional limitations: weakness, impaired endurance, impaired self care skills, impaired functional mobility, decreased lower extremity function, decreased safety awareness Pt tolerated treatment session fairly well today. Pt was just wrapping up with OT upon PTA arrival. Pt was up in chair when PTA entered the room, pt requesting to get back to bed. Pt was able to ambulate with encouragement with CGA. Pt reporting he knows what he needs to get back to, yet has no motivation to get to that point. Patient remains appropriate for continued skilled services within the acute environment and goals remain appropriate.       Rehab Prognosis: Good; patient would benefit from acute skilled PT services to address these deficits and reach maximum level of function.    Recent Surgery: Procedure(s) (LRB):  ARTHROSCOPY, KNEE, left, lateral post (Left)  IRRIGATION AND DEBRIDEMENT, LOWER EXTREMITY (Right) 4 Days Post-Op    Plan:     During this hospitalization, patient to be seen 4 x/week to address the identified rehab impairments via gait training, therapeutic activities, therapeutic exercises, neuromuscular re-education and progress toward the following goals:    Plan of Care Expires:  03/04/24    Subjective     Chief Complaint: None stated   Patient/Family Comments/goals: "I know what I have to do, but I have no motivation."   Pain/Comfort:  Pain Rating 1: 0/10      Objective:     Communicated with RN prior to session.  Patient found up in chair with peripheral IV, MULUGETA drain upon PT entry to " room.     General Precautions: Standard, fall, contact  Orthopedic Precautions:    Braces: N/A  Respiratory Status: Room air     Functional Mobility:  Bed Mobility:     Sit to Supine: minimum assistance for LE clearance   Draw sheet to HOB: TotalA x 2 (nurse assisting)   Transfers:     Sit to Stand from bedside chair:  minimum assistance with rolling walker  Gait: ~ 20 ft CGA/SBA with RW  Pt ambulated with slow nathaniel   Pt performed 10 repetitions of seated B LE exercises consisting of: Marching, LAQ, ABD/ADD, heel raises, and toe raises.   Pt requiring AAROM with B marching         AM-PAC 6 CLICK MOBILITY  Turning over in bed (including adjusting bedclothes, sheets and blankets)?: 3  Sitting down on and standing up from a chair with arms (e.g., wheelchair, bedside commode, etc.): 3  Moving from lying on back to sitting on the side of the bed?: 3  Moving to and from a bed to a chair (including a wheelchair)?: 3  Need to walk in hospital room?: 3  Climbing 3-5 steps with a railing?: 2  Basic Mobility Total Score: 17       Treatment & Education:  Therapist provided instruction and educated for safety during transfers and gait training. As well as proper body mechanics, energy conservation, and fall prevention strategies during tasks listed above, and the effects of prolonged immobility and the importance of performing EOB/OOB activity and exercises to promote healing and reduce recovery time. '      Patient left supine with all lines intact, call button in reach, and Rn notified..    GOALS:   Multidisciplinary Problems       Physical Therapy Goals          Problem: Physical Therapy    Goal Priority Disciplines Outcome Goal Variances Interventions   Physical Therapy Goal     PT, PT/OT Ongoing, Progressing     Description: Goals to be met by: 2024     Patient will increase functional independence with mobility by performin. Supine to sit with Modified [place]  2. Sit to supine with Modified  [place]  3. Sit to stand transfer with Supervision  4. Bed to chair transfer with Supervision using LRAD  5. Gait  x 100 feet with Supervision using LRAD.                          Time Tracking:     PT Received On: 02/05/24  PT Start Time: 1012     PT Stop Time: 1045  PT Total Time (min): 33 min     Billable Minutes: Gait Training 15 and Therapeutic Activity 18    Treatment Type: Treatment  PT/PTA: PTA     Number of PTA visits since last PT visit: 2     02/05/2024

## 2024-02-05 NOTE — ASSESSMENT & PLAN NOTE
Kulwant Mueller Jr. is a 38 y.o. male with a history of MRSA bacteremia,  recent left knee septic arthritis and right thigh abscess s/p L knee arthroscopic I&D on 1/17, s/p repeat L knee I&D and right thigh abscess I&D on 1/21, admitted for infectious workup after 1 day of worsening fevers/chills. Ortho consulted for rule out septic arthritis of the left knee joint. Pertinent physical exam findings include palpable effusion of the left knee in addition to a palpable fluid collection at the lateral aspect of the right thigh. The left knee and right thigh were aspirated at the bedside - see procedure note for further details.      Now s/p arthroscopic I&D of the left knee and thigh with debridement of right thigh on 2/1/24    Pain: Multimodal   CRP downtrending, 46.3  Dvt ppx: ASA 81BID  PT/OT WBAT, work on flexion of the left knee  Abx per ID, on daptomycin and Zosyn   Drain output 20cc /24 hr; will consider dc tomorrow    Dispo: pending ID recs

## 2024-02-05 NOTE — SUBJECTIVE & OBJECTIVE
Interval History: NAEON. Patient continues on Daptomycin/Unasyn. Planning for midline placement today. Abscess and joint cultures continue with no growth. Pending dispo back to facility.    Review of Systems   Constitutional:  Negative for appetite change, chills, fatigue and fever.   HENT:  Negative for congestion, mouth sores, nosebleeds and tinnitus.    Eyes:  Negative for discharge and itching.   Respiratory:  Negative for cough, chest tightness and shortness of breath.    Cardiovascular:  Negative for chest pain and leg swelling.   Gastrointestinal:  Negative for abdominal distention and abdominal pain.   Endocrine: Negative for cold intolerance and heat intolerance.   Genitourinary:  Negative for dysuria and frequency.   Musculoskeletal:  Negative for arthralgias, joint swelling and neck pain.   Skin:  Negative for color change and pallor.   Allergic/Immunologic: Negative for environmental allergies and food allergies.   Neurological:  Negative for dizziness and light-headedness.   Hematological:  Negative for adenopathy.   Psychiatric/Behavioral:  Negative for agitation, behavioral problems and confusion.      Objective:     Vital Signs (Most Recent):  Temp: 98.7 °F (37.1 °C) (02/05/24 1100)  Pulse: 78 (02/05/24 1100)  Resp: 17 (02/05/24 1100)  BP: 137/74 (02/05/24 1100)  SpO2: 95 % (02/05/24 1100) Vital Signs (24h Range):  Temp:  [97.8 °F (36.6 °C)-98.8 °F (37.1 °C)] 98.7 °F (37.1 °C)  Pulse:  [78-87] 78  Resp:  [16-18] 17  SpO2:  [95 %-97 %] 95 %  BP: (123-137)/(72-78) 137/74     Weight: 106.7 kg (235 lb 3.7 oz)  Body mass index is 31.03 kg/m².    Intake/Output Summary (Last 24 hours) at 2/5/2024 1427  Last data filed at 2/5/2024 0930  Gross per 24 hour   Intake 600 ml   Output 2330 ml   Net -1730 ml           Physical Exam  Constitutional:       General: He is not in acute distress.     Appearance: Normal appearance. He is not ill-appearing.   HENT:      Head: Normocephalic and atraumatic.      Right Ear:  External ear normal.      Left Ear: External ear normal.      Nose: Nose normal.      Mouth/Throat:      Mouth: Mucous membranes are moist.      Pharynx: Oropharynx is clear.   Eyes:      Extraocular Movements: Extraocular movements intact.      Pupils: Pupils are equal, round, and reactive to light.   Cardiovascular:      Rate and Rhythm: Normal rate and regular rhythm.      Pulses: Normal pulses.      Heart sounds: Normal heart sounds.   Pulmonary:      Effort: Pulmonary effort is normal.      Breath sounds: Normal breath sounds.   Abdominal:      General: Abdomen is flat. There is no distension.      Palpations: Abdomen is soft.      Tenderness: There is no abdominal tenderness.   Musculoskeletal:         General: No swelling. Normal range of motion.      Cervical back: Normal range of motion and neck supple.      Comments: Dressing in place LLE, no erythema noted near edges  Able to bend L knee    Skin:     General: Skin is warm and dry.      Capillary Refill: Capillary refill takes less than 2 seconds.   Neurological:      General: No focal deficit present.      Mental Status: He is alert and oriented to person, place, and time.   Psychiatric:         Mood and Affect: Mood normal.         Behavior: Behavior normal.             Significant Labs: All pertinent labs within the past 24 hours have been reviewed.  CBC:   Recent Labs   Lab 02/04/24  0536 02/05/24  0513   WBC 10.55 10.60   HGB 7.7* 8.1*   HCT 25.5* 27.0*   * 476*       CMP:   Recent Labs   Lab 02/04/24  0536 02/05/24  0513    135*   K 4.0 3.9   CL 99 101   CO2 26 25   * 223*   BUN 6 4*   CREATININE 0.7 0.7   CALCIUM 8.5* 8.6*   ANIONGAP 11 9         Significant Imaging: I have reviewed all pertinent imaging results/findings within the past 24 hours.

## 2024-02-05 NOTE — PLAN OF CARE
Problem: Adult Inpatient Plan of Care  Goal: Plan of Care Review  Outcome: Ongoing, Progressing  Goal: Optimal Comfort and Wellbeing  Outcome: Ongoing, Progressing     Problem: Diabetes Comorbidity  Goal: Blood Glucose Level Within Targeted Range  Outcome: Ongoing, Progressing     Problem: Infection  Goal: Absence of Infection Signs and Symptoms  Outcome: Ongoing, Progressing     Problem: Impaired Wound Healing  Goal: Optimal Wound Healing  Outcome: Ongoing, Progressing     Problem: Skin Injury Risk Increased  Goal: Skin Health and Integrity  Outcome: Ongoing, Progressing     Problem: Fall Injury Risk  Goal: Absence of Fall and Fall-Related Injury  Outcome: Ongoing, Progressing

## 2024-02-05 NOTE — PLAN OF CARE
Kulwant is A&Ox4. Denies pain. Vitals stable. Tolerating diet. Strict I&Ox on patient throughout shift. Antifungal cream applied to groin. IV abx administration continued per orders. Patient repositioned as tolerated. Plan of care continued as ordered.    Problem: Adult Inpatient Plan of Care  Goal: Plan of Care Review  Outcome: Ongoing, Progressing  Goal: Patient-Specific Goal (Individualized)  Outcome: Ongoing, Progressing  Goal: Absence of Hospital-Acquired Illness or Injury  Outcome: Ongoing, Progressing  Goal: Optimal Comfort and Wellbeing  Outcome: Ongoing, Progressing  Goal: Readiness for Transition of Care  Outcome: Ongoing, Progressing     Problem: Diabetes Comorbidity  Goal: Blood Glucose Level Within Targeted Range  Outcome: Ongoing, Progressing     Problem: Infection  Goal: Absence of Infection Signs and Symptoms  Outcome: Ongoing, Progressing     Problem: Impaired Wound Healing  Goal: Optimal Wound Healing  Outcome: Ongoing, Progressing     Problem: Skin Injury Risk Increased  Goal: Skin Health and Integrity  Outcome: Ongoing, Progressing     Problem: Fall Injury Risk  Goal: Absence of Fall and Fall-Related Injury  Outcome: Ongoing, Progressing

## 2024-02-05 NOTE — PLAN OF CARE
[facility] - Med Surg ([facility]-)  Discharge Reassessment    Primary Care Provider: No, Primary Doctor    Expected Discharge Date: 2/5/2024    Reassessment (most recent)       Discharge Reassessment - 02/05/24 1308          Discharge Reassessment    Assessment Type Discharge Planning Reassessment (P)      Did the patient's condition or plan change since previous assessment? No (P)      Discharge Plan discussed with: Patient (P)      Communicated RAEANN with patient/caregiver Date not available/Unable to determine (P)      Discharge Plan A Rehab (P)      Discharge Plan B Rehab (P)      DME Needed Upon Discharge  none (P)      Transition of Care Barriers None (P)      Why the patient remains in the hospital Requires continued medical care (P)         Post-Acute Status    Post-Acute Authorization Placement (P)      Post-Acute Placement Status Referrals Sent (P)      Discharge Delays None known at this time (P)                  CM spoke with pt in room.  DC plan is to go back to [facility].  DME TBD upon d/c from [facility].  Pt states he will need ambulance transport.    ALEXSANDRA BradshawN, BS, RN, CCM

## 2024-02-05 NOTE — CONSULTS
Single lumen  4fr x 14cm  midline placed in the left basilic vein. Needle advanced into the vessel under real time ultrasound guidance. Image recorded and saved.    Max dwell date 3/5/24.  Lot number: [id number]

## 2024-02-05 NOTE — ASSESSMENT & PLAN NOTE
Patient with multiple admissions for septic joint complicated by MRSA bacteremia and status post orthopedic intervention, on daptomycin prior to presentation found to have imaging concerns of worsening right thigh abscess and new concerns for left knee osteomyelitis. Previous expected end date of daptomycin was 03/03/24.  He denies worsening pain or subjective fevers.  No leukocytosis. ESR and CRP with mild elevation from prior.  S/p L knee washout on 2/1 and I&D of R thigh abscess. Cultures pending.   BCX from 1/31 grew Acinetobacter baumanii on 2/2 - Infectious Disease c/s  Acinetobacter baumanii sensitivities return near pan-sensitive    PLAN:  - orthopedic surgery consulted; s/p washout, trending CRP  - infectious disease consulted and recommend transition to unasyn from zosyn- continue Dapto/Unasyn  - Continue PICC holiday over the weekend; will plan to place line on 2/5  - repeat blood cultures NGTD  - Removed post-op [place]  - PT/OT

## 2024-02-06 PROBLEM — R19.7 DIARRHEA: Status: RESOLVED | Noted: 2024-02-01 | Resolved: 2024-02-06

## 2024-02-06 LAB
O+P STL MICRO: NORMAL
POCT GLUCOSE: 216 MG/DL (ref 70–110)
POCT GLUCOSE: 222 MG/DL (ref 70–110)
POCT GLUCOSE: 226 MG/DL (ref 70–110)
POCT GLUCOSE: 245 MG/DL (ref 70–110)

## 2024-02-06 PROCEDURE — 63600175 PHARM REV CODE 636 W HCPCS: Performed by: EMERGENCY MEDICINE

## 2024-02-06 PROCEDURE — 97110 THERAPEUTIC EXERCISES: CPT | Mod: CQ

## 2024-02-06 PROCEDURE — 97535 SELF CARE MNGMENT TRAINING: CPT | Mod: CO

## 2024-02-06 PROCEDURE — 25000003 PHARM REV CODE 250

## 2024-02-06 PROCEDURE — 25000003 PHARM REV CODE 250: Performed by: EMERGENCY MEDICINE

## 2024-02-06 PROCEDURE — 27000207 HC ISOLATION

## 2024-02-06 PROCEDURE — 11000001 HC ACUTE MED/SURG PRIVATE ROOM

## 2024-02-06 PROCEDURE — 97116 GAIT TRAINING THERAPY: CPT | Mod: CQ

## 2024-02-06 PROCEDURE — 63600175 PHARM REV CODE 636 W HCPCS: Performed by: INTERNAL MEDICINE

## 2024-02-06 PROCEDURE — 25000003 PHARM REV CODE 250: Performed by: INTERNAL MEDICINE

## 2024-02-06 PROCEDURE — 99222 1ST HOSP IP/OBS MODERATE 55: CPT | Mod: ,,, | Performed by: NURSE PRACTITIONER

## 2024-02-06 RX ORDER — INSULIN ASPART 100 [IU]/ML
6 INJECTION, SOLUTION INTRAVENOUS; SUBCUTANEOUS
Status: DISCONTINUED | OUTPATIENT
Start: 2024-02-06 | End: 2024-02-06

## 2024-02-06 RX ORDER — INSULIN ASPART 100 [IU]/ML
5 INJECTION, SOLUTION INTRAVENOUS; SUBCUTANEOUS
Status: DISCONTINUED | OUTPATIENT
Start: 2024-02-06 | End: 2024-02-07

## 2024-02-06 RX ADMIN — DAPTOMYCIN 945 MG: 350 INJECTION, POWDER, LYOPHILIZED, FOR SOLUTION INTRAVENOUS at 12:02

## 2024-02-06 RX ADMIN — CHOLECALCIFEROL TAB 25 MCG (1000 UNIT) 1000 UNITS: 25 TAB at 09:02

## 2024-02-06 RX ADMIN — METOPROLOL SUCCINATE 12.5 MG: 25 TABLET, EXTENDED RELEASE ORAL at 09:02

## 2024-02-06 RX ADMIN — INSULIN ASPART 4 UNITS: 100 INJECTION, SOLUTION INTRAVENOUS; SUBCUTANEOUS at 01:02

## 2024-02-06 RX ADMIN — INSULIN ASPART 2 UNITS: 100 INJECTION, SOLUTION INTRAVENOUS; SUBCUTANEOUS at 08:02

## 2024-02-06 RX ADMIN — INSULIN ASPART 4 UNITS: 100 INJECTION, SOLUTION INTRAVENOUS; SUBCUTANEOUS at 09:02

## 2024-02-06 RX ADMIN — INSULIN ASPART 4 UNITS: 100 INJECTION, SOLUTION INTRAVENOUS; SUBCUTANEOUS at 06:02

## 2024-02-06 RX ADMIN — AMPICILLIN SODIUM, SULBACTAM SODIUM 3 G: 2; 1 INJECTION, POWDER, FOR SOLUTION INTRAMUSCULAR; INTRAVENOUS at 08:02

## 2024-02-06 RX ADMIN — INSULIN ASPART 5 UNITS: 100 INJECTION, SOLUTION INTRAVENOUS; SUBCUTANEOUS at 06:02

## 2024-02-06 RX ADMIN — AMPICILLIN SODIUM, SULBACTAM SODIUM 3 G: 2; 1 INJECTION, POWDER, FOR SOLUTION INTRAMUSCULAR; INTRAVENOUS at 03:02

## 2024-02-06 RX ADMIN — RIVAROXABAN 10 MG: 10 TABLET, FILM COATED ORAL at 05:02

## 2024-02-06 RX ADMIN — AMPICILLIN SODIUM, SULBACTAM SODIUM 3 G: 2; 1 INJECTION, POWDER, FOR SOLUTION INTRAMUSCULAR; INTRAVENOUS at 05:02

## 2024-02-06 RX ADMIN — AMPICILLIN SODIUM, SULBACTAM SODIUM 3 G: 2; 1 INJECTION, POWDER, FOR SOLUTION INTRAMUSCULAR; INTRAVENOUS at 09:02

## 2024-02-06 NOTE — PROGRESS NOTES
[facility] - Med Surg ([place] [address])  Orthopedics  Progress Note    Patient Name: Kulwant Mueller Jr.  MRN: [id number]  Admission Date: 1/31/2024  Hospital Length of Stay: 5 days  Attending Provider: Soniya Javier*  Primary Care Provider: Shellie, Primary Doctor  Follow-up For: Procedure(s) (LRB):  ARTHROSCOPY, KNEE, left, lateral post (Left)  IRRIGATION AND DEBRIDEMENT, LOWER EXTREMITY (Right)    Post-Operative Day: 5 Days Post-Op  Subjective:     Principal Problem:Pyogenic arthritis of left knee joint    Principal Orthopedic Problem: same as above s/p L knee arthroscopic I&D and R thigh I&D    Interval History: NAEON. VSS. Pain well controlled. CRP down to 38 yesterday. Drain output 50cc/24 hr. Pt ambulated 20 ft with PT. Cx growing acinetobacter and MRSA. ID recs daptomycin and unasyn. Picc line placed.     Review of patient's allergies indicates:   Allergen Reactions    Heparin analogues Nausea And Vomiting       Current Facility-Administered Medications   Medication    acetaminophen tablet 650 mg    ampicillin-sulbactam (UNASYN) 3 g in sodium chloride 0.9 % 100 mL IVPB (MB+)    DAPTOmycin (CUBICIN) 945 mg in sodium chloride 0.9% SolP 50 mL IVPB    dextrose 10% bolus 125 mL 125 mL    dextrose 10% bolus 250 mL 250 mL    glucagon (human recombinant) injection 1 mg    glucose chewable tablet 16 g    glucose chewable tablet 24 g    HYDROcodone-acetaminophen  mg per tablet 1 tablet    insulin aspart U-100 pen 0-10 Units    insulin aspart U-100 pen 4 Units    insulin detemir U-100 (Levemir) pen 8 Units    methocarbamoL tablet 500 mg    metoprolol succinate (TOPROL-XL) 24 hr split tablet 12.5 mg    naloxone 0.4 mg/mL injection 0.02 mg    oxyCODONE immediate release tablet 5 mg    prochlorperazine tablet 10 mg    rivaroxaban tablet 10 mg    sodium chloride 0.9% flush 10 mL    sodium chloride 0.9% flush 10 mL    vitamin D 1000 units tablet 1,000 Units     Objective:     Vital Signs (Most Recent):  Temp: 97.9  "°F (36.6 °C) (02/06/24 0417)  Pulse: 80 (02/06/24 0417)  Resp: (!) 21 (02/06/24 0417)  BP: (!) 143/79 (02/06/24 0417)  SpO2: 95 % (02/06/24 0417) Vital Signs (24h Range):  Temp:  [97.9 °F (36.6 °C)-98.7 °F (37.1 °C)] 97.9 °F (36.6 °C)  Pulse:  [78-88] 80  Resp:  [16-21] 21  SpO2:  [95 %-98 %] 95 %  BP: (122-143)/(71-79) 143/79     Weight: 106.7 kg (235 lb 3.7 oz)  Height: 6' 1" (185.4 cm)  Body mass index is 31.03 kg/m².      Intake/Output Summary (Last 24 hours) at 2/6/2024 0842  Last data filed at 2/6/2024 0600  Gross per 24 hour   Intake 1045.47 ml   Output 2300 ml   Net -1254.53 ml         Ortho/SPM Exam  LLE:   Dressing c/d/I  Able to flex/extend the knee  Cap refill <2s  Wiggles all toes   Palpable pedal pulses     RLE:   Dressing c/d/I  Mild TTP  NVI distally  MULUGETA drain in place with SS output      Significant Labs: All pertinent labs within the past 24 hours have been reviewed.    Significant Imaging: I have reviewed and interpreted all pertinent imaging results/findings.  Assessment/Plan:     * Pyogenic arthritis of left knee joint  Kulwant Menardcaro Miguel. is a 38 y.o. male with a history of MRSA bacteremia,  recent left knee septic arthritis and right thigh abscess s/p L knee arthroscopic I&D on 1/17, s/p repeat L knee I&D and right thigh abscess I&D on 1/21, admitted for infectious workup after 1 day of worsening fevers/chills. Ortho consulted for rule out septic arthritis of the left knee joint. Pertinent physical exam findings include palpable effusion of the left knee in addition to a palpable fluid collection at the lateral aspect of the right thigh. The left knee and right thigh were aspirated at the bedside - see procedure note for further details.      Now s/p arthroscopic I&D of the left knee and thigh with debridement of right thigh on 2/1/24    Pain: Multimodal   CRP downtrending, 38  Dvt ppx: ASA 81BID  PT/OT WBAT, work on flexion of the left knee  Abx per ID, on daptomycin and Zosyn   Drain " output 50cc /24 hr; ok to dc with drain    Dispo: ID recs daptomycin and unasyn, stable for dc from ortho perspective - leave drain in place, will schedule fu for drain removal          AYAD Up MD  Orthopedics  [facility] Surg ([place])

## 2024-02-06 NOTE — ASSESSMENT & PLAN NOTE
Kulwant Mueller Jr. is a 38 y.o. male with a history of MRSA bacteremia,  recent left knee septic arthritis and right thigh abscess s/p L knee arthroscopic I&D on 1/17, s/p repeat L knee I&D and right thigh abscess I&D on 1/21, admitted for infectious workup after 1 day of worsening fevers/chills. Ortho consulted for rule out septic arthritis of the left knee joint. Pertinent physical exam findings include palpable effusion of the left knee in addition to a palpable fluid collection at the lateral aspect of the right thigh. The left knee and right thigh were aspirated at the bedside - see procedure note for further details.      Now s/p arthroscopic I&D of the left knee and thigh with debridement of right thigh on 2/1/24    Pain: Multimodal   CRP downtrending, 38  Dvt ppx: ASA 81BID  PT/OT WBAT, work on flexion of the left knee  Abx per ID, on daptomycin and Zosyn   Drain output 50cc /24 hr; ok to dc with drain    Dispo: ID recs daptomycin and unasyn, stable for dc from ortho perspective - leave drain in place, will schedule fu for drain removal

## 2024-02-06 NOTE — SUBJECTIVE & OBJECTIVE
Interval History: PHUONG, no new fevers or worsening symptoms    Review of Systems   Constitutional:  Positive for fatigue. Negative for chills and fever.   HENT:  Negative for trouble swallowing.    Respiratory:  Negative for cough and shortness of breath.    Gastrointestinal:  Negative for diarrhea and vomiting.   Musculoskeletal:  Positive for arthralgias and joint swelling.   Skin:  Positive for wound.       Objective:     Vital Signs (Most Recent):  Temp:  (Refused!) (02/05/24 1600)  Pulse: 78 (02/05/24 1100)  Resp: 17 (02/05/24 1100)  BP: 137/74 (02/05/24 1100)  SpO2: 95 % (02/05/24 1100) Vital Signs (24h Range):  Temp:  [97.8 °F (36.6 °C)-98.8 °F (37.1 °C)] 98.7 °F (37.1 °C)  Pulse:  [78-87] 78  Resp:  [16-18] 17  SpO2:  [95 %-97 %] 95 %  BP: (123-137)/(72-78) 137/74     Weight: 106.7 kg (235 lb 3.7 oz)  Body mass index is 31.03 kg/m².    Estimated Creatinine Clearance: 179.8 mL/min (based on SCr of 0.7 mg/dL).     Physical Exam  Constitutional:       Appearance: Normal appearance.   HENT:      Head: Normocephalic and atraumatic.      Nose: Nose normal.      Mouth/Throat:      Mouth: Mucous membranes are moist.      Pharynx: Oropharynx is clear.   Eyes:      Conjunctiva/sclera: Conjunctivae normal.   Cardiovascular:      Rate and Rhythm: Normal rate and regular rhythm.      Pulses: Normal pulses.      Heart sounds: Normal heart sounds.   Pulmonary:      Effort: Pulmonary effort is normal.      Breath sounds: Normal breath sounds.   Abdominal:      General: Bowel sounds are normal.      Palpations: Abdomen is soft.      Tenderness: There is no guarding or rebound.   Musculoskeletal:         General: Swelling, tenderness and deformity present.      Cervical back: Neck supple.      Right lower leg: Edema present.      Left lower leg: Edema present.      Comments: R thigh abscess I&D site bandaged, drain present  L knee with adequate ROM, soft compartments   Skin:     General: Skin is warm and dry.      Coloration:  Skin is not jaundiced.      Findings: No rash.      Comments: Lines c/d/i   Neurological:      Mental Status: He is alert and oriented to person, place, and time. Mental status is at baseline.          Significant Labs:   Microbiology Results (last 7 days)       Procedure Component Value Units Date/Time    Blood culture #2 **CANNOT BE ORDERED STAT** [[id number]] Collected: 01/31/24 1538    Order Status: Completed Specimen: Blood from Peripheral, Antecubital, Right Updated: 02/05/24 1812     Blood Culture, Routine No growth after 5 days.    E. coli 0157 antigen [[id number]] Collected: 02/01/24 0131    Order Status: Completed Specimen: Stool Updated: 02/05/24 1234     Shiga Toxin 1 E.coli Negative     Shiga Toxin 2 E.coli Negative    Stool culture [[id number]] Collected: 02/01/24 0131    Order Status: Completed Specimen: Stool Updated: 02/05/24 1232     Stool Culture No Salmonella,Shigella,Vibrio,Campylobacter,Yersinia isolated.    Culture, Anaerobe [[id number]] Collected: 02/01/24 1530    Order Status: Completed Specimen: Incision site from Leg, Right Updated: 02/05/24 1103     Anaerobic Culture Culture in progress    Narrative:      Right leg - culture    Culture, Anaerobe [[id number]] Collected: 02/01/24 1530    Order Status: Completed Specimen: Incision site from Leg, Right Updated: 02/05/24 1102     Anaerobic Culture Culture in progress    Narrative:      Right leg - culture    Culture, Anaerobic [[id number]] Collected: 02/01/24 0821    Order Status: Completed Specimen: Joint Fluid from Knee, Left Updated: 02/05/24 1101     Anaerobic Culture Culture in progress    Culture, Anaerobic [[id number]] Collected: 02/01/24 0740    Order Status: Completed Specimen: Abscess from Leg, Right Updated: 02/05/24 1059     Anaerobic Culture Culture in progress    Blood culture #1 **CANNOT BE ORDERED STAT** [[id number]]  (Abnormal)  (Susceptibility) Collected: 01/31/24 1538    Order Status: Completed Specimen: Blood from Line,  PICC Right Brachial Updated: 02/05/24 1030     Blood Culture, Routine Gram stain aer bottle: Gram negative rods      Results called to and read back by:MAHDU Pal RN 02/01/2024  21:08      ACINETOBACTER BAUMANNII COMPLEX    Blood culture [[id number]] Collected: 02/03/24 0438    Order Status: Completed Specimen: Blood Updated: 02/05/24 0812     Blood Culture, Routine No Growth to date      No Growth to date      No Growth to date    Blood culture [[id number]] Collected: 02/03/24 0438    Order Status: Completed Specimen: Blood Updated: 02/05/24 0812     Blood Culture, Routine No Growth to date      No Growth to date      No Growth to date    Aerobic culture [[id number]] Collected: 02/01/24 1530    Order Status: Completed Specimen: Incision site from Leg, Right Updated: 02/05/24 0718     Aerobic Bacterial Culture No growth    Narrative:      Right leg - culture    Aerobic culture [[id number]] Collected: 02/01/24 1530    Order Status: Completed Specimen: Incision site from Leg, Right Updated: 02/05/24 0718     Aerobic Bacterial Culture No growth    Narrative:      Right leg - culture    Aerobic culture [[id number]] Collected: 02/01/24 0951    Order Status: Completed Specimen: Joint Fluid from Knee, Left Updated: 02/05/24 0718     Aerobic Bacterial Culture No growth    Aerobic culture [[id number]] Collected: 02/01/24 0740    Order Status: Completed Specimen: Abscess from Leg, Right Updated: 02/05/24 0718     Aerobic Bacterial Culture No growth    AFB Culture & Smear [[id number]] Collected: 02/01/24 1530    Order Status: Completed Specimen: Incision site from Leg, Right Updated: 02/02/24 2127     AFB Culture & Smear Culture in progress     AFB CULTURE STAIN No acid fast bacilli seen.    Narrative:      Right leg - culture    AFB Culture & Smear [[id number]] Collected: 02/01/24 1530    Order Status: Completed Specimen: Incision site from Leg, Right Updated: 02/02/24 2127     AFB Culture & Smear Culture in progress     AFB  CULTURE STAIN No acid fast bacilli seen.    Narrative:      Right leg - culture    AFB Culture & Smear [[id number]] Collected: 02/01/24 0740    Order Status: Completed Specimen: Abscess from Leg, Right Updated: 02/02/24 2127     AFB Culture & Smear Culture in progress     AFB CULTURE STAIN No acid fast bacilli seen.    AFB Culture & Smear [[id number]] Collected: 02/01/24 0821    Order Status: Completed Specimen: Joint Fluid from Knee, Left Updated: 02/02/24 2127     AFB Culture & Smear Culture in progress     AFB CULTURE STAIN No acid fast bacilli seen.    Rapid Organism ID by PCR (from Blood culture) [[id number]]  (Abnormal) Collected: 01/31/24 1538    Order Status: Completed Updated: 02/01/24 2232     Enterococcus faecalis Not Detected     Enterococcus faecium Not Detected     Listeria monocytogenes Not Detected     Staphylococcus spp. Not Detected     Staphylococcus aureus Not Detected     Staphylococcus epidermidis Not Detected     Staphylococcus lugdunensis Not Detected     Streptococcus species Not Detected     Streptococcus agalactiae Not Detected     Streptococcus pneumoniae Not Detected     Streptococcus pyogenes Not Detected     Acinetobacter calcoaceticus/baumannii complex Detected     Bacteroides fragilis Not Detected     Enterobacterales Not Detected     Enterobacter cloacae complex Not Detected     Escherichia coli Not Detected     Klebsiella aerogenes Not Detected     Klebsiella oxytoca Not Detected     Klebsiella pneumoniae group Not Detected     Proteus Not Detected     Salmonella sp Not Detected     Serratia marcescens Not Detected     Haemophilus influenzae Not Detected     Neisseria meningtidis Not Detected     Pseudomonas aeruginosa Not Detected     Stenotrophomonas maltophilia Not Detected     Candida albicans Not Detected     Candida auris Not Detected     Candida glabrata Not Detected     Candida krusei Not Detected     Candida parapsilosis Not Detected     Candida tropicalis Not Detected      Cryptococcus neoformans/gattii Not Detected     CTX-M (ESBL ) Not Detected     IMP (Carbapenem resistant) Not Detected     KPC resistance gene (Carbapenem resistant) Not Detected     mcr-1  Test Not Applicable     mec A/C  Test Not Applicable     mec A/C and MREJ (MRSA) gene Test Not Applicable     NDM (Carbapenem resistant) Not Detected     OXA-48-like (Carbapenem resistant) Test Not Applicable     van A/B (VRE gene) Test Not Applicable     VIM (Carbapenem resistant) Not Detected    Gram stain [[id number]] Collected: 02/01/24 1530    Order Status: Completed Specimen: Incision site from Leg, Right Updated: 02/01/24 1704     Gram Stain Result Few WBC's      No organisms seen    Narrative:      Right leg - culture    Gram stain [[id number]] Collected: 02/01/24 1530    Order Status: Completed Specimen: Incision site from Leg, Right Updated: 02/01/24 1702     Gram Stain Result Few WBC's      No organisms seen    Narrative:      Right leg - culture    Fungus culture [[id number]] Collected: 02/01/24 1530    Order Status: Sent Specimen: Incision site from Leg, Right Updated: 02/01/24 1546    Fungus culture [[id number]] Collected: 02/01/24 1530    Order Status: Sent Specimen: Incision site from Leg, Right Updated: 02/01/24 1544    Gram stain [[id number]] Collected: 02/01/24 0740    Order Status: Completed Specimen: Abscess from Leg, Right Updated: 02/01/24 1108     Gram Stain Result Moderate WBC's      No organisms seen    Gram stain [[id number]] Collected: 02/01/24 0821    Order Status: Completed Specimen: Joint Fluid from Knee, Left Updated: 02/01/24 1033     Gram Stain Result Moderate WBC's      No organisms seen    Clostridium difficile EIA [[id number]] Collected: 02/01/24 0131    Order Status: Completed Specimen: Stool Updated: 02/01/24 0933     C. diff Antigen Negative     C difficile Toxins A+B, EIA Negative     Comment: Testing not recommended for children <24 months old.       Fungus culture [[id number]]  Collected: 02/01/24 0821    Order Status: Sent Specimen: Joint Fluid from Knee, Left Updated: 02/01/24 0908    Fungus culture [[id number]] Collected: 02/01/24 0740    Order Status: Sent Specimen: Abscess from Leg, Right Updated: 02/01/24 0904    Stool culture [[id number]]     Order Status: Completed Specimen: Stool     Stool culture [[id number]]     Order Status: Canceled Specimen: Stool           All pertinent labs within the past 24 hours have been reviewed.    Significant Imaging: I have reviewed all pertinent imaging results/findings within the past 24 hours.

## 2024-02-06 NOTE — PLAN OF CARE
Plan of care reviewed with pt. Pt AAOx4, VS as charted. Patient on RA. Purposeful rounding for pt care and safety. No reports of NV or pain. No falls or injuries reported during this shift. Skin integrity as charted, dressings CDI. DINORA PICC placed today, tolerated well. Voids per urinal. MULUGETA drain intact and draining. PIV removed following PICC placement. Difficulties with pt being agreeable to treatments throughout shift. Safety precautions maintained during shift- bed in low position, call light in reach, SR up x2, personal belongings in reach.       Problem: Adult Inpatient Plan of Care  Goal: Plan of Care Review  Outcome: Ongoing, Progressing  Goal: Patient-Specific Goal (Individualized)  Outcome: Ongoing, Progressing  Goal: Absence of Hospital-Acquired Illness or Injury  Outcome: Ongoing, Progressing  Goal: Optimal Comfort and Wellbeing  Outcome: Ongoing, Progressing  Goal: Readiness for Transition of Care  Outcome: Ongoing, Progressing     Problem: Diabetes Comorbidity  Goal: Blood Glucose Level Within Targeted Range  Outcome: Ongoing, Progressing     Problem: Infection  Goal: Absence of Infection Signs and Symptoms  Outcome: Ongoing, Progressing     Problem: Impaired Wound Healing  Goal: Optimal Wound Healing  Outcome: Ongoing, Progressing     Problem: Skin Injury Risk Increased  Goal: Skin Health and Integrity  Outcome: Ongoing, Progressing     Problem: Fall Injury Risk  Goal: Absence of Fall and Fall-Related Injury  Outcome: Ongoing, Progressing

## 2024-02-06 NOTE — ASSESSMENT & PLAN NOTE
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 (Surgical cxs +MRSA). ROBY 1/02/24 neg. Pt required salvage therapy dapto/ceftaroline & bld cxs finally cleared 1/07, and then placed on daptomycin monotherapy to complete 4wks duration (ASYA 02/06) per Gm ID (Yana). Of note, L shoulder imaging showed subdeltoid bursitis w/ SS tear; superimposed infection could be present but no drainable fluid collections. Also, R heel without osteo but had cellulitis, myositis, tenosynovitis.     Pt was discharged (01/10) to rehab with daptomycin monotherapy; but started having new fevers (up to 103F) at rehab, and re-admitted (01/14) for workup. Blood cultures 01/14/24 NGTD. MRI T/L spine negative for osteo-discitis. Found to have persistent left knee septic arthritis and right thigh abscess and underwent L knee arthroscopic I&D on 1/17. IR drain placed 01/19 R lateral thigh abscess, cxs +MRSA.  Taken to OR 01/21 for repeat surgical I&D of L knee (#3), and R thigh abscess with new R thigh drain placed. Surgical cxs (01/21) of L knee and R thigh NGTD.     Discharged to [facility] rehab with IV daptomycin 8mg/kg Q24h. Re-presented 1/31/2024 with tachycardia, fever. MRI left knee with: Findings compatible with persistent septic joint and early changes of osteomyelitis in the femoral condyles patella and tibial spines. And MRI pelvis with: Worsening of fluid enhancement in the right myofascial planes and gluteal muscles suggesting infected subcutaneous fat and tensor fascia jonah bursa with myositis and or infection of lateral gluteal muscles on the right. Evaluated by ortho and s/p aspiration of left knee joint fluid and right thigh fluid collection. Gram stains with moderate WBC, no organisms.    Bcx 1/31 growing  Acinetobacter Baumannii in 1/4 (from PICC line). - PICC line removed  Bcx 2/3 - no growth after 48 hours     Antibiotics this admission:   Daptomycin 12/27 - present   Zosyn added 2/2/24, switched to ampicillin/sulbactam 2/5 after susceptibilities reviewed for Acinetobacter baumannii     S/p arthroscopic I&D Left knee and right thigh I&D 2/1. Cultures with no growth, drain remains in place.   Hemodynamically stable and afebrile.     Recommendations  - Ok for midline/PICC. IV Daptomycin for six total weeks and IV ampicillin/sulbactam for two total weeks, see OPAT note for details. Discussed with patient and his partner.

## 2024-02-06 NOTE — PT/OT/SLP PROGRESS
Physical Therapy Treatment    Patient Name:  Kulwant Mueller Jr.   MRN:  [id number]    Recommendations:     Discharge Recommendations: High Intensity Therapy  Discharge Equipment Recommendations: to be determined by next level of care  Barriers to discharge: None    Assessment:     Kulwant Mueller Jr. is a 38 y.o. male admitted with a medical diagnosis of Pyogenic arthritis of left knee joint.  He presents with the following impairments/functional limitations: weakness, impaired endurance, impaired self care skills, impaired functional mobility, gait instability, decreased lower extremity function, decreased safety awareness Pt tolerated treatment session well today. Patient remains appropriate for continued skilled services within the acute environment and goals remain appropriate.   .    Rehab Prognosis: Good; patient would benefit from acute skilled PT services to address these deficits and reach maximum level of function.    Recent Surgery: Procedure(s) (LRB):  ARTHROSCOPY, KNEE, left, lateral post (Left)  IRRIGATION AND DEBRIDEMENT, LOWER EXTREMITY (Right) 5 Days Post-Op    Plan:     During this hospitalization, patient to be seen 4 x/week to address the identified rehab impairments via gait training, therapeutic activities, therapeutic exercises, neuromuscular re-education and progress toward the following goals:    Plan of Care Expires:  03/04/24    Subjective     Chief Complaint: None stated   Patient/Family Comments/goals: Pt agreeable to PT   Pain/Comfort:  Pain Rating 1: 0/10      Objective:     Communicated with RN prior to session.  Patient found supine with MULUGETA drain upon PT entry to room.     General Precautions: Standard, fall, contact  Orthopedic Precautions:    Braces: N/A  Respiratory Status: Room air     Functional Mobility:  Bed Mobility:     Supine to Sit: contact guard assistance  Sit to Supine: minimum assistance for LE clearance   Transfers:     Sit to Stand from EOB x 3 trials:  contact  guard assistance with rolling walker  Sit to Stand from toilet:  [place] guard assistance with rolling walker  Pt required assistance for denice care   Gait: 30 ft CGA with RW   Pt ambulated with slow nathaniel and reported LE fatigue   Pt performed 10 repetitions of seated B LE exercises consisting of: Marching, LAQ, heel raises, and toe raises.   PROM to L LE       AM-PAC 6 CLICK MOBILITY  Turning over in bed (including adjusting bedclothes, sheets and blankets)?: 3  Sitting down on and standing up from a chair with arms (e.g., wheelchair, bedside commode, etc.): 3  Moving from lying on back to sitting on the side of the bed?: 3  Moving to and from a bed to a chair (including a wheelchair)?: 3  Need to walk in hospital room?: 3  Climbing 3-5 steps with a railing?: 2  Basic Mobility Total Score: 17       Treatment & Education:  Therapist provided instruction and educated for safety during transfers and gait training. As well as proper body mechanics, energy conservation, and fall prevention strategies during tasks listed above, and the effects of prolonged immobility and the importance of performing EOB/OOB activity and exercises to promote healing and reduce recovery time.       Patient left supine with all lines intact, call button in reach, and Rn notified..    GOALS:   Multidisciplinary Problems       Physical Therapy Goals          Problem: Physical Therapy    Goal Priority Disciplines Outcome Goal Variances Interventions   Physical Therapy Goal     PT, PT/OT Ongoing, Progressing     Description: Goals to be met by: 2024     Patient will increase functional independence with mobility by performin. Supine to sit with Modified [place]  2. Sit to supine with Modified [place]  3. Sit to stand transfer with Supervision  4. Bed to chair transfer with Supervision using LRAD  5. Gait  x 100 feet with Supervision using LRAD.                          Time Tracking:     PT Received On: 24  PT Start  Time: 1403     PT Stop Time: 1436  PT Total Time (min): 33 min     Billable Minutes: Gait Training 16 and Therapeutic Exercise 17    Treatment Type: Treatment  PT/PTA: PTA     Number of PTA visits since last PT visit: 3     02/06/2024

## 2024-02-06 NOTE — SUBJECTIVE & OBJECTIVE
"Principal Problem:Pyogenic arthritis of left knee joint    Principal Orthopedic Problem: same as above s/p L knee arthroscopic I&D and R thigh I&D    Interval History: NAEON. VSS. Pain well controlled. CRP down to 38 yesterday. Drain output 50cc/24 hr. Pt ambulated 20 ft with PT. Cx growing acinetobacter and MRSA. ID recs daptomycin and unasyn. Picc line placed.     Review of patient's allergies indicates:   Allergen Reactions    Heparin analogues Nausea And Vomiting       Current Facility-Administered Medications   Medication    acetaminophen tablet 650 mg    ampicillin-sulbactam (UNASYN) 3 g in sodium chloride 0.9 % 100 mL IVPB (MB+)    DAPTOmycin (CUBICIN) 945 mg in sodium chloride 0.9% SolP 50 mL IVPB    dextrose 10% bolus 125 mL 125 mL    dextrose 10% bolus 250 mL 250 mL    glucagon (human recombinant) injection 1 mg    glucose chewable tablet 16 g    glucose chewable tablet 24 g    HYDROcodone-acetaminophen  mg per tablet 1 tablet    insulin aspart U-100 pen 0-10 Units    insulin aspart U-100 pen 4 Units    insulin detemir U-100 (Levemir) pen 8 Units    methocarbamoL tablet 500 mg    metoprolol succinate (TOPROL-XL) 24 hr split tablet 12.5 mg    naloxone 0.4 mg/mL injection 0.02 mg    oxyCODONE immediate release tablet 5 mg    prochlorperazine tablet 10 mg    rivaroxaban tablet 10 mg    sodium chloride 0.9% flush 10 mL    sodium chloride 0.9% flush 10 mL    vitamin D 1000 units tablet 1,000 Units     Objective:     Vital Signs (Most Recent):  Temp: 97.9 °F (36.6 °C) (02/06/24 0417)  Pulse: 80 (02/06/24 0417)  Resp: (!) 21 (02/06/24 0417)  BP: (!) 143/79 (02/06/24 0417)  SpO2: 95 % (02/06/24 0417) Vital Signs (24h Range):  Temp:  [97.9 °F (36.6 °C)-98.7 °F (37.1 °C)] 97.9 °F (36.6 °C)  Pulse:  [78-88] 80  Resp:  [16-21] 21  SpO2:  [95 %-98 %] 95 %  BP: (122-143)/(71-79) 143/79     Weight: 106.7 kg (235 lb 3.7 oz)  Height: 6' 1" (185.4 cm)  Body mass index is 31.03 kg/m².      Intake/Output Summary (Last " 24 hours) at 2/6/2024 0842  Last data filed at 2/6/2024 0600  Gross per 24 hour   Intake 1045.47 ml   Output 2300 ml   Net -1254.53 ml          Ortho/SPM Exam  LLE:   Dressing c/d/I  Able to flex/extend the knee  Cap refill <2s  Wiggles all toes   Palpable pedal pulses     RLE:   Dressing c/d/I  Mild TTP  NVI distally  MULUGETA drain in place with SS output      Significant Labs: All pertinent labs within the past 24 hours have been reviewed.    Significant Imaging: I have reviewed and interpreted all pertinent imaging results/findings.

## 2024-02-06 NOTE — CARE UPDATE
"RAPID RESPONSE NURSE CHART REVIEW        Chart Reviewed: 02/06/2024, 12:53 PM    MRN: [id number]  Bed: 82759/21115 A    Dx: Pyogenic arthritis of left knee joint    Kulwant Mueller Jr. has a past medical history of Anticoagulant long-term use, COVID-19 virus infection, Diabetes mellitus, Diabetes mellitus, type 2, Hypertension, and May-Thurner syndrome.    Last VS: /77   Pulse 86   Temp 98 °F (36.7 °C) (Oral)   Resp 18   Ht 6' 1" (1.854 m)   Wt 106.7 kg (235 lb 3.7 oz)   SpO2 95%   BMI 31.03 kg/m²     24H Vital Sign Range:  Temp:  [97.9 °F (36.6 °C)-98.1 °F (36.7 °C)]   Pulse:  []   Resp:  [18-21]   BP: (122-143)/(71-79)   SpO2:  [93 %-98 %]     Level of Consciousness (AVPU): alert    Recent Labs     02/04/24  0536 02/05/24  0513   WBC 10.55 10.60   HGB 7.7* 8.1*   HCT 25.5* 27.0*   * 476*       Recent Labs     02/04/24  0536 02/05/24  0513    135*   K 4.0 3.9   CL 99 101   CO2 26 25   BUN 6 4*   CREATININE 0.7 0.7   * 223*   PHOS 3.0  --    MG 1.8  --           OXYGEN:  Flow (L/min): 6      MEWS score: 4    Charge RN, Yvrose  contacted for tachycardia. Reports current HR 80s. No additional concerns verbalized at this time. Instructed to call [phone] for further concerns or assistance.    Suly Funez RN        "

## 2024-02-06 NOTE — PROGRESS NOTES
[facility] ([place] [address])  Infectious Disease  Progress Note    Patient Name: Kulwant Mueller Jr.  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 (Surgical cxs +MRSA). ROBY 1/02/24 neg. Pt required salvage therapy dapto/ceftaroline & bld cxs finally cleared 1/07, and then placed on daptomycin monotherapy to complete 4wks duration (ASYA 02/06) per [facility] (Yana). Of note, L shoulder imaging showed subdeltoid bursitis w/ SS tear; superimposed infection could be present but no drainable fluid collections. Also, R heel without osteo but had cellulitis, myositis, tenosynovitis.     Pt was discharged (01/10) to rehab with daptomycin monotherapy; but started having new fevers (up to 103F) at rehab, and re-admitted (01/14) for workup. Blood cultures 01/14/24 NGTD. MRI T/L spine negative for osteo-discitis. Found to have persistent left knee septic arthritis and right thigh abscess and underwent L knee arthroscopic I&D on 1/17. IR drain placed 01/19 R lateral thigh abscess, cxs +MRSA.  Taken to OR 01/21 for repeat surgical I&D of L knee (#3), and R thigh abscess with new R thigh drain placed. Surgical cxs (01/21) of L knee and R thigh NGTD.     Discharged to [facility] rehab with IV daptomycin 8mg/kg Q24h. Re-presented 1/31/2024 with tachycardia, fever. MRI left knee with: Findings compatible with persistent septic joint and early changes of osteomyelitis in the femoral condyles patella and tibial spines. And MRI pelvis with: Worsening of  fluid enhancement in the right myofascial planes and gluteal muscles suggesting infected subcutaneous fat and tensor fascia jonah bursa with myositis and or infection of lateral gluteal muscles on the right. Evaluated by ortho and s/p aspiration of left knee joint fluid and right thigh fluid collection. Gram stains with moderate WBC, no organisms.    Bcx 1/31 growing Acinetobacter Baumannii in 1/4 (from PICC line). - PICC line removed  Bcx 2/3 - no growth after 48 hours     Antibiotics this admission:   Daptomycin 12/27 - present   Zosyn added 2/2/24, switched to ampicillin/sulbactam 2/5 after susceptibilities reviewed for Acinetobacter baumannii     S/p arthroscopic I&D Left knee and right thigh I&D 2/1. Cultures with no growth, drain remains in place.   Hemodynamically stable and afebrile.     Recommendations  - Ok for midline/PICC. IV Daptomycin for six total weeks and IV ampicillin/sulbactam for two total weeks, see OPAT note for details. Discussed with patient and his partner.           Anticipated Disposition: TBD    Thank you for your consult. I will sign off. Please contact us if you have any additional questions.    Ruben Garcia MD  Infectious Disease  [facility] - Med Surg ([facility]-)    Subjective:     Principal Problem:Pyogenic arthritis of left knee joint    HPI: 38 year old male with history of May-Thurer syndrome, diabetes with chronic diabetic foot wounds, recent admission for MRSA bacteremia.      MRSA bacteremia (daptomycin susceptible) noted on index admission blood cultures 12/27, complicated by left knee septic arthritis, s/p L knee arthrotomy with synovectomy on 12/29- cx w/ MRSA, s/p left shoulder arthrocentesis reportedly with no evidence of infection, ROBY 1/02/24 without vegetations, required salvage therapy with addition of ceftaroline & cleared 1/07, subsequently placed on daptomycin monotherapy and sent to rehab.     Re-admitted 1/14 for fever while at rehab + back pain, no new or  persistent bacteremia, MRI T/L spine negative for osteo-discitis.  That admission he had persistent left knee septic arthritis and right thigh abscess and underwent L knee arthroscopic I&D on 1/17, repeat L knee I&D and right thigh abscess I&D on 1/21.    MRI L Knee this admission showed large air-fluid collection, myositis (involving vastus medialis, lateralis & popliteus musculature),     MRI pelvis with R thigh  17 by 7 x 5 cm collection- s/p IR drain placed 1/19 (cx MRSA). MRI L shoulder with bursitis    Operative course:  OR 01/15 for L knee washout (cell counts c/w/ septic joint +CPPD crystals)  cx w/ NG  OR 1/21 L knee washout (#3) w/ extensive synovectomy/debridement and R thigh I&D (cx NG)    Clinically improved and discharged, ID team had seen him and plan was to complete 6 weeks of abx from last washout (01/21); ASYA 03/03/24     This admission, he is afebrile, without leukocytosis, HDS    He had subjective fever and rigors. MRI 1/31 L knee shows persistent septic joint and early osteomyelitis.   MRI pelvis shows improved fluid around left hip but worsening fluid in R myofascial planes and gluteal muscles. Daptomycin being continued. CPK WNL  Interval History: PHUONG, no new fevers or worsening symptoms    Review of Systems   Constitutional:  Positive for fatigue. Negative for chills and fever.   HENT:  Negative for trouble swallowing.    Respiratory:  Negative for cough and shortness of breath.    Gastrointestinal:  Negative for diarrhea and vomiting.   Musculoskeletal:  Positive for arthralgias and joint swelling.   Skin:  Positive for wound.       Objective:     Vital Signs (Most Recent):  Temp:  (Refused!) (02/05/24 1600)  Pulse: 78 (02/05/24 1100)  Resp: 17 (02/05/24 1100)  BP: 137/74 (02/05/24 1100)  SpO2: 95 % (02/05/24 1100) Vital Signs (24h Range):  Temp:  [97.8 °F (36.6 °C)-98.8 °F (37.1 °C)] 98.7 °F (37.1 °C)  Pulse:  [78-87] 78  Resp:  [16-18] 17  SpO2:  [95 %-97 %] 95 %  BP: (123-137)/(72-78)  137/74     Weight: 106.7 kg (235 lb 3.7 oz)  Body mass index is 31.03 kg/m².    Estimated Creatinine Clearance: 179.8 mL/min (based on SCr of 0.7 mg/dL).     Physical Exam  Constitutional:       Appearance: Normal appearance.   HENT:      Head: Normocephalic and atraumatic.      Nose: Nose normal.      Mouth/Throat:      Mouth: Mucous membranes are moist.      Pharynx: Oropharynx is clear.   Eyes:      Conjunctiva/sclera: Conjunctivae normal.   Cardiovascular:      Rate and Rhythm: Normal rate and regular rhythm.      Pulses: Normal pulses.      Heart sounds: Normal heart sounds.   Pulmonary:      Effort: Pulmonary effort is normal.      Breath sounds: Normal breath sounds.   Abdominal:      General: Bowel sounds are normal.      Palpations: Abdomen is soft.      Tenderness: There is no guarding or rebound.   Musculoskeletal:         General: Swelling, tenderness and deformity present.      Cervical back: Neck supple.      Right lower leg: Edema present.      Left lower leg: Edema present.      Comments: R thigh abscess I&D site bandaged, drain present  L knee with adequate ROM, soft compartments   Skin:     General: Skin is warm and dry.      Coloration: Skin is not jaundiced.      Findings: No rash.      Comments: Lines c/d/i   Neurological:      Mental Status: He is alert and oriented to person, place, and time. Mental status is at baseline.          Significant Labs:   Microbiology Results (last 7 days)       Procedure Component Value Units Date/Time    Blood culture #2 **CANNOT BE ORDERED STAT** [[id number]] Collected: 01/31/24 1538    Order Status: Completed Specimen: Blood from Peripheral, Antecubital, Right Updated: 02/05/24 1812     Blood Culture, Routine No growth after 5 days.    E. coli 0157 antigen [[id number]] Collected: 02/01/24 0131    Order Status: Completed Specimen: Stool Updated: 02/05/24 1234     Shiga Toxin 1 E.coli Negative     Shiga Toxin 2 E.coli Negative    Stool culture [[id number]]  Collected: 02/01/24 0131    Order Status: Completed Specimen: Stool Updated: 02/05/24 1232     Stool Culture No Salmonella,Shigella,Vibrio,Campylobacter,Yersinia isolated.    Culture, Anaerobe [[id number]] Collected: 02/01/24 1530    Order Status: Completed Specimen: Incision site from Leg, Right Updated: 02/05/24 1103     Anaerobic Culture Culture in progress    Narrative:      Right leg - culture    Culture, Anaerobe [[id number]] Collected: 02/01/24 1530    Order Status: Completed Specimen: Incision site from Leg, Right Updated: 02/05/24 1102     Anaerobic Culture Culture in progress    Narrative:      Right leg - culture    Culture, Anaerobic [[id number]] Collected: 02/01/24 0821    Order Status: Completed Specimen: Joint Fluid from Knee, Left Updated: 02/05/24 1101     Anaerobic Culture Culture in progress    Culture, Anaerobic [[id number]] Collected: 02/01/24 0740    Order Status: Completed Specimen: Abscess from Leg, Right Updated: 02/05/24 1059     Anaerobic Culture Culture in progress    Blood culture #1 **CANNOT BE ORDERED STAT** [[id number]]  (Abnormal)  (Susceptibility) Collected: 01/31/24 1538    Order Status: Completed Specimen: Blood from Line, PICC Right Brachial Updated: 02/05/24 1030     Blood Culture, Routine Gram stain aer bottle: Gram negative rods      Results called to and read back by:MADHU Pal RN 02/01/2024  21:08      ACINETOBACTER BAUMANNII COMPLEX    Blood culture [[id number]] Collected: 02/03/24 0438    Order Status: Completed Specimen: Blood Updated: 02/05/24 0812     Blood Culture, Routine No Growth to date      No Growth to date      No Growth to date    Blood culture [[id number]] Collected: 02/03/24 0438    Order Status: Completed Specimen: Blood Updated: 02/05/24 0812     Blood Culture, Routine No Growth to date      No Growth to date      No Growth to date    Aerobic culture [[id number]] Collected: 02/01/24 1530    Order Status: Completed Specimen: Incision site from Leg, Right  Updated: 02/05/24 0718     Aerobic Bacterial Culture No growth    Narrative:      Right leg - culture    Aerobic culture [[id number]] Collected: 02/01/24 1530    Order Status: Completed Specimen: Incision site from Leg, Right Updated: 02/05/24 0718     Aerobic Bacterial Culture No growth    Narrative:      Right leg - culture    Aerobic culture [[id number]] Collected: 02/01/24 0951    Order Status: Completed Specimen: Joint Fluid from Knee, Left Updated: 02/05/24 0718     Aerobic Bacterial Culture No growth    Aerobic culture [[id number]] Collected: 02/01/24 0740    Order Status: Completed Specimen: Abscess from Leg, Right Updated: 02/05/24 0718     Aerobic Bacterial Culture No growth    AFB Culture & Smear [[id number]] Collected: 02/01/24 1530    Order Status: Completed Specimen: Incision site from Leg, Right Updated: 02/02/24 2127     AFB Culture & Smear Culture in progress     AFB CULTURE STAIN No acid fast bacilli seen.    Narrative:      Right leg - culture    AFB Culture & Smear [[id number]] Collected: 02/01/24 1530    Order Status: Completed Specimen: Incision site from Leg, Right Updated: 02/02/24 2127     AFB Culture & Smear Culture in progress     AFB CULTURE STAIN No acid fast bacilli seen.    Narrative:      Right leg - culture    AFB Culture & Smear [[id number]] Collected: 02/01/24 0740    Order Status: Completed Specimen: Abscess from Leg, Right Updated: 02/02/24 2127     AFB Culture & Smear Culture in progress     AFB CULTURE STAIN No acid fast bacilli seen.    AFB Culture & Smear [[id number]] Collected: 02/01/24 0821    Order Status: Completed Specimen: Joint Fluid from Knee, Left Updated: 02/02/24 2127     AFB Culture & Smear Culture in progress     AFB CULTURE STAIN No acid fast bacilli seen.    Rapid Organism ID by PCR (from Blood culture) [[id number]]  (Abnormal) Collected: 01/31/24 1538    Order Status: Completed Updated: 02/01/24 2232     Enterococcus faecalis Not Detected     Enterococcus  faecium Not Detected     Listeria monocytogenes Not Detected     Staphylococcus spp. Not Detected     Staphylococcus aureus Not Detected     Staphylococcus epidermidis Not Detected     Staphylococcus lugdunensis Not Detected     Streptococcus species Not Detected     Streptococcus agalactiae Not Detected     Streptococcus pneumoniae Not Detected     Streptococcus pyogenes Not Detected     Acinetobacter calcoaceticus/baumannii complex Detected     Bacteroides fragilis Not Detected     Enterobacterales Not Detected     Enterobacter cloacae complex Not Detected     Escherichia coli Not Detected     Klebsiella aerogenes Not Detected     Klebsiella oxytoca Not Detected     Klebsiella pneumoniae group Not Detected     Proteus Not Detected     Salmonella sp Not Detected     Serratia marcescens Not Detected     Haemophilus influenzae Not Detected     Neisseria meningtidis Not Detected     Pseudomonas aeruginosa Not Detected     Stenotrophomonas maltophilia Not Detected     Candida albicans Not Detected     Candida auris Not Detected     Candida glabrata Not Detected     Candida krusei Not Detected     Candida parapsilosis Not Detected     Candida tropicalis Not Detected     Cryptococcus neoformans/gattii Not Detected     CTX-M (ESBL ) Not Detected     IMP (Carbapenem resistant) Not Detected     KPC resistance gene (Carbapenem resistant) Not Detected     mcr-1  Test Not Applicable     mec A/C  Test Not Applicable     mec A/C and MREJ (MRSA) gene Test Not Applicable     NDM (Carbapenem resistant) Not Detected     OXA-48-like (Carbapenem resistant) Test Not Applicable     van A/B (VRE gene) Test Not Applicable     VIM (Carbapenem resistant) Not Detected    Gram stain [[id number]] Collected: 02/01/24 1530    Order Status: Completed Specimen: Incision site from Leg, Right Updated: 02/01/24 1704     Gram Stain Result Few WBC's      No organisms seen    Narrative:      Right leg - culture    Gram stain [[id number]]  Collected: 02/01/24 1530    Order Status: Completed Specimen: Incision site from Leg, Right Updated: 02/01/24 1702     Gram Stain Result Few WBC's      No organisms seen    Narrative:      Right leg - culture    Fungus culture [[id number]] Collected: 02/01/24 1530    Order Status: Sent Specimen: Incision site from Leg, Right Updated: 02/01/24 1546    Fungus culture [[id number]] Collected: 02/01/24 1530    Order Status: Sent Specimen: Incision site from Leg, Right Updated: 02/01/24 1544    Gram stain [[id number]] Collected: 02/01/24 0740    Order Status: Completed Specimen: Abscess from Leg, Right Updated: 02/01/24 1108     Gram Stain Result Moderate WBC's      No organisms seen    Gram stain [[id number]] Collected: 02/01/24 0821    Order Status: Completed Specimen: Joint Fluid from Knee, Left Updated: 02/01/24 1033     Gram Stain Result Moderate WBC's      No organisms seen    Clostridium difficile EIA [[id number]] Collected: 02/01/24 0131    Order Status: Completed Specimen: Stool Updated: 02/01/24 0933     C. diff Antigen Negative     C difficile Toxins A+B, EIA Negative     Comment: Testing not recommended for children <24 months old.       Fungus culture [[id number]] Collected: 02/01/24 0821    Order Status: Sent Specimen: Joint Fluid from Knee, Left Updated: 02/01/24 0908    Fungus culture [[id number]] Collected: 02/01/24 0740    Order Status: Sent Specimen: Abscess from Leg, Right Updated: 02/01/24 0904    Stool culture [[id number]]     Order Status: Completed Specimen: Stool     Stool culture [[id number]]     Order Status: Canceled Specimen: Stool           All pertinent labs within the past 24 hours have been reviewed.    Significant Imaging: I have reviewed all pertinent imaging results/findings within the past 24 hours.

## 2024-02-06 NOTE — PLAN OF CARE
Problem: Diabetes Comorbidity  Goal: Blood Glucose Level Within Targeted Range  Outcome: Ongoing, Progressing     Problem: Infection  Goal: Absence of Infection Signs and Symptoms  Outcome: Ongoing, Progressing     Problem: Impaired Wound Healing  Goal: Optimal Wound Healing  Outcome: Ongoing, Progressing   Pt AAO X 4; able to express needs.  C/o pain .  IV ABX given as ordered.  MULUGETA drain output this shift was 15cc.   Safety maintained.  Bed in low position,  call  light in reach.

## 2024-02-06 NOTE — SUBJECTIVE & OBJECTIVE
Interval History: No acute events overnight, afebrile, hemodynamically stable. Denies any acute concerns. Midline placed yesterday and pending placement for rehab facility.      Objective:     Vital Signs (Most Recent):  Temp: 97.8 °F (36.6 °C) (02/06/24 1303)  Pulse: 92 (02/06/24 1303)  Resp: 18 (02/06/24 1303)  BP: (!) 141/86 (02/06/24 1303)  SpO2: 100 % (02/06/24 1303) Vital Signs (24h Range):  Temp:  [97.8 °F (36.6 °C)-98.1 °F (36.7 °C)] 97.8 °F (36.6 °C)  Pulse:  [] 92  Resp:  [18-21] 18  SpO2:  [93 %-100 %] 100 %  BP: (122-143)/(71-86) 141/86     Weight: 106.7 kg (235 lb 3.7 oz)  Body mass index is 31.03 kg/m².    Intake/Output Summary (Last 24 hours) at 2/6/2024 1342  Last data filed at 2/6/2024 0600  Gross per 24 hour   Intake 1045.47 ml   Output 1790 ml   Net -744.53 ml         Physical Exam  Constitutional:       General: He is not in acute distress.     Appearance: Normal appearance. He is not ill-appearing, toxic-appearing or diaphoretic.   HENT:      Head: Normocephalic and atraumatic.      Mouth/Throat:      Mouth: Mucous membranes are moist.   Eyes:      General: No scleral icterus.        Right eye: No discharge.         Left eye: No discharge.   Cardiovascular:      Rate and Rhythm: Normal rate and regular rhythm.      Heart sounds: Normal heart sounds. No murmur heard.  Pulmonary:      Effort: Pulmonary effort is normal. No respiratory distress.      Breath sounds: Normal breath sounds. No wheezing, rhonchi or rales.   Abdominal:      General: Abdomen is flat. There is no distension.      Palpations: Abdomen is soft.      Tenderness: There is no abdominal tenderness. There is no guarding or rebound.   Musculoskeletal:         General: No swelling. Normal range of motion.      Cervical back: No rigidity.      Comments: Dressing in place LLE   Skin:     General: Skin is warm and dry.      Capillary Refill: Capillary refill takes less than 2 seconds.   Neurological:      General: No focal  deficit present.      Mental Status: He is alert and oriented to person, place, and time.   Psychiatric:         Mood and Affect: Mood normal.         Behavior: Behavior normal.             Significant Labs: All pertinent labs within the past 24 hours have been reviewed.  LABS:  Recent Labs   Lab 02/03/24 0438 02/04/24  0536 02/05/24  0513   * 136 135*   K 3.9 4.0 3.9    99 101   CO2 28 26 25   BUN 9 6 4*   CREATININE 0.8 0.7 0.7   * 175* 223*   ANIONGAP 6* 11 9     Recent Labs   Lab 02/02/24  0352 02/03/24 0438 02/04/24  0536   MG 2.2 1.9 1.8   PHOS 3.4 2.7 3.0     Recent Labs   Lab 01/31/24  1538   AST 33   ALT 34   ALKPHOS 151*   BILITOT 0.5   ALBUMIN 1.8*     POCT Glucose:   Recent Labs   Lab 02/05/24 2006 02/06/24  0859 02/06/24  1259   POCTGLUCOSE 212* 216* 222*    Recent Labs   Lab 02/03/24 0438 02/04/24  0536 02/05/24  0513   WBC 8.28 10.55 10.60   HGB 7.2* 7.7* 8.1*   HCT 24.3* 25.5* 27.0*    455* 476*   GRAN 61.2  5.1 72.0 68.1  7.2          Micro  Blood Cultures  Lab Results   Component Value Date    LABBLOO No Growth to date 02/03/2024    LABBLOO No Growth to date 02/03/2024    LABBLOO No Growth to date 02/03/2024    LABBLOO No Growth to date 02/03/2024    LABBLOO No Growth to date 02/03/2024    LABBLOO No Growth to date 02/03/2024    LABBLOO No Growth to date 02/03/2024    LABBLOO No Growth to date 02/03/2024             Significant Imaging: I have reviewed all pertinent imaging results/findings within the past 24 hours.      Inpatient Medications:    Scheduled Meds:   ampicillin-sulbactam  3 g Intravenous Q6H    DAPTOmycin (CUBICIN) 945 mg in sodium chloride 0.9% SolP 50 mL IVPB  10 mg/kg (Adjusted) Intravenous Daily    insulin aspart U-100  4 Units Subcutaneous TIDWM    insulin detemir U-100  8 Units Subcutaneous QHS    metoprolol succinate  12.5 mg Oral Daily    rivaroxaban  10 mg Oral Daily with dinner    vitamin D  1,000 Units Oral Daily     PRN Meds:acetaminophen,  dextrose 10%, dextrose 10%, glucagon (human recombinant), glucose, glucose, HYDROcodone-acetaminophen, insulin aspart U-100, methocarbamoL, naloxone, oxyCODONE, prochlorperazine, sodium chloride 0.9%, sodium chloride 0.9%

## 2024-02-06 NOTE — PLAN OF CARE
Problem: Adult Inpatient Plan of Care  Goal: Plan of Care Review  Outcome: Ongoing, Progressing  Goal: Patient-Specific Goal (Individualized)  Outcome: Ongoing, Progressing  Goal: Absence of Hospital-Acquired Illness or Injury  Outcome: Ongoing, Progressing  Goal: Optimal Comfort and Wellbeing  Outcome: Ongoing, Progressing  Goal: Readiness for Transition of Care  Outcome: Ongoing, Progressing     Problem: Diabetes Comorbidity  Goal: Blood Glucose Level Within Targeted Range  Outcome: Ongoing, Progressing     Problem: Infection  Goal: Absence of Infection Signs and Symptoms  Outcome: Ongoing, Progressing     Problem: Impaired Wound Healing  Goal: Optimal Wound Healing  Outcome: Ongoing, Progressing     Problem: Skin Injury Risk Increased  Goal: Skin Health and Integrity  Outcome: Ongoing, Progressing     Problem: Fall Injury Risk  Goal: Absence of Fall and Fall-Related Injury  Outcome: Ongoing, Progressing   Pt AAO X 4; able to express needs.  NO c/o pain.  IV ABX given as ordered via  L upper arm PICC Flushed and locked.  MULUGETA Drain 15cc emptied and recorded on this shift.      Safety maintained.  Bed in low position,  call  light in reach.

## 2024-02-06 NOTE — ASSESSMENT & PLAN NOTE
Patient with multiple admissions for septic joint complicated by MRSA bacteremia and status post orthopedic intervention, on daptomycin prior to presentation found to have imaging concerns of worsening right thigh abscess and new concerns for left knee osteomyelitis. Previous expected end date of daptomycin was 03/03/24.  He denies worsening pain or subjective fevers.  No leukocytosis. ESR and CRP with mild elevation from prior.  S/p L knee washout on 2/1 and I&D of R thigh abscess. Cultures pending.   BCX from 1/31 grew Acinetobacter baumanii on 2/2 - Infectious Disease c/s  Acinetobacter baumanii sensitivities return near pan-sensitive    PLAN:  - ID OPAT plan for  6 total weeks of daptomycin and 2 weeks of Unasyn .  -Orthopedic surgery to followup outpatient for drain management.    - repeat blood cultures NGTD  - PT/OT

## 2024-02-06 NOTE — PLAN OF CARE
Outpatient Antibiotic Therapy Plan:    Please send referral to Facility/Infusion Pharmacy.    1) Infection: Staph aureus PJI and acinetobacter baumannii CLABSI    2) Discharge Antibiotics:     Intravenous antibiotics:  Ampicillin-sulbactam/ Unasyn 3 grams IV q 6 hours   Daptomycin 950 grams IV q 24 hours       3) Therapy Duration:  6 total weeks of daptomycin and 2 weeks of Unasyn - see exact EOT below      Estimated end date of IV antibiotics for IV Unasyn  02/17/2024    Estimated end date of IV antibiotics for Daptomycin: 03/03/2024      4) Outpatient Weekly Labs:    Order the following labs to be drawn on Mondays:   CBC  CMP   CRP  CPK (when on Daptomycin)    5) Fax Lab Results to Infectious Diseases Provider: [facility] Fax Number: [phone]    6) Outpatient Infectious Diseases Follow-up    Follow-up appointment will be arranged by the ID clinic and will be found in the patient's appointments tab.    Prior to discharge, please ensure the patient's follow-up has been scheduled.    If there is still no follow-up scheduled prior to discharge, please send an EPIC message to Ana Syemour in Infectious Diseases.

## 2024-02-06 NOTE — PT/OT/SLP PROGRESS
"Occupational Therapy   Treatment    Name: Kulwant Mueller Jr.  MRN: [id number]  Admitting Diagnosis:  Pyogenic arthritis of left knee joint  5 Days Post-Op    Recommendations:     Discharge Recommendations: High Intensity Therapy  Discharge Equipment Recommendations:  to be determined by next level of care  Barriers to discharge:       Assessment:     Kulwant Mueller Jr. is a 38 y.o. male with a medical diagnosis of Pyogenic arthritis of left knee joint.  He presents with the following performance deficits affecting function: weakness, impaired functional mobility, gait instability, impaired endurance, impaired cardiopulmonary response to activity.     Pt agreeable to therapy and tolerated session well. Pt requiring light assistance with bed mobility. Pt deferred ambulation and stating that he will ambulate with PT in afternoon. Pt states that he just wanted to go home. He sat EOB to complete g/h tasks.     Rehab Prognosis:  Good; patient would benefit from acute skilled OT services to address these deficits and reach maximum level of function.       Plan:     Patient to be seen 4 x/week to address the above listed problems via self-care/home management, therapeutic activities, therapeutic exercises  Plan of Care Expires: 03/01/24  Plan of Care Reviewed with: patient    Subjective     Patient/Family Comments/goals: "I'm ready to go home"  Pain/Comfort:  Pain Rating 1: 0/10  Pain Rating Post-Intervention 1: 0/10    Objective:     Communicated with: RN prior to session.  Patient found HOB elevated with MULUGETA drain upon OT entry to room.  A client care conference was completed by the OTR and the VAZQUEZ prior to treatment by the VAZQUEZ to discuss the patient's POC and current status.    General Precautions: Standard, fall    Orthopedic Precautions:LLE weight bearing as tolerated  Braces: N/A  Respiratory Status: Room air     Occupational Performance:     Bed Mobility:    Patient completed Scooting/Bridging with stand by " assistance  Patient completed Supine to Sit with stand by assistance  Patient completed Sit to Supine with stand by assistance     Functional Mobility/Transfers:  Deferred with no explanation, pt unmotivated    Activities of Daily Living:  Grooming: supervision seated EOB for facial care and oral hygiene      [facility] 6 Click ADL: 20    Treatment & Education:  Pt educated on OT POC and frequency during hospital stay.   Pt educated on importance of OOB activity to improve function and activity tolerance.  Addressed all patient questions/concerns within VAZQUEZ scope of practice.     Patient left HOB elevated with all lines intact, call button in reach, and RN notified    GOALS:   Multidisciplinary Problems       Occupational Therapy Goals          Problem: Occupational Therapy    Goal Priority Disciplines Outcome Interventions   Occupational Therapy Goal     OT, PT/OT Ongoing, Progressing    Description: Goals to be met by: 3/1/24 (1 month)     Patient will increase functional independence with ADLs by performing:    UE Dressing with [place].  LE Dressing with [place].  Grooming while standing at sink with Modified [place].  Toileting from toilet with Modified [place] for hygiene and clothing management.   Rolling to Bilateral with [place].   Supine to sit with [place].  Step transfer with Modified [place]  Toilet transfer to toilet with Modified [place].                         Time Tracking:     OT Date of Treatment: 02/06/24  OT Start Time: 1114  OT Stop Time: 1133  OT Total Time (min): 19 min    Billable Minutes:Self Care/Home Management 19    OT/RUFINA: RUFINA     Number of RUFINA visits since last OT visit: 1 2/6/2024

## 2024-02-06 NOTE — CONSULTS
Inpatient consult to Physical Medicine Rehab  Consult performed by: Yudy Whitmore NP  Consult ordered by: Sukh Quiros DO  Reason for consult: Assess rehab needs      .Reviewed patient history and current admission.  Rehab team following.  Full consult to follow.    CHARLENE Esparza, FNP-C  Physical Medicine & Rehabilitation   02/06/2024

## 2024-02-06 NOTE — PROGRESS NOTES
[facility] Surg ([address] Medicine  Progress Note    Patient Name: Kulwant Mueller Jr.  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 Per chart review rehab facility was concern for orthostasis and administered IVF. Rehab staff were also concerned for worsening infection, reporting diaphoresis at nighttime and thus recommended evaluation with imaging at [facility]. On interview, patient states that he feels fine and denies any complaints except for diarrhea.    On presentation he was afebrile but tachycardic to 110 with /98.  Labs showed WBC 12.3, Hgb 8.0, platelets 396, ESR 76 .7, Na 134, K+ 3.4, .     MRI of the knee showed persistent septic joint and seemingly new early changes of osteomyelitis.    MRI of the pelvis showed improved fluid around the left hip but worsening fluid enhancement in the right myofascial planes and gluteal muscles.    C difficile testing was ordered in the emergency department.  He was admitted to Hospital Medicine for orthopedic surgery and Infectious Disease evaluation.    Overview/Hospital Course:  Arrived to [facility] on 1/31 for lightheadedness, fevers, and chills, found to have L septic knee joint and abscess in lateral R thigh. Underwent joint washout and I&D on 2/1 with orthopedic surgery. BCX grew Acinetobacter baumanii in one  bottle. Infectious disease consulted, continued Daptomycin due to previous MRSA infections in joint washouts, added Zosyn for A. Baumanii, recommended PICC removal, which was completed 2/2. Patient to have PICC holiday over the weekend. Sensitivities for A. Baumanii bacteremia returned with near pan-sensitive results. Patient transitioned to Unasyn with intent to treat until 2/17/24 for A. Baumanii bacteremia. Daptomycin to be continued until 3/3/24, both per ID recommendations. Thigh abscess and L knee cultures with no growth to date currently. Inpatient insulin regimen uptitrated throughout stay due to mildly uncontrolled blood glucose. ID OPAT plan for  6 total weeks of daptomycin and 2 weeks of Unasyn . Orthopedic surgery to followup outpatient for drain management.     Interval History: No acute events overnight, afebrile, hemodynamically stable. Denies any acute concerns. Midline placed yesterday and pending placement for rehab facility.      Objective:     Vital Signs (Most Recent):  Temp: 97.8 °F (36.6 °C) (02/06/24 1303)  Pulse: 92 (02/06/24 1303)  Resp: 18 (02/06/24 1303)  BP: (!) 141/86 (02/06/24 1303)  SpO2: 100 % (02/06/24 1303) Vital Signs (24h Range):  Temp:  [97.8 °F (36.6 °C)-98.1 °F (36.7 °C)] 97.8 °F (36.6 °C)  Pulse:  [] 92  Resp:  [18-21] 18  SpO2:  [93 %-100 %] 100 %  BP: (122-143)/(71-86) 141/86     Weight: 106.7 kg (235 lb 3.7 oz)  Body mass index is 31.03 kg/m².    Intake/Output Summary (Last 24 hours) at 2/6/2024 1342  Last data filed at 2/6/2024 0600  Gross per 24 hour   Intake 1045.47 ml   Output 1790 ml   Net -744.53 ml         Physical Exam  Constitutional:       General: He is not in acute distress.     Appearance: Normal appearance. He is not ill-appearing, toxic-appearing or diaphoretic.   HENT:      Head: Normocephalic and atraumatic.      Mouth/Throat:      Mouth: Mucous membranes are moist.   Eyes:      General: No scleral icterus.        Right eye: No discharge.          Left eye: No discharge.   Cardiovascular:      Rate and Rhythm: Normal rate and regular rhythm.      Heart sounds: Normal heart sounds. No murmur heard.  Pulmonary:      Effort: Pulmonary effort is normal. No respiratory distress.      Breath sounds: Normal breath sounds. No wheezing, rhonchi or rales.   Abdominal:      General: Abdomen is flat. There is no distension.      Palpations: Abdomen is soft.      Tenderness: There is no abdominal tenderness. There is no guarding or rebound.   Musculoskeletal:         General: No swelling. Normal range of motion.      Cervical back: No rigidity.      Comments: Dressing in place LLE   Skin:     General: Skin is warm and dry.      Capillary Refill: Capillary refill takes less than 2 seconds.   Neurological:      General: No focal deficit present.      Mental Status: He is alert and oriented to person, place, and time.   Psychiatric:         Mood and Affect: Mood normal.         Behavior: Behavior normal.             Significant Labs: All pertinent labs within the past 24 hours have been reviewed.  LABS:  Recent Labs   Lab 02/03/24 0438 02/04/24  0536 02/05/24  0513   * 136 135*   K 3.9 4.0 3.9    99 101   CO2 28 26 25   BUN 9 6 4*   CREATININE 0.8 0.7 0.7   * 175* 223*   ANIONGAP 6* 11 9     Recent Labs   Lab 02/02/24  0352 02/03/24 0438 02/04/24  0536   MG 2.2 1.9 1.8   PHOS 3.4 2.7 3.0     Recent Labs   Lab 01/31/24  1538   AST 33   ALT 34   ALKPHOS 151*   BILITOT 0.5   ALBUMIN 1.8*     POCT Glucose:   Recent Labs   Lab 02/05/24 2006 02/06/24  0859 02/06/24  1259   POCTGLUCOSE 212* 216* 222*    Recent Labs   Lab 02/03/24 0438 02/04/24  0536 02/05/24  0513   WBC 8.28 10.55 10.60   HGB 7.2* 7.7* 8.1*   HCT 24.3* 25.5* 27.0*    455* 476*   GRAN 61.2  5.1 72.0 68.1  7.2          Micro  Blood Cultures  Lab Results   Component Value Date    LABBLOO No Growth to date 02/03/2024    LABBLOO No Growth to date 02/03/2024    LABBLOO No Growth to date  02/03/2024    LABBLOO No Growth to date 02/03/2024    LABBLOO No Growth to date 02/03/2024    LABBLOO No Growth to date 02/03/2024    LABBLOO No Growth to date 02/03/2024    LABBLOO No Growth to date 02/03/2024             Significant Imaging: I have reviewed all pertinent imaging results/findings within the past 24 hours.      Inpatient Medications:    Scheduled Meds:   ampicillin-sulbactam  3 g Intravenous Q6H    DAPTOmycin (CUBICIN) 945 mg in sodium chloride 0.9% SolP 50 mL IVPB  10 mg/kg (Adjusted) Intravenous Daily    insulin aspart U-100  4 Units Subcutaneous TIDWM    insulin detemir U-100  8 Units Subcutaneous QHS    metoprolol succinate  12.5 mg Oral Daily    rivaroxaban  10 mg Oral Daily with dinner    vitamin D  1,000 Units Oral Daily     PRN Meds:acetaminophen, dextrose 10%, dextrose 10%, glucagon (human recombinant), glucose, glucose, HYDROcodone-acetaminophen, insulin aspart U-100, methocarbamoL, naloxone, oxyCODONE, prochlorperazine, sodium chloride 0.9%, sodium chloride 0.9%      Assessment/Plan:      * Pyogenic arthritis of left knee joint  See Staphylococcal arthritis of left knee       Hypokalemia  Patient has hypokalemia which is Acute and currently controlled. Most recent potassium levels reviewed-   Lab Results   Component Value Date    K 3.9 02/05/2024   . Will continue potassium replacement per protocol and recheck repeat levels after replacement completed.     HFrEF (heart failure with reduced ejection fraction)  Patient with known history of HFrEF. Does not appear to be in fluid overload on clinical examination.  Most recent echocardiogram:    Left Ventricle: The left ventricle is normal in size. There is concentric remodeling. Regional wall motion abnormalities present. See diagram for wall motion findings. There is reduced systolic function. Biplane (2D) method of discs ejection fraction is 48%. There is normal diastolic function.    Right Ventricle: Normal right ventricular cavity size.  Systolic function is normal. TAPSE is 2.54 cm.    Normal left atrial size. The left atrium volume index is 21.2 mL/m2.    Normal right atrial size.    The aortic valve is structurally normal. There is normal leaflet mobility.    The mitral valve is structurally normal. There is normal leaflet mobility.    The tricuspid valve is structurally normal. There is normal leaflet mobility.    IVC/SVC: Elevated venous pressure at 15 mmHg.    Overall the study quality was technically difficult.    - assess volume status daily  - Initially held home GDMT (Jardiance, Toprol XL 12.5 QD) in the setting of possible infection and resume as appropriate  - 2/1 Initiated Toprol 12.5mg QD     Abscess of right thigh  - See staphylococcal arthritis of the left knee      Staphylococcal arthritis of left knee  Patient with multiple admissions for septic joint complicated by MRSA bacteremia and status post orthopedic intervention, on daptomycin prior to presentation found to have imaging concerns of worsening right thigh abscess and new concerns for left knee osteomyelitis. Previous expected end date of daptomycin was 03/03/24.  He denies worsening pain or subjective fevers.  No leukocytosis. ESR and CRP with mild elevation from prior.  S/p L knee washout on 2/1 and I&D of R thigh abscess. Cultures pending.   BCX from 1/31 grew Acinetobacter baumanii on 2/2 - Infectious Disease c/s  Acinetobacter baumanii sensitivities return near pan-sensitive    PLAN:  - ID OPAT plan for  6 total weeks of daptomycin and 2 weeks of Unasyn .  -Orthopedic surgery to followup outpatient for drain management.    - repeat blood cultures NGTD  - PT/OT      May-Thurner syndrome  Known history of hypercoagulable state.  Reported heparin analog allergy.    - continue home rivaroxaban  - no other need for DVT prophylaxis    Hypertension associated with diabetes  - hold home antihypertensives in the setting of possible infection      Type 2 diabetes mellitus with  complication, with long-term current use of insulin    Home Diabetes Medications per chart review              empagliflozin (JARDIANCE) 25 mg tablet Take 1 tablet (25 mg total) by mouth once daily.    glucagon (GVOKE HYPOPEN 2-PACK) 1 mg/0.2 mL AtIn Inject 1 mg into the skin as needed (SEVERE HYPOGLYCEMIA).    insulin glargine (LANTUS) 100 unit/mL injection Inject 25 Units into the skin once daily.    TRESIBA FLEXTOUCH U-200 200 unit/mL (3 mL) insulin pen Inject 56 Units into the skin once daily.            Last A1c:   Lab Results   Component Value Date    HGBA1C 7.4 (H) 12/28/2023      Recent Labs     02/04/24  2232 02/05/24  0840 02/05/24  1631 02/05/24  2006 02/06/24  0859 02/06/24  1259   POCTGLUCOSE 186* 194* 217* 212* 216* 222*         Plan:  Antihyperglycemics (From admission, onward)      Start     Stop Route Frequency Ordered    02/06/24 2100  insulin detemir U-100 (Levemir) pen 10 Units         -- SubQ Nightly 02/06/24 1345    02/06/24 1645  insulin aspart U-100 pen 5 Units         -- SubQ 3 times daily with meals 02/06/24 1347    02/03/24 1339  insulin aspart U-100 pen 0-10 Units         -- SubQ Before meals & nightly PRN 02/03/24 1240        - Diabetic diet  - POCT glucose ACHS  - Will monitor glucose results and adjust insulin regimen accordingly  - Hold oral hypoglycemic agents while patient is in the hospital.      VTE Risk Mitigation (From admission, onward)           Ordered     rivaroxaban tablet 10 mg  With dinner         01/31/24 1734                    Discharge Planning   RAEANN: 2/6/2024     Code Status: Full Code   Is the patient medically ready for discharge?: Yes    Reason for patient still in hospital (select all that apply): PT / OT recommendations and Pending disposition  Discharge Plan A: Rehab   Discharge Delays: None known at this time        Sukh Quiros DO  Department of Hospital Medicine   [facility] Surg ([place])

## 2024-02-06 NOTE — ASSESSMENT & PLAN NOTE
Home Diabetes Medications per chart review              empagliflozin (JARDIANCE) 25 mg tablet Take 1 tablet (25 mg total) by mouth once daily.    glucagon (GVOKE HYPOPEN 2-PACK) 1 mg/0.2 mL AtIn Inject 1 mg into the skin as needed (SEVERE HYPOGLYCEMIA).    insulin glargine (LANTUS) 100 unit/mL injection Inject 25 Units into the skin once daily.    TRESIBA FLEXTOUCH U-200 200 unit/mL (3 mL) insulin pen Inject 56 Units into the skin once daily.            Last A1c:   Lab Results   Component Value Date    HGBA1C 7.4 (H) 12/28/2023      Recent Labs     02/04/24  2232 02/05/24  0840 02/05/24  1631 02/05/24  2006 02/06/24  0859 02/06/24  1259   POCTGLUCOSE 186* 194* 217* 212* 216* 222*         Plan:  Antihyperglycemics (From admission, onward)      Start     Stop Route Frequency Ordered    02/06/24 2100  insulin detemir U-100 (Levemir) pen 10 Units         -- SubQ Nightly 02/06/24 1345    02/06/24 1645  insulin aspart U-100 pen 5 Units         -- SubQ 3 times daily with meals 02/06/24 1347    02/03/24 1339  insulin aspart U-100 pen 0-10 Units         -- SubQ Before meals & nightly PRN 02/03/24 1240        - Diabetic diet  - POCT glucose ACHS  - Will monitor glucose results and adjust insulin regimen accordingly  - Hold oral hypoglycemic agents while patient is in the hospital.

## 2024-02-06 NOTE — PLAN OF CARE
Problem: Infection  Goal: Absence of Infection Signs and Symptoms  Outcome: Ongoing, Progressing     Problem: Diabetes Comorbidity  Goal: Blood Glucose Level Within Targeted Range  Outcome: Ongoing, Progressing

## 2024-02-07 VITALS
HEART RATE: 82 BPM | DIASTOLIC BLOOD PRESSURE: 74 MMHG | SYSTOLIC BLOOD PRESSURE: 124 MMHG | BODY MASS INDEX: 31.18 KG/M2 | WEIGHT: 235.25 LBS | HEIGHT: 73 IN | TEMPERATURE: 99 F | OXYGEN SATURATION: 96 % | RESPIRATION RATE: 18 BRPM

## 2024-02-07 LAB
CK SERPL-CCNC: 65 U/L (ref 20–200)
POCT GLUCOSE: 121 MG/DL (ref 70–110)
POCT GLUCOSE: 184 MG/DL (ref 70–110)
POCT GLUCOSE: 187 MG/DL (ref 70–110)
POCT GLUCOSE: 219 MG/DL (ref 70–110)

## 2024-02-07 PROCEDURE — 63600175 PHARM REV CODE 636 W HCPCS: Performed by: INTERNAL MEDICINE

## 2024-02-07 PROCEDURE — 27000207 HC ISOLATION

## 2024-02-07 PROCEDURE — 25000003 PHARM REV CODE 250: Performed by: EMERGENCY MEDICINE

## 2024-02-07 PROCEDURE — 25000003 PHARM REV CODE 250: Performed by: INTERNAL MEDICINE

## 2024-02-07 PROCEDURE — 63600175 PHARM REV CODE 636 W HCPCS: Performed by: EMERGENCY MEDICINE

## 2024-02-07 PROCEDURE — 25000003 PHARM REV CODE 250

## 2024-02-07 PROCEDURE — 11000001 HC ACUTE MED/SURG PRIVATE ROOM

## 2024-02-07 PROCEDURE — 36415 COLL VENOUS BLD VENIPUNCTURE: CPT | Performed by: HOSPITALIST

## 2024-02-07 PROCEDURE — 82550 ASSAY OF CK (CPK): CPT | Performed by: HOSPITALIST

## 2024-02-07 RX ORDER — INSULIN ASPART 100 [IU]/ML
0-10 INJECTION, SOLUTION INTRAVENOUS; SUBCUTANEOUS
Status: CANCELLED | OUTPATIENT
Start: 2024-02-07

## 2024-02-07 RX ORDER — HYDROCODONE BITARTRATE AND ACETAMINOPHEN 10; 325 MG/1; MG/1
1 TABLET ORAL EVERY 6 HOURS PRN
Status: CANCELLED | OUTPATIENT
Start: 2024-02-07

## 2024-02-07 RX ORDER — INSULIN DEGLUDEC 200 U/ML
15 INJECTION, SOLUTION SUBCUTANEOUS DAILY
Qty: 1 PEN | Refills: 9
Start: 2024-02-07 | End: 2025-02-06

## 2024-02-07 RX ORDER — SODIUM CHLORIDE 0.9 % (FLUSH) 0.9 %
10 SYRINGE (ML) INJECTION EVERY 12 HOURS PRN
Status: CANCELLED | OUTPATIENT
Start: 2024-02-07

## 2024-02-07 RX ORDER — ACETAMINOPHEN 325 MG/1
650 TABLET ORAL EVERY 4 HOURS PRN
Status: CANCELLED | OUTPATIENT
Start: 2024-02-07

## 2024-02-07 RX ORDER — IBUPROFEN 200 MG
24 TABLET ORAL
Status: CANCELLED | OUTPATIENT
Start: 2024-02-07

## 2024-02-07 RX ORDER — INSULIN ASPART 100 [IU]/ML
7 INJECTION, SOLUTION INTRAVENOUS; SUBCUTANEOUS
Qty: 6.3 ML | Refills: 11
Start: 2024-02-07 | End: 2025-02-06

## 2024-02-07 RX ORDER — SODIUM CHLORIDE 0.9 % (FLUSH) 0.9 %
10 SYRINGE (ML) INJECTION
Status: CANCELLED | OUTPATIENT
Start: 2024-02-07

## 2024-02-07 RX ORDER — OXYCODONE HYDROCHLORIDE 5 MG/1
5 TABLET ORAL EVERY 4 HOURS PRN
Status: CANCELLED | OUTPATIENT
Start: 2024-02-07

## 2024-02-07 RX ORDER — GLUCAGON 1 MG
1 KIT INJECTION
Status: CANCELLED | OUTPATIENT
Start: 2024-02-07

## 2024-02-07 RX ORDER — PROCHLORPERAZINE MALEATE 10 MG
10 TABLET ORAL 3 TIMES DAILY PRN
Status: CANCELLED | OUTPATIENT
Start: 2024-02-07

## 2024-02-07 RX ORDER — NALOXONE HCL 0.4 MG/ML
0.02 VIAL (ML) INJECTION
Status: CANCELLED | OUTPATIENT
Start: 2024-02-07

## 2024-02-07 RX ORDER — INSULIN ASPART 100 [IU]/ML
7 INJECTION, SOLUTION INTRAVENOUS; SUBCUTANEOUS
Status: CANCELLED | OUTPATIENT
Start: 2024-02-07

## 2024-02-07 RX ORDER — IBUPROFEN 200 MG
16 TABLET ORAL
Status: CANCELLED | OUTPATIENT
Start: 2024-02-07

## 2024-02-07 RX ORDER — INSULIN ASPART 100 [IU]/ML
7 INJECTION, SOLUTION INTRAVENOUS; SUBCUTANEOUS
Status: DISCONTINUED | OUTPATIENT
Start: 2024-02-07 | End: 2024-02-08 | Stop reason: HOSPADM

## 2024-02-07 RX ORDER — METHOCARBAMOL 500 MG/1
500 TABLET, FILM COATED ORAL 4 TIMES DAILY PRN
Status: CANCELLED | OUTPATIENT
Start: 2024-02-07

## 2024-02-07 RX ORDER — CHOLECALCIFEROL (VITAMIN D3) 25 MCG
1000 TABLET ORAL DAILY
Status: CANCELLED | OUTPATIENT
Start: 2024-02-08

## 2024-02-07 RX ADMIN — INSULIN ASPART 2 UNITS: 100 INJECTION, SOLUTION INTRAVENOUS; SUBCUTANEOUS at 05:02

## 2024-02-07 RX ADMIN — AMPICILLIN SODIUM, SULBACTAM SODIUM 3 G: 2; 1 INJECTION, POWDER, FOR SOLUTION INTRAMUSCULAR; INTRAVENOUS at 03:02

## 2024-02-07 RX ADMIN — DAPTOMYCIN 945 MG: 350 INJECTION, POWDER, LYOPHILIZED, FOR SOLUTION INTRAVENOUS at 01:02

## 2024-02-07 RX ADMIN — CHOLECALCIFEROL TAB 25 MCG (1000 UNIT) 1000 UNITS: 25 TAB at 09:02

## 2024-02-07 RX ADMIN — AMPICILLIN SODIUM, SULBACTAM SODIUM 3 G: 2; 1 INJECTION, POWDER, FOR SOLUTION INTRAMUSCULAR; INTRAVENOUS at 09:02

## 2024-02-07 RX ADMIN — INSULIN ASPART 4 UNITS: 100 INJECTION, SOLUTION INTRAVENOUS; SUBCUTANEOUS at 09:02

## 2024-02-07 RX ADMIN — INSULIN ASPART 2 UNITS: 100 INJECTION, SOLUTION INTRAVENOUS; SUBCUTANEOUS at 01:02

## 2024-02-07 RX ADMIN — INSULIN ASPART 7 UNITS: 100 INJECTION, SOLUTION INTRAVENOUS; SUBCUTANEOUS at 01:02

## 2024-02-07 RX ADMIN — INSULIN ASPART 7 UNITS: 100 INJECTION, SOLUTION INTRAVENOUS; SUBCUTANEOUS at 05:02

## 2024-02-07 RX ADMIN — RIVAROXABAN 10 MG: 10 TABLET, FILM COATED ORAL at 05:02

## 2024-02-07 RX ADMIN — METOPROLOL SUCCINATE 12.5 MG: 25 TABLET, EXTENDED RELEASE ORAL at 09:02

## 2024-02-07 RX ADMIN — INSULIN ASPART 7 UNITS: 100 INJECTION, SOLUTION INTRAVENOUS; SUBCUTANEOUS at 09:02

## 2024-02-07 NOTE — PROGRESS NOTES
[facility] ([address] Medicine  Progress Note    Patient Name: Kulwant Mueller Jr.  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 Per chart review rehab facility was concern for orthostasis and administered IVF. Rehab staff were also concerned for worsening infection, reporting diaphoresis at nighttime and thus recommended evaluation with imaging at [facility]. On interview, patient states that he feels fine and denies any complaints except for diarrhea.    On presentation he was afebrile but tachycardic to 110 with /98.  Labs showed WBC 12.3, Hgb 8.0, platelets 396, ESR 76 .7, Na 134, K+ 3.4, .     MRI of the knee showed persistent septic joint and seemingly new early changes of osteomyelitis.    MRI of the pelvis showed improved fluid around the left hip but worsening fluid enhancement in the right myofascial planes and gluteal muscles.    C difficile testing was ordered in the emergency department.  He was admitted to Hospital Medicine for orthopedic surgery and Infectious Disease evaluation.    Overview/Hospital Course:  Arrived to [facility] on 1/31 for lightheadedness, fevers, and chills, found to have L septic knee joint and abscess in lateral R thigh. Underwent joint washout and I&D on 2/1 with orthopedic surgery. BCX grew Acinetobacter baumanii in one  bottle. Infectious disease consulted, continued Daptomycin due to previous MRSA infections in joint washouts, added Zosyn for A. Baumanii, recommended PICC removal, which was completed 2/2. Patient to have PICC holiday over the weekend. Sensitivities for A. Baumanii bacteremia returned with near pan-sensitive results. Patient transitioned to Unasyn with intent to treat until 2/17/24 for A. Baumanii bacteremia. Daptomycin to be continued until 3/3/24, both per ID recommendations. Thigh abscess and L knee cultures with no growth to date currently. Inpatient insulin regimen uptitrated throughout stay due to mildly uncontrolled blood glucose. ID OPAT plan for  6 total weeks of daptomycin and 2 weeks of Unasyn . Orthopedic surgery to followup outpatient for drain management. Pending dispo to [facility].    Interval History: No acute events overnight, afebrile, hemodynamically stable. Denies any acute concerns. Continues on IV Dapto/Unasyn. Planning for discharge to [facility] today pending bed availability.    Objective:     Vital Signs (Most Recent):  Temp: 99.4 °F (37.4 °C) (02/07/24 1535)  Pulse: 89 (02/07/24 1535)  Resp: 17 (02/07/24 1535)  BP: 129/72 (02/07/24 1535)  SpO2: 99 % (02/07/24 1535) Vital Signs (24h Range):  Temp:  [97.6 °F (36.4 °C)-99.4 °F (37.4 °C)] 99.4 °F (37.4 °C)  Pulse:  [80-89] 89  Resp:  [17-18] 17  SpO2:  [95 %-100 %] 99 %  BP: (127-164)/(68-95) 129/72     Weight: 106.7 kg (235 lb 3.7 oz)  Body mass index is 31.03 kg/m².    Intake/Output Summary (Last 24 hours) at 2/7/2024 1552  Last data filed at 2/7/2024 1319  Gross per 24 hour   Intake 780 ml   Output 1920 ml   Net -1140 ml           Physical Exam  Constitutional:       General: He is not in acute distress.     Appearance: Normal appearance. He is not ill-appearing, toxic-appearing or diaphoretic.   HENT:      Head: Normocephalic and atraumatic.      Mouth/Throat:      Mouth: Mucous membranes are moist.   Eyes:      General: No scleral icterus.    "     Right eye: No discharge.         Left eye: No discharge.   Cardiovascular:      Rate and Rhythm: Normal rate and regular rhythm.      Heart sounds: Normal heart sounds. No murmur heard.  Pulmonary:      Effort: Pulmonary effort is normal. No respiratory distress.      Breath sounds: Normal breath sounds. No wheezing, rhonchi or rales.   Abdominal:      General: Abdomen is flat. There is no distension.      Palpations: Abdomen is soft.      Tenderness: There is no abdominal tenderness. There is no guarding or rebound.   Musculoskeletal:         General: No swelling. Normal range of motion.      Cervical back: No rigidity.      Comments: Dressing in place, clean, dry on B/L LEs   Skin:     General: Skin is warm and dry.      Capillary Refill: Capillary refill takes less than 2 seconds.   Neurological:      General: No focal deficit present.      Mental Status: He is alert and oriented to person, place, and time.   Psychiatric:         Mood and Affect: Mood normal.         Behavior: Behavior normal.             Significant Labs: All pertinent labs within the past 24 hours have been reviewed.  LABS:  Recent Labs   Lab 02/03/24 0438 02/04/24 0536 02/05/24  0513   * 136 135*   K 3.9 4.0 3.9    99 101   CO2 28 26 25   BUN 9 6 4*   CREATININE 0.8 0.7 0.7   * 175* 223*   ANIONGAP 6* 11 9       Recent Labs   Lab 02/02/24 0352 02/03/24 0438 02/04/24  0536   MG 2.2 1.9 1.8   PHOS 3.4 2.7 3.0       No results for input(s): "AST", "ALT", "ALKPHOS", "BILITOT", "BILIDIR", "ALBUMIN" in the last 168 hours.    POCT Glucose:   Recent Labs   Lab 02/06/24 2005 02/07/24  0717 02/07/24  1304   POCTGLUCOSE 245* 219* 184*      Recent Labs   Lab 02/03/24 0438 02/04/24  0536 02/05/24  0513   WBC 8.28 10.55 10.60   HGB 7.2* 7.7* 8.1*   HCT 24.3* 25.5* 27.0*    455* 476*   GRAN 61.2  5.1 72.0 68.1  7.2            Micro  Blood Cultures  Lab Results   Component Value Date    LABBLOO No Growth to date " 02/03/2024    LABBLOO No Growth to date 02/03/2024    LABBLOO No Growth to date 02/03/2024    LABBLOO No Growth to date 02/03/2024    LABBLOO No Growth to date 02/03/2024    LABBLOO No Growth to date 02/03/2024    LABBLOO No Growth to date 02/03/2024    LABBLOO No Growth to date 02/03/2024    LABBLOO No Growth to date 02/03/2024    LABBLOO No Growth to date 02/03/2024             Significant Imaging: I have reviewed all pertinent imaging results/findings within the past 24 hours.      Inpatient Medications:    Scheduled Meds:   ampicillin-sulbactam  3 g Intravenous Q6H    DAPTOmycin (CUBICIN) 945 mg in sodium chloride 0.9% SolP 50 mL IVPB  10 mg/kg (Adjusted) Intravenous Daily    insulin aspart U-100  7 Units Subcutaneous TIDWM    insulin detemir U-100  13 Units Subcutaneous QHS    metoprolol succinate  12.5 mg Oral Daily    rivaroxaban  10 mg Oral Daily with dinner    vitamin D  1,000 Units Oral Daily     PRN Meds:acetaminophen, dextrose 10%, dextrose 10%, glucagon (human recombinant), glucose, glucose, HYDROcodone-acetaminophen, insulin aspart U-100, methocarbamoL, naloxone, oxyCODONE, prochlorperazine, sodium chloride 0.9%, sodium chloride 0.9%    Review of Systems   Constitutional:  Negative for appetite change, chills, fatigue and fever.   HENT:  Negative for congestion, mouth sores, nosebleeds and tinnitus.    Eyes:  Negative for discharge and itching.   Respiratory:  Negative for cough, chest tightness and shortness of breath.    Cardiovascular:  Negative for chest pain and leg swelling.   Gastrointestinal:  Negative for abdominal distention and abdominal pain.   Endocrine: Negative for cold intolerance and heat intolerance.   Genitourinary:  Negative for dysuria and frequency.   Musculoskeletal:  Negative for arthralgias, joint swelling and neck pain.   Skin:  Negative for color change and pallor.   Allergic/Immunologic: Negative for environmental allergies and food allergies.   Neurological:  Negative for  dizziness and light-headedness.   Hematological:  Negative for adenopathy.   Psychiatric/Behavioral:  Negative for agitation, behavioral problems and confusion.        Assessment/Plan:      * Pyogenic arthritis of left knee joint  See Staphylococcal arthritis of left knee       Hypokalemia  Patient has hypokalemia which is Acute and currently controlled. Most recent potassium levels reviewed-   Lab Results   Component Value Date    K 3.9 02/05/2024   . Will continue potassium replacement per protocol and recheck repeat levels after replacement completed.     HFrEF (heart failure with reduced ejection fraction)  Patient with known history of HFrEF. Does not appear to be in fluid overload on clinical examination.  Most recent echocardiogram:    Left Ventricle: The left ventricle is normal in size. There is concentric remodeling. Regional wall motion abnormalities present. See diagram for wall motion findings. There is reduced systolic function. Biplane (2D) method of discs ejection fraction is 48%. There is normal diastolic function.    Right Ventricle: Normal right ventricular cavity size. Systolic function is normal. TAPSE is 2.54 cm.    Normal left atrial size. The left atrium volume index is 21.2 mL/m2.    Normal right atrial size.    The aortic valve is structurally normal. There is normal leaflet mobility.    The mitral valve is structurally normal. There is normal leaflet mobility.    The tricuspid valve is structurally normal. There is normal leaflet mobility.    IVC/SVC: Elevated venous pressure at 15 mmHg.    Overall the study quality was technically difficult.    - assess volume status daily  - Initially held home GDMT (Jardiance, Toprol XL 12.5 QD) in the setting of possible infection and resume as appropriate  - 2/1 Initiated Toprol 12.5mg QD     Abscess of right thigh  - See staphylococcal arthritis of the left knee      Staphylococcal arthritis of left knee  Patient with multiple admissions for septic  joint complicated by MRSA bacteremia and status post orthopedic intervention, on daptomycin prior to presentation found to have imaging concerns of worsening right thigh abscess and new concerns for left knee osteomyelitis. Previous expected end date of daptomycin was 03/03/24.  He denies worsening pain or subjective fevers.  No leukocytosis. ESR and CRP with mild elevation from prior.  S/p L knee washout on 2/1 and I&D of R thigh abscess. Cultures pending.   BCX from 1/31 grew Acinetobacter baumanii on 2/2 - Infectious Disease c/s  Acinetobacter baumanii sensitivities return near pan-sensitive    PLAN:  - ID OPAT plan for  6 total weeks of daptomycin and 2 weeks of Unasyn .  -Orthopedic surgery to followup outpatient for drain management.    - repeat blood cultures NGTD  - PT/OT      May-Thurner syndrome  Known history of hypercoagulable state.  Reported heparin analog allergy.    - continue home rivaroxaban  - no other need for DVT prophylaxis    Hypertension associated with diabetes  - hold home antihypertensives in the setting of possible infection      Type 2 diabetes mellitus with complication, with long-term current use of insulin    Home Diabetes Medications per chart review              empagliflozin (JARDIANCE) 25 mg tablet Take 1 tablet (25 mg total) by mouth once daily.    glucagon (GVOKE HYPOPEN 2-PACK) 1 mg/0.2 mL AtIn Inject 1 mg into the skin as needed (SEVERE HYPOGLYCEMIA).    insulin glargine (LANTUS) 100 unit/mL injection Inject 25 Units into the skin once daily.    TRESIBA FLEXTOUCH U-200 200 unit/mL (3 mL) insulin pen Inject 56 Units into the skin once daily.            Last A1c:   Lab Results   Component Value Date    HGBA1C 7.4 (H) 12/28/2023      Recent Labs     02/06/24  0859 02/06/24  1259 02/06/24  1715 02/06/24  2005 02/07/24  0717 02/07/24  1304   POCTGLUCOSE 216* 222* 226* 245* 219* 184*           Plan:  Antihyperglycemics (From admission, onward)      Start     Stop Route Frequency  Ordered    02/07/24 2100  insulin detemir U-100 (Levemir) pen 13 Units         -- SubQ Nightly 02/07/24 0729    02/07/24 1130  insulin aspart U-100 pen 7 Units         -- SubQ 3 times daily with meals 02/07/24 0729    02/03/24 1339  insulin aspart U-100 pen 0-10 Units         -- SubQ Before meals & nightly PRN 02/03/24 1240        - Diabetic diet  - POCT glucose ACHS  - Will monitor glucose results and adjust insulin regimen accordingly  - Hold oral hypoglycemic agents while patient is in the hospital.      VTE Risk Mitigation (From admission, onward)           Ordered     rivaroxaban tablet 10 mg  With dinner         01/31/24 1734                    Discharge Planning   RAEANN: 2/7/2024     Code Status: Full Code   Is the patient medically ready for discharge?: Yes    Reason for patient still in hospital (select all that apply): Patient trending condition, Treatment, Consult recommendations, and Pending disposition  Discharge Plan A: Rehab   Discharge Delays: None known at this time              Aquiles Lewis MD  Department of Hospital Medicine   [facility] - Med Surg ([place]

## 2024-02-07 NOTE — ASSESSMENT & PLAN NOTE
- s/p arthroscopic I&D of thigh with debridement of right thigh on 2/1/24.   - MULUGETA drain will remain in place and fu outpt 2/15

## 2024-02-07 NOTE — NURSING
Attempted to call report to facility, house supervisor told this nurse that there is a process, and paperwork has not been received or reviewed therefore no nurse has been assigned, and I cannot give report at this time. MD JORY aware.

## 2024-02-07 NOTE — PLAN OF CARE
On Call CM asked to take transport out of will call and set up for 6:30 PM per Ahsan at [facility].  CM phoned the [facility] and spoke with Oseas.  Per Oseas, he will order transport with an ETA~6:30 PM.    Discharge Plan A and Plan B have been determined by review of patient's clinical status, future medical and therapeutic needs, and coverage/benefits for post-acute care in coordination with multidisciplinary team members.      Guadalupe Benitez RN, CCRN-K, [facility]  Neuro-Critical Care   [phone]

## 2024-02-07 NOTE — PLAN OF CARE
CM ordered w/c van transport for 3 PM.    CM spoke with pt in room, instructed in d/c process, instructed that w/c van transport set up for 3 PM.  Pt v/u.    ALEXSANDRA BradshawN, BS, RN, CCM

## 2024-02-07 NOTE — SUBJECTIVE & OBJECTIVE
Interval History: No acute events overnight, afebrile, hemodynamically stable. Denies any acute concerns. Continues on IV Dapto/Unasyn. Planning for discharge to [facility] today pending bed availability.    Objective:     Vital Signs (Most Recent):  Temp: 99.4 °F (37.4 °C) (02/07/24 1535)  Pulse: 89 (02/07/24 1535)  Resp: 17 (02/07/24 1535)  BP: 129/72 (02/07/24 1535)  SpO2: 99 % (02/07/24 1535) Vital Signs (24h Range):  Temp:  [97.6 °F (36.4 °C)-99.4 °F (37.4 °C)] 99.4 °F (37.4 °C)  Pulse:  [80-89] 89  Resp:  [17-18] 17  SpO2:  [95 %-100 %] 99 %  BP: (127-164)/(68-95) 129/72     Weight: 106.7 kg (235 lb 3.7 oz)  Body mass index is 31.03 kg/m².    Intake/Output Summary (Last 24 hours) at 2/7/2024 1552  Last data filed at 2/7/2024 1319  Gross per 24 hour   Intake 780 ml   Output 1920 ml   Net -1140 ml           Physical Exam  Constitutional:       General: He is not in acute distress.     Appearance: Normal appearance. He is not ill-appearing, toxic-appearing or diaphoretic.   HENT:      Head: Normocephalic and atraumatic.      Mouth/Throat:      Mouth: Mucous membranes are moist.   Eyes:      General: No scleral icterus.        Right eye: No discharge.         Left eye: No discharge.   Cardiovascular:      Rate and Rhythm: Normal rate and regular rhythm.      Heart sounds: Normal heart sounds. No murmur heard.  Pulmonary:      Effort: Pulmonary effort is normal. No respiratory distress.      Breath sounds: Normal breath sounds. No wheezing, rhonchi or rales.   Abdominal:      General: Abdomen is flat. There is no distension.      Palpations: Abdomen is soft.      Tenderness: There is no abdominal tenderness. There is no guarding or rebound.   Musculoskeletal:         General: No swelling. Normal range of motion.      Cervical back: No rigidity.      Comments: Dressing in place, clean, dry on B/L LEs   Skin:     General: Skin is warm and dry.      Capillary Refill: Capillary refill takes less than 2 seconds.  "  Neurological:      General: No focal deficit present.      Mental Status: He is alert and oriented to person, place, and time.   Psychiatric:         Mood and Affect: Mood normal.         Behavior: Behavior normal.             Significant Labs: All pertinent labs within the past 24 hours have been reviewed.  LABS:  Recent Labs   Lab 02/03/24 0438 02/04/24 0536 02/05/24  0513   * 136 135*   K 3.9 4.0 3.9    99 101   CO2 28 26 25   BUN 9 6 4*   CREATININE 0.8 0.7 0.7   * 175* 223*   ANIONGAP 6* 11 9       Recent Labs   Lab 02/02/24 0352 02/03/24 0438 02/04/24  0536   MG 2.2 1.9 1.8   PHOS 3.4 2.7 3.0       No results for input(s): "AST", "ALT", "ALKPHOS", "BILITOT", "BILIDIR", "ALBUMIN" in the last 168 hours.    POCT Glucose:   Recent Labs   Lab 02/06/24 2005 02/07/24 0717 02/07/24  1304   POCTGLUCOSE 245* 219* 184*      Recent Labs   Lab 02/03/24 0438 02/04/24 0536 02/05/24  0513   WBC 8.28 10.55 10.60   HGB 7.2* 7.7* 8.1*   HCT 24.3* 25.5* 27.0*    455* 476*   GRAN 61.2  5.1 72.0 68.1  7.2            Micro  Blood Cultures  Lab Results   Component Value Date    LABBLOO No Growth to date 02/03/2024    LABBLOO No Growth to date 02/03/2024    LABBLOO No Growth to date 02/03/2024    LABBLOO No Growth to date 02/03/2024    LABBLOO No Growth to date 02/03/2024    LABBLOO No Growth to date 02/03/2024    LABBLOO No Growth to date 02/03/2024    LABBLOO No Growth to date 02/03/2024    LABBLOO No Growth to date 02/03/2024    LABBLOO No Growth to date 02/03/2024             Significant Imaging: I have reviewed all pertinent imaging results/findings within the past 24 hours.      Inpatient Medications:    Scheduled Meds:   ampicillin-sulbactam  3 g Intravenous Q6H    DAPTOmycin (CUBICIN) 945 mg in sodium chloride 0.9% SolP 50 mL IVPB  10 mg/kg (Adjusted) Intravenous Daily    insulin aspart U-100  7 Units Subcutaneous TIDWM    insulin detemir U-100  13 Units Subcutaneous QHS    metoprolol " succinate  12.5 mg Oral Daily    rivaroxaban  10 mg Oral Daily with dinner    vitamin D  1,000 Units Oral Daily     PRN Meds:acetaminophen, dextrose 10%, dextrose 10%, glucagon (human recombinant), glucose, glucose, HYDROcodone-acetaminophen, insulin aspart U-100, methocarbamoL, naloxone, oxyCODONE, prochlorperazine, sodium chloride 0.9%, sodium chloride 0.9%    Review of Systems   Constitutional:  Negative for appetite change, chills, fatigue and fever.   HENT:  Negative for congestion, mouth sores, nosebleeds and tinnitus.    Eyes:  Negative for discharge and itching.   Respiratory:  Negative for cough, chest tightness and shortness of breath.    Cardiovascular:  Negative for chest pain and leg swelling.   Gastrointestinal:  Negative for abdominal distention and abdominal pain.   Endocrine: Negative for cold intolerance and heat intolerance.   Genitourinary:  Negative for dysuria and frequency.   Musculoskeletal:  Negative for arthralgias, joint swelling and neck pain.   Skin:  Negative for color change and pallor.   Allergic/Immunologic: Negative for environmental allergies and food allergies.   Neurological:  Negative for dizziness and light-headedness.   Hematological:  Negative for adenopathy.   Psychiatric/Behavioral:  Negative for agitation, behavioral problems and confusion.

## 2024-02-07 NOTE — CONSULTS
Alfredo delaney - [facility] ([place] [address])  Physical Medicine & Rehab  Consult Note    Patient Name: Kulwant Mueller Jr.  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 Recently admitted to [facility] on 12/27/23 for nausea, vomiting, diarrhea, and generalized weakness x 6 days. Upon arrival, found to be in DKA. Hospital course complicated by MRSA bacteremia from L knee septic arthritis (S/p L knee arthrotomy with synovectomy on 12/29. Bcx with staph aureus),  L shoulder pain (s/p aspiration. ID was consulted and recommending IV Daptomycin x 6 weeks and Added Ceftaroline 1/4/2024), R heel wound (MRI foot with no evidence of osteomyelitis), and R UE DVT (continues on Xarelto). Discharged to [facility] on 1/10/24 and [place] [facility] [place]24 for fever. Taken to OR and s/p L knee arthroscopic I&D on 1/17, s/p repeat L knee I&D and right thigh abscess I&D on 1/21. Discharged to [facility] and readmitted back to [facility] [place] 1/31/24 for fever. Now s/p arthroscopic I&D of the left knee and thigh with debridement of right thigh on 2/1/24. ID consulted and recommending Unasyn end date 2/17/24 and Dapto end date 3/3/24.     Functional History: Patient lives with spouse in a single story home and was I with no use of DME prior to last hospitalization.     Hospital Course:   2/5/24: participated w/ PT. ait: ~ 20 ft CGA/SBA with RW. Pt ambulated with slow nathaniel     Past Medical History:   Diagnosis Date     Anticoagulant long-term use     COVID-19 virus infection     Diabetes mellitus     Diabetes mellitus, type 2     Hypertension     May-Thurner syndrome      Past Surgical History:   Procedure Laterality Date    APPENDECTOMY      ARTHROSCOPY OF KNEE Left 1/16/2024    Procedure: ARTHROSCOPY, KNEE, LEFT;  Surgeon: Mandeep Gatica MD;  Location: [address];  Service: Orthopedics;  Laterality: Left;    ARTHROSCOPY OF KNEE Left 1/21/2024    Procedure: ARTHROSCOPY, KNEE, left, supine, slider, lateral post, conmed,;  Surgeon: Derick Sloan MD;  Location: [address];  Service: Orthopedics;  Laterality: Left;    ARTHROSCOPY OF KNEE Left 2/1/2024    Procedure: ARTHROSCOPY, KNEE, left, lateral post;  Surgeon: Mandeep Gatica MD;  Location: [address];  Service: Orthopedics;  Laterality: Left;    ARTHROTOMY OF KNEE Left 12/29/2023    Procedure: ARTHROTOMY, KNEE;  Surgeon: Edy Bocanegra Jr., MD;  Location: [facility];  Service: Orthopedics;  Laterality: Left;    ESOPHAGOGASTRODUODENOSCOPY N/A 1/31/2022    Procedure: EGD (ESOPHAGOGASTRODUODENOSCOPY);  Surgeon: Cindy Quiros MD;  Location: [facility];  Service: Endoscopy;  Laterality: N/A;    ESOPHAGOGASTRODUODENOSCOPY N/A 3/21/2022    Procedure: EGD (ESOPHAGOGASTRODUODENOSCOPY);  Surgeon: Tejas Mann MD;  Location: [facility];  Service: Endoscopy;  Laterality: N/A;    INCISION AND DRAINAGE FOOT Left 11/28/2021    Procedure: INCISION AND DRAINAGE, FOOT;  Surgeon: Bj Alicea DPM;  Location: [facility];  Service: Podiatry;  Laterality: Left;    INCISION AND DRAINAGE OF THIGH Right 1/21/2024    Procedure: INCISION AND DRAINAGE, THIGH - right,;  Surgeon: Derick Sloan MD;  Location: [address];  Service: Orthopedics;  Laterality: Right;  right, lateral, slider, cysto tubing, 6L NS, betadine, peroxide, vanc powder, 10 [place] channel winter drain    IRRIGATION AND DEBRIDEMENT OF LOWER EXTREMITY Right 1/21/2024    Procedure: I&D  lateral thigh abscess, right, supine, slider, cysto tubing, 6L NS, betadine, peroxide, vanc powder, 10 [place] channel winter drain,;  Surgeon: Derick Sloan MD;  Location: [facility] OR 2ND FLR;  Service: Orthopedics;  Laterality: Right;    IRRIGATION AND DEBRIDEMENT OF LOWER EXTREMITY Right 2/1/2024    Procedure: IRRIGATION AND DEBRIDEMENT, LOWER EXTREMITY;  Surgeon: Mandeep Gatica MD;  Location: [facility] OR 2ND FLR;  Service: Orthopedics;  Laterality: Right;  started at 1510 pm incision    KNEE ARTHROSCOPY W/ DEBRIDEMENT Left 1/21/2024    Procedure: ARTHROSCOPY, KNEE, WITH DEBRIDEMENT - LEFT, SUPINE, SLIDER, LATERAL POST, CONMED;  Surgeon: Derick Sloan MD;  Location: [facility] OR 2ND FLR;  Service: Orthopedics;  Laterality: Left;    stents in bilateral legs      TRANSESOPHAGEAL ECHOCARDIOGRAPHY N/A 1/3/2024    Procedure: ECHOCARDIOGRAM, TRANSESOPHAGEAL;  Surgeon: Douglas Doss MD;  Location: [facility] CATH LAB/EP;  Service: Cardiology;  Laterality: N/A;     Review of patient's allergies indicates:   Allergen Reactions    Heparin analogues Nausea And Vomiting       Scheduled Medications:    ampicillin-sulbactam  3 g Intravenous Q6H    DAPTOmycin (CUBICIN) 945 mg in sodium chloride 0.9% SolP 50 mL IVPB  10 mg/kg (Adjusted) Intravenous Daily    insulin aspart U-100  7 Units Subcutaneous TIDWM    insulin detemir U-100  13 Units Subcutaneous QHS    metoprolol succinate  12.5 mg Oral Daily    rivaroxaban  10 mg Oral Daily with dinner    vitamin D  1,000 Units Oral Daily       PRN Medications: acetaminophen, dextrose 10%, dextrose 10%, glucagon (human recombinant), glucose, glucose, HYDROcodone-acetaminophen, insulin aspart U-100, methocarbamoL, naloxone, oxyCODONE, prochlorperazine, sodium chloride 0.9%, sodium chloride 0.9%    Family History       Problem Relation (Age of Onset)    Cancer Mother, Paternal Uncle, Paternal Grandfather, Maternal Grandfather    Irritable bowel syndrome Paternal Grandfather           Tobacco Use    Smoking status: Former     Types: Cigarettes    Smokeless tobacco: Former    Tobacco comments:     occasionally    Substance and Sexual Activity    Alcohol use: Yes     Comment: occ    Drug use: No    Sexual activity: Not on file     Review of Systems   Constitutional:  Positive for activity change. Negative for fatigue and fever.   HENT:  Negative for trouble swallowing and voice change.    Respiratory:  Negative for cough and shortness of breath.    Cardiovascular:  Negative for chest pain and leg swelling.   Gastrointestinal:  Negative for abdominal distention and abdominal pain.   Genitourinary:  Negative for difficulty urinating and flank pain.   Musculoskeletal:  Positive for gait problem. Negative for back pain.   Skin:  Negative for color change.   Neurological:  Positive for weakness. Negative for speech difficulty and numbness.   Psychiatric/Behavioral:  Negative for agitation and confusion.      Objective:     Vital Signs (Most Recent):  Temp: 98.5 °F (36.9 °C) (02/07/24 0719)  Pulse: 85 (02/07/24 0719)  Resp: 17 (02/07/24 0719)  BP: (!) 141/89 (02/07/24 0719)  SpO2: 99 % (02/07/24 0719)    Vital Signs (24h Range):  Temp:  [97.8 °F (36.6 °C)-98.6 °F (37 °C)] 98.5 °F (36.9 °C)  Pulse:  [80-92] 85  Resp:  [17-18] 17  SpO2:  [95 %-100 %] 99 %  BP: (127-164)/(68-95) 141/89     Body mass index is 31.03 kg/m².     Physical Exam  Vitals and nursing note reviewed.   Constitutional:       Appearance: He is well-developed.   HENT:      Head: Normocephalic and atraumatic.   Eyes:      General:         Right eye: No discharge.         Left eye: No discharge.      Pupils: Pupils are equal, round, and reactive to light.   Pulmonary:      Effort: Pulmonary effort is normal. No respiratory distress.   Abdominal:      General: There is no distension.      Palpations: Abdomen is soft.      Tenderness: There is no abdominal tenderness.   Musculoskeletal:         General: No deformity.      Cervical  back: Neck supple.      Comments: RLE with drain in place to thigh  LLE with dressing/ace badndage in place    Skin:     General: Skin is warm and dry.   Neurological:      Mental Status: He is oriented to person, place, and time. Mental status is at baseline.      Sensory: No sensory deficit.      Motor: Weakness present. No abnormal muscle tone.      Gait: Gait abnormal.   Psychiatric:         Mood and Affect: Mood normal.         Behavior: Behavior normal.         Thought Content: Thought content normal.     Diagnostic Results:   Labs: Reviewed  ECG: Reviewed  CT: Reviewed    Assessment/Plan:     * Pyogenic arthritis of left knee joint  - s/p arthroscopic another I&D on 2/1/24.     - Related to prolonged/acute hospital course.     Recommendations  -  Encourage mobility, OOB in chair at least 3 hours per day, and early ambulation as appropriate  -  PT/OT evaluate and treat  -  Pain management  -  Monitor for and prevent skin breakdown and pressure ulcers  Early mobility, repositioning/weight shifting every 20-30 minutes when sitting, turn patient every 2 hours, proper mattress/overlay and chair cushioning, pressure relief/heel protector boots  -  DVT prophylaxis    -  Reviewed discharge options (IP rehab, SNF, HH therapy, and OP therapy)     Abscess of right thigh  - s/p arthroscopic I&D of thigh with debridement of right thigh on 2/1/24.   - MULUGETA drain will remain in place and fu outpt 2/15    Staphylococcal arthritis of left knee  - ID consulted and recommending Unasyn end date 2/17/24 and Dapto end date 3/3/24.     PM&R Recommendation:     At this time, the PM&R team has reviewed this patient's ongoing medical case including inpatient diagnosis, medical history, clinical examination, labs, vitals, current social and functional history to provide the post-acute recommendation as follows:     RECOMMENDATIONS: inpatient rehabilitation due to good motivation/participation with therapies, has been determined to  tolerate 3 hours of therapy and good potential for recovery.     The patient will be admitted for comprehensive interdisciplinary inpatient rehabilitation to address the impairments due to medical diagnosis of L knee staph arthritis and R thigh abscess. The patient will benefit from an inpatient rehabilitation program to promote functional recovery, implement compensatory strategies and will undergo assessment for needs for durable medical equipment for safe discharge to the community. This patient will benefit from a coordinated interdisciplinary rehabilitation program services that require close monitoring and treatment with 24-hour rehabilitative nursing and physical/occupational therapies for 3 hours/day for 5 days/week.This interdisciplinary program will be performed under the direction of a physiatrist.    MEDICAL STABILITY:     At this time, no barriers for post-acute rehab admission.     We will continue to follow.     Thank you for your consult.     Yudy Whitmore NP  Department of Physical Medicine & Rehab  [facility] - Med [facility] ([place])

## 2024-02-07 NOTE — PLAN OF CARE
Problem: Adult Inpatient Plan of Care  Goal: Plan of Care Review  Outcome: Ongoing, Progressing     Problem: Adult Inpatient Plan of Care  Goal: Patient-Specific Goal (Individualized)  Outcome: Ongoing, Progressing     Problem: Adult Inpatient Plan of Care  Goal: Absence of Hospital-Acquired Illness or Injury  Outcome: Ongoing, Progressing     Problem: Adult Inpatient Plan of Care  Goal: Optimal Comfort and Wellbeing  Outcome: Ongoing, Progressing     Problem: Adult Inpatient Plan of Care  Goal: Readiness for Transition of Care  Outcome: Ongoing, Progressing     Problem: Diabetes Comorbidity  Goal: Blood Glucose Level Within Targeted Range  Outcome: Ongoing, Progressing     Problem: Infection  Goal: Absence of Infection Signs and Symptoms  Outcome: Ongoing, Progressing     Problem: Impaired Wound Healing  Goal: Optimal Wound Healing  Outcome: Ongoing, Progressing     Problem: Skin Injury Risk Increased  Goal: Skin Health and Integrity  Outcome: Ongoing, Progressing     Problem: Fall Injury Risk  Goal: Absence of Fall and Fall-Related Injury  Outcome: Ongoing, Progressing

## 2024-02-07 NOTE — ASSESSMENT & PLAN NOTE
- s/p arthroscopic another I&D on 2/1/24.     - Related to prolonged/acute hospital course.     Recommendations  -  Encourage mobility, OOB in chair at least 3 hours per day, and early ambulation as appropriate  -  PT/OT evaluate and treat  -  Pain management  -  Monitor for and prevent skin breakdown and pressure ulcers  Early mobility, repositioning/weight shifting every 20-30 minutes when sitting, turn patient every 2 hours, proper mattress/overlay and chair cushioning, pressure relief/heel protector boots  -  DVT prophylaxis    -  Reviewed discharge options (IP rehab, SNF, HH therapy, and OP therapy)

## 2024-02-07 NOTE — ASSESSMENT & PLAN NOTE
Home Diabetes Medications per chart review              empagliflozin (JARDIANCE) 25 mg tablet Take 1 tablet (25 mg total) by mouth once daily.    glucagon (GVOKE HYPOPEN 2-PACK) 1 mg/0.2 mL AtIn Inject 1 mg into the skin as needed (SEVERE HYPOGLYCEMIA).    insulin glargine (LANTUS) 100 unit/mL injection Inject 25 Units into the skin once daily.    TRESIBA FLEXTOUCH U-200 200 unit/mL (3 mL) insulin pen Inject 56 Units into the skin once daily.            Last A1c:   Lab Results   Component Value Date    HGBA1C 7.4 (H) 12/28/2023      Recent Labs     02/06/24  0859 02/06/24  1259 02/06/24  1715 02/06/24 2005 02/07/24  0717 02/07/24  1304   POCTGLUCOSE 216* 222* 226* 245* 219* 184*           Plan:  Antihyperglycemics (From admission, onward)    Start     Stop Route Frequency Ordered    02/07/24 2100  insulin detemir U-100 (Levemir) pen 13 Units         -- SubQ Nightly 02/07/24 0729    02/07/24 1130  insulin aspart U-100 pen 7 Units         -- SubQ 3 times daily with meals 02/07/24 0729    02/03/24 1339  insulin aspart U-100 pen 0-10 Units         -- SubQ Before meals & nightly PRN 02/03/24 1240      - Diabetic diet  - POCT glucose ACHS  - Will monitor glucose results and adjust insulin regimen accordingly  - Hold oral hypoglycemic agents while patient is in the hospital.

## 2024-02-07 NOTE — PLAN OF CARE
[facility]    FACILITY TRANSFER ORDERS      Patient Name: Kulwant Mueller Jr.  YOB: 1985    PCP: No, Primary Doctor   PCP Address: None  PCP Phone Number: None  PCP Fax: None    Encounter Date: 02/07/2024    Admit to: [facility]    Vital Signs:  Routine    Diagnoses:   Active Hospital Problems    Diagnosis  POA    *Pyogenic arthritis of left knee joint [M00.9]  Yes    HFrEF (heart failure with reduced ejection fraction) [I50.20]  Yes     Chronic    Hypokalemia [E87.6]  Yes    Abscess of right thigh [L02.415]  Yes    Staphylococcal arthritis of left knee [M00.062]  Yes    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]  Yes     Chronic    Hypertension associated with diabetes [E11.59, I15.2]  Yes     Chronic    Type 2 diabetes mellitus with complication, with long-term current use of insulin [E11.8, Z79.4]  Not Applicable     Chronic      Resolved Hospital Problems    Diagnosis Date Resolved POA    Diarrhea [R19.7] 02/06/2024 Yes       Allergies:  Review of patient's allergies indicates:   Allergen Reactions    Heparin analogues Nausea And Vomiting       Diet: diabetic diet: 2200 calorie    Activities: Activity as tolerated    Goals of Care Treatment Preferences:  Code Status: Full Code      Nursing:    Patient has an appointment with Orthopedics, Dr. Meier 2/15 for drain removal.      Labs: CBC, CMP, CRP, and CPK   Weekly, on Mondays  Can be faxed to ID clinic: [phone]    CONSULTS:    Physical Therapy to evaluate and treat.  and Occupational Therapy to evaluate and treat.    MISCELLANEOUS CARE:  Diabetes Care:   SN to perform and educate Diabetic management with blood glucose monitoring:    WOUND CARE ORDERS  None  Routine drain care    Medications: Review discharge medications with patient and family and provide education.      Current Discharge Medication List        START taking these medications    Details   insulin aspart U-100 (NOVOLOG U-100 INSULIN ASPART) 100 unit/mL injection Inject 7  Units into the skin 3 (three) times daily before meals.  Qty: 6.3 mL, Refills: 11      sodium chloride 0.9 % PgBk 100 mL with ampicillin-sulbactam 3 gram SolR Infuse 3 grams intravenously every 6 hours. EOT 2/17/23  Refills: 0    Associated Diagnoses: Septic arthritis, due to unspecified organism, septic arthritis of unspecified location           CONTINUE these medications which have CHANGED    Details   TRESIBA FLEXTOUCH U-200 200 unit/mL (3 mL) insulin pen Inject 16 Units into the skin once daily.  Qty: 1 pen , Refills: 9    Associated Diagnoses: Uncontrolled type 2 diabetes mellitus with hyperglycemia, with long-term current use of insulin           CONTINUE these medications which have NOT CHANGED    Details   empagliflozin (JARDIANCE) 25 mg tablet Take 1 tablet (25 mg total) by mouth once daily.  Qty: 90 tablet, Refills: 3    Associated Diagnoses: Type 2 diabetes mellitus with complication, with long-term current use of insulin      furosemide (LASIX) 20 MG tablet Take 1 tablet (20 mg total) by mouth once daily.  Qty: 90 tablet, Refills: 3      glucagon (GVOKE HYPOPEN 2-PACK) 1 mg/0.2 mL AtIn Inject 1 mg into the skin as needed (SEVERE HYPOGLYCEMIA).  Qty: 2 each, Refills: 5    Associated Diagnoses: Type 2 diabetes mellitus with complication, with long-term current use of insulin      metoclopramide HCl (REGLAN) 10 MG tablet Take 0.5 tablets (5 mg total) by mouth 3 (three) times daily before meals.  Qty: 270 tablet, Refills: 3    Comments: DX Code Needed  .  Associated Diagnoses: Type 2 diabetes mellitus with hyperglycemia, with long-term current use of insulin      metoprolol succinate (TOPROL-XL) 25 MG 24 hr tablet Take 0.5 tablets (12.5 mg total) by mouth once daily.  Qty: 15 tablet, Refills: 11    Comments: Per IPR  Ok to give if BP >120/80.      oxyCODONE (ROXICODONE) 10 mg Tab immediate release tablet Take 1 tablet (10 mg total) by mouth every 4 (four) hours as needed for Pain.  Refills: 0    Comments:  "Quantity prescribed more than 7 day supply? No      pantoprazole (PROTONIX) 40 MG tablet Take 1 tablet (40 mg total) by mouth once daily.  Qty: 90 tablet, Refills: 0      prochlorperazine (COMPAZINE) 10 MG tablet Take 1 tablet (10 mg total) by mouth 3 (three) times daily as needed (nausea or vomiting).  Qty: 15 tablet, Refills: 0      bisacodyL (DULCOLAX) 10 mg Supp Place 10 mg rectally daily as needed.      blood-glucose meter,continuous (DEXCOM G7 ) Misc 1 Device by Misc.(Non-Drug; Combo Route) route once daily.  Qty: 1 each, Refills: 0    Associated Diagnoses: Type 2 diabetes mellitus with complication, with long-term current use of insulin      blood-glucose sensor (DEXCOM G7 SENSOR) Justyna 1 Device by Misc.(Non-Drug; Combo Route) route every 10 days.  Qty: 3 each, Refills: 11    Associated Diagnoses: Type 2 diabetes mellitus with complication, with long-term current use of insulin      DAPTOMYCIN IN 0.9 % SOD CHLOR IV Inject 1,000 mg into the vein Q24H (prophylaxis, 1700).      lactobacillus acidophilus & bulgar (LACTINEX) 100 million cell packet Take 1 tablet by mouth once daily.      loperamide (IMODIUM A-D) 2 mg Tab Take 2 mg by mouth 3 (three) times daily as needed.      pen needle, diabetic (BD ULTRA-FINE DIYA PEN NEEDLE) 32 gauge x 5/32" Ndle Use with Tresiba daily and Humalog 3-4 times daily as directed.  Qty: 200 each, Refills: 5    Associated Diagnoses: Uncontrolled type 2 diabetes mellitus with hyperglycemia, with long-term current use of insulin      polyethylene glycol (GLYCOLAX) 17 gram PwPk Take 17 g by mouth once daily.      rivaroxaban (XARELTO) 10 mg Tab Take 1 tablet (10 mg total) by mouth daily with dinner or evening meal.  Qty: 90 tablet, Refills: 3      scopolamine (TRANSDERM-SCOP) 1.3-1.5 mg (1 mg over 3 days) Place 1 patch onto the skin every 72 hours as needed (intractable nausea/vomiting).    Comments: Per IPR           STOP taking these medications       methocarbamoL (ROBAXIN) " 500 MG Tab Comments:   Reason for Stopping:         insulin glargine (LANTUS) 100 unit/mL injection Comments:   Reason for Stopping:         sodium chloride 0.9% SolP 50 mL with DAPTOmycin 500 mg SolR 947 mg Comments:   Reason for Stopping:                  Immunizations Administered as of 2/7/2024       Name Date Dose VIS Date Route Exp Date    COVID-19, MRNA, LN-S, PF (Pfizer) (Purple Cap) 2/11/2022 11:47 AM 0.3 mL 9/22/2021 Intramuscular 5/31/2022    Site: Left deltoid     Given By: Kassy Mendoza RN     : [employer]     Lot: [id number]     COVID-19, MRNA, LN-S, PF (Pfizer) (Purple Cap) 1/19/2021 10:50 AM 0.3 mL 12/12/2020 Intramuscular 5/31/2021    Site: Left deltoid     Given By: Neris San RN     : [employer]     Lot: [id number]     COVID-19, MRNA, LN-S, PF (Pfizer) (Purple Cap) 12/28/2020  3:42 PM 0.3 mL 12/12/2020 Intramuscular 4/30/2021    Site: Left deltoid     Given By: Grace Anguiano LPN     : [employer]     Lot: [id number]             Some patients may experience side effects after vaccination.  These may include fever, headache, muscle or joint aches.  Most symptoms resolve with 24-48 hours and do not require urgent medical evaluation unless they persist for more than 72 hours or symptoms are concerning for an unrelated medical condition.          _________________________________  Soniya Javier MD  02/07/2024

## 2024-02-07 NOTE — NURSING
Attempted again to call report to [facility], spoke with nurse who stated they are in shift change and charge nurse told her not to take report and to call back at 6pm to give report. JORY/[facility]/charge nurse/KIKI CANDELARIO aware.

## 2024-02-08 LAB
BACTERIA BLD CULT: NORMAL
BACTERIA BLD CULT: NORMAL
BACTERIA SPEC ANAEROBE CULT: NORMAL

## 2024-02-08 NOTE — NURSING
Report called to [place] at [facility]. ETA on transport is showing 1930. Patient updated. Midline to stay in place for IV ABX.

## 2024-02-08 NOTE — NURSING
Pt. was picked up by [facility] for transport to [facility] in a wheelchair van. Pt.'s belongings, including his cell phone and , were packed up and sent along with the Pt. Alfredito at [facility] was notified via phone that Pt. was in transport to the facility.

## 2024-02-08 NOTE — DISCHARGE SUMMARY
[facility] - [facility] ([place] [address])  [facility] Medicine  Discharge Summary      Patient Name: Kulwant Mueller Jr.  MRN: [id number]  CAITLIN: [id number]  Patient Class: IP- Inpatient  Admission Date: 1/31/2024  Hospital Length of Stay: 6 days  Discharge Date and Time: 2/7/2024 10:19 PM  Attending Physician: Shellie att. 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 Per chart review rehab facility was concern for orthostasis and administered IVF. Rehab staff were also concerned for worsening infection, reporting diaphoresis at nighttime and thus recommended evaluation with imaging at [facility]. On interview, patient states that he feels fine and denies any complaints except for diarrhea.    On presentation he was afebrile but tachycardic to 110 with /98.  Labs showed WBC 12.3, Hgb 8.0, platelets 396, ESR 76 .7, Na 134, K+ 3.4, .     MRI of the knee showed persistent septic joint and seemingly new early changes of osteomyelitis.    MRI of the pelvis showed improved fluid around the left hip but worsening fluid enhancement in the right myofascial planes and gluteal muscles.    C difficile testing was ordered in the emergency department.  He was admitted to [facility] for orthopedic surgery and Infectious Disease evaluation.    Procedure(s) (LRB):  ARTHROSCOPY, KNEE, left, lateral post (Left)  IRRIGATION AND DEBRIDEMENT, LOWER EXTREMITY (Right)      Hospital Course:    Arrived to [facility] on 1/31 for lightheadedness, fevers, and chills, found to have L septic knee joint and abscess in lateral R thigh. Underwent joint washout and I&D on 2/1 with orthopedic surgery. BCX grew Acinetobacter baumanii in one bottle. Infectious disease consulted, continued Daptomycin due to previous MRSA infections in joint washouts, added Zosyn for A. Baumanii, recommended PICC removal, which was completed 2/2. Patient to have PICC holiday over the weekend. Sensitivities for A. Baumanii bacteremia returned with near pan-sensitive results. Patient transitioned to Unasyn with intent to treat until 2/17/24 for A. Baumanii bacteremia. Daptomycin to be continued until 3/3/24, both per ID recommendations. Thigh abscess and L knee cultures with no growth to date currently. Inpatient insulin regimen uptitrated throughout stay due to mildly uncontrolled blood glucose. ID OPAT plan for 6 total weeks of daptomycin and 2 weeks of Unasyn . Orthopedic surgery to followup outpatient for drain management. Patient was discharged to [facility] with plans to complete IV antibiotics with labs sent to ID and follow-up scheduled with orthopedic surgery for drain care.      Goals of Care Treatment Preferences:  Code Status: Full Code      Consults:   Consults (From admission, onward)          Status Ordering Provider     Inpatient consult to Physical Medicine Rehab  Once        Provider:  (Not yet assigned)    Completed PETE MICHAELS     [facility]  Once        Provider:  (Not yet assigned)    Completed CORINA MEJIA     Inpatient [facility]ICC team [facility]  Once        Provider:  (Not yet assigned)    Completed ANISHA CORTEZ     Inpatient consult to Orthopedic Surgery  Once        Provider:  (Not yet assigned)    Completed RADHA HERRERA            No new Assessment & Plan notes have been filed under this hospital service since the last note was generated.  Service:  Hospital Medicine    Final Active Diagnoses:    Diagnosis Date Noted POA    PRINCIPAL PROBLEM:  Pyogenic arthritis of left knee joint [M00.9] 01/10/2024 Yes    HFrEF (heart failure with reduced ejection fraction) [I50.20] 02/01/2024 Yes     Chronic    Hypokalemia [E87.6] 02/01/2024 Yes    Abscess of right thigh [L02.415] 01/20/2024 Yes    Staphylococcal arthritis of left knee [M00.062] 12/29/2023 Yes    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] 10/03/2018 Yes     Chronic    Hypertension associated with diabetes [E11.59, I15.2] 09/26/2018 Yes     Chronic    Type 2 diabetes mellitus with complication, with long-term current use of insulin [E11.8, Z79.4] 08/19/2017 Not Applicable     Chronic      Problems Resolved During this Admission:    Diagnosis Date Noted Date Resolved POA    Diarrhea [R19.7] 02/01/2024 02/06/2024 Yes       Discharged Condition: stable    Disposition: Rehab Facility    Follow Up:    Patient Instructions:   No discharge procedures on file.    Significant Diagnostic Studies:   2/5/2024  CBC: WBC 10.6, Hgb 8.1, Hct 27.0, Plt 476  BMP: Na 135, K, 3.9, Cl 101, CO2 25, Anion Gap 9, BUN, 4, Creatinine 0.7, Glucose 223, Calcium 8.6    Microbiology Results (last 7 days)       Procedure Component Value Units Date/Time    Fungus culture [[id number]] Collected: 02/01/24 1530    Order Status: Completed Specimen: Incision site from Leg, Right Updated: 02/07/24 0930     Fungus (Mycology) Culture Culture in progress    Narrative:      Right leg - culture    Fungus culture [[id number]] Collected: 02/01/24 1530    Order Status: Completed Specimen: Incision site from Leg, Right Updated: 02/07/24 0930     Fungus (Mycology) Culture Culture in progress    Narrative:      Right leg - culture    Fungus culture [[id number]] Collected: 02/01/24 0740    Order Status: Completed Specimen: Abscess from Leg, Right Updated: 02/07/24 0930     Fungus (Mycology) Culture Culture in progress    Fungus culture [[id number]] Collected: 02/01/24 0821     Order Status: Completed Specimen: Joint Fluid from Knee, Left Updated: 02/07/24 0930     Fungus (Mycology) Culture Culture in progress    Blood culture [[id number]] Collected: 02/03/24 0438    Order Status: Completed Specimen: Blood Updated: 02/07/24 0812     Blood Culture, Routine No Growth to date      No Growth to date      No Growth to date      No Growth to date      No Growth to date    Blood culture [[id number]] Collected: 02/03/24 0438    Order Status: Completed Specimen: Blood Updated: 02/07/24 0812     Blood Culture, Routine No Growth to date      No Growth to date      No Growth to date      No Growth to date      No Growth to date    Blood culture #2 **CANNOT BE ORDERED STAT** [[id number]] Collected: 01/31/24 1538    Order Status: Completed Specimen: Blood from Peripheral, Antecubital, Right Updated: 02/05/24 1812     Blood Culture, Routine No growth after 5 days.    E. coli 0157 antigen [[id number]] Collected: 02/01/24 0131    Order Status: Completed Specimen: Stool Updated: 02/05/24 1234     Shiga Toxin 1 E.coli Negative     Shiga Toxin 2 E.coli Negative    Stool culture [[id number]] Collected: 02/01/24 0131    Order Status: Completed Specimen: Stool Updated: 02/05/24 1232     Stool Culture No Salmonella,Shigella,Vibrio,Campylobacter,Yersinia isolated.    Culture, Anaerobe [[id number]] Collected: 02/01/24 1530    Order Status: Completed Specimen: Incision site from Leg, Right Updated: 02/05/24 1103     Anaerobic Culture Culture in progress    Narrative:      Right leg - culture    Culture, Anaerobe [[id number]] Collected: 02/01/24 1530    Order Status: Completed Specimen: Incision site from Leg, Right Updated: 02/05/24 1102     Anaerobic Culture Culture in progress    Narrative:      Right leg - culture    Culture, Anaerobic [[id number]] Collected: 02/01/24 0821    Order Status: Completed Specimen: Joint Fluid from Knee, Left Updated: 02/05/24 1101     Anaerobic Culture Culture in progress     Culture, Anaerobic [[id number]] Collected: 02/01/24 0740    Order Status: Completed Specimen: Abscess from Leg, Right Updated: 02/05/24 1059     Anaerobic Culture Culture in progress    Blood culture #1 **CANNOT BE ORDERED STAT** [[id number]]  (Abnormal)  (Susceptibility) Collected: 01/31/24 1538    Order Status: Completed Specimen: Blood from Line, PICC Right Brachial Updated: 02/05/24 1030     Blood Culture, Routine Gram stain aer bottle: Gram negative rods      Results called to and read back by:MADHU Pal RN 02/01/2024  21:08      ACINETOBACTER BAUMANNII COMPLEX    Aerobic culture [[id number]] Collected: 02/01/24 1530    Order Status: Completed Specimen: Incision site from Leg, Right Updated: 02/05/24 0718     Aerobic Bacterial Culture No growth    Narrative:      Right leg - culture    Aerobic culture [[id number]] Collected: 02/01/24 1530    Order Status: Completed Specimen: Incision site from Leg, Right Updated: 02/05/24 0718     Aerobic Bacterial Culture No growth    Narrative:      Right leg - culture    Aerobic culture [[id number]] Collected: 02/01/24 0951    Order Status: Completed Specimen: Joint Fluid from Knee, Left Updated: 02/05/24 0718     Aerobic Bacterial Culture No growth    Aerobic culture [[id number]] Collected: 02/01/24 0740    Order Status: Completed Specimen: Abscess from Leg, Right Updated: 02/05/24 0718     Aerobic Bacterial Culture No growth    AFB Culture & Smear [[id number]] Collected: 02/01/24 1530    Order Status: Completed Specimen: Incision site from Leg, Right Updated: 02/02/24 2127     AFB Culture & Smear Culture in progress     AFB CULTURE STAIN No acid fast bacilli seen.    Narrative:      Right leg - culture    AFB Culture & Smear [[id number]] Collected: 02/01/24 1530    Order Status: Completed Specimen: Incision site from Leg, Right Updated: 02/02/24 2127     AFB Culture & Smear Culture in progress     AFB CULTURE STAIN No acid fast bacilli seen.    Narrative:      Right leg -  culture    AFB Culture & Smear [[id number]] Collected: 02/01/24 0740    Order Status: Completed Specimen: Abscess from Leg, Right Updated: 02/02/24 2127     AFB Culture & Smear Culture in progress     AFB CULTURE STAIN No acid fast bacilli seen.    AFB Culture & Smear [[id number]] Collected: 02/01/24 0821    Order Status: Completed Specimen: Joint Fluid from Knee, Left Updated: 02/02/24 2127     AFB Culture & Smear Culture in progress     AFB CULTURE STAIN No acid fast bacilli seen.    Rapid Organism ID by PCR (from Blood culture) [[id number]]  (Abnormal) Collected: 01/31/24 1538    Order Status: Completed Updated: 02/01/24 2232     Enterococcus faecalis Not Detected     Enterococcus faecium Not Detected     Listeria monocytogenes Not Detected     Staphylococcus spp. Not Detected     Staphylococcus aureus Not Detected     Staphylococcus epidermidis Not Detected     Staphylococcus lugdunensis Not Detected     Streptococcus species Not Detected     Streptococcus agalactiae Not Detected     Streptococcus pneumoniae Not Detected     Streptococcus pyogenes Not Detected     Acinetobacter calcoaceticus/baumannii complex Detected     Bacteroides fragilis Not Detected     Enterobacterales Not Detected     Enterobacter cloacae complex Not Detected     Escherichia coli Not Detected     Klebsiella aerogenes Not Detected     Klebsiella oxytoca Not Detected     Klebsiella pneumoniae group Not Detected     Proteus Not Detected     Salmonella sp Not Detected     Serratia marcescens Not Detected     Haemophilus influenzae Not Detected     Neisseria meningtidis Not Detected     Pseudomonas aeruginosa Not Detected     Stenotrophomonas maltophilia Not Detected     Candida albicans Not Detected     Candida auris Not Detected     Candida glabrata Not Detected     Candida krusei Not Detected     Candida parapsilosis Not Detected     Candida tropicalis Not Detected     Cryptococcus neoformans/gattii Not Detected     CTX-M (ESBL )  Not Detected     IMP (Carbapenem resistant) Not Detected     KPC resistance gene (Carbapenem resistant) Not Detected     mcr-1  Test Not Applicable     mec A/C  Test Not Applicable     mec A/C and MREJ (MRSA) gene Test Not Applicable     NDM (Carbapenem resistant) Not Detected     OXA-48-like (Carbapenem resistant) Test Not Applicable     van A/B (VRE gene) Test Not Applicable     VIM (Carbapenem resistant) Not Detected    Gram stain [[id number]] Collected: 02/01/24 1530    Order Status: Completed Specimen: Incision site from Leg, Right Updated: 02/01/24 1704     Gram Stain Result Few WBC's      No organisms seen    Narrative:      Right leg - culture    Gram stain [[id number]] Collected: 02/01/24 1530    Order Status: Completed Specimen: Incision site from Leg, Right Updated: 02/01/24 1702     Gram Stain Result Few WBC's      No organisms seen    Narrative:      Right leg - culture    Gram stain [[id number]] Collected: 02/01/24 0740    Order Status: Completed Specimen: Abscess from Leg, Right Updated: 02/01/24 1108     Gram Stain Result Moderate WBC's      No organisms seen    Gram stain [[id number]] Collected: 02/01/24 0821    Order Status: Completed Specimen: Joint Fluid from Knee, Left Updated: 02/01/24 1033     Gram Stain Result Moderate WBC's      No organisms seen    Clostridium difficile EIA [[id number]] Collected: 02/01/24 0131    Order Status: Completed Specimen: Stool Updated: 02/01/24 0933     C. diff Antigen Negative     C difficile Toxins A+B, EIA Negative     Comment: Testing not recommended for children <24 months old.       Stool culture [[id number]]     Order Status: Completed Specimen: Stool     Stool culture [[id number]]     Order Status: Canceled Specimen: Stool                Pending Diagnostic Studies:       None           Radiology:  MRI Pelvis W WO Contrast [[id number]] Resulted: 02/01/24 0003   Order Status: Completed Updated: 02/01/24 0006   Narrative:     EXAMINATION:  MRI PELVIS W WO  CONTRAST    CLINICAL HISTORY:  Sepsis;    TECHNIQUE:  Multiplanar multisequence MR imaging of the pelvis was performed both prior to and following the administration of 10 cc of Gadavist intravenously.    COMPARISON:  MRI, 01/17/2024 of the pelvis and hips.    FINDINGS:  The fluid collection in the anterior muscles and myofascial planes on the left has diminished since the prior exam. However, the fluid appears more complex and extensive on the right lateral to the tensor fascia jonah and gluteus muscles with signal void suggesting air bubbles. This is incompletely imaged.  The left thigh and buttocks muscles appear normal.  The hip rotators appear unremarkable bilaterally. There is no bone marrow edema or enhancement.    The urinary bladder is distended.  Signal void from a left iliac vein stent is noted. Prostate seminal vesicles not well visualized. Small rectal fecal impaction.   Impression:       Improved fluid in the left phi ends subcutaneous tissues around the left hip.    Worsening of fluid enhancement in the right myofascial planes and gluteal muscles suggesting infected subcutaneous fat and tensor fascia jonah bursa with myositis and or infection of lateral gluteal muscles on the right.   ----------------------------------------------------------------------------------------------------  MRI Knee W WO Contrast Left [[id number]] Resulted: 01/31/24 2344   Order Status: Completed Updated: 01/31/24 2346   Narrative:     EXAMINATION:  MRI KNEE W WO CONTRAST LEFT    CLINICAL HISTORY:  Septic arthritis suspected, knee, xray done;Osteomyelitis suspected, knee, xray done;    TECHNIQUE:  Multiplanar multisequence MR imaging of the left knee was performed both prior and following the administration of Gadavist.    COMPARISON:  MRI of the left knee, 01/17/2024    FINDINGS:  There is persistent fluid within the joint and suprapatellar bursa with increase signal voids resembling air bubbles within the suprapatellar  bursa.  Bone marrow edema in the femoral condyles and patella articular surface are noted along with edema and fluid signal throughout the patellar retinaculum and subcutaneous soft tissues in the pre patella region.  Fluid extends into the medial soft tissues.    Following gadolinium there is enhancement of the synovium subcutaneous soft tissues and subchondral bone marrow of the tibial spines and patella as well as the femoral condyles.   Impression:       Findings compatible with persistent septic joint and early changes of osteomyelitis in the femoral condyles patella and tibial spines.    Cellulitis.     Medications:  Reconciled Home Medications:      Medication List        START taking these medications      insulin aspart U-100 100 unit/mL injection  Commonly known as: NovoLOG U-100 Insulin aspart  Inject 7 Units into the skin 3 (three) times daily before meals.     sodium chloride 0.9 % PgBk 100 mL with ampicillin-sulbactam 3 gram SolR  Infuse 3 grams intravenously every 6 hours. EOT 2/17/23            CHANGE how you take these medications      oxyCODONE 10 mg Tab immediate release tablet  Commonly known as: ROXICODONE  Take 1 tablet (10 mg total) by mouth every 4 (four) hours as needed for Pain.  What changed: Another medication with the same name was removed. Continue taking this medication, and follow the directions you see here.     TRESIBA FLEXTOUCH U-200 200 unit/mL (3 mL) insulin pen  Generic drug: insulin degludec  Inject 16 Units into the skin once daily.  What changed: how much to take            CONTINUE taking these medications      bisacodyL 10 mg Supp  Commonly known as: DULCOLAX  Place 10 mg rectally daily as needed.     DAPTOMYCIN IN 0.9 % SOD CHLOR IV  Inject 1,000 mg into the vein Q24H (prophylaxis, 1700).     DEXCOM G7  Misc  Generic drug: blood-glucose meter,continuous  1 Device by Misc.(Non-Drug; Combo Route) route once daily.     DEXCOM G7 SENSOR Justyna  Generic drug:  "blood-glucose sensor  1 Device by Misc.(Non-Drug; Combo Route) route every 10 days.     empagliflozin 25 mg tablet  Commonly known as: JARDIANCE  Take 1 tablet (25 mg total) by mouth once daily.     furosemide 20 MG tablet  Commonly known as: LASIX  Take 1 tablet (20 mg total) by mouth once daily.     GVOKE HYPOPEN 2-PACK 1 mg/0.2 mL Atin  Generic drug: glucagon  Inject 1 mg into the skin as needed (SEVERE HYPOGLYCEMIA).     lactobacillus acidophilus & bulgar 100 million cell packet  Commonly known as: LACTINEX  Take 1 tablet by mouth once daily.     loperamide 2 mg Tab  Commonly known as: IMODIUM A-D  Take 2 mg by mouth 3 (three) times daily as needed.     metoclopramide HCl 10 MG tablet  Commonly known as: REGLAN  Take 0.5 tablets (5 mg total) by mouth 3 (three) times daily before meals.     metoprolol succinate 25 MG 24 hr tablet  Commonly known as: TOPROL-XL  Take 0.5 tablets (12.5 mg total) by mouth once daily.     pantoprazole 40 MG tablet  Commonly known as: PROTONIX  Take 1 tablet (40 mg total) by mouth once daily.     pen needle, diabetic 32 gauge x 5/32" Ndle  Commonly known as: BD ULTRA-FINE DIYA PEN NEEDLE  Use with Tresiba daily and Humalog 3-4 times daily as directed.     polyethylene glycol 17 gram Pwpk  Commonly known as: GLYCOLAX  Take 17 g by mouth once daily.     prochlorperazine 10 MG tablet  Commonly known as: COMPAZINE  Take 1 tablet (10 mg total) by mouth 3 (three) times daily as needed (nausea or vomiting).     rivaroxaban 10 mg Tab  Commonly known as: XARELTO  Take 1 tablet (10 mg total) by mouth daily with dinner or evening meal.     scopolamine 1.3-1.5 mg (1 mg over 3 days)  Commonly known as: TRANSDERM-SCOP  Place 1 patch onto the skin every 72 hours as needed (intractable nausea/vomiting).            STOP taking these medications      insulin glargine 100 unit/mL injection  Commonly known as: Lantus     methocarbamoL 500 MG Tab  Commonly known as: ROBAXIN              Indwelling " Lines/Drains at time of discharge:   Lines/Drains/Airways       Drain  Duration                  Closed/Suction Drain 02/01/24 1540 Tube - 1 Right;Lateral Leg Bulb 10 Fr. 6 days                    Time spent on the discharge of patient: 35 minutes         Aquiles Lewis MD  Department of Hospital Medicine  [facility] ([place]-16)

## 2024-02-09 NOTE — PLAN OF CARE
[facility] - Med Surg ([facility])  Discharge Final Note    Primary Care Provider: No, Primary Doctor    Expected Discharge Date: 2/7/2024    Final Discharge Note (most recent)       Final Note - 02/09/24 0817          Final Note    Assessment Type Final Discharge Note (P)      Anticipated Discharge Disposition Rehab Facility (P)         Post-Acute Status    Post-Acute Authorization Placement (P)      Post-Acute Placement Status Set-up Complete/Auth obtained (P)      Discharge Delays None known at this time (P)                      Important Message from Medicare    Pt d/c'd to [facility].    Yudy Klein, ALEXSANDRAN, BS, RN, CCM

## 2024-02-12 LAB — FUNGUS SPEC CULT: NORMAL

## 2024-02-14 ENCOUNTER — HOSPITAL ENCOUNTER (OUTPATIENT)
Dept: RADIOLOGY | Facility: HOSPITAL | Age: 39
Discharge: HOME | End: 2024-02-14
Attending: FAMILY MEDICINE
Payer: COMMERCIAL

## 2024-02-14 PROCEDURE — 74018 RADEX ABDOMEN 1 VIEW: CPT | Mod: 26,,, | Performed by: RADIOLOGY

## 2024-02-15 ENCOUNTER — HOSPITAL ENCOUNTER (EMERGENCY)
Facility: HOSPITAL | Age: 39
Discharge: HOME | End: 2024-02-16
Attending: EMERGENCY MEDICINE
Payer: COMMERCIAL

## 2024-02-15 DIAGNOSIS — R11.2 NAUSEA AND VOMITING, UNSPECIFIED VOMITING TYPE: Primary | ICD-10-CM

## 2024-02-15 DIAGNOSIS — R11.10 EMESIS: ICD-10-CM

## 2024-02-15 DIAGNOSIS — K31.84 GASTROPARESIS: ICD-10-CM

## 2024-02-15 LAB
ALBUMIN SERPL BCP-MCNC: 2.5 G/DL (ref 3.5–5.2)
ALP SERPL-CCNC: 70 U/L (ref 55–135)
ALT SERPL W/O P-5'-P-CCNC: 11 U/L (ref 10–44)
ANION GAP SERPL CALC-SCNC: 13 MMOL/L (ref 8–16)
AST SERPL-CCNC: 15 U/L (ref 10–40)
BASOPHILS # BLD AUTO: 0.02 K/UL (ref 0–0.2)
BASOPHILS NFR BLD: 0.2 % (ref 0–1.9)
BILIRUB SERPL-MCNC: 0.5 MG/DL (ref 0.1–1)
BUN SERPL-MCNC: 6 MG/DL (ref 6–30)
BUN SERPL-MCNC: 7 MG/DL (ref 6–20)
CALCIUM SERPL-MCNC: 9.1 MG/DL (ref 8.7–10.5)
CHLORIDE SERPL-SCNC: 100 MMOL/L (ref 95–110)
CHLORIDE SERPL-SCNC: 96 MMOL/L (ref 95–110)
CO2 SERPL-SCNC: 26 MMOL/L (ref 23–29)
CREAT SERPL-MCNC: 0.7 MG/DL (ref 0.5–1.4)
CREAT SERPL-MCNC: 0.8 MG/DL (ref 0.5–1.4)
DIFFERENTIAL METHOD BLD: ABNORMAL
EOSINOPHIL # BLD AUTO: 0 K/UL (ref 0–0.5)
EOSINOPHIL NFR BLD: 0 % (ref 0–8)
ERYTHROCYTE [DISTWIDTH] IN BLOOD BY AUTOMATED COUNT: 16.1 % (ref 11.5–14.5)
EST. GFR  (NO RACE VARIABLE): >60 ML/MIN/1.73 M^2
GLUCOSE SERPL-MCNC: 69 MG/DL (ref 70–110)
GLUCOSE SERPL-MCNC: 74 MG/DL (ref 70–110)
HCT VFR BLD AUTO: 32 % (ref 40–54)
HCT VFR BLD CALC: 48 %PCV (ref 36–54)
HGB BLD-MCNC: 9.7 G/DL (ref 14–18)
IMM GRANULOCYTES # BLD AUTO: 0.04 K/UL (ref 0–0.04)
IMM GRANULOCYTES NFR BLD AUTO: 0.4 % (ref 0–0.5)
LIPASE SERPL-CCNC: 17 U/L (ref 4–60)
LYMPHOCYTES # BLD AUTO: 2.3 K/UL (ref 1–4.8)
LYMPHOCYTES NFR BLD: 20.5 % (ref 18–48)
MCH RBC QN AUTO: 26.1 PG (ref 27–31)
MCHC RBC AUTO-ENTMCNC: 30.3 G/DL (ref 32–36)
MCV RBC AUTO: 86 FL (ref 82–98)
MONOCYTES # BLD AUTO: 0.7 K/UL (ref 0.3–1)
MONOCYTES NFR BLD: 5.9 % (ref 4–15)
NEUTROPHILS # BLD AUTO: 8.2 K/UL (ref 1.8–7.7)
NEUTROPHILS NFR BLD: 73 % (ref 38–73)
NRBC BLD-RTO: 0 /100 WBC
PLATELET # BLD AUTO: 411 K/UL (ref 150–450)
PMV BLD AUTO: 9.7 FL (ref 9.2–12.9)
POC IONIZED CALCIUM: 1.18 MMOL/L (ref 1.06–1.42)
POC TCO2 (MEASURED): 30 MMOL/L (ref 23–29)
POTASSIUM BLD-SCNC: 3.7 MMOL/L (ref 3.5–5.1)
POTASSIUM SERPL-SCNC: 3.5 MMOL/L (ref 3.5–5.1)
PROT SERPL-MCNC: 7.4 G/DL (ref 6–8.4)
RBC # BLD AUTO: 3.71 M/UL (ref 4.6–6.2)
SAMPLE: ABNORMAL
SODIUM BLD-SCNC: 140 MMOL/L (ref 136–145)
SODIUM SERPL-SCNC: 139 MMOL/L (ref 136–145)
WBC # BLD AUTO: 11.23 K/UL (ref 3.9–12.7)

## 2024-02-15 PROCEDURE — 93010 ELECTROCARDIOGRAM REPORT: CPT | Mod: ,,, | Performed by: INTERNAL MEDICINE

## 2024-02-15 PROCEDURE — 85025 COMPLETE CBC W/AUTO DIFF WBC: CPT | Performed by: EMERGENCY MEDICINE

## 2024-02-15 PROCEDURE — 80048 BASIC METABOLIC PNL TOTAL CA: CPT | Mod: XB

## 2024-02-15 PROCEDURE — 63600175 PHARM REV CODE 636 W HCPCS: Performed by: EMERGENCY MEDICINE

## 2024-02-15 PROCEDURE — 83690 ASSAY OF LIPASE: CPT | Performed by: EMERGENCY MEDICINE

## 2024-02-15 PROCEDURE — 99285 EMERGENCY DEPT VISIT HI MDM: CPT | Mod: 25

## 2024-02-15 PROCEDURE — 99232 SBSQ HOSP IP/OBS MODERATE 35: CPT | Mod: ,,, | Performed by: NURSE PRACTITIONER

## 2024-02-15 PROCEDURE — 96361 HYDRATE IV INFUSION ADD-ON: CPT

## 2024-02-15 PROCEDURE — 93005 ELECTROCARDIOGRAM TRACING: CPT

## 2024-02-15 PROCEDURE — 82962 GLUCOSE BLOOD TEST: CPT

## 2024-02-15 PROCEDURE — 80053 COMPREHEN METABOLIC PANEL: CPT | Performed by: EMERGENCY MEDICINE

## 2024-02-15 PROCEDURE — 96374 THER/PROPH/DIAG INJ IV PUSH: CPT

## 2024-02-15 PROCEDURE — 25000003 PHARM REV CODE 250: Performed by: EMERGENCY MEDICINE

## 2024-02-15 RX ORDER — ONDANSETRON 4 MG/1
4 TABLET, ORALLY DISINTEGRATING ORAL EVERY 6 HOURS PRN
Qty: 12 TABLET | Refills: 0 | Status: SHIPPED | OUTPATIENT
Start: 2024-02-15 | End: 2024-02-18

## 2024-02-15 RX ORDER — PROCHLORPERAZINE EDISYLATE 5 MG/ML
10 INJECTION INTRAMUSCULAR; INTRAVENOUS
Status: COMPLETED | OUTPATIENT
Start: 2024-02-15 | End: 2024-02-15

## 2024-02-15 RX ADMIN — PROCHLORPERAZINE EDISYLATE 10 MG: 5 INJECTION INTRAMUSCULAR; INTRAVENOUS at 08:02

## 2024-02-15 RX ADMIN — SODIUM CHLORIDE 1000 ML: 9 INJECTION, SOLUTION INTRAVENOUS at 08:02

## 2024-02-15 NOTE — PT/OT/SLP PROGRESS
[facility] Medicine  Progress Note    Patient Name: Abner Zapata  MRN: [id number]  Patient Class: IP- Inpatient   Admission Date: 2/5/2024  Length of Stay: 9 days  Attending Physician: Gomez Mariano MD  Primary Care Provider: Niraj Ontiveros Occupational Therapy      Patient Name:  Kulwant Menardcaro Benítez   MRN:  [id number]    Patient not seen today secondary to Other (Comment) ([facility] - cath lab). Will follow-up as able.    1/3/2024   (Pending sale to [facility]

## 2024-02-16 VITALS
WEIGHT: 235 LBS | OXYGEN SATURATION: 99 % | DIASTOLIC BLOOD PRESSURE: 88 MMHG | SYSTOLIC BLOOD PRESSURE: 165 MMHG | TEMPERATURE: 99 F | RESPIRATION RATE: 18 BRPM | HEIGHT: 73 IN | HEART RATE: 80 BPM | BODY MASS INDEX: 31.14 KG/M2

## 2024-02-16 LAB
OHS QRS DURATION: 86 MS
OHS QTC CALCULATION: 432 MS
POCT GLUCOSE: 79 MG/DL (ref 70–110)

## 2024-02-16 PROCEDURE — 99232 SBSQ HOSP IP/OBS MODERATE 35: CPT | Mod: ,,, | Performed by: NURSE PRACTITIONER

## 2024-02-16 NOTE — ED PROVIDER NOTES
Encounter Date: 2/15/2024       History     Chief Complaint   Patient presents with    Emesis     Arrives from [facility] rehab for N/V for 3 days. EMS states he also has hypokalemia. Pt was hypotensive upon EMS arrival and received 500ml NS. Pt states he is at rehab for infection of is left ext.      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  He reports he is and rehabilitation facility for recent septic arthritis and he denies any complaints related to his healing knee.  He does report his Reglan which he takes for gastroparesis was not restarted after his last hospital discharge which he thinks is inducing nausea and vomiting and exacerbating gastroparesis.  At this time he denies focal abdominal pain.  He reports constipation over the previous few days but received an enema yesterday with a large amount of soft stool returned.  He denies a previous history of small-bowel obstruction.  He denies fever, chills, difficulty breathing, chest pain, back pain, change in urination.  He reports transient improvement with nausea with outpatient promethazine.      Review of patient's allergies indicates:   Allergen Reactions    Heparin analogues Nausea And Vomiting     Past Medical History:   Diagnosis Date    Anticoagulant long-term use     COVID-19 virus infection     Diabetes mellitus     Diabetes mellitus, type 2     Hypertension     May-Thurner syndrome      Past Surgical History:   Procedure Laterality Date    APPENDECTOMY      ARTHROSCOPY OF KNEE Left 1/16/2024    Procedure: ARTHROSCOPY, KNEE, LEFT;  Surgeon: Mandeep Gatica MD;  Location: [facility] OR [address] [place] [address];  Service: Orthopedics;  Laterality: Left;    ARTHROSCOPY OF KNEE Left  1/21/2024    Procedure: ARTHROSCOPY, KNEE, left, supine, slider, lateral post, conmed,;  Surgeon: Derick Sloan MD;  Location: [facility] OR 2ND FLR;  Service: Orthopedics;  Laterality: Left;    ARTHROSCOPY OF KNEE Left 2/1/2024    Procedure: ARTHROSCOPY, KNEE, left, lateral post;  Surgeon: Mandeep Gatica MD;  [facility];  Service: Orthopedics;  Laterality: Left;    ARTHROTOMY OF KNEE Left 12/29/2023    Procedure: ARTHROTOMY, KNEE;  Surgeon: Edy Bocanegra Jr., MD;  Location: [facility];  Service: Orthopedics;  Laterality: Left;    ESOPHAGOGASTRODUODENOSCOPY N/A 1/31/2022    Procedure: EGD (ESOPHAGOGASTRODUODENOSCOPY);  Surgeon: Cindy Quiros MD;  Location: [facility];  Service: Endoscopy;  Laterality: N/A;    ESOPHAGOGASTRODUODENOSCOPY N/A 3/21/2022    Procedure: EGD (ESOPHAGOGASTRODUODENOSCOPY);  Surgeon: Tejas Mann MD;  Location: [facility];  Service: Endoscopy;  Laterality: N/A;    INCISION AND DRAINAGE FOOT Left 11/28/2021    Procedure: INCISION AND DRAINAGE, FOOT;  Surgeon: Bj Alicea DPM;  [facility];  Service: Podiatry;  Laterality: Left;    INCISION AND DRAINAGE OF THIGH Right 1/21/2024    Procedure: INCISION AND DRAINAGE, THIGH - right,;  Surgeon: Derick Sloan MD;  [facility];  Service: Orthopedics;  Laterality: Right;  right, lateral, slider, cysto tubing, 6L NS, betadine, peroxide, vanc powder, 10 [place] winter drain    IRRIGATION AND DEBRIDEMENT OF LOWER EXTREMITY Right 1/21/2024    Procedure: I&D lateral thigh abscess, right, supine, slider, cysto tubing, 6L NS, betadine, peroxide, vanc powder, 10 [place] winter drain,;  Surgeon: Derick Sloan MD;  [facility];  Service: Orthopedics;  Laterality: Right;    IRRIGATION AND DEBRIDEMENT OF LOWER EXTREMITY Right 2/1/2024    Procedure: IRRIGATION AND DEBRIDEMENT, LOWER EXTREMITY;  Surgeon: Mandeep Gatica MD;  Location: [facility] OR [address] [place] [address];  Service:  Orthopedics;  Laterality: Right;  started at 1510 pm incision    KNEE ARTHROSCOPY W/ DEBRIDEMENT Left 1/21/2024    Procedure: ARTHROSCOPY, KNEE, WITH DEBRIDEMENT - LEFT, SUPINE, SLIDER, LATERAL POST, CONMED;  Surgeon: Derick Sloan MD;  Location: [facility] OR [address] [place] [address];  Service: Orthopedics;  Laterality: Left;    stents in bilateral legs      TRANSESOPHAGEAL ECHOCARDIOGRAPHY N/A 1/3/2024    Procedure: ECHOCARDIOGRAM, TRANSESOPHAGEAL;  Surgeon: Douglas Doss MD;  Location: [facility] CATH LAB/EP;  Service: Cardiology;  Laterality: N/A;     Family History   Problem Relation Age of Onset    Cancer Mother     Cancer Paternal Uncle     Cancer Paternal Grandfather     Irritable bowel syndrome Paternal Grandfather     Cancer Maternal Grandfather     Colon cancer Neg Hx     Esophageal cancer Neg Hx     Rectal cancer Neg Hx     Stomach cancer Neg Hx     Ulcerative colitis Neg Hx     Crohn's disease Neg Hx     Celiac disease Neg Hx      Social History     Tobacco Use    Smoking status: Former     Types: Cigarettes    Smokeless tobacco: Former    Tobacco comments:     occasionally    Substance Use Topics    Alcohol use: Yes     Comment: occ    Drug use: No     Review of Systems   Gastrointestinal:  Positive for nausea and vomiting.       Physical Exam     Initial Vitals [02/15/24 1627]   BP Pulse Resp Temp SpO2   97/66 96 14 98.7 °F (37.1 °C) 96 %      MAP       --         Physical Exam    Nursing note and vitals reviewed.  Constitutional: Vital signs are normal. He appears well-nourished.   HENT:   Head: Normocephalic and atraumatic.   Eyes: Conjunctivae and EOM are normal.   Neck: Neck supple. No thyroid mass present.   Normal range of motion.  Cardiovascular:  Normal rate, regular rhythm and normal heart sounds.     Exam reveals no gallop and no friction rub.       No murmur heard.  Pulmonary/Chest: Breath sounds normal. He has no wheezes. He has no rhonchi. He has no rales.   Abdominal: Abdomen is soft. Bowel  sounds are normal. There is no abdominal tenderness.   Musculoskeletal:         General: No edema. Normal range of motion.      Cervical back: Normal range of motion and neck supple.     Neurological: He is alert and oriented to person, place, and time. He has normal strength. No cranial nerve deficit or sensory deficit. GCS score is 15. GCS eye subscore is 4. GCS verbal subscore is 5. GCS motor subscore is 6.   Skin: Skin is warm and dry. No rash noted. No erythema.   Psychiatric: He has a normal mood and affect. His speech is normal. Thought content normal. Cognition and memory are normal.         ED Course   Procedures  Labs Reviewed   CBC W/ AUTO DIFFERENTIAL - Abnormal; Notable for the following components:       Result Value    RBC 3.71 (*)     Hemoglobin 9.7 (*)     Hematocrit 32.0 (*)     MCH 26.1 (*)     MCHC 30.3 (*)     RDW 16.1 (*)     Gran # (ANC) 8.2 (*)     All other components within normal limits   COMPREHENSIVE METABOLIC PANEL - Abnormal; Notable for the following components:    Glucose 69 (*)     Albumin 2.5 (*)     All other components within normal limits   ISTAT PROCEDURE - Abnormal; Notable for the following components:    POC TCO2 (MEASURED) 30 (*)     All other components within normal limits   LIPASE   POCT GLUCOSE          Imaging Results              X-Ray Abdomen Flat And Erect (Final result)  Result time 02/15/24 20:25:28      Final result by Eugenio Cesar MD (02/15/24 20:25:28)                   Impression:      1. Paucity of bowel gas, no findings to suggest high-grade obstruction.      Electronically signed by: Eugenio Cesar MD  Date:    02/15/2024  Time:    20:25               Narrative:    EXAMINATION:  XR ABDOMEN FLAT AND ERECT    CLINICAL HISTORY:  nausea and vomiting;    TECHNIQUE:  Flat and erect AP views of the abdomen were performed.    COMPARISON:  02/14/2024    FINDINGS:  One upright view, 1 supine view.    No significant air-fluid levels on upright view there is a  paucity of gas throughout the large bowel.  There is a paucity of gas throughout the small bowel.  No focally dilated small bowel loops.  Bi-iliac stents noted.  The osseous structures are intact.  No large volume free air or pneumatosis.  The lower lung zones are clear.                                       Medications   sodium chloride 0.9% bolus 1,000 mL 1,000 mL (0 mLs Intravenous Stopped 2/15/24 2115)   prochlorperazine injection Soln 10 mg (10 mg Intravenous Given 2/15/24 2015)     Medical Decision Making  PT rapidly assessed. Reporting symptomatic gastroparesis NV for 3 days, already improving since rehab restarting reglan. Further improvement with IV antiemetic, IVF. Denies abd pain. Abd XR flat and erect neg for evidence of obstruction. Low susp for this. Screening labs are negative for evidence of progressing infection, inflammation, end organs dysfunction, acute blood loss anemia, grave electrolyte abnormality, dehydration, hypoglycemia, pancreatitis. Stable period of ED observation. Pt educated about supp care and asked to f/u with his gastroenterology asap. Asked to return to the ED immed for any new, worsening or concerning symptoms. Pt agreeable and dc'd in stable condition.    Amount and/or Complexity of Data Reviewed  Labs: ordered.  Radiology: ordered.    Risk  Prescription drug management.                                      Clinical Impression:  Final diagnoses:  [R11.10] Emesis  [R11.2] Nausea and vomiting, unspecified vomiting type (Primary)  [K31.84] Gastroparesis          ED Disposition Condition    Discharge Stable          ED Prescriptions       Medication Sig Dispense Start Date End Date Auth. Provider    ondansetron (ZOFRAN-ODT) 4 MG TbDL Take 1 tablet (4 mg total) by mouth every 6 (six) hours as needed. 12 tablet 2/15/2024 2/18/2024 Stephanie Williamson MD          Follow-up Information       Follow up With Specialties Details Why Contact Info    Mary Steele NP Gastroenterology  Schedule an appointment as soon as possible for a visit   [address] [facility] [address]  [phone]               Андрей Baron MD  02/16/24 0904

## 2024-02-16 NOTE — PROVIDER PROGRESS NOTES - EMERGENCY DEPT.
Signout Note    I received signout from the previous provider.     Chief complaint:  Emesis (Arrives from [facility] rehab for N/V for 3 days. EMS states he also has hypokalemia. Pt was hypotensive upon EMS arrival and received 500ml NS. Pt states he is at rehab for infection of is left ext. )      Pertinent history &exam:  Kulwant Mueller Jr. is a 38 y.o. male with pertinent PMH of gastroparesis, recent admission to a medical rehab facility for recovery after a septic joint who presented to emergency department with complaint of vomiting after his rehab facility did not give him Reglan for 3 days.  He was having normal bowel movements.  He was afebrile and hemodynamically stable.     He was signed out pending labs and p.o. challenge for final disposition.    Vitals:    02/15/24 2108   BP: (!) 159/92   Pulse: 89   Resp:    Temp:        Imaging Studies:    X-Ray Abdomen Flat And Erect   Final Result      1. Paucity of bowel gas, no findings to suggest high-grade obstruction.         Electronically signed by: Eugenio Cesar MD   Date:    02/15/2024   Time:    20:25          Medications Given:  Medications   potassium bicarbonate disintegrating tablet 50 mEq (50 mEq Oral Not Given 2/15/24 2045)   sodium chloride 0.9% bolus 1,000 mL 1,000 mL (0 mLs Intravenous Stopped 2/15/24 2115)   prochlorperazine injection Soln 10 mg (10 mg Intravenous Given 2/15/24 2015)       Pending Items/ MDM:  38 y.o. male with above complaint.  Labs reviewed, including CBC, CMP, lipase.  He had a blood glucose of 69.  He was able to eat and drink here, and repeat fingerstick blood glucose 79.  Suspect gastroparesis flare.  Prescription for Zofran provided.  Discharged home in stable condition.    Diagnostic Impression:    1. Nausea and vomiting, unspecified vomiting type    2. Emesis    3. Gastroparesis         ED Disposition Condition    Discharge Stable          ED Prescriptions       Medication Sig Dispense Start Date End Date Auth.  Provider    ondansetron (ZOFRAN-ODT) 4 MG TbDL Take 1 tablet (4 mg total) by mouth every 6 (six) hours as needed. 12 tablet 2/15/2024 2/18/2024 Stephanie Williamson MD          Follow-up Information       Follow up With Specialties Details Why Contact Info    Mary Steele NP Gastroenterology Schedule an appointment as soon as possible for a visit   [address] [facility] [address] [phone]              Stephanie Williamson MD  Emergency Medicine

## 2024-02-16 NOTE — DISCHARGE INSTRUCTIONS
Diagnosis: Vomiting    Tests today showed:   Labs Reviewed   CBC W/ AUTO DIFFERENTIAL - Abnormal; Notable for the following components:       Result Value    RBC 3.71 (*)     Hemoglobin 9.7 (*)     Hematocrit 32.0 (*)     MCH 26.1 (*)     MCHC 30.3 (*)     RDW 16.1 (*)     Gran # (ANC) 8.2 (*)     All other components within normal limits   COMPREHENSIVE METABOLIC PANEL - Abnormal; Notable for the following components:    Glucose 69 (*)     Albumin 2.5 (*)     All other components within normal limits   ISTAT PROCEDURE - Abnormal; Notable for the following components:    POC TCO2 (MEASURED) 30 (*)     All other components within normal limits   LIPASE   ISTAT CHEM8     Imaging Results              X-Ray Abdomen Flat And Erect (Final result)  Result time 02/15/24 20:25:28      Final result by Eugenio Cesar MD (02/15/24 20:25:28)                   Impression:      1. Paucity of bowel gas, no findings to suggest high-grade obstruction.      Electronically signed by: Eugenio Cesar MD  Date:    02/15/2024  Time:    20:25               Narrative:    EXAMINATION:  XR ABDOMEN FLAT AND ERECT    CLINICAL HISTORY:  nausea and vomiting;    TECHNIQUE:  Flat and erect AP views of the abdomen were performed.    COMPARISON:  02/14/2024    FINDINGS:  One upright view, 1 supine view.    No significant air-fluid levels on upright view there is a paucity of gas throughout the large bowel.  There is a paucity of gas throughout the small bowel.  No focally dilated small bowel loops.  Bi-iliac stents noted.  The osseous structures are intact.  No large volume free air or pneumatosis.  The lower lung zones are clear.                                      Treatments you had today:   Medications   potassium bicarbonate disintegrating tablet 50 mEq (has no administration in time range)   sodium chloride 0.9% bolus 1,000 mL 1,000 mL (1,000 mLs Intravenous New Bag 2/15/24 2015)   prochlorperazine injection Soln 10 mg (10 mg Intravenous  Given 2/15/24 2015)       Home Care Instructions include:  - Continue taking your home medications as prescribed    Take the ondansetron (Zofran) as prescribed.  Place it under your tongue and let the medication dissolve on its own  Do not swallow whole  Give the medication 30 minutes to work before eating or drinking  Drink clear fluids (water, gatorade, broth, etc) and eat simple foods  Do not eat fatty, greasy, heavy meals when using this medication    Follow-up plan:  - Follow-up with: Primary care doctor within 3 - 5  days  - Follow-up for additional testing and/or evaluation as directed by your primary doctor    Return to the emergency department for symptoms including but not limited to: worsening symptoms, persistent abdominal pain, shortness of breath or chest pain, vomiting with inability to hold down fluids, fevers greater than 100.4°F, bloody vomit or poop, passing out/unconsciousness, or other concerning symptoms.

## 2024-02-19 LAB
FUNGUS SPEC CULT: NORMAL

## 2024-02-22 PROCEDURE — G0180 MD CERTIFICATION HHA PATIENT: HCPCS | Mod: ,,, | Performed by: NURSE PRACTITIONER

## 2024-02-26 ENCOUNTER — OFFICE VISIT (OUTPATIENT)
Dept: INTERNAL MEDICINE | Facility: CLINIC | Age: 39
End: 2024-02-26
Payer: COMMERCIAL

## 2024-02-26 ENCOUNTER — INFUSION (OUTPATIENT)
Dept: INFUSION THERAPY | Facility: HOSPITAL | Age: 39
End: 2024-02-26
Attending: FAMILY MEDICINE
Payer: COMMERCIAL

## 2024-02-26 ENCOUNTER — LAB VISIT (OUTPATIENT)
Dept: LAB | Facility: HOSPITAL | Age: 39
End: 2024-02-26
Attending: FAMILY MEDICINE
Payer: COMMERCIAL

## 2024-02-26 VITALS
DIASTOLIC BLOOD PRESSURE: 78 MMHG | WEIGHT: 237.63 LBS | BODY MASS INDEX: 31.49 KG/M2 | SYSTOLIC BLOOD PRESSURE: 134 MMHG | HEART RATE: 90 BPM | HEIGHT: 73 IN | OXYGEN SATURATION: 98 %

## 2024-02-26 DIAGNOSIS — E11.43 DIABETIC GASTROPARESIS ASSOCIATED WITH TYPE 2 DIABETES MELLITUS: ICD-10-CM

## 2024-02-26 DIAGNOSIS — K31.84 DIABETIC GASTROPARESIS ASSOCIATED WITH TYPE 2 DIABETES MELLITUS: ICD-10-CM

## 2024-02-26 DIAGNOSIS — E11.42 DIABETIC POLYNEUROPATHY ASSOCIATED WITH TYPE 2 DIABETES MELLITUS: ICD-10-CM

## 2024-02-26 DIAGNOSIS — R78.81 STAPHYLOCOCCUS AUREUS BACTEREMIA: Primary | ICD-10-CM

## 2024-02-26 DIAGNOSIS — I87.012 POSTTHROMBOTIC SYNDROME WITH ULCER OF LEFT LOWER EXTREMITY: ICD-10-CM

## 2024-02-26 DIAGNOSIS — I82.512 CHRONIC DEEP VEIN THROMBOSIS (DVT) OF FEMORAL VEIN OF LEFT LOWER EXTREMITY: ICD-10-CM

## 2024-02-26 DIAGNOSIS — I82.402 RECURRENT DEEP VEIN THROMBOSIS (DVT) OF LEFT LOWER EXTREMITY: Chronic | ICD-10-CM

## 2024-02-26 DIAGNOSIS — E11.59 HYPERTENSION ASSOCIATED WITH DIABETES: Chronic | ICD-10-CM

## 2024-02-26 DIAGNOSIS — I15.2 HYPERTENSION ASSOCIATED WITH DIABETES: Chronic | ICD-10-CM

## 2024-02-26 DIAGNOSIS — Z09 HOSPITAL DISCHARGE FOLLOW-UP: Primary | ICD-10-CM

## 2024-02-26 DIAGNOSIS — M00.062 STAPHYLOCOCCAL ARTHRITIS OF LEFT KNEE: ICD-10-CM

## 2024-02-26 DIAGNOSIS — K21.00 GASTROESOPHAGEAL REFLUX DISEASE WITH ESOPHAGITIS WITHOUT HEMORRHAGE: ICD-10-CM

## 2024-02-26 DIAGNOSIS — E87.6 HYPOKALEMIA: ICD-10-CM

## 2024-02-26 DIAGNOSIS — I87.1 MAY-THURNER SYNDROME: Chronic | ICD-10-CM

## 2024-02-26 DIAGNOSIS — R78.81 STAPHYLOCOCCUS AUREUS BACTEREMIA: ICD-10-CM

## 2024-02-26 DIAGNOSIS — B95.61 STAPHYLOCOCCUS AUREUS BACTEREMIA: ICD-10-CM

## 2024-02-26 DIAGNOSIS — I50.22 CHRONIC HFREF (HEART FAILURE WITH REDUCED EJECTION FRACTION): ICD-10-CM

## 2024-02-26 DIAGNOSIS — Z79.01 CHRONIC ANTICOAGULATION: ICD-10-CM

## 2024-02-26 DIAGNOSIS — L02.415 ABSCESS OF RIGHT THIGH: ICD-10-CM

## 2024-02-26 DIAGNOSIS — F43.21 ADJUSTMENT DISORDER WITH DEPRESSED MOOD: ICD-10-CM

## 2024-02-26 DIAGNOSIS — F32.0 CURRENT MILD EPISODE OF MAJOR DEPRESSIVE DISORDER WITHOUT PRIOR EPISODE: ICD-10-CM

## 2024-02-26 DIAGNOSIS — I87.002 POST-THROMBOTIC SYNDROME OF LEFT LOWER EXTREMITY: ICD-10-CM

## 2024-02-26 DIAGNOSIS — B95.61 STAPHYLOCOCCUS AUREUS BACTEREMIA: Primary | ICD-10-CM

## 2024-02-26 PROBLEM — Z86.31 PERSONAL HISTORY OF DIABETIC FOOT ULCER: Status: RESOLVED | Noted: 2023-12-30 | Resolved: 2024-02-26

## 2024-02-26 PROBLEM — K20.80 LOS ANGELES GRADE C ESOPHAGITIS: Status: RESOLVED | Noted: 2022-03-21 | Resolved: 2024-02-26

## 2024-02-26 PROBLEM — K20.90 ESOPHAGITIS DETERMINED BY ENDOSCOPY: Status: RESOLVED | Noted: 2022-03-19 | Resolved: 2024-02-26

## 2024-02-26 PROBLEM — I50.20 HFREF (HEART FAILURE WITH REDUCED EJECTION FRACTION): Chronic | Status: RESOLVED | Noted: 2024-02-01 | Resolved: 2024-02-26

## 2024-02-26 PROBLEM — F32.A DEPRESSION: Status: RESOLVED | Noted: 2024-01-01 | Resolved: 2024-02-26

## 2024-02-26 PROBLEM — R74.8 ELEVATED CK: Status: RESOLVED | Noted: 2021-11-27 | Resolved: 2024-02-26

## 2024-02-26 PROBLEM — R11.2 INTRACTABLE NAUSEA AND VOMITING: Status: RESOLVED | Noted: 2017-08-23 | Resolved: 2024-02-26

## 2024-02-26 PROBLEM — E87.1 HYPONATREMIA: Status: RESOLVED | Noted: 2024-01-14 | Resolved: 2024-02-26

## 2024-02-26 PROBLEM — R74.01 TRANSAMINITIS: Status: RESOLVED | Noted: 2024-01-09 | Resolved: 2024-02-26

## 2024-02-26 PROBLEM — Z86.31 PERSONAL HISTORY OF DIABETIC FOOT ULCER: Status: ACTIVE | Noted: 2023-12-30

## 2024-02-26 PROBLEM — D72.828 OTHER ELEVATED WHITE BLOOD CELL COUNT: Status: RESOLVED | Noted: 2023-12-28 | Resolved: 2024-02-26

## 2024-02-26 PROBLEM — M00.9 PYOGENIC ARTHRITIS, UNSPECIFIED: Status: ACTIVE | Noted: 2024-02-21

## 2024-02-26 PROBLEM — M86.271 SUBACUTE OSTEOMYELITIS OF RIGHT FOOT: Status: ACTIVE | Noted: 2024-02-21

## 2024-02-26 PROBLEM — R50.9 FEVER: Status: RESOLVED | Noted: 2024-01-14 | Resolved: 2024-02-26

## 2024-02-26 PROBLEM — Z74.09 IMPAIRED MOBILITY: Status: RESOLVED | Noted: 2024-01-23 | Resolved: 2024-02-26

## 2024-02-26 PROBLEM — M86.272 SUBACUTE OSTEOMYELITIS OF LEFT FOOT: Status: ACTIVE | Noted: 2024-02-21

## 2024-02-26 PROBLEM — E11.9 DIABETES MELLITUS: Status: RESOLVED | Noted: 2023-12-30 | Resolved: 2024-02-26

## 2024-02-26 LAB
ESTIMATED AVG GLUCOSE: 148 MG/DL (ref 68–131)
HBA1C MFR BLD: 6.8 % (ref 4–5.6)
MAGNESIUM SERPL-MCNC: 1.8 MG/DL (ref 1.6–2.6)
POTASSIUM SERPL-SCNC: 3.9 MMOL/L (ref 3.5–5.1)

## 2024-02-26 PROCEDURE — 1159F MED LIST DOCD IN RCRD: CPT | Mod: CPTII,S$GLB,, | Performed by: FAMILY MEDICINE

## 2024-02-26 PROCEDURE — 3078F DIAST BP <80 MM HG: CPT | Mod: CPTII,S$GLB,, | Performed by: FAMILY MEDICINE

## 2024-02-26 PROCEDURE — 83735 ASSAY OF MAGNESIUM: CPT | Performed by: FAMILY MEDICINE

## 2024-02-26 PROCEDURE — 1111F DSCHRG MED/CURRENT MED MERGE: CPT | Mod: CPTII,S$GLB,, | Performed by: FAMILY MEDICINE

## 2024-02-26 PROCEDURE — 3044F HG A1C LEVEL LT 7.0%: CPT | Mod: CPTII,S$GLB,, | Performed by: FAMILY MEDICINE

## 2024-02-26 PROCEDURE — 3075F SYST BP GE 130 - 139MM HG: CPT | Mod: CPTII,S$GLB,, | Performed by: FAMILY MEDICINE

## 2024-02-26 PROCEDURE — 1160F RVW MEDS BY RX/DR IN RCRD: CPT | Mod: CPTII,S$GLB,, | Performed by: FAMILY MEDICINE

## 2024-02-26 PROCEDURE — 83036 HEMOGLOBIN GLYCOSYLATED A1C: CPT | Performed by: FAMILY MEDICINE

## 2024-02-26 PROCEDURE — 99999 PR PBB SHADOW E&M-EST. PATIENT-LVL III: CPT | Mod: PBBFAC,,, | Performed by: FAMILY MEDICINE

## 2024-02-26 PROCEDURE — 99215 OFFICE O/P EST HI 40 MIN: CPT | Mod: S$GLB,,, | Performed by: FAMILY MEDICINE

## 2024-02-26 PROCEDURE — 36415 COLL VENOUS BLD VENIPUNCTURE: CPT | Mod: XB | Performed by: FAMILY MEDICINE

## 2024-02-26 PROCEDURE — 36415 COLL VENOUS BLD VENIPUNCTURE: CPT

## 2024-02-26 PROCEDURE — 84132 ASSAY OF SERUM POTASSIUM: CPT | Performed by: FAMILY MEDICINE

## 2024-02-26 RX ORDER — HEPARIN 100 UNIT/ML
500 SYRINGE INTRAVENOUS
Status: CANCELLED | OUTPATIENT
Start: 2024-02-26

## 2024-02-26 RX ORDER — GABAPENTIN 100 MG/1
100 CAPSULE ORAL
COMMUNITY
Start: 2024-02-21 | End: 2024-02-26 | Stop reason: SDUPTHER

## 2024-02-26 RX ORDER — GABAPENTIN 100 MG/1
100 CAPSULE ORAL 3 TIMES DAILY
Qty: 270 CAPSULE | Refills: 3 | Status: SHIPPED | OUTPATIENT
Start: 2024-02-26 | End: 2025-02-25

## 2024-02-26 RX ORDER — LANOLIN ALCOHOL/MO/W.PET/CERES
400 CREAM (GRAM) TOPICAL
COMMUNITY
Start: 2024-02-21

## 2024-02-26 RX ORDER — SODIUM CHLORIDE 0.9 % (FLUSH) 0.9 %
10 SYRINGE (ML) INJECTION
Status: CANCELLED | OUTPATIENT
Start: 2024-02-26

## 2024-02-26 RX ORDER — SODIUM CHLORIDE 0.9 % (FLUSH) 0.9 %
10 SYRINGE (ML) INJECTION
Status: DISCONTINUED | OUTPATIENT
Start: 2024-02-26 | End: 2024-02-26 | Stop reason: HOSPADM

## 2024-02-26 RX ORDER — ACETAMINOPHEN 325 MG/1
650 TABLET ORAL EVERY 4 HOURS PRN
COMMUNITY
Start: 2024-02-21

## 2024-02-26 RX ORDER — ESCITALOPRAM OXALATE 10 MG/1
10 TABLET ORAL DAILY
Qty: 90 TABLET | Refills: 3 | Status: SHIPPED | OUTPATIENT
Start: 2024-02-26 | End: 2025-02-25

## 2024-02-26 RX ORDER — HEPARIN 100 UNIT/ML
500 SYRINGE INTRAVENOUS
Status: DISCONTINUED | OUTPATIENT
Start: 2024-02-26 | End: 2024-02-26 | Stop reason: HOSPADM

## 2024-02-26 RX ORDER — ESCITALOPRAM OXALATE 10 MG/1
10 TABLET ORAL
COMMUNITY
Start: 2024-02-22 | End: 2024-02-26 | Stop reason: SDUPTHER

## 2024-02-26 RX ORDER — ONDANSETRON 8 MG/1
8 TABLET, ORALLY DISINTEGRATING ORAL EVERY 8 HOURS PRN
COMMUNITY
Start: 2024-02-21 | End: 2024-02-26

## 2024-02-26 RX ORDER — SODIUM CHLORIDE 0.9 % (FLUSH) 0.9 %
10 SYRINGE (ML) INJECTION
OUTPATIENT
Start: 2024-02-26

## 2024-02-26 RX ORDER — PANTOPRAZOLE SODIUM 40 MG/1
40 TABLET, DELAYED RELEASE ORAL 2 TIMES DAILY
Qty: 180 TABLET | Refills: 3 | Status: SHIPPED | OUTPATIENT
Start: 2024-02-26 | End: 2025-02-25

## 2024-02-26 RX ORDER — CHOLECALCIFEROL (VITAMIN D3) 25 MCG
1000 TABLET ORAL
COMMUNITY
Start: 2024-02-22

## 2024-02-26 RX ORDER — INSULIN LISPRO 100 [IU]/ML
30 INJECTION, SOLUTION INTRAVENOUS; SUBCUTANEOUS
COMMUNITY
End: 2024-02-26

## 2024-02-26 RX ORDER — POTASSIUM CHLORIDE 20 MEQ/1
40 TABLET, EXTENDED RELEASE ORAL
COMMUNITY
Start: 2024-02-21 | End: 2024-03-25

## 2024-02-26 RX ORDER — HEPARIN 100 UNIT/ML
500 SYRINGE INTRAVENOUS
OUTPATIENT
Start: 2024-02-26

## 2024-02-26 NOTE — NURSING
Attempted to draw lab from left upper arm midline, but no blood return noted; flushed without difficulty. Lab obtained from right AC venipuncture. Site WNL. Blood obtained, two patient identifiers confirmed, blood sent to lab, dressing applied. Patient discharged without complication.

## 2024-02-26 NOTE — PROGRESS NOTES
Subjective:   Patient ID: Kulwant Mueller Jr. is a 38 y.o. male.  Chief Complaint:  Establish Care and Hospital Follow Up    Presents for transitional care visit/hospital discharge follow-up and to establish care with a PCP     Reviewed chart and patient's most recent admission 2/7/2024 through 2/15/2024  Also had prolonged admission from 12/27/2023 through 1/10/2024 followed by prolonged inpatient rehab stay  Admissions were for infections which included left knee septic joint arthritis with MRSA, negative ROBY, entry site believed to be diabetic heel wound/ulcer  Had repeat presentation for additional infection to include cellulitis/fasciitis/soft tissue infection which recurrent additional surgical procedures    Medical history significant for:  - Diabetes mellitus.  At discharge on Tresiba 23 units daily and NovoLog 7 units 3 times a day with meals.  A1c December 2023 7.4%, was also on Jardiance 25 mg daily at that time.  Jardiance was used due to additional history of heart failure.  Jardiance was discontinued due to recurrent skin infections.  Reports compliance with current insulin.  Denies symptoms hypo or hyperglycemia.  Needs repeat A1c.  - Gastroparesis.  On Protonix 40 mg daily, Reglan 5 mg 3 times a day, and transderm scopolamine patch.  Reports compliance.  Denies side effects.  Reports symptoms stable.  - Polyneuropathy.  On gabapentin 100 mg 3 times a day.  Reports compliance.  Denies side effects.  Pain pattern stable.  - Hypertension and CHF.  Blood pressure currently controlled and asymptomatic.  Previously prescribed Toprol-XL 12.5 mg daily Lasix 20 mg daily, but currently not taking any medication.  No signs of volume overload.  -hypokalemia.  On potassium 20 mEq supplement 2 tablets twice a day.  Reports continuing potassium even though stopped diuretic.  Needs repeat potassium level.  - May Thurner syndrome.  On chronic anticoagulation with Xarelto.  Reports compliance.  Denies side  "effects.  - Adjustment disorder with depressed mood.  On Lexapro 10 mg daily.  Reports compliance.  Denies side effects.  States mood stable and reasonably controlled    Review of Systems   Constitutional:  Negative for chills, fatigue and fever.   Eyes:  Negative for visual disturbance.   Respiratory:  Negative for cough, chest tightness, shortness of breath and wheezing.    Cardiovascular:  Negative for chest pain, palpitations and leg swelling.   Gastrointestinal:  Negative for abdominal pain, constipation, diarrhea, nausea and vomiting.   Endocrine: Negative for polydipsia, polyphagia and polyuria.   Musculoskeletal:  Positive for arthralgias and myalgias.   Skin:  Negative for rash.   Neurological:  Positive for numbness. Negative for dizziness, syncope, weakness and headaches.     Objective:   /78 (BP Location: Right arm, Patient Position: Sitting, BP Method: Large (Manual))   Pulse 90   Ht 6' 1" (1.854 m)   Wt 107.8 kg (237 lb 10.5 oz)   SpO2 98%   BMI 31.35 kg/m²     Physical Exam  Vitals and nursing note reviewed.   Constitutional:       Appearance: Normal appearance. He is well-developed. He is obese.      Comments:   Examined in wheelchair   Eyes:      General: No scleral icterus.     Conjunctiva/sclera: Conjunctivae normal.   Neck:      Thyroid: No thyroid mass, thyromegaly or thyroid tenderness.      Vascular: Normal carotid pulses. No carotid bruit or JVD.   Cardiovascular:      Rate and Rhythm: Normal rate and regular rhythm.      Pulses: Normal pulses.      Heart sounds: Normal heart sounds.   Pulmonary:      Effort: Pulmonary effort is normal.      Breath sounds: Normal breath sounds.   Abdominal:      General: There is no distension.      Palpations: Abdomen is soft.      Tenderness: There is no abdominal tenderness.   Musculoskeletal:      Right lower leg: Edema present.      Left lower leg: Edema present.   Skin:     Findings: No rash.      Comments:   Access port in place left " anterior chest wall   Area clean, dry, and intact   Psychiatric:         Mood and Affect: Mood and affect normal.       Assessment:       ICD-10-CM ICD-9-CM   1. Hospital discharge follow-up  Z09 V67.59   2. Staphylococcal arthritis of left knee  M00.062 711.06     041.10   3. Abscess of right thigh  L02.415 682.6   4. Diabetic polyneuropathy associated with type 2 diabetes mellitus  E11.42 250.60     357.2   5. Diabetic gastroparesis associated with type 2 diabetes mellitus  E11.43 250.60    K31.84 536.3   6. Gastroesophageal reflux disease with esophagitis without hemorrhage  K21.00 530.81     530.10   7. Hypertension associated with diabetes  E11.59 250.80    I15.2 401.9   8. Chronic HFrEF (heart failure with reduced ejection fraction)  I50.22 428.22   9. Hypokalemia  E87.6 276.8   10. May-Thurner syndrome  I87.1 459.2   11. Chronic deep vein thrombosis (DVT) of femoral vein of left lower extremity  I82.512 453.51   12. Recurrent deep vein thrombosis (DVT) of left lower extremity  I82.402 453.40   13. Postthrombotic syndrome with ulcer of left lower extremity  I87.012 459.11   14. Post-thrombotic syndrome of left lower extremity  I87.002 459.10   15. Chronic anticoagulation  Z79.01 V58.61   16. Adjustment disorder with depressed mood  F43.21 309.0   17. Current mild episode of major depressive disorder without prior episode  F32.0 296.21     Plan:   Hospital discharge follow-up  Staphylococcal arthritis of left knee  Abscess of right thigh  Afebrile since discharge   Pain continues to improve   Receiving PT/OT through home health   Receiving IV antibiotic therapy through home health   Complete course of treatment as recommended by Infectious Disease   Follow up with surgery as scheduled     Diabetic polyneuropathy associated with type 2 diabetes mellitus  -     gabapentin (NEURONTIN) 100 MG capsule; Take 1 capsule (100 mg total) by mouth 3 (three) times daily.  Dispense: 270 capsule; Refill: 3  Stable pain  pattern  Continue gabapentin 100 mg 3 times a day    Diabetic gastroparesis associated with type 2 diabetes mellitus  Gastroesophageal reflux disease with esophagitis without hemorrhage  -     pantoprazole (PROTONIX) 40 MG tablet; Take 1 tablet (40 mg total) by mouth 2 (two) times daily.  Dispense: 180 tablet; Refill: 3  Stable   Symptoms reasonably controlled   Continue Protonix 40 mg twice a day  Continue Reglan 5 mg 3 times a day  Continue transderm scopolamine patch   A1c repeated today   Adjust Tresiba 23 units daily and/or NovoLog 7 units 3 times a day as needed  Remain off Jardiance   If needs additional agent, can consider Mounro   Follow-up diabetes management as scheduled     Hypertension associated with diabetes  Chronic HFrEF (heart failure with reduced ejection fraction)  Controlled.  Stable.  Asymptomatic.    Currently off beta-blocker and diuretic     Hypokalemia  -     POTASSIUM; Future; Expected date: 02/26/2024  -     Magnesium; Future; Expected date: 02/26/2024  Recheck potassium and magnesium levels   Adjust potassium 40 mEq twice a day supplement if indicated  Adjust magnesium 400 mg daily supplement as needed     May-Thurner syndrome  Chronic deep vein thrombosis (DVT) of femoral vein of left lower extremity  Recurrent deep vein thrombosis (DVT) of left lower extremity  Postthrombotic syndrome with ulcer of left lower extremity  Post-thrombotic syndrome of left lower extremity  Chronic anticoagulation  Continue chronic anticoagulation with Eliquis     Adjustment disorder with depressed mood  Current mild episode of major depressive disorder without prior episode  -     EScitalopram oxalate (LEXAPRO) 10 MG tablet; Take 1 tablet (10 mg total) by mouth once daily.  Dispense: 90 tablet; Refill: 3  Stable, no side effects, mood and symptoms well controlled on present medication .  Continue Lexapro 10 mg daily     Return to clinic one-month    Transitional Care Note    Family and/or Caretaker  present at visit?  Yes.  Diagnostic tests reviewed/disposition: I have reviewed all completed as well as pending diagnostic tests at the time of discharge.  Disease/illness education:  Diabetes, hypertension, CHF, chronic anticoagulation  Home health/community services discussion/referrals: Patient has home health established at [facility] .   Establishment or re-establishment of referral orders for community resources: No other necessary community resources.   Discussion with other health care providers:  Need to discuss with PT/OT if patient is lower body strength is improved inappropriate for safe operation motor vehicle    40+ minutes of total time spent on the encounter, which includes face to face time and non-face to face time preparing to see the patient (eg, review of tests), Obtaining and/or reviewing separately obtained history, documenting clinical information in the electronic or other health record, independently interpreting results (not separately reported) and communicating results to the patient/family/caregiver, or Care coordination (not separately reported).

## 2024-02-27 ENCOUNTER — TELEPHONE (OUTPATIENT)
Dept: INTERNAL MEDICINE | Facility: CLINIC | Age: 39
End: 2024-02-27
Payer: COMMERCIAL

## 2024-02-27 PROBLEM — F32.0 CURRENT MILD EPISODE OF MAJOR DEPRESSIVE DISORDER WITHOUT PRIOR EPISODE: Status: ACTIVE | Noted: 2024-02-27

## 2024-02-27 PROBLEM — I82.512 CHRONIC DEEP VEIN THROMBOSIS (DVT) OF FEMORAL VEIN OF LEFT LOWER EXTREMITY: Status: ACTIVE | Noted: 2024-02-27

## 2024-02-27 PROBLEM — I87.012: Status: ACTIVE | Noted: 2024-02-27

## 2024-02-27 NOTE — TELEPHONE ENCOUNTER
----- Message from Lawrence Magana MD sent at 2/27/2024  7:19 AM CST -----  Can we contact [facility] and see who the physical therapist occupational therapist working with patient  Need to ask them if they think his lower extremities are strong enough to safely operate/drive a motor vehicle

## 2024-02-27 NOTE — TELEPHONE ENCOUNTER
Spoke with Lubna; she stated pt has OT appt tomorrow with Suly; she states she will put an alert on his chart and someone will contact PCP office /LD

## 2024-02-29 ENCOUNTER — TELEPHONE (OUTPATIENT)
Dept: ORTHOPEDICS | Facility: CLINIC | Age: 39
End: 2024-02-29
Payer: COMMERCIAL

## 2024-02-29 ENCOUNTER — LAB VISIT (OUTPATIENT)
Dept: LAB | Facility: HOSPITAL | Age: 39
End: 2024-02-29
Attending: STUDENT IN AN ORGANIZED HEALTH CARE EDUCATION/TRAINING PROGRAM
Payer: COMMERCIAL

## 2024-02-29 DIAGNOSIS — L40.3 SAPHO SYNDROME: ICD-10-CM

## 2024-02-29 DIAGNOSIS — M00.9 PYOGENIC ARTHRITIS, UPPER ARM: Primary | ICD-10-CM

## 2024-02-29 DIAGNOSIS — M00.062 STAPHYLOCOCCAL ARTHRITIS OF LEFT KNEE: ICD-10-CM

## 2024-02-29 DIAGNOSIS — L70.9 SAPHO SYNDROME: ICD-10-CM

## 2024-02-29 DIAGNOSIS — M86.9 SAPHO SYNDROME: ICD-10-CM

## 2024-02-29 DIAGNOSIS — M65.9 SAPHO SYNDROME: ICD-10-CM

## 2024-02-29 DIAGNOSIS — M85.80 SAPHO SYNDROME: ICD-10-CM

## 2024-02-29 PROCEDURE — 80053 COMPREHEN METABOLIC PANEL: CPT | Performed by: STUDENT IN AN ORGANIZED HEALTH CARE EDUCATION/TRAINING PROGRAM

## 2024-02-29 PROCEDURE — 36415 COLL VENOUS BLD VENIPUNCTURE: CPT | Mod: PO | Performed by: STUDENT IN AN ORGANIZED HEALTH CARE EDUCATION/TRAINING PROGRAM

## 2024-02-29 PROCEDURE — 85025 COMPLETE CBC W/AUTO DIFF WBC: CPT | Performed by: STUDENT IN AN ORGANIZED HEALTH CARE EDUCATION/TRAINING PROGRAM

## 2024-02-29 PROCEDURE — 82550 ASSAY OF CK (CPK): CPT | Performed by: STUDENT IN AN ORGANIZED HEALTH CARE EDUCATION/TRAINING PROGRAM

## 2024-02-29 PROCEDURE — 86140 C-REACTIVE PROTEIN: CPT | Performed by: STUDENT IN AN ORGANIZED HEALTH CARE EDUCATION/TRAINING PROGRAM

## 2024-02-29 NOTE — TELEPHONE ENCOUNTER
Tried reaching out to Amos with HH caring for pt nurse at facility could not reach him. Name and number was left for return call back.

## 2024-02-29 NOTE — TELEPHONE ENCOUNTER
----- Message from Ana Ashraf sent at 2/29/2024  2:58 PM CST -----  Regarding: [facility]  Contact: Amos Hernandez @ [phone]  Amos Hernandez is calling to inform pt called to report redness, swelling, and tightness of right knee non surgical site, please call Amos @ [phone]

## 2024-03-01 LAB
ALBUMIN SERPL BCP-MCNC: 2.7 G/DL (ref 3.5–5.2)
ALP SERPL-CCNC: 68 U/L (ref 55–135)
ALT SERPL W/O P-5'-P-CCNC: 10 U/L (ref 10–44)
ANION GAP SERPL CALC-SCNC: 15 MMOL/L (ref 8–16)
AST SERPL-CCNC: 18 U/L (ref 10–40)
BASOPHILS # BLD AUTO: 0.03 K/UL (ref 0–0.2)
BASOPHILS NFR BLD: 0.3 % (ref 0–1.9)
BILIRUB SERPL-MCNC: 0.2 MG/DL (ref 0.1–1)
BUN SERPL-MCNC: 8 MG/DL (ref 6–20)
CALCIUM SERPL-MCNC: 9.1 MG/DL (ref 8.7–10.5)
CHLORIDE SERPL-SCNC: 102 MMOL/L (ref 95–110)
CK SERPL-CCNC: 187 U/L (ref 20–200)
CO2 SERPL-SCNC: 22 MMOL/L (ref 23–29)
CREAT SERPL-MCNC: 0.8 MG/DL (ref 0.5–1.4)
CRP SERPL-MCNC: 41.2 MG/L (ref 0–8.2)
DIFFERENTIAL METHOD BLD: ABNORMAL
EOSINOPHIL # BLD AUTO: 0.1 K/UL (ref 0–0.5)
EOSINOPHIL NFR BLD: 1 % (ref 0–8)
ERYTHROCYTE [DISTWIDTH] IN BLOOD BY AUTOMATED COUNT: 17.1 % (ref 11.5–14.5)
EST. GFR  (NO RACE VARIABLE): >60 ML/MIN/1.73 M^2
GLUCOSE SERPL-MCNC: 194 MG/DL (ref 70–110)
HCT VFR BLD AUTO: 34.5 % (ref 40–54)
HGB BLD-MCNC: 9.6 G/DL (ref 14–18)
IMM GRANULOCYTES # BLD AUTO: 0.03 K/UL (ref 0–0.04)
IMM GRANULOCYTES NFR BLD AUTO: 0.3 % (ref 0–0.5)
LYMPHOCYTES # BLD AUTO: 1.8 K/UL (ref 1–4.8)
LYMPHOCYTES NFR BLD: 18.3 % (ref 18–48)
MCH RBC QN AUTO: 26.2 PG (ref 27–31)
MCHC RBC AUTO-ENTMCNC: 27.8 G/DL (ref 32–36)
MCV RBC AUTO: 94 FL (ref 82–98)
MONOCYTES # BLD AUTO: 0.5 K/UL (ref 0.3–1)
MONOCYTES NFR BLD: 5.5 % (ref 4–15)
NEUTROPHILS # BLD AUTO: 7.4 K/UL (ref 1.8–7.7)
NEUTROPHILS NFR BLD: 74.6 % (ref 38–73)
NRBC BLD-RTO: 0 /100 WBC
PLATELET # BLD AUTO: 326 K/UL (ref 150–450)
PMV BLD AUTO: 10.4 FL (ref 9.2–12.9)
POTASSIUM SERPL-SCNC: 3.9 MMOL/L (ref 3.5–5.1)
PROT SERPL-MCNC: 7.6 G/DL (ref 6–8.4)
RBC # BLD AUTO: 3.66 M/UL (ref 4.6–6.2)
SODIUM SERPL-SCNC: 139 MMOL/L (ref 136–145)
WBC # BLD AUTO: 9.87 K/UL (ref 3.9–12.7)

## 2024-03-03 NOTE — PROGRESS NOTES
Infectious Disease Clinic Visit    Reason:    Hosp f/u    HPI:        +MRSA Pyogenic arthritis of left native knee joint and Abscess of right thigh     38M with h/o May-Thurner syndrome on Xarelto, DM-ID, chronic foot wounds, and HTN, with multiple recent prior admissions for MRSA bacteremia (Index Bcx + 12/27/24) c/b native L knee septic arthritis, s/p L knee arthrotomy with synovectomy on 12/29. (Surgical cxs +MRSA). ROBY 1/02/24 neg. Pt required salvage therapy dapto/ceftaroline & bld cxs finally cleared 1/07, and then placed on daptomycin monotherapy to complete 4wks duration (ASYA 02/06) per Gm ID (Yana). Of note, L shoulder imaging showed subdeltoid bursitis w/ SS tear; superimposed infection could be present but no drainable fluid collections. Also, R heel without osteo but had cellulitis, myositis, tenosynovitis.      Pt was discharged (01/10) to rehab with daptomycin monotherapy; but started having new fevers (up to 103F) at rehab, and re-admitted (01/14) for workup. Blood cultures 01/14/24 NGTD. MRI T/L spine negative for osteo-discitis. Found to have persistent left knee septic arthritis and right thigh abscess and underwent L knee arthroscopic I&D on 1/17. IR drain placed 01/19 R lateral thigh abscess, cxs +MRSA.  Taken to OR 01/21 for repeat surgical I&D of L knee (#3), and R thigh abscess with new R thigh drain placed. Surgical cxs (01/21) of L knee and R thigh NGTD.     Discharged to [facility] with IV daptomycin 8mg/kg Q24h. Re-presented 1/31/2024 with tachycardia, fever. MRI left knee with: Findings compatible with persistent septic joint and early changes of osteomyelitis in the femoral condyles patella and tibial spines. And MRI pelvis with: Worsening of fluid enhancement in the right myofascial planes and gluteal muscles suggesting infected subcutaneous fat and tensor fascia jonah bursa with myositis and or infection of lateral gluteal muscles on the right. Evaluated by ortho and s/p  aspiration of left knee joint fluid and right thigh fluid collection. Gram stains with moderate WBC, no organisms.     Bcx 1/31 growing Acinetobacter Baumannii in 1/4 (from PICC line). - PICC line removed  Bcx 2/3 - no growth after 48 hours      Antibiotics that admission:         Daptomycin 12/27 - present         Zosyn added 2/2/24, switched to ampicillin/sulbactam 2/5 after susceptibilities reviewed for Acinetobacter baumannii      S/p arthroscopic I&D Left knee and right thigh I&D 2/1. Cultures with no growth, drain remains in place.   Pt discharged to rehab for PT and Iv abx; to complete IV Daptomycin for six total weeks (ASYA 0 3/03) and IV ampicillin/sulbactam for two total weeks ( ASYA 02/17).    (03/04) Pt presents to ID clinic. L knee swelling stable, relatively unchanged per pt, but non painful with AROM and no TTP or warmth on exam. Picc LUE c/d/I.  Rt thigh / hip abscess well healed, no issues. Pt reports developed new draining wound at LLE (anterior shin), appearance c/f vascular related ulcer. Thin, wheeping drainage on dressings, but no active drainage or purulence. Skin macerated, but no TTP or gross fluctuance. Pt denies fever, chills, sweats, otherwise doing okay. Pt was discharged to home from rehab about 1wk ago.       ROS      Physical Exam      Review of patient's allergies indicates:   Allergen Reactions    Heparin analogues Nausea And Vomiting         Current Outpatient Medications:     acetaminophen (TYLENOL) 325 MG tablet, Take 650 mg by mouth every 4 (four) hours as needed., Disp: , Rfl:     bisacodyL (DULCOLAX) 10 mg Supp, Place 10 mg rectally daily as needed., Disp: , Rfl:     blood-glucose meter,continuous (DEXCOM G7 ) Misc, 1 Device by Misc.(Non-Drug; Combo Route) route once daily., Disp: 1 each, Rfl: 0    blood-glucose sensor (DEXCOM G7 SENSOR) Justyna, 1 Device by Misc.(Non-Drug; Combo Route) route every 10 days., Disp: 3 each, Rfl: 11    EScitalopram oxalate (LEXAPRO) 10 MG  "tablet, Take 1 tablet (10 mg total) by mouth once daily., Disp: 90 tablet, Rfl: 3    gabapentin (NEURONTIN) 100 MG capsule, Take 1 capsule (100 mg total) by mouth 3 (three) times daily., Disp: 270 capsule, Rfl: 3    glucagon (GVOKE HYPOPEN 2-PACK) 1 mg/0.2 mL AtIn, Inject 1 mg into the skin as needed (SEVERE HYPOGLYCEMIA)., Disp: 2 each, Rfl: 5    insulin aspart U-100 (NOVOLOG U-100 INSULIN ASPART) 100 unit/mL injection, Inject 7 Units into the skin 3 (three) times daily before meals., Disp: 6.3 mL, Rfl: 11    lactobacillus acidophilus & bulgar (LACTINEX) 100 million cell packet, Take 1 tablet by mouth once daily., Disp: , Rfl:     loperamide (IMODIUM A-D) 2 mg Tab, Take 2 mg by mouth 3 (three) times daily as needed., Disp: , Rfl:     magnesium oxide (MAG-OX) 400 mg (241.3 mg magnesium) tablet, Take 400 mg by mouth., Disp: , Rfl:     metoclopramide HCl (REGLAN) 10 MG tablet, Take 0.5 tablets (5 mg total) by mouth 3 (three) times daily before meals., Disp: 270 tablet, Rfl: 3    pantoprazole (PROTONIX) 40 MG tablet, Take 1 tablet (40 mg total) by mouth 2 (two) times daily., Disp: 180 tablet, Rfl: 3    pen needle, diabetic (BD ULTRA-FINE DIYA PEN NEEDLE) 32 gauge x 5/32" Ndle, Use with Tresiba daily and Humalog 3-4 times daily as directed., Disp: 200 each, Rfl: 5    polyethylene glycol (GLYCOLAX) 17 gram PwPk, Take 17 g by mouth once daily., Disp: , Rfl:     potassium chloride SA (K-DUR,KLOR-CON) 20 MEQ tablet, Take 40 mEq by mouth., Disp: , Rfl:     rivaroxaban (XARELTO) 10 mg Tab, Take 1 tablet (10 mg total) by mouth daily with dinner or evening meal., Disp: 90 tablet, Rfl: 3    scopolamine (TRANSDERM-SCOP) 1.3-1.5 mg (1 mg over 3 days), Place 1 patch onto the skin every 72 hours as needed (intractable nausea/vomiting)., Disp: , Rfl:     sodium chloride 0.9 % PgBk 100 mL with ampicillin-sulbactam 3 gram SolR, Infuse 3 grams intravenously every 6 hours. EOT 2/17/23, Disp: , Rfl: 0    TRESIBA FLEXTOUCH U-200 200 " "unit/mL (3 mL) insulin pen, Inject 16 Units into the skin once daily., Disp: 1 pen , Rfl: 9    vitamin D (VITAMIN D3) 1000 units Tab, Take 1,000 Units by mouth., Disp: , Rfl:     doxycycline (VIBRA-TABS) 100 MG tablet, Take 1 tablet (100 mg total) by mouth every 12 (twelve) hours. Take 1 tablet (100 mg total) by mouth every 12 (twelve) hours. AVOID dairy products, multi-vitamins, calcium, magnesium, antacids-tums within 2hrs of taking med, before and after. for 21 days, Disp: 42 tablet, Rfl: 0    Past Medical History:   Diagnosis Date    Anticoagulant long-term use     COVID-19 virus infection     Diabetes mellitus     Diabetes mellitus, type 2     Hypertension     May-Thurner syndrome        Lab Results   Component Value Date    WBC 9.87 03/01/2024    CRP 29.7 (H) 03/04/2024    SEDRATE 76 (H) 01/31/2024    CREATININE 0.8 03/04/2024    AST 13 03/04/2024    ALT 9 (L) 03/04/2024    ALKPHOS 81 03/04/2024       Immunization History   Administered Date(s) Administered    COVID-19, MRNA, LN-S, PF (Pfizer) (Purple Cap) 12/28/2020, 01/19/2021, 02/11/2022    Influenza - Quadrivalent - PF *Preferred* (6 months and older) 09/29/2020, 09/30/2021, 10/20/2022, 10/20/2023         Reviewed available labs and imaging.     Vitals:    03/04/24 1323   BP: (!) 146/96   BP Location: Right arm   Pulse: 92   Temp: 97.7 °F (36.5 °C)   TempSrc: Oral   Weight: 111.6 kg (246 lb 0.5 oz)   Height: 6' 1" (1.854 m)         Assessment:  Encounter Diagnoses   Name Primary?    Staphylococcal arthritis of left knee Yes    Abscess of right thigh     Septic prepatellar bursitis of left knee     Staphylococcus aureus bacteremia    -- Rt thigh abscess / L native knee septic joint improved s/p 6wks Iv-dapto.   -- now with LLE wheeping vascular ulcer, c/f SSTI  Plan:   Labs today; CRP, CBC, CMP  Remove picc line  Follow-up appt with ortho 03/14.   Oral doxy x 3wks, ASYA 03/25/24  Wound care and vascualr referreal in [place]?  If not, then wound care f/u " appt here when he see ortho 03/14.   Obtain x-ray Lt foot / tib-fib when he comes back to see ortho.   HH-wound care set up also      Follow-up: PRN      Orders Placed This Encounter   Procedures    C-reactive protein    Comprehensive Metabolic Panel    PICC line removal    Nursing communication          50

## 2024-03-04 ENCOUNTER — INFUSION (OUTPATIENT)
Dept: INFECTIOUS DISEASES | Facility: HOSPITAL | Age: 39
End: 2024-03-04
Payer: COMMERCIAL

## 2024-03-04 ENCOUNTER — OFFICE VISIT (OUTPATIENT)
Dept: INFECTIOUS DISEASES | Facility: CLINIC | Age: 39
End: 2024-03-04
Payer: COMMERCIAL

## 2024-03-04 VITALS
TEMPERATURE: 98 F | HEIGHT: 72 IN | RESPIRATION RATE: 18 BRPM | WEIGHT: 244.81 LBS | BODY MASS INDEX: 33.16 KG/M2 | SYSTOLIC BLOOD PRESSURE: 136 MMHG | OXYGEN SATURATION: 95 % | DIASTOLIC BLOOD PRESSURE: 75 MMHG | HEART RATE: 96 BPM

## 2024-03-04 VITALS
BODY MASS INDEX: 32.61 KG/M2 | HEART RATE: 92 BPM | HEIGHT: 73 IN | DIASTOLIC BLOOD PRESSURE: 96 MMHG | WEIGHT: 246.06 LBS | TEMPERATURE: 98 F | SYSTOLIC BLOOD PRESSURE: 146 MMHG

## 2024-03-04 DIAGNOSIS — R78.81 STAPHYLOCOCCUS AUREUS BACTEREMIA: ICD-10-CM

## 2024-03-04 DIAGNOSIS — M71.162 SEPTIC PREPATELLAR BURSITIS OF LEFT KNEE: ICD-10-CM

## 2024-03-04 DIAGNOSIS — B95.61 STAPHYLOCOCCUS AUREUS BACTEREMIA: ICD-10-CM

## 2024-03-04 DIAGNOSIS — L02.415 ABSCESS OF RIGHT THIGH: ICD-10-CM

## 2024-03-04 DIAGNOSIS — M00.062 STAPHYLOCOCCAL ARTHRITIS OF LEFT KNEE: Primary | ICD-10-CM

## 2024-03-04 DIAGNOSIS — M00.062 STAPHYLOCOCCAL ARTHRITIS OF LEFT KNEE: ICD-10-CM

## 2024-03-04 LAB
ACID FAST MOD KINY STN SPEC: NORMAL
ALBUMIN SERPL BCP-MCNC: 2.7 G/DL (ref 3.5–5.2)
ALP SERPL-CCNC: 81 U/L (ref 55–135)
ALT SERPL W/O P-5'-P-CCNC: 9 U/L (ref 10–44)
ANION GAP SERPL CALC-SCNC: 12 MMOL/L (ref 8–16)
AST SERPL-CCNC: 13 U/L (ref 10–40)
BILIRUB SERPL-MCNC: 0.2 MG/DL (ref 0.1–1)
BUN SERPL-MCNC: 7 MG/DL (ref 6–20)
CALCIUM SERPL-MCNC: 9.1 MG/DL (ref 8.7–10.5)
CHLORIDE SERPL-SCNC: 104 MMOL/L (ref 95–110)
CO2 SERPL-SCNC: 24 MMOL/L (ref 23–29)
CREAT SERPL-MCNC: 0.8 MG/DL (ref 0.5–1.4)
CRP SERPL-MCNC: 29.7 MG/L (ref 0–8.2)
EST. GFR  (NO RACE VARIABLE): >60 ML/MIN/1.73 M^2
GLUCOSE SERPL-MCNC: 259 MG/DL (ref 70–110)
MYCOBACTERIUM SPEC QL CULT: NORMAL
POTASSIUM SERPL-SCNC: 4.1 MMOL/L (ref 3.5–5.1)
PROT SERPL-MCNC: 7.6 G/DL (ref 6–8.4)
SODIUM SERPL-SCNC: 140 MMOL/L (ref 136–145)

## 2024-03-04 PROCEDURE — 86140 C-REACTIVE PROTEIN: CPT | Performed by: STUDENT IN AN ORGANIZED HEALTH CARE EDUCATION/TRAINING PROGRAM

## 2024-03-04 PROCEDURE — 3044F HG A1C LEVEL LT 7.0%: CPT | Mod: CPTII,S$GLB,, | Performed by: STUDENT IN AN ORGANIZED HEALTH CARE EDUCATION/TRAINING PROGRAM

## 2024-03-04 PROCEDURE — 3077F SYST BP >= 140 MM HG: CPT | Mod: CPTII,S$GLB,, | Performed by: STUDENT IN AN ORGANIZED HEALTH CARE EDUCATION/TRAINING PROGRAM

## 2024-03-04 PROCEDURE — 3080F DIAST BP >= 90 MM HG: CPT | Mod: CPTII,S$GLB,, | Performed by: STUDENT IN AN ORGANIZED HEALTH CARE EDUCATION/TRAINING PROGRAM

## 2024-03-04 PROCEDURE — 3008F BODY MASS INDEX DOCD: CPT | Mod: CPTII,S$GLB,, | Performed by: STUDENT IN AN ORGANIZED HEALTH CARE EDUCATION/TRAINING PROGRAM

## 2024-03-04 PROCEDURE — 1159F MED LIST DOCD IN RCRD: CPT | Mod: CPTII,S$GLB,, | Performed by: STUDENT IN AN ORGANIZED HEALTH CARE EDUCATION/TRAINING PROGRAM

## 2024-03-04 PROCEDURE — 80053 COMPREHEN METABOLIC PANEL: CPT | Performed by: STUDENT IN AN ORGANIZED HEALTH CARE EDUCATION/TRAINING PROGRAM

## 2024-03-04 PROCEDURE — 1111F DSCHRG MED/CURRENT MED MERGE: CPT | Mod: CPTII,S$GLB,, | Performed by: STUDENT IN AN ORGANIZED HEALTH CARE EDUCATION/TRAINING PROGRAM

## 2024-03-04 PROCEDURE — 99999 PR PBB SHADOW E&M-EST. PATIENT-LVL V: CPT | Mod: PBBFAC,,, | Performed by: STUDENT IN AN ORGANIZED HEALTH CARE EDUCATION/TRAINING PROGRAM

## 2024-03-04 PROCEDURE — 99214 OFFICE O/P EST MOD 30 MIN: CPT | Mod: S$GLB,,, | Performed by: STUDENT IN AN ORGANIZED HEALTH CARE EDUCATION/TRAINING PROGRAM

## 2024-03-04 RX ORDER — DOXYCYCLINE HYCLATE 100 MG
100 TABLET ORAL EVERY 12 HOURS
Qty: 42 TABLET | Refills: 0 | Status: SHIPPED | OUTPATIENT
Start: 2024-03-04 | End: 2024-03-26

## 2024-03-04 NOTE — PROGRESS NOTES
Pt arrived to infusion clinic for midline removal and labs.   Unable to draw labs from midline.  Midline removed and vazoline gauze applied. Instructed pt to leave dressing on 4 hours.  Labs drawn with butterfly.

## 2024-03-05 LAB
FUNGUS SPEC CULT: NORMAL

## 2024-03-10 LAB
ACID FAST MOD KINY STN SPEC: NORMAL
ACID FAST MOD KINY STN SPEC: NORMAL
MYCOBACTERIUM SPEC QL CULT: NORMAL
MYCOBACTERIUM SPEC QL CULT: NORMAL

## 2024-03-14 ENCOUNTER — TELEPHONE (OUTPATIENT)
Dept: ORTHOPEDICS | Facility: CLINIC | Age: 39
End: 2024-03-14

## 2024-03-19 ENCOUNTER — TELEPHONE (OUTPATIENT)
Dept: ORTHOPEDICS | Facility: CLINIC | Age: 39
End: 2024-03-19

## 2024-03-19 ENCOUNTER — PATIENT MESSAGE (OUTPATIENT)
Dept: ORTHOPEDICS | Facility: CLINIC | Age: 39
End: 2024-03-19

## 2024-03-19 ENCOUNTER — HOSPITAL ENCOUNTER (OUTPATIENT)
Dept: RADIOLOGY | Facility: HOSPITAL | Age: 39
Discharge: HOME | End: 2024-03-19
Attending: PHYSICIAN ASSISTANT
Payer: COMMERCIAL

## 2024-03-19 ENCOUNTER — PATIENT MESSAGE (OUTPATIENT)
Dept: INTERNAL MEDICINE | Facility: CLINIC | Age: 39
End: 2024-03-19
Payer: COMMERCIAL

## 2024-03-19 ENCOUNTER — OFFICE VISIT (OUTPATIENT)
Dept: ORTHOPEDICS | Facility: CLINIC | Age: 39
End: 2024-03-19
Payer: COMMERCIAL

## 2024-03-19 VITALS — BODY MASS INDEX: 33.14 KG/M2 | WEIGHT: 244.69 LBS | HEIGHT: 72 IN

## 2024-03-19 DIAGNOSIS — M00.062 STAPHYLOCOCCAL ARTHRITIS OF LEFT KNEE: ICD-10-CM

## 2024-03-19 PROCEDURE — 99999 PR PBB SHADOW E&M-EST. PATIENT-LVL IV: CPT | Mod: PBBFAC,,, | Performed by: PHYSICIAN ASSISTANT

## 2024-03-19 PROCEDURE — 73560 X-RAY EXAM OF KNEE 1 OR 2: CPT | Mod: TC,LT

## 2024-03-19 PROCEDURE — 73560 X-RAY EXAM OF KNEE 1 OR 2: CPT | Mod: 26,LT,, | Performed by: RADIOLOGY

## 2024-03-19 PROCEDURE — 99024 POSTOP FOLLOW-UP VISIT: CPT | Mod: S$GLB,,, | Performed by: PHYSICIAN ASSISTANT

## 2024-03-19 PROCEDURE — 3044F HG A1C LEVEL LT 7.0%: CPT | Mod: CPTII,S$GLB,, | Performed by: PHYSICIAN ASSISTANT

## 2024-03-19 NOTE — PROGRESS NOTES
Principal Orthopedic Problem: L knee septic arthritis and R hip/thigh abscess     1.16.24- L knee scope for septic arthritis ( Gavi)  1.21.24 - L knee scope for septic arthritis + I&D R hip ( Nathalia)  02/01/24: Dr. Gatica:   Left knee arthroscopy with irrigation and synovectomy for septic arthritis  Manipulation under anesthesia left knee  Incision and drainage abscess left thigh, deep  Excisional debridement skin subcutaneous tissue right thigh 30 sq cm    Mr. Mueller is here today for a post-operative visit    Interval History:  he reports that he is doing ok.   he is at home . he is  participating in PT/OT.   Pain is somewhat controled.  he is  taking pain medication.  Reports continued swelling in left knee. He dose not have updated labs.   Reports he is taking antibiotics as prescribed. He is followed by ID.   he denies fever, chills, and sweats .     Physical exam:    Patient arrives to exam room:    Incision is clean, dry and intact.     Healing well no signs of breakdown or infection. Moderate swelling mild pain with passive range of motion.   Bandage to foot     RADS: All pertinent images were reviewed by myself:   none done today    Assessment:  Post-op visit ( 2 weeks)    Plan:  Current care, treatment plan, precautions, activity level/ modifications, limitations, rehabilitation exercises and proposed future treatment were discussed with the patient. We discussed the need to monitor for changes in symptoms and condition and report them to the physician.  Discussed importance of compliance with all appointments and follow up examinations.     WOUND CARE :  - The patient was advised to keep the incision clean and dry for the next 24 hours after which he may wash the area with antibacterial soap in the shower. Will not submerge until the incision is completely healed  -Patient was advised to monitor wound closely and multiple times daily for any problems. Call clinic immediately or report to ED for  immediate medical attention for any complications including reopening of wound, drainage, purulence, redness, streaking, odor, pain out of proportion, fever, chills, etc.       ACTIVITY:   - as tolerated  -range of motion as tolerated    - WBAT      -PT/OT, , Patient is responsible to establish and continue care      PAIN MEDICATION:   - Multimodal pain control  - Pain medication: refill was not needed  - Pain medication refill policy provided to patient for review, yes.    - Patient was informed a multi-modal approach is used to treat their pain. With the goal to get off of narcotic pain medication and discontinue as soon as possible.   - ice and elevation to reduce pain and swelling     DVT PROPHYLAXIS:   - Xarelto    OTHER:   CRP and SED    FOLLOW UP:   - Patient will follow up in the clinic in 4 weeks, sooner if any concerns.  - X-ray of his knee is needed.      Lab work was elevated. Spoke with MD. Recommended MRI.  Patient insurance denied MRI. Will repeat labs.       If there are any questions prior to scheduled follow up, the patient was instructed to contact the office

## 2024-03-19 NOTE — TELEPHONE ENCOUNTER
----- Message from Tori Millard sent at 3/19/2024  8:01 AM CDT -----  Regarding: running late  Contact: [phone]   Pt's wife Chloé is calling to advise the office of running late for their appt today, due to traffice [place] [facility] [place] is moving but really slow.  Pt is still asking to be seen. Please call to advise. Thanks.  Appt time: 8:15  ETA:  8:33  [phone]

## 2024-03-20 ENCOUNTER — TELEPHONE (OUTPATIENT)
Dept: ORTHOPEDICS | Facility: CLINIC | Age: 39
End: 2024-03-20
Payer: COMMERCIAL

## 2024-03-20 DIAGNOSIS — M00.062 STAPHYLOCOCCAL ARTHRITIS OF LEFT KNEE: Primary | ICD-10-CM

## 2024-03-20 NOTE — TELEPHONE ENCOUNTER
MRI  Called and Informed patient of appointment on 3/2724 and mailed.  Patient states verbal understanding and has no further questions.

## 2024-03-20 NOTE — TELEPHONE ENCOUNTER
----- Message from Deepa Carter PA-C sent at 3/20/2024  9:44 AM CDT -----  I ordered MRI of the left thigh with contrast. Would like to see if can do closer to [place]. Can you please schedule.   Thanks,   Deepa

## 2024-03-21 ENCOUNTER — DOCUMENT SCAN (OUTPATIENT)
Dept: HOME HEALTH SERVICES | Facility: HOSPITAL | Age: 39
End: 2024-03-21
Payer: COMMERCIAL

## 2024-03-21 LAB
ACID FAST MOD KINY STN SPEC: NORMAL
MYCOBACTERIUM SPEC QL CULT: NORMAL

## 2024-03-22 DIAGNOSIS — M71.162 SEPTIC PREPATELLAR BURSITIS OF LEFT KNEE: ICD-10-CM

## 2024-03-22 DIAGNOSIS — M00.062 STAPHYLOCOCCAL ARTHRITIS OF LEFT KNEE: ICD-10-CM

## 2024-03-22 DIAGNOSIS — L02.415 ABSCESS OF RIGHT THIGH: ICD-10-CM

## 2024-03-22 DIAGNOSIS — B95.61 STAPHYLOCOCCUS AUREUS BACTEREMIA: ICD-10-CM

## 2024-03-22 DIAGNOSIS — R78.81 STAPHYLOCOCCUS AUREUS BACTEREMIA: ICD-10-CM

## 2024-03-25 ENCOUNTER — OFFICE VISIT (OUTPATIENT)
Dept: INTERNAL MEDICINE | Facility: CLINIC | Age: 39
End: 2024-03-25
Payer: COMMERCIAL

## 2024-03-25 DIAGNOSIS — M00.062 STAPHYLOCOCCAL ARTHRITIS OF LEFT KNEE: Primary | ICD-10-CM

## 2024-03-25 DIAGNOSIS — K31.84 DIABETIC GASTROPARESIS ASSOCIATED WITH TYPE 2 DIABETES MELLITUS: ICD-10-CM

## 2024-03-25 DIAGNOSIS — K21.00 GASTROESOPHAGEAL REFLUX DISEASE WITH ESOPHAGITIS WITHOUT HEMORRHAGE: ICD-10-CM

## 2024-03-25 DIAGNOSIS — E11.42 DIABETIC POLYNEUROPATHY ASSOCIATED WITH TYPE 2 DIABETES MELLITUS: ICD-10-CM

## 2024-03-25 DIAGNOSIS — E87.6 HYPOKALEMIA: ICD-10-CM

## 2024-03-25 DIAGNOSIS — E11.43 DIABETIC GASTROPARESIS ASSOCIATED WITH TYPE 2 DIABETES MELLITUS: ICD-10-CM

## 2024-03-25 DIAGNOSIS — E83.42 HYPOMAGNESEMIA: ICD-10-CM

## 2024-03-25 DIAGNOSIS — L02.415 ABSCESS OF RIGHT THIGH: ICD-10-CM

## 2024-03-25 PROCEDURE — 1160F RVW MEDS BY RX/DR IN RCRD: CPT | Mod: CPTII,95,, | Performed by: FAMILY MEDICINE

## 2024-03-25 PROCEDURE — 99214 OFFICE O/P EST MOD 30 MIN: CPT | Mod: 95,,, | Performed by: FAMILY MEDICINE

## 2024-03-25 PROCEDURE — 1159F MED LIST DOCD IN RCRD: CPT | Mod: CPTII,95,, | Performed by: FAMILY MEDICINE

## 2024-03-25 PROCEDURE — 3044F HG A1C LEVEL LT 7.0%: CPT | Mod: CPTII,95,, | Performed by: FAMILY MEDICINE

## 2024-03-25 NOTE — PROGRESS NOTES
Subjective:   Patient ID: Kulwant Mueller Jr. is a 39 y.o. male.  Chief Complaint:  No chief complaint on file.    Presents for one-month follow-up     Last visit to establish care and hospital discharge follow-up for staph clinical arthritis left knee and abscess of right thigh  Reports continued improvement with PT/OT  No longer on IV therapy, but taking doxycycline 100 mg however only once a day and not twice a day as prescribed  Had follow up with Infectious Disease and orthopedist since last visit   MRI of knee scheduled for later in the week,  Currently remains out of work in on disability, forms completed by orthopedist     Diabetes with polyneuropathy and gastroparesis  A1c last visit 6.8% and well controlled on Tresiba 23 units daily and NovoLog 7 units 3 times a day with meals   Jardiance discontinued due to recurrent skin/soft tissue infections   No additional diabetic medication needed at this time  Neuropathy reasonably controlled with gabapentin 100 mg 3 times a day   GERD/gastroparesis has been reasonably controlled since last visit on Protonix, Reglan, and transderm scopolamine.  Gastroparesis Diabetic polyneuropathy associated with type 2 diabetes mellitus     - Hypertension associated with diabetes and Chronic HFrEF (heart failure with reduced ejection fraction)  Remains controlled, stable, and asymptomatic off beta-blocker and diuretic     - Hypokalemia and hypo magnesemia  Magnesium level 1.8 and normal.  Continue current magnesium 400 mg daily.  Potassium level 4.1 and normal off any potassium supplementation.  No need to restart potassium.     - May-Thurner syndrome  - Chronic deep vein thrombosis (DVT) of femoral vein of left lower extremity  - Recurrent deep vein thrombosis (DVT) of left lower extremity  - Postthrombotic syndrome with ulcer of left lower extremity  - Post-thrombotic syndrome of left lower extremity  Continued chronic anticoagulation with Eliquis      Adjustment disorder  with depressed mood  Current mild episode of major depressive disorder without prior episode  -     EScitalopram oxalate (LEXAPRO) 10 MG tablet; Take 1 tablet (10 mg total) by mouth once daily.  Dispense: 90 tablet; Refill: 3  Stable, no side effects, mood and symptoms well controlled on present medication .  Continued Lexapro 10 mg daily     Overall doing well with no new complaints today    Review of Systems   Constitutional:  Negative for activity change and unexpected weight change.   HENT:  Negative for hearing loss, rhinorrhea and trouble swallowing.    Eyes:  Negative for discharge and visual disturbance.   Respiratory:  Negative for chest tightness and wheezing.    Cardiovascular:  Negative for chest pain and palpitations.   Gastrointestinal:  Negative for blood in stool, constipation, diarrhea and vomiting.   Endocrine: Negative for polydipsia and polyuria.   Genitourinary:  Negative for difficulty urinating, hematuria and urgency.   Musculoskeletal:  Positive for gait problem and myalgias. Negative for arthralgias, joint swelling and neck pain.   Neurological:  Negative for headaches.   Psychiatric/Behavioral:  Negative for confusion and dysphoric mood.        Objective:     Physical Exam  Constitutional:       Appearance: Normal appearance. He is obese.   Psychiatric:         Mood and Affect: Mood and affect normal.       Assessment:       ICD-10-CM ICD-9-CM   1. Staphylococcal arthritis of left knee  M00.062 711.06     041.10   2. Abscess of right thigh  L02.415 682.6   3. Diabetic polyneuropathy associated with type 2 diabetes mellitus  E11.42 250.60     357.2   4. Diabetic gastroparesis associated with type 2 diabetes mellitus  E11.43 250.60    K31.84 536.3   5. Gastroesophageal reflux disease with esophagitis without hemorrhage  K21.00 530.81     530.10   6. Hypokalemia  E87.6 276.8   7. Hypomagnesemia  E83.42 275.2     Plan:   Staphylococcal arthritis of left knee  Abscess of right thigh  Continue  improvement based on report from last exam   Finish current antibiotic course of treatment   Obtain MRI as planned  Follow-up orthopedist scheduled    Diabetic polyneuropathy associated with type 2 diabetes mellitus  Diabetic gastroparesis associated with type 2 diabetes mellitus  A1c less than 7%   Diabetes controlled  Neuropathy stable on gabapentin 100 mg 3 times a day  Gastroparesis reasonably controlled on current regimen     Gastroesophageal reflux disease with esophagitis without hemorrhage  Symptoms stable   Continue current PPI     Hypokalemia  Last potassium normal off supplement  Okay remain off any supplementation     Hypomagnesemia  Last magnesium level normal   Continue magnesium oxide 400 mg daily supplement     Return to clinic 6 months or sooner as needed

## 2024-03-26 RX ORDER — DOXYCYCLINE HYCLATE 100 MG
TABLET ORAL
Qty: 42 TABLET | Refills: 0 | Status: SHIPPED | OUTPATIENT
Start: 2024-03-26

## 2024-03-27 DIAGNOSIS — M00.062 STAPHYLOCOCCAL ARTHRITIS OF LEFT KNEE: Primary | ICD-10-CM

## 2024-04-01 ENCOUNTER — LAB VISIT (OUTPATIENT)
Dept: LAB | Facility: HOSPITAL | Age: 39
End: 2024-04-01
Payer: COMMERCIAL

## 2024-04-01 ENCOUNTER — OFFICE VISIT (OUTPATIENT)
Dept: INFECTIOUS DISEASES | Facility: CLINIC | Age: 39
End: 2024-04-01
Payer: COMMERCIAL

## 2024-04-01 VITALS
HEART RATE: 93 BPM | DIASTOLIC BLOOD PRESSURE: 72 MMHG | HEIGHT: 72 IN | BODY MASS INDEX: 32.85 KG/M2 | WEIGHT: 242.5 LBS | TEMPERATURE: 98 F | SYSTOLIC BLOOD PRESSURE: 117 MMHG

## 2024-04-01 DIAGNOSIS — M00.062 STAPHYLOCOCCAL ARTHRITIS OF LEFT KNEE: ICD-10-CM

## 2024-04-01 DIAGNOSIS — M71.162 SEPTIC PREPATELLAR BURSITIS OF LEFT KNEE: Primary | ICD-10-CM

## 2024-04-01 LAB
CRP SERPL-MCNC: 6.9 MG/L (ref 0–8.2)
ERYTHROCYTE [SEDIMENTATION RATE] IN BLOOD BY PHOTOMETRIC METHOD: 69 MM/HR (ref 0–23)

## 2024-04-01 PROCEDURE — 36415 COLL VENOUS BLD VENIPUNCTURE: CPT | Performed by: PHYSICIAN ASSISTANT

## 2024-04-01 PROCEDURE — 3078F DIAST BP <80 MM HG: CPT | Mod: CPTII,S$GLB,, | Performed by: PHYSICIAN ASSISTANT

## 2024-04-01 PROCEDURE — 1159F MED LIST DOCD IN RCRD: CPT | Mod: CPTII,S$GLB,, | Performed by: PHYSICIAN ASSISTANT

## 2024-04-01 PROCEDURE — 86140 C-REACTIVE PROTEIN: CPT | Performed by: PHYSICIAN ASSISTANT

## 2024-04-01 PROCEDURE — 99214 OFFICE O/P EST MOD 30 MIN: CPT | Mod: S$GLB,,, | Performed by: PHYSICIAN ASSISTANT

## 2024-04-01 PROCEDURE — 99999 PR PBB SHADOW E&M-EST. PATIENT-LVL IV: CPT | Mod: PBBFAC,,, | Performed by: PHYSICIAN ASSISTANT

## 2024-04-01 PROCEDURE — 3074F SYST BP LT 130 MM HG: CPT | Mod: CPTII,S$GLB,, | Performed by: PHYSICIAN ASSISTANT

## 2024-04-01 PROCEDURE — 3008F BODY MASS INDEX DOCD: CPT | Mod: CPTII,S$GLB,, | Performed by: PHYSICIAN ASSISTANT

## 2024-04-01 PROCEDURE — 3044F HG A1C LEVEL LT 7.0%: CPT | Mod: CPTII,S$GLB,, | Performed by: PHYSICIAN ASSISTANT

## 2024-04-01 PROCEDURE — 85652 RBC SED RATE AUTOMATED: CPT | Performed by: PHYSICIAN ASSISTANT

## 2024-04-01 NOTE — PROGRESS NOTES
Subjective     Patient ID: Kulwant Mueller Jr. is a 39 y.o. male.    Chief Complaint: Follow-up    HPI    38M with h/o May-Thurner syndrome on Xarelto, DM, chronic foot wounds, and HTN, with multiple recent prior admissions for MRSA bacteremia c/b native L knee septic arthritis, s/p L knee arthrotomy with synovectomy on 12/29. (Surgical cxs +MRSA). ROBY 1/02/24 neg. Pt completed 4 weeks of IV abx 2/6/24. Pt hx of subdeltoid bursitis w/ SS tear; superimposed infection could be present but no drainable fluid collections. Also, R heel without osteo but had cellulitis, myositis, tenosynovitis.     Pt readmitted in January for persistent left knee septic arthritis and right thigh abscess and underwent L knee arthroscopic I&D on 1/17. IR drain placed 01/19 R lateral thigh abscess, cxs +MRSA. Taken to OR 01/21 for repeat surgical I&D of L knee , and R thigh abscess with new R thigh drain placed. Surgical cxs (01/21) of L knee and R thigh NGTD. Pt was ultimately discharged to rehab with 6 weeks of IV daptomycin EOC 3/3/24.  Pt had acinetobacter bacteremia 2/2 picc line, removed. Pt completed two weeks of IV unasyn EOC 2/17/24 4/1/24: Pt is here for follow up. Pt is taking his oral doxycycline. He was taking it once a day but is now taking twice a day as directed. He denies having any fever chills or night sweats. No n/v/d,dysuria. No open wounds. He is ambulating. No significant left knee pain. Continues to have persistent left knee swelling. Saw orthopedics two weeks ago and is scheduled for blood work and mri this week. Otherwise no acute complaints or concerns.       Review of Systems   Constitutional:  Negative for chills, diaphoresis, fatigue and fever.   HENT:  Negative for nasal congestion, ear pain, nosebleeds, rhinorrhea and sore throat.    Eyes:  Negative for pain.   Respiratory:  Negative for cough, shortness of breath and wheezing.    Cardiovascular:  Negative for chest pain and leg swelling.    Gastrointestinal:  Negative for abdominal pain, constipation, diarrhea, nausea and vomiting.   Genitourinary:  Negative for dysuria, frequency and urgency.   Musculoskeletal:  Positive for arthralgias (left knee), gait problem and joint swelling (left knee). Negative for back pain and neck pain.   Integumentary:  Negative for pallor, rash and wound.   Neurological:  Negative for weakness, numbness and headaches.          Objective     Physical Exam  Vitals and nursing note reviewed.   Constitutional:       General: He is not in acute distress.     Appearance: He is well-developed. He is not diaphoretic.   HENT:      Head: Normocephalic and atraumatic.   Eyes:      Pupils: Pupils are equal, round, and reactive to light.   Cardiovascular:      Rate and Rhythm: Normal rate and regular rhythm.      Heart sounds: Normal heart sounds. No murmur heard.     No friction rub. No gallop.   Pulmonary:      Effort: Pulmonary effort is normal. No respiratory distress.      Breath sounds: Normal breath sounds. No wheezing or rales.   Chest:      Chest wall: No tenderness.   Abdominal:      General: Bowel sounds are normal. There is no distension.      Palpations: There is no mass.      Tenderness: There is no abdominal tenderness. There is no guarding.   Musculoskeletal:         General: Swelling (left knee) present. No tenderness, deformity or signs of injury. Normal range of motion.      Cervical back: Normal range of motion and neck supple.   Skin:     General: Skin is warm and dry.      Findings: No erythema or rash.   Neurological:      Mental Status: He is alert and oriented to person, place, and time.   Psychiatric:         Behavior: Behavior normal.         Thought Content: Thought content normal.         Judgment: Judgment normal.            Assessment and Plan     1. Septic prepatellar bursitis of left knee        Labs today; ESR CRP per orthopedics  Complete course of abx as anticipated  MRI scheduled this Thursday.  F/u   No further plans to continue abx unless with recurrent infection. Would consider rheumatology evaluation for persistent knee pain/swelling if no evidence of infection. Per pt, family history of rheumatoid arthritis.         No follow-ups on file.

## 2024-04-02 ENCOUNTER — EXTERNAL HOME HEALTH (OUTPATIENT)
Dept: HOME HEALTH SERVICES | Facility: HOSPITAL | Age: 39
End: 2024-04-02
Payer: COMMERCIAL

## 2024-04-03 ENCOUNTER — PATIENT MESSAGE (OUTPATIENT)
Dept: ORTHOPEDICS | Facility: CLINIC | Age: 39
End: 2024-04-03
Payer: COMMERCIAL

## 2024-04-05 ENCOUNTER — PATIENT MESSAGE (OUTPATIENT)
Dept: DIABETES | Facility: CLINIC | Age: 39
End: 2024-04-05
Payer: COMMERCIAL

## 2024-04-08 NOTE — HPI
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 Recently admitted to [facility] on 12/27/23 for nausea, vomiting, diarrhea, and generalized weakness x 6 days. Upon arrival, found to be in DKA. Hospital course complicated by MRSA bacteremia from L knee septic arthritis (S/p L knee arthrotomy with synovectomy on 12/29. Bcx with staph aureus),  L shoulder pain (s/p aspiration. ID was consulted and recommending IV Daptomycin x 6 weeks and Added Ceftaroline 1/4/2024), R heel wound (MRI foot with no evidence of osteomyelitis), and R UE DVT (continues on Xarelto). Discharged to [facility] rehab on 1/10/24 and presented to [facility] on 1/14/24 for fever. Taken to OR and s/p L knee arthroscopic I&D on 1/17, s/p repeat L knee I&D and right thigh abscess I&D on 1/21. Discharged to [facility] and readmitted back to [facility] on 1/31/24 for fever. Now s/p arthroscopic I&D of the left knee and thigh with debridement of right thigh on 2/1/24. ID consulted and recommending Unasyn end date 2/17/24 and Dapto end date 3/3/24.     Functional History: Patient lives with spouse in a single story home and was I with no use of DME prior to last hospitalization.    err

## 2024-04-15 ENCOUNTER — PATIENT MESSAGE (OUTPATIENT)
Dept: ORTHOPEDICS | Facility: CLINIC | Age: 39
End: 2024-04-15
Payer: COMMERCIAL

## 2024-04-17 DIAGNOSIS — Z79.4 TYPE 2 DIABETES MELLITUS WITH COMPLICATION, WITH LONG-TERM CURRENT USE OF INSULIN: Chronic | ICD-10-CM

## 2024-04-17 DIAGNOSIS — E11.8 TYPE 2 DIABETES MELLITUS WITH COMPLICATION, WITH LONG-TERM CURRENT USE OF INSULIN: Chronic | ICD-10-CM

## 2024-04-18 ENCOUNTER — PATIENT MESSAGE (OUTPATIENT)
Dept: DIABETES | Facility: CLINIC | Age: 39
End: 2024-04-18
Payer: COMMERCIAL

## 2024-04-18 RX ORDER — BLOOD-GLUCOSE SENSOR
1 EACH MISCELLANEOUS
Qty: 3 EACH | Refills: 11 | OUTPATIENT
Start: 2024-04-18 | End: 2025-04-18

## 2024-04-22 ENCOUNTER — PATIENT MESSAGE (OUTPATIENT)
Dept: ORTHOPEDICS | Facility: CLINIC | Age: 39
End: 2024-04-22
Payer: COMMERCIAL

## 2024-04-23 ENCOUNTER — TELEPHONE (OUTPATIENT)
Dept: ORTHOPEDICS | Facility: CLINIC | Age: 39
End: 2024-04-23
Payer: COMMERCIAL

## 2024-04-26 ENCOUNTER — DOCUMENT SCAN (OUTPATIENT)
Dept: HOME HEALTH SERVICES | Facility: HOSPITAL | Age: 39
End: 2024-04-26
Payer: COMMERCIAL

## 2024-05-08 ENCOUNTER — TELEPHONE (OUTPATIENT)
Dept: ORTHOPEDICS | Facility: CLINIC | Age: 39
End: 2024-05-08
Payer: COMMERCIAL

## 2024-05-30 ENCOUNTER — PATIENT MESSAGE (OUTPATIENT)
Dept: INTERNAL MEDICINE | Facility: CLINIC | Age: 39
End: 2024-05-30
Payer: COMMERCIAL

## 2024-05-31 ENCOUNTER — PATIENT MESSAGE (OUTPATIENT)
Dept: INTERNAL MEDICINE | Facility: CLINIC | Age: 39
End: 2024-05-31
Payer: COMMERCIAL

## 2024-05-31 ENCOUNTER — TELEPHONE (OUTPATIENT)
Dept: INTERNAL MEDICINE | Facility: CLINIC | Age: 39
End: 2024-05-31
Payer: COMMERCIAL

## 2024-05-31 NOTE — TELEPHONE ENCOUNTER
----- Message from Bonnie Coffey sent at 5/31/2024 11:32 AM CDT -----  Contact: kevin Snider pt is calling to see if his medical juanpablo can be uploaded in [employer]. Please give a call back at [phone]

## 2024-05-31 NOTE — TELEPHONE ENCOUNTER
----- Message from Lawrence Magana MD sent at 5/31/2024 10:08 AM CDT -----  Form for work completed for this patient   Please contact him to arrange

## 2024-06-10 ENCOUNTER — TELEPHONE (OUTPATIENT)
Dept: DIABETES | Facility: CLINIC | Age: 39
End: 2024-06-10
Payer: COMMERCIAL

## 2024-06-12 ENCOUNTER — OFFICE VISIT (OUTPATIENT)
Dept: DIABETES | Facility: CLINIC | Age: 39
End: 2024-06-12
Payer: COMMERCIAL

## 2024-06-12 DIAGNOSIS — Z79.4 TYPE 2 DIABETES MELLITUS WITH COMPLICATION, WITH LONG-TERM CURRENT USE OF INSULIN: Primary | Chronic | ICD-10-CM

## 2024-06-12 DIAGNOSIS — E11.8 TYPE 2 DIABETES MELLITUS WITH COMPLICATION, WITH LONG-TERM CURRENT USE OF INSULIN: Primary | Chronic | ICD-10-CM

## 2024-06-12 PROCEDURE — 99214 OFFICE O/P EST MOD 30 MIN: CPT | Mod: 95,,, | Performed by: NURSE PRACTITIONER

## 2024-06-12 PROCEDURE — 3044F HG A1C LEVEL LT 7.0%: CPT | Mod: CPTII,95,, | Performed by: NURSE PRACTITIONER

## 2024-06-12 RX ORDER — BLOOD-GLUCOSE SENSOR
1 EACH MISCELLANEOUS
Qty: 3 EACH | Refills: 11 | Status: SHIPPED | OUTPATIENT
Start: 2024-06-12 | End: 2025-06-12

## 2024-06-12 NOTE — Clinical Note
Virtual, Dexcom, 7/15/24 at 3:30 Schedule fasting labs, on 6/24 in [facility].  Schedule DM Education - pump eval. after next week, will be out of town for next 8 days.

## 2024-06-12 NOTE — PATIENT INSTRUCTIONS
PATIENT INSTRUCTIONS     Send message in [facility] portal once you have worn your Dexcom sensor for at least 7 days for me to review the data.     Refer to diabetes education.  - pump evaluation.   Labs ordered and will be scheduled by [facility] staff.     Lifestyle modification with well balanced diet and at least 30 minutes of physical activity daily recommended.   Increase water intake.   Increase protein and non-starchy vegetable servings with meals.   Decrease carbohydrate servings with meals.   Take 10 minute walk after each meal.   Avoid snacking on carbohydrates, choose low carb, high protein snacks.   Incorporate resistance training with weights or resistance bands with exercise regimen at least 3 days a week.       Continue Jardiance 25 mg by mouth daily.   Continue Tresiba 43 units.   Continue Novolog three times daily with meals using correction scale below:     If less than 200, may take 5 units of Novolog  If  - 250, may take 6 units of Novolog  If  - 300, may take 7 units of Novolog  If  - 350, may take 8 units of Novolog  If  - 400, may take 9 units of Novolog  If +, may take 10 units of Novolog     Continue Dexcom G7 CGM  Blood Sugar Goals:       Fastin-130.       1-2 hours after a meal: Less than 180.

## 2024-06-12 NOTE — PROGRESS NOTES
The patient location is: Home  The chief complaint leading to consultation is: Diabetes    Visit type: audiovisual    Face to Face time with patient: 15  30 minutes of total time spent on the encounter, which includes face to face time and non-face to face time preparing to see the patient (eg, review of tests), Obtaining and/or reviewing separately obtained history, Documenting clinical information in the electronic or other health record, Independently interpreting results (not separately reported) and communicating results to the patient/family/caregiver, or Care coordination (not separately reported).  Each patient to whom he or she provides medical services by telemedicine is:  (1) informed of the relationship between the physician and patient and the respective role of any other health care provider with respect to management of the patient; and (2) notified that he or she may decline to receive medical services by telemedicine and may withdraw from such care at any time.    Notes:    Kulwant Mueller Jr. is a 39 y.o. male who presents for a follow up evaluation of Type 2 diabetes mellitus.     CHIEF COMPLAINT: Diabetes Consultation    PCP: Lawrence Magana MD      Initial visit with me - 6/27/2023    The patient was initially diagnosed with diabetes at age 25.   Previously followed at [facility] by Lisa Bro NP, last visit 3/2022.    Stopped taking Humalog and Tresiba 3 weeks prior to initial visit with me due to making him feel weak, but restarted week before due to blood sugars going up.      Previous failed treatments include:  Metformin  Jardiance - recurrent skin/soft tissue infections.      Social Documentation:  Patient lives in [place]. 3 children, 15, 13, 7.   Occupation:  at [employer] in [place].   Exercise: walking at work.       Diabetes related complications:   peripheral neuropathy.   denies Pancreatitis  reports Gastroparesis - last ED visit in 2/2024  "for exacerbation.   denies DKA  denies Hx/family Hx of MEN2/MTC  denies Frequent UTIs/yeast infections     Cardiovascular Risk Factors: dyslipidemia, family history of premature cardiovascular disease, hypertension, male gender, and obesity (BMI >30 kg/m2).    Diabetes Medications               glucagon (GVOKE HYPOPEN 2-PACK) 1 mg/0.2 mL AtIn Inject 1 mg into the skin as needed (SEVERE HYPOGLYCEMIA).    insulin aspart U-100 (NOVOLOG U-100 INSULIN ASPART) 100 unit/mL injection Inject 7 Units into the skin 3 (three) times daily before meals. - TAKING 10-20 UNITS DAILY.     TRESIBA FLEXTOUCH U-200 200 unit/mL (3 mL) insulin pen Inject 16 Units into the skin once daily. - TAKING 43 UNITS DAILY.      Current monitoring regimen:  Dexcom G7 - has not had in a while.      Recent hypoglycemic episodes: No.   Patient compliant with glucose checks and medication administration? Yes    DIABETES MANAGEMENT STATUS  Statin: Not taking  ACE/ARB: Not taking  Screening or Prevention Patient's value Goal Complete/Controlled?   HgA1C Testing and Control   Lab Results   Component Value Date    HGBA1C 6.8 (H) 02/26/2024      Annually/Less than 8% No   Lipid profile : 09/27/2023 Annually Yes   LDL control Lab Results   Component Value Date    LDLCALC 120.6 09/27/2023    Annually/Less than 100 mg/dl  No   Nephropathy screening Lab Results   Component Value Date    LABMICR 243.0 07/20/2023     Lab Results   Component Value Date    PROTEINUA Negative 01/31/2024     No results found for: "UTPCR"   Annually Yes   Blood pressure BP Readings from Last 1 Encounters:   04/01/24 117/72    Less than 140/90 Yes   Dilated retinal exam : 02/17/2023 Annually Yes   Foot exam   : 11/27/2021 Annually No   Patient's medications, allergies, surgical, social and family histories were reviewed and updated as appropriate.     Review of Systems   Constitutional:  Negative for weight loss.   Eyes:  Negative for blurred vision and double vision.   Cardiovascular:  " Negative for chest pain.   Gastrointestinal:  Negative for nausea and vomiting.   Genitourinary:  Negative for frequency.   Musculoskeletal:  Negative for falls.   Neurological:  Negative for dizziness and weakness.   Endo/Heme/Allergies:  Negative for polydipsia.   Psychiatric/Behavioral:  Negative for depression.    All other systems reviewed and are negative.       Physical Exam  Constitutional:       General: He is not in acute distress.     Appearance: Normal appearance.   Pulmonary:      Effort: No respiratory distress.   Neurological:      Mental Status: He is alert and oriented to person, place, and time.   Psychiatric:         Mood and Affect: Mood normal.         Behavior: Behavior normal.        There were no vitals taken for this visit.  Wt Readings from Last 3 Encounters:   04/01/24 110 kg (242 lb 8.1 oz)   03/19/24 111 kg (244 lb 11.4 oz)   03/04/24 111 kg (244 lb 13.1 oz)       LAB REVIEW  Lab Results   Component Value Date     03/04/2024    K 4.1 03/04/2024     03/04/2024    CO2 24 03/04/2024    BUN 7 03/04/2024    CREATININE 0.8 03/04/2024    CALCIUM 9.1 03/04/2024    ANIONGAP 12 03/04/2024    EGFRNORACEVR >60.0 03/04/2024     Lab Results   Component Value Date    CPEPTIDE 3.10 09/27/2023    GLUTAMICACID 0.01 09/27/2023     Hemoglobin A1C   Date Value Ref Range Status   02/26/2024 6.8 (H) 4.0 - 5.6 % Final     Comment:     ADA Screening Guidelines:  5.7-6.4%  Consistent with prediabetes  >or=6.5%  Consistent with diabetes    High levels of fetal hemoglobin interfere with the HbA1C  assay. Heterozygous hemoglobin variants (HbS, HgC, etc)do  not significantly interfere with this assay.   However, presence of multiple variants may affect accuracy.     12/28/2023 7.4 (H) 4.0 - 5.6 % Final     Comment:     ADA Screening Guidelines:  5.7-6.4%  Consistent with prediabetes  >or=6.5%  Consistent with diabetes    High levels of fetal hemoglobin interfere with the HbA1C  assay. Heterozygous  hemoglobin variants (HbS, HgC, etc)do  not significantly interfere with this assay.   However, presence of multiple variants may affect accuracy.     09/27/2023 8.2 (H) 4.0 - 5.6 % Final     Comment:     ADA Screening Guidelines:  5.7-6.4%  Consistent with prediabetes  >or=6.5%  Consistent with diabetes    High levels of fetal hemoglobin interfere with the HbA1C  assay. Heterozygous hemoglobin variants (HbS, HgC, etc)do  not significantly interfere with this assay.   However, presence of multiple variants may affect accuracy.     09/27/2023 8.2 (H) 4.0 - 5.6 % Final     Comment:     ADA Screening Guidelines:  5.7-6.4%  Consistent with prediabetes  >or=6.5%  Consistent with diabetes    High levels of fetal hemoglobin interfere with the HbA1C  assay. Heterozygous hemoglobin variants (HbS, HgC, etc)do  not significantly interfere with this assay.   However, presence of multiple variants may affect accuracy.          ASSESSMENT    ICD-10-CM ICD-9-CM   1. Type 2 diabetes mellitus with complication, with long-term current use of insulin  E11.8 250.90    Z79.4 V58.67         PLAN  Diagnoses and all orders for this visit:    Type 2 diabetes mellitus with complication, with long-term current use of insulin  -     Insulin Antibody; Future  -     Hemoglobin A1C; Future  -     Glutamic Acid Decarboxylase; Future  -     C-Peptide; Future  -     Comprehensive Metabolic Panel; Future  -     Ambulatory referral/consult to Diabetes Education; Future  -     blood-glucose sensor (DEXCOM G7 SENSOR) Justyna; 1 Device by Misc.(Non-Drug; Combo Route) route every 10 days.      Reviewed pathophysiology of diabetes, complications related to the disease, importance of annual dilated eye exam and daily foot examination. Explained MOA, SE, dosage of medications. Written instructions given and reviewed with patient and patient verbalizes understanding.     8/28/2023 - states attempted to  Jardiance twice, but states there was issue at  "pharmacy and was never able to fill. Will send to [facility] today. G7 sensors falling off after a few days due to sweat/heat. Patient given additional adhesive options. Did not have clarity vane when he was trying to set up sharing. Will give more detailed instruction and new sharing code. Follow up 4 weeks.     9/25/2023 - started jardiance yesterday, states he "felt a little weird" after taking it, but better today. Still having issues with adhesive with g7, discussed additional overlay options. Will schedule nurse visit at the [facility] to set up data sharing with dexcom and have labs done prior to f/u     10/23/2023 - patient had to cancel nurse visit due to work schedule. Will be able to go on Friday, will f/u 1 week to review dexcom data. A1c improved to 8.2.     6/12/2024 - Last visit with me 10/23/23. 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 Had repeat presentation for additional infection to include cellulitis/fasciitis/soft tissue infection which required additional surgical procedures. Today patient reports all wounds are well healed. Has not had G7 since hospital discharge in March. Has been performing fingersticks and reports compliance with insulin. He is interested in OP5, will refer for pump eval. He is requesting medical clearance to work with 's office. Discussed that I will need to review at least 1 week of dexcom data and get updated labs prior to clearance. He reports he was hypoglycemic at his physical last week with glucose 45, +s/s.     PATIENT INSTRUCTIONS     Refer to diabetes education.  - pump evaluation.   Labs ordered and will be scheduled by [facility] staff.     Lifestyle modification with well balanced diet and at least 30 minutes of physical " activity daily recommended.   Increase water intake.   Increase protein and non-starchy vegetable servings with meals.   Decrease carbohydrate servings with meals.   Take 10 minute walk after each meal.   Avoid snacking on carbohydrates, choose low carb, high protein snacks.   Incorporate resistance training with weights or resistance bands with exercise regimen at least 3 days a week.       Continue Jardiance 25 mg by mouth daily.   Continue Tresiba 43 units.   Continue Novolog three times daily with meals using correction scale below:     If less than 200, may take 5 units of Novolog  If  - 250, may take 6 units of Novolog  If  - 300, may take 7 units of Novolog  If  - 350, may take 8 units of Novolog  If  - 400, may take 9 units of Novolog  If +, may take 10 units of Novolog     Continue Dexcom G7 CGM  Blood Sugar Goals:       Fastin-130.       1-2 hours after a meal: Less than 180.       Follow up in about 1 month (around 7/15/2024) for Virtual, Dexcom, Schedule fasting labs, Schedule DM Education.    Portions of this note were prepared with [employer] Naturally Speaking voice recognition transcription software. Grammatical errors, including garbled syntax, mangle pronouns, and other bizarre constructions may be attributed to that software system.

## 2024-06-19 ENCOUNTER — PATIENT MESSAGE (OUTPATIENT)
Dept: DIABETES | Facility: CLINIC | Age: 39
End: 2024-06-19
Payer: COMMERCIAL

## 2024-06-24 ENCOUNTER — CLINICAL SUPPORT (OUTPATIENT)
Dept: DIABETES | Facility: CLINIC | Age: 39
End: 2024-06-24
Payer: COMMERCIAL

## 2024-06-24 DIAGNOSIS — Z79.4 TYPE 2 DIABETES MELLITUS WITH COMPLICATION, WITH LONG-TERM CURRENT USE OF INSULIN: Primary | ICD-10-CM

## 2024-06-24 DIAGNOSIS — E11.8 TYPE 2 DIABETES MELLITUS WITH COMPLICATION, WITH LONG-TERM CURRENT USE OF INSULIN: Primary | ICD-10-CM

## 2024-06-25 ENCOUNTER — LAB VISIT (OUTPATIENT)
Dept: LAB | Facility: HOSPITAL | Age: 39
End: 2024-06-25
Attending: NURSE PRACTITIONER
Payer: COMMERCIAL

## 2024-06-25 DIAGNOSIS — E11.8 TYPE 2 DIABETES MELLITUS WITH COMPLICATION, WITH LONG-TERM CURRENT USE OF INSULIN: Chronic | ICD-10-CM

## 2024-06-25 DIAGNOSIS — Z79.4 TYPE 2 DIABETES MELLITUS WITH COMPLICATION, WITH LONG-TERM CURRENT USE OF INSULIN: Chronic | ICD-10-CM

## 2024-06-25 LAB
ALBUMIN SERPL BCP-MCNC: 3.7 G/DL (ref 3.5–5.2)
ALP SERPL-CCNC: 80 U/L (ref 38–126)
ALT SERPL W/O P-5'-P-CCNC: 19 U/L (ref 10–44)
ANION GAP SERPL CALC-SCNC: 10 MMOL/L (ref 8–16)
AST SERPL-CCNC: 27 U/L (ref 15–46)
BILIRUB SERPL-MCNC: 0.3 MG/DL (ref 0.1–1)
C PEPTIDE SERPL-MCNC: 1.79 NG/ML (ref 0.78–5.19)
CALCIUM SERPL-MCNC: 8.7 MG/DL (ref 8.7–10.5)
CHLORIDE SERPL-SCNC: 106 MMOL/L (ref 95–110)
CO2 SERPL-SCNC: 26 MMOL/L (ref 23–29)
CREAT SERPL-MCNC: 0.85 MG/DL (ref 0.5–1.4)
EST. GFR  (NO RACE VARIABLE): >60 ML/MIN/1.73 M^2
ESTIMATED AVG GLUCOSE: 298 MG/DL (ref 68–131)
GLUCOSE SERPL-MCNC: 147 MG/DL (ref 70–110)
HBA1C MFR BLD: 12 % (ref 4–5.6)
POTASSIUM SERPL-SCNC: 4.1 MMOL/L (ref 3.5–5.1)
PROT SERPL-MCNC: 7.1 G/DL (ref 6–8.4)
SODIUM SERPL-SCNC: 142 MMOL/L (ref 136–145)
UUN UR-MCNC: 15 MG/DL (ref 2–20)

## 2024-06-25 PROCEDURE — 36415 COLL VENOUS BLD VENIPUNCTURE: CPT | Mod: PN | Performed by: NURSE PRACTITIONER

## 2024-06-25 PROCEDURE — 83036 HEMOGLOBIN GLYCOSYLATED A1C: CPT | Performed by: NURSE PRACTITIONER

## 2024-06-25 PROCEDURE — 80053 COMPREHEN METABOLIC PANEL: CPT | Mod: PN | Performed by: NURSE PRACTITIONER

## 2024-06-25 PROCEDURE — 86337 INSULIN ANTIBODIES: CPT | Mod: PN | Performed by: NURSE PRACTITIONER

## 2024-06-25 PROCEDURE — 86341 ISLET CELL ANTIBODY: CPT | Mod: PN | Performed by: NURSE PRACTITIONER

## 2024-06-25 PROCEDURE — 84681 ASSAY OF C-PEPTIDE: CPT | Mod: PN | Performed by: NURSE PRACTITIONER

## 2024-06-26 ENCOUNTER — PATIENT MESSAGE (OUTPATIENT)
Dept: DIABETES | Facility: CLINIC | Age: 39
End: 2024-06-26
Payer: COMMERCIAL

## 2024-06-27 ENCOUNTER — PATIENT OUTREACH (OUTPATIENT)
Dept: ADMINISTRATIVE | Facility: HOSPITAL | Age: 39
End: 2024-06-27
Payer: COMMERCIAL

## 2024-06-27 NOTE — PROGRESS NOTES
VBHM Score: 2     Eye Exam  Foot Exam                       Health Maintenance Topic(s) Outreach Outcomes & Actions Taken:    Eye Exam - Outreach Outcomes & Actions Taken  : Eye Camera Scheduled or Optometry/Ophthalmology Referral Placed/Appt Scheduled    Diabetic Foot Exam - Outreach Outcomes & Actions Taken  : PCP appt already scheduled 9.30.24       Additional Notes:  Spoke to pt, he agreed to scheduling DM eye exam, and DFE note was placed on appt schedule for PCP appt already scheduled.               Care Management, Digital Medicine, and/or Education Referrals    OPCM Risk Score: 87.7         Next Steps - Referral Actions: Declined services

## 2024-06-28 DIAGNOSIS — Z79.4 UNCONTROLLED TYPE 2 DIABETES MELLITUS WITH HYPERGLYCEMIA, WITH LONG-TERM CURRENT USE OF INSULIN: ICD-10-CM

## 2024-06-28 DIAGNOSIS — E11.65 UNCONTROLLED TYPE 2 DIABETES MELLITUS WITH HYPERGLYCEMIA, WITH LONG-TERM CURRENT USE OF INSULIN: ICD-10-CM

## 2024-06-28 LAB — INSULIN AB SER-SCNC: 0 NMOL/L (ref 0–0.02)

## 2024-07-01 ENCOUNTER — TELEPHONE (OUTPATIENT)
Dept: DIABETES | Facility: CLINIC | Age: 39
End: 2024-07-01
Payer: COMMERCIAL

## 2024-07-01 RX ORDER — PEN NEEDLE, DIABETIC 30 GX3/16"
NEEDLE, DISPOSABLE MISCELLANEOUS
Qty: 200 EACH | Refills: 5 | Status: SHIPPED | OUTPATIENT
Start: 2024-07-01

## 2024-07-01 NOTE — TELEPHONE ENCOUNTER
Patient needed updated clearance letter stating in letter that patient is clear for work, updated letter fax over to provided number given by office at [phone].  ----- Message from Edwige Gonzalez sent at 7/1/2024  3:14 PM CDT -----  Contact: Pt  [phone]  Pt called in regards to speaking with Dajuan Santiago he did not states the reason please advise

## 2024-07-01 NOTE — TELEPHONE ENCOUNTER
Returned patient's call. Rescheduled per patient preferences.     ----- Message from Sylvia Whittingtonineslokeshpj sent at 7/1/2024  8:06 AM CDT -----  Contact: pt  Type:  Sooner Apoointment Request    Caller is requesting a sooner appointment.  Caller declined first available appointment listed below.  Caller will not accept being placed on the waitlist and is requesting a message be sent to doctor.  Name of Caller: pt  When is the first available appointment? Pt was gerardo for 7/1 @8am but couldn't make it  Symptoms:pump rikki  Would the patient rather a call back or a response via [facility]?  phone  Best Call Back Number:  [phone]  Additional Information:   pt wants to be scheduled at the [facility] instead

## 2024-07-08 ENCOUNTER — TELEPHONE (OUTPATIENT)
Dept: DIABETES | Facility: CLINIC | Age: 39
End: 2024-07-08
Payer: COMMERCIAL

## 2024-07-12 DIAGNOSIS — Z79.4 TYPE 2 DIABETES MELLITUS WITH COMPLICATION, WITH LONG-TERM CURRENT USE OF INSULIN: Chronic | ICD-10-CM

## 2024-07-12 DIAGNOSIS — E11.8 TYPE 2 DIABETES MELLITUS WITH COMPLICATION, WITH LONG-TERM CURRENT USE OF INSULIN: Chronic | ICD-10-CM

## 2024-07-12 RX ORDER — BLOOD-GLUCOSE SENSOR
1 EACH MISCELLANEOUS
Qty: 3 EACH | Refills: 11 | Status: SHIPPED | OUTPATIENT
Start: 2024-07-12 | End: 2025-07-12

## 2024-07-15 ENCOUNTER — OFFICE VISIT (OUTPATIENT)
Dept: DIABETES | Facility: CLINIC | Age: 39
End: 2024-07-15
Payer: COMMERCIAL

## 2024-07-15 DIAGNOSIS — Z79.4 TYPE 2 DIABETES MELLITUS WITH COMPLICATION, WITH LONG-TERM CURRENT USE OF INSULIN: Primary | Chronic | ICD-10-CM

## 2024-07-15 DIAGNOSIS — E11.8 TYPE 2 DIABETES MELLITUS WITH COMPLICATION, WITH LONG-TERM CURRENT USE OF INSULIN: Primary | Chronic | ICD-10-CM

## 2024-07-15 PROCEDURE — 99214 OFFICE O/P EST MOD 30 MIN: CPT | Mod: 95,,, | Performed by: NURSE PRACTITIONER

## 2024-07-15 PROCEDURE — 95251 CONT GLUC MNTR ANALYSIS I&R: CPT | Mod: NDTC,,, | Performed by: NURSE PRACTITIONER

## 2024-07-15 PROCEDURE — G2211 COMPLEX E/M VISIT ADD ON: HCPCS | Mod: 95,,, | Performed by: NURSE PRACTITIONER

## 2024-07-15 PROCEDURE — 3046F HEMOGLOBIN A1C LEVEL >9.0%: CPT | Mod: CPTII,95,, | Performed by: NURSE PRACTITIONER

## 2024-07-15 NOTE — PATIENT INSTRUCTIONS
PATIENT INSTRUCTIONS     Please contact us for your availability for insulin pump evaluation.     Lifestyle modification with well balanced diet and at least 30 minutes of physical activity daily recommended.   Increase water intake.   Increase protein and non-starchy vegetable servings with meals.   Decrease carbohydrate servings with meals.   Take 10 minute walk after each meal.   Avoid snacking on carbohydrates, choose low carb, high protein snacks.   Incorporate resistance training with weights or resistance bands with exercise regimen at least 3 days a week.       Continue Jardiance 25 mg by mouth daily.   Continue Tresiba 43 units daily.   Continue Novolog three times daily with meals using correction scale below:     If less than 200, may take 5 units of Novolog  If  - 250, may take 6 units of Novolog  If  - 300, may take 7 units of Novolog  If  - 350, may take 8 units of Novolog  If  - 400, may take 9 units of Novolog  If +, may take 10 units of Novolog     Continue Dexcom G7 CGM  Blood Sugar Goals:       Fastin-130.       1-2 hours after a meal: Less than 180.

## 2024-07-15 NOTE — PROGRESS NOTES
The patient location is: Home  The chief complaint leading to consultation is: Diabetes    Visit type: audiovisual    Face to Face time with patient: 15  30 minutes of total time spent on the encounter, which includes face to face time and non-face to face time preparing to see the patient (eg, review of tests), Obtaining and/or reviewing separately obtained history, Documenting clinical information in the electronic or other health record, Independently interpreting results (not separately reported) and communicating results to the patient/family/caregiver, or Care coordination (not separately reported).  Each patient to whom he or she provides medical services by telemedicine is:  (1) informed of the relationship between the physician and patient and the respective role of any other health care provider with respect to management of the patient; and (2) notified that he or she may decline to receive medical services by telemedicine and may withdraw from such care at any time.    Notes:    Kulwant Mueller Jr. is a 39 y.o. male who presents for a follow up evaluation of Type 2 diabetes mellitus.     CHIEF COMPLAINT: Diabetes Consultation    PCP: Lawrence Magana MD      Initial visit with me - 6/27/2023    The patient was initially diagnosed with diabetes at age 25.   Previously followed at [facility] by Lisa Bro NP, last visit 3/2022.    Stopped taking Humalog and Tresiba 3 weeks prior to initial visit with me due to making him feel weak, but restarted week before due to blood sugars going up.      Previous failed treatments include:  Metformin     Social Documentation:  Patient lives in [place]. 3 children, 15, 13, 7.   Occupation:  at [employer] in [place].   Exercise: walking at work.       Diabetes related complications:   peripheral neuropathy.   denies Pancreatitis  reports Gastroparesis - last ED visit in 2/2024 for exacerbation.   denies DKA  denies Hx/family Hx of  "MEN2/MTC  denies Frequent UTIs/yeast infections     Cardiovascular Risk Factors: dyslipidemia, family history of premature cardiovascular disease, hypertension, male gender, and obesity (BMI >30 kg/m2).    Diabetes Medications               glucagon (GVOKE HYPOPEN 2-PACK) 1 mg/0.2 mL AtIn Inject 1 mg into the skin as needed (SEVERE HYPOGLYCEMIA).    insulin aspart U-100 (NOVOLOG U-100 INSULIN ASPART) 100 unit/mL injection Inject 7 Units into the skin 3 (three) times daily before meals.    TRESIBA FLEXTOUCH U-200 200 unit/mL (3 mL) insulin pen Inject 16 Units into the skin once daily.     Current monitoring regimen: Dexcom G7 CGM    The patient's Dexcom CGM was downloaded and reviewed. For 14 days, patient average glucose was 184 mg/dL. He was above range 49% of the time, in range 51% of the time, and below range 0% of the time. The target range for this patient was 70 - 180 mg/dL. Overall, there was a pattern of post prandial hyperglycemia.      Recent hypoglycemic episodes: No.   Patient compliant with glucose checks and medication administration? Yes    DIABETES MANAGEMENT STATUS  Statin: Not taking  ACE/ARB: Not taking  Screening or Prevention Patient's value Goal Complete/Controlled?   HgA1C Testing and Control   Lab Results   Component Value Date    HGBA1C 12.0 (H) 06/25/2024      Annually/Less than 8% No   Lipid profile : 09/27/2023 Annually Yes   LDL control Lab Results   Component Value Date    LDLCALC 120.6 09/27/2023    Annually/Less than 100 mg/dl  No   Nephropathy screening Lab Results   Component Value Date    LABMICR 243.0 07/20/2023     Lab Results   Component Value Date    PROTEINUA Negative 01/31/2024     No results found for: "UTPCR"   Annually Yes   Blood pressure BP Readings from Last 1 Encounters:   04/01/24 117/72    Less than 140/90 Yes   Dilated retinal exam : 02/17/2023 Annually Yes   Foot exam   : 11/27/2021 Annually No   Patient's medications, allergies, surgical, social and family " histories were reviewed and updated as appropriate.     Review of Systems   Constitutional:  Negative for weight loss.   Eyes:  Negative for blurred vision and double vision.   Cardiovascular:  Negative for chest pain.   Gastrointestinal:  Negative for nausea and vomiting.   Genitourinary:  Negative for frequency.   Musculoskeletal:  Negative for falls.   Neurological:  Negative for dizziness and weakness.   Endo/Heme/Allergies:  Negative for polydipsia.   Psychiatric/Behavioral:  Negative for depression.    All other systems reviewed and are negative.       Physical Exam  Constitutional:       General: He is not in acute distress.     Appearance: Normal appearance.   Pulmonary:      Effort: No respiratory distress.   Neurological:      Mental Status: He is alert and oriented to person, place, and time.   Psychiatric:         Mood and Affect: Mood normal.         Behavior: Behavior normal.        There were no vitals taken for this visit.  Wt Readings from Last 3 Encounters:   04/01/24 110 kg (242 lb 8.1 oz)   03/19/24 111 kg (244 lb 11.4 oz)   03/04/24 111 kg (244 lb 13.1 oz)       LAB REVIEW  Lab Results   Component Value Date     06/25/2024    K 4.1 06/25/2024     06/25/2024    CO2 26 06/25/2024    BUN 15 06/25/2024    CREATININE 0.85 06/25/2024    CALCIUM 8.7 06/25/2024    ANIONGAP 10 06/25/2024    EGFRNORACEVR >60.0 06/25/2024     Lab Results   Component Value Date    CPEPTIDE 1.79 06/25/2024    GLUTAMICACID 0.02 06/25/2024     Hemoglobin A1C   Date Value Ref Range Status   06/25/2024 12.0 (H) 4.0 - 5.6 % Final     Comment:     ADA Screening Guidelines:  5.7-6.4%  Consistent with prediabetes  >or=6.5%  Consistent with diabetes    High levels of fetal hemoglobin interfere with the HbA1C  assay. Heterozygous hemoglobin variants (HbS, HgC, etc)do  not significantly interfere with this assay.   However, presence of multiple variants may affect accuracy.     02/26/2024 6.8 (H) 4.0 - 5.6 % Final      "Comment:     ADA Screening Guidelines:  5.7-6.4%  Consistent with prediabetes  >or=6.5%  Consistent with diabetes    High levels of fetal hemoglobin interfere with the HbA1C  assay. Heterozygous hemoglobin variants (HbS, HgC, etc)do  not significantly interfere with this assay.   However, presence of multiple variants may affect accuracy.     12/28/2023 7.4 (H) 4.0 - 5.6 % Final     Comment:     ADA Screening Guidelines:  5.7-6.4%  Consistent with prediabetes  >or=6.5%  Consistent with diabetes    High levels of fetal hemoglobin interfere with the HbA1C  assay. Heterozygous hemoglobin variants (HbS, HgC, etc)do  not significantly interfere with this assay.   However, presence of multiple variants may affect accuracy.          ASSESSMENT    ICD-10-CM ICD-9-CM   1. Type 2 diabetes mellitus with complication, with long-term current use of insulin  E11.8 250.90    Z79.4 V58.67         PLAN  Diagnoses and all orders for this visit:    Type 2 diabetes mellitus with complication, with long-term current use of insulin  -     empagliflozin (JARDIANCE) 25 mg tablet; Take 1 tablet (25 mg total) by mouth once daily.      Reviewed pathophysiology of diabetes, complications related to the disease, importance of annual dilated eye exam and daily foot examination. Explained MOA, SE, dosage of medications. Written instructions given and reviewed with patient and patient verbalizes understanding.     8/28/2023 - states attempted to  Jardiance twice, but states there was issue at pharmacy and was never able to fill. Will send to [facility] today. G7 sensors falling off after a few days due to sweat/heat. Patient given additional adhesive options. Did not have clarity vane when he was trying to set up sharing. Will give more detailed instruction and new sharing code. Follow up 4 weeks.     9/25/2023 - started jardiance yesterday, states he "felt a little weird" after taking it, but better today. Still having issues with " adhesive with g7, discussed additional overlay options. Will schedule nurse visit at the [facility] to set up data sharing with dexcom and have labs done prior to f/u     10/23/2023 - patient had to cancel nurse visit due to work schedule. Will be able to go on Friday, will f/u 1 week to review dexcom data. A1c improved to 8.2.     6/12/2024 - Last visit with me 10/23/23. 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 Had repeat presentation for additional infection to include cellulitis/fasciitis/soft tissue infection which required additional surgical procedures. Today patient reports all wounds are well healed. Has not had G7 since hospital discharge in March. Has been performing fingersticks and reports compliance with insulin. He is interested in OP5, will refer for pump eval. He is requesting medical clearance to work with 's office. Discussed that I will need to review at least 1 week of dexcom data and get updated labs prior to clearance. He reports he was hypoglycemic at his physical last week with glucose 45, +s/s.     7/15/2024 -     PATIENT INSTRUCTIONS     Please contact us for your availability for insulin pump evaluation.     Lifestyle modification with well balanced diet and at least 30 minutes of physical activity daily recommended.   Increase water intake.   Increase protein and non-starchy vegetable servings with meals.   Decrease carbohydrate servings with meals.   Take 10 minute walk after each meal.   Avoid snacking on carbohydrates, choose low carb, high protein snacks.   Incorporate resistance training with weights or resistance bands with exercise regimen at least 3 days a week.       Continue Jardiance 25 mg by mouth daily.   Continue Tresiba 43 units daily.   Continue Novolog  three times daily with meals using correction scale below:     If less than 200, may take 5 units of Novolog  If  - 250, may take 6 units of Novolog  If  - 300, may take 7 units of Novolog  If  - 350, may take 8 units of Novolog  If  - 400, may take 9 units of Novolog  If +, may take 10 units of Novolog     Continue Dexcom G7 CGM  Blood Sugar Goals:       Fastin-130.       1-2 hours after a meal: Less than 180.       Follow up in about 2 months (around 9/15/2024) for Dexcom.    Portions of this note were prepared with [employer] voice recognition transcription software. Grammatical errors, including garbled syntax, mangle pronouns, and other bizarre constructions may be attributed to that software system.

## 2024-07-24 DIAGNOSIS — E11.9 TYPE 2 DIABETES MELLITUS WITHOUT COMPLICATION: ICD-10-CM

## 2024-08-01 ENCOUNTER — PATIENT MESSAGE (OUTPATIENT)
Dept: DIABETES | Facility: CLINIC | Age: 39
End: 2024-08-01
Payer: COMMERCIAL

## 2024-08-14 ENCOUNTER — PATIENT MESSAGE (OUTPATIENT)
Dept: ORTHOPEDICS | Facility: CLINIC | Age: 39
End: 2024-08-14
Payer: COMMERCIAL

## 2024-09-06 ENCOUNTER — OFFICE VISIT (OUTPATIENT)
Dept: OPHTHALMOLOGY | Facility: CLINIC | Age: 39
End: 2024-09-06
Payer: COMMERCIAL

## 2024-09-06 DIAGNOSIS — E11.3393 TYPE 2 DIABETES MELLITUS WITH MODERATE NONPROLIFERATIVE DIABETIC RETINOPATHY OF BOTH EYES WITHOUT MACULAR EDEMA, UNSPECIFIED WHETHER LONG TERM INSULIN USE: Primary | ICD-10-CM

## 2024-09-06 DIAGNOSIS — H52.4 MYOPIA WITH ASTIGMATISM AND PRESBYOPIA, BILATERAL: ICD-10-CM

## 2024-09-06 DIAGNOSIS — H52.203 MYOPIA WITH ASTIGMATISM AND PRESBYOPIA, BILATERAL: ICD-10-CM

## 2024-09-06 DIAGNOSIS — H52.13 MYOPIA WITH ASTIGMATISM AND PRESBYOPIA, BILATERAL: ICD-10-CM

## 2024-09-06 PROCEDURE — 99999 PR PBB SHADOW E&M-EST. PATIENT-LVL III: CPT | Mod: PBBFAC,,, | Performed by: OPTOMETRIST

## 2024-09-06 NOTE — PROGRESS NOTES
HPI    Diagnosed with diabetes in 2016  Lab Results       Component                Value               Date                       HGBA1C                   12.0 (H)            06/25/2024            Vision changes since last eye exam?: Yes at distance  Wears SVL glasses full-time     Any eye pain today: No    Other ocular symptoms: No    Interested in contact lens fitting today? No    1. DM         Last edited by Gabriela Ireland, PCT on 9/6/2024  3:13 PM.            Assessment /Plan     For exam results, see Encounter Report.    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   Continue strict bp/bs control at this time    Myopia with astigmatism and presbyopia, bilateral  Eyeglass Final Rx       Eyeglass Final Rx         Sphere Cylinder Axis Add    Right -4.75 +2.50 108 +1.25    Left -4.50 +2.25 071 +1.25      Type: PAL    Expiration Date: 9/6/2025   PD 68                     RTC with Dr VARGAS for diabetic consult next available.

## 2024-09-11 NOTE — PROGRESS NOTES
HPI     Diabetic Eye Exam            Comments: Lab Results       Component                Value               Date                       HGBA1C                   >14.0 (H)           02/02/2023                     Comments    Patient here today for redness and tearing OS  Pain Scale:  None  Onset:   This morning  OD, OS, OU:   OS  Discharge:   Yes  A.M. Matting:  No  Itch:   No  Redness:   Yes  Photophobia:   Yes  Foreign body sensation:   No  Deep pain:   No  Previous occurrence:   Yes  Drops:   No            Last edited by Yariel Pantoja, OD on 2/17/2023  4:35 PM.            Assessment /Plan     For exam results, see Encounter Report.    Iritis of left eye  -     prednisoLONE acetate (PRED FORTE) 1 % DrpS; Instill one drop to the left eye every 2 hours while awake for 2 days then instill one drop 4 time daily for 5 days  Dispense: 5 mL; Refill: 0  -     atropine 1% (ISOPTO ATROPINE) 1 % Drop; Place 1 drop into the left eye once daily. for 7 days  Dispense: 5 mL; Refill: 0  Eyepain improved with cycloplegia. Start Atropine once daily with Pred forte q2h x 2 days then qid x 5 days. 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 Continue strict bp/bs control. Last A1C >14. Recommend diabetic retinal consult with Dr VARGAS once #1 resolves.     RTC 7 days for f/u with [facility] if improvement in symptoms.                     Cystoscopy, bilateral metallic ureteral stent replacement with image.  Pre op instructions:   Hold OTC supplements.    Medical eval needed  May take Tylenol for pain or headache if needed.    NPO after 11pm to the morning of surgery.   Antibacterial wash instructions given for the morning of surgery  Patient verbalized understanding.

## 2024-09-27 ENCOUNTER — PATIENT MESSAGE (OUTPATIENT)
Dept: DIABETES | Facility: CLINIC | Age: 39
End: 2024-09-27
Payer: COMMERCIAL

## 2024-10-02 DIAGNOSIS — E11.9 TYPE 2 DIABETES MELLITUS WITHOUT COMPLICATION: ICD-10-CM

## 2024-10-14 ENCOUNTER — OFFICE VISIT (OUTPATIENT)
Dept: DIABETES | Facility: CLINIC | Age: 39
End: 2024-10-14
Payer: COMMERCIAL

## 2024-10-14 DIAGNOSIS — E11.59 HYPERTENSION ASSOCIATED WITH DIABETES: Chronic | ICD-10-CM

## 2024-10-14 DIAGNOSIS — Z79.4 TYPE 2 DIABETES MELLITUS WITH COMPLICATION, WITH LONG-TERM CURRENT USE OF INSULIN: Primary | Chronic | ICD-10-CM

## 2024-10-14 DIAGNOSIS — E11.8 TYPE 2 DIABETES MELLITUS WITH COMPLICATION, WITH LONG-TERM CURRENT USE OF INSULIN: Primary | Chronic | ICD-10-CM

## 2024-10-14 DIAGNOSIS — I15.2 HYPERTENSION ASSOCIATED WITH DIABETES: Chronic | ICD-10-CM

## 2024-10-14 PROCEDURE — G2211 COMPLEX E/M VISIT ADD ON: HCPCS | Mod: 95,,, | Performed by: NURSE PRACTITIONER

## 2024-10-14 PROCEDURE — 3046F HEMOGLOBIN A1C LEVEL >9.0%: CPT | Mod: CPTII,95,, | Performed by: NURSE PRACTITIONER

## 2024-10-14 PROCEDURE — 99214 OFFICE O/P EST MOD 30 MIN: CPT | Mod: 95,,, | Performed by: NURSE PRACTITIONER

## 2024-10-14 NOTE — PATIENT INSTRUCTIONS
PATIENT INSTRUCTIONS     Refer to diabetes education.    Lifestyle modification with well balanced diet and at least 30 minutes of physical activity daily recommended.   Increase water intake.   Increase protein and non-starchy vegetable servings with meals.   Decrease carbohydrate servings with meals.   Take 10 minute walk after each meal.   Avoid snacking on carbohydrates, choose low carb, high protein snacks.   Incorporate resistance training with weights or resistance bands with exercise regimen at least 3 days a week.       Start Jardiance 25 mg by mouth daily.   Continue Tresiba 43 units daily.   Continue Novolog three times daily with meals using correction scale below:     If less than 200, may take 5 units of Novolog  If  - 250, may take 6 units of Novolog  If  - 300, may take 7 units of Novolog  If  - 350, may take 8 units of Novolog  If  - 400, may take 9 units of Novolog  If +, may take 10 units of Novolog     Continue Dexcom G7 CGM  Blood Sugar Goals:       Fastin-130.       1-2 hours after a meal: Less than 180.

## 2024-10-14 NOTE — Clinical Note
Virtual, Dexcom, 6 wks Schedule fasting labs, prior to f/u at [facility] Schedule DM Education - pump eval at [facility]

## 2024-10-14 NOTE — PROGRESS NOTES
The patient location is: Home  The chief complaint leading to consultation is: Diabetes    Visit type: audiovisual    Face to Face time with patient: 15  30 minutes of total time spent on the encounter, which includes face to face time and non-face to face time preparing to see the patient (eg, review of tests), Obtaining and/or reviewing separately obtained history, Documenting clinical information in the electronic or other health record, Independently interpreting results (not separately reported) and communicating results to the patient/family/caregiver, or Care coordination (not separately reported).  Each patient to whom he or she provides medical services by telemedicine is:  (1) informed of the relationship between the physician and patient and the respective role of any other health care provider with respect to management of the patient; and (2) notified that he or she may decline to receive medical services by telemedicine and may withdraw from such care at any time.    Notes:    Kulwant Mueller Jr. is a 39 y.o. male who presents for a follow up evaluation of Type 2 diabetes mellitus.     CHIEF COMPLAINT: Diabetes Consultation    PCP: Lawrence Magana MD      Initial visit with me - 6/27/2023    The patient was initially diagnosed with diabetes at age 25.   Previously followed at [facility] by Lisa Bro NP, last visit 3/2022.    Stopped taking Humalog and Tresiba 3 weeks prior to initial visit with me due to making him feel weak, but restarted week before due to blood sugars going up.      Previous failed treatments include:  Metformin     Social Documentation:  Patient lives in [place]. 3 children, 15, 13, 7.   Occupation:  at [employer] in [place].   Exercise: walking at work.       Diabetes related complications:   peripheral neuropathy.   denies Pancreatitis  reports Gastroparesis - last ED visit in 2/2024 for exacerbation.   denies DKA  denies Hx/family Hx of  "MEN2/MTC  denies Frequent UTIs/yeast infections     Cardiovascular Risk Factors: dyslipidemia, family history of premature cardiovascular disease, hypertension, male gender, and obesity (BMI >30 kg/m2).    Diabetes Medications               glucagon (GVOKE HYPOPEN 2-PACK) 1 mg/0.2 mL AtIn Inject 1 mg into the skin as needed (SEVERE HYPOGLYCEMIA).    insulin aspart U-100 (NOVOLOG U-100 INSULIN ASPART) 100 unit/mL injection Inject 7 Units into the skin 3 (three) times daily before meals.    TRESIBA FLEXTOUCH U-200 200 unit/mL (3 mL) insulin pen Inject 16 Units into the skin once daily.     Current monitoring regimen: Dexcom G7 CGM    The patient's Dexcom CGM was downloaded and reviewed. For 14 days, patient average glucose was 202 mg/dL. He was above range 60% of the time, in range 40% of the time, and below range 0% of the time. The target range for this patient was 70 - 180 mg/dL. Overall, there was a pattern of post prandial hyperglycemia.        Recent hypoglycemic episodes: No.   Patient compliant with glucose checks and medication administration? Yes    DIABETES MANAGEMENT STATUS  Statin: Not taking  ACE/ARB: Not taking  Screening or Prevention Patient's value Goal Complete/Controlled?   HgA1C Testing and Control   Lab Results   Component Value Date    HGBA1C 12.0 (H) 06/25/2024      Annually/Less than 8% No   Lipid profile : 09/27/2023 Annually Yes   LDL control Lab Results   Component Value Date    LDLCALC 120.6 09/27/2023    Annually/Less than 100 mg/dl  No   Nephropathy screening Lab Results   Component Value Date    LABMICR 243.0 07/20/2023     Lab Results   Component Value Date    PROTEINUA Negative 01/31/2024     No results found for: "UTPCR"   Annually Yes   Blood pressure BP Readings from Last 1 Encounters:   04/01/24 117/72    Less than 140/90 Yes   Dilated retinal exam : 09/06/2024 Annually Yes   Foot exam   : 11/27/2021 Annually No   Patient's medications, allergies, surgical, social and family " histories were reviewed and updated as appropriate.     Review of Systems   Constitutional:  Negative for weight loss.   Eyes:  Negative for blurred vision and double vision.   Cardiovascular:  Negative for chest pain.   Gastrointestinal:  Negative for nausea and vomiting.   Genitourinary:  Negative for frequency.   Musculoskeletal:  Negative for falls.   Neurological:  Negative for dizziness and weakness.   Endo/Heme/Allergies:  Negative for polydipsia.   Psychiatric/Behavioral:  Negative for depression.    All other systems reviewed and are negative.       Physical Exam  Constitutional:       General: He is not in acute distress.     Appearance: Normal appearance.   Pulmonary:      Effort: No respiratory distress.   Neurological:      Mental Status: He is alert and oriented to person, place, and time.   Psychiatric:         Mood and Affect: Mood normal.         Behavior: Behavior normal.        There were no vitals taken for this visit.  Wt Readings from Last 3 Encounters:   04/01/24 110 kg (242 lb 8.1 oz)   03/19/24 111 kg (244 lb 11.4 oz)   03/04/24 111 kg (244 lb 13.1 oz)       LAB REVIEW  Lab Results   Component Value Date     06/25/2024    K 4.1 06/25/2024     06/25/2024    CO2 26 06/25/2024    BUN 15 06/25/2024    CREATININE 0.85 06/25/2024    CALCIUM 8.7 06/25/2024    ANIONGAP 10 06/25/2024    EGFRNORACEVR >60.0 06/25/2024     Lab Results   Component Value Date    CPEPTIDE 1.79 06/25/2024    GLUTAMICACID 0.02 06/25/2024     Hemoglobin A1C   Date Value Ref Range Status   06/25/2024 12.0 (H) 4.0 - 5.6 % Final     Comment:     ADA Screening Guidelines:  5.7-6.4%  Consistent with prediabetes  >or=6.5%  Consistent with diabetes    High levels of fetal hemoglobin interfere with the HbA1C  assay. Heterozygous hemoglobin variants (HbS, HgC, etc)do  not significantly interfere with this assay.   However, presence of multiple variants may affect accuracy.     02/26/2024 6.8 (H) 4.0 - 5.6 % Final      Comment:     ADA Screening Guidelines:  5.7-6.4%  Consistent with prediabetes  >or=6.5%  Consistent with diabetes    High levels of fetal hemoglobin interfere with the HbA1C  assay. Heterozygous hemoglobin variants (HbS, HgC, etc)do  not significantly interfere with this assay.   However, presence of multiple variants may affect accuracy.     12/28/2023 7.4 (H) 4.0 - 5.6 % Final     Comment:     ADA Screening Guidelines:  5.7-6.4%  Consistent with prediabetes  >or=6.5%  Consistent with diabetes    High levels of fetal hemoglobin interfere with the HbA1C  assay. Heterozygous hemoglobin variants (HbS, HgC, etc)do  not significantly interfere with this assay.   However, presence of multiple variants may affect accuracy.          ASSESSMENT    ICD-10-CM ICD-9-CM   1. Type 2 diabetes mellitus with complication, with long-term current use of insulin  E11.8 250.90    Z79.4 V58.67   2. Hypertension associated with diabetes  E11.59 250.80    I15.2 401.9         PLAN  Diagnoses and all orders for this visit:    Type 2 diabetes mellitus with complication, with long-term current use of insulin  -     Hemoglobin A1C; Future  -     Basic Metabolic Panel; Future  -     Lipid Panel; Future  -     Microalbumin/Creatinine Ratio, Urine; Future  -     Ambulatory referral/consult to Diabetes Education; Future    Hypertension associated with diabetes      Reviewed pathophysiology of diabetes, complications related to the disease, importance of annual dilated eye exam and daily foot examination. Explained MOA, SE, dosage of medications. Written instructions given and reviewed with patient and patient verbalizes understanding.     8/28/2023 - states attempted to  Jardiance twice, but states there was issue at pharmacy and was never able to fill. Will send to [facility] today. G7 sensors falling off after a few days due to sweat/heat. Patient given additional adhesive options. Did not have clarity vane when he was trying to set up  "sharing. Will give more detailed instruction and new sharing code. Follow up 4 weeks.     9/25/2023 - started jardiance yesterday, states he "felt a little weird" after taking it, but better today. Still having issues with adhesive with g7, discussed additional overlay options. Will schedule nurse visit at the [facility] to set up data sharing with dexcom and have labs done prior to f/u     10/23/2023 - patient had to cancel nurse visit due to work schedule. Will be able to go on Friday, will f/u 1 week to review dexcom data. A1c improved to 8.2.     6/12/2024 - Last visit with me 10/23/23. 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 Had repeat presentation for additional infection to include cellulitis/fasciitis/soft tissue infection which required additional surgical procedures. Today patient reports all wounds are well healed. Has not had G7 since hospital discharge in March. Has been performing fingersticks and reports compliance with insulin. He is interested in OP5, will refer for pump eval. He is requesting medical clearance to work with 's office. Discussed that I will need to review at least 1 week of dexcom data and get updated labs prior to clearance. He reports he was hypoglycemic at his physical last week with glucose 45, +s/s.     7/15/2024 - no changes. CGM showed GMI 8%, work clearance completed.     1014/2024 - will refer for pump eval. Interested in OP5. Stopped jardiance after last visit, will restart. Working with 's office now.     PATIENT INSTRUCTIONS     Refer to diabetes education.    Lifestyle modification with well balanced diet and at least 30 minutes of physical activity daily recommended.   Increase water intake.   Increase protein and non-starchy vegetable " servings with meals.   Decrease carbohydrate servings with meals.   Take 10 minute walk after each meal.   Avoid snacking on carbohydrates, choose low carb, high protein snacks.   Incorporate resistance training with weights or resistance bands with exercise regimen at least 3 days a week.       Start Jardiance 25 mg by mouth daily.   Continue Tresiba 43 units daily.   Continue Novolog three times daily with meals using correction scale below:     If less than 200, may take 5 units of Novolog  If  - 250, may take 6 units of Novolog  If  - 300, may take 7 units of Novolog  If  - 350, may take 8 units of Novolog  If  - 400, may take 9 units of Novolog  If +, may take 10 units of Novolog     Continue Dexcom G7 CGM  Blood Sugar Goals:       Fastin-130.       1-2 hours after a meal: Less than 180.       Follow up in about 6 weeks (around 2024) for Virtual, Dexcom, Schedule fasting labs, Schedule DM Education.    Portions of this note were prepared with [employer] Naturally [employer] voice recognition transcription software. Grammatical errors, including garbled syntax, mangle pronouns, and other bizarre constructions may be attributed to that software system.

## 2024-10-15 ENCOUNTER — PATIENT OUTREACH (OUTPATIENT)
Dept: ADMINISTRATIVE | Facility: HOSPITAL | Age: 39
End: 2024-10-15
Payer: COMMERCIAL

## 2024-10-15 ENCOUNTER — TELEPHONE (OUTPATIENT)
Dept: DIABETES | Facility: CLINIC | Age: 39
End: 2024-10-15
Payer: COMMERCIAL

## 2024-10-15 ENCOUNTER — CLINICAL SUPPORT (OUTPATIENT)
Dept: DIABETES | Facility: CLINIC | Age: 39
End: 2024-10-15
Payer: COMMERCIAL

## 2024-10-15 VITALS — WEIGHT: 278.69 LBS | BODY MASS INDEX: 37.79 KG/M2

## 2024-10-15 DIAGNOSIS — E11.8 TYPE 2 DIABETES MELLITUS WITH COMPLICATION, WITH LONG-TERM CURRENT USE OF INSULIN: Chronic | ICD-10-CM

## 2024-10-15 DIAGNOSIS — Z79.4 TYPE 2 DIABETES MELLITUS WITH COMPLICATION, WITH LONG-TERM CURRENT USE OF INSULIN: Primary | ICD-10-CM

## 2024-10-15 DIAGNOSIS — E11.8 TYPE 2 DIABETES MELLITUS WITH COMPLICATION, WITH LONG-TERM CURRENT USE OF INSULIN: Primary | ICD-10-CM

## 2024-10-15 DIAGNOSIS — E11.8 TYPE 2 DIABETES MELLITUS WITH COMPLICATION, WITH LONG-TERM CURRENT USE OF INSULIN: ICD-10-CM

## 2024-10-15 DIAGNOSIS — Z79.4 TYPE 2 DIABETES MELLITUS WITH COMPLICATION, WITH LONG-TERM CURRENT USE OF INSULIN: Chronic | ICD-10-CM

## 2024-10-15 DIAGNOSIS — Z79.4 TYPE 2 DIABETES MELLITUS WITH COMPLICATION, WITH LONG-TERM CURRENT USE OF INSULIN: ICD-10-CM

## 2024-10-15 PROCEDURE — G0108 DIAB MANAGE TRN  PER INDIV: HCPCS | Mod: S$GLB,,, | Performed by: DIETITIAN, REGISTERED

## 2024-10-15 PROCEDURE — 99999 PR PBB SHADOW E&M-EST. PATIENT-LVL IV: CPT | Mod: PBBFAC,,, | Performed by: DIETITIAN, REGISTERED

## 2024-10-15 RX ORDER — INSULIN PMP CART,AUT,G6/7,CNTR
1 EACH SUBCUTANEOUS
Qty: 1 EACH | Refills: 0 | Status: SHIPPED | OUTPATIENT
Start: 2024-10-15 | End: 2025-10-15

## 2024-10-15 RX ORDER — INSULIN PMP CART,AUT,G6/7,CNTR
1 EACH SUBCUTANEOUS
Qty: 10 EACH | Refills: 11 | Status: SHIPPED | OUTPATIENT
Start: 2024-10-15 | End: 2024-10-15

## 2024-10-15 RX ORDER — INSULIN ASPART 100 [IU]/ML
200 INJECTION, SOLUTION INTRAVENOUS; SUBCUTANEOUS
Qty: 20 ML | Refills: 11 | Status: SHIPPED | OUTPATIENT
Start: 2024-10-15 | End: 2025-10-15

## 2024-10-15 RX ORDER — INSULIN PMP CART,AUT,G6/7,CNTR
1 EACH SUBCUTANEOUS
Qty: 1 EACH | Refills: 0 | Status: SHIPPED | OUTPATIENT
Start: 2024-10-15 | End: 2024-10-15

## 2024-10-15 RX ORDER — INSULIN PMP CART,AUT,G6/7,CNTR
1 EACH SUBCUTANEOUS
Qty: 10 EACH | Refills: 11 | Status: SHIPPED | OUTPATIENT
Start: 2024-10-15 | End: 2024-11-14

## 2024-10-15 NOTE — PROGRESS NOTES
Diabetes Care Specialist Progress Note  Author: Margaret Julian RD, CDE  Date: 10/15/2024    Intake    Program Intake  Reason for Diabetes Program Visit:: Initial Diabetes Assessment (DM referral 10/14/24 Almaz Santiago FNP)  Type of Intervention:: Individual  Education: Insulin Pump Evaluation (Pt interested in OP5 and desires mobile vane controller functionality.)  Current diabetes risk level:: high  In the last 12 months, have you:: been admitted to a hospital  Was the ER or hospital admission related to diabetes?: No    Current Diabetes Treatment: Oral Medications, Diet/Exercise, Insulin (Jardiance 25mg 1tab daily. Tresiba 43units daily. Novolog ac per s/s.)    Continuous Glucose Monitoring  Patient has CGM: Yes  Personal CGM type:: DexG7 - reports saved media from provider visit yesterday; noted last sensor data from 10/10/24. Pt reports occs sensor failure and reports to Dexcom; currently waiting for replacement device. Pt reports having another sensor at home but hasn't yet placed.    Lab Results   Component Value Date    HGBA1C 12.0 (H) 06/25/2024     Weight: 126.4 kg (278 lb 10.6 oz)       Body mass index is 37.79 kg/m².    Lifestyle Coping Support & Clinical    Problem Review  Active Comorbidities:  (HTN, Neuropathy, Gastroparesis, heart failure with reduced ejection fraction, DVT, GERD, BMI 37.79.)    Diabetes Self-Management Skills Assessment    Medication Skills Assessment  Patient is able to identify current diabetes medications, dosages, and appropriate timing of medications.: yes  Patient reports problems or concerns with current medication regimen.: yes  Medication regimen problems/concerns:: other (see comments) (Pt in clinic today for pump eval. He reports prior pump usage >5 yrs ago and had difficulty with tubing during work duties.)  Patient is  aware that some diabetes medications can cause low blood sugar?: Yes  Medication Skills Assessment Completed:: Yes  Assessment indicates::  Instruction Needed, Knowledge deficit  Area of need?: Yes    Diabetes Disease Process/Treatment Options  When were you diagnosed?: ~age 24yo    Nutrition/Healthy Eating  Meal Plan 24 Hour Recall - Breakfast: 2 eggs, diet tea  Meal Plan 24 Hour Recall - Lunch: none  Meal Plan 24 Hour Recall - Dinner: fried chix, dirty rice, corn  Meal Plan 24 Hour Recall - Snack: fruit  Meal Plan 24 Hour Recall - Beverage: water, diet - stopped sugary latasha  Who shops/cooks?: wife  Patient can identify foods that impact blood sugar.: no (see comments)  Challenges to healthy eating:: eating out, going to parties  Nutrition/Healthy Eating Skills Assessment Completed:: Yes  Assessment indicates:: Instruction Needed, Knowledge deficit  Area of need?: Yes    Home Blood Glucose Monitoring  Patient states that blood sugar is checked at home daily.: yes  Personal CGM type:: DexG7 - reports saved media from provider visit yesterday; noted last sensor data from 10/10/24. Pt reports occs sensor failure and reports to Dexcom; currently waiting for replacement device. Pt reports having another sensor at home but hasn't yet placed.  Home Blood Glucose Monitoring Skills Assessment Completed: : Yes  Assessment indicates:: Adequate understanding  Area of need?: Yes    Assessment Summary and Plan  Based on today's diabetes care assessment, the following areas of need were identified:          10/15/2024   Areas of Need   Medications/Current Diabetes Treatment Yes - see care plan   Nutrition/Healthy Eating Yes - see care plan   Home Blood Glucose Monitoring Yes - encouraged consistency of Dexcom usage and automated features with compatible pumps      Today's interventions were provided through individual discussion, instruction, and written materials were provided.    Patient verbalized understanding of instruction and written materials.  Pt was able to return back demonstration of instructions today. Patient understood key points, needs reinforcement  and further instruction.     Diabetes Self-Management Care Plan:  Today's Diabetes Self-Management Care Plan was developed with Kulwant's input. Kulwant has agreed to work toward the following goal(s) to improve his/her overall diabetes control.      Care Plan: Diabetes Management   Updates made since 9/15/2024 12:00 AM        Problem: Medications         Goal: Patient Agrees to take Diabetes Medication(s) as prescribed.    Start Date: 10/15/2024   Expected End Date: 10/14/2025   Priority: High   Barriers: No Barriers Identified   Note:    Discussed pre-pump topics:   Pump options w/ compatible CGM: OmniPod 5, Tandem t:slim x2, Mobi and iLet using Dexcom G6/G7.   Common terms: basal/bolus insulin, IC, ISF, TBS, IOB  Pros/cons pump therapy, supplies needed, frequency of changing infusion sets and cartridges/pods, and backup pump plan   Allowed pt to see demo pumps and example infusion set    Provided carb counting training using food lists, food label. Pt able demonstrate understanding using examples in clinic.     Pt would like to proceed with OP5 and agrees to use provided  until mobile vane avail for iphone.         Task: Reviewed with patient all current diabetes medications and provided basic review of the purpose, dosage, frequency, side effects, and storage of both oral and injectable diabetes medications. Completed 10/15/2024        Task: Discussed guidelines for preventing, detecting and treating hypoglycemia and hyperglycemia and reviewed the importance of meal and medication timing with diabetes mediations for prevention of hypoglycemia and maximum drug benefit. Completed 10/15/2024          Follow Up Plan   Pt agrees to contact clinic for training visit once device arrives.    Today's care plan and follow up schedule was discussed with patient.  Kulwant verbalized understanding of the care plan, goals, and agrees to follow up plan.        The patient was encouraged to communicate with his/her health  care provider/physician and care team regarding his/her condition(s) and treatment.  I provided the patient with my contact information today and encouraged to contact me via phone or [facility] as needed.     Length of Visit   Total Time: 60 Minutes

## 2024-10-15 NOTE — PROGRESS NOTES
VBC OUTREACH: per chart review the following is overdue:  PCP follow up is scheduled 11.11.24

## 2024-10-16 RX ORDER — INSULIN LISPRO 100 [IU]/ML
200 INJECTION, SOLUTION INTRAVENOUS; SUBCUTANEOUS
Qty: 20 ML | Refills: 11 | Status: SHIPPED | OUTPATIENT
Start: 2024-10-16 | End: 2025-10-16

## 2024-10-16 RX ORDER — INSULIN ASPART 100 [IU]/ML
INJECTION, SOLUTION INTRAVENOUS; SUBCUTANEOUS
Refills: 0 | OUTPATIENT
Start: 2024-10-16

## 2024-10-22 ENCOUNTER — TELEPHONE (OUTPATIENT)
Dept: DIABETES | Facility: CLINIC | Age: 39
End: 2024-10-22
Payer: COMMERCIAL

## 2024-10-22 NOTE — TELEPHONE ENCOUNTER
Call placed to [facility] regarding OP5 rx spoke with [facility] Pharmacist voiced that kit was filled and retrieved by patient on 10/16/2024

## 2024-10-22 NOTE — TELEPHONE ENCOUNTER
----- Message from Diabetic Educator Joy sent at 10/22/2024  9:41 AM CDT -----  Regarding: OP5 order  Stephanie Nicole from [facility] just text me about Mr. Blum's OP5 order. She says he is eligible for a free trial through [facility] so she needs you to cancel the order to the local pharmacy. It is showing up as a refill since it's at both places. [facility] is not able to run the free trial through.     In case you need to get in touch with her, here is her contact info:  Stephanie Mills  [phone]    Thanks!

## 2024-10-23 ENCOUNTER — TELEPHONE (OUTPATIENT)
Dept: DIABETES | Facility: CLINIC | Age: 39
End: 2024-10-23
Payer: COMMERCIAL

## 2024-10-28 ENCOUNTER — OFFICE VISIT (OUTPATIENT)
Dept: PODIATRY | Facility: CLINIC | Age: 39
End: 2024-10-28
Payer: COMMERCIAL

## 2024-10-28 VITALS — WEIGHT: 278.69 LBS | HEIGHT: 72 IN | BODY MASS INDEX: 37.75 KG/M2

## 2024-10-28 DIAGNOSIS — Z79.01 CHRONIC ANTICOAGULATION: ICD-10-CM

## 2024-10-28 DIAGNOSIS — I87.2 VENOUS STASIS ULCER OF OTHER PART OF LEFT LOWER LEG LIMITED TO BREAKDOWN OF SKIN WITHOUT VARICOSE VEINS: ICD-10-CM

## 2024-10-28 DIAGNOSIS — L97.821 VENOUS STASIS ULCER OF OTHER PART OF LEFT LOWER LEG LIMITED TO BREAKDOWN OF SKIN WITHOUT VARICOSE VEINS: ICD-10-CM

## 2024-10-28 DIAGNOSIS — L60.8 ONYCHOPTOSIS: ICD-10-CM

## 2024-10-28 DIAGNOSIS — E11.42 TYPE 2 DIABETES MELLITUS WITH PERIPHERAL NEUROPATHY: ICD-10-CM

## 2024-10-28 DIAGNOSIS — I50.22 CHRONIC HFREF (HEART FAILURE WITH REDUCED EJECTION FRACTION): ICD-10-CM

## 2024-10-28 DIAGNOSIS — E11.9 COMPREHENSIVE DIABETIC FOOT EXAMINATION, TYPE 2 DM, ENCOUNTER FOR: ICD-10-CM

## 2024-10-28 DIAGNOSIS — I87.2 VENOUS INSUFFICIENCY OF BOTH LOWER EXTREMITIES: Primary | ICD-10-CM

## 2024-10-28 PROCEDURE — 3008F BODY MASS INDEX DOCD: CPT | Mod: CPTII,S$GLB,, | Performed by: PODIATRIST

## 2024-10-28 PROCEDURE — 87186 SC STD MICRODIL/AGAR DIL: CPT | Mod: 59 | Performed by: PODIATRIST

## 2024-10-28 PROCEDURE — 99214 OFFICE O/P EST MOD 30 MIN: CPT | Mod: S$GLB,,, | Performed by: PODIATRIST

## 2024-10-28 PROCEDURE — 87077 CULTURE AEROBIC IDENTIFY: CPT | Performed by: PODIATRIST

## 2024-10-28 PROCEDURE — 99999 PR PBB SHADOW E&M-EST. PATIENT-LVL IV: CPT | Mod: PBBFAC,,, | Performed by: PODIATRIST

## 2024-10-28 PROCEDURE — 87075 CULTR BACTERIA EXCEPT BLOOD: CPT | Performed by: PODIATRIST

## 2024-10-28 PROCEDURE — 1159F MED LIST DOCD IN RCRD: CPT | Mod: CPTII,S$GLB,, | Performed by: PODIATRIST

## 2024-10-28 PROCEDURE — 1160F RVW MEDS BY RX/DR IN RCRD: CPT | Mod: CPTII,S$GLB,, | Performed by: PODIATRIST

## 2024-10-28 PROCEDURE — 87070 CULTURE OTHR SPECIMN AEROBIC: CPT | Performed by: PODIATRIST

## 2024-10-28 PROCEDURE — 3046F HEMOGLOBIN A1C LEVEL >9.0%: CPT | Mod: CPTII,S$GLB,, | Performed by: PODIATRIST

## 2024-10-31 ENCOUNTER — PATIENT MESSAGE (OUTPATIENT)
Dept: PODIATRY | Facility: CLINIC | Age: 39
End: 2024-10-31
Payer: COMMERCIAL

## 2024-10-31 DIAGNOSIS — E11.9 COMPREHENSIVE DIABETIC FOOT EXAMINATION, TYPE 2 DM, ENCOUNTER FOR: Primary | ICD-10-CM

## 2024-10-31 DIAGNOSIS — L97.821 VENOUS STASIS ULCER OF OTHER PART OF LEFT LOWER LEG LIMITED TO BREAKDOWN OF SKIN WITHOUT VARICOSE VEINS: ICD-10-CM

## 2024-10-31 DIAGNOSIS — E11.42 TYPE 2 DIABETES MELLITUS WITH PERIPHERAL NEUROPATHY: ICD-10-CM

## 2024-10-31 DIAGNOSIS — I87.2 VENOUS STASIS ULCER OF OTHER PART OF LEFT LOWER LEG LIMITED TO BREAKDOWN OF SKIN WITHOUT VARICOSE VEINS: ICD-10-CM

## 2024-10-31 LAB — BACTERIA SPEC ANAEROBE CULT: NORMAL

## 2024-10-31 RX ORDER — SULFAMETHOXAZOLE AND TRIMETHOPRIM 800; 160 MG/1; MG/1
1 TABLET ORAL 2 TIMES DAILY
Qty: 14 TABLET | Refills: 0 | Status: SHIPPED | OUTPATIENT
Start: 2024-10-31 | End: 2024-11-07

## 2024-11-01 LAB
BACTERIA SPEC AEROBE CULT: ABNORMAL
BACTERIA SPEC AEROBE CULT: ABNORMAL

## 2024-11-11 ENCOUNTER — PATIENT OUTREACH (OUTPATIENT)
Dept: ADMINISTRATIVE | Facility: HOSPITAL | Age: 39
End: 2024-11-11
Payer: COMMERCIAL

## 2024-11-11 NOTE — PROGRESS NOTES
DM LABS: per chart review pt is overdue for Ha1c/LIPID/Microalbumin already linked to lab appt 11.12.24

## 2024-12-02 ENCOUNTER — OFFICE VISIT (OUTPATIENT)
Dept: INTERNAL MEDICINE | Facility: CLINIC | Age: 39
End: 2024-12-02
Payer: COMMERCIAL

## 2024-12-02 DIAGNOSIS — Z79.4 UNCONTROLLED TYPE 2 DIABETES MELLITUS WITH HYPERGLYCEMIA, WITH LONG-TERM CURRENT USE OF INSULIN: ICD-10-CM

## 2024-12-02 DIAGNOSIS — B96.89 ACUTE BACTERIAL BRONCHITIS: ICD-10-CM

## 2024-12-02 DIAGNOSIS — J20.8 ACUTE BACTERIAL BRONCHITIS: ICD-10-CM

## 2024-12-02 DIAGNOSIS — E11.65 UNCONTROLLED TYPE 2 DIABETES MELLITUS WITH HYPERGLYCEMIA, WITH LONG-TERM CURRENT USE OF INSULIN: ICD-10-CM

## 2024-12-02 DIAGNOSIS — R05.2 SUBACUTE COUGH: Primary | ICD-10-CM

## 2024-12-02 PROCEDURE — 99213 OFFICE O/P EST LOW 20 MIN: CPT | Mod: 95,,, | Performed by: FAMILY MEDICINE

## 2024-12-02 PROCEDURE — 1160F RVW MEDS BY RX/DR IN RCRD: CPT | Mod: CPTII,95,, | Performed by: FAMILY MEDICINE

## 2024-12-02 PROCEDURE — 1159F MED LIST DOCD IN RCRD: CPT | Mod: CPTII,95,, | Performed by: FAMILY MEDICINE

## 2024-12-02 PROCEDURE — 3046F HEMOGLOBIN A1C LEVEL >9.0%: CPT | Mod: CPTII,95,, | Performed by: FAMILY MEDICINE

## 2024-12-02 RX ORDER — DOXYCYCLINE 100 MG/1
100 CAPSULE ORAL 2 TIMES DAILY
Qty: 20 CAPSULE | Refills: 0 | Status: SHIPPED | OUTPATIENT
Start: 2024-12-02 | End: 2024-12-12

## 2024-12-02 RX ORDER — ALBUTEROL SULFATE 90 UG/1
2 INHALANT RESPIRATORY (INHALATION) EVERY 6 HOURS PRN
Qty: 17 G | Refills: 0 | Status: SHIPPED | OUTPATIENT
Start: 2024-12-02

## 2024-12-02 NOTE — PROGRESS NOTES
Subjective:   Patient ID: Kulwant Mueller Jr. is a 39 y.o. male.  Chief Complaint:  Cough    The patient location is: Work  The chief complaint leading to consultation is:  Cough    Visit type: audiovisual    Face to Face time with patient: 10 minutes  15 minutes of total time spent on the encounter, which includes face to face time and non-face to face time preparing to see the patient (eg, review of tests), Obtaining and/or reviewing separately obtained history, Documenting clinical information in the electronic or other health record, Independently interpreting results (not separately reported) and communicating results to the patient/family/caregiver, or Care coordination (not separately reported).     Each patient to whom he or she provides medical services by telemedicine is:  (1) informed of the relationship between the physician and patient and the respective role of any other health care provider with respect to management of the patient; and (2) notified that he or she may decline to receive medical services by telemedicine and may withdraw from such care at any time.    Presents for evaluation of cough   Follows [facility]   Other symptoms have improved   Cough has become productive   Some wheezing and shortness breath  Did not do a home COVID test   Has no known COVID or flu exposure  COVID and flu vaccines are not up-to-date  Increased risk of infection from uncontrolled diabetes last A1c 12%    Cough  This is a new problem. The current episode started 1 to 4 weeks ago. The problem has been gradually improving. The problem occurs every few minutes. The cough is Productive of purulent sputum. Pertinent negatives include no chest pain, chills, ear congestion, ear pain, eye redness, fever, headaches, heartburn, hemoptysis, myalgias, nasal congestion, postnasal drip, rash, rhinorrhea, sore throat, shortness of breath, sweats, weight loss or wheezing. Nothing aggravates the symptoms. He has tried OTC cough  suppressant for the symptoms. The treatment provided mild relief. There is no history of asthma, bronchiectasis, bronchitis, COPD, emphysema, environmental allergies or pneumonia.       Review of Systems   Constitutional:  Negative for chills, fatigue, fever and weight loss.   HENT:  Negative for congestion, dental problem, drooling, ear discharge, ear pain, facial swelling, hearing loss, mouth sores, nosebleeds, postnasal drip, rhinorrhea, sinus pressure, sinus pain, sneezing, sore throat, tinnitus, trouble swallowing and voice change.    Eyes:  Negative for photophobia, pain, discharge, redness, itching and visual disturbance.   Respiratory:  Positive for cough. Negative for hemoptysis, shortness of breath, wheezing and stridor.    Cardiovascular:  Negative for chest pain, palpitations and leg swelling.   Gastrointestinal:  Negative for abdominal pain, diarrhea, heartburn, nausea and vomiting.   Musculoskeletal:  Negative for myalgias, neck pain and neck stiffness.   Skin:  Negative for rash.   Allergic/Immunologic: Negative for environmental allergies.   Neurological:  Negative for dizziness, light-headedness and headaches.   Hematological:  Negative for adenopathy.       Objective:     Physical Exam  Constitutional:       General: He is not in acute distress.     Appearance: Normal appearance. He is obese. He is not ill-appearing or toxic-appearing.      Comments:   Spasmodic cough throughout exam   HENT:      Nose: Nose normal. No mucosal edema, congestion or rhinorrhea.      Right Sinus: No maxillary sinus tenderness or frontal sinus tenderness.      Left Sinus: No maxillary sinus tenderness or frontal sinus tenderness.   Psychiatric:         Mood and Affect: Mood and affect normal.       Assessment:       ICD-10-CM ICD-9-CM   1. Subacute cough  R05.2 786.2   2. Acute bacterial bronchitis  J20.8 466.0    B96.89 041.9     Plan:   Subacute cough  Acute bacterial bronchitis  -     doxycycline (VIBRAMYCIN) 100 MG  Cap; Take 1 capsule (100 mg total) by mouth 2 (two) times daily. for 10 days  Dispense: 20 capsule; Refill: 0  -     albuterol (PROVENTIL/VENTOLIN HFA) 90 mcg/actuation inhaler; Inhale 2 puffs into the lungs every 6 (six) hours as needed for Wheezing or Shortness of Breath (or Cough).  Dispense: 17 g; Refill: 0    Take antibiotic as prescribed  Albuterol inhaler as needed for cough  Should improve in 48-72 hours, may linger for up to an additional 10-14 days  Discussed increased risk of infection due to uncontrolled diabetes and needs to follow up with diabetes management as scheduled  RTC if any worsening despite above medicines or as needed

## 2024-12-03 RX ORDER — INSULIN DEGLUDEC 200 U/ML
56 INJECTION, SOLUTION SUBCUTANEOUS DAILY
Qty: 25.2 ML | Refills: 3 | Status: SHIPPED | OUTPATIENT
Start: 2024-12-03 | End: 2025-12-03

## 2024-12-05 ENCOUNTER — TELEPHONE (OUTPATIENT)
Dept: DIABETES | Facility: CLINIC | Age: 39
End: 2024-12-05
Payer: COMMERCIAL

## 2024-12-05 ENCOUNTER — PATIENT MESSAGE (OUTPATIENT)
Dept: DIABETES | Facility: CLINIC | Age: 39
End: 2024-12-05
Payer: COMMERCIAL

## 2024-12-05 NOTE — TELEPHONE ENCOUNTER
Spoke with pt. Assisted w/ scheduling OP5 training appt. Reviewed supplies to bring including Novolog vial insulin.     Instructed pt to hold Tresiba morning of appt since he doses in am and use Novolog per usual directions as needed. Encouraged pt to wear active Dexcom sensor. Pt verbalized understanding of all instructions provided.     Msg to provider Zeferino for pump rates and additional medication adjustments. Successful outreach.

## 2024-12-05 NOTE — TELEPHONE ENCOUNTER
INITIAL PUMP SETTINGS      Basal Rate:  0000 - 0000:  1.3 units/hr    Insulin to Carb Ratio:   7 grams/unit    Correction Factor:  27 mg/dL/unit    Duration of Action:  3 hrs    Max Basal Rate:  5 units/hr    Max Bolus:  20 units     Target B - 0000: 130 mg/dL    Correct Above: 130 mg/dL    Reverse Correction:  on

## 2024-12-16 ENCOUNTER — CLINICAL SUPPORT (OUTPATIENT)
Dept: DIABETES | Facility: CLINIC | Age: 39
End: 2024-12-16
Payer: COMMERCIAL

## 2024-12-16 ENCOUNTER — TELEPHONE (OUTPATIENT)
Dept: DIABETES | Facility: CLINIC | Age: 39
End: 2024-12-16
Payer: COMMERCIAL

## 2024-12-16 VITALS — WEIGHT: 278.69 LBS | BODY MASS INDEX: 37.79 KG/M2

## 2024-12-16 DIAGNOSIS — Z79.4 TYPE 2 DIABETES MELLITUS WITH COMPLICATION, WITH LONG-TERM CURRENT USE OF INSULIN: ICD-10-CM

## 2024-12-16 DIAGNOSIS — Z79.4 TYPE 2 DIABETES MELLITUS WITH COMPLICATION, WITH LONG-TERM CURRENT USE OF INSULIN: Primary | Chronic | ICD-10-CM

## 2024-12-16 DIAGNOSIS — E11.8 TYPE 2 DIABETES MELLITUS WITH COMPLICATION, WITH LONG-TERM CURRENT USE OF INSULIN: Primary | Chronic | ICD-10-CM

## 2024-12-16 DIAGNOSIS — E11.8 TYPE 2 DIABETES MELLITUS WITH COMPLICATION, WITH LONG-TERM CURRENT USE OF INSULIN: ICD-10-CM

## 2024-12-16 PROCEDURE — G0108 DIAB MANAGE TRN  PER INDIV: HCPCS | Mod: S$GLB,,, | Performed by: DIETITIAN, REGISTERED

## 2024-12-16 PROCEDURE — 99999 PR PBB SHADOW E&M-EST. PATIENT-LVL IV: CPT | Mod: PBBFAC,,, | Performed by: DIETITIAN, REGISTERED

## 2024-12-16 RX ORDER — INSULIN ASPART 100 [IU]/ML
INJECTION, SOLUTION INTRAVENOUS; SUBCUTANEOUS
Qty: 20 ML | Refills: 11 | Status: SHIPPED | OUTPATIENT
Start: 2024-12-16

## 2024-12-16 NOTE — PROGRESS NOTES
Diabetes Care Specialist Progress Note  Author: Margaret Julian RD, CDE  Date: 12/16/2024    Intake    Program Intake  Reason for Diabetes Program Visit:: Intervention (DM referral 10/14/24 Almaz Santiago FNP)  Type of Intervention:: Individual  Device Training: Insulin Pump Start (OP5 using Novolog)  Current diabetes risk level:: high  In the last month, have you used the ER or been admitted to the hospital: No    Current Diabetes Treatment: Oral Medications, Diet/Exercise, Insulin (Jardiance 25mg 1tab daily. Tresiba 43units daily (last dose ~6:50am). Novolog ac per s/s (last dose ~6:50am).)    Continuous Glucose Monitoring  Patient has CGM: Yes  Personal CGM type:: DexG7 - GMI unavail    Lab Results   Component Value Date    HGBA1C 12.0 (H) 06/25/2024       Weight: 126.4 kg (278 lb 10.6 oz)       Body mass index is 37.79 kg/m².    Lifestyle Coping Support & Clinical    Problem Review  Active Comorbidities:  (HTN, Neuropathy, Gastroparesis, heart failure with reduced ejection fraction, DVT, GERD, BMI 37.79.)    Diabetes Self-Management Skills Assessment    Medication Skills Assessment  Patient is able to identify current diabetes medications, dosages, and appropriate timing of medications.: yes  Patient reports problems or concerns with current medication regimen.: yes  Medication regimen problems/concerns:: other (see comments) (Pt in clinic for OP5 training.)  Patient is  aware that some diabetes medications can cause low blood sugar?: Yes  Medication Skills Assessment Completed:: Yes  Assessment indicates:: Instruction Needed, Knowledge deficit  Area of need?: Yes    Assessment Summary and Plan  Based on today's diabetes care assessment, the following areas of need were identified:      Identified Areas of Need      Medication/Current Diabetes Treatment: Yes -see care plan     Today's interventions were provided through individual discussion, instruction, and written materials were provided.     Patient verbalized understanding of instruction and written materials.  Pt was able to return back demonstration of instructions today. Patient understood key points, needs reinforcement and further instruction.     Diabetes Self-Management Care Plan:  Today's Diabetes Self-Management Care Plan was developed with Kulwant's input. Kulwant has agreed to work toward the following goal(s) to improve his/her overall diabetes control.      Care Plan: Diabetes Management   Updates made since 12/17/2023 12:00 AM        Problem: Medications         Goal: Patient Agrees to take Diabetes Medication(s) as prescribed.    Start Date: 10/15/2024   Expected End Date: 10/14/2025   Priority: High   Barriers: No Barriers Identified   Note:    OMNIPOD 5 INSULIN PUMP START  Pump training was provided per Omni Pod protocol using Quick start guide, simulator and hands-on practice.     Of note, pt received G6 compatible Pods in his starter kit and second Rx for G6/G7 compatible Pods. Instructed pt to contact pharmacy or [employer] Customer Support to see if they would exchange Pods. Also, pt informed sharing.ithone OP5 vane currently compatible with G6. Assisted pt with OP5 vane download and programming w/ initial settings; pt understands we will need to verify settings before he transfers.     Patient understands he will no longer take MDI injections daily.  Details of pump therapy were covered included following: controller features and programming, pod activation, pod site selection and rotation, automatic pod priming and insertion, setting & editing basal rates in manual mode, giving bolus and other features in the set up menu.  Patient demonstrated ability to program controller, activate and insert pod using aseptic technique.  Assisted pt in programming Dexcom G7 sensor into controller and starting automated limited mode.    Instructed pt on use of basic pump features ie...give a bolus, pause insulin, switch from manual to automated  mode.  Reviewed features available in manual mode verses automated mode.   Reviewed when and how to use activity function in automated mode.  Reviewed site selection of pods, rotation of sites and hard stop to change pod every 72 hrs.   Instructed that insulin vial is good out of refrigeration for up to 28-30 days .   Reviewed treatment of hypoglycemia, hyperglycemia; sick day care, DKA, and troubleshooting of pump.  Omni Pod 24 hour support can be reached at [phone].     INITIAL SETTINGS: (per provider Zeferino)    Basal rate:1.3units /hr  Maximum basal: 5units /hr     Bolus Menu:  ISF: 1:27  Carb Ratio : 1:7  Blood glucose target: 130; correct above: 130  Active insulin:  3 hrs  Maximum bolus = 20 units  Reverse Correction: on  ______________  Podder ID username:   Aidao username:   Password:  _____________  Patient has written materials for Omnipod 5 for home use.  Patient verbalized understanding of all instructions given.  Reviewed back up plan in case of pump malfunction.  Educated that if glucose levels increasing and patient is delivering insulin, it is recommended to change pod.     Follow Up Plan  Follow up in about 2 weeks (around 12/30/2024).  -review OP5 reports  -eval goals    Today's care plan and follow up schedule was discussed with patient.  Kulwant verbalized understanding of the care plan, goals, and agrees to follow up plan.        The patient was encouraged to communicate with his/her health care provider/physician and care team regarding his/her condition(s) and treatment.  I provided the patient with my contact information today and encouraged to contact me via phone or [facility] Patient Portal as needed.     Length of Visit   Total Time: 120 Minutes

## 2024-12-17 ENCOUNTER — TELEPHONE (OUTPATIENT)
Dept: DIABETES | Facility: CLINIC | Age: 39
End: 2024-12-17
Payer: COMMERCIAL

## 2024-12-17 NOTE — TELEPHONE ENCOUNTER
Spoke with pt. He reports doing well since OP5 training yesterday. He states applying new Dexcom sensor since his came off during the night.     Encouraged progress and maintaining automated mode. Noted pt entered correction bolus yesterday but missed carb entry. He reports eating dinner w/ carbs. Encouraged pt to focus on carb ctg and entering into bolus calculator. Pt verbalized understanding and agreed to maintain clinic contact. Successful outreach.

## 2024-12-31 ENCOUNTER — PATIENT OUTREACH (OUTPATIENT)
Dept: ADMINISTRATIVE | Facility: HOSPITAL | Age: 39
End: 2024-12-31
Payer: COMMERCIAL

## 2024-12-31 NOTE — PROGRESS NOTES
VBC OUTREACH: per chart review pt is overdue for Micro, lipid, and Ha1c, pt missed lab appt 11.12.24, spoke to pt he agreed to reschedule appt.

## 2025-01-02 ENCOUNTER — PATIENT OUTREACH (OUTPATIENT)
Dept: ADMINISTRATIVE | Facility: HOSPITAL | Age: 40
End: 2025-01-02
Payer: COMMERCIAL

## 2025-01-08 DIAGNOSIS — F43.21 ADJUSTMENT DISORDER WITH DEPRESSED MOOD: ICD-10-CM

## 2025-01-08 RX ORDER — ESCITALOPRAM OXALATE 10 MG/1
10 TABLET ORAL
Qty: 90 TABLET | Refills: 3 | Status: SHIPPED | OUTPATIENT
Start: 2025-01-08

## 2025-01-08 NOTE — TELEPHONE ENCOUNTER
Refill Routing Note   Medication(s) are not appropriate for processing by [facility] for the following reason(s):        Drug-disease interaction    [facility] action(s):  Defer      Medication Therapy Plan: Drug-Disease: EScitalopram oxalate and Hypokalemia    Pharmacist review requested: Yes     Appointments  past 12m or future 3m with PCP    Date Provider   Last Visit   12/2/2024 Lawrence Magana MD   Next Visit   Visit date not found Lawrence Magana MD   ED visits in past 90 days: 0        Note composed:5:01 PM 01/08/2025

## 2025-01-08 NOTE — TELEPHONE ENCOUNTER
No care due was identified.  [facility] Embedded Care Due Messages. Reference number: [id number].   1/08/2025 4:05:35 PM CST

## 2025-01-08 NOTE — TELEPHONE ENCOUNTER
Refill Decision Note   Kulwant Menardley  is requesting a refill authorization.  Brief Assessment and Rationale for Refill:  Approve     Medication Therapy Plan:         Pharmacist review requested: Yes   Extended chart review required: Yes   Comments:     Note composed:5:37 PM 01/08/2025

## 2025-01-09 ENCOUNTER — TELEPHONE (OUTPATIENT)
Dept: DIABETES | Facility: CLINIC | Age: 40
End: 2025-01-09
Payer: COMMERCIAL

## 2025-01-09 ENCOUNTER — CLINICAL SUPPORT (OUTPATIENT)
Dept: DIABETES | Facility: CLINIC | Age: 40
End: 2025-01-09
Payer: COMMERCIAL

## 2025-01-09 DIAGNOSIS — E11.8 TYPE 2 DIABETES MELLITUS WITH COMPLICATION, WITH LONG-TERM CURRENT USE OF INSULIN: Primary | Chronic | ICD-10-CM

## 2025-01-09 DIAGNOSIS — Z79.4 TYPE 2 DIABETES MELLITUS WITH COMPLICATION, WITH LONG-TERM CURRENT USE OF INSULIN: Primary | Chronic | ICD-10-CM

## 2025-01-09 NOTE — TELEPHONE ENCOUNTER
Scheduled pt with Almaz for follow up with OP5. I also let pt know there are 2 dexcom G7 sensors at registration desk on 2nd floor. Pt said he will stop by tomorrow to  sensors.

## 2025-01-09 NOTE — PROGRESS NOTES
Diabetes Care Specialist Virtual Visit Note       The patient location is: parked vehicle in [place]  The chief complaint leading to consultation is: Diabetes  Visit type: audiovisual  Total time spent with patient: 30 min   Each patient to whom he or she provides medical services by telemedicine is:  (1) informed of the relationship between the physician and patient and the respective role of any other health care provider with respect to management of the patient; and (2) notified that he or she may decline to receive medical services by telemedicine and may withdraw from such care at any time.    Diabetes Care Specialist Progress Note  Author: Margaret Julian RD, CDE  Date: 1/9/2025    Intake    Program Intake  Reason for Diabetes Program Visit:: Intervention (DM referral 10/14/24 Almaz Santiago FNP)  Type of Intervention:: Individual  Education: Advanced Pump (OP5 using Novolog (training 12/16/24))  Current diabetes risk level:: high  In the last month, have you used the ER or been admitted to the hospital: No  Was the ER or hospital admission related to diabetes?: No    Current Diabetes Treatment: Oral Medications, Diet/Exercise, Insulin (Jardiance 25mg 1tab daily. OP5 using Novolog (training 12/16/24).)    Continuous Glucose Monitoring  Patient has CGM: Yes  Personal CGM type:: DexG7 - Pt denies site issues. He reports recently placing new Pod on opposite arm from Dexcom and unable to change sensor early.  GMI Date: 01/09/25  GMI Value: 7.6 %    Lab Results   Component Value Date    HGBA1C 12.0 (H) 06/25/2024     Lifestyle Coping Support & Clinical    Problem Review  Active Comorbidities:  (HTN, Neuropathy, Gastroparesis, heart failure with reduced ejection fraction, DVT, GERD, BMI 37.79.)    Diabetes Self-Management Skills Assessment    Medication Skills Assessment  Patient is able to identify current diabetes medications, dosages, and appropriate timing of medications.: yes (Pt understands when and  "how to use bolus calculator but reports late and missed bolus due to work gerardo/demands.)  Patient reports problems or concerns with current medication regimen.: yes  Medication regimen problems/concerns:: concerned about side effects (Pt reports concerns related to rapidly falling glucose and overcorrects using food (2bowls cereal).)  Patient is  aware that some diabetes medications can cause low blood sugar?: Yes  Assessment indicates:: Instruction Needed  Area of need?: Yes    Nutrition/Healthy Eating  Meal Plan 24 Hour Recall - Breakfast: none  Meal Plan 24 Hour Recall - Lunch: [employer] ham/cheese 6"  Meal Plan 24 Hour Recall - Dinner: Spaghetti  Meal Plan 24 Hour Recall - Snack: Sunchips OR pie  Meal Plan 24 Hour Recall - Beverage: water  Who shops/cooks?: wife  Patient can identify foods that impact blood sugar.: yes (Pt states understanding carb grm ctg and has tools to support.)  Challenges to healthy eating:: eating out, going to parties (Pt states trying to make healthier restaurant choices.)  Nutrition/Healthy Eating Skills Assessment Completed:: Yes  Assessment indicates:: Adequate understanding  Area of need?: No    Physical Activity/Exercise  Patient's daily activity level::  (Neighborhood walking ~30min daily.)  Physical Activity/Exercise Skills Assessment Completed: : Yes  Assessment indicates:: Adequate understanding  Area of need?: No    Home Blood Glucose Monitoring  Patient states that blood sugar is checked at home daily.: yes  Monitoring Method:: personal continuous glucose monitor  Personal CGM type:: DexG7 - Pt denies site issues. He reports recently placing new Pod on opposite arm from Dexcom and unable to change sensor early.  Home Blood Glucose Monitoring Skills Assessment Completed: : Yes  Assessment indicates:: Instruction Needed  Area of need?: Yes    Acute Complications  Have you ever had hypoglycemia (low BG 70 or less)?: yes (Pt reports one episode of rapid falling BG during the night " and overtreated using food.)  Have you ever had hyperglycemia (high  or more)?: yes (Pt denies symptoms.)  Acute Complications Skills Assessment Completed: : Yes  Assessment indicates:: Instruction Needed  Area of need?: Yes    Assessment Summary and Plan  Based on today's diabetes care assessment, the following areas of need were identified:      Identified Areas of Need      Medication/Current Diabetes Treatment: Yes - see care plan   Nutrition/Healthy Eating: No    Physical Activity/Exercise: No    Home Blood Glucose Monitoring: Yes - see care plan   Acute Complications: Yes - see care plan     Today's interventions were provided through individual discussion, instruction, and written materials were provided.    Patient verbalized understanding of instruction and written materials.  Pt was able to return back demonstration of instructions today. Patient understood key points, needs reinforcement and further instruction.     Diabetes Self-Management Care Plan:  Today's Diabetes Self-Management Care Plan was developed with Kulwant's input. Kulwant has agreed to work toward the following goal(s) to improve his/her overall diabetes control.      Care Plan: Diabetes Management   Updates made since 1/10/2024 12:00 AM        Problem: Medications         Goal: Patient Agrees to take Diabetes Medication(s) as prescribed.    Start Date: 10/15/2024   Expected End Date: 10/14/2025   This Visit's Progress: On track   Priority: High   Barriers: No Barriers Identified   Note:    Encouraged progress and consistency using OP5. Reviewed need for OP5 Pod and DexG6 sensor to be placed in line of site for automated mode to function. Discussed how to gradually rotate devices from left to right side body and then back to left side. Pt verbalized understanding.     Msg to [facility] staff to assist pt with DexG7 sample since pt's unable to change sensor early, new to pump and concerned about low BG patterns.     Instructed pt on  process for hypoglycemia treatment while using automated insulin pump. Provided examples of ~7 gram rapid acting carb treatment options and protein snack for additional BG stability.     Reviewed need for consistency in bolus entry and dosing 10-15min before eating. Encouraged progress of making healthier dining out choices.     Msg to provider Zeferino for progress updates. Encouraged pt to follow-up with provider for medical mgmt.   ______________  OmniPod Account:  Podder Central username: [email or web address]  [email or web address] username: [email or web address]  Password: [email or web address]  _____________  Patient has written materials for Omnipod 5 for home use.  Patient verbalized understanding of all instructions given.  Reviewed back up plan in case of pump malfunction.  Educated that if glucose levels increasing and patient is delivering insulin, it is recommended to change pod.         Follow Up Plan  Follow up in about 6 weeks (around 2/20/2025).  -review OP5 reports  -eval goals    Today's care plan and follow up schedule was discussed with patient.  Kulwant verbalized understanding of the care plan, goals, and agrees to follow up plan.        The patient was encouraged to communicate with his/her health care provider/physician and care team regarding his/her condition(s) and treatment.  I provided the patient with my contact information today and encouraged to contact me via phone or [facility] Patient Portal as needed.     Length of Visit   Total Time: 30 Minutes

## 2025-01-09 NOTE — Clinical Note
Jef Muse,  Met w/ Mr Mueller today. He's doing well since starting OP5. Still needs to work on bolus consistency/timing and placing devices in line of site to maintain auto mode. He doesn't currently have visit fu w/ you.  I plan to see him back 6wks. Thank you, Margaret

## 2025-01-14 ENCOUNTER — PATIENT MESSAGE (OUTPATIENT)
Dept: DIABETES | Facility: CLINIC | Age: 40
End: 2025-01-14
Payer: COMMERCIAL

## 2025-01-14 ENCOUNTER — TELEPHONE (OUTPATIENT)
Dept: DIABETES | Facility: CLINIC | Age: 40
End: 2025-01-14
Payer: COMMERCIAL

## 2025-01-14 NOTE — TELEPHONE ENCOUNTER
Called patient to let them know that his pump failure backup plan was sent via [facility] portal and if he has any questions to reach out as soon as possible. No answer, left a message

## 2025-01-14 NOTE — TELEPHONE ENCOUNTER
Spoke with pt. He reports two OP5 Pod failures when trying to activate Pods. Pt states having additional rapid acting insulin vial but unsure if he has more Pods. Pt states having backup MDI injectable insulin used before transitioning to OP5. He states forgetting to bring insulin pens with him today for work.     Instructed pt to contact Om[employer] Customer Support to report Pod failures and insulin loss. Reviewed to either place new Pod or start using backup insulin injection plan if he no longer has Pod supply. Pt states having MDI injection dose amounts. Encouraged continued clinic contact. Provider notified.

## 2025-01-14 NOTE — TELEPHONE ENCOUNTER
Patient called to inform of pump failure backup plans no answer- per notes plans were sent to patient via portal

## 2025-01-14 NOTE — TELEPHONE ENCOUNTER
We have decided to develop a plan in the event that your pump breaks or is nonfunctioning. After 4 hours without pump use, you should begin to consider putting your Pump Failure Back Up Plan into action especially if you foresee that you may not be able to use your pump for more than 20 hours.     Dosing:   If the insulin pump is non functional and discontinued for anticipated more than 20 hours, please give daily injections of:    Long Acting Insulin Dosing:   Tresiba 36 units daily.  Short Acting Insulin Dosing:   Novolog per IC of 7 (or 1 unit for every 7g of carbs) and ISF/CF of 30 (or 1 unit for every 30 pts above goal blood sugar of 130)    Ex: If you are giving an injection for a meal, your dose will be 1 unit for every 7g of carbs and 1 unit for every 30pts above 130. So if you eat a 50 g carb meal and your blood sugar is 250 mg/dl, you would calculate as following and round the units down for your dosing.   Meal Carbs (50) divided by your Carb Ratio (7) = 7.1 so you would round down to 7 units to cover your meal.   Blood Sugar (250) - Goal Blood Sugar (130) = 120 and then 120 divided by your Correction Factor (30) = 4      Adding these two numbers together would give you 11 units total to take before your meal.       When the insulin pump is restarted, do not restart basal rates until at least 22 hours after the last long acting insulin injection. You can set a 0% temporary basal setting that will last until this time and still use your pump to bolus for meals and correction. For help with setting temporary basal setting, please call office, view your pump's online resource library, or call 24 hour technical support hotline.     For any technical insulin pump issues, please contact the insulin pump company; the toll free number is printed on the label on the back of the insulin pump.

## 2025-01-31 ENCOUNTER — PATIENT MESSAGE (OUTPATIENT)
Dept: DIABETES | Facility: CLINIC | Age: 40
End: 2025-01-31
Payer: COMMERCIAL

## 2025-03-05 ENCOUNTER — TELEPHONE (OUTPATIENT)
Dept: DIABETES | Facility: CLINIC | Age: 40
End: 2025-03-05
Payer: COMMERCIAL

## 2025-03-06 ENCOUNTER — CLINICAL SUPPORT (OUTPATIENT)
Dept: DIABETES | Facility: CLINIC | Age: 40
End: 2025-03-06
Payer: COMMERCIAL

## 2025-03-06 ENCOUNTER — PATIENT MESSAGE (OUTPATIENT)
Dept: ADMINISTRATIVE | Facility: HOSPITAL | Age: 40
End: 2025-03-06
Payer: COMMERCIAL

## 2025-03-06 ENCOUNTER — TELEPHONE (OUTPATIENT)
Dept: DIABETES | Facility: CLINIC | Age: 40
End: 2025-03-06
Payer: COMMERCIAL

## 2025-03-06 DIAGNOSIS — Z79.4 TYPE 2 DIABETES MELLITUS WITH COMPLICATION, WITH LONG-TERM CURRENT USE OF INSULIN: Primary | Chronic | ICD-10-CM

## 2025-03-06 DIAGNOSIS — E11.8 TYPE 2 DIABETES MELLITUS WITH COMPLICATION, WITH LONG-TERM CURRENT USE OF INSULIN: Primary | Chronic | ICD-10-CM

## 2025-03-06 NOTE — PROGRESS NOTES
Diabetes Care Specialist Virtual Visit Note       The patient location is: parked vehicle in [place]  The chief complaint leading to consultation is: Diabetes  Visit type: audiovisual  Total time spent with patient: 30 min   Each patient to whom he or she provides medical services by telemedicine is:  (1) informed of the relationship between the physician and patient and the respective role of any other health care provider with respect to management of the patient; and (2) notified that he or she may decline to receive medical services by telemedicine and may withdraw from such care at any time.    Diabetes Care Specialist Progress Note  Author: Margaret Julian RD, [facility] [place]  Date: 3/6/2025    Intake    Program Intake  Reason for Diabetes Program Visit:: Intervention (DM referral 10/14/24 Almaz Santiago FN)  Type of Intervention:: Individual  Education: Advanced Pump (OP5 using Novolog (training 12/16/24))  Current diabetes risk level:: high  In the last month, have you used the ER or been admitted to the hospital: No    Current Diabetes Treatment: Oral Medications, Diet/Exercise, Insulin (Jardiance 25mg 1tab daily. OP5 using Novolog (training 12/16/24).)    Continuous Glucose Monitoring  Patient has CGM: Yes  Personal CGM type:: DexG7 - Pt denies device or site placement issues.  GMI Date: 03/06/25  GMI Value: 8 %    Lab Results   Component Value Date    HGBA1C 12.0 (H) 06/25/2024     Lifestyle Coping Support & Clinical    Problem Review  Active Comorbidities:  (HTN, Neuropathy, Gastroparesis, heart failure with reduced ejection fraction, DVT, GERD, BMI 37.79.)    Diabetes Self-Management Skills Assessment    Medication Skills Assessment  Patient is able to identify current diabetes medications, dosages, and appropriate timing of medications.: yes (Pt understands when and how to use bolus calculator. He is missing meal/snack bolus (states busy work schedule), underestimating carb gram entry (not yet using  Nutrition calculator). Pt isn't applying correction bolus to lower high BG patterns. Pt states one day without Pod (states needing a break) but used MDI injections. Pt would like to run OP5 on mobile device.)  Patient reports problems or concerns with current medication regimen.: yes  Medication regimen problems/concerns:: other (see comments) (Pt states pharmacy filled O Pods instead of G6/G7 Pods. He received some replacement from Om[employer]Pod customer support.)  Patient is  aware that some diabetes medications can cause low blood sugar?: Yes  Medication Skills Assessment Completed:: Yes  Assessment indicates:: Instruction Needed  Area of need?: Yes    Physical Activity/Exercise  Patient's daily activity level::  (Neighborhood walking ~30min daily.)    Home Blood Glucose Monitoring  Personal CGM type:: DexG7 - Pt denies device or site placement issues.    Assessment Summary and Plan  Based on today's diabetes care assessment, the following areas of need were identified:      Identified Areas of Need      Medication/Current Diabetes Treatment: Yes -see care plan     Today's interventions were provided through individual discussion, instruction, and written materials were provided.    Patient verbalized understanding of instruction and written materials.  Pt was able to return back demonstration of instructions today. Patient understood key points, needs reinforcement and further instruction.     Diabetes Self-Management Care Plan:  Today's Diabetes Self-Management Care Plan was developed with Kulwant's input. Kulwant has agreed to work toward the following goal(s) to improve his/her overall diabetes control.      Care Plan: Diabetes Management   Updates made since 3/6/2024 12:00 AM        Problem: Medications         Goal: Patient Agrees to take Diabetes Medication(s) as prescribed.    Start Date: 10/15/2024   Expected End Date: 10/14/2025   This Visit's Progress: On track   Recent Progress: On track   Priority: High    Barriers: No Barriers Identified   Note:    Encouraged progress and consistency using OP5. Pt opts to remain on DexG7 using OmniPod controller and wait for DexG7 compatibility for OP5 mobile.     Reviewed need for consistent meal/snack bolus entry 10-15min before eating and correction bolus outside of meals. Pt is reducing portions of yadira covered peanuts ~10-12 ea; advised pt to start entering ~20grams carb for this snack. Also, assisted pt with carb gram estimation for Lit pizza. Instructed to download nutrition calculators ([employer]ronometer, [employer] to support carb gram accuracy. Pt agrees to start entering snack carbs and continue progress with goal.     Msg to provider Zeferino for progress updates. Reviewed missed provider visit, and encouraged pt to follow-up with provider for medical mgmt.   ______________  OmniPod Account:  Podder Central username: [email or web address]  LeidaTablo username: [email or web address]  Password: [email or web address]     Follow Up Plan  Follow up in about 2 months (around 5/6/2025).  -review OP5 reports  -eval goals    Today's care plan and follow up schedule was discussed with patient.  Kulwant verbalized understanding of the care plan, goals, and agrees to follow up plan.        The patient was encouraged to communicate with his/her health care provider/physician and care team regarding his/her condition(s) and treatment.  I provided the patient with my contact information today and encouraged to contact me via phone or [facility] Patient Portal as needed.     Length of Visit   Total Time: 30 Minutes

## 2025-03-06 NOTE — Clinical Note
Jef Muse,  I met w/ Mr Mueller today for OP5 follow-up. Overall improving GMI 8. We worked today on carb bolus accuracy. He's missing several meal/snack bolus (states busy work schedule), correction bolus and underestimating carb intake. I noticed he missed last visit with you and encouraged him to follow-up for medical mgmt & pump rate review.   Thank you, Margaret

## 2025-03-06 NOTE — TELEPHONE ENCOUNTER
----- Message from Dietitian Margaret sent at 3/6/2025  1:53 PM CST -----  Jef Muse,   I met w/ Mr Mueller today for OP5 follow-up. Overall improving GMI 8. We worked today on carb bolus accuracy. He's missing several meal/snack bolus (states busy work schedule), correction bolus and underestimating carb intake. I noticed he missed last visit with you and encouraged him to follow-up for medical mgmt & pump rate review.    Thank you, Margaret

## 2025-03-06 NOTE — TELEPHONE ENCOUNTER
I attempted to call LetiKulwant to assist him with scheduling a DM follow up with  but received no answer. I left a detailed voicemail with my name and the [facility] callback number.

## 2025-03-24 ENCOUNTER — OFFICE VISIT (OUTPATIENT)
Dept: DIABETES | Facility: CLINIC | Age: 40
End: 2025-03-24
Payer: COMMERCIAL

## 2025-03-24 DIAGNOSIS — E11.8 TYPE 2 DIABETES MELLITUS WITH COMPLICATION, WITH LONG-TERM CURRENT USE OF INSULIN: Chronic | ICD-10-CM

## 2025-03-24 DIAGNOSIS — Z79.4 TYPE 2 DIABETES MELLITUS WITH COMPLICATION, WITH LONG-TERM CURRENT USE OF INSULIN: Chronic | ICD-10-CM

## 2025-03-24 RX ORDER — BLOOD-GLUCOSE SENSOR
1 EACH MISCELLANEOUS
Qty: 9 EACH | Refills: 5 | Status: SHIPPED | OUTPATIENT
Start: 2025-03-24 | End: 2026-03-24

## 2025-03-24 RX ORDER — INSULIN ASPART 100 [IU]/ML
INJECTION, SOLUTION INTRAVENOUS; SUBCUTANEOUS
Qty: 20 ML | Refills: 11 | Status: SHIPPED | OUTPATIENT
Start: 2025-03-24

## 2025-03-24 RX ORDER — INSULIN PMP CART,AUT,G6/7,CNTR
1 EACH SUBCUTANEOUS
Qty: 30 EACH | Refills: 5 | Status: SHIPPED | OUTPATIENT
Start: 2025-03-24 | End: 2025-04-23

## 2025-03-24 RX ORDER — EMPAGLIFLOZIN 25 MG/1
25 TABLET, FILM COATED ORAL DAILY
Qty: 90 TABLET | Refills: 3 | Status: SHIPPED | OUTPATIENT
Start: 2025-03-24 | End: 2026-03-24

## 2025-03-24 NOTE — PROGRESS NOTES
The patient location is: Home  The chief complaint leading to consultation is: Diabetes    Visit type: audiovisual    Face to Face time with patient: 18  30 minutes of total time spent on the encounter, which includes face to face time and non-face to face time preparing to see the patient (eg, review of tests), Obtaining and/or reviewing separately obtained history, Documenting clinical information in the electronic or other health record, Independently interpreting results (not separately reported) and communicating results to the patient/family/caregiver, or Care coordination (not separately reported).  Each patient to whom he or she provides medical services by telemedicine is:  (1) informed of the relationship between the physician and patient and the respective role of any other health care provider with respect to management of the patient; and (2) notified that he or she may decline to receive medical services by telemedicine and may withdraw from such care at any time.    Notes:    Kulwant Mueller Jr. is a 40 y.o. male who presents for a follow up evaluation of Type 2 diabetes mellitus.     CHIEF COMPLAINT: Diabetes Consultation    PCP: Lawrence Magana MD      Initial visit with me - 6/27/2023    The patient was initially diagnosed with diabetes at age 25.   Previously followed at [facility] by Lisa Bro NP, last visit 3/2022.    Stopped taking Humalog and Tresiba 3 weeks prior to initial visit with me due to making him feel weak, but restarted week before due to blood sugars going up.      Previous failed treatments include:  Metformin     Social Documentation:  Patient lives in [place]. 3 children, 15, 13, 7.   Occupation:  at [employer] in [place].   Exercise: walking at work.       Diabetes related complications:   peripheral neuropathy.   denies Pancreatitis  reports Gastroparesis - last ED visit in 2/2024 for exacerbation.   denies DKA  denies Hx/family Hx of  "MEN2/MTC  denies Frequent UTIs/yeast infections     Cardiovascular Risk Factors: dyslipidemia, family history of premature cardiovascular disease, hypertension, male gender, and obesity (BMI >30 kg/m2).    Diabetes Medications              empagliflozin (JARDIANCE) 25 mg tablet Take 1 tablet (25 mg total) by mouth once daily.    glucagon (GVOKE HYPOPEN 2-PACK) 1 mg/0.2 mL AtIn Inject 1 mg into the skin as needed (SEVERE HYPOGLYCEMIA).    insulin aspart U-100 (NOVOLOG U-100 INSULIN ASPART) 100 unit/mL injection Inject 200 units into omnipod insulin pump every 3 days.    insulin lispro (HUMALOG U-100 INSULIN) 100 unit/mL injection Inject 200 Units into the skin Every 3 (three) days.    TRESIBA FLEXTOUCH U-200 200 unit/mL (3 mL) insulin pen Inject 56 Units into the skin once daily.     Current monitoring regimen: Dexcom G7 CGM        Recent hypoglycemic episodes: No.   Patient compliant with glucose checks and medication administration? Yes    DIABETES MANAGEMENT STATUS  Statin: Not taking  ACE/ARB: Not taking  Screening or Prevention Patient's value Goal Complete/Controlled?   HgA1C Testing and Control   Lab Results   Component Value Date    HGBA1C 12.0 (H) 06/25/2024      Annually/Less than 8% No   Lipid profile : 09/27/2023 Annually Yes   LDL control Lab Results   Component Value Date    LDLCALC 120.6 09/27/2023    Annually/Less than 100 mg/dl  No   Nephropathy screening Lab Results   Component Value Date    LABMICR 243.0 07/20/2023     Lab Results   Component Value Date    PROTEINUA Negative 01/31/2024     No results found for: "UTPCR"   Annually Yes   Blood pressure BP Readings from Last 1 Encounters:   04/01/24 117/72    Less than 140/90 Yes   Dilated retinal exam : 09/06/2024 Annually Yes   Foot exam   : 10/28/2024 Annually No   Patient's medications, allergies, surgical, social and family histories were reviewed and updated as appropriate.     Review of Systems   Constitutional:  Negative for weight loss. "   Eyes:  Negative for blurred vision and double vision.   Cardiovascular:  Negative for chest pain.   Gastrointestinal:  Negative for nausea and vomiting.   Genitourinary:  Negative for frequency.   Musculoskeletal:  Negative for falls.   Neurological:  Negative for dizziness and weakness.   Endo/Heme/Allergies:  Negative for polydipsia.   Psychiatric/Behavioral:  Negative for depression.    All other systems reviewed and are negative.       Physical Exam  Constitutional:       General: He is not in acute distress.     Appearance: Normal appearance.   Pulmonary:      Effort: No respiratory distress.   Neurological:      Mental Status: He is alert and oriented to person, place, and time.   Psychiatric:         Mood and Affect: Mood normal.         Behavior: Behavior normal.        There were no vitals taken for this visit.  Wt Readings from Last 3 Encounters:   12/16/24 126.4 kg (278 lb 10.6 oz)   10/28/24 126.4 kg (278 lb 10.6 oz)   10/15/24 126.4 kg (278 lb 10.6 oz)       LAB REVIEW  Lab Results   Component Value Date     06/25/2024    K 4.1 06/25/2024     06/25/2024    CO2 26 06/25/2024    BUN 15 06/25/2024    CREATININE 0.85 06/25/2024    CALCIUM 8.7 06/25/2024    ANIONGAP 10 06/25/2024    EGFRNORACEVR >60.0 06/25/2024     Lab Results   Component Value Date    CPEPTIDE 1.79 06/25/2024    GLUTAMICACID 0.02 06/25/2024     Hemoglobin A1C   Date Value Ref Range Status   06/25/2024 12.0 (H) 4.0 - 5.6 % Final     Comment:     ADA Screening Guidelines:  5.7-6.4%  Consistent with prediabetes  >or=6.5%  Consistent with diabetes    High levels of fetal hemoglobin interfere with the HbA1C  assay. Heterozygous hemoglobin variants (HbS, HgC, etc)do  not significantly interfere with this assay.   However, presence of multiple variants may affect accuracy.     02/26/2024 6.8 (H) 4.0 - 5.6 % Final     Comment:     ADA Screening Guidelines:  5.7-6.4%  Consistent with prediabetes  >or=6.5%  Consistent with  diabetes    High levels of fetal hemoglobin interfere with the HbA1C  assay. Heterozygous hemoglobin variants (HbS, HgC, etc)do  not significantly interfere with this assay.   However, presence of multiple variants may affect accuracy.     12/28/2023 7.4 (H) 4.0 - 5.6 % Final     Comment:     ADA Screening Guidelines:  5.7-6.4%  Consistent with prediabetes  >or=6.5%  Consistent with diabetes    High levels of fetal hemoglobin interfere with the HbA1C  assay. Heterozygous hemoglobin variants (HbS, HgC, etc)do  not significantly interfere with this assay.   However, presence of multiple variants may affect accuracy.          ASSESSMENT    ICD-10-CM ICD-9-CM   1. Type 2 diabetes mellitus with complication, with long-term current use of insulin  E11.8 250.90    Z79.4 V58.67           PLAN  Diagnoses and all orders for this visit:    Type 2 diabetes mellitus with complication, with long-term current use of insulin  -     insulin pump cart,auto,BT,G6/7 (OMNIPOD 5 G6-G7 PODS, GEN 5,) Crtg; Inject 1 each into the skin Every 3 (three) days.  -     blood-glucose sensor (DEXCOM G7 SENSOR) Justyna; 1 Device by Misc.(Non-Drug; Combo Route) route every 10 days.  -     insulin aspart U-100 (NOVOLOG U-100 INSULIN ASPART) 100 unit/mL injection; Inject 200 units into omnipod insulin pump every 3 days.  -     Hemoglobin A1C; Future  -     Basic Metabolic Panel; Future  -     Lipid Panel; Future  -     JARDIANCE 25 mg tablet; Take 1 tablet (25 mg total) by mouth once daily.  -     Microalbumin/Creatinine Ratio, Urine; Future        Reviewed pathophysiology of diabetes, complications related to the disease, importance of annual dilated eye exam and daily foot examination. Explained MOA, SE, dosage of medications. Written instructions given and reviewed with patient and patient verbalizes understanding.     8/28/2023 - states attempted to  Jardiance twice, but states there was issue at pharmacy and was never able to fill. Will send  "to [facility] today. G7 sensors falling off after a few days due to sweat/heat. Patient given additional adhesive options. Did not have clarity vane when he was trying to set up sharing. Will give more detailed instruction and new sharing code. Follow up 4 weeks.     9/25/2023 - started jardiance yesterday, states he "felt a little weird" after taking it, but better today. Still having issues with adhesive with g7, discussed additional overlay options. Will schedule nurse visit at the [facility] to set up data sharing with dexcom and have labs done prior to f/u     10/23/2023 - patient had to cancel nurse visit due to work schedule. Will be able to go on Friday, will f/u 1 week to review dexcom data. A1c improved to 8.2.     6/12/2024 - Last visit with me 10/23/23. 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 Had repeat presentation for additional infection to include cellulitis/fasciitis/soft tissue infection which required additional surgical procedures. Today patient reports all wounds are well healed. Has not had G7 since hospital discharge in March. Has been performing fingersticks and reports compliance with insulin. He is interested in OP5, will refer for pump eval. He is requesting medical clearance to work with 's office. Discussed that I will need to review at least 1 week of dexcom data and get updated labs prior to clearance. He reports he was hypoglycemic at his physical last week with glucose 45, +s/s.     7/15/2024 - no changes. CGM showed GMI 8%, work clearance completed.     10/14/2024 - will refer for pump eval. Interested in OP5. Stopped jardiance after last visit, will restart. Working with 's office now.     3/24/2025 - OP5 start 12/16/24. Doing well. Will decrease " targets to 120. Labs due. Still not taking Jardiance, he thought it was stopped, but he will restart.    PATIENT INSTRUCTIONS     Labs ordered and will be scheduled by [facility] staff.   - tomorrow morning at the [facility].     Lifestyle modification with well balanced diet and at least 30 minutes of physical activity daily recommended.   Increase water intake.   Increase protein and non-starchy vegetable servings with meals.   Decrease carbohydrate servings with meals.   Take 10 minute walk after each meal.   Avoid snacking on carbohydrates, choose low carb, high protein snacks.   Incorporate resistance training with weights or resistance bands with exercise regimen at least 3 days a week.       INSULIN PUMP SETTINGS:  Insulin pump type: Omnipod 5  Insulin Type: Novolog  Basal rate: 1.3 Units/hour   Insulin to Carbohydrate Ratio: 1/7   Insulin Sensitivity Factor: 1/27   Glucose Target: 130-130 mg/dl - DECREASE -120  Active Insulin Time: 3 hours  Max Basal Rate: 5 units/hour    Restart Jardiance 25 mg by mouth daily.       U100 Pump Failure Plan:   We have decided to develop a plan in the event that your pump breaks or is nonfunctioning. After 4 hours without pump use, you should begin to consider putting your Pump Failure Back Up Plan into action especially if you foresee that you may not be able to use your pump for more than 20 hours. Since you are currently using short acting insulin (Humalog/Novolog/Admelog/Novolin R) in your pump, you will need access to your short acting insulin vial, syringes, and a back up long acting insulin in the event of a pump failure. I have sent a prescription for a long acting insulin to your pharmacy for use during your emergency pump failure plan. It is important that you keep both types of insulin/syringes with you so that you may have access to it at least 3 times daily if needed. This medication and syringes should be brought with you on overnight trips in  the case of an emergency pump failure. This means making a plan to keeping your insulin and syringes at school, work, or other places you frequent. If needed, please reach out to my office about any letters you may need for permission to keep these items for emergency purposes. We will outline your plan for pump failure emergencies below, but please remember to contact the insulin pump company (toll free number is printed on the label on the back of the insulin pump) and our office if you have a pump failure. When the insulin pump is restarted, do not restart basal rates until at least 22 hours after the last long acting insulin injection. You can set a 0% temporary basal setting that will last until this time and use your pump to bolus for meals and correction. If you need help with these feature, please call your insulin pump company tech support line or ask them in anticipation of this action.       Continue Tresiba 43 units daily.   Continue Novolog three times daily with meals using correction scale below:     If less than 200, may take 5 units of Novolog  If  - 250, may take 6 units of Novolog  If  - 300, may take 7 units of Novolog  If  - 350, may take 8 units of Novolog  If  - 400, may take 9 units of Novolog  If +, may take 10 units of Novolog     NOTE: This plan is based off of your average insulin needs per recent pump report. Short Acting Insulin should be taken 15 mins before eating a meal and every 2 hours as needed for correction dosing. The dose calculation above may be used for correction dosing by either only calculating the units to cover blood sugar or inputting a carb amount of 0g if not eating. Please not that taking insulin at the 2 hour shira may mean you still have insulin on board and may lead to hypoglycemia or low blood sugars due to insulin stacking. If calculating correction dosing for the 3rd time or more time, please consider dividing in half to avoid over  correction of blood sugar leading to hypoglycemia. Please call office to report to  any large sugar abnormalities including hyperglycemia > 250 and hypoglycemia < 60. Please continue to wear CGM even if not using pump to monitor for blood sugar fluctuations. Please make sure glucose tablets or sugary beverages available in case of hypoglycemia. Please make sure ketone strips are available to you (may buy over the counter from pharmacy) and monitor urine ketones every 12 hours; if you have two readings og BG > 250 mg/dl; or if signs/symptoms concerning for DKA present including abdominal pain, nausea, vomiting, confusion, trouble breathing, dry mouth, fruity breath or breath that smells like nail polish remover, and excessive thirst/urination. If your urine ketone strips shows trace to small ketones, drink plenty of water and give yourself a correction dose. If BG is less than 250 mg/dL and you do not have moderate/large ketones, you may also exercise to help lower glucose levels. If you have moderate/large ketones, please give correction dosage, drink water, and immediately contact [facility] if during normal business hours. If outside of normal business hours, please give correction dosage, drink water, and report to nearest ED for evaluation/management of DKA.     When the insulin pump is restarted, do not restart basal rates until at least 22 hours after the last long acting insulin injection. You can set a 0% temporary basal setting that will last until this time and still use your pump to bolus for meals and correction. For help with setting temporary basal setting, please call office, view your pump's online resource library, or call 24 hour technical support hotline.     For any technical insulin pump issues, please contact the insulin pump company; the toll free number is printed on the label on the back of the insulin pump.    Follow up in about 4 weeks (around 4/21/2025) for Virtual, OP5,  Schedule fasting labs.    Portions of this note were prepared with [email or web address] voice recognition transcription software. Grammatical errors, including garbled syntax, mangle pronouns, and other bizarre constructions may be attributed to that software system.

## 2025-03-24 NOTE — PATIENT INSTRUCTIONS
PATIENT INSTRUCTIONS     Labs ordered and will be scheduled by [facility] staff.   - tomorrow morning at the [facility].     Lifestyle modification with well balanced diet and at least 30 minutes of physical activity daily recommended.   Increase water intake.   Increase protein and non-starchy vegetable servings with meals.   Decrease carbohydrate servings with meals.   Take 10 minute walk after each meal.   Avoid snacking on carbohydrates, choose low carb, high protein snacks.   Incorporate resistance training with weights or resistance bands with exercise regimen at least 3 days a week.       INSULIN PUMP SETTINGS:  Insulin pump type: Omnipod 5  Insulin Type: Novolog  Basal rate: 1.3 Units/hour   Insulin to Carbohydrate Ratio: 1/7   Insulin Sensitivity Factor: 1/27   Glucose Target: 130-130 mg/dl - DECREASE -120  Active Insulin Time: 3 hours  Max Basal Rate: 5 units/hour    Restart Jardiance 25 mg by mouth daily.       U100 Pump Failure Plan:   We have decided to develop a plan in the event that your pump breaks or is nonfunctioning. After 4 hours without pump use, you should begin to consider putting your Pump Failure Back Up Plan into action especially if you foresee that you may not be able to use your pump for more than 20 hours. Since you are currently using short acting insulin (Humalog/Novolog/Admelog/Novolin R) in your pump, you will need access to your short acting insulin vial, syringes, and a back up long acting insulin in the event of a pump failure. I have sent a prescription for a long acting insulin to your pharmacy for use during your emergency pump failure plan. It is important that you keep both types of insulin/syringes with you so that you may have access to it at least 3 times daily if needed. This medication and syringes should be brought with you on overnight trips in the case of an emergency pump failure. This means making a plan to keeping your insulin and syringes at  school, work, or other places you frequent. If needed, please reach out to my office about any letters you may need for permission to keep these items for emergency purposes. We will outline your plan for pump failure emergencies below, but please remember to contact the insulin pump company (toll free number is printed on the label on the back of the insulin pump) and our office if you have a pump failure. When the insulin pump is restarted, do not restart basal rates until at least 22 hours after the last long acting insulin injection. You can set a 0% temporary basal setting that will last until this time and use your pump to bolus for meals and correction. If you need help with these feature, please call your insulin pump company tech support line or ask them in anticipation of this action.       Continue Tresiba 43 units daily.   Continue Novolog three times daily with meals using correction scale below:     If less than 200, may take 5 units of Novolog  If  - 250, may take 6 units of Novolog  If  - 300, may take 7 units of Novolog  If  - 350, may take 8 units of Novolog  If  - 400, may take 9 units of Novolog  If +, may take 10 units of Novolog     NOTE: This plan is based off of your average insulin needs per recent pump report. Short Acting Insulin should be taken 15 mins before eating a meal and every 2 hours as needed for correction dosing. The dose calculation above may be used for correction dosing by either only calculating the units to cover blood sugar or inputting a carb amount of 0g if not eating. Please not that taking insulin at the 2 hour shira may mean you still have insulin on board and may lead to hypoglycemia or low blood sugars due to insulin stacking. If calculating correction dosing for the 3rd time or more time, please consider dividing in half to avoid over correction of blood sugar leading to hypoglycemia. Please call office to report to  any large sugar  abnormalities including hyperglycemia > 250 and hypoglycemia < 60. Please continue to wear CGM even if not using pump to monitor for blood sugar fluctuations. Please make sure glucose tablets or sugary beverages available in case of hypoglycemia. Please make sure ketone strips are available to you (may buy over the counter from pharmacy) and monitor urine ketones every 12 hours; if you have two readings og BG > 250 mg/dl; or if signs/symptoms concerning for DKA present including abdominal pain, nausea, vomiting, confusion, trouble breathing, dry mouth, fruity breath or breath that smells like nail polish remover, and excessive thirst/urination. If your urine ketone strips shows trace to small ketones, drink plenty of water and give yourself a correction dose. If BG is less than 250 mg/dL and you do not have moderate/large ketones, you may also exercise to help lower glucose levels. If you have moderate/large ketones, please give correction dosage, drink water, and immediately contact [facility] if during normal business hours. If outside of normal business hours, please give correction dosage, drink water, and report to nearest ED for evaluation/management of DKA.     When the insulin pump is restarted, do not restart basal rates until at least 22 hours after the last long acting insulin injection. You can set a 0% temporary basal setting that will last until this time and still use your pump to bolus for meals and correction. For help with setting temporary basal setting, please call office, view your pump's online resource library, or call 24 hour technical support hotline.     For any technical insulin pump issues, please contact the insulin pump company; the toll free number is printed on the label on the back of the insulin pump.

## 2025-03-25 ENCOUNTER — LAB VISIT (OUTPATIENT)
Dept: LAB | Facility: HOSPITAL | Age: 40
End: 2025-03-25
Attending: NURSE PRACTITIONER
Payer: COMMERCIAL

## 2025-03-25 DIAGNOSIS — E11.8 TYPE 2 DIABETES MELLITUS WITH COMPLICATION, WITH LONG-TERM CURRENT USE OF INSULIN: Chronic | ICD-10-CM

## 2025-03-25 DIAGNOSIS — Z79.4 TYPE 2 DIABETES MELLITUS WITH COMPLICATION, WITH LONG-TERM CURRENT USE OF INSULIN: Chronic | ICD-10-CM

## 2025-03-25 PROCEDURE — 36415 COLL VENOUS BLD VENIPUNCTURE: CPT

## 2025-03-25 PROCEDURE — 82570 ASSAY OF URINE CREATININE: CPT

## 2025-03-25 PROCEDURE — 83036 HEMOGLOBIN GLYCOSYLATED A1C: CPT

## 2025-03-25 PROCEDURE — 82310 ASSAY OF CALCIUM: CPT

## 2025-03-25 PROCEDURE — 80061 LIPID PANEL: CPT

## 2025-03-26 LAB
ALBUMIN/CREAT UR: 17.1 UG/MG
ANION GAP (OHS): 7 MMOL/L (ref 8–16)
BUN SERPL-MCNC: 13 MG/DL (ref 6–20)
CALCIUM SERPL-MCNC: 9 MG/DL (ref 8.7–10.5)
CHLORIDE SERPL-SCNC: 106 MMOL/L (ref 95–110)
CHOLEST SERPL-MCNC: 145 MG/DL (ref 120–199)
CHOLEST/HDLC SERPL: 5.2 {RATIO} (ref 2–5)
CO2 SERPL-SCNC: 27 MMOL/L (ref 23–29)
CREAT SERPL-MCNC: 1.1 MG/DL (ref 0.5–1.4)
CREAT UR-MCNC: 246 MG/DL (ref 23–375)
EAG (OHS): 174 MG/DL (ref 68–131)
GFR SERPLBLD CREATININE-BSD FMLA CKD-EPI: >60 ML/MIN/1.73/M2
GLUCOSE SERPL-MCNC: 146 MG/DL (ref 70–110)
HBA1C MFR BLD: 7.7 % (ref 4–5.6)
HDLC SERPL-MCNC: 28 MG/DL (ref 40–75)
HDLC SERPL: 19.3 % (ref 20–50)
LDLC SERPL CALC-MCNC: 99.2 MG/DL (ref 63–159)
MICROALBUMIN UR-MCNC: 42 UG/ML (ref ?–5000)
NONHDLC SERPL-MCNC: 117 MG/DL
POTASSIUM SERPL-SCNC: 4.4 MMOL/L (ref 3.5–5.1)
SODIUM SERPL-SCNC: 140 MMOL/L (ref 136–145)
TRIGL SERPL-MCNC: 89 MG/DL (ref 30–150)

## 2025-03-31 ENCOUNTER — PATIENT MESSAGE (OUTPATIENT)
Dept: INTERNAL MEDICINE | Facility: CLINIC | Age: 40
End: 2025-03-31
Payer: COMMERCIAL

## 2025-03-31 NOTE — TELEPHONE ENCOUNTER
No care due was identified.  [facility] Embedded Care Due Messages. Reference number: [id number].   3/31/2025 11:33:13 AM CDT

## 2025-04-08 ENCOUNTER — PATIENT OUTREACH (OUTPATIENT)
Dept: ADMINISTRATIVE | Facility: HOSPITAL | Age: 40
End: 2025-04-08
Payer: COMMERCIAL

## 2025-04-21 ENCOUNTER — OFFICE VISIT (OUTPATIENT)
Dept: DIABETES | Facility: CLINIC | Age: 40
End: 2025-04-21
Payer: COMMERCIAL

## 2025-04-21 DIAGNOSIS — E11.8 TYPE 2 DIABETES MELLITUS WITH COMPLICATION, WITH LONG-TERM CURRENT USE OF INSULIN: Chronic | ICD-10-CM

## 2025-04-21 DIAGNOSIS — Z79.4 TYPE 2 DIABETES MELLITUS WITH COMPLICATION, WITH LONG-TERM CURRENT USE OF INSULIN: Chronic | ICD-10-CM

## 2025-04-21 PROCEDURE — 3061F NEG MICROALBUMINURIA REV: CPT | Mod: CPTII,95,, | Performed by: NURSE PRACTITIONER

## 2025-04-21 PROCEDURE — 98006 SYNCH AUDIO-VIDEO EST MOD 30: CPT | Mod: 95,,, | Performed by: NURSE PRACTITIONER

## 2025-04-21 PROCEDURE — G2211 COMPLEX E/M VISIT ADD ON: HCPCS | Mod: 95,,, | Performed by: NURSE PRACTITIONER

## 2025-04-21 PROCEDURE — 3066F NEPHROPATHY DOC TX: CPT | Mod: CPTII,95,, | Performed by: NURSE PRACTITIONER

## 2025-04-21 PROCEDURE — 3051F HG A1C>EQUAL 7.0%<8.0%: CPT | Mod: CPTII,95,, | Performed by: NURSE PRACTITIONER

## 2025-04-21 RX ORDER — EMPAGLIFLOZIN 25 MG/1
25 TABLET, FILM COATED ORAL DAILY
Qty: 90 TABLET | Refills: 3 | Status: SHIPPED | OUTPATIENT
Start: 2025-04-21 | End: 2026-04-21

## 2025-04-21 NOTE — PROGRESS NOTES
The patient location is: Home  The chief complaint leading to consultation is: Diabetes    Visit type: audiovisual    Face to Face time with patient: 18  30 minutes of total time spent on the encounter, which includes face to face time and non-face to face time preparing to see the patient (eg, review of tests), Obtaining and/or reviewing separately obtained history, Documenting clinical information in the electronic or other health record, Independently interpreting results (not separately reported) and communicating results to the patient/family/caregiver, or Care coordination (not separately reported).  Each patient to whom he or she provides medical services by telemedicine is:  (1) informed of the relationship between the physician and patient and the respective role of any other health care provider with respect to management of the patient; and (2) notified that he or she may decline to receive medical services by telemedicine and may withdraw from such care at any time.    Notes:    Kulwant Mueller Jr. is a 40 y.o. male who presents for a follow up evaluation of Type 2 diabetes mellitus.     CHIEF COMPLAINT: Diabetes Consultation    PCP: Lawrence Magana MD      Initial visit with me - 6/27/2023    The patient was initially diagnosed with diabetes at age 25.   Previously followed at [facility] by Lisa Bro NP, last visit 3/2022.    Stopped taking Humalog and Tresiba 3 weeks prior to initial visit with me due to making him feel weak, but restarted week before due to blood sugars going up.      Previous failed treatments include:  Metformin     Social Documentation:  Patient lives in [place]. 3 children, 15, 13, 7.   Occupation: [employer] office  Exercise: walking at work.       Diabetes related complications:   peripheral neuropathy.   denies Pancreatitis  reports Gastroparesis - last ED visit in 2/2024 for exacerbation.   denies DKA  denies Hx/family Hx of MEN2/MTC  denies Frequent  "UTIs/yeast infections     Cardiovascular Risk Factors: dyslipidemia, family history of premature cardiovascular disease, hypertension, male gender, and obesity (BMI >30 kg/m2).    Diabetes Medications              glucagon (GVOKE HYPOPEN 2-PACK) 1 mg/0.2 mL AtIn Inject 1 mg into the skin as needed (SEVERE HYPOGLYCEMIA).    insulin aspart U-100 (NOVOLOG U-100 INSULIN ASPART) 100 unit/mL injection Inject 200 units into omnipod insulin pump every 3 days.    JARDIANCE 25 mg tablet Take 1 tablet (25 mg total) by mouth once daily. - HAS NOT RESTARTED. WILL SEND TO DIFFERENT PHARMACY.     TRESIBA FLEXTOUCH U-200 200 unit/mL (3 mL) insulin pen Inject 56 Units into the skin once daily.     Current monitoring regimen: Dexcom G7 CGM    The patient's Dexcom CGM was downloaded and reviewed. For 14 days, patient average glucose was 179 mg/dL. He was above range 46% of the time, in range 54% of the time, and below range 0% of the time. The target range for this patient was 70 - 180 mg/dL. Overall, there was a pattern of post prandial hyperglycemia, overnight hyperglycemia. Attributes to snacking on chips at night, not bolusing for snack. Will cut out chips..        Recent hypoglycemic episodes: No.   Patient compliant with glucose checks and medication administration? Yes    DIABETES MANAGEMENT STATUS  Statin: Not taking  ACE/ARB: Not taking  Screening or Prevention Patient's value Goal Complete/Controlled?   HgA1C Testing and Control   Lab Results   Component Value Date    HGBA1C 7.7 (H) 03/25/2025      Annually/Less than 8% No   Lipid profile : 03/25/2025 Annually Yes   LDL control Lab Results   Component Value Date    LDLCALC 120.6 09/27/2023    Annually/Less than 100 mg/dl  No   Nephropathy screening Lab Results   Component Value Date    LABMICR 42.0 03/25/2025     Lab Results   Component Value Date    PROTEINUA Negative 01/31/2024     No results found for: "UTPCR"   Annually Yes   Blood pressure BP Readings from Last 1 " Encounters:   04/01/24 117/72    Less than 140/90 Yes   Dilated retinal exam : 09/06/2024 Annually Yes   Foot exam   : 10/28/2024 Annually No   Patient's medications, allergies, surgical, social and family histories were reviewed and updated as appropriate.     Review of Systems   Constitutional:  Negative for weight loss.   Eyes:  Negative for blurred vision and double vision.   Cardiovascular:  Negative for chest pain.   Gastrointestinal:  Negative for nausea and vomiting.   Genitourinary:  Negative for frequency.   Musculoskeletal:  Negative for falls.   Neurological:  Negative for dizziness and weakness.   Endo/Heme/Allergies:  Negative for polydipsia.   Psychiatric/Behavioral:  Negative for depression.    All other systems reviewed and are negative.       Physical Exam  Constitutional:       General: He is not in acute distress.     Appearance: Normal appearance.   Pulmonary:      Effort: No respiratory distress.   Neurological:      Mental Status: He is alert and oriented to person, place, and time.   Psychiatric:         Mood and Affect: Mood normal.         Behavior: Behavior normal.        There were no vitals taken for this visit.  Wt Readings from Last 3 Encounters:   12/16/24 126.4 kg (278 lb 10.6 oz)   10/28/24 126.4 kg (278 lb 10.6 oz)   10/15/24 126.4 kg (278 lb 10.6 oz)       LAB REVIEW  Lab Results   Component Value Date     03/25/2025    K 4.4 03/25/2025     03/25/2025    CO2 27 03/25/2025    BUN 13 03/25/2025    CREATININE 1.1 03/25/2025    CALCIUM 9.0 03/25/2025    ANIONGAP 7 (L) 03/25/2025    EGFRNORACEVR >60 03/25/2025     Lab Results   Component Value Date    CPEPTIDE 1.79 06/25/2024    GLUTAMICACID 0.02 06/25/2024     Hemoglobin A1C   Date Value Ref Range Status   06/25/2024 12.0 (H) 4.0 - 5.6 % Final     Comment:     ADA Screening Guidelines:  5.7-6.4%  Consistent with prediabetes  >or=6.5%  Consistent with diabetes    High levels of fetal hemoglobin interfere with the  HbA1C  assay. Heterozygous hemoglobin variants (HbS, HgC, etc)do  not significantly interfere with this assay.   However, presence of multiple variants may affect accuracy.     02/26/2024 6.8 (H) 4.0 - 5.6 % Final     Comment:     ADA Screening Guidelines:  5.7-6.4%  Consistent with prediabetes  >or=6.5%  Consistent with diabetes    High levels of fetal hemoglobin interfere with the HbA1C  assay. Heterozygous hemoglobin variants (HbS, HgC, etc)do  not significantly interfere with this assay.   However, presence of multiple variants may affect accuracy.     12/28/2023 7.4 (H) 4.0 - 5.6 % Final     Comment:     ADA Screening Guidelines:  5.7-6.4%  Consistent with prediabetes  >or=6.5%  Consistent with diabetes    High levels of fetal hemoglobin interfere with the HbA1C  assay. Heterozygous hemoglobin variants (HbS, HgC, etc)do  not significantly interfere with this assay.   However, presence of multiple variants may affect accuracy.       Hemoglobin A1c   Date Value Ref Range Status   03/25/2025 7.7 (H) 4.0 - 5.6 % Final     Comment:     ADA Screening Guidelines:  5.7-6.4%  Consistent with prediabetes  >=6.5%  Consistent with diabetes    High levels of fetal hemoglobin interfere with the HbA1C  assay. Heterozygous hemoglobin variants (HbS, HgC, etc)do  not significantly interfere with this assay.   However, presence of multiple variants may affect accuracy.        ASSESSMENT    ICD-10-CM ICD-9-CM   1. Type 2 diabetes mellitus with complication, with long-term current use of insulin  E11.8 250.90    Z79.4 V58.67             PLAN  Diagnoses and all orders for this visit:    Type 2 diabetes mellitus with complication, with long-term current use of insulin  -     JARDIANCE 25 mg tablet; Take 1 tablet (25 mg total) by mouth once daily.          Reviewed pathophysiology of diabetes, complications related to the disease, importance of annual dilated eye exam and daily foot examination. Explained MOA, SE, dosage of  "medications. Written instructions given and reviewed with patient and patient verbalizes understanding.     8/28/2023 - states attempted to  Jardiance twice, but states there was issue at pharmacy and was never able to fill. Will send to [facility] today. G7 sensors falling off after a few days due to sweat/heat. Patient given additional adhesive options. Did not have clarity vane when he was trying to set up sharing. Will give more detailed instruction and new sharing code. Follow up 4 weeks.     9/25/2023 - started jardiance yesterday, states he "felt a little weird" after taking it, but better today. Still having issues with adhesive with g7, discussed additional overlay options. Will schedule nurse visit at the [facility] to set up data sharing with dexcom and have labs done prior to f/u     10/23/2023 - patient had to cancel nurse visit due to work schedule. Will be able to go on Friday, will f/u 1 week to review dexcom data. A1c improved to 8.2.     6/12/2024 - Last visit with me 10/23/23. 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 Had repeat presentation for additional infection to include cellulitis/fasciitis/soft tissue infection which required additional surgical procedures. Today patient reports all wounds are well healed. Has not had G7 since hospital discharge in March. Has been performing fingersticks and reports compliance with insulin. He is interested in OP5, will refer for pump eval. He is requesting medical clearance to work with 's office. Discussed that I will need to review at least 1 week of dexcom data and get updated labs prior to clearance. He reports he was hypoglycemic at his physical last week with glucose 45, +s/s.     7/15/2024 - no changes. CGM showed GMI " 8%, work clearance completed.     10/14/2024 - will refer for pump eval. Interested in OP5. Stopped jardiance after last visit, will restart. Working with 's office now.     3/24/2025 - OP5 start 12/16/24. Doing well. Will decrease targets to 120. Labs due. Still not taking Jardiance, he thought it was stopped, but he will restart.    4/21/2025 - Overall, there was a pattern of post prandial hyperglycemia, overnight hyperglycemia. Attributes to snacking on chips at night, not bolusing for snack. Will cut out chips..      PATIENT INSTRUCTIONS     Lifestyle modification with well balanced diet and at least 30 minutes of physical activity daily recommended.   Increase water intake.   Increase protein and non-starchy vegetable servings with meals.   Decrease carbohydrate servings with meals.   Take 10 minute walk after each meal.   Avoid snacking on carbohydrates, choose low carb, high protein snacks.   Incorporate resistance training with weights or resistance bands with exercise regimen at least 3 days a week.       INSULIN PUMP SETTINGS:  Insulin pump type: Omnipod 5  Insulin Type: Novolog  Basal rate: 1.3 Units/hour   Insulin to Carbohydrate Ratio: 1/7   Insulin Sensitivity Factor: 1/27   Glucose Target: 120-120mg/dl - DECREASE -110  Active Insulin Time: 3 hours - DECREASE TO 2 HOURS  Max Basal Rate: 5 units/hour    Restart Jardiance 25 mg by mouth daily. - SENT TO [employer]      U100 Pump Failure Plan:   We have decided to develop a plan in the event that your pump breaks or is nonfunctioning. After 4 hours without pump use, you should begin to consider putting your Pump Failure Back Up Plan into action especially if you foresee that you may not be able to use your pump for more than 20 hours. Since you are currently using short acting insulin (Humalog/Novolog/Admelog/Novolin R) in your pump, you will need access to your short acting insulin vial, syringes, and a back up long acting insulin in the  event of a pump failure. I have sent a prescription for a long acting insulin to your pharmacy for use during your emergency pump failure plan. It is important that you keep both types of insulin/syringes with you so that you may have access to it at least 3 times daily if needed. This medication and syringes should be brought with you on overnight trips in the case of an emergency pump failure. This means making a plan to keeping your insulin and syringes at school, work, or other places you frequent. If needed, please reach out to my office about any letters you may need for permission to keep these items for emergency purposes. We will outline your plan for pump failure emergencies below, but please remember to contact the insulin pump company (toll free number is printed on the label on the back of the insulin pump) and our office if you have a pump failure. When the insulin pump is restarted, do not restart basal rates until at least 22 hours after the last long acting insulin injection. You can set a 0% temporary basal setting that will last until this time and use your pump to bolus for meals and correction. If you need help with these feature, please call your insulin pump company tech support line or ask them in anticipation of this action.       Continue Tresiba 43 units daily.   Continue Novolog three times daily with meals using correction scale below:     If less than 200, may take 5 units of Novolog  If  - 250, may take 6 units of Novolog  If  - 300, may take 7 units of Novolog  If  - 350, may take 8 units of Novolog  If  - 400, may take 9 units of Novolog  If +, may take 10 units of Novolog     NOTE: This plan is based off of your average insulin needs per recent pump report. Short Acting Insulin should be taken 15 mins before eating a meal and every 2 hours as needed for correction dosing. The dose calculation above may be used for correction dosing by either only  calculating the units to cover blood sugar or inputting a carb amount of 0g if not eating. Please not that taking insulin at the 2 hour shira may mean you still have insulin on board and may lead to hypoglycemia or low blood sugars due to insulin stacking. If calculating correction dosing for the 3rd time or more time, please consider dividing in half to avoid over correction of blood sugar leading to hypoglycemia. Please call office to report to  any large sugar abnormalities including hyperglycemia > 250 and hypoglycemia < 60. Please continue to wear CGM even if not using pump to monitor for blood sugar fluctuations. Please make sure glucose tablets or sugary beverages available in case of hypoglycemia. Please make sure ketone strips are available to you (may buy over the counter from pharmacy) and monitor urine ketones every 12 hours; if you have two readings og BG > 250 mg/dl; or if signs/symptoms concerning for DKA present including abdominal pain, nausea, vomiting, confusion, trouble breathing, dry mouth, fruity breath or breath that smells like nail polish remover, and excessive thirst/urination. If your urine ketone strips shows trace to small ketones, drink plenty of water and give yourself a correction dose. If BG is less than 250 mg/dL and you do not have moderate/large ketones, you may also exercise to help lower glucose levels. If you have moderate/large ketones, please give correction dosage, drink water, and immediately contact [facility] if during normal business hours. If outside of normal business hours, please give correction dosage, drink water, and report to nearest ED for evaluation/management of DKA.     When the insulin pump is restarted, do not restart basal rates until at least 22 hours after the last long acting insulin injection. You can set a 0% temporary basal setting that will last until this time and still use your pump to bolus for meals and correction. For help with  setting temporary basal setting, please call office, view your pump's online resource library, or call 24 hour technical support hotline.     For any technical insulin pump issues, please contact the insulin pump company; the toll free number is printed on the label on the back of the insulin pump.    No follow-ups on file.    Portions of this note were prepared with [employer] voice recognition transcription software. Grammatical errors, including garbled syntax, mangle pronouns, and other bizarre constructions may be attributed to that software system.

## 2025-04-22 ENCOUNTER — PATIENT MESSAGE (OUTPATIENT)
Dept: DIABETES | Facility: CLINIC | Age: 40
End: 2025-04-22
Payer: COMMERCIAL

## 2025-04-23 ENCOUNTER — TELEPHONE (OUTPATIENT)
Dept: DIABETES | Facility: CLINIC | Age: 40
End: 2025-04-23
Payer: COMMERCIAL

## 2025-04-23 NOTE — TELEPHONE ENCOUNTER
PA completed for JARDIANCE 25MG   Prior Auth (EOC) ID: [id number]    On [email or web address] and sent to plan.

## 2025-04-23 NOTE — PATIENT INSTRUCTIONS
PATIENT INSTRUCTIONS     Lifestyle modification with well balanced diet and at least 30 minutes of physical activity daily recommended.   Increase water intake.   Increase protein and non-starchy vegetable servings with meals.   Decrease carbohydrate servings with meals.   Take 10 minute walk after each meal.   Avoid snacking on carbohydrates, choose low carb, high protein snacks.   Incorporate resistance training with weights or resistance bands with exercise regimen at least 3 days a week.       INSULIN PUMP SETTINGS:  Insulin pump type: Omnipod 5  Insulin Type: Novolog  Basal rate: 1.3 Units/hour   Insulin to Carbohydrate Ratio: 1/7   Insulin Sensitivity Factor: 1/27   Glucose Target: 120-120mg/dl - DECREASE -110  Active Insulin Time: 3 hours - DECREASE TO 2 HOURS  Max Basal Rate: 5 units/hour    Restart Jardiance 25 mg by mouth daily. - SENT TO [employer]      [employer]board Pump Failure Plan:   We have decided to develop a plan in the event that your pump breaks or is nonfunctioning. After 4 hours without pump use, you should begin to consider putting your Pump Failure Back Up Plan into action especially if you foresee that you may not be able to use your pump for more than 20 hours. Since you are currently using short acting insulin (Humalog/Novolog/Admelog/Novolin R) in your pump, you will need access to your short acting insulin vial, syringes, and a back up long acting insulin in the event of a pump failure. I have sent a prescription for a long acting insulin to your pharmacy for use during your emergency pump failure plan. It is important that you keep both types of insulin/syringes with you so that you may have access to it at least 3 times daily if needed. This medication and syringes should be brought with you on overnight trips in the case of an emergency pump failure. This means making a plan to keeping your insulin and syringes at school, work, or other places you frequent. If needed, please reach out to  my office about any letters you may need for permission to keep these items for emergency purposes. We will outline your plan for pump failure emergencies below, but please remember to contact the insulin pump company (toll free number is printed on the label on the back of the insulin pump) and our office if you have a pump failure. When the insulin pump is restarted, do not restart basal rates until at least 22 hours after the last long acting insulin injection. You can set a 0% temporary basal setting that will last until this time and use your pump to bolus for meals and correction. If you need help with these feature, please call your insulin pump company tech support line or ask them in anticipation of this action.       Continue Tresiba 43 units daily.   Continue Novolog three times daily with meals using correction scale below:     If less than 200, may take 5 units of Novolog  If  - 250, may take 6 units of Novolog  If  - 300, may take 7 units of Novolog  If  - 350, may take 8 units of Novolog  If  - 400, may take 9 units of Novolog  If +, may take 10 units of Novolog     NOTE: This plan is based off of your average insulin needs per recent pump report. Short Acting Insulin should be taken 15 mins before eating a meal and every 2 hours as needed for correction dosing. The dose calculation above may be used for correction dosing by either only calculating the units to cover blood sugar or inputting a carb amount of 0g if not eating. Please not that taking insulin at the 2 hour shira may mean you still have insulin on board and may lead to hypoglycemia or low blood sugars due to insulin stacking. If calculating correction dosing for the 3rd time or more time, please consider dividing in half to avoid over correction of blood sugar leading to hypoglycemia. Please call office to report to  any large sugar abnormalities including hyperglycemia > 250 and hypoglycemia < 60. Please  continue to wear CGM even if not using pump to monitor for blood sugar fluctuations. Please make sure glucose tablets or sugary beverages available in case of hypoglycemia. Please make sure ketone strips are available to you (may buy over the counter from pharmacy) and monitor urine ketones every 12 hours; if you have two readings og BG > 250 mg/dl; or if signs/symptoms concerning for DKA present including abdominal pain, nausea, vomiting, confusion, trouble breathing, dry mouth, fruity breath or breath that smells like nail polish remover, and excessive thirst/urination. If your urine ketone strips shows trace to small ketones, drink plenty of water and give yourself a correction dose. If BG is less than 250 mg/dL and you do not have moderate/large ketones, you may also exercise to help lower glucose levels. If you have moderate/large ketones, please give correction dosage, drink water, and immediately contact [facility] if during normal business hours. If outside of normal business hours, please give correction dosage, drink water, and report to nearest ED for evaluation/management of DKA.     When the insulin pump is restarted, do not restart basal rates until at least 22 hours after the last long acting insulin injection. You can set a 0% temporary basal setting that will last until this time and still use your pump to bolus for meals and correction. For help with setting temporary basal setting, please call office, view your pump's online resource library, or call 24 hour technical support hotline.     For any technical insulin pump issues, please contact the insulin pump company; the toll free number is printed on the label on the back of the insulin pump.

## 2025-05-02 ENCOUNTER — PATIENT MESSAGE (OUTPATIENT)
Dept: INTERNAL MEDICINE | Facility: CLINIC | Age: 40
End: 2025-05-02
Payer: COMMERCIAL

## 2025-05-02 NOTE — TELEPHONE ENCOUNTER
Care Due:                  Date            Visit Type   Department     Provider  --------------------------------------------------------------------------------                                ESTABLISHED                              PATIENT -    [facility]  Last Visit: 12-      [facility]      MEDICINE       Lawrence Magana  Next Visit: None Scheduled  None         None Found                                                            Last  Test          Frequency    Reason                     Performed    Due Date  --------------------------------------------------------------------------------    CBC.........  12 months..  rivaroxaban..............  03- 03-    [facility] Embedded Care Due Messages. Reference number: [id number].   5/02/2025 9:32:57 AM CDT

## 2025-05-06 ENCOUNTER — PATIENT MESSAGE (OUTPATIENT)
Dept: INTERNAL MEDICINE | Facility: CLINIC | Age: 40
End: 2025-05-06
Payer: COMMERCIAL

## 2025-05-08 ENCOUNTER — CLINICAL SUPPORT (OUTPATIENT)
Dept: DIABETES | Facility: CLINIC | Age: 40
End: 2025-05-08
Payer: COMMERCIAL

## 2025-05-08 DIAGNOSIS — Z79.4 TYPE 2 DIABETES MELLITUS WITH COMPLICATION, WITH LONG-TERM CURRENT USE OF INSULIN: Primary | Chronic | ICD-10-CM

## 2025-05-08 DIAGNOSIS — E11.8 TYPE 2 DIABETES MELLITUS WITH COMPLICATION, WITH LONG-TERM CURRENT USE OF INSULIN: Primary | Chronic | ICD-10-CM

## 2025-05-08 PROCEDURE — G0108 DIAB MANAGE TRN  PER INDIV: HCPCS | Mod: 95,,, | Performed by: DIETITIAN, REGISTERED

## 2025-05-08 NOTE — PROGRESS NOTES
Diabetes Care Specialist Virtual Visit Note     The patient location is: parked vehicle   The chief complaint leading to consultation is: Diabetes  Visit type: audiovisual  Total time spent with patient: 30 min   Each patient to whom he or she provides medical services by telemedicine is:  (1) informed of the relationship between the physician and patient and the respective role of any other health care provider with respect to management of the patient; and (2) notified that he or she may decline to receive medical services by telemedicine and may withdraw from such care at any time.    Diabetes Care Specialist Progress Note  Author: Margaret Julian RD, [facility]  Date: 5/11/2025    Intake    Program Intake  Reason for Diabetes Program Visit:: Post Program Follow-Up (DM referral 10/14/24 Almaz Santiago [facility])  Type of Intervention:: Individual  Education: Advanced Pump (OP5 using Novolog (training 12/16/24))  Current diabetes risk level:: high  In the last month, have you used the ER or been admitted to the hospital: No    Current Diabetes Treatment: Oral Medications, Diet/Exercise, Insulin (Jardiance 25mg 1tab daily (unable to obtain due to cost). OP5 using Novolog (training 12/16/24).)    Continuous Glucose Monitoring  Patient has CGM: Yes  Personal CGM type:: DexG7 - Pt denies device or site placement issues.  GMI Date: 05/08/25  GMI Value: 6.9 %    Lab Results   Component Value Date    HGBA1C 7.7 (H) 03/25/2025     Lifestyle Coping Support & Clinical    Problem Review  Active Comorbidities:  (HTN, Neuropathy, Gastroparesis, heart failure with reduced ejection fraction, DVT, GERD, BMI 37.8)    Diabetes Self-Management Skills Assessment    Medication Skills Assessment  Patient is able to identify current diabetes medications, dosages, and appropriate timing of medications.: yes (Pt understands when and how to use bolus calculator. Pt denies device or site placement issues.)  Patient reports problems or  concerns with current medication regimen.: yes  Medication regimen problems/concerns:: financial concerns  Pharmacy assistance referral placed?: Yes  Patient is  aware that some diabetes medications can cause low blood sugar?: Yes  Medication Skills Assessment Completed:: Yes  Assessment indicates:: Adequate understanding  Area of need?: Yes    Nutrition/Healthy Eating  Meal Plan 24 Hour Recall - Breakfast: none  Meal Plan 24 Hour Recall - Lunch: none OR roberto's 2pc chix, biscuit  Meal Plan 24 Hour Recall - Dinner: shrimp pasta 1c  Meal Plan 24 Hour Recall - Beverage: water  Who shops/cooks?: wife  Patient can identify foods that impact blood sugar.: yes (Pt states understanding carb gram ctg to use for OP5 bolus.)  Challenges to healthy eating::  (Pt states recent GI issues and restricting foods.)  Nutrition/Healthy Eating Skills Assessment Completed:: Yes  Assessment indicates:: Instruction Needed  Area of need?: Yes    Home Blood Glucose Monitoring  Patient states that blood sugar is checked at home daily.: yes  Monitoring Method:: personal continuous glucose monitor  Personal CGM type:: DexG7 - Pt denies device or site placement issues.  Home Blood Glucose Monitoring Skills Assessment Completed: : Yes  Assessment indicates:: Adequate understanding  Area of need?: No    Acute Complications  Have you ever had hypoglycemia (low BG 70 or less)?: yes (Pt states rare episodes, BG 60, treats w/ reg coke.)  Have you ever had hyperglycemia (high  or more)?: no (Pt denies recent symptoms.)  Acute Complications Skills Assessment Completed: : Yes  Assessment indicates:: Adequate understanding  Area of need?: No    Assessment Summary and Plan  Based on today's diabetes care assessment, the following areas of need were identified:      Identified Areas of Need      Medication/Current Diabetes Treatment: Yes - Pharm assistance referral entered for Jardiance.   Nutrition/Healthy Eating: Yes - Provided meal planning  strategies to assist nausea. Encouraged follow-up with primary care if nausea continues or worsens.    Home Blood Glucose Monitoring: No    Acute Complications: No      Today's interventions were provided through individual discussion, instruction, and written materials were provided.    Patient verbalized understanding of instruction and written materials.  Pt was able to return back demonstration of instructions today. Patient understood key points, needs reinforcement and further instruction.     Diabetes Self-Management Care Plan:  Today's Diabetes Self-Management Care Plan was developed with Kulwant's input. Kulwant has agreed to work toward the following goal(s) to improve his/her overall diabetes control.      Care Plan: Diabetes Management   Updates made since 5/8/2024 12:00 AM        Problem: Medications         Goal: Patient Agrees to take Diabetes Medication(s) as prescribed. Completed 5/8/2025   Start Date: 10/15/2024   Expected End Date: 10/14/2025   This Visit's Progress: Met   Recent Progress: On track   Priority: High   Barriers: No Barriers Identified   Note:    Encouraged progress and for pt to follow-up with provider Zeferino for medical mgmt.  ______________  OmniPod Account:  Podder Central username: [email or web address]  Eventure Interactive username: [email or web address]  Password: [email or web address]  _____________  Patient has written materials for Omnipod 5 for home use.  Patient verbalized understanding of all instructions given.  Reviewed back up plan in case of pump malfunction.  Educated that if glucose levels increasing and patient is delivering insulin, it is recommended to change pod.         Follow Up Plan  Follow up if symptoms worsen or fail to improve. As referred.    Today's care plan and follow up schedule was discussed with patient.  Kulwant verbalized understanding of the care plan, goals, and agrees to follow up plan.        The patient was encouraged to communicate with his/her health care  provider/physician and care team regarding his/her condition(s) and treatment.  I provided the patient with my contact information today and encouraged to contact me via phone or [facility] Patient Portal as needed.     Length of Visit   Total Time: 30 Minutes

## 2025-05-08 NOTE — LETTER
May 11, 2025      Almaz Santiago, [facility]  [address] [place] [address]         Patient: Kulwant Mueller   MR Number: [id number]   YOB: 1985   Date of Visit: 5/8/2025       Dear Dr. Santiago:    Thank you for referring Kulwant for diabetes self-management education and support services. He has completed all components of our Diabetes Management Program. Below is a summary of his clinical outcomes and goal progress.    Patient Outcomes:    A1c Status:   Lab Results   Component Value Date    HGBA1C 7.7 (H) 03/25/2025    HGBA1C 12.0 (H) 06/25/2024    HGBA1C 6.8 (H) 02/26/2024       Care Plan: Diabetes Management   Updates made since 5/11/2024 12:00 AM        Problem: Medications         Goal: Patient Agrees to take Diabetes Medication(s) as prescribed. Completed 5/8/2025   Start Date: 10/15/2024   Expected End Date: 10/14/2025   This Visit's Progress: Met   Recent Progress: On track   Priority: High   Barriers: No Barriers Identified   Note:    Encouraged progress and for pt to follow-up with provider Zeferino for medical mgmt.   ______________  OmniPod Account:  Podder Central username: [email or web address]  [facility] username: [email or web address]  Password: [email or web address]  _____________  Patient has written materials for Omnipod 5 for home use.  Patient verbalized understanding of all instructions given.  Reviewed back up plan in case of pump malfunction.  Educated that if glucose levels increasing and patient is delivering insulin, it is recommended to change pod.           Task: Reviewed with patient all current diabetes medications and provided basic review of the purpose, dosage, frequency, side effects, and storage of both oral and injectable diabetes medications. Completed 10/15/2024        Task: Discussed guidelines for preventing, detecting and treating hypoglycemia and hyperglycemia and reviewed the importance of meal and medication timing with diabetes mediations for prevention of  hypoglycemia and maximum drug benefit. Completed 10/15/2024          Follow up:   Kulwant to attend medical appointments as scheduled  Kulwant to update you on his DM education progress as needed      If you have questions, please do not hesitate to call me. I look forward to providing additional education and support as needed.    Sincerely,    Margaret Julian RD, [facility]  Diabetes Care and

## 2025-05-09 ENCOUNTER — NURSE TRIAGE (OUTPATIENT)
Dept: ADMINISTRATIVE | Facility: CLINIC | Age: 40
End: 2025-05-09
Payer: COMMERCIAL

## 2025-05-10 NOTE — TELEPHONE ENCOUNTER
Pt's wife is calling for pt and she states that when she went to go  pt's Rx (Xarelto) was not at the pharmacy. Pt's wife states that the RX went to the wrong pharmacy. Pt would like the prescription to go to the [facility] @ [address] [place], [address] Dispo- Call PCP now. Reached out to provider on call Dr. Gama, and he stated that he will call the pt in his Xarelto to the correct pharmacy. Pt and his wife aware and VU. Advised to call back with any further concerns or questions.           Reason for Disposition   [1] Prescription not at pharmacy AND [2] was prescribed by PCP recently (Exception: [facility] has access to EMR and prescription is recorded there. Go to Home Care and confirm for pharmacy.)    Protocols used: Medication Question Call-A-

## 2025-05-11 DIAGNOSIS — Z79.4 TYPE 2 DIABETES MELLITUS WITH COMPLICATION, WITH LONG-TERM CURRENT USE OF INSULIN: Primary | ICD-10-CM

## 2025-05-11 DIAGNOSIS — E11.8 TYPE 2 DIABETES MELLITUS WITH COMPLICATION, WITH LONG-TERM CURRENT USE OF INSULIN: Primary | ICD-10-CM

## 2025-05-12 ENCOUNTER — RESULTS FOLLOW-UP (OUTPATIENT)
Dept: PHARMACY | Facility: CLINIC | Age: 40
End: 2025-05-12

## 2025-05-12 ENCOUNTER — TELEPHONE (OUTPATIENT)
Dept: PHARMACY | Facility: CLINIC | Age: 40
End: 2025-05-12
Payer: COMMERCIAL

## 2025-05-12 NOTE — PROGRESS NOTES
Evelin     Mr. Mueller's case has been assigned to Sofia Gonzalez @[phone].       What happens next? Assigned advocate will review your patients chart and research available options.  Patient may be asked to provide specific documentation to help determine eligibility. Failure to provide the requested documentation will delay assistance.    Please note all requests are subject to program availability and patient eligibility verification.   Please note each program has it's own unique eligibility criteria (e.g., income limits, insurance status medical condition, residency).Therefore eligibility is determined by the specific program being applied to not by the Pharmacy Patient Assistance Team.  Please note epic chart must reflect a current order for the requested medication written by an [facility] provider to begin PAP process.   Provider may review progress notes by typing pharmacy patient assistance in Epic search box.       Thank you,   [facility]  [id number]  [place] [address]  Fax: [phone]  Email: [email or web address]

## 2025-05-12 NOTE — TELEPHONE ENCOUNTER
Currently we are unable to assist with PAP enrollment for the following reason(s):      Private and/or commercial coverage visible in chart. Patient may be eligible for a co-pay card. Please reach out to the number provided below for assistance with obtaining a co-pay card.         Sofia Gonzalez @[phone]  Pharmacy Patient Assistance Team

## 2025-05-12 NOTE — TELEPHONE ENCOUNTER
----- Message from Silvino Black sent at 5/12/2025  1:24 PM CDT -----  Regarding: Order for ASTRID MUELLER JR.     Mr. Mueller's case has been assigned to Sofia Gonzalez @[phone].       What happens next? Assigned advocate will review your patients chart and research available options.  Patient may be asked to provide specific documentation to help determine eligibility. Failure to provide the requested documentation will delay assistance.    Please note all requests are subject to program availability and patient eligibility verification.   Please note each program has it's own unique eligibility criteria (e.g., income limits, insurance status medical condition, residency).Therefore eligibility is determined by the specific program   being applied to not by the Pharmacy Patient Assistance Team.  Please note epic chart must reflect a current order for the requested medication written by an [facility] provider to begin PAP process.   Provider may review progress notes by typing pharmacy patient assistance in Epic search box.       Thank you,   [facility]  [address]  [place] [address]  Fax: [phone]  Email: [email or web address]      ----- Message -----  From: Margaret Julian RD, [facility]  Sent: 5/11/2025   6:12 PM CDT  To: Pharmacy Patient Assistance Team  Subject: Order for ALLAASTRID JR.

## 2025-05-13 NOTE — TELEPHONE ENCOUNTER
----- Message from Silvino Black sent at 5/12/2025  1:24 PM CDT -----  Regarding: Order for ASTRID MUELLER JR.     Mr. Mueller's case has been assigned to Sofia Gonzalez @[phone].       What happens next? Assigned advocate will review your patients chart and research available options.  Patient may be asked to provide specific documentation to help determine eligibility. Failure to provide the requested documentation will delay assistance.    Please note all requests are subject to program availability and patient eligibility verification.   Please note each program has it's own unique eligibility criteria (e.g., income limits, insurance status medical condition, residency).Therefore eligibility is determined by the specific program   being applied to not by the Pharmacy Patient Assistance Team.  Please note epic chart must reflect a current order for the requested medication written by an [facility] provider to begin PAP process.   Provider may review progress notes by typing pharmacy patient assistance in Epic search box.       Thank you,   [facility]  [address]  [place] [address]  Fax: [phone]  Email: [email or web address]      ----- Message -----  From: Margaret Julian RD, [facility]  Sent: 5/11/2025   6:12 PM CDT  To: Pharmacy Patient Assistance Team  Subject: Order for ALLAASTRID JR.

## 2025-05-14 ENCOUNTER — OFFICE VISIT (OUTPATIENT)
Dept: INTERNAL MEDICINE | Facility: CLINIC | Age: 40
End: 2025-05-14
Payer: COMMERCIAL

## 2025-05-14 DIAGNOSIS — I82.512 CHRONIC DEEP VEIN THROMBOSIS (DVT) OF FEMORAL VEIN OF LEFT LOWER EXTREMITY: ICD-10-CM

## 2025-05-14 DIAGNOSIS — I82.512 CHRONIC DEEP VEIN THROMBOSIS (DVT) OF FEMORAL VEIN OF LEFT LOWER EXTREMITY: Primary | ICD-10-CM

## 2025-05-14 DIAGNOSIS — I87.012 POSTTHROMBOTIC SYNDROME WITH ULCER OF LEFT LOWER EXTREMITY: ICD-10-CM

## 2025-05-14 DIAGNOSIS — I87.002 POST-THROMBOTIC SYNDROME OF LEFT LOWER EXTREMITY: ICD-10-CM

## 2025-05-14 DIAGNOSIS — I82.402 RECURRENT DEEP VEIN THROMBOSIS (DVT) OF LEFT LOWER EXTREMITY: ICD-10-CM

## 2025-05-14 DIAGNOSIS — Z79.01 CHRONIC ANTICOAGULATION: ICD-10-CM

## 2025-05-14 PROCEDURE — 1160F RVW MEDS BY RX/DR IN RCRD: CPT | Mod: CPTII,95,, | Performed by: FAMILY MEDICINE

## 2025-05-14 PROCEDURE — 3066F NEPHROPATHY DOC TX: CPT | Mod: CPTII,95,, | Performed by: FAMILY MEDICINE

## 2025-05-14 PROCEDURE — 3051F HG A1C>EQUAL 7.0%<8.0%: CPT | Mod: CPTII,95,, | Performed by: FAMILY MEDICINE

## 2025-05-14 PROCEDURE — 98005 SYNCH AUDIO-VIDEO EST LOW 20: CPT | Mod: 95,,, | Performed by: FAMILY MEDICINE

## 2025-05-14 PROCEDURE — 1159F MED LIST DOCD IN RCRD: CPT | Mod: CPTII,95,, | Performed by: FAMILY MEDICINE

## 2025-05-14 PROCEDURE — 3061F NEG MICROALBUMINURIA REV: CPT | Mod: CPTII,95,, | Performed by: FAMILY MEDICINE

## 2025-05-14 RX ORDER — INSULIN PMP CART,AUT,G6/7,CNTR
EACH SUBCUTANEOUS
COMMUNITY
Start: 2025-04-23

## 2025-05-14 NOTE — PROGRESS NOTES
Subjective:   Patient ID: Kulwant Mueller Jr. is a 40 y.o. male.  Chief Complaint:  Medication Refill    The patient location is: Home  The chief complaint leading to consultation is: Medication Refill    Visit type: audiovisual    Face to Face time with patient: 10 minutes  15 minutes of total time spent on the encounter, which includes face to face time and non-face to face time preparing to see the patient (eg, review of tests), Obtaining and/or reviewing separately obtained history, Documenting clinical information in the electronic or other health record, Independently interpreting results (not separately reported) and communicating results to the patient/family/caregiver, or Care coordination (not separately reported).     Each patient to whom he or she provides medical services by telemedicine is:  (1) informed of the relationship between the physician and patient and the respective role of any other health care provider with respect to management of the patient; and (2) notified that he or she may decline to receive medical services by telemedicine and may withdraw from such care at any time.    Presents via telemedicine for medication refill   Needs Xarelto 10 mg daily sent/faxed to a mail-order pharmacy in [place] so we continue to obtain the medication for free   Diabetes reasonably controlled with most recent A1c 7.7%.  Microalbumin negative.    Hyperlipidemia stable with most recent LDL 99.  Mood stable and well controlled on current medication.    Reflux stable and well controlled on current medication    No new complaints or concerns today    Review of Systems   Constitutional:  Negative for activity change and unexpected weight change.   HENT:  Negative for hearing loss, rhinorrhea and trouble swallowing.    Eyes:  Negative for discharge and visual disturbance.   Respiratory:  Negative for chest tightness and wheezing.    Cardiovascular:  Negative for chest pain and palpitations.    Gastrointestinal:  Positive for diarrhea and vomiting. Negative for blood in stool and constipation.   Endocrine: Negative for polydipsia and polyuria.   Genitourinary:  Negative for difficulty urinating, hematuria and urgency.   Musculoskeletal:  Negative for arthralgias, joint swelling and neck pain.   Neurological:  Negative for weakness and headaches.   Psychiatric/Behavioral:  Negative for confusion and dysphoric mood.      Objective:     Physical Exam  Constitutional:       Appearance: Normal appearance.   Psychiatric:         Mood and Affect: Mood and affect normal.       Assessment:       ICD-10-CM ICD-9-CM   1. Chronic deep vein thrombosis (DVT) of femoral vein of left lower extremity  I82.512 453.51   2. Recurrent deep vein thrombosis (DVT) of left lower extremity  I82.402 453.40   3. Postthrombotic syndrome with ulcer of left lower extremity  I87.012 459.11   4. Post-thrombotic syndrome of left lower extremity  I87.002 459.10   5. Chronic anticoagulation  Z79.01 V58.61     Plan:   Chronic deep vein thrombosis (DVT) of femoral vein of left lower extremity  Recurrent deep vein thrombosis (DVT) of left lower extremity  Postthrombotic syndrome with ulcer of left lower extremity  Post-thrombotic syndrome of left lower extremity  Chronic anticoagulation  -     rivaroxaban (XARELTO) 10 mg Tab; Take 1 tablet (10 mg total) by mouth daily with dinner or evening meal.  Dispense: 90 tablet; Refill: 3    Prescription printed out and faxed to mail-order pharmacy as requested   Continue all other current medications   Follow up with any/all specialists scheduled          left upper arm

## 2025-05-15 ENCOUNTER — TELEPHONE (OUTPATIENT)
Dept: INTERNAL MEDICINE | Facility: CLINIC | Age: 40
End: 2025-05-15
Payer: COMMERCIAL

## 2025-05-15 DIAGNOSIS — I87.002 POST-THROMBOTIC SYNDROME OF LEFT LOWER EXTREMITY: ICD-10-CM

## 2025-05-15 DIAGNOSIS — Z79.01 CHRONIC ANTICOAGULATION: ICD-10-CM

## 2025-05-15 DIAGNOSIS — I82.402 RECURRENT DEEP VEIN THROMBOSIS (DVT) OF LEFT LOWER EXTREMITY: ICD-10-CM

## 2025-05-15 DIAGNOSIS — I87.012 POSTTHROMBOTIC SYNDROME WITH ULCER OF LEFT LOWER EXTREMITY: ICD-10-CM

## 2025-05-15 DIAGNOSIS — I82.512 CHRONIC DEEP VEIN THROMBOSIS (DVT) OF FEMORAL VEIN OF LEFT LOWER EXTREMITY: ICD-10-CM

## 2025-05-15 RX ORDER — RIVAROXABAN 10 MG/1
10 TABLET, FILM COATED ORAL
Qty: 90 TABLET | Refills: 3 | OUTPATIENT
Start: 2025-05-15

## 2025-05-15 NOTE — TELEPHONE ENCOUNTER
Spoke with patient. Advise patient Rx for Xarelto will be refuse due to last office visit in clinic was 10/31/2023.   Assisted patient scheduling appointment with Dr. Boss for 05/27/25 at 1:40 pm.   Patient verbalized understanding.     Ruth CAMPOS

## 2025-05-15 NOTE — TELEPHONE ENCOUNTER
Spoke with pt wife; MA informed her PCP sent over 30 day prescription for Xarelto; pt wife verbalized understanding and stated pt was on his way to  paper copy /LD

## 2025-05-15 NOTE — TELEPHONE ENCOUNTER
----- Message from Nickie sent at 5/15/2025 10:49 AM CDT -----  Type:  Needs Medical AdviceWho Called: Patient's Dom Call Back Number: [phone] Information: Patient is requesting a call back in regards to some insurance issues with the Follwing medication. He will be picking up the 90d Rx but [facility] will only allow him to fill a 30 day. Mail order will want the 90 day. He would like to know if another short term Rx can be written in addition to this one just for local pickup until mail order can start delivering (up to 15d after it is received) rivaroxaban (XARELTO) 10 mg Tab 90 tablet 3 5/14/2025 5/14/2026Sig: Take 1 tablet (10 mg total) by mouth daily with dinner or evening meal.

## 2025-05-15 NOTE — TELEPHONE ENCOUNTER
Spoke with pt; pt stated he tried to  medication but was told medication was canceled; MA advised pt paper prescription was faxed on yesterday and the original can be picked up if needed; pt verbalized understanding /LD

## 2025-05-15 NOTE — TELEPHONE ENCOUNTER
Prescription for Xarelto 10 mg daily, 30 day supply, sent to local [facility] pharmacy  Please notify patient/wife

## 2025-05-26 DIAGNOSIS — I15.2 HYPERTENSION ASSOCIATED WITH DIABETES: Primary | ICD-10-CM

## 2025-05-26 DIAGNOSIS — E11.59 HYPERTENSION ASSOCIATED WITH DIABETES: Primary | ICD-10-CM

## 2025-06-09 ENCOUNTER — OFFICE VISIT (OUTPATIENT)
Dept: DIABETES | Facility: CLINIC | Age: 40
End: 2025-06-09
Payer: COMMERCIAL

## 2025-06-09 ENCOUNTER — PATIENT MESSAGE (OUTPATIENT)
Dept: DIABETES | Facility: CLINIC | Age: 40
End: 2025-06-09
Payer: COMMERCIAL

## 2025-06-09 DIAGNOSIS — E11.8 TYPE 2 DIABETES MELLITUS WITH COMPLICATION, WITH LONG-TERM CURRENT USE OF INSULIN: Primary | ICD-10-CM

## 2025-06-09 DIAGNOSIS — Z79.4 TYPE 2 DIABETES MELLITUS WITH COMPLICATION, WITH LONG-TERM CURRENT USE OF INSULIN: Primary | ICD-10-CM

## 2025-06-09 PROCEDURE — 98006 SYNCH AUDIO-VIDEO EST MOD 30: CPT | Mod: 95,,, | Performed by: NURSE PRACTITIONER

## 2025-06-09 PROCEDURE — 3066F NEPHROPATHY DOC TX: CPT | Mod: CPTII,95,, | Performed by: NURSE PRACTITIONER

## 2025-06-09 PROCEDURE — 3051F HG A1C>EQUAL 7.0%<8.0%: CPT | Mod: CPTII,95,, | Performed by: NURSE PRACTITIONER

## 2025-06-09 PROCEDURE — 3061F NEG MICROALBUMINURIA REV: CPT | Mod: CPTII,95,, | Performed by: NURSE PRACTITIONER

## 2025-06-09 PROCEDURE — 95251 CONT GLUC MNTR ANALYSIS I&R: CPT | Mod: NDTC,,, | Performed by: NURSE PRACTITIONER

## 2025-06-09 PROCEDURE — G2211 COMPLEX E/M VISIT ADD ON: HCPCS | Mod: 95,,, | Performed by: NURSE PRACTITIONER

## 2025-06-09 NOTE — PROGRESS NOTES
The patient location is: Home  The chief complaint leading to consultation is: Diabetes    Visit type: audiovisual    Face to Face time with patient: 18  30 minutes of total time spent on the encounter, which includes face to face time and non-face to face time preparing to see the patient (eg, review of tests), Obtaining and/or reviewing separately obtained history, Documenting clinical information in the electronic or other health record, Independently interpreting results (not separately reported) and communicating results to the patient/family/caregiver, or Care coordination (not separately reported).  Each patient to whom he or she provides medical services by telemedicine is:  (1) informed of the relationship between the physician and patient and the respective role of any other health care provider with respect to management of the patient; and (2) notified that he or she may decline to receive medical services by telemedicine and may withdraw from such care at any time.    Notes:    Kulwant uMeller Jr. is a 40 y.o. male who presents for a follow up evaluation of Type 2 diabetes mellitus.     CHIEF COMPLAINT: Diabetes Consultation    PCP: Lawrence Magana MD      Initial visit with me - 6/27/2023    The patient was initially diagnosed with diabetes at age 25.   Previously followed at [facility] by Lisa Bro NP, last visit 3/2022.    Stopped taking Humalog and Tresiba 3 weeks prior to initial visit with me due to making him feel weak, but restarted week before due to blood sugars going up.      Previous failed treatments include:  Metformin     Social Documentation:  Patient lives in [place]. 3 children, 15, 13, 7.   Occupation: [employer]  Exercise: walking at work.       Diabetes related complications:   peripheral neuropathy.   denies Pancreatitis  reports Gastroparesis - last ED visit in 2/2024 for exacerbation.   denies DKA  denies Hx/family Hx of MEN2/MTC  denies Frequent  "UTIs/yeast infections     Diabetes Medications              glucagon (GVOKE HYPOPEN 2-PACK) 1 mg/0.2 mL AtIn Inject 1 mg into the skin as needed (SEVERE HYPOGLYCEMIA).    insulin aspart U-100 (NOVOLOG U-100 INSULIN ASPART) 100 unit/mL injection Inject 200 units into omnipod insulin pump every 3 days.    JARDIANCE 25 mg tablet Take 1 tablet (25 mg total) by mouth once daily.    TRESIBA FLEXTOUCH U-200 200 unit/mL (3 mL) insulin pen Inject 56 Units into the skin once daily.     Current monitoring regimen: Dexcom G7 CGM    The patient's Dexcom CGM was downloaded and reviewed. For 14 days, patient average glucose was 167 mg/dL. He was above range 31% of the time, in range 69% of the time, and below range 0% of the time. The target range for this patient was 70 - 180 mg/dL. Overall, there was a pattern of post prandial hyperglycemia while on cruise 5/28-6/3, otherwise euglycemia.        Recent hypoglycemic episodes: No.   Patient compliant with glucose checks and medication administration? Yes    DIABETES MANAGEMENT STATUS  Statin: Not taking  ACE/ARB: Not taking  Screening or Prevention Patient's value Goal Complete/Controlled?   HgA1C Testing and Control   Lab Results   Component Value Date    HGBA1C 7.7 (H) 03/25/2025      Annually/Less than 8% No   Lipid profile : 03/25/2025 Annually Yes   LDL control Lab Results   Component Value Date    LDLCALC 99.2 03/25/2025    Annually/Less than 100 mg/dl  No   Nephropathy screening Lab Results   Component Value Date    LABMICR 42.0 03/25/2025     Lab Results   Component Value Date    PROTEINUA Negative 01/31/2024     No results found for: "UTPCR"   Annually Yes   Blood pressure BP Readings from Last 1 Encounters:   04/01/24 117/72    Less than 140/90 Yes   Dilated retinal exam : 09/06/2024 Annually Yes   Foot exam   : 10/28/2024 Annually No   Patient's medications, allergies, surgical, social and family histories were reviewed and updated as appropriate.     Review of Systems "   Constitutional:  Negative for weight loss.   Eyes:  Negative for blurred vision and double vision.   Cardiovascular:  Negative for chest pain.   Gastrointestinal:  Negative for nausea and vomiting.   Genitourinary:  Negative for frequency.   Musculoskeletal:  Negative for falls.   Neurological:  Negative for dizziness and weakness.   Endo/Heme/Allergies:  Negative for polydipsia.   Psychiatric/Behavioral:  Negative for depression.    All other systems reviewed and are negative.       Physical Exam  Constitutional:       General: He is not in acute distress.     Appearance: Normal appearance.   Pulmonary:      Effort: No respiratory distress.   Neurological:      Mental Status: He is alert and oriented to person, place, and time.   Psychiatric:         Mood and Affect: Mood normal.         Behavior: Behavior normal.      There were no vitals taken for this visit.  Wt Readings from Last 3 Encounters:   12/16/24 126.4 kg (278 lb 10.6 oz)   10/28/24 126.4 kg (278 lb 10.6 oz)   10/15/24 126.4 kg (278 lb 10.6 oz)       LAB REVIEW  Lab Results   Component Value Date     03/25/2025    K 4.4 03/25/2025     03/25/2025    CO2 27 03/25/2025    BUN 13 03/25/2025    CREATININE 1.1 03/25/2025    CALCIUM 9.0 03/25/2025    ANIONGAP 7 (L) 03/25/2025    EGFRNORACEVR >60 03/25/2025     Lab Results   Component Value Date    CPEPTIDE 1.79 06/25/2024    GLUTAMICACID 0.02 06/25/2024     Hemoglobin A1C   Date Value Ref Range Status   06/25/2024 12.0 (H) 4.0 - 5.6 % Final     Comment:     ADA Screening Guidelines:  5.7-6.4%  Consistent with prediabetes  >or=6.5%  Consistent with diabetes    High levels of fetal hemoglobin interfere with the HbA1C  assay. Heterozygous hemoglobin variants (HbS, HgC, etc)do  not significantly interfere with this assay.   However, presence of multiple variants may affect accuracy.     02/26/2024 6.8 (H) 4.0 - 5.6 % Final     Comment:     ADA Screening Guidelines:  5.7-6.4%  Consistent with  "prediabetes  >or=6.5%  Consistent with diabetes    High levels of fetal hemoglobin interfere with the HbA1C  assay. Heterozygous hemoglobin variants (HbS, HgC, etc)do  not significantly interfere with this assay.   However, presence of multiple variants may affect accuracy.     12/28/2023 7.4 (H) 4.0 - 5.6 % Final     Comment:     ADA Screening Guidelines:  5.7-6.4%  Consistent with prediabetes  >or=6.5%  Consistent with diabetes    High levels of fetal hemoglobin interfere with the HbA1C  assay. Heterozygous hemoglobin variants (HbS, HgC, etc)do  not significantly interfere with this assay.   However, presence of multiple variants may affect accuracy.       Hemoglobin A1c   Date Value Ref Range Status   03/25/2025 7.7 (H) 4.0 - 5.6 % Final     Comment:     ADA Screening Guidelines:  5.7-6.4%  Consistent with prediabetes  >=6.5%  Consistent with diabetes    High levels of fetal hemoglobin interfere with the HbA1C  assay. Heterozygous hemoglobin variants (HbS, HgC, etc)do  not significantly interfere with this assay.   However, presence of multiple variants may affect accuracy.        ASSESSMENT  No diagnosis found.            PLAN  There are no diagnoses linked to this encounter.        Reviewed pathophysiology of diabetes, complications related to the disease, importance of annual dilated eye exam and daily foot examination. Explained MOA, SE, dosage of medications. Written instructions given and reviewed with patient and patient verbalizes understanding.     8/28/2023 - states attempted to  Jardiance twice, but states there was issue at pharmacy and was never able to fill. Will send to [facility] today. G7 sensors falling off after a few days due to sweat/heat. Patient given additional adhesive options. Did not have clarity vane when he was trying to set up sharing. Will give more detailed instruction and new sharing code. Follow up 4 weeks.     9/25/2023 - started jardiance yesterday, states he "felt a " "little weird" after taking it, but better today. Still having issues with adhesive with g7, discussed additional overlay options. Will schedule nurse visit at the [facility] to set up data sharing with dexcom and have labs done prior to f/u     10/23/2023 - patient had to cancel nurse visit due to work schedule. Will be able to go on Friday, will f/u 1 week to review dexcom data. A1c improved to 8.2.     6/12/2024 - Last visit with me 10/23/23. 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 Had repeat presentation for additional infection to include cellulitis/fasciitis/soft tissue infection which required additional surgical procedures. Today patient reports all wounds are well healed. Has not had G7 since hospital discharge in March. Has been performing fingersticks and reports compliance with insulin. He is interested in OP5, will refer for pump eval. He is requesting medical clearance to work with 's office. Discussed that I will need to review at least 1 week of dexcom data and get updated labs prior to clearance. He reports he was hypoglycemic at his physical last week with glucose 45, +s/s.     7/15/2024 - no changes. CGM showed GMI 8%, work clearance completed.     10/14/2024 - will refer for pump eval. Interested in OP5. Stopped jardiance after last visit, will restart. Working with 's office now.     3/24/2025 - OP5 start 12/16/24. Doing well. Will decrease targets to 120. Labs due. Still not taking Jardiance, he thought it was stopped, but he will restart.    4/21/2025 - Overall, there was a pattern of post prandial hyperglycemia, overnight hyperglycemia. Attributes to snacking on chips at night, not bolusing for snack. Will cut out chips..      6/9/2025 - Overall, there was a pattern " of post prandial hyperglycemia while on cruise 5/28-6/3, otherwise euglycemia.  Also reports he notices glucoses will run higher when he has sensor/pods on left arm and lower when on right arm. Denies edema to either extremity. Overall well controlled, will need A1c at end of month    PATIENT INSTRUCTIONS     Lifestyle modification with well balanced diet and at least 30 minutes of physical activity daily recommended.   Increase water intake.   Increase protein and non-starchy vegetable servings with meals.   Decrease carbohydrate servings with meals.   Take 10 minute walk after each meal.   Avoid snacking on carbohydrates, choose low carb, high protein snacks.   Incorporate resistance training with weights or resistance bands with exercise regimen at least 3 days a week.       INSULIN PUMP SETTINGS:  Insulin pump type: Omnipod 5  Insulin Type: Novolog  Basal rate: 1.3 Units/hour   Insulin to Carbohydrate Ratio: 1/7   Insulin Sensitivity Factor: 1/27   Glucose Target: 110-110 mg/dl    Active Insulin Time: 2 hours    Max Basal Rate: 5 units/hour    Continue Jardiance 25 mg by mouth daily.     U100 Pump Failure Plan:   We have decided to develop a plan in the event that your pump breaks or is nonfunctioning. After 4 hours without pump use, you should begin to consider putting your Pump Failure Back Up Plan into action especially if you foresee that you may not be able to use your pump for more than 20 hours. Since you are currently using short acting insulin (Humalog/Novolog/Admelog/Novolin R) in your pump, you will need access to your short acting insulin vial, syringes, and a back up long acting insulin in the event of a pump failure. I have sent a prescription for a long acting insulin to your pharmacy for use during your emergency pump failure plan. It is important that you keep both types of insulin/syringes with you so that you may have access to it at least 3 times daily if needed. This medication and syringes  should be brought with you on overnight trips in the case of an emergency pump failure. This means making a plan to keeping your insulin and syringes at school, work, or other places you frequent. If needed, please reach out to my office about any letters you may need for permission to keep these items for emergency purposes. We will outline your plan for pump failure emergencies below, but please remember to contact the insulin pump company (toll free number is printed on the label on the back of the insulin pump) and our office if you have a pump failure. When the insulin pump is restarted, do not restart basal rates until at least 22 hours after the last long acting insulin injection. You can set a 0% temporary basal setting that will last until this time and use your pump to bolus for meals and correction. If you need help with these feature, please call your insulin pump company tech support line or ask them in anticipation of this action.     Continue Tresiba 43 units daily.   Continue Novolog three times daily with meals using correction scale below:     If less than 200, may take 5 units of Novolog  If  - 250, may take 6 units of Novolog  If  - 300, may take 7 units of Novolog  If  - 350, may take 8 units of Novolog  If  - 400, may take 9 units of Novolog  If +, may take 10 units of Novolog     NOTE: This plan is based off of your average insulin needs per recent pump report. Short Acting Insulin should be taken 15 mins before eating a meal and every 2 hours as needed for correction dosing. The dose calculation above may be used for correction dosing by either only calculating the units to cover blood sugar or inputting a carb amount of 0g if not eating. Please not that taking insulin at the 2 hour shira may mean you still have insulin on board and may lead to hypoglycemia or low blood sugars due to insulin stacking. If calculating correction dosing for the 3rd time or more time,  please consider dividing in half to avoid over correction of blood sugar leading to hypoglycemia. Please call office to report to  any large sugar abnormalities including hyperglycemia > 250 and hypoglycemia < 60. Please continue to wear CGM even if not using pump to monitor for blood sugar fluctuations. Please make sure glucose tablets or sugary beverages available in case of hypoglycemia. Please make sure ketone strips are available to you (may buy over the counter from pharmacy) and monitor urine ketones every 12 hours; if you have two readings og BG > 250 mg/dl; or if signs/symptoms concerning for DKA present including abdominal pain, nausea, vomiting, confusion, trouble breathing, dry mouth, fruity breath or breath that smells like nail polish remover, and excessive thirst/urination. If your urine ketone strips shows trace to small ketones, drink plenty of water and give yourself a correction dose. If BG is less than 250 mg/dL and you do not have moderate/large ketones, you may also exercise to help lower glucose levels. If you have moderate/large ketones, please give correction dosage, drink water, and immediately contact [facility] if during normal business hours. If outside of normal business hours, please give correction dosage, drink water, and report to nearest ED for evaluation/management of DKA.     When the insulin pump is restarted, do not restart basal rates until at least 22 hours after the last long acting insulin injection. You can set a 0% temporary basal setting that will last until this time and still use your pump to bolus for meals and correction. For help with setting temporary basal setting, please call office, view your pump's online resource library, or call 24 hour technical support hotline.     For any technical insulin pump issues, please contact the insulin pump company; the toll free number is printed on the label on the back of the insulin pump.    No follow-ups on  file.    Portions of this note were prepared with [employer] voice recognition transcription software. Grammatical errors, including garbled syntax, mangle pronouns, and other bizarre constructions may be attributed to that software system.

## 2025-06-09 NOTE — PATIENT INSTRUCTIONS
PATIENT INSTRUCTIONS     Lifestyle modification with well balanced diet and at least 30 minutes of physical activity daily recommended.   Increase water intake.   Increase protein and non-starchy vegetable servings with meals.   Decrease carbohydrate servings with meals.   Take 10 minute walk after each meal.   Avoid snacking on carbohydrates, choose low carb, high protein snacks.   Incorporate resistance training with weights or resistance bands with exercise regimen at least 3 days a week.       INSULIN PUMP SETTINGS:  Insulin pump type: Omnipod 5  Insulin Type: Novolog  Basal rate: 1.3 Units/hour   Insulin to Carbohydrate Ratio: 1/7   Insulin Sensitivity Factor: 1/27   Glucose Target: 110-110 mg/dl    Active Insulin Time: 2 hours    Max Basal Rate: 5 units/hour    Continue Jardiance 25 mg by mouth daily.     U100 Pump Failure Plan:   We have decided to develop a plan in the event that your pump breaks or is nonfunctioning. After 4 hours without pump use, you should begin to consider putting your Pump Failure Back Up Plan into action especially if you foresee that you may not be able to use your pump for more than 20 hours. Since you are currently using short acting insulin (Humalog/Novolog/Admelog/Novolin R) in your pump, you will need access to your short acting insulin vial, syringes, and a back up long acting insulin in the event of a pump failure. I have sent a prescription for a long acting insulin to your pharmacy for use during your emergency pump failure plan. It is important that you keep both types of insulin/syringes with you so that you may have access to it at least 3 times daily if needed. This medication and syringes should be brought with you on overnight trips in the case of an emergency pump failure. This means making a plan to keeping your insulin and syringes at school, work, or other places you frequent. If needed, please reach out to my office about any letters you may need for permission to  keep these items for emergency purposes. We will outline your plan for pump failure emergencies below, but please remember to contact the insulin pump company (toll free number is printed on the label on the back of the insulin pump) and our office if you have a pump failure. When the insulin pump is restarted, do not restart basal rates until at least 22 hours after the last long acting insulin injection. You can set a 0% temporary basal setting that will last until this time and use your pump to bolus for meals and correction. If you need help with these feature, please call your insulin pump company tech support line or ask them in anticipation of this action.     Continue Tresiba 43 units daily.   Continue Novolog three times daily with meals using correction scale below:     If less than 200, may take 5 units of Novolog  If  - 250, may take 6 units of Novolog  If  - 300, may take 7 units of Novolog  If  - 350, may take 8 units of Novolog  If  - 400, may take 9 units of Novolog  If +, may take 10 units of Novolog     NOTE: This plan is based off of your average insulin needs per recent pump report. Short Acting Insulin should be taken 15 mins before eating a meal and every 2 hours as needed for correction dosing. The dose calculation above may be used for correction dosing by either only calculating the units to cover blood sugar or inputting a carb amount of 0g if not eating. Please not that taking insulin at the 2 hour shira may mean you still have insulin on board and may lead to hypoglycemia or low blood sugars due to insulin stacking. If calculating correction dosing for the 3rd time or more time, please consider dividing in half to avoid over correction of blood sugar leading to hypoglycemia. Please call office to report to  any large sugar abnormalities including hyperglycemia > 250 and hypoglycemia < 60. Please continue to wear CGM even if not using pump to monitor for blood  sugar fluctuations. Please make sure glucose tablets or sugary beverages available in case of hypoglycemia. Please make sure ketone strips are available to you (may buy over the counter from pharmacy) and monitor urine ketones every 12 hours; if you have two readings og BG > 250 mg/dl; or if signs/symptoms concerning for DKA present including abdominal pain, nausea, vomiting, confusion, trouble breathing, dry mouth, fruity breath or breath that smells like nail polish remover, and excessive thirst/urination. If your urine ketone strips shows trace to small ketones, drink plenty of water and give yourself a correction dose. If BG is less than 250 mg/dL and you do not have moderate/large ketones, you may also exercise to help lower glucose levels. If you have moderate/large ketones, please give correction dosage, drink water, and immediately contact [facility] if during normal business hours. If outside of normal business hours, please give correction dosage, drink water, and report to nearest ED for evaluation/management of DKA.     When the insulin pump is restarted, do not restart basal rates until at least 22 hours after the last long acting insulin injection. You can set a 0% temporary basal setting that will last until this time and still use your pump to bolus for meals and correction. For help with setting temporary basal setting, please call office, view your pump's online resource library, or call 24 hour technical support hotline.     For any technical insulin pump issues, please contact the insulin pump company; the toll free number is printed on the label on the back of the insulin pump.

## 2025-06-25 DIAGNOSIS — I87.012 POSTTHROMBOTIC SYNDROME WITH ULCER OF LEFT LOWER EXTREMITY: ICD-10-CM

## 2025-06-25 DIAGNOSIS — I87.002 POST-THROMBOTIC SYNDROME OF LEFT LOWER EXTREMITY: ICD-10-CM

## 2025-06-25 DIAGNOSIS — I82.512 CHRONIC DEEP VEIN THROMBOSIS (DVT) OF FEMORAL VEIN OF LEFT LOWER EXTREMITY: ICD-10-CM

## 2025-06-25 DIAGNOSIS — I82.402 RECURRENT DEEP VEIN THROMBOSIS (DVT) OF LEFT LOWER EXTREMITY: ICD-10-CM

## 2025-06-25 DIAGNOSIS — Z79.01 CHRONIC ANTICOAGULATION: ICD-10-CM

## 2025-06-28 ENCOUNTER — PATIENT MESSAGE (OUTPATIENT)
Dept: DIABETES | Facility: CLINIC | Age: 40
End: 2025-06-28
Payer: COMMERCIAL

## 2025-06-30 NOTE — TELEPHONE ENCOUNTER
No care due was identified.  [facility] Embedded Care Due Messages. Reference number: [id number].   6/25/2025 1:12:10 PM CDT  
This was filled last month as a short term supply until he received medication from the mail order pharmacy     Please contact patient and verify received medication from the mail order pharmacy in does not need this refilled at the local pharmacy  
Initial (On Arrival)

## 2025-07-04 ENCOUNTER — OFFICE VISIT (OUTPATIENT)
Dept: URGENT CARE | Facility: CLINIC | Age: 40
End: 2025-07-04
Payer: COMMERCIAL

## 2025-07-04 VITALS
TEMPERATURE: 98 F | DIASTOLIC BLOOD PRESSURE: 81 MMHG | HEIGHT: 74 IN | OXYGEN SATURATION: 98 % | WEIGHT: 250 LBS | HEART RATE: 85 BPM | SYSTOLIC BLOOD PRESSURE: 136 MMHG | RESPIRATION RATE: 16 BRPM | BODY MASS INDEX: 32.08 KG/M2

## 2025-07-04 DIAGNOSIS — R22.1 LUMP ON NECK: Primary | ICD-10-CM

## 2025-07-04 NOTE — PROGRESS NOTES
"Subjective:      Patient ID: Kulwant Mueller Jr. is a 40 y.o. male.    Vitals:  height is 6' 2" (1.88 m) and weight is 113.4 kg (250 lb). His oral temperature is 97.9 °F (36.6 °C). His blood pressure is 136/81 and his pulse is 85. His respiration is 16 and oxygen saturation is 98%.     Chief Complaint: Mass    Pt reports lump to back of head/neck area since yesterday. Pt denies any drainage, pain, or itching to the area. Pt reports using antibiotic cream with no improvement.     Mass  This is a new problem. The current episode started yesterday. The problem occurs constantly. The problem has been unchanged. Pertinent negatives include no fever or rash. Treatments tried: antibiotic cream. The treatment provided no relief.       Constitution: Negative for fever.   Skin:  Negative for rash.      Objective:     Physical Exam   HENT:   Head:       Lump at the base of skull. Non flucuant, non tender, no erythema       Comments: Lump at the base of skull. Non flucuant, non tender, no erythema       Assessment:     1. Lump on neck      Here with lump on neck that he noticed yesterday. He denies pain or injury. His wife sent him in to get it checked out. It is non tender. There is no erythema or fluctuance. I am not concerned for infection. I recommended the patient follow up with PCP in a week if it still persists to order an ultrasound. He was instructed to hydrate and return to the clinic if new or worsening symptoms occur.    Plan:       Lump on neck                      "

## 2025-07-08 ENCOUNTER — OFFICE VISIT (OUTPATIENT)
Dept: URGENT CARE | Facility: CLINIC | Age: 40
End: 2025-07-08
Payer: COMMERCIAL

## 2025-07-08 VITALS
TEMPERATURE: 98 F | DIASTOLIC BLOOD PRESSURE: 80 MMHG | SYSTOLIC BLOOD PRESSURE: 140 MMHG | HEART RATE: 88 BPM | OXYGEN SATURATION: 96 % | BODY MASS INDEX: 33.37 KG/M2 | HEIGHT: 74 IN | WEIGHT: 260 LBS | RESPIRATION RATE: 18 BRPM

## 2025-07-08 DIAGNOSIS — R22.1 MASS PRESENT ON ONE SIDE OF NECK: Primary | ICD-10-CM

## 2025-07-08 PROCEDURE — 99213 OFFICE O/P EST LOW 20 MIN: CPT | Mod: S$GLB,,, | Performed by: PHYSICIAN ASSISTANT

## 2025-07-08 NOTE — PROGRESS NOTES
"Subjective:      Patient ID: Kulwant Mueller Jr. is a 40 y.o. male.    Vitals:  height is 6' 2" (1.88 m) and weight is 117.9 kg (260 lb). His oral temperature is 97.9 °F (36.6 °C). His blood pressure is 140/80 (abnormal) and his pulse is 88. His respiration is 18 and oxygen saturation is 96%.     Chief Complaint: Mass    40 year old patient presents here today with c/o of bump on neck x 1wk. Pt states he was last here last week due to the bump on his neck and was told to give it a week to go down but pt states the bumps is getting worse. He denies pain on palpation but does have some pain when turning his head in certain ways.    Mass  This is a new problem. The current episode started in the past 7 days. The problem occurs constantly. The problem has been unchanged. Associated symptoms include neck pain. Pertinent negatives include no abdominal pain, anorexia, arthralgias, change in bowel habit, chest pain, chills, congestion, coughing, diaphoresis, fatigue, fever, headaches, joint swelling, myalgias, nausea, numbness, rash, sore throat, swollen glands, urinary symptoms, vertigo, visual change, vomiting or weakness. Nothing aggravates the symptoms. He has tried nothing for the symptoms.       Constitution: Negative for chills, sweating, fatigue and fever.   HENT:  Negative for congestion and sore throat.    Neck: Positive for neck pain.   Cardiovascular:  Negative for chest pain.   Respiratory:  Negative for cough.    Gastrointestinal:  Negative for abdominal pain, nausea and vomiting.   Musculoskeletal:  Negative for joint pain, joint swelling and muscle ache.   Skin:  Negative for rash.   Neurological:  Negative for history of vertigo, headaches and numbness.      Objective:     Physical Exam   Constitutional: He is oriented to person, place, and time. He appears well-developed.   HENT:   Head: Normocephalic and atraumatic.   Ears:   Right Ear: External ear normal.   Left Ear: External ear normal.   Nose: Nose " normal.   Mouth/Throat: Oropharynx is clear and moist.   Eyes: Conjunctivae, EOM and lids are normal.   Neck: Neck supple.          Comments: 5-6 cm hard mass on back up neck that extends to base of skull. No fluctuance or erythema noted.  No signs of infection.   Cardiovascular: Normal rate.   Pulmonary/Chest: Effort normal.   Musculoskeletal: Normal range of motion.         General: Normal range of motion.   Neurological: He is alert and oriented to person, place, and time.   Skin: Skin is warm, dry and intact.   Psychiatric: His speech is normal and behavior is normal. Judgment and thought content normal.   Nursing note and vitals reviewed.      Assessment:     1. Mass present on one side of neck        Plan:   VSS. Patient non-toxic appearing.  Discussed with patient that I didn't feel that the best course of action would be to have him either go to a Dermatologist or his PCP.  I informed him that I could send in a referral for dermatology, but patient wants to see his PCP first.  Patient verbalized understanding, agrees with the plan, and is comfortable with discharge.      Mass present on one side of neck

## 2025-07-15 RX ORDER — RIVAROXABAN 10 MG/1
10 TABLET, FILM COATED ORAL
Qty: 30 TABLET | Refills: 0 | OUTPATIENT
Start: 2025-07-15

## 2025-08-29 ENCOUNTER — PATIENT MESSAGE (OUTPATIENT)
Dept: ADMINISTRATIVE | Facility: HOSPITAL | Age: 40
End: 2025-08-29
Payer: COMMERCIAL

## (undated) DEVICE — BLADE SCALP OPHTL BEVEL STR

## (undated) DEVICE — SUT ETHILON 3/0 18IN PS-1

## (undated) DEVICE — SPONGE GAUZE 16PLY 4X4

## (undated) DEVICE — PROBE ARTHSCP EDGE  90 SUCTION

## (undated) DEVICE — SPONGE COTTON TRAY 4X4IN

## (undated) DEVICE — DRAPE STERI U-SHAPED 47X51IN

## (undated) DEVICE — DRESSING TRANS 4X4 TEGADERM

## (undated) DEVICE — DRAPE U SPLIT SHEET 54X76IN

## (undated) DEVICE — COVER OVERHEAD SURG LT BLUE

## (undated) DEVICE — BANDAGE ACE ELASTIC 6"

## (undated) DEVICE — PACK SURGERY START

## (undated) DEVICE — PACK BASIC

## (undated) DEVICE — SUT VICRYL 2-0 CT-1 VCP345H

## (undated) DEVICE — GOWN AERO CHROME W/ TOWEL XL

## (undated) DEVICE — SEE MEDLINE ITEM 157117

## (undated) DEVICE — SEE MEDLINE ITEM 157216

## (undated) DEVICE — TRAY ARTHROSCOPY 2/CS

## (undated) DEVICE — SEE L#120831

## (undated) DEVICE — SHAVER ULTRAFFR 4.2MM

## (undated) DEVICE — DRESSING TEGADERM IV 3.5 X 4.5

## (undated) DEVICE — ELECTRODE REM PLYHSV RETURN 9

## (undated) DEVICE — APPLICATOR CHLORAPREP ORN 26ML

## (undated) DEVICE — SEE MEDLINE ITEM 157027

## (undated) DEVICE — DRESSING N ADH OIL EMUL 3X3

## (undated) DEVICE — TOURNIQUET SB QC SP 34X4IN

## (undated) DEVICE — GLOVE BIOGEL SZ 8 1/2

## (undated) DEVICE — NDL ECLIPSE SAFETY 18GX1-1/2IN

## (undated) DEVICE — NDL SAFETY 25G X 1.5 ECLIPSE

## (undated) DEVICE — GLOVE 8 PROTEXIS PI ORTHO

## (undated) DEVICE — BANDAGE MATRIX HK LOOP 6IN 5YD

## (undated) DEVICE — SOL IRR NACL .9% 3000ML

## (undated) DEVICE — SHEET DRAPE FAN-FOLDED 3/4

## (undated) DEVICE — DRESSING GAUZE OIL EMUL 3X8

## (undated) DEVICE — PAD ABDOMINAL STERILE 5X9IN

## (undated) DEVICE — GAUZE AVANT SPNG 4PLY STRL 4X4

## (undated) DEVICE — DRESSING ADAPTIC N ADH 3X8IN

## (undated) DEVICE — DRESSING XEROFORM NONADH 1X8IN

## (undated) DEVICE — TOWEL OR DISP STRL BLUE 4/PK

## (undated) DEVICE — SOL 9P NACL IRR PIC IL

## (undated) DEVICE — PAD CAST SPECIALIST STRL 6

## (undated) DEVICE — BANDAGE ESMARK ELASTIC ST 6X9

## (undated) DEVICE — GOWN POLY REINF BRTH SLV XL

## (undated) DEVICE — SUT VICRYL PLUS 0 CT1 36IN

## (undated) DEVICE — IMMOBILIZER KNEE 20 L

## (undated) DEVICE — BNDG COFLEX FOAM LF2 ST 6X5YD

## (undated) DEVICE — TAPE SURG DURAPORE 2 X10YD

## (undated) DEVICE — DRAPE THREE-QTR REINF 53X77IN

## (undated) DEVICE — SUT CTD VICRYL 1 VIL BR CT

## (undated) DEVICE — MANIFOLD 4 PORT

## (undated) DEVICE — DRAPE THREE-QUARTER 53X77IN

## (undated) DEVICE — TIP YANKAUERS BULB NO VENT

## (undated) DEVICE — SPONGE LAP 18X18 PREWASHED

## (undated) DEVICE — GOWN POLY REINF BRTH SLV LG

## (undated) DEVICE — SYR 10CC LUER LOCK

## (undated) DEVICE — STAPLER SKIN ROTATING HEAD

## (undated) DEVICE — DECANTER 6 VIAL

## (undated) DEVICE — DRESSING N ADH OIL EMUL 3X8

## (undated) DEVICE — GAUZE XEROFORM NONADH 5X9IN

## (undated) DEVICE — GLOVE SURG BIOGEL LATEX SZ 7.5

## (undated) DEVICE — DRAPE T EXTRM SURG 121X128X90

## (undated) DEVICE — SYR 30CC LUER LOCK

## (undated) DEVICE — POSITIONER HEAD DONUT 9IN FOAM

## (undated) DEVICE — GLOVE SURGICAL LATEX SZ 8

## (undated) DEVICE — SET TUR BLADDER IRRIG Y TYPE

## (undated) DEVICE — CONTAINER SPECIMEN OR STER 4OZ

## (undated) DEVICE — PROBE ARTHO ENERGY 90 DEG

## (undated) DEVICE — DRAPE ARTHSCP T ORTHOMAX POUCH

## (undated) DEVICE — SUPPORT ULNA NERVE PROTECTOR

## (undated) DEVICE — GAUZE SPONGE 4X4 12PLY

## (undated) DEVICE — GOWN POLY REINF X-LONG 2XL

## (undated) DEVICE — Device

## (undated) DEVICE — TRAY MINOR ORTHO OMC

## (undated) DEVICE — GLOVE BIOGEL SKINSENSE PI 6.0

## (undated) DEVICE — GLOVE BIOGEL PIMICRO INDIC 6.5

## (undated) DEVICE — BANDAGE ELAS SOFTWRAP ST 6X5YD

## (undated) DEVICE — TUBE SET INFLOW/OUTFLOW

## (undated) DEVICE — DRAPE TOP 53X102IN

## (undated) DEVICE — BLADE SURG CARBON STEEL #10

## (undated) DEVICE — PAD PREP CUFFED NS 24X48IN

## (undated) DEVICE — SEE MEDLINE ITEM 146298

## (undated) DEVICE — PAD CAST SPECIALIST STRL 4

## (undated) DEVICE — PADDING WYTEX UNDRCST 6INX4YD

## (undated) DEVICE — SPONGE DERMACEA GAUZE 4X4

## (undated) DEVICE — NDL SPINAL 18GX3.5 SPINOCAN

## (undated) DEVICE — DRAPE SURG W/TWL 17 5/8X23